# Patient Record
Sex: MALE | Race: WHITE | NOT HISPANIC OR LATINO | Employment: OTHER | URBAN - METROPOLITAN AREA
[De-identification: names, ages, dates, MRNs, and addresses within clinical notes are randomized per-mention and may not be internally consistent; named-entity substitution may affect disease eponyms.]

---

## 2017-01-04 ENCOUNTER — COMMUNICATION - HEALTHEAST (OUTPATIENT)
Dept: NURSING | Facility: CLINIC | Age: 77
End: 2017-01-04

## 2017-01-04 DIAGNOSIS — I48.91 ATRIAL FIBRILLATION, UNSPECIFIED TYPE (H): ICD-10-CM

## 2017-01-09 ENCOUNTER — AMBULATORY - HEALTHEAST (OUTPATIENT)
Dept: CARDIOLOGY | Facility: CLINIC | Age: 77
End: 2017-01-09

## 2017-01-09 DIAGNOSIS — Z95.810 ICD (IMPLANTABLE CARDIOVERTER-DEFIBRILLATOR) IN PLACE: ICD-10-CM

## 2017-01-10 ENCOUNTER — COMMUNICATION - HEALTHEAST (OUTPATIENT)
Dept: CARDIOLOGY | Facility: CLINIC | Age: 77
End: 2017-01-10

## 2017-01-17 ENCOUNTER — COMMUNICATION - HEALTHEAST (OUTPATIENT)
Dept: NURSING | Facility: CLINIC | Age: 77
End: 2017-01-17

## 2017-01-17 DIAGNOSIS — I48.91 ATRIAL FIBRILLATION, UNSPECIFIED TYPE (H): ICD-10-CM

## 2017-01-30 ENCOUNTER — COMMUNICATION - HEALTHEAST (OUTPATIENT)
Dept: NURSING | Facility: CLINIC | Age: 77
End: 2017-01-30

## 2017-01-30 DIAGNOSIS — I48.91 ATRIAL FIBRILLATION, UNSPECIFIED TYPE (H): ICD-10-CM

## 2017-02-06 ENCOUNTER — COMMUNICATION - HEALTHEAST (OUTPATIENT)
Dept: CARDIOLOGY | Facility: CLINIC | Age: 77
End: 2017-02-06

## 2017-02-06 ENCOUNTER — AMBULATORY - HEALTHEAST (OUTPATIENT)
Dept: CARDIOLOGY | Facility: CLINIC | Age: 77
End: 2017-02-06

## 2017-02-06 DIAGNOSIS — Z95.810 ICD (IMPLANTABLE CARDIOVERTER-DEFIBRILLATOR) IN PLACE: ICD-10-CM

## 2017-02-07 ENCOUNTER — COMMUNICATION - HEALTHEAST (OUTPATIENT)
Dept: SCHEDULING | Facility: CLINIC | Age: 77
End: 2017-02-07

## 2017-02-10 ENCOUNTER — OFFICE VISIT - HEALTHEAST (OUTPATIENT)
Dept: FAMILY MEDICINE | Facility: CLINIC | Age: 77
End: 2017-02-10

## 2017-02-10 ENCOUNTER — COMMUNICATION - HEALTHEAST (OUTPATIENT)
Dept: NURSING | Facility: CLINIC | Age: 77
End: 2017-02-10

## 2017-02-10 DIAGNOSIS — J40 BRONCHITIS: ICD-10-CM

## 2017-02-10 DIAGNOSIS — I48.91 ATRIAL FIBRILLATION, UNSPECIFIED TYPE (H): ICD-10-CM

## 2017-02-10 DIAGNOSIS — I48.20 CHRONIC ATRIAL FIBRILLATION (H): ICD-10-CM

## 2017-02-13 ENCOUNTER — OFFICE VISIT - HEALTHEAST (OUTPATIENT)
Dept: NEUROSURGERY | Facility: CLINIC | Age: 77
End: 2017-02-13

## 2017-02-13 ENCOUNTER — HOSPITAL ENCOUNTER (OUTPATIENT)
Dept: CT IMAGING | Facility: CLINIC | Age: 77
Discharge: HOME | End: 2017-02-13
Attending: SURGERY

## 2017-02-13 ENCOUNTER — AMBULATORY - HEALTHEAST (OUTPATIENT)
Dept: NEUROSURGERY | Facility: CLINIC | Age: 77
End: 2017-02-13

## 2017-02-13 ENCOUNTER — COMMUNICATION - HEALTHEAST (OUTPATIENT)
Dept: CARDIOLOGY | Facility: CLINIC | Age: 77
End: 2017-02-13

## 2017-02-13 DIAGNOSIS — S22.009A THORACIC SPINE FRACTURE (H): ICD-10-CM

## 2017-02-13 DIAGNOSIS — S12.9XXA MULTIPLE FRACTURES OF CERVICAL SPINE (H): ICD-10-CM

## 2017-02-13 ASSESSMENT — MIFFLIN-ST. JEOR: SCORE: 1652.01

## 2017-02-14 ENCOUNTER — COMMUNICATION - HEALTHEAST (OUTPATIENT)
Dept: NURSING | Facility: CLINIC | Age: 77
End: 2017-02-14

## 2017-02-14 ENCOUNTER — AMBULATORY - HEALTHEAST (OUTPATIENT)
Dept: LAB | Facility: CLINIC | Age: 77
End: 2017-02-14

## 2017-02-14 ENCOUNTER — OFFICE VISIT - HEALTHEAST (OUTPATIENT)
Dept: FAMILY MEDICINE | Facility: CLINIC | Age: 77
End: 2017-02-14

## 2017-02-14 DIAGNOSIS — I48.91 ATRIAL FIBRILLATION, UNSPECIFIED TYPE (H): ICD-10-CM

## 2017-02-14 DIAGNOSIS — I48.20 CHRONIC ATRIAL FIBRILLATION (H): ICD-10-CM

## 2017-02-14 DIAGNOSIS — R60.9 EDEMA, UNSPECIFIED TYPE: ICD-10-CM

## 2017-02-14 DIAGNOSIS — N18.3 CHRONIC KIDNEY DISEASE (CKD), STAGE 3 (MODERATE): ICD-10-CM

## 2017-02-14 DIAGNOSIS — I50.22 CHRONIC SYSTOLIC CONGESTIVE HEART FAILURE (H): ICD-10-CM

## 2017-02-15 ENCOUNTER — AMBULATORY - HEALTHEAST (OUTPATIENT)
Dept: NEUROSURGERY | Facility: CLINIC | Age: 77
End: 2017-02-15
Payer: MEDICARE

## 2017-02-21 ENCOUNTER — COMMUNICATION - HEALTHEAST (OUTPATIENT)
Dept: NURSING | Facility: CLINIC | Age: 77
End: 2017-02-21

## 2017-02-21 DIAGNOSIS — I48.91 ATRIAL FIBRILLATION, UNSPECIFIED TYPE (H): ICD-10-CM

## 2017-02-22 ENCOUNTER — COMMUNICATION - HEALTHEAST (OUTPATIENT)
Dept: FAMILY MEDICINE | Facility: CLINIC | Age: 77
End: 2017-02-22

## 2017-02-22 DIAGNOSIS — I48.91 ATRIAL FIBRILLATION, UNSPECIFIED TYPE (H): ICD-10-CM

## 2017-03-02 ENCOUNTER — COMMUNICATION - HEALTHEAST (OUTPATIENT)
Dept: NURSING | Facility: CLINIC | Age: 77
End: 2017-03-02

## 2017-03-02 DIAGNOSIS — I48.91 ATRIAL FIBRILLATION, UNSPECIFIED TYPE (H): ICD-10-CM

## 2017-03-09 ENCOUNTER — COMMUNICATION - HEALTHEAST (OUTPATIENT)
Dept: NURSING | Facility: CLINIC | Age: 77
End: 2017-03-09

## 2017-03-09 DIAGNOSIS — I48.91 ATRIAL FIBRILLATION, UNSPECIFIED TYPE (H): ICD-10-CM

## 2017-03-16 ENCOUNTER — COMMUNICATION - HEALTHEAST (OUTPATIENT)
Dept: NURSING | Facility: CLINIC | Age: 77
End: 2017-03-16

## 2017-03-16 DIAGNOSIS — I48.91 ATRIAL FIBRILLATION, UNSPECIFIED TYPE (H): ICD-10-CM

## 2017-03-21 ENCOUNTER — AMBULATORY - HEALTHEAST (OUTPATIENT)
Dept: HEALTH INFORMATION MANAGEMENT | Facility: CLINIC | Age: 77
End: 2017-03-21

## 2017-03-26 ENCOUNTER — COMMUNICATION - HEALTHEAST (OUTPATIENT)
Dept: FAMILY MEDICINE | Facility: CLINIC | Age: 77
End: 2017-03-26

## 2017-03-26 DIAGNOSIS — K21.9 GERD (GASTROESOPHAGEAL REFLUX DISEASE): ICD-10-CM

## 2017-03-30 ENCOUNTER — COMMUNICATION - HEALTHEAST (OUTPATIENT)
Dept: ADMINISTRATIVE | Facility: CLINIC | Age: 77
End: 2017-03-30

## 2017-03-30 ENCOUNTER — COMMUNICATION - HEALTHEAST (OUTPATIENT)
Dept: NURSING | Facility: CLINIC | Age: 77
End: 2017-03-30

## 2017-03-30 DIAGNOSIS — I48.91 ATRIAL FIBRILLATION, UNSPECIFIED TYPE (H): ICD-10-CM

## 2017-04-13 ENCOUNTER — COMMUNICATION - HEALTHEAST (OUTPATIENT)
Dept: NURSING | Facility: CLINIC | Age: 77
End: 2017-04-13

## 2017-04-13 ENCOUNTER — COMMUNICATION - HEALTHEAST (OUTPATIENT)
Dept: FAMILY MEDICINE | Facility: CLINIC | Age: 77
End: 2017-04-13

## 2017-04-13 ENCOUNTER — AMBULATORY - HEALTHEAST (OUTPATIENT)
Dept: LAB | Facility: CLINIC | Age: 77
End: 2017-04-13

## 2017-04-13 DIAGNOSIS — I48.91 ATRIAL FIBRILLATION, UNSPECIFIED TYPE (H): ICD-10-CM

## 2017-04-17 ENCOUNTER — AMBULATORY - HEALTHEAST (OUTPATIENT)
Dept: CARDIOLOGY | Facility: CLINIC | Age: 77
End: 2017-04-17

## 2017-04-17 DIAGNOSIS — Z95.810 ICD (IMPLANTABLE CARDIOVERTER-DEFIBRILLATOR) IN PLACE: ICD-10-CM

## 2017-04-26 ENCOUNTER — AMBULATORY - HEALTHEAST (OUTPATIENT)
Dept: PODIATRY | Age: 77
End: 2017-04-26

## 2017-04-26 DIAGNOSIS — L60.2 ONYCHAUXIS: ICD-10-CM

## 2017-04-26 DIAGNOSIS — M79.673 PAIN OF FOOT, UNSPECIFIED LATERALITY: ICD-10-CM

## 2017-04-27 ENCOUNTER — COMMUNICATION - HEALTHEAST (OUTPATIENT)
Dept: NURSING | Facility: CLINIC | Age: 77
End: 2017-04-27

## 2017-04-27 ENCOUNTER — AMBULATORY - HEALTHEAST (OUTPATIENT)
Dept: LAB | Facility: CLINIC | Age: 77
End: 2017-04-27

## 2017-04-27 DIAGNOSIS — I48.91 ATRIAL FIBRILLATION, UNSPECIFIED TYPE (H): ICD-10-CM

## 2017-04-28 ENCOUNTER — OFFICE VISIT - HEALTHEAST (OUTPATIENT)
Dept: ENDOCRINOLOGY | Facility: CLINIC | Age: 77
End: 2017-04-28

## 2017-04-28 DIAGNOSIS — M81.0 OSTEOPOROSIS: ICD-10-CM

## 2017-04-28 ASSESSMENT — MIFFLIN-ST. JEOR: SCORE: 1613.9

## 2017-05-08 ENCOUNTER — COMMUNICATION - HEALTHEAST (OUTPATIENT)
Dept: NEUROSURGERY | Facility: CLINIC | Age: 77
End: 2017-05-08

## 2017-05-08 ENCOUNTER — AMBULATORY - HEALTHEAST (OUTPATIENT)
Dept: NEUROSURGERY | Facility: CLINIC | Age: 77
End: 2017-05-08

## 2017-05-08 DIAGNOSIS — M46.1 SACROILIITIS, NOT ELSEWHERE CLASSIFIED (H): ICD-10-CM

## 2017-05-08 DIAGNOSIS — M54.9 BACK PAIN: ICD-10-CM

## 2017-05-09 ENCOUNTER — COMMUNICATION - HEALTHEAST (OUTPATIENT)
Dept: FAMILY MEDICINE | Facility: CLINIC | Age: 77
End: 2017-05-09

## 2017-05-09 ENCOUNTER — COMMUNICATION - HEALTHEAST (OUTPATIENT)
Dept: NURSING | Facility: CLINIC | Age: 77
End: 2017-05-09

## 2017-05-09 ENCOUNTER — AMBULATORY - HEALTHEAST (OUTPATIENT)
Dept: LAB | Facility: CLINIC | Age: 77
End: 2017-05-09

## 2017-05-09 DIAGNOSIS — I48.91 ATRIAL FIBRILLATION, UNSPECIFIED TYPE (H): ICD-10-CM

## 2017-05-15 ENCOUNTER — AMBULATORY - HEALTHEAST (OUTPATIENT)
Dept: LAB | Facility: CLINIC | Age: 77
End: 2017-05-15

## 2017-05-15 ENCOUNTER — COMMUNICATION - HEALTHEAST (OUTPATIENT)
Dept: NURSING | Facility: CLINIC | Age: 77
End: 2017-05-15

## 2017-05-15 DIAGNOSIS — I48.91 ATRIAL FIBRILLATION, UNSPECIFIED TYPE (H): ICD-10-CM

## 2017-05-16 ENCOUNTER — COMMUNICATION - HEALTHEAST (OUTPATIENT)
Dept: FAMILY MEDICINE | Facility: CLINIC | Age: 77
End: 2017-05-16

## 2017-05-18 ENCOUNTER — COMMUNICATION - HEALTHEAST (OUTPATIENT)
Dept: CARDIOLOGY | Facility: CLINIC | Age: 77
End: 2017-05-18

## 2017-05-18 DIAGNOSIS — I48.20 CHRONIC ATRIAL FIBRILLATION (H): ICD-10-CM

## 2017-05-18 DIAGNOSIS — I25.5 ISCHEMIC CARDIOMYOPATHY: ICD-10-CM

## 2017-05-22 ENCOUNTER — AMBULATORY - HEALTHEAST (OUTPATIENT)
Dept: LAB | Facility: CLINIC | Age: 77
End: 2017-05-22

## 2017-05-22 ENCOUNTER — COMMUNICATION - HEALTHEAST (OUTPATIENT)
Dept: NURSING | Facility: CLINIC | Age: 77
End: 2017-05-22

## 2017-05-22 DIAGNOSIS — I48.91 ATRIAL FIBRILLATION, UNSPECIFIED TYPE (H): ICD-10-CM

## 2017-05-23 ENCOUNTER — AMBULATORY - HEALTHEAST (OUTPATIENT)
Dept: NEUROSURGERY | Facility: CLINIC | Age: 77
End: 2017-05-23

## 2017-06-05 ENCOUNTER — COMMUNICATION - HEALTHEAST (OUTPATIENT)
Dept: ENDOCRINOLOGY | Facility: CLINIC | Age: 77
End: 2017-06-05

## 2017-06-06 ENCOUNTER — AMBULATORY - HEALTHEAST (OUTPATIENT)
Dept: LAB | Facility: CLINIC | Age: 77
End: 2017-06-06

## 2017-06-06 ENCOUNTER — COMMUNICATION - HEALTHEAST (OUTPATIENT)
Dept: NURSING | Facility: CLINIC | Age: 77
End: 2017-06-06

## 2017-06-06 DIAGNOSIS — I48.91 ATRIAL FIBRILLATION, UNSPECIFIED TYPE (H): ICD-10-CM

## 2017-06-16 ENCOUNTER — OFFICE VISIT - HEALTHEAST (OUTPATIENT)
Dept: CARDIOLOGY | Facility: CLINIC | Age: 77
End: 2017-06-16

## 2017-06-16 DIAGNOSIS — I25.810 CORONARY ARTERY DISEASE INVOLVING CORONARY BYPASS GRAFT OF NATIVE HEART WITHOUT ANGINA PECTORIS: ICD-10-CM

## 2017-06-16 DIAGNOSIS — G47.33 OBSTRUCTIVE SLEEP APNEA (ADULT) (PEDIATRIC): ICD-10-CM

## 2017-06-16 DIAGNOSIS — D68.32 WARFARIN-INDUCED COAGULOPATHY (H): ICD-10-CM

## 2017-06-16 DIAGNOSIS — I48.20 CHRONIC ATRIAL FIBRILLATION (H): ICD-10-CM

## 2017-06-16 DIAGNOSIS — I48.91 ATRIAL FIBRILLATION, UNSPECIFIED TYPE (H): ICD-10-CM

## 2017-06-16 DIAGNOSIS — I50.22 CHRONIC SYSTOLIC CONGESTIVE HEART FAILURE (H): ICD-10-CM

## 2017-06-16 DIAGNOSIS — T45.515A WARFARIN-INDUCED COAGULOPATHY (H): ICD-10-CM

## 2017-06-16 ASSESSMENT — MIFFLIN-ST. JEOR: SCORE: 1611.18

## 2017-06-19 ENCOUNTER — AMBULATORY - HEALTHEAST (OUTPATIENT)
Dept: LAB | Facility: CLINIC | Age: 77
End: 2017-06-19

## 2017-06-19 ENCOUNTER — AMBULATORY - HEALTHEAST (OUTPATIENT)
Dept: ENDOCRINOLOGY | Facility: CLINIC | Age: 77
End: 2017-06-19

## 2017-06-19 ENCOUNTER — COMMUNICATION - HEALTHEAST (OUTPATIENT)
Dept: NURSING | Facility: CLINIC | Age: 77
End: 2017-06-19

## 2017-06-19 DIAGNOSIS — M81.0 OSTEOPOROSIS: ICD-10-CM

## 2017-06-19 DIAGNOSIS — I48.91 ATRIAL FIBRILLATION, UNSPECIFIED TYPE (H): ICD-10-CM

## 2017-06-22 ENCOUNTER — OFFICE VISIT - HEALTHEAST (OUTPATIENT)
Dept: FAMILY MEDICINE | Facility: CLINIC | Age: 77
End: 2017-06-22

## 2017-06-22 ENCOUNTER — RECORDS - HEALTHEAST (OUTPATIENT)
Dept: GENERAL RADIOLOGY | Facility: CLINIC | Age: 77
End: 2017-06-22

## 2017-06-22 DIAGNOSIS — S22.39XA CLOSED RIB FRACTURE: ICD-10-CM

## 2017-06-22 DIAGNOSIS — I10 ESSENTIAL HYPERTENSION, BENIGN: ICD-10-CM

## 2017-06-22 DIAGNOSIS — T14.90XA TRAUMA: ICD-10-CM

## 2017-06-22 DIAGNOSIS — T14.90XA INJURY: ICD-10-CM

## 2017-07-03 ENCOUNTER — AMBULATORY - HEALTHEAST (OUTPATIENT)
Dept: LAB | Facility: CLINIC | Age: 77
End: 2017-07-03

## 2017-07-03 ENCOUNTER — COMMUNICATION - HEALTHEAST (OUTPATIENT)
Dept: NURSING | Facility: CLINIC | Age: 77
End: 2017-07-03

## 2017-07-03 DIAGNOSIS — I48.91 ATRIAL FIBRILLATION, UNSPECIFIED TYPE (H): ICD-10-CM

## 2017-07-13 ENCOUNTER — COMMUNICATION - HEALTHEAST (OUTPATIENT)
Dept: FAMILY MEDICINE | Facility: CLINIC | Age: 77
End: 2017-07-13

## 2017-07-13 DIAGNOSIS — I51.89 DIASTOLIC DYSFUNCTION: ICD-10-CM

## 2017-07-14 ENCOUNTER — COMMUNICATION - HEALTHEAST (OUTPATIENT)
Dept: FAMILY MEDICINE | Facility: CLINIC | Age: 77
End: 2017-07-14

## 2017-07-24 ENCOUNTER — AMBULATORY - HEALTHEAST (OUTPATIENT)
Dept: CARDIOLOGY | Facility: CLINIC | Age: 77
End: 2017-07-24

## 2017-07-24 DIAGNOSIS — Z95.810 ICD (IMPLANTABLE CARDIOVERTER-DEFIBRILLATOR) IN PLACE: ICD-10-CM

## 2017-07-25 LAB — HCC DEVICE COMMENTS: NORMAL

## 2017-08-01 ENCOUNTER — AMBULATORY - HEALTHEAST (OUTPATIENT)
Dept: LAB | Facility: CLINIC | Age: 77
End: 2017-08-01

## 2017-08-01 ENCOUNTER — COMMUNICATION - HEALTHEAST (OUTPATIENT)
Dept: NURSING | Facility: CLINIC | Age: 77
End: 2017-08-01

## 2017-08-01 DIAGNOSIS — I48.91 ATRIAL FIBRILLATION, UNSPECIFIED TYPE (H): ICD-10-CM

## 2017-08-14 ENCOUNTER — AMBULATORY - HEALTHEAST (OUTPATIENT)
Dept: LAB | Facility: CLINIC | Age: 77
End: 2017-08-14

## 2017-08-14 ENCOUNTER — COMMUNICATION - HEALTHEAST (OUTPATIENT)
Dept: NURSING | Facility: CLINIC | Age: 77
End: 2017-08-14

## 2017-08-14 DIAGNOSIS — I48.91 ATRIAL FIBRILLATION, UNSPECIFIED TYPE (H): ICD-10-CM

## 2017-08-21 ENCOUNTER — COMMUNICATION - HEALTHEAST (OUTPATIENT)
Dept: NURSING | Facility: CLINIC | Age: 77
End: 2017-08-21

## 2017-08-21 ENCOUNTER — COMMUNICATION - HEALTHEAST (OUTPATIENT)
Dept: NEUROSURGERY | Facility: CLINIC | Age: 77
End: 2017-08-21

## 2017-08-21 ENCOUNTER — AMBULATORY - HEALTHEAST (OUTPATIENT)
Dept: LAB | Facility: CLINIC | Age: 77
End: 2017-08-21

## 2017-08-21 DIAGNOSIS — I48.91 ATRIAL FIBRILLATION, UNSPECIFIED TYPE (H): ICD-10-CM

## 2017-08-21 DIAGNOSIS — M25.559 HIP PAIN: ICD-10-CM

## 2017-08-29 ENCOUNTER — COMMUNICATION - HEALTHEAST (OUTPATIENT)
Dept: FAMILY MEDICINE | Facility: CLINIC | Age: 77
End: 2017-08-29

## 2017-08-29 DIAGNOSIS — I48.91 ATRIAL FIBRILLATION, UNSPECIFIED TYPE (H): ICD-10-CM

## 2017-08-31 ENCOUNTER — AMBULATORY - HEALTHEAST (OUTPATIENT)
Dept: LAB | Facility: CLINIC | Age: 77
End: 2017-08-31

## 2017-08-31 ENCOUNTER — COMMUNICATION - HEALTHEAST (OUTPATIENT)
Dept: FAMILY MEDICINE | Facility: CLINIC | Age: 77
End: 2017-08-31

## 2017-08-31 DIAGNOSIS — I48.91 ATRIAL FIBRILLATION, UNSPECIFIED TYPE (H): ICD-10-CM

## 2017-09-07 ENCOUNTER — COMMUNICATION - HEALTHEAST (OUTPATIENT)
Dept: CARDIOLOGY | Facility: CLINIC | Age: 77
End: 2017-09-07

## 2017-09-07 DIAGNOSIS — I47.29 PAROXYSMAL VENTRICULAR TACHYCARDIA (H): ICD-10-CM

## 2017-09-07 DIAGNOSIS — M25.559 HIP PAIN: ICD-10-CM

## 2017-09-07 DIAGNOSIS — I25.5 ISCHEMIC CARDIOMYOPATHY: ICD-10-CM

## 2017-09-08 ENCOUNTER — COMMUNICATION - HEALTHEAST (OUTPATIENT)
Dept: CARDIOLOGY | Facility: CLINIC | Age: 77
End: 2017-09-08

## 2017-09-08 DIAGNOSIS — I47.29 PAROXYSMAL VENTRICULAR TACHYCARDIA (H): ICD-10-CM

## 2017-09-08 DIAGNOSIS — I25.5 ISCHEMIC CARDIOMYOPATHY: ICD-10-CM

## 2017-09-11 ENCOUNTER — COMMUNICATION - HEALTHEAST (OUTPATIENT)
Dept: ENDOCRINOLOGY | Facility: CLINIC | Age: 77
End: 2017-09-11

## 2017-09-11 ENCOUNTER — AMBULATORY - HEALTHEAST (OUTPATIENT)
Dept: ENDOCRINOLOGY | Facility: CLINIC | Age: 77
End: 2017-09-11

## 2017-09-12 ENCOUNTER — AMBULATORY - HEALTHEAST (OUTPATIENT)
Dept: LAB | Facility: CLINIC | Age: 77
End: 2017-09-12

## 2017-09-12 ENCOUNTER — COMMUNICATION - HEALTHEAST (OUTPATIENT)
Dept: NURSING | Facility: CLINIC | Age: 77
End: 2017-09-12

## 2017-09-12 DIAGNOSIS — I48.91 ATRIAL FIBRILLATION, UNSPECIFIED TYPE (H): ICD-10-CM

## 2017-09-14 ENCOUNTER — COMMUNICATION - HEALTHEAST (OUTPATIENT)
Dept: FAMILY MEDICINE | Facility: CLINIC | Age: 77
End: 2017-09-14

## 2017-09-14 DIAGNOSIS — E78.5 DYSLIPIDEMIA: ICD-10-CM

## 2017-09-24 ENCOUNTER — COMMUNICATION - HEALTHEAST (OUTPATIENT)
Dept: FAMILY MEDICINE | Facility: CLINIC | Age: 77
End: 2017-09-24

## 2017-09-24 DIAGNOSIS — K21.9 GERD (GASTROESOPHAGEAL REFLUX DISEASE): ICD-10-CM

## 2017-09-25 ENCOUNTER — AMBULATORY - HEALTHEAST (OUTPATIENT)
Dept: NEUROSURGERY | Facility: CLINIC | Age: 77
End: 2017-09-25

## 2017-09-26 ENCOUNTER — AMBULATORY - HEALTHEAST (OUTPATIENT)
Dept: LAB | Facility: CLINIC | Age: 77
End: 2017-09-26

## 2017-09-26 ENCOUNTER — COMMUNICATION - HEALTHEAST (OUTPATIENT)
Dept: NURSING | Facility: CLINIC | Age: 77
End: 2017-09-26

## 2017-09-26 DIAGNOSIS — I48.91 ATRIAL FIBRILLATION, UNSPECIFIED TYPE (H): ICD-10-CM

## 2017-09-27 ENCOUNTER — OFFICE VISIT - HEALTHEAST (OUTPATIENT)
Dept: NEUROSURGERY | Facility: CLINIC | Age: 77
End: 2017-09-27

## 2017-09-27 DIAGNOSIS — M48.062 LUMBAR STENOSIS WITH NEUROGENIC CLAUDICATION: ICD-10-CM

## 2017-09-27 DIAGNOSIS — M43.16 SPONDYLOLISTHESIS OF LUMBAR REGION: ICD-10-CM

## 2017-09-27 DIAGNOSIS — M53.3 SACROILIAC JOINT DYSFUNCTION OF RIGHT SIDE: ICD-10-CM

## 2017-09-27 ASSESSMENT — MIFFLIN-ST. JEOR: SCORE: 1611.18

## 2017-10-02 ENCOUNTER — AMBULATORY - HEALTHEAST (OUTPATIENT)
Dept: CARDIOLOGY | Facility: CLINIC | Age: 77
End: 2017-10-02

## 2017-10-02 DIAGNOSIS — I50.22 CHRONIC SYSTOLIC CONGESTIVE HEART FAILURE (H): ICD-10-CM

## 2017-10-02 DIAGNOSIS — I48.91 ATRIAL FIBRILLATION (H): ICD-10-CM

## 2017-10-04 ENCOUNTER — AMBULATORY - HEALTHEAST (OUTPATIENT)
Dept: PODIATRY | Age: 77
End: 2017-10-04

## 2017-10-04 ENCOUNTER — AMBULATORY - HEALTHEAST (OUTPATIENT)
Dept: CARDIOLOGY | Facility: CLINIC | Age: 77
End: 2017-10-04

## 2017-10-04 DIAGNOSIS — M79.673 PAIN OF FOOT, UNSPECIFIED LATERALITY: ICD-10-CM

## 2017-10-04 DIAGNOSIS — I48.21 PERMANENT ATRIAL FIBRILLATION (H): ICD-10-CM

## 2017-10-04 DIAGNOSIS — I50.22 CHRONIC SYSTOLIC CONGESTIVE HEART FAILURE (H): ICD-10-CM

## 2017-10-04 DIAGNOSIS — Z95.810 ICD (IMPLANTABLE CARDIOVERTER-DEFIBRILLATOR), SINGLE, IN SITU: ICD-10-CM

## 2017-10-04 DIAGNOSIS — L60.2 ONYCHAUXIS: ICD-10-CM

## 2017-10-04 DIAGNOSIS — I25.5 ISCHEMIC CARDIOMYOPATHY: ICD-10-CM

## 2017-10-04 DIAGNOSIS — I47.29 PAROXYSMAL VENTRICULAR TACHYCARDIA (H): ICD-10-CM

## 2017-10-04 DIAGNOSIS — Z95.810 PRESENCE OF AUTOMATIC CARDIOVERTER/DEFIBRILLATOR (AICD): ICD-10-CM

## 2017-10-04 LAB
ATRIAL RATE - MUSE: 65 BPM
DIASTOLIC BLOOD PRESSURE - MUSE: NORMAL MMHG
HCC DEVICE COMMENTS: NORMAL
INTERPRETATION ECG - MUSE: NORMAL
P AXIS - MUSE: NORMAL DEGREES
PR INTERVAL - MUSE: NORMAL MS
QRS DURATION - MUSE: 172 MS
QT - MUSE: 468 MS
QTC - MUSE: 471 MS
R AXIS - MUSE: -34 DEGREES
SYSTOLIC BLOOD PRESSURE - MUSE: NORMAL MMHG
T AXIS - MUSE: 171 DEGREES
VENTRICULAR RATE- MUSE: 61 BPM

## 2017-10-04 ASSESSMENT — MIFFLIN-ST. JEOR
SCORE: 1597.57
SCORE: 1597.57

## 2017-10-05 ENCOUNTER — COMMUNICATION - HEALTHEAST (OUTPATIENT)
Dept: FAMILY MEDICINE | Facility: CLINIC | Age: 77
End: 2017-10-05

## 2017-10-13 ENCOUNTER — HOSPITAL ENCOUNTER (OUTPATIENT)
Dept: CARDIOLOGY | Facility: HOSPITAL | Age: 77
Discharge: HOME | End: 2017-10-13
Attending: INTERNAL MEDICINE

## 2017-10-13 DIAGNOSIS — I50.22 CHRONIC SYSTOLIC CONGESTIVE HEART FAILURE (H): ICD-10-CM

## 2017-10-13 DIAGNOSIS — I48.91 ATRIAL FIBRILLATION (H): ICD-10-CM

## 2017-10-13 LAB
AORTIC VALVE MEAN VELOCITY: 95.6 CM/S
AV DIMENSIONLESS INDEX VTI: 0.7
AV MEAN GRADIENT: 4 MMHG
AV PEAK GRADIENT: 8 MMHG
AV VALVE AREA: 2.5 CM2
AV VELOCITY RATIO: 0.8
BSA FOR ECHO PROCEDURE: 2.1 M2
CV BLOOD PRESSURE: NORMAL MMHG
CV ECHO HEIGHT: 69 IN
CV ECHO WEIGHT: 200 LBS
DOP CALC AO PEAK VEL: 141 CM/S
DOP CALC AO VTI: 31.6 CM
DOP CALC LVOT AREA: 3.46 CM2
DOP CALC LVOT DIAMETER: 2.1 CM
DOP CALC LVOT PEAK VEL: 110 CM/S
DOP CALC LVOT STROKE VOLUME: 80 CM3
DOP CALCLVOT PEAK VEL VTI: 23.1 CM
ECHO EJECTION FRACTION ESTIMATED: 35 %
LA AREA 1: 28.8 CM2
LA AREA 2: 30 CM2
LEFT ATRIUM LENGTH: 6.9 CM
LEFT ATRIUM SIZE: 5.2 CM
LEFT ATRIUM VOLUME INDEX: 50.7 ML/M2
LEFT ATRIUM VOLUME: 106.4 CM3
LEFT VENTRICULAR OUTFLOW TRACT MEAN GRADIENT: 2 MMHG
LEFT VENTRICULAR OUTFLOW TRACT MEAN VELOCITY: 76.3 CM/S
LEFT VENTRICULAR OUTFLOW TRACT PEAK GRADIENT: 5 MMHG
LV STROKE VOLUME INDEX: 38.1 ML/M2
MV AVERAGE E/E' RATIO: 12.6 CM/S
MV DECELERATION TIME: 175 MS
MV E'TISSUE VEL-LAT: 9.19 CM/S
MV E'TISSUE VEL-MED: 5.51 CM/S
MV LATERAL E/E' RATIO: 10.1
MV MEDIAL E/E' RATIO: 16.8
MV PEAK E VELOCITY: 92.6 CM/S
NUC REST DIASTOLIC VOLUME INDEX: 3200 LBS
NUC REST SYSTOLIC VOLUME INDEX: 69 IN
TRICUSPID REGURGITATION PEAK PRESSURE GRADIENT: 29.8 MMHG
TRICUSPID VALVE ANULAR PLANE SYSTOLIC EXCURSION: 1.2 CM
TRICUSPID VALVE PEAK REGURGITANT VELOCITY: 273 CM/S

## 2017-10-13 ASSESSMENT — MIFFLIN-ST. JEOR: SCORE: 1597.57

## 2017-10-17 ENCOUNTER — COMMUNICATION - HEALTHEAST (OUTPATIENT)
Dept: NURSING | Facility: CLINIC | Age: 77
End: 2017-10-17

## 2017-10-17 ENCOUNTER — OFFICE VISIT - HEALTHEAST (OUTPATIENT)
Dept: FAMILY MEDICINE | Facility: CLINIC | Age: 77
End: 2017-10-17

## 2017-10-17 DIAGNOSIS — K21.9 GASTROESOPHAGEAL REFLUX DISEASE WITHOUT ESOPHAGITIS: ICD-10-CM

## 2017-10-17 DIAGNOSIS — I50.22 CHRONIC SYSTOLIC CONGESTIVE HEART FAILURE (H): ICD-10-CM

## 2017-10-17 DIAGNOSIS — N18.30 STAGE 3 CHRONIC KIDNEY DISEASE (H): ICD-10-CM

## 2017-10-17 DIAGNOSIS — G47.33 OBSTRUCTIVE SLEEP APNEA: ICD-10-CM

## 2017-10-17 DIAGNOSIS — M25.569 KNEE PAIN: ICD-10-CM

## 2017-10-17 DIAGNOSIS — D64.9 ANEMIA: ICD-10-CM

## 2017-10-17 DIAGNOSIS — I10 ESSENTIAL HYPERTENSION, BENIGN: ICD-10-CM

## 2017-10-17 DIAGNOSIS — E78.5 HYPERLIPIDEMIA, UNSPECIFIED HYPERLIPIDEMIA TYPE: ICD-10-CM

## 2017-10-17 DIAGNOSIS — M81.0 OSTEOPOROSIS: ICD-10-CM

## 2017-10-17 DIAGNOSIS — H16.139 ACTINIC KERATITIS: ICD-10-CM

## 2017-10-17 DIAGNOSIS — Z00.00 ROUTINE GENERAL MEDICAL EXAMINATION AT A HEALTH CARE FACILITY: ICD-10-CM

## 2017-10-17 DIAGNOSIS — I25.10 ATHEROSCLEROSIS OF NATIVE CORONARY ARTERY OF NATIVE HEART WITHOUT ANGINA PECTORIS: ICD-10-CM

## 2017-10-17 DIAGNOSIS — I48.91 ATRIAL FIBRILLATION, UNSPECIFIED TYPE (H): ICD-10-CM

## 2017-10-17 DIAGNOSIS — M43.16 SPONDYLOLISTHESIS OF LUMBAR REGION: ICD-10-CM

## 2017-10-17 LAB
CHOLEST SERPL-MCNC: 149 MG/DL
FASTING STATUS PATIENT QL REPORTED: YES
HDLC SERPL-MCNC: 41 MG/DL
LDLC SERPL CALC-MCNC: 96 MG/DL
TRIGL SERPL-MCNC: 59 MG/DL

## 2017-10-17 ASSESSMENT — MIFFLIN-ST. JEOR: SCORE: 1615.72

## 2017-10-19 ENCOUNTER — COMMUNICATION - HEALTHEAST (OUTPATIENT)
Dept: FAMILY MEDICINE | Facility: CLINIC | Age: 77
End: 2017-10-19

## 2017-10-25 ENCOUNTER — OFFICE VISIT - HEALTHEAST (OUTPATIENT)
Dept: PHYSICAL THERAPY | Facility: REHABILITATION | Age: 77
End: 2017-10-25

## 2017-10-25 DIAGNOSIS — M25.561 CHRONIC PAIN OF BOTH KNEES: ICD-10-CM

## 2017-10-25 DIAGNOSIS — M25.562 CHRONIC PAIN OF BOTH KNEES: ICD-10-CM

## 2017-10-25 DIAGNOSIS — M62.81 MUSCLE WEAKNESS (GENERALIZED): ICD-10-CM

## 2017-10-25 DIAGNOSIS — G89.29 CHRONIC PAIN OF BOTH KNEES: ICD-10-CM

## 2017-11-01 ENCOUNTER — OFFICE VISIT - HEALTHEAST (OUTPATIENT)
Dept: PHYSICAL THERAPY | Facility: REHABILITATION | Age: 77
End: 2017-11-01

## 2017-11-01 DIAGNOSIS — M25.561 CHRONIC PAIN OF BOTH KNEES: ICD-10-CM

## 2017-11-01 DIAGNOSIS — M62.81 MUSCLE WEAKNESS (GENERALIZED): ICD-10-CM

## 2017-11-01 DIAGNOSIS — G89.29 CHRONIC PAIN OF BOTH KNEES: ICD-10-CM

## 2017-11-01 DIAGNOSIS — M25.562 CHRONIC PAIN OF BOTH KNEES: ICD-10-CM

## 2017-11-03 ENCOUNTER — OFFICE VISIT - HEALTHEAST (OUTPATIENT)
Dept: CARDIOLOGY | Facility: CLINIC | Age: 77
End: 2017-11-03

## 2017-11-03 ENCOUNTER — AMBULATORY - HEALTHEAST (OUTPATIENT)
Dept: CARDIOLOGY | Facility: CLINIC | Age: 77
End: 2017-11-03

## 2017-11-03 DIAGNOSIS — I50.22 CHRONIC SYSTOLIC CONGESTIVE HEART FAILURE (H): ICD-10-CM

## 2017-11-03 DIAGNOSIS — I25.5 ISCHEMIC CARDIOMYOPATHY: ICD-10-CM

## 2017-11-03 ASSESSMENT — MIFFLIN-ST. JEOR: SCORE: 1620.25

## 2017-11-15 ENCOUNTER — COMMUNICATION - HEALTHEAST (OUTPATIENT)
Dept: CARDIOLOGY | Facility: CLINIC | Age: 77
End: 2017-11-15

## 2017-11-15 ENCOUNTER — OFFICE VISIT - HEALTHEAST (OUTPATIENT)
Dept: PHYSICAL THERAPY | Facility: REHABILITATION | Age: 77
End: 2017-11-15

## 2017-11-15 DIAGNOSIS — G89.29 CHRONIC PAIN OF BOTH KNEES: ICD-10-CM

## 2017-11-15 DIAGNOSIS — M53.3 CHRONIC RIGHT SACROILIAC JOINT PAIN: ICD-10-CM

## 2017-11-15 DIAGNOSIS — M62.81 MUSCLE WEAKNESS (GENERALIZED): ICD-10-CM

## 2017-11-15 DIAGNOSIS — M62.81 GENERALIZED MUSCLE WEAKNESS: ICD-10-CM

## 2017-11-15 DIAGNOSIS — G89.29 CHRONIC RIGHT SACROILIAC JOINT PAIN: ICD-10-CM

## 2017-11-15 DIAGNOSIS — M25.562 CHRONIC PAIN OF BOTH KNEES: ICD-10-CM

## 2017-11-15 DIAGNOSIS — I25.5 ISCHEMIC CARDIOMYOPATHY: ICD-10-CM

## 2017-11-15 DIAGNOSIS — M25.561 CHRONIC PAIN OF BOTH KNEES: ICD-10-CM

## 2017-11-15 DIAGNOSIS — I48.20 CHRONIC ATRIAL FIBRILLATION (H): ICD-10-CM

## 2017-11-17 ENCOUNTER — COMMUNICATION - HEALTHEAST (OUTPATIENT)
Dept: NURSING | Facility: CLINIC | Age: 77
End: 2017-11-17

## 2017-11-17 ENCOUNTER — AMBULATORY - HEALTHEAST (OUTPATIENT)
Dept: LAB | Facility: CLINIC | Age: 77
End: 2017-11-17

## 2017-11-17 ENCOUNTER — COMMUNICATION - HEALTHEAST (OUTPATIENT)
Dept: FAMILY MEDICINE | Facility: CLINIC | Age: 77
End: 2017-11-17

## 2017-11-17 DIAGNOSIS — I48.91 ATRIAL FIBRILLATION, UNSPECIFIED TYPE (H): ICD-10-CM

## 2017-11-17 DIAGNOSIS — S22.39XA CLOSED RIB FRACTURE: ICD-10-CM

## 2017-11-29 ENCOUNTER — HOSPITAL ENCOUNTER (OUTPATIENT)
Dept: PHYSICAL MEDICINE AND REHAB | Facility: CLINIC | Age: 77
Discharge: HOME | End: 2017-11-29
Attending: NEUROLOGICAL SURGERY

## 2017-11-29 ENCOUNTER — OFFICE VISIT - HEALTHEAST (OUTPATIENT)
Dept: PHYSICAL THERAPY | Facility: REHABILITATION | Age: 77
End: 2017-11-29

## 2017-11-29 DIAGNOSIS — M53.3 SACROILIAC JOINT DYSFUNCTION OF RIGHT SIDE: ICD-10-CM

## 2017-11-29 DIAGNOSIS — G89.29 CHRONIC PAIN OF BOTH KNEES: ICD-10-CM

## 2017-11-29 DIAGNOSIS — Z79.01 ON WARFARIN THERAPY: ICD-10-CM

## 2017-11-29 DIAGNOSIS — M62.81 MUSCLE WEAKNESS (GENERALIZED): ICD-10-CM

## 2017-11-29 DIAGNOSIS — M53.3 CHRONIC RIGHT SACROILIAC JOINT PAIN: ICD-10-CM

## 2017-11-29 DIAGNOSIS — G89.29 CHRONIC RIGHT SACROILIAC JOINT PAIN: ICD-10-CM

## 2017-11-29 DIAGNOSIS — M25.562 CHRONIC PAIN OF BOTH KNEES: ICD-10-CM

## 2017-11-29 DIAGNOSIS — M25.561 CHRONIC PAIN OF BOTH KNEES: ICD-10-CM

## 2017-12-04 ENCOUNTER — COMMUNICATION - HEALTHEAST (OUTPATIENT)
Dept: CARDIOLOGY | Facility: CLINIC | Age: 77
End: 2017-12-04

## 2017-12-04 DIAGNOSIS — I47.29 PAROXYSMAL VENTRICULAR TACHYCARDIA (H): ICD-10-CM

## 2017-12-04 DIAGNOSIS — I25.5 ISCHEMIC CARDIOMYOPATHY: ICD-10-CM

## 2017-12-05 ENCOUNTER — COMMUNICATION - HEALTHEAST (OUTPATIENT)
Dept: FAMILY MEDICINE | Facility: CLINIC | Age: 77
End: 2017-12-05

## 2017-12-05 DIAGNOSIS — E78.5 DYSLIPIDEMIA: ICD-10-CM

## 2017-12-07 ENCOUNTER — COMMUNICATION - HEALTHEAST (OUTPATIENT)
Dept: NURSING | Facility: CLINIC | Age: 77
End: 2017-12-07

## 2017-12-07 ENCOUNTER — AMBULATORY - HEALTHEAST (OUTPATIENT)
Dept: LAB | Facility: CLINIC | Age: 77
End: 2017-12-07

## 2017-12-07 DIAGNOSIS — I48.91 ATRIAL FIBRILLATION, UNSPECIFIED TYPE (H): ICD-10-CM

## 2017-12-08 ENCOUNTER — OFFICE VISIT - HEALTHEAST (OUTPATIENT)
Dept: CARDIOLOGY | Facility: CLINIC | Age: 77
End: 2017-12-08

## 2017-12-08 DIAGNOSIS — D68.32 WARFARIN-INDUCED COAGULOPATHY (H): ICD-10-CM

## 2017-12-08 DIAGNOSIS — N18.30 STAGE 3 CHRONIC KIDNEY DISEASE (H): ICD-10-CM

## 2017-12-08 DIAGNOSIS — I25.5 ISCHEMIC CARDIOMYOPATHY: ICD-10-CM

## 2017-12-08 DIAGNOSIS — I48.21 PERMANENT ATRIAL FIBRILLATION (H): ICD-10-CM

## 2017-12-08 DIAGNOSIS — T45.515A WARFARIN-INDUCED COAGULOPATHY (H): ICD-10-CM

## 2017-12-08 DIAGNOSIS — Z95.810 PRESENCE OF AUTOMATIC CARDIOVERTER/DEFIBRILLATOR (AICD): ICD-10-CM

## 2017-12-08 ASSESSMENT — MIFFLIN-ST. JEOR: SCORE: 1606.65

## 2017-12-11 ENCOUNTER — HOSPITAL ENCOUNTER (OUTPATIENT)
Dept: PHYSICAL MEDICINE AND REHAB | Facility: CLINIC | Age: 77
Discharge: HOME | End: 2017-12-11
Attending: NURSE PRACTITIONER

## 2017-12-11 DIAGNOSIS — M48.062 SPINAL STENOSIS OF LUMBAR REGION WITH NEUROGENIC CLAUDICATION: ICD-10-CM

## 2017-12-11 DIAGNOSIS — M53.3 SACROILIAC JOINT DYSFUNCTION OF RIGHT SIDE: ICD-10-CM

## 2017-12-11 DIAGNOSIS — Z79.01 ON WARFARIN THERAPY: ICD-10-CM

## 2017-12-11 ASSESSMENT — MIFFLIN-ST. JEOR: SCORE: 1615.72

## 2017-12-20 ENCOUNTER — COMMUNICATION - HEALTHEAST (OUTPATIENT)
Dept: FAMILY MEDICINE | Facility: CLINIC | Age: 77
End: 2017-12-20

## 2017-12-21 ENCOUNTER — AMBULATORY - HEALTHEAST (OUTPATIENT)
Dept: PHYSICAL MEDICINE AND REHAB | Facility: CLINIC | Age: 77
End: 2017-12-21

## 2017-12-21 ENCOUNTER — COMMUNICATION - HEALTHEAST (OUTPATIENT)
Dept: NURSING | Facility: CLINIC | Age: 77
End: 2017-12-21

## 2017-12-21 ENCOUNTER — COMMUNICATION - HEALTHEAST (OUTPATIENT)
Dept: PHYSICAL MEDICINE AND REHAB | Facility: CLINIC | Age: 77
End: 2017-12-21

## 2017-12-21 DIAGNOSIS — M48.062 SPINAL STENOSIS OF LUMBAR REGION WITH NEUROGENIC CLAUDICATION: ICD-10-CM

## 2018-01-03 ENCOUNTER — COMMUNICATION - HEALTHEAST (OUTPATIENT)
Dept: NURSING | Facility: CLINIC | Age: 78
End: 2018-01-03

## 2018-01-03 DIAGNOSIS — I48.21 PERMANENT ATRIAL FIBRILLATION (H): ICD-10-CM

## 2018-01-09 ENCOUNTER — AMBULATORY - HEALTHEAST (OUTPATIENT)
Dept: CARDIOLOGY | Facility: CLINIC | Age: 78
End: 2018-01-09

## 2018-01-09 DIAGNOSIS — Z95.810 ICD (IMPLANTABLE CARDIOVERTER-DEFIBRILLATOR), SINGLE, IN SITU: ICD-10-CM

## 2018-01-10 LAB — HCC DEVICE COMMENTS: NORMAL

## 2018-01-11 ENCOUNTER — COMMUNICATION - HEALTHEAST (OUTPATIENT)
Dept: NURSING | Facility: CLINIC | Age: 78
End: 2018-01-11

## 2018-01-11 LAB — INR PPP: 2.9 (ref 0.9–1.1)

## 2018-01-12 ENCOUNTER — COMMUNICATION - HEALTHEAST (OUTPATIENT)
Dept: FAMILY MEDICINE | Facility: CLINIC | Age: 78
End: 2018-01-12

## 2018-01-12 DIAGNOSIS — R13.10 DYSPHAGIA: ICD-10-CM

## 2018-01-16 ENCOUNTER — COMMUNICATION - HEALTHEAST (OUTPATIENT)
Dept: FAMILY MEDICINE | Facility: CLINIC | Age: 78
End: 2018-01-16

## 2018-01-24 ENCOUNTER — COMMUNICATION - HEALTHEAST (OUTPATIENT)
Dept: NURSING | Facility: CLINIC | Age: 78
End: 2018-01-24

## 2018-01-24 LAB — INR PPP: 1.2 (ref 0.9–1.1)

## 2018-01-30 ENCOUNTER — COMMUNICATION - HEALTHEAST (OUTPATIENT)
Dept: NURSING | Facility: CLINIC | Age: 78
End: 2018-01-30

## 2018-01-30 LAB — INR PPP: 2 (ref 0.9–1.1)

## 2018-02-02 ENCOUNTER — COMMUNICATION - HEALTHEAST (OUTPATIENT)
Dept: NURSING | Facility: CLINIC | Age: 78
End: 2018-02-02

## 2018-02-02 ENCOUNTER — COMMUNICATION - HEALTHEAST (OUTPATIENT)
Dept: FAMILY MEDICINE | Facility: CLINIC | Age: 78
End: 2018-02-02

## 2018-02-02 LAB — INR PPP: 2.6 (ref 0.9–1.1)

## 2018-02-05 ENCOUNTER — COMMUNICATION - HEALTHEAST (OUTPATIENT)
Dept: NURSING | Facility: CLINIC | Age: 78
End: 2018-02-05

## 2018-02-05 LAB — INR PPP: 3.3 (ref 0.9–1.1)

## 2018-02-14 ENCOUNTER — AMBULATORY - HEALTHEAST (OUTPATIENT)
Dept: PODIATRY | Age: 78
End: 2018-02-14

## 2018-02-14 DIAGNOSIS — L60.2 ONYCHAUXIS: ICD-10-CM

## 2018-02-14 DIAGNOSIS — M79.673 PAIN OF FOOT, UNSPECIFIED LATERALITY: ICD-10-CM

## 2018-02-15 ENCOUNTER — AMBULATORY - HEALTHEAST (OUTPATIENT)
Dept: PHYSICAL MEDICINE AND REHAB | Facility: CLINIC | Age: 78
End: 2018-02-15

## 2018-02-15 ENCOUNTER — HOSPITAL ENCOUNTER (OUTPATIENT)
Dept: PHYSICAL MEDICINE AND REHAB | Facility: CLINIC | Age: 78
Discharge: HOME | End: 2018-02-15
Attending: PHYSICAL MEDICINE & REHABILITATION

## 2018-02-15 DIAGNOSIS — M48.062 SPINAL STENOSIS OF LUMBAR REGION WITH NEUROGENIC CLAUDICATION: ICD-10-CM

## 2018-02-15 DIAGNOSIS — M53.3 SACROILIAC JOINT DYSFUNCTION OF RIGHT SIDE: ICD-10-CM

## 2018-02-15 DIAGNOSIS — Z79.01 ON WARFARIN THERAPY: ICD-10-CM

## 2018-02-15 LAB — POC INR - HE - HISTORICAL: 1.5 (ref 0.9–1.1)

## 2018-02-16 ENCOUNTER — RECORDS - HEALTHEAST (OUTPATIENT)
Dept: ADMINISTRATIVE | Facility: OTHER | Age: 78
End: 2018-02-16

## 2018-02-21 ENCOUNTER — COMMUNICATION - HEALTHEAST (OUTPATIENT)
Dept: NURSING | Facility: CLINIC | Age: 78
End: 2018-02-21

## 2018-02-21 LAB — INR PPP: 2.3 (ref 0.9–1.1)

## 2018-02-27 ENCOUNTER — COMMUNICATION - HEALTHEAST (OUTPATIENT)
Dept: FAMILY MEDICINE | Facility: CLINIC | Age: 78
End: 2018-02-27

## 2018-02-27 DIAGNOSIS — E78.5 DYSLIPIDEMIA: ICD-10-CM

## 2018-02-27 DIAGNOSIS — I48.91 ATRIAL FIBRILLATION, UNSPECIFIED TYPE (H): ICD-10-CM

## 2018-02-28 ENCOUNTER — COMMUNICATION - HEALTHEAST (OUTPATIENT)
Dept: INTERNAL MEDICINE | Facility: CLINIC | Age: 78
End: 2018-02-28

## 2018-02-28 ENCOUNTER — COMMUNICATION - HEALTHEAST (OUTPATIENT)
Dept: FAMILY MEDICINE | Facility: CLINIC | Age: 78
End: 2018-02-28

## 2018-02-28 DIAGNOSIS — I48.91 ATRIAL FIBRILLATION, UNSPECIFIED TYPE (H): ICD-10-CM

## 2018-02-28 LAB — INR PPP: 2.8 (ref 0.9–1.1)

## 2018-03-19 ENCOUNTER — COMMUNICATION - HEALTHEAST (OUTPATIENT)
Dept: FAMILY MEDICINE | Facility: CLINIC | Age: 78
End: 2018-03-19

## 2018-03-19 DIAGNOSIS — K21.9 GERD (GASTROESOPHAGEAL REFLUX DISEASE): ICD-10-CM

## 2018-03-21 ENCOUNTER — COMMUNICATION - HEALTHEAST (OUTPATIENT)
Dept: NURSING | Facility: CLINIC | Age: 78
End: 2018-03-21

## 2018-03-21 LAB — INR PPP: 3.2 (ref 0.9–1.1)

## 2018-04-09 ENCOUNTER — AMBULATORY - HEALTHEAST (OUTPATIENT)
Dept: LAB | Facility: CLINIC | Age: 78
End: 2018-04-09

## 2018-04-09 ENCOUNTER — COMMUNICATION - HEALTHEAST (OUTPATIENT)
Dept: ANTICOAGULATION | Facility: CLINIC | Age: 78
End: 2018-04-09

## 2018-04-09 DIAGNOSIS — I48.21 PERMANENT ATRIAL FIBRILLATION (H): ICD-10-CM

## 2018-04-09 LAB — INR PPP: 4.3 (ref 0.9–1.1)

## 2018-04-11 ENCOUNTER — AMBULATORY - HEALTHEAST (OUTPATIENT)
Dept: CARDIOLOGY | Facility: CLINIC | Age: 78
End: 2018-04-11

## 2018-04-11 DIAGNOSIS — Z95.810 ICD (IMPLANTABLE CARDIOVERTER-DEFIBRILLATOR), SINGLE, IN SITU: ICD-10-CM

## 2018-04-11 LAB — HCC DEVICE COMMENTS: NORMAL

## 2018-04-19 ENCOUNTER — COMMUNICATION - HEALTHEAST (OUTPATIENT)
Dept: ANTICOAGULATION | Facility: CLINIC | Age: 78
End: 2018-04-19

## 2018-04-19 ENCOUNTER — AMBULATORY - HEALTHEAST (OUTPATIENT)
Dept: LAB | Facility: CLINIC | Age: 78
End: 2018-04-19

## 2018-04-19 DIAGNOSIS — I48.21 PERMANENT ATRIAL FIBRILLATION (H): ICD-10-CM

## 2018-04-19 LAB — INR PPP: 3.1 (ref 0.9–1.1)

## 2018-05-02 ENCOUNTER — COMMUNICATION - HEALTHEAST (OUTPATIENT)
Dept: ANTICOAGULATION | Facility: CLINIC | Age: 78
End: 2018-05-02

## 2018-05-02 ENCOUNTER — COMMUNICATION - HEALTHEAST (OUTPATIENT)
Dept: FAMILY MEDICINE | Facility: CLINIC | Age: 78
End: 2018-05-02

## 2018-05-02 ENCOUNTER — AMBULATORY - HEALTHEAST (OUTPATIENT)
Dept: LAB | Facility: CLINIC | Age: 78
End: 2018-05-02

## 2018-05-02 DIAGNOSIS — H91.90 HEARING LOSS: ICD-10-CM

## 2018-05-02 DIAGNOSIS — I48.21 PERMANENT ATRIAL FIBRILLATION (H): ICD-10-CM

## 2018-05-02 LAB — INR PPP: 2.5 (ref 0.9–1.1)

## 2018-05-08 ENCOUNTER — HOSPITAL ENCOUNTER (OUTPATIENT)
Dept: PHYSICAL MEDICINE AND REHAB | Facility: CLINIC | Age: 78
Discharge: HOME | End: 2018-05-08
Attending: NURSE PRACTITIONER

## 2018-05-08 DIAGNOSIS — M54.16 RIGHT LUMBAR RADICULITIS: ICD-10-CM

## 2018-05-08 DIAGNOSIS — M79.18 MYOFASCIAL PAIN: ICD-10-CM

## 2018-05-08 DIAGNOSIS — M48.061 LUMBAR STENOSIS: ICD-10-CM

## 2018-05-08 DIAGNOSIS — M53.3 SACROILIAC JOINT DYSFUNCTION OF RIGHT SIDE: ICD-10-CM

## 2018-05-08 DIAGNOSIS — M48.061 LUMBAR FORAMINAL STENOSIS: ICD-10-CM

## 2018-05-14 ENCOUNTER — HOSPITAL ENCOUNTER (OUTPATIENT)
Dept: CT IMAGING | Facility: HOSPITAL | Age: 78
Discharge: HOME | End: 2018-05-14

## 2018-05-14 DIAGNOSIS — M48.061 LUMBAR FORAMINAL STENOSIS: ICD-10-CM

## 2018-05-14 DIAGNOSIS — M99.83 OTHER BIOMECHANICAL LESIONS OF LUMBAR REGION: ICD-10-CM

## 2018-05-14 DIAGNOSIS — M54.16 RIGHT LUMBAR RADICULITIS: ICD-10-CM

## 2018-05-14 DIAGNOSIS — M48.061 LUMBAR STENOSIS: ICD-10-CM

## 2018-05-15 ENCOUNTER — COMMUNICATION - HEALTHEAST (OUTPATIENT)
Dept: PHYSICAL MEDICINE AND REHAB | Facility: CLINIC | Age: 78
End: 2018-05-15

## 2018-05-15 ENCOUNTER — OFFICE VISIT - HEALTHEAST (OUTPATIENT)
Dept: AUDIOLOGY | Facility: CLINIC | Age: 78
End: 2018-05-15

## 2018-05-15 DIAGNOSIS — M54.50 LOW BACK PAIN: ICD-10-CM

## 2018-05-15 DIAGNOSIS — M48.061 LUMBAR STENOSIS: ICD-10-CM

## 2018-05-15 DIAGNOSIS — H90.3 SENSORINEURAL HEARING LOSS, BILATERAL: ICD-10-CM

## 2018-05-16 ENCOUNTER — COMMUNICATION - HEALTHEAST (OUTPATIENT)
Dept: ANTICOAGULATION | Facility: CLINIC | Age: 78
End: 2018-05-16

## 2018-05-16 ENCOUNTER — OFFICE VISIT - HEALTHEAST (OUTPATIENT)
Dept: FAMILY MEDICINE | Facility: CLINIC | Age: 78
End: 2018-05-16

## 2018-05-16 ENCOUNTER — AMBULATORY - HEALTHEAST (OUTPATIENT)
Dept: PODIATRY | Age: 78
End: 2018-05-16

## 2018-05-16 DIAGNOSIS — L60.2 ONYCHAUXIS: ICD-10-CM

## 2018-05-16 DIAGNOSIS — I48.21 PERMANENT ATRIAL FIBRILLATION (H): ICD-10-CM

## 2018-05-16 DIAGNOSIS — M79.673 PAIN OF FOOT, UNSPECIFIED LATERALITY: ICD-10-CM

## 2018-05-16 DIAGNOSIS — R19.7 DIARRHEA: ICD-10-CM

## 2018-05-16 LAB
ALBUMIN SERPL-MCNC: 3.5 G/DL (ref 3.5–5)
ALP SERPL-CCNC: 210 U/L (ref 45–120)
ALT SERPL W P-5'-P-CCNC: 13 U/L (ref 0–45)
ANION GAP SERPL CALCULATED.3IONS-SCNC: 12 MMOL/L (ref 5–18)
AST SERPL W P-5'-P-CCNC: 15 U/L (ref 0–40)
BASOPHILS # BLD AUTO: 0 THOU/UL (ref 0–0.2)
BASOPHILS NFR BLD AUTO: 0 % (ref 0–2)
BILIRUB SERPL-MCNC: 0.5 MG/DL (ref 0–1)
BUN SERPL-MCNC: 20 MG/DL (ref 8–28)
CALCIUM SERPL-MCNC: 9.2 MG/DL (ref 8.5–10.5)
CHLORIDE BLD-SCNC: 106 MMOL/L (ref 98–107)
CO2 SERPL-SCNC: 24 MMOL/L (ref 22–31)
CREAT SERPL-MCNC: 1.51 MG/DL (ref 0.7–1.3)
EOSINOPHIL # BLD AUTO: 0.6 THOU/UL (ref 0–0.4)
EOSINOPHIL NFR BLD AUTO: 7 % (ref 0–6)
ERYTHROCYTE [DISTWIDTH] IN BLOOD BY AUTOMATED COUNT: 12.3 % (ref 11–14.5)
GFR SERPL CREATININE-BSD FRML MDRD: 45 ML/MIN/1.73M2
GLUCOSE BLD-MCNC: 91 MG/DL (ref 70–125)
HCT VFR BLD AUTO: 31.5 % (ref 40–54)
HGB BLD-MCNC: 10.5 G/DL (ref 14–18)
INR PPP: 2.5 (ref 0.9–1.1)
LYMPHOCYTES # BLD AUTO: 1.3 THOU/UL (ref 0.8–4.4)
LYMPHOCYTES NFR BLD AUTO: 17 % (ref 20–40)
MCH RBC QN AUTO: 30.2 PG (ref 27–34)
MCHC RBC AUTO-ENTMCNC: 33.4 G/DL (ref 32–36)
MCV RBC AUTO: 90 FL (ref 80–100)
MONOCYTES # BLD AUTO: 0.8 THOU/UL (ref 0–0.9)
MONOCYTES NFR BLD AUTO: 11 % (ref 2–10)
NEUTROPHILS # BLD AUTO: 4.8 THOU/UL (ref 2–7.7)
NEUTROPHILS NFR BLD AUTO: 65 % (ref 50–70)
PLATELET # BLD AUTO: 159 THOU/UL (ref 140–440)
PMV BLD AUTO: 6.7 FL (ref 7–10)
POTASSIUM BLD-SCNC: 4.2 MMOL/L (ref 3.5–5)
PROT SERPL-MCNC: 6.6 G/DL (ref 6–8)
RBC # BLD AUTO: 3.49 MILL/UL (ref 4.4–6.2)
SODIUM SERPL-SCNC: 142 MMOL/L (ref 136–145)
WBC: 7.5 THOU/UL (ref 4–11)

## 2018-05-16 ASSESSMENT — MIFFLIN-ST. JEOR: SCORE: 1567.18

## 2018-05-21 ENCOUNTER — RECORDS - HEALTHEAST (OUTPATIENT)
Dept: ADMINISTRATIVE | Facility: OTHER | Age: 78
End: 2018-05-21

## 2018-05-22 ENCOUNTER — COMMUNICATION - HEALTHEAST (OUTPATIENT)
Dept: FAMILY MEDICINE | Facility: CLINIC | Age: 78
End: 2018-05-22

## 2018-05-23 ENCOUNTER — COMMUNICATION - HEALTHEAST (OUTPATIENT)
Dept: PHYSICAL MEDICINE AND REHAB | Facility: CLINIC | Age: 78
End: 2018-05-23

## 2018-05-23 ENCOUNTER — HOSPITAL ENCOUNTER (OUTPATIENT)
Dept: PHYSICAL MEDICINE AND REHAB | Facility: CLINIC | Age: 78
Discharge: HOME | End: 2018-05-23
Attending: PHYSICAL MEDICINE & REHABILITATION

## 2018-05-23 DIAGNOSIS — I48.21 PERMANENT ATRIAL FIBRILLATION (H): ICD-10-CM

## 2018-05-23 DIAGNOSIS — M54.50 LOW BACK PAIN: ICD-10-CM

## 2018-05-23 DIAGNOSIS — M53.3 SACROILIAC JOINT DYSFUNCTION OF RIGHT SIDE: ICD-10-CM

## 2018-05-23 LAB — POC INR - HE - HISTORICAL: 2.5 (ref 0.9–1.1)

## 2018-05-25 ENCOUNTER — COMMUNICATION - HEALTHEAST (OUTPATIENT)
Dept: FAMILY MEDICINE | Facility: CLINIC | Age: 78
End: 2018-05-25

## 2018-06-04 ENCOUNTER — OFFICE VISIT - HEALTHEAST (OUTPATIENT)
Dept: CARDIOLOGY | Facility: CLINIC | Age: 78
End: 2018-06-04

## 2018-06-04 DIAGNOSIS — I48.20 CHRONIC ATRIAL FIBRILLATION (H): ICD-10-CM

## 2018-06-04 DIAGNOSIS — I25.5 ISCHEMIC CARDIOMYOPATHY: ICD-10-CM

## 2018-06-04 ASSESSMENT — MIFFLIN-ST. JEOR: SCORE: 1574.89

## 2018-06-06 ENCOUNTER — HOSPITAL ENCOUNTER (OUTPATIENT)
Dept: PHYSICAL MEDICINE AND REHAB | Facility: CLINIC | Age: 78
Discharge: HOME | End: 2018-06-06
Attending: NURSE PRACTITIONER

## 2018-06-06 DIAGNOSIS — M53.3 SACROILIAC JOINT DYSFUNCTION OF RIGHT SIDE: ICD-10-CM

## 2018-06-06 DIAGNOSIS — M47.816 LUMBAR FACET ARTHROPATHY: ICD-10-CM

## 2018-06-06 DIAGNOSIS — M48.061 LUMBAR STENOSIS: ICD-10-CM

## 2018-06-06 DIAGNOSIS — M25.562 CHRONIC PAIN OF BOTH KNEES: ICD-10-CM

## 2018-06-06 DIAGNOSIS — M54.50 CHRONIC RIGHT-SIDED LOW BACK PAIN WITHOUT SCIATICA: ICD-10-CM

## 2018-06-06 DIAGNOSIS — M48.061 LUMBAR FORAMINAL STENOSIS: ICD-10-CM

## 2018-06-06 DIAGNOSIS — M25.561 CHRONIC PAIN OF BOTH KNEES: ICD-10-CM

## 2018-06-06 DIAGNOSIS — G89.29 CHRONIC PAIN OF BOTH KNEES: ICD-10-CM

## 2018-06-06 DIAGNOSIS — M54.16 RIGHT LUMBAR RADICULITIS: ICD-10-CM

## 2018-06-06 DIAGNOSIS — G89.29 CHRONIC RIGHT-SIDED LOW BACK PAIN WITHOUT SCIATICA: ICD-10-CM

## 2018-06-13 ENCOUNTER — AMBULATORY - HEALTHEAST (OUTPATIENT)
Dept: LAB | Facility: CLINIC | Age: 78
End: 2018-06-13

## 2018-06-13 ENCOUNTER — COMMUNICATION - HEALTHEAST (OUTPATIENT)
Dept: ANTICOAGULATION | Facility: CLINIC | Age: 78
End: 2018-06-13

## 2018-06-13 DIAGNOSIS — I48.21 PERMANENT ATRIAL FIBRILLATION (H): ICD-10-CM

## 2018-06-13 LAB — INR PPP: 1.9 (ref 0.9–1.1)

## 2018-06-15 ENCOUNTER — HOSPITAL ENCOUNTER (OUTPATIENT)
Dept: PHYSICAL MEDICINE AND REHAB | Facility: CLINIC | Age: 78
Discharge: HOME | End: 2018-06-15
Attending: PHYSICAL MEDICINE & REHABILITATION

## 2018-06-15 DIAGNOSIS — G89.29 CHRONIC RIGHT-SIDED LOW BACK PAIN WITHOUT SCIATICA: ICD-10-CM

## 2018-06-15 DIAGNOSIS — M47.816 LUMBAR FACET ARTHROPATHY: ICD-10-CM

## 2018-06-15 DIAGNOSIS — M54.50 CHRONIC RIGHT-SIDED LOW BACK PAIN WITHOUT SCIATICA: ICD-10-CM

## 2018-06-15 DIAGNOSIS — M12.88 OTHER SPECIFIC ARTHROPATHIES, NOT ELSEWHERE CLASSIFIED, OTHER SPECIFIED SITE: ICD-10-CM

## 2018-06-15 DIAGNOSIS — I47.29 PAROXYSMAL VENTRICULAR TACHYCARDIA (H): ICD-10-CM

## 2018-06-15 LAB — POC INR - HE - HISTORICAL: 2 (ref 0.9–1.1)

## 2018-06-26 ENCOUNTER — HOSPITAL ENCOUNTER (OUTPATIENT)
Dept: PHYSICAL MEDICINE AND REHAB | Facility: CLINIC | Age: 78
Discharge: HOME | End: 2018-06-26
Attending: NURSE PRACTITIONER

## 2018-06-26 DIAGNOSIS — M48.061 LUMBAR FORAMINAL STENOSIS: ICD-10-CM

## 2018-06-26 DIAGNOSIS — M47.816 LUMBAR FACET ARTHROPATHY: ICD-10-CM

## 2018-06-26 DIAGNOSIS — M48.061 LUMBAR STENOSIS: ICD-10-CM

## 2018-06-26 DIAGNOSIS — G89.29 CHRONIC RIGHT-SIDED LOW BACK PAIN WITHOUT SCIATICA: ICD-10-CM

## 2018-06-26 DIAGNOSIS — M53.3 SACROILIAC JOINT DYSFUNCTION OF RIGHT SIDE: ICD-10-CM

## 2018-06-26 DIAGNOSIS — M54.50 CHRONIC RIGHT-SIDED LOW BACK PAIN WITHOUT SCIATICA: ICD-10-CM

## 2018-06-28 ENCOUNTER — AMBULATORY - HEALTHEAST (OUTPATIENT)
Dept: LAB | Facility: CLINIC | Age: 78
End: 2018-06-28

## 2018-06-28 ENCOUNTER — COMMUNICATION - HEALTHEAST (OUTPATIENT)
Dept: ANTICOAGULATION | Facility: CLINIC | Age: 78
End: 2018-06-28

## 2018-06-28 DIAGNOSIS — I48.21 PERMANENT ATRIAL FIBRILLATION (H): ICD-10-CM

## 2018-06-28 LAB — INR PPP: 4.5 (ref 0.9–1.1)

## 2018-07-02 ENCOUNTER — COMMUNICATION - HEALTHEAST (OUTPATIENT)
Dept: FAMILY MEDICINE | Facility: CLINIC | Age: 78
End: 2018-07-02

## 2018-07-02 DIAGNOSIS — L60.2 ONYCHAUXIS: ICD-10-CM

## 2018-07-09 ENCOUNTER — COMMUNICATION - HEALTHEAST (OUTPATIENT)
Dept: ANTICOAGULATION | Facility: CLINIC | Age: 78
End: 2018-07-09

## 2018-07-09 ENCOUNTER — OFFICE VISIT - HEALTHEAST (OUTPATIENT)
Dept: PODIATRY | Facility: CLINIC | Age: 78
End: 2018-07-09

## 2018-07-09 ENCOUNTER — AMBULATORY - HEALTHEAST (OUTPATIENT)
Dept: LAB | Facility: CLINIC | Age: 78
End: 2018-07-09

## 2018-07-09 ENCOUNTER — COMMUNICATION - HEALTHEAST (OUTPATIENT)
Dept: FAMILY MEDICINE | Facility: CLINIC | Age: 78
End: 2018-07-09

## 2018-07-09 DIAGNOSIS — S22.39XA CLOSED RIB FRACTURE: ICD-10-CM

## 2018-07-09 DIAGNOSIS — I48.21 PERMANENT ATRIAL FIBRILLATION (H): ICD-10-CM

## 2018-07-09 DIAGNOSIS — L60.2 ONYCHAUXIS: ICD-10-CM

## 2018-07-09 DIAGNOSIS — B35.1 NAIL FUNGUS: ICD-10-CM

## 2018-07-09 DIAGNOSIS — M21.6X9 PLANTAR FLEXED METATARSAL, UNSPECIFIED LATERALITY: ICD-10-CM

## 2018-07-09 LAB — INR PPP: 3.3 (ref 0.9–1.1)

## 2018-07-11 ENCOUNTER — HOSPITAL ENCOUNTER (OUTPATIENT)
Dept: PHYSICAL MEDICINE AND REHAB | Facility: CLINIC | Age: 78
Discharge: HOME | End: 2018-07-11
Attending: PHYSICAL MEDICINE & REHABILITATION

## 2018-07-11 DIAGNOSIS — M54.50 CHRONIC RIGHT-SIDED LOW BACK PAIN WITHOUT SCIATICA: ICD-10-CM

## 2018-07-11 DIAGNOSIS — I47.29 PAROXYSMAL VENTRICULAR TACHYCARDIA (H): ICD-10-CM

## 2018-07-11 DIAGNOSIS — M99.83 OTHER BIOMECHANICAL LESIONS OF LUMBAR REGION: ICD-10-CM

## 2018-07-11 DIAGNOSIS — M48.061 LUMBAR FORAMINAL STENOSIS: ICD-10-CM

## 2018-07-11 DIAGNOSIS — G89.29 CHRONIC RIGHT-SIDED LOW BACK PAIN WITHOUT SCIATICA: ICD-10-CM

## 2018-07-11 DIAGNOSIS — M53.3 SACROILIAC JOINT DYSFUNCTION: ICD-10-CM

## 2018-07-11 LAB — POC INR - HE - HISTORICAL: 2.7 (ref 0.9–1.1)

## 2018-07-16 ENCOUNTER — AMBULATORY - HEALTHEAST (OUTPATIENT)
Dept: LAB | Facility: CLINIC | Age: 78
End: 2018-07-16

## 2018-07-16 ENCOUNTER — COMMUNICATION - HEALTHEAST (OUTPATIENT)
Dept: ANTICOAGULATION | Facility: CLINIC | Age: 78
End: 2018-07-16

## 2018-07-16 DIAGNOSIS — I48.21 PERMANENT ATRIAL FIBRILLATION (H): ICD-10-CM

## 2018-07-16 LAB — INR PPP: 2.3 (ref 0.9–1.1)

## 2018-07-17 ENCOUNTER — AMBULATORY - HEALTHEAST (OUTPATIENT)
Dept: CARDIOLOGY | Facility: CLINIC | Age: 78
End: 2018-07-17

## 2018-07-17 DIAGNOSIS — Z95.810 ICD (IMPLANTABLE CARDIOVERTER-DEFIBRILLATOR), SINGLE, IN SITU: ICD-10-CM

## 2018-07-17 LAB
HCC DEVICE COMMENTS: NORMAL
HCC DEVICE IMPLANTING PROVIDER: NORMAL
HCC DEVICE MANUFACTURE: NORMAL
HCC DEVICE MODEL: NORMAL
HCC DEVICE SERIAL NUMBER: NORMAL
HCC DEVICE TYPE: NORMAL

## 2018-08-06 ENCOUNTER — COMMUNICATION - HEALTHEAST (OUTPATIENT)
Dept: ANTICOAGULATION | Facility: CLINIC | Age: 78
End: 2018-08-06

## 2018-08-07 ENCOUNTER — AMBULATORY - HEALTHEAST (OUTPATIENT)
Dept: LAB | Facility: CLINIC | Age: 78
End: 2018-08-07

## 2018-08-07 ENCOUNTER — COMMUNICATION - HEALTHEAST (OUTPATIENT)
Dept: ANTICOAGULATION | Facility: CLINIC | Age: 78
End: 2018-08-07

## 2018-08-07 DIAGNOSIS — I48.21 PERMANENT ATRIAL FIBRILLATION (H): ICD-10-CM

## 2018-08-07 LAB — INR PPP: 4.9 (ref 0.9–1.1)

## 2018-08-14 ENCOUNTER — COMMUNICATION - HEALTHEAST (OUTPATIENT)
Dept: ANTICOAGULATION | Facility: CLINIC | Age: 78
End: 2018-08-14

## 2018-08-14 ENCOUNTER — AMBULATORY - HEALTHEAST (OUTPATIENT)
Dept: LAB | Facility: CLINIC | Age: 78
End: 2018-08-14

## 2018-08-14 ENCOUNTER — COMMUNICATION - HEALTHEAST (OUTPATIENT)
Dept: FAMILY MEDICINE | Facility: CLINIC | Age: 78
End: 2018-08-14

## 2018-08-14 DIAGNOSIS — I50.22 CHRONIC SYSTOLIC CONGESTIVE HEART FAILURE (H): ICD-10-CM

## 2018-08-14 DIAGNOSIS — I48.21 PERMANENT ATRIAL FIBRILLATION (H): ICD-10-CM

## 2018-08-14 LAB — INR PPP: 2.7 (ref 0.9–1.1)

## 2018-08-20 ENCOUNTER — HOSPITAL ENCOUNTER (OUTPATIENT)
Dept: CT IMAGING | Facility: HOSPITAL | Age: 78
Discharge: HOME | End: 2018-08-20
Attending: NURSE PRACTITIONER

## 2018-08-20 ENCOUNTER — OFFICE VISIT - HEALTHEAST (OUTPATIENT)
Dept: FAMILY MEDICINE | Facility: CLINIC | Age: 78
End: 2018-08-20

## 2018-08-20 DIAGNOSIS — R10.84 ABDOMINAL PAIN, GENERALIZED: ICD-10-CM

## 2018-08-20 DIAGNOSIS — R19.7 DIARRHEA, UNSPECIFIED TYPE: ICD-10-CM

## 2018-08-20 DIAGNOSIS — K50.919 CROHN'S DISEASE WITH COMPLICATION, UNSPECIFIED GASTROINTESTINAL TRACT LOCATION (H): ICD-10-CM

## 2018-08-20 LAB
ALBUMIN SERPL-MCNC: 3.2 G/DL (ref 3.5–5)
ALP SERPL-CCNC: 290 U/L (ref 45–120)
ALT SERPL W P-5'-P-CCNC: 15 U/L (ref 0–45)
ANION GAP SERPL CALCULATED.3IONS-SCNC: 10 MMOL/L (ref 5–18)
AST SERPL W P-5'-P-CCNC: 15 U/L (ref 0–40)
BILIRUB SERPL-MCNC: 0.5 MG/DL (ref 0–1)
BUN SERPL-MCNC: 22 MG/DL (ref 8–28)
CALCIUM SERPL-MCNC: 8.8 MG/DL (ref 8.5–10.5)
CHLORIDE BLD-SCNC: 107 MMOL/L (ref 98–107)
CO2 SERPL-SCNC: 23 MMOL/L (ref 22–31)
CREAT BLD-MCNC: 1.6 MG/DL
CREAT SERPL-MCNC: 1.53 MG/DL (ref 0.7–1.3)
ERYTHROCYTE [DISTWIDTH] IN BLOOD BY AUTOMATED COUNT: 12.8 % (ref 11–14.5)
GFR SERPL CREATININE-BSD FRML MDRD: 44 ML/MIN/1.73M2
GLUCOSE BLD-MCNC: 109 MG/DL (ref 70–125)
HCT VFR BLD AUTO: 28.9 % (ref 40–54)
HGB BLD-MCNC: 9.9 G/DL (ref 14–18)
MCH RBC QN AUTO: 30 PG (ref 27–34)
MCHC RBC AUTO-ENTMCNC: 34.1 G/DL (ref 32–36)
MCV RBC AUTO: 88 FL (ref 80–100)
PLATELET # BLD AUTO: 205 THOU/UL (ref 140–440)
PMV BLD AUTO: 6.5 FL (ref 7–10)
POTASSIUM BLD-SCNC: 3.9 MMOL/L (ref 3.5–5)
PROT SERPL-MCNC: 6.2 G/DL (ref 6–8)
RBC # BLD AUTO: 3.29 MILL/UL (ref 4.4–6.2)
SODIUM SERPL-SCNC: 140 MMOL/L (ref 136–145)
WBC: 11.8 THOU/UL (ref 4–11)

## 2018-08-20 ASSESSMENT — MIFFLIN-ST. JEOR: SCORE: 1535.88

## 2018-08-21 ENCOUNTER — AMBULATORY - HEALTHEAST (OUTPATIENT)
Dept: FAMILY MEDICINE | Facility: CLINIC | Age: 78
End: 2018-08-21

## 2018-08-21 ENCOUNTER — COMMUNICATION - HEALTHEAST (OUTPATIENT)
Dept: FAMILY MEDICINE | Facility: CLINIC | Age: 78
End: 2018-08-21

## 2018-08-21 ENCOUNTER — COMMUNICATION - HEALTHEAST (OUTPATIENT)
Dept: UROLOGY | Facility: CLINIC | Age: 78
End: 2018-08-21

## 2018-08-21 DIAGNOSIS — N20.0 NEPHROLITHIASIS: ICD-10-CM

## 2018-08-22 ENCOUNTER — OFFICE VISIT - HEALTHEAST (OUTPATIENT)
Dept: UROLOGY | Facility: CLINIC | Age: 78
End: 2018-08-22

## 2018-08-22 DIAGNOSIS — N20.9 URINARY TRACT STONES: ICD-10-CM

## 2018-08-22 DIAGNOSIS — N20.1 CALCULUS OF URETER: ICD-10-CM

## 2018-08-22 LAB
ALBUMIN UR-MCNC: ABNORMAL MG/DL
APPEARANCE UR: CLEAR
BILIRUB UR QL STRIP: NEGATIVE
COLOR UR AUTO: YELLOW
GLUCOSE UR STRIP-MCNC: NEGATIVE MG/DL
HGB UR QL STRIP: NEGATIVE
KETONES UR STRIP-MCNC: NEGATIVE MG/DL
LEUKOCYTE ESTERASE UR QL STRIP: NEGATIVE
NITRATE UR QL: NEGATIVE
PH UR STRIP: 5 [PH] (ref 5–8)
SP GR UR STRIP: 1.01 (ref 1–1.03)
UROBILINOGEN UR STRIP-ACNC: ABNORMAL

## 2018-08-27 ENCOUNTER — COMMUNICATION - HEALTHEAST (OUTPATIENT)
Dept: CARDIOLOGY | Facility: CLINIC | Age: 78
End: 2018-08-27

## 2018-08-27 DIAGNOSIS — I48.20 CHRONIC ATRIAL FIBRILLATION (H): ICD-10-CM

## 2018-08-27 DIAGNOSIS — I25.5 ISCHEMIC CARDIOMYOPATHY: ICD-10-CM

## 2018-08-28 ENCOUNTER — AMBULATORY - HEALTHEAST (OUTPATIENT)
Dept: CARDIOLOGY | Facility: CLINIC | Age: 78
End: 2018-08-28

## 2018-08-28 DIAGNOSIS — Z95.810 PRESENCE OF AUTOMATIC CARDIOVERTER/DEFIBRILLATOR (AICD): ICD-10-CM

## 2018-08-28 ASSESSMENT — MIFFLIN-ST. JEOR: SCORE: 1529.53

## 2018-08-29 ENCOUNTER — OFFICE VISIT - HEALTHEAST (OUTPATIENT)
Dept: UROLOGY | Facility: CLINIC | Age: 78
End: 2018-08-29

## 2018-08-29 ENCOUNTER — HOSPITAL ENCOUNTER (OUTPATIENT)
Dept: RADIOLOGY | Facility: CLINIC | Age: 78
Discharge: HOME | End: 2018-08-29
Attending: SPECIALIST

## 2018-08-29 ENCOUNTER — AMBULATORY - HEALTHEAST (OUTPATIENT)
Dept: LAB | Facility: CLINIC | Age: 78
End: 2018-08-29

## 2018-08-29 ENCOUNTER — COMMUNICATION - HEALTHEAST (OUTPATIENT)
Dept: ANTICOAGULATION | Facility: CLINIC | Age: 78
End: 2018-08-29

## 2018-08-29 ENCOUNTER — HOSPITAL ENCOUNTER (OUTPATIENT)
Dept: CT IMAGING | Facility: CLINIC | Age: 78
Discharge: HOME | End: 2018-08-29
Attending: SPECIALIST

## 2018-08-29 DIAGNOSIS — N20.9 URINARY TRACT STONES: ICD-10-CM

## 2018-08-29 DIAGNOSIS — N20.1 CALCULUS OF URETER: ICD-10-CM

## 2018-08-29 DIAGNOSIS — I48.21 PERMANENT ATRIAL FIBRILLATION (H): ICD-10-CM

## 2018-08-29 LAB
ALBUMIN UR-MCNC: NEGATIVE MG/DL
APPEARANCE UR: CLEAR
BILIRUB UR QL STRIP: NEGATIVE
COLOR UR AUTO: YELLOW
GLUCOSE UR STRIP-MCNC: NEGATIVE MG/DL
HGB UR QL STRIP: NEGATIVE
INR PPP: 2.9 (ref 0.9–1.1)
KETONES UR STRIP-MCNC: NEGATIVE MG/DL
LEUKOCYTE ESTERASE UR QL STRIP: ABNORMAL
NITRATE UR QL: NEGATIVE
PH UR STRIP: 5 [PH] (ref 5–8)
SP GR UR STRIP: 1.01 (ref 1–1.03)
UROBILINOGEN UR STRIP-ACNC: ABNORMAL

## 2018-09-04 ENCOUNTER — OFFICE VISIT - HEALTHEAST (OUTPATIENT)
Dept: FAMILY MEDICINE | Facility: CLINIC | Age: 78
End: 2018-09-04

## 2018-09-04 DIAGNOSIS — M54.9 CVA TENDERNESS: ICD-10-CM

## 2018-09-04 DIAGNOSIS — R42 LIGHTHEADEDNESS: ICD-10-CM

## 2018-09-04 DIAGNOSIS — R19.7 DIARRHEA: ICD-10-CM

## 2018-09-04 LAB
ALBUMIN SERPL-MCNC: 3.5 G/DL (ref 3.5–5)
ALBUMIN UR-MCNC: NEGATIVE MG/DL
ALP SERPL-CCNC: 281 U/L (ref 45–120)
ALT SERPL W P-5'-P-CCNC: 11 U/L (ref 0–45)
ANION GAP SERPL CALCULATED.3IONS-SCNC: 11 MMOL/L (ref 5–18)
APPEARANCE UR: CLEAR
AST SERPL W P-5'-P-CCNC: 14 U/L (ref 0–40)
BASOPHILS # BLD AUTO: 0 THOU/UL (ref 0–0.2)
BASOPHILS NFR BLD AUTO: 0 % (ref 0–2)
BILIRUB SERPL-MCNC: 0.5 MG/DL (ref 0–1)
BILIRUB UR QL STRIP: NEGATIVE
BUN SERPL-MCNC: 27 MG/DL (ref 8–28)
CALCIUM SERPL-MCNC: 9.3 MG/DL (ref 8.5–10.5)
CHLORIDE BLD-SCNC: 106 MMOL/L (ref 98–107)
CO2 SERPL-SCNC: 23 MMOL/L (ref 22–31)
COLOR UR AUTO: YELLOW
CREAT SERPL-MCNC: 1.94 MG/DL (ref 0.7–1.3)
EOSINOPHIL # BLD AUTO: 0.7 THOU/UL (ref 0–0.4)
EOSINOPHIL NFR BLD AUTO: 7 % (ref 0–6)
ERYTHROCYTE [DISTWIDTH] IN BLOOD BY AUTOMATED COUNT: 12.8 % (ref 11–14.5)
GFR SERPL CREATININE-BSD FRML MDRD: 34 ML/MIN/1.73M2
GLUCOSE BLD-MCNC: 95 MG/DL (ref 70–125)
GLUCOSE UR STRIP-MCNC: NEGATIVE MG/DL
HCT VFR BLD AUTO: 30.3 % (ref 40–54)
HGB BLD-MCNC: 10.3 G/DL (ref 14–18)
HGB UR QL STRIP: NEGATIVE
KETONES UR STRIP-MCNC: NEGATIVE MG/DL
LEUKOCYTE ESTERASE UR QL STRIP: NEGATIVE
LYMPHOCYTES # BLD AUTO: 1.3 THOU/UL (ref 0.8–4.4)
LYMPHOCYTES NFR BLD AUTO: 14 % (ref 20–40)
MCH RBC QN AUTO: 29.8 PG (ref 27–34)
MCHC RBC AUTO-ENTMCNC: 33.9 G/DL (ref 32–36)
MCV RBC AUTO: 88 FL (ref 80–100)
MONOCYTES # BLD AUTO: 1 THOU/UL (ref 0–0.9)
MONOCYTES NFR BLD AUTO: 11 % (ref 2–10)
NEUTROPHILS # BLD AUTO: 6.4 THOU/UL (ref 2–7.7)
NEUTROPHILS NFR BLD AUTO: 68 % (ref 50–70)
NITRATE UR QL: NEGATIVE
PH UR STRIP: 5 [PH] (ref 5–8)
PLATELET # BLD AUTO: 236 THOU/UL (ref 140–440)
PMV BLD AUTO: 6.4 FL (ref 7–10)
POTASSIUM BLD-SCNC: 4.2 MMOL/L (ref 3.5–5)
PROT SERPL-MCNC: 6.6 G/DL (ref 6–8)
RBC # BLD AUTO: 3.45 MILL/UL (ref 4.4–6.2)
SODIUM SERPL-SCNC: 140 MMOL/L (ref 136–145)
SP GR UR STRIP: 1.02 (ref 1–1.03)
UROBILINOGEN UR STRIP-ACNC: NORMAL
WBC: 9.4 THOU/UL (ref 4–11)

## 2018-09-04 ASSESSMENT — MIFFLIN-ST. JEOR: SCORE: 1515.93

## 2018-09-05 ENCOUNTER — COMMUNICATION - HEALTHEAST (OUTPATIENT)
Dept: FAMILY MEDICINE | Facility: CLINIC | Age: 78
End: 2018-09-05

## 2018-09-06 ENCOUNTER — COMMUNICATION - HEALTHEAST (OUTPATIENT)
Dept: FAMILY MEDICINE | Facility: CLINIC | Age: 78
End: 2018-09-06

## 2018-09-10 ENCOUNTER — COMMUNICATION - HEALTHEAST (OUTPATIENT)
Dept: CARE COORDINATION | Facility: CLINIC | Age: 78
End: 2018-09-10

## 2018-09-10 ENCOUNTER — COMMUNICATION - HEALTHEAST (OUTPATIENT)
Dept: ANTICOAGULATION | Facility: CLINIC | Age: 78
End: 2018-09-10

## 2018-09-11 ENCOUNTER — OFFICE VISIT - HEALTHEAST (OUTPATIENT)
Dept: FAMILY MEDICINE | Facility: CLINIC | Age: 78
End: 2018-09-11

## 2018-09-11 DIAGNOSIS — I50.22 CHRONIC SYSTOLIC CONGESTIVE HEART FAILURE (H): ICD-10-CM

## 2018-09-11 DIAGNOSIS — N18.30 STAGE 3 CHRONIC KIDNEY DISEASE (H): ICD-10-CM

## 2018-09-11 DIAGNOSIS — K52.9 ILEITIS: ICD-10-CM

## 2018-09-11 DIAGNOSIS — D64.9 ANEMIA: ICD-10-CM

## 2018-09-11 DIAGNOSIS — R59.9 ADENOPATHY: ICD-10-CM

## 2018-09-11 DIAGNOSIS — N39.0 URINARY TRACT INFECTION: ICD-10-CM

## 2018-09-11 DIAGNOSIS — I48.21 PERMANENT ATRIAL FIBRILLATION (H): ICD-10-CM

## 2018-09-11 DIAGNOSIS — R74.8 ELEVATED ALKALINE PHOSPHATASE LEVEL: ICD-10-CM

## 2018-09-11 LAB
ALBUMIN SERPL-MCNC: 3.3 G/DL (ref 3.5–5)
ALP SERPL-CCNC: 212 U/L (ref 45–120)
ALT SERPL W P-5'-P-CCNC: 10 U/L (ref 0–45)
ANION GAP SERPL CALCULATED.3IONS-SCNC: 11 MMOL/L (ref 5–18)
AST SERPL W P-5'-P-CCNC: 14 U/L (ref 0–40)
BILIRUB SERPL-MCNC: 0.5 MG/DL (ref 0–1)
BUN SERPL-MCNC: 14 MG/DL (ref 8–28)
CALCIUM SERPL-MCNC: 9 MG/DL (ref 8.5–10.5)
CHLORIDE BLD-SCNC: 108 MMOL/L (ref 98–107)
CO2 SERPL-SCNC: 23 MMOL/L (ref 22–31)
CREAT SERPL-MCNC: 1.77 MG/DL (ref 0.7–1.3)
ERYTHROCYTE [DISTWIDTH] IN BLOOD BY AUTOMATED COUNT: 13.2 % (ref 11–14.5)
GFR SERPL CREATININE-BSD FRML MDRD: 37 ML/MIN/1.73M2
GGT SERPL-CCNC: 126 U/L (ref 0–50)
GLUCOSE BLD-MCNC: 100 MG/DL (ref 70–125)
HCT VFR BLD AUTO: 28 % (ref 40–54)
HGB BLD-MCNC: 9.6 G/DL (ref 14–18)
MCH RBC QN AUTO: 29.7 PG (ref 27–34)
MCHC RBC AUTO-ENTMCNC: 34.2 G/DL (ref 32–36)
MCV RBC AUTO: 87 FL (ref 80–100)
PLATELET # BLD AUTO: 198 THOU/UL (ref 140–440)
PMV BLD AUTO: 7.2 FL (ref 7–10)
POTASSIUM BLD-SCNC: 4 MMOL/L (ref 3.5–5)
PROT SERPL-MCNC: 6 G/DL (ref 6–8)
RBC # BLD AUTO: 3.23 MILL/UL (ref 4.4–6.2)
SODIUM SERPL-SCNC: 142 MMOL/L (ref 136–145)
WBC: 7.6 THOU/UL (ref 4–11)

## 2018-09-11 ASSESSMENT — MIFFLIN-ST. JEOR: SCORE: 1506.85

## 2018-09-12 ENCOUNTER — COMMUNICATION - HEALTHEAST (OUTPATIENT)
Dept: CARDIOLOGY | Facility: CLINIC | Age: 78
End: 2018-09-12

## 2018-09-12 DIAGNOSIS — I47.29 PAROXYSMAL VENTRICULAR TACHYCARDIA (H): ICD-10-CM

## 2018-09-12 DIAGNOSIS — I25.5 ISCHEMIC CARDIOMYOPATHY: ICD-10-CM

## 2018-09-13 ENCOUNTER — COMMUNICATION - HEALTHEAST (OUTPATIENT)
Dept: ANTICOAGULATION | Facility: CLINIC | Age: 78
End: 2018-09-13

## 2018-09-13 ENCOUNTER — AMBULATORY - HEALTHEAST (OUTPATIENT)
Dept: LAB | Facility: CLINIC | Age: 78
End: 2018-09-13

## 2018-09-13 DIAGNOSIS — I48.21 PERMANENT ATRIAL FIBRILLATION (H): ICD-10-CM

## 2018-09-13 LAB
INR PPP: 1.5 (ref 0.9–1.1)
MITOCHONDRIAL M2 ABY IGG: 1 U/ML

## 2018-09-14 ENCOUNTER — RECORDS - HEALTHEAST (OUTPATIENT)
Dept: ADMINISTRATIVE | Facility: OTHER | Age: 78
End: 2018-09-14

## 2018-09-18 ENCOUNTER — COMMUNICATION - HEALTHEAST (OUTPATIENT)
Dept: SCHEDULING | Facility: CLINIC | Age: 78
End: 2018-09-18

## 2018-09-18 DIAGNOSIS — K52.9 COLITIS: ICD-10-CM

## 2018-09-21 ENCOUNTER — OFFICE VISIT - HEALTHEAST (OUTPATIENT)
Dept: UROLOGY | Facility: CLINIC | Age: 78
End: 2018-09-21

## 2018-09-21 ENCOUNTER — HOSPITAL ENCOUNTER (OUTPATIENT)
Dept: CT IMAGING | Facility: CLINIC | Age: 78
Discharge: HOME | End: 2018-09-21
Attending: SPECIALIST

## 2018-09-21 DIAGNOSIS — R10.31 RIGHT LOWER QUADRANT ABDOMINAL PAIN: ICD-10-CM

## 2018-09-21 DIAGNOSIS — N20.1 CALCULUS OF URETER: ICD-10-CM

## 2018-09-21 DIAGNOSIS — I48.91 A-FIB (H): ICD-10-CM

## 2018-09-21 DIAGNOSIS — N20.9 URINARY TRACT STONES: ICD-10-CM

## 2018-09-21 LAB
ALBUMIN UR-MCNC: NEGATIVE MG/DL
APPEARANCE UR: CLEAR
BILIRUB UR QL STRIP: NEGATIVE
COLOR UR AUTO: YELLOW
GLUCOSE UR STRIP-MCNC: NEGATIVE MG/DL
HGB UR QL STRIP: NEGATIVE
KETONES UR STRIP-MCNC: NEGATIVE MG/DL
LEUKOCYTE ESTERASE UR QL STRIP: NEGATIVE
NITRATE UR QL: NEGATIVE
PH UR STRIP: 5 [PH] (ref 5–8)
SP GR UR STRIP: 1.01 (ref 1–1.03)
UROBILINOGEN UR STRIP-ACNC: NORMAL

## 2018-09-24 ENCOUNTER — AMBULATORY - HEALTHEAST (OUTPATIENT)
Dept: LAB | Facility: CLINIC | Age: 78
End: 2018-09-24

## 2018-09-24 ENCOUNTER — RECORDS - HEALTHEAST (OUTPATIENT)
Dept: ADMINISTRATIVE | Facility: OTHER | Age: 78
End: 2018-09-24

## 2018-09-24 ENCOUNTER — COMMUNICATION - HEALTHEAST (OUTPATIENT)
Dept: ANTICOAGULATION | Facility: CLINIC | Age: 78
End: 2018-09-24

## 2018-09-24 DIAGNOSIS — I48.21 PERMANENT ATRIAL FIBRILLATION (H): ICD-10-CM

## 2018-09-24 LAB — INR PPP: 2.1 (ref 0.9–1.1)

## 2018-09-25 ENCOUNTER — RECORDS - HEALTHEAST (OUTPATIENT)
Dept: ADMINISTRATIVE | Facility: OTHER | Age: 78
End: 2018-09-25

## 2018-09-25 ENCOUNTER — COMMUNICATION - HEALTHEAST (OUTPATIENT)
Dept: FAMILY MEDICINE | Facility: CLINIC | Age: 78
End: 2018-09-25

## 2018-09-25 ENCOUNTER — COMMUNICATION - HEALTHEAST (OUTPATIENT)
Dept: UROLOGY | Facility: CLINIC | Age: 78
End: 2018-09-25

## 2018-09-25 DIAGNOSIS — I48.91 ATRIAL FIBRILLATION, UNSPECIFIED TYPE (H): ICD-10-CM

## 2018-09-25 DIAGNOSIS — K21.9 GERD (GASTROESOPHAGEAL REFLUX DISEASE): ICD-10-CM

## 2018-09-26 ENCOUNTER — RECORDS - HEALTHEAST (OUTPATIENT)
Dept: ADMINISTRATIVE | Facility: OTHER | Age: 78
End: 2018-09-26

## 2018-09-27 ENCOUNTER — COMMUNICATION - HEALTHEAST (OUTPATIENT)
Dept: FAMILY MEDICINE | Facility: CLINIC | Age: 78
End: 2018-09-27

## 2018-10-01 ENCOUNTER — RECORDS - HEALTHEAST (OUTPATIENT)
Dept: ADMINISTRATIVE | Facility: OTHER | Age: 78
End: 2018-10-01

## 2018-10-05 ENCOUNTER — AMBULATORY - HEALTHEAST (OUTPATIENT)
Dept: FAMILY MEDICINE | Facility: CLINIC | Age: 78
End: 2018-10-05

## 2018-10-05 ENCOUNTER — OFFICE VISIT - HEALTHEAST (OUTPATIENT)
Dept: FAMILY MEDICINE | Facility: CLINIC | Age: 78
End: 2018-10-05

## 2018-10-05 ENCOUNTER — COMMUNICATION - HEALTHEAST (OUTPATIENT)
Dept: ANTICOAGULATION | Facility: CLINIC | Age: 78
End: 2018-10-05

## 2018-10-05 DIAGNOSIS — I25.10 ATHEROSCLEROSIS OF NATIVE CORONARY ARTERY OF NATIVE HEART WITHOUT ANGINA PECTORIS: ICD-10-CM

## 2018-10-05 DIAGNOSIS — N18.30 STAGE 3 CHRONIC KIDNEY DISEASE (H): ICD-10-CM

## 2018-10-05 DIAGNOSIS — R13.10 DYSPHAGIA: ICD-10-CM

## 2018-10-05 DIAGNOSIS — I48.21 PERMANENT ATRIAL FIBRILLATION (H): ICD-10-CM

## 2018-10-05 DIAGNOSIS — K52.9 COLITIS: ICD-10-CM

## 2018-10-05 DIAGNOSIS — Z01.818 PREOPERATIVE EXAMINATION: ICD-10-CM

## 2018-10-05 DIAGNOSIS — G47.33 OBSTRUCTIVE SLEEP APNEA: ICD-10-CM

## 2018-10-05 DIAGNOSIS — I50.22 CHRONIC SYSTOLIC CONGESTIVE HEART FAILURE (H): ICD-10-CM

## 2018-10-05 LAB — INR PPP: 1.8 (ref 0.9–1.1)

## 2018-10-08 ENCOUNTER — COMMUNICATION - HEALTHEAST (OUTPATIENT)
Dept: FAMILY MEDICINE | Facility: CLINIC | Age: 78
End: 2018-10-08

## 2018-10-10 ENCOUNTER — COMMUNICATION - HEALTHEAST (OUTPATIENT)
Dept: CARDIOLOGY | Facility: CLINIC | Age: 78
End: 2018-10-10

## 2018-10-10 ENCOUNTER — OFFICE VISIT - HEALTHEAST (OUTPATIENT)
Dept: CARDIOLOGY | Facility: CLINIC | Age: 78
End: 2018-10-10

## 2018-10-10 DIAGNOSIS — I50.23 ACUTE ON CHRONIC SYSTOLIC HEART FAILURE (H): ICD-10-CM

## 2018-10-10 DIAGNOSIS — I25.5 ISCHEMIC CARDIOMYOPATHY: ICD-10-CM

## 2018-10-10 DIAGNOSIS — I48.20 CHRONIC ATRIAL FIBRILLATION (H): ICD-10-CM

## 2018-10-10 LAB
ANION GAP SERPL CALCULATED.3IONS-SCNC: 9 MMOL/L (ref 5–18)
BUN SERPL-MCNC: 19 MG/DL (ref 8–28)
CALCIUM SERPL-MCNC: 8.9 MG/DL (ref 8.5–10.5)
CHLORIDE BLD-SCNC: 105 MMOL/L (ref 98–107)
CO2 SERPL-SCNC: 27 MMOL/L (ref 22–31)
CREAT SERPL-MCNC: 0.94 MG/DL (ref 0.7–1.3)
GFR SERPL CREATININE-BSD FRML MDRD: >60 ML/MIN/1.73M2
GLUCOSE BLD-MCNC: 111 MG/DL (ref 70–125)
POTASSIUM BLD-SCNC: 3.6 MMOL/L (ref 3.5–5)
SODIUM SERPL-SCNC: 141 MMOL/L (ref 136–145)

## 2018-10-10 ASSESSMENT — MIFFLIN-ST. JEOR: SCORE: 1561.29

## 2018-10-11 ENCOUNTER — COMMUNICATION - HEALTHEAST (OUTPATIENT)
Dept: CARDIOLOGY | Facility: CLINIC | Age: 78
End: 2018-10-11

## 2018-10-11 DIAGNOSIS — I50.23 ACUTE ON CHRONIC SYSTOLIC CONGESTIVE HEART FAILURE (H): ICD-10-CM

## 2018-10-15 ENCOUNTER — COMMUNICATION - HEALTHEAST (OUTPATIENT)
Dept: ANTICOAGULATION | Facility: CLINIC | Age: 78
End: 2018-10-15

## 2018-10-15 ENCOUNTER — AMBULATORY - HEALTHEAST (OUTPATIENT)
Dept: LAB | Facility: CLINIC | Age: 78
End: 2018-10-15

## 2018-10-15 DIAGNOSIS — I48.21 PERMANENT ATRIAL FIBRILLATION (H): ICD-10-CM

## 2018-10-15 LAB — INR PPP: 1.2 (ref 0.9–1.1)

## 2018-10-16 ENCOUNTER — OFFICE VISIT - HEALTHEAST (OUTPATIENT)
Dept: CARDIOLOGY | Facility: CLINIC | Age: 78
End: 2018-10-16

## 2018-10-16 ENCOUNTER — COMMUNICATION - HEALTHEAST (OUTPATIENT)
Dept: CARDIOLOGY | Facility: CLINIC | Age: 78
End: 2018-10-16

## 2018-10-16 DIAGNOSIS — K50.919 CROHN'S DISEASE WITH COMPLICATION, UNSPECIFIED GASTROINTESTINAL TRACT LOCATION (H): ICD-10-CM

## 2018-10-16 DIAGNOSIS — I10 ESSENTIAL HYPERTENSION, BENIGN: ICD-10-CM

## 2018-10-16 DIAGNOSIS — I50.23 ACUTE ON CHRONIC SYSTOLIC HEART FAILURE (H): ICD-10-CM

## 2018-10-16 DIAGNOSIS — I50.23 ACUTE ON CHRONIC SYSTOLIC CONGESTIVE HEART FAILURE (H): ICD-10-CM

## 2018-10-16 DIAGNOSIS — I48.20 CHRONIC ATRIAL FIBRILLATION (H): ICD-10-CM

## 2018-10-16 LAB
ANION GAP SERPL CALCULATED.3IONS-SCNC: 7 MMOL/L (ref 5–18)
BUN SERPL-MCNC: 20 MG/DL (ref 8–28)
CALCIUM SERPL-MCNC: 9.5 MG/DL (ref 8.5–10.5)
CHLORIDE BLD-SCNC: 105 MMOL/L (ref 98–107)
CO2 SERPL-SCNC: 30 MMOL/L (ref 22–31)
CREAT SERPL-MCNC: 1.04 MG/DL (ref 0.7–1.3)
GFR SERPL CREATININE-BSD FRML MDRD: >60 ML/MIN/1.73M2
GLUCOSE BLD-MCNC: 120 MG/DL (ref 70–125)
POTASSIUM BLD-SCNC: 3.7 MMOL/L (ref 3.5–5)
SODIUM SERPL-SCNC: 142 MMOL/L (ref 136–145)

## 2018-10-16 ASSESSMENT — MIFFLIN-ST. JEOR: SCORE: 1570.36

## 2018-10-18 ENCOUNTER — COMMUNICATION - HEALTHEAST (OUTPATIENT)
Dept: CARDIOLOGY | Facility: CLINIC | Age: 78
End: 2018-10-18

## 2018-10-18 DIAGNOSIS — I50.23 ACUTE ON CHRONIC SYSTOLIC HEART FAILURE (H): ICD-10-CM

## 2018-10-22 ENCOUNTER — OFFICE VISIT - HEALTHEAST (OUTPATIENT)
Dept: CARDIOLOGY | Facility: CLINIC | Age: 78
End: 2018-10-22

## 2018-10-22 DIAGNOSIS — K50.919 CROHN'S DISEASE WITH COMPLICATION, UNSPECIFIED GASTROINTESTINAL TRACT LOCATION (H): ICD-10-CM

## 2018-10-22 DIAGNOSIS — I48.20 CHRONIC ATRIAL FIBRILLATION (H): ICD-10-CM

## 2018-10-22 DIAGNOSIS — I50.23 ACUTE ON CHRONIC SYSTOLIC HEART FAILURE (H): ICD-10-CM

## 2018-10-22 LAB
ANION GAP SERPL CALCULATED.3IONS-SCNC: 10 MMOL/L (ref 5–18)
BUN SERPL-MCNC: 25 MG/DL (ref 8–28)
CALCIUM SERPL-MCNC: 9.2 MG/DL (ref 8.5–10.5)
CHLORIDE BLD-SCNC: 103 MMOL/L (ref 98–107)
CO2 SERPL-SCNC: 30 MMOL/L (ref 22–31)
CREAT SERPL-MCNC: 1.09 MG/DL (ref 0.7–1.3)
GFR SERPL CREATININE-BSD FRML MDRD: >60 ML/MIN/1.73M2
GLUCOSE BLD-MCNC: 94 MG/DL (ref 70–125)
POTASSIUM BLD-SCNC: 4.1 MMOL/L (ref 3.5–5)
SODIUM SERPL-SCNC: 143 MMOL/L (ref 136–145)

## 2018-10-22 ASSESSMENT — MIFFLIN-ST. JEOR: SCORE: 1552.21

## 2018-10-23 ENCOUNTER — COMMUNICATION - HEALTHEAST (OUTPATIENT)
Dept: CARDIOLOGY | Facility: CLINIC | Age: 78
End: 2018-10-23

## 2018-10-23 ENCOUNTER — AMBULATORY - HEALTHEAST (OUTPATIENT)
Dept: LAB | Facility: CLINIC | Age: 78
End: 2018-10-23

## 2018-10-23 ENCOUNTER — COMMUNICATION - HEALTHEAST (OUTPATIENT)
Dept: ANTICOAGULATION | Facility: CLINIC | Age: 78
End: 2018-10-23

## 2018-10-23 DIAGNOSIS — I48.21 PERMANENT ATRIAL FIBRILLATION (H): ICD-10-CM

## 2018-10-23 DIAGNOSIS — I50.23 ACUTE ON CHRONIC SYSTOLIC HEART FAILURE (H): ICD-10-CM

## 2018-10-23 LAB — INR PPP: 1.6 (ref 0.9–1.1)

## 2018-10-29 ENCOUNTER — OFFICE VISIT - HEALTHEAST (OUTPATIENT)
Dept: CARDIOLOGY | Facility: CLINIC | Age: 78
End: 2018-10-29

## 2018-10-29 DIAGNOSIS — I50.23 ACUTE ON CHRONIC SYSTOLIC HEART FAILURE (H): ICD-10-CM

## 2018-10-29 DIAGNOSIS — K50.919 CROHN'S DISEASE WITH COMPLICATION, UNSPECIFIED GASTROINTESTINAL TRACT LOCATION (H): ICD-10-CM

## 2018-10-29 DIAGNOSIS — I25.5 ISCHEMIC CARDIOMYOPATHY: ICD-10-CM

## 2018-10-29 DIAGNOSIS — Z95.810 PRESENCE OF AUTOMATIC CARDIOVERTER/DEFIBRILLATOR (AICD): ICD-10-CM

## 2018-10-29 DIAGNOSIS — I48.20 CHRONIC ATRIAL FIBRILLATION (H): ICD-10-CM

## 2018-10-29 LAB
ANION GAP SERPL CALCULATED.3IONS-SCNC: 13 MMOL/L (ref 5–18)
BUN SERPL-MCNC: 34 MG/DL (ref 8–28)
CALCIUM SERPL-MCNC: 9.9 MG/DL (ref 8.5–10.5)
CHLORIDE BLD-SCNC: 104 MMOL/L (ref 98–107)
CO2 SERPL-SCNC: 28 MMOL/L (ref 22–31)
CREAT SERPL-MCNC: 1.44 MG/DL (ref 0.7–1.3)
GFR SERPL CREATININE-BSD FRML MDRD: 47 ML/MIN/1.73M2
GLUCOSE BLD-MCNC: 96 MG/DL (ref 70–125)
POTASSIUM BLD-SCNC: 4.1 MMOL/L (ref 3.5–5)
SODIUM SERPL-SCNC: 145 MMOL/L (ref 136–145)

## 2018-10-29 ASSESSMENT — MIFFLIN-ST. JEOR: SCORE: 1561.29

## 2018-10-30 ENCOUNTER — COMMUNICATION - HEALTHEAST (OUTPATIENT)
Dept: CARDIOLOGY | Facility: CLINIC | Age: 78
End: 2018-10-30

## 2018-10-30 DIAGNOSIS — I50.23 ACUTE ON CHRONIC SYSTOLIC HEART FAILURE (H): ICD-10-CM

## 2018-10-31 ENCOUNTER — RECORDS - HEALTHEAST (OUTPATIENT)
Dept: ADMINISTRATIVE | Facility: OTHER | Age: 78
End: 2018-10-31

## 2018-11-05 ENCOUNTER — COMMUNICATION - HEALTHEAST (OUTPATIENT)
Dept: CARDIOLOGY | Facility: CLINIC | Age: 78
End: 2018-11-05

## 2018-11-05 ENCOUNTER — AMBULATORY - HEALTHEAST (OUTPATIENT)
Dept: CARDIOLOGY | Facility: CLINIC | Age: 78
End: 2018-11-05

## 2018-11-05 ENCOUNTER — COMMUNICATION - HEALTHEAST (OUTPATIENT)
Dept: ANTICOAGULATION | Facility: CLINIC | Age: 78
End: 2018-11-05

## 2018-11-05 ENCOUNTER — AMBULATORY - HEALTHEAST (OUTPATIENT)
Dept: LAB | Facility: CLINIC | Age: 78
End: 2018-11-05

## 2018-11-05 DIAGNOSIS — I50.23 ACUTE ON CHRONIC SYSTOLIC HEART FAILURE (H): ICD-10-CM

## 2018-11-05 DIAGNOSIS — Z95.810 ICD (IMPLANTABLE CARDIOVERTER-DEFIBRILLATOR), SINGLE, IN SITU: ICD-10-CM

## 2018-11-05 DIAGNOSIS — I48.21 PERMANENT ATRIAL FIBRILLATION (H): ICD-10-CM

## 2018-11-05 DIAGNOSIS — I50.20 HFREF (HEART FAILURE WITH REDUCED EJECTION FRACTION) (H): ICD-10-CM

## 2018-11-05 LAB
HCC DEVICE COMMENTS: NORMAL
HCC DEVICE IMPLANTING PROVIDER: NORMAL
HCC DEVICE MANUFACTURE: NORMAL
HCC DEVICE MODEL: NORMAL
HCC DEVICE SERIAL NUMBER: NORMAL
HCC DEVICE TYPE: NORMAL
INR PPP: 2 (ref 0.9–1.1)

## 2018-11-09 ENCOUNTER — COMMUNICATION - HEALTHEAST (OUTPATIENT)
Dept: CARDIOLOGY | Facility: CLINIC | Age: 78
End: 2018-11-09

## 2018-11-11 ENCOUNTER — AMBULATORY - HEALTHEAST (OUTPATIENT)
Dept: CARDIOLOGY | Facility: CLINIC | Age: 78
End: 2018-11-11

## 2018-11-11 DIAGNOSIS — Z95.810 ICD (IMPLANTABLE CARDIOVERTER-DEFIBRILLATOR), SINGLE, IN SITU: ICD-10-CM

## 2018-11-12 ENCOUNTER — COMMUNICATION - HEALTHEAST (OUTPATIENT)
Dept: CARDIOLOGY | Facility: CLINIC | Age: 78
End: 2018-11-12

## 2018-11-12 ENCOUNTER — HOSPITAL ENCOUNTER (OUTPATIENT)
Dept: CARDIOLOGY | Facility: HOSPITAL | Age: 78
Discharge: HOME | End: 2018-11-12

## 2018-11-12 ENCOUNTER — AMBULATORY - HEALTHEAST (OUTPATIENT)
Dept: LAB | Facility: HOSPITAL | Age: 78
End: 2018-11-12

## 2018-11-12 DIAGNOSIS — I50.23 ACUTE ON CHRONIC SYSTOLIC HEART FAILURE (H): ICD-10-CM

## 2018-11-12 DIAGNOSIS — I25.5 ISCHEMIC CARDIOMYOPATHY: ICD-10-CM

## 2018-11-12 DIAGNOSIS — I50.9 HEART FAILURE (H): ICD-10-CM

## 2018-11-12 LAB
ANION GAP SERPL CALCULATED.3IONS-SCNC: 9 MMOL/L (ref 5–18)
AORTIC ROOT: 3.3 CM
ASCENDING AORTA: 3.3 CM
BSA FOR ECHO PROCEDURE: 2.06 M2
BUN SERPL-MCNC: 42 MG/DL (ref 8–28)
CALCIUM SERPL-MCNC: 10.1 MG/DL (ref 8.5–10.5)
CHLORIDE BLD-SCNC: 105 MMOL/L (ref 98–107)
CO2 SERPL-SCNC: 27 MMOL/L (ref 22–31)
CREAT SERPL-MCNC: 2.31 MG/DL (ref 0.7–1.3)
CV BLOOD PRESSURE: ABNORMAL MMHG
CV ECHO HEIGHT: 69 IN
CV ECHO WEIGHT: 192 LBS
DOP CALC LVOT AREA: 3.14 CM2
DOP CALC LVOT DIAMETER: 2 CM
DOP CALC LVOT PEAK VEL: 103 CM/S
DOP CALC LVOT STROKE VOLUME: 61.5 CM3
DOP CALCLVOT PEAK VEL VTI: 19.6 CM
ECHO EJECTION FRACTION ESTIMATED: 35 %
EJECTION FRACTION: 41 % (ref 55–75)
FRACTIONAL SHORTENING: 18.8 % (ref 28–44)
GFR SERPL CREATININE-BSD FRML MDRD: 27 ML/MIN/1.73M2
GLUCOSE BLD-MCNC: 94 MG/DL (ref 70–125)
INTERVENTRICULAR SEPTUM IN END DIASTOLE: 1.21 CM (ref 0.6–1)
IVS/PW RATIO: 0.8
LA AREA 1: 33 CM2
LA AREA 2: 32 CM2
LEFT ATRIUM LENGTH: 7.2 CM
LEFT ATRIUM SIZE: 5.2 CM
LEFT ATRIUM TO AORTIC ROOT RATIO: 1.58 NO UNITS
LEFT ATRIUM VOLUME INDEX: 60.5 ML/M2
LEFT ATRIUM VOLUME: 124.7 ML
LEFT VENTRICLE CARDIAC INDEX: 1.8 L/MIN/M2
LEFT VENTRICLE CARDIAC OUTPUT: 3.6 L/MIN
LEFT VENTRICLE DIASTOLIC VOLUME INDEX: 76.2 CM3/M2 (ref 34–74)
LEFT VENTRICLE DIASTOLIC VOLUME: 157 CM3 (ref 62–150)
LEFT VENTRICLE HEART RATE: 59 BPM
LEFT VENTRICLE MASS INDEX: 148.1 G/M2
LEFT VENTRICLE SYSTOLIC VOLUME INDEX: 44.8 CM3/M2 (ref 11–31)
LEFT VENTRICLE SYSTOLIC VOLUME: 92.2 CM3 (ref 21–61)
LEFT VENTRICULAR INTERNAL DIMENSION IN DIASTOLE: 5.22 CM (ref 4.2–5.8)
LEFT VENTRICULAR INTERNAL DIMENSION IN SYSTOLE: 4.24 CM (ref 2.5–4)
LEFT VENTRICULAR MASS: 305.1 G
LEFT VENTRICULAR OUTFLOW TRACT MEAN GRADIENT: 2 MMHG
LEFT VENTRICULAR OUTFLOW TRACT MEAN VELOCITY: 65.7 CM/S
LEFT VENTRICULAR OUTFLOW TRACT PEAK GRADIENT: 4 MMHG
LEFT VENTRICULAR POSTERIOR WALL IN END DIASTOLE: 1.55 CM (ref 0.6–1)
LV STROKE VOLUME INDEX: 29.9 ML/M2
MITRAL VALVE DECELERATION SLOPE: 5460 MM/S2
MITRAL VALVE PRESSURE HALF-TIME: 62 MS
MV AVERAGE E/E' RATIO: 16.1 CM/S
MV DECELERATION TIME: 212 MS
MV E'TISSUE VEL-LAT: 8.05 CM/S
MV E'TISSUE VEL-MED: 6.09 CM/S
MV LATERAL E/E' RATIO: 14.2
MV MEDIAL E/E' RATIO: 18.7
MV PEAK E VELOCITY: 114 CM/S
MV VALVE AREA PRESSURE 1/2 METHOD: 3.5 CM2
NUC REST DIASTOLIC VOLUME INDEX: 3072 LBS
NUC REST SYSTOLIC VOLUME INDEX: 69 IN
POTASSIUM BLD-SCNC: 5.1 MMOL/L (ref 3.5–5)
PR MAX PG: 4 MMHG
PR PEAK VELOCITY: 111 CM/S
SODIUM SERPL-SCNC: 141 MMOL/L (ref 136–145)
TRICUSPID REGURGITATION PEAK PRESSURE GRADIENT: 38.7 MMHG
TRICUSPID VALVE ANULAR PLANE SYSTOLIC EXCURSION: 1.5 CM
TRICUSPID VALVE PEAK REGURGITANT VELOCITY: 311 CM/S

## 2018-11-12 ASSESSMENT — MIFFLIN-ST. JEOR: SCORE: 1561.29

## 2018-11-19 ENCOUNTER — COMMUNICATION - HEALTHEAST (OUTPATIENT)
Dept: ANTICOAGULATION | Facility: CLINIC | Age: 78
End: 2018-11-19

## 2018-11-19 ENCOUNTER — COMMUNICATION - HEALTHEAST (OUTPATIENT)
Dept: CARDIOLOGY | Facility: CLINIC | Age: 78
End: 2018-11-19

## 2018-11-19 ENCOUNTER — AMBULATORY - HEALTHEAST (OUTPATIENT)
Dept: LAB | Facility: CLINIC | Age: 78
End: 2018-11-19

## 2018-11-19 DIAGNOSIS — I50.9 HEART FAILURE (H): ICD-10-CM

## 2018-11-19 DIAGNOSIS — I48.21 PERMANENT ATRIAL FIBRILLATION (H): ICD-10-CM

## 2018-11-19 DIAGNOSIS — I50.20 HFREF (HEART FAILURE WITH REDUCED EJECTION FRACTION) (H): ICD-10-CM

## 2018-11-19 LAB
ANION GAP SERPL CALCULATED.3IONS-SCNC: 11 MMOL/L (ref 5–18)
BUN SERPL-MCNC: 38 MG/DL (ref 8–28)
CALCIUM SERPL-MCNC: 9.4 MG/DL (ref 8.5–10.5)
CHLORIDE BLD-SCNC: 105 MMOL/L (ref 98–107)
CO2 SERPL-SCNC: 22 MMOL/L (ref 22–31)
CREAT SERPL-MCNC: 2.49 MG/DL (ref 0.7–1.3)
GFR SERPL CREATININE-BSD FRML MDRD: 25 ML/MIN/1.73M2
GLUCOSE BLD-MCNC: 96 MG/DL (ref 70–125)
INR PPP: 2.9 (ref 0.9–1.1)
POTASSIUM BLD-SCNC: 4.5 MMOL/L (ref 3.5–5)
SODIUM SERPL-SCNC: 138 MMOL/L (ref 136–145)

## 2018-11-26 ENCOUNTER — OFFICE VISIT - HEALTHEAST (OUTPATIENT)
Dept: CARDIOLOGY | Facility: CLINIC | Age: 78
End: 2018-11-26

## 2018-11-26 DIAGNOSIS — I10 ESSENTIAL HYPERTENSION, BENIGN: ICD-10-CM

## 2018-11-26 DIAGNOSIS — K50.919 CROHN'S DISEASE WITH COMPLICATION, UNSPECIFIED GASTROINTESTINAL TRACT LOCATION (H): ICD-10-CM

## 2018-11-26 DIAGNOSIS — I25.5 ISCHEMIC CARDIOMYOPATHY: ICD-10-CM

## 2018-11-26 DIAGNOSIS — I50.22 CHRONIC SYSTOLIC HEART FAILURE (H): ICD-10-CM

## 2018-11-26 LAB
ANION GAP SERPL CALCULATED.3IONS-SCNC: 9 MMOL/L (ref 5–18)
BUN SERPL-MCNC: 39 MG/DL (ref 8–28)
CALCIUM SERPL-MCNC: 9.6 MG/DL (ref 8.5–10.5)
CHLORIDE BLD-SCNC: 105 MMOL/L (ref 98–107)
CO2 SERPL-SCNC: 26 MMOL/L (ref 22–31)
CREAT SERPL-MCNC: 2.67 MG/DL (ref 0.7–1.3)
GFR SERPL CREATININE-BSD FRML MDRD: 23 ML/MIN/1.73M2
GLUCOSE BLD-MCNC: 94 MG/DL (ref 70–125)
POTASSIUM BLD-SCNC: 5.3 MMOL/L (ref 3.5–5)
SODIUM SERPL-SCNC: 140 MMOL/L (ref 136–145)

## 2018-11-26 ASSESSMENT — MIFFLIN-ST. JEOR: SCORE: 1534.07

## 2018-11-27 ENCOUNTER — OFFICE VISIT - HEALTHEAST (OUTPATIENT)
Dept: FAMILY MEDICINE | Facility: CLINIC | Age: 78
End: 2018-11-27

## 2018-11-27 ENCOUNTER — COMMUNICATION - HEALTHEAST (OUTPATIENT)
Dept: CARDIOLOGY | Facility: CLINIC | Age: 78
End: 2018-11-27

## 2018-11-27 DIAGNOSIS — I50.22 CHRONIC SYSTOLIC CONGESTIVE HEART FAILURE (H): ICD-10-CM

## 2018-11-27 DIAGNOSIS — I25.5 ISCHEMIC CARDIOMYOPATHY: ICD-10-CM

## 2018-11-27 DIAGNOSIS — E87.5 HYPERKALEMIA: ICD-10-CM

## 2018-11-27 DIAGNOSIS — I48.21 PERMANENT ATRIAL FIBRILLATION (H): ICD-10-CM

## 2018-11-27 DIAGNOSIS — N17.9 AKI (ACUTE KIDNEY INJURY) (H): ICD-10-CM

## 2018-11-27 DIAGNOSIS — I50.20 HFREF (HEART FAILURE WITH REDUCED EJECTION FRACTION) (H): ICD-10-CM

## 2018-11-27 DIAGNOSIS — I48.20 CHRONIC ATRIAL FIBRILLATION (H): ICD-10-CM

## 2018-11-27 DIAGNOSIS — N20.0 NEPHROLITHIASIS: ICD-10-CM

## 2018-11-27 DIAGNOSIS — R19.7 DIARRHEA, UNSPECIFIED TYPE: ICD-10-CM

## 2018-11-27 DIAGNOSIS — K50.919 CROHN'S DISEASE WITH COMPLICATION, UNSPECIFIED GASTROINTESTINAL TRACT LOCATION (H): ICD-10-CM

## 2018-11-27 DIAGNOSIS — N18.30 STAGE 3 CHRONIC KIDNEY DISEASE (H): ICD-10-CM

## 2018-11-27 LAB
ALBUMIN UR-MCNC: NEGATIVE MG/DL
APPEARANCE UR: CLEAR
BILIRUB UR QL STRIP: NEGATIVE
COLOR UR AUTO: YELLOW
GLUCOSE UR STRIP-MCNC: NEGATIVE MG/DL
HGB UR QL STRIP: NEGATIVE
KETONES UR STRIP-MCNC: NEGATIVE MG/DL
LEUKOCYTE ESTERASE UR QL STRIP: NEGATIVE
NITRATE UR QL: NEGATIVE
PH UR STRIP: 5.5 [PH] (ref 5–8)
SP GR UR STRIP: 1.01 (ref 1–1.03)
UROBILINOGEN UR STRIP-ACNC: NORMAL

## 2018-12-04 ENCOUNTER — OFFICE VISIT - HEALTHEAST (OUTPATIENT)
Dept: FAMILY MEDICINE | Facility: CLINIC | Age: 78
End: 2018-12-04

## 2018-12-04 ENCOUNTER — COMMUNICATION - HEALTHEAST (OUTPATIENT)
Dept: ANTICOAGULATION | Facility: CLINIC | Age: 78
End: 2018-12-04

## 2018-12-04 DIAGNOSIS — D64.9 ANEMIA, UNSPECIFIED TYPE: ICD-10-CM

## 2018-12-04 DIAGNOSIS — I48.21 PERMANENT ATRIAL FIBRILLATION (H): ICD-10-CM

## 2018-12-04 DIAGNOSIS — N18.30 STAGE 3 CHRONIC KIDNEY DISEASE (H): ICD-10-CM

## 2018-12-04 DIAGNOSIS — N17.9 AKI (ACUTE KIDNEY INJURY) (H): ICD-10-CM

## 2018-12-04 DIAGNOSIS — I50.22 CHRONIC SYSTOLIC CONGESTIVE HEART FAILURE (H): ICD-10-CM

## 2018-12-04 DIAGNOSIS — N20.0 NEPHROLITHIASIS: ICD-10-CM

## 2018-12-04 DIAGNOSIS — K50.919 CROHN'S DISEASE WITH COMPLICATION, UNSPECIFIED GASTROINTESTINAL TRACT LOCATION (H): ICD-10-CM

## 2018-12-04 DIAGNOSIS — R19.7 DIARRHEA, UNSPECIFIED TYPE: ICD-10-CM

## 2018-12-04 LAB
ANION GAP SERPL CALCULATED.3IONS-SCNC: 10 MMOL/L (ref 5–18)
BUN SERPL-MCNC: 31 MG/DL (ref 8–28)
CALCIUM SERPL-MCNC: 9.3 MG/DL (ref 8.5–10.5)
CHLORIDE BLD-SCNC: 107 MMOL/L (ref 98–107)
CO2 SERPL-SCNC: 22 MMOL/L (ref 22–31)
CREAT SERPL-MCNC: 2.01 MG/DL (ref 0.7–1.3)
ERYTHROCYTE [DISTWIDTH] IN BLOOD BY AUTOMATED COUNT: 14.1 % (ref 11–14.5)
GFR SERPL CREATININE-BSD FRML MDRD: 32 ML/MIN/1.73M2
GLUCOSE BLD-MCNC: 79 MG/DL (ref 70–125)
HCT VFR BLD AUTO: 30.6 % (ref 40–54)
HGB BLD-MCNC: 10.3 G/DL (ref 14–18)
INR PPP: 2.7 (ref 0.9–1.1)
MCH RBC QN AUTO: 29.3 PG (ref 27–34)
MCHC RBC AUTO-ENTMCNC: 33.8 G/DL (ref 32–36)
MCV RBC AUTO: 87 FL (ref 80–100)
PLATELET # BLD AUTO: 144 THOU/UL (ref 140–440)
PMV BLD AUTO: 6.5 FL (ref 7–10)
POTASSIUM BLD-SCNC: 4.9 MMOL/L (ref 3.5–5)
RBC # BLD AUTO: 3.52 MILL/UL (ref 4.4–6.2)
SODIUM SERPL-SCNC: 139 MMOL/L (ref 136–145)
WBC: 8.3 THOU/UL (ref 4–11)

## 2018-12-05 ENCOUNTER — COMMUNICATION - HEALTHEAST (OUTPATIENT)
Dept: ANTICOAGULATION | Facility: CLINIC | Age: 78
End: 2018-12-05

## 2018-12-05 DIAGNOSIS — I48.20 CHRONIC ATRIAL FIBRILLATION (H): ICD-10-CM

## 2018-12-07 ENCOUNTER — AMBULATORY - HEALTHEAST (OUTPATIENT)
Dept: FAMILY MEDICINE | Facility: CLINIC | Age: 78
End: 2018-12-07

## 2018-12-07 DIAGNOSIS — D64.9 ANEMIA, UNSPECIFIED: ICD-10-CM

## 2018-12-07 DIAGNOSIS — N18.30 STAGE 3 CHRONIC KIDNEY DISEASE (H): ICD-10-CM

## 2018-12-14 ENCOUNTER — HOSPITAL ENCOUNTER (OUTPATIENT)
Dept: PHYSICAL MEDICINE AND REHAB | Facility: CLINIC | Age: 78
Discharge: HOME | End: 2018-12-14
Attending: NURSE PRACTITIONER

## 2018-12-14 DIAGNOSIS — G89.29 CHRONIC RIGHT-SIDED LOW BACK PAIN WITHOUT SCIATICA: ICD-10-CM

## 2018-12-14 DIAGNOSIS — M54.50 CHRONIC RIGHT-SIDED LOW BACK PAIN WITHOUT SCIATICA: ICD-10-CM

## 2018-12-14 DIAGNOSIS — M47.816 LUMBAR FACET ARTHROPATHY: ICD-10-CM

## 2018-12-14 DIAGNOSIS — M53.3 SACROILIAC JOINT DYSFUNCTION: ICD-10-CM

## 2018-12-14 DIAGNOSIS — M79.18 RIGHT BUTTOCK PAIN: ICD-10-CM

## 2018-12-14 DIAGNOSIS — M48.061 LUMBAR FORAMINAL STENOSIS: ICD-10-CM

## 2018-12-17 ENCOUNTER — HOSPITAL ENCOUNTER (OUTPATIENT)
Dept: PHYSICAL MEDICINE AND REHAB | Facility: CLINIC | Age: 78
Discharge: HOME | End: 2018-12-17
Attending: PHYSICAL MEDICINE & REHABILITATION

## 2018-12-17 DIAGNOSIS — M53.3 SACROILIAC JOINT DYSFUNCTION: ICD-10-CM

## 2018-12-17 DIAGNOSIS — M54.50 CHRONIC RIGHT-SIDED LOW BACK PAIN WITHOUT SCIATICA: ICD-10-CM

## 2018-12-17 DIAGNOSIS — Z79.01 ON WARFARIN THERAPY: ICD-10-CM

## 2018-12-17 DIAGNOSIS — M79.18 RIGHT BUTTOCK PAIN: ICD-10-CM

## 2018-12-17 DIAGNOSIS — G89.29 CHRONIC RIGHT-SIDED LOW BACK PAIN WITHOUT SCIATICA: ICD-10-CM

## 2018-12-17 LAB — POC INR - HE - HISTORICAL: 2.5 (ref 0.9–1.1)

## 2018-12-18 ENCOUNTER — COMMUNICATION - HEALTHEAST (OUTPATIENT)
Dept: FAMILY MEDICINE | Facility: CLINIC | Age: 78
End: 2018-12-18

## 2018-12-21 ENCOUNTER — AMBULATORY - HEALTHEAST (OUTPATIENT)
Dept: LAB | Facility: CLINIC | Age: 78
End: 2018-12-21

## 2018-12-21 DIAGNOSIS — N18.30 STAGE 3 CHRONIC KIDNEY DISEASE (H): ICD-10-CM

## 2018-12-21 DIAGNOSIS — D64.9 ANEMIA, UNSPECIFIED: ICD-10-CM

## 2018-12-21 LAB
ANION GAP SERPL CALCULATED.3IONS-SCNC: 8 MMOL/L (ref 5–18)
BUN SERPL-MCNC: 19 MG/DL (ref 8–28)
CALCIUM SERPL-MCNC: 9.4 MG/DL (ref 8.5–10.5)
CHLORIDE BLD-SCNC: 105 MMOL/L (ref 98–107)
CO2 SERPL-SCNC: 32 MMOL/L (ref 22–31)
CREAT SERPL-MCNC: 1.43 MG/DL (ref 0.7–1.3)
ERYTHROCYTE [DISTWIDTH] IN BLOOD BY AUTOMATED COUNT: 13.3 % (ref 11–14.5)
GFR SERPL CREATININE-BSD FRML MDRD: 48 ML/MIN/1.73M2
GLUCOSE BLD-MCNC: 93 MG/DL (ref 70–125)
HCT VFR BLD AUTO: 31 % (ref 40–54)
HGB BLD-MCNC: 10.6 G/DL (ref 14–18)
MCH RBC QN AUTO: 29.1 PG (ref 27–34)
MCHC RBC AUTO-ENTMCNC: 34.2 G/DL (ref 32–36)
MCV RBC AUTO: 85 FL (ref 80–100)
PLATELET # BLD AUTO: 221 THOU/UL (ref 140–440)
PMV BLD AUTO: 6.3 FL (ref 7–10)
POTASSIUM BLD-SCNC: 3.5 MMOL/L (ref 3.5–5)
RBC # BLD AUTO: 3.64 MILL/UL (ref 4.4–6.2)
SODIUM SERPL-SCNC: 145 MMOL/L (ref 136–145)
WBC: 8 THOU/UL (ref 4–11)

## 2018-12-24 ENCOUNTER — OFFICE VISIT - HEALTHEAST (OUTPATIENT)
Dept: CARDIOLOGY | Facility: CLINIC | Age: 78
End: 2018-12-24

## 2018-12-24 DIAGNOSIS — I25.5 ISCHEMIC CARDIOMYOPATHY: ICD-10-CM

## 2018-12-24 DIAGNOSIS — I50.22 CHRONIC SYSTOLIC HEART FAILURE (H): ICD-10-CM

## 2018-12-24 DIAGNOSIS — I48.20 CHRONIC ATRIAL FIBRILLATION (H): ICD-10-CM

## 2018-12-24 ASSESSMENT — MIFFLIN-ST. JEOR: SCORE: 1561.29

## 2018-12-27 ENCOUNTER — COMMUNICATION - HEALTHEAST (OUTPATIENT)
Dept: FAMILY MEDICINE | Facility: CLINIC | Age: 78
End: 2018-12-27

## 2019-01-02 ENCOUNTER — RECORDS - HEALTHEAST (OUTPATIENT)
Dept: ADMINISTRATIVE | Facility: OTHER | Age: 79
End: 2019-01-02

## 2019-01-03 ENCOUNTER — AMBULATORY - HEALTHEAST (OUTPATIENT)
Dept: LAB | Facility: CLINIC | Age: 79
End: 2019-01-03

## 2019-01-03 ENCOUNTER — COMMUNICATION - HEALTHEAST (OUTPATIENT)
Dept: ANTICOAGULATION | Facility: CLINIC | Age: 79
End: 2019-01-03

## 2019-01-03 DIAGNOSIS — I48.20 CHRONIC ATRIAL FIBRILLATION (H): ICD-10-CM

## 2019-01-03 LAB — INR PPP: 2.9 (ref 0.9–1.1)

## 2019-01-04 ENCOUNTER — HOSPITAL ENCOUNTER (OUTPATIENT)
Dept: PHYSICAL MEDICINE AND REHAB | Facility: CLINIC | Age: 79
Discharge: HOME | End: 2019-01-04
Attending: NURSE PRACTITIONER

## 2019-01-04 ENCOUNTER — COMMUNICATION - HEALTHEAST (OUTPATIENT)
Dept: FAMILY MEDICINE | Facility: CLINIC | Age: 79
End: 2019-01-04

## 2019-01-04 DIAGNOSIS — M48.061 LUMBAR FORAMINAL STENOSIS: ICD-10-CM

## 2019-01-04 DIAGNOSIS — M79.18 RIGHT BUTTOCK PAIN: ICD-10-CM

## 2019-01-04 DIAGNOSIS — M54.16 RIGHT LUMBAR RADICULITIS: ICD-10-CM

## 2019-01-04 DIAGNOSIS — M54.50 CHRONIC RIGHT-SIDED LOW BACK PAIN WITHOUT SCIATICA: ICD-10-CM

## 2019-01-04 DIAGNOSIS — M47.816 LUMBAR FACET ARTHROPATHY: ICD-10-CM

## 2019-01-04 DIAGNOSIS — M53.3 SACROILIAC JOINT DYSFUNCTION: ICD-10-CM

## 2019-01-04 DIAGNOSIS — G89.29 CHRONIC RIGHT-SIDED LOW BACK PAIN WITHOUT SCIATICA: ICD-10-CM

## 2019-01-07 ENCOUNTER — RECORDS - HEALTHEAST (OUTPATIENT)
Dept: ADMINISTRATIVE | Facility: OTHER | Age: 79
End: 2019-01-07

## 2019-01-08 ENCOUNTER — AMBULATORY - HEALTHEAST (OUTPATIENT)
Dept: CARDIOLOGY | Facility: CLINIC | Age: 79
End: 2019-01-08

## 2019-01-08 ENCOUNTER — COMMUNICATION - HEALTHEAST (OUTPATIENT)
Dept: CARDIOLOGY | Facility: CLINIC | Age: 79
End: 2019-01-08

## 2019-01-08 DIAGNOSIS — Z95.810 ICD (IMPLANTABLE CARDIOVERTER-DEFIBRILLATOR) IN PLACE: ICD-10-CM

## 2019-01-09 ENCOUNTER — RECORDS - HEALTHEAST (OUTPATIENT)
Dept: ADMINISTRATIVE | Facility: OTHER | Age: 79
End: 2019-01-09

## 2019-01-09 ENCOUNTER — OFFICE VISIT - HEALTHEAST (OUTPATIENT)
Dept: NEUROSURGERY | Facility: CLINIC | Age: 79
End: 2019-01-09

## 2019-01-09 ENCOUNTER — AMBULATORY - HEALTHEAST (OUTPATIENT)
Dept: SCHEDULING | Facility: CLINIC | Age: 79
End: 2019-01-09

## 2019-01-09 ENCOUNTER — COMMUNICATION - HEALTHEAST (OUTPATIENT)
Dept: FAMILY MEDICINE | Facility: CLINIC | Age: 79
End: 2019-01-09

## 2019-01-09 DIAGNOSIS — M48.062 SPINAL STENOSIS OF LUMBAR REGION WITH NEUROGENIC CLAUDICATION: ICD-10-CM

## 2019-01-09 ASSESSMENT — MIFFLIN-ST. JEOR: SCORE: 1570.36

## 2019-01-14 ENCOUNTER — OFFICE VISIT - HEALTHEAST (OUTPATIENT)
Dept: CARDIOLOGY | Facility: CLINIC | Age: 79
End: 2019-01-14

## 2019-01-14 DIAGNOSIS — I50.20 HFREF (HEART FAILURE WITH REDUCED EJECTION FRACTION) (H): ICD-10-CM

## 2019-01-14 DIAGNOSIS — I50.22 CHRONIC SYSTOLIC HEART FAILURE (H): ICD-10-CM

## 2019-01-14 DIAGNOSIS — I25.5 ISCHEMIC CARDIOMYOPATHY: ICD-10-CM

## 2019-01-14 LAB
ANION GAP SERPL CALCULATED.3IONS-SCNC: 11 MMOL/L (ref 5–18)
BUN SERPL-MCNC: 13 MG/DL (ref 8–28)
CALCIUM SERPL-MCNC: 8.8 MG/DL (ref 8.5–10.5)
CHLORIDE BLD-SCNC: 107 MMOL/L (ref 98–107)
CO2 SERPL-SCNC: 25 MMOL/L (ref 22–31)
CREAT SERPL-MCNC: 1.11 MG/DL (ref 0.7–1.3)
GFR SERPL CREATININE-BSD FRML MDRD: >60 ML/MIN/1.73M2
GLUCOSE BLD-MCNC: 98 MG/DL (ref 70–125)
POTASSIUM BLD-SCNC: 3.4 MMOL/L (ref 3.5–5)
SODIUM SERPL-SCNC: 143 MMOL/L (ref 136–145)

## 2019-01-14 ASSESSMENT — MIFFLIN-ST. JEOR: SCORE: 1561.29

## 2019-01-17 ENCOUNTER — HOSPITAL ENCOUNTER (OUTPATIENT)
Dept: RADIOLOGY | Facility: HOSPITAL | Age: 79
Discharge: HOME | End: 2019-01-17
Attending: SURGERY

## 2019-01-17 ENCOUNTER — HOSPITAL ENCOUNTER (OUTPATIENT)
Dept: CT IMAGING | Facility: HOSPITAL | Age: 79
Discharge: HOME | End: 2019-01-17
Attending: SURGERY

## 2019-01-17 ENCOUNTER — HOSPITAL ENCOUNTER (OUTPATIENT)
Dept: INTERVENTIONAL RADIOLOGY/VASCULAR | Facility: HOSPITAL | Age: 79
Discharge: HOME | End: 2019-01-17
Attending: SURGERY

## 2019-01-17 DIAGNOSIS — M48.062 SPINAL STENOSIS OF LUMBAR REGION WITH NEUROGENIC CLAUDICATION: ICD-10-CM

## 2019-01-17 DIAGNOSIS — M48.062 LUMBAR STENOSIS WITH NEUROGENIC CLAUDICATION: ICD-10-CM

## 2019-01-17 ASSESSMENT — MIFFLIN-ST. JEOR: SCORE: 1561.29

## 2019-01-18 ENCOUNTER — COMMUNICATION - HEALTHEAST (OUTPATIENT)
Dept: ANTICOAGULATION | Facility: CLINIC | Age: 79
End: 2019-01-18

## 2019-01-18 ENCOUNTER — COMMUNICATION - HEALTHEAST (OUTPATIENT)
Dept: CARDIOLOGY | Facility: CLINIC | Age: 79
End: 2019-01-18

## 2019-01-21 ENCOUNTER — COMMUNICATION - HEALTHEAST (OUTPATIENT)
Dept: CARDIOLOGY | Facility: CLINIC | Age: 79
End: 2019-01-21

## 2019-01-21 ENCOUNTER — AMBULATORY - HEALTHEAST (OUTPATIENT)
Dept: CARDIOLOGY | Facility: CLINIC | Age: 79
End: 2019-01-21

## 2019-01-21 DIAGNOSIS — I25.5 ISCHEMIC CARDIOMYOPATHY: ICD-10-CM

## 2019-01-21 DIAGNOSIS — I50.20 HFREF (HEART FAILURE WITH REDUCED EJECTION FRACTION) (H): ICD-10-CM

## 2019-01-21 DIAGNOSIS — Z95.810 PRESENCE OF AUTOMATIC CARDIOVERTER/DEFIBRILLATOR (AICD): ICD-10-CM

## 2019-01-23 ENCOUNTER — OFFICE VISIT - HEALTHEAST (OUTPATIENT)
Dept: NEUROSURGERY | Facility: CLINIC | Age: 79
End: 2019-01-23

## 2019-01-23 ENCOUNTER — AMBULATORY - HEALTHEAST (OUTPATIENT)
Dept: LAB | Facility: HOSPITAL | Age: 79
End: 2019-01-23

## 2019-01-23 ENCOUNTER — COMMUNICATION - HEALTHEAST (OUTPATIENT)
Dept: CARDIOLOGY | Facility: CLINIC | Age: 79
End: 2019-01-23

## 2019-01-23 ENCOUNTER — HOSPITAL ENCOUNTER (OUTPATIENT)
Dept: RADIOLOGY | Facility: HOSPITAL | Age: 79
Discharge: HOME | End: 2019-01-23
Attending: SURGERY

## 2019-01-23 ENCOUNTER — AMBULATORY - HEALTHEAST (OUTPATIENT)
Dept: CARDIOLOGY | Facility: CLINIC | Age: 79
End: 2019-01-23

## 2019-01-23 DIAGNOSIS — I50.23 ACUTE ON CHRONIC SYSTOLIC HEART FAILURE (H): ICD-10-CM

## 2019-01-23 DIAGNOSIS — M47.12 CERVICAL SPONDYLOSIS WITH MYELOPATHY: ICD-10-CM

## 2019-01-23 DIAGNOSIS — I25.5 ISCHEMIC CARDIOMYOPATHY: ICD-10-CM

## 2019-01-23 LAB
ANION GAP SERPL CALCULATED.3IONS-SCNC: 9 MMOL/L (ref 5–18)
BUN SERPL-MCNC: 10 MG/DL (ref 8–28)
CALCIUM SERPL-MCNC: 9.5 MG/DL (ref 8.5–10.5)
CHLORIDE BLD-SCNC: 104 MMOL/L (ref 98–107)
CO2 SERPL-SCNC: 30 MMOL/L (ref 22–31)
CREAT SERPL-MCNC: 1.32 MG/DL (ref 0.7–1.3)
GFR SERPL CREATININE-BSD FRML MDRD: 52 ML/MIN/1.73M2
GLUCOSE BLD-MCNC: 103 MG/DL (ref 70–125)
POTASSIUM BLD-SCNC: 4.1 MMOL/L (ref 3.5–5)
SODIUM SERPL-SCNC: 143 MMOL/L (ref 136–145)

## 2019-01-23 ASSESSMENT — MIFFLIN-ST. JEOR: SCORE: 1561.29

## 2019-01-24 ENCOUNTER — AMBULATORY - HEALTHEAST (OUTPATIENT)
Dept: LAB | Facility: CLINIC | Age: 79
End: 2019-01-24

## 2019-01-24 ENCOUNTER — COMMUNICATION - HEALTHEAST (OUTPATIENT)
Dept: ANTICOAGULATION | Facility: CLINIC | Age: 79
End: 2019-01-24

## 2019-01-24 ENCOUNTER — OFFICE VISIT - HEALTHEAST (OUTPATIENT)
Dept: NEUROSURGERY | Facility: CLINIC | Age: 79
End: 2019-01-24

## 2019-01-24 DIAGNOSIS — M53.3 SACROILIAC JOINT PAIN: ICD-10-CM

## 2019-01-24 DIAGNOSIS — I48.20 CHRONIC ATRIAL FIBRILLATION (H): ICD-10-CM

## 2019-01-24 DIAGNOSIS — G95.20 CORD COMPRESSION MYELOPATHY (H): ICD-10-CM

## 2019-01-24 DIAGNOSIS — M54.16 LUMBAR RADICULOPATHY: ICD-10-CM

## 2019-01-24 LAB — INR PPP: 2.5 (ref 0.9–1.1)

## 2019-01-24 ASSESSMENT — MIFFLIN-ST. JEOR: SCORE: 1561.29

## 2019-01-29 ENCOUNTER — COMMUNICATION - HEALTHEAST (OUTPATIENT)
Dept: CARDIOLOGY | Facility: CLINIC | Age: 79
End: 2019-01-29

## 2019-02-01 ENCOUNTER — OFFICE VISIT - HEALTHEAST (OUTPATIENT)
Dept: CARDIOLOGY | Facility: CLINIC | Age: 79
End: 2019-02-01

## 2019-02-01 ENCOUNTER — COMMUNICATION - HEALTHEAST (OUTPATIENT)
Dept: CARDIOLOGY | Facility: CLINIC | Age: 79
End: 2019-02-01

## 2019-02-01 ENCOUNTER — COMMUNICATION - HEALTHEAST (OUTPATIENT)
Dept: ANTICOAGULATION | Facility: CLINIC | Age: 79
End: 2019-02-01

## 2019-02-01 DIAGNOSIS — I50.20 HFREF (HEART FAILURE WITH REDUCED EJECTION FRACTION) (H): ICD-10-CM

## 2019-02-01 DIAGNOSIS — I25.5 ISCHEMIC CARDIOMYOPATHY: ICD-10-CM

## 2019-02-01 DIAGNOSIS — I48.20 CHRONIC ATRIAL FIBRILLATION (H): ICD-10-CM

## 2019-02-01 DIAGNOSIS — I50.22 CHRONIC SYSTOLIC HEART FAILURE (H): ICD-10-CM

## 2019-02-01 LAB
ANION GAP SERPL CALCULATED.3IONS-SCNC: 7 MMOL/L (ref 5–18)
BUN SERPL-MCNC: 28 MG/DL (ref 8–28)
CALCIUM SERPL-MCNC: 9.5 MG/DL (ref 8.5–10.5)
CHLORIDE BLD-SCNC: 103 MMOL/L (ref 98–107)
CO2 SERPL-SCNC: 30 MMOL/L (ref 22–31)
CREAT SERPL-MCNC: 1.76 MG/DL (ref 0.7–1.3)
GFR SERPL CREATININE-BSD FRML MDRD: 38 ML/MIN/1.73M2
GLUCOSE BLD-MCNC: 97 MG/DL (ref 70–125)
POC INR - HE - HISTORICAL: 2.4 (ref 0.9–1.1)
POTASSIUM BLD-SCNC: 4 MMOL/L (ref 3.5–5)
SODIUM SERPL-SCNC: 140 MMOL/L (ref 136–145)

## 2019-02-01 ASSESSMENT — MIFFLIN-ST. JEOR: SCORE: 1513.66

## 2019-02-09 ENCOUNTER — AMBULATORY - HEALTHEAST (OUTPATIENT)
Dept: CARDIOLOGY | Facility: CLINIC | Age: 79
End: 2019-02-09

## 2019-02-09 DIAGNOSIS — Z95.810 ICD (IMPLANTABLE CARDIOVERTER-DEFIBRILLATOR) IN PLACE: ICD-10-CM

## 2019-02-11 ENCOUNTER — COMMUNICATION - HEALTHEAST (OUTPATIENT)
Dept: FAMILY MEDICINE | Facility: CLINIC | Age: 79
End: 2019-02-11

## 2019-02-12 ENCOUNTER — COMMUNICATION - HEALTHEAST (OUTPATIENT)
Dept: FAMILY MEDICINE | Facility: CLINIC | Age: 79
End: 2019-02-12

## 2019-02-12 ENCOUNTER — COMMUNICATION - HEALTHEAST (OUTPATIENT)
Dept: ANTICOAGULATION | Facility: CLINIC | Age: 79
End: 2019-02-12

## 2019-02-12 ENCOUNTER — RECORDS - HEALTHEAST (OUTPATIENT)
Dept: ADMINISTRATIVE | Facility: OTHER | Age: 79
End: 2019-02-12

## 2019-02-12 ENCOUNTER — OFFICE VISIT - HEALTHEAST (OUTPATIENT)
Dept: FAMILY MEDICINE | Facility: CLINIC | Age: 79
End: 2019-02-12

## 2019-02-12 ENCOUNTER — COMMUNICATION - HEALTHEAST (OUTPATIENT)
Dept: NEUROSURGERY | Facility: CLINIC | Age: 79
End: 2019-02-12

## 2019-02-12 ENCOUNTER — AMBULATORY - HEALTHEAST (OUTPATIENT)
Dept: NEUROSURGERY | Facility: CLINIC | Age: 79
End: 2019-02-12

## 2019-02-12 DIAGNOSIS — M48.02 CERVICAL STENOSIS OF SPINE: ICD-10-CM

## 2019-02-12 DIAGNOSIS — I48.20 CHRONIC ATRIAL FIBRILLATION (H): ICD-10-CM

## 2019-02-12 DIAGNOSIS — K52.9 COLITIS: ICD-10-CM

## 2019-02-12 DIAGNOSIS — I50.22 CHRONIC SYSTOLIC CONGESTIVE HEART FAILURE (H): ICD-10-CM

## 2019-02-12 DIAGNOSIS — I25.10 ATHEROSCLEROSIS OF NATIVE CORONARY ARTERY OF NATIVE HEART WITHOUT ANGINA PECTORIS: ICD-10-CM

## 2019-02-12 DIAGNOSIS — D64.9 ANEMIA, UNSPECIFIED TYPE: ICD-10-CM

## 2019-02-12 DIAGNOSIS — Z01.818 PRE-OP EXAM: ICD-10-CM

## 2019-02-12 DIAGNOSIS — G47.33 OBSTRUCTIVE SLEEP APNEA: ICD-10-CM

## 2019-02-12 DIAGNOSIS — I48.21 PERMANENT ATRIAL FIBRILLATION (H): ICD-10-CM

## 2019-02-12 DIAGNOSIS — N18.30 STAGE 3 CHRONIC KIDNEY DISEASE (H): ICD-10-CM

## 2019-02-12 DIAGNOSIS — Z01.818 PREOPERATIVE EXAMINATION: ICD-10-CM

## 2019-02-12 LAB
ALBUMIN UR-MCNC: NEGATIVE MG/DL
ANION GAP SERPL CALCULATED.3IONS-SCNC: 12 MMOL/L (ref 5–18)
APPEARANCE UR: CLEAR
APTT PPP: 46 SECONDS (ref 24–37)
BILIRUB UR QL STRIP: NEGATIVE
BUN SERPL-MCNC: 22 MG/DL (ref 8–28)
CALCIUM SERPL-MCNC: 9.2 MG/DL (ref 8.5–10.5)
CHLORIDE BLD-SCNC: 105 MMOL/L (ref 98–107)
CLOSURE TME COLL+EPINEP BLD: 101 SEC (ref 1–180)
CO2 SERPL-SCNC: 25 MMOL/L (ref 22–31)
COLOR UR AUTO: YELLOW
CREAT SERPL-MCNC: 1.66 MG/DL (ref 0.7–1.3)
ERYTHROCYTE [DISTWIDTH] IN BLOOD BY AUTOMATED COUNT: 13.2 % (ref 11–14.5)
GFR SERPL CREATININE-BSD FRML MDRD: 40 ML/MIN/1.73M2
GLUCOSE BLD-MCNC: 110 MG/DL (ref 70–125)
GLUCOSE UR STRIP-MCNC: NEGATIVE MG/DL
HCT VFR BLD AUTO: 30.4 % (ref 40–54)
HGB BLD-MCNC: 9.9 G/DL (ref 14–18)
HGB UR QL STRIP: NEGATIVE
INR PPP: 2.9 (ref 0.9–1.1)
KETONES UR STRIP-MCNC: NEGATIVE MG/DL
LEUKOCYTE ESTERASE UR QL STRIP: NEGATIVE
MCH RBC QN AUTO: 28.6 PG (ref 27–34)
MCHC RBC AUTO-ENTMCNC: 32.6 G/DL (ref 32–36)
MCV RBC AUTO: 88 FL (ref 80–100)
NITRATE UR QL: NEGATIVE
PH UR STRIP: 5.5 [PH] (ref 5–8)
PLATELET # BLD AUTO: 159 THOU/UL (ref 140–440)
PMV BLD AUTO: 6.5 FL (ref 7–10)
POTASSIUM BLD-SCNC: 3.5 MMOL/L (ref 3.5–5)
RBC # BLD AUTO: 3.47 MILL/UL (ref 4.4–6.2)
SODIUM SERPL-SCNC: 142 MMOL/L (ref 136–145)
SP GR UR STRIP: 1.01 (ref 1–1.03)
UROBILINOGEN UR STRIP-ACNC: NORMAL
WBC: 6.3 THOU/UL (ref 4–11)

## 2019-02-12 ASSESSMENT — MIFFLIN-ST. JEOR: SCORE: 1520.46

## 2019-02-13 ENCOUNTER — COMMUNICATION - HEALTHEAST (OUTPATIENT)
Dept: NEUROSURGERY | Facility: CLINIC | Age: 79
End: 2019-02-13

## 2019-02-13 ENCOUNTER — COMMUNICATION - HEALTHEAST (OUTPATIENT)
Dept: CARDIOLOGY | Facility: CLINIC | Age: 79
End: 2019-02-13

## 2019-02-14 ENCOUNTER — COMMUNICATION - HEALTHEAST (OUTPATIENT)
Dept: FAMILY MEDICINE | Facility: CLINIC | Age: 79
End: 2019-02-14

## 2019-02-14 ENCOUNTER — ANESTHESIA - HEALTHEAST (OUTPATIENT)
Dept: SURGERY | Facility: HOSPITAL | Age: 79
End: 2019-02-14

## 2019-02-14 ENCOUNTER — RECORDS - HEALTHEAST (OUTPATIENT)
Dept: ADMINISTRATIVE | Facility: OTHER | Age: 79
End: 2019-02-14

## 2019-02-14 ENCOUNTER — COMMUNICATION - HEALTHEAST (OUTPATIENT)
Dept: CARDIOLOGY | Facility: CLINIC | Age: 79
End: 2019-02-14

## 2019-02-15 ENCOUNTER — COMMUNICATION - HEALTHEAST (OUTPATIENT)
Dept: ANTICOAGULATION | Facility: CLINIC | Age: 79
End: 2019-02-15

## 2019-02-15 ENCOUNTER — OFFICE VISIT - HEALTHEAST (OUTPATIENT)
Dept: NEUROSURGERY | Facility: CLINIC | Age: 79
End: 2019-02-15

## 2019-02-15 ENCOUNTER — AMBULATORY - HEALTHEAST (OUTPATIENT)
Dept: LAB | Facility: CLINIC | Age: 79
End: 2019-02-15

## 2019-02-15 DIAGNOSIS — I48.20 CHRONIC ATRIAL FIBRILLATION (H): ICD-10-CM

## 2019-02-15 DIAGNOSIS — Z01.818 PREOPERATIVE EXAMINATION: ICD-10-CM

## 2019-02-15 LAB — INR PPP: 1.8 (ref 0.9–1.1)

## 2019-02-15 ASSESSMENT — MIFFLIN-ST. JEOR: SCORE: 1520.46

## 2019-02-19 ENCOUNTER — OFFICE VISIT - HEALTHEAST (OUTPATIENT)
Dept: CARDIOLOGY | Facility: CLINIC | Age: 79
End: 2019-02-19

## 2019-02-19 DIAGNOSIS — I25.5 ISCHEMIC CARDIOMYOPATHY: ICD-10-CM

## 2019-02-19 ASSESSMENT — MIFFLIN-ST. JEOR: SCORE: 1529.53

## 2019-02-21 ENCOUNTER — COMMUNICATION - HEALTHEAST (OUTPATIENT)
Dept: NEUROSURGERY | Facility: CLINIC | Age: 79
End: 2019-02-21

## 2019-02-21 DIAGNOSIS — G95.20 CORD COMPRESSION (H): ICD-10-CM

## 2019-02-22 ENCOUNTER — SURGERY - HEALTHEAST (OUTPATIENT)
Dept: SURGERY | Facility: CLINIC | Age: 79
End: 2019-02-22

## 2019-02-22 ENCOUNTER — ANESTHESIA - HEALTHEAST (OUTPATIENT)
Dept: SURGERY | Facility: CLINIC | Age: 79
End: 2019-02-22

## 2019-02-22 ASSESSMENT — MIFFLIN-ST. JEOR
SCORE: 1494.95
SCORE: 1515.93

## 2019-02-23 ENCOUNTER — HOME CARE/HOSPICE - HEALTHEAST (OUTPATIENT)
Dept: HOME HEALTH SERVICES | Facility: HOME HEALTH | Age: 79
End: 2019-02-23

## 2019-02-25 ENCOUNTER — COMMUNICATION - HEALTHEAST (OUTPATIENT)
Dept: NEUROSURGERY | Facility: CLINIC | Age: 79
End: 2019-02-25

## 2019-02-25 DIAGNOSIS — M48.02 CERVICAL STENOSIS OF SPINAL CANAL: ICD-10-CM

## 2019-02-26 ENCOUNTER — HOME CARE/HOSPICE - HEALTHEAST (OUTPATIENT)
Dept: HOME HEALTH SERVICES | Facility: HOME HEALTH | Age: 79
End: 2019-02-26

## 2019-02-26 ENCOUNTER — RECORDS - HEALTHEAST (OUTPATIENT)
Dept: ADMINISTRATIVE | Facility: OTHER | Age: 79
End: 2019-02-26

## 2019-02-26 ENCOUNTER — COMMUNICATION - HEALTHEAST (OUTPATIENT)
Dept: ANTICOAGULATION | Facility: CLINIC | Age: 79
End: 2019-02-26

## 2019-02-27 ENCOUNTER — HOME CARE/HOSPICE - HEALTHEAST (OUTPATIENT)
Dept: HOME HEALTH SERVICES | Facility: HOME HEALTH | Age: 79
End: 2019-02-27

## 2019-02-27 ASSESSMENT — MIFFLIN-ST. JEOR: SCORE: 1493.25

## 2019-02-28 ENCOUNTER — ANESTHESIA - HEALTHEAST (OUTPATIENT)
Dept: SURGERY | Facility: HOSPITAL | Age: 79
End: 2019-02-28

## 2019-02-28 ENCOUNTER — HOME CARE/HOSPICE - HEALTHEAST (OUTPATIENT)
Dept: HOME HEALTH SERVICES | Facility: HOME HEALTH | Age: 79
End: 2019-02-28

## 2019-02-28 ENCOUNTER — SURGERY - HEALTHEAST (OUTPATIENT)
Dept: SURGERY | Facility: HOSPITAL | Age: 79
End: 2019-02-28

## 2019-02-28 ASSESSMENT — MIFFLIN-ST. JEOR
SCORE: 1475.56
SCORE: 1475.56

## 2019-03-01 ENCOUNTER — HOME CARE/HOSPICE - HEALTHEAST (OUTPATIENT)
Dept: HOME HEALTH SERVICES | Facility: HOME HEALTH | Age: 79
End: 2019-03-01

## 2019-03-02 ENCOUNTER — HOME CARE/HOSPICE - HEALTHEAST (OUTPATIENT)
Dept: HOME HEALTH SERVICES | Facility: HOME HEALTH | Age: 79
End: 2019-03-02

## 2019-03-02 ASSESSMENT — MIFFLIN-ST. JEOR: SCORE: 1481

## 2019-03-04 ENCOUNTER — COMMUNICATION - HEALTHEAST (OUTPATIENT)
Dept: ANTICOAGULATION | Facility: CLINIC | Age: 79
End: 2019-03-04

## 2019-03-05 ENCOUNTER — COMMUNICATION - HEALTHEAST (OUTPATIENT)
Dept: NEUROSURGERY | Facility: CLINIC | Age: 79
End: 2019-03-05

## 2019-03-06 ENCOUNTER — OFFICE VISIT - HEALTHEAST (OUTPATIENT)
Dept: NEUROSURGERY | Facility: CLINIC | Age: 79
End: 2019-03-06

## 2019-03-06 ENCOUNTER — COMMUNICATION - HEALTHEAST (OUTPATIENT)
Dept: ANTICOAGULATION | Facility: CLINIC | Age: 79
End: 2019-03-06

## 2019-03-06 DIAGNOSIS — Z48.02 REMOVAL OF STAPLE: ICD-10-CM

## 2019-03-06 LAB — INR PPP: 1.1 (ref 0.9–1.1)

## 2019-03-06 ASSESSMENT — MIFFLIN-ST. JEOR: SCORE: 1479.64

## 2019-03-07 ENCOUNTER — OFFICE VISIT - HEALTHEAST (OUTPATIENT)
Dept: GERIATRICS | Facility: CLINIC | Age: 79
End: 2019-03-07

## 2019-03-07 DIAGNOSIS — R52 PAIN MANAGEMENT: ICD-10-CM

## 2019-03-07 DIAGNOSIS — I50.22 CHRONIC SYSTOLIC CONGESTIVE HEART FAILURE (H): ICD-10-CM

## 2019-03-07 DIAGNOSIS — Z90.49 STATUS POST LAPAROSCOPIC CHOLECYSTECTOMY: ICD-10-CM

## 2019-03-07 DIAGNOSIS — K81.0 ACUTE CHOLECYSTITIS: ICD-10-CM

## 2019-03-07 DIAGNOSIS — I10 ESSENTIAL HYPERTENSION: ICD-10-CM

## 2019-03-08 ENCOUNTER — COMMUNICATION - HEALTHEAST (OUTPATIENT)
Dept: ANTICOAGULATION | Facility: CLINIC | Age: 79
End: 2019-03-08

## 2019-03-08 ENCOUNTER — RECORDS - HEALTHEAST (OUTPATIENT)
Dept: LAB | Facility: CLINIC | Age: 79
End: 2019-03-08

## 2019-03-08 LAB
HGB BLD-MCNC: 8.2 G/DL (ref 14–18)
INR PPP: 1.2 (ref 0.9–1.1)

## 2019-03-11 ENCOUNTER — OFFICE VISIT - HEALTHEAST (OUTPATIENT)
Dept: GERIATRICS | Facility: CLINIC | Age: 79
End: 2019-03-11

## 2019-03-11 ENCOUNTER — COMMUNICATION - HEALTHEAST (OUTPATIENT)
Dept: ANTICOAGULATION | Facility: CLINIC | Age: 79
End: 2019-03-11

## 2019-03-11 DIAGNOSIS — K59.00 CONSTIPATION, UNSPECIFIED CONSTIPATION TYPE: ICD-10-CM

## 2019-03-11 DIAGNOSIS — I10 ESSENTIAL HYPERTENSION: ICD-10-CM

## 2019-03-11 DIAGNOSIS — Z98.1 S/P CERVICAL SPINAL FUSION: ICD-10-CM

## 2019-03-11 DIAGNOSIS — Z90.49 S/P LAPAROSCOPIC CHOLECYSTECTOMY: ICD-10-CM

## 2019-03-11 LAB — INR PPP: 1.7 (ref 0.9–1.1)

## 2019-03-13 ENCOUNTER — COMMUNICATION - HEALTHEAST (OUTPATIENT)
Dept: ANTICOAGULATION | Facility: CLINIC | Age: 79
End: 2019-03-13

## 2019-03-13 LAB — INR PPP: 2.3 (ref 0.9–1.1)

## 2019-03-14 ENCOUNTER — OFFICE VISIT - HEALTHEAST (OUTPATIENT)
Dept: GERIATRICS | Facility: CLINIC | Age: 79
End: 2019-03-14

## 2019-03-14 DIAGNOSIS — Z90.49 S/P LAPAROSCOPIC CHOLECYSTECTOMY: ICD-10-CM

## 2019-03-14 DIAGNOSIS — N18.30 STAGE 3 CHRONIC KIDNEY DISEASE (H): ICD-10-CM

## 2019-03-14 DIAGNOSIS — K21.9 GASTROESOPHAGEAL REFLUX DISEASE WITHOUT ESOPHAGITIS: ICD-10-CM

## 2019-03-14 DIAGNOSIS — G95.9 CERVICAL MYELOPATHY (H): ICD-10-CM

## 2019-03-15 ENCOUNTER — COMMUNICATION - HEALTHEAST (OUTPATIENT)
Dept: HOME HEALTH SERVICES | Facility: HOME HEALTH | Age: 79
End: 2019-03-15

## 2019-03-15 ENCOUNTER — COMMUNICATION - HEALTHEAST (OUTPATIENT)
Dept: ANTICOAGULATION | Facility: CLINIC | Age: 79
End: 2019-03-15

## 2019-03-15 LAB — INR PPP: 2.9 (ref 0.9–1.1)

## 2019-03-18 ENCOUNTER — COMMUNICATION - HEALTHEAST (OUTPATIENT)
Dept: GERIATRICS | Facility: CLINIC | Age: 79
End: 2019-03-18

## 2019-03-18 ENCOUNTER — AMBULATORY - HEALTHEAST (OUTPATIENT)
Dept: GERIATRICS | Facility: CLINIC | Age: 79
End: 2019-03-18

## 2019-03-19 ENCOUNTER — COMMUNICATION - HEALTHEAST (OUTPATIENT)
Dept: FAMILY MEDICINE | Facility: CLINIC | Age: 79
End: 2019-03-19

## 2019-03-19 ENCOUNTER — HOME CARE/HOSPICE - HEALTHEAST (OUTPATIENT)
Dept: ADMINISTRATIVE | Facility: OTHER | Age: 79
End: 2019-03-19

## 2019-03-19 ENCOUNTER — HOME CARE/HOSPICE - HEALTHEAST (OUTPATIENT)
Dept: HOME HEALTH SERVICES | Facility: HOME HEALTH | Age: 79
End: 2019-03-19

## 2019-03-19 LAB — INR PPP: 2.3 (ref 0.9–1.1)

## 2019-03-20 ENCOUNTER — HOME CARE/HOSPICE - HEALTHEAST (OUTPATIENT)
Dept: ADMINISTRATIVE | Facility: OTHER | Age: 79
End: 2019-03-20

## 2019-03-25 ENCOUNTER — OFFICE VISIT - HEALTHEAST (OUTPATIENT)
Dept: FAMILY MEDICINE | Facility: CLINIC | Age: 79
End: 2019-03-25

## 2019-03-25 ENCOUNTER — COMMUNICATION - HEALTHEAST (OUTPATIENT)
Dept: ANTICOAGULATION | Facility: CLINIC | Age: 79
End: 2019-03-25

## 2019-03-25 DIAGNOSIS — R63.4 WEIGHT LOSS: ICD-10-CM

## 2019-03-25 DIAGNOSIS — R11.0 NAUSEA: ICD-10-CM

## 2019-03-25 DIAGNOSIS — I48.20 CHRONIC ATRIAL FIBRILLATION (H): ICD-10-CM

## 2019-03-25 DIAGNOSIS — R10.84 ABDOMINAL PAIN, GENERALIZED: ICD-10-CM

## 2019-03-25 DIAGNOSIS — R19.7 DIARRHEA, UNSPECIFIED TYPE: ICD-10-CM

## 2019-03-25 DIAGNOSIS — I50.22 CHRONIC SYSTOLIC HEART FAILURE (H): ICD-10-CM

## 2019-03-25 DIAGNOSIS — D64.9 ANEMIA, UNSPECIFIED TYPE: ICD-10-CM

## 2019-03-25 DIAGNOSIS — Z90.49 S/P LAPAROSCOPIC CHOLECYSTECTOMY: ICD-10-CM

## 2019-03-25 DIAGNOSIS — Z98.1 S/P CERVICAL SPINAL FUSION: ICD-10-CM

## 2019-03-25 LAB
ALBUMIN SERPL-MCNC: 3.1 G/DL (ref 3.5–5)
ALP SERPL-CCNC: 300 U/L (ref 45–120)
ALT SERPL W P-5'-P-CCNC: 11 U/L (ref 0–45)
ANION GAP SERPL CALCULATED.3IONS-SCNC: 11 MMOL/L (ref 5–18)
AST SERPL W P-5'-P-CCNC: 15 U/L (ref 0–40)
BASOPHILS # BLD AUTO: 0 THOU/UL (ref 0–0.2)
BASOPHILS NFR BLD AUTO: 0 % (ref 0–2)
BILIRUB SERPL-MCNC: 0.4 MG/DL (ref 0–1)
BUN SERPL-MCNC: 15 MG/DL (ref 8–28)
CALCIUM SERPL-MCNC: 9.1 MG/DL (ref 8.5–10.5)
CHLORIDE BLD-SCNC: 105 MMOL/L (ref 98–107)
CO2 SERPL-SCNC: 26 MMOL/L (ref 22–31)
CREAT SERPL-MCNC: 1.42 MG/DL (ref 0.7–1.3)
EOSINOPHIL # BLD AUTO: 0.4 THOU/UL (ref 0–0.4)
EOSINOPHIL NFR BLD AUTO: 6 % (ref 0–6)
ERYTHROCYTE [DISTWIDTH] IN BLOOD BY AUTOMATED COUNT: 14.2 % (ref 11–14.5)
GFR SERPL CREATININE-BSD FRML MDRD: 48 ML/MIN/1.73M2
GLUCOSE BLD-MCNC: 102 MG/DL (ref 70–125)
HCT VFR BLD AUTO: 30 % (ref 40–54)
HGB BLD-MCNC: 9.7 G/DL (ref 14–18)
INR PPP: 2.3 (ref 0.9–1.1)
LIPASE SERPL-CCNC: 16 U/L (ref 0–52)
LYMPHOCYTES # BLD AUTO: 1.1 THOU/UL (ref 0.8–4.4)
LYMPHOCYTES NFR BLD AUTO: 15 % (ref 20–40)
MCH RBC QN AUTO: 27.8 PG (ref 27–34)
MCHC RBC AUTO-ENTMCNC: 32.4 G/DL (ref 32–36)
MCV RBC AUTO: 86 FL (ref 80–100)
MONOCYTES # BLD AUTO: 0.9 THOU/UL (ref 0–0.9)
MONOCYTES NFR BLD AUTO: 12 % (ref 2–10)
NEUTROPHILS # BLD AUTO: 4.7 THOU/UL (ref 2–7.7)
NEUTROPHILS NFR BLD AUTO: 66 % (ref 50–70)
PLATELET # BLD AUTO: 249 THOU/UL (ref 140–440)
PMV BLD AUTO: 6.4 FL (ref 7–10)
POTASSIUM BLD-SCNC: 4.1 MMOL/L (ref 3.5–5)
PROT SERPL-MCNC: 6.3 G/DL (ref 6–8)
RBC # BLD AUTO: 3.51 MILL/UL (ref 4.4–6.2)
SODIUM SERPL-SCNC: 142 MMOL/L (ref 136–145)
TSH SERPL DL<=0.005 MIU/L-ACNC: 1.92 UIU/ML (ref 0.3–5)
WBC: 7.1 THOU/UL (ref 4–11)

## 2019-03-26 ENCOUNTER — COMMUNICATION - HEALTHEAST (OUTPATIENT)
Dept: FAMILY MEDICINE | Facility: CLINIC | Age: 79
End: 2019-03-26

## 2019-03-26 DIAGNOSIS — R11.0 NAUSEA: ICD-10-CM

## 2019-03-27 ENCOUNTER — COMMUNICATION - HEALTHEAST (OUTPATIENT)
Dept: FAMILY MEDICINE | Facility: CLINIC | Age: 79
End: 2019-03-27

## 2019-03-27 DIAGNOSIS — R11.0 NAUSEA: ICD-10-CM

## 2019-03-27 LAB
C DIFF TOX B STL QL: POSITIVE
RIBOTYPE 027/NAP1/BI: ABNORMAL

## 2019-03-28 ENCOUNTER — AMBULATORY - HEALTHEAST (OUTPATIENT)
Dept: FAMILY MEDICINE | Facility: CLINIC | Age: 79
End: 2019-03-28

## 2019-03-28 ENCOUNTER — AMBULATORY - HEALTHEAST (OUTPATIENT)
Dept: LAB | Facility: CLINIC | Age: 79
End: 2019-03-28

## 2019-03-28 DIAGNOSIS — R19.7 DIARRHEA, UNSPECIFIED TYPE: ICD-10-CM

## 2019-03-28 DIAGNOSIS — A04.72 C. DIFFICILE COLITIS: ICD-10-CM

## 2019-03-28 LAB
G LAMBLIA AG STL QL IA: NORMAL
O+P STL MICRO: NORMAL

## 2019-03-29 ENCOUNTER — OFFICE VISIT - HEALTHEAST (OUTPATIENT)
Dept: FAMILY MEDICINE | Facility: CLINIC | Age: 79
End: 2019-03-29

## 2019-03-29 DIAGNOSIS — I50.22 CHRONIC SYSTOLIC CONGESTIVE HEART FAILURE (H): ICD-10-CM

## 2019-03-29 DIAGNOSIS — I48.20 CHRONIC ATRIAL FIBRILLATION (H): ICD-10-CM

## 2019-03-29 DIAGNOSIS — N18.30 STAGE 3 CHRONIC KIDNEY DISEASE (H): ICD-10-CM

## 2019-03-29 DIAGNOSIS — A04.72 C. DIFFICILE COLITIS: ICD-10-CM

## 2019-03-29 LAB
ANION GAP SERPL CALCULATED.3IONS-SCNC: 11 MMOL/L (ref 5–18)
BUN SERPL-MCNC: 16 MG/DL (ref 8–28)
CALCIUM SERPL-MCNC: 8.8 MG/DL (ref 8.5–10.5)
CHLORIDE BLD-SCNC: 106 MMOL/L (ref 98–107)
CO2 SERPL-SCNC: 25 MMOL/L (ref 22–31)
CREAT SERPL-MCNC: 1.86 MG/DL (ref 0.7–1.3)
ERYTHROCYTE [DISTWIDTH] IN BLOOD BY AUTOMATED COUNT: 13.5 % (ref 11–14.5)
GFR SERPL CREATININE-BSD FRML MDRD: 35 ML/MIN/1.73M2
GLUCOSE BLD-MCNC: 90 MG/DL (ref 70–125)
HCT VFR BLD AUTO: 30.2 % (ref 40–54)
HGB BLD-MCNC: 9.7 G/DL (ref 14–18)
MCH RBC QN AUTO: 27.5 PG (ref 27–34)
MCHC RBC AUTO-ENTMCNC: 32.2 G/DL (ref 32–36)
MCV RBC AUTO: 85 FL (ref 80–100)
PLATELET # BLD AUTO: 218 THOU/UL (ref 140–440)
PMV BLD AUTO: 6.8 FL (ref 7–10)
POTASSIUM BLD-SCNC: 3.9 MMOL/L (ref 3.5–5)
RBC # BLD AUTO: 3.54 MILL/UL (ref 4.4–6.2)
SHIGA TOXIN 1: NEGATIVE
SHIGA TOXIN 2: NEGATIVE
SODIUM SERPL-SCNC: 142 MMOL/L (ref 136–145)
WBC: 7 THOU/UL (ref 4–11)

## 2019-03-30 ENCOUNTER — COMMUNICATION - HEALTHEAST (OUTPATIENT)
Dept: CARDIOLOGY | Facility: CLINIC | Age: 79
End: 2019-03-30

## 2019-03-30 DIAGNOSIS — I47.29 PAROXYSMAL VENTRICULAR TACHYCARDIA (H): ICD-10-CM

## 2019-03-30 DIAGNOSIS — I25.5 ISCHEMIC CARDIOMYOPATHY: ICD-10-CM

## 2019-03-31 LAB — BACTERIA SPEC CULT: NORMAL

## 2019-04-01 ENCOUNTER — COMMUNICATION - HEALTHEAST (OUTPATIENT)
Dept: SCHEDULING | Facility: CLINIC | Age: 79
End: 2019-04-01

## 2019-04-01 ENCOUNTER — COMMUNICATION - HEALTHEAST (OUTPATIENT)
Dept: FAMILY MEDICINE | Facility: CLINIC | Age: 79
End: 2019-04-01

## 2019-04-02 ENCOUNTER — COMMUNICATION - HEALTHEAST (OUTPATIENT)
Dept: NEUROSURGERY | Facility: CLINIC | Age: 79
End: 2019-04-02

## 2019-04-03 ENCOUNTER — COMMUNICATION - HEALTHEAST (OUTPATIENT)
Dept: FAMILY MEDICINE | Facility: CLINIC | Age: 79
End: 2019-04-03

## 2019-04-03 ENCOUNTER — AMBULATORY - HEALTHEAST (OUTPATIENT)
Dept: CARE COORDINATION | Facility: CLINIC | Age: 79
End: 2019-04-03

## 2019-04-03 DIAGNOSIS — Z98.1 S/P CERVICAL SPINAL FUSION: ICD-10-CM

## 2019-04-03 DIAGNOSIS — N18.30 STAGE 3 CHRONIC KIDNEY DISEASE (H): ICD-10-CM

## 2019-04-03 DIAGNOSIS — Z90.49 S/P LAPAROSCOPIC CHOLECYSTECTOMY: ICD-10-CM

## 2019-04-03 DIAGNOSIS — I48.20 CHRONIC ATRIAL FIBRILLATION (H): ICD-10-CM

## 2019-04-03 DIAGNOSIS — I10 ESSENTIAL HYPERTENSION, BENIGN: ICD-10-CM

## 2019-04-03 DIAGNOSIS — A04.72 C. DIFFICILE COLITIS: ICD-10-CM

## 2019-04-03 DIAGNOSIS — Z87.19 HISTORY OF CROHN'S DISEASE: ICD-10-CM

## 2019-04-03 DIAGNOSIS — R29.898 RIGHT ARM WEAKNESS: ICD-10-CM

## 2019-04-04 ENCOUNTER — HOME CARE/HOSPICE - HEALTHEAST (OUTPATIENT)
Dept: HOME HEALTH SERVICES | Facility: HOME HEALTH | Age: 79
End: 2019-04-04

## 2019-04-04 ENCOUNTER — COMMUNICATION - HEALTHEAST (OUTPATIENT)
Dept: CARE COORDINATION | Facility: CLINIC | Age: 79
End: 2019-04-04

## 2019-04-05 ENCOUNTER — COMMUNICATION - HEALTHEAST (OUTPATIENT)
Dept: NEUROSURGERY | Facility: CLINIC | Age: 79
End: 2019-04-05

## 2019-04-06 ENCOUNTER — RECORDS - HEALTHEAST (OUTPATIENT)
Dept: ADMINISTRATIVE | Facility: OTHER | Age: 79
End: 2019-04-06

## 2019-04-08 ENCOUNTER — COMMUNICATION - HEALTHEAST (OUTPATIENT)
Dept: FAMILY MEDICINE | Facility: CLINIC | Age: 79
End: 2019-04-08

## 2019-04-08 ENCOUNTER — OFFICE VISIT - HEALTHEAST (OUTPATIENT)
Dept: FAMILY MEDICINE | Facility: CLINIC | Age: 79
End: 2019-04-08

## 2019-04-08 ENCOUNTER — COMMUNICATION - HEALTHEAST (OUTPATIENT)
Dept: ANTICOAGULATION | Facility: CLINIC | Age: 79
End: 2019-04-08

## 2019-04-08 DIAGNOSIS — I48.20 CHRONIC ATRIAL FIBRILLATION (H): ICD-10-CM

## 2019-04-08 DIAGNOSIS — Z90.49 S/P LAPAROSCOPIC CHOLECYSTECTOMY: ICD-10-CM

## 2019-04-08 DIAGNOSIS — E83.42 HYPOMAGNESEMIA: ICD-10-CM

## 2019-04-08 DIAGNOSIS — M62.81 MUSCLE WEAKNESS (GENERALIZED): ICD-10-CM

## 2019-04-08 DIAGNOSIS — A04.72 C. DIFFICILE COLITIS: ICD-10-CM

## 2019-04-08 DIAGNOSIS — N18.30 STAGE 3 CHRONIC KIDNEY DISEASE (H): ICD-10-CM

## 2019-04-08 DIAGNOSIS — I50.22 CHRONIC SYSTOLIC CONGESTIVE HEART FAILURE (H): ICD-10-CM

## 2019-04-08 DIAGNOSIS — Z98.1 S/P CERVICAL SPINAL FUSION: ICD-10-CM

## 2019-04-08 LAB
ALBUMIN SERPL-MCNC: 3.3 G/DL (ref 3.5–5)
ALP SERPL-CCNC: 221 U/L (ref 45–120)
ALT SERPL W P-5'-P-CCNC: <9 U/L (ref 0–45)
ANION GAP SERPL CALCULATED.3IONS-SCNC: 12 MMOL/L (ref 5–18)
AST SERPL W P-5'-P-CCNC: 14 U/L (ref 0–40)
BILIRUB SERPL-MCNC: 0.4 MG/DL (ref 0–1)
BUN SERPL-MCNC: 12 MG/DL (ref 8–28)
CALCIUM SERPL-MCNC: 9.5 MG/DL (ref 8.5–10.5)
CHLORIDE BLD-SCNC: 104 MMOL/L (ref 98–107)
CO2 SERPL-SCNC: 26 MMOL/L (ref 22–31)
CREAT SERPL-MCNC: 1.33 MG/DL (ref 0.7–1.3)
ERYTHROCYTE [DISTWIDTH] IN BLOOD BY AUTOMATED COUNT: 14.1 % (ref 11–14.5)
GFR SERPL CREATININE-BSD FRML MDRD: 52 ML/MIN/1.73M2
GLUCOSE BLD-MCNC: 96 MG/DL (ref 70–125)
HCT VFR BLD AUTO: 28.7 % (ref 40–54)
HGB BLD-MCNC: 9.3 G/DL (ref 14–18)
INR PPP: 1.5 (ref 0.9–1.1)
MAGNESIUM SERPL-MCNC: 1.7 MG/DL (ref 1.8–2.6)
MCH RBC QN AUTO: 27.8 PG (ref 27–34)
MCHC RBC AUTO-ENTMCNC: 32.3 G/DL (ref 32–36)
MCV RBC AUTO: 86 FL (ref 80–100)
PLATELET # BLD AUTO: 172 THOU/UL (ref 140–440)
PMV BLD AUTO: 6.7 FL (ref 7–10)
POTASSIUM BLD-SCNC: 3.5 MMOL/L (ref 3.5–5)
PROT SERPL-MCNC: 6.1 G/DL (ref 6–8)
RBC # BLD AUTO: 3.34 MILL/UL (ref 4.4–6.2)
SODIUM SERPL-SCNC: 142 MMOL/L (ref 136–145)
WBC: 7.3 THOU/UL (ref 4–11)

## 2019-04-09 ENCOUNTER — COMMUNICATION - HEALTHEAST (OUTPATIENT)
Dept: NURSING | Facility: CLINIC | Age: 79
End: 2019-04-09

## 2019-04-10 ENCOUNTER — COMMUNICATION - HEALTHEAST (OUTPATIENT)
Dept: FAMILY MEDICINE | Facility: CLINIC | Age: 79
End: 2019-04-10

## 2019-04-12 ENCOUNTER — COMMUNICATION - HEALTHEAST (OUTPATIENT)
Dept: FAMILY MEDICINE | Facility: CLINIC | Age: 79
End: 2019-04-12

## 2019-04-17 ENCOUNTER — RECORDS - HEALTHEAST (OUTPATIENT)
Dept: ADMINISTRATIVE | Facility: OTHER | Age: 79
End: 2019-04-17

## 2019-04-17 ENCOUNTER — OFFICE VISIT - HEALTHEAST (OUTPATIENT)
Dept: NEUROSURGERY | Facility: CLINIC | Age: 79
End: 2019-04-17

## 2019-04-17 DIAGNOSIS — G95.9 CERVICAL MYELOPATHY (H): ICD-10-CM

## 2019-04-17 DIAGNOSIS — M53.3 SACROILIAC PAIN: ICD-10-CM

## 2019-04-17 ASSESSMENT — MIFFLIN-ST. JEOR: SCORE: 1443.35

## 2019-04-18 ENCOUNTER — COMMUNICATION - HEALTHEAST (OUTPATIENT)
Dept: FAMILY MEDICINE | Facility: CLINIC | Age: 79
End: 2019-04-18

## 2019-04-19 ENCOUNTER — COMMUNICATION - HEALTHEAST (OUTPATIENT)
Dept: ANTICOAGULATION | Facility: CLINIC | Age: 79
End: 2019-04-19

## 2019-04-19 ENCOUNTER — COMMUNICATION - HEALTHEAST (OUTPATIENT)
Dept: FAMILY MEDICINE | Facility: CLINIC | Age: 79
End: 2019-04-19

## 2019-04-19 DIAGNOSIS — I48.91 ATRIAL FIBRILLATION, UNSPECIFIED TYPE (H): ICD-10-CM

## 2019-04-22 ENCOUNTER — COMMUNICATION - HEALTHEAST (OUTPATIENT)
Dept: FAMILY MEDICINE | Facility: CLINIC | Age: 79
End: 2019-04-22

## 2019-04-22 DIAGNOSIS — I48.91 ATRIAL FIBRILLATION, UNSPECIFIED TYPE (H): ICD-10-CM

## 2019-04-23 ENCOUNTER — COMMUNICATION - HEALTHEAST (OUTPATIENT)
Dept: ANTICOAGULATION | Facility: CLINIC | Age: 79
End: 2019-04-23

## 2019-04-23 ENCOUNTER — RECORDS - HEALTHEAST (OUTPATIENT)
Dept: LAB | Facility: CLINIC | Age: 79
End: 2019-04-23

## 2019-04-23 LAB
ANION GAP SERPL CALCULATED.3IONS-SCNC: 9 MMOL/L (ref 5–18)
BUN SERPL-MCNC: 11 MG/DL (ref 8–28)
CALCIUM SERPL-MCNC: 9.5 MG/DL (ref 8.5–10.5)
CHLORIDE BLD-SCNC: 106 MMOL/L (ref 98–107)
CO2 SERPL-SCNC: 27 MMOL/L (ref 22–31)
CREAT SERPL-MCNC: 1.3 MG/DL (ref 0.7–1.3)
GFR SERPL CREATININE-BSD FRML MDRD: 53 ML/MIN/1.73M2
GLUCOSE BLD-MCNC: 88 MG/DL (ref 70–125)
INR PPP: 1.6 (ref 0.9–1.1)
MAGNESIUM SERPL-MCNC: 1.3 MG/DL (ref 1.8–2.6)
POTASSIUM BLD-SCNC: 3.7 MMOL/L (ref 3.5–5)
SODIUM SERPL-SCNC: 142 MMOL/L (ref 136–145)

## 2019-04-24 ENCOUNTER — OFFICE VISIT - HEALTHEAST (OUTPATIENT)
Dept: GERIATRICS | Facility: CLINIC | Age: 79
End: 2019-04-24

## 2019-04-24 ENCOUNTER — COMMUNICATION - HEALTHEAST (OUTPATIENT)
Dept: FAMILY MEDICINE | Facility: CLINIC | Age: 79
End: 2019-04-24

## 2019-04-24 ENCOUNTER — RECORDS - HEALTHEAST (OUTPATIENT)
Dept: LAB | Facility: CLINIC | Age: 79
End: 2019-04-24

## 2019-04-24 DIAGNOSIS — M48.062 LUMBAR STENOSIS WITH NEUROGENIC CLAUDICATION: ICD-10-CM

## 2019-04-24 DIAGNOSIS — R53.1 GENERAL WEAKNESS: ICD-10-CM

## 2019-04-24 DIAGNOSIS — A04.72 CLOSTRIDIUM DIFFICILE DIARRHEA: ICD-10-CM

## 2019-04-24 DIAGNOSIS — I10 ESSENTIAL HYPERTENSION: ICD-10-CM

## 2019-04-24 LAB
ANION GAP SERPL CALCULATED.3IONS-SCNC: 11 MMOL/L (ref 5–18)
BUN SERPL-MCNC: 18 MG/DL (ref 8–28)
CALCIUM SERPL-MCNC: 9.9 MG/DL (ref 8.5–10.5)
CHLORIDE BLD-SCNC: 102 MMOL/L (ref 98–107)
CO2 SERPL-SCNC: 28 MMOL/L (ref 22–31)
CREAT SERPL-MCNC: 1.7 MG/DL (ref 0.7–1.3)
GFR SERPL CREATININE-BSD FRML MDRD: 39 ML/MIN/1.73M2
GLUCOSE BLD-MCNC: 105 MG/DL (ref 70–125)
MAGNESIUM SERPL-MCNC: 1.5 MG/DL (ref 1.8–2.6)
POTASSIUM BLD-SCNC: 3.6 MMOL/L (ref 3.5–5)
SODIUM SERPL-SCNC: 141 MMOL/L (ref 136–145)

## 2019-04-25 ENCOUNTER — OFFICE VISIT - HEALTHEAST (OUTPATIENT)
Dept: GERIATRICS | Facility: CLINIC | Age: 79
End: 2019-04-25

## 2019-04-25 DIAGNOSIS — R79.0 LOW MAGNESIUM LEVELS: ICD-10-CM

## 2019-04-25 DIAGNOSIS — A04.72 CLOSTRIDIUM DIFFICILE DIARRHEA: ICD-10-CM

## 2019-04-25 DIAGNOSIS — Z90.49 S/P LAPAROSCOPIC CHOLECYSTECTOMY: ICD-10-CM

## 2019-04-25 DIAGNOSIS — N18.30 STAGE 3 CHRONIC KIDNEY DISEASE (H): ICD-10-CM

## 2019-04-25 DIAGNOSIS — M48.062 LUMBAR STENOSIS WITH NEUROGENIC CLAUDICATION: ICD-10-CM

## 2019-04-25 DIAGNOSIS — R53.1 GENERAL WEAKNESS: ICD-10-CM

## 2019-04-26 ENCOUNTER — RECORDS - HEALTHEAST (OUTPATIENT)
Dept: LAB | Facility: CLINIC | Age: 79
End: 2019-04-26

## 2019-04-26 ENCOUNTER — COMMUNICATION - HEALTHEAST (OUTPATIENT)
Dept: ANTICOAGULATION | Facility: CLINIC | Age: 79
End: 2019-04-26

## 2019-04-26 LAB
INR PPP: 2.1 (ref 0.9–1.1)
MAGNESIUM SERPL-MCNC: 1.7 MG/DL (ref 1.8–2.6)

## 2019-04-29 ENCOUNTER — OFFICE VISIT - HEALTHEAST (OUTPATIENT)
Dept: GERIATRICS | Facility: CLINIC | Age: 79
End: 2019-04-29

## 2019-04-29 DIAGNOSIS — A04.72 CLOSTRIDIUM DIFFICILE DIARRHEA: ICD-10-CM

## 2019-04-29 DIAGNOSIS — I50.22 CHRONIC SYSTOLIC CONGESTIVE HEART FAILURE (H): ICD-10-CM

## 2019-04-29 DIAGNOSIS — I10 ESSENTIAL HYPERTENSION: ICD-10-CM

## 2019-04-29 DIAGNOSIS — R53.1 GENERAL WEAKNESS: ICD-10-CM

## 2019-04-29 DIAGNOSIS — R79.0 LOW MAGNESIUM LEVELS: ICD-10-CM

## 2019-04-29 DIAGNOSIS — N18.30 STAGE 3 CHRONIC KIDNEY DISEASE (H): ICD-10-CM

## 2019-04-29 LAB
ANION GAP SERPL CALCULATED.3IONS-SCNC: 10 MMOL/L (ref 5–18)
BUN SERPL-MCNC: 33 MG/DL (ref 8–28)
CALCIUM SERPL-MCNC: 10.3 MG/DL (ref 8.5–10.5)
CHLORIDE BLD-SCNC: 103 MMOL/L (ref 98–107)
CO2 SERPL-SCNC: 24 MMOL/L (ref 22–31)
CREAT SERPL-MCNC: 2.32 MG/DL (ref 0.7–1.3)
GFR SERPL CREATININE-BSD FRML MDRD: 27 ML/MIN/1.73M2
GLUCOSE BLD-MCNC: 112 MG/DL (ref 70–125)
POTASSIUM BLD-SCNC: 4 MMOL/L (ref 3.5–5)
SODIUM SERPL-SCNC: 137 MMOL/L (ref 136–145)

## 2019-04-30 ENCOUNTER — COMMUNICATION - HEALTHEAST (OUTPATIENT)
Dept: ANTICOAGULATION | Facility: CLINIC | Age: 79
End: 2019-04-30

## 2019-04-30 LAB — INR PPP: 2.2 (ref 0.9–1.1)

## 2019-05-01 ENCOUNTER — OFFICE VISIT - HEALTHEAST (OUTPATIENT)
Dept: GERIATRICS | Facility: CLINIC | Age: 79
End: 2019-05-01

## 2019-05-01 ENCOUNTER — RECORDS - HEALTHEAST (OUTPATIENT)
Dept: LAB | Facility: CLINIC | Age: 79
End: 2019-05-01

## 2019-05-01 DIAGNOSIS — I50.22 CHRONIC SYSTOLIC HEART FAILURE (H): ICD-10-CM

## 2019-05-01 DIAGNOSIS — I10 ESSENTIAL HYPERTENSION: ICD-10-CM

## 2019-05-01 DIAGNOSIS — R53.1 WEAKNESS: ICD-10-CM

## 2019-05-01 DIAGNOSIS — E83.42 HYPOMAGNESEMIA: ICD-10-CM

## 2019-05-02 LAB
ANION GAP SERPL CALCULATED.3IONS-SCNC: 9 MMOL/L (ref 5–18)
BUN SERPL-MCNC: 45 MG/DL (ref 8–28)
CALCIUM SERPL-MCNC: 10.2 MG/DL (ref 8.5–10.5)
CHLORIDE BLD-SCNC: 107 MMOL/L (ref 98–107)
CO2 SERPL-SCNC: 24 MMOL/L (ref 22–31)
CREAT SERPL-MCNC: 2.68 MG/DL (ref 0.7–1.3)
GFR SERPL CREATININE-BSD FRML MDRD: 23 ML/MIN/1.73M2
GLUCOSE BLD-MCNC: 85 MG/DL (ref 70–125)
POTASSIUM BLD-SCNC: 4.3 MMOL/L (ref 3.5–5)
SODIUM SERPL-SCNC: 140 MMOL/L (ref 136–145)

## 2019-05-06 ENCOUNTER — OFFICE VISIT - HEALTHEAST (OUTPATIENT)
Dept: GERIATRICS | Facility: CLINIC | Age: 79
End: 2019-05-06

## 2019-05-06 DIAGNOSIS — I10 ESSENTIAL HYPERTENSION: ICD-10-CM

## 2019-05-06 DIAGNOSIS — M48.062 LUMBAR STENOSIS WITH NEUROGENIC CLAUDICATION: ICD-10-CM

## 2019-05-06 DIAGNOSIS — M43.16 SPONDYLOLISTHESIS OF LUMBAR REGION: ICD-10-CM

## 2019-05-06 DIAGNOSIS — I50.22 CHRONIC SYSTOLIC HEART FAILURE (H): ICD-10-CM

## 2019-05-07 ENCOUNTER — COMMUNICATION - HEALTHEAST (OUTPATIENT)
Dept: ANTICOAGULATION | Facility: CLINIC | Age: 79
End: 2019-05-07

## 2019-05-07 LAB — INR PPP: 3.4 (ref 0.9–1.1)

## 2019-05-09 ENCOUNTER — OFFICE VISIT - HEALTHEAST (OUTPATIENT)
Dept: GERIATRICS | Facility: CLINIC | Age: 79
End: 2019-05-09

## 2019-05-09 DIAGNOSIS — I25.5 ISCHEMIC CARDIOMYOPATHY: ICD-10-CM

## 2019-05-09 DIAGNOSIS — I10 ESSENTIAL HYPERTENSION: ICD-10-CM

## 2019-05-09 DIAGNOSIS — M48.062 LUMBAR STENOSIS WITH NEUROGENIC CLAUDICATION: ICD-10-CM

## 2019-05-09 DIAGNOSIS — I50.22 CHRONIC SYSTOLIC HEART FAILURE (H): ICD-10-CM

## 2019-05-14 ENCOUNTER — RECORDS - HEALTHEAST (OUTPATIENT)
Dept: LAB | Facility: CLINIC | Age: 79
End: 2019-05-14

## 2019-05-14 ENCOUNTER — COMMUNICATION - HEALTHEAST (OUTPATIENT)
Dept: ANTICOAGULATION | Facility: CLINIC | Age: 79
End: 2019-05-14

## 2019-05-14 LAB
C DIFF TOX B STL QL: POSITIVE
INR PPP: 4.6 (ref 0.9–1.1)
RIBOTYPE 027/NAP1/BI: ABNORMAL

## 2019-05-20 ENCOUNTER — COMMUNICATION - HEALTHEAST (OUTPATIENT)
Dept: FAMILY MEDICINE | Facility: CLINIC | Age: 79
End: 2019-05-20

## 2019-05-20 ENCOUNTER — AMBULATORY - HEALTHEAST (OUTPATIENT)
Dept: GERIATRICS | Facility: CLINIC | Age: 79
End: 2019-05-20

## 2019-05-20 ENCOUNTER — COMMUNICATION - HEALTHEAST (OUTPATIENT)
Dept: GERIATRICS | Facility: CLINIC | Age: 79
End: 2019-05-20

## 2019-05-21 ENCOUNTER — OFFICE VISIT - HEALTHEAST (OUTPATIENT)
Dept: FAMILY MEDICINE | Facility: CLINIC | Age: 79
End: 2019-05-21

## 2019-05-21 ENCOUNTER — COMMUNICATION - HEALTHEAST (OUTPATIENT)
Dept: ANTICOAGULATION | Facility: CLINIC | Age: 79
End: 2019-05-21

## 2019-05-21 DIAGNOSIS — I48.20 CHRONIC ATRIAL FIBRILLATION (H): ICD-10-CM

## 2019-05-21 DIAGNOSIS — N18.30 STAGE 3 CHRONIC KIDNEY DISEASE (H): ICD-10-CM

## 2019-05-21 DIAGNOSIS — R53.1 WEAKNESS: ICD-10-CM

## 2019-05-21 DIAGNOSIS — A04.72 CLOSTRIDIUM DIFFICILE DIARRHEA: ICD-10-CM

## 2019-05-21 DIAGNOSIS — Z09 HOSPITAL DISCHARGE FOLLOW-UP: ICD-10-CM

## 2019-05-21 DIAGNOSIS — I50.22 CHRONIC SYSTOLIC HEART FAILURE (H): ICD-10-CM

## 2019-05-21 DIAGNOSIS — I10 ESSENTIAL HYPERTENSION, BENIGN: ICD-10-CM

## 2019-05-21 LAB
ALBUMIN SERPL-MCNC: 3.7 G/DL (ref 3.5–5)
ALP SERPL-CCNC: 199 U/L (ref 45–120)
ALT SERPL W P-5'-P-CCNC: 17 U/L (ref 0–45)
ANION GAP SERPL CALCULATED.3IONS-SCNC: 11 MMOL/L (ref 5–18)
AST SERPL W P-5'-P-CCNC: 14 U/L (ref 0–40)
BILIRUB SERPL-MCNC: 0.6 MG/DL (ref 0–1)
BUN SERPL-MCNC: 66 MG/DL (ref 8–28)
CALCIUM SERPL-MCNC: 10 MG/DL (ref 8.5–10.5)
CHLORIDE BLD-SCNC: 107 MMOL/L (ref 98–107)
CO2 SERPL-SCNC: 18 MMOL/L (ref 22–31)
CREAT SERPL-MCNC: 5.11 MG/DL (ref 0.7–1.3)
ERYTHROCYTE [DISTWIDTH] IN BLOOD BY AUTOMATED COUNT: 15.1 % (ref 11–14.5)
GFR SERPL CREATININE-BSD FRML MDRD: 11 ML/MIN/1.73M2
GLUCOSE BLD-MCNC: 101 MG/DL (ref 70–125)
HCT VFR BLD AUTO: 31.8 % (ref 40–54)
HGB BLD-MCNC: 10.4 G/DL (ref 14–18)
INR PPP: 2.1 (ref 0.9–1.1)
MCH RBC QN AUTO: 28.1 PG (ref 27–34)
MCHC RBC AUTO-ENTMCNC: 32.7 G/DL (ref 32–36)
MCV RBC AUTO: 86 FL (ref 80–100)
PLATELET # BLD AUTO: 172 THOU/UL (ref 140–440)
PMV BLD AUTO: 6.9 FL (ref 7–10)
POTASSIUM BLD-SCNC: 5.1 MMOL/L (ref 3.5–5)
PROT SERPL-MCNC: 6.6 G/DL (ref 6–8)
RBC # BLD AUTO: 3.7 MILL/UL (ref 4.4–6.2)
SODIUM SERPL-SCNC: 136 MMOL/L (ref 136–145)
WBC: 7.4 THOU/UL (ref 4–11)

## 2019-05-22 ENCOUNTER — COMMUNICATION - HEALTHEAST (OUTPATIENT)
Dept: FAMILY MEDICINE | Facility: CLINIC | Age: 79
End: 2019-05-22

## 2019-05-22 ENCOUNTER — AMBULATORY - HEALTHEAST (OUTPATIENT)
Dept: CARDIOLOGY | Facility: CLINIC | Age: 79
End: 2019-05-22

## 2019-05-22 DIAGNOSIS — Z95.810 PRESENCE OF AUTOMATIC CARDIOVERTER/DEFIBRILLATOR (AICD): ICD-10-CM

## 2019-05-28 ENCOUNTER — COMMUNICATION - HEALTHEAST (OUTPATIENT)
Dept: FAMILY MEDICINE | Facility: CLINIC | Age: 79
End: 2019-05-28

## 2019-05-29 ENCOUNTER — COMMUNICATION - HEALTHEAST (OUTPATIENT)
Dept: FAMILY MEDICINE | Facility: CLINIC | Age: 79
End: 2019-05-29

## 2019-05-29 ENCOUNTER — COMMUNICATION - HEALTHEAST (OUTPATIENT)
Dept: ANTICOAGULATION | Facility: CLINIC | Age: 79
End: 2019-05-29

## 2019-05-29 ENCOUNTER — COMMUNICATION - HEALTHEAST (OUTPATIENT)
Dept: CARE COORDINATION | Facility: CLINIC | Age: 79
End: 2019-05-29

## 2019-05-30 ENCOUNTER — OFFICE VISIT - HEALTHEAST (OUTPATIENT)
Dept: INFECTIOUS DISEASES | Facility: CLINIC | Age: 79
End: 2019-05-30

## 2019-05-30 DIAGNOSIS — A04.72 C. DIFFICILE DIARRHEA: ICD-10-CM

## 2019-05-31 ENCOUNTER — COMMUNICATION - HEALTHEAST (OUTPATIENT)
Dept: FAMILY MEDICINE | Facility: CLINIC | Age: 79
End: 2019-05-31

## 2019-05-31 ENCOUNTER — AMBULATORY - HEALTHEAST (OUTPATIENT)
Dept: FAMILY MEDICINE | Facility: CLINIC | Age: 79
End: 2019-05-31

## 2019-06-03 ENCOUNTER — COMMUNICATION - HEALTHEAST (OUTPATIENT)
Dept: ANTICOAGULATION | Facility: CLINIC | Age: 79
End: 2019-06-03

## 2019-06-03 ENCOUNTER — OFFICE VISIT - HEALTHEAST (OUTPATIENT)
Dept: FAMILY MEDICINE | Facility: CLINIC | Age: 79
End: 2019-06-03

## 2019-06-03 ENCOUNTER — AMBULATORY - HEALTHEAST (OUTPATIENT)
Dept: PHARMACY | Facility: CLINIC | Age: 79
End: 2019-06-03

## 2019-06-03 DIAGNOSIS — N18.30 STAGE 3 CHRONIC KIDNEY DISEASE (H): ICD-10-CM

## 2019-06-03 DIAGNOSIS — Z98.1 S/P CERVICAL SPINAL FUSION: ICD-10-CM

## 2019-06-03 DIAGNOSIS — R53.1 WEAKNESS: ICD-10-CM

## 2019-06-03 DIAGNOSIS — R63.4 WEIGHT LOSS: ICD-10-CM

## 2019-06-03 DIAGNOSIS — D64.9 ANEMIA, UNSPECIFIED TYPE: ICD-10-CM

## 2019-06-03 DIAGNOSIS — I48.20 CHRONIC ATRIAL FIBRILLATION (H): ICD-10-CM

## 2019-06-03 DIAGNOSIS — Z90.49 S/P LAPAROSCOPIC CHOLECYSTECTOMY: ICD-10-CM

## 2019-06-03 DIAGNOSIS — A04.72 CLOSTRIDIUM DIFFICILE DIARRHEA: ICD-10-CM

## 2019-06-03 DIAGNOSIS — I50.22 CHRONIC SYSTOLIC HEART FAILURE (H): ICD-10-CM

## 2019-06-03 LAB
ANION GAP SERPL CALCULATED.3IONS-SCNC: 10 MMOL/L (ref 5–18)
BUN SERPL-MCNC: 16 MG/DL (ref 8–28)
CALCIUM SERPL-MCNC: 8.9 MG/DL (ref 8.5–10.5)
CHLORIDE BLD-SCNC: 109 MMOL/L (ref 98–107)
CO2 SERPL-SCNC: 23 MMOL/L (ref 22–31)
CREAT SERPL-MCNC: 1.45 MG/DL (ref 0.7–1.3)
ERYTHROCYTE [DISTWIDTH] IN BLOOD BY AUTOMATED COUNT: 15 % (ref 11–14.5)
GFR SERPL CREATININE-BSD FRML MDRD: 47 ML/MIN/1.73M2
GLUCOSE BLD-MCNC: 99 MG/DL (ref 70–125)
HCT VFR BLD AUTO: 28.1 % (ref 40–54)
HGB BLD-MCNC: 8.9 G/DL (ref 14–18)
INR PPP: 3.2 (ref 0.9–1.1)
MCH RBC QN AUTO: 27.9 PG (ref 27–34)
MCHC RBC AUTO-ENTMCNC: 31.7 G/DL (ref 32–36)
MCV RBC AUTO: 88 FL (ref 80–100)
PLATELET # BLD AUTO: 152 THOU/UL (ref 140–440)
PMV BLD AUTO: 7.6 FL (ref 7–10)
POTASSIUM BLD-SCNC: 3.7 MMOL/L (ref 3.5–5)
RBC # BLD AUTO: 3.19 MILL/UL (ref 4.4–6.2)
SODIUM SERPL-SCNC: 142 MMOL/L (ref 136–145)
WBC: 5.5 THOU/UL (ref 4–11)

## 2019-06-06 ENCOUNTER — COMMUNICATION - HEALTHEAST (OUTPATIENT)
Dept: FAMILY MEDICINE | Facility: CLINIC | Age: 79
End: 2019-06-06

## 2019-06-06 ENCOUNTER — OFFICE VISIT - HEALTHEAST (OUTPATIENT)
Dept: PHARMACY | Facility: CLINIC | Age: 79
End: 2019-06-06

## 2019-06-06 DIAGNOSIS — A04.72 C. DIFFICILE DIARRHEA: ICD-10-CM

## 2019-06-06 DIAGNOSIS — I25.5 ISCHEMIC CARDIOMYOPATHY: ICD-10-CM

## 2019-06-06 DIAGNOSIS — I25.10 ATHEROSCLEROSIS OF NATIVE CORONARY ARTERY OF NATIVE HEART WITHOUT ANGINA PECTORIS: ICD-10-CM

## 2019-06-06 DIAGNOSIS — Z95.810 PRESENCE OF AUTOMATIC CARDIOVERTER/DEFIBRILLATOR (AICD): ICD-10-CM

## 2019-06-06 DIAGNOSIS — N17.0 ACUTE RENAL FAILURE WITH TUBULAR NECROSIS (H): ICD-10-CM

## 2019-06-06 DIAGNOSIS — D50.9 IRON DEFICIENCY ANEMIA, UNSPECIFIED IRON DEFICIENCY ANEMIA TYPE: ICD-10-CM

## 2019-06-06 DIAGNOSIS — M43.16 SPONDYLOLISTHESIS OF LUMBAR REGION: ICD-10-CM

## 2019-06-06 DIAGNOSIS — I10 ESSENTIAL HYPERTENSION, BENIGN: ICD-10-CM

## 2019-06-06 DIAGNOSIS — K21.9 GASTROESOPHAGEAL REFLUX DISEASE WITHOUT ESOPHAGITIS: ICD-10-CM

## 2019-06-07 ENCOUNTER — COMMUNICATION - HEALTHEAST (OUTPATIENT)
Dept: FAMILY MEDICINE | Facility: CLINIC | Age: 79
End: 2019-06-07

## 2019-06-07 DIAGNOSIS — M54.30 SCIATICA, UNSPECIFIED LATERALITY: ICD-10-CM

## 2019-06-10 ENCOUNTER — COMMUNICATION - HEALTHEAST (OUTPATIENT)
Dept: CARDIOLOGY | Facility: CLINIC | Age: 79
End: 2019-06-10

## 2019-06-10 DIAGNOSIS — I48.20 CHRONIC ATRIAL FIBRILLATION (H): ICD-10-CM

## 2019-06-10 DIAGNOSIS — I25.5 ISCHEMIC CARDIOMYOPATHY: ICD-10-CM

## 2019-06-12 ENCOUNTER — COMMUNICATION - HEALTHEAST (OUTPATIENT)
Dept: PHYSICAL MEDICINE AND REHAB | Facility: CLINIC | Age: 79
End: 2019-06-12

## 2019-06-13 ENCOUNTER — COMMUNICATION - HEALTHEAST (OUTPATIENT)
Dept: NEUROSURGERY | Facility: CLINIC | Age: 79
End: 2019-06-13

## 2019-06-17 ENCOUNTER — COMMUNICATION - HEALTHEAST (OUTPATIENT)
Dept: NEUROSURGERY | Facility: CLINIC | Age: 79
End: 2019-06-17

## 2019-06-19 ENCOUNTER — COMMUNICATION - HEALTHEAST (OUTPATIENT)
Dept: FAMILY MEDICINE | Facility: CLINIC | Age: 79
End: 2019-06-19

## 2019-06-19 ENCOUNTER — OFFICE VISIT - HEALTHEAST (OUTPATIENT)
Dept: FAMILY MEDICINE | Facility: CLINIC | Age: 79
End: 2019-06-19

## 2019-06-19 ENCOUNTER — COMMUNICATION - HEALTHEAST (OUTPATIENT)
Dept: ANTICOAGULATION | Facility: CLINIC | Age: 79
End: 2019-06-19

## 2019-06-19 DIAGNOSIS — I50.22 CHRONIC SYSTOLIC HEART FAILURE (H): ICD-10-CM

## 2019-06-19 DIAGNOSIS — R27.0 ATAXIA: ICD-10-CM

## 2019-06-19 DIAGNOSIS — R60.0 BILATERAL LOWER EXTREMITY EDEMA: ICD-10-CM

## 2019-06-19 DIAGNOSIS — I48.20 CHRONIC ATRIAL FIBRILLATION (H): ICD-10-CM

## 2019-06-19 LAB
CK SERPL-CCNC: 40 U/L (ref 30–190)
INR PPP: 1.9 (ref 0.9–1.1)

## 2019-06-24 ENCOUNTER — OFFICE VISIT - HEALTHEAST (OUTPATIENT)
Dept: FAMILY MEDICINE | Facility: CLINIC | Age: 79
End: 2019-06-24

## 2019-06-24 DIAGNOSIS — N18.30 STAGE 3 CHRONIC KIDNEY DISEASE (H): ICD-10-CM

## 2019-06-24 DIAGNOSIS — I10 ESSENTIAL HYPERTENSION, BENIGN: ICD-10-CM

## 2019-06-24 DIAGNOSIS — Z90.49 S/P LAPAROSCOPIC CHOLECYSTECTOMY: ICD-10-CM

## 2019-06-24 DIAGNOSIS — Z98.1 S/P CERVICAL SPINAL FUSION: ICD-10-CM

## 2019-06-24 DIAGNOSIS — I50.22 CHRONIC SYSTOLIC HEART FAILURE (H): ICD-10-CM

## 2019-06-24 DIAGNOSIS — I48.20 CHRONIC ATRIAL FIBRILLATION (H): ICD-10-CM

## 2019-06-24 DIAGNOSIS — D64.9 ANEMIA, UNSPECIFIED TYPE: ICD-10-CM

## 2019-06-24 DIAGNOSIS — R19.7 DIARRHEA: ICD-10-CM

## 2019-06-24 DIAGNOSIS — A04.72 C. DIFFICILE COLITIS: ICD-10-CM

## 2019-06-24 LAB
ALBUMIN SERPL-MCNC: 3.7 G/DL (ref 3.5–5)
ALP SERPL-CCNC: 217 U/L (ref 45–120)
ALT SERPL W P-5'-P-CCNC: 11 U/L (ref 0–45)
ANION GAP SERPL CALCULATED.3IONS-SCNC: 12 MMOL/L (ref 5–18)
AST SERPL W P-5'-P-CCNC: 19 U/L (ref 0–40)
BASOPHILS # BLD AUTO: 0 THOU/UL (ref 0–0.2)
BASOPHILS NFR BLD AUTO: 0 % (ref 0–2)
BILIRUB SERPL-MCNC: 0.5 MG/DL (ref 0–1)
BUN SERPL-MCNC: 12 MG/DL (ref 8–28)
C DIFF TOX B STL QL: NEGATIVE
CALCIUM SERPL-MCNC: 9.4 MG/DL (ref 8.5–10.5)
CHLORIDE BLD-SCNC: 103 MMOL/L (ref 98–107)
CO2 SERPL-SCNC: 28 MMOL/L (ref 22–31)
CREAT SERPL-MCNC: 1.58 MG/DL (ref 0.7–1.3)
EOSINOPHIL # BLD AUTO: 0.4 THOU/UL (ref 0–0.4)
EOSINOPHIL NFR BLD AUTO: 8 % (ref 0–6)
ERYTHROCYTE [DISTWIDTH] IN BLOOD BY AUTOMATED COUNT: 15 % (ref 11–14.5)
GFR SERPL CREATININE-BSD FRML MDRD: 42 ML/MIN/1.73M2
GLUCOSE BLD-MCNC: 94 MG/DL (ref 70–125)
HCT VFR BLD AUTO: 29.4 % (ref 40–54)
HGB BLD-MCNC: 9.6 G/DL (ref 14–18)
LYMPHOCYTES # BLD AUTO: 1.1 THOU/UL (ref 0.8–4.4)
LYMPHOCYTES NFR BLD AUTO: 19 % (ref 20–40)
MAGNESIUM SERPL-MCNC: 1.7 MG/DL (ref 1.8–2.6)
MCH RBC QN AUTO: 29.2 PG (ref 27–34)
MCHC RBC AUTO-ENTMCNC: 32.8 G/DL (ref 32–36)
MCV RBC AUTO: 89 FL (ref 80–100)
MONOCYTES # BLD AUTO: 0.6 THOU/UL (ref 0–0.9)
MONOCYTES NFR BLD AUTO: 9 % (ref 2–10)
NEUTROPHILS # BLD AUTO: 3.7 THOU/UL (ref 2–7.7)
NEUTROPHILS NFR BLD AUTO: 64 % (ref 50–70)
PLATELET # BLD AUTO: 158 THOU/UL (ref 140–440)
PMV BLD AUTO: 7 FL (ref 7–10)
POTASSIUM BLD-SCNC: 3.2 MMOL/L (ref 3.5–5)
PROT SERPL-MCNC: 6.5 G/DL (ref 6–8)
RBC # BLD AUTO: 3.31 MILL/UL (ref 4.4–6.2)
RIBOTYPE 027/NAP1/BI: NORMAL
SODIUM SERPL-SCNC: 143 MMOL/L (ref 136–145)
WBC: 5.8 THOU/UL (ref 4–11)

## 2019-06-25 ENCOUNTER — COMMUNICATION - HEALTHEAST (OUTPATIENT)
Dept: FAMILY MEDICINE | Facility: CLINIC | Age: 79
End: 2019-06-25

## 2019-06-26 ENCOUNTER — HOSPITAL ENCOUNTER (OUTPATIENT)
Dept: PHYSICAL MEDICINE AND REHAB | Facility: CLINIC | Age: 79
Discharge: HOME | End: 2019-06-26
Attending: NURSE PRACTITIONER

## 2019-06-26 DIAGNOSIS — M79.18 RIGHT BUTTOCK PAIN: ICD-10-CM

## 2019-06-26 DIAGNOSIS — Z98.890 HX OF CERVICAL SPINE SURGERY: ICD-10-CM

## 2019-06-26 DIAGNOSIS — M54.50 CHRONIC RIGHT-SIDED LOW BACK PAIN WITHOUT SCIATICA: ICD-10-CM

## 2019-06-26 DIAGNOSIS — M53.3 SACROILIAC JOINT PAIN: ICD-10-CM

## 2019-06-26 DIAGNOSIS — Z98.890 HISTORY OF LUMBAR SURGERY: ICD-10-CM

## 2019-06-26 DIAGNOSIS — M53.3 SACROILIAC JOINT DYSFUNCTION: ICD-10-CM

## 2019-06-26 DIAGNOSIS — G89.29 CHRONIC RIGHT-SIDED LOW BACK PAIN WITHOUT SCIATICA: ICD-10-CM

## 2019-06-27 ENCOUNTER — COMMUNICATION - HEALTHEAST (OUTPATIENT)
Dept: PHYSICAL MEDICINE AND REHAB | Facility: CLINIC | Age: 79
End: 2019-06-27

## 2019-06-27 ENCOUNTER — COMMUNICATION - HEALTHEAST (OUTPATIENT)
Dept: FAMILY MEDICINE | Facility: CLINIC | Age: 79
End: 2019-06-27

## 2019-06-27 DIAGNOSIS — E87.6 HYPOKALEMIA: ICD-10-CM

## 2019-07-01 ENCOUNTER — COMMUNICATION - HEALTHEAST (OUTPATIENT)
Dept: PHYSICAL MEDICINE AND REHAB | Facility: CLINIC | Age: 79
End: 2019-07-01

## 2019-07-02 ENCOUNTER — AMBULATORY - HEALTHEAST (OUTPATIENT)
Dept: LAB | Facility: CLINIC | Age: 79
End: 2019-07-02

## 2019-07-02 DIAGNOSIS — E87.6 HYPOKALEMIA: ICD-10-CM

## 2019-07-02 LAB
ANION GAP SERPL CALCULATED.3IONS-SCNC: 9 MMOL/L (ref 5–18)
BUN SERPL-MCNC: 17 MG/DL (ref 8–28)
CALCIUM SERPL-MCNC: 8.7 MG/DL (ref 8.5–10.5)
CHLORIDE BLD-SCNC: 109 MMOL/L (ref 98–107)
CO2 SERPL-SCNC: 24 MMOL/L (ref 22–31)
CREAT SERPL-MCNC: 1.28 MG/DL (ref 0.7–1.3)
GFR SERPL CREATININE-BSD FRML MDRD: 54 ML/MIN/1.73M2
GLUCOSE BLD-MCNC: 89 MG/DL (ref 70–125)
POTASSIUM BLD-SCNC: 3.2 MMOL/L (ref 3.5–5)
SODIUM SERPL-SCNC: 142 MMOL/L (ref 136–145)

## 2019-07-03 ENCOUNTER — AMBULATORY - HEALTHEAST (OUTPATIENT)
Dept: PHYSICAL MEDICINE AND REHAB | Facility: CLINIC | Age: 79
End: 2019-07-03

## 2019-07-03 DIAGNOSIS — M79.18 RIGHT BUTTOCK PAIN: ICD-10-CM

## 2019-07-03 DIAGNOSIS — M53.3 SACROILIAC JOINT DYSFUNCTION: ICD-10-CM

## 2019-07-03 DIAGNOSIS — G89.29 CHRONIC RIGHT-SIDED LOW BACK PAIN WITHOUT SCIATICA: ICD-10-CM

## 2019-07-03 DIAGNOSIS — M25.561 BILATERAL CHRONIC KNEE PAIN: ICD-10-CM

## 2019-07-03 DIAGNOSIS — G89.29 BILATERAL CHRONIC KNEE PAIN: ICD-10-CM

## 2019-07-03 DIAGNOSIS — M53.3 SACROILIAC JOINT PAIN: ICD-10-CM

## 2019-07-03 DIAGNOSIS — M17.9 OSTEOARTHRITIS OF KNEE, UNSPECIFIED LATERALITY, UNSPECIFIED OSTEOARTHRITIS TYPE: ICD-10-CM

## 2019-07-03 DIAGNOSIS — M54.50 CHRONIC RIGHT-SIDED LOW BACK PAIN WITHOUT SCIATICA: ICD-10-CM

## 2019-07-03 DIAGNOSIS — M25.562 BILATERAL CHRONIC KNEE PAIN: ICD-10-CM

## 2019-07-03 DIAGNOSIS — M19.012 PRIMARY OSTEOARTHRITIS OF LEFT SHOULDER: ICD-10-CM

## 2019-07-07 ENCOUNTER — COMMUNICATION - HEALTHEAST (OUTPATIENT)
Dept: FAMILY MEDICINE | Facility: CLINIC | Age: 79
End: 2019-07-07

## 2019-07-07 ENCOUNTER — AMBULATORY - HEALTHEAST (OUTPATIENT)
Dept: FAMILY MEDICINE | Facility: CLINIC | Age: 79
End: 2019-07-07

## 2019-07-07 DIAGNOSIS — E87.6 HYPOKALEMIA: ICD-10-CM

## 2019-07-08 ENCOUNTER — COMMUNICATION - HEALTHEAST (OUTPATIENT)
Dept: FAMILY MEDICINE | Facility: CLINIC | Age: 79
End: 2019-07-08

## 2019-07-08 ENCOUNTER — AMBULATORY - HEALTHEAST (OUTPATIENT)
Dept: FAMILY MEDICINE | Facility: CLINIC | Age: 79
End: 2019-07-08

## 2019-07-08 ENCOUNTER — HOSPITAL ENCOUNTER (OUTPATIENT)
Dept: PHYSICAL MEDICINE AND REHAB | Facility: CLINIC | Age: 79
Discharge: HOME | End: 2019-07-08
Attending: SURGERY

## 2019-07-08 ENCOUNTER — COMMUNICATION - HEALTHEAST (OUTPATIENT)
Dept: ANTICOAGULATION | Facility: CLINIC | Age: 79
End: 2019-07-08

## 2019-07-08 DIAGNOSIS — E87.6 HYPOKALEMIA: ICD-10-CM

## 2019-07-08 DIAGNOSIS — Z79.01 ON WARFARIN THERAPY: ICD-10-CM

## 2019-07-08 DIAGNOSIS — M53.3 SACROILIAC PAIN: ICD-10-CM

## 2019-07-08 LAB — POC INR - HE - HISTORICAL: 2.4 (ref 0.9–1.1)

## 2019-07-09 ENCOUNTER — COMMUNICATION - HEALTHEAST (OUTPATIENT)
Dept: FAMILY MEDICINE | Facility: CLINIC | Age: 79
End: 2019-07-09

## 2019-07-09 ENCOUNTER — OFFICE VISIT - HEALTHEAST (OUTPATIENT)
Dept: CARDIOLOGY | Facility: CLINIC | Age: 79
End: 2019-07-09

## 2019-07-09 ENCOUNTER — AMBULATORY - HEALTHEAST (OUTPATIENT)
Dept: CARDIOLOGY | Facility: CLINIC | Age: 79
End: 2019-07-09

## 2019-07-09 DIAGNOSIS — Z95.810 ICD (IMPLANTABLE CARDIOVERTER-DEFIBRILLATOR), SINGLE, IN SITU: ICD-10-CM

## 2019-07-09 DIAGNOSIS — N18.30 STAGE 3 CHRONIC KIDNEY DISEASE (H): ICD-10-CM

## 2019-07-09 DIAGNOSIS — I10 ESSENTIAL HYPERTENSION, BENIGN: ICD-10-CM

## 2019-07-09 DIAGNOSIS — I50.22 CHRONIC SYSTOLIC HEART FAILURE (H): ICD-10-CM

## 2019-07-09 DIAGNOSIS — I25.5 ISCHEMIC CARDIOMYOPATHY: ICD-10-CM

## 2019-07-09 DIAGNOSIS — E87.6 HYPOKALEMIA: ICD-10-CM

## 2019-07-09 DIAGNOSIS — N17.0 ACUTE RENAL FAILURE WITH TUBULAR NECROSIS (H): ICD-10-CM

## 2019-07-09 LAB
ANION GAP SERPL CALCULATED.3IONS-SCNC: 10 MMOL/L (ref 5–18)
BUN SERPL-MCNC: 13 MG/DL (ref 8–28)
CALCIUM SERPL-MCNC: 9 MG/DL (ref 8.5–10.5)
CHLORIDE BLD-SCNC: 112 MMOL/L (ref 98–107)
CO2 SERPL-SCNC: 22 MMOL/L (ref 22–31)
CREAT SERPL-MCNC: 1.24 MG/DL (ref 0.7–1.3)
GFR SERPL CREATININE-BSD FRML MDRD: 56 ML/MIN/1.73M2
GLUCOSE BLD-MCNC: 104 MG/DL (ref 70–125)
POTASSIUM BLD-SCNC: 4.1 MMOL/L (ref 3.5–5)
SODIUM SERPL-SCNC: 144 MMOL/L (ref 136–145)

## 2019-07-09 ASSESSMENT — MIFFLIN-ST. JEOR: SCORE: 1479.64

## 2019-07-12 ENCOUNTER — COMMUNICATION - HEALTHEAST (OUTPATIENT)
Dept: SCHEDULING | Facility: CLINIC | Age: 79
End: 2019-07-12

## 2019-07-18 ENCOUNTER — COMMUNICATION - HEALTHEAST (OUTPATIENT)
Dept: ANTICOAGULATION | Facility: CLINIC | Age: 79
End: 2019-07-18

## 2019-07-19 ENCOUNTER — RECORDS - HEALTHEAST (OUTPATIENT)
Dept: LAB | Facility: CLINIC | Age: 79
End: 2019-07-19

## 2019-07-19 LAB — INR PPP: 1.83 (ref 0.9–1.1)

## 2019-07-22 ENCOUNTER — RECORDS - HEALTHEAST (OUTPATIENT)
Dept: LAB | Facility: CLINIC | Age: 79
End: 2019-07-22

## 2019-07-22 ENCOUNTER — OFFICE VISIT - HEALTHEAST (OUTPATIENT)
Dept: GERIATRICS | Facility: CLINIC | Age: 79
End: 2019-07-22

## 2019-07-22 DIAGNOSIS — M48.062 LUMBAR STENOSIS WITH NEUROGENIC CLAUDICATION: ICD-10-CM

## 2019-07-22 DIAGNOSIS — I10 ESSENTIAL HYPERTENSION, BENIGN: ICD-10-CM

## 2019-07-22 DIAGNOSIS — D64.9 ANEMIA, UNSPECIFIED TYPE: ICD-10-CM

## 2019-07-22 DIAGNOSIS — I50.22 CHRONIC SYSTOLIC CONGESTIVE HEART FAILURE (H): ICD-10-CM

## 2019-07-22 DIAGNOSIS — I25.5 ISCHEMIC CARDIOMYOPATHY: ICD-10-CM

## 2019-07-22 DIAGNOSIS — R42 DIZZINESS: ICD-10-CM

## 2019-07-22 DIAGNOSIS — K21.9 GASTROESOPHAGEAL REFLUX DISEASE WITHOUT ESOPHAGITIS: ICD-10-CM

## 2019-07-22 DIAGNOSIS — I48.20 CHRONIC ATRIAL FIBRILLATION (H): ICD-10-CM

## 2019-07-22 DIAGNOSIS — I25.10 ATHEROSCLEROSIS OF NATIVE CORONARY ARTERY OF NATIVE HEART WITHOUT ANGINA PECTORIS: ICD-10-CM

## 2019-07-22 LAB — INR PPP: 1.94 (ref 0.9–1.1)

## 2019-07-23 ENCOUNTER — OFFICE VISIT - HEALTHEAST (OUTPATIENT)
Dept: GERIATRICS | Facility: CLINIC | Age: 79
End: 2019-07-23

## 2019-07-23 DIAGNOSIS — I48.20 CHRONIC ATRIAL FIBRILLATION (H): ICD-10-CM

## 2019-07-23 DIAGNOSIS — I10 ESSENTIAL HYPERTENSION: ICD-10-CM

## 2019-07-23 DIAGNOSIS — R41.89 COGNITIVE IMPAIRMENT: ICD-10-CM

## 2019-07-23 DIAGNOSIS — N18.30 STAGE 3 CHRONIC KIDNEY DISEASE (H): ICD-10-CM

## 2019-07-23 DIAGNOSIS — I25.5 ISCHEMIC CARDIOMYOPATHY: ICD-10-CM

## 2019-07-23 DIAGNOSIS — R11.0 NAUSEA: ICD-10-CM

## 2019-07-23 DIAGNOSIS — D64.9 ANEMIA, UNSPECIFIED TYPE: ICD-10-CM

## 2019-07-24 ENCOUNTER — RECORDS - HEALTHEAST (OUTPATIENT)
Dept: LAB | Facility: CLINIC | Age: 79
End: 2019-07-24

## 2019-07-25 ENCOUNTER — OFFICE VISIT - HEALTHEAST (OUTPATIENT)
Dept: GERIATRICS | Facility: CLINIC | Age: 79
End: 2019-07-25

## 2019-07-25 DIAGNOSIS — I48.20 CHRONIC ATRIAL FIBRILLATION (H): ICD-10-CM

## 2019-07-25 DIAGNOSIS — Z79.01 ANTICOAGULATION MANAGEMENT ENCOUNTER: ICD-10-CM

## 2019-07-25 DIAGNOSIS — N18.30 STAGE 3 CHRONIC KIDNEY DISEASE (H): ICD-10-CM

## 2019-07-25 DIAGNOSIS — Z51.81 ANTICOAGULATION MANAGEMENT ENCOUNTER: ICD-10-CM

## 2019-07-25 DIAGNOSIS — D64.9 ANEMIA, UNSPECIFIED TYPE: ICD-10-CM

## 2019-07-25 DIAGNOSIS — R11.0 NAUSEA: ICD-10-CM

## 2019-07-25 DIAGNOSIS — I50.22 CHRONIC SYSTOLIC CONGESTIVE HEART FAILURE (H): ICD-10-CM

## 2019-07-25 LAB
ANION GAP SERPL CALCULATED.3IONS-SCNC: 10 MMOL/L (ref 5–18)
BASOPHILS # BLD AUTO: 0.1 THOU/UL (ref 0–0.2)
BASOPHILS NFR BLD AUTO: 1 % (ref 0–2)
BUN SERPL-MCNC: 21 MG/DL (ref 8–28)
CALCIUM SERPL-MCNC: 9.2 MG/DL (ref 8.5–10.5)
CHLORIDE BLD-SCNC: 105 MMOL/L (ref 98–107)
CO2 SERPL-SCNC: 23 MMOL/L (ref 22–31)
CREAT SERPL-MCNC: 1.33 MG/DL (ref 0.7–1.3)
EOSINOPHIL # BLD AUTO: 0.4 THOU/UL (ref 0–0.4)
EOSINOPHIL NFR BLD AUTO: 5 % (ref 0–6)
ERYTHROCYTE [DISTWIDTH] IN BLOOD BY AUTOMATED COUNT: 15.3 % (ref 11–14.5)
GFR SERPL CREATININE-BSD FRML MDRD: 52 ML/MIN/1.73M2
GLUCOSE BLD-MCNC: 85 MG/DL (ref 70–125)
HCT VFR BLD AUTO: 28.8 % (ref 40–54)
HGB BLD-MCNC: 8.9 G/DL (ref 14–18)
INR PPP: 2.45 (ref 0.9–1.1)
LYMPHOCYTES # BLD AUTO: 0.9 THOU/UL (ref 0.8–4.4)
LYMPHOCYTES NFR BLD AUTO: 14 % (ref 20–40)
MCH RBC QN AUTO: 29 PG (ref 27–34)
MCHC RBC AUTO-ENTMCNC: 30.9 G/DL (ref 32–36)
MCV RBC AUTO: 94 FL (ref 80–100)
MONOCYTES # BLD AUTO: 1 THOU/UL (ref 0–0.9)
MONOCYTES NFR BLD AUTO: 14 % (ref 2–10)
NEUTROPHILS # BLD AUTO: 4.5 THOU/UL (ref 2–7.7)
NEUTROPHILS NFR BLD AUTO: 66 % (ref 50–70)
PLATELET # BLD AUTO: 124 THOU/UL (ref 140–440)
PMV BLD AUTO: 10.7 FL (ref 8.5–12.5)
POTASSIUM BLD-SCNC: 4.1 MMOL/L (ref 3.5–5)
RBC # BLD AUTO: 3.07 MILL/UL (ref 4.4–6.2)
SODIUM SERPL-SCNC: 138 MMOL/L (ref 136–145)
WBC: 6.9 THOU/UL (ref 4–11)

## 2019-07-26 ENCOUNTER — RECORDS - HEALTHEAST (OUTPATIENT)
Dept: LAB | Facility: CLINIC | Age: 79
End: 2019-07-26

## 2019-07-29 ENCOUNTER — RECORDS - HEALTHEAST (OUTPATIENT)
Dept: LAB | Facility: CLINIC | Age: 79
End: 2019-07-29

## 2019-07-29 LAB — INR PPP: 2.29 (ref 0.9–1.1)

## 2019-07-30 LAB
ANION GAP SERPL CALCULATED.3IONS-SCNC: 5 MMOL/L (ref 5–18)
BUN SERPL-MCNC: 14 MG/DL (ref 8–28)
CALCIUM SERPL-MCNC: 9 MG/DL (ref 8.5–10.5)
CHLORIDE BLD-SCNC: 108 MMOL/L (ref 98–107)
CO2 SERPL-SCNC: 24 MMOL/L (ref 22–31)
CREAT SERPL-MCNC: 1.08 MG/DL (ref 0.7–1.3)
GFR SERPL CREATININE-BSD FRML MDRD: >60 ML/MIN/1.73M2
GLUCOSE BLD-MCNC: 80 MG/DL (ref 70–125)
HGB BLD-MCNC: 8.5 G/DL (ref 14–18)
POTASSIUM BLD-SCNC: 3.7 MMOL/L (ref 3.5–5)
SODIUM SERPL-SCNC: 137 MMOL/L (ref 136–145)

## 2019-08-01 ENCOUNTER — OFFICE VISIT - HEALTHEAST (OUTPATIENT)
Dept: GERIATRICS | Facility: CLINIC | Age: 79
End: 2019-08-01

## 2019-08-01 DIAGNOSIS — D64.9 ANEMIA, UNSPECIFIED TYPE: ICD-10-CM

## 2019-08-01 DIAGNOSIS — I25.5 ISCHEMIC CARDIOMYOPATHY: ICD-10-CM

## 2019-08-01 DIAGNOSIS — I48.20 CHRONIC ATRIAL FIBRILLATION (H): ICD-10-CM

## 2019-08-01 DIAGNOSIS — I50.22 CHRONIC SYSTOLIC HEART FAILURE (H): ICD-10-CM

## 2019-08-05 ENCOUNTER — COMMUNICATION - HEALTHEAST (OUTPATIENT)
Dept: GERIATRICS | Facility: CLINIC | Age: 79
End: 2019-08-05

## 2019-08-05 ENCOUNTER — AMBULATORY - HEALTHEAST (OUTPATIENT)
Dept: GERIATRICS | Facility: CLINIC | Age: 79
End: 2019-08-05

## 2019-08-06 ENCOUNTER — OFFICE VISIT - HEALTHEAST (OUTPATIENT)
Dept: FAMILY MEDICINE | Facility: CLINIC | Age: 79
End: 2019-08-06

## 2019-08-06 ENCOUNTER — COMMUNICATION - HEALTHEAST (OUTPATIENT)
Dept: ANTICOAGULATION | Facility: CLINIC | Age: 79
End: 2019-08-06

## 2019-08-06 DIAGNOSIS — I48.20 CHRONIC ATRIAL FIBRILLATION (H): ICD-10-CM

## 2019-08-06 DIAGNOSIS — I50.22 CHRONIC SYSTOLIC CONGESTIVE HEART FAILURE (H): ICD-10-CM

## 2019-08-06 DIAGNOSIS — I25.5 ISCHEMIC CARDIOMYOPATHY: ICD-10-CM

## 2019-08-06 DIAGNOSIS — N18.30 STAGE 3 CHRONIC KIDNEY DISEASE (H): ICD-10-CM

## 2019-08-06 DIAGNOSIS — D64.9 ANEMIA, UNSPECIFIED TYPE: ICD-10-CM

## 2019-08-06 LAB
ANION GAP SERPL CALCULATED.3IONS-SCNC: 11 MMOL/L (ref 5–18)
BUN SERPL-MCNC: 23 MG/DL (ref 8–28)
CALCIUM SERPL-MCNC: 9.2 MG/DL (ref 8.5–10.5)
CHLORIDE BLD-SCNC: 111 MMOL/L (ref 98–107)
CO2 SERPL-SCNC: 19 MMOL/L (ref 22–31)
CREAT SERPL-MCNC: 1.44 MG/DL (ref 0.7–1.3)
FERRITIN SERPL-MCNC: 256 NG/ML (ref 27–300)
GFR SERPL CREATININE-BSD FRML MDRD: 47 ML/MIN/1.73M2
GLUCOSE BLD-MCNC: 92 MG/DL (ref 70–125)
INR PPP: 2.4 (ref 0.9–1.1)
IRON SATN MFR SERPL: 9 % (ref 20–50)
IRON SERPL-MCNC: 25 UG/DL (ref 42–175)
POTASSIUM BLD-SCNC: 4.8 MMOL/L (ref 3.5–5)
RETICS # AUTO: 0.01 MILL/UL (ref 0.01–0.11)
RETICS/RBC NFR AUTO: 0.46 % (ref 0.8–2.7)
SODIUM SERPL-SCNC: 141 MMOL/L (ref 136–145)
TIBC SERPL-MCNC: 275 UG/DL (ref 313–563)
TRANSFERRIN SERPL-MCNC: 220 MG/DL (ref 212–360)
VIT B12 SERPL-MCNC: 352 PG/ML (ref 213–816)

## 2019-08-07 LAB
BASOPHILS # BLD AUTO: 0 THOU/UL (ref 0–0.2)
BASOPHILS NFR BLD AUTO: 1 % (ref 0–2)
EOSINOPHIL # BLD AUTO: 0.5 THOU/UL (ref 0–0.4)
EOSINOPHIL NFR BLD AUTO: 10 % (ref 0–6)
ERYTHROCYTE [DISTWIDTH] IN BLOOD BY AUTOMATED COUNT: 14.6 % (ref 11–14.5)
HCT VFR BLD AUTO: 28.5 % (ref 40–54)
HGB BLD-MCNC: 9.1 G/DL (ref 14–18)
LYMPHOCYTES # BLD AUTO: 0.9 THOU/UL (ref 0.8–4.4)
LYMPHOCYTES NFR BLD AUTO: 16 % (ref 20–40)
MCH RBC QN AUTO: 28.7 PG (ref 27–34)
MCHC RBC AUTO-ENTMCNC: 31.9 G/DL (ref 32–36)
MCV RBC AUTO: 90 FL (ref 80–100)
MONOCYTES # BLD AUTO: 0.7 THOU/UL (ref 0–0.9)
MONOCYTES NFR BLD AUTO: 13 % (ref 2–10)
NEUTROPHILS # BLD AUTO: 3.3 THOU/UL (ref 2–7.7)
NEUTROPHILS NFR BLD AUTO: 60 % (ref 50–70)
PATH REPORT.MICROSCOPIC SPEC OTHER STN: ABNORMAL
PLATELET # BLD AUTO: 141 THOU/UL (ref 140–440)
PMV BLD AUTO: 9.8 FL (ref 8.5–12.5)
RBC # BLD AUTO: 3.17 MILL/UL (ref 4.4–6.2)
WBC: 5.5 THOU/UL (ref 4–11)

## 2019-08-08 LAB
LAB AP CHARGES (HE HISTORICAL CONVERSION): NORMAL
PATH REPORT.COMMENTS IMP SPEC: NORMAL
PATH REPORT.COMMENTS IMP SPEC: NORMAL
PATH REPORT.FINAL DX SPEC: NORMAL
PATH REPORT.RELEVANT HX SPEC: NORMAL

## 2019-08-16 ENCOUNTER — COMMUNICATION - HEALTHEAST (OUTPATIENT)
Dept: ANTICOAGULATION | Facility: CLINIC | Age: 79
End: 2019-08-16

## 2019-08-16 ENCOUNTER — AMBULATORY - HEALTHEAST (OUTPATIENT)
Dept: LAB | Facility: CLINIC | Age: 79
End: 2019-08-16

## 2019-08-16 DIAGNOSIS — I48.20 CHRONIC ATRIAL FIBRILLATION (H): ICD-10-CM

## 2019-08-16 LAB — INR PPP: 1.8 (ref 0.9–1.1)

## 2019-08-27 ENCOUNTER — OFFICE VISIT - HEALTHEAST (OUTPATIENT)
Dept: CARDIOLOGY | Facility: CLINIC | Age: 79
End: 2019-08-27

## 2019-08-27 ENCOUNTER — HOSPITAL ENCOUNTER (OUTPATIENT)
Dept: PHYSICAL MEDICINE AND REHAB | Facility: CLINIC | Age: 79
Discharge: HOME | End: 2019-08-27
Attending: NURSE PRACTITIONER

## 2019-08-27 DIAGNOSIS — M53.3 SACROILIAC JOINT DYSFUNCTION: ICD-10-CM

## 2019-08-27 DIAGNOSIS — M62.9 HAMSTRING TIGHTNESS OF BOTH LOWER EXTREMITIES: ICD-10-CM

## 2019-08-27 DIAGNOSIS — Z98.890 HX OF CERVICAL SPINE SURGERY: ICD-10-CM

## 2019-08-27 DIAGNOSIS — I25.5 ISCHEMIC CARDIOMYOPATHY: ICD-10-CM

## 2019-08-27 DIAGNOSIS — M53.3 SACROILIAC PAIN: ICD-10-CM

## 2019-08-27 DIAGNOSIS — G89.29 CHRONIC RIGHT-SIDED LOW BACK PAIN WITHOUT SCIATICA: ICD-10-CM

## 2019-08-27 DIAGNOSIS — M48.061 SPINAL STENOSIS OF LUMBAR REGION WITHOUT NEUROGENIC CLAUDICATION: ICD-10-CM

## 2019-08-27 DIAGNOSIS — M54.50 CHRONIC RIGHT-SIDED LOW BACK PAIN WITHOUT SCIATICA: ICD-10-CM

## 2019-08-27 DIAGNOSIS — M48.061 LUMBAR FORAMINAL STENOSIS: ICD-10-CM

## 2019-08-27 DIAGNOSIS — I50.22 CHRONIC SYSTOLIC HEART FAILURE (H): ICD-10-CM

## 2019-08-27 DIAGNOSIS — Z98.890 HISTORY OF LUMBAR SURGERY: ICD-10-CM

## 2019-08-27 ASSESSMENT — MIFFLIN-ST. JEOR: SCORE: 1466.03

## 2019-08-30 ENCOUNTER — AMBULATORY - HEALTHEAST (OUTPATIENT)
Dept: LAB | Facility: CLINIC | Age: 79
End: 2019-08-30

## 2019-08-30 ENCOUNTER — COMMUNICATION - HEALTHEAST (OUTPATIENT)
Dept: ANTICOAGULATION | Facility: CLINIC | Age: 79
End: 2019-08-30

## 2019-08-30 DIAGNOSIS — I48.20 CHRONIC ATRIAL FIBRILLATION (H): ICD-10-CM

## 2019-08-30 LAB — INR PPP: 3.1 (ref 0.9–1.1)

## 2019-09-06 ENCOUNTER — OFFICE VISIT - HEALTHEAST (OUTPATIENT)
Dept: PHYSICAL THERAPY | Facility: REHABILITATION | Age: 79
End: 2019-09-06

## 2019-09-06 DIAGNOSIS — M54.6 PAIN IN THORACIC SPINE: ICD-10-CM

## 2019-09-06 DIAGNOSIS — Z98.890 HX OF CERVICAL SPINE SURGERY: ICD-10-CM

## 2019-09-06 DIAGNOSIS — M62.9 HAMSTRING TIGHTNESS OF BOTH LOWER EXTREMITIES: ICD-10-CM

## 2019-09-06 DIAGNOSIS — M53.3 CHRONIC RIGHT SACROILIAC JOINT PAIN: ICD-10-CM

## 2019-09-06 DIAGNOSIS — M62.81 GENERALIZED MUSCLE WEAKNESS: ICD-10-CM

## 2019-09-06 DIAGNOSIS — G89.29 CHRONIC RIGHT SACROILIAC JOINT PAIN: ICD-10-CM

## 2019-09-09 ENCOUNTER — COMMUNICATION - HEALTHEAST (OUTPATIENT)
Dept: CARDIOLOGY | Facility: CLINIC | Age: 79
End: 2019-09-09

## 2019-09-13 ENCOUNTER — AMBULATORY - HEALTHEAST (OUTPATIENT)
Dept: LAB | Facility: CLINIC | Age: 79
End: 2019-09-13

## 2019-09-13 ENCOUNTER — COMMUNICATION - HEALTHEAST (OUTPATIENT)
Dept: ANTICOAGULATION | Facility: CLINIC | Age: 79
End: 2019-09-13

## 2019-09-13 DIAGNOSIS — I48.20 CHRONIC ATRIAL FIBRILLATION (H): ICD-10-CM

## 2019-09-13 LAB — INR PPP: 3.3 (ref 0.9–1.1)

## 2019-09-14 ENCOUNTER — COMMUNICATION - HEALTHEAST (OUTPATIENT)
Dept: FAMILY MEDICINE | Facility: CLINIC | Age: 79
End: 2019-09-14

## 2019-09-14 DIAGNOSIS — I10 ESSENTIAL HYPERTENSION: ICD-10-CM

## 2019-09-19 ENCOUNTER — OFFICE VISIT - HEALTHEAST (OUTPATIENT)
Dept: CARDIOLOGY | Facility: CLINIC | Age: 79
End: 2019-09-19

## 2019-09-19 DIAGNOSIS — I50.20 HEART FAILURE WITH REDUCED EJECTION FRACTION (H): ICD-10-CM

## 2019-09-19 DIAGNOSIS — I48.91 A-FIB (H): ICD-10-CM

## 2019-09-19 DIAGNOSIS — I50.22 CHRONIC SYSTOLIC CONGESTIVE HEART FAILURE (H): ICD-10-CM

## 2019-09-19 DIAGNOSIS — I25.5 ISCHEMIC CARDIOMYOPATHY: ICD-10-CM

## 2019-09-19 ASSESSMENT — MIFFLIN-ST. JEOR: SCORE: 1470.57

## 2019-09-24 ENCOUNTER — COMMUNICATION - HEALTHEAST (OUTPATIENT)
Dept: ANTICOAGULATION | Facility: CLINIC | Age: 79
End: 2019-09-24

## 2019-09-24 ENCOUNTER — OFFICE VISIT - HEALTHEAST (OUTPATIENT)
Dept: FAMILY MEDICINE | Facility: CLINIC | Age: 79
End: 2019-09-24

## 2019-09-24 DIAGNOSIS — I48.20 CHRONIC ATRIAL FIBRILLATION (H): ICD-10-CM

## 2019-09-24 DIAGNOSIS — R74.8 ELEVATED ALKALINE PHOSPHATASE LEVEL: ICD-10-CM

## 2019-09-24 DIAGNOSIS — N18.30 STAGE 3 CHRONIC KIDNEY DISEASE (H): ICD-10-CM

## 2019-09-24 DIAGNOSIS — D64.9 ANEMIA, UNSPECIFIED TYPE: ICD-10-CM

## 2019-09-24 DIAGNOSIS — I50.22 CHRONIC SYSTOLIC CONGESTIVE HEART FAILURE (H): ICD-10-CM

## 2019-09-24 LAB
ALBUMIN SERPL-MCNC: 3.4 G/DL (ref 3.5–5)
ALP SERPL-CCNC: 437 U/L (ref 45–120)
ALT SERPL W P-5'-P-CCNC: 17 U/L (ref 0–45)
ANION GAP SERPL CALCULATED.3IONS-SCNC: 8 MMOL/L (ref 5–18)
AST SERPL W P-5'-P-CCNC: 21 U/L (ref 0–40)
BILIRUB SERPL-MCNC: 0.5 MG/DL (ref 0–1)
BUN SERPL-MCNC: 12 MG/DL (ref 8–28)
CALCIUM SERPL-MCNC: 8.7 MG/DL (ref 8.5–10.5)
CHLORIDE BLD-SCNC: 108 MMOL/L (ref 98–107)
CO2 SERPL-SCNC: 26 MMOL/L (ref 22–31)
CREAT SERPL-MCNC: 1.41 MG/DL (ref 0.7–1.3)
ERYTHROCYTE [DISTWIDTH] IN BLOOD BY AUTOMATED COUNT: 14.1 % (ref 11–14.5)
GFR SERPL CREATININE-BSD FRML MDRD: 48 ML/MIN/1.73M2
GLUCOSE BLD-MCNC: 90 MG/DL (ref 70–125)
HCT VFR BLD AUTO: 28.2 % (ref 40–54)
HGB BLD-MCNC: 9.5 G/DL (ref 14–18)
INR PPP: 2.9 (ref 0.9–1.1)
MCH RBC QN AUTO: 28.8 PG (ref 27–34)
MCHC RBC AUTO-ENTMCNC: 33.6 G/DL (ref 32–36)
MCV RBC AUTO: 86 FL (ref 80–100)
PLATELET # BLD AUTO: 171 THOU/UL (ref 140–440)
PMV BLD AUTO: 7.2 FL (ref 7–10)
POTASSIUM BLD-SCNC: 4 MMOL/L (ref 3.5–5)
PROT SERPL-MCNC: 6.4 G/DL (ref 6–8)
RBC # BLD AUTO: 3.28 MILL/UL (ref 4.4–6.2)
SODIUM SERPL-SCNC: 142 MMOL/L (ref 136–145)
WBC: 6.8 THOU/UL (ref 4–11)

## 2019-09-26 LAB — GGT SERPL-CCNC: 146 U/L (ref 0–50)

## 2019-09-27 ENCOUNTER — HOSPITAL ENCOUNTER (OUTPATIENT)
Dept: CARDIOLOGY | Facility: HOSPITAL | Age: 79
Discharge: HOME | End: 2019-09-27
Attending: INTERNAL MEDICINE

## 2019-09-27 ENCOUNTER — HOSPITAL ENCOUNTER (OUTPATIENT)
Dept: NUCLEAR MEDICINE | Facility: HOSPITAL | Age: 79
Discharge: HOME | End: 2019-09-27
Attending: INTERNAL MEDICINE

## 2019-09-27 ENCOUNTER — OFFICE VISIT - HEALTHEAST (OUTPATIENT)
Dept: PHYSICAL THERAPY | Facility: REHABILITATION | Age: 79
End: 2019-09-27

## 2019-09-27 DIAGNOSIS — G89.29 CHRONIC RIGHT SACROILIAC JOINT PAIN: ICD-10-CM

## 2019-09-27 DIAGNOSIS — M54.6 PAIN IN THORACIC SPINE: ICD-10-CM

## 2019-09-27 DIAGNOSIS — M62.9 HAMSTRING TIGHTNESS OF BOTH LOWER EXTREMITIES: ICD-10-CM

## 2019-09-27 DIAGNOSIS — I50.22 CHRONIC SYSTOLIC CONGESTIVE HEART FAILURE (H): ICD-10-CM

## 2019-09-27 DIAGNOSIS — M53.3 CHRONIC RIGHT SACROILIAC JOINT PAIN: ICD-10-CM

## 2019-09-27 DIAGNOSIS — M62.81 GENERALIZED MUSCLE WEAKNESS: ICD-10-CM

## 2019-09-27 DIAGNOSIS — Z98.890 HX OF CERVICAL SPINE SURGERY: ICD-10-CM

## 2019-09-27 DIAGNOSIS — I25.5 ISCHEMIC CARDIOMYOPATHY: ICD-10-CM

## 2019-09-27 LAB
CV STRESS CURRENT BP HE: NORMAL
CV STRESS CURRENT HR HE: 57
CV STRESS CURRENT HR HE: 59
CV STRESS CURRENT HR HE: 60
CV STRESS CURRENT HR HE: 60
CV STRESS CURRENT HR HE: 61
CV STRESS CURRENT HR HE: 62
CV STRESS CURRENT HR HE: 63
CV STRESS CURRENT HR HE: 64
CV STRESS CURRENT HR HE: 65
CV STRESS CURRENT HR HE: 66
CV STRESS CURRENT HR HE: 72
CV STRESS DEVIATION TIME HE: NORMAL
CV STRESS ECHO PERCENT HR HE: NORMAL
CV STRESS EXERCISE STAGE HE: NORMAL
CV STRESS FINAL RESTING BP HE: NORMAL
CV STRESS FINAL RESTING HR HE: 64
CV STRESS MAX HR HE: 72
CV STRESS MAX TREADMILL GRADE HE: 0
CV STRESS MAX TREADMILL SPEED HE: 0
CV STRESS PEAK DIA BP HE: NORMAL
CV STRESS PEAK SYS BP HE: NORMAL
CV STRESS PHASE HE: NORMAL
CV STRESS PROTOCOL HE: NORMAL
CV STRESS RESTING PT POSITION HE: NORMAL
CV STRESS RESTING PT POSITION HE: NORMAL
CV STRESS ST DEVIATION AMOUNT HE: NORMAL
CV STRESS ST DEVIATION ELEVATION HE: NORMAL
CV STRESS ST EVELATION AMOUNT HE: NORMAL
CV STRESS TEST TYPE HE: NORMAL
CV STRESS TOTAL STAGE TIME MIN 1 HE: NORMAL
NUC STRESS EJECTION FRACTION: 48 %
STRESS ECHO BASELINE BP: 58
STRESS ECHO BASELINE HR: 60
STRESS ECHO CALCULATED PERCENT HR: 51 %
STRESS ECHO LAST STRESS BP: NORMAL
STRESS ECHO LAST STRESS HR: 65

## 2019-10-01 ENCOUNTER — COMMUNICATION - HEALTHEAST (OUTPATIENT)
Dept: FAMILY MEDICINE | Facility: CLINIC | Age: 79
End: 2019-10-01

## 2019-10-02 ENCOUNTER — AMBULATORY - HEALTHEAST (OUTPATIENT)
Dept: CARDIOLOGY | Facility: CLINIC | Age: 79
End: 2019-10-02

## 2019-10-02 DIAGNOSIS — Z95.810 ICD (IMPLANTABLE CARDIOVERTER-DEFIBRILLATOR), SINGLE, IN SITU: ICD-10-CM

## 2019-10-08 ENCOUNTER — COMMUNICATION - HEALTHEAST (OUTPATIENT)
Dept: ANTICOAGULATION | Facility: CLINIC | Age: 79
End: 2019-10-08

## 2019-10-08 ENCOUNTER — AMBULATORY - HEALTHEAST (OUTPATIENT)
Dept: LAB | Facility: CLINIC | Age: 79
End: 2019-10-08

## 2019-10-08 DIAGNOSIS — I25.5 ISCHEMIC CARDIOMYOPATHY: ICD-10-CM

## 2019-10-08 DIAGNOSIS — R74.8 ELEVATED ALKALINE PHOSPHATASE LEVEL: ICD-10-CM

## 2019-10-08 DIAGNOSIS — I48.20 CHRONIC ATRIAL FIBRILLATION (H): ICD-10-CM

## 2019-10-08 LAB
ANION GAP SERPL CALCULATED.3IONS-SCNC: 8 MMOL/L (ref 5–18)
BUN SERPL-MCNC: 16 MG/DL (ref 8–28)
CALCIUM SERPL-MCNC: 9.3 MG/DL (ref 8.5–10.5)
CHLORIDE BLD-SCNC: 106 MMOL/L (ref 98–107)
CO2 SERPL-SCNC: 28 MMOL/L (ref 22–31)
CREAT SERPL-MCNC: 1.68 MG/DL (ref 0.7–1.3)
GFR SERPL CREATININE-BSD FRML MDRD: 40 ML/MIN/1.73M2
GLUCOSE BLD-MCNC: 97 MG/DL (ref 70–125)
INR PPP: 2 (ref 0.9–1.1)
POTASSIUM BLD-SCNC: 3.9 MMOL/L (ref 3.5–5)
SODIUM SERPL-SCNC: 142 MMOL/L (ref 136–145)

## 2019-10-14 LAB
ALBUMIN SERPL-MCNC: 3.8 G/DL (ref 3.5–5)
ALP SERPL-CCNC: 414 U/L (ref 45–120)
ALT SERPL W P-5'-P-CCNC: 12 U/L (ref 0–45)
AST SERPL W P-5'-P-CCNC: 17 U/L (ref 0–40)
BILIRUB DIRECT SERPL-MCNC: 0.4 MG/DL
BILIRUB SERPL-MCNC: 0.8 MG/DL (ref 0–1)
PROT SERPL-MCNC: 6.6 G/DL (ref 6–8)

## 2019-10-17 ENCOUNTER — AMBULATORY - HEALTHEAST (OUTPATIENT)
Dept: FAMILY MEDICINE | Facility: CLINIC | Age: 79
End: 2019-10-17

## 2019-10-17 DIAGNOSIS — R74.8 ELEVATED ALKALINE PHOSPHATASE LEVEL: ICD-10-CM

## 2019-11-06 ENCOUNTER — COMMUNICATION - HEALTHEAST (OUTPATIENT)
Dept: ANTICOAGULATION | Facility: CLINIC | Age: 79
End: 2019-11-06

## 2019-11-06 ENCOUNTER — AMBULATORY - HEALTHEAST (OUTPATIENT)
Dept: LAB | Facility: CLINIC | Age: 79
End: 2019-11-06

## 2019-11-06 DIAGNOSIS — R74.8 ELEVATED ALKALINE PHOSPHATASE LEVEL: ICD-10-CM

## 2019-11-06 DIAGNOSIS — I48.20 CHRONIC ATRIAL FIBRILLATION (H): ICD-10-CM

## 2019-11-06 LAB
ALBUMIN SERPL-MCNC: 3.4 G/DL (ref 3.5–5)
ALP SERPL-CCNC: 453 U/L (ref 45–120)
ALT SERPL W P-5'-P-CCNC: 17 U/L (ref 0–45)
ANION GAP SERPL CALCULATED.3IONS-SCNC: 8 MMOL/L (ref 5–18)
AST SERPL W P-5'-P-CCNC: 24 U/L (ref 0–40)
BILIRUB SERPL-MCNC: 0.7 MG/DL (ref 0–1)
BUN SERPL-MCNC: 23 MG/DL (ref 8–28)
CALCIUM SERPL-MCNC: 9 MG/DL (ref 8.5–10.5)
CHLORIDE BLD-SCNC: 107 MMOL/L (ref 98–107)
CO2 SERPL-SCNC: 26 MMOL/L (ref 22–31)
CREAT SERPL-MCNC: 1.92 MG/DL (ref 0.7–1.3)
ERYTHROCYTE [DISTWIDTH] IN BLOOD BY AUTOMATED COUNT: 14.1 % (ref 11–14.5)
GFR SERPL CREATININE-BSD FRML MDRD: 34 ML/MIN/1.73M2
GGT SERPL-CCNC: 178 U/L (ref 0–50)
GLUCOSE BLD-MCNC: 105 MG/DL (ref 70–125)
HCT VFR BLD AUTO: 26.2 % (ref 40–54)
HGB BLD-MCNC: 8.7 G/DL (ref 14–18)
INR PPP: 1.8 (ref 0.9–1.1)
MCH RBC QN AUTO: 28.4 PG (ref 27–34)
MCHC RBC AUTO-ENTMCNC: 33.1 G/DL (ref 32–36)
MCV RBC AUTO: 86 FL (ref 80–100)
PLATELET # BLD AUTO: 167 THOU/UL (ref 140–440)
PMV BLD AUTO: 6.5 FL (ref 7–10)
POTASSIUM BLD-SCNC: 3.9 MMOL/L (ref 3.5–5)
PROT SERPL-MCNC: 6.6 G/DL (ref 6–8)
RBC # BLD AUTO: 3.05 MILL/UL (ref 4.4–6.2)
SODIUM SERPL-SCNC: 141 MMOL/L (ref 136–145)
WBC: 5.6 THOU/UL (ref 4–11)

## 2019-11-09 LAB
ALP BONE SERPL-CCNC: 32 U/L (ref 0–55)
ALP LIVER SERPL-CCNC: 423 U/L (ref 0–94)
ALP OTHER SERPL-CCNC: 0 U/L
ALP SERPL-CCNC: 455 U/L (ref 40–120)

## 2019-11-10 ENCOUNTER — AMBULATORY - HEALTHEAST (OUTPATIENT)
Dept: FAMILY MEDICINE | Facility: CLINIC | Age: 79
End: 2019-11-10

## 2019-11-10 DIAGNOSIS — R74.8 ELEVATED ALKALINE PHOSPHATASE LEVEL: ICD-10-CM

## 2019-11-10 DIAGNOSIS — D64.9 ANEMIA, UNSPECIFIED TYPE: ICD-10-CM

## 2019-11-11 LAB
ALBUMIN PERCENT: 55.4 % (ref 51–67)
ALBUMIN SERPL ELPH-MCNC: 3.7 G/DL (ref 3.2–4.7)
ALPHA 1 PERCENT: 4.1 % (ref 2–4)
ALPHA 2 PERCENT: 13.4 % (ref 5–13)
ALPHA1 GLOB SERPL ELPH-MCNC: 0.3 G/DL (ref 0.1–0.3)
ALPHA2 GLOB SERPL ELPH-MCNC: 0.9 G/DL (ref 0.4–0.9)
B-GLOBULIN SERPL ELPH-MCNC: 0.8 G/DL (ref 0.7–1.2)
BETA PERCENT: 12.4 % (ref 10–17)
GAMMA GLOB SERPL ELPH-MCNC: 1 G/DL (ref 0.6–1.4)
GAMMA GLOBULIN PERCENT: 14.7 % (ref 9–20)
PATH ICD:: ABNORMAL
PROT PATTERN SERPL ELPH-IMP: ABNORMAL
PROT SERPL-MCNC: 6.7 G/DL (ref 6–8)
REVIEWING PATHOLOGIST: ABNORMAL

## 2019-11-19 ENCOUNTER — COMMUNICATION - HEALTHEAST (OUTPATIENT)
Dept: FAMILY MEDICINE | Facility: CLINIC | Age: 79
End: 2019-11-19

## 2019-11-19 DIAGNOSIS — K21.9 GERD (GASTROESOPHAGEAL REFLUX DISEASE): ICD-10-CM

## 2019-11-20 ENCOUNTER — COMMUNICATION - HEALTHEAST (OUTPATIENT)
Dept: ANTICOAGULATION | Facility: CLINIC | Age: 79
End: 2019-11-20

## 2019-11-20 ENCOUNTER — OFFICE VISIT - HEALTHEAST (OUTPATIENT)
Dept: CARDIOLOGY | Facility: CLINIC | Age: 79
End: 2019-11-20

## 2019-11-20 ENCOUNTER — AMBULATORY - HEALTHEAST (OUTPATIENT)
Dept: CARDIOLOGY | Facility: CLINIC | Age: 79
End: 2019-11-20

## 2019-11-20 DIAGNOSIS — Z79.01 LONG TERM (CURRENT) USE OF ANTICOAGULANTS: ICD-10-CM

## 2019-11-20 DIAGNOSIS — I50.22 CHRONIC SYSTOLIC HEART FAILURE (H): ICD-10-CM

## 2019-11-20 DIAGNOSIS — I48.91 A-FIB (H): ICD-10-CM

## 2019-11-20 DIAGNOSIS — I25.5 ISCHEMIC CARDIOMYOPATHY: ICD-10-CM

## 2019-11-20 LAB — POC INR - HE - HISTORICAL: 1.9 (ref 0.9–1.1)

## 2019-11-20 ASSESSMENT — MIFFLIN-ST. JEOR: SCORE: 1484.17

## 2019-12-04 ENCOUNTER — COMMUNICATION - HEALTHEAST (OUTPATIENT)
Dept: ANTICOAGULATION | Facility: CLINIC | Age: 79
End: 2019-12-04

## 2019-12-04 ENCOUNTER — AMBULATORY - HEALTHEAST (OUTPATIENT)
Dept: LAB | Facility: CLINIC | Age: 79
End: 2019-12-04

## 2019-12-04 DIAGNOSIS — I47.29 PAROXYSMAL VENTRICULAR TACHYCARDIA (H): ICD-10-CM

## 2019-12-04 DIAGNOSIS — I48.91 A-FIB (H): ICD-10-CM

## 2019-12-04 LAB — INR PPP: 2.2 (ref 0.9–1.1)

## 2019-12-17 ENCOUNTER — COMMUNICATION - HEALTHEAST (OUTPATIENT)
Dept: FAMILY MEDICINE | Facility: CLINIC | Age: 79
End: 2019-12-17

## 2019-12-17 ENCOUNTER — AMBULATORY - HEALTHEAST (OUTPATIENT)
Dept: LAB | Facility: CLINIC | Age: 79
End: 2019-12-17

## 2019-12-17 ENCOUNTER — COMMUNICATION - HEALTHEAST (OUTPATIENT)
Dept: ANTICOAGULATION | Facility: CLINIC | Age: 79
End: 2019-12-17

## 2019-12-17 DIAGNOSIS — I48.91 A-FIB (H): ICD-10-CM

## 2019-12-17 LAB — INR PPP: 2.3 (ref 0.9–1.1)

## 2020-01-07 ENCOUNTER — AMBULATORY - HEALTHEAST (OUTPATIENT)
Dept: CARDIOLOGY | Facility: CLINIC | Age: 80
End: 2020-01-07

## 2020-01-07 ENCOUNTER — COMMUNICATION - HEALTHEAST (OUTPATIENT)
Dept: ANTICOAGULATION | Facility: CLINIC | Age: 80
End: 2020-01-07

## 2020-01-07 ENCOUNTER — AMBULATORY - HEALTHEAST (OUTPATIENT)
Dept: LAB | Facility: CLINIC | Age: 80
End: 2020-01-07

## 2020-01-07 DIAGNOSIS — I48.91 A-FIB (H): ICD-10-CM

## 2020-01-07 DIAGNOSIS — Z95.810 ICD (IMPLANTABLE CARDIOVERTER-DEFIBRILLATOR) IN PLACE: ICD-10-CM

## 2020-01-07 DIAGNOSIS — I25.5 ISCHEMIC CARDIOMYOPATHY: ICD-10-CM

## 2020-01-07 LAB — INR PPP: 2 (ref 0.9–1.1)

## 2020-01-08 ENCOUNTER — COMMUNICATION - HEALTHEAST (OUTPATIENT)
Dept: CARDIOLOGY | Facility: CLINIC | Age: 80
End: 2020-01-08

## 2020-01-08 ENCOUNTER — OFFICE VISIT - HEALTHEAST (OUTPATIENT)
Dept: CARDIOLOGY | Facility: CLINIC | Age: 80
End: 2020-01-08

## 2020-01-08 DIAGNOSIS — I25.5 ISCHEMIC CARDIOMYOPATHY: ICD-10-CM

## 2020-01-08 DIAGNOSIS — N18.30 STAGE 3 CHRONIC KIDNEY DISEASE (H): ICD-10-CM

## 2020-01-08 DIAGNOSIS — I48.20 CHRONIC ATRIAL FIBRILLATION (H): ICD-10-CM

## 2020-01-08 DIAGNOSIS — Z95.810 ICD (IMPLANTABLE CARDIOVERTER-DEFIBRILLATOR) IN PLACE: ICD-10-CM

## 2020-01-08 ASSESSMENT — MIFFLIN-ST. JEOR: SCORE: 1461.49

## 2020-01-13 ENCOUNTER — COMMUNICATION - HEALTHEAST (OUTPATIENT)
Dept: CARDIOLOGY | Facility: CLINIC | Age: 80
End: 2020-01-13

## 2020-01-13 DIAGNOSIS — I48.20 CHRONIC ATRIAL FIBRILLATION (H): ICD-10-CM

## 2020-01-13 DIAGNOSIS — I25.5 ISCHEMIC CARDIOMYOPATHY: ICD-10-CM

## 2020-02-04 ENCOUNTER — AMBULATORY - HEALTHEAST (OUTPATIENT)
Dept: LAB | Facility: CLINIC | Age: 80
End: 2020-02-04

## 2020-02-04 ENCOUNTER — COMMUNICATION - HEALTHEAST (OUTPATIENT)
Dept: ANTICOAGULATION | Facility: CLINIC | Age: 80
End: 2020-02-04

## 2020-02-04 DIAGNOSIS — I48.91 A-FIB (H): ICD-10-CM

## 2020-02-04 LAB — INR PPP: 1.6 (ref 0.9–1.1)

## 2020-02-07 ENCOUNTER — COMMUNICATION - HEALTHEAST (OUTPATIENT)
Dept: ANTICOAGULATION | Facility: CLINIC | Age: 80
End: 2020-02-07

## 2020-02-07 ENCOUNTER — OFFICE VISIT - HEALTHEAST (OUTPATIENT)
Dept: FAMILY MEDICINE | Facility: CLINIC | Age: 80
End: 2020-02-07

## 2020-02-07 DIAGNOSIS — D64.9 ANEMIA, UNSPECIFIED TYPE: ICD-10-CM

## 2020-02-07 DIAGNOSIS — Z86.19 HX OF CLOSTRIDIUM DIFFICILE INFECTION: ICD-10-CM

## 2020-02-07 DIAGNOSIS — M81.0 OSTEOPOROSIS, UNSPECIFIED OSTEOPOROSIS TYPE, UNSPECIFIED PATHOLOGICAL FRACTURE PRESENCE: ICD-10-CM

## 2020-02-07 DIAGNOSIS — R74.8 ELEVATED ALKALINE PHOSPHATASE LEVEL: ICD-10-CM

## 2020-02-07 DIAGNOSIS — K21.9 GASTROESOPHAGEAL REFLUX DISEASE WITHOUT ESOPHAGITIS: ICD-10-CM

## 2020-02-07 DIAGNOSIS — N18.30 STAGE 3 CHRONIC KIDNEY DISEASE (H): ICD-10-CM

## 2020-02-07 DIAGNOSIS — I50.22 CHRONIC SYSTOLIC CONGESTIVE HEART FAILURE (H): ICD-10-CM

## 2020-02-07 DIAGNOSIS — I48.91 A-FIB (H): ICD-10-CM

## 2020-02-07 DIAGNOSIS — Z95.810 ICD (IMPLANTABLE CARDIOVERTER-DEFIBRILLATOR) IN PLACE: ICD-10-CM

## 2020-02-07 DIAGNOSIS — Z01.818 PREOP GENERAL PHYSICAL EXAM: ICD-10-CM

## 2020-02-07 DIAGNOSIS — I25.5 ISCHEMIC CARDIOMYOPATHY: ICD-10-CM

## 2020-02-07 DIAGNOSIS — I48.20 CHRONIC ATRIAL FIBRILLATION (H): ICD-10-CM

## 2020-02-07 DIAGNOSIS — G47.33 OBSTRUCTIVE SLEEP APNEA: ICD-10-CM

## 2020-02-07 LAB
ALBUMIN SERPL-MCNC: 3.6 G/DL (ref 3.5–5)
ALP SERPL-CCNC: 365 U/L (ref 45–120)
ALT SERPL W P-5'-P-CCNC: 10 U/L (ref 0–45)
ANION GAP SERPL CALCULATED.3IONS-SCNC: 10 MMOL/L (ref 5–18)
AST SERPL W P-5'-P-CCNC: 15 U/L (ref 0–40)
ATRIAL RATE - MUSE: 49 BPM
BASOPHILS # BLD AUTO: 0 THOU/UL (ref 0–0.2)
BASOPHILS NFR BLD AUTO: 0 % (ref 0–2)
BILIRUB SERPL-MCNC: 0.6 MG/DL (ref 0–1)
BUN SERPL-MCNC: 20 MG/DL (ref 8–28)
CALCIUM SERPL-MCNC: 9.2 MG/DL (ref 8.5–10.5)
CHLORIDE BLD-SCNC: 106 MMOL/L (ref 98–107)
CO2 SERPL-SCNC: 27 MMOL/L (ref 22–31)
CREAT SERPL-MCNC: 1.62 MG/DL (ref 0.7–1.3)
DIASTOLIC BLOOD PRESSURE - MUSE: NORMAL
EOSINOPHIL # BLD AUTO: 0.6 THOU/UL (ref 0–0.4)
EOSINOPHIL NFR BLD AUTO: 7 % (ref 0–6)
ERYTHROCYTE [DISTWIDTH] IN BLOOD BY AUTOMATED COUNT: 15 % (ref 11–14.5)
GFR SERPL CREATININE-BSD FRML MDRD: 41 ML/MIN/1.73M2
GLUCOSE BLD-MCNC: 108 MG/DL (ref 70–125)
HCT VFR BLD AUTO: 29 % (ref 40–54)
HGB BLD-MCNC: 9.7 G/DL (ref 14–18)
INR PPP: 1.6 (ref 0.9–1.1)
INTERPRETATION ECG - MUSE: NORMAL
LYMPHOCYTES # BLD AUTO: 1.1 THOU/UL (ref 0.8–4.4)
LYMPHOCYTES NFR BLD AUTO: 14 % (ref 20–40)
MCH RBC QN AUTO: 28.4 PG (ref 27–34)
MCHC RBC AUTO-ENTMCNC: 33.4 G/DL (ref 32–36)
MCV RBC AUTO: 85 FL (ref 80–100)
MONOCYTES # BLD AUTO: 0.7 THOU/UL (ref 0–0.9)
MONOCYTES NFR BLD AUTO: 8 % (ref 2–10)
NEUTROPHILS # BLD AUTO: 5.6 THOU/UL (ref 2–7.7)
NEUTROPHILS NFR BLD AUTO: 70 % (ref 50–70)
P AXIS - MUSE: NORMAL
PLATELET # BLD AUTO: 173 THOU/UL (ref 140–440)
PMV BLD AUTO: 6.8 FL (ref 7–10)
POTASSIUM BLD-SCNC: 4.1 MMOL/L (ref 3.5–5)
PR INTERVAL - MUSE: NORMAL
PROT SERPL-MCNC: 6.8 G/DL (ref 6–8)
QRS DURATION - MUSE: 188 MS
QT - MUSE: 530 MS
QTC - MUSE: 507 MS
R AXIS - MUSE: -80 DEGREES
RBC # BLD AUTO: 3.4 MILL/UL (ref 4.4–6.2)
SODIUM SERPL-SCNC: 143 MMOL/L (ref 136–145)
SYSTOLIC BLOOD PRESSURE - MUSE: NORMAL
T AXIS - MUSE: 82 DEGREES
VENTRICULAR RATE- MUSE: 55 BPM
WBC: 8 THOU/UL (ref 4–11)

## 2020-02-07 ASSESSMENT — MIFFLIN-ST. JEOR: SCORE: 1429.74

## 2020-02-10 ENCOUNTER — COMMUNICATION - HEALTHEAST (OUTPATIENT)
Dept: FAMILY MEDICINE | Facility: CLINIC | Age: 80
End: 2020-02-10

## 2020-02-11 ENCOUNTER — COMMUNICATION - HEALTHEAST (OUTPATIENT)
Dept: FAMILY MEDICINE | Facility: CLINIC | Age: 80
End: 2020-02-11

## 2020-02-17 ENCOUNTER — COMMUNICATION - HEALTHEAST (OUTPATIENT)
Dept: FAMILY MEDICINE | Facility: CLINIC | Age: 80
End: 2020-02-17

## 2020-02-19 ENCOUNTER — AMBULATORY - HEALTHEAST (OUTPATIENT)
Dept: ANTICOAGULATION | Facility: CLINIC | Age: 80
End: 2020-02-19

## 2020-02-19 ENCOUNTER — SURGERY - HEALTHEAST (OUTPATIENT)
Dept: SURGERY | Facility: CLINIC | Age: 80
End: 2020-02-19

## 2020-02-19 ENCOUNTER — ANESTHESIA - HEALTHEAST (OUTPATIENT)
Dept: SURGERY | Facility: CLINIC | Age: 80
End: 2020-02-19

## 2020-02-26 ENCOUNTER — COMMUNICATION - HEALTHEAST (OUTPATIENT)
Dept: FAMILY MEDICINE | Facility: CLINIC | Age: 80
End: 2020-02-26

## 2020-02-26 ENCOUNTER — COMMUNICATION - HEALTHEAST (OUTPATIENT)
Dept: ANTICOAGULATION | Facility: CLINIC | Age: 80
End: 2020-02-26

## 2020-02-26 ENCOUNTER — AMBULATORY - HEALTHEAST (OUTPATIENT)
Dept: LAB | Facility: CLINIC | Age: 80
End: 2020-02-26

## 2020-02-26 DIAGNOSIS — I48.91 A-FIB (H): ICD-10-CM

## 2020-02-26 LAB — INR PPP: 2.1 (ref 0.9–1.1)

## 2020-03-05 ENCOUNTER — COMMUNICATION - HEALTHEAST (OUTPATIENT)
Dept: FAMILY MEDICINE | Facility: CLINIC | Age: 80
End: 2020-03-05

## 2020-03-05 ENCOUNTER — COMMUNICATION - HEALTHEAST (OUTPATIENT)
Dept: CARDIOLOGY | Facility: CLINIC | Age: 80
End: 2020-03-05

## 2020-03-05 DIAGNOSIS — I48.20 CHRONIC ATRIAL FIBRILLATION (H): ICD-10-CM

## 2020-03-05 DIAGNOSIS — I25.5 ISCHEMIC CARDIOMYOPATHY: ICD-10-CM

## 2020-03-10 ENCOUNTER — RECORDS - HEALTHEAST (OUTPATIENT)
Dept: ADMINISTRATIVE | Facility: OTHER | Age: 80
End: 2020-03-10

## 2020-03-11 ENCOUNTER — COMMUNICATION - HEALTHEAST (OUTPATIENT)
Dept: FAMILY MEDICINE | Facility: CLINIC | Age: 80
End: 2020-03-11

## 2020-03-11 ENCOUNTER — COMMUNICATION - HEALTHEAST (OUTPATIENT)
Dept: ANTICOAGULATION | Facility: CLINIC | Age: 80
End: 2020-03-11

## 2020-03-11 ENCOUNTER — AMBULATORY - HEALTHEAST (OUTPATIENT)
Dept: LAB | Facility: CLINIC | Age: 80
End: 2020-03-11

## 2020-03-11 DIAGNOSIS — R11.0 NAUSEA: ICD-10-CM

## 2020-03-11 DIAGNOSIS — I48.91 A-FIB (H): ICD-10-CM

## 2020-03-11 LAB — INR PPP: 2.7 (ref 0.9–1.1)

## 2020-03-12 ENCOUNTER — RECORDS - HEALTHEAST (OUTPATIENT)
Dept: ADMINISTRATIVE | Facility: OTHER | Age: 80
End: 2020-03-12

## 2020-04-06 ENCOUNTER — COMMUNICATION - HEALTHEAST (OUTPATIENT)
Dept: FAMILY MEDICINE | Facility: CLINIC | Age: 80
End: 2020-04-06

## 2020-04-07 ENCOUNTER — COMMUNICATION - HEALTHEAST (OUTPATIENT)
Dept: PHYSICAL MEDICINE AND REHAB | Facility: CLINIC | Age: 80
End: 2020-04-07

## 2020-04-07 ENCOUNTER — AMBULATORY - HEALTHEAST (OUTPATIENT)
Dept: CARDIOLOGY | Facility: CLINIC | Age: 80
End: 2020-04-07

## 2020-04-07 DIAGNOSIS — M25.562 CHRONIC PAIN OF LEFT KNEE: ICD-10-CM

## 2020-04-07 DIAGNOSIS — I25.5 ISCHEMIC CARDIOMYOPATHY: ICD-10-CM

## 2020-04-07 DIAGNOSIS — M53.3 SACROILIAC JOINT PAIN: ICD-10-CM

## 2020-04-07 DIAGNOSIS — Z95.810 ICD (IMPLANTABLE CARDIOVERTER-DEFIBRILLATOR), SINGLE, IN SITU: ICD-10-CM

## 2020-04-07 DIAGNOSIS — G89.29 CHRONIC PAIN OF LEFT KNEE: ICD-10-CM

## 2020-04-07 DIAGNOSIS — M17.9 OSTEOARTHRITIS OF KNEE, UNSPECIFIED LATERALITY, UNSPECIFIED OSTEOARTHRITIS TYPE: ICD-10-CM

## 2020-04-08 ENCOUNTER — AMBULATORY - HEALTHEAST (OUTPATIENT)
Dept: LAB | Facility: CLINIC | Age: 80
End: 2020-04-08

## 2020-04-08 ENCOUNTER — COMMUNICATION - HEALTHEAST (OUTPATIENT)
Dept: FAMILY MEDICINE | Facility: CLINIC | Age: 80
End: 2020-04-08

## 2020-04-08 ENCOUNTER — COMMUNICATION - HEALTHEAST (OUTPATIENT)
Dept: ANTICOAGULATION | Facility: CLINIC | Age: 80
End: 2020-04-08

## 2020-04-08 DIAGNOSIS — I48.91 A-FIB (H): ICD-10-CM

## 2020-04-08 LAB — INR PPP: 2.8 (ref 0.9–1.1)

## 2020-05-06 ENCOUNTER — OFFICE VISIT - HEALTHEAST (OUTPATIENT)
Dept: CARDIOLOGY | Facility: CLINIC | Age: 80
End: 2020-05-06

## 2020-05-06 DIAGNOSIS — I50.22 CHRONIC SYSTOLIC HEART FAILURE (H): ICD-10-CM

## 2020-05-06 DIAGNOSIS — I10 ESSENTIAL HYPERTENSION, BENIGN: ICD-10-CM

## 2020-05-06 DIAGNOSIS — I25.5 ISCHEMIC CARDIOMYOPATHY: ICD-10-CM

## 2020-05-14 ENCOUNTER — COMMUNICATION - HEALTHEAST (OUTPATIENT)
Dept: SCHEDULING | Facility: CLINIC | Age: 80
End: 2020-05-14

## 2020-05-15 ENCOUNTER — OFFICE VISIT - HEALTHEAST (OUTPATIENT)
Dept: FAMILY MEDICINE | Facility: CLINIC | Age: 80
End: 2020-05-15

## 2020-05-15 DIAGNOSIS — R19.5 LOOSE STOOLS: ICD-10-CM

## 2020-05-15 DIAGNOSIS — R53.83 FATIGUE, UNSPECIFIED TYPE: ICD-10-CM

## 2020-05-17 ENCOUNTER — HOSPITAL ENCOUNTER (OUTPATIENT)
Dept: CT IMAGING | Facility: HOSPITAL | Age: 80
Discharge: HOME | End: 2020-05-17

## 2020-05-17 DIAGNOSIS — M54.50 LOW BACK PAIN: ICD-10-CM

## 2020-05-19 ENCOUNTER — AMBULATORY - HEALTHEAST (OUTPATIENT)
Dept: LAB | Facility: CLINIC | Age: 80
End: 2020-05-19

## 2020-05-19 ENCOUNTER — COMMUNICATION - HEALTHEAST (OUTPATIENT)
Dept: PHYSICAL MEDICINE AND REHAB | Facility: CLINIC | Age: 80
End: 2020-05-19

## 2020-05-19 ENCOUNTER — AMBULATORY - HEALTHEAST (OUTPATIENT)
Dept: FAMILY MEDICINE | Facility: CLINIC | Age: 80
End: 2020-05-19

## 2020-05-19 ENCOUNTER — COMMUNICATION - HEALTHEAST (OUTPATIENT)
Dept: ANTICOAGULATION | Facility: CLINIC | Age: 80
End: 2020-05-19

## 2020-05-19 DIAGNOSIS — D64.9 ANEMIA, UNSPECIFIED TYPE: ICD-10-CM

## 2020-05-19 DIAGNOSIS — I48.91 A-FIB (H): ICD-10-CM

## 2020-05-19 DIAGNOSIS — I50.22 CHRONIC SYSTOLIC CONGESTIVE HEART FAILURE (H): ICD-10-CM

## 2020-05-19 DIAGNOSIS — M17.9 OSTEOARTHRITIS OF KNEE, UNSPECIFIED LATERALITY, UNSPECIFIED OSTEOARTHRITIS TYPE: ICD-10-CM

## 2020-05-19 DIAGNOSIS — N18.30 STAGE 3 CHRONIC KIDNEY DISEASE (H): ICD-10-CM

## 2020-05-19 DIAGNOSIS — I25.5 ISCHEMIC CARDIOMYOPATHY: ICD-10-CM

## 2020-05-19 DIAGNOSIS — G89.29 CHRONIC PAIN OF LEFT KNEE: ICD-10-CM

## 2020-05-19 DIAGNOSIS — M53.3 SACROILIAC JOINT PAIN: ICD-10-CM

## 2020-05-19 DIAGNOSIS — M25.562 CHRONIC PAIN OF LEFT KNEE: ICD-10-CM

## 2020-05-19 LAB
ALBUMIN SERPL-MCNC: 3.3 G/DL (ref 3.5–5)
ALP SERPL-CCNC: 438 U/L (ref 45–120)
ALT SERPL W P-5'-P-CCNC: 23 U/L (ref 0–45)
ANION GAP SERPL CALCULATED.3IONS-SCNC: 13 MMOL/L (ref 5–18)
AST SERPL W P-5'-P-CCNC: 25 U/L (ref 0–40)
BASOPHILS # BLD AUTO: 0 THOU/UL (ref 0–0.2)
BASOPHILS NFR BLD AUTO: 1 % (ref 0–2)
BILIRUB SERPL-MCNC: 0.7 MG/DL (ref 0–1)
BUN SERPL-MCNC: 22 MG/DL (ref 8–28)
CALCIUM SERPL-MCNC: 8.8 MG/DL (ref 8.5–10.5)
CHLORIDE BLD-SCNC: 107 MMOL/L (ref 98–107)
CHOLEST SERPL-MCNC: 99 MG/DL
CO2 SERPL-SCNC: 22 MMOL/L (ref 22–31)
CREAT SERPL-MCNC: 1.67 MG/DL (ref 0.7–1.3)
EOSINOPHIL # BLD AUTO: 0.4 THOU/UL (ref 0–0.4)
EOSINOPHIL NFR BLD AUTO: 5 % (ref 0–6)
ERYTHROCYTE [DISTWIDTH] IN BLOOD BY AUTOMATED COUNT: 15.6 % (ref 11–14.5)
FASTING STATUS PATIENT QL REPORTED: NO
GFR SERPL CREATININE-BSD FRML MDRD: 40 ML/MIN/1.73M2
GLUCOSE BLD-MCNC: 104 MG/DL (ref 70–125)
HCT VFR BLD AUTO: 26.3 % (ref 40–54)
HDLC SERPL-MCNC: 36 MG/DL
HGB BLD-MCNC: 9.1 G/DL (ref 14–18)
INR PPP: 4.7 (ref 0.9–1.1)
LDLC SERPL CALC-MCNC: 47 MG/DL
LYMPHOCYTES # BLD AUTO: 0.8 THOU/UL (ref 0.8–4.4)
LYMPHOCYTES NFR BLD AUTO: 10 % (ref 20–40)
MCH RBC QN AUTO: 29.8 PG (ref 27–34)
MCHC RBC AUTO-ENTMCNC: 34.6 G/DL (ref 32–36)
MCV RBC AUTO: 86 FL (ref 80–100)
MONOCYTES # BLD AUTO: 0.8 THOU/UL (ref 0–0.9)
MONOCYTES NFR BLD AUTO: 10 % (ref 2–10)
NEUTROPHILS # BLD AUTO: 6 THOU/UL (ref 2–7.7)
NEUTROPHILS NFR BLD AUTO: 75 % (ref 50–70)
PLATELET # BLD AUTO: 187 THOU/UL (ref 140–440)
PMV BLD AUTO: 6.3 FL (ref 7–10)
POTASSIUM BLD-SCNC: 4.4 MMOL/L (ref 3.5–5)
PROT SERPL-MCNC: 6.9 G/DL (ref 6–8)
RBC # BLD AUTO: 3.04 MILL/UL (ref 4.4–6.2)
SODIUM SERPL-SCNC: 142 MMOL/L (ref 136–145)
TRIGL SERPL-MCNC: 81 MG/DL
WBC: 8 THOU/UL (ref 4–11)

## 2020-05-26 ENCOUNTER — COMMUNICATION - HEALTHEAST (OUTPATIENT)
Dept: FAMILY MEDICINE | Facility: CLINIC | Age: 80
End: 2020-05-26

## 2020-06-01 ENCOUNTER — COMMUNICATION - HEALTHEAST (OUTPATIENT)
Dept: FAMILY MEDICINE | Facility: CLINIC | Age: 80
End: 2020-06-01

## 2020-06-01 DIAGNOSIS — M25.562 CHRONIC PAIN OF LEFT KNEE: ICD-10-CM

## 2020-06-01 DIAGNOSIS — G89.29 CHRONIC PAIN OF LEFT KNEE: ICD-10-CM

## 2020-06-01 DIAGNOSIS — M17.9 OSTEOARTHRITIS OF KNEE, UNSPECIFIED LATERALITY, UNSPECIFIED OSTEOARTHRITIS TYPE: ICD-10-CM

## 2020-06-01 DIAGNOSIS — M53.3 SACROILIAC JOINT PAIN: ICD-10-CM

## 2020-06-02 ENCOUNTER — COMMUNICATION - HEALTHEAST (OUTPATIENT)
Dept: ANTICOAGULATION | Facility: CLINIC | Age: 80
End: 2020-06-02

## 2020-06-02 ENCOUNTER — AMBULATORY - HEALTHEAST (OUTPATIENT)
Dept: LAB | Facility: CLINIC | Age: 80
End: 2020-06-02

## 2020-06-02 DIAGNOSIS — I48.91 A-FIB (H): ICD-10-CM

## 2020-06-02 LAB — INR PPP: 4.1 (ref 0.9–1.1)

## 2020-06-19 ENCOUNTER — COMMUNICATION - HEALTHEAST (OUTPATIENT)
Dept: ANTICOAGULATION | Facility: CLINIC | Age: 80
End: 2020-06-19

## 2020-06-19 ENCOUNTER — AMBULATORY - HEALTHEAST (OUTPATIENT)
Dept: LAB | Facility: CLINIC | Age: 80
End: 2020-06-19

## 2020-06-19 ENCOUNTER — COMMUNICATION - HEALTHEAST (OUTPATIENT)
Dept: FAMILY MEDICINE | Facility: CLINIC | Age: 80
End: 2020-06-19

## 2020-06-19 DIAGNOSIS — I48.91 A-FIB (H): ICD-10-CM

## 2020-06-19 LAB — INR PPP: 2.4 (ref 0.9–1.1)

## 2020-07-01 ENCOUNTER — COMMUNICATION - HEALTHEAST (OUTPATIENT)
Dept: ANTICOAGULATION | Facility: CLINIC | Age: 80
End: 2020-07-01

## 2020-07-01 ENCOUNTER — AMBULATORY - HEALTHEAST (OUTPATIENT)
Dept: LAB | Facility: CLINIC | Age: 80
End: 2020-07-01

## 2020-07-01 DIAGNOSIS — I48.91 A-FIB (H): ICD-10-CM

## 2020-07-01 LAB — INR PPP: 2.2 (ref 0.9–1.1)

## 2020-07-06 ENCOUNTER — RECORDS - HEALTHEAST (OUTPATIENT)
Dept: ADMINISTRATIVE | Facility: OTHER | Age: 80
End: 2020-07-06

## 2020-07-07 ENCOUNTER — DOCUMENTATION ONLY (OUTPATIENT)
Dept: OTHER | Facility: CLINIC | Age: 80
End: 2020-07-07

## 2020-07-07 ENCOUNTER — AMBULATORY - HEALTHEAST (OUTPATIENT)
Dept: OTHER | Facility: CLINIC | Age: 80
End: 2020-07-07

## 2020-07-13 ENCOUNTER — COMMUNICATION - HEALTHEAST (OUTPATIENT)
Dept: FAMILY MEDICINE | Facility: CLINIC | Age: 80
End: 2020-07-13

## 2020-07-13 DIAGNOSIS — E87.6 HYPOKALEMIA: ICD-10-CM

## 2020-08-03 ENCOUNTER — COMMUNICATION - HEALTHEAST (OUTPATIENT)
Dept: ANTICOAGULATION | Facility: CLINIC | Age: 80
End: 2020-08-03

## 2020-08-03 ENCOUNTER — AMBULATORY - HEALTHEAST (OUTPATIENT)
Dept: LAB | Facility: CLINIC | Age: 80
End: 2020-08-03

## 2020-08-03 DIAGNOSIS — I48.91 A-FIB (H): ICD-10-CM

## 2020-08-03 LAB — INR PPP: 3 (ref 0.9–1.1)

## 2020-08-24 ENCOUNTER — RECORDS - HEALTHEAST (OUTPATIENT)
Dept: ADMINISTRATIVE | Facility: OTHER | Age: 80
End: 2020-08-24

## 2020-08-24 ENCOUNTER — TRANSFERRED RECORDS (OUTPATIENT)
Dept: HEALTH INFORMATION MANAGEMENT | Facility: CLINIC | Age: 80
End: 2020-08-24

## 2020-08-29 ENCOUNTER — COMMUNICATION - HEALTHEAST (OUTPATIENT)
Dept: CARDIOLOGY | Facility: CLINIC | Age: 80
End: 2020-08-29

## 2020-08-29 DIAGNOSIS — I25.5 ISCHEMIC CARDIOMYOPATHY: ICD-10-CM

## 2020-09-01 ENCOUNTER — AMBULATORY - HEALTHEAST (OUTPATIENT)
Dept: LAB | Facility: CLINIC | Age: 80
End: 2020-09-01

## 2020-09-01 ENCOUNTER — COMMUNICATION - HEALTHEAST (OUTPATIENT)
Dept: ANTICOAGULATION | Facility: CLINIC | Age: 80
End: 2020-09-01

## 2020-09-01 DIAGNOSIS — I48.91 A-FIB (H): ICD-10-CM

## 2020-09-01 LAB — INR PPP: 2.7 (ref 0.9–1.1)

## 2020-09-08 ENCOUNTER — OFFICE VISIT - HEALTHEAST (OUTPATIENT)
Dept: FAMILY MEDICINE | Facility: CLINIC | Age: 80
End: 2020-09-08

## 2020-09-08 DIAGNOSIS — R74.8 ELEVATED ALKALINE PHOSPHATASE LEVEL: ICD-10-CM

## 2020-09-08 DIAGNOSIS — I50.22 CHRONIC SYSTOLIC CONGESTIVE HEART FAILURE (H): ICD-10-CM

## 2020-09-08 DIAGNOSIS — D64.9 ANEMIA, UNSPECIFIED TYPE: ICD-10-CM

## 2020-09-08 DIAGNOSIS — Z23 FLU VACCINE NEED: ICD-10-CM

## 2020-09-08 DIAGNOSIS — I48.20 CHRONIC ATRIAL FIBRILLATION (H): ICD-10-CM

## 2020-09-08 DIAGNOSIS — N18.30 STAGE 3 CHRONIC KIDNEY DISEASE (H): ICD-10-CM

## 2020-09-08 DIAGNOSIS — I25.5 ISCHEMIC CARDIOMYOPATHY: ICD-10-CM

## 2020-09-08 DIAGNOSIS — R19.7 DIARRHEA, UNSPECIFIED TYPE: ICD-10-CM

## 2020-09-08 LAB
ALBUMIN SERPL-MCNC: 3.8 G/DL (ref 3.5–5)
ALP SERPL-CCNC: 421 U/L (ref 45–120)
ALT SERPL W P-5'-P-CCNC: 28 U/L (ref 0–45)
ANION GAP SERPL CALCULATED.3IONS-SCNC: 13 MMOL/L (ref 5–18)
AST SERPL W P-5'-P-CCNC: 25 U/L (ref 0–40)
BASOPHILS # BLD AUTO: 0 THOU/UL (ref 0–0.2)
BASOPHILS NFR BLD AUTO: 1 % (ref 0–2)
BILIRUB SERPL-MCNC: 0.4 MG/DL (ref 0–1)
BUN SERPL-MCNC: 23 MG/DL (ref 8–28)
CALCIUM SERPL-MCNC: 8.8 MG/DL (ref 8.5–10.5)
CHLORIDE BLD-SCNC: 108 MMOL/L (ref 98–107)
CO2 SERPL-SCNC: 22 MMOL/L (ref 22–31)
CREAT SERPL-MCNC: 1.55 MG/DL (ref 0.7–1.3)
EOSINOPHIL # BLD AUTO: 0.6 THOU/UL (ref 0–0.4)
EOSINOPHIL NFR BLD AUTO: 7 % (ref 0–6)
ERYTHROCYTE [DISTWIDTH] IN BLOOD BY AUTOMATED COUNT: 13.5 % (ref 11–14.5)
GFR SERPL CREATININE-BSD FRML MDRD: 43 ML/MIN/1.73M2
GLUCOSE BLD-MCNC: 99 MG/DL (ref 70–125)
HCT VFR BLD AUTO: 32.6 % (ref 40–54)
HGB BLD-MCNC: 10.6 G/DL (ref 14–18)
LYMPHOCYTES # BLD AUTO: 1.2 THOU/UL (ref 0.8–4.4)
LYMPHOCYTES NFR BLD AUTO: 15 % (ref 20–40)
MCH RBC QN AUTO: 28.8 PG (ref 27–34)
MCHC RBC AUTO-ENTMCNC: 32.7 G/DL (ref 32–36)
MCV RBC AUTO: 88 FL (ref 80–100)
MONOCYTES # BLD AUTO: 0.6 THOU/UL (ref 0–0.9)
MONOCYTES NFR BLD AUTO: 8 % (ref 2–10)
NEUTROPHILS # BLD AUTO: 5.6 THOU/UL (ref 2–7.7)
NEUTROPHILS NFR BLD AUTO: 70 % (ref 50–70)
PLATELET # BLD AUTO: 173 THOU/UL (ref 140–440)
PMV BLD AUTO: 7.4 FL (ref 7–10)
POTASSIUM BLD-SCNC: 4.2 MMOL/L (ref 3.5–5)
PROT SERPL-MCNC: 6.9 G/DL (ref 6–8)
RBC # BLD AUTO: 3.7 MILL/UL (ref 4.4–6.2)
SODIUM SERPL-SCNC: 143 MMOL/L (ref 136–145)
VIT B12 SERPL-MCNC: >2000 PG/ML (ref 213–816)
WBC: 8 THOU/UL (ref 4–11)

## 2020-09-09 ENCOUNTER — AMBULATORY - HEALTHEAST (OUTPATIENT)
Dept: LAB | Facility: CLINIC | Age: 80
End: 2020-09-09

## 2020-09-09 DIAGNOSIS — R19.7 DIARRHEA, UNSPECIFIED TYPE: ICD-10-CM

## 2020-09-09 LAB
25(OH)D3 SERPL-MCNC: 68.1 NG/ML (ref 30–80)
C DIFF TOX B STL QL: NEGATIVE
RIBOTYPE 027/NAP1/BI: NORMAL

## 2020-09-10 ENCOUNTER — COMMUNICATION - HEALTHEAST (OUTPATIENT)
Dept: FAMILY MEDICINE | Facility: CLINIC | Age: 80
End: 2020-09-10

## 2020-09-11 ENCOUNTER — COMMUNICATION - HEALTHEAST (OUTPATIENT)
Dept: FAMILY MEDICINE | Facility: CLINIC | Age: 80
End: 2020-09-11

## 2020-09-11 DIAGNOSIS — I48.20 CHRONIC ATRIAL FIBRILLATION (H): ICD-10-CM

## 2020-09-14 LAB — ELASTASE PANC STL-MCNT: >800 UG/G

## 2020-09-21 ENCOUNTER — COMMUNICATION - HEALTHEAST (OUTPATIENT)
Dept: FAMILY MEDICINE | Facility: CLINIC | Age: 80
End: 2020-09-21

## 2020-09-21 DIAGNOSIS — R74.8 ELEVATED ALKALINE PHOSPHATASE LEVEL: ICD-10-CM

## 2020-09-28 ENCOUNTER — TRANSCRIBE ORDERS (OUTPATIENT)
Dept: OTHER | Age: 80
End: 2020-09-28

## 2020-09-28 ENCOUNTER — MEDICAL CORRESPONDENCE (OUTPATIENT)
Dept: HEALTH INFORMATION MANAGEMENT | Facility: CLINIC | Age: 80
End: 2020-09-28

## 2020-09-28 DIAGNOSIS — M54.50 LOW BACK PAIN: Primary | ICD-10-CM

## 2020-10-06 ENCOUNTER — COMMUNICATION - HEALTHEAST (OUTPATIENT)
Dept: ANTICOAGULATION | Facility: CLINIC | Age: 80
End: 2020-10-06

## 2020-10-06 ENCOUNTER — AMBULATORY - HEALTHEAST (OUTPATIENT)
Dept: LAB | Facility: CLINIC | Age: 80
End: 2020-10-06

## 2020-10-06 DIAGNOSIS — I48.91 A-FIB (H): ICD-10-CM

## 2020-10-06 LAB — INR PPP: 2.7 (ref 0.9–1.1)

## 2020-10-14 ENCOUNTER — COMMUNICATION - HEALTHEAST (OUTPATIENT)
Dept: CARDIOLOGY | Facility: CLINIC | Age: 80
End: 2020-10-14

## 2020-10-14 DIAGNOSIS — I48.20 CHRONIC ATRIAL FIBRILLATION (H): ICD-10-CM

## 2020-10-14 DIAGNOSIS — I25.5 ISCHEMIC CARDIOMYOPATHY: ICD-10-CM

## 2020-10-23 ENCOUNTER — TRANSFERRED RECORDS (OUTPATIENT)
Dept: HEALTH INFORMATION MANAGEMENT | Facility: CLINIC | Age: 80
End: 2020-10-23

## 2020-10-23 NOTE — TELEPHONE ENCOUNTER
DIAGNOSIS: low back pain/Dr. Abreu/BCBS/Xr/MRI with referring md/ortho con   APPOINTMENT DATE: 12/3/2020   NOTES STATUS DETAILS   OFFICE NOTE from referring provider Received 9/28/2020 referral   8/24/2020 note    OFFICE NOTE from other specialist Care Everywhere 09/27/2019 OV from Sharon Arora PT ([facility]  )    08/27/2019 OV from Jyoti Her, CNP ([facility] [place] )   DISCHARGE SUMMARY from hospital Care Everywhere 04/20/2019 - 04/22/2019 @ [facility]    10/22/2016 - 10/25/2016 @ [facility]    DISCHARGE REPORT from the ER N/A    OPERATIVE REPORT N/A    MEDICATION LIST Care Everywhere    EMG (for Spine) N/A    IMPLANT RECORD/STICKER N/A    LABS     CBC/DIFF Care Everywhere 9/8/2020   CULTURES N/A    INJECTIONS DONE IN RADIOLOGY Care Everywhere [facility]   7/8/2019 SACROILIAC JOINT INJECTION   12/17/2018 SACROILIAC JOINT INJECTION    MRI Pending  req sent to [facility] for MRI    CT SCAN Complete [facility] images   5/17/2020 CT Lumbar SPine   7/15/2019 CT Abd Pelvis   2/27/2019 CT Abd Pelvis  1/18/2019 CT Cervical Thoracic Lumbar   XRAYS (IMAGES & REPORTS) Complete [facility] images   1/17/2019 XR Lumbar    TUMOR     PATHOLOGY  Slides & report N/A      10/23/2020 4:53PM sent a fax to [facility] for images. A req sent to [facility] for MRI report - Amay

## 2020-11-02 ENCOUNTER — COMMUNICATION - HEALTHEAST (OUTPATIENT)
Dept: CARDIOLOGY | Facility: CLINIC | Age: 80
End: 2020-11-02

## 2020-11-02 DIAGNOSIS — I50.22 CHRONIC SYSTOLIC CONGESTIVE HEART FAILURE (H): ICD-10-CM

## 2020-11-02 DIAGNOSIS — I25.5 ISCHEMIC CARDIOMYOPATHY: ICD-10-CM

## 2020-11-17 ENCOUNTER — COMMUNICATION - HEALTHEAST (OUTPATIENT)
Dept: ANTICOAGULATION | Facility: CLINIC | Age: 80
End: 2020-11-17

## 2020-11-17 ENCOUNTER — AMBULATORY - HEALTHEAST (OUTPATIENT)
Dept: LAB | Facility: CLINIC | Age: 80
End: 2020-11-17

## 2020-11-17 DIAGNOSIS — I48.91 A-FIB (H): ICD-10-CM

## 2020-11-17 LAB — INR PPP: 2.7 (ref 0.9–1.1)

## 2020-11-23 ENCOUNTER — COMMUNICATION - HEALTHEAST (OUTPATIENT)
Dept: CARDIOLOGY | Facility: CLINIC | Age: 80
End: 2020-11-23

## 2020-11-25 ENCOUNTER — AMBULATORY - HEALTHEAST (OUTPATIENT)
Dept: ANTICOAGULATION | Facility: CLINIC | Age: 80
End: 2020-11-25

## 2020-11-25 ENCOUNTER — OFFICE VISIT - HEALTHEAST (OUTPATIENT)
Dept: CARDIOLOGY | Facility: CLINIC | Age: 80
End: 2020-11-25

## 2020-11-25 ENCOUNTER — AMBULATORY - HEALTHEAST (OUTPATIENT)
Dept: CARDIOLOGY | Facility: CLINIC | Age: 80
End: 2020-11-25

## 2020-11-25 DIAGNOSIS — I48.20 CHRONIC ATRIAL FIBRILLATION (H): ICD-10-CM

## 2020-11-25 DIAGNOSIS — I25.5 ISCHEMIC CARDIOMYOPATHY: ICD-10-CM

## 2020-11-25 DIAGNOSIS — Z95.810 ICD (IMPLANTABLE CARDIOVERTER-DEFIBRILLATOR) IN PLACE: ICD-10-CM

## 2020-11-25 DIAGNOSIS — I48.91 ATRIAL FIBRILLATION, UNSPECIFIED TYPE (H): ICD-10-CM

## 2020-11-25 DIAGNOSIS — I50.20 HEART FAILURE WITH REDUCED EJECTION FRACTION (H): ICD-10-CM

## 2020-11-25 ASSESSMENT — MIFFLIN-ST. JEOR: SCORE: 1438.82

## 2020-11-30 DIAGNOSIS — M54.50 LOW BACK PAIN: Primary | ICD-10-CM

## 2020-12-03 ENCOUNTER — OFFICE VISIT (OUTPATIENT)
Dept: ORTHOPEDICS | Facility: CLINIC | Age: 80
End: 2020-12-03
Payer: COMMERCIAL

## 2020-12-03 ENCOUNTER — ANCILLARY PROCEDURE (OUTPATIENT)
Dept: GENERAL RADIOLOGY | Facility: CLINIC | Age: 80
End: 2020-12-03
Attending: ORTHOPAEDIC SURGERY
Payer: COMMERCIAL

## 2020-12-03 ENCOUNTER — PRE VISIT (OUTPATIENT)
Dept: ORTHOPEDICS | Facility: CLINIC | Age: 80
End: 2020-12-03

## 2020-12-03 VITALS — WEIGHT: 164 LBS | BODY MASS INDEX: 24.29 KG/M2 | HEIGHT: 69 IN

## 2020-12-03 DIAGNOSIS — M54.50 LOW BACK PAIN, UNSPECIFIED BACK PAIN LATERALITY, UNSPECIFIED CHRONICITY, UNSPECIFIED WHETHER SCIATICA PRESENT: ICD-10-CM

## 2020-12-03 DIAGNOSIS — G57.01 PIRIFORMIS SYNDROME, RIGHT: Primary | ICD-10-CM

## 2020-12-03 PROCEDURE — 72082 X-RAY EXAM ENTIRE SPI 2/3 VW: CPT | Performed by: STUDENT IN AN ORGANIZED HEALTH CARE EDUCATION/TRAINING PROGRAM

## 2020-12-03 PROCEDURE — 99203 OFFICE O/P NEW LOW 30 MIN: CPT | Mod: GC | Performed by: ORTHOPAEDIC SURGERY

## 2020-12-03 PROCEDURE — 77073 BONE LENGTH STUDIES: CPT | Performed by: STUDENT IN AN ORGANIZED HEALTH CARE EDUCATION/TRAINING PROGRAM

## 2020-12-03 PROCEDURE — 72190 X-RAY EXAM OF PELVIS: CPT | Performed by: RADIOLOGY

## 2020-12-03 RX ORDER — POTASSIUM CHLORIDE 1500 MG/1
1 TABLET, EXTENDED RELEASE ORAL DAILY
COMMUNITY
Start: 2020-07-14 | End: 2021-07-21

## 2020-12-03 RX ORDER — AMLODIPINE BESYLATE 5 MG/1
5 TABLET ORAL DAILY
COMMUNITY
Start: 2019-09-14 | End: 2021-07-16

## 2020-12-03 RX ORDER — WARFARIN SODIUM 2.5 MG/1
1.25-2.5 TABLET ORAL SEE ADMIN INSTRUCTIONS
COMMUNITY
Start: 2020-09-14 | End: 2022-01-01

## 2020-12-03 RX ORDER — ONDANSETRON 8 MG/1
8 TABLET, FILM COATED ORAL PRN
Status: ON HOLD | COMMUNITY
Start: 2020-03-11 | End: 2022-01-01

## 2020-12-03 RX ORDER — BUMETANIDE 1 MG/1
1 TABLET ORAL DAILY
COMMUNITY
Start: 2020-11-02 | End: 2021-09-08

## 2020-12-03 RX ORDER — ACETAMINOPHEN 500 MG
1000 TABLET ORAL EVERY 8 HOURS PRN
COMMUNITY
Start: 2019-07-18

## 2020-12-03 RX ORDER — CYCLOBENZAPRINE HCL 10 MG
10 TABLET ORAL
COMMUNITY
End: 2021-07-13

## 2020-12-03 RX ORDER — MULTIPLE VITAMINS W/ MINERALS TAB 9MG-400MCG
1 TAB ORAL DAILY
COMMUNITY

## 2020-12-03 RX ORDER — ROSUVASTATIN CALCIUM 20 MG/1
1 TABLET, COATED ORAL AT BEDTIME
COMMUNITY
Start: 2020-10-14 | End: 2021-07-13

## 2020-12-03 RX ORDER — FERROUS SULFATE 325(65) MG
1 TABLET ORAL DAILY
COMMUNITY

## 2020-12-03 RX ORDER — LANOLIN ALCOHOL/MO/W.PET/CERES
3 CREAM (GRAM) TOPICAL
COMMUNITY
Start: 2019-07-18 | End: 2021-08-09

## 2020-12-03 RX ORDER — CARVEDILOL 25 MG/1
25 TABLET ORAL 2 TIMES DAILY
Status: ON HOLD | COMMUNITY
Start: 2020-08-31 | End: 2022-01-01

## 2020-12-03 ASSESSMENT — MIFFLIN-ST. JEOR: SCORE: 1439.28

## 2020-12-03 NOTE — NURSING NOTE
"Reason For Visit:   Chief Complaint   Patient presents with     Consult     low back pain/Dr. Abreu/BCBS/Xr/MRI with referring md       Primary MD: jerrell Stiles [facility]  Ref. MD: Dr. Abreu    ?  No  Occupation Retired.    Date of injury: No  Type of injury: no.    Date of surgery: couple years ago   Type of surgery: fusion in the neck    Smoker: No  Request smoking cessation information: No    Ht 1.753 m (5' 9\")   Wt 74.4 kg (164 lb)   BMI 24.22 kg/m      Pain Assessment  Patient Currently in Pain: Yes  0-10 Pain Scale: 5  Primary Pain Location: Back    Oswestry (CANDIDA) Questionnaire    OSWESTRY DISABILITY INDEX 12/3/2020   Count 9   Sum 15   Oswestry Score (%) 33.33   Some recent data might be hidden          Visual Analog Pain Scale  Back Pain Scale 0-10: 5.5  Right leg pain: 6(back of knee)  Left leg pain: 6(back of knee)  Neck Pain Scale 0-10: 0  Right arm pain: 0  Left arm pain: 0    Promis 10 Assessment    PROMIS 10 12/3/2020   In general, would you say your health is: Very good   In general, would you say your quality of life is: Very good   In general, how would you rate your physical health? Very good   In general, how would you rate your mental health, including your mood and your ability to think? Very good   In general, how would you rate your satisfaction with your social activities and relationships? Good   In general, please rate how well you carry out your usual social activities and roles Good   To what extent are you able to carry out your everyday physical activities such as walking, climbing stairs, carrying groceries, or moving a chair? Moderately   How often have you been bothered by emotional problems such as feeling anxious, depressed or irritable? Never   How would you rate your fatigue on average? Moderate   How would you rate your pain on average?   0 = No Pain  to  10 = Worst Imaginable Pain 5   In general, would you say your health is: 4   In general, would " you say your quality of life is: 4   In general, how would you rate your physical health? 4   In general, how would you rate your mental health, including your mood and your ability to think? 4   In general, how would you rate your satisfaction with your social activities and relationships? 3   In general, please rate how well you carry out your usual social activities and roles. (This includes activities at home, at work and in your community, and responsibilities as a parent, child, spouse, employee, friend, etc.) 3   To what extent are you able to carry out your everyday physical activities such as walking, climbing stairs, carrying groceries, or moving a chair? 3   In the past 7 days, how often have you been bothered by emotional problems such as feeling anxious, depressed, or irritable? 1   In the past 7 days, how would you rate your fatigue on average? 3   In the past 7 days, how would you rate your pain on average, where 0 means no pain, and 10 means worst imaginable pain? 5   Global Mental Health Score 16   Global Physical Health Score 13   PROMIS TOTAL - SUBSCORES 29   Some recent data might be hidden                uNvia Samaniego LPN

## 2020-12-03 NOTE — LETTER
"    12/3/2020         RE: Tanmay Israel  [address] [place] [address]        Dear Colleague,    Thank you for referring your patient, Tanmay Israel, to the [facility] [place]. Please see a copy of my visit note below.    Service Date: 12/03/2020      HISTORY OF PRESENT ILLNESS:  Mr. Israel is an 80-year-old male presenting to [facility] today for evaluation of right sacroiliitis.  The patient has a history of an L5 laminectomy and what appears to be a right facetectomy at the same level.  The patient has no recollection as to this surgery.  He has a long history of lower back pain that he believes is just due to his age and he is not interested in having that addressed today.  His primary complaint is a sharp pain in his right buttock.  When the patient's right buttock pain gets quite severe, he experiences radiating pain down the back of his right leg that stop at his knee.  He denies any sensory changes or weakness.  It has been going on for years and has been getting worse.  It is more significant with activity such as golfing.  He is able to sit for long periods of time with no issue.  He does not feel as if he needs to lean to one side versus the other to offload his buttocks.  He is able to stand for approximately 30 minutes max prior to needing to sit down and rest.  He had a right fluoroscopy guided SI joint injection, which offered approximately 75% pain relief.  He has undergone approximately 6 months of physical therapy, which was SI joint specific that he felt was not helpful.    He has some similar symptoms on the left side but to a lesser degree.      PHYSICAL EXAMINATION:   Ht 1.753 m (5' 9\")   Wt 74.4 kg (164 lb)   BMI 24.22 kg/m     CANDIDA 33%.  VAS 5.     The patient is alert, appropriately communicative.  He does have a lower lumbar scar from a prior incision that appears well-healed.  No tenderness to palpation over the facet joints bilaterally.  " Mildly tender over the right PSIS and no tenderness on the left side.  The tenderness is local without any radiation.  Palpation over the right piriformis is quite tender with re-creation of radiating pains.  He is also somewhat tender to palpation over the left piriformis with some minor radicular component.  His right glute is exceptionally tense.  No pain with palpation over greater trochanters bilaterally.  Strength and sensation intact in bilateral lower extremities.  SI specific provocative tests as follows:  Negative pelvic gapping, thigh thrust, pelvic compression and Gaenslen's.  Mildly positive MANUELA test on the right.  Doing cross-leg stretch of the piriformis was very painful in the right with re-creation of radicular symptoms.  The patient stands with a kyphotic stooped posture.  He is able to stand erect with additional effort.      IMAGING:  Standing EOS x-ray was obtained prior to today's visit.  This demonstrates degenerative grade 2 L4-L5 spondylolisthesis as well as mild degenerative scoliosis in the lumbar spine.  Bilateral vacuum phenomena is present in the SI joints.  The very wide laminectomy is apparent at L5 with the right-sided facetectomy.  Additional degenerative changes are apparent throughout his lumbar spine on the CT.  SI joint injection dated 07/08/2019 imaging was reviewed.  This shows caudal needle placement with some extravasation of contrast.          ASSESSMENT:   1.  Right piriformis syndrome.   2.  Physical examination is inconsistent with right sacroiliitis.  Given his response to injection, I question if the injection specifically filled the SI joint or had some extravasation to offer treatment locally as well.   3.  L4-L5 degenerative spondylolisthesis.   4.  Degenerative lumbar scoliosis.   5.  Baastrup's phenomenon.      PLAN:  The patient will be referred to Dr. Leonardo Smart in PM&R for evaluation and treatment of right piriformis syndrome.  Given that he has attempted  physical therapy in the past, he was pain limited.  He may need additional support through the treatment of a physiatrist to address his symptoms.           IAM DURÁN JR, MD       As dictated by HEBERT MEEK MD            D: 2020   T: 2020   MT: mariano      Name:     YOKASTA MILTON   MRN:      [id number]        Account:      [id number]   :      1940           Service Date: 2020      Document: [id number]        Attestation signed by Iam Durán MD at 2020  9:29 PM:  I saw and evaluated the patient and developed the plan.  Iam Durán MD

## 2020-12-04 ENCOUNTER — TELEPHONE (OUTPATIENT)
Dept: PHYSICAL MEDICINE AND REHAB | Facility: CLINIC | Age: 80
End: 2020-12-04

## 2020-12-04 NOTE — PROGRESS NOTES
"Service Date: 12/03/2020      HISTORY OF PRESENT ILLNESS:  Mr. Israel is an 80-year-old male presenting to [facility] today for evaluation of right sacroiliitis.  The patient has a history of an L5 laminectomy and what appears to be a right facetectomy at the same level.  The patient has no recollection as to this surgery.  He has a long history of lower back pain that he believes is just due to his age and he is not interested in having that addressed today.  His primary complaint is a sharp pain in his right buttock.  When the patient's right buttock pain gets quite severe, he experiences radiating pain down the back of his right leg that stop at his knee.  He denies any sensory changes or weakness.  It has been going on for years and has been getting worse.  It is more significant with activity such as golfing.  He is able to sit for long periods of time with no issue.  He does not feel as if he needs to lean to one side versus the other to offload his buttocks.  He is able to stand for approximately 30 minutes max prior to needing to sit down and rest.  He had a right fluoroscopy guided SI joint injection, which offered approximately 75% pain relief.  He has undergone approximately 6 months of physical therapy, which was SI joint specific that he felt was not helpful.    He has some similar symptoms on the left side but to a lesser degree.      PHYSICAL EXAMINATION:   Ht 1.753 m (5' 9\")   Wt 74.4 kg (164 lb)   BMI 24.22 kg/m     CANDIDA 33%.  VAS 5.     The patient is alert, appropriately communicative.  He does have a lower lumbar scar from a prior incision that appears well-healed.  No tenderness to palpation over the facet joints bilaterally.  Mildly tender over the right PSIS and no tenderness on the left side.  The tenderness is local without any radiation.  Palpation over the right piriformis is quite tender with re-creation of radiating pains.  He is also somewhat tender to palpation over the " left piriformis with some minor radicular component.  His right glute is exceptionally tense.  No pain with palpation over greater trochanters bilaterally.  Strength and sensation intact in bilateral lower extremities.  SI specific provocative tests as follows:  Negative pelvic gapping, thigh thrust, pelvic compression and Gaenslen's.  Mildly positive MANUELA test on the right.  Doing cross-leg stretch of the piriformis was very painful in the right with re-creation of radicular symptoms.  The patient stands with a kyphotic stooped posture.  He is able to stand erect with additional effort.      IMAGING:  Standing EOS x-ray was obtained prior to today's visit.  This demonstrates degenerative grade 2 L4-L5 spondylolisthesis as well as mild degenerative scoliosis in the lumbar spine.  Bilateral vacuum phenomena is present in the SI joints.  The very wide laminectomy is apparent at L5 with the right-sided facetectomy.  Additional degenerative changes are apparent throughout his lumbar spine on the CT.  SI joint injection dated 07/08/2019 imaging was reviewed.  This shows caudal needle placement with some extravasation of contrast.          ASSESSMENT:   1.  Right piriformis syndrome.   2.  Physical examination is inconsistent with right sacroiliitis.  Given his response to injection, I question if the injection specifically filled the SI joint or had some extravasation to offer treatment locally as well.   3.  L4-L5 degenerative spondylolisthesis.   4.  Degenerative lumbar scoliosis.   5.  Baastrup's phenomenon.      PLAN:  The patient will be referred to Dr. Leonardo Smart in PM&R for evaluation and treatment of right piriformis syndrome.  Given that he has attempted physical therapy in the past, he was pain limited.  He may need additional support through the treatment of a physiatrist to address his symptoms.           IAM RUBIO JR, MD       As dictated by HEBERT MEEK MD            D: 12/03/2020   T:  2020   MT: mariano      Name:     YOKASTA MILTON   MRN:      [id number]        Account:      [id number]   :      1940           Service Date: 2020      Document: [id number]

## 2020-12-04 NOTE — TELEPHONE ENCOUNTER
[facility] Call Center    Phone Message    May a detailed message be left on voicemail: yes     Reason for Call: Appointment Intake    Referring Provider Name: Josué Lam MD   Diagnosis and/or Symptoms: Piriformis syndrome, right     Referral specifies Dr Dickerson who shows on the Medical Spine page.    This Dx does not appear on our protocols. Was directed by priority line to send TE.    Action Taken: Message routed to:  [facility] ([facility] PM[facility]    Travel Screening: Not Applicable

## 2020-12-12 ENCOUNTER — COMMUNICATION - HEALTHEAST (OUTPATIENT)
Dept: FAMILY MEDICINE | Facility: CLINIC | Age: 80
End: 2020-12-12

## 2020-12-12 DIAGNOSIS — K21.9 GERD (GASTROESOPHAGEAL REFLUX DISEASE): ICD-10-CM

## 2020-12-16 ENCOUNTER — TELEPHONE (OUTPATIENT)
Dept: PHYSICAL MEDICINE AND REHAB | Facility: CLINIC | Age: 80
End: 2020-12-16

## 2020-12-16 ENCOUNTER — OFFICE VISIT (OUTPATIENT)
Dept: PHYSICAL MEDICINE AND REHAB | Facility: CLINIC | Age: 80
End: 2020-12-16
Attending: STUDENT IN AN ORGANIZED HEALTH CARE EDUCATION/TRAINING PROGRAM
Payer: COMMERCIAL

## 2020-12-16 VITALS
SYSTOLIC BLOOD PRESSURE: 178 MMHG | DIASTOLIC BLOOD PRESSURE: 68 MMHG | HEART RATE: 54 BPM | OXYGEN SATURATION: 99 % | BODY MASS INDEX: 24.22 KG/M2 | WEIGHT: 164 LBS

## 2020-12-16 DIAGNOSIS — M48.07 NEURAL FORAMINAL STENOSIS OF LUMBOSACRAL SPINE: ICD-10-CM

## 2020-12-16 DIAGNOSIS — Z79.01 WARFARIN ANTICOAGULATION: ICD-10-CM

## 2020-12-16 DIAGNOSIS — M53.3 SACROILIAC JOINT DYSFUNCTION: ICD-10-CM

## 2020-12-16 DIAGNOSIS — G57.01 PIRIFORMIS SYNDROME, RIGHT: Primary | ICD-10-CM

## 2020-12-16 DIAGNOSIS — M79.18 PIRIFORMIS MUSCLE PAIN: ICD-10-CM

## 2020-12-16 PROCEDURE — 99204 OFFICE O/P NEW MOD 45 MIN: CPT | Mod: GC | Performed by: PHYSICAL MEDICINE & REHABILITATION

## 2020-12-16 RX ORDER — CYCLOBENZAPRINE HCL 5 MG
5-10 TABLET ORAL 3 TIMES DAILY PRN
Qty: 60 TABLET | Refills: 1 | Status: SHIPPED | OUTPATIENT
Start: 2020-12-16 | End: 2021-07-13

## 2020-12-16 ASSESSMENT — PAIN SCALES - GENERAL: PAINLEVEL: MODERATE PAIN (5)

## 2020-12-16 NOTE — PATIENT INSTRUCTIONS
You have pain in your right gluteal area that is consistent with Piriformis Syndrome.  The piriformis is a muscle that attaches to your sacrum (tailbone) and to your hip and helps you rotate your leg out.    We recommend the following:  - Physical Therapy to work on stretching and strengthening program.   - Can continue to take Flexeril as needed to help you sleep at night.  A refill was ordered for you and can take 5 to 10 mg (1-2 tabs) as needed at night.  Monitor for excessive grogginess in the morning.  - If stretching does not help, we can inject the muscle with numbing medication.  Please call our office to schedule this when you need it.  - We will need to get your INR checked the day of the procedure, so prepare for that.

## 2020-12-16 NOTE — TELEPHONE ENCOUNTER
Spoke with lab. The patient has not been seen by our clinic as of yet. PMR clinic did not send him for labs. Recommended lab send the patient up to be seen for appointment.

## 2020-12-16 NOTE — LETTER
2020       RE: Tanmay Israel  [address] [place] [address]     Dear Colleague,    Thank you for referring your patient, Tanmay Israel, to the [facility] [place] at [facility]. Please see a copy of my visit note below.    Patient seen at the request of Dr. Houston Durán / Josué Lam for an opinion and evaluation of piriformis syndrome.      HISTORY OF PRESENT ILLNESS:  Tanmay Israel is a 80 year old male who presents with a chief complaint of gluteal pain.     Patient has a history of at least 2-3 years of right gluteal pain.  He feels that it is different from when he had back surgery.    Pain began 2-3 years ago and is not associated with trauma.   The pain is localized to the right gluteal region; he denies numbness/tingling; described as aching in nature. Pain is reported as 5-6/10 at rest today and up to 8/10 at worst in the last week. Symptoms are worse with golf swing ; and improved with leg extensions. he does  report pain-related sleep disturbance.     Functional limitations: playing golf    PRIOR INJURIES/TREATMENT:   Ice/Heat: some help  Brace: none  Physical Therapy:   Feels that it was not helpful in the past      - Current Pain Medications -   Flexeril 10 mg at bedtime PRN    - Prior/Trialed Pain Medications -   Aleve/Tylenol OTC    Prior Procedures:  Date      Procedure     Improvement (%)  Prior SI joint injection got 30% relief         Prior Related Surgery: prior history of L5 laminectomy, but prior to this pain.   Other (acupuncture, OMT, CMM, TENS, DME, etc.): none    Specialists Seen:   1. Orthopedic spine surgery - Dr. Houston Durán     IMAGIN views pelvis radiograph(s) 12/3/2020 6:14 PM     History: Low back pain     Comparison: Outside CT 2020     Findings:     Outlet and lateral  view(s) of the pelvis were obtained.      No acute osseous abnormality.     Extensive enthesopathic  changes of innominate bones and trochanters.  Bones appear osteopenic.     Mild to moderate vertebral body height loss of L4 and L5, similar to prior. 8 mm anterolisthesis of L4 on L5 with moderate to severe disc space loss posteriorly. 6 mm retrolisthesis L3 on L4.     Vascular calcifications.     Degenerative changes of bilateral hips, greater on the right which is moderate to severe. Radiodensity right lower quadrant, presumably ingested material.                                                                      IMPRESSION:  1. Osteopenia.  2. 8 mm anterolisthesis of L4 on L5 with moderate to severe disc space  loss posteriorly.    Review Of Systems:   Contsitutional: No Fever, chills  HEENT: No HA, blurry vision  Respiratory: No Cough, SOB  Cardiovascular: No CP, palpitations   Gastrointestinal: continent of bowel  Genitourinary: continent of bladder  Musculoskeletal: as per HPI  Neurologic: as per HPI  Endocrine: no polyuria, polydipsia, polyphagia, no major unintentional weight gain/loss, hair growth/loss  Psychiatric: mood is stable      Medical History:  History of CAD, atrial fibrillation, ischemic cardiomyopathy status post ICD/PPM on warfarin, status post lumbar laminectomy and facetectomy, rotator cuff disease, GERD, rotator cuff arthropathy, iron deficiency anemia, hyperlipidemia, benign essential hypertension, history of tonsillectomy, left cervical 4, 5, 6 laminoplasty-surgeon Dr. Liza Cesar at [facility]      Family History  his family history includes alcohol abuse in his brother cancer in his brother, Crohn's disease in his father, stroke in his mother, gout and his son    Social History:  Work: retired  History of any legal related pain issues: none  Current living situation: independent apartment  Smoking: none   rare EtOH, none Illicit      Current Medications:   he has a current medication list which includes the following prescription(s): acetaminophen, amlodipine,  bextra, bumetanide, carvedilol, cholecalciferol, cyclobenzaprine, diclofenac, ferrous sulfate, melatonin, multivitamin w/minerals, omeprazole, ondansetron, potassium chloride er, rosuvastatin, vitamin b-12, and warfarin anticoagulant.       Allergies:   No Known Allergies    PHYSICAL EXAMINATION:  BP (!) 178/68   Pulse 54   Wt 74.4 kg (164 lb)   SpO2 99%   BMI 24.22 kg/m     General: Pleasant, straightforward, WDWN individual.  Mental Status: Pleasant, direct, appropriate mood and affect  Resp: breathing is unlabored without audible wheeze  Vascular: Palpable pedal pulses, no cyanosis, no venous stasis changes  Heme: no visible ecchymosis or erythema on extremities  Skin: No notable rash    Neurologic:  Strength: All major muscle groups of the bilateral lower extremities have normal and symmetric muscle strength    Sensation: SILT in lower extremities bilaterally L3-S1     DTRs: bilateral lower extremity stretch reflexes are equal and symmetric      Musculoskeletal:   Palpation: mild tenderness to R SI joint, no tenderness over C/T/L spinous/facet joints.  Pain improves with flexion/extension movements.  Tenderness in right glut region that reproduces his pain.  ROM: trunk flexion to 90, trunk extension 20, hip flexion 110  Special tests: SLR-, mild pain with bilateral FAIR, mild pain with right MANUELA, pain reproduces with right hip flexion and adduction.  Neuro: MS is equal and symmetrical in all myotomes of BLE and is consistent for age.  Reflexes were intact and symmetrical at achilles and patellar.     ASSESSMENT:   Tanmay Israel is a pleasant 80 year old gentleman who is on anticoagulation who presents with a chronic history of right gluteal pain that worsens with certain activity.  An SI joint injection in the past was of some benefit.  Today's exam showed tenderness over the right gluteal area of his piriformis that reproduces his pain.  Piriformis Syndrome, consider L5/S1 NFS affecting proximal muscles  and innervation to glut max.    PLAN:  - Imaging: no further imaging ordered  - Therapy: recommend a trial of PT to focus on strengthening and stabilizing hip and piriformis.  - Injections: consider USG piriformis and possibly right SI joint if pain does not improve with focused PT.  Will need INR prior to injection  - Medications: pt requested a refill of flexeril.  Will trial on smaller dose of 5 mg to see if has similar effect.  - RTC: 4-6 weeks or as needed.    Ready to learn, no apparent learning barriers.  Education provided on treatment plan according to patient's preferred learning style.  Patient verbalizes understanding.     Babak Murillo, DO  PGY-4, [facility]    I was physically present for the E/M service provided. I agree with the House Officer note and plan, which I have reviewed and edited where appropriate.  Agree with review of history, physical exam, and imaging which is suggestive of piriformis syndrome/muscle pain on the right.  I would also consider a potential more proximal spine generator given overlapping innervation of the gluteal region and lumbosacral nerve roots in the setting of known, severe neuroforaminal stenosis, and possibly atypically presenting given minimal radicular features.    He would like to pursue conservative management with focused physical therapy which we have prescribed.  He may be interested in a piriformis muscle injection prior to golf season.  I offered to schedule an appointment in the late winter/early spring, however, patient defers.  He knows to contact our clinic if he would like to pursue an injection or if signs/symptoms persist or worsen.  ________________________________   Leonardo Smart MD  Department of Physical Medicine & Rehabilitation

## 2020-12-16 NOTE — PROGRESS NOTES
Patient seen at the request of Dr. Houston Durán / Josué Lam for an opinion and evaluation of piriformis syndrome.      HISTORY OF PRESENT ILLNESS:  Tanmay Israel is a 80 year old male who presents with a chief complaint of gluteal pain.     Patient has a history of at least 2-3 years of right gluteal pain.  He feels that it is different from when he had back surgery.    Pain began 2-3 years ago and is not associated with trauma.   The pain is localized to the right gluteal region; he denies numbness/tingling; described as aching in nature. Pain is reported as 5-6/10 at rest today and up to 8/10 at worst in the last week. Symptoms are worse with golf swing ; and improved with leg extensions. he does  report pain-related sleep disturbance.     Functional limitations: playing golf      PRIOR INJURIES/TREATMENT:   Ice/Heat: some help  Brace: none  Physical Therapy:   Feels that it was not helpful in the past      - Current Pain Medications -   Flexeril 10 mg at bedtime PRN    - Prior/Trialed Pain Medications -   Aleve/Tylenol OTC    Prior Procedures:  Date      Procedure     Improvement (%)  Prior SI joint injection got 30% relief         Prior Related Surgery: prior history of L5 laminectomy, but prior to this pain.   Other (acupuncture, OMT, CMM, TENS, DME, etc.): none    Specialists Seen:   1. Orthopedic spine surgery - Dr. Houston Durán     IMAGIN views pelvis radiograph(s) 12/3/2020 6:14 PM     History: Low back pain     Comparison: Outside CT 2020     Findings:     Outlet and lateral  view(s) of the pelvis were obtained.      No acute osseous abnormality.     Extensive enthesopathic changes of innominate bones and trochanters.  Bones appear osteopenic.     Mild to moderate vertebral body height loss of L4 and L5, similar to prior. 8 mm anterolisthesis of L4 on L5 with moderate to severe disc space loss posteriorly. 6 mm retrolisthesis L3 on L4.     Vascular calcifications.     Degenerative changes of  bilateral hips, greater on the right which is moderate to severe. Radiodensity right lower quadrant, presumably ingested material.                                                                      IMPRESSION:  1. Osteopenia.  2. 8 mm anterolisthesis of L4 on L5 with moderate to severe disc space  loss posteriorly.    Review Of Systems:   Contsitutional: No Fever, chills  HEENT: No HA, blurry vision  Respiratory: No Cough, SOB  Cardiovascular: No CP, palpitations   Gastrointestinal: continent of bowel  Genitourinary: continent of bladder  Musculoskeletal: as per HPI  Neurologic: as per HPI  Endocrine: no polyuria, polydipsia, polyphagia, no major unintentional weight gain/loss, hair growth/loss  Psychiatric: mood is stable      Medical History:  History of CAD, atrial fibrillation, ischemic cardiomyopathy status post ICD/PPM on warfarin, status post lumbar laminectomy and facetectomy, rotator cuff disease, GERD, rotator cuff arthropathy, iron deficiency anemia, hyperlipidemia, benign essential hypertension, history of tonsillectomy, left cervical 4, 5, 6 laminoplasty-surgeon [facility]      Family History  his family history includes alcohol abuse in his brother cancer in his brother, Crohn's disease in his father, stroke in his mother, gout and his son    Social History:  Work: retired  History of any legal related pain issues: none  Current living situation: independent apartment  Smoking: none   rare EtOH, none Illicit      Current Medications:   he has a current medication list which includes the following prescription(s): acetaminophen, amlodipine, bextra, bumetanide, carvedilol, cholecalciferol, cyclobenzaprine, diclofenac, ferrous sulfate, melatonin, multivitamin w/minerals, omeprazole, ondansetron, potassium chloride er, rosuvastatin, vitamin b-12, and warfarin anticoagulant.       Allergies:   No Known Allergies    PHYSICAL EXAMINATION:  BP (!) 178/68   Pulse  54   Wt 74.4 kg (164 lb)   SpO2 99%   BMI 24.22 kg/m     General: Pleasant, straightforward, WDWN individual.  Mental Status: Pleasant, direct, appropriate mood and affect  Resp: breathing is unlabored without audible wheeze  Vascular: Palpable pedal pulses, no cyanosis, no venous stasis changes  Heme: no visible ecchymosis or erythema on extremities  Skin: No notable rash    Neurologic:  Strength: All major muscle groups of the bilateral lower extremities have normal and symmetric muscle strength    Sensation: SILT in lower extremities bilaterally L3-S1     DTRs: bilateral lower extremity stretch reflexes are equal and symmetric      Musculoskeletal:   Palpation: mild tenderness to R SI joint, no tenderness over C/T/L spinous/facet joints.  Pain improves with flexion/extension movements.  Tenderness in right glut region that reproduces his pain.  ROM: trunk flexion to 90, trunk extension 20, hip flexion 110  Special tests: SLR-, mild pain with bilateral FAIR, mild pain with right MANUELA, pain reproduces with right hip flexion and adduction.  Neuro: MS is equal and symmetrical in all myotomes of BLE and is consistent for age.  Reflexes were intact and symmetrical at achilles and patellar.     ASSESSMENT:   Tanmay Israel is a pleasant 80 year old gentleman who is on anticoagulation who presents with a chronic history of right gluteal pain that worsens with certain activity.  An SI joint injection in the past was of some benefit.  Today's exam showed tenderness over the right gluteal area of his piriformis that reproduces his pain.  Piriformis Syndrome, consider L5/S1 NFS affecting proximal muscles and innervation to glut max.    PLAN:  - Imaging: no further imaging ordered  - Therapy: recommend a trial of PT to focus on strengthening and stabilizing hip and piriformis.  - Injections: consider USG piriformis and possibly right SI joint if pain does not improve with focused PT.  Will need INR prior to injection  -  Medications: pt requested a refill of flexeril.  Will trial on smaller dose of 5 mg to see if has similar effect.  - RTC: 4-6 weeks or as needed.    Ready to learn, no apparent learning barriers.  Education provided on treatment plan according to patient's preferred learning style.  Patient verbalizes understanding.     Babak Murillo, DO  PGY-4, [facility]    I was physically present for the E/M service provided. I agree with the House Officer note and plan, which I have reviewed and edited where appropriate.  Agree with review of history, physical exam, and imaging which is suggestive of piriformis syndrome/muscle pain on the right.  I would also consider a potential more proximal spine generator given overlapping innervation of the gluteal region and lumbosacral nerve roots in the setting of known, severe neuroforaminal stenosis, and possibly atypically presenting given minimal radicular features.    He would like to pursue conservative management with focused physical therapy which we have prescribed.  He may be interested in a piriformis muscle injection prior to golf season.  I offered to schedule an appointment in the late winter/early spring, however, patient defers.  He knows to contact our clinic if he would like to pursue an injection or if signs/symptoms persist or worsen.  ________________________________   Leonardo Smart MD  Department of Physical Medicine & Rehabilitation

## 2020-12-16 NOTE — NURSING NOTE
Chief Complaint   Patient presents with     Consult     Low back pain - right side     Johan Tamayo, SANDY

## 2020-12-16 NOTE — TELEPHONE ENCOUNTER
[facility] Call Center    Phone Message    May a detailed message be left on voicemail: yes     Reason for Call: Other: Nicol calling from the lab on the first floor stating that patient came to the lab with a card and stating he is needing labs today for Dr. Smart but there are no orders placed for labs    Please advise and call the lab back to discuss further     Action Taken: Other: [facility]    Travel Screening: Not Applicable

## 2020-12-16 NOTE — Clinical Note
Thanks for your help in seeing patients. Please be sure to pay a little closer attention to detail and fill out all components of the note. PSH, PMH and FHx was empty. Physical exam needs 8 systems including Vitals. Thx

## 2020-12-29 ENCOUNTER — AMBULATORY - HEALTHEAST (OUTPATIENT)
Dept: LAB | Facility: CLINIC | Age: 80
End: 2020-12-29

## 2020-12-29 ENCOUNTER — COMMUNICATION - HEALTHEAST (OUTPATIENT)
Dept: ANTICOAGULATION | Facility: CLINIC | Age: 80
End: 2020-12-29

## 2020-12-29 DIAGNOSIS — I48.91 ATRIAL FIBRILLATION, UNSPECIFIED TYPE (H): ICD-10-CM

## 2020-12-29 LAB — INR PPP: 1.9 (ref 0.9–1.1)

## 2020-12-30 ENCOUNTER — COMMUNICATION - HEALTHEAST (OUTPATIENT)
Dept: FAMILY MEDICINE | Facility: CLINIC | Age: 80
End: 2020-12-30

## 2021-01-01 ENCOUNTER — TELEPHONE (OUTPATIENT)
Dept: FAMILY MEDICINE | Facility: CLINIC | Age: 81
End: 2021-01-01

## 2021-01-01 ENCOUNTER — LAB (OUTPATIENT)
Dept: LAB | Facility: CLINIC | Age: 81
End: 2021-01-01
Payer: COMMERCIAL

## 2021-01-01 ENCOUNTER — MEDICAL CORRESPONDENCE (OUTPATIENT)
Dept: HEALTH INFORMATION MANAGEMENT | Facility: CLINIC | Age: 81
End: 2021-01-01
Payer: COMMERCIAL

## 2021-01-01 ENCOUNTER — LAB (OUTPATIENT)
Dept: LAB | Facility: CLINIC | Age: 81
End: 2021-01-01

## 2021-01-01 ENCOUNTER — TELEPHONE (OUTPATIENT)
Dept: FAMILY MEDICINE | Facility: CLINIC | Age: 81
End: 2021-01-01
Payer: COMMERCIAL

## 2021-01-01 ENCOUNTER — PATIENT OUTREACH (OUTPATIENT)
Dept: NURSING | Facility: CLINIC | Age: 81
End: 2021-01-01
Payer: COMMERCIAL

## 2021-01-01 ENCOUNTER — ANTICOAGULATION THERAPY VISIT (OUTPATIENT)
Dept: ANTICOAGULATION | Facility: CLINIC | Age: 81
End: 2021-01-01

## 2021-01-01 ENCOUNTER — ANCILLARY PROCEDURE (OUTPATIENT)
Dept: CARDIOLOGY | Facility: CLINIC | Age: 81
End: 2021-01-01
Attending: INTERNAL MEDICINE
Payer: COMMERCIAL

## 2021-01-01 ENCOUNTER — PATIENT OUTREACH (OUTPATIENT)
Dept: CARE COORDINATION | Facility: CLINIC | Age: 81
End: 2021-01-01
Payer: COMMERCIAL

## 2021-01-01 ENCOUNTER — DOCUMENTATION ONLY (OUTPATIENT)
Dept: CARDIOLOGY | Facility: CLINIC | Age: 81
End: 2021-01-01
Payer: COMMERCIAL

## 2021-01-01 ENCOUNTER — PATIENT OUTREACH (OUTPATIENT)
Dept: CARE COORDINATION | Facility: CLINIC | Age: 81
End: 2021-01-01

## 2021-01-01 ENCOUNTER — TRANSFERRED RECORDS (OUTPATIENT)
Dept: HEALTH INFORMATION MANAGEMENT | Facility: CLINIC | Age: 81
End: 2021-01-01
Payer: COMMERCIAL

## 2021-01-01 ENCOUNTER — TELEPHONE (OUTPATIENT)
Dept: CARE COORDINATION | Facility: CLINIC | Age: 81
End: 2021-01-01
Payer: COMMERCIAL

## 2021-01-01 ENCOUNTER — ANTICOAGULATION THERAPY VISIT (OUTPATIENT)
Dept: FAMILY MEDICINE | Facility: CLINIC | Age: 81
End: 2021-01-01

## 2021-01-01 ENCOUNTER — OFFICE VISIT (OUTPATIENT)
Dept: FAMILY MEDICINE | Facility: CLINIC | Age: 81
End: 2021-01-01
Payer: COMMERCIAL

## 2021-01-01 ENCOUNTER — PATIENT OUTREACH (OUTPATIENT)
Dept: OTHER | Facility: CLINIC | Age: 81
End: 2021-01-01

## 2021-01-01 ENCOUNTER — HOSPITAL ENCOUNTER (OUTPATIENT)
Dept: RADIOLOGY | Facility: HOSPITAL | Age: 81
End: 2021-12-10
Attending: PHYSICAL MEDICINE & REHABILITATION
Payer: COMMERCIAL

## 2021-01-01 ENCOUNTER — DOCUMENTATION ONLY (OUTPATIENT)
Dept: ANTICOAGULATION | Facility: CLINIC | Age: 81
End: 2021-01-01

## 2021-01-01 ENCOUNTER — DOCUMENTATION ONLY (OUTPATIENT)
Dept: FAMILY MEDICINE | Facility: CLINIC | Age: 81
End: 2021-01-01

## 2021-01-01 ENCOUNTER — TELEPHONE (OUTPATIENT)
Dept: CARDIOLOGY | Facility: CLINIC | Age: 81
End: 2021-01-01
Payer: COMMERCIAL

## 2021-01-01 ENCOUNTER — TELEPHONE (OUTPATIENT)
Dept: CARDIOLOGY | Facility: CLINIC | Age: 81
End: 2021-01-01

## 2021-01-01 ENCOUNTER — APPOINTMENT (OUTPATIENT)
Dept: LAB | Facility: CLINIC | Age: 81
End: 2021-01-01
Attending: PHYSICAL MEDICINE & REHABILITATION
Payer: COMMERCIAL

## 2021-01-01 ENCOUNTER — PREP FOR PROCEDURE (OUTPATIENT)
Dept: CARDIOLOGY | Facility: CLINIC | Age: 81
End: 2021-01-01
Payer: COMMERCIAL

## 2021-01-01 ENCOUNTER — TRANSCRIBE ORDERS (OUTPATIENT)
Dept: CARDIOLOGY | Facility: CLINIC | Age: 81
End: 2021-01-01
Payer: COMMERCIAL

## 2021-01-01 ENCOUNTER — ANTICOAGULATION THERAPY VISIT (OUTPATIENT)
Dept: ANTICOAGULATION | Facility: CLINIC | Age: 81
End: 2021-01-01
Payer: COMMERCIAL

## 2021-01-01 ENCOUNTER — HOSPITAL ENCOUNTER (OUTPATIENT)
Dept: CT IMAGING | Facility: HOSPITAL | Age: 81
End: 2021-12-10
Attending: PHYSICAL MEDICINE & REHABILITATION
Payer: COMMERCIAL

## 2021-01-01 ENCOUNTER — TRANSFERRED RECORDS (OUTPATIENT)
Dept: HEALTH INFORMATION MANAGEMENT | Facility: CLINIC | Age: 81
End: 2021-01-01

## 2021-01-01 VITALS
SYSTOLIC BLOOD PRESSURE: 153 MMHG | OXYGEN SATURATION: 99 % | RESPIRATION RATE: 18 BRPM | TEMPERATURE: 97.6 F | DIASTOLIC BLOOD PRESSURE: 61 MMHG | HEART RATE: 66 BPM

## 2021-01-01 VITALS
SYSTOLIC BLOOD PRESSURE: 116 MMHG | HEART RATE: 73 BPM | OXYGEN SATURATION: 99 % | DIASTOLIC BLOOD PRESSURE: 62 MMHG | BODY MASS INDEX: 21.41 KG/M2 | WEIGHT: 145 LBS

## 2021-01-01 VITALS
BODY MASS INDEX: 22.54 KG/M2 | HEART RATE: 68 BPM | HEIGHT: 67 IN | WEIGHT: 143.6 LBS | SYSTOLIC BLOOD PRESSURE: 136 MMHG | DIASTOLIC BLOOD PRESSURE: 78 MMHG

## 2021-01-01 DIAGNOSIS — I48.91 ATRIAL FIBRILLATION (H): Primary | ICD-10-CM

## 2021-01-01 DIAGNOSIS — Z95.810 ICD (IMPLANTABLE CARDIOVERTER-DEFIBRILLATOR) IN PLACE: ICD-10-CM

## 2021-01-01 DIAGNOSIS — N18.32 STAGE 3B CHRONIC KIDNEY DISEASE (H): ICD-10-CM

## 2021-01-01 DIAGNOSIS — Z23 HIGH PRIORITY FOR 2019-NCOV VACCINE: ICD-10-CM

## 2021-01-01 DIAGNOSIS — I25.5 ISCHEMIC CARDIOMYOPATHY: ICD-10-CM

## 2021-01-01 DIAGNOSIS — I26.99 PULMONARY EMBOLISM WITH INFARCTION (H): ICD-10-CM

## 2021-01-01 DIAGNOSIS — M48.062 LUMBAR STENOSIS WITH NEUROGENIC CLAUDICATION: ICD-10-CM

## 2021-01-01 DIAGNOSIS — M48.062 LUMBAR STENOSIS WITH NEUROGENIC CLAUDICATION: Primary | ICD-10-CM

## 2021-01-01 DIAGNOSIS — N18.31 STAGE 3A CHRONIC KIDNEY DISEASE (H): ICD-10-CM

## 2021-01-01 DIAGNOSIS — Z95.810 ICD (IMPLANTABLE CARDIOVERTER-DEFIBRILLATOR), SINGLE, IN SITU: ICD-10-CM

## 2021-01-01 DIAGNOSIS — Z45.02 ICD (IMPLANTABLE CARDIOVERTER-DEFIBRILLATOR) BATTERY DEPLETION: Primary | ICD-10-CM

## 2021-01-01 DIAGNOSIS — I50.22 CHRONIC SYSTOLIC CONGESTIVE HEART FAILURE (H): ICD-10-CM

## 2021-01-01 DIAGNOSIS — S42.202D CLOSED FRACTURE OF PROXIMAL END OF LEFT HUMERUS WITH ROUTINE HEALING, UNSPECIFIED FRACTURE MORPHOLOGY, SUBSEQUENT ENCOUNTER: ICD-10-CM

## 2021-01-01 DIAGNOSIS — M46.1 SACROILIITIS (H): Primary | ICD-10-CM

## 2021-01-01 DIAGNOSIS — I50.23 ACUTE ON CHRONIC SYSTOLIC HEART FAILURE (H): ICD-10-CM

## 2021-01-01 DIAGNOSIS — Z71.89 OTHER SPECIFIED COUNSELING: ICD-10-CM

## 2021-01-01 DIAGNOSIS — M54.50 LOW BACK PAIN: ICD-10-CM

## 2021-01-01 DIAGNOSIS — Z95.810 ICD (IMPLANTABLE CARDIOVERTER-DEFIBRILLATOR) IN PLACE: Primary | ICD-10-CM

## 2021-01-01 DIAGNOSIS — M48.061 SPINAL STENOSIS, LUMBAR REGION, WITHOUT NEUROGENIC CLAUDICATION: ICD-10-CM

## 2021-01-01 DIAGNOSIS — D64.9 ANEMIA, UNSPECIFIED TYPE: ICD-10-CM

## 2021-01-01 DIAGNOSIS — I25.5 ISCHEMIC CARDIOMYOPATHY: Primary | ICD-10-CM

## 2021-01-01 DIAGNOSIS — I10 ESSENTIAL HYPERTENSION, BENIGN: ICD-10-CM

## 2021-01-01 DIAGNOSIS — I48.20 CHRONIC ATRIAL FIBRILLATION (H): ICD-10-CM

## 2021-01-01 DIAGNOSIS — M17.10 UNILATERAL PRIMARY OSTEOARTHRITIS, UNSPECIFIED KNEE: ICD-10-CM

## 2021-01-01 DIAGNOSIS — M54.32 SCIATICA OF LEFT SIDE: ICD-10-CM

## 2021-01-01 DIAGNOSIS — I26.99 PULMONARY EMBOLISM WITH INFARCTION (H): Primary | ICD-10-CM

## 2021-01-01 DIAGNOSIS — S42.202D CLOSED FRACTURE OF PROXIMAL END OF LEFT HUMERUS WITH ROUTINE HEALING, UNSPECIFIED FRACTURE MORPHOLOGY, SUBSEQUENT ENCOUNTER: Primary | ICD-10-CM

## 2021-01-01 DIAGNOSIS — I48.21 PERMANENT ATRIAL FIBRILLATION (H): ICD-10-CM

## 2021-01-01 DIAGNOSIS — I48.91 ATRIAL FIBRILLATION, UNSPECIFIED TYPE (H): ICD-10-CM

## 2021-01-01 DIAGNOSIS — I48.91 ATRIAL FIBRILLATION, UNSPECIFIED TYPE (H): Primary | ICD-10-CM

## 2021-01-01 DIAGNOSIS — Z11.59 ENCOUNTER FOR SCREENING FOR OTHER VIRAL DISEASES: ICD-10-CM

## 2021-01-01 DIAGNOSIS — M46.1 SACROILIITIS (H): ICD-10-CM

## 2021-01-01 DIAGNOSIS — Z01.818 PREOP GENERAL PHYSICAL EXAM: ICD-10-CM

## 2021-01-01 LAB
ANION GAP SERPL CALCULATED.3IONS-SCNC: 11 MMOL/L (ref 5–18)
ANION GAP SERPL CALCULATED.3IONS-SCNC: 13 MMOL/L (ref 5–18)
BUN SERPL-MCNC: 21 MG/DL (ref 8–28)
BUN SERPL-MCNC: 23 MG/DL (ref 8–28)
CALCIUM SERPL-MCNC: 9.3 MG/DL (ref 8.5–10.5)
CALCIUM SERPL-MCNC: 9.8 MG/DL (ref 8.5–10.5)
CHLORIDE BLD-SCNC: 104 MMOL/L (ref 98–107)
CHLORIDE BLD-SCNC: 107 MMOL/L (ref 98–107)
CO2 SERPL-SCNC: 21 MMOL/L (ref 22–31)
CO2 SERPL-SCNC: 27 MMOL/L (ref 22–31)
CREAT SERPL-MCNC: 1.37 MG/DL (ref 0.7–1.3)
CREAT SERPL-MCNC: 1.57 MG/DL (ref 0.7–1.3)
ERYTHROCYTE [DISTWIDTH] IN BLOOD BY AUTOMATED COUNT: 13.9 % (ref 10–15)
ERYTHROCYTE [DISTWIDTH] IN BLOOD BY AUTOMATED COUNT: 14.6 % (ref 10–15)
GFR SERPL CREATININE-BSD FRML MDRD: 40 ML/MIN/1.73M2
GFR SERPL CREATININE-BSD FRML MDRD: 48 ML/MIN/1.73M2
GLUCOSE BLD-MCNC: 102 MG/DL (ref 70–125)
GLUCOSE BLD-MCNC: 97 MG/DL (ref 70–125)
HCT VFR BLD AUTO: 29.2 % (ref 40–53)
HCT VFR BLD AUTO: 33.5 % (ref 40–53)
HGB BLD-MCNC: 10.9 G/DL (ref 13.3–17.7)
HGB BLD-MCNC: 9.4 G/DL (ref 13.3–17.7)
INR (EXTERNAL): 1.8 (ref 0.9–1.1)
INR (EXTERNAL): 2.2 (ref 0.9–1.1)
INR (EXTERNAL): 3 (ref 0.9–1.1)
INR BLD: 1.4 (ref 0.9–1.1)
INR BLD: 1.7 (ref 0.9–1.1)
INR BLD: 2.9 (ref 0.9–1.1)
INR BLD: 3.1 (ref 0.9–1.1)
INR BLD: 3.2 (ref 0.9–1.1)
MCH RBC QN AUTO: 29.3 PG (ref 26.5–33)
MCH RBC QN AUTO: 29.8 PG (ref 26.5–33)
MCHC RBC AUTO-ENTMCNC: 32.2 G/DL (ref 31.5–36.5)
MCHC RBC AUTO-ENTMCNC: 32.5 G/DL (ref 31.5–36.5)
MCV RBC AUTO: 91 FL (ref 78–100)
MCV RBC AUTO: 92 FL (ref 78–100)
MDC_IDC_LEAD_IMPLANT_DT: NORMAL
MDC_IDC_LEAD_IMPLANT_DT: NORMAL
MDC_IDC_LEAD_LOCATION: NORMAL
MDC_IDC_LEAD_LOCATION: NORMAL
MDC_IDC_LEAD_LOCATION_DETAIL_1: NORMAL
MDC_IDC_LEAD_LOCATION_DETAIL_1: NORMAL
MDC_IDC_LEAD_MFG: NORMAL
MDC_IDC_LEAD_MFG: NORMAL
MDC_IDC_LEAD_MODEL: NORMAL
MDC_IDC_LEAD_MODEL: NORMAL
MDC_IDC_LEAD_POLARITY_TYPE: NORMAL
MDC_IDC_LEAD_POLARITY_TYPE: NORMAL
MDC_IDC_LEAD_SERIAL: NORMAL
MDC_IDC_LEAD_SERIAL: NORMAL
MDC_IDC_MSMT_BATTERY_DTM: NORMAL
MDC_IDC_MSMT_BATTERY_DTM: NORMAL
MDC_IDC_MSMT_BATTERY_RRT_TRIGGER: 2.63
MDC_IDC_MSMT_BATTERY_RRT_TRIGGER: 2.63
MDC_IDC_MSMT_BATTERY_STATUS: NORMAL
MDC_IDC_MSMT_BATTERY_STATUS: NORMAL
MDC_IDC_MSMT_BATTERY_VOLTAGE: 2.61 V
MDC_IDC_MSMT_BATTERY_VOLTAGE: 2.61 V
MDC_IDC_MSMT_CAP_CHARGE_DTM: NORMAL
MDC_IDC_MSMT_CAP_CHARGE_DTM: NORMAL
MDC_IDC_MSMT_CAP_CHARGE_ENERGY: 35 J
MDC_IDC_MSMT_CAP_CHARGE_ENERGY: 35 J
MDC_IDC_MSMT_CAP_CHARGE_TIME: 14.83
MDC_IDC_MSMT_CAP_CHARGE_TIME: 14.83
MDC_IDC_MSMT_CAP_CHARGE_TYPE: NORMAL
MDC_IDC_MSMT_CAP_CHARGE_TYPE: NORMAL
MDC_IDC_MSMT_LEADCHNL_RV_IMPEDANCE_VALUE: 304 OHM
MDC_IDC_MSMT_LEADCHNL_RV_IMPEDANCE_VALUE: 342 OHM
MDC_IDC_MSMT_LEADCHNL_RV_PACING_THRESHOLD_AMPLITUDE: 0.75 V
MDC_IDC_MSMT_LEADCHNL_RV_PACING_THRESHOLD_AMPLITUDE: 0.88 V
MDC_IDC_MSMT_LEADCHNL_RV_PACING_THRESHOLD_PULSEWIDTH: 0.4 MS
MDC_IDC_MSMT_LEADCHNL_RV_PACING_THRESHOLD_PULSEWIDTH: 0.4 MS
MDC_IDC_MSMT_LEADCHNL_RV_SENSING_INTR_AMPL: 10.25 MV
MDC_IDC_MSMT_LEADCHNL_RV_SENSING_INTR_AMPL: 10.25 MV
MDC_IDC_MSMT_LEADCHNL_RV_SENSING_INTR_AMPL: 10.88 MV
MDC_IDC_MSMT_LEADCHNL_RV_SENSING_INTR_AMPL: 10.88 MV
MDC_IDC_PG_IMPLANT_DTM: NORMAL
MDC_IDC_PG_IMPLANT_DTM: NORMAL
MDC_IDC_PG_MFG: NORMAL
MDC_IDC_PG_MFG: NORMAL
MDC_IDC_PG_MODEL: NORMAL
MDC_IDC_PG_MODEL: NORMAL
MDC_IDC_PG_SERIAL: NORMAL
MDC_IDC_PG_SERIAL: NORMAL
MDC_IDC_PG_TYPE: NORMAL
MDC_IDC_PG_TYPE: NORMAL
MDC_IDC_SESS_CLINIC_NAME: NORMAL
MDC_IDC_SESS_CLINIC_NAME: NORMAL
MDC_IDC_SESS_DTM: NORMAL
MDC_IDC_SESS_DTM: NORMAL
MDC_IDC_SESS_TYPE: NORMAL
MDC_IDC_SESS_TYPE: NORMAL
MDC_IDC_SET_BRADY_HYSTRATE: NORMAL
MDC_IDC_SET_BRADY_HYSTRATE: NORMAL
MDC_IDC_SET_BRADY_LOWRATE: 50 {BEATS}/MIN
MDC_IDC_SET_BRADY_LOWRATE: 50 {BEATS}/MIN
MDC_IDC_SET_BRADY_MODE: NORMAL
MDC_IDC_SET_BRADY_MODE: NORMAL
MDC_IDC_SET_LEADCHNL_RV_PACING_AMPLITUDE: 1.5 V
MDC_IDC_SET_LEADCHNL_RV_PACING_AMPLITUDE: 1.5 V
MDC_IDC_SET_LEADCHNL_RV_PACING_ANODE_ELECTRODE_1: NORMAL
MDC_IDC_SET_LEADCHNL_RV_PACING_ANODE_ELECTRODE_1: NORMAL
MDC_IDC_SET_LEADCHNL_RV_PACING_ANODE_LOCATION_1: NORMAL
MDC_IDC_SET_LEADCHNL_RV_PACING_ANODE_LOCATION_1: NORMAL
MDC_IDC_SET_LEADCHNL_RV_PACING_CAPTURE_MODE: NORMAL
MDC_IDC_SET_LEADCHNL_RV_PACING_CAPTURE_MODE: NORMAL
MDC_IDC_SET_LEADCHNL_RV_PACING_CATHODE_ELECTRODE_1: NORMAL
MDC_IDC_SET_LEADCHNL_RV_PACING_CATHODE_ELECTRODE_1: NORMAL
MDC_IDC_SET_LEADCHNL_RV_PACING_CATHODE_LOCATION_1: NORMAL
MDC_IDC_SET_LEADCHNL_RV_PACING_CATHODE_LOCATION_1: NORMAL
MDC_IDC_SET_LEADCHNL_RV_PACING_POLARITY: NORMAL
MDC_IDC_SET_LEADCHNL_RV_PACING_POLARITY: NORMAL
MDC_IDC_SET_LEADCHNL_RV_PACING_PULSEWIDTH: 0.4 MS
MDC_IDC_SET_LEADCHNL_RV_PACING_PULSEWIDTH: 0.4 MS
MDC_IDC_SET_LEADCHNL_RV_SENSING_ANODE_ELECTRODE_1: NORMAL
MDC_IDC_SET_LEADCHNL_RV_SENSING_ANODE_ELECTRODE_1: NORMAL
MDC_IDC_SET_LEADCHNL_RV_SENSING_ANODE_LOCATION_1: NORMAL
MDC_IDC_SET_LEADCHNL_RV_SENSING_ANODE_LOCATION_1: NORMAL
MDC_IDC_SET_LEADCHNL_RV_SENSING_CATHODE_ELECTRODE_1: NORMAL
MDC_IDC_SET_LEADCHNL_RV_SENSING_CATHODE_ELECTRODE_1: NORMAL
MDC_IDC_SET_LEADCHNL_RV_SENSING_CATHODE_LOCATION_1: NORMAL
MDC_IDC_SET_LEADCHNL_RV_SENSING_CATHODE_LOCATION_1: NORMAL
MDC_IDC_SET_LEADCHNL_RV_SENSING_POLARITY: NORMAL
MDC_IDC_SET_LEADCHNL_RV_SENSING_POLARITY: NORMAL
MDC_IDC_SET_LEADCHNL_RV_SENSING_SENSITIVITY: 0.45 MV
MDC_IDC_SET_LEADCHNL_RV_SENSING_SENSITIVITY: 0.45 MV
MDC_IDC_SET_ZONE_DETECTION_BEATS_DENOMINATOR: 40 {BEATS}
MDC_IDC_SET_ZONE_DETECTION_BEATS_DENOMINATOR: 40 {BEATS}
MDC_IDC_SET_ZONE_DETECTION_BEATS_NUMERATOR: 30 {BEATS}
MDC_IDC_SET_ZONE_DETECTION_BEATS_NUMERATOR: 30 {BEATS}
MDC_IDC_SET_ZONE_DETECTION_INTERVAL: 240 MS
MDC_IDC_SET_ZONE_DETECTION_INTERVAL: 240 MS
MDC_IDC_SET_ZONE_DETECTION_INTERVAL: 310 MS
MDC_IDC_SET_ZONE_DETECTION_INTERVAL: 310 MS
MDC_IDC_SET_ZONE_DETECTION_INTERVAL: 330 MS
MDC_IDC_SET_ZONE_DETECTION_INTERVAL: 330 MS
MDC_IDC_SET_ZONE_DETECTION_INTERVAL: 450 MS
MDC_IDC_SET_ZONE_DETECTION_INTERVAL: 450 MS
MDC_IDC_SET_ZONE_TYPE: NORMAL
MDC_IDC_STAT_BRADY_DTM_END: NORMAL
MDC_IDC_STAT_BRADY_DTM_END: NORMAL
MDC_IDC_STAT_BRADY_DTM_START: NORMAL
MDC_IDC_STAT_BRADY_DTM_START: NORMAL
MDC_IDC_STAT_BRADY_RV_PERCENT_PACED: 22.4 %
MDC_IDC_STAT_BRADY_RV_PERCENT_PACED: 26.41 %
MDC_IDC_STAT_EPISODE_RECENT_COUNT: 0
MDC_IDC_STAT_EPISODE_RECENT_COUNT_DTM_END: NORMAL
MDC_IDC_STAT_EPISODE_RECENT_COUNT_DTM_START: NORMAL
MDC_IDC_STAT_EPISODE_TOTAL_COUNT: 0
MDC_IDC_STAT_EPISODE_TOTAL_COUNT: 9
MDC_IDC_STAT_EPISODE_TOTAL_COUNT: 9
MDC_IDC_STAT_EPISODE_TOTAL_COUNT_DTM_END: NORMAL
MDC_IDC_STAT_EPISODE_TOTAL_COUNT_DTM_START: NORMAL
MDC_IDC_STAT_EPISODE_TYPE: NORMAL
MDC_IDC_STAT_TACHYTHERAPY_ATP_DELIVERED_RECENT: 0
MDC_IDC_STAT_TACHYTHERAPY_ATP_DELIVERED_RECENT: 0
MDC_IDC_STAT_TACHYTHERAPY_ATP_DELIVERED_TOTAL: 0
MDC_IDC_STAT_TACHYTHERAPY_ATP_DELIVERED_TOTAL: 0
MDC_IDC_STAT_TACHYTHERAPY_RECENT_DTM_END: NORMAL
MDC_IDC_STAT_TACHYTHERAPY_RECENT_DTM_END: NORMAL
MDC_IDC_STAT_TACHYTHERAPY_RECENT_DTM_START: NORMAL
MDC_IDC_STAT_TACHYTHERAPY_RECENT_DTM_START: NORMAL
MDC_IDC_STAT_TACHYTHERAPY_SHOCKS_ABORTED_RECENT: 0
MDC_IDC_STAT_TACHYTHERAPY_SHOCKS_ABORTED_RECENT: 0
MDC_IDC_STAT_TACHYTHERAPY_SHOCKS_ABORTED_TOTAL: 0
MDC_IDC_STAT_TACHYTHERAPY_SHOCKS_ABORTED_TOTAL: 0
MDC_IDC_STAT_TACHYTHERAPY_SHOCKS_DELIVERED_RECENT: 0
MDC_IDC_STAT_TACHYTHERAPY_SHOCKS_DELIVERED_RECENT: 0
MDC_IDC_STAT_TACHYTHERAPY_SHOCKS_DELIVERED_TOTAL: 0
MDC_IDC_STAT_TACHYTHERAPY_SHOCKS_DELIVERED_TOTAL: 0
MDC_IDC_STAT_TACHYTHERAPY_TOTAL_DTM_END: NORMAL
MDC_IDC_STAT_TACHYTHERAPY_TOTAL_DTM_END: NORMAL
MDC_IDC_STAT_TACHYTHERAPY_TOTAL_DTM_START: NORMAL
MDC_IDC_STAT_TACHYTHERAPY_TOTAL_DTM_START: NORMAL
PLATELET # BLD AUTO: 133 10E3/UL (ref 150–450)
PLATELET # BLD AUTO: 163 10E3/UL (ref 150–450)
POTASSIUM BLD-SCNC: 4 MMOL/L (ref 3.5–5)
POTASSIUM BLD-SCNC: 4 MMOL/L (ref 3.5–5)
RBC # BLD AUTO: 3.21 10E6/UL (ref 4.4–5.9)
RBC # BLD AUTO: 3.66 10E6/UL (ref 4.4–5.9)
SARS-COV-2 RNA RESP QL NAA+PROBE: NEGATIVE
SARS-COV-2 RNA RESP QL NAA+PROBE: NORMAL
SARS-COV-2 RNA RESP QL NAA+PROBE: NOT DETECTED
SODIUM SERPL-SCNC: 141 MMOL/L (ref 136–145)
SODIUM SERPL-SCNC: 142 MMOL/L (ref 136–145)
WBC # BLD AUTO: 6.2 10E3/UL (ref 4–11)
WBC # BLD AUTO: 7.7 10E3/UL (ref 4–11)

## 2021-01-01 PROCEDURE — 99214 OFFICE O/P EST MOD 30 MIN: CPT | Performed by: NURSE PRACTITIONER

## 2021-01-01 PROCEDURE — 36416 COLLJ CAPILLARY BLOOD SPEC: CPT

## 2021-01-01 PROCEDURE — 85610 PROTHROMBIN TIME: CPT | Performed by: FAMILY MEDICINE

## 2021-01-01 PROCEDURE — 85610 PROTHROMBIN TIME: CPT

## 2021-01-01 PROCEDURE — U0003 INFECTIOUS AGENT DETECTION BY NUCLEIC ACID (DNA OR RNA); SEVERE ACUTE RESPIRATORY SYNDROME CORONAVIRUS 2 (SARS-COV-2) (CORONAVIRUS DISEASE [COVID-19]), AMPLIFIED PROBE TECHNIQUE, MAKING USE OF HIGH THROUGHPUT TECHNOLOGIES AS DESCRIBED BY CMS-2020-01-R: HCPCS | Performed by: PHYSICAL MEDICINE & REHABILITATION

## 2021-01-01 PROCEDURE — 80048 BASIC METABOLIC PNL TOTAL CA: CPT | Performed by: FAMILY MEDICINE

## 2021-01-01 PROCEDURE — 93295 DEV INTERROG REMOTE 1/2/MLT: CPT | Performed by: INTERNAL MEDICINE

## 2021-01-01 PROCEDURE — U0005 INFEC AGEN DETEC AMPLI PROBE: HCPCS | Performed by: NURSE PRACTITIONER

## 2021-01-01 PROCEDURE — 72132 CT LUMBAR SPINE W/DYE: CPT

## 2021-01-01 PROCEDURE — 99214 OFFICE O/P EST MOD 30 MIN: CPT | Performed by: FAMILY MEDICINE

## 2021-01-01 PROCEDURE — 93296 REM INTERROG EVL PM/IDS: CPT | Performed by: INTERNAL MEDICINE

## 2021-01-01 PROCEDURE — 255N000002 HC RX 255 OP 636: Performed by: PHYSICAL MEDICINE & REHABILITATION

## 2021-01-01 PROCEDURE — 85027 COMPLETE CBC AUTOMATED: CPT | Performed by: FAMILY MEDICINE

## 2021-01-01 PROCEDURE — 85610 PROTHROMBIN TIME: CPT | Performed by: NURSE PRACTITIONER

## 2021-01-01 PROCEDURE — 62304 MYELOGRAPHY LUMBAR INJECTION: CPT

## 2021-01-01 PROCEDURE — U0003 INFECTIOUS AGENT DETECTION BY NUCLEIC ACID (DNA OR RNA); SEVERE ACUTE RESPIRATORY SYNDROME CORONAVIRUS 2 (SARS-COV-2) (CORONAVIRUS DISEASE [COVID-19]), AMPLIFIED PROBE TECHNIQUE, MAKING USE OF HIGH THROUGHPUT TECHNOLOGIES AS DESCRIBED BY CMS-2020-01-R: HCPCS | Performed by: NURSE PRACTITIONER

## 2021-01-01 PROCEDURE — 250N000013 HC RX MED GY IP 250 OP 250 PS 637: Performed by: RADIOLOGY

## 2021-01-01 PROCEDURE — 36415 COLL VENOUS BLD VENIPUNCTURE: CPT | Performed by: FAMILY MEDICINE

## 2021-01-01 PROCEDURE — 0004A COVID-19,PF,PFIZER (12+ YRS): CPT | Performed by: FAMILY MEDICINE

## 2021-01-01 PROCEDURE — 91300 COVID-19,PF,PFIZER (12+ YRS): CPT | Performed by: FAMILY MEDICINE

## 2021-01-01 PROCEDURE — 36416 COLLJ CAPILLARY BLOOD SPEC: CPT | Performed by: NURSE PRACTITIONER

## 2021-01-01 RX ORDER — BUMETANIDE 1 MG/1
1 TABLET ORAL DAILY
COMMUNITY
Start: 2021-10-09 | End: 2022-01-01

## 2021-01-01 RX ORDER — VANCOMYCIN HYDROCHLORIDE 1 G/200ML
1000 INJECTION, SOLUTION INTRAVENOUS
Status: CANCELLED | OUTPATIENT
Start: 2021-01-01

## 2021-01-01 RX ORDER — TRAMADOL HYDROCHLORIDE 50 MG/1
50 TABLET ORAL EVERY 6 HOURS PRN
Status: ON HOLD | COMMUNITY
Start: 2021-01-01 | End: 2022-01-01

## 2021-01-01 RX ORDER — SODIUM CHLORIDE 9 MG/ML
100 INJECTION, SOLUTION INTRAVENOUS CONTINUOUS
Status: CANCELLED | OUTPATIENT
Start: 2021-01-01

## 2021-01-01 RX ORDER — VANCOMYCIN HYDROCHLORIDE 1 G/20ML
1000 INJECTION, POWDER, LYOPHILIZED, FOR SOLUTION INTRAVENOUS
Status: CANCELLED | OUTPATIENT
Start: 2022-01-01

## 2021-01-01 RX ORDER — LIDOCAINE 40 MG/G
CREAM TOPICAL
Status: CANCELLED | OUTPATIENT
Start: 2021-01-01

## 2021-01-01 RX ORDER — FENTANYL CITRATE 50 UG/ML
25 INJECTION, SOLUTION INTRAMUSCULAR; INTRAVENOUS
Status: CANCELLED | OUTPATIENT
Start: 2021-01-01

## 2021-01-01 RX ORDER — ACETAMINOPHEN 325 MG/1
650 TABLET ORAL EVERY 4 HOURS PRN
Status: DISCONTINUED | OUTPATIENT
Start: 2021-01-01 | End: 2021-01-01 | Stop reason: HOSPADM

## 2021-01-01 RX ORDER — SODIUM CHLORIDE 9 MG/ML
100 INJECTION, SOLUTION INTRAVENOUS CONTINUOUS
Status: CANCELLED | OUTPATIENT
Start: 2022-01-01

## 2021-01-01 RX ADMIN — IOHEXOL 20 ML: 180 INJECTION INTRAVENOUS at 12:33

## 2021-01-01 RX ADMIN — ACETAMINOPHEN 650 MG: 325 TABLET ORAL at 12:53

## 2021-01-01 SDOH — ECONOMIC STABILITY: INCOME INSECURITY: IN THE LAST 12 MONTHS, WAS THERE A TIME WHEN YOU WERE NOT ABLE TO PAY THE MORTGAGE OR RENT ON TIME?: NO

## 2021-01-01 SDOH — ECONOMIC STABILITY: FOOD INSECURITY: WITHIN THE PAST 12 MONTHS, THE FOOD YOU BOUGHT JUST DIDN'T LAST AND YOU DIDN'T HAVE MONEY TO GET MORE.: NEVER TRUE

## 2021-01-01 SDOH — ECONOMIC STABILITY: TRANSPORTATION INSECURITY
IN THE PAST 12 MONTHS, HAS THE LACK OF TRANSPORTATION KEPT YOU FROM MEDICAL APPOINTMENTS OR FROM GETTING MEDICATIONS?: NO

## 2021-01-01 SDOH — ECONOMIC STABILITY: TRANSPORTATION INSECURITY
IN THE PAST 12 MONTHS, HAS LACK OF TRANSPORTATION KEPT YOU FROM MEETINGS, WORK, OR FROM GETTING THINGS NEEDED FOR DAILY LIVING?: NO

## 2021-01-01 SDOH — ECONOMIC STABILITY: FOOD INSECURITY: WITHIN THE PAST 12 MONTHS, YOU WORRIED THAT YOUR FOOD WOULD RUN OUT BEFORE YOU GOT MONEY TO BUY MORE.: NEVER TRUE

## 2021-01-01 ASSESSMENT — SOCIAL DETERMINANTS OF HEALTH (SDOH): HOW HARD IS IT FOR YOU TO PAY FOR THE VERY BASICS LIKE FOOD, HOUSING, MEDICAL CARE, AND HEATING?: SOMEWHAT HARD

## 2021-01-01 ASSESSMENT — MIFFLIN-ST. JEOR: SCORE: 1302.06

## 2021-01-01 ASSESSMENT — ACTIVITIES OF DAILY LIVING (ADL)
DEPENDENT_IADLS:: CLEANING;LAUNDRY;SHOPPING;MEAL PREPARATION
DEPENDENT_IADLS:: CLEANING;LAUNDRY;SHOPPING;MEAL PREPARATION

## 2021-01-06 ENCOUNTER — COMMUNICATION - HEALTHEAST (OUTPATIENT)
Dept: CARDIOLOGY | Facility: CLINIC | Age: 81
End: 2021-01-06

## 2021-01-06 DIAGNOSIS — I25.5 ISCHEMIC CARDIOMYOPATHY: ICD-10-CM

## 2021-01-06 DIAGNOSIS — I48.20 CHRONIC ATRIAL FIBRILLATION (H): ICD-10-CM

## 2021-01-12 ENCOUNTER — AMBULATORY - HEALTHEAST (OUTPATIENT)
Dept: LAB | Facility: CLINIC | Age: 81
End: 2021-01-12

## 2021-01-12 ENCOUNTER — COMMUNICATION - HEALTHEAST (OUTPATIENT)
Dept: ANTICOAGULATION | Facility: CLINIC | Age: 81
End: 2021-01-12

## 2021-01-12 DIAGNOSIS — I48.91 ATRIAL FIBRILLATION, UNSPECIFIED TYPE (H): ICD-10-CM

## 2021-01-12 LAB — INR PPP: 2 (ref 0.9–1.1)

## 2021-01-26 ENCOUNTER — AMBULATORY - HEALTHEAST (OUTPATIENT)
Dept: LAB | Facility: CLINIC | Age: 81
End: 2021-01-26

## 2021-01-26 ENCOUNTER — COMMUNICATION - HEALTHEAST (OUTPATIENT)
Dept: ANTICOAGULATION | Facility: CLINIC | Age: 81
End: 2021-01-26

## 2021-01-26 DIAGNOSIS — I48.91 ATRIAL FIBRILLATION, UNSPECIFIED TYPE (H): ICD-10-CM

## 2021-01-26 LAB — INR PPP: 1.9 (ref 0.9–1.1)

## 2021-02-01 ENCOUNTER — COMMUNICATION - HEALTHEAST (OUTPATIENT)
Dept: CARDIOLOGY | Facility: CLINIC | Age: 81
End: 2021-02-01

## 2021-02-01 DIAGNOSIS — I50.22 CHRONIC SYSTOLIC CONGESTIVE HEART FAILURE (H): ICD-10-CM

## 2021-02-01 DIAGNOSIS — I25.5 ISCHEMIC CARDIOMYOPATHY: ICD-10-CM

## 2021-02-09 ENCOUNTER — COMMUNICATION - HEALTHEAST (OUTPATIENT)
Dept: ANTICOAGULATION | Facility: CLINIC | Age: 81
End: 2021-02-09

## 2021-02-09 ENCOUNTER — AMBULATORY - HEALTHEAST (OUTPATIENT)
Dept: LAB | Facility: CLINIC | Age: 81
End: 2021-02-09

## 2021-02-09 ENCOUNTER — TRANSFERRED RECORDS (OUTPATIENT)
Dept: HEALTH INFORMATION MANAGEMENT | Facility: CLINIC | Age: 81
End: 2021-02-09

## 2021-02-09 DIAGNOSIS — I48.91 ATRIAL FIBRILLATION, UNSPECIFIED TYPE (H): ICD-10-CM

## 2021-02-09 LAB — INR PPP: 2.2 (ref 0.9–1.1)

## 2021-02-11 ENCOUNTER — AMBULATORY - HEALTHEAST (OUTPATIENT)
Dept: NURSING | Facility: CLINIC | Age: 81
End: 2021-02-11

## 2021-02-22 ENCOUNTER — DOCUMENTATION ONLY (OUTPATIENT)
Dept: PHYSICAL MEDICINE AND REHAB | Facility: CLINIC | Age: 81
End: 2021-02-22

## 2021-02-22 NOTE — PROGRESS NOTES
Received initial evaluation PT paperwork from [facility]. Signed by MD and faxed back to [phone]. A copy was sent to scan.

## 2021-02-25 ENCOUNTER — AMBULATORY - HEALTHEAST (OUTPATIENT)
Dept: CARDIOLOGY | Facility: CLINIC | Age: 81
End: 2021-02-25

## 2021-02-25 DIAGNOSIS — I25.5 ISCHEMIC CARDIOMYOPATHY: ICD-10-CM

## 2021-02-25 DIAGNOSIS — Z95.810 ICD (IMPLANTABLE CARDIOVERTER-DEFIBRILLATOR) IN PLACE: ICD-10-CM

## 2021-03-01 ENCOUNTER — COMMUNICATION - HEALTHEAST (OUTPATIENT)
Dept: CARDIOLOGY | Facility: CLINIC | Age: 81
End: 2021-03-01

## 2021-03-01 DIAGNOSIS — I25.5 ISCHEMIC CARDIOMYOPATHY: ICD-10-CM

## 2021-03-02 ENCOUNTER — AMBULATORY - HEALTHEAST (OUTPATIENT)
Dept: LAB | Facility: CLINIC | Age: 81
End: 2021-03-02

## 2021-03-02 ENCOUNTER — COMMUNICATION - HEALTHEAST (OUTPATIENT)
Dept: ANTICOAGULATION | Facility: CLINIC | Age: 81
End: 2021-03-02

## 2021-03-02 DIAGNOSIS — I48.91 ATRIAL FIBRILLATION, UNSPECIFIED TYPE (H): ICD-10-CM

## 2021-03-02 LAB — INR PPP: 2.9 (ref 0.9–1.1)

## 2021-03-04 ENCOUNTER — AMBULATORY - HEALTHEAST (OUTPATIENT)
Dept: NURSING | Facility: CLINIC | Age: 81
End: 2021-03-04

## 2021-03-20 ENCOUNTER — COMMUNICATION - HEALTHEAST (OUTPATIENT)
Dept: FAMILY MEDICINE | Facility: CLINIC | Age: 81
End: 2021-03-20

## 2021-03-20 DIAGNOSIS — I48.20 CHRONIC ATRIAL FIBRILLATION (H): ICD-10-CM

## 2021-03-25 ENCOUNTER — AMBULATORY - HEALTHEAST (OUTPATIENT)
Dept: CARDIOLOGY | Facility: CLINIC | Age: 81
End: 2021-03-25

## 2021-03-25 DIAGNOSIS — Z95.810 ICD (IMPLANTABLE CARDIOVERTER-DEFIBRILLATOR) IN PLACE: ICD-10-CM

## 2021-03-25 DIAGNOSIS — I25.5 ISCHEMIC CARDIOMYOPATHY: ICD-10-CM

## 2021-03-30 ENCOUNTER — COMMUNICATION - HEALTHEAST (OUTPATIENT)
Dept: ANTICOAGULATION | Facility: CLINIC | Age: 81
End: 2021-03-30

## 2021-03-30 ENCOUNTER — AMBULATORY - HEALTHEAST (OUTPATIENT)
Dept: LAB | Facility: CLINIC | Age: 81
End: 2021-03-30

## 2021-03-30 DIAGNOSIS — I48.91 ATRIAL FIBRILLATION, UNSPECIFIED TYPE (H): ICD-10-CM

## 2021-03-30 LAB — INR PPP: 3.3 (ref 0.9–1.1)

## 2021-04-13 ENCOUNTER — COMMUNICATION - HEALTHEAST (OUTPATIENT)
Dept: ANTICOAGULATION | Facility: CLINIC | Age: 81
End: 2021-04-13

## 2021-04-13 ENCOUNTER — AMBULATORY - HEALTHEAST (OUTPATIENT)
Dept: LAB | Facility: CLINIC | Age: 81
End: 2021-04-13

## 2021-04-13 DIAGNOSIS — I48.91 ATRIAL FIBRILLATION, UNSPECIFIED TYPE (H): ICD-10-CM

## 2021-04-13 LAB — INR PPP: 2.6 (ref 0.9–1.1)

## 2021-04-14 ENCOUNTER — TELEPHONE (OUTPATIENT)
Dept: PHYSICAL MEDICINE AND REHAB | Facility: CLINIC | Age: 81
End: 2021-04-14

## 2021-04-14 DIAGNOSIS — G57.01 PIRIFORMIS SYNDROME, RIGHT: Primary | ICD-10-CM

## 2021-04-14 DIAGNOSIS — Z79.01 WARFARIN ANTICOAGULATION: ICD-10-CM

## 2021-04-14 DIAGNOSIS — M79.18 PIRIFORMIS MUSCLE PAIN: ICD-10-CM

## 2021-04-14 NOTE — TELEPHONE ENCOUNTER
[facility] Call Center    Phone Message    May a detailed message be left on voicemail: yes     Reason for Call: Other: Pt called to schedule appt wit Dr. Smart for injection for Piriformis syndrome.    Please call Pt back to schedule.    Action Taken: Message routed to:  [facility]    Travel Screening: Not Applicable

## 2021-04-15 NOTE — TELEPHONE ENCOUNTER
Ok to schedule USG piriformis mm injection.   INR order placed. Needs to have completed day of procedure with INR < 3. Patient should arrive early for this.

## 2021-04-16 ENCOUNTER — OFFICE VISIT - HEALTHEAST (OUTPATIENT)
Dept: CARDIOLOGY | Facility: CLINIC | Age: 81
End: 2021-04-16

## 2021-04-16 DIAGNOSIS — I25.5 ISCHEMIC CARDIOMYOPATHY: ICD-10-CM

## 2021-04-16 DIAGNOSIS — I10 ESSENTIAL HYPERTENSION, BENIGN: ICD-10-CM

## 2021-04-16 DIAGNOSIS — N18.30 STAGE 3 CHRONIC KIDNEY DISEASE (H): ICD-10-CM

## 2021-04-16 DIAGNOSIS — Z95.810 ICD (IMPLANTABLE CARDIOVERTER-DEFIBRILLATOR) IN PLACE: ICD-10-CM

## 2021-04-16 ASSESSMENT — MIFFLIN-ST. JEOR: SCORE: 1434.28

## 2021-04-16 NOTE — TELEPHONE ENCOUNTER
[facility] Call Center    Phone Message    May a detailed message be left on voicemail: yes     Reason for Call: Other: Patient calling checking status of scheduling injections with Dr. Smart.      Please advise and call patient back at your earliest convenience     Action Taken: Other: [facility]    Travel Screening: Not Applicable

## 2021-04-19 NOTE — TELEPHONE ENCOUNTER
[facility] Call [facility]    Phone Message    May a detailed message be left on voicemail: yes     Reason for Call: Other: Patient calling to schedule injections with Dr. Smart.     Please advise and call patient back at your earliest convenience to discuss scheduling options     Action Taken: Other: [facility]    Travel Screening: Not Applicable

## 2021-04-20 NOTE — TELEPHONE ENCOUNTER
Appt scheduled for piriformis injection with Dr. Smart for 5/26/21. We discussed the INR lab should be completed on day of procedure. He prefers to go to his PCP. Lab order placed.

## 2021-04-22 ENCOUNTER — AMBULATORY - HEALTHEAST (OUTPATIENT)
Dept: CARDIOLOGY | Facility: CLINIC | Age: 81
End: 2021-04-22

## 2021-04-22 DIAGNOSIS — I10 ESSENTIAL HYPERTENSION, BENIGN: ICD-10-CM

## 2021-04-22 DIAGNOSIS — N18.30 STAGE 3 CHRONIC KIDNEY DISEASE (H): ICD-10-CM

## 2021-04-22 LAB
ANION GAP SERPL CALCULATED.3IONS-SCNC: 11 MMOL/L (ref 5–18)
BUN SERPL-MCNC: 27 MG/DL (ref 8–28)
CALCIUM SERPL-MCNC: 8.2 MG/DL (ref 8.5–10.5)
CHLORIDE BLD-SCNC: 109 MMOL/L (ref 98–107)
CO2 SERPL-SCNC: 20 MMOL/L (ref 22–31)
CREAT SERPL-MCNC: 1.95 MG/DL (ref 0.7–1.3)
GFR SERPL CREATININE-BSD FRML MDRD: 33 ML/MIN/1.73M2
GLUCOSE BLD-MCNC: 114 MG/DL (ref 70–125)
POTASSIUM BLD-SCNC: 4.3 MMOL/L (ref 3.5–5)
SODIUM SERPL-SCNC: 140 MMOL/L (ref 136–145)

## 2021-04-23 ENCOUNTER — RECORDS - HEALTHEAST (OUTPATIENT)
Dept: ADMINISTRATIVE | Facility: OTHER | Age: 81
End: 2021-04-23

## 2021-04-23 ENCOUNTER — AMBULATORY - HEALTHEAST (OUTPATIENT)
Dept: CARDIOLOGY | Facility: CLINIC | Age: 81
End: 2021-04-23

## 2021-04-23 DIAGNOSIS — Z95.810 ICD (IMPLANTABLE CARDIOVERTER-DEFIBRILLATOR) IN PLACE: ICD-10-CM

## 2021-04-23 DIAGNOSIS — I25.5 ISCHEMIC CARDIOMYOPATHY: ICD-10-CM

## 2021-04-27 ENCOUNTER — AMBULATORY - HEALTHEAST (OUTPATIENT)
Dept: LAB | Facility: CLINIC | Age: 81
End: 2021-04-27

## 2021-04-27 ENCOUNTER — COMMUNICATION - HEALTHEAST (OUTPATIENT)
Dept: SCHEDULING | Facility: CLINIC | Age: 81
End: 2021-04-27

## 2021-04-27 ENCOUNTER — COMMUNICATION - HEALTHEAST (OUTPATIENT)
Dept: ANTICOAGULATION | Facility: CLINIC | Age: 81
End: 2021-04-27

## 2021-04-27 DIAGNOSIS — I48.91 ATRIAL FIBRILLATION, UNSPECIFIED TYPE (H): ICD-10-CM

## 2021-04-27 LAB — INR PPP: 3.5 (ref 0.9–1.1)

## 2021-05-04 ENCOUNTER — COMMUNICATION - HEALTHEAST (OUTPATIENT)
Dept: CARDIOLOGY | Facility: CLINIC | Age: 81
End: 2021-05-04
Payer: COMMERCIAL

## 2021-05-04 DIAGNOSIS — I50.22 CHRONIC SYSTOLIC CONGESTIVE HEART FAILURE (H): ICD-10-CM

## 2021-05-04 DIAGNOSIS — I25.5 ISCHEMIC CARDIOMYOPATHY: ICD-10-CM

## 2021-05-10 ENCOUNTER — RECORDS - HEALTHEAST (OUTPATIENT)
Dept: ADMINISTRATIVE | Facility: OTHER | Age: 81
End: 2021-05-10

## 2021-05-10 ENCOUNTER — SURGERY - HEALTHEAST (OUTPATIENT)
Dept: GASTROENTEROLOGY | Facility: HOSPITAL | Age: 81
End: 2021-05-10
Payer: COMMERCIAL

## 2021-05-10 ENCOUNTER — HOSPITAL ENCOUNTER (EMERGENCY)
Dept: EMERGENCY MEDICINE | Facility: HOSPITAL | Age: 81
Discharge: HOME | End: 2021-05-10
Attending: EMERGENCY MEDICINE
Payer: COMMERCIAL
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
## 2021-05-10 DIAGNOSIS — T18.128A ESOPHAGEAL OBSTRUCTION DUE TO FOOD IMPACTION: ICD-10-CM

## 2021-05-10 ASSESSMENT — MIFFLIN-ST. JEOR
SCORE: 1434.28
SCORE: 1434.28

## 2021-05-11 ENCOUNTER — AMBULATORY - HEALTHEAST (OUTPATIENT)
Dept: LAB | Facility: CLINIC | Age: 81
End: 2021-05-11

## 2021-05-11 ENCOUNTER — AMBULATORY - HEALTHEAST (OUTPATIENT)
Dept: ANTICOAGULATION | Facility: CLINIC | Age: 81
End: 2021-05-11

## 2021-05-11 ENCOUNTER — COMMUNICATION - HEALTHEAST (OUTPATIENT)
Dept: ANTICOAGULATION | Facility: CLINIC | Age: 81
End: 2021-05-11

## 2021-05-11 DIAGNOSIS — I48.91 ATRIAL FIBRILLATION, UNSPECIFIED TYPE (H): ICD-10-CM

## 2021-05-11 LAB — INR PPP: 3.2 (ref 0.9–1.1)

## 2021-05-19 ENCOUNTER — TELEPHONE (OUTPATIENT)
Dept: PHYSICAL MEDICINE AND REHAB | Facility: CLINIC | Age: 81
End: 2021-05-19

## 2021-05-19 ENCOUNTER — COMMUNICATION - HEALTHEAST (OUTPATIENT)
Dept: ANTICOAGULATION | Facility: CLINIC | Age: 81
End: 2021-05-19

## 2021-05-19 ENCOUNTER — AMBULATORY - HEALTHEAST (OUTPATIENT)
Dept: LAB | Facility: CLINIC | Age: 81
End: 2021-05-19

## 2021-05-19 DIAGNOSIS — I48.91 ATRIAL FIBRILLATION, UNSPECIFIED TYPE (H): ICD-10-CM

## 2021-05-19 LAB — INR PPP: 3.4 (ref 0.9–1.1)

## 2021-05-19 NOTE — TELEPHONE ENCOUNTER
[facility] Call Center    Phone Message    May a detailed message be left on voicemail: yes     Reason for Call: Other: Tanmay calling to request a call back to discuss if he should complete his INR prior to his appointment or if he can complete it at his PCP's office on 5/25/21. Please call Tanmay at your earliest convenience to discuss.     Action Taken: Message routed to:  [facility]    Travel Screening: Not Applicable

## 2021-05-20 NOTE — TELEPHONE ENCOUNTER
Needs to have INR <3.0 prior to the procedure. Ideally day of procedure INR but if he does at PCP needs to be within 24hrs     Notified patient of information and he said he will get his INR done the morning before his injection. His last INR was 3.4, so dosing was adjusted and patient is aware that INR must be less than 3.0 for injection.       May Aparicio RN, BSN, PHN

## 2021-05-24 ENCOUNTER — RECORDS - HEALTHEAST (OUTPATIENT)
Dept: ADMINISTRATIVE | Facility: CLINIC | Age: 81
End: 2021-05-24

## 2021-05-25 ENCOUNTER — RECORDS - HEALTHEAST (OUTPATIENT)
Dept: ADMINISTRATIVE | Facility: CLINIC | Age: 81
End: 2021-05-25

## 2021-05-25 ENCOUNTER — AMBULATORY - HEALTHEAST (OUTPATIENT)
Dept: LAB | Facility: CLINIC | Age: 81
End: 2021-05-25

## 2021-05-25 ENCOUNTER — TELEPHONE (OUTPATIENT)
Dept: PHYSICAL MEDICINE AND REHAB | Facility: CLINIC | Age: 81
End: 2021-05-25

## 2021-05-25 ENCOUNTER — COMMUNICATION - HEALTHEAST (OUTPATIENT)
Dept: ANTICOAGULATION | Facility: CLINIC | Age: 81
End: 2021-05-25

## 2021-05-25 DIAGNOSIS — I48.91 ATRIAL FIBRILLATION, UNSPECIFIED TYPE (H): ICD-10-CM

## 2021-05-25 LAB — INR PPP: 2.8 (ref 0.9–1.1)

## 2021-05-25 NOTE — TELEPHONE ENCOUNTER
[facility] Call Center    Phone Message    May a detailed message be left on voicemail: yes     Reason for Call: Other: Reporting INR for appt tomorrow   Patient called to report that his INR is 2.8  Action Taken: Message routed to:  [facility]    Travel Screening: Not Applicable

## 2021-05-26 ENCOUNTER — OFFICE VISIT (OUTPATIENT)
Dept: PHYSICAL MEDICINE AND REHAB | Facility: CLINIC | Age: 81
End: 2021-05-26
Payer: COMMERCIAL

## 2021-05-26 ENCOUNTER — RECORDS - HEALTHEAST (OUTPATIENT)
Dept: ADMINISTRATIVE | Facility: CLINIC | Age: 81
End: 2021-05-26

## 2021-05-26 VITALS — SYSTOLIC BLOOD PRESSURE: 148 MMHG | HEART RATE: 63 BPM | DIASTOLIC BLOOD PRESSURE: 75 MMHG | OXYGEN SATURATION: 99 %

## 2021-05-26 DIAGNOSIS — M79.18 PIRIFORMIS MUSCLE PAIN: ICD-10-CM

## 2021-05-26 DIAGNOSIS — G57.01 PIRIFORMIS SYNDROME, RIGHT: Primary | ICD-10-CM

## 2021-05-26 PROCEDURE — 20552 NJX 1/MLT TRIGGER POINT 1/2: CPT | Performed by: PHYSICAL MEDICINE & REHABILITATION

## 2021-05-26 PROCEDURE — 76942 ECHO GUIDE FOR BIOPSY: CPT | Performed by: PHYSICAL MEDICINE & REHABILITATION

## 2021-05-26 ASSESSMENT — PAIN SCALES - GENERAL: PAINLEVEL: EXTREME PAIN (8)

## 2021-05-26 NOTE — NURSING NOTE
Chief Complaint   Patient presents with     Procedure     Piriformis injection      Johan Tamayo, CMA

## 2021-05-26 NOTE — PROGRESS NOTES
PROCEDURE NOTE: Piriformis Muscle Injection Under Ultrasound Guidance    PROCEDURE DATE: 5/26/2021    PATIENT NAME: Tanmay Israel  YOB: 1940    ATTENDING PHYSICIAN: Leonardo Smart MD  FELLOW/RESIDENT PHYSICIAN: Dominic Burgess MD, PGY-IV    PREOPERATIVE DIAGNOSIS:   #. Piriformis syndrome, right  (primary encounter diagnosis)  #. Piriformis muscle pain    POSTOPERATIVE DIAGNOSIS: same    PROCEDURE PERFORMED: Right Piriformis Muscle Injection Under Ultrasound Guidance    ULTRASOUND WAS USED.     INDICATIONS FOR THE PROCEDURE:  Tanmay Israel is a 81 year old male who presents with piriformis syndrome.     PROCEDURE AND FINDINGS:  He was greeted in the clinic. The risk, benefits and alternatives to the procedure were again reviewed with him and informed consent was obtained and the patient agreed to proceed. A time-out was performed. Following review alternatives, benefits and risks, the procedure was carried out under sterile prep with sterile gel. The use of direct sonographic guidance was used to ensure accurate placement of the needle (rather than non-guided injection) and required to minimize the risk of bleeding or injury to nearby neurovascular structures.     A 5-1MHz ultrasound transducer was used to visualize the relevant structures and determine the optimal needle path for the procedure.  2mL 1% lidocaine was infiltrated at the needle insertion site subcutaneously. Then, a 22 gauge 3.5 inch Quincke spinal needle was advanced from lateral to medial utilizing an in-plane approach, under continuous ultrasound guidance to the piriformis muscle. After negative aspiration, slow injection of the treatment solution 3.5mL total consisting of 1mL Kenalog (40mg/mL) and 2.5mL of 1% Lidocaine was instilled into affected area. The tip of the needle was visualized throughout the procedure. The remainder of the single-use vials were discarded.      he tolerated the procedure well, was discharged home in stable  condition.     Follow-up will be in clinic ~4 weeks    COMPLICATIONS: None    COMMENTS: None    Procedure was performed by Dr. Burgess, resident physician, under my direct supervision.

## 2021-05-26 NOTE — LETTER
5/26/2021       RE: Tanmay Israel  [address]  [place] [address]     Dear Colleague,    Thank you for referring your patient, Tanmay Israel, to the [facility] [place] [facility]. Please see a copy of my visit note below.    PROCEDURE NOTE: Piriformis Muscle Injection Under Ultrasound Guidance    PROCEDURE DATE: 5/26/2021    PATIENT NAME: Tanmay Israel  YOB: 1940    ATTENDING PHYSICIAN: Leonardo Smart MD  FELLOW/RESIDENT PHYSICIAN: Dominic Burgess MD, PGY-IV    PREOPERATIVE DIAGNOSIS:   #. Piriformis syndrome, right  (primary encounter diagnosis)  #. Piriformis muscle pain    POSTOPERATIVE DIAGNOSIS: same    PROCEDURE PERFORMED: Right Piriformis Muscle Injection Under Ultrasound Guidance    ULTRASOUND WAS USED.     INDICATIONS FOR THE PROCEDURE:  Tanmay Israel is a 81 year old male who presents with piriformis syndrome.     PROCEDURE AND FINDINGS:  He was greeted in the clinic. The risk, benefits and alternatives to the procedure were again reviewed with him and informed consent was obtained and the patient agreed to proceed. A time-out was performed. Following review alternatives, benefits and risks, the procedure was carried out under sterile prep with sterile gel. The use of direct sonographic guidance was used to ensure accurate placement of the needle (rather than non-guided injection) and required to minimize the risk of bleeding or injury to nearby neurovascular structures.     A 5-1MHz ultrasound transducer was used to visualize the relevant structures and determine the optimal needle path for the procedure.  2mL 1% lidocaine was infiltrated at the needle insertion site subcutaneously. Then, a 22 gauge 3.5 inch Quincke spinal needle was advanced from lateral to medial utilizing an in-plane approach, under continuous ultrasound guidance to the piriformis muscle. After negative  aspiration, slow injection of the treatment solution 3.5mL total consisting of 1mL Kenalog (40mg/mL) and 2.5mL of 1% Lidocaine was instilled into affected area. The tip of the needle was visualized throughout the procedure. The remainder of the single-use vials were discarded.      he tolerated the procedure well, was discharged home in stable condition.     Follow-up will be in clinic ~4 weeks    COMPLICATIONS: None    COMMENTS: None    Procedure was performed by Dr. Burgess, resident physician, under my direct supervision.       Again, thank you for allowing me to participate in the care of your patient.      Sincerely,    Leonardo Smart MD

## 2021-05-27 VITALS
HEIGHT: 69 IN | BODY MASS INDEX: 24.29 KG/M2 | HEIGHT: 69 IN | WEIGHT: 164 LBS | BODY MASS INDEX: 24.22 KG/M2 | BODY MASS INDEX: 24.22 KG/M2 | WEIGHT: 164 LBS

## 2021-05-27 NOTE — TELEPHONE ENCOUNTER
Patients niece called to inform us that her uncle was admitted to the hospital and has an appt scheduled 4/3/19 w/ Arsenio. The niece said that the doctor from the hospital was going to contact Neurosurgery to see if the patient could be seen in the hospital. Patients niece was going to call the hospital back to get some clarification before deciding to either cancel or reschedule the appt w/ Arsenio.

## 2021-05-27 NOTE — TELEPHONE ENCOUNTER
Reason contacted:  Orders request - home care  Information relayed:  Spoke with patient and Yisel.    Requesting home care services for HHA to help with showering and house keeping, PT to strengthen arms and legs patient has been experiencing weakness due to C. Diff and sits in bed most of the day.    Ardisabrina is going to look into Meals on Wheels as patient hasn't been able to cook for himself right now and she is concerned he is not eating enough.    Other than weakness patient is not experiencing any pain or discomfort (other than the leg and arm weakness), denies shortness of breath, denies chest pain.     Patient did not sound as if he was in any pain or discomfort on the phone    Please advise   Additional questions:  No  Further follow-up needed:  Yes  Okay to leave a detailed message:  Yes

## 2021-05-27 NOTE — TELEPHONE ENCOUNTER
ACN called and spoke with pt. Pt couldn't get INR check yesterday so he scheduled it for tomorrow 4/19. No further questions/ concerns today.

## 2021-05-27 NOTE — PROGRESS NOTES
TCM DISCHARGE FOLLOW UP CALL    Discharge Date:  4/8/2019  Reason for hospital stay (discharge diagnosis)::  Hypomagnesemia  Are you feeling better, the same or worse since your discharge?:  Patient is feeling the same (feels weak. Stools soft, formed, 2 daily. Denies abd pain.)  Do you feel like you have a plan in the event of a health emergency?: Yes (he talks to niece or nephew, neighbors)    As part of your discharge plan, were  home care services ordered for you?: Yes    Have you seen them yet, or are they scheduled to visit?: Yes    Do you have any follow up visits scheduled with your PCP or Specialist?:  Yes, with PCP (4/8)  (RN) Is PCP appt scheduled soon enough (within 14 days of discharge date)?: Yes

## 2021-05-27 NOTE — TELEPHONE ENCOUNTER
ANTICOAGULATION  MANAGEMENT    Assessment     Today's INR result of 1.5 is Subtherapeutic (goal INR of 2.0-3.0)        Warfarin taken as previously instructed    Increased greens/vitamin K intake may be affecting INR- started drinking boost daily several days ago. Will discontinue boosts.     No new medication/supplements affecting INR    Continues to tolerate warfarin with no reported s/s of bleeding or thromboembolism     Previous INR was Therapeutic    Plan:     Spoke with Tanmay regarding INR result and instructed:     Warfarin Dosing Instructions:  Take booster dose of 3.75 mg today only then continue current warfarin dose 2.5 mg daily.    Instructed patient to follow up no later than: 10 days. Appt is made for 4/17.     Education provided: importance of taking warfarin as instructed    Tanmay verbalizes understanding and agrees to warfarin dosing plan.    Instructed to call the [facility] for any changes, questions or concerns. (#[phone])   ?   Edgar Ortez RN    Subjective/Objective:      Tanmay Israel, a 78 y.o. male is on warfarin.     Tanmay reports:     Home warfarin dose: verbally confirmed home dose with pt and updated on anticoagulation calendar     Missed doses: No     Medication changes:  No     S/S of bleeding or thromboembolism:  No     New Injury or illness:  No     Changes in diet or alcohol consumption:  Yes: started drinking boosts.      Upcoming surgery, procedure or cardioversion:  No    Anticoagulation Episode Summary     Current INR goal:   2.0-3.0   TTR:   58.2 % (4.1 y)   Next INR check:   4/18/2019   INR from last check:   1.50! (4/8/2019)   Weekly max warfarin dose:      Target end date:      INR check location:      Preferred lab:      Send INR reminders to:   ANTICOAGULATION POOL B (MPW,ANA CRISTINAG,STW,RVL,OAK,RLN)       Comments:            Anticoagulation Care Providers     Provider Role Specialty Phone number    Go Ramírez MD Referring Family Medicine [phone]

## 2021-05-27 NOTE — TELEPHONE ENCOUNTER
ANTICOAGULATION  MANAGEMENT    Assessment     Today's INR result of 2.3 is Therapeutic (goal INR of 2.0-3.0)        Warfarin taken as previously instructed    No new diet changes affecting INR    No new medication/supplements affecting INR     Has been having problems with loose stools for about two weeks now.     Continues to tolerate warfarin with no reported s/s of bleeding or thromboembolism     Previous INR was Therapeutic     Colonoscopy 4/11/19- hold warfarin 4 days without bridge (see encounter 1/4/19).     Plan:     Spoke with Tanmay regarding INR result and instructed:     Warfarin Dosing Instructions:  Continue current warfarin dose 2.5 mg daily.    Start holding 4/7-4/10. Resume the evening of 4/11 of ok with procedure doctor.     Instructed patient to follow up no later than: 1 week after resuming warfarin.     Education provided: importance of taking warfarin as instructed and no interaction anticipated between warfarin and Reglan    Tanmay verbalizes understanding and agrees to warfarin dosing plan.    Instructed to call the [facility] for any changes, questions or concerns. (#[phone])   ?   Edgar Ortez RN    Subjective/Objective:      Tanmay GIO Haydertim, a 78 y.o. male is on warfarin.     Tanmay reports:     Home warfarin dose: verbally confirmed home dose with pt and updated on anticoagulation calendar     Missed doses: No     Medication changes:  Yes: starting Reglan     S/S of bleeding or thromboembolism:  No     New Injury or illness:  No     Changes in diet or alcohol consumption:  No     Upcoming surgery, procedure or cardioversion:  Yes: colonoscopy 4/11.     Anticoagulation Episode Summary     Current INR goal:   2.0-3.0   TTR:   58.1 % (4.1 y)   Next INR check:   4/18/2019   INR from last check:   2.30 (3/25/2019)   Weekly max warfarin dose:      Target end date:      INR check location:      Preferred lab:      Send INR reminders to:   ANTICOAGULATION POOL B (MPW,MARTA,STW,RVL,OAK,RLN)        Comments:            Anticoagulation Care Providers     Provider Role Specialty Phone number    Go Ramírez MD Referring Family Medicine [phone]

## 2021-05-27 NOTE — TELEPHONE ENCOUNTER
[facility] PA team  [phone]  Pool: HE PA MED ([id number])          PA has been initiated.       PA form completed and faxed insurance via Cover My Meds     Key:  [id number]     Medication:  Ondansetron HCl 8MG OR TABS    Insurance:  [employer] [place] Medicare         Response will be received via fax and may take up to 5-10 business days depending on plan

## 2021-05-27 NOTE — TELEPHONE ENCOUNTER
Orders being requested: skilled nursing visits 1 x a week for 2 weeks starting 4/11, PT and OT evals  Reason service is needed/diagnosis: recent hospital discharge s/p c - diff  When are orders needed by: asap  Where to send Orders: Phone:  [phone]  Okay to leave detailed message?  Yes

## 2021-05-27 NOTE — TELEPHONE ENCOUNTER
ANTICOAGULATION  MANAGEMENT    Assessment     Today's INR result of 2.09 is Therapeutic (goal INR of 2.0-3.0)        Warfarin taken as previously instructed     Hospitalized 4/1-4/3 for hypomagnesemia    No new diet changes affecting INR    No new medication/supplements affecting INR    Continues to tolerate warfarin with no reported s/s of bleeding or thromboembolism     Previous INR was Therapeutic     Colonoscopy 5/2- to hold warfarin 4 days before without bridge (see encounter 1/4/19).    Plan:     Spoke with Tanmay regarding INR result and instructed:     Warfarin Dosing Instructions:  Continue current warfarin dose 2.5 mg daily.  Instructed patient to follow up no later than: 2 weeks.     Education provided: importance of taking warfarin as instructed    Tanmay verbalizes understanding and agrees to warfarin dosing plan.    Instructed to call the [facility] for any changes, questions or concerns. (#[phone])   ?   Edgar Ortez RN    Subjective/Objective:      Tanmay Israel, a 78 y.o. male is on warfarin.     Tanmay reports:     Home warfarin dose: verbally confirmed home dose with pt and updated on anticoagulation calendar     Missed doses: No     Medication changes:  No     S/S of bleeding or thromboembolism:  No     New Injury or illness:  Yes: hypomagnesemia     Changes in diet or alcohol consumption:  No     Upcoming surgery, procedure or cardioversion:  Yes: colonoscopy 5/2.     Anticoagulation Episode Summary     Current INR goal:   2.0-3.0   TTR:   58.3 % (4.1 y)   Next INR check:   4/17/2019   INR from last check:   2.09 (4/3/2019)   Weekly max warfarin dose:      Target end date:      INR check location:      Preferred lab:      Send INR reminders to:   ANTICOAGULATION POOL B (MPW,MARTA,STW,RVL,OAK,RLN)       Comments:            Anticoagulation Care Providers     Provider Role Specialty Phone number    Go Ramírez MD Referring Family Medicine [phone]

## 2021-05-27 NOTE — TELEPHONE ENCOUNTER
Orders being requested:    1 More nursing visit  Reason service is needed/diagnosis:  Medication education and teaching  When are orders needed by:  asap  Where to send Orders: Phone:  [phone]  Okay to leave detailed message?  Yes    Medication question:  Should the patient be taking the the Reglan 3 times per day? Or as a PRN.    Patient accidentally took 2 Baclofen last night.  Patient is fine alert.  And knows now to only take one.  Still working on the educations

## 2021-05-27 NOTE — TELEPHONE ENCOUNTER
Fax received from [facility], they have started the Prior Authorization Process via Cover My Meds    CoverMyMeds Key: [id number]    Medication Name: Ondansetron 8mg tablet    Insurance Plan: [facility] Part D   PBM:    Patient ID Number: [id number]    Please complete PA process

## 2021-05-27 NOTE — PROGRESS NOTES
Patient ID: Tanmay Israel is a 78 y.o. male.  /72   Pulse 65   Temp 98.3  F (36.8  C)   Wt 174 lb 12.8 oz (79.3 kg)   SpO2 99%   BMI 25.81 kg/m      Assessment/Plan:                Diagnoses and all orders for this visit:    Diarrhea, unspecified type  -     Comprehensive Metabolic Panel  -     HM1(CBC and Differential)  -     Lipase  -     C. difficile Toxigenic by PCR  -     Giardia Detection, Stool  -     Ova and Parasite, Stool  -     Culture, Stool  -     HM1 (CBC with Diff)  -     C. difficile Toxigenic by PCR  -     Giardia Detection, Stool  -     Ova and Parasite, Stool    Chronic atrial fibrillation (H)  -     INR    Abdominal pain, generalized  -     Comprehensive Metabolic Panel  -     HM1(CBC and Differential)  -     Lipase  -     HM1 (CBC with Diff)    Anemia, unspecified type    Nausea  -     Discontinue: metoclopramide (REGLAN) 10 MG tablet  Dispense: 90 tablet; Refill: 0    Weight loss  -     Thyroid Eugene    Chronic systolic heart failure (H)    S/P laparoscopic cholecystectomy    S/P cervical spinal fusion      DISCUSSION  From a standpoint of his recovery from surgery he seems to be doing well with the exception of the diarrhea.  I am concerned that this could be colitis or other infectious cause based on his description in the scenario.  Obtain stool test immediately and treat based on test results.  Use Reglan for nausea as this is been successful in the past.  Obtain additional labs as noted above to make sure that there are not any evolving concerns which may be causing symptoms.  Continue other medications as prior.  Monitor closely.  Discussed with patient the importance of immediate follow-up should he have any worsening of his symptoms from this point.  Subjective:     HPI    Tanmay Israel is a 78 y.o. male he is here today to follow-up of a hospitalization.  He was hospitalized for gallbladder removal from February 27 - March 4.  Prior to that on February 22 he had a cervical  laminoplasty for long-standing cervical myelopathy symptoms.    He has a complex medical history that includes coronary artery disease, chronic systolic congestive heart failure, chronic kidney disease stage III, history of colitis thought to be Crohn's colitis.  Prior to his original cervical laminoplasty I had seen him for a preoperative evaluation and he underwent a cardiac evaluation.  At that time was doing quite well.  He had done fairly well with his cervical laminoplasty but then developed abdominal pain that was determined to be cholecystitis and then has since had a cholecystectomy.    Today reviewed his hospital course from the cervical laminoplasty standpoint as well as the cholecystectomy standpoint.  We reviewed his records from transitional care unit stay.  He is now back at home reporting he is doing relatively well but does report to me he is having increased abdominal pain and profuse diarrhea.  He reports he feels uncomfortable.  He denies any dizziness lightheadedness or syncope.  He is not feeling short of breath nor is he having chest pain.  He is not having any dysuria or frequency.  He reports no blood with the diarrhea.  Denies any vomiting.  Appetite is poor.    Review of Systems  Complete review of systems is obtained.  Other than the specific considerations noted above complete review of systems is negative.          Objective:   Medications:  Current Outpatient Medications   Medication Sig     baclofen (LIORESAL) 10 MG tablet Take 1 tablet (10 mg total) by mouth every 8 (eight) hours as needed.     bumetanide (BUMEX) 1 MG tablet Take 1 tablet (1 mg total) by mouth daily.     carvedilol (COREG) 25 MG tablet Take 1.5 tablets (37.5 mg total) by mouth 2 (two) times a day.     cholecalciferol, vitamin D3, 1,000 unit tablet Take 2,000 Units by mouth daily.     FERROUS FUMARATE (IRON ORAL) Take 1 tablet by mouth daily.      lidocaine 4 % patch Place 1 patch on the skin daily. Remove and discard  patch with 12 hours or as directed by MD.     losartan (COZAAR) 50 MG tablet Take 1 tablet (50 mg total) by mouth daily.     multivitamin with iron (ONE DAILY WITH IRON) Tab tablet Take 1 tablet by mouth daily.      omeprazole (PRILOSEC) 20 MG capsule Take 20 mg by mouth 2 (two) times a day.     oxyCODONE (ROXICODONE) 5 MG immediate release tablet 1 tab PO for pain rated 6-8 and 2 tabs for pain rated 9-10 every 4 hours as needed.     rosuvastatin (CRESTOR) 20 MG tablet Take 1 tablet (20 mg total) by mouth at bedtime.     senna-docusate (PERICOLACE) 8.6-50 mg tablet Take 1 tablet by mouth 2 (two) times a day.     warfarin (COUMADIN/JANTOVEN) 2.5 MG tablet Take 2.5 mg by mouth See Admin Instructions. Continue with 2.5mg daily until next INR at clinic on 3/25/19. larry Davenport RN for Dr. Ramírez     metoclopramide (REGLAN) 10 MG tablet Take 1 tablet (10 mg total) by mouth 3 (three) times a day with meals.     ondansetron (ZOFRAN) 8 MG tablet Take 1 tablet (8 mg total) by mouth every 8 (eight) hours as needed for nausea.     vancomycin (VANCOCIN) 125 MG capsule Take 1 capsule (125 mg total) by mouth 4 (four) times a day for 10 days.       Allergies:  No Known Allergies    Tobacco:   reports that he has quit smoking. he has never used smokeless tobacco.     Physical Exam          /72   Pulse 65   Temp 98.3  F (36.8  C)   Wt 174 lb 12.8 oz (79.3 kg)   SpO2 99%   BMI 25.81 kg/m          General Appearance:    Alert, cooperative, no distress   Eyes:   No scleral icterus or conjunctival irritation       Ears:    Normal TM's and external ear canals, both ears   Throat:   Lips, mucosa, and tongue normal; teeth and gums normal   Neck:   Supple, symmetrical, trachea midline, no adenopathy;        thyroid:  No enlargement/tenderness/nodules   Lungs:     Clear to auscultation bilaterally, respirations unlabored, no wheezes or crackles   Heart:    Regular rate and rhythm,  No murmur   Abdomen:    Soft, no distention, diffuse  mild tenderness no organomegaly or masses     Extremities:  No edema, no joint swelling or redness, no evidence of any injuries   Skin:  No concerning skin findings, no suspicious moles, no rashes   Neurologic:  On gross examination there is no motor or sensory deficit.  Patient walks with a normal gait

## 2021-05-27 NOTE — PROGRESS NOTES
The Clinic Care Guide called the patient  today at the request of the PCP to discuss possible clinic care coordination enrollment. Clinic care coordination was described to the patient and immediate needs were discussed. The patient declined enrollment in clinic care coordination at this time. The patient was provided with contact information for the clinic care guide if their needs changed.    Priority: Routine Class: Internal Referral    Standing Status: Normal Status: Sent [2]    Ordering User: Myhre, David J, RN Department: [facility]    Auth Provider: NAI IVAN Provider: Myhre, David J, RN    Diagnosis: History of Crohn's disease  Right arm weakness  Essential hypertension, benign      Indications of Use:         Expected Date:        Order Questions:          Order Comments: Patient was recently hospitalized at [facility] for evaluation of fatigue/weakness probably related to recent diarrhea secondary to C difficile plus using diuretics. He was started on vancomycin a few days prior to hospitalization. Patient had cholecystectomy 6 weeks ago and recent cervical surgery chronic pain in February 2019. He was discharged to his home. Patient has a history of Crohn's disease, CKD, heart failure, cardiomyopathy, pacemaker, hyperlipidemia, hypertension, A fib, heart failure with reduced ejection fraction.      Sched Instructions:   Patient Instructions:       Visit Types: CCC RN ASSESSMENT      Sched Instruct (Non-Radiology):         Referral Priority: Routine [1]      Additional Comments:        Order Summary Internal Referral, Routine, Primary Care, Continuity of Care

## 2021-05-27 NOTE — PROGRESS NOTES
Hospital discharge follow up call to pt, no answer, no VM.  RN will attempt call back at another time.

## 2021-05-27 NOTE — TELEPHONE ENCOUNTER
Bilateral infiltrates no a clear picture of pneumonia or COPD will CTA chest PATIENT NAME:  Tanmay Israel  YOB: 1940  MRN: [id number]  SURGEON: DR. CESAR  DATE of CONSULT:  4-2-19  DIAGNOSIS:  NEW WEAKNESS, S/P cervical laminoplasty 4,5,6 with Dr Cesar on 2/22/2019 for cervical myelopathy from severe cervical stenosis at C5-6    Patient currently being treated for CDiff and hypomagnesemia (Mag 1.1 4/1/2019) which may contribute to generalized weakness but not focal weakness as noticed on exam in his RUE            FOLLOW-UP:  Post Op Visit: 4-17-19 (ALREADY SCHEDULED)  Post-op Provider: DR. CESAR  DIAGNOSTICS:  HAD CT CERVICAL SPINE 4-2-19  DISPOSITION:  HOME 4-3-19    ADDITIONAL INSTRUCTIONS FOR MEDICAL STAFF:    Patient discharged prior to being able to visit. Dr Cesar did see patient early this morning and established a plan to see her in clinic in two weeks. Images were reviewed yesterday with Dr Cesar and plans at this time are no intervention for the screw that is lose. Foramen is open and patent and imaging does not support arm weakness. However, Dr Cesar did not appreciate weakness on exam. OK to wean from cervical collar beginning today. Our office will call and discuss with patient.    WEAN COLLAR: (INSTRUCTIONS PROVIDED TO PT)  To wean from your cervical collar, begin by removing the collar for 1-2 hours on the first day.   You may sleep out of the collar.  Increase the time you are not wearing the collar slowly by 1-2 hours per day.  Listen to your body as you wean from the collar.  If you experience increased pain or back/neck spasms, back down on how long you are out of the collar (example: go back to how may hours you were out of the collar yesterday or do not increase your time out of the collar tomorrow).  Resume increasing your time out of the collar when ready.  When you tolerate being out of collar for eight hours, you may discontinue the collar altogether.  Everyone is different, however, you may anticipate weaning from the collar over seven to  ten days.

## 2021-05-27 NOTE — TELEPHONE ENCOUNTER
Who is calling:  lindy Mroillo  Reason for Call:  Caller stated that patient would like to have help getting out of bed, showering, house keeping, physical therapy (patient is very weak). Caller was with patient during the call.  Date of last appointment with primary care: 3/29  Okay to leave a detailed message: Yes, [phone]

## 2021-05-27 NOTE — TELEPHONE ENCOUNTER
RN Triage:     Patient calling as a follow up to his diarrhea. Per patient the diarrhea is better but he is very weak.   The patient can't get in and out of bed and this is new. Patient stated he cannot walk and is having a hard time standing. Patient was advised if he is too weak to get out of bed to call 911 for help.     Kia Koehler RN, BSN Care Connection Triage Nurse      Reason for Disposition    SEVERE weakness (i.e., unable to walk or barely able to walk, requires support) and new onset or worsening    Protocols used: WEAKNESS (GENERALIZED) AND FATIGUE-A-OH

## 2021-05-27 NOTE — PROGRESS NOTES
Patient ID: Tanmay Israel is a 78 y.o. male.  /62   Pulse 69   Wt 174 lb (78.9 kg)   SpO2 98%   BMI 25.70 kg/m      Assessment/Plan:                   Diagnoses and all orders for this visit:    C. difficile colitis    Chronic atrial fibrillation (H)  -     HM2(CBC w/o Differential)  -     INR    Stage 3 chronic kidney disease (H)  -     HM2(CBC w/o Differential)  -     Comprehensive Metabolic Panel    S/P laparoscopic cholecystectomy  -     Comprehensive Metabolic Panel    S/P cervical spinal fusion    Chronic systolic congestive heart failure (H)    Hypomagnesemia  -     Magnesium    Muscle weakness (generalized)  -     HM2(CBC w/o Differential)    Other orders  -     Cancel: Basic Metabolic Panel          DISCUSSION  Largely labs had improved prior to discharge but we discussed rechecking labs today to be sure.  Reviewed medications.  I do not recommend any changes to medications.  His nutrition overall seems to be improving.  His diarrhea is resolving.  There is no focal finding on examination today.  Recommend proceeding with the current plan including home care with therapy and awaiting lab test results.  Will plan to arrange for short-term follow-up pending test results.  Subjective:     HPI    Tanmay Israel is a 78 y.o. male with a complex medical history including recent cholecystectomy and recent cervical spine surgery.  Following hospitalizations for these 2 surgeries he developed diarrhea was tested and found to have C. difficile colitis.  He was initiated on treatment.  I saw him in outpatient follow-up and he was doing better.  Unfortunately he began to feel profoundly weak after that visit on March 29 and he presented to the emergency department on April 1 and was admitted and discharged on the third.  Laboratory test performed and showed he had profound hypomagnesemia and other electrode disturbances.  These were corrected.  He had an evaluation of his cervical spine given his recent surgery  and there was notation that there is a screw that is not properly fixated.  He was seen by neurosurgery.  Patient informs me he was told that he may need a repeat surgery to correct this situation he is not interested in doing this and he soon.  He is currently in the process of weaning off of his cervical spine collar.  Patient reports he continues to feel weak but his appetite is improved.  It is noted his weight is increased slightly.  He does not have any lower extremity edema.  He denies shortness of breath, denies dizziness and lightheadedness.  His diarrhea associated with the C. difficile colitis has improved and he now reports an infrequent soft stool.  He denies any vomiting.  Other comp getting factors are atrial fibrillation with antic regulation, chronic congestive heart failure kidney disease stage III.  He has started home care.  He is essentially homebound because he needs the assistance of another person currently because of his profound weakness.  He was transferred to the clinic today by a friend.  There is a plan to start therapy through home care which I think is essential.    Review of Systems  Complete review of systems is obtained.  Other than the specific considerations noted above complete review of systems is negative.          Objective:   Medications:  Current Outpatient Medications   Medication Sig     baclofen (LIORESAL) 10 MG tablet Take 1 tablet (10 mg total) by mouth every 8 (eight) hours as needed.     bumetanide (BUMEX) 1 MG tablet Take 1 tablet (1 mg total) by mouth daily.     carvedilol (COREG) 25 MG tablet Take 37.5 mg by mouth 2 (two) times a day with meals.     cholecalciferol, vitamin D3, 1,000 unit tablet Take 2,000 Units by mouth daily.     FERROUS FUMARATE (IRON ORAL) Take 1 tablet by mouth daily.      losartan (COZAAR) 100 MG tablet Take 100 mg by mouth daily.     metoclopramide (REGLAN) 10 MG tablet Take 1 tablet (10 mg total) by mouth 3 (three) times a day with meals.      multivitamin with iron (ONE DAILY WITH IRON) Tab tablet Take 1 tablet by mouth daily.      omeprazole (PRILOSEC) 20 MG capsule Take 20 mg by mouth daily before breakfast.            ondansetron (ZOFRAN) 8 MG tablet Take 1 tablet (8 mg total) by mouth every 8 (eight) hours as needed for nausea.     oxyCODONE (ROXICODONE) 5 MG immediate release tablet Take 5-10 mg by mouth every 4 (four) hours as needed for pain. 1 tab PO for pain rated 6-8 and 2 tabs for pain rated 9-10 every 4 hours as needed.     rosuvastatin (CRESTOR) 20 MG tablet Take 1 tablet (20 mg total) by mouth at bedtime.     senna-docusate (SENNOSIDES-DOCUSATE SODIUM) 8.6-50 mg tablet Take 1 tablet by mouth 2 (two) times a day as needed for constipation.     warfarin (COUMADIN/JANTOVEN) 2.5 MG tablet Take 2.5 mg by mouth daily.              Allergies:  No Known Allergies    Tobacco:   reports that he has quit smoking. He has never used smokeless tobacco.     Physical Exam      /62   Pulse 69   Wt 174 lb (78.9 kg)   SpO2 98%   BMI 25.70 kg/m          General Appearance:    Alert, cooperative, no distress   Eyes:   No scleral icterus or conjunctival irritation       Lungs:     Clear to auscultation bilaterally, respirations unlabored, no wheezes or crackles   Heart:    Regular rate and rhythm,  No murmur   Abdomen:    Soft, no distention, no organomegaly or masses he does report mild tenderness to palpation especially in the right upper quadrant where he recently had surgery no rebound tenderness no guarding     Extremities:  No edema, no joint swelling or redness, no evidence of any injuries   Skin:  No concerning skin findings, no suspicious moles, no rashes   Neurologic:  On gross examination there is no motor or sensory deficit.  Patient walks with a normal gait

## 2021-05-27 NOTE — TELEPHONE ENCOUNTER
FYI - Status Update  Who is Calling: Patient  Update: Patient called to let you know that [facility] took his INR today and the INR result was 2.09.  Patient left the hospital today.  Patient also wanted you to know that he canceled his colonoscopy.  Okay to leave a detailed message?:  No return call needed per patient

## 2021-05-27 NOTE — TELEPHONE ENCOUNTER
Left message to call back for: medication problem  Information to relay to patient:  Below message

## 2021-05-27 NOTE — PROGRESS NOTES
"NEUROSURGERY FOLLOWUP  NOTE    Tanmay Israel comes today in f/u after C4-6 laminoplasty on 2/22/019. Recent hospitalization for C diff and s/p cholecystectomy.    Overall doing better. Neck pain controlled. Arms improved. No new weakness. He is ambulating with a walker due to his SI joint pain. Does not feel like he is weak in the legs but he has difficulty walking due to his pain.      The pts PMH, PSH, ROS, Meds, Allergies, SH, FH are all unchanged and summarized in the pts health history from last visit        PHYSICAL EXAM:   Constitutional: /56   Pulse 68   Ht 5' 9\" (1.753 m)   Wt 166 lb (75.3 kg)   SpO2 97%   BMI 24.51 kg/m       Mental Status: A & O in no acute distress.  Affect is appropriate.  Speech is fluent.  Recent and remote memory are intact.  Attention span and concentration are normal.     Cranial Nerves: CN1: grossly intact per patient recall. CN2: No funduscopic exam performed. CN3,4 & 6: Pupillary light response, lateral and vertical gaze normal.  No nystagmus.  Visual fields are full to confrontation. CN5: Intact to touch CN7: No facial weakness, smile, facial symmetry intact. CN8: Intact to spoken voice. CN9&10: Gag reflex, uvula midline, palate rises with phonation. CN11: Shoulder shrug 5/5 intact bilaterally. CN12: Tongue midline and moves freely from side to side.     Motor: No pronator drift of upper extremity. Normal bulk and tone all muscle groups of upper and lower extremities.     Sensory: Sensation intact bilaterally to light touch.      Incision CDI    IMAGING:   I personally reviewed all radiographic images      CONSULTATION ASSESSMENT AND PLAN:    77 yo male s/p C4-6 laminoplasty on 2/22/019. Recent hospitalization for C dif and s/p cholecystectomy. Overall improving. He feels his right buttock pain is coming from his SI joint. Cannot mobilize well due to the pain. Ordered a new right SI joint injection. Baclofen and oxy refilled. F/u in 4 weeks.     I spent more than 25 " minutes in this apt, examining the pt, reviewing the scans, reviewing notes from chart, discussing treatment options with risks and benefits and coordinating care. >50 % clinic time was spent in face to face counseling and coordinating care    Liza Cesar      CC:     Go Ramírez MD  [address] [address]

## 2021-05-27 NOTE — TELEPHONE ENCOUNTER
Reason contacted:  Orders request  Information relayed:  Below message, patient was very appreciative.   Additional questions:  No  Further follow-up needed:  No  Okay to leave a detailed message:  No

## 2021-05-27 NOTE — TELEPHONE ENCOUNTER
Orders being requested: Home Care  Reason service is needed/diagnosis: Post hospital care, and general weakness  When are orders needed by: as soon as possible  Where to send Orders: [facility]  Okay to leave detailed message?  Yes    Patient's neighbor called with patient in the room.  Neighbor is taking care of patient who's just been discharged from [facility] and is having a lot of difficulty with weakness.  Neighbor states that she'd like home care services for patient whom she states she is taking care of right now.  From what writer could hear in background patient did seem to be in discomfort.  Writer offered triage but neighbor declined.

## 2021-05-27 NOTE — TELEPHONE ENCOUNTER
Please find out more information.  It is concerning that there is a description in the message that it sounds like patient is in discomfort.  Please see if patient requires a more emergent evaluation.  Please determine if patient wants to receive home care services.  Please determine what type of services they are hoping to receive.

## 2021-05-27 NOTE — PROGRESS NOTES
"Tanmay Israel presents today for a follow up regarding his neck. He is s/p LEFT CERVICAL 4, 5, 6 LAMINOPLASTY on 2/22/2019 with Dr. Cesar. He denies any pain at this time. He states, \"Everything is fine with my neck.\" He does c/o SI joint pain. He states, \"the right side is just killing me.\" He would like to see about getting a cortisone injection.   NDI Score: 52%  Ashish Tony LPN      "

## 2021-05-27 NOTE — TELEPHONE ENCOUNTER
[facility] called and spoke with [facility]. Colonoscopy has been rescheduled from 4/11 to 5/2. ACN informed that PCP approved orders to hold warfarin 4 days without bridge (see encounter 1/4/19).

## 2021-05-27 NOTE — TELEPHONE ENCOUNTER
Left message to call back for: orders request   Information to relay to patient:  Below message via VM

## 2021-05-27 NOTE — TELEPHONE ENCOUNTER
Orders being requested:  Physical Therapy     2 times per week for 3 weeks    Reason service is needed/diagnosis: Strength, Balance, Endurance    When are orders needed by: asap  Where to send Orders: Phone:  [phone]  Okay to leave detailed message?  Yes

## 2021-05-27 NOTE — TELEPHONE ENCOUNTER
Reason contacted:  Orders request  Information relayed:  Notified ok for requested order per Dr. Ramírez. Please advise if this is not ok.   Additional questions:  No  Further follow-up needed:  No  Okay to leave a detailed message:  No

## 2021-05-27 NOTE — TELEPHONE ENCOUNTER
Patient Returning Call  Reason for call:  Returning call  Information relayed to patient:  Below message relayed to patient. Patient states that yes he would like a reglan prescription.  Patient has additional questions:  No  If YES, what are your questions/concerns:  No additional calls at this time.  Okay to leave a detailed message?: No call back needed

## 2021-05-27 NOTE — TELEPHONE ENCOUNTER
Thank you for obtaining additional information.  I had misread the initial note, thinking there was discomfort described when it was not.  I have placed an order for home care, I have asked that they try to see him within 24 hours.

## 2021-05-27 NOTE — TELEPHONE ENCOUNTER
Orders being requested: bridging and hold orders. 4 day hold  Reason service is needed/diagnosis: colonoscopy   When are orders needed by: as available  Where to send Orders: verbal  Okay to leave detailed message?  Yes

## 2021-05-27 NOTE — PROGRESS NOTES
Patient ID: Tanmay Israel is a 78 y.o. male.  /64   Pulse 68   Wt 173 lb 4.8 oz (78.6 kg)   SpO2 98%   BMI 25.59 kg/m      Assessment/Plan:                Diagnoses and all orders for this visit:    C. difficile colitis  -     Basic Metabolic Panel  -     HM2(CBC w/o Differential)    Chronic atrial fibrillation (H)    Chronic systolic congestive heart failure (H)  -     Basic Metabolic Panel  -     HM2(CBC w/o Differential)    Stage 3 chronic kidney disease (H)  -     Basic Metabolic Panel  -     HM2(CBC w/o Differential)      DISCUSSION  Check labs as above.  Continue vancomycin 125 mg 4 times daily for the 10-day course.  Subjective:     HPI    Tanmay Israel is a 78 y.o. male is here today at my request for follow-up.  He was seen earlier this week for hospital follow-up.  He had been hospitalized for gallbladder removal from February 27 - March 4 after having undergone a cervical laminoplasty on February 22.    When he saw me for outpatient follow-up on March 25 he was complaining of some increasing abdominal pain, nausea and profuse diarrhea.  He does have a history of colitis which in the past had thought to be Crohn's or ulcerative colitis although that had been called into question.  I was concerned about his recent hospitalizations and treatment testing was ordered for C. difficile and was found to be positive.  The result returned yesterday and patient was contacted and initiated on vancomycin.  Because of other medications and other underlying health concerns we elected to proceed directly with treatment with vancomycin as opposed to trying metronidazole.  Patient states he has done 3 doses and actually has noted some slight improvement in his symptoms as of today.  He does not report any fevers or chills.  Reports improved abdominal pain.    He does have underlying congestive heart failure.  He also has chronic kidney disease stage III.  Discussed recheck of labs obtained at last visit to ensure  improvement.    Review of Systems  Complete review of systems is obtained.  Other than the specific considerations noted above complete review of systems is negative.        Objective:   Medications:  Current Outpatient Medications   Medication Sig     baclofen (LIORESAL) 10 MG tablet Take 1 tablet (10 mg total) by mouth every 8 (eight) hours as needed.     bumetanide (BUMEX) 1 MG tablet Take 1 tablet (1 mg total) by mouth daily.     carvedilol (COREG) 25 MG tablet Take 1.5 tablets (37.5 mg total) by mouth 2 (two) times a day.     cholecalciferol, vitamin D3, 1,000 unit tablet Take 2,000 Units by mouth daily.     FERROUS FUMARATE (IRON ORAL) Take 1 tablet by mouth daily.      lidocaine 4 % patch Place 1 patch on the skin daily. Remove and discard patch with 12 hours or as directed by MD.     losartan (COZAAR) 50 MG tablet Take 1 tablet (50 mg total) by mouth daily.     metoclopramide (REGLAN) 10 MG tablet Take 1 tablet (10 mg total) by mouth 3 (three) times a day with meals.     multivitamin with iron (ONE DAILY WITH IRON) Tab tablet Take 1 tablet by mouth daily.      omeprazole (PRILOSEC) 20 MG capsule Take 20 mg by mouth 2 (two) times a day.     ondansetron (ZOFRAN) 8 MG tablet Take 1 tablet (8 mg total) by mouth every 8 (eight) hours as needed for nausea.     oxyCODONE (ROXICODONE) 5 MG immediate release tablet 1 tab PO for pain rated 6-8 and 2 tabs for pain rated 9-10 every 4 hours as needed.     rosuvastatin (CRESTOR) 20 MG tablet Take 1 tablet (20 mg total) by mouth at bedtime.     senna-docusate (PERICOLACE) 8.6-50 mg tablet Take 1 tablet by mouth 2 (two) times a day.     vancomycin (VANCOCIN) 125 MG capsule Take 1 capsule (125 mg total) by mouth 4 (four) times a day for 10 days.     warfarin (COUMADIN/JANTOVEN) 2.5 MG tablet Take 2.5 mg by mouth See Admin Instructions. Continue with 2.5mg daily until next INR at clinic on 3/25/19. larry Davenport RN for Dr. Ramírez     Allergies:  No Known Allergies    Tobacco:    reports that he has quit smoking. he has never used smokeless tobacco.     Physical Exam      /64   Pulse 68   Wt 173 lb 4.8 oz (78.6 kg)   SpO2 98%   BMI 25.59 kg/m          General Appearance:    Alert, cooperative, no distress   Eyes:   No scleral icterus or conjunctival irritation       Lungs:     Clear to auscultation bilaterally, respirations unlabored, no wheezes or crackles   Heart:    Regular rate and rhythm,  No murmur   Extremities:  No edema, no joint swelling or redness, no evidence of any injuries   Skin:  No concerning skin findings, no suspicious moles, no rashes   Neurologic:  On gross examination there is no motor or sensory deficit.  Patient walks with a normal gait

## 2021-05-27 NOTE — TELEPHONE ENCOUNTER
Who is calling:  The patient     Reason for Call:  The patient would like to talk to Nikky about his INR.  Patient is available for a call back call.

## 2021-05-28 ENCOUNTER — AMBULATORY - HEALTHEAST (OUTPATIENT)
Dept: CARDIOLOGY | Facility: CLINIC | Age: 81
End: 2021-05-28

## 2021-05-28 DIAGNOSIS — I25.5 ISCHEMIC CARDIOMYOPATHY: ICD-10-CM

## 2021-05-28 DIAGNOSIS — Z95.810 ICD (IMPLANTABLE CARDIOVERTER-DEFIBRILLATOR) IN PLACE: ICD-10-CM

## 2021-05-28 NOTE — TELEPHONE ENCOUNTER
Routed to the anticoagulation pool.    Giovana Crews, RN, BSN, PHN  [facility] Medication Refill Nurse  4/23/2019  8:44 AM

## 2021-05-28 NOTE — TELEPHONE ENCOUNTER
ANTICOAGULATION  MANAGEMENT    Assessment     Today's INR result of 1.6 is Subtherapeutic (goal INR of 2.0-3.0)        Previous INR was Subtherapeutic    Warfarin given as previously instructed    Acute health changes, weakness; c-diff, may be affecting INR    Interaction between Vanco and warfarin may be affecting INR- on Vanco until 5/1.     Continues to tolerate warfarin with no reported s/s of bleeding or thromboembolism     Colonoscopy 5/2/19- hold warfarin 4 days without bridge (see encounter 1/4/19). TCU nurse Carolynn will check with pt if he will go through with procedure.       Plan:     Warfarin Dosing Orders: Give 3.75 mg Tues; 2.5 mg on Wed and Thurs.     Next INR: Fri 4/26.    Telephone orders given to nurseCarolynn.  Orders read back correctly.     Edgar Ortez RN    Subjective/Objective:      Tanmay POWERS Haydertim, a 79 y.o. male is on warfarin recently admitted to TCU under care of [facility].  Chart reviewed:    Outpatient anticoagulation information:     Anticoagulation management provider: [facility]    Reason for anticoagulation: Atrial Fibrillation    Home INR goal: 2-3    Home warfarin dose:  3.75 mg daily on Fri; and 2.5 mg daily rest of week     Recent hospitalization review:    Reason for hospitalization prior to TCU admission: weakness; c-diff    Hospital warfarin management: Home regimen continued    Hospital medication changes pertinent to anticoagulation: Yes: antibiotic    Health changes pertinent to anticoagulation during hospitalization: Yes: c-diff      TCU Facility nurse report since admission:    Other anticoagulants: No    Medication changes: No    Missed warfarin doses since last INR: No     Abnormal bleeding since last INR: No    New symptoms, injury or illness: No     Upcoming surgery, procedure or cardioversion: No      Recent INR Results:    Lab Results   Component Value Date    INR 1.60 (!) 04/23/2019    INR 1.66 (H) 04/22/2019    INR 1.86  (H) 04/21/2019       Anticoagulation Episode Summary     Current INR goal:   2.0-3.0   TTR:   57.6 % (4.2 y)   Next INR check:   4/26/2019   INR from last check:   1.60! (4/23/2019)   Weekly max warfarin dose:      Target end date:      INR check location:      Preferred lab:      Send INR reminders to:   ANTICOAGULATION POOL E ([facility])       Comments:            Anticoagulation Care Providers     Provider Role Specialty Phone number    Go Ramírez MD Referring Family Medicine [phone]

## 2021-05-28 NOTE — TELEPHONE ENCOUNTER
ANTICOAGULATION  MANAGEMENT    Assessment     Today's INR result of 1.78 is Subtherapeutic (goal INR of 2.0-3.0)        Warfarin taken as previously instructed    No new diet changes affecting INR    No new medication/supplements affecting INR     Visited ED today for extremity weakness    Continues to tolerate warfarin with no reported s/s of bleeding or thromboembolism     Previous INR was Subtherapeutic     Colonoscopy 5/2/19- hold warfarin 4 days without bridge (see encounter 1/4/19).     Plan:     Spoke with Tanmay regarding INR result and instructed:     Warfarin Dosing Instructions:  Change warfarin dose to 3.75 mg daily on Fri; and 2.5 mg daily rest of week  (7 % change)    Start holding 4/28-5/1. Resume the mary of 5/2 if ok with attending provider.     Instructed patient to follow up no later than: 1 week after resuming warfarin. Appt is made for 5/9.     Education provided: importance of taking warfarin as instructed    Tanmay verbalizes understanding and agrees to warfarin dosing plan.    Instructed to call the [facility] for any changes, questions or concerns. (#[phone])   ?   Edgar Ortez RN    Subjective/Objective:      Tanmay Israel, a 79 y.o. male is on warfarin.     Tanmay reports:     Home warfarin dose: verbally confirmed home dose with pt and updated on anticoagulation calendar     Missed doses: No     Medication changes:  Yes: starting Potassium and Magnesium     S/S of bleeding or thromboembolism:  No     New Injury or illness:  Yes: ED visit for extremity weakness     Changes in diet or alcohol consumption:  No     Upcoming surgery, procedure or cardioversion:  Yes: colonoscopy 5/2.     Anticoagulation Episode Summary     Current INR goal:   2.0-3.0   TTR:   57.8 % (4.2 y)   Next INR check:   5/9/2019   INR from last check:   1.78! (4/19/2019)   Weekly max warfarin dose:      Target end date:      INR check location:      Preferred lab:      Send INR reminders to:   ANTICOAGULATION POOL  B (MPW,ANA CRISTINAG,STW,RVL,OAK,RLN)       Comments:            Anticoagulation Care Providers     Provider Role Specialty Phone number    Go Ramírez MD Referring Family Medicine [phone]

## 2021-05-28 NOTE — TELEPHONE ENCOUNTER
Fax received from [facility], they have started the Prior Authorization Process via Cover My Meds    CoverMyMeds Key: [id number]    Medication Name: Ondansetron 8mg tablets    Insurance Plan: [facility]  PBM:   Patient ID: [id number]    Please complete the PA process

## 2021-05-28 NOTE — TELEPHONE ENCOUNTER
Central PA team  [phone]  Pool: HE PA MED ([id number])          PA has been initiated.       PA form completed and faxed insurance via Cover My Meds     Key:  [id number] - Rx #: [id number]     Medication:  Ondansetron HCl 8MG OR TABS    Insurance:  [employer]        Response will be received via fax and may take up to 5-10 business days depending on plan

## 2021-05-28 NOTE — PROGRESS NOTES
Code Status:  FULL CODE  Visit Type: Follow Up     Facility:  [facility] [[id number]]        Facility Type: SNF (Skilled Nursing Facility, TCU)    History of Present Illness: Tanmay Israel is a 79 y.o. male who is seen today for a TCU follow-up visit.  He has a past medical history for atrial fibrillation on Coumadin, ED 3, CAD, Crohn's disease, HLD, hypertension, ischemic cardiomyopathy, and anemia.  He was recently seen in the emergency room for generalized weakness in which the CT of his cervical spine was negative.  However, they did determine he had C. difficile colitis, and hypomagnesemia.  His Slow-Mag was discontinued due to his loose stools as he was started on vancomycin.  Recent magnesium was better on 4/26 at 1.7.  This was reinstated last week and he reports that his stools are becoming firmer.  He denies any abdominal cramping or pain.  We will continue vancomycin until tomorrow.  His CHF is well compensated.  His BMP was drawn today and showed an elevated creatinine of 2.32.  He states his appetite is improving but is not all the way back to baseline.  He also feels he is not drinking enough fluids.    Review of Systems   Patient denies fever, chills, headache, lightheadedness, dizziness, rhinorrhea, cough, congestion, shortness of breath, chest pain, palpitations, abdominal pain, n/v, diarrhea, constipation, change in appetite, dysuria, frequency, burning or pain with urination.  Other than stated in HPI all other review of systems is negative.         Physical Exam   Vital signs: P10 1/58, heart rate 76, respiratory 24, temp 95.1, weight 158 pounds.  GENERAL APPEARANCE:well nourished, pleasant male, in no acute distress.  HEENT: normocephalic, atraumatic  PERRL, sclerae anicteric, conjunctivae clear and moist, EOM intact.  NECK: Supple and symmetric. Trachea is midline, no thyromegaly, no adenopathy, and no tenderness  LUNGS: Lung sounds CTA, no adventitious sounds, respiratory  effort normal.  CARD: RRR, S1, S2, without murmurs, gallops, rubs, no JVD, peripheral pulses 2+ and symmetric  ABD: Soft nondistended and nontender with normal bowel sounds.   MSK: Muscle strength and tone were normal.  EXTREMITIES: No cyanosis, clubbing or edema.  NEURO: Alert and oriented x 3. Normal affect.  Face is symmetric.  SKIN: Inspection of the skin reveals no rashes, ulcerations or petechiae.  PSYCH: euthymic          Labs:    Recent Results (from the past 240 hour(s))   Comprehensive Metabolic Panel    Collection Time: 04/20/19  9:57 AM   Result Value Ref Range    Sodium 142 136 - 145 mmol/L    Potassium 3.7 3.5 - 5.0 mmol/L    Chloride 105 98 - 107 mmol/L    CO2 27 22 - 31 mmol/L    Anion Gap, Calculation 10 5 - 18 mmol/L    Glucose 125 70 - 125 mg/dL    BUN 13 8 - 28 mg/dL    Creatinine 1.36 (H) 0.70 - 1.30 mg/dL    GFR MDRD Af Amer >60 >60 mL/min/1.73m2    GFR MDRD Non Af Amer 50 (L) >60 mL/min/1.73m2    Bilirubin, Total 0.7 0.0 - 1.0 mg/dL    Calcium 9.9 8.5 - 10.5 mg/dL    Protein, Total 6.9 6.0 - 8.0 g/dL    Albumin 3.7 3.5 - 5.0 g/dL    Alkaline Phosphatase 200 (H) 45 - 120 U/L    AST 19 0 - 40 U/L    ALT 12 0 - 45 U/L   Magnesium    Collection Time: 04/20/19  9:57 AM   Result Value Ref Range    Magnesium 1.6 (L) 1.8 - 2.6 mg/dL   HM1 (CBC with Diff)    Collection Time: 04/20/19  9:57 AM   Result Value Ref Range    WBC 7.6 4.0 - 11.0 thou/uL    RBC 3.58 (L) 4.40 - 6.20 mill/uL    Hemoglobin 9.9 (L) 14.0 - 18.0 g/dL    Hematocrit 30.9 (L) 40.0 - 54.0 %    MCV 86 80 - 100 fL    MCH 27.7 27.0 - 34.0 pg    MCHC 32.0 32.0 - 36.0 g/dL    RDW 15.8 (H) 11.0 - 14.5 %    Platelets 154 140 - 440 thou/uL    MPV 8.8 8.5 - 12.5 fL    Neutrophils % 73 (H) 50 - 70 %    Lymphocytes % 12 (L) 20 - 40 %    Monocytes % 10 2 - 10 %    Eosinophils % 4 0 - 6 %    Basophils % 1 0 - 2 %    Neutrophils Absolute 5.5 2.0 - 7.7 thou/uL    Lymphocytes Absolute 0.9 0.8 - 4.4 thou/uL    Monocytes Absolute 0.8 0.0 - 0.9 thou/uL     Eosinophils Absolute 0.3 0.0 - 0.4 thou/uL    Basophils Absolute 0.1 0.0 - 0.2 thou/uL   CK    Collection Time: 04/20/19  9:57 AM   Result Value Ref Range    CK, Total 42 30 - 190 U/L   C. difficile Toxigenic by PCR    Collection Time: 04/21/19  6:15 AM   Result Value Ref Range    C.Difficile Toxigenic by PCR Positive (!) Negative    Ribotype 027/NAP1/B1 Presumptive Negative Presumptive Negative   Basic Metabolic Panel    Collection Time: 04/21/19  6:27 AM   Result Value Ref Range    Sodium 141 136 - 145 mmol/L    Potassium 3.6 3.5 - 5.0 mmol/L    Chloride 108 (H) 98 - 107 mmol/L    CO2 26 22 - 31 mmol/L    Anion Gap, Calculation 7 5 - 18 mmol/L    Glucose 97 70 - 125 mg/dL    Calcium 9.6 8.5 - 10.5 mg/dL    BUN 11 8 - 28 mg/dL    Creatinine 1.15 0.70 - 1.30 mg/dL    GFR MDRD Af Amer >60 >60 mL/min/1.73m2    GFR MDRD Non Af Amer >60 >60 mL/min/1.73m2   HM2(CBC w/o Differential)    Collection Time: 04/21/19  6:27 AM   Result Value Ref Range    WBC 6.3 4.0 - 11.0 thou/uL    RBC 3.38 (L) 4.40 - 6.20 mill/uL    Hemoglobin 9.4 (L) 14.0 - 18.0 g/dL    Hematocrit 29.3 (L) 40.0 - 54.0 %    MCV 87 80 - 100 fL    MCH 27.8 27.0 - 34.0 pg    MCHC 32.1 32.0 - 36.0 g/dL    RDW 16.0 (H) 11.0 - 14.5 %    Platelets 139 (L) 140 - 440 thou/uL    MPV 9.0 8.5 - 12.5 fL   Hepatic Profile    Collection Time: 04/21/19  6:27 AM   Result Value Ref Range    Bilirubin, Total 0.6 0.0 - 1.0 mg/dL    Bilirubin, Direct 0.3 <=0.5 mg/dL    Protein, Total 6.3 6.0 - 8.0 g/dL    Albumin 3.4 (L) 3.5 - 5.0 g/dL    Alkaline Phosphatase 201 (H) 45 - 120 U/L    AST 19 0 - 40 U/L    ALT 12 0 - 45 U/L   INR    Collection Time: 04/21/19  6:27 AM   Result Value Ref Range    INR 1.86 (H) 0.90 - 1.10   Vitamin B12    Collection Time: 04/21/19  6:27 AM   Result Value Ref Range    Vitamin B-12 257 213 - 816 pg/mL   Myasthenia Gravis (MG) Evaluation, With MuSK Reflex    Collection Time: 04/21/19  6:27 AM   Result Value Ref Range    ACH Receptor (Muscle) Modulating  Antibody 0 %    ACH Receptor (Muscle) Binding Antibody 0.00 <=0.02 nmol/L    Striational (Striated Muscle) Antibody Negative <1:120 titer    MG Interpretive Comments SEE BELOW    MuSK Autoantibody,Serum    Collection Time: 04/21/19  6:27 AM   Result Value Ref Range    MuSK Autoantibody, S 0.00 0.00 - 0.02 nmol/L   INR    Collection Time: 04/22/19  6:04 AM   Result Value Ref Range    INR 1.66 (H) 0.90 - 1.10   Basic Metabolic Panel    Collection Time: 04/22/19  6:04 AM   Result Value Ref Range    Sodium 142 136 - 145 mmol/L    Potassium 3.2 (L) 3.5 - 5.0 mmol/L    Chloride 106 98 - 107 mmol/L    CO2 27 22 - 31 mmol/L    Anion Gap, Calculation 9 5 - 18 mmol/L    Glucose 91 70 - 125 mg/dL    Calcium 9.2 8.5 - 10.5 mg/dL    BUN 10 8 - 28 mg/dL    Creatinine 1.17 0.70 - 1.30 mg/dL    GFR MDRD Af Amer >60 >60 mL/min/1.73m2    GFR MDRD Non Af Amer 60 (L) >60 mL/min/1.73m2   HM2(CBC w/o Differential)    Collection Time: 04/22/19  6:04 AM   Result Value Ref Range    WBC 7.0 4.0 - 11.0 thou/uL    RBC 3.37 (L) 4.40 - 6.20 mill/uL    Hemoglobin 9.5 (L) 14.0 - 18.0 g/dL    Hematocrit 28.8 (L) 40.0 - 54.0 %    MCV 86 80 - 100 fL    MCH 28.2 27.0 - 34.0 pg    MCHC 33.0 32.0 - 36.0 g/dL    RDW 16.0 (H) 11.0 - 14.5 %    Platelets 134 (L) 140 - 440 thou/uL    MPV 9.3 8.5 - 12.5 fL   Magnesium    Collection Time: 04/22/19  6:04 AM   Result Value Ref Range    Magnesium 1.4 (L) 1.8 - 2.6 mg/dL   INR    Collection Time: 04/23/19 12:00 AM   Result Value Ref Range    INR 1.60 (!) 0.9 - 1.1   Magnesium    Collection Time: 04/23/19  7:00 AM   Result Value Ref Range    Magnesium 1.3 (L) 1.8 - 2.6 mg/dL   Basic Metabolic Panel    Collection Time: 04/23/19  7:00 AM   Result Value Ref Range    Sodium 142 136 - 145 mmol/L    Potassium 3.7 3.5 - 5.0 mmol/L    Chloride 106 98 - 107 mmol/L    CO2 27 22 - 31 mmol/L    Anion Gap, Calculation 9 5 - 18 mmol/L    Glucose 88 70 - 125 mg/dL    Calcium 9.5 8.5 - 10.5 mg/dL    BUN 11 8 - 28 mg/dL     Creatinine 1.30 0.70 - 1.30 mg/dL    GFR MDRD Af Amer >60 >60 mL/min/1.73m2    GFR MDRD Non Af Amer 53 (L) >60 mL/min/1.73m2   Basic Metabolic Panel    Collection Time: 04/24/19  8:15 AM   Result Value Ref Range    Sodium 141 136 - 145 mmol/L    Potassium 3.6 3.5 - 5.0 mmol/L    Chloride 102 98 - 107 mmol/L    CO2 28 22 - 31 mmol/L    Anion Gap, Calculation 11 5 - 18 mmol/L    Glucose 105 70 - 125 mg/dL    Calcium 9.9 8.5 - 10.5 mg/dL    BUN 18 8 - 28 mg/dL    Creatinine 1.70 (H) 0.70 - 1.30 mg/dL    GFR MDRD Af Amer 47 (L) >60 mL/min/1.73m2    GFR MDRD Non Af Amer 39 (L) >60 mL/min/1.73m2   Magnesium    Collection Time: 04/24/19  8:15 AM   Result Value Ref Range    Magnesium 1.5 (L) 1.8 - 2.6 mg/dL   INR    Collection Time: 04/26/19 12:00 AM   Result Value Ref Range    INR 2.10 (!) 0.9 - 1.1   Magnesium    Collection Time: 04/26/19 10:20 AM   Result Value Ref Range    Magnesium 1.7 (L) 1.8 - 2.6 mg/dL   Basic Metabolic Panel    Collection Time: 04/29/19  9:00 AM   Result Value Ref Range    Sodium 137 136 - 145 mmol/L    Potassium 4.0 3.5 - 5.0 mmol/L    Chloride 103 98 - 107 mmol/L    CO2 24 22 - 31 mmol/L    Anion Gap, Calculation 10 5 - 18 mmol/L    Glucose 112 70 - 125 mg/dL    Calcium 10.3 8.5 - 10.5 mg/dL    BUN 33 (H) 8 - 28 mg/dL    Creatinine 2.32 (H) 0.70 - 1.30 mg/dL    GFR MDRD Af Amer 33 (L) >60 mL/min/1.73m2    GFR MDRD Non Af Amer 27 (L) >60 mL/min/1.73m2     .    Assessment:  1. Clostridium difficile diarrhea     2. General weakness     3. Stage 3 chronic kidney disease (H)     4. Low magnesium levels     5. Essential hypertension     6. Chronic systolic congestive heart failure (H)         Plan:  C. difficile: Improving, continue vancomycin through 4/30/2019.  Counseled patient on the possibility of ongoing loose stools approximately 1 week following an date of vancomycin.    General weakness: Continue therapies.    CKD: Creatinine has almost doubled.  Most likely related to dehydration because of  Bumex and recent diarrhea.  Counseled on improving fluid intake I will have nursing encourage fluids.  We will recheck a BMP on Thursday of this week.    Hypomagnesemia: Continue Slow-Mag, will plan for recheck in the next week.    Hypertension: Continue losartan 100 mg daily his hypertension is stable.  Could be a little on the low side due to dehydration.    CHF: Continue Bumex 1 mg daily do not want to decrease this as his CHF is well compensated.  We will also continue carvedilol 37.5 mg daily.        Electronically signed by: Kimmy Naranjo, ERIN

## 2021-05-28 NOTE — TELEPHONE ENCOUNTER
Reason contacted:  Orders request  Information relayed:  Below message   Additional questions:  No  Further follow-up needed:  No  Okay to leave a detailed message:  No

## 2021-05-28 NOTE — TELEPHONE ENCOUNTER
Orders being requested:  PT and OT eval and treat ,  referral,  nursing recommending skilled nurse once a week for 1 week , 2 times a week for 2 weeks, and 1 time a week for 3 weeks, also- 3 PRN's. Also, needing to know if patient is still taking medication called Reglan 10 mg, 3 times a day, as this was not noted on discharge summary- AND FiberCon, patient has been experiencing diarrhea and unsure if patient should still be taking this medicaiton  Reason service is needed/diagnosis:  Medication education, symptom management related to weakness and c-diff.    When are orders needed by: as soon as able today  Where to send Orders: Phone:  [phone]  Okay to leave detailed message?  Yes

## 2021-05-28 NOTE — TELEPHONE ENCOUNTER
ANTICOAGULATION  MANAGEMENT    Assessment     Today's INR result of 4.6 is Supratherapeutic (goal INR of 2.0-3.0)        Previous INR was Supratherapeutic    Warfarin given as previously instructed    No new health/diet changes affecting INR    No new medication/supplements affecting INR    Continues to tolerate warfarin with no reported s/s of bleeding or thromboembolism       Plan:     Warfarin Dosing Orders:  Hold today then change warfarin dose to 1.25 mg daily on Wed; and 2.5 mg daily rest of week  (13 % change)    Next INR: 1 week.    Telephone orders given to nurseKiley.  Orders read back correctly.     Edgar Ortez RN    Subjective/Objective:      Tanmay Israel, a 79 y.o. male is on warfarin. Facility nurse reports for Tanmay:    Other anticoagulants: No    Medication changes: No     Missed warfarin doses since last INR: No     Abnormal bleeding since last INR: No    New symptoms, injury or illness: No     Upcoming surgery, procedure or cardioversion: No    Recent INR Results:    Lab Results   Component Value Date    INR 4.60 (!) 05/14/2019    INR 3.40 (!) 05/07/2019    INR 2.20 (!) 04/30/2019       Anticoagulation Episode Summary     Current INR goal:   2.0-3.0   TTR:   57.4 % (4.2 y)   Next INR check:   5/21/2019   INR from last check:   4.60! (5/14/2019)   Weekly max warfarin dose:      Target end date:      INR check location:      Preferred lab:      Send INR reminders to:   ANTICOAGULATION POOL E ([facility])       Comments:            Anticoagulation Care Providers     Provider Role Specialty Phone number    Go Ramírez MD Referring Family Medicine [phone]

## 2021-05-28 NOTE — TELEPHONE ENCOUNTER
Okay to continue fiber supplementation as this will not worsen diarrhea.    Is patient requesting to take the Reglan?  Since it is not on his discharge medications I would say he should not take it at this time.    Otherwise okay for orders.

## 2021-05-28 NOTE — PROGRESS NOTES
[facility]      Facility:    [facility] [[id number]]  Code Status: UNKNOWN      Chief Complaint/Reason for Visit:  Chief Complaint   Patient presents with     H & P     Generalized weakness, C. difficile infection, chronic kidney disease stage III which is stable, chronic systolic CHF well compensated at this time, low magnesium levels, cervical myelopathy in the past, left shoulder pain.       HPI:   Tanmay is a 79 y.o. male who was recently admitted to the hospital after relatively quick onset of weakness and it was generalized.  He went to the emergency room and it was unclear etiology.  CC C-spine imaging showed surgical procedure was unrelated.  He did see neurology and head CT and cervical spine imaging was negative.  He did have a B12 was low normal and he did have low magnesium 1.4.  He was taken of mag oxide secondary to he was diagnosed with C. difficile colitis.  He was started oral vancomycin and slept to stop the mag oxide and I did start him on Slow-Mag secondary to his stools are starting to form.  He did have chronic kidney disease stage II which is stable and his CHF was well compensated.  He was treated appropriately and transferred here to the TCU at [facility] in stable condition.    Patient feels much better at this time he is improving and it is anticipated with physical and Occupational Therapy.  His stools are starting to form at this time he has had no abdominal pain nausea vomiting.  He is breathing okay and has no chest pain or shortness of breath.    Patient complains of left shoulder pain which is nontraumatic with normal range of motion.  He feels he sleeping on it wrong at this time.    Past Medical History:  Past Medical History:   Diagnosis Date     Anemia      Arthritis      Atrial fibrillation (H)      Back pain      Callus      Cerebral aneurysm      Cervical spinal stenosis      Chronic kidney disease     stage 3     Chronic  systolic congestive heart failure (H)      Coronary artery disease      Crohn's disease (H)      Dysphagia      GERD (gastroesophageal reflux disease)      Hyperlipidemia      Hypertension      ICD (implantable cardioverter-defibrillator) in place     Medtronic     Ischemic cardiomyopathy      Kidney stone      Low back pain      Macular degeneration      Permanent atrial fibrillation (H)     [id number] risk score = 5 (age1/ CAD/ HTN/ CHF) Chronic warfarin     Right rotator cuff tendinitis      Schatzki's ring      Shortness of breath      Sleep apnea     no CPAP           Surgical History:  Past Surgical History:   Procedure Laterality Date     APPENDECTOMY       CARDIAC PACEMAKER PLACEMENT  2013    ICD Medtronic     CATARACT EXTRACTION W/  INTRAOCULAR LENS IMPLANT Bilateral      CHOLECYSTECTOMY       ESOPHAGOGASTRODUODENOSCOPY       CT C- LAMINOPLASTY, 2 OR MORE Left 2/22/2019    Procedure: LEFT CERVICAL 4, 5, 6 LAMINOPLASTY;  Surgeon: Liza Cesar MD;  Location: [facility];  Service: Spine     CT CABG, VEIN, FOUR      Description: Coronary Artery Quadruple Venous Bypass Graft;  Recorded: 10/21/2008;  Comments: 8/2004     CT LAP,CHOLECYSTECTOMY N/A 2/28/2019    Procedure: CHOLECYSTECTOMY, LAPAROSCOPIC;  Surgeon: Mana Roman MD;  Location: [facility];  Service: General     ROTATOR CUFF REPAIR Right      TONSILLECTOMY       XR MYELOGRAM CERVICAL THORACIC LUMBAR  1/17/2019       Family History:   Family History   Problem Relation Age of Onset     Heart disease Mother      Stroke Mother      Crohn's disease Father      Alcohol abuse Brother      No Medical Problems Daughter      No Medical Problems Son      No Medical Problems Maternal Aunt      No Medical Problems Maternal Uncle      No Medical Problems Paternal Aunt      No Medical Problems Paternal Uncle      No Medical Problems Maternal Grandmother      No Medical Problems Maternal Grandfather      No Medical Problems Paternal  Grandmother      No Medical Problems Paternal Grandfather      Cancer Brother      Urolithiasis Neg Hx      Gout Neg Hx        Social History:    Social History     Socioeconomic History     Marital status: Single     Spouse name: None     Number of children: None     Years of education: None     Highest education level: None   Occupational History     Occupation: retired     Comment: [employer]   Social Needs     Financial resource strain: None     Food insecurity:     Worry: None     Inability: None     Transportation needs:     Medical: None     Non-medical: None   Tobacco Use     Smoking status: Former Smoker     Smokeless tobacco: Never Used   Substance and Sexual Activity     Alcohol use: No     Drug use: No     Sexual activity: None   Lifestyle     Physical activity:     Days per week: None     Minutes per session: None     Stress: None   Relationships     Social connections:     Talks on phone: None     Gets together: None     Attends Catholic service: None     Active member of club or organization: None     Attends meetings of clubs or organizations: None     Relationship status: None     Intimate partner violence:     Fear of current or ex partner: None     Emotionally abused: None     Physically abused: None     Forced sexual activity: None   Other Topics Concern     None   Social History Narrative     None          Review of Systems   Constitutional:        Outside left shoulder pain patient denies any fevers chills nausea vomiting diarrhea change in vision hearing taste or smell weakness one side the other chest pain or shortness of breath.  He denies any incontinent of stool polyphagia or polydipsia polyuria depression or anxiety and the remainder the review of systems is negative.       Vitals:    04/25/19 1246   BP: 130/74   Pulse: 72   Resp: 15   Temp: 98.3  F (36.8  C)   SpO2: 96%       Physical Exam   Constitutional: No distress.   HENT:   Head: Normocephalic and atraumatic.   Nose: Nose normal.    Mouth/Throat: No oropharyngeal exudate.   Eyes: Conjunctivae are normal. Right eye exhibits no discharge. Left eye exhibits no discharge. No scleral icterus.   Cardiovascular:   Patient's heart sounds are irregularly irregular with adequate rate control.   Pulmonary/Chest: Effort normal and breath sounds normal. No respiratory distress. He has no wheezes.   Abdominal: Soft. Bowel sounds are normal. He exhibits no distension. There is no tenderness. There is no rebound.   Musculoskeletal: He exhibits no edema.   Tenderness in the musculoskeletal exam was in the left shoulder anterior posterior of the glenohumeral joint.  He is good range of motion with minimal crepitus and strength was symmetrical.   Neurological: He is alert. He exhibits normal muscle tone.   Skin: Skin is warm and dry. No rash noted. He is not diaphoretic. No erythema.   Psychiatric: He has a normal mood and affect. His behavior is normal.       Medication List:  Current Outpatient Medications   Medication Sig     baclofen (LIORESAL) 10 MG tablet Take 1 tablet (10 mg total) by mouth every 8 (eight) hours as needed.     bumetanide (BUMEX) 1 MG tablet Take 2 mg by mouth 2 (two) times a day.     carvedilol (COREG) 25 MG tablet Take 37.5 mg by mouth 2 (two) times a day with meals.     cholecalciferol, vitamin D3, 1,000 unit tablet Take 2,000 Units by mouth daily.     FERROUS FUMARATE (IRON ORAL) Take 1 tablet by mouth daily.      losartan (COZAAR) 100 MG tablet Take 100 mg by mouth daily.     magnesium chloride (SLOW-MAG) 64 mg TbEC delayed-release tablet Take 64 mg by mouth 2 (two) times a day.     mecobalamin, vitamin B12, 1,000 mcg TbDL Place 1 tablet (1,000 mcg total) under the tongue daily.     multivitamin with iron (ONE DAILY WITH IRON) Tab tablet Take 1 tablet by mouth daily.      omeprazole (PRILOSEC) 20 MG capsule Take 20 mg by mouth daily before breakfast.            potassium chloride (KLOR-CON) 20 mEq packet Take 20 mEq by mouth daily.      rosuvastatin (CRESTOR) 20 MG tablet Take 1 tablet (20 mg total) by mouth at bedtime.     vancomycin (FIRVANQ) 50 mg/mL oral solution Take 2.5 mL (125 mg total) by mouth 4 (four) times a day for 9 days.     warfarin (COUMADIN) 3 MG tablet Take 1 tablet (3 mg total) by mouth See Admin Instructions.       Labs: Basic metabolic profile done 2 days ago was basically within normal limits with the exception of a GFR about 53.  Magnesium is gone from 1.4-1.3 now patient is on Slow-Mag for better absorption.  Watching the fact that patient does have loose stools but his stools are starting to form from his C. difficile.      Assessment:    ICD-10-CM    1. Clostridium difficile diarrhea A04.72    2. General weakness R53.1    3. Lumbar stenosis with neurogenic claudication M48.062    4. Stage 3 chronic kidney disease (H) N18.3    5. S/P laparoscopic cholecystectomy Z90.49    6. Low magnesium levels R79.0        Plan: Plan at this time Coumadin clinic will manage INR to an INR of 2.0-3.0.  Will increase Slow-Mag to twice daily and check mag today sacral low mag levels.  Aspercreme to left shoulder 3 times daily as needed and will assess left shoulder pain every 8 hours for Aspercreme.  We will continue to monitor his stool output for forming stools magnesium that may make his diarrhea somewhat worse.  However his magnesium levels are too low and dropping.  I will continue to monitor above medical problems and no other changes to care plan at this time.        Electronically signed by: Juan Sotelo DO

## 2021-05-28 NOTE — PROGRESS NOTES
[facility]    Facility:   [facility] [[id number]]   Code Status: FULL CODE      CHIEF COMPLAINT/REASON FOR VISIT:  Chief Complaint   Patient presents with     Problem Visit     asked to see       HISTORY:      HPI: Tanmay is a 79 y.o. male who I had the chance and was asked to see secondary to his hospitalization April 20 - April 22, 2019 secondary to generalized weakness.  The generalized weakness was of unclear etiology it was extended to the upper extremities and lower extremities but no neck pain.  His workup did include a chest x-ray which was negative and clear.  He does have a prior right shoulder arthroplasty as well as a pacemaker.  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  CT of the cervical spine without contrast did show posterior hardware otherwise unremarkable along with severe right-sided facet arthropathy at C2-3 and severe left-sided facet arthropathy C3-4 but again unchanged from the previous CAT scan but he does have a history of C4-C6 laminoplasty's.  His laboratory studies on the 20th did show a hemoglobin of 9.9 white cells of 7.6 sodium 142 potassium 3.7 BUN 13 creatinine 1.36.  He is on warfarin secondary to his history of A. fib and pacemaker.  C. difficile was positive they did start him on oral vancomycin and stop the magnesium oxide.  He does have a history of CKD stage III but is stable.  A history of chronic systolic congestive heart failure which was compensated continue with current diuretics and Coreg.  I remember him from a previous transitional care unit stay when he was hospitalized February 27 - March 4, 2019 secondary to acute cholecystitis status post laparoscopic cholecystectomy.  He currently is now in the transitional care unit which we had a chance to once again addressed as well as the rehabilitation process and  trying to improve his strength and his conditioning.  He does admit to generalized weakness and deconditioned.  He does have a history of chronic pain including spasms he can have baclofen as needed to which he has not required any at this time he can have Tylenol as needed.  He is on a number of multivitamins and minerals he is also being followed by the Coumadin clinic.  The vancomycin will continue 4 times a day until April 30, 2019.  He otherwise has been normotensive and afebrile and also on room air.  Recently did develop some left shoulder discomfort he is not sure why he did have a chance to talk about osteoarthrosis in that shoulder joint as well as impingement and frozen shoulder or adhesive capsulitis.  He does remember those similar symptoms before his right shoulder replacement.  He currently is not having any increase in bowel problems his appetite is slowly improving.  He does have a good sense of humor.  He does live down in [facility].  He does have a couple of friends in the memory care unit in this facility.    Past Medical History:   Diagnosis Date     Anemia      Arthritis      Atrial fibrillation (H)      Back pain      Callus      Cerebral aneurysm      Cervical spinal stenosis      Chronic kidney disease     stage 3     Chronic systolic congestive heart failure (H)      Coronary artery disease      Crohn's disease (H)      Dysphagia      GERD (gastroesophageal reflux disease)      Hyperlipidemia      Hypertension      ICD (implantable cardioverter-defibrillator) in place     Medtronic     Ischemic cardiomyopathy      Kidney stone      Low back pain      Macular degeneration      Permanent atrial fibrillation (H)     [id number] risk score = 5 (age1/ CAD/ HTN/ CHF) Chronic warfarin     Right rotator cuff tendinitis      Schatzki's ring      Shortness of breath      Sleep apnea     no CPAP             Family History   Problem Relation Age of Onset     Heart disease Mother      Stroke Mother       Crohn's disease Father      Alcohol abuse Brother      No Medical Problems Daughter      No Medical Problems Son      No Medical Problems Maternal Aunt      No Medical Problems Maternal Uncle      No Medical Problems Paternal Aunt      No Medical Problems Paternal Uncle      No Medical Problems Maternal Grandmother      No Medical Problems Maternal Grandfather      No Medical Problems Paternal Grandmother      No Medical Problems Paternal Grandfather      Cancer Brother      Urolithiasis Neg Hx      Gout Neg Hx      Social History     Socioeconomic History     Marital status: Single     Spouse name: Not on file     Number of children: Not on file     Years of education: Not on file     Highest education level: Not on file   Occupational History     Occupation: retired     Comment: [employer]   Social Needs     Financial resource strain: Not on file     Food insecurity:     Worry: Not on file     Inability: Not on file     Transportation needs:     Medical: Not on file     Non-medical: Not on file   Tobacco Use     Smoking status: Former Smoker     Smokeless tobacco: Never Used   Substance and Sexual Activity     Alcohol use: No     Drug use: No     Sexual activity: Not on file   Lifestyle     Physical activity:     Days per week: Not on file     Minutes per session: Not on file     Stress: Not on file   Relationships     Social connections:     Talks on phone: Not on file     Gets together: Not on file     Attends Mormonism service: Not on file     Active member of club or organization: Not on file     Attends meetings of clubs or organizations: Not on file     Relationship status: Not on file     Intimate partner violence:     Fear of current or ex partner: Not on file     Emotionally abused: Not on file     Physically abused: Not on file     Forced sexual activity: Not on file   Other Topics Concern     Not on file   Social History Narrative     Not on file         Review of Systems  Currently denies chills and  fever coughing wheezing chest pain dizziness or vertigo nausea vomiting diarrhea or flulike symptoms headache or rashes or sores.  History of recent laparoscopic cholecystectomy hypertension CAD ICD CKD stage III heart failure cervical laminoplasty      /  Current Outpatient Medications   Medication Sig Note     baclofen (LIORESAL) 10 MG tablet Take 1 tablet (10 mg total) by mouth every 8 (eight) hours as needed.      bumetanide (BUMEX) 1 MG tablet Take 2 mg by mouth 2 (two) times a day. 4/20/2019: Patient takes 3 tablets in the morning daily.      carvedilol (COREG) 25 MG tablet Take 37.5 mg by mouth 2 (two) times a day with meals.      cholecalciferol, vitamin D3, 1,000 unit tablet Take 2,000 Units by mouth daily.      FERROUS FUMARATE (IRON ORAL) Take 1 tablet by mouth daily.       losartan (COZAAR) 100 MG tablet Take 100 mg by mouth daily.      mecobalamin, vitamin B12, 1,000 mcg TbDL Place 1 tablet (1,000 mcg total) under the tongue daily.      multivitamin with iron (ONE DAILY WITH IRON) Tab tablet Take 1 tablet by mouth daily.       omeprazole (PRILOSEC) 20 MG capsule Take 20 mg by mouth daily before breakfast.             potassium chloride (KLOR-CON) 20 mEq packet Take 20 mEq by mouth daily. 4/20/2019: Potassium chloride was prescribed on 4/19 for a 5 day course. Patient took the first dose yesterday.     rosuvastatin (CRESTOR) 20 MG tablet Take 1 tablet (20 mg total) by mouth at bedtime.      vancomycin (FIRVANQ) 50 mg/mL oral solution Take 2.5 mL (125 mg total) by mouth 4 (four) times a day for 9 days.      warfarin (COUMADIN) 3 MG tablet Take 1 tablet (3 mg total) by mouth See Admin Instructions.        .There were no vitals filed for this visit.  Blood pressure 102/68 pulse 85 temperature 97.9 saturation room air 92%  Physical Exam  Constitutional: No distress.   HENT:    Eyes: Pupils are equal, round, and reactive to light.   Neck: Neck supple. No thyromegaly present.   Cardiovascular:   Irregularity.   Pacemaker.   Pulmonary/Chest: Breath sounds normal.   Abdominal: Bowel sounds are normal. There is no rebound.   Musculoskeletal: History of right shoulder replacement.  Current left shoulder discomfort along with an impingement.  Generalized deconditioning.  Chronic back pain.   Lymphadenopathy:     He has no cervical adenopathy.   Neurological: He is alert.   Psychiatric: His behavior is normal.       LABS:   Lab Results   Component Value Date    WBC 7.0 04/22/2019    HGB 9.5 (L) 04/22/2019    HCT 28.8 (L) 04/22/2019    MCV 86 04/22/2019     (L) 04/22/2019     Results for orders placed or performed in visit on 04/23/19   Basic Metabolic Panel   Result Value Ref Range    Sodium 142 136 - 145 mmol/L    Potassium 3.7 3.5 - 5.0 mmol/L    Chloride 106 98 - 107 mmol/L    CO2 27 22 - 31 mmol/L    Anion Gap, Calculation 9 5 - 18 mmol/L    Glucose 88 70 - 125 mg/dL    Calcium 9.5 8.5 - 10.5 mg/dL    BUN 11 8 - 28 mg/dL    Creatinine 1.30 0.70 - 1.30 mg/dL    GFR MDRD Af Amer >60 >60 mL/min/1.73m2    GFR MDRD Non Af Amer 53 (L) >60 mL/min/1.73m2       Lab Results   Component Value Date    ALT 12 04/21/2019    AST 19 04/21/2019    ALKPHOS 201 (H) 04/21/2019    BILITOT 0.6 04/21/2019         ASSESSMENT:      ICD-10-CM    1. Clostridium difficile diarrhea A04.72    2. General weakness R53.1    3. Lumbar stenosis with neurogenic claudication M48.062    4. Essential hypertension I10        PLAN:    At this time we will continue to manage and follow he will continue his current medication and treatment modalities.  Also being followed by the Coumadin clinic.  His vancomycin will finish up on April 30, 2019 and he will continue to work with the therapy department including perhaps his left shoulder and do some ultrasound to that arm as well.  He did not have any other further questions.    For documentation purposes total visit 35 minutes to which over 50% was spent with the patient going over his hospitalization our  previous visits his laboratory studies his concerns rehabilitation process and coordination of care.    Electronically signed by: Michael Duane Johnson, CNP

## 2021-05-28 NOTE — PROGRESS NOTES
[facility]    Facility:   [facility] [[id number]]   Code Status: FULL CODE      CHIEF COMPLAINT/REASON FOR VISIT:  Chief Complaint   Patient presents with     Follow Up     rehab,        HISTORY:      HPI: Tanmay is a 79 y.o. male who I had the pleasure to visit with secondary to his hospitalization 20 April 22nd 2019 secondary to generalized weakness of unclear etiology.  He currently and eventually was treated with vancomycin for C. difficile.  He is still getting a few he thinks at least 2 or 3 loose stools per day now he has had magnesium twice daily Slow-Mag 71.5 mg twice daily and what we will do is just decrease it to once daily since his magnesium now at 1.7 and will also add in probiotic as well as FiberCon tablets.  He did finish his vancomycin last on April 30, 2019.  He otherwise has been normotensive and afebrile.  Is also having Coumadin which is being followed by the Coumadin clinic.  His appetite is fair to good.  He is enjoying the therapy process is on a regular diet also getting extra mighty shakes twice daily.    Past Medical History:   Diagnosis Date     Anemia      Arthritis      Atrial fibrillation (H)      Back pain      Callus      Cerebral aneurysm      Cervical spinal stenosis      Chronic kidney disease     stage 3     Chronic systolic congestive heart failure (H)      Coronary artery disease      Crohn's disease (H)      Dysphagia      GERD (gastroesophageal reflux disease)      Hyperlipidemia      Hypertension      ICD (implantable cardioverter-defibrillator) in place     [employer]tr[employer]     Ischemic cardiomyopathy      Kidney stone      Low back pain      Macular degeneration      Permanent atrial fibrillation (H)     [id number] risk score = 5 (age1/ CAD/ HTN/ CHF) Chronic warfarin     Right rotator cuff tendinitis      Schatzki's ring      Shortness of breath      Sleep apnea     no CPAP             Family History   Problem Relation Age of Onset      Heart disease Mother      Stroke Mother      Crohn's disease Father      Alcohol abuse Brother      No Medical Problems Daughter      No Medical Problems Son      No Medical Problems Maternal Aunt      No Medical Problems Maternal Uncle      No Medical Problems Paternal Aunt      No Medical Problems Paternal Uncle      No Medical Problems Maternal Grandmother      No Medical Problems Maternal Grandfather      No Medical Problems Paternal Grandmother      No Medical Problems Paternal Grandfather      Cancer Brother      Urolithiasis Neg Hx      Gout Neg Hx      Social History     Socioeconomic History     Marital status: Single     Spouse name: Not on file     Number of children: Not on file     Years of education: Not on file     Highest education level: Not on file   Occupational History     Occupation: retired     Comment: [employer]   Social Needs     Financial resource strain: Not on file     Food insecurity:     Worry: Not on file     Inability: Not on file     Transportation needs:     Medical: Not on file     Non-medical: Not on file   Tobacco Use     Smoking status: Former Smoker     Smokeless tobacco: Never Used   Substance and Sexual Activity     Alcohol use: No     Drug use: No     Sexual activity: Not on file   Lifestyle     Physical activity:     Days per week: Not on file     Minutes per session: Not on file     Stress: Not on file   Relationships     Social connections:     Talks on phone: Not on file     Gets together: Not on file     Attends Adventism service: Not on file     Active member of club or organization: Not on file     Attends meetings of clubs or organizations: Not on file     Relationship status: Not on file     Intimate partner violence:     Fear of current or ex partner: Not on file     Emotionally abused: Not on file     Physically abused: Not on file     Forced sexual activity: Not on file   Other Topics Concern     Not on file   Social History Narrative     Not on file         Review  of Systems  Currently denies chills and fever coughing wheezing chest pain dizziness or vertigo nausea vomiting diarrhea or flulike symptoms headache or rashes or sores.  History of recent laparoscopic cholecystectomy hypertension CAD ICD CKD stage III heart failure cervical laminoplasty       Current Outpatient Medications   Medication Sig Note     baclofen (LIORESAL) 10 MG tablet Take 1 tablet (10 mg total) by mouth every 8 (eight) hours as needed.      bumetanide (BUMEX) 1 MG tablet Take 2 mg by mouth 2 (two) times a day. 4/20/2019: Patient takes 3 tablets in the morning daily.      carvedilol (COREG) 25 MG tablet Take 37.5 mg by mouth 2 (two) times a day with meals.      cholecalciferol, vitamin D3, 1,000 unit tablet Take 2,000 Units by mouth daily.      FERROUS FUMARATE (IRON ORAL) Take 1 tablet by mouth daily.       losartan (COZAAR) 100 MG tablet Take 100 mg by mouth daily.      magnesium chloride (SLOW-MAG) 64 mg TbEC delayed-release tablet Take 64 mg by mouth 2 (two) times a day.      mecobalamin, vitamin B12, 1,000 mcg TbDL Place 1 tablet (1,000 mcg total) under the tongue daily.      multivitamin with iron (ONE DAILY WITH IRON) Tab tablet Take 1 tablet by mouth daily.       omeprazole (PRILOSEC) 20 MG capsule Take 20 mg by mouth daily before breakfast.             potassium chloride (KLOR-CON) 20 mEq packet Take 20 mEq by mouth daily. 4/20/2019: Potassium chloride was prescribed on 4/19 for a 5 day course. Patient took the first dose yesterday.     rosuvastatin (CRESTOR) 20 MG tablet Take 1 tablet (20 mg total) by mouth at bedtime.      vancomycin (FIRVANQ) 50 mg/mL oral solution Take 2.5 mL (125 mg total) by mouth 4 (four) times a day for 9 days.      warfarin (COUMADIN) 3 MG tablet Take 1 tablet (3 mg total) by mouth See Admin Instructions.        .There were no vitals filed for this visit.  Blood pressure 122/53 pulse 50 respirations 19 temperature 96.9 saturation room air 99%  Physical Exam    Constitutional: No distress.   HENT:     Neck: Neck supple. No thyromegaly present.   Cardiovascular:   Irregularity.  Pacemaker.   Pulmonary/Chest: Breath sounds normal.   Abdominal: Bowel sounds are normal. There is no rebound.   Musculoskeletal: History of right shoulder replacement. .  Generalized deconditioning.  Chronic back pain.  Neurological: He is alert.   Psychiatric: His behavior is normal.          LABS:   Lab Results   Component Value Date    WBC 7.0 04/22/2019    HGB 9.5 (L) 04/22/2019    HCT 28.8 (L) 04/22/2019    MCV 86 04/22/2019     (L) 04/22/2019     Results for orders placed or performed in visit on 04/29/19   Basic Metabolic Panel   Result Value Ref Range    Sodium 137 136 - 145 mmol/L    Potassium 4.0 3.5 - 5.0 mmol/L    Chloride 103 98 - 107 mmol/L    CO2 24 22 - 31 mmol/L    Anion Gap, Calculation 10 5 - 18 mmol/L    Glucose 112 70 - 125 mg/dL    Calcium 10.3 8.5 - 10.5 mg/dL    BUN 33 (H) 8 - 28 mg/dL    Creatinine 2.32 (H) 0.70 - 1.30 mg/dL    GFR MDRD Af Amer 33 (L) >60 mL/min/1.73m2    GFR MDRD Non Af Amer 27 (L) >60 mL/min/1.73m2       April 26, 2019 magnesium 1.7 and in comparison to April 24 magnesium 1.5  ASSESSMENT:      ICD-10-CM    1. Weakness R53.1    2. Hypomagnesemia E83.42    3. Essential hypertension I10    4. Chronic systolic heart failure (H) I50.22        PLAN:    Decreasing his Slow-Mag once daily rather than twice daily adding FiberCon tablets as well as Culturelle for stock.  Continue to monitor his chronic medical conditions.  Is also being seen by the Coumadin clinic for his warfarin dosing.  Did talk again about the rehabilitation process.  Also last week I did look at his left shoulder it was a little tight and impingement but he states his been doing much better.  He does not see neurology until June 18, 2019.  .    Electronically signed by: Michael Duane Johnson, CNP

## 2021-05-28 NOTE — TELEPHONE ENCOUNTER
ANTICOAGULATION  MANAGEMENT    Assessment     Today's INR result of 3.4 is Supratherapeutic (goal INR of 2.0-3.0)        Previous INR was Therapeutic    Warfarin given as previously instructed    No new health/diet changes affecting INR    No new medication/supplements affecting INR    Continues to tolerate warfarin with no reported s/s of bleeding or thromboembolism       Plan:     Warfarin Dosing Orders:  Change warfarin dose to 3.75 mg daily on Fri; and 2.5 mg daily rest of week  (6 % change)    Next INR: 1 week.     Telephone orders given to nurseRhoda.  Orders read back correctly.     Edgar Ortez RN    Subjective/Objective:      Tanmay Israel, a 79 y.o. male is on warfarin. Facility nurse reports for Tanmay:    Other anticoagulants: No    Medication changes: No     Missed warfarin doses since last INR: No     Abnormal bleeding since last INR: No    New symptoms, injury or illness: No     Upcoming surgery, procedure or cardioversion: No    Recent INR Results:    Lab Results   Component Value Date    INR 3.40 (!) 05/07/2019    INR 2.20 (!) 04/30/2019    INR 2.10 (!) 04/26/2019       Anticoagulation Episode Summary     Current INR goal:   2.0-3.0   TTR:   57.7 % (4.2 y)   Next INR check:   5/14/2019   INR from last check:   3.40! (5/7/2019)   Weekly max warfarin dose:      Target end date:      INR check location:      Preferred lab:      Send INR reminders to:   ANTICOAGULATION POOL E ([facility])       Comments:            Anticoagulation Care Providers     Provider Role Specialty Phone number    Go Ramírez MD Referring Family Medicine [phone]

## 2021-05-28 NOTE — TELEPHONE ENCOUNTER
ANTICOAGULATION  MANAGEMENT    Assessment     Today's INR result of 2.1 is Therapeutic (goal INR of 2.0-3.0)        Previous INR was Subtherapeutic    Warfarin given as previously instructed    No new health/diet changes affecting INR    Interaction between Vanco and warfarin may be affecting INR- will be done 5/1.     Continues to tolerate warfarin with no reported s/s of bleeding or thromboembolism     Pt cancelled colonoscopy that was scheduled for 5/2.       Plan:     Warfarin Dosing Orders: Give 3.75 mg on Fri; 2.5 mg on Sat, Sun, Mon    Next INR: Tues 4/30.     Telephone orders given to nurseRhoda.  Orders read back correctly.     Edgar Ortez RN    Subjective/Objective:      Tanmay Israel, a 79 y.o. male is on warfarin. Facility nurse reports for Tanmay:    Other anticoagulants: No    Medication changes: No     Missed warfarin doses since last INR: No     Abnormal bleeding since last INR: No    New symptoms, injury or illness: No     Upcoming surgery, procedure or cardioversion: No    Recent INR Results:    Lab Results   Component Value Date    INR 2.10 (!) 04/26/2019    INR 1.60 (!) 04/23/2019    INR 1.66 (H) 04/22/2019       Anticoagulation Episode Summary     Current INR goal:   2.0-3.0   TTR:   57.6 % (4.2 y)   Next INR check:   4/30/2019   INR from last check:   2.10 (4/26/2019)   Weekly max warfarin dose:      Target end date:      INR check location:      Preferred lab:      Send INR reminders to:   ANTICOAGULATION POOL E ([facility])       Comments:            Anticoagulation Care Providers     Provider Role Specialty Phone number    Go Ramírez MD Referring Family Medicine [phone]

## 2021-05-28 NOTE — TELEPHONE ENCOUNTER
ANTICOAGULATION  MANAGEMENT    Assessment     Today's INR result of 2.2 is Therapeutic (goal INR of 2.0-3.0)        Previous INR was Therapeutic    Warfarin given as previously instructed    No new health/diet changes affecting INR    Interaction between Vanco and warfarin may be affecting INR- will take last dose today     Continues to tolerate warfarin with no reported s/s of bleeding or thromboembolism       Plan:     Warfarin Dosing Orders:  Change warfarin dose to 3.75 mg daily on Tue, Fri; and 2.5 mg daily rest of week  (0 % change from what pt took the past 7 days)    Next INR: 1 week.     Telephone orders given to nurseMansi.  Orders read back correctly.     Edgar Ortez RN    Subjective/Objective:      Tanmay Israel, a 79 y.o. male is on warfarin. Facility nurse reports for Tanmay:    Other anticoagulants: No    Medication changes: No     Missed warfarin doses since last INR: No     Abnormal bleeding since last INR: No    New symptoms, injury or illness: No     Upcoming surgery, procedure or cardioversion: No    Recent INR Results:    Lab Results   Component Value Date    INR 2.20 (!) 04/30/2019    INR 2.10 (!) 04/26/2019    INR 1.60 (!) 04/23/2019       Anticoagulation Episode Summary     Current INR goal:   2.0-3.0   TTR:   57.7 % (4.2 y)   Next INR check:   5/7/2019   INR from last check:   2.20 (4/30/2019)   Weekly max warfarin dose:      Target end date:      INR check location:      Preferred lab:      Send INR reminders to:   ANTICOAGULATION POOL E ([facility])       Comments:            Anticoagulation Care Providers     Provider Role Specialty Phone number    Go Ramírez MD Referring Family Medicine [phone]

## 2021-05-28 NOTE — PROGRESS NOTES
[facility]    Facility:   [facility] [[id number]]   Code Status: FULL CODE  PCP: Go Ramírez MD   Phone: [phone]   Fax: [phone]      CHIEF COMPLAINT/REASON FOR VISIT:  Chief Complaint   Patient presents with     Discharge Summary       HISTORY COURSE:  Tanmay is a 79 y.o. male who I had the chance and was asked to see secondary to his hospitalization April 20 - April 22, 2019 secondary to generalized weakness.  The generalized weakness was of unclear etiology it was extended to the upper extremities and lower extremities but no neck pain.  His workup did include a chest x-ray which was negative and clear.  He does have a prior right shoulder arthroplasty as well as a pacemaker.  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  CT of the cervical spine without contrast did show posterior hardware otherwise unremarkable along with severe right-sided facet arthropathy at C2-3 and severe left-sided facet arthropathy C3-4 but again unchanged from the previous CAT scan but he does have a history of C4-C6 laminoplasty's.  His laboratory studies on the 20th did show a hemoglobin of 9.9 white cells of 7.6 sodium 142 potassium 3.7 BUN 13 creatinine 1.36.  He is on warfarin secondary to his history of A. fib and pacemaker.  C. difficile was positive they did start him on oral vancomycin and stop the magnesium oxide.  He does have a history of CKD stage III but is stable.  A history of chronic systolic congestive heart failure which was compensated continue with current diuretics and Coreg.  I remember him from a previous transitional care unit stay when he was hospitalized February 27 - March 4, 2019 secondary to acute cholecystitis status post laparoscopic cholecystectomy.  He has done well from a therapy standpoint and overall has been able to  participate in succeed therapy recommendations.  He does have also have a history of shoulder osteoarthrosis primarily the left was giving him problems ice packs did help we did talk about further intervention but it did seem to resolve.  He did finish up his vancomycin on April 30.  Couple of days later did have a couple loose stools we did bulk him up using FiberCon as well as a probiotic.  Over the past couple of days he has had some nausea to which Zofran has been helpful he is taking a couple of doses otherwise feeling pretty good now his appetite has slowly improved he is taking in some fluids.  He has been normotensive and afebrile and also on room air.  He can have baclofen as needed for muscular spasms he did take a dose on May 6.  He also is being followed by the warfarin clinic.  No current heartburn or reflux.  We did have a chance to go over his rehabilitation stay but also his medications and also going home and having various services.    Review of Systems  Currently denies chills and fever coughing wheezing chest pain dizziness or vertigo nausea vomiting diarrhea or flulike symptoms headache or rashes or sores.  History of recent laparoscopic cholecystectomy hypertension CAD ICD CKD stage III heart failure cervical laminoplasty    There were no vitals filed for this visit.  Blood pressure 102/59 pulse 56 respirations 20 temperature 97.0 saturation room air 98%  Physical Exam   Constitutional: No distress.   HENT:     Neck: Neck supple. No thyromegaly present.   Cardiovascular:   Irregularity.  Pacemaker.   Pulmonary/Chest: Breath sounds normal.   Abdominal: Bowel sounds are normal. There is no rebound.   Musculoskeletal: History of right shoulder replacement. .  Generalized deconditioning.  Chronic back pain.  Neurological: He is alert.   Psychiatric: His behavior is normal.     Lab Results   Component Value Date    WBC 7.0 04/22/2019    HGB 9.5 (L) 04/22/2019    HCT 28.8 (L) 04/22/2019    MCV 86  04/22/2019     (L) 04/22/2019     Lab Results   Component Value Date    WBC 7.0 04/22/2019    HGB 9.5 (L) 04/22/2019    HCT 28.8 (L) 04/22/2019    MCV 86 04/22/2019     (L) 04/22/2019     Lab Results   Component Value Date    ALT 12 04/21/2019    AST 19 04/21/2019    ALKPHOS 201 (H) 04/21/2019    BILITOT 0.6 04/21/2019     Lab Results   Component Value Date    ALBUMIN 3.4 (L) 04/21/2019     Results for orders placed or performed in visit on 05/02/19   Basic Metabolic Panel   Result Value Ref Range    Sodium 140 136 - 145 mmol/L    Potassium 4.3 3.5 - 5.0 mmol/L    Chloride 107 98 - 107 mmol/L    CO2 24 22 - 31 mmol/L    Anion Gap, Calculation 9 5 - 18 mmol/L    Glucose 85 70 - 125 mg/dL    Calcium 10.2 8.5 - 10.5 mg/dL    BUN 45 (H) 8 - 28 mg/dL    Creatinine 2.68 (H) 0.70 - 1.30 mg/dL    GFR MDRD Af Amer 28 (L) >60 mL/min/1.73m2    GFR MDRD Non Af Amer 23 (L) >60 mL/min/1.73m2         MEDICATION LIST:  Current Outpatient Medications   Medication Sig     baclofen (LIORESAL) 10 MG tablet Take 1 tablet (10 mg total) by mouth every 8 (eight) hours as needed.     bumetanide (BUMEX) 1 MG tablet Take 2 mg by mouth 2 (two) times a day.     carvedilol (COREG) 25 MG tablet Take 37.5 mg by mouth 2 (two) times a day with meals.     cholecalciferol, vitamin D3, 1,000 unit tablet Take 2,000 Units by mouth daily.     FERROUS FUMARATE (IRON ORAL) Take 1 tablet by mouth daily.      losartan (COZAAR) 100 MG tablet Take 100 mg by mouth daily.     magnesium chloride (SLOW-MAG) 64 mg TbEC delayed-release tablet Take 64 mg by mouth daily.            mecobalamin, vitamin B12, 1,000 mcg TbDL Place 1 tablet (1,000 mcg total) under the tongue daily.     multivitamin with iron (ONE DAILY WITH IRON) Tab tablet Take 1 tablet by mouth daily.      omeprazole (PRILOSEC) 20 MG capsule Take 20 mg by mouth daily before breakfast.            potassium chloride (KLOR-CON) 20 mEq packet Take 20 mEq by mouth daily.     rosuvastatin  (CRESTOR) 20 MG tablet Take 1 tablet (20 mg total) by mouth at bedtime.     warfarin (COUMADIN) 3 MG tablet Take 1 tablet (3 mg total) by mouth See Admin Instructions.       DISCHARGE DIAGNOSIS:    ICD-10-CM    1. Chronic systolic heart failure (H) I50.22    2. Essential hypertension I10    3. Ischemic cardiomyopathy I25.5    4. Lumbar stenosis with neurogenic claudication M48.062        MEDICAL EQUIPMENT NEEDS:  None    DISCHARGE PLAN/FACE TO FACE:  I certify that services are/were furnished while this patient was under the care of a physician and that a physician or an allowed non-physician practitioner (NPP), had a face-to-face encounter that meets the physician face-to-face encounter requirements. The encounter was in whole, or in part, related to the primary reason for home health. The patient is confined to his/her home and needs intermittent skilled nursing, physical therapy, speech-language pathology, or the continued need for occupational therapy. A plan of care has been established by a physician and is periodically reviewed by a physician.  Date of Face-to-Face Encounter: May 9, 2019    I certify that, based on my findings, the following services are medically necessary home health services: He will be discharging to home with current medications and the approximated discharge date is May 11, 2019.  He will also receive physical and occupational therapy home health aide and nursing.  Home care agency has not yet been identified.    My clinical findings support the need for the above skilled services because: (Please write a brief narrative summary that describes what the RN, PT, SLP, or other services will be doing in the home. A list of diagnoses in this section does not meet the CMS requirements.)  He will require the various skilled services secondary to his recent hospitalization for weakness along with chronic medical conditions home safety self-care deficits and medication management.    This  patient is homebound because: (Please write a brief narrative summary describing the functional limitations as to why this patient is homebound and specifically what makes this patient homebound.)  Secondary to multiple chronic medical conditions including his most recent hospitalization for weakness but also home safety transfers exercises self-care deficits and medication management.    The patient is, or has been, under my care and I have initiated the establishment of the plan of care. This patient will be followed by a physician who will periodically review the plan of care.    Schedule follow up visit with primary care provider within 7 days to reestablish care.  He is aware of his most recent laboratory studies as well as following up with his primary care doctor to repeat his BMP as well as try to keep himself hydrated and drinking water and certainly keeping in mind his other chronic medical conditions as well as being alerted to any problems with CHF.  Again he is being followed by the Coumadin clinic.  He does have a visit with neurology on June 18.  He also follow-up with cardiology and at this point does not recall the exact date but he will be following up with cardiology.  He did not have any further questions.    Discharge coordination of care greater than 30 minutes  Electronically signed by: Michael Duane Johnson, CNP

## 2021-05-28 NOTE — PROGRESS NOTES
[facility] Patient Outreach:     Discharge Date::  5/19/19      Reason for TCU stay (discharge diagnosis)::  Generalized weakness, C-diff      Are you feeling better, the same or worse since your discharge?:  Patient is feeling the same (still having loose stool.  Stil taking antibiotic for C-diff.  )          As part of your discharge plan, did they discuss home care with you?: Yes        Have your seen them yet, or are they scheduled to visit?: Yes (Home RN saw patient today.  )                Do you have any follow up visits scheduled with your PCP or Specialist?:  Yes, with PCP      (RN) Is it scheduled soon enough (3-5 days)?: Yes        I'm glad to hear you're doing well and we want you to continue to do well. Your PCP would like to see you for a follow-up visit. Can we help set that up for your today?: No        (RN) Provided patient the PCP's phone number to call if they have any questions or concerns?: No (Patient seeing PCP's office tomorrow.  )

## 2021-05-28 NOTE — PROGRESS NOTES
[facility]    Facility:   [facility] [[id number]]   Code Status: FULL CODE      CHIEF COMPLAINT/REASON FOR VISIT:  Chief Complaint   Patient presents with     Follow Up     rehab, weak       HISTORY:      HPI: Tanmay is a 79 y.o. male who I had the chance to visit with secondary to his hospitalization April 20 through April 22, 2019 secondary to generalized weakness of unclear etiology.  He currently was doing fairly well the end of last week after adding in the probiotic as well as fiber, twice daily.  Overall had a pretty good weekend.  His shoulders are not bothering him anymore.  Does have a pacemaker.  Has been normotensive and afebrile.  Getting extra mighty shakes.  He was treated for C. difficile currently asymptomatic.  Able to use a walker his strength has improved his balance is also improved he is overall hoping to perhaps go home in another week or so he does not see neurology until June.  He does have some generalized weakness and debility along with a history of laminoplasty/cervical.  He is on warfarin being followed by the Coumadin clinic.    Past Medical History:   Diagnosis Date     Anemia      Arthritis      Atrial fibrillation (H)      Back pain      Callus      Cerebral aneurysm      Cervical spinal stenosis      Chronic kidney disease     stage 3     Chronic systolic congestive heart failure (H)      Coronary artery disease      Crohn's disease (H)      Dysphagia      GERD (gastroesophageal reflux disease)      Hyperlipidemia      Hypertension      ICD (implantable cardioverter-defibrillator) in place     [employer]     Ischemic cardiomyopathy      Kidney stone      Low back pain      Macular degeneration      Permanent atrial fibrillation (H)     [id number] risk score = 5 (age3/ CAD/ HTN/ CHF) Chronic warfarin     Right rotator cuff tendinitis      Schatzki's ring      Shortness of breath      Sleep apnea     no CPAP             Family History    Problem Relation Age of Onset     Heart disease Mother      Stroke Mother      Crohn's disease Father      Alcohol abuse Brother      No Medical Problems Daughter      No Medical Problems Son      No Medical Problems Maternal Aunt      No Medical Problems Maternal Uncle      No Medical Problems Paternal Aunt      No Medical Problems Paternal Uncle      No Medical Problems Maternal Grandmother      No Medical Problems Maternal Grandfather      No Medical Problems Paternal Grandmother      No Medical Problems Paternal Grandfather      Cancer Brother      Urolithiasis Neg Hx      Gout Neg Hx      Social History     Socioeconomic History     Marital status: Single     Spouse name: Not on file     Number of children: Not on file     Years of education: Not on file     Highest education level: Not on file   Occupational History     Occupation: retired     Comment: [employer]   Social Needs     Financial resource strain: Not on file     Food insecurity:     Worry: Not on file     Inability: Not on file     Transportation needs:     Medical: Not on file     Non-medical: Not on file   Tobacco Use     Smoking status: Former Smoker     Smokeless tobacco: Never Used   Substance and Sexual Activity     Alcohol use: No     Drug use: No     Sexual activity: Not on file   Lifestyle     Physical activity:     Days per week: Not on file     Minutes per session: Not on file     Stress: Not on file   Relationships     Social connections:     Talks on phone: Not on file     Gets together: Not on file     Attends Spiritism service: Not on file     Active member of club or organization: Not on file     Attends meetings of clubs or organizations: Not on file     Relationship status: Not on file     Intimate partner violence:     Fear of current or ex partner: Not on file     Emotionally abused: Not on file     Physically abused: Not on file     Forced sexual activity: Not on file   Other Topics Concern     Not on file   Social History  Narrative     Not on file         Review of Systems  Currently denies chills and fever coughing wheezing chest pain dizziness or vertigo nausea vomiting diarrhea or flulike symptoms headache or rashes or sores.  History of recent laparoscopic cholecystectomy hypertension CAD ICD CKD stage III heart failure cervical laminoplasty         /  Current Outpatient Medications   Medication Sig Note     baclofen (LIORESAL) 10 MG tablet Take 1 tablet (10 mg total) by mouth every 8 (eight) hours as needed.      bumetanide (BUMEX) 1 MG tablet Take 2 mg by mouth 2 (two) times a day. 4/20/2019: Patient takes 3 tablets in the morning daily.      carvedilol (COREG) 25 MG tablet Take 37.5 mg by mouth 2 (two) times a day with meals.      cholecalciferol, vitamin D3, 1,000 unit tablet Take 2,000 Units by mouth daily.      FERROUS FUMARATE (IRON ORAL) Take 1 tablet by mouth daily.       losartan (COZAAR) 100 MG tablet Take 100 mg by mouth daily.      magnesium chloride (SLOW-MAG) 64 mg TbEC delayed-release tablet Take 64 mg by mouth daily.             mecobalamin, vitamin B12, 1,000 mcg TbDL Place 1 tablet (1,000 mcg total) under the tongue daily.      multivitamin with iron (ONE DAILY WITH IRON) Tab tablet Take 1 tablet by mouth daily.       omeprazole (PRILOSEC) 20 MG capsule Take 20 mg by mouth daily before breakfast.             potassium chloride (KLOR-CON) 20 mEq packet Take 20 mEq by mouth daily. 4/20/2019: Potassium chloride was prescribed on 4/19 for a 5 day course. Patient took the first dose yesterday.     rosuvastatin (CRESTOR) 20 MG tablet Take 1 tablet (20 mg total) by mouth at bedtime.      warfarin (COUMADIN) 3 MG tablet Take 1 tablet (3 mg total) by mouth See Admin Instructions.        .There were no vitals filed for this visit.  Blood pressure 116/68 pulse 66 weight 159 pounds  Physical Exam   Constitutional: No distress.   HENT:     Neck: Neck supple. No thyromegaly present.   Cardiovascular:   Irregularity.   Pacemaker.   Pulmonary/Chest: Breath sounds normal.   Abdominal: Bowel sounds are normal. There is no rebound.   Musculoskeletal: History of right shoulder replacement. .  Generalized deconditioning.  Chronic back pain.  Neurological: He is alert.   Psychiatric: His behavior is normal.     LABS:   Lab Results   Component Value Date    WBC 7.0 04/22/2019    HGB 9.5 (L) 04/22/2019    HCT 28.8 (L) 04/22/2019    MCV 86 04/22/2019     (L) 04/22/2019     Results for orders placed or performed in visit on 05/02/19   Basic Metabolic Panel   Result Value Ref Range    Sodium 140 136 - 145 mmol/L    Potassium 4.3 3.5 - 5.0 mmol/L    Chloride 107 98 - 107 mmol/L    CO2 24 22 - 31 mmol/L    Anion Gap, Calculation 9 5 - 18 mmol/L    Glucose 85 70 - 125 mg/dL    Calcium 10.2 8.5 - 10.5 mg/dL    BUN 45 (H) 8 - 28 mg/dL    Creatinine 2.68 (H) 0.70 - 1.30 mg/dL    GFR MDRD Af Amer 28 (L) >60 mL/min/1.73m2    GFR MDRD Non Af Amer 23 (L) >60 mL/min/1.73m2           ASSESSMENT:      ICD-10-CM    1. Spondylolisthesis of lumbar region M43.16    2. Lumbar stenosis with neurogenic claudication M48.062    3. Essential hypertension I10    4. Chronic systolic heart failure (H) I50.22        PLAN:    No new changes at this time.  Is made pretty good progress he did finish his vancomycin on April 30.  He wishes to continue to work with therapy he is made pretty good progress [place] in his own eyes.  We will continue to manage and follow.      Electronically signed by: Michael Duane Johnson, CNP

## 2021-05-29 ENCOUNTER — RECORDS - HEALTHEAST (OUTPATIENT)
Dept: ADMINISTRATIVE | Facility: CLINIC | Age: 81
End: 2021-05-29

## 2021-05-29 NOTE — PROGRESS NOTES
Patient ID: Tanmay Israel is a 79 y.o. male.  /52   Pulse 66   Wt 166 lb (75.3 kg)   SpO2 99%   BMI 24.51 kg/m      Assessment/Plan:                   Diagnoses and all orders for this visit:    Stage 3 chronic kidney disease (H)  -     Basic Metabolic Panel    Chronic atrial fibrillation (H)  -     INR  -     Cancel: INR    Anemia, unspecified type  -     HM2(CBC w/o Differential)    Clostridium difficile diarrhea    Weakness    Chronic systolic heart failure (H)    S/P laparoscopic cholecystectomy    S/P cervical spinal fusion    Weight loss        DISCUSSION  Reviewed medications.  Obtain labs as above.  Follow-up with specialty providers.  Subjective:     HPI    Tanmay Israel is a 79 y.o. male who is here today for follow-up.  This past winter he had cervical spine surgery undergoing a cervical spine fusion.  Following that he developed acute cholecystitis requiring a cholecystectomy.  Following that he developed C. difficile colitis.  He was initiated on treatment.  He developed profound weakness resulting in several hospitalizations.  Most recently he was hospitalized because of acute renal failure with a creatinine of 5.  He was also found to have an anemia with a hemoglobin of 8.5.  Medications were adjusted and he was ultimately discharged.  His treatment for C. difficile colitis was changed from vancomycin to Dificid.  He is reporting that his stools have returned to a near normal state at this point.  He reports increasing appetite and increasing energy.  Today we reviewed his medications, reviewed labs including abnormal labs.  Reviewed other tests.  Reviewed other outpatient follow-up visits preceding this visit.    He plans to continue on the Dificid until June 17 under the direction of ID.  He is scheduled for follow-up with nephrology.  Fortunately his creatinine seems to have returned to near his usual baseline prior to discharge his hemoglobin was noted to be 8.5 as of the 23rd and had not  been rechecked.  We discussed reassessment of labs today.  In reviewing medications patient had questions and they were answered.  We discussed discontinuation of baclofen as it seems unnecessary at this point and may be contributing to fatigue and weakness.  He is holding his Bumex and weighing himself daily.  He gained 1 pound yesterday but his weight has otherwise been very stable.  He is avoiding salt in his diet.  Reviewed some specific dietary considerations noting that his overall nutrition seems to be increasing significantly just in the past week.  We discussed considerations that can lead to recurrent C. difficile namely usage of other antibiotics.    With his low hemoglobin he does not have any history of blood loss.  He reports no blood loss concern.  He is anticoagulated.  He does have a chronic kidney disease consideration which may be contributing factor as well as the colitis described.    May need medication adjustment for chronic congestive heart failure in the future as his clinical state improves.  We will hold on restarting his losartan at this time given low normal blood pressure and recent creatinine elevation.    Review of Systems  Complete review of systems is obtained.  Other than the specific considerations noted above complete review of systems is negative.          Objective:   Medications:  Current Outpatient Medications   Medication Sig     bumetanide (BUMEX) 1 MG tablet Take 1 tablet (1 mg total) by mouth see administration instructions. For weight gain >3lbs.     carvedilol (COREG) 25 MG tablet Take 37.5 mg by mouth 2 (two) times a day with meals.     cholecalciferol, vitamin D3, 1,000 unit tablet Take 2,000 Units by mouth daily.     FERROUS FUMARATE (IRON ORAL) Take 1 tablet by mouth daily.      fidaxomicin (DIFICID) tablet 200mg PO two times a day 5/23-5/27. Then 200mg PO every other day x 10 doses starting 5/29.     multivitamin with iron (ONE DAILY WITH IRON) Tab tablet Take 1  tablet by mouth daily.      omeprazole (PRILOSEC) 20 MG capsule Take 20 mg by mouth daily before breakfast.            potassium chloride (K-DUR,KLOR-CON) 20 MEQ tablet Take 20 mEq by mouth daily.     rosuvastatin (CRESTOR) 20 MG tablet Take 1 tablet (20 mg total) by mouth at bedtime.     warfarin (COUMADIN/JANTOVEN) 2.5 MG tablet Take 1.25-2.5 mg by mouth See Admin Instructions. Take 1.25 mg (half-tablet) on Wednesdays. Take 2.5 mg (1 tablet) all other days of the week.       Allergies:  No Known Allergies    Tobacco:   reports that he has quit smoking. He has never used smokeless tobacco.     Physical Exam          /52   Pulse 66   Wt 166 lb (75.3 kg)   SpO2 99%   BMI 24.51 kg/m          General Appearance:    Alert, cooperative, no distress, he is much more pale frail appearance than prior   Eyes:   No scleral icterus or conjunctival irritation       Lungs:     Clear to auscultation bilaterally, respirations unlabored, no wheezes or crackles   Heart:    Regular rate and rhythm,  No murmur   Abdomen:    Soft, no distention, no tenderness on palpation, no masses, no organomegaly     Extremities:  No edema, no joint swelling or redness, no evidence of any injuries   Skin:  No concerning skin findings, no suspicious moles, no rashes   Neurologic:  On gross examination there is no motor or sensory deficit.  Patient walks with a normal gait

## 2021-05-29 NOTE — TELEPHONE ENCOUNTER
Per Dr. Cesar, we will be able to re-submit injection for insurance this Wednesday and will see what they say. Pt was informed.   SANDY Alvarado

## 2021-05-29 NOTE — TELEPHONE ENCOUNTER
Referral Request  Type of referral: spine clinic  Who s requesting: Patient  Why the request: would like to get an injection for his low back pain  Have you been seen for this request: No:  Appointment Offered:  declined  Does patient have a preference on a group/provider? no  Okay to leave a detailed message?  Yes

## 2021-05-29 NOTE — TELEPHONE ENCOUNTER
ANTICOAGULATION  MANAGEMENT    Assessment     Today's INR result of 3.2 is Supratherapeutic (goal INR of 2.0-3.0)        Warfarin taken as previously instructed    No new diet changes affecting INR    No new medication/supplements affecting INR    Continues to tolerate warfarin with no reported s/s of bleeding or thromboembolism     Previous INR was Therapeutic    Plan:     Spoke with Tanmay regarding INR result and instructed:     Warfarin Dosing Instructions: (Pt already took 2.5 mg today).  Change warfarin dose to 1.25 mg daily on Wed, Sat; and 2.5 mg daily rest of week  (7.7 % change)    Instructed patient to follow up no later than: 2 weeks.     Education provided: importance of taking warfarin as instructed    Tanmay verbalizes understanding and agrees to warfarin dosing plan.    Instructed to call the [facility] for any changes, questions or concerns. (#[phone])   ?   Edgar Ortez RN    Subjective/Objective:      Tanmay Israel, a 79 y.o. male is on warfarin.     Tanmay reports:     Home warfarin dose: verbally confirmed home dose with pt and updated on anticoagulation calendar     Missed doses: No     Medication changes:  No     S/S of bleeding or thromboembolism:  No     New Injury or illness:  No     Changes in diet or alcohol consumption:  No     Upcoming surgery, procedure or cardioversion:  No    Anticoagulation Episode Summary     Current INR goal:   2.0-3.0   TTR:   57.4 % (4.3 y)   Next INR check:   6/17/2019   INR from last check:   3.20! (6/3/2019)   Weekly max warfarin dose:      Target end date:      INR check location:      Preferred lab:      Send INR reminders to:   CHANI RIOS       Comments:            Anticoagulation Care Providers     Provider Role Specialty Phone number    Go Ramírez MD Referring Family Medicine [phone]

## 2021-05-29 NOTE — TELEPHONE ENCOUNTER
I called and spoke pt again. Explained to him that he could pay out of pocket but Dr. Cesar is having her team work on his injection. Unfortunately, we are a different department so I'm not sure where things are at but I'm assuming [facility] will reach out to him with any updates.     Informed pt that we will need to cancel his injection on 6/13/19 due to the denial from his insurance. Pt was ok cancelling his injection for tomorrow.     Informed pt to contact [facility] if he don't hear from them. Again, [facility] does the injection but the ordering provider is with [facility] so they will need to contact his insurance. Pt verbally understood and will call [facility] if he doesn't hear from them.

## 2021-05-29 NOTE — PROGRESS NOTES
Assessment/Plan:  1. Hospital discharge follow-up  2. Weakness  3. Clostridium difficile diarrhea  Hospital discharge follow-up visit completed today.  Reviewed pertinent labs and discharge notes.  Patient continues to have some weakness, feeling fatigued.  Discussed that this could be related to C. difficile and dehydration.  He remains on vancomycin for treatment of C. difficile.  Will check comprehensive metabolic panel and complete blood count today to ensure stability.  Per patient's request, will place referral to infectious disease for further evaluation and management of recurring C. difficile.  In the meantime, we discussed red flag symptoms that would warrant immediate evaluation in the ER.  He has follow-up visit with his neurologist scheduled in upcoming weeks to discuss possible correlation between recent C-spine procedure and symptoms, possible EMG.  - Comprehensive Metabolic Panel  - HM2(CBC w/o Differential)  - Ambulatory referral to Infectious Disease    4. Benign Essential Hypertension  Blood pressure stable today 120/62.  Continue with losartan, carvedilol as prescribed.    5. Chronic atrial fibrillation (H)  Rate controlled.  Continued carvedilol and Coumadin.  - INR    6. Chronic systolic heart failure (H)  Patient is euvolemic.  Continue carvedilol, Bumex.  We will check electrolytes today follow-up with patient regarding results available.     7. Stage 3 chronic kidney disease (H)  Chronic kidney disease, will check comprehensive metabolic panel today.      Subjective:    Tanmay Israel is a 79 year old male seen today for an inpatient follow up.  He is accompanied by his niece today.  He presented to [facility] emergency department on 4/20/2019 with generalized weakness and difficulty getting out of his bed.  There is no clear etiology found to explain his symptoms.  Patient recently had a C-spine surgical procedure with neurosurgery.  Neurosurgery in the hospital do not feel that this was a  potential cause however patient was advised to follow-up with his neurosurgeon in upcoming weeks to discuss possible EMG to further evaluate potential causes of his weakness.  Patient tested positive for C. difficile.  He was started on vancomycin.  Reports that this is been his third bout of C. difficile this year.  Is very frustrated by this and his requesting referral to infectious disease to further discuss.  Patient has history of chronic kidney disease stage III, and chronic systolic heart failure.  He remains on carvedilol and Bumex.  Continues use of losartan for management of blood pressure.  To fibrillation, rate controlled.  Continues Coumadin.  Today, patient reports feeling fatigued and somewhat weak still.  He continues to have loose bowel movements.  He is frustrated by these ongoing symptoms without any clear etiology.  He denies any chest pain or shortness of breath.  No lower extremity edema.  He is reports that patient has not been eating as much as he should.  He has decreased appetite.  She has been trying to find supplement drinks for him to take to ensure that he is staying hydrated etc. Review of systems is as stated in HPI, and the remainder of the 10 system review is otherwise unremarkable.    Per discharge note:    1. Generalized weakness of unclear etiology.  Weakness extends to upper extremities and lower extremities no neck pain.  Recent C-spine surgical procedure neurosurgery informed by ER who feel this is un related.    - appreciate neurology consult follow-up with neurology in 6 to 8 weeks depending on course may need an EMG  - Head CT and cervical negative   - PT and OT evaluation.  - B12 low normal B12 subcu 1000 mcg x 1 B12 1000 mcg p.o. Daily  - MG panel pending   2. Atrial fibrillation rate controlled   - pharmacy to dose Coumadin but sending out slightly increased due to low INR   - continue coreg   - INR check tomorrow   3. CDiff  - enteric precautions  - start oral vanco   -  stop mag ox   4. Electrolyte abnormalities continue supplementation per protocol  - recheck labs at TCU   5. Chronic kidney disease stage III stable   6. Chronic systolic CHf Ischemic cardiomyopathy compensated   - continue coreg and bumex  - monitor lytes   - continue arb   7. Cervical myelopathy post recent C-spine surgery last admission neurosurgery was consulted for same weakness and felt was unrelated to surgical procedure follow neurosurgery as outpatient  - PT/OT   - may need EMG in the future per neuro and f/u with them   Follow-up with neurosurgery in regards to his past neck surgery per their recommendations question loose screw on CT of the neck stable compared to previous scan      Past Medical History, Family History, and Social History reviewed.    Past Surgical History:   Procedure Laterality Date     APPENDECTOMY       CARDIAC PACEMAKER PLACEMENT  2013    ICD Medtronic     CATARACT EXTRACTION W/  INTRAOCULAR LENS IMPLANT Bilateral      CHOLECYSTECTOMY       ESOPHAGOGASTRODUODENOSCOPY       N[place] C- LAMINOPLASTY, 2 OR MORE Left 2/22/2019    Procedure: LEFT CERVICAL 4, 5, 6 LAMINOPLASTY;  Surgeon: Liza Cesar MD;  Location: [facility];  Service: Spine     NJ CABG, VEIN, FOUR      Description: Coronary Artery Quadruple Venous Bypass Graft;  Recorded: 10/21/2008;  Comments: 8/2004     NJ LAP,CHOLECYSTECTOMY N/A 2/28/2019    Procedure: CHOLECYSTECTOMY, LAPAROSCOPIC;  Surgeon: Mana Roman MD;  [facility];  Service: General     ROTATOR CUFF REPAIR Right      TONSILLECTOMY       XR MYELOGRAM CERVICAL THORACIC LUMBAR  1/17/2019        Family History   Problem Relation Age of Onset     Heart disease Mother      Stroke Mother      Crohn's disease Father      Alcohol abuse Brother      No Medical Problems Daughter      No Medical Problems Son      No Medical Problems Maternal Aunt      No Medical Problems Maternal Uncle      No Medical Problems Paternal Aunt      No  Medical Problems Paternal Uncle      No Medical Problems Maternal Grandmother      No Medical Problems Maternal Grandfather      No Medical Problems Paternal Grandmother      No Medical Problems Paternal Grandfather      Cancer Brother      Urolithiasis Neg Hx      Gout Neg Hx         Past Medical History:   Diagnosis Date     Anemia      Arthritis      Atrial fibrillation (H)      Back pain      Callus      Cerebral aneurysm      Cervical spinal stenosis      Chronic kidney disease     stage 3     Chronic systolic congestive heart failure (H)      Coronary artery disease      Crohn's disease (H)      Dysphagia      GERD (gastroesophageal reflux disease)      Hyperlipidemia      Hypertension      ICD (implantable cardioverter-defibrillator) in place     Medtronic     Ischemic cardiomyopathy      Kidney stone      Low back pain      Macular degeneration      Permanent atrial fibrillation (H)     [id number] risk score = 5 (age3/ CAD/ HTN/ CHF) Chronic warfarin     Right rotator cuff tendinitis      Schatzki's ring      Shortness of breath      Sleep apnea     no CPAP        Social History     Tobacco Use     Smoking status: Former Smoker     Smokeless tobacco: Never Used   Substance Use Topics     Alcohol use: No     Drug use: No        Current Outpatient Medications   Medication Sig Dispense Refill     baclofen (LIORESAL) 10 MG tablet Take 1 tablet (10 mg total) by mouth every 8 (eight) hours as needed. 20 tablet 0     bumetanide (BUMEX) 1 MG tablet Take 2 mg by mouth 2 (two) times a day.       carvedilol (COREG) 25 MG tablet Take 37.5 mg by mouth 2 (two) times a day with meals.       cholecalciferol, vitamin D3, 1,000 unit tablet Take 2,000 Units by mouth daily.       FERROUS FUMARATE (IRON ORAL) Take 1 tablet by mouth daily.        losartan (COZAAR) 100 MG tablet Take 100 mg by mouth daily.       magnesium chloride (SLOW-MAG) 64 mg TbEC delayed-release tablet Take 64 mg by mouth daily.              mecobalamin,  vitamin B12, 1,000 mcg TbDL Place 1 tablet (1,000 mcg total) under the tongue daily.  0     multivitamin with iron (ONE DAILY WITH IRON) Tab tablet Take 1 tablet by mouth daily.        omeprazole (PRILOSEC) 20 MG capsule Take 20 mg by mouth daily before breakfast.              potassium chloride (KLOR-CON) 20 mEq packet Take 20 mEq by mouth daily. 5 tablet 0     rosuvastatin (CRESTOR) 20 MG tablet Take 1 tablet (20 mg total) by mouth at bedtime. 90 tablet 3     warfarin (COUMADIN) 3 MG tablet Take 1 tablet (3 mg total) by mouth See Admin Instructions. 10 tablet 0     No current facility-administered medications for this visit.           Objective:    Vitals:    05/21/19 1026   BP: 120/62   Patient Site: Right Arm   Patient Position: Sitting   Cuff Size: Adult Regular   Pulse: 68   Weight: 160 lb 12.8 oz (72.9 kg)      Body mass index is 23.75 kg/m .      General Appearance:  Alert, cooperative, no distress, appears stated age   HEENT:  Normal.  No acute findings.   Neck: Supple, symmetrical, no adenopathy.   Lungs:   Clear to auscultation bilaterally, respirations unlabored.    Heart:  Regular rate and rhythm, S1, S2 normal, no murmur, rub or gallop   Abdomen:   Soft, non-tender, positive bowel sounds, no masses, no organomegaly   Extremities: Extremities normal.  No cyanosis or edema. Equal strength and sensation bilaterally.    Skin: Warm, dry.  Skin color, texture, turgor normal, no rashes or lesions   Neurologic: Alert and oriented x 3. Grossly intact. He I using wheelchair, transfers slowly.          This note has been dictated using voice recognition software. Any grammatical or context distortions are unintentional and inherent to the use of this software.

## 2021-05-29 NOTE — TELEPHONE ENCOUNTER
Orders being requested: skilled nursing x1 wk x2 wks, 2 prn  PT & OT assessment  Social work evaluation  INR orders     Reason service is needed/diagnosis: patient care  When are orders needed by: asap  Where to send Orders: Phone:  Verbal  Okay to leave detailed message?  Yes

## 2021-05-29 NOTE — TELEPHONE ENCOUNTER
Left message to call back for: orders request  Information to relay to patient:  Notified ok for requested order per Dr. Ramírez via

## 2021-05-29 NOTE — TELEPHONE ENCOUNTER
Orders being requested: Physical therapy.  - One time a week for four weeks .  Reason service is needed/diagnosis: Strengthing ,Endurance , and balance .   When are orders needed by: Today  Where to send Orders: Verbal order:KRIS Herrera from [facility]-[id number]   Okay to leave detailed message?  Yes

## 2021-05-29 NOTE — TELEPHONE ENCOUNTER
Please advise when you want the patients INR checked again?  Lab Results   Component Value Date    INR 2.43 (H) 05/25/2019    INR 2.36 (H) 05/24/2019    INR 2.27 (H) 05/23/2019       Reason contacted:  Orders request  Information relayed:  Notified ok for requested order per Dr. Ramírez.   Additional questions:  Yes  Further follow-up needed:  Yes  Okay to leave a detailed message:  Yes

## 2021-05-29 NOTE — TELEPHONE ENCOUNTER
Please see message below and place order to [facility] if you agree. Thank you!

## 2021-05-29 NOTE — TELEPHONE ENCOUNTER
Not sure if Samina Gao ([facility] coordinator) has seen Dr. Cesar's message yet. Samina Gao doesn't seem to be in clinic today. I will reach out to pt regarding cancelling his injection.    _______________________________    I called and spoke with pt. Explained to pt that his R SI injection was denied by [facility] on 4/30/19 which he was not aware of. Per pt, he has been having phone issues and finally got a new phone. Plus he was recently hospitalized for something else. Informed pt, since his injection was denied on 4/30/19 we will need to cancel his injection tomorrow 6/13/19. Pt did not meet the medical necessity and more info is needed per [facility] medical director. Dr. Cesar who is the ordering provider will need to do a [id number] review and we will need to wait to hear back from his insurance after the [id number] before rescheduling. He wanted to know if Dr. Cesar was in clinic today or doing surgery. Informed pt I don't know, Dr. Cesar works in a different department so I don't know. Anyway, Dr Cesar is the ordering provider and not [facility] so her office will need to work on this [id number].    Pt stated, he is in so much pain and can't  wait due to his right SI pain. He really want to get this injection and want to know the out of pocket cost. I'm not sure of that james but I could ask Gwen and then call him back.

## 2021-05-29 NOTE — PROGRESS NOTES
Assessment/Plan:    1. Bilateral lower extremity edema    2. Chronic systolic heart failure (H)  Worsening lower extremity edema over the last 5 days.  No respiratory symptoms, lung sounds clear on exam today.  Will have patient increase his bumetanide to 1 mg by mouth twice daily.  He will follow-up in clinic with Dr. Ramírez on Monday or Tuesday of next week to reassess symptoms.  We discussed worsening symptoms that would warrant immediate evaluation.  He understands this.    3. Chronic atrial fibrillation (H)  He remains anticoagulated on warfarin.  Will check INR today.  - INR    4. Ataxia  Patient's neurologist is requesting to have CK level drawn today.  Will fax results to neurology.  - CK Total    5.  C. Difficile  Patient finished antibiotic treatment for C. difficile 4 days ago.  Discussed that it is too soon to retest today.  Will have patient leave stool sample at follow-up appointment next week.    Subjective:    Tanmay Israel is a 79 year old male seen today for evaluation of bilateral lower extremity edema.  He is accompanied by his nephew today.  Past medical history includes hypertension, atrial fibrillation, heart failure, hyperlipidemia, DANIEL, recent C. difficile diagnosis, CKD stage III.  Patient back surgery earlier this year and continues to follow with neurology for management of chronic back pain.    Patient noticed worsening lower extremity edema on Saturday, 4 days ago.  Prior to that patient had been holding his Bumex.  Once edema developed on Saturday, he started taking 1 tablet each day.  Previously was prescribed 2 tablets by mouth twice daily.  Has noticed a slight improvement in edema since doing this.  At the time he noticed edema he was having some mild shortness of breath which has since improved.  He denies any continued symptoms of shortness of breath today.  No chest pain.  No other symptoms that he is concerned about in this regard.    Additionally, patient has been under the care  of infectious disease for management of C. difficile.  States that he finished antibiotic 4 days ago and has brought in a stool sample today to recheck.  States that stools have slowly been improving.      He does remain anticoagulated.  Denies any concern for bleeding or blood loss.  He does not have any additional concerns today.    Review of systems is as stated in HPI, and the remainder of the 10 system review is otherwise unremarkable.    Past Medical History, Family History, and Social History reviewed.    Past Surgical History:   Procedure Laterality Date     APPENDECTOMY       CARDIAC PACEMAKER PLACEMENT  2013    ICD Medtronic     CATARACT EXTRACTION W/  INTRAOCULAR LENS IMPLANT Bilateral      CHOLECYSTECTOMY       ESOPHAGOGASTRODUODENOSCOPY       RI C- LAMINOPLASTY, 2 OR MORE Left 2/22/2019    Procedure: LEFT CERVICAL 4, 5, 6 LAMINOPLASTY;  Surgeon: Liza Cesar MD;  Location: [facility];  Service: Spine     RI CABG, VEIN, FOUR      Description: Coronary Artery Quadruple Venous Bypass Graft;  Recorded: 10/21/2008;  Comments: 8/2004     RI LAP,CHOLECYSTECTOMY N/A 2/28/2019    Procedure: CHOLECYSTECTOMY, LAPAROSCOPIC;  Surgeon: Mana Roman MD;  Location: [facility];  Service: General     ROTATOR CUFF REPAIR Right      TONSILLECTOMY       XR MYELOGRAM CERVICAL THORACIC LUMBAR  1/17/2019        Family History   Problem Relation Age of Onset     Heart disease Mother      Stroke Mother      Crohn's disease Father      Alcohol abuse Brother      No Medical Problems Daughter      No Medical Problems Son      No Medical Problems Maternal Aunt      No Medical Problems Maternal Uncle      No Medical Problems Paternal Aunt      No Medical Problems Paternal Uncle      No Medical Problems Maternal Grandmother      No Medical Problems Maternal Grandfather      No Medical Problems Paternal Grandmother      No Medical Problems Paternal Grandfather      Cancer Brother      Urolithiasis Neg Hx       Gout Neg Hx         Past Medical History:   Diagnosis Date     Anemia      Arthritis      Atrial fibrillation (H)      Back pain      Callus      Cerebral aneurysm      Cervical spinal stenosis      Chronic kidney disease     stage 3     Chronic systolic congestive heart failure (H)      Coronary artery disease      Crohn's disease (H)      Dysphagia      GERD (gastroesophageal reflux disease)      Hyperlipidemia      Hypertension      ICD (implantable cardioverter-defibrillator) in place     Medtronic     Ischemic cardiomyopathy      Kidney stone      Low back pain      Macular degeneration      Permanent atrial fibrillation (H)     [id number] risk score = 5 (age3/ CAD/ HTN/ CHF) Chronic warfarin     Right rotator cuff tendinitis      Schatzki's ring      Shortness of breath      Sleep apnea     no CPAP        Social History     Tobacco Use     Smoking status: Former Smoker     Smokeless tobacco: Never Used   Substance Use Topics     Alcohol use: No     Drug use: No        Current Outpatient Medications   Medication Sig Dispense Refill     bumetanide (BUMEX) 1 MG tablet Take 1 tablet (1 mg total) by mouth see administration instructions. For weight gain >3lbs.  0     carvedilol (COREG) 25 MG tablet Take 37.5 mg by mouth 2 (two) times a day with meals.       cholecalciferol, vitamin D3, 1,000 unit tablet Take 2,000 Units by mouth daily.       FERROUS FUMARATE (IRON ORAL) Take 1 tablet by mouth daily.        fidaxomicin (DIFICID) tablet 200mg PO two times a day 5/23-5/27. Then 200mg PO every other day x 10 doses starting 5/29. 11 tablet 0     multivitamin with iron (ONE DAILY WITH IRON) Tab tablet Take 1 tablet by mouth daily.        omeprazole (PRILOSEC) 20 MG capsule Take 20 mg by mouth daily before breakfast.              oxyCODONE (ROXICODONE) 5 MG immediate release tablet Take 5 mg by mouth every 4 (four) hours as needed.       rosuvastatin (CRESTOR) 20 MG tablet TAKE 1 TABLET(20 MG) BY MOUTH AT BEDTIME 90  tablet 1     warfarin (COUMADIN/JANTOVEN) 2.5 MG tablet Take 1.25-2.5 mg by mouth See Admin Instructions. Take 1.25 mg (half-tablet) on Wednesdays. Take 2.5 mg (1 tablet) all other days of the week.       No current facility-administered medications for this visit.           Objective:    Vitals:    06/19/19 1013   BP: 130/62   Patient Site: Left Arm   Patient Position: Sitting   Cuff Size: Adult Regular   Pulse: 76   Weight: 171 lb 14.4 oz (78 kg)      Body mass index is 25.39 kg/m .      General Appearance:  Alert, cooperative, no distress, appears stated age   Lungs:   Clear to auscultation bilaterally, respirations unlabored.  No expiratory wheeze or inspiratory crackles noted.   Heart:  Regular rate and rhythm, S1, S2 normal, no murmur, rub or gallop   Extremities:  +1 pitting edema in bilateral lower extremities.  Extremities are otherwise normal.   Skin: Warm, dry.  Skin color, texture, turgor normal, no rashes or lesions   Neurologic:  Alert and oriented x3.  Grossly intact.       This note has been dictated using voice recognition software. Any grammatical or context distortions are unintentional and inherent to the use of this software.

## 2021-05-29 NOTE — TELEPHONE ENCOUNTER
Orders being requested: Resumption Orders For Skilled Nursing  Reason service is needed/diagnosis: Patient was discharged from the hospital on 05/27/2019  When are orders needed by: As Able  Where to send Orders: Please give verbal orders to Tran at: [phone].  Okay to leave detailed message?  Yes

## 2021-05-29 NOTE — TELEPHONE ENCOUNTER
Writer left message for patient to return call. Per Gladys, please let patient know that he will need to contact the spine center to submit for his injections.   Ashish Tony LPN

## 2021-05-29 NOTE — TELEPHONE ENCOUNTER
ANTICOAGULATION  MANAGEMENT    Assessment     Today's INR result of 1.9 is Subtherapeutic (goal INR of 2.0-3.0)        Missed dose(s) may be affecting INR- pt skipped two days last week; unsure which days.     No new diet changes affecting INR    No new medication/supplements affecting INR    Continues to tolerate warfarin with no reported s/s of bleeding or thromboembolism     Previous INR was Supratherapeutic    Plan:     Spoke with Tanmay regarding INR result and instructed:     Warfarin Dosing Instructions:  Take booster dose of 2.5 mg today only then continue current warfarin dose    1.25 mg every Wed, Sat; 2.5 mg all other days       Instructed patient to follow up no later than: 2 weeks     Education provided: importance of taking warfarin as instructed    Tanmay verbalizes understanding and agrees to warfarin dosing plan.    Instructed to call the [facility] for any changes, questions or concerns. (#[phone])   ?   Edgar Ortez RN    Subjective/Objective:      Tanmay Israel, a 79 y.o. male is on warfarin.     Tanmay reports:     Home warfarin dose: verbally confirmed home dose with pt and updated on anticoagulation calendar     Missed doses: Yes: missed two days.     Medication changes:  No     S/S of bleeding or thromboembolism:  No     New Injury or illness:  No     Changes in diet or alcohol consumption:  No     Upcoming surgery, procedure or cardioversion:  No    Anticoagulation Episode Summary     Current INR goal:   2.0-3.0   TTR:   57.6 % (4.3 y)   Next INR check:   7/3/2019   INR from last check:   1.90! (6/19/2019)   Weekly max warfarin dose:      Target end date:      INR check location:      Preferred lab:      Send INR reminders to:   CHANI RIOS       Comments:            Anticoagulation Care Providers     Provider Role Specialty Phone number    Go Ramírez MD Referring Family Medicine [phone]

## 2021-05-29 NOTE — TELEPHONE ENCOUNTER
ANTICOAGULATION  MANAGEMENT: Discharge Continuity of Care Review    Hospital admission on 5/22-5/27for acute renal failure.    Discharge disposition: Home with home care    INR Results:       Recent labs: (last 7 days)     05/23/19  0631 05/24/19  0638 05/25/19  0628   INR 2.27* 2.36* 2.43*       Warfarin inpatient management: Home regimen continued    Warfarin discharge instructions: home regimen continued     Medication Changes Affecting Anticoagulation: No    Additional Factors Affecting Anticoagulation: No    Plan     Agree with discharge plan for follow up on 6/3    Recommended follow up is scheduled    Anticoagulation calendar updated    Edgar Ortez RN

## 2021-05-29 NOTE — PATIENT INSTRUCTIONS - HE
Recommendations from today's PharmD medication management visit                                                       1. If the back injection is not helpful, could try acetaminophen 1000 mg (Tylenol) two times a day on a schedule or lidocaine 4% patch on your back (available over the counter)    2. Consider decrease omeprazole to every other day    3. We will check your magnesium level with your future INR, to see what your level is and if you need to keep taking magnesium supplement.     4. When you are done with current iron supplement, buy ferrous sulfate which has more iron.     Next pharmacist visit: As needed     My Pharmacist's contact information:                                                       It was a pleasure seeing you today to discuss your medications!  Please feel free to contact me with any questions or concerns you have. I look forward to seeing you again to help you get the most benefit from your medications!    To reschedule your PharmD appointment, please call the clinic directly or you may call the MTM scheduling line at [phone] or toll-free at [phone]:   [facility] (available for appointments Mondays and Thursdays)  [facility] (available for appointments Tuesday, Wednesday & Friday)     Massiel Medina, [place], BCACP  Medication Therapy Management Pharmacist  [facility]    You may receive a survey about the MTM services you received by email and/or US Mail.  I would appreciate your feedback to help me serve you better in the future. Your comments will be anonymous.

## 2021-05-29 NOTE — PROGRESS NOTES
[facility] INFECTIOUS DISEASE CLINIC FOLLOW UP NOTE    Date: 5/30/2019  Patient Name: Tanmay Israel   YOB: 1940  MRN: [id number]      ASSESSMENT:  79-year-old man referred to ID clinic for recurrent C. difficile infection.  Unfortunately, prior to her appointment he was admitted to the hospital for acute kidney injury and hypotension.  He was seen by ID during hospital stay for recurring C. difficile infection.  He was placed on fidaxomicin with a taper given his recurring infection.  Today he is doing quite well.  Diarrhea has resolved.  He remains on fidaxomicin.    Renal function improved prior to discharge.  He was fortunately able to discharge back to home and is doing well there.  Unfortunately, he continues to have poor appetite, which has been an issue for several months.      PLAN:  -Continue fidaxomicin acute 48 hours until 6/17  -Watch for recurrence of diarrhea  -Avoid antibiotics unless absolutely necessary for new infection, as this may trigger recurrence  -If he does have a recurrence of C. difficile, then would refer to GI for FMT.    Return to clinic as needed.    Carmine Wright MD  [facility]   Clinic phone: [phone]  Clinic fax: [phone]    ______________________________________________________________________    HISTORY OF PRESENT ILLNESS:   From inpatient ID consult on 5/23:    Nice elder man with recurrent c diff, most of 2019.  From TCU/NH now with paleness, FTT, no appetite serious dehydration and ARF.  On oral vanco mid April to April 30, then c diff + again may 14th (notes very poor on this period of his care in chart) and back on oral vanco.  Some vomiting I believe also.  Belly is soft doesn't look like megacolon clinically.           SUBJECTIVE / INTERVAL HISTORY:   Tanmay Israel returns for follow up of C. difficile infection.  The patient was hospitalized at [facility] from 5/22 to 5/27.  He was seen by Dr. Osman with ID.  He was  started on fidaxomicin for 5 days, followed by a 2-week taper.  He was discharged to home.  He is doing well.  He denies any diarrhea.  He is having 1-2 formed bowel movements a day.  Unfortunately, his appetite remains poor, this started several months ago.  He is scheduled to see his primary care provider next week.      ROS:   No fevers. No abd pain.      Current Outpatient Medications:      baclofen (LIORESAL) 10 MG tablet, Take 1 tablet (10 mg total) by mouth every 8 (eight) hours as needed., Disp: 20 tablet, Rfl: 0     bumetanide (BUMEX) 1 MG tablet, Take 1 tablet (1 mg total) by mouth see administration instructions. For weight gain >3lbs., Disp: , Rfl: 0     carvedilol (COREG) 25 MG tablet, Take 37.5 mg by mouth 2 (two) times a day with meals., Disp: , Rfl:      cholecalciferol, vitamin D3, 1,000 unit tablet, Take 2,000 Units by mouth daily., Disp: , Rfl:      FERROUS FUMARATE (IRON ORAL), Take 1 tablet by mouth daily. , Disp: , Rfl:      fidaxomicin (DIFICID) tablet, 200mg PO two times a day 5/23-5/27. Then 200mg PO every other day x 10 doses starting 5/29., Disp: 11 tablet, Rfl: 0     magnesium chloride (SLOW-MAG) 64 mg TbEC delayed-release tablet, Take 64 mg by mouth daily.    , Disp: , Rfl:      multivitamin with iron (ONE DAILY WITH IRON) Tab tablet, Take 1 tablet by mouth daily. , Disp: , Rfl:      omeprazole (PRILOSEC) 20 MG capsule, Take 20 mg by mouth daily before breakfast.    , Disp: , Rfl:      rosuvastatin (CRESTOR) 20 MG tablet, Take 1 tablet (20 mg total) by mouth at bedtime., Disp: 90 tablet, Rfl: 3     warfarin (COUMADIN/JANTOVEN) 2.5 MG tablet, Take 1.25-2.5 mg by mouth See Admin Instructions. Take 1.25 mg (half-tablet) on Wednesdays. Take 2.5 mg (1 tablet) all other days of the week., Disp: , Rfl:       OBJECTIVE:  Vitals:    05/30/19 0954   BP: 104/56   Pulse: 60   Temp: 97.6  F (36.4  C)         GEN: No acute distress.    HENT: Head is normocephalic, atraumatic.   EYES: Eyes have  anicteric sclerae without conjunctival injection   RESPIRATORY:  Normal breathing pattern. Clear to auscultation  CARDIOVASCULAR:  Regular rate and rhythm. Normal S1 and S2. No murmur, click, gallop or rub.   ABDOMEN:  Soft, normal bowel sounds, non-tender  EXTREMITIES: No edema.   SKIN/HAIR/NAILS:  No rashes  NEUROLOGIC: Grossly nonfocal. Slow movements, but able to get on/off exam bed with minimal assistance.      Pertinent labs:        Results from last 7 days   Lab Units 05/26/19  0753 05/25/19  0628 05/24/19  0638   LN-SODIUM mmol/L 139 139 140   LN-POTASSIUM mmol/L 4.1 4.3 4.3   LN-CHLORIDE mmol/L 112* 115* 117*   LN-CO2 mmol/L 20* 19* 16*   LN-BLOOD UREA NITROGEN mg/dL 20 28 38*   LN-CREATININE mg/dL 1.65* 1.85* 2.46*   LN-CALCIUM mg/dL 9.4 9.0 9.1     Lab Results   Component Value Date    CRP 0.5 04/01/2019         Lab Results   Component Value Date    ALT 17 05/21/2019    AST 14 05/21/2019    ALKPHOS 199 (H) 05/21/2019    BILITOT 0.6 05/21/2019         MICROBIOLOGY DATA:  Reviewed.    RADIOLOGY:  Reviewed

## 2021-05-29 NOTE — PROGRESS NOTES
"MTM Transition of Care Encounter  Assessment & Plan                                                     Post Discharge Medication Reconciliation Status: discharge medications reconciled and changed, per note/orders (see AVS)    Medication Adherence/Access: Pillboxes set correctly.  Reviewed that he could consider moving warfarin from evening slot to dinner slot that way he is taking medications twice a day as he is only taking warfarin in the \"evening.\"    Acute Renal Failure: Serum creatinine has returned to baseline.  We will continue to monitor.    Hypertension: BP well controlled today.  Continue to remain off losartan at this time.    Recurrent CDiff: Improved, patient will be done with fidaxomicin next week.    CHF: Weight appears stable, therefore continue to use Bumex as needed.    Atrial Fibrillation: Last INR goal 2-3.  Patient to continue current regimen.    Pain: Uncontrolled.  Chandler today will be asking Dr. Ramírez about back injection.  At this point we will add oxycodone to his med list as he is taking as needed.  Recommended using acetaminophen on a schedule if he does not get the back injection.  Can also consider lidocaine patch.  Reviewed to not use tramadol if he is using oxycodone.    GERD/nausea: Patient is not having nausea, therefore I recommended that he discontinue metoclopramide due to risk of tardive dyskinesia.  Okay to have ondansetron as needed in the future.   Reviewed potential risk of omeprazole and C. difficile.  The effect of PPIs and increasing gastric pH levels may encourage the growth of got microflora and therefore the susceptibility of certain organisms including C. difficile.  Due to his history of C. difficile, could consider tapering off.  Recommended to try every other day.  He would like to think about this.  Plan:  1.  Consider omeprazole 20 mg every other day due to risk of C. difficile.  Patient would like to think about this.    CAD: Patient is on a high intensity " statin which is appropriate due to CABG history.  Patient to continue.    Supplements: Last vitamin D level within normal limits, recommended continuing since he has osteoporosis.  Patient last magnesium level was normal, but now that he will be taking Bumex as needed, he may not need magnesium supplementation.  We will check a future magnesium level.  Patient would like to lessen medications and pill burden.  Last hemoglobin level was low, which is chronic for him.  Reviewed that ferrous fumarate has a very low amount of elemental iron, therefore when he buys iron next to look for ferrous sulfate.  Plan:  1.  Future magnesium level  2.  One done with ferrous fumarate, buy ferrous sulfate once daily.    Follow Up  As needed with MTM    Subjective & Objective                                                       Tanmay Israel is a 79 y.o. male coming in for a transitions of care visit. he was discharged from [facility] on 5/27/19 for ARF.  His nephew joins us today    Chief Complaint: Would like to see what his medications are for and if he needs to take all of them.    Medication Adherence/Access: niece sets up his medications. 2 weeks at home.  Brings to pillboxes each for 4 times daily.  Takes medications 3 times daily.    Acute Renal Failure: Baseline SCr 1-1.5 and presented with SCr 5.1. Was suspected to be pre-renal from ongoing GI loss, HOTN, Bumex, and losartan. Nephrology consulted  Last Scr = 1.45 on 6/3/2019    Hypertension: Hypotension in ED. Was recommended to hold losartan 100 mg daily --he is not taking. Currently taking carvedilol twice daily. Patient does not monitor BP at home.  Denies lightheadedness/dizziness.     Recurrent CDiff: Was on vanco on admission and was still having loose stools. ID consulted and vanco d/c'ed and fidaxomycin started (dose = 200 mg two times a day x 5 days, then every other day x 10 doses).   Per consult, if fails a stool transplant was recommended. ID follow up on 5/30  -- recommended to continue fidaxomicin every other day --will be done on Allyson 15.  Reports that currently he is having about 2 bowel movements a day and they are firm.    CHF: Current medications include carvedilol and Bumex 1 mg PRN. Due to ANA losartan 100 mg held and recommended to use Bumex 1 mg PRN weight gain >3 lbs. he has not needed yet since discharge.  No longer on K supplement.  Denies swelling.   ECHO: Date 11/12/2018, EF 35%  Pt is measuring daily weights: Gained a lb maybe, almost 2 in last week.     Atrial Fibrillation: Currently taking carvedilol 37.5 mg BID and warfarin.  Currently sees a cardiologist.  Last INR 2.43. Denies significant bleeding/bruising.   [id number] score = 5   Patient does have a pacemaker.     Pain:  Reports his back is killing him. Took an oxycodone 5 mg yesterday and today-- was helpful.  Brings 2 bottles of oxycodone today.  Left over from past neck surgery.  Aches lower back the more he walks then more worse it gets. Once in a while APAP, not lot.  Is wondering about a shot in his back.  Also brings baclofen 10 mg every 8 hours PRN and tramadol 50 mg every 6 hours as needed with him today, but he is not using currently.    GERD/nausea: Currently taking omeprazole 20 mg daily. No heartburn or acid reflex.  Reports he has been taking for very long time.  Brings metoclopramide 10 mg 3 times daily and ondansetron 8 mg every 8 hours as needed with him today, but is not currently taking.  Denies current nausea.    CAD: Currently taking rosuvastatin 20 mg daily. Denies myalgias. Last lipids checked 2015. Hx CABG x4.     Supplements: Currently taking vitamin D 2000 IU daily, Slow-Mag daily, MVI, and ferrous fumarate daily.  Patient does have osteoporosis. Last T score -2.5 in 2016.    Last Vitamin D = 54.5 on 10/24/16  Last Hgb level = 8.9 on 6/3/2019  Last Mag level = 1.9 on 5/22/19     PMH: reviewed in EPIC   Allergies/ADRs: reviewed in EPIC   Alcohol: reviewed  Tobacco:    Social History     Tobacco Use   Smoking Status Former Smoker   Smokeless Tobacco Never Used     Recent Vitals:   BP Readings from Last 3 Encounters:   06/03/19 116/52   05/30/19 104/56   05/27/19 156/72      Wt Readings from Last 3 Encounters:   06/03/19 166 lb (75.3 kg)   05/30/19 166 lb (75.3 kg)   05/24/19 168 lb (76.2 kg)     ----------------    The patient was given a summary of these recommendations as an after visit summary    I spent 45 minutes with this patient today;  . All changes were made via collaborative practice agreement with Go Ramírez MD. A copy of the visit note was provided to the patient's provider.     Massiel Medina, Pharm.D., [facility]  Medication Therapy Management Pharmacist  [facility]     Current Outpatient Medications   Medication Sig Dispense Refill     bumetanide (BUMEX) 1 MG tablet Take 1 tablet (1 mg total) by mouth see administration instructions. For weight gain >3lbs.  0     carvedilol (COREG) 25 MG tablet Take 37.5 mg by mouth 2 (two) times a day with meals.       cholecalciferol, vitamin D3, 1,000 unit tablet Take 2,000 Units by mouth daily.       FERROUS FUMARATE (IRON ORAL) Take 1 tablet by mouth daily.        fidaxomicin (DIFICID) tablet 200mg PO two times a day 5/23-5/27. Then 200mg PO every other day x 10 doses starting 5/29. 11 tablet 0     multivitamin with iron (ONE DAILY WITH IRON) Tab tablet Take 1 tablet by mouth daily.        omeprazole (PRILOSEC) 20 MG capsule Take 20 mg by mouth daily before breakfast.              potassium chloride (K-DUR,KLOR-CON) 20 MEQ tablet Take 20 mEq by mouth daily.  0     rosuvastatin (CRESTOR) 20 MG tablet Take 1 tablet (20 mg total) by mouth at bedtime. 90 tablet 3     warfarin (COUMADIN/JANTOVEN) 2.5 MG tablet Take 1.25-2.5 mg by mouth See Admin Instructions. Take 1.25 mg (half-tablet) on Wednesdays. Take 2.5 mg (1 tablet) all other days of the week.       No current facility-administered medications for  this visit.

## 2021-05-29 NOTE — TELEPHONE ENCOUNTER
Friend of patient (w patient in background) called regarding denial of injection ordered by Dr. Cesar. Per Evelyn/Gladys, he had too many injections in a year. Can resubmit claim June 19th.    Pt is a 9 of 10 on pain scale. Would like a cb from a nurse. [phone]

## 2021-05-29 NOTE — PROGRESS NOTES
Patient ID: Tanmay Israel is a 79 y.o. male.  /62   Pulse 66   Wt 164 lb (74.4 kg)   SpO2 99%   BMI 24.22 kg/m      Assessment/Plan:                Diagnoses and all orders for this visit:    Diarrhea  -     Cancel: C. Diff Toxin By PCR; Future  -     C. Diff Toxin By PCR    Chronic systolic heart failure (H)  -     Comprehensive Metabolic Panel    Chronic atrial fibrillation (H)    Stage 3 chronic kidney disease (H)  -     Comprehensive Metabolic Panel    Anemia, unspecified type  -     HM1(CBC and Differential)  -     HM1 (CBC with Diff)    S/P laparoscopic cholecystectomy    S/P cervical spinal fusion    C. difficile colitis      DISCUSSION  See discussion below.  Obtain labs as above.  Subjective:     HPI    Tanmay Israel is a 79 y.o. male is here today for follow-up.  His complex medical history.  This past year he had undergone cervical spine surgery followed by gallbladder surgery then developed recurrent C. difficile colitis.  He was rehospitalized most recently for acute renal failure with ongoing C. difficile colitis.  He has been seen by infectious disease.  He just completed a course of Dificid.  He is not having diarrhea.  There was some confusion about whether or not he should have a stool sample to confirm resolution.  His stools are solid at this point.  In my opinion I do not think it is pertinent to test at this point as long as he is not having symptoms in the stool sample he brought his a solid stool.  We will forego any testing as long as he is symptom-free.  We did review considerations regarding prevention.  If he gets recurrent diarrhea he should contact me immediately so we can decide on a further course of action.  He should be cautious about antibiotics and he is now well aware of this.  He reports reasonable appetite.    He has chronic systolic congestive heart failure.  Currently he has good fluid balance after taking several days of Bumex for lower extremity edema.  His weight  has gradually gone upward but it had not increased significantly to trigger him to use the Bumex.  He was noted to have lower extremity edema so he did start the diuretic and has had good diuresis.  The edema is now gone.  His weight is back down to what seems to be a reasonable baseline for him at just less than 160 pounds.  He is not short of breath.  He does not feel dizzy or lightheaded.  We reviewed the considerations related to his C. difficile and chronic congestive heart failure in depth.  We discussed rechecking laboratory test because of his chronic kidney disease and anemia.  Based on the test results we will decide if there is any further considerations.    Review of Systems  Complete review of systems is obtained.  Other than the specific considerations noted above complete review of systems is negative.        Objective:   Medications:  Current Outpatient Medications   Medication Sig     carvedilol (COREG) 25 MG tablet Take 37.5 mg by mouth 2 (two) times a day with meals.     cholecalciferol, vitamin D3, 1,000 unit tablet Take 2,000 Units by mouth daily.     FERROUS FUMARATE (IRON ORAL) Take 1 tablet by mouth daily.      fidaxomicin (DIFICID) tablet 200mg PO two times a day 5/23-5/27. Then 200mg PO every other day x 10 doses starting 5/29.     multivitamin with iron (ONE DAILY WITH IRON) Tab tablet Take 1 tablet by mouth daily.      omeprazole (PRILOSEC) 20 MG capsule Take 20 mg by mouth daily before breakfast.            rosuvastatin (CRESTOR) 20 MG tablet TAKE 1 TABLET(20 MG) BY MOUTH AT BEDTIME     warfarin (COUMADIN/JANTOVEN) 2.5 MG tablet Take 1.25-2.5 mg by mouth See Admin Instructions. Take 1.25 mg (half-tablet) on Wednesdays. Take 2.5 mg (1 tablet) all other days of the week.     bumetanide (BUMEX) 1 MG tablet Take 1 tablet (1 mg total) by mouth see administration instructions. For weight gain >3lbs.     oxyCODONE (ROXICODONE) 5 MG immediate release tablet Take 5 mg by mouth every 4 (four)  hours as needed.     Allergies:  No Known Allergies    Tobacco:   reports that he has quit smoking. He has never used smokeless tobacco.     Physical Exam      /62   Pulse 66   Wt 164 lb (74.4 kg)   SpO2 99%   BMI 24.22 kg/m      General Appearance:    Alert, cooperative, no distress, he is much more pale frail appearance than prior   Eyes:   No scleral icterus or conjunctival irritation       Lungs:     Clear to auscultation bilaterally, respirations unlabored, no wheezes or crackles   Heart:    Regular rate and rhythm,  No murmur   Abdomen:    Soft, no distention, no tenderness on palpation, no masses, no organomegaly      Extremities:  No edema, no joint swelling or redness, no evidence of any injuries   Skin:  No concerning skin findings, no suspicious moles, no rashes   Neurologic:  On gross examination there is no motor or sensory deficit.  Patient walks with a normal gait

## 2021-05-29 NOTE — TELEPHONE ENCOUNTER
----- Message from Bita Velasquez CNP sent at 5/22/2019  8:43 AM CDT -----  Patient's kidney function has decreased since 2 weeks ago.  Given his symptoms of continued weakness along with these results, I would feel most comfortable having patient evaluated in the ER again.  He may need IV fluids.

## 2021-05-29 NOTE — TELEPHONE ENCOUNTER
FYI - Status Update  Who is Calling: Patient  Update: Patient requests call back from Edgar Patel to leave a detailed message?:  No, would like to speak with you.

## 2021-05-29 NOTE — TELEPHONE ENCOUNTER
ANTICOAGULATION  MANAGEMENT    Assessment     Today's INR result of 2.1 is Therapeutic (goal INR of 2.0-3.0)        Warfarin taken as previously instructed     Discharged home from TCU two days ago on 5/19. Pt is a little unclear on how much warfarin he's been taking since. He said home care set it up for him.     No new diet changes affecting INR    No new medication/supplements affecting INR    Continues to tolerate warfarin with no reported s/s of bleeding or thromboembolism     Previous INR was Supratherapeutic    Plan:     Spoke with Tanmay regarding INR result and instructed:     Warfarin Dosing Instructions:  Continue current warfarin dose 1.25 mg daily on Wed; and 2.5 mg daily rest of week  (0 % change)    Instructed patient to follow up no later than: Thurs 5/23 at nurse home visit. ACN called and spoke with Aster at [facility].     Education provided: importance of taking warfarin as instructed    Tanmay verbalizes understanding and agrees to warfarin dosing plan.    Instructed to call the [facility] for any changes, questions or concerns. (#[phone])   ?   Edgar Ortez RN    Subjective/Objective:      Tanmay Israel, a 79 y.o. male is on warfarin.     Tanmay reports:     Home warfarin dose: verbally confirmed home dose with pt and updated on anticoagulation calendar     Missed doses: No     Medication changes:  No     S/S of bleeding or thromboembolism:  No     New Injury or illness:  No     Changes in diet or alcohol consumption:  No     Upcoming surgery, procedure or cardioversion:  No    Anticoagulation Episode Summary     Current INR goal:   2.0-3.0   TTR:   57.3 % (4.3 y)   Next INR check:   5/23/2019   INR from last check:   2.10 (5/21/2019)   Weekly max warfarin dose:      Target end date:      INR check location:      Preferred lab:      Send INR reminders to:   CHANI RIOS       Comments:            Anticoagulation Care Providers     Provider Role Specialty Phone number     Go Ramírez MD [facility] [phone]

## 2021-05-29 NOTE — TELEPHONE ENCOUNTER
Attempted to call patient regarding lab results from yesterday.  His kidney function has decreased quite a bit since hospitalization 2 weeks ago.  I suspect this is related to dehydration secondary to C. Difficile. tano emanuel  Given these results along with patient's continued weakness, I would feel most comfortable having patient evaluated in the ER. He will likely need IV fluids.

## 2021-05-29 NOTE — PROGRESS NOTES
TCM DISCHARGE FOLLOW UP CALL    Discharge Date:  5/27/2019  Reason for hospital stay (discharge diagnosis)::  ARF, C. diff  Are you feeling better, the same or worse since your discharge?:  Patient is feeling the same (Very tired. Denies loose stools. Pt not interested in talking on the phone.)  Do you feel like you have a plan in the event of a health emergency?: Yes (He talks with his neighbor when not well.)    As part of your discharge plan, were  home care services ordered for you?: Yes    Have you seen them yet, or are they scheduled to visit?: Yes    Do you have any follow up visits scheduled with your PCP or Specialist?:  Yes, with PCP (6/3)  (RN) Is PCP appt scheduled soon enough (within 14 days of discharge date)?: Yes

## 2021-05-30 ENCOUNTER — RECORDS - HEALTHEAST (OUTPATIENT)
Dept: ADMINISTRATIVE | Facility: CLINIC | Age: 81
End: 2021-05-30

## 2021-05-30 VITALS — HEIGHT: 69 IN | WEIGHT: 212 LBS | BODY MASS INDEX: 31.4 KG/M2

## 2021-05-30 VITALS — BODY MASS INDEX: 31.37 KG/M2 | WEIGHT: 212.4 LBS

## 2021-05-30 VITALS — WEIGHT: 207.6 LBS | BODY MASS INDEX: 30.66 KG/M2

## 2021-05-30 VITALS — BODY MASS INDEX: 30.16 KG/M2 | HEIGHT: 69 IN | WEIGHT: 203.6 LBS

## 2021-05-30 NOTE — TELEPHONE ENCOUNTER
Central PA team  [phone]  Pool: LAISHA PA MED ([id number])          PA has been initiated.       PA form completed and faxed insurance via Cover My Meds     Key:  YOKASTA MILTON Aquino: [id number]       Medication:  Diclofenac Sodium 1% gel      Insurance:  [employer] [place] Medicare Electronic PA Form (CB)          Response will be received via fax and may take up to 5-10 business days depending on plan

## 2021-05-30 NOTE — PROGRESS NOTES
[facility]      Facility:    [facility] [[id number]]  Code Status: DNR      Chief Complaint/Reason for Visit:  Chief Complaint   Patient presents with     Follow Up       HPI:   Tanmay is a 79 y.o. male whom I see today for initial TCU follow up visit.  With a past medical history for CHF, CKD 3, dementia, hypertension, anemia, osteoarthritis, CAD, ischemic cardiomyopathy, atrial fibrillation on Coumadin, and pacemaker in situ.  He has had multiple hospitalizations in the last 6 months for various reasons including weakness, radiculopathy due to lumbar stenosis, abnormal lab work, C. difficile, cholecystitis.  He was admitted to [facility] 7/15 to 7/18/2019 for acute dizziness and weakness.  He was out fishing in the heat in which he did not drink enough fluids and he had vomiting for 2 days.  He also had diarrhea.  His diagnosis was presumed likely related to vertigo as his orthostatics were normal and his dizziness had improved with a dose of meclizine.  He did have pleural effusion on chest x-ray with left lower leg edema and so his Bumex was restarted in which it was discontinued on a previous admission due to ANA.  Echocardiogram done in April showed EF of 35%.    Today, he reports he is feeling better and has had no incidents of diarrhea or vomiting.  His C. difficile culture was negative in the hospital and he was recently treated with fidaxomicin with good results.  He reports his appetite is returning and he feels stronger and feels he is independent enough to return home however with ambulation he does have an unsteady gait and I do feel he needs ongoing therapy and I discussed this with him and his wife and they were agreeable.  He plans to go out to a family event over the weekend.  His weight did get up to 163 from his baseline of 160 however today his weight is back down to 160.9 pounds.  He has no lower extremity edema.  His blood pressures are well  controlled with SBP's in the 140s.  He states he is sleeping well and does not feel he has a sleep issue.  He has been refusing his scheduled Tylenol and feels he has no aches and pains that require ongoing scheduled Tylenol and would like this changed to as needed.  His INR was 1.94 today and he has been taking 2.4 mg daily since 7/18/2019.  He states that he typically takes 5 mg twice a day and 2-1/2 mg all the other days.  Due to his multiple hospitalizations and dementia it was thought in the hospital that he would likely need a new living situation.    Past Medical History:  Past Medical History:   Diagnosis Date     Acute cholecystitis 2/28/2019     Acute post-operative pain      Anemia      Arthritis      Atrial fibrillation (H)      Back pain      C. difficile diarrhea 4/21/2019     Calculus of ureter      Callus      Cerebral aneurysm      Cervical myelopathy (H) 2/22/2019     Cervical spinal stenosis      Chronic kidney disease     stage 3     Chronic systolic congestive heart failure (H)      Closed fracture of distal end of left radius, unspecified fracture morphology, initial encounter      Closed fracture of distal end of right radius, unspecified fracture morphology, initial encounter      Closed fracture of first thoracic vertebra, unspecified fracture morphology, initial encounter (H)      Coronary artery disease      Crohn's disease (H)      Difficulty Breathing (Dyspnea)     Created by Conversion      Dysphagia      Fall 10/22/2016     GERD (gastroesophageal reflux disease)      Hospital discharge follow-up 5/21/2019     Hyperlipidemia      Hypertension      Hypotension, unspecified hypotension type      ICD (implantable cardioverter-defibrillator) in place     Medtronic     Ischemic cardiomyopathy      Joint Pain, Localized In The Knee     Created by Conversion      Joint Pain, Localized In The Right Shoulder     Created by Conversion      Kidney stone      Low back pain      Lumbago     Created  by Conversion      Macular degeneration      Muscle Aches, Generalized (Myalgias)     Created by Conversion      Permanent atrial fibrillation (H)     [id number] risk score = 5 (age1/ CAD/ HTN/ CHF) Chronic warfarin     Personal history of colonic polyps 2/12/2019     Polyp of duodenum 10/17/2016     Pyuria      Right arm weakness 4/2/2019     Right rotator cuff tendinitis      Right Rotator Cuff Tendonitis     Created by Conversion  Replacement Utility updated for latest IMO load     Schatzki's ring      Sciatica     Created by Conversion      Serum Enzyme Levels - ALT (SGPT) Elevated     Created by Conversion      Shortness of breath      Sleep apnea     no CPAP     Spondylolisthesis of lumbar region 7/5/2016     Weakness 4/20/2019           Surgical History:  Past Surgical History:   Procedure Laterality Date     APPENDECTOMY       CARDIAC PACEMAKER PLACEMENT  2013    ICD Medtronic     CATARACT EXTRACTION W/  INTRAOCULAR LENS IMPLANT Bilateral      CHOLECYSTECTOMY       ESOPHAGOGASTRODUODENOSCOPY       IN C- LAMINOPLASTY, 2 OR MORE Left 2/22/2019    Procedure: LEFT CERVICAL 4, 5, 6 LAMINOPLASTY;  Surgeon: Liza Cesar MD;  Location: [facility];  Service: Spine     IN CABG, VEIN, FOUR      Description: Coronary Artery Quadruple Venous Bypass Graft;  Recorded: 10/21/2008;  Comments: 8/2004     IN LAP,CHOLECYSTECTOMY N/A 2/28/2019    Procedure: CHOLECYSTECTOMY, LAPAROSCOPIC;  Surgeon: Mana Roman MD;  Location: [facility];  Service: General     ROTATOR CUFF REPAIR Right      TONSILLECTOMY       XR MYELOGRAM CERVICAL THORACIC LUMBAR  1/17/2019       Family History:   Family History   Problem Relation Age of Onset     Heart disease Mother      Stroke Mother      Crohn's disease Father      Alcohol abuse Brother      No Medical Problems Daughter      No Medical Problems Son      No Medical Problems Maternal Aunt      No Medical Problems Maternal Uncle      No Medical Problems Paternal  Aunt      No Medical Problems Paternal Uncle      No Medical Problems Maternal Grandmother      No Medical Problems Maternal Grandfather      No Medical Problems Paternal Grandmother      No Medical Problems Paternal Grandfather      Cancer Brother      Urolithiasis Neg Hx      Gout Neg Hx        Social History:    Social History     Socioeconomic History     Marital status: Single     Spouse name: Not on file     Number of children: Not on file     Years of education: Not on file     Highest education level: Not on file   Occupational History     Occupation: retired     Comment: [employer]   Social Needs     Financial resource strain: Not on file     Food insecurity:     Worry: Not on file     Inability: Not on file     Transportation needs:     Medical: Not on file     Non-medical: Not on file   Tobacco Use     Smoking status: Former Smoker     Smokeless tobacco: Never Used   Substance and Sexual Activity     Alcohol use: No     Drug use: No     Sexual activity: Not on file   Lifestyle     Physical activity:     Days per week: Not on file     Minutes per session: Not on file     Stress: Not on file   Relationships     Social connections:     Talks on phone: Not on file     Gets together: Not on file     Attends Hinduism service: Not on file     Active member of club or organization: Not on file     Attends meetings of clubs or organizations: Not on file     Relationship status: Not on file     Intimate partner violence:     Fear of current or ex partner: Not on file     Emotionally abused: Not on file     Physically abused: Not on file     Forced sexual activity: Not on file   Other Topics Concern     Not on file   Social History Narrative     Not on file          Review of Systems   Patient denies fever, chills, headache, lightheadedness, dizziness, rhinorrhea, cough, congestion, shortness of breath, chest pain, palpitations, abdominal pain, n/v, diarrhea, constipation, change in appetite, dysuria, frequency,  burning or pain with urination.  Other than stated in HPI all other review of systems is negative.       Physical Exam   Vital signs: /74, heart rate 75, respiratory 16, temp 97.3.  GENERAL APPEARANCE: Well developed, well nourished, in no acute distress.  HEENT: normocephalic, atraumatic  PERRL, sclerae anicteric, conjunctivae clear and moist, EOM intact  External inspection of ears and nose showed no scars, lesions or masses.  NECK: Supple and symmetric. Trachea is midline, no thyromegaly, no adenopathy, and no tenderness  LUNGS: Lung sounds CTA, no adventitious sounds, respiratory effort normal.  CARD: RRR, S1, S2, without murmurs, gallops, rubs,   ABD: Soft and nontender with normal bowel sounds.   MSK: Muscle strength and tone were normal.  EXTREMITIES: No cyanosis, clubbing or edema.  NEURO: Alert and oriented x 3.  With cognitive impairment.  Face is symmetric.  SKIN: Inspection of the skin reveals no rashes, ulcerations or petechiae.  PSYCH: euthymic          Medication List:  Current Outpatient Medications   Medication Sig     acetaminophen (TYLENOL) 500 MG tablet Take 2 tablets (1,000 mg total) by mouth 3 (three) times a day.     amLODIPine (NORVASC) 5 MG tablet Take 1 tablet (5 mg total) by mouth daily.     bumetanide (BUMEX) 1 MG tablet Take 0.5 tablets (0.5 mg total) by mouth see administration instructions. For weight gain >3lbs. (Patient taking differently: Take 0.5 mg by mouth daily.       )     carvedilol (COREG) 25 MG tablet Take 37.5 mg by mouth 2 (two) times a day with meals.     cholecalciferol, vitamin D3, 1,000 unit tablet Take 2,000 Units by mouth daily.     cyanocobalamin 500 MCG tablet Take 500 mcg by mouth daily.     diclofenac sodium (VOLTAREN) 1 % Gel Apply 4 g topically 4 (four) times a day as needed.     FERROUS FUMARATE (IRON ORAL) Take 1 tablet by mouth daily.      melatonin 3 mg Tab tablet Take 1 tablet (3 mg total) by mouth at bedtime as needed.     multivitamin with iron  (ONE DAILY WITH IRON) Tab tablet Take 1 tablet by mouth daily.      omeprazole (PRILOSEC) 20 MG capsule Take 20 mg by mouth daily before breakfast.            potassium chloride (K-DUR,KLOR-CON) 20 MEQ tablet TAKE 1 TABLET(20 MEQ) BY MOUTH DAILY     rosuvastatin (CRESTOR) 20 MG tablet TAKE 1 TABLET(20 MG) BY MOUTH AT BEDTIME     senna-docusate (PERICOLACE) 8.6-50 mg tablet Take 1 tablet by mouth daily as needed for constipation.     warfarin (COUMADIN/JANTOVEN) 2.5 MG tablet Take 1 tablet 2.5 mg daily, repeat INR and adjust dose as directed       Labs:  Recent Results (from the past 240 hour(s))   POCT Glucose   Result Value Ref Range    Glucose 105 70 - 139 mg/dL   Basic Metabolic Panel   Result Value Ref Range    Sodium 146 (H) 136 - 145 mmol/L    Potassium 3.8 3.5 - 5.0 mmol/L    Chloride 107 98 - 107 mmol/L    CO2 28 22 - 31 mmol/L    Anion Gap, Calculation 11 5 - 18 mmol/L    Glucose 100 70 - 125 mg/dL    Calcium 10.0 8.5 - 10.5 mg/dL    BUN 18 8 - 28 mg/dL    Creatinine 1.19 0.70 - 1.30 mg/dL    GFR MDRD Af Amer >60 >60 mL/min/1.73m2    GFR MDRD Non Af Amer 59 (L) >60 mL/min/1.73m2   Hepatic Profile   Result Value Ref Range    Bilirubin, Total 0.9 0.0 - 1.0 mg/dL    Bilirubin, Direct 0.4 <=0.5 mg/dL    Protein, Total 7.2 6.0 - 8.0 g/dL    Albumin 3.9 3.5 - 5.0 g/dL    Alkaline Phosphatase 260 (H) 45 - 120 U/L    AST 17 0 - 40 U/L    ALT 23 0 - 45 U/L   Lipase   Result Value Ref Range    Lipase 11 0 - 52 U/L   Lactic Acid   Result Value Ref Range    Lactic Acid 1.1 0.5 - 2.2 mmol/L   Magnesium   Result Value Ref Range    Magnesium 1.5 (L) 1.8 - 2.6 mg/dL   HM2 (CBC W/O DIFF)   Result Value Ref Range    WBC 10.6 4.0 - 11.0 thou/uL    RBC 3.36 (L) 4.40 - 6.20 mill/uL    Hemoglobin 9.8 (L) 14.0 - 18.0 g/dL    Hematocrit 31.1 (L) 40.0 - 54.0 %    MCV 93 80 - 100 fL    MCH 29.2 27.0 - 34.0 pg    MCHC 31.5 (L) 32.0 - 36.0 g/dL    RDW 16.6 (H) 11.0 - 14.5 %    Platelets 205 140 - 440 thou/uL    MPV 9.4 8.5 - 12.5 fL    INR   Result Value Ref Range    INR 1.60 (H) 0.90 - 1.10   Urinalysis-UC if Indicated   Result Value Ref Range    Color, UA Yellow Colorless, Yellow, Straw, Light Yellow    Clarity, UA Cloudy (!) Clear    Glucose, UA Negative Negative    Bilirubin, UA Negative Negative    Ketones, UA Trace (!) Negative, 60 mg/dL    Specific Gravity, UA 1.021 1.001 - 1.030    Blood, UA Negative Negative    pH, UA 5.5 4.5 - 8.0    Protein,  mg/dL (!) Negative mg/dL    Urobilinogen, UA 2.0 E.U./dL <2.0 E.U./dL, 2.0 E.U./dL    Nitrite, UA Negative Negative    Leukocytes, UA Negative Negative    Bacteria, UA None Seen None Seen hpf    RBC, UA 0-2 None Seen, 0-2 hpf    WBC, UA 5-10 (!) None Seen, 0-5 hpf    Squam Epithel, UA 0-5 None Seen, 0-5 lpf    Mucus, UA Moderate (!) None Seen lpf    Hyaline Casts, UA 5-10 (!) 0-5, None Seen lpf   Culture, Urine   Result Value Ref Range    Culture No Growth    Basic Metabolic Panel   Result Value Ref Range    Sodium 144 136 - 145 mmol/L    Potassium 3.4 (L) 3.5 - 5.0 mmol/L    Chloride 107 98 - 107 mmol/L    CO2 27 22 - 31 mmol/L    Anion Gap, Calculation 10 5 - 18 mmol/L    Glucose 79 70 - 125 mg/dL    Calcium 9.1 8.5 - 10.5 mg/dL    BUN 17 8 - 28 mg/dL    Creatinine 1.15 0.70 - 1.30 mg/dL    GFR MDRD Af Amer >60 >60 mL/min/1.73m2    GFR MDRD Non Af Amer >60 >60 mL/min/1.73m2   HM2(CBC w/o Differential)   Result Value Ref Range    WBC 8.5 4.0 - 11.0 thou/uL    RBC 2.81 (L) 4.40 - 6.20 mill/uL    Hemoglobin 8.1 (L) 14.0 - 18.0 g/dL    Hematocrit 25.6 (L) 40.0 - 54.0 %    MCV 91 80 - 100 fL    MCH 28.8 27.0 - 34.0 pg    MCHC 31.6 (L) 32.0 - 36.0 g/dL    RDW 16.2 (H) 11.0 - 14.5 %    Platelets 155 140 - 440 thou/uL    MPV 10.6 8.5 - 12.5 fL   INR   Result Value Ref Range    INR 1.71 (H) 0.90 - 1.10   Magnesium   Result Value Ref Range    Magnesium 2.1 1.8 - 2.6 mg/dL   Potassium   Result Value Ref Range    Potassium 3.7 3.5 - 5.0 mmol/L   INR   Result Value Ref Range    INR 1.75 (H) 0.90 -  1.10   Magnesium   Result Value Ref Range    Magnesium 2.0 1.8 - 2.6 mg/dL   Potassium - Next AM   Result Value Ref Range    Potassium 4.3 3.5 - 5.0 mmol/L   C. difficile Toxigenic by PCR   Result Value Ref Range    C.Difficile Toxigenic by PCR Negative Negative    Ribotype 027/NAP1/B1 Presumptive Negative Presumptive Negative   Culture, Stool   Result Value Ref Range    Culture       No Salmonella, Shigella, Yersinia, or Campylobacter.   EnteroHaemorrhagic E. coli Work Up   Result Value Ref Range    Shiga Toxin 1 Negative Negative    Shiga Toxin 2 Negative Negative   INR   Result Value Ref Range    INR 1.86 (H) 0.90 - 1.10   Magnesium   Result Value Ref Range    Magnesium 2.0 1.8 - 2.6 mg/dL   Comprehensive Metabolic Panel   Result Value Ref Range    Sodium 139 136 - 145 mmol/L    Potassium 4.0 3.5 - 5.0 mmol/L    Chloride 103 98 - 107 mmol/L    CO2 29 22 - 31 mmol/L    Anion Gap, Calculation 7 5 - 18 mmol/L    Glucose 94 70 - 125 mg/dL    BUN 12 8 - 28 mg/dL    Creatinine 1.05 0.70 - 1.30 mg/dL    GFR MDRD Af Amer >60 >60 mL/min/1.73m2    GFR MDRD Non Af Amer >60 >60 mL/min/1.73m2    Bilirubin, Total 0.8 0.0 - 1.0 mg/dL    Calcium 9.2 8.5 - 10.5 mg/dL    Protein, Total 6.2 6.0 - 8.0 g/dL    Albumin 3.3 (L) 3.5 - 5.0 g/dL    Alkaline Phosphatase 247 (H) 45 - 120 U/L    AST 14 0 - 40 U/L    ALT 16 0 - 45 U/L   HM1 (CBC with Diff)   Result Value Ref Range    WBC 6.8 4.0 - 11.0 thou/uL    RBC 3.19 (L) 4.40 - 6.20 mill/uL    Hemoglobin 9.3 (L) 14.0 - 18.0 g/dL    Hematocrit 28.8 (L) 40.0 - 54.0 %    MCV 90 80 - 100 fL    MCH 29.2 27.0 - 34.0 pg    MCHC 32.3 32.0 - 36.0 g/dL    RDW 15.7 (H) 11.0 - 14.5 %    Platelets 163 140 - 440 thou/uL    MPV 10.2 8.5 - 12.5 fL    Neutrophils % 71 (H) 50 - 70 %    Lymphocytes % 12 (L) 20 - 40 %    Monocytes % 13 (H) 2 - 10 %    Eosinophils % 4 0 - 6 %    Basophils % 0 0 - 2 %    Neutrophils Absolute 4.8 2.0 - 7.7 thou/uL    Lymphocytes Absolute 0.8 0.8 - 4.4 thou/uL    Monocytes  Absolute 0.9 0.0 - 0.9 thou/uL    Eosinophils Absolute 0.3 0.0 - 0.4 thou/uL    Basophils Absolute 0.0 0.0 - 0.2 thou/uL   INR   Result Value Ref Range    INR 1.83 (H) 0.90 - 1.10   INR   Result Value Ref Range    INR 1.94 (H) 0.90 - 1.10       Assessment:    ICD-10-CM    1. Chronic systolic congestive heart failure (H) I50.22    2. Chronic atrial fibrillation (H) I48.2    3. Benign Essential Hypertension I10    4. Atherosclerosis of native coronary artery of native heart without angina pectoris I25.10    5. Ischemic cardiomyopathy I25.5    6. Dizziness R42    7. Lumbar stenosis with neurogenic claudication M48.062    8. Gastroesophageal reflux disease without esophagitis K21.9    9. Anemia, unspecified type D64.9        Plan:  CHF: Stable continue with Bumex 0.5 mg and potassium supplement of 20 mEq daily continue with carvedilol 37.5 mg twice daily.  Counseled patient and wife on rationale for potassium supplement paired with Bumex.  Will have staff check weights daily.  We will also check orthostatic blood pressures to assure he is stable on his feet.  We will check a BMP on Thursday to check his electrolytes.    Atrial fibrillation: Continue with carvedilol 37.5 mg twice daily and warfarin.  His INR today was 1.94.  We will have him take 5 mg of warfarin today, then 2.5 mg for the next 2 days and then will recheck his INR on Thursday, 7/25/2019.    Hypertension: Stable continue with amlodipine 5 mg daily and carvedilol 37.5 mg twice daily.    CAD: Not on any aspirin at this time unsure of rationale for that will have him follow-up with his primary provider in regards to risk to benefit for aspirin.  Continue with rosuvastatin 20 mg daily he denies any myopathy issues.    Dizziness: Resolved    Lumbar stenosis: Pain is well managed he would like to discontinue scheduled Tylenol and so I will change this to PRN for him to take if needed.  Continue with Voltaren gel for osteoarthritis discomforts.    Continue with  Prilosec 20 mg daily.    Anemia: Stable with last hemoglobin at 9.3 will check a CBC this week at this time continue with ferrous sulfate 325 mg daily and cyanocobalamin.    At this time will discontinue his melatonin as he does report he is sleeping well.    35 minutes total time spent with 20 minutes spent with patient and wife in counseling and coordination of medication uses, effects and side effects.     Electronically signed by: Kimmy Naranjo CNP

## 2021-05-30 NOTE — TELEPHONE ENCOUNTER
Please inform patient that his potassium was slightly low, I would like him to come in this week for a lab recheck.  If it remains low we will start a potassium supplement.

## 2021-05-30 NOTE — TELEPHONE ENCOUNTER
Who is calling:  Patient  Reason for Call:  Patient is wanting to ask Pharm Massiel CARREON, if he is suppose to be taking more potassium.   Date of last appointment with primary care: 6.24.19  Okay to leave a detailed message: Yes

## 2021-05-30 NOTE — TELEPHONE ENCOUNTER
Hi PA team, Jyoti noted that he is not able to take oral NSAIDs in her note from 6/26/19, see below. Also Jyoti is out of the office today and will return, Monday 7/1/19. I will talk to her a get back to you regarding the appeal letter. Thanks!    _________________________________    Prescribed diclofenac gel which patient has utilized in the past with benefit advised patient to apply to right low back 3 times daily as needed for inflammation and pain related to the sacroiliac joint.  -Also advised patient that he can continue Tylenol 500 mg 1 to 2 tablets 3 times daily or 650 mg 1 tablet 4 times daily as needed for pain.  -Patient cannot take oral NSAIDs due to long-term Coumadin anticoagulation therapy.

## 2021-05-30 NOTE — TELEPHONE ENCOUNTER
Refill Approved  -  Patient Requests 90 Day Supply    Rx renewed per Medication Renewal Policy. Medication was last renewed on 7/7/19 for 30/11  Last OV 6/24/2019  Jessica Chan, [facility] Connection Triage/Med Refill 7/8/2019     Requested Prescriptions   Pending Prescriptions Disp Refills     potassium chloride (K-DUR,KLOR-CON) 20 MEQ tablet [Pharmacy Med Name: POTASSIUM CL 20MEQ ER TABLETS] 90 tablet 11     Sig: TAKE 1 TABLET(20 MEQ) BY MOUTH DAILY       Potassium Supplements Refill Protocol Passed - 7/7/2019  9:48 AM        Passed - PCP or prescribing provider visit in past 12 months       Last office visit with prescriber/PCP: 6/24/2019 Go Ramírez MD OR same dept: 6/24/2019 Go Ramírez MD OR same specialty: 6/24/2019 Go Ramírez MD  Last physical: 2/12/2019 Last MTM visit: Visit date not found   Next visit within 3 mo: Visit date not found  Next physical within 3 mo: Visit date not found  Prescriber OR PCP: Go Ramírez MD  Last diagnosis associated with med order: 1. Hypokalemia  - potassium chloride (K-DUR,KLOR-CON) 20 MEQ tablet [Pharmacy Med Name: POTASSIUM CL 20MEQ ER TABLETS]; TAKE 1 TABLET(20 MEQ) BY MOUTH DAILY  Dispense: 90 tablet; Refill: 11    If protocol passes may refill for 12 months if within 3 months of last provider visit (or a total of 15 months).             Passed - Potassium level in last 12 months     Lab Results   Component Value Date    Potassium 3.2 (L) 07/02/2019

## 2021-05-30 NOTE — PROGRESS NOTES
[facility]      Facility:    [facility] [[id number]]  Code Status: DNR      Chief Complaint/Reason for Visit:  Chief Complaint   Patient presents with     Follow Up       HPI:   Tanmay is a 79 y.o. male whom I see today forT follow up visit.  With a past medical history for CHF, CKD 3, dementia, hypertension, anemia, osteoarthritis, CAD, ischemic cardiomyopathy, atrial fibrillation on Coumadin, and pacemaker in situ.  He has had multiple hospitalizations in the last 6 months for various reasons including weakness, radiculopathy due to lumbar stenosis, abnormal lab work, C. difficile, cholecystitis.  He was admitted to [facility] 7/15 to 7/18/2019 for acute dizziness and weakness.  He was out fishing in the heat in which he did not drink enough fluids and he had vomiting for 2 days.  He also had diarrhea.  His diagnosis was presumed likely related to vertigo as his orthostatics were normal and his dizziness had improved with a dose of meclizine.  He did have pleural effusion on chest x-ray with left lower leg edema and so his Bumex was restarted in which it was discontinued on a previous admission due to ANA.  Echocardiogram done in April showed EF of 35%.    Today, he reports he is feeling good and is wanting to know when he can be discharged.  He was started on Zofran earlier this week due to new onset nausea however he states that resolved within that day.  He denies any abdominal discomfort, heartburn, increased gas, diarrhea or constipation.  He denies any pain or discomforts at this time and has not used any PRN acetaminophen.  His INR today was 2.45 which is up from 1.94 on Monday.  He denies any signs and symptoms of bleeding    Past Medical History:  Past Medical History:   Diagnosis Date     Acute cholecystitis 2/28/2019     Acute post-operative pain      Anemia      Arthritis      Atrial fibrillation (H)      Back pain      C. difficile diarrhea 4/21/2019      Calculus of ureter      Callus      Cerebral aneurysm      Cervical myelopathy (H) 2/22/2019     Cervical spinal stenosis      Chronic kidney disease     stage 3     Chronic systolic congestive heart failure (H)      Closed fracture of distal end of left radius, unspecified fracture morphology, initial encounter      Closed fracture of distal end of right radius, unspecified fracture morphology, initial encounter      Closed fracture of first thoracic vertebra, unspecified fracture morphology, initial encounter (H)      Coronary artery disease      Crohn's disease (H)      Difficulty Breathing (Dyspnea)     Created by Conversion      Dysphagia      Fall 10/22/2016     GERD (gastroesophageal reflux disease)      Hospital discharge follow-up 5/21/2019     Hyperlipidemia      Hypertension      Hypotension, unspecified hypotension type      ICD (implantable cardioverter-defibrillator) in place     Medtronic     Ischemic cardiomyopathy      Joint Pain, Localized In The Knee     Created by Conversion      Joint Pain, Localized In The Right Shoulder     Created by Conversion      Kidney stone      Low back pain      Lumbago     Created by Conversion      Macular degeneration      Muscle Aches, Generalized (Myalgias)     Created by Conversion      Permanent atrial fibrillation (H)     [id number] risk score = 5 (age3/ CAD/ HTN/ CHF) Chronic warfarin     Personal history of colonic polyps 2/12/2019     Polyp of duodenum 10/17/2016     Pyuria      Right arm weakness 4/2/2019     Right rotator cuff tendinitis      Right Rotator Cuff Tendonitis     Created by Conversion  Replacement Utility updated for latest IMO load     Schatzki's ring      Sciatica     Created by Conversion      Serum Enzyme Levels - ALT (SGPT) Elevated     Created by Conversion      Shortness of breath      Sleep apnea     no CPAP     Spondylolisthesis of lumbar region 7/5/2016     Weakness 4/20/2019           Surgical History:  Past Surgical History:    Procedure Laterality Date     APPENDECTOMY       CARDIAC PACEMAKER PLACEMENT  2013    ICD Medtronic     CATARACT EXTRACTION W/  INTRAOCULAR LENS IMPLANT Bilateral      CHOLECYSTECTOMY       ESOPHAGOGASTRODUODENOSCOPY       WI C- LAMINOPLASTY, 2 OR MORE Left 2/22/2019    Procedure: LEFT CERVICAL 4, 5, 6 LAMINOPLASTY;  Surgeon: Liza Cesar MD;  Location: [facility];  Service: Spine     WI CABG, VEIN, FOUR      Description: Coronary Artery Quadruple Venous Bypass Graft;  Recorded: 10/21/2008;  Comments: 8/2004     WI LAP,CHOLECYSTECTOMY N/A 2/28/2019    Procedure: CHOLECYSTECTOMY, LAPAROSCOPIC;  Surgeon: Mana Roman MD;  Location: [facility];  Service: General     ROTATOR CUFF REPAIR Right      TONSILLECTOMY       XR MYELOGRAM CERVICAL THORACIC LUMBAR  1/17/2019       Family History:   Family History   Problem Relation Age of Onset     Heart disease Mother      Stroke Mother      Crohn's disease Father      Alcohol abuse Brother      No Medical Problems Daughter      No Medical Problems Son      No Medical Problems Maternal Aunt      No Medical Problems Maternal Uncle      No Medical Problems Paternal Aunt      No Medical Problems Paternal Uncle      No Medical Problems Maternal Grandmother      No Medical Problems Maternal Grandfather      No Medical Problems Paternal Grandmother      No Medical Problems Paternal Grandfather      Cancer Brother      Urolithiasis Neg Hx      Gout Neg Hx        Social History:    Social History     Socioeconomic History     Marital status: Single     Spouse name: Not on file     Number of children: Not on file     Years of education: Not on file     Highest education level: Not on file   Occupational History     Occupation: retired     Comment: [employer]   Social Needs     Financial resource strain: Not on file     Food insecurity:     Worry: Not on file     Inability: Not on file     Transportation needs:     Medical: Not on file     Non-medical: Not on  file   Tobacco Use     Smoking status: Former Smoker     Smokeless tobacco: Never Used   Substance and Sexual Activity     Alcohol use: No     Drug use: No     Sexual activity: Not on file   Lifestyle     Physical activity:     Days per week: Not on file     Minutes per session: Not on file     Stress: Not on file   Relationships     Social connections:     Talks on phone: Not on file     Gets together: Not on file     Attends [employer] service: Not on file     Active member of club or organization: Not on file     Attends meetings of clubs or organizations: Not on file     Relationship status: Not on file     Intimate partner violence:     Fear of current or ex partner: Not on file     Emotionally abused: Not on file     Physically abused: Not on file     Forced sexual activity: Not on file   Other Topics Concern     Not on file   Social History Narrative     Not on file          Review of Systems   Patient denies fever, chills, headache, lightheadedness, dizziness, rhinorrhea, cough, congestion, shortness of breath, chest pain, palpitations, abdominal pain, n/v, diarrhea, constipation, change in appetite, dysuria, frequency, burning or pain with urination.  Other than stated in HPI all other review of systems is negative.       Physical Exam   Vital signs: /55, heart rate 71, respiratory 14, weight stable at 162 pounds.  GENERAL APPEARANCE: Well developed, well nourished, in no acute distress.  HEENT: normocephalic, atraumatic  PERRL, sclerae anicteric, conjunctivae clear and moist, EOM intact  External inspection of ears and nose showed no scars, lesions or masses.  LUNGS: Lung sounds CTA, no adventitious sounds, respiratory effort normal.  CARD: RRR, S1, S2, without murmurs, gallops, rubs,   ABD: Soft nondistended and nontender with normal bowel sounds.   MSK: Muscle strength and tone were normal.  EXTREMITIES: No cyanosis, clubbing or edema.  NEURO: Alert and oriented x 3.  With cognitive impairment.   Face is symmetric.  SKIN: Inspection of the skin reveals no rashes, ulcerations or petechiae.  PSYCH: euthymic          Medication List:  Current Outpatient Medications   Medication Sig     acetaminophen (TYLENOL) 500 MG tablet Take 2 tablets (1,000 mg total) by mouth 3 (three) times a day.     amLODIPine (NORVASC) 5 MG tablet Take 1 tablet (5 mg total) by mouth daily.     bumetanide (BUMEX) 1 MG tablet Take 0.5 tablets (0.5 mg total) by mouth see administration instructions. For weight gain >3lbs. (Patient taking differently: Take 0.5 mg by mouth daily.       )     carvedilol (COREG) 25 MG tablet Take 37.5 mg by mouth 2 (two) times a day with meals.     cholecalciferol, vitamin D3, 1,000 unit tablet Take 2,000 Units by mouth daily.     cyanocobalamin 500 MCG tablet Take 500 mcg by mouth daily.     diclofenac sodium (VOLTAREN) 1 % Gel Apply 4 g topically 4 (four) times a day as needed.     FERROUS FUMARATE (IRON ORAL) Take 1 tablet by mouth daily.      melatonin 3 mg Tab tablet Take 1 tablet (3 mg total) by mouth at bedtime as needed.     multivitamin with iron (ONE DAILY WITH IRON) Tab tablet Take 1 tablet by mouth daily.      omeprazole (PRILOSEC) 20 MG capsule Take 20 mg by mouth daily before breakfast.            potassium chloride (K-DUR,KLOR-CON) 20 MEQ tablet TAKE 1 TABLET(20 MEQ) BY MOUTH DAILY     rosuvastatin (CRESTOR) 20 MG tablet TAKE 1 TABLET(20 MG) BY MOUTH AT BEDTIME     senna-docusate (PERICOLACE) 8.6-50 mg tablet Take 1 tablet by mouth daily as needed for constipation.     warfarin (COUMADIN/JANTOVEN) 2.5 MG tablet Take 1 tablet 2.5 mg daily, repeat INR and adjust dose as directed       Labs:  Recent Results (from the past 240 hour(s))   Basic Metabolic Panel   Result Value Ref Range    Sodium 146 (H) 136 - 145 mmol/L    Potassium 3.8 3.5 - 5.0 mmol/L    Chloride 107 98 - 107 mmol/L    CO2 28 22 - 31 mmol/L    Anion Gap, Calculation 11 5 - 18 mmol/L    Glucose 100 70 - 125 mg/dL    Calcium 10.0 8.5  - 10.5 mg/dL    BUN 18 8 - 28 mg/dL    Creatinine 1.19 0.70 - 1.30 mg/dL    GFR MDRD Af Amer >60 >60 mL/min/1.73m2    GFR MDRD Non Af Amer 59 (L) >60 mL/min/1.73m2   Hepatic Profile   Result Value Ref Range    Bilirubin, Total 0.9 0.0 - 1.0 mg/dL    Bilirubin, Direct 0.4 <=0.5 mg/dL    Protein, Total 7.2 6.0 - 8.0 g/dL    Albumin 3.9 3.5 - 5.0 g/dL    Alkaline Phosphatase 260 (H) 45 - 120 U/L    AST 17 0 - 40 U/L    ALT 23 0 - 45 U/L   Lipase   Result Value Ref Range    Lipase 11 0 - 52 U/L   Lactic Acid   Result Value Ref Range    Lactic Acid 1.1 0.5 - 2.2 mmol/L   Magnesium   Result Value Ref Range    Magnesium 1.5 (L) 1.8 - 2.6 mg/dL   HM2 (CBC W/O DIFF)   Result Value Ref Range    WBC 10.6 4.0 - 11.0 thou/uL    RBC 3.36 (L) 4.40 - 6.20 mill/uL    Hemoglobin 9.8 (L) 14.0 - 18.0 g/dL    Hematocrit 31.1 (L) 40.0 - 54.0 %    MCV 93 80 - 100 fL    MCH 29.2 27.0 - 34.0 pg    MCHC 31.5 (L) 32.0 - 36.0 g/dL    RDW 16.6 (H) 11.0 - 14.5 %    Platelets 205 140 - 440 thou/uL    MPV 9.4 8.5 - 12.5 fL   INR   Result Value Ref Range    INR 1.60 (H) 0.90 - 1.10   Urinalysis-UC if Indicated   Result Value Ref Range    Color, UA Yellow Colorless, Yellow, Straw, Light Yellow    Clarity, UA Cloudy (!) Clear    Glucose, UA Negative Negative    Bilirubin, UA Negative Negative    Ketones, UA Trace (!) Negative, 60 mg/dL    Specific Gravity, UA 1.021 1.001 - 1.030    Blood, UA Negative Negative    pH, UA 5.5 4.5 - 8.0    Protein,  mg/dL (!) Negative mg/dL    Urobilinogen, UA 2.0 E.U./dL <2.0 E.U./dL, 2.0 E.U./dL    Nitrite, UA Negative Negative    Leukocytes, UA Negative Negative    Bacteria, UA None Seen None Seen hpf    RBC, UA 0-2 None Seen, 0-2 hpf    WBC, UA 5-10 (!) None Seen, 0-5 hpf    Squam Epithel, UA 0-5 None Seen, 0-5 lpf    Mucus, UA Moderate (!) None Seen lpf    Hyaline Casts, UA 5-10 (!) 0-5, None Seen lpf   Culture, Urine   Result Value Ref Range    Culture No Growth    Basic Metabolic Panel   Result Value Ref  Range    Sodium 144 136 - 145 mmol/L    Potassium 3.4 (L) 3.5 - 5.0 mmol/L    Chloride 107 98 - 107 mmol/L    CO2 27 22 - 31 mmol/L    Anion Gap, Calculation 10 5 - 18 mmol/L    Glucose 79 70 - 125 mg/dL    Calcium 9.1 8.5 - 10.5 mg/dL    BUN 17 8 - 28 mg/dL    Creatinine 1.15 0.70 - 1.30 mg/dL    GFR MDRD Af Amer >60 >60 mL/min/1.73m2    GFR MDRD Non Af Amer >60 >60 mL/min/1.73m2   HM2(CBC w/o Differential)   Result Value Ref Range    WBC 8.5 4.0 - 11.0 thou/uL    RBC 2.81 (L) 4.40 - 6.20 mill/uL    Hemoglobin 8.1 (L) 14.0 - 18.0 g/dL    Hematocrit 25.6 (L) 40.0 - 54.0 %    MCV 91 80 - 100 fL    MCH 28.8 27.0 - 34.0 pg    MCHC 31.6 (L) 32.0 - 36.0 g/dL    RDW 16.2 (H) 11.0 - 14.5 %    Platelets 155 140 - 440 thou/uL    MPV 10.6 8.5 - 12.5 fL   INR   Result Value Ref Range    INR 1.71 (H) 0.90 - 1.10   Magnesium   Result Value Ref Range    Magnesium 2.1 1.8 - 2.6 mg/dL   Potassium   Result Value Ref Range    Potassium 3.7 3.5 - 5.0 mmol/L   INR   Result Value Ref Range    INR 1.75 (H) 0.90 - 1.10   Magnesium   Result Value Ref Range    Magnesium 2.0 1.8 - 2.6 mg/dL   Potassium - Next AM   Result Value Ref Range    Potassium 4.3 3.5 - 5.0 mmol/L   C. difficile Toxigenic by PCR   Result Value Ref Range    C.Difficile Toxigenic by PCR Negative Negative    Ribotype 027/NAP1/B1 Presumptive Negative Presumptive Negative   Culture, Stool   Result Value Ref Range    Culture       No Salmonella, Shigella, Yersinia, or Campylobacter.   EnteroHaemorrhagic E. coli Work Up   Result Value Ref Range    Shiga Toxin 1 Negative Negative    Shiga Toxin 2 Negative Negative   INR   Result Value Ref Range    INR 1.86 (H) 0.90 - 1.10   Magnesium   Result Value Ref Range    Magnesium 2.0 1.8 - 2.6 mg/dL   Comprehensive Metabolic Panel   Result Value Ref Range    Sodium 139 136 - 145 mmol/L    Potassium 4.0 3.5 - 5.0 mmol/L    Chloride 103 98 - 107 mmol/L    CO2 29 22 - 31 mmol/L    Anion Gap, Calculation 7 5 - 18 mmol/L    Glucose 94 70 -  125 mg/dL    BUN 12 8 - 28 mg/dL    Creatinine 1.05 0.70 - 1.30 mg/dL    GFR MDRD Af Amer >60 >60 mL/min/1.73m2    GFR MDRD Non Af Amer >60 >60 mL/min/1.73m2    Bilirubin, Total 0.8 0.0 - 1.0 mg/dL    Calcium 9.2 8.5 - 10.5 mg/dL    Protein, Total 6.2 6.0 - 8.0 g/dL    Albumin 3.3 (L) 3.5 - 5.0 g/dL    Alkaline Phosphatase 247 (H) 45 - 120 U/L    AST 14 0 - 40 U/L    ALT 16 0 - 45 U/L   HM1 (CBC with Diff)   Result Value Ref Range    WBC 6.8 4.0 - 11.0 thou/uL    RBC 3.19 (L) 4.40 - 6.20 mill/uL    Hemoglobin 9.3 (L) 14.0 - 18.0 g/dL    Hematocrit 28.8 (L) 40.0 - 54.0 %    MCV 90 80 - 100 fL    MCH 29.2 27.0 - 34.0 pg    MCHC 32.3 32.0 - 36.0 g/dL    RDW 15.7 (H) 11.0 - 14.5 %    Platelets 163 140 - 440 thou/uL    MPV 10.2 8.5 - 12.5 fL    Neutrophils % 71 (H) 50 - 70 %    Lymphocytes % 12 (L) 20 - 40 %    Monocytes % 13 (H) 2 - 10 %    Eosinophils % 4 0 - 6 %    Basophils % 0 0 - 2 %    Neutrophils Absolute 4.8 2.0 - 7.7 thou/uL    Lymphocytes Absolute 0.8 0.8 - 4.4 thou/uL    Monocytes Absolute 0.9 0.0 - 0.9 thou/uL    Eosinophils Absolute 0.3 0.0 - 0.4 thou/uL    Basophils Absolute 0.0 0.0 - 0.2 thou/uL   INR   Result Value Ref Range    INR 1.83 (H) 0.90 - 1.10   INR   Result Value Ref Range    INR 1.94 (H) 0.90 - 1.10   Basic Metabolic Panel   Result Value Ref Range    Sodium 138 136 - 145 mmol/L    Potassium 4.1 3.5 - 5.0 mmol/L    Chloride 105 98 - 107 mmol/L    CO2 23 22 - 31 mmol/L    Anion Gap, Calculation 10 5 - 18 mmol/L    Glucose 85 70 - 125 mg/dL    Calcium 9.2 8.5 - 10.5 mg/dL    BUN 21 8 - 28 mg/dL    Creatinine 1.33 (H) 0.70 - 1.30 mg/dL    GFR MDRD Af Amer >60 >60 mL/min/1.73m2    GFR MDRD Non Af Amer 52 (L) >60 mL/min/1.73m2   INR   Result Value Ref Range    INR 2.45 (H) 0.90 - 1.10   HM1 (CBC with Diff)   Result Value Ref Range    WBC 6.9 4.0 - 11.0 thou/uL    RBC 3.07 (L) 4.40 - 6.20 mill/uL    Hemoglobin 8.9 (L) 14.0 - 18.0 g/dL    Hematocrit 28.8 (L) 40.0 - 54.0 %    MCV 94 80 - 100 fL    MCH  29.0 27.0 - 34.0 pg    MCHC 30.9 (L) 32.0 - 36.0 g/dL    RDW 15.3 (H) 11.0 - 14.5 %    Platelets 124 (L) 140 - 440 thou/uL    MPV 10.7 8.5 - 12.5 fL    Neutrophils % 66 50 - 70 %    Lymphocytes % 14 (L) 20 - 40 %    Monocytes % 14 (H) 2 - 10 %    Eosinophils % 5 0 - 6 %    Basophils % 1 0 - 2 %    Neutrophils Absolute 4.5 2.0 - 7.7 thou/uL    Lymphocytes Absolute 0.9 0.8 - 4.4 thou/uL    Monocytes Absolute 1.0 (H) 0.0 - 0.9 thou/uL    Eosinophils Absolute 0.4 0.0 - 0.4 thou/uL    Basophils Absolute 0.1 0.0 - 0.2 thou/uL       Assessment:    ICD-10-CM    1. Chronic atrial fibrillation (H) I48.2    2. Anticoagulation management encounter Z51.81     Z79.01    3. Anemia, unspecified type D64.9    4. Stage 3 chronic kidney disease (H) N18.3    5. Nausea R11.0    6. Chronic systolic congestive heart failure (H) I50.22        Plan:  For his atrial fibrillation will continue on carvedilol 37.5 mg twice a day as he is stable on this dose.      His INR today is in therapeutic range and so we will continue with 2.5 mg of Coumadin every day.  He will likely need a combination of 2.5 mg and a couple of 5 mg/week.    Anemia: His hemoglobin today was 8.9 down from 9.3 will continue to monitor this this is likely due to his chronic kidney disease and he currently already takes ferrous sulfate.  Recheck a hemoglobin next week.    Chronic kidney disease: His creatinine is elevated more today than at discharge from the hospital at 1.33 will recheck a BMP next week to monitor his potassium and creatinine.  After that could expect that he could follow-up in his primary clinic as needed.    Nausea: Resolved    CHF: Weight is stable at 162 pounds he was 160 pounds when coming into the facility so we will continue to monitor daily weights and continue with Bumex 0.5 mg daily.  He has no lower extremity edema or difficulties with breathing.            Electronically signed by: Kimmy Naranjo, ERIN

## 2021-05-30 NOTE — TELEPHONE ENCOUNTER
Prior Authorization Request  Who s requesting:  Pharmacy  Pharmacy Name and Location: [address]  Medication Name: Diclofenac 1% gel  Insurance Plan: [employer] [place]  Insurance Member ID Number:  [id number]  Informed patient that prior authorizations can take up to 10 business days for response:   Yes  Okay to leave a detailed message: Yes

## 2021-05-30 NOTE — PROGRESS NOTES
Assessment:     Diagnoses and all orders for this visit:    Chronic right-sided low back pain without sciatica  -     diclofenac sodium (VOLTAREN) 1 % Gel; Apply 2-4 g topically 3 (three) times a day. Each application should be 2-4 grams.  Dispense: 1 Tube; Refill: 3    Right buttock pain  -     diclofenac sodium (VOLTAREN) 1 % Gel; Apply 2-4 g topically 3 (three) times a day. Each application should be 2-4 grams.  Dispense: 1 Tube; Refill: 3    Sacroiliac joint dysfunction    Sacroiliac joint pain  -     diclofenac sodium (VOLTAREN) 1 % Gel; Apply 2-4 g topically 3 (three) times a day. Each application should be 2-4 grams.  Dispense: 1 Tube; Refill: 3    History of lumbar surgery    Hx of cervical spine surgery       Tanmay Israel is a 79 y.o. y.o. male with past medical history significant for knee osteoarthritis, GERD, DANIEL, hyperlipidemia, Crohn's colitis, hypertension, coronary atherosclerosis, ischemic cardiomyopathy/chronic systolic CHF, paroxysmal ventricular tachycardia, internal defibrillator, lumbar spinal listhesis/SI joint dysfunction, chronic kidney disease stage III, cerebral aneurysm, osteoporosis, left cervical C4, C5, C6 laminoplasty Dr. Cesar 2/22/2019, cholecystectomy 2/28/2019 with C. difficile infection who presents today for follow-up regarding:    -Chronic right low back pain rating to the right upper buttocks that localizes to the sacroiliac joint that is most consistent with sacroiliac joint dysfunction, patient is gotten benefit with SI joint steroid injections in the past, last injection December 2018.    No radicular pain, patient is neurologically intact on exam.     Plan:     A shared decision making plan was used. The patient's values and choices were respected. Prior medical records from 1/4/19 were reviewed today. The following represents what was discussed and decided upon by the provider and the patient.        -DIAGNOSTIC TESTS: Images were personally reviewed and interpreted.    --No further imaging recommendations at this time.  --Lumbar CT scan 5/14/2018 reveals L5-S1 laminectomy and right facetectomy.  L4-5 severe bilateral foraminal stenosis with moderate to severe spinal canal stenosis that has worsened since 2015.  There is also similar L5-S1 bilateral foraminal stenosis.  L3-4 moderate bilateral foraminal and bilateral lateral recess stenosis.  Nondisplaced right L4 pars defect noted with unchanged L4-5 spondylolisthesis, 2 mm.  Spondylolisthesis also noted 4 mm at L4-5 unchanged and 2 mm L5-S1 unchanged.  --Lumbar spine CT myelogram from 2015 does reveal chronic L4-5 moderately severe central canal stenosis and severe right/moderate left foraminal stenosis.  L5-S1 moderately severe foraminal stenosis without central canal stenosis.  --Scoliosis panel 2015 shows 65  lordosis, thoracic kyphosis 51 , 8  scoliosis T11 through L3.  5 mm L4-5 and 3 mm L5-S1 spondylolisthesis.    -INTERVENTIONS: Previous order right SI joint steroid injection was placed by Dr. Cesar [facility] neurosurgery 4/17/2019 which patient would like to get completed at this time.  He is currently scheduled for the injection 7/2/2019 and it is in review by his insurance.  I also agree that this injection is warranted at this time.    -MEDICATIONS: Prescribed diclofenac gel which patient has utilized in the past with benefit advised patient to apply to right low back 3 times daily as needed for inflammation and pain related to the sacroiliac joint.  -Also advised patient that he can continue Tylenol 500 mg 1 to 2 tablets 3 times daily or 650 mg 1 tablet 4 times daily as needed for pain.  -Patient cannot take oral NSAIDs due to long-term Coumadin anticoagulation therapy.  Discussed side effects of medications and proper use. Patient verbalized understanding.    -PHYSICAL THERAPY: Did discuss revisiting physical therapy, patient does continue with home exercises from prior physical therapy at this time therefore  defers further PT.  Discussed the importance of core strengthening, ROM, stretching exercises with the patient and how each of these entities is important in decreasing pain.  Explained to the patient that the purpose of physical therapy is to teach the patient a home exercise program.  These exercises need to be performed every day in order to decrease pain and prevent future occurrences of pain.        -PATIENT EDUCATION:  25 minutes of total visit time was spent face to face with the patient today, 60 % of the visit was spent on counseling, education, and coordinating care.   -5 minutes spent outside of visit time, non-face-to-face time, reviewing chart.    -FOLLOW UP: Follow-up for injection with Dr. Cage in 2 weeks postinjection  Advised to contact clinic if symptoms worsen or change.    Subjective:     Tanmay Israel is a 79 y.o. male who presents today for follow-up regarding ongoing chronic right-sided low back pain lumbosacral junction that radiates to the right upper buttock that is ongoing for many years, currently bothersome at 8/10 more significant for the last couple of months that is significant with sitting for long periods of time as well as walking and standing however.  Patient denies any new symptoms in the lower extremity's, denies any recent trips or falls or weakness in his lower extremities, he does walk long-term with a walker however due to generalized weakness.  Patient denies bowel or bladder loss control.    **Patient saw Dr. Alicia last 9/27/2017 who recommended SI joint steroid injection however did discuss that if he fails to get further relief with SI injection/therapy, can trial injection to target stenosis at the L4-5 level.  Both of these injections have been completed however with minimal benefit.  **Patient was also evaluated by Dr. Cesar post neck surgery as well as her SI joint pain 4/17/2019 recommended right SI joint steroid injection.  However injection order was placed  and it was denied by insurance, order originally placed 4/17/2019.      Treatment to Date: History right L5-S1 laminectomy [facility] orthopedics.  Left cervical C4, C5, C6 laminoplasty Dr. Cesar 2/22/2019  Physical therapy [facility] re[facility] ×4 sessions 11/29/2017.  Lam Chi on his own in the past however unable to do currently due to increased pain.  Acupuncture recently with some relief.      SPR Right L4-5 TF JOSUÉ 7/12/2016 with no benefit.  Procedure 8/10, postprocedure 0/10 on procedure note.  SPR CT-guided Right SI joint steroid injection 9/20/2016.  SPR Right SI joint steroid injection 2/15/2017, 5/23/2017, 9/8/2017, with typical relief except the last 9/8/2017 he reports he got minimal relief.  Right SI joint steroid injection Dr. Cage 11/29/2017 with 10% relief.  Right L4-5 TF JOSUÉ 2/15/2018 with 40% relief ×3 months.  Preprocedure pain 8/10, post 1/10.  Right L5-S1 TF JOSUÉ 5/23/2018 with 90% relief ×3-4 days, NO lasting relief. Preprocedure pain 8/10, post 3/10.  FAILED Right L3, L4, L5 MBB 6/15/2018.  Right SI joint 7/11/2018 with moderate relief.  Preprocedure pain 8/10, post 0/10.  Right SI joint steroid injection 12/17/2018 with moderate relief.  Preprocedure pain 8/10, post 7/10.      Medications:  Voltaren gel is helping  Lidoderm patches  Tylenol occasional with minimal relief.  Previous tramadol prescription via PCP currently taking very infrequently as he does not want to take this regularly.  Does provide some benefit however.    Patient Active Problem List   Diagnosis     Right Rotator Cuff Tendonitis     Osteoarthritis Of The Knee     Joint Pain, Localized In The Right Shoulder     Difficulty Breathing (Dyspnea)     Esophageal Reflux     Muscle Aches, Generalized (Myalgias)     Lumbago     Obstructive sleep apnea     Sciatica     Joint Pain, Localized In The Knee     Ptosis Of Eyelid     Hyperlipidemia     Anemia     Crohn's (Granulomatous) Colitis     Benign Essential Hypertension      Coronary atherosclerosis of native coronary artery     Ischemic cardiomyopathy     Paroxysmal ventricular tachycardia (H)     Dry Nonexudative Macular Degeneration     Serum Enzyme Levels - ALT (SGPT) Elevated     Serum Enzyme Levels - Alkaline Phosphatase Elevated     Dysphagia     Presence of automatic cardioverter/defibrillator (AICD)--- Medtronic single lead     Spondylolisthesis of lumbar region     Lumbar stenosis with neurogenic claudication     Sacroiliac joint dysfunction of right side     Stage 3 chronic kidney disease (H)     Fall     Closed fracture of distal end of left radius, unspecified fracture morphology, initial encounter     Closed fracture of distal end of right radius, unspecified fracture morphology, initial encounter     Warfarin-induced coagulopathy (H)     Closed fracture of first thoracic vertebra, unspecified fracture morphology, initial encounter (H)     A-fib (H)     Anemia, unspecified type     Cerebral aneurysm     Osteoporosis     Ileitis     Calculus of ureter     Pyuria     Heart failure with reduced ejection fraction (H)     Diaphragmatic hernia     Pharyngoesophageal dysphagia     History of Crohn's disease     Hypertension     Iron deficiency anemia     Polyp of duodenum     Polyp of stomach and duodenum     Reflux esophagitis     Rotator cuff tear arthropathy of right shoulder     Schatzki's ring     Diverticulosis of colon     Flatulence, eructation and gas pain     Personal history of colonic polyps     Cervical myelopathy (H)     Acute post-operative pain     Acute cholecystitis     S/P cervical spinal fusion     S/P laparoscopic cholecystectomy     Hypomagnesemia     Right arm weakness     Weakness     C. difficile diarrhea     Hospital discharge follow-up     Acute renal failure (ARF) (H)     Hypotension, unspecified hypotension type       Current Outpatient Medications on File Prior to Encounter   Medication Sig Dispense Refill     bumetanide (BUMEX) 1 MG tablet Take 1  tablet (1 mg total) by mouth see administration instructions. For weight gain >3lbs.  0     carvedilol (COREG) 25 MG tablet Take 37.5 mg by mouth 2 (two) times a day with meals.       cholecalciferol, vitamin D3, 1,000 unit tablet Take 2,000 Units by mouth daily.       cyanocobalamin 500 MCG tablet Take 500 mcg by mouth daily.       FERROUS FUMARATE (IRON ORAL) Take 1 tablet by mouth daily.        multivitamin with iron (ONE DAILY WITH IRON) Tab tablet Take 1 tablet by mouth daily.        omeprazole (PRILOSEC) 20 MG capsule Take 20 mg by mouth daily before breakfast.              oxyCODONE (ROXICODONE) 5 MG immediate release tablet Take 5 mg by mouth every 4 (four) hours as needed.       rosuvastatin (CRESTOR) 20 MG tablet TAKE 1 TABLET(20 MG) BY MOUTH AT BEDTIME 90 tablet 1     warfarin (COUMADIN/JANTOVEN) 2.5 MG tablet Take 1.25-2.5 mg by mouth See Admin Instructions. Take 1.25 mg (half-tablet) on Wednesdays. Take 2.5 mg (1 tablet) all other days of the week.       [DISCONTINUED] fidaxomicin (DIFICID) tablet 200mg PO two times a day 5/23-5/27. Then 200mg PO every other day x 10 doses starting 5/29. 11 tablet 0     No current facility-administered medications on file prior to encounter.        No Known Allergies    Past Medical History:   Diagnosis Date     Anemia      Arthritis      Atrial fibrillation (H)      Back pain      Callus      Cerebral aneurysm      Cervical spinal stenosis      Chronic kidney disease     stage 3     Chronic systolic congestive heart failure (H)      Coronary artery disease      Crohn's disease (H)      Dysphagia      GERD (gastroesophageal reflux disease)      Hyperlipidemia      Hypertension      ICD (implantable cardioverter-defibrillator) in place     Medtronic     Ischemic cardiomyopathy      Kidney stone      Low back pain      Macular degeneration      Permanent atrial fibrillation (H)     [id number] risk score = 5 (age1/ CAD/ HTN/ CHF) Chronic warfarin     Right rotator cuff  tendinitis      Schatzki's ring      Shortness of breath      Sleep apnea     no CPAP        Review of Systems  ROS: Positive for generalized weakness.  Specifically negative for bowel/bladder dysfunction, balance changes, headache, dizziness, foot drop, fevers, chills, appetite changes, nausea/vomiting, unexplained weight loss. Otherwise 13 systems reviewed are negative. Please see the patient's intake questionnaire from today for details.    Reviewed Social, Family, Past Medical and Past Surgical history with patient, no significant changes noted since prior visit.     Objective:     /60 (Patient Site: Right Arm, Patient Position: Sitting)   Pulse 66   Wt 165 lb (74.8 kg)   SpO2 100%   BMI 24.37 kg/m      PHYSICAL EXAMINATION:    --CONSTITUTIONAL: Well developed, well nourished, healthy appearing individual.  --PSYCHIATRIC: Appropriate mood and affect. No difficulty interacting due to temper, social withdrawal, or memory issues.  --SKIN: Lumbar region is dry and intact.   --RESPIRATORY: Normal rhythm and effort. No abnormal accessory muscle breathing patterns noted.   --MUSCULOSKELETAL:  Normal lumbar lordosis noted, no lateral shift.  --GROSS MOTOR: Easily arises from a seated position. Gait is non-antalgic  --LUMBAR SPINE:  Inspection reveals no evidence of deformity. No tenderness to palpation lumbar spine. Straight leg raising in the supine position is negative to radicular pain. Sciatic notch non-tender on the left and tender on the right.   --SACROILIAC JOINT: Positive right distraction.  Positive right Primitivo's with reproduction of pain to affected extremity. One Finger point test positive right.  --LOWER EXTREMITY MOTOR TESTING:  Plantar flexion left 5/5, right 5/5   Dorsiflexion left 5/5, right 5/5   Great toe MTP extension left 5/5, right 5/5  Knee flexion left 5/5, right 5/5  Knee extension left 5/5, right 5/5   Hip flexion left 5/5, right 5/5  Hip abduction left 5/5, right 5/5  Hip adduction  left 5/5, right 5/5   --HIPS: Full range of motion bilaterally.   --NEUROLOGIC: Bilateral patellar and achilles reflexes are physiologic and symmetric. Sensation to light touch is intact in the bilateral L4, L5, and S1 dermatomes.    RESULTS:   Imaging: Lumbar spine imaging was reviewed today. The images were shown to the patient and the findings were explained using a spine model.      Ct Lumbar Spine Without Contrast  Result Date: 5/14/2018  INDICATION: Spinal stenosis, lumbar. Ongoing right lumbar radiculitis. TECHNIQUE: Helical thin-section CT scan of the spine was performed using bone reconstruction algorithm. From the axial source data, sagittal and coronal thin reformats. Dose reduction techniques were used. IV CONTRAST: None. COMPARISON: 9/9/2015. FINDINGS:  Nomenclature is based on 5 lumbar type vertebral bodies. Vertebral body heights are unremarkable and unchanged from 9/9/2015. No destructive bony lesions are demonstrated. L5-S1 laminectomy and right facetectomy have been performed. Nondisplaced right L4 pars defect again demonstrated. There is no evidence of an acute displaced fracture. The imaged portions of the bony pelvis appear intact. There are mild degenerative changes of the sacroiliac joints. There is L4-L5 anterolisthesis of approximately 4 mm, not appreciably changed. L3-L4 retrolisthesis of 2 mm is also unchanged. L5-S1 anterolisthesis of 2 mm is similar to prior. Alignment is otherwise unremarkable in the sagittal plane. There is mild levoconvex lower lumbar curvature. Grossly unremarkable paraspinal soft tissues. There is scattered atherosclerotic change of the abdominal aorta and its branches. No abdominal aortic aneurysm is demonstrated. There is colonic diverticulosis.   T12-L1: Mild intervertebral disc height loss. No significant posterior disc abnormality. Prominent anterior and lateral disc osteophyte complexes. Mild facet arthropathy. No significant spinal canal stenosis. No right  neural foraminal stenosis. No left neural foraminal stenosis.   L1-L2: Mild intervertebral disc height loss. Mild broad-based disc bulging. Mild ligamentum flavum thickening and mild facet arthropathy. Mild to moderate spinal canal stenosis. Mild right neural foraminal stenosis. Mild left neural foraminal stenosis.   L2-L3: Moderate intervertebral disc height loss, with broad-based disc osteophyte complex. Mild facet arthropathy. Bilateral lateral recess stenosis. Mild to moderate spinal canal stenosis. Moderate right neural foraminal stenosis. Mild to moderate left neural foraminal stenosis.   L3-L4: Mild intervertebral disc height loss with broad-based disc osteophyte complex. Ligamentum flavum thickening and moderate facet arthropathy. Moderate spinal canal stenosis. Moderate right neural foraminal stenosis. Moderate left neural foraminal stenosis. Bilateral lateral recess stenosis.   L4-L5: Unchanged anterolisthesis as above, with uncovering of the dorsal disc margin. There is superimposed broad-based disc bulging. Severe bilateral facet arthropathy, left greater than right. Moderate to severe spinal canal stenosis. Severe right neural foraminal stenosis. Severe left neural foraminal stenosis.   L5-S1: Normal disc height. Mild bulge. No definite right-sided facet articulation demonstrated, similar to prior. Moderate left facet arthropathy. Mild spinal canal stenosis. Severe right neural foraminal stenosis. Severe left neural foraminal stenosis.   CONCLUSION:   1.  Postoperative changes of L5-S1 laminectomy and right facetectomy.   2.  At L4-L5, there is severe bilateral neural foraminal stenosis and moderate to severe spinal canal stenosis, slightly progressed.   3.  At L5-S1, there is severe bilateral neural foraminal stenosis, similar to prior.   4.  At L3-L4, there is moderate bilateral neural foraminal stenosis and bilateral lateral recess stenosis.   5.  Additional degenerative changes as  described.        XR SCOLIOSIS AP OR PA AND LATERAL STANDING  10/19/2015   INDICATION: Low back pain. Thoracic kyphosis.  COMPARISON: None.  FINDINGS: Twelve rib-bearing thoracic-type vertebral bodies. Five lumbar type vertebral bodies. Thoracic kyphosis estimated at 51 degrees. Lumbar lordosis between L1 and S1 estimated at 65 degrees. Right convex asymmetry of the thoracolumbar spine   measuring 8 degrees between T11 and L3.  Several bridging osteophytes in the thoracic spine, pattern that is consistent with diffuse idiopathic skeletal hyperostosis. Prominent left lateral T12-L1 osteophyte and ventral L2-L3 and L3-L4 osteophytes, but not definitely bridging. 5 mm   anterolisthesis of L4 on L5 and 3 mm anterolisthesis of L5 on S1. Prominent facet arthropathy lower lumbar spine. No acute superimposed bony abnormality of the thoracolumbar spine, without definite fracture.  Dense aortic calcification, normal caliber. Left hemithorax generator with two pacemaker leads to the heart. Sternal wires. Reverse right shoulder arthroplasty. Moderate degenerative change both hips.          CT LUMBAR SPINE MYELOGRAM  9/9/2015  INDICATION: Lumbar stenosis.  TECHNIQUE: Helical images were reconstructed in the axial plane at 1.25-mm increments, with sagittal and coronal reformations. Intrathecal contrast introduced under separate procedure and described with that procedure.  COMPARISON: None.  FINDINGS: Five lumbar-type vertebral bodies. Slightly exaggerated lordosis at L4-L5 on a degenerative basis. 4-mm anterior subluxation of L4 on L5. 2-mm anterior subluxation of L5 on S1. Slight retrolisthesis of L2 on L3 and L3 on L4. Left convex   scoliosis measuring 14 degrees between L3 and L5. Much of this is at L4-L5 from asymmetric disc height loss. Right-sided L4 pars defect. L5-S1 wide laminectomy with right facetectomy. No destructive bony lesion is apparent. No fracture is evident.  Conus tip ends at T12-L1. Cauda equina generally  unremarkable. Paraspinous soft tissues are grossly normal. Moderate paraspinous muscle volume loss. Normal aortic caliber, moderate calcification. Mild to moderate degenerative change SI joints.  T12-L1: Slight disc height loss. Left anterior interbody spurring. Mild facet arthropathy. No central canal stenosis. No foraminal stenosis.  L1-L2: Slight disc height loss. Mild circumferential disc bulging. Left anterolateral interbody spur. Mild to moderate facet arthropathy. No central canal stenosis. No foraminal stenosis.  L2-L3: Moderate disc height loss. Moderate circumferential interbody spur and disc bulging, more pronounced anteriorly. Mild facet arthropathy. No central canal stenosis. Mild right foraminal stenosis. No left foraminal stenosis.  L3-L4: Mild disc height loss. Mild to moderate circumferential disc bulging. Mild to moderate facet arthropathy. Some calcification of the right facet cartilage. Mild central canal stenosis. No lateral recess stenosis. Mild to moderate right foraminal   stenosis. Borderline moderate left foraminal stenosis.  L4-L5: Mild disc height loss greater dorsally. At least moderate circumferential disc bulging. Prominent facet arthropathy with diastatic left facet. Moderately severe central canal stenosis. Despite this, no definite lateral recess stenosis. Severe   right and moderate left foraminal stenoses from disc bulging.  L5-S1: Disc height normal. Slight disc bulging. Sagittally oriented left facet. Effectively absent right facet. No central canal stenosis. Moderately severe bilateral foraminal stenoses.  CONCLUSION:  1.  Previous lumbar spine surgery with L5-S1 wide laminectomy and right facetectomy.  2.  Degenerative changes most prominent at L4-L5 and L5-S1. The facets are sagittally oriented. This predisposes to subluxation.  3.  At L4-L5, moderately severe central canal stenosis without grossly apparent lateral recess stenosis. Severe right and moderate left foraminal  stenoses.  4.  At L5-S1, moderately severe bilateral foraminal stenoses.  5.  At L3-L4, borderline moderate left foraminal stenosis. Mild central canal stenosis.

## 2021-05-30 NOTE — PROGRESS NOTES
[facility]        Visit Type: H & P (Generalized weakness with nausea)    Code Status:  FULL CODE  Facility:  [facility] [[id number]]          PCP: Go Ramírez MD       PHONE: [phone]     FAX:[phone]        ASSESSMENT/PLAN:  1. Nausea   with no associated vomiting or weakness.  Will restart Zofran 4 mg p.o. 3 times daily as needed   2. Chronic atrial fibrillation (H)   stable, status post pacemaker.  Rate controlled with Coreg.  On Coumadin.  Will monitor INR, consider decreasing INR goal to 1.8-2.5, need to be cautious with patient's anemia   3. Ischemic cardiomyopathy   currently stable without evidence of fluid overload, on Bumex and KCl, Coreg   4. Stage 3 chronic kidney disease (H)   will recheck BMP   5. Anemia, unspecified type   no evidence for bleeding, will recheck hemogram   6. Essential hypertension   blood pressures are stable on Coreg and amlodipine   7. Cognitive impairment   at this time patient does not want to be in [facility], will need discussion with family,  and further and testing of cognitive impairment         HISTORY OF PRESENT ILLNESS:   Tanmay Israel is a 79 y.o. male with a history of chronic atrial fibrillation status post pacemaker placement, CAD with ischemic cardiomyopathy, CRD with chronic anemia, hypertension, DJD, CRD, history of colonic polyps, history of Schatzki's ring, history of sleep apnea was admitted for nausea and vomiting, dizziness and diarrhea which improved after initial IV fluids and treatment with meclizine and IV Zofran.  Most recently the patient had C. difficile treated with vancomycin and eventually treated with for fidaxomicin after ID consult.  C. difficile culture in the hospital was negative.  The patient was also noted to have pleural effusions on imaging and eventually IV fluids were discontinued for fear of CHF exacerbation and the patient was restarted on Bumex.  The  most recent Echo done on 11/18 showed EF of 35% with moderate global hypokinesis.  The patient also was noted to have anemia with a hemoglobin initially of 8.2, then 9.3, also had hypokalemia and hypomagnesemia.  The patient does have chronic renal disease and his chronic anemia was felt to be secondary to multiple factors including his CRD and his multiple medical problems.  He does not have any evidence for any bleeding.  Electrolyte imbalance resolved prior to discharge.  His blood pressure was also high and he was started on Norvasc in the hospital.  For his dementia, evaluation was done by OT with slums score at 15/30.  It was recommended that the patient consider [facility] after TCU admit.    Currently, the patient has been doing much better except that this morning, he again developed some nausea and he refused his breakfast and lunch for fear of having some vomiting although he did not have any episodes of vomiting today.  He states that his energy is usually good and that he ambulates well to the dining room without any problems he is also doing well with physical therapy.  He denies any abdominal pain, dizziness or lightheadedness.  He also has not had any diarrhea.  He also denies any shortness of breath, chest pains, palpitations.  He does not have any urinary symptoms.  He had recent coccyx and heels ulcers which are now clear.    Other review of systems are negative.      PAST MEDICAL/SURGICAL HISTORY:  Past Medical History:   Diagnosis Date     Acute cholecystitis 2/28/2019     Acute post-operative pain      Anemia      Arthritis      Atrial fibrillation (H)      Back pain      C. difficile diarrhea 4/21/2019     Calculus of ureter      Callus      Cerebral aneurysm      Cervical myelopathy (H) 2/22/2019     Cervical spinal stenosis      Chronic kidney disease     stage 3     Chronic systolic congestive heart failure (H)      Closed fracture of distal end of left radius, unspecified fracture morphology,  initial encounter      Closed fracture of distal end of right radius, unspecified fracture morphology, initial encounter      Closed fracture of first thoracic vertebra, unspecified fracture morphology, initial encounter (H)      Coronary artery disease      Crohn's disease (H)      Difficulty Breathing (Dyspnea)     Created by Conversion      Dysphagia      Fall 10/22/2016     GERD (gastroesophageal reflux disease)      Hospital discharge follow-up 5/21/2019     Hyperlipidemia      Hypertension      Hypotension, unspecified hypotension type      ICD (implantable cardioverter-defibrillator) in place     Medtronic     Ischemic cardiomyopathy      Joint Pain, Localized In The Knee     Created by Conversion      Joint Pain, Localized In The Right Shoulder     Created by Conversion      Kidney stone      Low back pain      Lumbago     Created by Conversion      Macular degeneration      Muscle Aches, Generalized (Myalgias)     Created by Conversion      Permanent atrial fibrillation (H)     [id number] risk score = 5 (age1/ CAD/ HTN/ CHF) Chronic warfarin     Personal history of colonic polyps 2/12/2019     Polyp of duodenum 10/17/2016     Pyuria      Right arm weakness 4/2/2019     Right rotator cuff tendinitis      Right Rotator Cuff Tendonitis     Created by Conversion  Replacement Utility updated for latest IMO load     Schatzki's ring      Sciatica     Created by Conversion      Serum Enzyme Levels - ALT (SGPT) Elevated     Created by Conversion      Shortness of breath      Sleep apnea     no CPAP     Spondylolisthesis of lumbar region 7/5/2016     Weakness 4/20/2019     Past Surgical History:   Procedure Laterality Date     APPENDECTOMY       CARDIAC PACEMAKER PLACEMENT  2013    ICD Medtronic     CATARACT EXTRACTION W/  INTRAOCULAR LENS IMPLANT Bilateral      CHOLECYSTECTOMY       ESOPHAGOGASTRODUODENOSCOPY       [place]- LAMINOPLASTY, 2 OR MORE Left 2/22/2019    Procedure: LEFT CERVICAL 4, 5, 6 LAMINOPLASTY;   Surgeon: Liza Cesar MD;  Location: [facility];  Service: Spine     PA CABG, VEIN, FOUR      Description: Coronary Artery Quadruple Venous Bypass Graft;  Recorded: 10/21/2008;  Comments: 8/2004     PA LAP,CHOLECYSTECTOMY N/A 2/28/2019    Procedure: CHOLECYSTECTOMY, LAPAROSCOPIC;  Surgeon: Mana Roman MD;  Location: [facility];  Service: General     ROTATOR CUFF REPAIR Right      TONSILLECTOMY       XR MYELOGRAM CERVICAL THORACIC LUMBAR  1/17/2019       SOCIAL HISTORY:  Social History     Socioeconomic History     Marital status: Single     Spouse name: Not on file     Number of children: Not on file     Years of education: Not on file     Highest education level: Not on file   Occupational History     Occupation: retired     Comment: [employer]   Social Needs     Financial resource strain: Not on file     Food insecurity:     Worry: Not on file     Inability: Not on file     Transportation needs:     Medical: Not on file     Non-medical: Not on file   Tobacco Use     Smoking status: Former Smoker     Smokeless tobacco: Never Used   Substance and Sexual Activity     Alcohol use: No     Drug use: No     Sexual activity: Not on file   Lifestyle     Physical activity:     Days per week: Not on file     Minutes per session: Not on file     Stress: Not on file   Relationships     Social connections:     Talks on phone: Not on file     Gets together: Not on file     Attends Gnosticism service: Not on file     Active member of club or organization: Not on file     Attends meetings of clubs or organizations: Not on file     Relationship status: Not on file     Intimate partner violence:     Fear of current or ex partner: Not on file     Emotionally abused: Not on file     Physically abused: Not on file     Forced sexual activity: Not on file   Other Topics Concern     Not on file   Social History Narrative     Not on file       FAMILY HISTORY:  Family History   Problem Relation Age of Onset      Heart disease Mother      Stroke Mother      Crohn's disease Father      Alcohol abuse Brother      No Medical Problems Daughter      No Medical Problems Son      No Medical Problems Maternal Aunt      No Medical Problems Maternal Uncle      No Medical Problems Paternal Aunt      No Medical Problems Paternal Uncle      No Medical Problems Maternal Grandmother      No Medical Problems Maternal Grandfather      No Medical Problems Paternal Grandmother      No Medical Problems Paternal Grandfather      Cancer Brother      Urolithiasis Neg Hx      Gout Neg Hx        MEDICATIONS:  Current Outpatient Medications on File Prior to Visit   Medication Sig     acetaminophen (TYLENOL) 500 MG tablet Take 2 tablets (1,000 mg total) by mouth 3 (three) times a day.     amLODIPine (NORVASC) 5 MG tablet Take 1 tablet (5 mg total) by mouth daily.     bumetanide (BUMEX) 1 MG tablet Take 0.5 tablets (0.5 mg total) by mouth see administration instructions. For weight gain >3lbs. (Patient taking differently: Take 0.5 mg by mouth daily.       )     carvedilol (COREG) 25 MG tablet Take 37.5 mg by mouth 2 (two) times a day with meals.     cholecalciferol, vitamin D3, 1,000 unit tablet Take 2,000 Units by mouth daily.     cyanocobalamin 500 MCG tablet Take 500 mcg by mouth daily.     diclofenac sodium (VOLTAREN) 1 % Gel Apply 4 g topically 4 (four) times a day as needed.     FERROUS FUMARATE (IRON ORAL) Take 1 tablet by mouth daily.      melatonin 3 mg Tab tablet Take 1 tablet (3 mg total) by mouth at bedtime as needed.     multivitamin with iron (ONE DAILY WITH IRON) Tab tablet Take 1 tablet by mouth daily.      omeprazole (PRILOSEC) 20 MG capsule Take 20 mg by mouth daily before breakfast.            potassium chloride (K-DUR,KLOR-CON) 20 MEQ tablet TAKE 1 TABLET(20 MEQ) BY MOUTH DAILY     rosuvastatin (CRESTOR) 20 MG tablet TAKE 1 TABLET(20 MG) BY MOUTH AT BEDTIME     senna-docusate (PERICOLACE) 8.6-50 mg tablet Take 1 tablet by mouth  daily as needed for constipation.     warfarin (COUMADIN/JANTOVEN) 2.5 MG tablet Take 1 tablet 2.5 mg daily, repeat INR and adjust dose as directed     No current facility-administered medications on file prior to visit.        ALLERGIES:  No Known Allergies      PHYSICAL EXAMINATION:  Vital signs 136/57, 98.0, 54, 160.9 pounds, 18, 99% room air  General: Awake, Alert, oriented x3, not in any form of acute distress, answers questions appropriately, follows simple commands, conversant  HEENT: Pink conjunctiva, anicteric sclerae, oral mucosa is moist upper and lower dentures  NECK: Supple, without any lymphadenopathy, thyromegaly or any masses  LUNG: Clear to auscultation, good chest expansion. There are no crackles, no wheezes, normal AP diameter  BACK: No kyphosis of the thoracic spine  CVS: There is good S1  S2, there are no murmurs, no heaves, rhythm is regular, pacemaker on left chest  ABDOMEN: Globular and soft, nontender to palpation, no organomegaly, good bowel sounds  EXTREMITIES: Good range of motion on both upper and lower extremities, no pedal edema, no cyanosis or clubbing, no calf tenderness  SKIN: Warm and dry, no rashes or erythema noted    LABS:  Lab Results   Component Value Date    WBC 6.8 07/18/2019    HGB 9.3 (L) 07/18/2019    HCT 28.8 (L) 07/18/2019    MCV 90 07/18/2019     07/18/2019     Lab Results   Component Value Date    CREATININE 1.05 07/18/2019    BUN 12 07/18/2019     07/18/2019    K 4.0 07/18/2019     07/18/2019    CO2 29 07/18/2019           >45 minutes of total time spent, greater than 55% of the time spent in coordination of care and counseling regarding the above medical issues and plan of care including discussion regarding cognitive impairment with possible consideration of senior living. I have reviewed the patient's medical records, labs and medications.       Electronically signed by:Nataliya Manriquez MD

## 2021-05-30 NOTE — TELEPHONE ENCOUNTER
MEDICATION APPEAL APPROVED    Effective date: 04/06/2019  End date 07/05/2020    I called to get the information in regards to the status the appeal and per the [facility] rep that I spoke with, it was approved on 07/05/2019. The doctor's office will be getting a letter mailed to them. I did call [facility] and the medication did process and they will notify the patient.

## 2021-05-30 NOTE — TELEPHONE ENCOUNTER
Who is calling:  Nurse  at [employer]  Reason for Call:  Caller stated I am here to assist patient and the care team as needed with care managment. Please have a member of Go Ramírez MD  return call to touch base with me regarding how I am able to assist.    Date of last appointment with primary care: 6/24/19  Okay to leave a detailed message: Yes

## 2021-05-30 NOTE — TELEPHONE ENCOUNTER
ANTICOAGULATION  MANAGEMENT: Discharge Continuity of Care Review    Hospital admission on  7/15-7/18 for dizziness and weakness.    Discharge disposition: TCU    INR Results:       Recent labs: (last 7 days)     07/15/19  1614 07/16/19  0635 07/17/19  0641 07/18/19  0658   INR 1.60* 1.71* 1.75* 1.86*       Warfarin inpatient management: Home regimen continued    Warfarin discharge instructions: increase dose to: 2.5 mg daily      Medication Changes Affecting Anticoagulation: No    Additional Factors Affecting Anticoagulation: No    Plan       ACM will resume monitoring upon discharge from TCU    Anticoagulation calendar updated    Edgar Ortez RN

## 2021-05-30 NOTE — PATIENT INSTRUCTIONS - HE
Please follow up two weeks post procedure with Jyoti to evaluate your plan of care.       DISCHARGE INSTRUCTIONS    During office hours (8:00 a.m.- 4:30 p.m.) questions or concerns may be answered  by calling Spine Navigation Nurses at  [phone].     If you experience any problems after hours  please call [phone] and you will be connected to [facility].     All Patients:    ? You may experience an increase in your symptoms for the first 2 days (It may take anywhere between 2 days- 2 weeks for the steroid to have maximum effect).    ? You may use ice on the injection site, as frequently as 20 minutes each hour if needed.    ? You may take your pain medicine.    ? You may continue taking your regular medication after your injection. If you have had a Medial Branch Block you may resume pain medication once your pain diary is completed.    ? You may shower. No swimming, tub bath or hot tub for 48 hours.  You may remove your bandaid/bandage as soon as you are home.    ? You may resume light activities, as tolerated.    ? Resume your usual diet as tolerated.    ? It is strongly advised that you do not drive for 1-3 hours post injection.    ? If you have had oral sedation:  Do not drive for 8 hours post injection.      ? If you have had IV sedation:  Do not drive for 24 hours post injection.  Do not operate hazardous machinery or make important personal/business decisions for 24 hours.      POSSIBLE STEROID SIDE EFFECTS (If steroid/cortisone was used for your procedure)    -If you experience these symptoms, it should only last for a short period      Swelling of the legs                Skin redness (flushing)       Mouth (oral) irritation     Blood sugar (glucose) levels              Sweats                      Mood changes    Headache    Sleeplessness         POSSIBLE PROCEDURE SIDE EFFECTS  -Call the Spine Center if you are concerned    Increased Pain             Increased numbness/tingling         Nausea/Vomiting            Bruising/bleeding at site        Redness or swelling                                                Difficulty walking        Weakness             Fever greater than 100.5    *In the event of a severe headache after an epidural steroid injection that is relieved by lying down, please call the [facility] to speak with a clinical staff member*

## 2021-05-30 NOTE — TELEPHONE ENCOUNTER
ANTICOAGULATION  MANAGEMENT    Assessment     Today's INR result of 2.4 is Therapeutic (goal INR of 2.0-3.0)        Warfarin taken as previously instructed    No new diet changes affecting INR    No new medication/supplements affecting INR    Continues to tolerate warfarin with no reported s/s of bleeding or thromboembolism     Previous INR was Subtherapeutic    Plan:     Spoke with Tanmay regarding INR result and instructed:     Warfarin Dosing Instructions:  Continue current warfarin dose    1.25 mg every Wed, Sat; 2.5 mg all other days         Instructed patient to follow up no later than: 2 weeks.    Education provided: importance of taking warfarin as instructed    Tanmay verbalizes understanding and agrees to warfarin dosing plan.    Instructed to call the [facility] for any changes, questions or concerns. (#[phone])   ?   Edgar Ortez RN    Subjective/Objective:      Tanmay Israel, a 79 y.o. male is on warfarin.     Tanmay reports:     Home warfarin dose: verbally confirmed home dose with pt and updated on anticoagulation calendar     Missed doses: No     Medication changes:  No     S/S of bleeding or thromboembolism:  No     New Injury or illness:  No     Changes in diet or alcohol consumption:  No     Upcoming surgery, procedure or cardioversion:  No    Anticoagulation Episode Summary     Current INR goal:   2.0-3.0   TTR:   57.8 % (4.4 y)   Next INR check:   7/22/2019   INR from last check:   2.40 (7/8/2019)   Weekly max warfarin dose:      Target end date:      INR check location:      Preferred lab:      Send INR reminders to:   CHANI RIOS       Comments:            Anticoagulation Care Providers     Provider Role Specialty Phone number    Go Ramírez MD Referring Family Medicine [phone]

## 2021-05-30 NOTE — PATIENT INSTRUCTIONS - HE
Tylenol over-the-counter up to 3000 mg max per day.  You can take either:  500 mg, 1 to 2 tablets 3 times daily (max 6 tablets/day)   OR   650 mg, 1 tablet 4 times daily (max 4 tablets/day)    ~Please call Nurse Navigation line [phone] with any questions or concerns about your treatment plan, if symptoms worsen and you would like to be seen urgently, or if you have problems controlling bladder and bowel function.  ~Follow Up Appointment time slots with Jyoti Her CNP with the Spine Center, are also available at the [facility] location near [facility] on the first and third THURSDAY afternoons of each month.

## 2021-05-30 NOTE — TELEPHONE ENCOUNTER
Last K was 3.2 on 6/24/19. Would you like to have the K rechecked before starting a supplement?     Massiel Medina, Pharm.D., [facility] Center[facility]  Medication Therapy Management Pharmacist  [facility]

## 2021-05-30 NOTE — TELEPHONE ENCOUNTER
Reason contacted:  Results   Information relayed:  Below message. Patient is coming in today at 1:45 pm to have his potassium rechecked.    Order pended for approval     Additional questions:  No  Further follow-up needed:  No  Okay to leave a detailed message:  No

## 2021-05-30 NOTE — PROGRESS NOTES
In clinic device check with Tatyana Bolden CNP.  Please see link for full device report.  Patient was informed of results and next follow up during today's visit.

## 2021-05-30 NOTE — PATIENT INSTRUCTIONS - HE
Tanmay Israel,    It was a pleasure to see you today at the [facility].     My recommendations after this visit include:    - Device check (OptiVol) shows that you are retaining fluid. I would like to resume Bumex on lower dose. Take half a tablet of 1 mg (0.5mg) daily. Please call back in 3-5 days if no improvement in your weight and leg swelling     - I did some lab work today and will call you with results when available     - Please get your blood work (BMP) checked in 1 week    - Follow up with Dr. Baker in 4-6 weeks    - Follow up in Heart Failure Clinic with Tatyana/Alaina in 1 week    - Please call Sydney Smith RN on [phone], if you have any questions or concerns    Tatyana Bolden CNP

## 2021-05-30 NOTE — TELEPHONE ENCOUNTER
MEDICATION APPEAL INITIATION     Letter of medical necessity, original denial letter, chart notes and application for determination were to faxed to [phone].

## 2021-05-30 NOTE — TELEPHONE ENCOUNTER
Who is calling:  Patient   Reason for Call:  The patient is returning the call to the pharmacist and is requesting more information on the dosing of the supplement recommended of Magnesium. He has 500 mg tbs on hand. Please advise at the number provided.   Date of last appointment with primary care: 6/24  Okay to leave a detailed message: Yes

## 2021-05-30 NOTE — TELEPHONE ENCOUNTER
500 mg would be fine! Since his level was not too low, one tablet a day would be sufficient. We can continue to monitor his magnesium with upcoming labs.

## 2021-05-31 ENCOUNTER — RECORDS - HEALTHEAST (OUTPATIENT)
Dept: ADMINISTRATIVE | Facility: CLINIC | Age: 81
End: 2021-05-31

## 2021-05-31 VITALS — WEIGHT: 200 LBS | HEIGHT: 69 IN | BODY MASS INDEX: 29.62 KG/M2

## 2021-05-31 VITALS — WEIGHT: 205 LBS | HEIGHT: 69 IN | BODY MASS INDEX: 30.36 KG/M2

## 2021-05-31 VITALS — WEIGHT: 203 LBS | HEIGHT: 69 IN | BODY MASS INDEX: 30.07 KG/M2

## 2021-05-31 VITALS — HEIGHT: 69 IN | BODY MASS INDEX: 29.62 KG/M2 | WEIGHT: 200 LBS

## 2021-05-31 VITALS — HEIGHT: 69 IN | BODY MASS INDEX: 29.92 KG/M2 | WEIGHT: 202 LBS

## 2021-05-31 VITALS — WEIGHT: 203 LBS | BODY MASS INDEX: 29.98 KG/M2

## 2021-05-31 VITALS — BODY MASS INDEX: 29.62 KG/M2 | HEIGHT: 69 IN | WEIGHT: 200 LBS

## 2021-05-31 VITALS — BODY MASS INDEX: 30.21 KG/M2 | HEIGHT: 69 IN | WEIGHT: 204 LBS

## 2021-05-31 VITALS — WEIGHT: 203 LBS | BODY MASS INDEX: 30.07 KG/M2 | HEIGHT: 69 IN

## 2021-05-31 NOTE — PROGRESS NOTES
Patient ID: Tanmay Israel is a 79 y.o. male.  /50   Pulse (!) 59   Wt 162 lb 8 oz (73.7 kg)   SpO2 99%   BMI 24.00 kg/m      Assessment/Plan:                Diagnoses and all orders for this visit:    Anemia, unspecified type  -     Basic Metabolic Panel  -     HM1(CBC and Differential)  -     Iron and Transferrin Iron Binding Capacity  -     Ferritin  -     Vitamin B12  -     Reticulocytes  -     Morphology,Smear Review (MORP)  -     HM1 (CBC with Diff)    Chronic atrial fibrillation (H)  -     INR    Chronic systolic congestive heart failure (H)    Ischemic cardiomyopathy    Stage 3 chronic kidney disease (H)      DISCUSSION  Check labs as above.  Continue current medication treatment.  Follow-up with me in about 1 month pending laboratory test results.  Continue to monitor closely if signs of edema, fluid weight gain, trouble breathing or any other additional symptoms he needs to be evaluated in a timely manner.  Today medications are reconciled, patient is provided with a new printed medication list.  I reviewed a multitude of lab tests including hospital labs, labs from transitional care.  I reviewed consultation notes, imaging studies and notes from [facility].  All information discussed with patient today.  Subjective:     HPI    Tanmay Israel is a 79 y.o. male is here today for hospital in transitional care unit follow-up.    His complex medical history.  This past year is undergone cervical spine surgery followed by gallbladder surgery then he developed recurrent C. difficile colitis.  He has had multiple hospitalizations for these various issues over the last several he also has chronic systolic congestive heart failure, caused by ischemic cardia myopathy and he has atrial fibrillation and is anticoagulated.    Most recently he was hospitalized beginning July 15-18.  He was discharged to transitional care where he stayed until 2 August.    He was hospitalized initially for dizziness and weakness.   He was having vomiting and diarrhea.  There is also concern during his most recent hospitalization that he of some level of cognitive impairment/early dementia.  This is not been something that has been well quantified on an outpatient basis.    He reports is overall doing well.  Today we reviewed his hospital course and transitional care unit course.  I do note that he has an anemia with hemoglobin in the mid 8 range.  He does have chronic anemia but is current hemoglobin readings are worse than it has been previously.  I do not see any distinct recent work-up for this.  There are multiple potential reasons for this which would include his chronic kidney disease, possibility of chronic GI blood loss or malabsorption.  Discussed investigating further to try and determine further course of action.    Overall he reports he feels well.  He does not feel dizzy or lightheaded.  His blood pressure is noted to be on the lower end of normal.  His fluid balance seems to be reasonable.  Reviewed recent labs indicate that there are no significant elect light disturbances or problems with kidney function but we discussed recheck.  He remains anticoagulated he denies any sources of bleeding.        Review of Systems  Complete review of systems is obtained.  Other than the specific considerations noted above complete review of systems is negative.        Objective:   Medications:  Current Outpatient Medications   Medication Sig     acetaminophen (TYLENOL) 500 MG tablet Take 2 tablets (1,000 mg total) by mouth 3 (three) times a day.     amLODIPine (NORVASC) 5 MG tablet Take 1 tablet (5 mg total) by mouth daily.     bumetanide (BUMEX) 1 MG tablet Take 0.5 tablets (0.5 mg total) by mouth see administration instructions. For weight gain >3lbs. (Patient taking differently: Take 0.5 mg by mouth daily.       )     carvedilol (COREG) 25 MG tablet Take 37.5 mg by mouth 2 (two) times a day with meals.     cholecalciferol, vitamin D3,  1,000 unit tablet Take 2,000 Units by mouth daily.     cyanocobalamin 500 MCG tablet Take 500 mcg by mouth daily.     diclofenac sodium (VOLTAREN) 1 % Gel Apply 4 g topically 4 (four) times a day as needed.     FERROUS FUMARATE (IRON ORAL) Take 1 tablet by mouth daily.      melatonin 3 mg Tab tablet Take 1 tablet (3 mg total) by mouth at bedtime as needed.     multivitamin with iron (ONE DAILY WITH IRON) Tab tablet Take 1 tablet by mouth daily.      omeprazole (PRILOSEC) 20 MG capsule Take 20 mg by mouth daily before breakfast.            potassium chloride (K-DUR,KLOR-CON) 20 MEQ tablet TAKE 1 TABLET(20 MEQ) BY MOUTH DAILY     rosuvastatin (CRESTOR) 20 MG tablet TAKE 1 TABLET(20 MG) BY MOUTH AT BEDTIME     senna-docusate (PERICOLACE) 8.6-50 mg tablet Take 1 tablet by mouth daily as needed for constipation.     warfarin (COUMADIN/JANTOVEN) 2.5 MG tablet Take 1 tablet 2.5 mg daily, repeat INR and adjust dose as directed     Allergies:  No Known Allergies    Tobacco:   reports that he has quit smoking. He has never used smokeless tobacco.     Physical Exam      /50   Pulse (!) 59   Wt 162 lb 8 oz (73.7 kg)   SpO2 99%   BMI 24.00 kg/m      General Appearance:    Alert, cooperative, no distress   Eyes:   No scleral icterus or conjunctival irritation       Throat:   Lips, mucosa, and tongue normal; teeth and gums normal   Neck:   Supple, symmetrical, trachea midline, no adenopathy;        thyroid:  No enlargement/tenderness/nodules   Lungs:     Clear to auscultation bilaterally, respirations unlabored, no wheezes or crackles   Heart:    Regular rate and rhythm,  No murmur   Abdomen:    Soft, no distention, no tenderness on palpation, no masses, no organomegaly     Extremities:  No edema, no joint swelling or redness, no evidence of any injuries   Skin:  No concerning skin findings, no suspicious moles, no rashes   Neurologic:  On gross examination there is no motor or sensory deficit.  Patient walks with a  normal gait

## 2021-05-31 NOTE — PROGRESS NOTES
Code Status:  DNR  Visit Type: Discharge Summary     Facility:  [facility] [[id number]]          PCP:  Go Ramírez MD  [phone]       Admission Date to our Facility: 7/18/2019 Discharge Date from our Facility: 8/2/2019    Discharge Diagnosis:    1. Ischemic cardiomyopathy     2. Chronic atrial fibrillation (H)     3. Anemia, unspecified type     4. Chronic systolic heart failure (H)          History of Present Illness: Tanmay Israel is a 79 y.o. male with a past medical history for CHF, CKD 3, dementia, hypertension, anemia, osteoarthritis, CAD, ischemic cardiomyopathy, atrial fibrillation on Coumadin, and pacemaker in situ. He has had multiple hospitalizations in the last 6 months for various reasons including weakness, radiculopathy due to lumbar stenosis, abnormal lab work, C. difficile, cholecystitis.  He was admitted to [facility] 7/15 to 7/18/2019 for acute dizziness and weakness.  He was out fishing in the heat in which he did not drink enough fluids and he had vomiting for 2 days.  He also had diarrhea.  His diagnosis was presumed likely related to vertigo as his orthostatics were normal and his dizziness had improved with a dose of meclizine.  He did have pleural effusion on chest x-ray with left lower leg edema and so his Bumex was restarted in which it was discontinued on a previous admission due to ANA.  Echocardiogram done in April showed EF of 35%. He was discharged to the TCU due to poor balance and weakness.      Skilled Nursing Facility Course:  While at the TCU he progressed well with therapy to ambulating without a device independently.  He does have a mild cognitive impairment and has difficulty with problem solving.  He lives alone independently and declines any home care services.  He is planning to discharge to his girlfriend's house for a couple of weeks.  His INR has been fairly stable and he is currently taking Warfarin 2.5mg daily with plan for a recheck INR  on 8/6/2019 at his PCP.  I did lower his therapeutic range to 1.8-2.5 due to his anemia.  He may need further workup regarding low hemoglobin.  Orthostatic blood pressures have been normal.  He has had no issues with nausea/vomitting or diarrhea while at the TCU so his Zofran was discontinued.      Discharge Medications:    Current Outpatient Medications   Medication Sig Dispense Refill     acetaminophen (TYLENOL) 500 MG tablet Take 2 tablets (1,000 mg total) by mouth 3 (three) times a day.  0     amLODIPine (NORVASC) 5 MG tablet Take 1 tablet (5 mg total) by mouth daily. 30 tablet 0     bumetanide (BUMEX) 1 MG tablet Take 0.5 tablets (0.5 mg total) by mouth see administration instructions. For weight gain >3lbs. (Patient taking differently: Take 0.5 mg by mouth daily.       ) 30 tablet 0     carvedilol (COREG) 25 MG tablet Take 37.5 mg by mouth 2 (two) times a day with meals.       cholecalciferol, vitamin D3, 1,000 unit tablet Take 2,000 Units by mouth daily.       cyanocobalamin 500 MCG tablet Take 500 mcg by mouth daily.       diclofenac sodium (VOLTAREN) 1 % Gel Apply 4 g topically 4 (four) times a day as needed.       FERROUS FUMARATE (IRON ORAL) Take 1 tablet by mouth daily.        melatonin 3 mg Tab tablet Take 1 tablet (3 mg total) by mouth at bedtime as needed.  0     multivitamin with iron (ONE DAILY WITH IRON) Tab tablet Take 1 tablet by mouth daily.        omeprazole (PRILOSEC) 20 MG capsule Take 20 mg by mouth daily before breakfast.              potassium chloride (K-DUR,KLOR-CON) 20 MEQ tablet TAKE 1 TABLET(20 MEQ) BY MOUTH DAILY 90 tablet 3     rosuvastatin (CRESTOR) 20 MG tablet TAKE 1 TABLET(20 MG) BY MOUTH AT BEDTIME 90 tablet 1     senna-docusate (PERICOLACE) 8.6-50 mg tablet Take 1 tablet by mouth daily as needed for constipation.  0     warfarin (COUMADIN/JANTOVEN) 2.5 MG tablet Take 1 tablet 2.5 mg daily, repeat INR and adjust dose as directed  0     No current facility-administered  medications for this visit.        For most current and accurate medication list, please contact the skilled nursing facility that this patient visit took place at.      Discharge Plan:  Patient is stable to discharge to the home of his girlfriend for extra support.  He declines any home care services at this time.  I did remind him that he could get services after his discharge if he felt he needed them he would need to follow up with his PCP.  He has a PCP appointment 8/6 in which he will need an INR and a follow up hgb as his last hgb is 8.5 but has been stable in the 8.        Review of Systems   Patient denies fever, chills, headache, lightheadedness, dizziness, rhinorrhea, cough, congestion, shortness of breath, chest pain, palpitations, abdominal pain, n/v, diarrhea, constipation, change in appetite, dysuria, frequency, burning or pain with urination.  Other than stated in HPI all other review of systems is negative.         Physical Exam   Vital signs: 131/52, HR 70, resp 18, temp 98.2  GENERAL APPEARANCE: Well developed, well nourished, in no acute distress.  HEENT: normocephalic, atraumatic  PERRL, sclerae anicteric, conjunctivae clear and moist, EOM intact  LUNGS: Lung sounds CTA, no adventitious sounds, respiratory effort normal.  CARD: RRR, S1, S2, without murmurs, gallops, rubs, no JVD  ABD: Soft and nontender with normal bowel sounds.   MSK: Muscle strength and tone were normal.  EXTREMITIES: No cyanosis, clubbing or edema.  NEURO: Alert and oriented x 3.with cognitive impairment. Face is symmetric.  SKIN: Inspection of the skin reveals no rashes, ulcerations or petechiae.  PSYCH: euthymic          Labs:    Recent Results (from the past 240 hour(s))   Basic Metabolic Panel   Result Value Ref Range    Sodium 138 136 - 145 mmol/L    Potassium 4.1 3.5 - 5.0 mmol/L    Chloride 105 98 - 107 mmol/L    CO2 23 22 - 31 mmol/L    Anion Gap, Calculation 10 5 - 18 mmol/L    Glucose 85 70 - 125 mg/dL    Calcium  9.2 8.5 - 10.5 mg/dL    BUN 21 8 - 28 mg/dL    Creatinine 1.33 (H) 0.70 - 1.30 mg/dL    GFR MDRD Af Amer >60 >60 mL/min/1.73m2    GFR MDRD Non Af Amer 52 (L) >60 mL/min/1.73m2   INR   Result Value Ref Range    INR 2.45 (H) 0.90 - 1.10   HM1 (CBC with Diff)   Result Value Ref Range    WBC 6.9 4.0 - 11.0 thou/uL    RBC 3.07 (L) 4.40 - 6.20 mill/uL    Hemoglobin 8.9 (L) 14.0 - 18.0 g/dL    Hematocrit 28.8 (L) 40.0 - 54.0 %    MCV 94 80 - 100 fL    MCH 29.0 27.0 - 34.0 pg    MCHC 30.9 (L) 32.0 - 36.0 g/dL    RDW 15.3 (H) 11.0 - 14.5 %    Platelets 124 (L) 140 - 440 thou/uL    MPV 10.7 8.5 - 12.5 fL    Neutrophils % 66 50 - 70 %    Lymphocytes % 14 (L) 20 - 40 %    Monocytes % 14 (H) 2 - 10 %    Eosinophils % 5 0 - 6 %    Basophils % 1 0 - 2 %    Neutrophils Absolute 4.5 2.0 - 7.7 thou/uL    Lymphocytes Absolute 0.9 0.8 - 4.4 thou/uL    Monocytes Absolute 1.0 (H) 0.0 - 0.9 thou/uL    Eosinophils Absolute 0.4 0.0 - 0.4 thou/uL    Basophils Absolute 0.1 0.0 - 0.2 thou/uL   INR   Result Value Ref Range    INR 2.29 (H) 0.90 - 1.10   Basic Metabolic Panel   Result Value Ref Range    Sodium 137 136 - 145 mmol/L    Potassium 3.7 3.5 - 5.0 mmol/L    Chloride 108 (H) 98 - 107 mmol/L    CO2 24 22 - 31 mmol/L    Anion Gap, Calculation 5 5 - 18 mmol/L    Glucose 80 70 - 125 mg/dL    Calcium 9.0 8.5 - 10.5 mg/dL    BUN 14 8 - 28 mg/dL    Creatinine 1.08 0.70 - 1.30 mg/dL    GFR MDRD Af Amer >60 >60 mL/min/1.73m2    GFR MDRD Non Af Amer >60 >60 mL/min/1.73m2   Hemoglobin   Result Value Ref Range    Hemoglobin 8.5 (L) 14.0 - 18.0 g/dL         Assessment:  1. Ischemic cardiomyopathy     2. Chronic atrial fibrillation (H)     3. Anemia, unspecified type     4. Chronic systolic heart failure (H)         MEDICAL EQUIPMENT NEEDS:  NA        Electronically signed by: Kimmy Naranjo CNP

## 2021-05-31 NOTE — PROGRESS NOTES
[facility] Patient Outreach:     Discharge Date::  8/2/19      Reason for TCU stay (discharge diagnosis)::  Weakness, dizziness, ischemic cardiomyopathy, A-fib, CHF      Are you feeling better, the same or worse since your discharge?:  Patient is feeling better          As part of your discharge plan, did they discuss home care with you?: No            Did you receive any new medications, or was there a change to your medications?: No (same as TCU.  )            Do you have any follow up visits scheduled with your PCP or Specialist?:  Yes, with PCP      (RN) Is it scheduled soon enough (3-5 days)?: Yes

## 2021-05-31 NOTE — PATIENT INSTRUCTIONS - HE
Discussed the importance of core strengthening, ROM, stretching exercises with the patient and how each of these entities is important in decreasing pain.  Explained to the patient that the purpose of physical therapy is to teach the patient a home exercise program.  These exercises need to be performed every day in order to decrease pain and prevent future occurrences of pain.        ~Please call Nurse Navigation line [phone] with any questions or concerns about your treatment plan, if symptoms worsen and you would like to be seen urgently, or if you have problems controlling bladder and bowel function.  ~Follow Up Appointment time slots with Jyoti Her CNP with [facility], are also available at the [facility] location near [facility] on the first and third THURSDAY afternoons of each month.

## 2021-05-31 NOTE — PROGRESS NOTES
Assessment:     Diagnoses and all orders for this visit:    Chronic right-sided low back pain without sciatica  -     Ambulatory referral to PT/OT    Sacroiliac pain  -     Ambulatory referral to PT/OT    Sacroiliac joint dysfunction  -     Ambulatory referral to PT/OT    History of lumbar surgery  -     Ambulatory referral to PT/OT    Hx of cervical spine surgery  -     Ambulatory referral to PT/OT    Hamstring tightness of both lower extremities  -     Ambulatory referral to PT/OT    Spinal stenosis of lumbar region without neurogenic claudication  -     Ambulatory referral to PT/OT    Lumbar foraminal stenosis  -     Ambulatory referral to PT/OT       Tanmay Israel is a 79 y.o. y.o. male with past medical history significant for knee osteoarthritis, GERD, DANIEL, hyperlipidemia, Crohn's colitis, hypertension, coronary atherosclerosis, ischemic cardiomyopathy/chronic systolic CHF, paroxysmal ventricular tachycardia, internal defibrillator, lumbar spinal listhesis/SI joint dysfunction, chronic kidney disease stage III, cerebral aneurysm, osteoporosis, left cervical C4, C5, C6 laminoplasty Dr. Cesar 2/22/2019, cholecystectomy 2/28/2019 with C. difficile infection who presents today for follow-up regarding:    -Chronic right low back pain with pain into the right buttock in which she received about 40% relief with right SI joint steroid injection however pain is slightly returning 4 weeks post-injection.    -Posterior thigh pain post walking, question stenosis related however minimal pain with walking therefore no clear neurogenic claudication, hamstring tightness noted on exam.    Patient is otherwise neurologically intact.     Plan:     A shared decision making plan was used. The patient's values and choices were respected. Prior medical records from 4/19/39 were reviewed today. The following represents what was discussed and decided upon by the provider and the patient.        -DIAGNOSTIC TESTS: Images were  personally reviewed and interpreted.   --Discussed obtaining updated CT scan (versus CT myelogram however patient is on warfarin anticoagulation therapy) and lumbar flexion-extension x-ray if symptoms are not improving with physical therapy.  --Lumbar CT scan 5/14/2018 reveals L5-S1 laminectomy and right facetectomy.  L4-5 severe bilateral foraminal stenosis with moderate to severe spinal canal stenosis that has worsened since 2015.  There is also similar L5-S1 bilateral foraminal stenosis.  L3-4 moderate bilateral foraminal and bilateral lateral recess stenosis.  Nondisplaced right L4 pars defect noted with unchanged L4-5 spondylolisthesis, 2 mm.  Spondylolisthesis also noted 4 mm at L4-5 unchanged and 2 mm L5-S1 unchanged.  --Lumbar spine CT myelogram from 2015 does reveal chronic L4-5 moderately severe central canal stenosis and severe right/moderate left foraminal stenosis.  L5-S1 moderately severe foraminal stenosis without central canal stenosis.  --Scoliosis panel 2015 shows 65  lordosis, thoracic kyphosis 51 , 8  scoliosis T11 through L3.  5 mm L4-5 and 3 mm L5-S1 spondylolisthesis.    -INTERVENTIONS: No further injection recommendations at this time.  -Warfarin anticoagulation therapy.    -MEDICATIONS: No changes in medications advised patient continue Tylenol as needed as well as diclofenac gel as needed.  Discussed side effects of medications and proper use. Patient verbalized understanding.    -PHYSICAL THERAPY: Referral to physical therapy again placed [facility] to work on core strengthening and reestablish home exercise program, previous therapy 2017.  Discussed the importance of core strengthening, ROM, stretching exercises with the patient and how each of these entities is important in decreasing pain.  Explained to the patient that the purpose of physical therapy is to teach the patient a home exercise program.  These exercises need to be performed every day in order to decrease pain  and prevent future occurrences of pain.        -PATIENT EDUCATION:  25 minutes of total visit time was spent face to face with the patient today, 60 % of the visit was spent on counseling, education, and coordinating care.   -5 minutes spent outside of visit time, non-face-to-face time, reviewing chart.    -FOLLOW UP: Follow-up if symptoms are not improving with PT, sooner pain is worsening or new symptoms arise.  Advised to contact clinic if symptoms worsen or change.    Subjective:     Tanmay Israel is a 79 y.o. male who presents today for follow-up regarding ongoing chronic right-sided low back pain lumbosacral junction that radiates to the right buttock in which she did receive about 40% relief with right SI joint steroid injection however short-lived for about 4 weeks and pain is slowly returning however he reports it still better than preinjection pain level.  Currently his pain is a 6/10, and 8 at its worst, a 3 to its best more bothersome with walking and standing, does improve with lying down and with heat.  Patient does report that after walking he does notice pain into his posterior thighs but more of a tight feeling than pain, he reports that when he is walking he feels pretty good but pain is more bothersome when he sits down and post walking.  Patient denies any lower extremely weakness but he does use a walker long-term at times for generalized weakness.  Patient denies bowel or bladder loss control, denies saddle anesthesia.    **Patient saw Dr. Alicia last 9/27/2017 who recommended SI joint steroid injection however did discuss that if he fails to get further relief with SI injection/therapy, can trial injection to target stenosis at the L4-5 level.  Both of these injections have been completed however with minimal benefit.  **Patient was also evaluated by Dr. Cesar post neck surgery as well as her SI joint pain 4/17/2019 recommended right SI joint steroid injection.     Treatment to Date: History  right L5-S1 laminectomy [facility].  Left cervical C4, C5, C6 laminoplasty Dr. Cesar 2/22/2019  Physical therapy [facility] ×4 sessions 11/29/2017.  Lam Chi on his own in the past however unable to do currently due to increased pain.  Acupuncture recently with some relief.      SPR Right L4-5 TF JOSUÉ 7/12/2016 with no benefit.  Procedure 8/10, postprocedure 0/10 on procedure note.  SPR CT-guided Right SI joint steroid injection 9/20/2016.  SPR Right SI joint steroid injection 2/15/2017, 5/23/2017, 9/8/2017, with typical relief except the last 9/8/2017 he reports he got minimal relief.  Right SI joint steroid injection Dr. Cage 11/29/2017 with 10% relief.  Right L4-5 TF JOSUÉ 2/15/2018 with 40% relief ×3 months.  Preprocedure pain 8/10, post 1/10.  Right L5-S1 TF JOSUÉ 5/23/2018 with 90% relief ×3-4 days, NO lasting relief. Preprocedure pain 8/10, post 3/10.  FAILED Right L3, L4, L5 MBB 6/15/2018.  Right SI joint 7/11/2018 with moderate relief.  Preprocedure pain 8/10, post 0/10.  Right SI joint steroid injection 12/17/2018 with moderate relief.  Preprocedure pain 8/10, post 7/10.  Right SI joint steroid injection 7/8/2019 with 40% relief x4 weeks then slow return of symptoms.  Preprocedure pain 8/10, post 7/10.      Medications:  Voltaren gel is helping  Lidoderm patches  Tylenol occasional with minimal relief.  Previous tramadol prescription via PCP currently taking very infrequently as he does not want to take this regularly.  Does provide some benefit however.    Patient Active Problem List   Diagnosis     Osteoarthritis Of The Knee     Esophageal Reflux     Obstructive sleep apnea     Ptosis Of Eyelid     Hyperlipidemia     Anemia     Crohn's (Granulomatous) Colitis     Benign Essential Hypertension     Coronary atherosclerosis of native coronary artery     Ischemic cardiomyopathy     Paroxysmal ventricular tachycardia (H)     Dry Nonexudative Macular Degeneration     Serum Enzyme Levels -  Alkaline Phosphatase Elevated     Dysphagia     Presence of automatic cardioverter/defibrillator (AICD)--- Medtronic single lead     Lumbar stenosis with neurogenic claudication     Sacroiliac joint dysfunction of right side     Stage 3 chronic kidney disease (H)     Warfarin-induced coagulopathy (H)     A-fib (H)     Anemia, unspecified type     Cerebral aneurysm     Osteoporosis     Ileitis     Heart failure with reduced ejection fraction (H)     Diaphragmatic hernia     Pharyngoesophageal dysphagia     History of Crohn's disease     Iron deficiency anemia     Polyp of stomach and duodenum     Reflux esophagitis     Rotator cuff tear arthropathy of right shoulder     Schatzki's ring     Diverticulosis of colon     Flatulence, eructation and gas pain     S/P cervical spinal fusion     S/P laparoscopic cholecystectomy     Hypomagnesemia     Acute renal failure (ARF) (H)     Dizziness     Chronic systolic congestive heart failure (H)       Current Outpatient Medications on File Prior to Encounter   Medication Sig Dispense Refill     acetaminophen (TYLENOL) 500 MG tablet Take 2 tablets (1,000 mg total) by mouth 3 (three) times a day.  0     amLODIPine (NORVASC) 5 MG tablet Take 1 tablet (5 mg total) by mouth daily. 30 tablet 0     carvedilol (COREG) 25 MG tablet Take 37.5 mg by mouth 2 (two) times a day with meals.       cholecalciferol, vitamin D3, 1,000 unit tablet Take 2,000 Units by mouth daily.       cyanocobalamin 500 MCG tablet Take 500 mcg by mouth daily.       diclofenac sodium (VOLTAREN) 1 % Gel Apply 4 g topically 4 (four) times a day as needed.       FERROUS FUMARATE (IRON ORAL) Take 1 tablet by mouth daily.        melatonin 3 mg Tab tablet Take 1 tablet (3 mg total) by mouth at bedtime as needed.  0     multivitamin with iron (ONE DAILY WITH IRON) Tab tablet Take 1 tablet by mouth daily.        omeprazole (PRILOSEC) 20 MG capsule Take 20 mg by mouth daily before breakfast.              potassium chloride  (K-DUR,KLOR-CON) 20 MEQ tablet TAKE 1 TABLET(20 MEQ) BY MOUTH DAILY 90 tablet 3     rosuvastatin (CRESTOR) 20 MG tablet TAKE 1 TABLET(20 MG) BY MOUTH AT BEDTIME 90 tablet 1     senna-docusate (PERICOLACE) 8.6-50 mg tablet Take 1 tablet by mouth daily as needed for constipation.  0     warfarin (COUMADIN/JANTOVEN) 2.5 MG tablet Take 1 tablet 2.5 mg daily, repeat INR and adjust dose as directed  0     [DISCONTINUED] bumetanide (BUMEX) 1 MG tablet Take 0.5 tablets (0.5 mg total) by mouth see administration instructions. For weight gain >3lbs. (Patient taking differently: Take 0.5 mg by mouth daily.       ) 30 tablet 0     No current facility-administered medications on file prior to encounter.        No Known Allergies    Past Medical History:   Diagnosis Date     Acute cholecystitis 2/28/2019     Acute post-operative pain      Anemia      Arthritis      Atrial fibrillation (H)      Back pain      C. difficile diarrhea 4/21/2019     Calculus of ureter      Callus      Cerebral aneurysm      Cervical myelopathy (H) 2/22/2019     Cervical spinal stenosis      Chronic kidney disease     stage 3     Chronic systolic congestive heart failure (H)      Closed fracture of distal end of left radius, unspecified fracture morphology, initial encounter      Closed fracture of distal end of right radius, unspecified fracture morphology, initial encounter      Closed fracture of first thoracic vertebra, unspecified fracture morphology, initial encounter (H)      Coronary artery disease      Crohn's disease (H)      Difficulty Breathing (Dyspnea)     Created by Conversion      Dysphagia      Fall 10/22/2016     GERD (gastroesophageal reflux disease)      Hospital discharge follow-up 5/21/2019     Hyperlipidemia      Hypertension      Hypotension, unspecified hypotension type      ICD (implantable cardioverter-defibrillator) in place     Medtronic     Ischemic cardiomyopathy      Joint Pain, Localized In The Knee     Created by  Conversion      Joint Pain, Localized In The Right Shoulder     Created by Conversion      Kidney stone      Low back pain      Lumbago     Created by Conversion      Macular degeneration      Muscle Aches, Generalized (Myalgias)     Created by Conversion      Permanent atrial fibrillation (H)     [id number] risk score = 5 (age3/ CAD/ HTN/ CHF) Chronic warfarin     Personal history of colonic polyps 2/12/2019     Polyp of duodenum 10/17/2016     Pyuria      Right arm weakness 4/2/2019     Right rotator cuff tendinitis      Right Rotator Cuff Tendonitis     Created by Conversion  Replacement Utility updated for latest IMO load     Schatzki's ring      Sciatica     Created by Conversion      Serum Enzyme Levels - ALT (SGPT) Elevated     Created by Conversion      Shortness of breath      Sleep apnea     no CPAP     Spondylolisthesis of lumbar region 7/5/2016     Weakness 4/20/2019        Review of Systems  ROS: Positive for lower extremity weakness.  Specifically negative for bowel/bladder dysfunction, balance changes, headache, dizziness, foot drop, fevers, chills, appetite changes, nausea/vomiting, unexplained weight loss. Otherwise 13 systems reviewed are negative. Please see the patient's intake questionnaire from today for details.    Reviewed Social, Family, Past Medical and Past Surgical history with patient, no significant changes noted since prior visit.     Objective:     /53 (Patient Site: Right Arm, Patient Position: Sitting)   Pulse 72   Wt 172 lb (78 kg)   SpO2 94%   BMI 25.40 kg/m      PHYSICAL EXAMINATION:    --CONSTITUTIONAL: Well developed, well nourished, healthy appearing individual.  --PSYCHIATRIC: Appropriate mood and affect. No difficulty interacting due to temper, social withdrawal, or memory issues.  --SKIN: Lumbar region is dry and intact.   --RESPIRATORY: Normal rhythm and effort. No abnormal accessory muscle breathing patterns noted.   --MUSCULOSKELETAL:  Normal lumbar lordosis  noted, no lateral shift.  --GROSS MOTOR: Easily arises from a seated position. Gait is non-antalgic  --LUMBAR SPINE:  Inspection reveals no evidence of deformity. Range of motion is not limited in lumbar flexion, extension, lateral rotation.  --LOWER EXTREMITY MOTOR TESTING:  Plantar flexion left 5/5, right 5/5   Dorsiflexion left 5/5, right 5/5   Great toe MTP extension left 5/5, right 5/5  Knee flexion left 5/5, right 5/5  Knee extension left 5/5, right 5/5   Hip flexion left 5/5, right 5/5  Hip abduction left 5/5, right 5/5  Hip adduction left 5/5, right 5/5   --HIPS: Full range of motion bilaterally.    --NEUROLOGIC: Bilateral patellar and achilles reflexes are physiologic and symmetric. Sensation to light touch is intact in the bilateral L4, L5, and S1 dermatomes.    RESULTS:   Imaging: Lumbar spine imaging was reviewed today. The images were shown to the patient and the findings were explained using a spine model.      Ct Lumbar Spine Without Contrast  Result Date: 5/14/2018  INDICATION: Spinal stenosis, lumbar. Ongoing right lumbar radiculitis. TECHNIQUE: Helical thin-section CT scan of the spine was performed using bone reconstruction algorithm. From the axial source data, sagittal and coronal thin reformats. Dose reduction techniques were used. IV CONTRAST: None. COMPARISON: 9/9/2015. FINDINGS:  Nomenclature is based on 5 lumbar type vertebral bodies. Vertebral body heights are unremarkable and unchanged from 9/9/2015. No destructive bony lesions are demonstrated. L5-S1 laminectomy and right facetectomy have been performed. Nondisplaced right L4 pars defect again demonstrated. There is no evidence of an acute displaced fracture. The imaged portions of the bony pelvis appear intact. There are mild degenerative changes of the sacroiliac joints. There is L4-L5 anterolisthesis of approximately 4 mm, not appreciably changed. L3-L4 retrolisthesis of 2 mm is also unchanged. L5-S1 anterolisthesis of 2 mm is similar  to prior. Alignment is otherwise unremarkable in the sagittal plane. There is mild levoconvex lower lumbar curvature. Grossly unremarkable paraspinal soft tissues. There is scattered atherosclerotic change of the abdominal aorta and its branches. No abdominal aortic aneurysm is demonstrated. There is colonic diverticulosis.   T12-L1: Mild intervertebral disc height loss. No significant posterior disc abnormality. Prominent anterior and lateral disc osteophyte complexes. Mild facet arthropathy. No significant spinal canal stenosis. No right neural foraminal stenosis. No left neural foraminal stenosis.   L1-L2: Mild intervertebral disc height loss. Mild broad-based disc bulging. Mild ligamentum flavum thickening and mild facet arthropathy. Mild to moderate spinal canal stenosis. Mild right neural foraminal stenosis. Mild left neural foraminal stenosis.   L2-L3: Moderate intervertebral disc height loss, with broad-based disc osteophyte complex. Mild facet arthropathy. Bilateral lateral recess stenosis. Mild to moderate spinal canal stenosis. Moderate right neural foraminal stenosis. Mild to moderate left neural foraminal stenosis.   L3-L4: Mild intervertebral disc height loss with broad-based disc osteophyte complex. Ligamentum flavum thickening and moderate facet arthropathy. Moderate spinal canal stenosis. Moderate right neural foraminal stenosis. Moderate left neural foraminal stenosis. Bilateral lateral recess stenosis.   L4-L5: Unchanged anterolisthesis as above, with uncovering of the dorsal disc margin. There is superimposed broad-based disc bulging. Severe bilateral facet arthropathy, left greater than right. Moderate to severe spinal canal stenosis. Severe right neural foraminal stenosis. Severe left neural foraminal stenosis.   L5-S1: Normal disc height. Mild bulge. No definite right-sided facet articulation demonstrated, similar to prior. Moderate left facet arthropathy. Mild spinal canal stenosis. Severe  right neural foraminal stenosis. Severe left neural foraminal stenosis.   CONCLUSION:   1.  Postoperative changes of L5-S1 laminectomy and right facetectomy.   2.  At L4-L5, there is severe bilateral neural foraminal stenosis and moderate to severe spinal canal stenosis, slightly progressed.   3.  At L5-S1, there is severe bilateral neural foraminal stenosis, similar to prior.   4.  At L3-L4, there is moderate bilateral neural foraminal stenosis and bilateral lateral recess stenosis.   5.  Additional degenerative changes as described.        XR SCOLIOSIS AP OR PA AND LATERAL STANDING  10/19/2015   INDICATION: Low back pain. Thoracic kyphosis.  COMPARISON: None.  FINDINGS: Twelve rib-bearing thoracic-type vertebral bodies. Five lumbar type vertebral bodies. Thoracic kyphosis estimated at 51 degrees. Lumbar lordosis between L1 and S1 estimated at 65 degrees. Right convex asymmetry of the thoracolumbar spine   measuring 8 degrees between T11 and L3.  Several bridging osteophytes in the thoracic spine, pattern that is consistent with diffuse idiopathic skeletal hyperostosis. Prominent left lateral T12-L1 osteophyte and ventral L2-L3 and L3-L4 osteophytes, but not definitely bridging. 5 mm   anterolisthesis of L4 on L5 and 3 mm anterolisthesis of L5 on S1. Prominent facet arthropathy lower lumbar spine. No acute superimposed bony abnormality of the thoracolumbar spine, without definite fracture.  Dense aortic calcification, normal caliber. Left hemithorax generator with two pacemaker leads to the heart. Sternal wires. Reverse right shoulder arthroplasty. Moderate degenerative change both hips.          CT LUMBAR SPINE MYELOGRAM  9/9/2015  INDICATION: Lumbar stenosis.  TECHNIQUE: Helical images were reconstructed in the axial plane at 1.25-mm increments, with sagittal and coronal reformations. Intrathecal contrast introduced under separate procedure and described with that procedure.  COMPARISON: None.  FINDINGS: Five  lumbar-type vertebral bodies. Slightly exaggerated lordosis at L4-L5 on a degenerative basis. 4-mm anterior subluxation of L4 on L5. 2-mm anterior subluxation of L5 on S1. Slight retrolisthesis of L2 on L3 and L3 on L4. Left convex   scoliosis measuring 14 degrees between L3 and L5. Much of this is at L4-L5 from asymmetric disc height loss. Right-sided L4 pars defect. L5-S1 wide laminectomy with right facetectomy. No destructive bony lesion is apparent. No fracture is evident.  Conus tip ends at T12-L1. Cauda equina generally unremarkable. Paraspinous soft tissues are grossly normal. Moderate paraspinous muscle volume loss. Normal aortic caliber, moderate calcification. Mild to moderate degenerative change SI joints.  T12-L1: Slight disc height loss. Left anterior interbody spurring. Mild facet arthropathy. No central canal stenosis. No foraminal stenosis.  L1-L2: Slight disc height loss. Mild circumferential disc bulging. Left anterolateral interbody spur. Mild to moderate facet arthropathy. No central canal stenosis. No foraminal stenosis.  L2-L3: Moderate disc height loss. Moderate circumferential interbody spur and disc bulging, more pronounced anteriorly. Mild facet arthropathy. No central canal stenosis. Mild right foraminal stenosis. No left foraminal stenosis.  L3-L4: Mild disc height loss. Mild to moderate circumferential disc bulging. Mild to moderate facet arthropathy. Some calcification of the right facet cartilage. Mild central canal stenosis. No lateral recess stenosis. Mild to moderate right foraminal   stenosis. Borderline moderate left foraminal stenosis.  L4-L5: Mild disc height loss greater dorsally. At least moderate circumferential disc bulging. Prominent facet arthropathy with diastatic left facet. Moderately severe central canal stenosis. Despite this, no definite lateral recess stenosis. Severe   right and moderate left foraminal stenoses from disc bulging.  L5-S1: Disc height normal. Slight  disc bulging. Sagittally oriented left facet. Effectively absent right facet. No central canal stenosis. Moderately severe bilateral foraminal stenoses.  CONCLUSION:  1.  Previous lumbar spine surgery with L5-S1 wide laminectomy and right facetectomy.  2.  Degenerative changes most prominent at L4-L5 and L5-S1. The facets are sagittally oriented. This predisposes to subluxation.  3.  At L4-L5, moderately severe central canal stenosis without grossly apparent lateral recess stenosis. Severe right and moderate left foraminal stenoses.  4.  At L5-S1, moderately severe bilateral foraminal stenoses.  5.  At L3-L4, borderline moderate left foraminal stenosis. Mild central canal stenosis.

## 2021-05-31 NOTE — PATIENT INSTRUCTIONS - HE
Tanmay Israel,    It was a pleasure to see you today at the [facility].     My recommendations after this visit include:  - No changes to your medications.    - See me in 3-4 months.   - Continue to monitor for signs of retaining fluid (increasing weights, shortness of breath, swelling) and call with any concerns. [phone]    Debra Rowe, CNP

## 2021-05-31 NOTE — TELEPHONE ENCOUNTER
"ANTICOAGULATION  MANAGEMENT    Assessment     Today's INR result of 2.4 is Therapeutic (goal INR of 2.0-3.0)(per TCU Discharge summary \"I did lower his therapeutic range to 1.8-2.5 due to his anemia.\" will clarify with PCP)        Warfarin taken as previously instructed    No new diet changes affecting INR    No new medication/supplements affecting INR    Continues to tolerate warfarin with no reported s/s of bleeding or thromboembolism     Home from TCU last week    Previous INR was Therapeutic    Plan:     Spoke with Tanmay while at the lab regarding INR result and instructed:     Warfarin Dosing Instructions:  Continue current warfarin dose 2.5 mg daily   (0 % change)    Instructed patient to follow up no later than: 7-10 days    Education provided: target INR goal and significance of current INR result, importance of following up for INR monitoring at instructed interval, importance of taking warfarin as instructed and importance of notifying clinic for changes in medications    Tanmay verbalizes understanding and agrees to warfarin dosing plan.    Instructed to call the [facility] for any changes, questions or concerns. (#[phone])   ?   Ana Adler RN    Subjective/Objective:      Tanmay Israel, a 79 y.o. male is on warfarin.     Tanmay reports:     Home warfarin dose: verbally confirmed home dose with Tanmay and updated on anticoagulation calendar     Missed doses: No     Medication changes:  No     S/S of bleeding or thromboembolism:  No     New Injury or illness:  No     Changes in diet or alcohol consumption:  No     Upcoming surgery, procedure or cardioversion:  No    Anticoagulation Episode Summary     Current INR goal:   2.0-3.0   TTR:   57.9 % (4.4 y)   Next INR check:   8/16/2019   INR from last check:   2.40 (8/6/2019)   Weekly max warfarin dose:      Target end date:      INR check location:      Preferred lab:      Send INR reminders to:   CHANI RIOS       Comments:            Anticoagulation " Care Providers     Provider Role Specialty Phone number    Go Ramírez MD Referring Family Medicine [phone]

## 2021-05-31 NOTE — TELEPHONE ENCOUNTER
ANTICOAGULATION  MANAGEMENT    Assessment     Today's INR result of 1.8 is Subtherapeutic (goal INR of 2.0-3.0)        Missed dose(s) may be affecting INR- missed on 8/12.     No new diet changes affecting INR    No new medication/supplements affecting INR    Continues to tolerate warfarin with no reported s/s of bleeding or thromboembolism     Previous INR was Therapeutic    Plan:     Spoke with Tanmay regarding INR result and instructed:     Warfarin Dosing Instructions:  Take booster dose of 3.75 mg today only then continue current warfarin dose 2.5 mg daily.    Instructed patient to follow up no later than: 2 weeks.     Education provided: importance of taking warfarin as instructed    Tanmay verbalizes understanding and agrees to warfarin dosing plan.    Instructed to call the [facility] for any changes, questions or concerns. (#[phone])   ?   Edgar Ortez RN    Subjective/Objective:      Tanmay Israel, a 79 y.o. male is on warfarin.     Tanmay reports:     Home warfarin dose: verbally confirmed home dose with pt and updated on anticoagulation calendar     Missed doses: Yes, missed on 8/12.      Medication changes:  No     S/S of bleeding or thromboembolism:  No     New Injury or illness:  No     Changes in diet or alcohol consumption:  No     Upcoming surgery, procedure or cardioversion:  No    Anticoagulation Episode Summary     Current INR goal:   2.0-3.0   TTR:   58.0 % (4.5 y)   Next INR check:   8/30/2019   INR from last check:   1.80! (8/16/2019)   Weekly max warfarin dose:      Target end date:      INR check location:      Preferred lab:      Send INR reminders to:   CHANI RIOS       Comments:            Anticoagulation Care Providers     Provider Role Specialty Phone number    Go Ramírez MD Referring Family Medicine [phone]

## 2021-05-31 NOTE — TELEPHONE ENCOUNTER
ANTICOAGULATION  MANAGEMENT    Assessment     Today's INR result of 3.1 is Supratherapeutic (goal INR of 2.0-3.0)        Warfarin taken as previously instructed    No new diet changes affecting INR    No new medication/supplements affecting INR    Continues to tolerate warfarin with no reported s/s of bleeding or thromboembolism     Previous INR was Subtherapeutic    Plan:     Spoke with Tanmay regarding INR result and instructed:     Warfarin Dosing Instructions:  Continue current warfarin dose 2.5 mg daily.  (0 % change)    Instructed patient to follow up no later than: 1-2 weeks.    Education provided: importance of consistent vitamin K intake, impact of vitamin K foods on INR, importance of therapeutic range, importance of following up for INR monitoring at instructed interval and importance of taking warfarin as instructed    Tanmay verbalizes understanding and agrees to warfarin dosing plan.    Instructed to call the [facility] for any changes, questions or concerns. (#[phone])   ?   Bekah Urban RN    Subjective/Objective:      Tanmay Israel, a 79 y.o. male is on warfarin.     Tanmay reports:     Home warfarin dose: verbally confirmed home dose with Tanmay and updated on anticoagulation calendar     Missed doses: No     Medication changes:  No     S/S of bleeding or thromboembolism:  No     New Injury or illness:  No     Changes in diet or alcohol consumption:  No     Upcoming surgery, procedure or cardioversion:  No    Anticoagulation Episode Summary     Current INR goal:   2.0-3.0   TTR:   63.2 %   Next INR check:   9/13/2019   INR from last check:   3.10! (8/30/2019)   Weekly max warfarin dose:      Target end date:      INR check location:      Preferred lab:      Send INR reminders to:   CHANI RIOS       Comments:            Anticoagulation Care Providers     Provider Role Specialty Phone number    Go Ramírez MD Referring Family Medicine [phone]

## 2021-05-31 NOTE — TELEPHONE ENCOUNTER
"PCP,    Can you clarify goal range for pt.  We have 2.0-3.0 for patient, but TCU discharge summary states \"I did lower his therapeutic range to 1.8-2.5 due to his anemia. \"    Please advise which goal range we should follow.  "

## 2021-06-01 VITALS — BODY MASS INDEX: 27.61 KG/M2 | WEIGHT: 186.4 LBS | HEIGHT: 69 IN

## 2021-06-01 VITALS — HEIGHT: 69 IN | WEIGHT: 195 LBS | BODY MASS INDEX: 28.88 KG/M2

## 2021-06-01 VITALS — BODY MASS INDEX: 27.4 KG/M2 | WEIGHT: 185 LBS | HEIGHT: 69 IN

## 2021-06-01 VITALS — BODY MASS INDEX: 28.5 KG/M2 | WEIGHT: 193 LBS

## 2021-06-01 VITALS — HEIGHT: 69 IN | WEIGHT: 193.3 LBS | BODY MASS INDEX: 28.63 KG/M2

## 2021-06-01 VITALS — WEIGHT: 195 LBS | BODY MASS INDEX: 28.8 KG/M2

## 2021-06-01 VITALS — BODY MASS INDEX: 28.21 KG/M2 | WEIGHT: 191 LBS

## 2021-06-01 VITALS — WEIGHT: 192 LBS | BODY MASS INDEX: 28.35 KG/M2

## 2021-06-01 NOTE — PROGRESS NOTES
"[facility] Daily Progress/ Discharge Summary     Patient Name: Tanmay Israel  Date: 2019  Visit #: 2  PTA visit #:  na  Referral Diagnosis:  Chronic right-sided low back pain without sciatica [M54.5, G89.29]  - Primary       Sacroiliac pain [M53.3]       Sacroiliac joint dysfunction [M53.3]       History of lumbar surgery [Z98.890]       Hx of cervical spine surgery [Z98.890]       Hamstring tightness of both lower extremities [M62.9]       Spinal stenosis of lumbar region without neurogenic claudication [M48.061]       Lumbar foraminal stenosis [M99.83]         Referring provider: Jyoti Her C*Visit Diagnosis:     ICD-10-CM    1. Chronic right sacroiliac joint pain M53.3     G89.29    2. Hamstring tightness of both lower extremities M62.9    3. Generalized muscle weakness M62.81    4. Pain in thoracic spine M54.6    5. Hx of cervical spine surgery Z98.890        Assessment:     HEP/POC compliance is  good .  Response to Intervention  Pt is compliant with his HEP 2x/day but would like to do it more frequently. He is walking 1 mile daily without a significant increase in his pain. At this time, he is not interested in further exercises and would like to continue with his current HEP independently. Pt was educated on discharge at this time.    Goal Status:  Pt. will be independent with home exercise program in : 4 weeks;Met    Pt will: be able to walk 1 mile with <4/10 pain in juanis posterior thighs in 12 weeks.  (Partially met, pain 6/10 but able to do)  Pt will: be able to perform all bed mobility with <4/10 pain in 12 weeks. (Partially met, 4-5/10 pain with bed mobility)      Plan / Patient Education:     Pt would like to continue with the exercises independently at home. Will discharge from PT at this point.    Subjective:     Pain Ratin  Reports his neck pain is \" a little bit better\". His back is about the same. No change with the exercises, does them 2x/day. The pain in his juanis " posterior thighs is a little better.       Objective:     Cervical AROM:  Flexion:  Mild  Ext: mod to severe  Lateral flexion: severe juanis  Rotation: R severe, L severe    Lumbar AROM:  Flexion: to ankles  Ext: severe  Rotation: R mod to severe, L  mod    Exercises:  Exercise #1: current HEP: heel slides, supine arm reaches- laterally and above head, walk, standing twists  Comment #1: seated hamstring stretch 2x 30 sec hold B  Exercise #2: bridge with isometric hip add 2 x 10 reps  Comment #2: isometric hip flexion with TA contraction, 10 x 3 sec hold B  Exercise #3: seated scapular retraction with chin tuck, x10 reps  Comment #3: STS 5-10 reps, 3x/day        Treatment Today     TREATMENT MINUTES COMMENTS   Evaluation     Self-care/ Home management     Manual therapy     Neuromuscular Re-education     Therapeutic Activity     Therapeutic Exercises 27 -see exercise flow sheet for date completed  -educated on discharge- will leave chart open for 30 days then formal DC  -educated on continued HEP completion and walking to help reduce pain and maintain function  -educated on looking into gym membership through his insurance for walking in the winter   Gait training     Modality__________________                Total 27    Blank areas are intentional and mean the treatment did not include these items.       Sharon Arora, PT, DPT  9/27/2019

## 2021-06-01 NOTE — PROGRESS NOTES
[facility] Office Note    Assessment / Plan:    1.  Permanent atrial fibrillation.  Rate control adequate.  2.  Cardiomyopathy, ischemic euvolemic on today's examination.  With decreasing activity tolerance, will check pharmacologic nuclear stress test to exclude progressive disease.  Volume overloaded today.  Will resume Bumex 1 mg daily.  Consider resuming losartan and follow-up  3.  Coronary artery disease.  No anginal symptoms, but dyspnea could be anginal equivalent    Follow-up 3 months or sooner based on stress test results    ______________________________________________________________________    Subjective:    I had the opportunity to see Tanmay Israel at the [facility]. Tanmay Israel is a 79 y.o. male with a history of atrial fibrillation, coronary artery disease status post bypass revascularization , and cardiomyopathy who returns for routine follow-up.      Echocardiogram 11/2018 showed stable moderate to severe impairment left ventricular systolic function.    Over the last year he has had a couple of surgical procedures, complicated by C. difficile, etc.  He has lost 25 pounds.  He reports that his appetite is good and he continues to walk 1 mile daily.  He notes that he has to stop more often during his walk and that he feels short of breath, particularly first thing in the morning.  Since his Bumex has been stopped, he is developed lower extremity edema.  He denies paroxysmal nocturnal dyspnea or orthopnea.  He has had no chest pain palpitations.  He has had no shocks from his ICD, and reports that his sleep is restorative, interrupted by nocturia only    ______________________________________________________________________    Problem List:  Patient Active Problem List   Diagnosis     Osteoarthritis Of The Knee     Esophageal Reflux     Obstructive sleep apnea     Ptosis Of Eyelid     Hyperlipidemia     Anemia     Crohn's (Granulomatous) Colitis     Benign Essential  Hypertension     Coronary atherosclerosis of native coronary artery     Ischemic cardiomyopathy     Paroxysmal ventricular tachycardia (H)     Dry Nonexudative Macular Degeneration     Serum Enzyme Levels - Alkaline Phosphatase Elevated     Dysphagia     Presence of automatic cardioverter/defibrillator (AICD)--- Medtronic single lead     Lumbar stenosis with neurogenic claudication     Sacroiliac joint dysfunction of right side     Stage 3 chronic kidney disease (H)     Warfarin-induced coagulopathy (H)     A-fib (H)     Anemia, unspecified type     Cerebral aneurysm     Osteoporosis     Ileitis     Heart failure with reduced ejection fraction (H)     Diaphragmatic hernia     Pharyngoesophageal dysphagia     History of Crohn's disease     Iron deficiency anemia     Polyp of stomach and duodenum     Reflux esophagitis     Rotator cuff tear arthropathy of right shoulder     Schatzki's ring     Diverticulosis of colon     Flatulence, eructation and gas pain     S/P cervical spinal fusion     S/P laparoscopic cholecystectomy     Hypomagnesemia     Acute renal failure (ARF) (H)     Dizziness     Chronic systolic congestive heart failure (H)     Medical History:  Past Medical History:   Diagnosis Date     Acute cholecystitis 2/28/2019     Acute post-operative pain      Anemia      Arthritis      Atrial fibrillation (H)      Back pain      C. difficile diarrhea 4/21/2019     Calculus of ureter      Callus      Cerebral aneurysm      Cervical myelopathy (H) 2/22/2019     Cervical spinal stenosis      Chronic kidney disease     stage 3     Chronic systolic congestive heart failure (H)      Closed fracture of distal end of left radius, unspecified fracture morphology, initial encounter      Closed fracture of distal end of right radius, unspecified fracture morphology, initial encounter      Closed fracture of first thoracic vertebra, unspecified fracture morphology, initial encounter (H)      Coronary artery disease       Crohn's disease (H)      Difficulty Breathing (Dyspnea)     Created by Conversion      Dysphagia      Fall 10/22/2016     GERD (gastroesophageal reflux disease)      Hospital discharge follow-up 5/21/2019     Hyperlipidemia      Hypertension      Hypotension, unspecified hypotension type      ICD (implantable cardioverter-defibrillator) in place     Medtronic     Ischemic cardiomyopathy      Joint Pain, Localized In The Knee     Created by Conversion      Joint Pain, Localized In The Right Shoulder     Created by Conversion      Kidney stone      Low back pain      Lumbago     Created by Conversion      Macular degeneration      Muscle Aches, Generalized (Myalgias)     Created by Conversion      Permanent atrial fibrillation (H)     [id number] risk score = 5 (age3/ CAD/ HTN/ CHF) Chronic warfarin     Personal history of colonic polyps 2/12/2019     Polyp of duodenum 10/17/2016     Pyuria      Right arm weakness 4/2/2019     Right rotator cuff tendinitis      Right Rotator Cuff Tendonitis     Created by Conversion  Replacement Utility updated for latest IMO load     Schatzki's ring      Sciatica     Created by Conversion      Serum Enzyme Levels - ALT (SGPT) Elevated     Created by Conversion      Shortness of breath      Sleep apnea     no CPAP     Spondylolisthesis of lumbar region 7/5/2016     Weakness 4/20/2019     Surgical History:  Past Surgical History:   Procedure Laterality Date     APPENDECTOMY       CARDIAC PACEMAKER PLACEMENT  2013    ICD Medtronic     CATARACT EXTRACTION W/  INTRAOCULAR LENS IMPLANT Bilateral      CHOLECYSTECTOMY       ESOPHAGOGASTRODUODENOSCOPY       MT C- LAMINOPLASTY, 2 OR MORE Left 2/22/2019    Procedure: LEFT CERVICAL 4, 5, 6 LAMINOPLASTY;  Surgeon: Liza Cesar MD;  Location: [facility];  Service: Spine     MT CABG, VEIN, FOUR      Description: Coronary Artery Quadruple Venous Bypass Graft;  Recorded: 10/21/2008;  Comments: 8/2004     MT LAP,CHOLECYSTECTOMY N/A  2/28/2019    Procedure: CHOLECYSTECTOMY, LAPAROSCOPIC;  Surgeon: Mana Roman MD;  Location: [facility] OR;  Service: General     ROTATOR CUFF REPAIR Right      TONSILLECTOMY       XR MYELOGRAM CERVICAL THORACIC LUMBAR  1/17/2019     Social History:  Social History     Socioeconomic History     Marital status: Single     Spouse name: Not on file     Number of children: Not on file     Years of education: Not on file     Highest education level: Not on file   Occupational History     Occupation: retired     Comment: [employer]   Social Needs     Financial resource strain: Not on file     Food insecurity:     Worry: Not on file     Inability: Not on file     Transportation needs:     Medical: Not on file     Non-medical: Not on file   Tobacco Use     Smoking status: Former Smoker     Smokeless tobacco: Never Used   Substance and Sexual Activity     Alcohol use: No     Drug use: No     Sexual activity: Not on file   Lifestyle     Physical activity:     Days per week: Not on file     Minutes per session: Not on file     Stress: Not on file   Relationships     Social connections:     Talks on phone: Not on file     Gets together: Not on file     Attends Yazdanism service: Not on file     Active member of club or organization: Not on file     Attends meetings of clubs or organizations: Not on file     Relationship status: Not on file     Intimate partner violence:     Fear of current or ex partner: Not on file     Emotionally abused: Not on file     Physically abused: Not on file     Forced sexual activity: Not on file   Other Topics Concern     Not on file   Social History Narrative     Not on file     Sleep History:  Sleeps with the head of bed up on a 2 x 4.  No daytime sleepiness  Exercise History:  Walks 1 mile a day.  Frequent stops along the way.    Review of Systems:   General: Weight Gain  Eyes: WNL  Ears/Nose/Throat: WNL  Lungs: Shortness of Breath  Heart: Shortness of Breath with activity, Irregular  Heartbeat, Leg Swelling  Stomach: Diarrhea  Bladder: WNL  Muscle/Joints: Joint Pain  Skin: WNL  Nervous System: WNL  Mental Health: WNL     Blood: WNL          Family History:  Family History   Problem Relation Age of Onset     Heart disease Mother      Stroke Mother      Crohn's disease Father      Alcohol abuse Brother      No Medical Problems Daughter      No Medical Problems Son      No Medical Problems Maternal Aunt      No Medical Problems Maternal Uncle      No Medical Problems Paternal Aunt      No Medical Problems Paternal Uncle      No Medical Problems Maternal Grandmother      No Medical Problems Maternal Grandfather      No Medical Problems Paternal Grandmother      No Medical Problems Paternal Grandfather      Cancer Brother      Urolithiasis Neg Hx      Gout Neg Hx          Allergies:  No Known Allergies  Medications:  Current Outpatient Medications   Medication Sig Dispense Refill     acetaminophen (TYLENOL) 500 MG tablet Take 2 tablets (1,000 mg total) by mouth 3 (three) times a day.  0     amLODIPine (NORVASC) 5 MG tablet Take 1 tablet (5 mg total) by mouth daily. 90 tablet 3     carvedilol (COREG) 25 MG tablet Take 37.5 mg by mouth 2 (two) times a day with meals.       cholecalciferol, vitamin D3, 1,000 unit tablet Take 2,000 Units by mouth daily.       cyanocobalamin 500 MCG tablet Take 500 mcg by mouth daily.       diclofenac sodium (VOLTAREN) 1 % Gel Apply 4 g topically 4 (four) times a day as needed.       FERROUS FUMARATE (IRON ORAL) Take 1 tablet by mouth daily.        multivitamin with iron (ONE DAILY WITH IRON) Tab tablet Take 1 tablet by mouth daily.        omeprazole (PRILOSEC) 20 MG capsule Take 20 mg by mouth daily before breakfast.              potassium chloride (K-DUR,KLOR-CON) 20 MEQ tablet TAKE 1 TABLET(20 MEQ) BY MOUTH DAILY 90 tablet 3     rosuvastatin (CRESTOR) 20 MG tablet TAKE 1 TABLET(20 MG) BY MOUTH AT BEDTIME 90 tablet 1     warfarin (COUMADIN/JANTOVEN) 2.5 MG tablet Take 1  "tablet 2.5 mg daily, repeat INR and adjust dose as directed  0     melatonin 3 mg Tab tablet Take 1 tablet (3 mg total) by mouth at bedtime as needed.  0     senna-docusate (PERICOLACE) 8.6-50 mg tablet Take 1 tablet by mouth daily as needed for constipation.  0     No current facility-administered medications for this visit.        Objective:   Wt Readings from Last 3 Encounters:   09/19/19 172 lb (78 kg)   08/27/19 172 lb (78 kg)   08/27/19 171 lb (77.6 kg)     Vital signs:  /52 (Patient Site: Right Arm, Patient Position: Sitting, Cuff Size: Adult Regular)   Pulse 64   Resp 24   Ht 5' 9\" (1.753 m)   Wt 172 lb (78 kg)   BMI 25.40 kg/m        Physical Exam:    Eyes     Conjunctiva Findings: Noninjected  Sclerae: Clear, nonicteric.    ENT    Mouth: Oral mucuous membranes are pink and moist  .  Loosely fitting dentures  Neck    Carotid pulses: Carotid pulses palpaple with normal contours and symmetric, no bruits.    Jugular Veins: Normal jugular venous pressure.    Thyroid: No visible thyromegaly.    Pulmonary    Examination of lungs: Clear to auscultation, no crackles, or wheezing.    Chest    Examination of Chest incision: Clear, dry and intact.  Pectoral asymmetry  Cardiovascular     The heart rate was normal. Regular S1 and S2 with no murmur or gallop  Pulses: Normal    Extremities: 1+ ankle and mid calf edema , no clubbing.  Superficial varicosities particularly on the right   Gastrointestinal    Bowel sounds were normal. The abdomen was soft and nontender.    Musculoskeletal    Spine: spine straight upon visual inspection.    Skin    Skin and subcutaneous tissue: No xanthelasma.    Neurologic    Orientation to person, place and time: Normal.    Psychiatric    Mood and affect: Normal.     Lab Results:  LIPIDS:  Lab Results   Component Value Date    CHOL 149 10/17/2017    CHOL 170 09/27/2016    CHOL 144 07/27/2015     Lab Results   Component Value Date    HDL 41 10/17/2017    HDL 46 09/27/2016    HDL 33 " (L) 07/27/2015     Lab Results   Component Value Date    LDLCALC 96 10/17/2017    LDLCALC 106 09/27/2016    LDLCALC 82 07/27/2015     Lab Results   Component Value Date    TRIG 59 10/17/2017    TRIG 88 09/27/2016    TRIG 144 07/27/2015         Echocardiogram 11/2018:    When compared to the previous study dated 10/13/2017, no significant change.    Normal left ventricular size, wall thickness and moderate global hypokinesis. Elevated left ventricular filling pressure. Left ventricle ejection fraction is moderately decreased. The estimated left ventricular ejection fraction is 35%.    Normal right ventricular size with moderately reduced right ventricular systolic performance. Pacemaker leads are identified.    Severely enlarged left atrium. Moderately enlarged right atrium.    Mild mitral valve regurgitation. Mild tricuspid valve regurgitation with estimated right ventricular systolic pressure 45 mmHg.      RegAdenosine myocardial perfusion imaging study 5/2016:   The gated images reveal moderate global hypokinesis. The measured left ventricular ejection fraction is 40 %.   CONCLUSION:    1. Lexiscan stress nuclear study is negative for inducible myocardial ischemia or infarction.    2. Mild to moderate(?) global hypokinesis with a quantitate the left ventricular ejection fraction of 40%.      LOUISA BLOCK MD [facility]  [phone]    This note created using Dragon voice recognition software.  Sound alike errors may have escaped editing.

## 2021-06-01 NOTE — TELEPHONE ENCOUNTER
----- Message from Tran King sent at 9/9/2019  9:51 AM CDT -----  General phone call:    Caller: Pt  Primary cardiologist: Rico  Detailed reason for call: Pt states he has swollen feet and weight gain  New or active symptoms? yes  Best phone number: [phone]  Best time to contact:   Ok to leave a detailed message?   Device? Yes    Additional Info:

## 2021-06-01 NOTE — TELEPHONE ENCOUNTER
ANTICOAGULATION  MANAGEMENT    Assessment     Today's INR result of 2.9 is Therapeutic (goal INR of 2.0-3.0)        Missed dose(s) may be affecting INR- missed a dose last week; unsure which day.     No new diet changes affecting INR    No new medication/supplements affecting INR    Continues to tolerate warfarin with no reported s/s of bleeding or thromboembolism     Previous INR was Supratherapeutic    Plan:     Spoke with Tanmay regarding INR result and instructed:     Warfarin Dosing Instructions:  Continue current warfarin dose 1.25 mg daily on Fri; and 2.5 mg daily rest of week  (0 % change)    Instructed patient to follow up no later than: 2 weeks.     Education provided: importance of taking warfarin as instructed    Tanmay verbalizes understanding and agrees to warfarin dosing plan.    Instructed to call the [facility] for any changes, questions or concerns. (#[phone])   ?   Edgar Ortez RN    Subjective/Objective:      Tanmay Israel, a 79 y.o. male is on warfarin.     Tanmay reports:     Home warfarin dose: verbally confirmed home dose with pt and updated on anticoagulation calendar     Missed doses: Yes, one dose last week.     Medication changes:  No     S/S of bleeding or thromboembolism:  No     New Injury or illness:  No     Changes in diet or alcohol consumption:  No     Upcoming surgery, procedure or cardioversion:  No    Anticoagulation Episode Summary     Current INR goal:   2.0-3.0   TTR:   60.1 %   Next INR check:   10/8/2019   INR from last check:   2.90 (9/24/2019)   Weekly max warfarin dose:      Target end date:      INR check location:      Preferred lab:      Send INR reminders to:   CHANI RIOS       Comments:            Anticoagulation Care Providers     Provider Role Specialty Phone number    Go Ramírez MD Referring Family Medicine [phone]

## 2021-06-01 NOTE — TELEPHONE ENCOUNTER
Left detailed message with recommendation below and my direct number for return call with further questions or concerns.

## 2021-06-01 NOTE — PROGRESS NOTES
Patient ID: Tanmay Israel is a 79 y.o. male.  /62   Pulse 61   Wt 169 lb 14.4 oz (77.1 kg) Comment: 164# at home scale naked  SpO2 99%   BMI 25.09 kg/m      Assessment/Plan:                   Diagnoses and all orders for this visit:    Anemia, unspecified type  -     HM2(CBC w/o Differential)    Chronic systolic congestive heart failure (H)  -     Comprehensive Metabolic Panel    Stage 3 chronic kidney disease (H)  -     Comprehensive Metabolic Panel    Chronic atrial fibrillation (H)  -     INR    Other orders  -     Influenza High Dose, Seasonal 65+ yrs           DISCUSSION  Check labs as above.  See additional discussion below.  No indication for any change in medication management at this point.  Based on laboratory test results will decide on recommendations for further follow-up which would be at a maximum of 3 months duration.  Subjective:     HPI    Tanmay Israel is a 79 y.o. male who is here today at my request for routine follow-up.  He has a complex medical history.  This past year he has undergone cervical spine surgery, followed by gallbladder surgery then developed recurrent C. difficile colitis.  Over the course of the spring and summer 2019 he had multiple hospitalizations.  His medical history is also significant for chronic systolic congestive heart failure caused by ischemic cardiomyopathy.  He has atrial fibrillation, coronary artery disease and a pacemaker.    He recently saw his cardiologist who restarted Bumex because of concerns of fluid buildup.  Patient reports that since initiating medication a week ago he has had significant diuresis, his weight is noted to have decreased.  He reports he is breathing better and just overall feeling better.  He does not report any recurrent diarrhea.  He denies dizziness lightheadedness or weakness.  He is out walking daily.  He reports no concerns with his appetite.  He denies any areas of pain.  During the course of his hospitalizations it was  brought up that there might be concern about an evolving dementia process however this is not apparent at this point in time.    Today we discussed obtaining repeat laboratory testing.  As mentioned in previous office visit note he does have an anemia.  His anemia was improving when last checked on August 6.  He reports no history of blood loss and remains anticoagulated.  INR is to be checked today as well.  We will consider further work-up if there are concerns about repeat evaluation of his hemoglobin.    Review of Systems  Complete review of systems is obtained.  Other than the specific considerations noted above complete review of systems is negative.          Objective:   Medications:  Current Outpatient Medications   Medication Sig     acetaminophen (TYLENOL) 500 MG tablet Take 2 tablets (1,000 mg total) by mouth 3 (three) times a day.     amLODIPine (NORVASC) 5 MG tablet Take 1 tablet (5 mg total) by mouth daily.     bumetanide (BUMEX) 1 MG tablet Take 1 tablet (1 mg total) by mouth daily.     carvedilol (COREG) 25 MG tablet Take 37.5 mg by mouth 2 (two) times a day with meals.     cholecalciferol, vitamin D3, 1,000 unit tablet Take 2,000 Units by mouth daily.     cyanocobalamin 500 MCG tablet Take 500 mcg by mouth daily.     diclofenac sodium (VOLTAREN) 1 % Gel Apply 4 g topically 4 (four) times a day as needed.     FERROUS FUMARATE (IRON ORAL) Take 1 tablet by mouth daily.      melatonin 3 mg Tab tablet Take 1 tablet (3 mg total) by mouth at bedtime as needed.     multivitamin with iron (ONE DAILY WITH IRON) Tab tablet Take 1 tablet by mouth daily.      omeprazole (PRILOSEC) 20 MG capsule Take 20 mg by mouth daily before breakfast.            potassium chloride (K-DUR,KLOR-CON) 20 MEQ tablet TAKE 1 TABLET(20 MEQ) BY MOUTH DAILY     rosuvastatin (CRESTOR) 20 MG tablet TAKE 1 TABLET(20 MG) BY MOUTH AT BEDTIME     senna-docusate (PERICOLACE) 8.6-50 mg tablet Take 1 tablet by mouth daily as needed for  constipation.     warfarin (COUMADIN/JANTOVEN) 2.5 MG tablet Take 1 tablet 2.5 mg daily, repeat INR and adjust dose as directed       Allergies:  No Known Allergies    Tobacco:   reports that he has quit smoking. He has never used smokeless tobacco.     Physical Exam          /62   Pulse 61   Wt 169 lb 14.4 oz (77.1 kg) Comment: 164# at home scale naked  SpO2 99%   BMI 25.09 kg/m        General: Patient is alert without any signs of distress.    Lungs: Good air movement throughout no wheeze crackle or other focal lung sounds are heard    Heart: Irregularly irregular rhythm that is rate controlled no murmur heard    Abdomen: Soft nondistended no significant tenderness on palpation    Extremities: Trace pitting edema is noted bilaterally with mild discomfort on palpation.  No evidence of skin breakdown.

## 2021-06-01 NOTE — TELEPHONE ENCOUNTER
ANTICOAGULATION  MANAGEMENT    Assessment     Today's INR result of 3.3 is Supratherapeutic (goal INR of 2.0-3.0)        Missed dose(s) may be affecting INR- missed yesterday.    No new diet changes affecting INR    No new medication/supplements affecting INR    Continues to tolerate warfarin with no reported s/s of bleeding or thromboembolism     Previous INR was Supratherapeutic    Plan:     Spoke with Tanmay regarding INR result and instructed:     Warfarin Dosing Instructions:  Change warfarin dose to 1.25 mg daily on Fri; and 2.5 mg daily rest of week  (7 % change)    Instructed patient to follow up no later than: 2 weeks.    Education provided: importance of taking warfarin as instructed    Tanmay verbalizes understanding and agrees to warfarin dosing plan.    Instructed to call the [facility] for any changes, questions or concerns. (#[phone])   ?   Edgar Ortez RN    Subjective/Objective:      Tanmay Israel, a 79 y.o. male is on warfarin.     Tanmay reports:     Home warfarin dose: verbally confirmed home dose with pt and updated on anticoagulation calendar     Missed doses: Yes, missed yesterday 9/12.      Medication changes:  No     S/S of bleeding or thromboembolism:  No     New Injury or illness:  No     Changes in diet or alcohol consumption:  No     Upcoming surgery, procedure or cardioversion:  No    Anticoagulation Episode Summary     Current INR goal:   2.0-3.0   TTR:   59.7 %   Next INR check:   9/27/2019   INR from last check:   3.30! (9/13/2019)   Weekly max warfarin dose:      Target end date:      INR check location:      Preferred lab:      Send INR reminders to:   CHANI RIOS       Comments:            Anticoagulation Care Providers     Provider Role Specialty Phone number    Go Ramírez MD Referring Family Medicine [phone]

## 2021-06-01 NOTE — PATIENT INSTRUCTIONS - HE
"1. Start Bumex 1 mg daily  2. We will schedule you for a \"chemical stress test\" to look for signs of recurrent heart artery blockages  3. Continue your walking regimen.  There is no substitute!  4. I will plan to see you back in 3 months  "

## 2021-06-01 NOTE — TELEPHONE ENCOUNTER
With the lab tests that were obtained here in the clinic on September 24 his kidney function is remained stable and the electrolytes are normal.  There is a slight abnormality to a kidney function measurement called alkaline phosphatase which I think we need to recheck again soon and make sure it is not indicating any new or evolving problem with his liver.  The elevation in the alkaline phosphatase may be an indication of a problem or just a simple fluctuation because of other things going on.  I would like him to have a repeat blood test 2 weeks from now.  If he develops abdominal pain, nausea or vomiting he will need to let me know right away.  If he continues to feel well we will simply obtain the lab test again in 2 weeks and I will let him know if we need to take any further action.

## 2021-06-01 NOTE — TELEPHONE ENCOUNTER
----- Message from Katelynn Watkins CMA sent at 9/30/2019  8:42 AM CDT -----    ----- Message -----  From: Go Ramírez MD  Sent: 9/29/2019   2:58 PM CDT  To: Go Ramírez Care Team Pool    Please call patient: With the lab tests that were obtained here in the clinic on September 24 his kidney function is remained stable and the electrolytes are normal.  There is a slight abnormality to a kidney function measurement called alkaline phosphatase which I think we need to recheck again soon and make sure it is not indicating any new or evolving problem with his liver.  The elevation in the alkaline phosphatase may be an indication of a problem or just a simple fluctuation because of other things going on.  I would like him to have a repeat blood test 2 weeks from now.  If he develops abdominal pain, nausea or vomiting he will need to let me know right away.  If he continues to feel well we will simply obtain the lab test again in 2 weeks and I will let him know if we need to take any further action.

## 2021-06-01 NOTE — TELEPHONE ENCOUNTER
I agree with Dr. Ramírez's recommendation.  If this one time dose does not work, he should call me or Dr. Ramírez back.

## 2021-06-01 NOTE — TELEPHONE ENCOUNTER
Refill Approved    Rx renewed per Medication Renewal Policy. Medication was last renewed on 7/19/19.    Sue Yang, [facility] Triage/Med Refill 9/14/2019     Requested Prescriptions   Pending Prescriptions Disp Refills     amLODIPine (NORVASC) 5 MG tablet [Pharmacy Med Name: AMLODIPINE BESYLATE 5MG TABLETS] 30 tablet 0     Sig: TAKE 1 TABLET BY MOUTH EVERY DAY       Calcium-Channel Blockers Protocol Passed - 9/14/2019 10:09 AM        Passed - PCP or prescribing provider visit in past 12 months or next 3 months     Last office visit with prescriber/PCP: 8/6/2019 Go Ramírez MD OR same dept: 8/6/2019 Go Ramírez MD OR same specialty: 8/6/2019 Go Ramírez MD  Last physical: 2/12/2019 Last MTM visit: Visit date not found   Next visit within 3 mo: Visit date not found  Next physical within 3 mo: Visit date not found  Prescriber OR PCP: Go Ramírez MD  Last diagnosis associated with med order: 1. Essential hypertension  - amLODIPine (NORVASC) 5 MG tablet [Pharmacy Med Name: AMLODIPINE BESYLATE 5MG TABLETS]; TAKE 1 TABLET BY MOUTH EVERY DAY  Dispense: 30 tablet; Refill: 0    If protocol passes may refill for 12 months if within 3 months of last provider visit (or a total of 15 months).             Passed - Blood pressure filed in past 12 months     BP Readings from Last 1 Encounters:   08/27/19 119/53

## 2021-06-01 NOTE — TELEPHONE ENCOUNTER
Reached out to patient to ensure he received the message.  He did take a bumex 2 mg tablet this afternoon. He will call Dr. Ramírez tomorrow if symptoms are no better. He has no further questions for me at this time.

## 2021-06-01 NOTE — TELEPHONE ENCOUNTER
Debra,  Please see patient concerns below.  Patient stats Dr. Ramírez has advised he take Bumex 2 mg tablet once with 3 or more pound weight gain.  He wishes for you to please advise/confirm this recommendations.  Any new orders or recommendations?  Thank you.        ----------------------------------------  Called patient to discuss his concerns.    Since last Thursday he has worsening shortness of breath, lower extremity edema and weight gain.  His stable home weight is 164 pounds. This mornings weight is 167 pounds and recorded 165, 166, 168, 169 pounds since 9/5/19.    While golfing last Thursday he was short of breath the entire time which is new for him. He notices worsening swelling and itching in his feet especially when putting shoes on.    He denies lightheadedness, dizziness, palpitations, chest pain and pre-syncope. He is monitoring sodium intake and has made no dietary changes or indiscretions.     Dr. Ramírez has recommended patient take Bumex 2 mg tablet with weight gain of 3 pounds or more; of which Tanmay has a current prescription.     Patient is calling for Debra's advisement.

## 2021-06-02 ENCOUNTER — RECORDS - HEALTHEAST (OUTPATIENT)
Dept: ADMINISTRATIVE | Facility: CLINIC | Age: 81
End: 2021-06-02

## 2021-06-02 VITALS — HEIGHT: 69 IN | WEIGHT: 182 LBS | BODY MASS INDEX: 26.96 KG/M2

## 2021-06-02 VITALS — BODY MASS INDEX: 27.55 KG/M2 | HEIGHT: 69 IN | WEIGHT: 186 LBS

## 2021-06-02 VITALS — BODY MASS INDEX: 26.66 KG/M2 | HEIGHT: 69 IN | WEIGHT: 180 LBS

## 2021-06-02 VITALS — HEIGHT: 69 IN | WEIGHT: 192 LBS | BODY MASS INDEX: 28.44 KG/M2

## 2021-06-02 VITALS — HEIGHT: 69 IN | BODY MASS INDEX: 28.44 KG/M2 | WEIGHT: 192 LBS

## 2021-06-02 VITALS — BODY MASS INDEX: 28.65 KG/M2 | WEIGHT: 194 LBS

## 2021-06-02 VITALS — BODY MASS INDEX: 28.44 KG/M2 | WEIGHT: 192 LBS | HEIGHT: 69 IN

## 2021-06-02 VITALS — WEIGHT: 192 LBS | HEIGHT: 69 IN | BODY MASS INDEX: 28.44 KG/M2

## 2021-06-02 VITALS — BODY MASS INDEX: 28.94 KG/M2 | WEIGHT: 196 LBS

## 2021-06-02 VITALS — BODY MASS INDEX: 25.81 KG/M2 | WEIGHT: 174.8 LBS

## 2021-06-02 VITALS — BODY MASS INDEX: 28.8 KG/M2 | WEIGHT: 195 LBS

## 2021-06-02 VITALS — HEIGHT: 69 IN | BODY MASS INDEX: 28.73 KG/M2 | WEIGHT: 194 LBS

## 2021-06-02 VITALS — BODY MASS INDEX: 27.11 KG/M2 | HEIGHT: 69 IN | WEIGHT: 183 LBS

## 2021-06-02 VITALS — HEIGHT: 69 IN | WEIGHT: 190 LBS | BODY MASS INDEX: 28.14 KG/M2

## 2021-06-02 VITALS — HEIGHT: 69 IN | WEIGHT: 174.3 LBS | BODY MASS INDEX: 25.82 KG/M2

## 2021-06-02 VITALS — WEIGHT: 191 LBS | BODY MASS INDEX: 28.21 KG/M2

## 2021-06-02 VITALS — WEIGHT: 185 LBS | BODY MASS INDEX: 27.4 KG/M2 | HEIGHT: 69 IN

## 2021-06-02 VITALS — BODY MASS INDEX: 25.77 KG/M2 | WEIGHT: 174 LBS | HEIGHT: 69 IN

## 2021-06-02 VITALS — BODY MASS INDEX: 25.59 KG/M2 | WEIGHT: 173.3 LBS

## 2021-06-02 VITALS — BODY MASS INDEX: 28.73 KG/M2 | HEIGHT: 69 IN | WEIGHT: 194 LBS

## 2021-06-02 VITALS
HEIGHT: 69 IN | WEIGHT: 192 LBS | BODY MASS INDEX: 28.44 KG/M2 | WEIGHT: 192 LBS | HEIGHT: 69 IN | BODY MASS INDEX: 28.44 KG/M2

## 2021-06-02 VITALS — BODY MASS INDEX: 25.7 KG/M2 | WEIGHT: 174 LBS

## 2021-06-02 VITALS — HEIGHT: 69 IN | WEIGHT: 181.5 LBS | BODY MASS INDEX: 26.88 KG/M2

## 2021-06-02 VITALS — WEIGHT: 183 LBS | BODY MASS INDEX: 27.11 KG/M2 | HEIGHT: 69 IN

## 2021-06-02 NOTE — TELEPHONE ENCOUNTER
ANTICOAGULATION  MANAGEMENT    Assessment     Today's INR result of 2.0 is Therapeutic (goal INR of 2.0-3.0)        Warfarin taken as previously instructed    No new diet changes affecting INR    No new medication/supplements affecting INR    Continues to tolerate warfarin with no reported s/s of bleeding or thromboembolism     Previous INR was Therapeutic    Plan:     Spoke with Tanmay regarding INR result and instructed:     Warfarin Dosing Instructions:  Continue current warfarin dose 1.25 mg daily on Fri; and 2.5 mg daily rest of week  (0 % change)    Instructed patient to follow up no later than: 4 weeks.     Education provided: importance of taking warfarin as instructed    Tanmay verbalizes understanding and agrees to warfarin dosing plan.    Instructed to call the [facility] for any changes, questions or concerns. (#[phone])   ?   Edgar Ortez RN    Subjective/Objective:      Tanmay Israel, a 79 y.o. male is on warfarin.     Tanmay reports:     Home warfarin dose: verbally confirmed home dose with Tanmay and updated on anticoagulation calendar     Missed doses: No     Medication changes:  No     S/S of bleeding or thromboembolism:  No     New Injury or illness:  No     Changes in diet or alcohol consumption:  No     Upcoming surgery, procedure or cardioversion:  No    Anticoagulation Episode Summary     Current INR goal:   2.0-3.0   TTR:   61.4 %   Next INR check:   11/5/2019   INR from last check:   2.00 (10/8/2019)   Weekly max warfarin dose:      Target end date:      INR check location:      Preferred lab:      Send INR reminders to:   CHANI RIOS       Comments:            Anticoagulation Care Providers     Provider Role Specialty Phone number    Go Ramírez MD Referring Family Medicine [phone]

## 2021-06-03 VITALS — WEIGHT: 160.8 LBS | BODY MASS INDEX: 23.75 KG/M2

## 2021-06-03 VITALS
HEART RATE: 64 BPM | SYSTOLIC BLOOD PRESSURE: 124 MMHG | WEIGHT: 172 LBS | RESPIRATION RATE: 24 BRPM | BODY MASS INDEX: 25.48 KG/M2 | HEIGHT: 69 IN | DIASTOLIC BLOOD PRESSURE: 52 MMHG

## 2021-06-03 VITALS — HEIGHT: 69 IN | WEIGHT: 166 LBS | BODY MASS INDEX: 24.59 KG/M2

## 2021-06-03 VITALS — WEIGHT: 174 LBS | BODY MASS INDEX: 25.77 KG/M2 | HEIGHT: 69 IN

## 2021-06-03 VITALS — BODY MASS INDEX: 25.33 KG/M2 | WEIGHT: 171 LBS | HEIGHT: 69 IN

## 2021-06-03 VITALS
HEART RATE: 61 BPM | BODY MASS INDEX: 25.09 KG/M2 | WEIGHT: 169.9 LBS | OXYGEN SATURATION: 99 % | DIASTOLIC BLOOD PRESSURE: 62 MMHG | SYSTOLIC BLOOD PRESSURE: 118 MMHG

## 2021-06-03 VITALS — WEIGHT: 162.5 LBS | BODY MASS INDEX: 24 KG/M2

## 2021-06-03 VITALS — BODY MASS INDEX: 25.4 KG/M2 | WEIGHT: 172 LBS

## 2021-06-03 VITALS — WEIGHT: 171.9 LBS | BODY MASS INDEX: 25.39 KG/M2

## 2021-06-03 VITALS — WEIGHT: 166 LBS | BODY MASS INDEX: 24.51 KG/M2

## 2021-06-03 VITALS — BODY MASS INDEX: 24.51 KG/M2 | WEIGHT: 166 LBS

## 2021-06-03 VITALS — WEIGHT: 161 LBS | BODY MASS INDEX: 23.78 KG/M2

## 2021-06-03 VITALS — BODY MASS INDEX: 24.37 KG/M2 | WEIGHT: 165 LBS

## 2021-06-03 VITALS — BODY MASS INDEX: 24.22 KG/M2 | WEIGHT: 164 LBS

## 2021-06-03 NOTE — TELEPHONE ENCOUNTER
ANTICOAGULATION  MANAGEMENT    Assessment     Today's INR result of 1.8 is Subtherapeutic (goal INR of 2.0-3.0)        Warfarin taken as previously instructed    No new diet changes affecting INR    No new medication/supplements affecting INR    Continues to tolerate warfarin with no reported s/s of bleeding or thromboembolism     Previous INR was Therapeutic    Plan:     Spoke with Tanmay regarding INR result and instructed:     Warfarin Dosing Instructions:  Take booster dose of 3.75 mg today only then change warfarin dose to 2.5 mg daily  (7.7 % change)    Instructed patient to follow up no later than: 2 weeks. Appt is made for 11/20    Education provided: importance of taking warfarin as instructed    Tanmay verbalizes understanding and agrees to warfarin dosing plan.    Instructed to call the [facility] for any changes, questions or concerns. (#[phone])   ?   Edgar Ortez RN    Subjective/Objective:      Tanmay Israel, a 79 y.o. male is on warfarin.     Tanmay reports:     Home warfarin dose: verbally confirmed home dose with pt and updated on anticoagulation calendar     Missed doses: No     Medication changes:  No     S/S of bleeding or thromboembolism:  No     New Injury or illness:  No     Changes in diet or alcohol consumption:  No     Upcoming surgery, procedure or cardioversion:  No    Anticoagulation Episode Summary     Current INR goal:   2.0-3.0   TTR:   61.2 %   Next INR check:   11/20/2019   INR from last check:   1.80! (11/6/2019)   Weekly max warfarin dose:      Target end date:      INR check location:      Preferred lab:      Send INR reminders to:   CHANI RIOS       Comments:            Anticoagulation Care Providers     Provider Role Specialty Phone number    Go Ramírez MD Referring Family Medicine [phone]

## 2021-06-03 NOTE — PATIENT INSTRUCTIONS - HE
Tanmay Israel,    It was a pleasure to see you today at the [facility].     My recommendations after this visit include:  - See Dr. Baker in January    - Continue to monitor for signs of retaining fluid (increasing weights, shortness of breath, swelling) and call with any concerns. [phone]  - Follow up with me in 6 months.     Debra Rowe, CNP

## 2021-06-03 NOTE — TELEPHONE ENCOUNTER
ANTICOAGULATION  MANAGEMENT    Assessment     Today's INR result of 1.9 is Subtherapeutic (goal INR of 2.0-3.0)        Missed dose(s) may be affecting INR- missed on 11/17.     No new diet changes affecting INR    No new medication/supplements affecting INR    Continues to tolerate warfarin with no reported s/s of bleeding or thromboembolism     Previous INR was Therapeutic     Colonoscopy 2/19/20.    Plan:     Spoke with Tanmay regarding INR result and instructed:     Warfarin Dosing Instructions:  Take booster dose of 3.75 mg today only then continue current warfarin dose 2.5 mg daily.    Instructed patient to follow up no later than: 2 weeks.    Education provided: importance of taking warfarin as instructed    Tanmay verbalizes understanding and agrees to warfarin dosing plan.    Instructed to call the [facility] for any changes, questions or concerns. (#[phone])   ?   Edgar Ortez RN    Subjective/Objective:      Tanmay Israel, a 79 y.o. male is on warfarin.     Tanmay reports:     Home warfarin dose: verbally confirmed home dose with pt and updated on anticoagulation calendar     Missed doses: Yes, missed 11/17.     Medication changes:  No     S/S of bleeding or thromboembolism:  No     New Injury or illness:  No     Changes in diet or alcohol consumption:  No     Upcoming surgery, procedure or cardioversion:  Yes: colonoscopy 2/19/20.    Anticoagulation Episode Summary     Current INR goal:   2.0-3.0   TTR:   57.1 %   Next INR check:   12/4/2019   INR from last check:   1.90! (11/20/2019)   Weekly max warfarin dose:      Target end date:      INR check location:      Preferred lab:      Send INR reminders to:   CHANI RIOS       Comments:            Anticoagulation Care Providers     Provider Role Specialty Phone number    Go Ramírez MD Referring Family Medicine [phone]

## 2021-06-03 NOTE — TELEPHONE ENCOUNTER
RN cannot approve Refill Request    RN can NOT refill this medication historical medication requested.     Blossom Medina, [facility] Triage/Med Refill 11/21/2019    Requested Prescriptions   Pending Prescriptions Disp Refills     omeprazole (PRILOSEC) 20 MG capsule [Pharmacy Med Name: OMEPRAZOLE 20MG CAPSULES] 180 capsule 0     Sig: TAKE 1 CAPSULE BY MOUTH TWICE DAILY       GI Medications Refill Protocol Passed - 11/19/2019 12:49 PM        Passed - PCP or prescribing provider visit in last 12 or next 3 months.     Last office visit with prescriber/PCP: 9/24/2019 Go Ramírez MD OR same dept: 9/24/2019 Go Ramírez MD OR same specialty: 9/24/2019 Go Ramírez MD  Last physical: 2/12/2019 Last MTM visit: Visit date not found   Next visit within 3 mo: Visit date not found  Next physical within 3 mo: Visit date not found  Prescriber OR PCP: Go Ramírez MD  Last diagnosis associated with med order: 1. GERD (gastroesophageal reflux disease)  - omeprazole (PRILOSEC) 20 MG capsule [Pharmacy Med Name: OMEPRAZOLE 20MG CAPSULES]; TAKE 1 CAPSULE BY MOUTH TWICE DAILY  Dispense: 180 capsule; Refill: 0    If protocol passes may refill for 12 months if within 3 months of last provider visit (or a total of 15 months).

## 2021-06-04 VITALS
SYSTOLIC BLOOD PRESSURE: 120 MMHG | HEIGHT: 69 IN | WEIGHT: 175 LBS | RESPIRATION RATE: 16 BRPM | DIASTOLIC BLOOD PRESSURE: 46 MMHG | BODY MASS INDEX: 25.92 KG/M2 | HEART RATE: 60 BPM

## 2021-06-04 NOTE — TELEPHONE ENCOUNTER
ACN called back and spoke with pt. Reviewed warfarin dosing. Recheck INR in 4 weeks. Pt verbalized understanding.

## 2021-06-04 NOTE — TELEPHONE ENCOUNTER
Who is calling:  Patient  Reason for Call:  Patient accidentally erased voicemail message for dosing and follow up instructions from 12/17 and asked ACN to call him again with instructions.   Date of last appointment with primary care: na  Okay to leave a detailed message: Yes

## 2021-06-04 NOTE — TELEPHONE ENCOUNTER
ANTICOAGULATION  MANAGEMENT    Assessment     Today's INR result of 2.2 is Therapeutic (goal INR of 2.0-3.0)        Warfarin taken as previously instructed    No new diet changes affecting INR    No new medication/supplements affecting INR    Continues to tolerate warfarin with no reported s/s of bleeding or thromboembolism     Previous INR was Subtherapeutic    Plan:     Spoke with Tanmay regarding INR result and instructed:     Warfarin Dosing Instructions:  Continue current warfarin dose 2.5 mg daily.    Instructed patient to follow up no later than: 2 weeks.     Education provided: importance of taking warfarin as instructed    Tanmay verbalizes understanding and agrees to warfarin dosing plan.    Instructed to call the [facility] for any changes, questions or concerns. (#[phone])   ?   Edgar Ortez RN    Subjective/Objective:      Tanmay Israel, a 79 y.o. male is on warfarin.     Tanmay reports:     Home warfarin dose: template incorrect; verbally confirmed home dose with pt and updated on anticoagulation calendar     Missed doses: No     Medication changes:  No     S/S of bleeding or thromboembolism:  No     New Injury or illness:  No     Changes in diet or alcohol consumption:  No     Upcoming surgery, procedure or cardioversion:  No    Anticoagulation Episode Summary     Current INR goal:   2.0-3.0   TTR:   55.8 % (11.4 mo)   Next INR check:   12/18/2019   INR from last check:   2.20 (12/4/2019)   Weekly max warfarin dose:      Target end date:      INR check location:      Preferred lab:      Send INR reminders to:   CHANI RIOS       Comments:            Anticoagulation Care Providers     Provider Role Specialty Phone number    Go Ramírez MD Referring Family Medicine [phone]

## 2021-06-04 NOTE — TELEPHONE ENCOUNTER
ANTICOAGULATION  MANAGEMENT    Assessment     Today's INR result of 2.3 is Therapeutic (goal INR of 2.0-3.0)        Warfarin taken as previously instructed    No new diet changes affecting INR    No new medication/supplements affecting INR    Continues to tolerate warfarin with no reported s/s of bleeding or thromboembolism     Previous INR was Therapeutic    Plan:     Left a detailed message for Tanmay regarding INR result and instructed:     Warfarin Dosing Instructions:  Continue current warfarin dose 2.5 mg daily.    Instructed patient to follow up no later than: 4 weeks.    Education provided: importance of taking warfarin as instructed    Instructed to call the [facility] for any changes, questions or concerns. (#[phone])   ?   Edgar Ortez RN    Subjective/Objective:      Tanmay Israel, a 79 y.o. male is on warfarin.     Tanmay reports:     Home warfarin dose: as updated on anticoagulation calendar per template     Missed doses: No     Medication changes:  No     S/S of bleeding or thromboembolism:  No     New Injury or illness:  No     Changes in diet or alcohol consumption:  No     Upcoming surgery, procedure or cardioversion:  No    Anticoagulation Episode Summary     Current INR goal:   2.0-3.0   TTR:   55.8 % (11.4 mo)   Next INR check:   1/14/2020   INR from last check:   2.30 (12/17/2019)   Weekly max warfarin dose:      Target end date:      INR check location:      Preferred lab:      Send INR reminders to:   CHANI RIOS       Comments:            Anticoagulation Care Providers     Provider Role Specialty Phone number    Go Ramírez MD Referring Family Medicine [phone]

## 2021-06-05 NOTE — TELEPHONE ENCOUNTER
ANTICOAGULATION  MANAGEMENT    Assessment     Today's INR result of 2.0 is Therapeutic (goal INR of 2.0-3.0)        Warfarin taken as previously instructed    No new diet changes affecting INR    No new medication/supplements affecting INR    Continues to tolerate warfarin with no reported s/s of bleeding or thromboembolism     Previous INR was Therapeutic     Colonoscopy 2/19. Pre-op visit 2/7.     Plan:     Left a detailed message for Tanmay regarding INR result and instructed:     Warfarin Dosing Instructions:  Continue current warfarin dose 2.5 mg daily.    Instructed patient to follow up no later than: Pre-op 2/7.    Education provided: importance of taking warfarin as instructed      Instructed to call the [facility] for any changes, questions or concerns. (#[phone])   ?   Edgar Ortez RN    Subjective/Objective:      Tanmay Israel, a 79 y.o. male is on warfarin.     Tanmay reports:     Home warfarin dose: as updated on anticoagulation calendar per template     Missed doses: No     Medication changes:  No     S/S of bleeding or thromboembolism:  No     New Injury or illness:  No     Changes in diet or alcohol consumption:  No     Upcoming surgery, procedure or cardioversion:  No    Anticoagulation Episode Summary     Current INR goal:   2.0-3.0   TTR:   55.8 % (11.4 mo)   Next INR check:   2/7/2020   INR from last check:   2.00 (1/7/2020)   Weekly max warfarin dose:      Target end date:      INR check location:      Preferred lab:      Send INR reminders to:   CHANI RIOS       Comments:            Anticoagulation Care Providers     Provider Role Specialty Phone number    Go Ramírez MD Referring Family Medicine [phone]

## 2021-06-05 NOTE — TELEPHONE ENCOUNTER
ANTICOAGULATION  MANAGEMENT    Assessment     Today's INR result of 1.6 is Subtherapeutic (goal INR of 2.0-3.0)        Warfarin taken as previously instructed    No new diet changes affecting INR    No new medication/supplements affecting INR    Continues to tolerate warfarin with no reported s/s of bleeding or thromboembolism     Previous INR was Therapeutic     Colonoscopy 2/19. Pre-op on 2/7.     Plan:     Spoke with Tanmay regarding INR result and instructed:     Warfarin Dosing Instructions:  Change warfarin dose to 3.75 mg daily on Tues; and 2.5 mg daily rest of week  (7 % change)    Instructed patient to follow up no later than: Fri 2/7.     Education provided: importance of taking warfarin as instructed    Tanmay verbalizes understanding and agrees to warfarin dosing plan.    Instructed to call the [facility] for any changes, questions or concerns. (#[phone])   ?   Edgar Ortez RN    Subjective/Objective:      Tanmay Israel, a 79 y.o. male is on warfarin.     Tanmay reports:     Home warfarin dose: verbally confirmed home dose with pt and updated on anticoagulation calendar     Missed doses: No     Medication changes:  No     S/S of bleeding or thromboembolism:  No     New Injury or illness:  No     Changes in diet or alcohol consumption:  No     Upcoming surgery, procedure or cardioversion:  Yes: colonoscopy 2/19.     Anticoagulation Episode Summary     Current INR goal:   2.0-3.0   TTR:   49.8 % (11.4 mo)   Next INR check:   2/7/2020   INR from last check:   1.60! (2/4/2020)   Weekly max warfarin dose:      Target end date:      INR check location:      Preferred lab:      Send INR reminders to:   CHANI RIOS       Comments:            Anticoagulation Care Providers     Provider Role Specialty Phone number    Go Ramírez MD Referring Family Medicine [phone]

## 2021-06-05 NOTE — TELEPHONE ENCOUNTER
----- Message from Go Ramírez MD sent at 2/9/2020 10:54 AM CST -----  Please call pt- all labs are improved, we still need to monitor kidney, liver and blood counts.  Recheck in 3 months.

## 2021-06-05 NOTE — TELEPHONE ENCOUNTER
ANTICOAGULATION  MANAGEMENT    Assessment     Today's INR result of 1.6 is Subtherapeutic (goal INR of 2.0-3.0)        Warfarin taken as previously instructed    No new diet changes affecting INR    No new medication/supplements affecting INR    Continues to tolerate warfarin with no reported s/s of bleeding or thromboembolism     Previous INR was Subtherapeutic     Colonoscopy 2/19- to hold warfarin 4 days before without bridge (see Pre-op notes today)    Plan:     Spoke with Tanmay regarding INR result and instructed:     Warfarin Dosing Instructions:  Change warfarin dose to 3.75 mg daily on Tues, Fri; and 2.5 mg daily rest of week  (7 % change)    Start holding 2/15-2/18. Resume the evening of 2/19 if ok with surgeon.    Instructed patient to follow up no later than: 1 week after resuming warfarin.    Education provided: importance of taking warfarin as instructed    Tanmay verbalizes understanding and agrees to warfarin dosing plan.    Instructed to call the [facility] for any changes, questions or concerns. (#[phone])   ?   Edgar Ortez RN    Subjective/Objective:      Tanmay Israel, a 79 y.o. male is on warfarin.     Tanmay reports:     Home warfarin dose: verbally confirmed home dose with pt and updated on anticoagulation calendar     Missed doses: No     Medication changes:  No     S/S of bleeding or thromboembolism:  No     New Injury or illness:  No     Changes in diet or alcohol consumption:  No     Upcoming surgery, procedure or cardioversion:  Yes: colonoscopy 2/19.     Anticoagulation Episode Summary     Current INR goal:   2.0-3.0   TTR:   48.9 % (11.4 mo)   Next INR check:   2/26/2020   INR from last check:   1.60! (2/7/2020)   Weekly max warfarin dose:      Target end date:      INR check location:      Preferred lab:      Send INR reminders to:   CHANI RIOS       Comments:            Anticoagulation Care Providers     Provider Role Specialty Phone number    Go Ramírez MD  [facility] [phone]

## 2021-06-05 NOTE — PATIENT INSTRUCTIONS - HE
1. Work your way up to walking 20 minutes daily  2. No medication changes today  3. Look forward to seeing you again in 1 year.

## 2021-06-05 NOTE — TELEPHONE ENCOUNTER
Patient Returning Call  Reason for call:  Patient called back  Information relayed to patient:  Informed of Dr Ramírez's message listed below.  Patient states understanding.  Patient has additional questions:  No  If YES, what are your questions/concerns:    Okay to leave a detailed message?: No call back needed

## 2021-06-05 NOTE — PROGRESS NOTES
Preoperative Exam    Scheduled Procedure: colonoscopy  Surgery Date:  2/19/20  Surgery Location: [facility], fax [phone]    Surgeon:  Dr. Rivas    Assessment/Plan:     Tanmay was seen today for pre-op exam.    Preop general physical exam  -     Electrocardiogram Perform and Read    Anemia, unspecified type  -     HM1(CBC and Differential)  -     HM1 (CBC with Diff)    Chronic systolic congestive heart failure (H)    Stage 3 chronic kidney disease (H)  -     Comprehensive Metabolic Panel    Elevated alkaline phosphatase level  -     Comprehensive Metabolic Panel    Chronic atrial fibrillation    Ischemic cardiomyopathy    Hx of Clostridium difficile infection    Gastroesophageal reflux disease without esophagitis    ICD (implantable cardioverter-defibrillator) in place    Obstructive sleep apnea    Osteoporosis, unspecified osteoporosis type, unspecified pathological fracture presence      Surgical Procedure Risk: Low (reported cardiac risk generally < 1%)  Have you had prior anesthesia?: Yes  Have you or any family members had a previous anesthesia reaction:  No  Do you or any family members have a history of a clotting or bleeding disorder?: Yes: pt is on warfarin for a fib  Cardiac Risk Assessment: no increased risk for major cardiac complications    APPROVAL GIVEN to proceed with proposed procedure, without further diagnostic evaluation    Stop warfarin without bridging 4 days prior to colonoscopy, will confirm [facility] gastroenterology to make sure they agree with 4 day hold.    Please Note:  Patient has an implanted device.  Device Type: Medtronic [id number] Protecta XT VR, ICD      Device Vendor:  Medtronic    Functional Status: Independent  Patient plans to recover at home with family.     Subjective:      Tanmay Israel is a 79 y.o. male who presents for a preoperative consultation.      He needs to undergo a colonoscopy for assessment of his colon.  He has a history of Crohn's disease.  He has a chronic  anemia which may be related to his underlying chronic kidney disease.  He needs reassessment at this time.    His medical history significant for coronary disease with chronic systolic congestive heart failure, he has an ejection fraction of 48%.  He was recently seen by cardiology and noted to be stable with 1 year follow-up recommended.  He also has underlying atrial fibrillation and is anticoagulated.  He has coronary artery disease and denies any symptoms such as chest pain or shortness of breath.  He is at his baseline weight without any concerns for decompensation of his underlying cardiomyopathy.  He remains on diuretic therapy.    He has a history of chronic kidney disease stage III.  He has a history of obstructive sleep apnea but does not use CPAP.  Does not currently report any snoring symptoms.    During the course of the year last year he underwent cervical spine surgery followed by need for cholecystectomy, he then developed C. difficile colitis with several recurrences along with concurrent decompensation of his congestive heart failure.  He has recovered from these surgical interventions and the C. difficile is resolved without any sign of recurrence and again his cardiomyopathy/CHF is well compensated at this time.    No sign of acute illness.    No history of reaction to anesthesia.    No history of blood clot.    All other systems reviewed and are negative, other than those listed in the HPI.    Pertinent History  Do you have difficulty breathing or chest pain after walking up a flight of stairs: No  History of obstructive sleep apnea: Yes: not using CPAP  Steroid use in the last 6 months: No  Frequent Aspirin/NSAID use: No  Prior Blood Transfusion: No  Prior Blood Transfusion Reaction: No  If for some reason prior to, during or after the procedure, if it is medically indicated, would you be willing to have a blood transfusion?:  There is no transfusion refusal.    Current Outpatient Medications    Medication Sig Dispense Refill     acetaminophen (TYLENOL) 500 MG tablet Take 2 tablets (1,000 mg total) by mouth 3 (three) times a day.  0     amLODIPine (NORVASC) 5 MG tablet Take 1 tablet (5 mg total) by mouth daily. 90 tablet 3     bumetanide (BUMEX) 1 MG tablet Take 1 tablet (1 mg total) by mouth daily. 90 tablet 3     carvedilol (COREG) 25 MG tablet Take 37.5 mg by mouth 2 (two) times a day with meals.       cholecalciferol, vitamin D3, 1,000 unit tablet Take 2,000 Units by mouth daily.       cyanocobalamin 500 MCG tablet Take 500 mcg by mouth daily.       diclofenac sodium (VOLTAREN) 1 % Gel Apply 4 g topically 4 (four) times a day as needed.       melatonin 3 mg Tab tablet Take 1 tablet (3 mg total) by mouth at bedtime as needed.  0     multivitamin with iron (ONE DAILY WITH IRON) Tab tablet Take 1 tablet by mouth daily.        omeprazole (PRILOSEC) 20 MG capsule TAKE 1 CAPSULE BY MOUTH TWICE DAILY 180 capsule 3     potassium chloride (K-DUR,KLOR-CON) 20 MEQ tablet TAKE 1 TABLET(20 MEQ) BY MOUTH DAILY 90 tablet 3     rosuvastatin (CRESTOR) 20 MG tablet Take 1 tablet (20 mg total) by mouth at bedtime. 90 tablet 1     senna-docusate (PERICOLACE) 8.6-50 mg tablet Take 1 tablet by mouth daily as needed for constipation.  0     warfarin (COUMADIN/JANTOVEN) 2.5 MG tablet Take 1 tablet 2.5 mg daily, repeat INR and adjust dose as directed  0     omeprazole (PRILOSEC) 20 MG capsule Take 20 mg by mouth daily before breakfast.              No current facility-administered medications for this visit.       No Known Allergies    Patient Active Problem List   Diagnosis     Osteoarthritis Of The Knee     Esophageal Reflux     Obstructive sleep apnea     Ptosis Of Eyelid     Hyperlipidemia     Anemia     Crohn's (Granulomatous) Colitis     Benign Essential Hypertension     Coronary atherosclerosis of native coronary artery     Ischemic cardiomyopathy     Paroxysmal ventricular tachycardia (H)     Dry Nonexudative Macular  Degeneration     Serum Enzyme Levels - Alkaline Phosphatase Elevated     Dysphagia     ICD (implantable cardioverter-defibrillator) in place     Lumbar stenosis with neurogenic claudication     Sacroiliac joint dysfunction of right side     Stage 3 chronic kidney disease (H)     Warfarin-induced coagulopathy (H)     A-fib (H)     Anemia, unspecified type     Cerebral aneurysm     Osteoporosis     Ileitis     Heart failure with reduced ejection fraction (H)     Diaphragmatic hernia     Pharyngoesophageal dysphagia     History of Crohn's disease     Iron deficiency anemia     Polyp of stomach and duodenum     Reflux esophagitis     Rotator cuff tear arthropathy of right shoulder     Schatzki's ring     Diverticulosis of colon     Flatulence, eructation and gas pain     S/P cervical spinal fusion     S/P laparoscopic cholecystectomy     Hypomagnesemia     Acute renal failure (ARF) (H)     Dizziness     Past Medical History:   Diagnosis Date     Acute cholecystitis 2/28/2019     Acute post-operative pain      Anemia      Arthritis      Atrial fibrillation (H)      C. difficile diarrhea 4/21/2019     Calculus of ureter      Callus      Cerebral aneurysm      Cervical myelopathy (H) 2/22/2019     Cervical spinal stenosis      Chronic kidney disease     stage 3     Chronic systolic congestive heart failure (H)      Closed fracture of distal end of left radius, unspecified fracture morphology, initial encounter      Closed fracture of distal end of right radius, unspecified fracture morphology, initial encounter      Closed fracture of first thoracic vertebra, unspecified fracture morphology, initial encounter (H)      Coronary artery disease      Crohn's disease (H)      Dysphagia      Fall 10/22/2016     GERD (gastroesophageal reflux disease)      Hyperlipidemia      Hypertension      Hypotension, unspecified hypotension type      ICD (implantable cardioverter-defibrillator) in place     Medtronic     Ischemic  cardiomyopathy      Kidney stone      Low back pain      Lumbago     Created by Conversion      Macular degeneration      Muscle Aches, Generalized (Myalgias)     Created by Conversion      Permanent atrial fibrillation     [id number] risk score = 5 (age3/ CAD/ HTN/ CHF) Chronic warfarin     Personal history of colonic polyps 2/12/2019     Polyp of duodenum 10/17/2016     Pyuria      Right arm weakness 4/2/2019     Right Rotator Cuff Tendonitis     Created by Conversion  Replacement Utility updated for latest IMO load     Schatzki's ring      Sciatica     Created by Conversion      Serum Enzyme Levels - ALT (SGPT) Elevated     Created by Conversion      Sleep apnea     no CPAP     Spondylolisthesis of lumbar region 7/5/2016     Weakness 4/20/2019     Past Surgical History:   Procedure Laterality Date     APPENDECTOMY       CARDIAC DEFIBRILLATOR PLACEMENT  2013    ICD Medtronic     CATARACT EXTRACTION W/  INTRAOCULAR LENS IMPLANT Bilateral      CHOLECYSTECTOMY       CORONARY ARTERY BYPASS GRAFT  08/2004    Coronary Artery Quadruple Venous Bypass Graft     ESOPHAGOGASTRODUODENOSCOPY       UT C- LAMINOPLASTY, 2 OR MORE Left 2/22/2019    Procedure: LEFT CERVICAL 4, 5, 6 LAMINOPLASTY;  Surgeon: Liza Cesar MD;  Location: [facility] OR;  Service: Spine     UT LAP,CHOLECYSTECTOMY N/A 2/28/2019    CHOLECYSTECTOMY, LAPAROSCOPIC;  Surgeon: Mana Roman MD;  Location: [facility] OR;  Service: General     ROTATOR CUFF REPAIR Right      TONSILLECTOMY       XR MYELOGRAM CERVICAL THORACIC LUMBAR  1/17/2019       Social History     Socioeconomic History     Marital status: Single     Spouse name: Not on file     Number of children: Not on file     Years of education: Not on file     Highest education level: Not on file   Occupational History     Occupation: yang     Employer: RETIRED   Social Needs     Financial resource strain: Not on file     Food insecurity:     Worry: Not on file     Inability: Not  "on file     Transportation needs:     Medical: Not on file     Non-medical: Not on file   Tobacco Use     Smoking status: Former Smoker     Smokeless tobacco: Never Used   Substance and Sexual Activity     Alcohol use: No     Drug use: No     Sexual activity: Not on file   Lifestyle     Physical activity:     Days per week: Not on file     Minutes per session: Not on file     Stress: Not on file   Relationships     Social connections:     Talks on phone: Not on file     Gets together: Not on file     Attends Muslim service: Not on file     Active member of club or organization: Not on file     Attends meetings of clubs or organizations: Not on file     Relationship status: Not on file     Intimate partner violence:     Fear of current or ex partner: Not on file     Emotionally abused: Not on file     Physically abused: Not on file     Forced sexual activity: Not on file   Other Topics Concern     Not on file   Social History Narrative     Not on file     Patient Care Team:  Go Ramírez MD as PCP - General  Massiel Medina PharmD as Pharmacist (Pharmacist)  Go Ramírez MD as Assigned PCP    Objective:     Vitals:    02/07/20 0957   BP: 106/70   Pulse: 77   SpO2: 95%   Weight: 170 lb (77.1 kg)   Height: 5' 7\" (1.702 m)     Physical Exam:  Physical Exam   General Appearance:    Alert, cooperative, no distress   Eyes:   No scleral icterus or conjunctival irritation       Ears:    Normal TM's and external ear canals, both ears   Throat:   Lips, mucosa, and tongue normal; denture plates present   Neck:   Supple, symmetrical, trachea midline, no adenopathy;        thyroid:  No enlargement/tenderness/nodules, there is a skin nodule that appears benign on the left anterior neck   Lungs:     Clear to auscultation bilaterally, respirations unlabored, no wheezes or crackles   Heart:    Regular rate and rhythm,  No murmur   Abdomen:    Soft, no distention, no tenderness on palpation, no masses, no " organomegaly     Extremities:  No edema, no joint swelling or redness, no evidence of any injuries   Skin:  No concerning skin findings, no suspicious moles, no rashes   Neurologic:  On gross examination there is no motor or sensory deficit.  Patient walks with a normal gait     Recent Results (from the past 240 hour(s))   INR    Collection Time: 02/04/20  9:27 AM   Result Value Ref Range    INR 1.60 (H) 0.90 - 1.10   Electrocardiogram Perform and Read    Collection Time: 02/07/20 10:04 AM   Result Value Ref Range    SYSTOLIC BLOOD PRESSURE      DIASTOLIC BLOOD PRESSURE      VENTRICULAR RATE 55 BPM    ATRIAL RATE 49 BPM    P-R INTERVAL      QRS DURATION 188 ms    Q-T INTERVAL 530 ms    QTC CALCULATION (BEZET) 507 ms    P Axis      R AXIS -80 degrees    T AXIS 82 degrees    MUSE DIAGNOSIS       Atrial fibrillation with slow ventricular response with premature ventricular or aberrantly conducted complexes and with ventricular escape complexes  Left axis deviation  Non-specific intra-ventricular conduction block  Possible Lateral infarct (cited on or before 07-FEB-2020)  Inferior infarct (cited on or before 07-FEB-2020)  Abnormal ECG  When compared with ECG of 22-MAY-2019 14:26,  Sinus rhythm is now with ventricular escape complexes  Serial changes of Lateral infarct Present       When EKG is compared to previous there is no significant appreciable difference.  From a symptom standpoint do not feel that based on this piece of information we need to undergo any further evaluation of his heart at this time prior to proceeding with the procedure.      Immunization History   Administered Date(s) Administered     DT (pediatric) 03/01/2001     Hep B, Adult 10/05/2018     Influenza P3w5-85, 01/27/2010     Influenza high dose,seasonal,PF, 65+ yrs 09/17/2015, 09/27/2016, 10/17/2017, 09/11/2018, 09/24/2019     Influenza, Seasonal, Inj PF IIV3 09/09/2010     Influenza, inj, historic,unspecified 10/15/2007, 10/21/2008, 09/09/2010      Influenza, seasonal,quad inj 6-35 mos 09/16/2009, 11/22/2011, 09/28/2012, 10/01/2014     Influenza,inj,MDCK,PF,Quad >4yrs 09/30/2018     Influenza,seasonal, Inj IIV3 10/21/2005, 11/28/2006, 10/15/2007, 10/21/2008, 09/16/2009, 11/22/2011, 09/28/2012, 10/01/2014     Pneumo Conj 13-V (2010&after) 10/01/2014     Pneumo Polysac 23-V 09/08/2005     Td, Adult, Absorbed 03/01/2001     Td,adult,historic,unspecified 03/01/2001     Tdap 11/09/2015     ZOSTER, LIVE 12/16/2010       Electronically signed by Go Ramírez MD 02/07/20 9:52 AM

## 2021-06-06 NOTE — ANESTHESIA PREPROCEDURE EVALUATION
Anesthesia Evaluation      Patient summary reviewed   No history of anesthetic complications     Airway   Mallampati: II  Neck ROM: limited  Comment: Cervical collar in place   Pulmonary - normal exam   (+) sleep apnea,   (-) shortness of breath                         Cardiovascular - normal exam  Exercise tolerance: > or = 4 METS  (+) pacemaker (ICD), hypertension, CAD, CABG/stent (2008), dysrhythmias ( Hx of paroxysmal afib, rare VT), CHF, cardiomyopathy ( EF improved to 48% on 9/2019 [facility]), hypercholesterolemia, PVD     Neuro/Psych - negative ROS   (-) no neuromuscular disease    Comments: Possible cerebral aneurysm      Endo/Other    (+) arthritis,      GI/Hepatic/Renal    (+) GERD,   chronic renal disease,           Dental    (+) upper dentures                         Anesthesia Plan  Planned anesthetic: MAC  Plan for propofol gtt w/ fentanyl PRN for pain.  Discussed potential need to convert to GA w/ ETT vs LMA if not tolerating.  Explained that it is possible to remember certain aspects of the OR experience during MAC dependent on the depth of sedation and patient understands this.  zofran for PONV ppx.  **Magnet on AICD.     ASA 4   Induction: intravenous   Anesthetic plan and risks discussed with: patient  Anesthesia plan special considerations: antiemetics, arterial catheterization,   Post-op plan: routine recovery        Chemistry        Component Value Date/Time     02/07/2020 1038    K 4.1 02/07/2020 1038     02/07/2020 1038    CO2 27 02/07/2020 1038    BUN 20 02/07/2020 1038    CREATININE 1.62 (H) 02/07/2020 1038     02/07/2020 1038        Component Value Date/Time    CALCIUM 9.2 02/07/2020 1038    ALKPHOS 365 (H) 02/07/2020 1038    AST 15 02/07/2020 1038    ALT 10 02/07/2020 1038    BILITOT 0.6 02/07/2020 1038        Lab Results   Component Value Date    WBC 8.0 02/07/2020    HGB 9.7 (L) 02/07/2020    HCT 29.0 (L) 02/07/2020    MCV 85 02/07/2020     02/07/2020      Lab Results   Component Value Date    INR 1.60 (H) 02/07/2020    INR 1.60 (H) 02/04/2020    INR 2.00 (H) 01/07/2020

## 2021-06-06 NOTE — TELEPHONE ENCOUNTER
Who is calling:  Patient   Reason for Call:  Patient was calling with questions regarding his medication and when he was suppose have his next INR is due.   Date of last appointment with primary care: 02/07/20  Okay to leave a detailed message: No

## 2021-06-06 NOTE — TELEPHONE ENCOUNTER
Per 2/7/2020 OV note:   Stop warfarin without bridging 4 days prior to colonoscopy, will confirm [facility] to make sure they agree with 4 day hold.       Called [facility] and notified ok for 4 day hold without bridging.

## 2021-06-06 NOTE — ANESTHESIA POSTPROCEDURE EVALUATION
Patient: Tanmay sIrael  Procedure(s):  COLONOSCOPY  Anesthesia type: MAC    Patient location: Phase II Recovery  Last vitals:   Vitals Value Taken Time   /74 2/19/2020 12:45 PM   Temp 36.4  C (97.6  F) 2/19/2020 12:16 PM   Pulse 57 2/19/2020 12:54 PM   Resp 18 2/19/2020 12:45 PM   SpO2 95 % 2/19/2020 12:55 PM   Vitals shown include unvalidated device data.  Post vital signs: stable  Level of consciousness: awake and responds to simple questions  Post-anesthesia pain: pain controlled  Post-anesthesia nausea and vomiting: no  Pulmonary: unassisted, return to baseline  Cardiovascular: stable and blood pressure at baseline  Hydration: adequate  Anesthetic events: no    QCDR Measures:  ASA# 11 - Tarah-op Cardiac Arrest: ASA11B - Patient did NOT experience unanticipated cardiac arrest  ASA# 12 - Tarah-op Mortality Rate: ASA12B - Patient did NOT die  ASA# 13 - PACU Re-Intubation Rate: NA - No ETT / LMA used for case  ASA# 10 - Composite Anes Safety: ASA10A - No serious adverse event    Additional Notes:

## 2021-06-06 NOTE — TELEPHONE ENCOUNTER
ANTICOAGULATION  MANAGEMENT    Assessment     Today's INR result of 2.1 is Therapeutic (goal INR of 2.0-3.0)        Warfarin taken as previously instructed    No new diet changes affecting INR    No new medication/supplements affecting INR    Continues to tolerate warfarin with no reported s/s of bleeding or thromboembolism     Previous INR was Subtherapeutic most likely due to  4 days warfarin hold for Colonoscopy Procedure done on  2/19    Plan:     Left a detailed message for Tanmay regarding INR result and instructed:     Warfarin Dosing Instructions:  Continue current warfarin dose    3.75 mg every Tue, Fri; 2.5 mg all other days      (0 % change)    Instructed patient to follow up no later than: 1-2 weeks    Education provided: importance of therapeutic range and target INR goal and significance of current INR result        Instructed to call the [facility] for any changes, questions or concerns. (#[phone])   ?   Roxanne Valdez RN    Subjective/Objective:      Tanmay Israel, a 79 y.o. male is on warfarin.     Tanmay reports:     Home warfarin dose: as updated on anticoagulation calendar per template     Missed doses: No     Medication changes:  No     S/S of bleeding or thromboembolism:  No     New Injury or illness:  No     Changes in diet or alcohol consumption:  No     Upcoming surgery, procedure or cardioversion:  No    Anticoagulation Episode Summary     Current INR goal:   2.0-3.0   TTR:   46.5 % (11.5 mo)   Next INR check:   3/11/2020   INR from last check:   2.10 (2/26/2020)   Weekly max warfarin dose:      Target end date:      INR check location:      Preferred lab:      Send INR reminders to:   CHANI RIOS       Comments:            Anticoagulation Care Providers     Provider Role Specialty Phone number    Go Ramírez MD Referring Family Medicine [phone]

## 2021-06-06 NOTE — ANESTHESIA CARE TRANSFER NOTE
Last vitals:   Vitals:    02/19/20 1216   BP: 126/58   Pulse: (!) 57   Resp: 16   Temp: 36.4  C (97.6  F)   SpO2: 99%     Patient's level of consciousness is drowsy  Spontaneous respirations: yes  Maintains airway independently: yes  Dentition unchanged: yes  Oropharynx: oropharynx clear of all foreign objects    QCDR Measures:  ASA# 20 - Surgical Safety Checklist: WHO surgical safety checklist completed prior to induction    PQRS# 430 - Adult PONV Prevention: 4558F - Pt received => 2 anti-emetic agents (different classes) preop & intraop  ASA# 8 - Peds PONV Prevention: NA - Not pediatric patient, not GA or 2 or more risk factors NOT present  PQRS# 424 - Tarah-op Temp Management: NA - MAC anesthesia or case < 60 minutes  PQRS# 426 - PACU Transfer Protocol: - Transfer of care checklist used  ASA# 14 - Acute Post-op Pain: ASA14B - Patient did NOT experience pain >= 7 out of 10

## 2021-06-06 NOTE — TELEPHONE ENCOUNTER
Called and spoke with pt. Pt has been holding warfarin since 2/15 for colonoscopy on 2/19. Will continue holding until after colonoscopy, and recheck INR a week after resuming warfarin. Pt verbalized understanding.

## 2021-06-06 NOTE — TELEPHONE ENCOUNTER
Who is calling:  Patient   Reason for Call:  Returning ACN call- encounter is closed  Date of last appointment with primary care: n/a  Okay to leave a detailed message: Yes

## 2021-06-06 NOTE — TELEPHONE ENCOUNTER
RN cannot approve Refill Request    RN can NOT refill this medication Protocol failed and NO refill given.       Blossom Medina, [facility] Triage/Med Refill 3/5/2020    Requested Prescriptions   Pending Prescriptions Disp Refills     warfarin ANTICOAGULANT (COUMADIN/JANTOVEN) 2.5 MG tablet [Pharmacy Med Name: WARFARIN 2.5MG ORAL TABLET] 30 tablet 0     Sig: TAKE 1 TABLET BY MOUTH DAILY       Warfarin Refill Protocol  Failed - 3/5/2020 11:59 AM        Failed -  Route to appropriate pool/provider     Last Anticoagulation Summary:   Anticoagulation Episode Summary     Current INR goal:   2.0-3.0   TTR:   46.5 % (11.5 mo)   Next INR check:   3/11/2020   INR from last check:   2.10 (2/26/2020)   Weekly max warfarin dose:      Target end date:      INR check location:      Preferred lab:      Send INR reminders to:   CHANI RIOS       Comments:            Anticoagulation Care Providers     Provider Role Specialty Phone number    Go Ramírez MD Referring Family Medicine [phone]                Passed - Provider visit in last year     Last office visit with prescriber/PCP: 9/24/2019 oG Ramírez MD OR same dept: 9/24/2019 Go Ramírez MD OR same specialty: 9/24/2019 Go Ramírez MD  Last physical: 2/7/2020 Last MTM visit: Visit date not found    Next appt within 3 mo: Visit date not found Next physical within 3 mo: Visit date not found  Prescriber OR PCP: Go Ramírez MD  Last diagnosis associated with med order: 1. Chronic atrial fibrillation  - warfarin ANTICOAGULANT (COUMADIN/JANTOVEN) 2.5 MG tablet [Pharmacy Med Name: WARFARIN 2.5MG ORAL TABLET]; TAKE 1 TABLET BY MOUTH DAILY  Dispense: 30 tablet; Refill: 0    If protocol passes may refill for 6 months if within 3 months of last provider visit (or a total of 9 months).

## 2021-06-06 NOTE — PROGRESS NOTES
ANTICOAGULATION  MANAGEMENT: Discharge Review    Tanmay Israel chart reviewed for anticoagulation continuity of care    Outpatient surgery/procedure on  2/19 for colonoscopy.    Discharge disposition: Home    INR Results:       Recent labs: (last 7 days)     02/19/20  1105   INR 1.45*       Warfarin inpatient management: not applicable    Warfarin discharge instructions: home regimen continued     Medication Changes Affecting Anticoagulation: No    Additional Factors Affecting Anticoagulation: No    Plan     No adjustment to anticoagulation plan needed      Patient not contacted      Edgar Ortez RN

## 2021-06-06 NOTE — TELEPHONE ENCOUNTER
ACN called patient back and discussed INR and warfarin dosing plan as stated below.    Patient prefers to call back to schedule follow up INR appointment.  He has no other concerns at this time.    Roxanne Valdez RN

## 2021-06-06 NOTE — TELEPHONE ENCOUNTER
FYI - Status Update  Who is Calling: Patient  Update: Returned ACN Edgar's call, unable to transfer at this time, line busy. Patient requests you call him again, he will be available between 12:00 and 1:00 at home.  Okay to leave a detailed message?:  Yes

## 2021-06-06 NOTE — TELEPHONE ENCOUNTER
Orders being requested:   1. 4 Day Hold Orders and bridging if necessary     Reason service is needed/diagnosis: Patient has upcoming Colonoscopy ( Please call and verify date )   When are orders needed by: ASAP   Where to send Orders: [phone]  Okay to leave detailed message?  No, Please call and speak w/ Clinical staff     warfarin (COUMADIN/JANTOVEN) 2.5 MG tablet

## 2021-06-07 NOTE — TELEPHONE ENCOUNTER
Last appointment: 08/27/2019  Next appointment: None    Notes/Comments:      Rx request(s) has been reviewed. Rx(s) cued up for auth, to be e-scribed to pharm. Thanks.

## 2021-06-07 NOTE — TELEPHONE ENCOUNTER
Called and spoke with pt. Advised him to check his INR as instructed. INR appt is made for 4/8. Pt verbalized understanding.

## 2021-06-07 NOTE — TELEPHONE ENCOUNTER
ANTICOAGULATION  MANAGEMENT    Assessment     Today's INR result of 2.8 is Therapeutic (goal INR of 2.0-3.0)        Warfarin taken as previously instructed    No new diet changes affecting INR    No new medication/supplements affecting INR    Continues to tolerate warfarin with no reported s/s of bleeding or thromboembolism     Previous INR was Therapeutic    Plan:     Spoke with Tanmay regarding INR result and instructed:     Warfarin Dosing Instructions:  Continue current warfarin dose    3.75 mg every Tue, Fri; 2.5 mg all other days         Instructed patient to follow up no later than: 4-6 weeks.    Education provided: importance of taking warfarin as instructed    Tanmay verbalizes understanding and agrees to warfarin dosing plan.    Instructed to call the [facility] for any changes, questions or concerns. (#[phone])   ?   Edgar Ortez RN    Subjective/Objective:      Tanmay Israel, a 79 y.o. male is on warfarin.     Tanmay reports:     Home warfarin dose: verbally confirmed home dose with pt and updated on anticoagulation calendar     Missed doses: No     Medication changes:  No     S/S of bleeding or thromboembolism:  No     New Injury or illness:  No     Changes in diet or alcohol consumption:  No     Upcoming surgery, procedure or cardioversion:  No    Anticoagulation Episode Summary     Current INR goal:   2.0-3.0   TTR:   50.8 % (11.8 mo)   Next INR check:   5/20/2020   INR from last check:   2.80 (4/8/2020)   Weekly max warfarin dose:      Target end date:      INR check location:      Preferred lab:      Send INR reminders to:   CHANI RIOS       Comments:            Anticoagulation Care Providers     Provider Role Specialty Phone number    Go Ramírez MD Referring Family Medicine [phone]

## 2021-06-07 NOTE — TELEPHONE ENCOUNTER
Who is calling:  patient  Reason for Call:  Tanmay called regarding his upcoming INR. He would like to speak with a nurse about whether or not he should keep the appointment. He stated he is feeling good.  Date of last appointment with primary care: 2/7/2020  Okay to leave a detailed message: Yes

## 2021-06-07 NOTE — TELEPHONE ENCOUNTER
FYI - Status Update  Who is Calling: Patient  Update: Patient stated that Edgar, INR nurse, gave him a call so he is just returning the call. Did not see a direct number to transfer to. Please call the patient back.   Okay to leave a detailed message?:  Yes

## 2021-06-08 ENCOUNTER — OFFICE VISIT - HEALTHEAST (OUTPATIENT)
Dept: FAMILY MEDICINE | Facility: CLINIC | Age: 81
End: 2021-06-08

## 2021-06-08 ENCOUNTER — COMMUNICATION - HEALTHEAST (OUTPATIENT)
Dept: ANTICOAGULATION | Facility: CLINIC | Age: 81
End: 2021-06-08

## 2021-06-08 ENCOUNTER — AMBULATORY - HEALTHEAST (OUTPATIENT)
Dept: LAB | Facility: CLINIC | Age: 81
End: 2021-06-08

## 2021-06-08 DIAGNOSIS — I48.91 ATRIAL FIBRILLATION, UNSPECIFIED TYPE (H): ICD-10-CM

## 2021-06-08 DIAGNOSIS — R50.9 FEVER, UNSPECIFIED FEVER CAUSE: ICD-10-CM

## 2021-06-08 DIAGNOSIS — R05.9 COUGH: ICD-10-CM

## 2021-06-08 DIAGNOSIS — I50.22 CHRONIC SYSTOLIC CONGESTIVE HEART FAILURE (H): ICD-10-CM

## 2021-06-08 LAB
ALBUMIN SERPL-MCNC: 3.9 G/DL (ref 3.5–5)
ALP SERPL-CCNC: 243 U/L (ref 45–120)
ALT SERPL W P-5'-P-CCNC: 26 U/L (ref 0–45)
ANION GAP SERPL CALCULATED.3IONS-SCNC: 11 MMOL/L (ref 5–18)
AST SERPL W P-5'-P-CCNC: 17 U/L (ref 0–40)
ATRIAL RATE - MUSE: 78 BPM
BILIRUB SERPL-MCNC: 0.8 MG/DL (ref 0–1)
BUN SERPL-MCNC: 24 MG/DL (ref 8–28)
CALCIUM SERPL-MCNC: 8.4 MG/DL (ref 8.5–10.5)
CHLORIDE BLD-SCNC: 107 MMOL/L (ref 98–107)
CO2 SERPL-SCNC: 23 MMOL/L (ref 22–31)
CREAT SERPL-MCNC: 1.27 MG/DL (ref 0.7–1.3)
DIASTOLIC BLOOD PRESSURE - MUSE: NORMAL
ERYTHROCYTE [DISTWIDTH] IN BLOOD BY AUTOMATED COUNT: 14.6 % (ref 11–14.5)
GFR SERPL CREATININE-BSD FRML MDRD: 54 ML/MIN/1.73M2
GLUCOSE BLD-MCNC: 92 MG/DL (ref 70–125)
HCT VFR BLD AUTO: 30.7 % (ref 40–54)
HGB BLD-MCNC: 10.1 G/DL (ref 14–18)
INR PPP: 1.7 (ref 0.9–1.1)
INTERPRETATION ECG - MUSE: NORMAL
MCH RBC QN AUTO: 30.6 PG (ref 27–34)
MCHC RBC AUTO-ENTMCNC: 32.9 G/DL (ref 32–36)
MCV RBC AUTO: 93 FL (ref 80–100)
P AXIS - MUSE: NORMAL
PLATELET # BLD AUTO: 113 THOU/UL (ref 140–440)
PMV BLD AUTO: 9 FL (ref 7–10)
POTASSIUM BLD-SCNC: 4.1 MMOL/L (ref 3.5–5)
PR INTERVAL - MUSE: NORMAL
PROT SERPL-MCNC: 6.6 G/DL (ref 6–8)
QRS DURATION - MUSE: 156 MS
QT - MUSE: 412 MS
QTC - MUSE: 472 MS
R AXIS - MUSE: 259 DEGREES
RBC # BLD AUTO: 3.3 MILL/UL (ref 4.4–6.2)
SODIUM SERPL-SCNC: 141 MMOL/L (ref 136–145)
SYSTOLIC BLOOD PRESSURE - MUSE: NORMAL
T AXIS - MUSE: 89 DEGREES
VENTRICULAR RATE- MUSE: 79 BPM
WBC: 9.4 THOU/UL (ref 4–11)

## 2021-06-08 NOTE — TELEPHONE ENCOUNTER
ANTICOAGULATION  MANAGEMENT    Assessment     Today's INR result of 4.7 is Supratherapeutic (goal INR of 2.0-3.0)        Warfarin taken as previously instructed    No new diet changes affecting INR    No new medication/supplements affecting INR    Continues to tolerate warfarin with no reported s/s of bleeding or thromboembolism     Previous INR was Therapeutic     Had diarrhea for 3-4 days last week.    Plan:     Spoke with Tanmay regarding INR result and instructed:     Warfarin Dosing Instructions:  Hold today, take lower dose of 1.25 mg tmr 5/20 then continue current warfarin dose    3.75 mg every Tue, Fri; 2.5 mg all other days         Instructed patient to follow up no later than: 10 days.    Education provided: importance of following up for INR monitoring at instructed interval and importance of taking warfarin as instructed    Tanmay verbalizes understanding and agrees to warfarin dosing plan.    Instructed to call the [facility] for any changes, questions or concerns. (#[phone])   ?   Edgar Ortez RN    Subjective/Objective:      Tanmay Israel, a 80 y.o. male is on warfarin.     Tanmay reports:     Home warfarin dose: verbally confirmed home dose with pt and updated on anticoagulation calendar     Missed doses: No     Medication changes:  No     S/S of bleeding or thromboembolism:  No     New Injury or illness:  Yes, had diarrhea last week.     Changes in diet or alcohol consumption:  No     Upcoming surgery, procedure or cardioversion:  No    Anticoagulation Episode Summary     Current INR goal:   2.0-3.0   TTR:   49.0 % (11.8 mo)   Next INR check:   5/29/2020   INR from last check:   4.70! (5/19/2020)   Weekly max warfarin dose:      Target end date:      INR check location:      Preferred lab:      Send INR reminders to:   CHANI RIOS       Comments:            Anticoagulation Care Providers     Provider Role Specialty Phone number    Go Ramírez MD Referring Family Medicine  [phone]

## 2021-06-08 NOTE — PROGRESS NOTES
Patient is 76 years old.  He fell and fractured his cervical and thoracic spine October 22, 2016.  He has been in a [employer] brace.  His current CT shows healing but not healed fractures.  His DEXA scan shows osteoporosis with high fracture risk.  Plan to leave him in a collar for a total of 6 months from the fracture date.  New CT at that time.  Referral to osteoporosis clinic.  Patient is satisfied with the plan.  Total time 10 minutes, more than 50% spent counseling and/or coordinating care.

## 2021-06-08 NOTE — PROGRESS NOTES
Assessment/Plan:     1. Bronchitis  Significant improvement  Provided nebulizer in office which patient states gave him some relief.  Declines albuterol to have at home.  Discussed ongoing supportive care.  Call return to care if symptoms worsen or do not improve.  - Nebulizer  - albuterol nebulizer solution 3 mL (PROVENTIL); Take 3 mL by nebulization once.        Subjective:      Tanmay Israel is a 76 y.o. male comes in today as a follow-up from bronchitis.  He pedro in [place] and states he was seen there just about 2 weeks ago.  He was given prednisone and Levaquin and states he finished the medication about a week ago.  He states he feels significantly improved since taking the medication he does not feel very ill.  No recent fevers.  No postnasal drip.  No runny nose or sore throat.  No ear pain or sinus pressure.  He has not been coughing much but just states he feels he can take as deep a breath as he normally can.  He is a cardiac patient.  States no changes in his meds.  His weight is somewhat up from the last time that we saw him review shows that he fluctuates greatly and he has not had any significant leg edema.  He did come home on a flight last night and reports no significant shortness of breath since then.  He is on Coumadin for anticoagulation and we will check his INR today but he should be at low risk for blood clot.  Otherwise he feels well.  He plans to go back to [place] in a few days but will be back within a few months and will plan to have physical with primary care provider at that time.    Current Outpatient Prescriptions   Medication Sig Dispense Refill     acetaminophen (TYLENOL) 325 MG tablet Take 650 mg by mouth every 4 (four) hours as needed for pain. Indications: Pain       calcium, as carbonate, (OS-RUBÉN) 500 mg calcium (1,250 mg) tablet Take 1 tablet (500 mg total) by mouth 2 (two) times a day.  0     camphor-menthol (SARNA) lotion Apply as needed to skin under collar 222  mL 0     carvedilol (COREG) 25 MG tablet Take 37.5 mg by mouth 2 (two) times a day with meals.        carvedilol (COREG) 25 MG tablet TAKE 1 AND 1/2 TABLETS BY MOUTH TWICE DAILY 270 tablet 2     cholecalciferol, vitamin D3, 1,000 unit tablet Take 2 tablets (2,000 Units total) by mouth daily.  0     FERROUS FUMARATE (IRON ORAL) Take 1 tablet by mouth daily.        HYDROcodone-acetaminophen 5-325 mg per tablet Take 1-2 tablets by mouth every 4 (four) hours as needed for pain. Indications: Pain       losartan (COZAAR) 50 MG tablet Take 1.5 tablets (75 mg total) by mouth daily. (Patient taking differently: Take 75 mg by mouth daily. ) 135 tablet 3     lovastatin (MEVACOR) 40 MG tablet Take 40 mg by mouth daily.        lovastatin (MEVACOR) 40 MG tablet TAKE 1 TABLET BY MOUTH EVERY DAY AS DIRECTED 90 tablet 2     multivitamin with iron (ONE DAILY WITH IRON) Tab tablet Take 1 tablet by mouth daily.        omeprazole (PRILOSEC) 20 MG capsule Take 20 mg by mouth daily.        senna-docusate (PERICOLACE) 8.6-50 mg tablet Take 1 tablet by mouth 2 (two) times a day.  0     warfarin (COUMADIN) 2.5 MG tablet 3.75 mg on Thu; 2.5 mg all other days or as last directed 60 tablet 0     Current Facility-Administered Medications   Medication Dose Route Frequency Provider Last Rate Last Dose     albuterol nebulizer solution 3 mL (PROVENTIL)  3 mL Nebulization Once Elisa East DO           Past Medical History, Family History, and Social History reviewed.  Past Medical History:   Diagnosis Date     Arthritis      Atrial fibrillation      Back pain      Chronic systolic congestive heart failure      Coronary artery disease      Hyperlipidemia      Hypertension      Past Surgical History:   Procedure Laterality Date     APPENDECTOMY       CARDIAC PACEMAKER PLACEMENT  2013     CATARACT EXTRACTION W/  INTRAOCULAR LENS IMPLANT Bilateral      CHOLECYSTECTOMY       ESOPHAGOGASTRODUODENOSCOPY       [place] CABG, VEIN, FOUR      Description:  Coronary Artery Quadruple Venous Bypass Graft;  Recorded: 10/21/2008;  Comments: 8/2004     ROTATOR CUFF REPAIR Right      TONSILLECTOMY       Review of patient's allergies indicates no known allergies.  Family History   Problem Relation Age of Onset     Heart disease Mother      Stroke Mother      Crohn's disease Father      Alcohol abuse Brother      No Medical Problems Daughter      No Medical Problems Son      No Medical Problems Maternal Aunt      No Medical Problems Maternal Uncle      No Medical Problems Paternal Aunt      No Medical Problems Paternal Uncle      No Medical Problems Maternal Grandmother      No Medical Problems Maternal Grandfather      No Medical Problems Paternal Grandmother      No Medical Problems Paternal Grandfather      Cancer Brother      Social History     Social History     Marital status: Single     Spouse name: N/A     Number of children: N/A     Years of education: N/A     Occupational History     retired      [employer]     Social History Main Topics     Smoking status: Former Smoker     Smokeless tobacco: Not on file     Alcohol use No     Drug use: No     Sexual activity: Not on file     Other Topics Concern     Not on file     Social History Narrative         Review of systems is as stated in HPI, and the remainder of the 10 system review is otherwise negative.    Objective:     Vitals:    02/10/17 0720   BP: 130/70   Patient Site: Right Arm   Patient Position: Sitting   Cuff Size: Adult Regular   Pulse: 72   Resp: 22   Temp: 98.3  F (36.8  C)   TempSrc: Tympanic   SpO2: 99%   Weight: 212 lb 6.4 oz (96.3 kg)    Body mass index is 31.37 kg/(m^2).    General Appearance:    Alert, cooperative, no distress, appears stated age   Head:    Normocephalic, without obvious abnormality, atraumatic   Neck:   Supple, symmetrical, no adenopathy, no carotid bruit        Lungs:     Clear to auscultation bilaterally, respirations unlabored    Heart:    Regular rate and rhythm, S1 and S2 normal, no  murmur       Extremities:   Extremities normal, atraumatic, no cyanosis or edema   Skin:   No rashes or lesions         This note has been dictated using voice recognition software. Any grammatical or context distortions are unintentional and inherent to the the software.

## 2021-06-08 NOTE — PATIENT INSTRUCTIONS - HE
Tanmay Israel,    It was a pleasure to see you today at the [facility].     My recommendations after this visit include:  - No changes to your medications.    - Follow up with me in 6 months.   - Please call my nurse Yuliana if you have any concerns: [phone]    Debra Rowe, CNP

## 2021-06-08 NOTE — PROGRESS NOTES
Tanmay is a 76 y.o. male presenting to the clinic for follow-up on lower extremity edema.  Patient was seen at a clinic in [place] approximately 2 1/2 weeks ago.  He was diagnosed with bronchitis and provided prednisone and Levaquin.  He flew to [place] from [place] last week.  Patient presented to the clinic on 2/10/17 where he had noted lower extremity edema.  Patient states he recently discontinued Coumadin temporarily to receive a cortisone injection.  He restarts Coumadin tomorrow.  He has a history of ischemic cardiomyopathy, CAD, atrial fibrillation, and chronic systolic heart failure.  He has a pacemaker in place.  Patient has been taking furosemide 20 mg twice a day and the swelling in his ankles have improved.  He denies any pain or redness in his lower extremities.  He denies shortness of breath, chest tightness, wheezing, and chest pain.  He returns to [place] on Thursday.    Review of Systems: A complete 14 point review of systems was obtained and is negative or as stated in the history of present illness.    Social History     Social History     Marital status: Single     Spouse name: N/A     Number of children: N/A     Years of education: N/A     Occupational History     retired      [employer]     Social History Main Topics     Smoking status: Former Smoker     Smokeless tobacco: Not on file     Alcohol use No     Drug use: No     Sexual activity: Not on file     Other Topics Concern     Not on file     Social History Narrative       Active Ambulatory Problems     Diagnosis Date Noted     Right Rotator Cuff Tendonitis      Osteoarthritis Of The Knee      Joint Pain, Localized In The Right Shoulder      Difficulty Breathing (Dyspnea)      Esophageal Reflux      Muscle Aches, Generalized (Myalgias)      Lumbago      Obstructive Sleep Apnea      Sciatica      Joint Pain, Localized In The Knee      Ptosis Of Eyelid      Hyperlipidemia      Anemia      Crohn's (Granulomatous) Colitis      Benign  Essential Hypertension      Coronary atherosclerosis of native coronary artery      Ischemic cardiomyopathy      Paroxysmal Ventricular Tachycardia      Atrial fibrillation, unspecified type      Dry Nonexudative Macular Degeneration      Serum Enzyme Levels - ALT (SGPT) Elevated      Serum Enzyme Levels - Alkaline Phosphatase Elevated      Dysphagia      Diarrhea      Abdominal Pain      Presence of automatic cardioverter/defibrillator (AICD)--- Medtronic single lead 10/20/2015     Spondylolisthesis of lumbar region 07/05/2016     Lumbar stenosis with neurogenic claudication 07/05/2016     Sacroiliac joint dysfunction of right side 08/22/2016     Chronic kidney disease (CKD), stage 3 (moderate) 09/27/2016     Fall 10/22/2016     Multiple fractures of cervical spine, closed, initial encounter      Closed fracture of distal end of left radius, unspecified fracture morphology, initial encounter      Closed fracture of distal end of right radius, unspecified fracture morphology, initial encounter      Warfarin-induced coagulopathy      Coronary artery disease involving coronary bypass graft of native heart without angina pectoris      Closed fracture of first thoracic vertebra, unspecified fracture morphology, initial encounter      Chronic systolic congestive heart failure      Chronic atrial fibrillation      Anemia, unspecified type      Cerebral aneurysm 11/16/2016     Resolved Ambulatory Problems     Diagnosis Date Noted     No Resolved Ambulatory Problems     Past Medical History:   Diagnosis Date     Arthritis      Atrial fibrillation      Back pain      Chronic systolic congestive heart failure      Coronary artery disease      Hyperlipidemia      Hypertension        Family History   Problem Relation Age of Onset     Heart disease Mother      Stroke Mother      Crohn's disease Father      Alcohol abuse Brother      No Medical Problems Daughter      No Medical Problems Son      No Medical Problems Maternal Aunt       No Medical Problems Maternal Uncle      No Medical Problems Paternal Aunt      No Medical Problems Paternal Uncle      No Medical Problems Maternal Grandmother      No Medical Problems Maternal Grandfather      No Medical Problems Paternal Grandmother      No Medical Problems Paternal Grandfather      Cancer Brother        OBJECTIVE:     Visit Vitals     /62 (Patient Site: Right Arm, Patient Position: Sitting, Cuff Size: Adult Large)     Pulse 78     Wt 207 lb 9.6 oz (94.2 kg)     SpO2 98%     BMI 30.66 kg/m2       Patient is alert, in no obvious distress.   Skin: Warm, dry.    Lungs:  Clear to auscultation. Respirations even and unlabored.  No wheezing or rales noted.   Heart:  Regular rate and rhythm.  No murmurs.  Musculoskeletal:  He ambulates without difficulty.  He is +1 nonpitting edema present in bilateral lower extremities.  Pedal pulses present and palpable.  There is no erythema and he is nontender to palpation of his ankles, feet, and calves.  Love's sign is negative.     ASSESSMENT AND PLAN:     1. Edema, unspecified type     2. Chronic systolic congestive heart failure     3. Chronic kidney disease (CKD), stage 3 (moderate)       Differentials for edema include retention from heart failure or recent prednisone use, worsening kidney disease, DVT.  Patient has found improvement with furosemide and will continue this.  Offered labs to check BMP, d-dimer, and BNP, but he declines.  He has found improvement with his symptoms and would like to continue to monitor them.  Discussed referral to heart failure clinic and he will discuss this further with Dr. Ramírez when he returns from [place] in one month.  He is content with the plan.    I spent 25 minutes with the patient with greater than 50% spent discussing symptoms, treatment options, and coordination of care.

## 2021-06-08 NOTE — PROGRESS NOTES
"Tanmay Israel is a 80 y.o. male who is being evaluated via a billable telephone visit.      The patient has been notified of following:     \"This telephone visit will be conducted via a call between you and your physician/provider. We have found that certain health care needs can be provided without the need for a physical exam.  This service lets us provide the care you need with a short phone conversation.  If a prescription is necessary we can send it directly to your pharmacy.  If lab work is needed we can place an order for that and you can then stop by our lab to have the test done at a later time.    Telephone visits are billed at different rates depending on your insurance coverage. During this emergency period, for some insurers they may be billed the same as an in-person visit.  Please reach out to your insurance provider with any questions.    If during the course of the call the physician/provider feels a telephone visit is not appropriate, you will not be charged for this service.\"    Patient has given verbal consent to a Telephone visit? Yes    What phone number would you like to be contacted at? [phone]    Patient would like to receive their AVS by AVS Preference: Mail a copy.    Assessment/Plan:    1. Fatigue, unspecified type  2. Loose stools  We discussed potential causes of his fatigue and recent loose stools.  Based on timing of his symptoms, I think the drowsiness is likely to be a result of taking his muscle relaxers yesterday.  It is reassuring that he is feeling better today.  Loose stools have also stopped which is a good sign and he is not having any other systemic symptoms currently.  We did discuss the possibility of having atypical symptoms of coronavirus.  Patient does not feel that this is the case.  I recommend that he monitor symptoms over the next 3 days and follow-up with us early next week to let us know how he is doing.  In the meantime, he should ensure that he is staying " hydrated.  He should notify us of any new or worsening symptoms early next week and will determine if further laboratory testing or work-up is indicated at that time.      Subjective:    Tanmay Israel is a 80-year-old male here today for a telephone visit to discuss concerns of loose stools over the last couple days.  Past medical history is significant for CAD, atrial fibrillation, heart failure, Crohn's.  Patient states that over the last 5 days or so he has noticed some diarrhea in the morning and evening hours.  He denies any abdominal pain associated with the change in bowel movements.  He has not been running any fevers.  No nausea or vomiting.  Patient states that last evening and this morning he felt more fatigued than usual.  He went golfing yesterday and walked fairly far.  He also took 2 muscle relaxers prior to golfing to help with sacroiliac pain that he has been experiencing.  He felt somewhat groggy this morning.  Reports taking a shower and now feels better.  He did not have much of an appetite yesterday and has not had diarrhea for the last day.  He is not coughing.  He denies having any shortness of breath or chest pain.  No weakness.  He has not started any new medications otherwise.  Review of systems is as stated in HPI, and the remainder of the 10 system review is otherwise unremarkable.    Past Medical History, Family History, and Social History reviewed.    Past Surgical History:   Procedure Laterality Date     APPENDECTOMY       CARDIAC DEFIBRILLATOR PLACEMENT  2013    ICD Medtronic     CATARACT EXTRACTION W/  INTRAOCULAR LENS IMPLANT Bilateral      CHOLECYSTECTOMY       COLONOSCOPY N/A 2/19/2020    Procedure: COLONOSCOPY;  Surgeon: Houston Medina MD;  Location: [facility];  Service: Gastroenterology     CORONARY ARTERY BYPASS GRAFT  08/2004    Coronary Artery Quadruple Venous Bypass Graft     ESOPHAGOGASTRODUODENOSCOPY       AZ C- LAMINOPLASTY, 2 OR MORE Left 2/22/2019     Procedure: LEFT CERVICAL 4, 5, 6 LAMINOPLASTY;  Surgeon: Liza Cesar MD;  Location: [facility];  Service: Spine     GA LAP,CHOLECYSTECTOMY N/A 2/28/2019    CHOLECYSTECTOMY, LAPAROSCOPIC;  Surgeon: Mana Roman MD;  Location: [facility] [place], [place];  Service: General     ROTATOR CUFF REPAIR Right      TONSILLECTOMY       XR MYELOGRAM CERVICAL THORACIC LUMBAR  1/17/2019        Family History   Problem Relation Age of Onset     Heart disease Mother      Stroke Mother      Crohn's disease Father      Alcohol abuse Brother      No Medical Problems Daughter      No Medical Problems Son      No Medical Problems Maternal Aunt      No Medical Problems Maternal Uncle      No Medical Problems Paternal Aunt      No Medical Problems Paternal Uncle      No Medical Problems Maternal Grandmother      No Medical Problems Maternal Grandfather      No Medical Problems Paternal Grandmother      No Medical Problems Paternal Grandfather      Cancer Brother      Urolithiasis Neg Hx      Gout Neg Hx         Past Medical History:   Diagnosis Date     Acute cholecystitis 2/28/2019     Acute post-operative pain      Anemia      Arthritis      Atrial fibrillation (H)      C. difficile diarrhea 4/21/2019     Calculus of ureter      Callus      Cerebral aneurysm      Cervical myelopathy (H) 2/22/2019     Cervical spinal stenosis      Chronic kidney disease     stage 3     Chronic systolic congestive heart failure (H)      Closed fracture of distal end of left radius, unspecified fracture morphology, initial encounter      Closed fracture of distal end of right radius, unspecified fracture morphology, initial encounter      Closed fracture of first thoracic vertebra, unspecified fracture morphology, initial encounter (H)      Coronary artery disease      Crohn's disease (H)      Dysphagia      Fall 10/22/2016     GERD (gastroesophageal reflux disease)      Hyperlipidemia      Hypertension      Hypotension, unspecified  hypotension type      ICD (implantable cardioverter-defibrillator) in place     Medtronic     Ischemic cardiomyopathy      Kidney stone      Low back pain      Lumbago     Created by Conversion      Macular degeneration      Muscle Aches, Generalized (Myalgias)     Created by Conversion      Permanent atrial fibrillation     [id number] risk score = 5 (age3/ CAD/ HTN/ CHF) Chronic warfarin     Personal history of colonic polyps 2/12/2019     Polyp of duodenum 10/17/2016     Pyuria      Right arm weakness 4/2/2019     Right Rotator Cuff Tendonitis     Created by Conversion  Replacement Utility updated for latest IMO load     Schatzki's ring      Sciatica     Created by Conversion      Serum Enzyme Levels - ALT (SGPT) Elevated     Created by Conversion      Sleep apnea     no CPAP     Spondylolisthesis of lumbar region 7/5/2016     Weakness 4/20/2019        Social History     Tobacco Use     Smoking status: Former Smoker     Smokeless tobacco: Never Used   Substance Use Topics     Alcohol use: No     Comment: rarely     Drug use: No        Current Outpatient Medications   Medication Sig Dispense Refill     acetaminophen (TYLENOL) 500 MG tablet Take 2 tablets (1,000 mg total) by mouth 3 (three) times a day.  0     amLODIPine (NORVASC) 5 MG tablet Take 1 tablet (5 mg total) by mouth daily. 90 tablet 3     bumetanide (BUMEX) 1 MG tablet Take 1 tablet (1 mg total) by mouth daily. 90 tablet 3     carvediloL (COREG) 25 MG tablet Take 1.5 tablets (37.5 mg total) by mouth 2 (two) times a day with meals. 270 tablet 1     cholecalciferol, vitamin D3, 1,000 unit tablet Take 2,000 Units by mouth daily.       cyanocobalamin 500 MCG tablet Take 500 mcg by mouth daily.       diclofenac sodium (VOLTAREN) 1 % Gel APPLY 2 TO 4 GRAMS TOPICALLY THREE TIMES DAILY 100 g 0     ferrous sulfate 325 (65 FE) MG tablet Take 1 tablet by mouth daily.       melatonin 3 mg Tab tablet Take 1 tablet (3 mg total) by mouth at bedtime as needed.  0      multivitamin with iron (ONE DAILY WITH IRON) Tab tablet Take 1 tablet by mouth daily.        omeprazole (PRILOSEC) 20 MG capsule TAKE 1 CAPSULE BY MOUTH TWICE DAILY (Patient taking differently: Take 20 mg by mouth daily. ) 180 capsule 3     ondansetron (ZOFRAN) 8 MG tablet Take 1 tablet (8 mg total) by mouth every 8 (eight) hours as needed. 30 tablet 0     potassium chloride (K-DUR,KLOR-CON) 20 MEQ tablet TAKE 1 TABLET(20 MEQ) BY MOUTH DAILY 90 tablet 3     rosuvastatin (CRESTOR) 20 MG tablet Take 1 tablet (20 mg total) by mouth at bedtime. 90 tablet 1     warfarin ANTICOAGULANT (COUMADIN/JANTOVEN) 2.5 MG tablet Take 1 to 1.5 tablets (2.5 to 3.75 mg) by mouth daily. Adjust dose per INR results as instructed. 100 tablet 1     No current facility-administered medications for this visit.           This note has been dictated using voice recognition software. Any grammatical or context distortions are unintentional and inherent to the use of this software.       Phone call duration:  12 minutes    Biat Velasquez, CNP

## 2021-06-08 NOTE — TELEPHONE ENCOUNTER
RN Triage  Tanmay is calling today because he has an appointment for lab on Tuesday at 10 am. Tanmay says he has been having diarrhea lately. Diarrhea started about one week ago. He says he feels mildly weak. Reports diarrhea mostly in the morning and at bed time, about 2-3 times per day but a few occurrences each time. He states he has been taking zofran that helps his diarrhea. He is worried the diarrhea may be caused by one of his pills.    I advised Tanmay that we should have him consult with a doctor as soon as possible. He understands this, scheduling to assist with appointment time.    Shelley Michelle RN  [facility] Nurse Advisor         Reason for Disposition    MODERATE diarrhea (e.g., 4-6 times / day more than normal) and present > 48 hours (2 days)    MODERATE diarrhea (e.g., 4-6 times / day more than normal) and age > 70 years    Protocols used: DIARRHEA-A-OH    COVID 19 Nurse Triage Plan/Patient Instructions    Please be aware that novel coronavirus (COVID-19) may be circulating in the community. If you develop symptoms such as fever, cough, or SOB or if you have concerns about the presence of another infection including coronavirus (COVID-19), please contact your health care provider or visit [email or web address].     Disposition/Instructions    Patient to have scheduled Telephone Visit with a provider. Follow System Ambulatory Workflow for COVID 19.     The clinic staff will assist you to schedule an appointment to complete the Telephone Visit with a provider during normal clinic hours.       Call Back If: Your symptoms worsen before you are able to complete your Telephone Visit with a provider.        Thank you for limiting contact with others, wearing a simple mask to cover your cough, practice good hand hygiene habits and accessing our virtual services where possible to limit the spread of this virus.    For more information about COVID19 and options for caring for yourself at home, please visit the CDC  website at https://www.cdc.gov/coronavirus/2019-ncov/about/steps-when-sick.html  For more options for care at [facility], please visit our website at [email or web address]    For more information, please use the [place] Department of Health COVID-19 Website: [email or web address]/coronavirus/index.html  [place] Department of Health ([facility]) COVID-19 Hotlines (Interpreters available):      Health questions: Phone Number: [phone] or [phone] and Hours: 7 a.m. to 7 p.m.    Schools and  questions: Phone Number: [phone] or [phone] and Hours 7 a.m. to 7 p.m.

## 2021-06-08 NOTE — PROGRESS NOTES
Patient is here to follow up on recent Dexa scan and CT. He fell  In October 2016 and fractured his cervical and thoracic spine.  He states that he is  Feel much better.  Radicular Pain:  denies     Motor complaints: bilateral arn weakness   Sensory complaints: denies        Gait and balance issues: denies  Bowel or bladder issues: denies       Patient has tried the following conservative measures: PT- helpful, soaking in hot tub, tylenol   NDI today is  0   %  Antonio, CMA

## 2021-06-08 NOTE — TELEPHONE ENCOUNTER
Last appointment: 08/17/2019  Next appointment: None    Notes/Comments:      Rx request(s) has been reviewed. Rx(s) cued up for auth, to be e-scribed to pharm. Thanks.

## 2021-06-08 NOTE — TELEPHONE ENCOUNTER
ANTICOAGULATION  MANAGEMENT    Assessment     Today's INR result of 4.1 is Supratherapeutic (goal INR of 2.0-3.0)        Warfarin taken as previously instructed    No new diet changes affecting INR    No new medication/supplements affecting INR    Continues to tolerate warfarin with no reported s/s of bleeding or thromboembolism     Previous INR was Supratherapeutic     Back injection 6/12. Pt said he was instructed [facility] doctor to hold warfarin 4 days prior.     Plan:     Spoke with Tanmay regarding INR result and instructed:     Warfarin Dosing Instructions:  Hold today only then change warfarin dose to 2.5 mg daily (12.5 % change)  Start holding 6/8-6/11 as instructed by [facility]. Resume after procedure as instructed by surgeon.  Instructed patient to follow up no later than: 1 week after resuming warfarin.     Education provided: importance of taking warfarin as instructed    Tanmay verbalizes understanding and agrees to warfarin dosing plan.    Instructed to call the [facility] for any changes, questions or concerns. (#[phone])   ?   Edgar Ortez RN    Subjective/Objective:      Tanmay Israel, a 80 y.o. male is on warfarin.     Tanmay reports:     Home warfarin dose: verbally confirmed home dose with Tanmay and updated on anticoagulation calendar     Missed doses: No     Medication changes:  No     S/S of bleeding or thromboembolism:  No     New Injury or illness:  No     Changes in diet or alcohol consumption:  No     Upcoming surgery, procedure or cardioversion:  Yes: back injection 6/12.     Anticoagulation Episode Summary     Current INR goal:   2.0-3.0   TTR:   46.8 % (1 y)   Next INR check:   6/19/2020   INR from last check:   4.10! (6/2/2020)   Weekly max warfarin dose:      Target end date:      INR check location:      Preferred lab:      Send INR reminders to:   CHANI RIOS       Comments:            Anticoagulation Care Providers     Provider Role Specialty Phone number    Desiree  Go SAAVEDRA MD [facility] [phone]

## 2021-06-09 LAB
SARS-COV-2 PCR COMMENT: NORMAL
SARS-COV-2 RNA SPEC QL NAA+PROBE: NEGATIVE
SARS-COV-2 VIRUS SPECIMEN SOURCE: NORMAL

## 2021-06-09 NOTE — TELEPHONE ENCOUNTER
ANTICOAGULATION  MANAGEMENT    Assessment     Today's INR result of 2.2 is Therapeutic (goal INR of 2.0-3.0)        Warfarin taken as previously instructed    No new diet changes affecting INR    No new medication/supplements affecting INR    Continues to tolerate warfarin with no reported s/s of bleeding or thromboembolism     Previous INR was Therapeutic    Plan:     Spoke with Tanmay regarding INR result and instructed:     Warfarin Dosing Instructions:  Continue current warfarin dose 2.5 mg daily.    Instructed patient to follow up no later than: 4 weeks.    Education provided: importance of taking warfarin as instructed    Tanmay verbalizes understanding and agrees to warfarin dosing plan.    Instructed to call the [facility] for any changes, questions or concerns. (#[phone])   ?   Edgar Ortez RN    Subjective/Objective:      Tanmay GIO Haydertim, a 80 y.o. male is on warfarin.     Tanmay reports:     Home warfarin dose: verbally confirmed home dose with Tanmay and updated on anticoagulation calendar     Missed doses: No     Medication changes:  No     S/S of bleeding or thromboembolism:  No     New Injury or illness:  No     Changes in diet or alcohol consumption:  No     Upcoming surgery, procedure or cardioversion:  No    Anticoagulation Episode Summary     Current INR goal:   2.0-3.0   TTR:   45.9 % (1 y)   Next INR check:   7/29/2020   INR from last check:   2.20 (7/1/2020)   Weekly max warfarin dose:      Target end date:      INR check location:      Preferred lab:      Send INR reminders to:   CHANI RIOS       Comments:            Anticoagulation Care Providers     Provider Role Specialty Phone number    Go Ramírez MD Referring Family Medicine [phone]

## 2021-06-09 NOTE — TELEPHONE ENCOUNTER
ANTICOAGULATION  MANAGEMENT    Assessment     Today's INR result of 2.4 is Therapeutic (goal INR of 2.0-3.0)        Warfarin taken as previously instructed    No new diet changes affecting INR    No new medication/supplements affecting INR    Continues to tolerate warfarin with no reported s/s of bleeding or thromboembolism     Previous INR was Supratherapeutic    Plan:     Left a detailed message for Tanmay regarding INR result and instructed:     Warfarin Dosing Instructions:  Continue current warfarin dose 2.5 mg daily.    Instructed patient to follow up no later than: 2 weeks.    Education provided: importance of taking warfarin as instructed      Instructed to call the [facility] for any changes, questions or concerns. (#[phone])   ?   Edgar Ortez RN    Subjective/Objective:      Tanmay Israel, a 80 y.o. male is on warfarin.     Tanmay reports:     Home warfarin dose: as updated on anticoagulation calendar per template     Missed doses: No     Medication changes:  No     S/S of bleeding or thromboembolism:  No     New Injury or illness:  No     Changes in diet or alcohol consumption:  No     Upcoming surgery, procedure or cardioversion:  No    Anticoagulation Episode Summary     Current INR goal:   2.0-3.0   TTR:   45.0 % (1 y)   Next INR check:   7/3/2020   INR from last check:   2.40 (6/19/2020)   Weekly max warfarin dose:      Target end date:      INR check location:      Preferred lab:      Send INR reminders to:   CHANI RIOS       Comments:            Anticoagulation Care Providers     Provider Role Specialty Phone number    Go Ramírez MD Referring Family Medicine [phone]         Principal Discharge DX:	Postpartum state  Goal:	routine postpartum state  Instructions for follow-up, activity and diet:	see discharge instructions and follow up with Dr. Hartley

## 2021-06-09 NOTE — TELEPHONE ENCOUNTER
Who is calling:  Tanmay   Reason for Call:  Patient called back requesting to speak with Edgar  Date of last appointment with primary care: 5/15/2020  Okay to leave a detailed message: Yes

## 2021-06-09 NOTE — TELEPHONE ENCOUNTER
Refill Approved    Rx renewed per Medication Renewal Policy. Medication was last renewed on 7/8/19.    Blossom Medina, [facility] Triage/Med Refill 7/14/2020     Requested Prescriptions   Pending Prescriptions Disp Refills     potassium chloride (K-DUR,KLOR-CON) 20 MEQ tablet [Pharmacy Med Name: POTASSIUM CHL ER 20MEQ SUSTAINED ACTION TABLET] 90 tablet 3     Sig: TAKE ONE TABLET BY MOUTH ONCE DAILY       Potassium Supplements Refill Protocol Passed - 7/13/2020 12:15 PM        Passed - PCP or prescribing provider visit in past 12 months       Last office visit with prescriber/PCP: 9/24/2019 Go Ramírez MD OR same dept: 9/24/2019 Go Ramírez MD OR same specialty: 9/24/2019 Go Ramírez MD  Last physical: 2/7/2020 Last MTM visit: Visit date not found   Next visit within 3 mo: Visit date not found  Next physical within 3 mo: Visit date not found  Prescriber OR PCP: Go Ramírez MD  Last diagnosis associated with med order: 1. Hypokalemia  - potassium chloride (K-DUR,KLOR-CON) 20 MEQ tablet [Pharmacy Med Name: POTASSIUM CHL ER 20MEQ SUSTAINED ACTION TABLET]; TAKE ONE TABLET BY MOUTH ONCE DAILY  Dispense: 90 tablet; Refill: 3    If protocol passes may refill for 12 months if within 3 months of last provider visit (or a total of 15 months).             Passed - Potassium level in last 12 months     Lab Results   Component Value Date    Potassium 4.4 05/19/2020

## 2021-06-09 NOTE — TELEPHONE ENCOUNTER
Called back and spoke with pt. Reviewed warfarin dosing instructions. Pt will continue taking 2.5 mg warfarin daily. Recheck INR in 2 weeks. Appt is made for 7/1.

## 2021-06-10 ENCOUNTER — COMMUNICATION - HEALTHEAST (OUTPATIENT)
Dept: SCHEDULING | Facility: CLINIC | Age: 81
End: 2021-06-10

## 2021-06-10 NOTE — PROGRESS NOTES
Progress Note    Reason for Visit:  Chief Complaint     Consult          Progress Note:    This pleasant 77-year-old male patient seen in consultation at the request of his primary care physician because of osteoporosis.    Thank you for referring this pleasant 77-year-old male patient last year he fell off a retaining wall that was 6 feet high hearing sustained fracture of his cervical vertebrae and both wrists.    He had a bone DEXA scan which showed T score at the spine is -1.8 at the left hip -1.7 at the right hip -2.5 at the forearm -2.3.    The patient has lost 15.8% at the left hip and 14.2 at the right hip 7.7 at the forearm.    He does not smoke or drink he does not have children.    The patient has a pacemaker he takes Coreg 25 mg 1-1/2 tablets twice daily.  Losartan 50 mg Mevacor 40 mg    Component      Latest Ref Rng & Units 10/23/2016 10/24/2016 11/3/2016   Sodium      136 - 145 mmol/L 139  139   Potassium      3.5 - 5.0 mmol/L 4.3  3.8   CO2      22 - 31 mmol/L 23  23   Chloride      98 - 107 mmol/L 107  105   Anion Gap, Calculation      5 - 18 mmol/L 9  11   BUN      8 - 28 mg/dL 22  22   Creatinine      0.70 - 1.30 mg/dL 1.33 (H)  1.16   GFR MDRD Non Af Amer      >60 mL/min/1.73m2 52 (L)  >60   GFR MDRD Af Amer      >60 mL/min/1.73m2 >60  >60   Glucose      70 - 125 mg/dL 109  85   Calcium      8.5 - 10.5 mg/dL 9.0  9.7   Vitamin D, Total (25-Hydroxy)      30.0 - 80.0 ng/mL  54.5        HPI:     Patient Profile:  76 y.o. male, is here for the follow up bone density test.   History of fractures - Yes; Wrist and Thoracic vertebrae. Family history of osteoporosis - None. Family history of hip fracture: None. Smoking history - No. Osteoporosis treatment past - No. Osteoporosis treatment current - No. Chronic medical problems - Inflammatory bowel disease. High risk medications - Blood thinner (Coumadin or Heparin); Yes, Currently.        Assessment:     1. The spine bone density L1-L4 with T-score 1.2 and  significant artifacts.  2. Femoral bone densities show left femoral neck T- score -1.7 and right femoral neck T-score -2.5 and significant decline of 15.8% on the left hip and 14.2% on the right hip compared to 2008.  3. Left forearm bone density with T-score -2.3 and significant decline of 7.7% compared to 2008..     76 y.o. male with OSTEOPOROSIS and HIGH fracture risk.         Review of Systems:    Nervous System: No headache, dizziness, fainting or memory loss. No tingling sensation of hand or feet.  Ears: No hearing loss or ringing in the ears  Eyes: No blurring of vision, redness, itching or dryness.  Nose: No nosebleed or loss of smell  Mouth: No mouth sores or loss of taste  Throat: No hoarseness or difficulty swallowing  Neck: No enlarged thyroid or lymph nodes.  Heart: No chest pain, palpitation or irregular heartbeat. No swelling of hands or feet  Lungs: No shortness of breath, cough, night sweats, wheezing or hemoptysis.  Gastrointestinal: No nausea or vomiting, constipation or diarrhea.  No acid reflux, abdominal pain or blood in stools.  Kidney/Bladdr: No polyuria, polydipsia, nocturia or hematuria.  Genital/Sexual: No loss of libido  Skin: No rash, hair loss or hirsutism.  No abnormal striae  Muscles/Joints/Bones: No morning stiffness, muscle aches and pain or loss of height.    Current Medications:  Current Outpatient Prescriptions   Medication Sig     acetaminophen (TYLENOL) 325 MG tablet Take 650 mg by mouth every 4 (four) hours as needed for pain. Indications: Pain     albuterol (PROVENTIL HFA;VENTOLIN HFA) 90 mcg/actuation inhaler Inhale 2 puffs every 6 (six) hours as needed.     calcium, as carbonate, (OS-RUBÉN) 500 mg calcium (1,250 mg) tablet Take 1 tablet (500 mg total) by mouth 2 (two) times a day.     camphor-menthol (SARNA) lotion Apply as needed to skin under collar     carvedilol (COREG) 25 MG tablet TAKE 1 AND 1/2 TABLETS BY MOUTH TWICE DAILY     cholecalciferol, vitamin D3, 1,000 unit  tablet Take 2 tablets (2,000 Units total) by mouth daily.     FERROUS FUMARATE (IRON ORAL) Take 1 tablet by mouth daily.      furosemide (LASIX) 40 MG tablet Take 40 mg by mouth daily.     HYDROcodone-acetaminophen 5-325 mg per tablet Take 1-2 tablets by mouth every 4 (four) hours as needed for pain. Indications: Pain     losartan (COZAAR) 50 MG tablet Take 1.5 tablets (75 mg total) by mouth daily. (Patient taking differently: Take 75 mg by mouth daily. )     lovastatin (MEVACOR) 40 MG tablet TAKE 1 TABLET BY MOUTH EVERY DAY AS DIRECTED     multivitamin with iron (ONE DAILY WITH IRON) Tab tablet Take 1 tablet by mouth daily.      omeprazole (PRILOSEC) 20 MG capsule Take 20 mg by mouth daily.      omeprazole (PRILOSEC) 20 MG capsule TAKE 1 CAPSULE BY MOUTH TWICE DAILY     senna-docusate (PERICOLACE) 8.6-50 mg tablet Take 1 tablet by mouth 2 (two) times a day.     warfarin (COUMADIN) 2.5 MG tablet TAKE 1 AND 1/2 TABLETS BY MOUTH ON THURSDAYS; TAKE 1 TABLET ALL OTHER DAYS OR AS DIRECTED.       Patients Active Problems:  Patient Active Problem List   Diagnosis     Right Rotator Cuff Tendonitis     Osteoarthritis Of The Knee     Joint Pain, Localized In The Right Shoulder     Difficulty Breathing (Dyspnea)     Esophageal Reflux     Muscle Aches, Generalized (Myalgias)     Lumbago     Obstructive Sleep Apnea     Sciatica     Joint Pain, Localized In The Knee     Ptosis Of Eyelid     Hyperlipidemia     Anemia     Crohn's (Granulomatous) Colitis     Benign Essential Hypertension     Coronary atherosclerosis of native coronary artery     Ischemic cardiomyopathy     Paroxysmal Ventricular Tachycardia     Atrial fibrillation, unspecified type     Dry Nonexudative Macular Degeneration     Serum Enzyme Levels - ALT (SGPT) Elevated     Serum Enzyme Levels - Alkaline Phosphatase Elevated     Dysphagia     Diarrhea     Abdominal Pain     Presence of automatic cardioverter/defibrillator (AICD)--- Medtronic single lead      Spondylolisthesis of lumbar region     Lumbar stenosis with neurogenic claudication     Sacroiliac joint dysfunction of right side     Chronic kidney disease (CKD), stage 3 (moderate)     Fall     Multiple fractures of cervical spine, closed, initial encounter     Closed fracture of distal end of left radius, unspecified fracture morphology, initial encounter     Closed fracture of distal end of right radius, unspecified fracture morphology, initial encounter     Warfarin-induced coagulopathy     Coronary artery disease involving coronary bypass graft of native heart without angina pectoris     Closed fracture of first thoracic vertebra, unspecified fracture morphology, initial encounter     Chronic systolic congestive heart failure     Chronic atrial fibrillation     Anemia, unspecified type     Cerebral aneurysm       History:   reports that he has quit smoking. He does not have any smokeless tobacco history on file. He reports that he does not drink alcohol or use illicit drugs.   reports that he has quit smoking. He does not have any smokeless tobacco history on file. He reports that he does not drink alcohol or use illicit drugs.  History   Smoking Status     Former Smoker   Smokeless Tobacco     Not on file      reports that he has quit smoking. He does not have any smokeless tobacco history on file. He reports that he does not drink alcohol or use illicit drugs.  History   Sexual Activity     Sexual activity: Not on file     Past Medical History:   Diagnosis Date     Arthritis      Atrial fibrillation      Back pain      Chronic systolic congestive heart failure      Coronary artery disease      Hyperlipidemia      Hypertension      Family History   Problem Relation Age of Onset     Heart disease Mother      Stroke Mother      Crohn's disease Father      Alcohol abuse Brother      No Medical Problems Daughter      No Medical Problems Son      No Medical Problems Maternal Aunt      No Medical Problems Maternal  "Uncle      No Medical Problems Paternal Aunt      No Medical Problems Paternal Uncle      No Medical Problems Maternal Grandmother      No Medical Problems Maternal Grandfather      No Medical Problems Paternal Grandmother      No Medical Problems Paternal Grandfather      Cancer Brother      Past Medical History:   Diagnosis Date     Arthritis      Atrial fibrillation      Back pain      Chronic systolic congestive heart failure      Coronary artery disease      Hyperlipidemia      Hypertension      Past Surgical History:   Procedure Laterality Date     APPENDECTOMY       CARDIAC PACEMAKER PLACEMENT  2013     CATARACT EXTRACTION W/  INTRAOCULAR LENS IMPLANT Bilateral      CHOLECYSTECTOMY       ESOPHAGOGASTRODUODENOSCOPY       AZ CABG, VEIN, FOUR      Description: Coronary Artery Quadruple Venous Bypass Graft;  Recorded: 10/21/2008;  Comments: 8/2004     ROTATOR CUFF REPAIR Right      TONSILLECTOMY         Vitals   height is 5' 9\" (1.753 m) and weight is 203 lb 9.6 oz (92.4 kg). His blood pressure is 112/60.         Exam  General appearance: The patient looked well, not in acute distress.  Eyes: no evidence of thyroid eye disease.   Retinal exam: No evidence of diabetic retinopathy.  Mouth and Throat: Normal  Neck: No evidence of thyromegaly, enlarged lymph node or tenderness  Chest: Trachea is central. Chest is clear to auscultation and percussion. Breat sounds are normal.  Cardiovascular exam: JVP is not raised. Heart sounds are normal, no murmurs or rub  Peripheral pulses are palpable.   Abdomen: No masses or tenderness.    Back: No vertebral tenderness or kyphosis.  Extremities: No evidence of leg edema.   Skin: Normal to touch.  No abnormal striae  Neurologic exam:  Visual fields are intact by confrontation, grossly intact. No evidence of peripheral neuropathy.  Detailed foot exam normal.        Diagnosis:  No diagnosis found.    Orders:   No orders of the defined types were placed in this " encounter.        Assessment and Plan:  Osteoporosis I have discussed the pathophysiology of the disease with the patient I did advise him to continue on calcium and vitamin D his vitamin D is adequate in the 50s.    We will contact insurance to approve Prolia 60 mg subcutaneously every 6 months.    Cardiomyopathy the patient has a pacemaker and he is on Coreg and Lasix and losartan.  Lasix.    Hyperlipidemia controlled on Mevacor.    The patient is also taking warfarin.    Patient will return to clinic in 1 year I did spend 60 minutes with the patient more than 50% was spent on counseling and managing his care.

## 2021-06-10 NOTE — TELEPHONE ENCOUNTER
ANTICOAGULATION  MANAGEMENT    Assessment     Today's INR result of 3.0 is Therapeutic (goal INR of 2.0-3.0)        Warfarin taken as previously instructed    No new diet changes affecting INR    No new medication/supplements affecting INR    Continues to tolerate warfarin with no reported s/s of bleeding or thromboembolism     Previous INR was Therapeutic    Plan:     Left a detailed message for Tanmay regarding INR result and instructed:     Warfarin Dosing Instructions:  Continue current warfarin dose 2.5 mg daily  (0 % change)    Instructed patient to follow up no later than: 4 weeks    Education provided: target INR goal and significance of current INR result, importance of notifying clinic for changes in medications and importance of notifying clinic for diarrhea, nausea/vomiting, reduced intake and/or illness    Instructed to call the [facility] for any changes, questions or concerns. (#[phone])   ?   Ana Adler RN    Subjective/Objective:      Tanmay Israel, a 80 y.o. male is on warfarin.     Tanmay reports:     Home warfarin dose: as updated on anticoagulation calendar per template     Missed doses: No     Medication changes:  No     S/S of bleeding or thromboembolism:  No     New Injury or illness:  No     Changes in diet or alcohol consumption:  No     Upcoming surgery, procedure or cardioversion:  No    Anticoagulation Episode Summary     Current INR goal:   2.0-3.0   TTR:   48.8 % (1 y)   Next INR check:   8/31/2020   INR from last check:   3.00 (8/3/2020)   Weekly max warfarin dose:      Target end date:      INR check location:      Preferred lab:      Send INR reminders to:   CHANI RIOS       Comments:            Anticoagulation Care Providers     Provider Role Specialty Phone number    Go Ramírez MD Referring Family Medicine [phone]

## 2021-06-10 NOTE — PROGRESS NOTES
"ASSESSMENT: Onychauxis, foot pain.    PLAN: Toenails were debrided manually and mechanically x10.  Return to clinic in nine weeks.         SUBJECTIVE: Toenails are long, thick and painful. Last nail debridement in the clinic was 24, 2016.  He just got back from [place] where he spent the winter.  He complains of paresthesias bilaterally. When walking, the feet feel like \"a block of wood\". He is not diabetic.    OBJECTIVE:  General: Pleasant 77 y.o. male in no acute distress.  Vascular: DP pulses are absent. PT pulses are palpable. Pedal hair is diminished. Feet are cool to the touch.  Neuro: Sensation in the feet is altered. Patient describes paresthesias.  Derm:  Toenails are elongated, thickened and dystrophic with discoloration and subungual debris. Skin is thin and shiny but intact.   Musculoskeletal: Hammertoes.     "

## 2021-06-11 ENCOUNTER — RECORDS - HEALTHEAST (OUTPATIENT)
Dept: ADMINISTRATIVE | Facility: OTHER | Age: 81
End: 2021-06-11

## 2021-06-11 ENCOUNTER — OFFICE VISIT - HEALTHEAST (OUTPATIENT)
Dept: FAMILY MEDICINE | Facility: CLINIC | Age: 81
End: 2021-06-11

## 2021-06-11 DIAGNOSIS — I48.20 CHRONIC ATRIAL FIBRILLATION (H): ICD-10-CM

## 2021-06-11 DIAGNOSIS — I25.5 ISCHEMIC CARDIOMYOPATHY: ICD-10-CM

## 2021-06-11 DIAGNOSIS — I50.22 CHRONIC SYSTOLIC CONGESTIVE HEART FAILURE (H): ICD-10-CM

## 2021-06-11 DIAGNOSIS — R50.9 FEVER, UNSPECIFIED FEVER CAUSE: ICD-10-CM

## 2021-06-11 DIAGNOSIS — D64.9 ANEMIA, UNSPECIFIED TYPE: ICD-10-CM

## 2021-06-11 DIAGNOSIS — R05.9 COUGH: ICD-10-CM

## 2021-06-11 DIAGNOSIS — N18.31 STAGE 3A CHRONIC KIDNEY DISEASE (H): ICD-10-CM

## 2021-06-11 DIAGNOSIS — R06.00 DYSPNEA, UNSPECIFIED TYPE: ICD-10-CM

## 2021-06-11 ASSESSMENT — MIFFLIN-ST. JEOR
SCORE: 1420.67
SCORE: 1391.64

## 2021-06-11 NOTE — TELEPHONE ENCOUNTER
----- Message from Go Ramírez MD sent at 9/20/2020  8:01 PM CDT -----  Please see if Tanmay has a follow-up scheduled with his gastroenterologist and let me know.  All of the testing from his visit with me has returned and no new problem has been identified but I do want to make sure he has follow-up for his gastrointestinal concerns.

## 2021-06-11 NOTE — TELEPHONE ENCOUNTER
ANTICOAGULATION  MANAGEMENT    Assessment     Today's INR result of 2.7 is Therapeutic (goal INR of 2.0-3.0)        Warfarin taken as previously instructed    No new diet changes affecting INR    No new medication/supplements affecting INR    Continues to tolerate warfarin with no reported s/s of bleeding or thromboembolism     Previous INR was Therapeutic    Plan:     Left detailed message for Tanmay regarding INR result and instructed:     Warfarin Dosing Instructions:  Continue current warfarin dose 2.5 mg daily.    Instructed patient to follow up no later than: 4 weeks.    Education provided: importance of following up for INR monitoring at instructed interval and importance of taking warfarin as instructed    Instructed to call the [facility] for any changes, questions or concerns. (#[phone])   ?   Edgar Ortez RN    Subjective/Objective:      Tanmay Israel, a 80 y.o. male is on warfarin.     Tanmay reports:     Home warfarin dose: as updated on anticoagulation calendar per template     Missed doses: No     Medication changes:  No     S/S of bleeding or thromboembolism:  No     New Injury or illness:  No     Changes in diet or alcohol consumption:  No     Upcoming surgery, procedure or cardioversion:  No    Anticoagulation Episode Summary     Current INR goal:   2.0-3.0   TTR:   51.3 % (1 y)   Next INR check:   9/29/2020   INR from last check:   2.70 (9/1/2020)   Weekly max warfarin dose:      Target end date:      INR check location:      Preferred lab:      Send INR reminders to:   CHANI RIOS       Comments:            Anticoagulation Care Providers     Provider Role Specialty Phone number    Go Ramírez MD Referring Family Medicine [phone]

## 2021-06-11 NOTE — TELEPHONE ENCOUNTER
RN cannot approve Refill Request    RN can NOT refill this medication . Last office visit: 9/8/2020 Go Ramírez MD Last Physical: 2/7/2020 Last MTM visit: Visit date not found Last visit same specialty: 9/8/2020 Go Ramírez MD.  Next visit within 3 mo: Visit date not found  Next physical within 3 mo: Visit date not found      Jacque Odonnell, [facility] Triage/Med Refill 9/11/2020    Requested Prescriptions   Pending Prescriptions Disp Refills     warfarin ANTICOAGULANT (COUMADIN/JANTOVEN) 2.5 MG tablet [Pharmacy Med Name: WARFARIN 2.5MG ORAL TABLET] 20 tablet 0     Sig: TAKE 1 TO 1 & 1/2 TABLETS (2.5MG TO 3.75MG) BY MOUTH DAILY  >ADJUST DOSE PER INR AS DIRECTED       Warfarin Refill Protocol  Failed - 9/11/2020 12:49 PM        Failed -  Route to appropriate pool/provider     Last Anticoagulation Summary:   Anticoagulation Episode Summary     Current INR goal:   2.0-3.0   TTR:   52.7 % (11.8 mo)   Next INR check:   9/29/2020   INR from last check:   2.70 (9/1/2020)   Weekly max warfarin dose:      Target end date:      INR check location:      Preferred lab:      Send INR reminders to:   CHANI RIOS       Comments:            Anticoagulation Care Providers     Provider Role Specialty Phone number    Go Ramírez MD Referring Family Medicine [phone]                Passed - Provider visit in last year     Last office visit with prescriber/PCP: 9/8/2020 Go Ramírez MD OR same dept: 9/8/2020 Go Ramírez MD OR same specialty: 9/8/2020 Go Ramírez MD  Last physical: 2/7/2020 Last MTM visit: Visit date not found    Next appt within 3 mo: Visit date not found Next physical within 3 mo: Visit date not found  Prescriber OR PCP: Go Ramírez MD  Last diagnosis associated with med order: 1. Chronic atrial fibrillation (H)  - warfarin ANTICOAGULANT (COUMADIN/JANTOVEN) 2.5 MG tablet [Pharmacy Med Name: WARFARIN 2.5MG ORAL TABLET]; TAKE 1 TO 1 & 1/2 TABLETS (2.5MG  TO 3.75MG) BY MOUTH DAILY  >ADJUST DOSE PER INR AS DIRECTED  Dispense: 20 tablet; Refill: 0    If protocol passes may refill for 6 months if within 3 months of last provider visit (or a total of 9 months).

## 2021-06-11 NOTE — TELEPHONE ENCOUNTER
Reason contacted:  Results   Information relayed:  Below message. Patient does not have an appointment with [facility] scheduled. Could you place an order for [facility] and then their office can reach out to him in regards to scheduling?  Additional questions:  No  Further follow-up needed:  Yes  Okay to leave a detailed message:  Yes

## 2021-06-11 NOTE — PROGRESS NOTES
Assessment:    1. Trauma  XR Ribs Right W PA Chest   2. Closed rib fracture  traMADol (ULTRAM) 50 mg tablet   3. Benign Essential Hypertension           Plan:    Rib films do suggest right lateral 9th rib fracture with mild displacement only.  Will have radiologist review.  Avoidance of exacerbating triggers.  Tramadol 50 mg every 6 hours as needed for acute pain management otherwise anticipate routine healing over next 4-6 weeks.  Hypertension as well as chronic medical concerns remain otherwise stable and will continue home medications as noted.        Subjective:    Tanmay Israel is seen today for concerns with right anterolateral chest discomfort.  Occurred on Sunday when on his back on the floor at home exercising with a back to life machine in which does some twisting activity.  Attempted to roll over onto his side and felt pain right anterolateral chest.  Worse with deep breath.  No cough.  No shortness of breath.  No exertional chest pain otherwise or cardiac etiology identified.  No fevers or chills.  No hemoptysis.  No phlegm production.  Known history of hypertension, CKD stage III, CAD etc.    Past Surgical History:   Procedure Laterality Date     APPENDECTOMY       CARDIAC PACEMAKER PLACEMENT  2013     CATARACT EXTRACTION W/  INTRAOCULAR LENS IMPLANT Bilateral      CHOLECYSTECTOMY       ESOPHAGOGASTRODUODENOSCOPY       [place] CABG, VEIN, FOUR      Description: Coronary Artery Quadruple Venous Bypass Graft;  Recorded: 10/21/2008;  Comments: 8/2004     ROTATOR CUFF REPAIR Right      TONSILLECTOMY          Family History   Problem Relation Age of Onset     Heart disease Mother      Stroke Mother      Crohn's disease Father      Alcohol abuse Brother      No Medical Problems Daughter      No Medical Problems Son      No Medical Problems Maternal Aunt      No Medical Problems Maternal Uncle      No Medical Problems Paternal Aunt      No Medical Problems Paternal Uncle      No Medical Problems Maternal Grandmother       No Medical Problems Maternal Grandfather      No Medical Problems Paternal Grandmother      No Medical Problems Paternal Grandfather      Cancer Brother         Past Medical History:   Diagnosis Date     Arthritis      Atrial fibrillation      Back pain      Chronic systolic congestive heart failure      Coronary artery disease      Hyperlipidemia      Hypertension         Social History   Substance Use Topics     Smoking status: Former Smoker     Smokeless tobacco: None     Alcohol use No        Current Outpatient Prescriptions   Medication Sig Dispense Refill     acetaminophen (TYLENOL) 325 MG tablet Take 650 mg by mouth every 4 (four) hours as needed for pain. Indications: Pain       albuterol (PROVENTIL HFA;VENTOLIN HFA) 90 mcg/actuation inhaler Inhale 2 puffs every 6 (six) hours as needed. 1 Inhaler 0     calcium, as carbonate, (OS-RUBÉN) 500 mg calcium (1,250 mg) tablet Take 1 tablet (500 mg total) by mouth 2 (two) times a day.  0     camphor-menthol (SARNA) lotion Apply as needed to skin under collar 222 mL 0     carvedilol (COREG) 25 MG tablet TAKE 1 AND 1/2 TABLETS BY MOUTH TWICE DAILY 270 tablet 2     cholecalciferol, vitamin D3, 1,000 unit tablet Take 2 tablets (2,000 Units total) by mouth daily.  0     FERROUS FUMARATE (IRON ORAL) Take 1 tablet by mouth daily.        furosemide (LASIX) 40 MG tablet Take 40 mg by mouth daily.       HYDROcodone-acetaminophen 5-325 mg per tablet Take 1-2 tablets by mouth every 4 (four) hours as needed for pain. Indications: Pain       losartan (COZAAR) 50 MG tablet TAKE 1 1/2 TABLETS BY MOUTH EVERY  tablet 1     lovastatin (MEVACOR) 40 MG tablet TAKE 1 TABLET BY MOUTH EVERY DAY AS DIRECTED 90 tablet 2     multivitamin with iron (ONE DAILY WITH IRON) Tab tablet Take 1 tablet by mouth daily.        omeprazole (PRILOSEC) 20 MG capsule TAKE 1 CAPSULE BY MOUTH TWICE DAILY 180 capsule 0     senna-docusate (PERICOLACE) 8.6-50 mg tablet Take 1 tablet by mouth 2 (two)  times a day.  0     warfarin (COUMADIN) 2.5 MG tablet TAKE 1 AND 1/2 TABLETS BY MOUTH ON THURSDAYS; TAKE 1 TABLET ALL OTHER DAYS OR AS DIRECTED. 60 tablet 6     omeprazole (PRILOSEC) 20 MG capsule Take 20 mg by mouth daily.        traMADol (ULTRAM) 50 mg tablet Take 1 tablet (50 mg total) by mouth every 6 (six) hours as needed for pain. 30 tablet 0     Current Facility-Administered Medications   Medication Dose Route Frequency Provider Last Rate Last Dose     denosumab 60 mg (PROLIA 60 mg/ml)  60 mg Subcutaneous Q6 Months Israel Barros MD   60 mg at 06/19/17 1301          Objective:    Vitals:    06/22/17 1026   BP: 130/60   Pulse: 62   SpO2: 99%   Weight: 203 lb (92.1 kg)      Body mass index is 29.98 kg/(m^2).    Alert.  No apparent distress at rest.  Abdominal obesity noted.  Right anterolateral chest wall tenderness reproducible on exam without significant crepitus.  Chest clear.  Cardiac exam regular.  AICD in place.  Extremities warm and dry per

## 2021-06-11 NOTE — PROGRESS NOTES
[facility] Office Note    Assessment / Plan:    1.  Permanent atrial fibrillation.  Rate control adequate by device interrogation.  2.  Cardiomyopathy, likely ischemic with no ischemia noted on stress test and global dysfunction.  Euvolemic on examination today despite elevated optive all readings.  No medication changes suggested.  We will plan repeat echo prior to follow-up in 6 months  3.  Coronary artery disease.  No evidence of inducible ischemia on nuclear testing.      Follow-up in 6 months     ______________________________________________________________________    Subjective:    I had the opportunity to see Tanmay Israel at the [facility]. Tanmay Israel is a 77 y.o. male with a history of atrial fibrillation, coronary artery disease status post bypass revascularization , and cardiomyopathy who returns for routine follow-up.      Since I saw him last he has not experienced any chest pains.  He has had no problems playing golf, using a cart.  He walks 1 mile daily with no change in stamina.  He had a fall last year resulting in bilateral wrist fractures and cervical spine fracture.  He rarely drinks any alcohol anymore.  His walking is improved.  He is not aware of any chest pain, or palpitations, shortness of breath, or peripheral edema.  He chronically sleeps with the head of the bed elevated but is not using any CPAP.  He reports awakening refreshed and denies any daytime sleepiness.  ______________________________________________________________________    Problem List:  Patient Active Problem List   Diagnosis     Right Rotator Cuff Tendonitis     Osteoarthritis Of The Knee     Joint Pain, Localized In The Right Shoulder     Difficulty Breathing (Dyspnea)     Esophageal Reflux     Muscle Aches, Generalized (Myalgias)     Lumbago     Obstructive Sleep Apnea     Sciatica     Joint Pain, Localized In The Knee     Ptosis Of Eyelid     Hyperlipidemia     Anemia     Crohn's  (Granulomatous) Colitis     Benign Essential Hypertension     Coronary atherosclerosis of native coronary artery     Ischemic cardiomyopathy     Paroxysmal Ventricular Tachycardia     Atrial fibrillation, unspecified type     Dry Nonexudative Macular Degeneration     Serum Enzyme Levels - ALT (SGPT) Elevated     Serum Enzyme Levels - Alkaline Phosphatase Elevated     Dysphagia     Diarrhea     Abdominal Pain     Presence of automatic cardioverter/defibrillator (AICD)--- [employer] single lead     Spondylolisthesis of lumbar region     Lumbar stenosis with neurogenic claudication     Sacroiliac joint dysfunction of right side     Chronic kidney disease (CKD), stage 3 (moderate)     Fall     Multiple fractures of cervical spine, closed, initial encounter     Closed fracture of distal end of left radius, unspecified fracture morphology, initial encounter     Closed fracture of distal end of right radius, unspecified fracture morphology, initial encounter     Warfarin-induced coagulopathy     Coronary artery disease involving coronary bypass graft of native heart without angina pectoris     Closed fracture of first thoracic vertebra, unspecified fracture morphology, initial encounter     Chronic systolic congestive heart failure     Chronic atrial fibrillation     Anemia, unspecified type     Cerebral aneurysm     Osteoporosis     Medical History:  Past Medical History:   Diagnosis Date     Arthritis      Atrial fibrillation      Back pain      Chronic systolic congestive heart failure      Coronary artery disease      Hyperlipidemia      Hypertension      Surgical History:  Past Surgical History:   Procedure Laterality Date     APPENDECTOMY       CARDIAC PACEMAKER PLACEMENT  2013     CATARACT EXTRACTION W/  INTRAOCULAR LENS IMPLANT Bilateral      CHOLECYSTECTOMY       ESOPHAGOGASTRODUODENOSCOPY       [place] CABG, VEIN, FOUR      Description: Coronary Artery Quadruple Venous Bypass Graft;  Recorded: 10/21/2008;  Comments:  8/2004     ROTATOR CUFF REPAIR Right      TONSILLECTOMY       Social History:  Social History     Social History     Marital status: Single     Spouse name: N/A     Number of children: N/A     Years of education: N/A     Occupational History     retired      [employer]     Social History Main Topics     Smoking status: Former Smoker     Smokeless tobacco: Not on file     Alcohol use No     Drug use: No     Sexual activity: Not on file     Other Topics Concern     Not on file     Social History Narrative     Sleep History:  Sleeps with the head of bed up on 1- 2 x 4. no paroxysmal nocturnal dyspnea  Exercise History:  Plays golf with a golf cart    Review of Systems:   General: WNL  Eyes: WNL  Ears/Nose/Throat: WNL  Lungs: WNL  Heart: Irregular Heartbeat  Stomach: WNL  Bladder: WNL  Muscle/Joints: WNL  Skin: WNL  Nervous System: WNL  Mental Health: WNL     Blood: WNL          Family History:  Family History   Problem Relation Age of Onset     Heart disease Mother      Stroke Mother      Crohn's disease Father      Alcohol abuse Brother      No Medical Problems Daughter      No Medical Problems Son      No Medical Problems Maternal Aunt      No Medical Problems Maternal Uncle      No Medical Problems Paternal Aunt      No Medical Problems Paternal Uncle      No Medical Problems Maternal Grandmother      No Medical Problems Maternal Grandfather      No Medical Problems Paternal Grandmother      No Medical Problems Paternal Grandfather      Cancer Brother          Allergies:  No Known Allergies  Medications:  Current Outpatient Prescriptions   Medication Sig Dispense Refill     acetaminophen (TYLENOL) 325 MG tablet Take 650 mg by mouth every 4 (four) hours as needed for pain. Indications: Pain       calcium, as carbonate, (OS-RUBÉN) 500 mg calcium (1,250 mg) tablet Take 1 tablet (500 mg total) by mouth 2 (two) times a day.  0     camphor-menthol (SARNA) lotion Apply as needed to skin under collar 222 mL 0     carvedilol  "(COREG) 25 MG tablet TAKE 1 AND 1/2 TABLETS BY MOUTH TWICE DAILY 270 tablet 2     cholecalciferol, vitamin D3, 1,000 unit tablet Take 2 tablets (2,000 Units total) by mouth daily.  0     FERROUS FUMARATE (IRON ORAL) Take 1 tablet by mouth daily.        furosemide (LASIX) 40 MG tablet Take 40 mg by mouth daily.       HYDROcodone-acetaminophen 5-325 mg per tablet Take 1-2 tablets by mouth every 4 (four) hours as needed for pain. Indications: Pain       losartan (COZAAR) 50 MG tablet TAKE 1 1/2 TABLETS BY MOUTH EVERY  tablet 1     lovastatin (MEVACOR) 40 MG tablet TAKE 1 TABLET BY MOUTH EVERY DAY AS DIRECTED 90 tablet 2     multivitamin with iron (ONE DAILY WITH IRON) Tab tablet Take 1 tablet by mouth daily.        omeprazole (PRILOSEC) 20 MG capsule Take 20 mg by mouth daily.        omeprazole (PRILOSEC) 20 MG capsule TAKE 1 CAPSULE BY MOUTH TWICE DAILY 180 capsule 0     senna-docusate (PERICOLACE) 8.6-50 mg tablet Take 1 tablet by mouth 2 (two) times a day.  0     warfarin (COUMADIN) 2.5 MG tablet TAKE 1 AND 1/2 TABLETS BY MOUTH ON THURSDAYS; TAKE 1 TABLET ALL OTHER DAYS OR AS DIRECTED. 60 tablet 6     albuterol (PROVENTIL HFA;VENTOLIN HFA) 90 mcg/actuation inhaler Inhale 2 puffs every 6 (six) hours as needed. 1 Inhaler 0     No current facility-administered medications for this visit.        Objective:   Wt Readings from Last 3 Encounters:   06/16/17 203 lb (92.1 kg)   04/28/17 203 lb 9.6 oz (92.4 kg)   02/14/17 207 lb 9.6 oz (94.2 kg)     Vital signs:  /50 (Patient Site: Left Arm, Patient Position: Sitting, Cuff Size: Adult Large)  Pulse 60  Resp 18  Ht 5' 9\" (1.753 m)  Wt 203 lb (92.1 kg)  BMI 29.98 kg/m2      Physical Exam:    Eyes     Conjunctiva Findings: Noninjected  Sclerae: Clear, nonicteric.    ENT    Mouth: Oral mucuous membranes are pink and moist    Neck    Carotid pulses: Carotid pulses palpaple with normal contours and symmetric, no bruits.    Jugular Veins: Normal jugular venous " pressure.    Thyroid: No visible thyromegaly.    Pulmonary    Examination of lungs: Clear to auscultation, no crackles, or wheezing.    Chest    Examination of Chest incision: Clear, dry and intact.    Cardiovascular     The heart rate was normal. Regular S1 and S2 with no murmur or gallop  Pulses: Normal    Extremities: No edema or clubbing.  Superficial varicosities particularly on the right   Gastrointestinal     The abdomen was obese. Bowel sounds were normal. The abdomen was soft and nontender.    Musculoskeletal    Spine: spine straight upon visual inspection.    Skin    Skin and subcutaneous tissue: No xanthelasma.    Neurologic    Orientation to person, place and time: Normal.    Psychiatric    Mood and affect: Normal.     Lab Results:  LIPIDS:  Lab Results   Component Value Date    CHOL 170 09/27/2016    CHOL 144 07/27/2015    CHOL 156 02/01/2013     Lab Results   Component Value Date    HDL 46 09/27/2016    HDL 33 (L) 07/27/2015    HDL 41 02/01/2013     Lab Results   Component Value Date    LDLCALC 106 09/27/2016    LDLCALC 82 07/27/2015    LDLCALC 98 02/01/2013     Lab Results   Component Value Date    TRIG 88 09/27/2016    TRIG 144 07/27/2015    TRIG 86 02/01/2013         Echocardiogram 4/2016:  Summary  Global hypokinesis of the left ventricle with moderate impairment of systolic function.  Left ventricular ejection fraction is visually estimated to be 35%.  Pacemaker lead noted in the right atrium and right ventricle.  Mild mitral regurgitation is present.  Since 2/01/2013 the EF has decreased    RegAdenosine myocardial perfusion imaging study 5/2016:   The gated images reveal moderate global hypokinesis. The measured left ventricular ejection fraction is 40 %.   CONCLUSION:    1. Lexiscan stress nuclear study is negative for inducible myocardial ischemia or infarction.    2. Mild to moderate(?) global hypokinesis with a quantitate the left ventricular ejection fraction of 40%.      LOUISA BLOCK,  MD [facility]  [phone]    This note created using Dragon voice recognition software.  Sound alike errors may have escaped editing.

## 2021-06-12 ENCOUNTER — RECORDS - HEALTHEAST (OUTPATIENT)
Dept: ADMINISTRATIVE | Facility: OTHER | Age: 81
End: 2021-06-12

## 2021-06-12 ASSESSMENT — MIFFLIN-ST. JEOR: SCORE: 1385.74

## 2021-06-12 NOTE — TELEPHONE ENCOUNTER
ANTICOAGULATION  MANAGEMENT    Assessment     Today's INR result of 2.7 is Therapeutic (goal INR of 2.0-3.0)        Warfarin taken as previously instructed    No new diet changes affecting INR    No new medication/supplements affecting INR    Continues to tolerate warfarin with no reported s/s of bleeding or thromboembolism     Previous INR was Therapeutic    Plan:     Spoke on phone with Tanmay regarding INR result and instructed:     Warfarin Dosing Instructions:  Continue current warfarin dose 2.5 mg daily.    Instructed patient to follow up no later than: 6 weeks.    Education provided: importance of following up for INR monitoring at instructed interval and importance of taking warfarin as instructed    Tanmay verbalizes understanding and agrees to warfarin dosing plan.    Instructed to call the [facility] for any changes, questions or concerns. (#[phone])   ?   Edgar Ortez RN    Subjective/Objective:      Tanmay Israel, a 80 y.o. male is on warfarin.     Tanmay reports:     Home warfarin dose: verbally confirmed home dose with pt and updated on anticoagulation calendar     Missed doses: No     Medication changes:  No     S/S of bleeding or thromboembolism:  No     New Injury or illness:  No     Changes in diet or alcohol consumption:  No     Upcoming surgery, procedure or cardioversion:  No    Anticoagulation Episode Summary     Current INR goal:  2.0-3.0   TTR:  56.7 % (1 y)   Next INR check:  11/17/2020   INR from last check:  2.70 (10/6/2020)   Weekly max warfarin dose:     Target end date:     INR check location:     Preferred lab:     Send INR reminders to:  CHANI RIOS       Comments:           Anticoagulation Care Providers     Provider Role Specialty Phone number    Go Ramírez MD Referring Family Medicine [phone]

## 2021-06-13 ENCOUNTER — RECORDS - HEALTHEAST (OUTPATIENT)
Dept: ADMINISTRATIVE | Facility: OTHER | Age: 81
End: 2021-06-13

## 2021-06-13 ASSESSMENT — MIFFLIN-ST. JEOR: SCORE: 1387.11

## 2021-06-13 NOTE — PROGRESS NOTES
"ASSESSMENT: Onychauxis, foot pain.    PLAN: Toenails were debrided manually and mechanically x10.  Return to clinic in nine weeks.         SUBJECTIVE: Toenails are long, thick and painful. Last nail debridement in the clinic was April 26, 2017.  He complains of paresthesias bilaterally. When walking, the feet feel like \"a block of wood\". He is not diabetic.    OBJECTIVE:  General: Pleasant 77 y.o. male in no acute distress.  Vascular: DP pulses are absent. PT pulses are palpable. Pedal hair is diminished. Feet are cool to the touch.  Neuro: Sensation in the feet is altered. Patient describes paresthesias.  Derm:  Toenails are elongated, thickened and dystrophic with discoloration and subungual debris. Skin is thin and shiny but intact.   Musculoskeletal: Hammertoes.     "

## 2021-06-13 NOTE — PROGRESS NOTES
"[facility] HEART [facility]  Arrhythmia Clinic  Eugenio Ordoñez    Referring:      Assessment:         Chronic systolic heart failure: The patient has known presumed ischemic cardiomyopathy with an LVEF of 35% when measured over a year ago.  His device interrogations have shown periods of elevated Opti-Vol excursion most recently in May of this year but the patient had a prolonged elevation in January and February of this year as well.  The patient was symptomatic with \"the flu\" during that timeframe.  The patient does not appear to have had significant dietary counseling regarding the role of sodium as it pertains to his heart failure clinical status.  I outlined the problem, need to follow a strict shopping regimen, and a target sodium intake of 2000 mg daily.  The patient's twelve-lead EKG today demonstrates left bundle branch block.  If the patient does not stabilize with a low-sodium diet he may be a candidate for upgrade to a CRT-D device.    Permanent atrial for ablation: The patient has a [id number] score of 5 and an elevated HAS-BLED score.  The patient has been on chronic warfarin therapy but does exhibit evidence of labile INR levels.  The patient is also falls risk having a serious fall involving multiple fractures in the past 12 months.  The patient is a possible candidate for left atrial appendage occlusion depending on his left ventricular systolic performance.  The patient is scheduled for an echo in the next week or 2 which will help clarify his left ventricular systolic performance and whether or not he would be a candidate to potentially undergo left atrial appendage occlusion.      Recommendations:    I gave the patient specific instructions regarding sodium intake restriction and target levels for daily consumption.  Additionally I gave him specific recommendations about reading labels and shopping directions for all foods which are packaged.    No change in the patient's device prescription " today.    We will review the echocardiogram and contact the patient if it does appear that he would be a candidate for left atrial appendage occlusion.    Follow-up in the heart failure clinic with the nurse practitioner in 4 weeks to reassess his clinical status.        Patient Active Problem List   Diagnosis     Right Rotator Cuff Tendonitis     Osteoarthritis Of The Knee     Joint Pain, Localized In The Right Shoulder     Difficulty Breathing (Dyspnea)     Esophageal Reflux     Muscle Aches, Generalized (Myalgias)     Lumbago     Obstructive Sleep Apnea     Sciatica     Joint Pain, Localized In The Knee     Ptosis Of Eyelid     Hyperlipidemia     Anemia     Crohn's (Granulomatous) Colitis     Benign Essential Hypertension     Coronary atherosclerosis of native coronary artery     Ischemic cardiomyopathy     Paroxysmal ventricular tachycardia     Dry Nonexudative Macular Degeneration     Serum Enzyme Levels - ALT (SGPT) Elevated     Serum Enzyme Levels - Alkaline Phosphatase Elevated     Dysphagia     Diarrhea     Abdominal Pain     Presence of automatic cardioverter/defibrillator (AICD)--- [employer] single lead     Spondylolisthesis of lumbar region     Lumbar stenosis with neurogenic claudication     Sacroiliac joint dysfunction of right side     Chronic kidney disease (CKD), stage 3 (moderate)     Fall     Multiple fractures of cervical spine, closed, initial encounter     Closed fracture of distal end of left radius, unspecified fracture morphology, initial encounter     Closed fracture of distal end of right radius, unspecified fracture morphology, initial encounter     Warfarin-induced coagulopathy     Closed fracture of first thoracic vertebra, unspecified fracture morphology, initial encounter     Chronic systolic congestive heart failure     Permanent atrial fibrillation     Anemia, unspecified type     Cerebral aneurysm     Osteoporosis       Subjective:  Tanmay Israel (77 y.o. male) returns to the  "arrhythmia clinic for interval reevaluation of his rhythm status, heart care status, and device function.  The patient reports that currently he feels reasonably stable.  He is not having any significant dyspnea on exertion or chest discomfort.  He has not had any tachypalpitations, presyncope, syncope, or ICD discharge.  The patient does typically travel out to [place] for the winter.  This past winter he had \"the flu\" which occurred in the timeframe when he is recovering from a serious fall with multiple fractures.  The patient was questioned about his understanding of low-sodium diet and he said that he does not currently \"follow a diet\".  He does not read food labels and although he does not use a saltshaker much he does use it on some foods.    Current Outpatient Prescriptions   Medication Sig Dispense Refill     acetaminophen (TYLENOL) 325 MG tablet Take 650 mg by mouth every 4 (four) hours as needed for pain. Indications: Pain       carvedilol (COREG) 25 MG tablet Take 1.5 tablets (37.5 mg total) by mouth 2 (two) times a day with meals. 270 tablet 2     cholecalciferol, vitamin D3, 1,000 unit tablet Take 2 tablets (2,000 Units total) by mouth daily.  0     diclofenac sodium (VOLTAREN) 1 % Gel Apply to painful areas over low back and sacroiliac joint once daily as needed for pain 1 Tube 3     FERROUS FUMARATE (IRON ORAL) Take 1 tablet by mouth daily.        furosemide (LASIX) 40 MG tablet Take 1 tablet (40 mg total) by mouth daily. 90 tablet 3     losartan (COZAAR) 50 MG tablet TAKE 1 1/2 TABLETS BY MOUTH EVERY  tablet 1     lovastatin (MEVACOR) 40 MG tablet TAKE 1 TABLET BY MOUTH EVERY DAY AS DIRECTED 90 tablet 2     multivitamin with iron (ONE DAILY WITH IRON) Tab tablet Take 1 tablet by mouth daily.        omeprazole (PRILOSEC) 20 MG capsule TAKE 1 CAPSULE BY MOUTH TWICE DAILY 180 capsule 0     warfarin (COUMADIN) 2.5 MG tablet TAKE 1 AND 1/2 TABLETS BY MOUTH ON THURSDAYS; TAKE 1 TABLET ALL OTHER " "DAYS OR AS DIRECTED. 60 tablet 6     Current Facility-Administered Medications   Medication Dose Route Frequency Provider Last Rate Last Dose     denosumab 60 mg (PROLIA 60 mg/ml)  60 mg Subcutaneous Q6 Months Israel Barros MD   60 mg at 06/19/17 1301       Review of Systems:   General: WNL  Eyes: WNL  Ears/Nose/Throat: WNL  Lungs: WNL  Heart: WNL  Stomach: WNL  Bladder: WNL  Muscle/Joints: Joint Pain  Skin: WNL  Nervous System: WNL  Mental Health: WNL     Blood: WNL    Family History  Family History   Problem Relation Age of Onset     Heart disease Mother      Stroke Mother      Crohn's disease Father      Alcohol abuse Brother      No Medical Problems Daughter      No Medical Problems Son      No Medical Problems Maternal Aunt      No Medical Problems Maternal Uncle      No Medical Problems Paternal Aunt      No Medical Problems Paternal Uncle      No Medical Problems Maternal Grandmother      No Medical Problems Maternal Grandfather      No Medical Problems Paternal Grandmother      No Medical Problems Paternal Grandfather      Cancer Brother        Social History   reports that he has quit smoking. He has never used smokeless tobacco. He reports that he does not drink alcohol or use illicit drugs.    Objective:   Vital signs in last 24 hours:  /56 (Patient Site: Left Arm, Patient Position: Sitting, Cuff Size: Adult Regular)  Pulse 60  Resp 16  Ht 5' 9\" (1.753 m)  Wt 200 lb (90.7 kg)  BMI 29.53 kg/m2  Weight: Weight: 200 lb (90.7 kg)     Physical Exam:  General: The patient is alert oriented to person place and situation.  The patient is in no acute distress at the time of my evaluation.  Eyes: Pupils are equal, round, and reactive to light.  Conjunctiva and sclera are clear.  ENT: Oral mucosa is moist and without redness. No evident nasal discharge.  Pulmonary: Lungs are notable for some mild decrease in breath sounds at the bases bilaterally,, but no rales, rhonchi, or wheezes.  "   Cardiovascular exam: Rhythm is irregular. S1 and S2 are normal. No significant murmur is present. JVP is normal. Lower extremities demonstrate no significant edema. Distal pulses are intact bilaterally.  Abdomen is flat, soft, and nontender.  Musculoskeletal: Spine is straight. Extremities without deformity.  Neuro: Gait is normal.   Skin is warm, dry, and otherwise intact.      Cardiographics:   Twelve-lead EKG: The patient demonstrates atrial fibrillation with a left bundle branch block.  There is rare ventricular paced events.  Device interrogation demonstrates normal battery monitoring voltage.  The right ventricular lead function is stable.  Device diagnostics show no sustained ventricular arrhythmia.  Opti-Vol assessment is currently normal however the patient had a prolonged decrease in thoracic impedance from December 2016 through March 2017.    Lab Results:   Lab Results   Component Value Date     11/03/2016    K 3.8 11/03/2016     11/03/2016    CO2 23 11/03/2016    BUN 22 11/03/2016    CREATININE 1.16 11/03/2016    CALCIUM 9.7 11/03/2016     Lab Results   Component Value Date    WBC 8.9 10/24/2016    WBC 7.5 08/31/2015    HGB 11.0 (L) 11/03/2016    HCT 32.7 (L) 10/24/2016    MCV 86 10/24/2016     (L) 10/24/2016     Lab Results   Component Value Date    INR 2.60 (H) 09/26/2017    INR 2.60 (!) 12/02/2016         Outside record review:

## 2021-06-13 NOTE — PROGRESS NOTES
In clinic device check with Debra Rowe CNP.  Please see link for full device report.  Patient was informed of results and next follow up during today's visit.

## 2021-06-13 NOTE — PROGRESS NOTES
" Patient ID: Tanmay Israel is a 77 y.o. male.  /60  Pulse (!) 57  Ht 5' 9\" (1.753 m)  Wt 204 lb (92.5 kg)  SpO2 98%  BMI 30.13 kg/m2    Assessment/Plan:                   Diagnoses and all orders for this visit:    Routine general medical examination at a health care facility    Atrial fibrillation, unspecified type  -     INR    Chronic systolic congestive heart failure    Obstructive sleep apnea    Benign Essential Hypertension  -     Comprehensive Metabolic Panel    Hyperlipidemia, unspecified hyperlipidemia type  -     Comprehensive Metabolic Panel  -     Lipid [place] FASTING    Atherosclerosis of native coronary artery of native heart without angina pectoris    Osteoporosis    Stage 3 chronic kidney disease    Gastroesophageal reflux disease without esophagitis    Spondylolisthesis of lumbar region    Osteoarthritis Of The Knee  -     Ambulatory referral to PT/OT    Anemia  -     HM2(CBC w/o Differential)    Knee pain  -     Ambulatory referral to PT/OT    Actinic keratitis  -     Ambulatory referral to Dermatology    Other orders  -     Influenza High Dose, Seasonal 65+ yrs      DISCUSSION  Obtain labs as above.  Flu shot given today.  For multiple actinic keratoses found on examination will refer to dermatology for help in treating.  Given his knee osteoarthritis with weakness we will send a physical therapy as I think this will overcome the biggest concern for him which is the weakness.  No signs or symptoms of heart failure.  Continue current medication therapy for other underlying chronic medical concerns.  Subjective:     HPI    Tanmay Israel is a 69-year-old man with a complex medical history who is here today for a physical/review of multiple chronic health concerns.    He has specific questions today.  His history of knee osteoarthritis.  Feels that his knees have become much weaker.  Notes that and in a senior exercise class that he was not able to raise from a chair without using his arms " for assistance.  He reports some stiffness and mild discomfort in the morning but not having a lot of knee pain.  He would like to improve the situation.  We discussed physical therapy as an option to improve strength.    He does have a history of osteoporosis.  About a year ago had a fall and had multiple fractures in the upper extremities and neck.  He is recovered from this but as part of the workup underwent a bone density scan as he had significant risk factors for osteoporosis.  He did have osteoporosis and was seen by specialty provider.  He was prescribed Prolia.  Because of the cost to him for the medication he has elected not to pursue this any further.  He would be due for his second injection but he refuses to consider this at this point.  At this point in time we elected not to pursue any specific treatment for his osteoporosis but we will revisit this in the future.    He does have a history of low back pain and has undergone corticosteroid injections into the low back region in different areas.  Currently reports he is doing well.  He does have known sacroiliac joint dysfunction.  He previously been seen by neurosurgery.  He does have an option to undergo a corticosteroid injection this February.  Discussed the potential for referral to a different provider as his current neurosurgeon that he has seen most recently will be departing.    He does have a history of coronary artery disease, atrial fibrillation and heart failure.  He is currently well compensated.  His heart rate is controlled.  He is anticoagulated with Coumadin.  There are no bleeding concerns.  No lower extremity edema does not report shortness of breath.  Had a recent visit with cardiology.  He was counseled on a low-salt diet and we discussed this again briefly today.    He has a history of Crohn's disease without any active symptoms or concerns for many years.  He does have acid reflux with dysphasia.  He had been on metoclopramide  to manage acid reflux and swallowing concerns but it did lead to abnormal jaw movements and has since been discontinued.  He has undergone esophageal dilatation on several occasions because of narrowing in the past but is not currently reporting any concerns.    He does have a history of anemia and chronic kidney disease.  Anemia has typically been stable.  Discussed reassessment of these concerns.    While doing well he is retired [employer].  Works 2 days per week generally overall feels well.  Does not smoke.    Review of Systems  Complete review of systems is obtained.  Other than the specific considerations noted above complete review of systems is negative.          Objective:   Medications:  Current Outpatient Prescriptions   Medication Sig     acetaminophen (TYLENOL) 325 MG tablet Take 650 mg by mouth every 4 (four) hours as needed for pain. Indications: Pain     carvedilol (COREG) 25 MG tablet Take 1.5 tablets (37.5 mg total) by mouth 2 (two) times a day with meals.     cholecalciferol, vitamin D3, 1,000 unit tablet Take 2 tablets (2,000 Units total) by mouth daily.     diclofenac sodium (VOLTAREN) 1 % Gel Apply to painful areas over low back and sacroiliac joint once daily as needed for pain     FERROUS FUMARATE (IRON ORAL) Take 1 tablet by mouth daily.      furosemide (LASIX) 40 MG tablet Take 1 tablet (40 mg total) by mouth daily.     losartan (COZAAR) 50 MG tablet TAKE 1 1/2 TABLETS BY MOUTH EVERY DAY     lovastatin (MEVACOR) 40 MG tablet TAKE 1 TABLET BY MOUTH EVERY DAY AS DIRECTED     multivitamin with iron (ONE DAILY WITH IRON) Tab tablet Take 1 tablet by mouth daily.      omeprazole (PRILOSEC) 20 MG capsule TAKE 1 CAPSULE BY MOUTH TWICE DAILY     warfarin (COUMADIN) 2.5 MG tablet TAKE 1 AND 1/2 TABLETS BY MOUTH ON THURSDAYS; TAKE 1 TABLET ALL OTHER DAYS OR AS DIRECTED.       Allergies:  No Known Allergies    Tobacco:   reports that he has quit smoking. He has never used smokeless tobacco.    HEALTH  "PREVENTION    General  Dental care: Discussed the importance of regular dental care.  Eye care: Discussed importance of routine eye exams for glaucoma screening  Exercise: Discussed the importance of remaining active.  Diet: Discussed a healthy balanced diet, specifically low-salt diet  Alcohol use: Not identified as a concern  DEXA: Has known osteoporosis see discussion above  AAA: Previous imaging has not shown the presence of an abdominal aortic aneurysm.    Wt Readings from Last 3 Encounters:   10/17/17 204 lb (92.5 kg)   10/13/17 200 lb (90.7 kg)   10/04/17 200 lb (90.7 kg)     Body mass index is 30.13 kg/(m^2).    The following high BMI interventions were performed this visit: encouragement to exercise    Cancer screening  Testicular cancer:is discussed and exam performed today  Skin cancer: Discussed sun burn prevention and self monitoring.  Colon cancer: Colon cancer screening is discussed.  Reviewed most recent colon cancer screening.  Prostate cancer: Elected to forego any specific screening at this time    Cholesterol:   LDL Calculated (mg/dL)   Date Value   09/27/2016 106   07/27/2015 82   02/01/2013 98      Blood Pressure:   BP Readings from Last 3 Encounters:   10/17/17 134/60   10/13/17 130/70   10/04/17 132/58     Immunization History   Administered Date(s) Administered     DT (pediatric) 03/01/2001     Influenza J2q4-12, 01/27/2010     Influenza high dose, seasonal 09/17/2015, 09/27/2016, 10/17/2017     Influenza, inj, historic 10/15/2007, 10/21/2008, 09/09/2010     Influenza, seasonal,quad inj 6-35 mos 09/16/2009, 11/22/2011, 09/28/2012, 10/01/2014     Pneumo Conj 13-V (2010&after) 10/01/2014     Pneumo Polysac 23-V 09/08/2005     Td, historic 03/01/2001     Tdap 11/09/2015     ZOSTER 12/16/2010     There are no preventive care reminders to display for this patient.     Physical Exam      /60  Pulse (!) 57  Ht 5' 9\" (1.753 m)  Wt 204 lb (92.5 kg)  SpO2 98%  BMI 30.13 kg/m2    General " Appearance:    Alert, cooperative, no distress, appears stated age   Head:   Multiple actinic keratoses are noted on the scalp and temporal region   Eyes:   No conjunctival irritation or scleral icterus       Ears:    Normal TM's and external ear canals, both ears   Nose:   Nares normal,    Throat:   Lips, mucosa, and tongue normal; dentures posterior pharynx normal   Neck:   Supple, symmetrical, trachea midline, no adenopathy;        thyroid:  No enlargement/tenderness/nodules   Lungs:     Clear to auscultation bilaterally, respirations unlabored   Heart:    Regular rate and rhythm, no murmur, rub   or gallop   Abdomen:     Soft, non-tender, bowel sounds active all four quadrants,     no masses, no organomegaly   Extremities:   Extremities normal, atraumatic, no cyanosis or edema   Pulses:   2+ and symmetric all extremities   Skin:   Skin color, texture, turgor normal, no rashes or lesions   Neurologic:  No focal neurologic dysfunction

## 2021-06-13 NOTE — PROGRESS NOTES
Assessment/Plan:     1. Ischemic cardiomyopathy with systolic dysfunction, NYHA class II: Tanmay Israel appears well compensated.  He has mild dyspnea on exertion.  This is his norm he states.  We discussed monitoring heart failure symptoms, following a low-sodium diet, monitoring daily weights, and heart failure treatment.  No changes to medications today. He met with a heart failure nurse clinician to discuss heart failure management.  His Optivol does not show fluid retention.  We discussed reasons to follow-up in the heart failure clinic.  He will be going to [place] for the winter.      Heart failure treatment includes:  - Beta blocker therapy with carvedilol 37.5 mg twice a day  - ARB therapy with losartan 75 mg daily  - Diuretic therapy with furosemide 40 mg daily    2. Hypertension: Controlled.  Blood pressure 100/66.    3. Permanent atrial fibrillation: Rate controlled.  He continues carvedilol 37.5 mg twice a day and warfarin for anticoagulation.    Follow-up with Dr. Baker on December 8 as scheduled and in the heart failure clinic as needed    Subjective:     Tanmay Israel is seen at [facility] heart failure clinic today for continued follow-up.  He follows up for ischemic cardiomyopathy with systolic dysfunction.  His most recent echocardiogram was done October 13, 2017 which showed a ejection fraction of 35%.  It also showed mild mitral regurgitation.  His ejection fraction is unchanged from previous echocardiogram in 2016 which was 35% also.  He has a past medical history significant for hypertension, coronary artery disease, hyperlipidemia, permanent atrial fibrillation, obstructive sleep apnea, and chronic kidney disease stage III.  He also has a ICD which was implanted in February 2013.    During the last clinic visit, Dr. Ordoñez noticed some fluid retention on his Optivol and wanted him to follow-up in the heart failure clinic.  Today, Tanmay states he has mild dyspnea on exertion but  this is not new.  He denies any other acute heart failure symptoms.  He has been more aware of salt intake since Dr. Ordoñez's appointment.  He denies fatigue, lightheadedness, shortness of breath, orthopnea, PND, palpitations, chest pain, abdominal fullness/bloating and lower extremity edema.      He is not monitoring home weights.  He is trying to follow a low sodium diet.  He has been more aware and been reading food labels looking at salt intake.    Review of Systems:   General: WNL  Eyes: WNL  Ears/Nose/Throat: WNL  Lungs: WNL  Heart: WNL  Stomach: WNL  Bladder: WNL  Muscle/Joints: WNL  Skin: WNL  Nervous System: WNL  Mental Health: WNL     Blood: WNL     Patient Active Problem List   Diagnosis     Right Rotator Cuff Tendonitis     Osteoarthritis Of The Knee     Joint Pain, Localized In The Right Shoulder     Difficulty Breathing (Dyspnea)     Esophageal Reflux     Muscle Aches, Generalized (Myalgias)     Lumbago     Obstructive sleep apnea     Sciatica     Joint Pain, Localized In The Knee     Ptosis Of Eyelid     Hyperlipidemia     Anemia     Crohn's (Granulomatous) Colitis     Benign Essential Hypertension     Coronary atherosclerosis of native coronary artery     Ischemic cardiomyopathy     Paroxysmal ventricular tachycardia     Dry Nonexudative Macular Degeneration     Serum Enzyme Levels - ALT (SGPT) Elevated     Serum Enzyme Levels - Alkaline Phosphatase Elevated     Dysphagia     Diarrhea     Abdominal Pain     Presence of automatic cardioverter/defibrillator (AICD)--- [employer] single lead     Spondylolisthesis of lumbar region     Lumbar stenosis with neurogenic claudication     Sacroiliac joint dysfunction of right side     Stage 3 chronic kidney disease     Fall     Multiple fractures of cervical spine, closed, initial encounter     Closed fracture of distal end of left radius, unspecified fracture morphology, initial encounter     Closed fracture of distal end of right radius, unspecified fracture  morphology, initial encounter     Warfarin-induced coagulopathy     Closed fracture of first thoracic vertebra, unspecified fracture morphology, initial encounter     Chronic systolic congestive heart failure     Permanent atrial fibrillation     Anemia, unspecified type     Cerebral aneurysm     Osteoporosis       Past Medical History:   Diagnosis Date     Arthritis      Atrial fibrillation      Back pain      Callus      Chronic systolic congestive heart failure      Coronary artery disease      Hyperlipidemia      Hypertension      Permanent atrial fibrillation     [id number] risk score = 5 (age2/ CAD/ HTN/ CHF) Chronic warfarin       Past Surgical History:   Procedure Laterality Date     APPENDECTOMY       CARDIAC PACEMAKER PLACEMENT  2013     CATARACT EXTRACTION W/  INTRAOCULAR LENS IMPLANT Bilateral      CHOLECYSTECTOMY       ESOPHAGOGASTRODUODENOSCOPY       MS CABG, VEIN, FOUR      Description: Coronary Artery Quadruple Venous Bypass Graft;  Recorded: 10/21/2008;  Comments: 8/2004     ROTATOR CUFF REPAIR Right      TONSILLECTOMY         Family History   Problem Relation Age of Onset     Heart disease Mother      Stroke Mother      Crohn's disease Father      Alcohol abuse Brother      No Medical Problems Daughter      No Medical Problems Son      No Medical Problems Maternal Aunt      No Medical Problems Maternal Uncle      No Medical Problems Paternal Aunt      No Medical Problems Paternal Uncle      No Medical Problems Maternal Grandmother      No Medical Problems Maternal Grandfather      No Medical Problems Paternal Grandmother      No Medical Problems Paternal Grandfather      Cancer Brother        Social History     Social History     Marital status: Single     Spouse name: N/A     Number of children: N/A     Years of education: N/A     Occupational History     retired      [employer]     Social History Main Topics     Smoking status: Former Smoker     Smokeless tobacco: Never Used     Alcohol use No      Drug use: No     Sexual activity: Not on file     Other Topics Concern     Not on file     Social History Narrative       Current Outpatient Prescriptions   Medication Sig Dispense Refill     acetaminophen (TYLENOL) 325 MG tablet Take 650 mg by mouth every 4 (four) hours as needed for pain. Indications: Pain       carvedilol (COREG) 25 MG tablet Take 1.5 tablets (37.5 mg total) by mouth 2 (two) times a day with meals. 270 tablet 2     cholecalciferol, vitamin D3, 1,000 unit tablet Take 2 tablets (2,000 Units total) by mouth daily.  0     diclofenac sodium (VOLTAREN) 1 % Gel Apply to painful areas over low back and sacroiliac joint once daily as needed for pain 1 Tube 3     FERROUS FUMARATE (IRON ORAL) Take 1 tablet by mouth daily.        furosemide (LASIX) 40 MG tablet Take 1 tablet (40 mg total) by mouth daily. 90 tablet 3     losartan (COZAAR) 50 MG tablet TAKE 1 1/2 TABLETS BY MOUTH EVERY  tablet 1     lovastatin (MEVACOR) 40 MG tablet TAKE 1 TABLET BY MOUTH EVERY DAY AS DIRECTED 90 tablet 2     multivitamin with iron (ONE DAILY WITH IRON) Tab tablet Take 1 tablet by mouth daily.        omeprazole (PRILOSEC) 20 MG capsule TAKE 1 CAPSULE BY MOUTH TWICE DAILY 180 capsule 0     warfarin (COUMADIN) 2.5 MG tablet TAKE 1 AND 1/2 TABLETS BY MOUTH ON THURSDAYS; TAKE 1 TABLET ALL OTHER DAYS OR AS DIRECTED. 60 tablet 6     Current Facility-Administered Medications   Medication Dose Route Frequency Provider Last Rate Last Dose     denosumab 60 mg (PROLIA 60 mg/ml)  60 mg Subcutaneous Q6 Months Israel Barros MD   60 mg at 06/19/17 1301       No Known Allergies    Objective:     Vitals:    11/03/17 0950   BP: 100/66   Pulse: 60   Resp: 18     Wt Readings from Last 3 Encounters:   11/03/17 205 lb (93 kg)   10/17/17 204 lb (92.5 kg)   10/13/17 200 lb (90.7 kg)       General Appearance:   Alert, cooperative and in no acute distress.   HEENT:  No scleral icterus; the mucous membranes were pink and moist.   Neck: JVP  normal. No HJR.   Chest: The spine was straight. The chest was symmetric.   Lungs:   Respirations unlabored; the lungs are clear to auscultation.   Cardiovascular:    Irregularly irregular. S1 and S2 without murmur, clicks or rubs. Radial and posterior tibial pulses are intact and symmetrical.    Abdomen:  Soft, nontender, nondistended, bowel sounds present   Extremities: No cyanosis, clubbing, or edema.   Skin: No xanthelasma.   Neurologic: Mood and affect are appropriate.         Lab Review   Lab Results   Component Value Date    CREATININE 1.01 10/17/2017    BUN 20 10/17/2017     10/17/2017    K 4.3 10/17/2017     (H) 10/17/2017    CO2 25 10/17/2017     Lab Results   Component Value Date     (H) 02/08/2013     BNP (pg/mL)   Date Value   02/08/2013 104 (H)   01/31/2013 89 (H)   05/13/2011 143 (H)     Creatinine (mg/dL)   Date Value   10/17/2017 1.01   11/03/2016 1.16   10/24/2016 1.43 (H)   10/23/2016 1.33 (H)     [employer] device has heart failure diagnostics which were downloaded and reviewed.  The OptiVol fluid index is below the reference line indicating normal fluid status.  The thoracic impedence is near the reference line indicating the possibility of normal fluid status.  There is no VT, VF, or AT/AF.  There is good heart rate variability and good separation between average day and night heart rates.    Cardiographics  Echocardiogram: 10/13/2017  Summary     When compared to the previous study dated 4/26/2016, no significant change    Left ventricle ejection fraction is moderately decreased. The estimated left ventricular ejection fraction is 35%.    Mild mitral regurgitation    Pacemaker lead in right heart chambers               30 minutes were spent face to face with the patient with greater than 50% spent on education and counseling.      Alaina Young, [facility]   Heart Failure Clinic

## 2021-06-13 NOTE — PROGRESS NOTES
Anticoagulation Annual Referral Renewal Review    Tanmay Israel's chart reviewed for annual renewal of referral to anticoagulation monitoring.        Criteria for anticoagulation nurse and/or pharmacist renewal met   Warfarin indication: Atrial Fibrillation Yes, per indication   Current with INR monitoring/compliant Yes Yes   Date of last office visit 9/8/20 Yes, had office visit within last year   Time in Therapeutic Range (TTR) 68 % Yes, TTR > 60%       aTnmay Israel met all criteria for anticoagulation management program initiated renewal.  New INR standing orders and anticoagulation referral renewal placed.      Renay Li RN  1:12 PM

## 2021-06-13 NOTE — PROGRESS NOTES
Tanmay is here for his yearly follow up and follow up after SI injection. He no longer has right hip and leg pain.  He does c/o back pain and bilateral hamstring pain with activity. He states when he sits down pain goes away. He also c/o weakness in both legs.   Tanmay denies numbness and tingling.   CANDIDA today is 26%  JShowen,CMA

## 2021-06-13 NOTE — PATIENT INSTRUCTIONS - HE
Tanmay Israel,    It was a pleasure to see you today at the [facility].     My recommendations after this visit include:  - Follow up with Dr. Baker in March and follow up with me in August.    - Please call my nurse Yuliana if you have any concerns: [phone]    Debra Rowe, CNP

## 2021-06-13 NOTE — PROGRESS NOTES
In clinic device check with follow up with Dr. Ordoñez.  Please see link for full device report.  Patient was informed of results and next follow up during today's visit.

## 2021-06-13 NOTE — PROGRESS NOTES
Diagnoses and all orders for this visit:    Sacroiliac joint dysfunction of right side  -     Ambulatory referral to [facility]  -     OPS Joint Injection Sacroiliac Joint Unilateral; Future; Expected date: 2/1/18  -     diclofenac sodium (VOLTAREN) 1 % Gel; Apply to painful areas over low back and sacroiliac joint once daily as needed for pain  Dispense: 1 Tube; Refill: 3    Lumbar stenosis with neurogenic claudication    Spondylolisthesis of lumbar region                Mr. Israel has right sacroiliac joint pain and L4-5 stenosis with neurogenic claudication.    Mr. Israel had zero relief from a right L4-5 TFESI in July 2016, no good effect on his pain at all not even with the local anesthetic in the first few hours. He has always had great initial pain relief with right sacroiliac joint injections, best relief documented by Dr. Tony Canales injecting the posterior fibrous portion of the joint as well as synovial portion.      I talked in detail with him about his two separate pathologies:  1. Lumbar stenosis with neurogenic claudication at L4-5 spondylolisthesis, and I think this is causing his posterior thigh tightness when ambulating, relieved somewhat by leaning on a shopping cart. He has no significant radiculopathy.  2. Right sacroiliac joint dysfunction: I think this is causing his right-sided low back and sharp buttock pain.          His SI joint exam today really is again the most significant pain generator; this is aligned with his injection results.  We will continue conservative treatment with SI joint injections. He tried SI joint PT- he felt this was too painful. Heat has been helpful for him as have lidoderm patches over the SI joint. Voltaren gel has helped him also, so I have reordered this for him.    He has started Lam-Chi and is enjoying the exercise, except he cannot move from seated to standing without pain and weakness in his gluteal muscles. He has only done this exercise twice, and I  encouraged him to work to build up muscle strength in the Lam-Chi for the next month or so before trying it again.     He is leaving for wintering in [place] the first week of December. He would like to have another right SI joint injection in February 2018 when he returns, and I would like to transition his care to the spine center, so I have ordered the next injection there and made a referral to the spine center for continued nonoperative care.    Of note, Mr. Israel also has lumbar stenosis with neurogenic claudication secondary to spondylolisthesis at L4-L5; although this seems to not be his major pain generator at this time, I told him that if he is having worsening pain in his posterior thighs with walking and feels this is limiting him over time, then he will need to have his lumbar spine re-imaged- he needs a CT myelogram due to a pacemaker. He has not had an interlaminar epidural steroid injection under my care- this could be trialed at L3-4 if the SI joint injections stop working, but I would recommend repeat imaging first.      He had prior lami at [facility]- I note that at his prior laminectomy at L5-S1 the entire right L5-S1 facet looks like it was resected on imaging. Unfortunately, his degenerative spondylolisthesis at L4-L5 has very sagitally oriented facets that are prone to further spondylolisthesis if a unilateral decompression is performed at L4-L5; but importantly his left L4-L5 facet is autofused (series 3, image 27 of the myelogram), meaning that he should not have instability there if decompressed without resecting that facet. Because of his age and medical co-morbidities and bone health with obvious osteopenia on CT scan (Hounsfeld units are 53 when opportunistically measuring at L1, osteoporosis is less than 120), I think he would best be served with a decompression, not a fusion. I explained both a fusion and a decompression and the risks and benefits of both, and he agrees that  the decompression is the right surgery for him, and he understands the risk of further surgery needed or of failure to improve. He knows that he will still have low back pain after surgery.  I would be careful NOT to take the medial edge of the left L4-5 facet that is autofused, and I would NOT perform foraminotomies because he does not have any radiculopathy right now, and foraminotomies would further destabilize the L4-5 facets due to need for medial facetectomies; I also would not do a diskectomy because of the need for facetectomy with that approach, I think central dorsal decompression alone would be enough to help his stenosis; with management of his perioperative coumadin needed.       He will call us if he has worsening symptoms otherwise will follow up with [facility] in February 2018 to establish nonoperative care.       I spent 15 minutes in patient care with greater than 50% spent in counseling and/or coordination of care.            Subjective:   Patient ID: Tanmay Israel is a 75 y.o. male.  WELLINGTON Donato is here to discuss results of right SI joint injection done by Dr. Sosa at [facility] 9/08/17; this injection worked well for him and he has 60% pain relief lasting. He feels this is an acceptable amount of pain relief and that he can function with this. He has been getting SI joint injections about every 4-5 months for the past 2 years.    On previous SI joint injections, Dr. Dominic Canales had done CT-guided injections and made note that patient's posterior fibrous area of joint as well as synovium were injected due to pain provocation at those sites, and this was a very successful strategy that I appreciate very much. The patient is very happy with this result.      Tanmay Israel is a 75 y.o. male presenting with low back and bilateral leg pain, right greater than left.  Pain radiates to both legs posterior thighs, cramping pain with buttocks worsens with ambulation, can't walk greater than 1 block due  to pain.  50% low back pain,   50% leg pain.      Leg pain is only in buttocks and posterior thighs bilaterally, significantly worse with standing and ambulation, also with prolonged sitting, no radicular pain.      NO prior relief with L4-5 TFESI. All prior relief has come from SI joint injection on right.    Since I saw him last, he had a fall off of a 6-foot wall where he didn't see the drop off while walking to a bonfire, and he broke bilateral radius and had a cervical spine fracture. He has since recovered well from all of the above.        low back pain is:  Quality: burning, stabbing, cramping and aching   Severity: severe   Location: low right lumbar spine  Radiation: to buttock and posterior bilateral thighs  Duration: constant  Exacerbating Factors: sitting, standing and walking   Relieving Factors: rest  Prior therapies tried: lumbar epidural steroid injections, at first were effective, then decreased effectiveness to several days relief; prior hip steroid injections with no effect  Prior spine surgeries: L5-S1 laminectomy several years ago/[facility]  Pain medications: Tylenol; has anxiety with oral steroid medication in past  Weakness: right foot subjective weakness/tripping  Numbness: denies  Gait imbalance: trips easily  Bowel/bladder changes: none  Loss of height: no        Body mass index is 31.01 kg/(m^2).     Review of Systems   Constitutional: Negative for fever, chills, activity change, appetite change, fatigue and unexpected weight change.   HENT: Negative for dental problem, mouth sores and trouble swallowing.   Eyes: Negative for visual disturbance.   Respiratory: Negative for shortness of breath.   Cardiovascular: Negative for leg swelling.   Gastrointestinal: Negative for nausea, vomiting, abdominal pain, diarrhea and constipation.   Endocrine: Negative for cold intolerance.   Genitourinary: Negative for dysuria, urgency, frequency, enuresis and difficulty urinating.   Musculoskeletal:  Positive for arthralgias, back pain and myalgias. Negative for gait problem, neck pain and neck stiffness.   Skin: Negative for color change.   Allergic/Immunologic: Negative for immunocompromised state.   Neurological: Negative for tremors, speech difficulty, weakness, numbness and headaches.   Hematological: Bruises/bleeds easily.   Psychiatric/Behavioral: The patient is not nervous/anxious.       Past Medical History:   Diagnosis Date     Arthritis      Atrial fibrillation      Back pain      Chronic systolic congestive heart failure      Coronary artery disease      Hyperlipidemia      Hypertension          No Known Allergies                   Current Outpatient Prescriptions on File Prior to Visit   Medication Sig Dispense Refill     carvedilol (COREG) 25 MG tablet Take 25 mg by mouth 2 (two) times a day.            cholecalciferol, vitamin D3, 1,000 unit tablet Take 1,000 Units by mouth daily.           coenzyme Q10 (CO Q-10) 100 mg capsule Take 100 mg by mouth daily.            COUMADIN 2.5 mg tablet              diclofenac sodium (VOLTAREN) 1 % Gel Apply 1 application topically 2 (two) times a day as needed. Apply sparingly to affected area twice daily as needed for pain. 100 g 1     furosemide (LASIX) 40 MG tablet TAKE 1 TABLET BY MOUTH EVERY DAY 90 tablet 3     losartan (COZAAR) 50 MG tablet TAKE ONE TABLET BY MOUTH DAILY 90 tablet 1     lovastatin (MEVACOR) 40 MG tablet TAKE 1 TABLET BY MOUTH DAILY AS DIRECTED 90 tablet 1     multivitamin with iron (ONE DAILY WITH IRON) Tab tablet Take 1 tablet by mouth daily.           omeprazole (PRILOSEC) 20 MG capsule TAKE ONE CAPSULE BY MOUTH TWICE DAILY 180 capsule 0     promethazine (PHENERGAN) 25 MG tablet Take 1 tablet (25 mg total) by mouth every 6 (six) hours as needed for nausea. 30 tablet 1     VOLTAREN 1 % Gel            No current facility-administered medications on file prior to visit.          Social History                     History                  Social History     Marital Status: Single         Spouse Name: N/A         Number of Children: N/A     Years of Education: N/A           Occupational History     Not on file.              Social History Main Topics     Smoking status: Former Smoker     Smokeless tobacco: Not on file     Alcohol Use: No     Drug Use: No     Sexual Activity: Not on file              Other Topics Concern     Not on file           Social History Narrative     No narrative on file                          Family History   Problem Relation Age of Onset     Heart disease Mother        No Medical Problems Father        No Medical Problems Sister        No Medical Problems Brother        No Medical Problems Daughter        No Medical Problems Son        No Medical Problems Maternal Aunt        No Medical Problems Maternal Uncle        No Medical Problems Paternal Aunt        No Medical Problems Paternal Uncle        No Medical Problems Maternal Grandmother        No Medical Problems Maternal Grandfather        No Medical Problems Paternal Grandmother        No Medical Problems Paternal Grandfather          IMAGIN2015: CT myelogram lumbar spine (no MRI due to pacemaker)- degenerative spondylolisthesis at L4-L5 with moderate central canal stenosis; note of L5 prior laminectomy; disc height well-preserved; flexion extension xrays with no instability at spondylolisthesis      Objective:   Physical Exam   Constitutional: He is oriented to person, place, and time. He appears well-developed and well-nourished. He is cooperative. No distress.   HENT:   Head: Normocephalic and atraumatic.   Eyes: Conjunctivae are normal.   Neck: Normal range of motion. Neck supple. No spinous process tenderness and no muscular tenderness present. No tracheal deviation present.   Cardiovascular: Normal rate.   Pulmonary/Chest: Effort normal and breath sounds normal.   Abdominal: Soft. Bowel sounds are normal. He exhibits no distension. There is no tenderness.    Musculoskeletal:     Lumbar flexion/extension ROM: flexion to 75 degrees, extension to 5 degrees  Neurological: He is alert and oriented to person, place, and time. No cranial nerve deficit or sensory deficit. He displays a negative Romberg sign. Gait normal. He displays no Babinski's sign on the right side. He displays no Babinski's sign on the left side.   Reflex Scores:  Tricep reflexes are 2+ on the right side and 2+ on the left side.  Bicep reflexes are 2+ on the right side and 2+ on the left side.  Brachioradialis reflexes are 2+ on the right side and 2+ on the left side.  Patellar reflexes are 2+ on the right side and 2+ on the left side.  Achilles reflexes are 2+ on the right side and 2+ on the left side.  Strength:    LEFT RIGHT  Deltoid 5 5  Bicep 5 5  Wrist Extensor 5 5   Tricep 5 5  Finger Flexion 5 5  Finger Abduction 5 5   5 5    Hip Flexion 5 5   Knee Extension 5 5  Dorsiflexion 5 4  Extensor Hallucis Longus 5 4  Plantar Flexion 5 5    No Lhermitte's, No Spurling's  No Radha's   No ankle clonus  Able to tandem walk  Able to heel walk and toe walk      SI Joint EXAM:  LEFT RIGHT  Anoop Finger Test - +  PSIS tenderness - +  Thigh Thrust - +  MANUELA - +  Pelvic gapping - -  Pelvic compression - +  Gaenslen's - +  Sacral Thrust - +    Hip EXAM:  Axial loading/posterior - -  Impingement     Medial femoral   Impingement: - -     Skin: Skin is warm, dry and intact.   Psychiatric: He has a normal mood and affect. His speech is normal and behavior is normal.

## 2021-06-13 NOTE — TELEPHONE ENCOUNTER
ANTICOAGULATION  MANAGEMENT    Assessment     Today's INR result of 2.7 is Therapeutic (goal INR of 2.0-3.0)        Warfarin taken as previously instructed    No new diet changes affecting INR    No new medication/supplements affecting INR    Continues to tolerate warfarin with no reported s/s of bleeding or thromboembolism     Previous INR was Therapeutic    Plan:     Left detailed message for Tanmay regarding INR result and instructed:     Warfarin Dosing Instructions:  Continue current warfarin dose 2.5 mg daily (0 % change)    Instructed patient to follow up no later than: 6 weeks    Education provided: target INR goal and significance of current INR result, importance of following up for INR monitoring at instructed interval, importance of taking warfarin as instructed, importance of notifying clinic for changes in medications and importance of notifying clinic for diarrhea, nausea/vomiting, reduced intake and/or illness    Instructed to call the [facility] for any changes, questions or concerns. (#[phone])   ?   Ana Adler RN    Subjective/Objective:      Tanmay Israel, a 80 y.o. male is on warfarin.     Tanmay reports:     Home warfarin dose: as updated on anticoagulation calendar per template     Missed doses: No     Medication changes:  No     S/S of bleeding or thromboembolism:  No     New Injury or illness:  No     Changes in diet or alcohol consumption:  No     Upcoming surgery, procedure or cardioversion:  No    Anticoagulation Episode Summary     Current INR goal:  2.0-3.0   TTR:  67.8 % (1 y)   Next INR check:  12/29/2020   INR from last check:  2.70 (11/17/2020)   Weekly max warfarin dose:     Target end date:     INR check location:     Preferred lab:     Send INR reminders to:  CHANI RIOS       Comments:           Anticoagulation Care Providers     Provider Role Specialty Phone number    Go Ramírez MD Referring Family Medicine [phone]

## 2021-06-13 NOTE — PROGRESS NOTES
[facility] Certification Request    October 25, 2017      Patient: Tanmay Israel  MR Number: [id number]  YOB: 1940  Date of Visit: 10/25/2017      Dear Dr. Ramírez:    Thank you for this referral.   We are seeing Tanmay Israel for Physical Therapy of leg weakness and knee pain.    Medicare and/or Medicaid requires physician review and approval of the treatment plan. Please review the plan of care and verify that you agree with the therapy plan of care by co-signing this note.      Plan of Care  Authorization / Certification Start Date: 10/25/17  Communication with: Referral Source  Patient Related Instruction: Nature of Condition;Treatment plan and rationale;Self Care instruction;Basis of treatment;Body mechanics;Posture;Precautions;Next steps;Expected outcome  Times per Week: 1  Number of Weeks: 6-8  Number of Visits: up tp 8 sessions  Therapeutic Exercise: ROM;Stretching;Strengthening  Neuromuscular Reeducation: kinesio tape;posture;core;balance/proprioception  Manual Therapy: soft tissue mobilization;myofascial release;joint mobilization;muscle energy    Goals:  Pt. will be independent with home exercise program in : 6 weeks  Pt. will be able to walk : 30 minutes;on uneven/inclined surfaces;on even surfaces;with less difficulty;for community mobility;for exercise/recreation;in 6 weeks  Patient will transfer: sit/stand;for car;for in/out of chair;with less difficulty;in 6 weeks  Pt will: complete floor to stand transfer wtih modified indpendence in 6 weeks      If you have any questions or concerns, please don't hesitate to call.    Sincerely,      Mary Rosas, PT        Physician recommendation:     ___ Follow therapist's recommendation        ___ Modify therapy      *Physician co-signature indicates they certify the need for these services furnished within this plan and while under their care.        [facility]   Knee Initial Evaluation    Patient Name: Tanmay Israel  Date of  evaluation: 10/25/2017  Referral Diagnosis: Osteoarthrosis involving lower leg  Referring provider: Go Ramírez MD  Visit Diagnosis:     ICD-10-CM    1. Chronic pain of both knees M25.561     M25.562     G89.29    2. Muscle weakness (generalized) M62.81        Assessment:      Impairments in  pain, posture, ROM, joint mobility, strength  The POC is dynamic and will be modified on an ongoing basis.  Barriers to achieving goals as noted in the assessment section may affect outcome.  Prognosis to achieve goals is  fair   Pt. is appropriate for skilled PT intervention as outlined in the Plan of Care (POC).  Pt. is a good candidate for skilled PT services to improve pain levels and function.     Tanmay Israel is a 77 y.o. male who presents to therapy today with chief complaints of leg weakness and pain from back and knees that has been chronic in nature and progressively getting worse. He is most concerned about the weakness as he is limited in getting out of a chair without use of UEs and getting down to line up a put and back up off the ground. Patient tested 6 reps in 30 seconds on sit to stand test but was able to do it without the use of his arms, this is below the age gender norm of 14 reps and puts him at an increase risk for falls. Patient has mild limitations in knee extension but WNL knee flexion. Patient is unable to SL stance on the right side but able to do 3-5 seconds on the left side. Patient is able to heel and toe walk without increase in pain. Patient will benefit from skilled PT intervention to increase in strength and overall functional mobility.     Plan of care and pathology of condition were reviewed with the pt.  Pt is in agreement with the POC.  A HEP was initiated today.  Pt was instructed in exercise with PT and a handout with detailed instructions was given.    Goals:  Pt. will be independent with home exercise program in : 6 weeks  Pt. will be able to walk : 30 minutes;on  uneven/inclined surfaces;on even surfaces;with less difficulty;for community mobility;for exercise/recreation;in 6 weeks  Patient will transfer: sit/stand;for car;for in/out of chair;with less difficulty;in 6 weeks  Pt will: complete floor to stand transfer wtih modified indpendence in 6 weeks      Patient's expectations/goals are realistic.    Barriers to Learning or Achieving Goals:  Chronicity of the problem.  Co-morbidities or other medical factors.  Crohn's, HTN, CAD, AICD placed, cerebral aneurysm, a-fib, osteoporosis, hx of fall with multiple fractures, kidney disease        Patient educated on and demonstrated understanding of nature of impairment, plan of care, patient role and HEP. Patient compliant with PT and prognosis is good. Patient would benefit from skilled PT to progress and improve range of motion, flexibility and tissue extensibility, joint mobility and pain.     Plan / Patient Instructions:      Plan of Care:   Authorization / Certification Start Date: 10/25/17  Communication with: Referral Source  Patient Related Instruction: Nature of Condition;Treatment plan and rationale;Self Care instruction;Basis of treatment;Body mechanics;Posture;Precautions;Next steps;Expected outcome  Times per Week: 1  Number of Weeks: 6-8  Number of Visits: up tp 8 sessions  Therapeutic Exercise: ROM;Stretching;Strengthening  Neuromuscular Reeducation: kinesio tape;posture;core;balance/proprioception  Manual Therapy: soft tissue mobilization;myofascial release;joint mobilization;muscle energy    Plan for next visit: review HEP, discuss floor transfers and progress exercises toward functional limitations      Subjective:        Social information:   Living Situation:single family home and lives with others    Occupation:retired   Work Status:NA   Equipment Available: None    History of Present Illness:    Tanmay is a 77 y.o. male who presents to therapy today with complaints of bilateral knee pain due to OA of the  knees, and weakness of both legs. Onset was gradual and over a longer period of time where he progressively had more pain and less strength of the knees and legs. The patient noticed that when he moved to a apartment and started taking a Lam Chi class he noticed his legs are not as strong as they should be as he could not get up out of the chair without pushing with his hands. 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 Patient did have a fall one year ago in which he was walking in the dark holding something and fell over a 6 foot wall, he ended up with several cervical and one thoracic fracture as well as bilateral wrist fractures. He has healed well from this overall and does not feel the fall was related to balance issues only an accident.   Patients goal would be to be able to get up with increase ease and be able to complete a transfer from the ground to standing when golfing.      Pain Ratin  Pain rating at best: 0  Pain rating at worst: 7  Pain description:aching    Functional limitations are described as occurring with:   ascending and descending stairs or curbs  bending  lifting  repetitive movements  performing routine daily activities  transitional movements sit to stand and sit to supine  walking >20 min    Patient reports benefit from:  rest  , movement or exercise        Objective:      Note: Items left blank indicates the item was not performed or not indicated at the time of the evaluation.    Patient Outcome Measures :    Lower Extremity Functional Scale (_/80): 26/56   Scores range from 0-80, where a score of 80 represents maximum function. The minimal clinically important difference is a positive change of 9 points.    Knee Examination  1. Chronic pain of both knees     2. Muscle weakness (generalized)       Precautions/Restrictions:  None  Involved Side: Bilateral  Posture Observation:      General standing posture is  fair.  Assistive Device: None  Gait Observation: decrease knee extension in terminal stance, decrease hip extension and rotation  Lumbar Clearing: Does not provoke symptoms  Hip Clearing: Does not provoke symptoms    Knee ROM Within normal limits unless otherwise indicated     Date:  10/25/2017    AROM in degrees  Right   Left  Right   Left  Right   Left       Knee Flexion  (130 )   WNL WNL                     Knee Extension  (0 )   0   -3                 PROM in degrees  Right   Left  Right   Left  Right   Left       Knee Flexion  (130 )                         Knee Extension  (0 )                       LE Strength    Within normal limits unless otherwise indicated               Date:  10/25/2017   Able to toe walk - off balance but able to do with UE support, able to heel walk  SL stance: unable on the right side, 2-5 seconds on the left side  30 sec sit to stand: 7 reps     Flexibility:  Moderate tightness of B hamstrings, R > L side     Palpation:  Non tender in knees bilaterally specifically     Knee Special Tests (+/-):  10/25/2017    Knee OA Cluster   Right   Left   Ligament Tests   Right   Left    1. > 51 y/o   + +       Lachman          2. Stiffness > 30 min.   +   +     Anterior Drawer          3. Crepitus           Posterior Drawer          4. Bony tenderness   +  +      Posterior Sag          5. Bone enlargement           Valgus Stress          6. No warmth to the touch           Varus Stress           Meniscal Tests   Right   Left    Other   Right    Left       [place]'s           Ely's             Joint line tenderness           Paresh             Thessaly Thomas Apley's                        Treatment Today   10/25/2017  TREATMENT MINUTES COMMENTS   Evaluation 25    Self-care/ Home management     Manual therapy     Neuromuscular Re-education     Therapeutic Activity     Therapeutic Exercises 25 Discussed POC and pathology  Reviewed current exercises with patient; Lam Chi, walking  1 mile daily, leg kicks, knee to chest stretching, ball exercises and encouraged the patient to continue with this as needed for back pain management   Initiated HEP   - sit to stand 10 reps 2 times per day  - walking up steps at home 1 time per day     Gait training     Modality__________________                Total 50     Blank areas are intentional and mean the treatment did not include these items.     PT Evaluation Code: (Please list factors)  Patient History/Comorbidities: as above  Examination: as above   Clinical Presentation: stable   Clinical Decision Making: low     Patient History/  Comorbidities Examination  (body structures and functions, activity limitations, and/or participation restrictions) Clinical Presentation Clinical Decision Making (Complexity)   No documented Comorbidities or personal factors 1-2 Elements Stable and/or uncomplicated Low   1-2 documented comorbidities or personal factor 3 Elements Evolving clinical presentation with changing characteristics Moderate   3-4 documented comorbidities or personal factors 4 or more Unstable and unpredictable High     Mary Rosas, PT, DPT   10/25/2017  8:50 AM

## 2021-06-13 NOTE — TELEPHONE ENCOUNTER
Wellness Screening Tool  Symptom Screening:  Do you have one of the following NEW symptoms:    Fever (subjective or >100.0)?  No    A new cough?  No    Shortness of breath?  No     Chills? No     New loss of taste or smell? No     Generalized body aches? No     New persistent headache? No     New sore throat? No     Nausea, vomiting, or diarrhea?  No    Within the past 2 weeks, have you been exposed to someone with a known positive illness below:    COVID-19 (known or suspected)?  No    Chicken pox?  No    Mealses?  No    Pertussis?  No    Patient notified of visitor policy- No visitors are allowed to accompany the patient at this time. Yes  Pt informed to wear a mask: Yes  Pt notified if they develop any symptoms listed above, prior to their appointment, they are to call the clinic directly at [phone] for further instructions.  Yes  Patient's appointment status: Patient will be seen in clinic as scheduled on 11/25/20

## 2021-06-13 NOTE — PROGRESS NOTES
"Patient seen in clinic for HF education s/p recent clinic visit with Dr. Ordoñez 10/4/17.  Reviewed HF Binder that includes the  HF Sx Awareness & Action plan  handout and  A Stronger Pump  booklet and Weight log booklet highlighting :  __X_patient s type of heart failure _X__Na management in diet  __X_importance of daily wts  _X__Fluid Guidelines, if applicable  __X_medication review and importance of compliance     Instructed patient in signs and sx of heart failure, reiterated when to call clinic - reviewed HF hotline # [phone] and after hours call # [phone].  Majority of time was spent reviewing: reading nutrition labels, serving size in relation to sodium content and foods not recommended. Patient encouraged to have 1 serving size - occasionally -  of foods he \"needs to live\" such as pizza.   Patient verbalized understanding of HF discussion.  No formal f/u scheduled with nurse clinician for continued education - will continue to reinforce HF management education.  "

## 2021-06-13 NOTE — TELEPHONE ENCOUNTER
RN cannot approve Refill Request    RN can NOT refill this medication pt reports is taking daily. Last office visit: 9/8/2020 Go Ramírez MD Last Physical: 2/7/2020 Last MTM visit: Visit date not found Last visit same specialty: 9/8/2020 Go Ramírez MD.  Next visit within 3 mo: Visit date not found  Next physical within 3 mo: Visit date not found      Blossom Medina, [facility] Triage/Med Refill 12/14/2020    Requested Prescriptions   Pending Prescriptions Disp Refills     omeprazole (PRILOSEC) 20 MG capsule [Pharmacy Med Name: OMEPRAZOLE 20MG ORAL CAPSULE] 180 capsule 3     Sig: TAKE 1 CAPSULE BY MOUTH TWICE DAILY       GI Medications Refill Protocol Passed - 12/12/2020 11:15 AM        Passed - PCP or prescribing provider visit in last 12 or next 3 months.     Last office visit with prescriber/PCP: 9/8/2020 Go Ramírez MD OR same dept: 9/8/2020 Go Ramírez MD OR same specialty: 9/8/2020 Go Ramírez MD  Last physical: 2/7/2020 Last MTM visit: Visit date not found   Next visit within 3 mo: Visit date not found  Next physical within 3 mo: Visit date not found  Prescriber OR PCP: Go Ramírez MD  Last diagnosis associated with med order: 1. GERD (gastroesophageal reflux disease)  - omeprazole (PRILOSEC) 20 MG capsule [Pharmacy Med Name: OMEPRAZOLE 20MG ORAL CAPSULE]; TAKE 1 CAPSULE BY MOUTH TWICE DAILY  Dispense: 180 capsule; Refill: 3    If protocol passes may refill for 12 months if within 3 months of last provider visit (or a total of 15 months).

## 2021-06-14 ENCOUNTER — RECORDS - HEALTHEAST (OUTPATIENT)
Dept: ADMINISTRATIVE | Facility: OTHER | Age: 81
End: 2021-06-14

## 2021-06-14 ASSESSMENT — MIFFLIN-ST. JEOR: SCORE: 1389.83

## 2021-06-14 NOTE — PROGRESS NOTES
[facility] Office Note    Assessment / Plan:    1.  Permanent atrial fibrillation.  Rate control adequate.  2.  Cardiomyopathy, likely ischemic euvolemic on today's examination.  No medication changes suggested  3.  Coronary artery disease.  No anginal symptoms or evidence of inducible ischemia.    Stable, doing well.  Follow-up in 6 months     ______________________________________________________________________    Subjective:    I had the opportunity to see Tanmay Israel at the [facility]. Tanmay Israel is a 77 y.o. male with a history of atrial fibrillation, coronary artery disease status post bypass revascularization , and cardiomyopathy who returns for routine follow-up.      Since I saw him last 6 months ago he has not experienced any chest pains.  He enjoys cart golf but otherwise walks little due to chronic low back pain.  He reports being dyspneic climbing one flight of steps which he does on a daily basis, this is not changed significantly over the last year.  His stamina is otherwise stable.  He denies orthopnea or paroxysmal nocturnal dyspnea.  He sleeps chronically with the head of bed elevated secondary to reflux.  He awakens refreshed.  He has had no palpitations or peripheral edema.    He rarely drinks any alcohol anymore.  Generally feels well and offers no complaints.  He has had no significant bruising or bleeding problems on the warfarin.  He tries to limit his sodium intake, this is a struggle for him.    He plans to travel to [place] for this winter, he will take his Optival monitoring device with him.  ______________________________________________________________________    Problem List:  Patient Active Problem List   Diagnosis     Right Rotator Cuff Tendonitis     Osteoarthritis Of The Knee     Joint Pain, Localized In The Right Shoulder     Difficulty Breathing (Dyspnea)     Esophageal Reflux     Muscle Aches, Generalized (Myalgias)     Lumbago      Obstructive sleep apnea     Sciatica     Joint Pain, Localized In The Knee     Ptosis Of Eyelid     Hyperlipidemia     Anemia     Crohn's (Granulomatous) Colitis     Benign Essential Hypertension     Coronary atherosclerosis of native coronary artery     Ischemic cardiomyopathy     Paroxysmal ventricular tachycardia     Dry Nonexudative Macular Degeneration     Serum Enzyme Levels - ALT (SGPT) Elevated     Serum Enzyme Levels - Alkaline Phosphatase Elevated     Dysphagia     Diarrhea     Abdominal Pain     Presence of automatic cardioverter/defibrillator (AICD)--- [employer] single lead     Spondylolisthesis of lumbar region     Lumbar stenosis with neurogenic claudication     Sacroiliac joint dysfunction of right side     Stage 3 chronic kidney disease     Fall     Multiple fractures of cervical spine, closed, initial encounter     Closed fracture of distal end of left radius, unspecified fracture morphology, initial encounter     Closed fracture of distal end of right radius, unspecified fracture morphology, initial encounter     Warfarin-induced coagulopathy     Closed fracture of first thoracic vertebra, unspecified fracture morphology, initial encounter     Chronic systolic congestive heart failure     Permanent atrial fibrillation     Anemia, unspecified type     Cerebral aneurysm     Osteoporosis     Medical History:  Past Medical History:   Diagnosis Date     Arthritis      Atrial fibrillation      Back pain      Callus      Chronic systolic congestive heart failure      Coronary artery disease      Hyperlipidemia      Hypertension      Permanent atrial fibrillation     [id number] risk score = 5 (age2/ CAD/ HTN/ CHF) Chronic warfarin     Surgical History:  Past Surgical History:   Procedure Laterality Date     APPENDECTOMY       CARDIAC PACEMAKER PLACEMENT  2013     CATARACT EXTRACTION W/  INTRAOCULAR LENS IMPLANT Bilateral      CHOLECYSTECTOMY       ESOPHAGOGASTRODUODENOSCOPY       MS CABG, VEIN, FOUR       Description: Coronary Artery Quadruple Venous Bypass Graft;  Recorded: 10/21/2008;  Comments: 8/2004     ROTATOR CUFF REPAIR Right      TONSILLECTOMY       Social History:  Social History     Social History     Marital status: Single     Spouse name: N/A     Number of children: N/A     Years of education: N/A     Occupational History     retired      [employer]     Social History Main Topics     Smoking status: Former Smoker     Smokeless tobacco: Never Used     Alcohol use No     Drug use: No     Sexual activity: Not on file     Other Topics Concern     Not on file     Social History Narrative     Sleep History:  Sleeps with the head of bed up on a 2 x 4.  No daytime sleepiness  Exercise History:  Plays golf with a golf cart, walking limited due to low back pain    Review of Systems:   General: WNL  Eyes: WNL  Ears/Nose/Throat: WNL  Lungs: WNL  Heart: WNL  Stomach: WNL  Bladder: WNL  Muscle/Joints: WNL  Skin: WNL  Nervous System: WNL  Mental Health: WNL     Blood: WNL          Family History:  Family History   Problem Relation Age of Onset     Heart disease Mother      Stroke Mother      Crohn's disease Father      Alcohol abuse Brother      No Medical Problems Daughter      No Medical Problems Son      No Medical Problems Maternal Aunt      No Medical Problems Maternal Uncle      No Medical Problems Paternal Aunt      No Medical Problems Paternal Uncle      No Medical Problems Maternal Grandmother      No Medical Problems Maternal Grandfather      No Medical Problems Paternal Grandmother      No Medical Problems Paternal Grandfather      Cancer Brother          Allergies:  No Known Allergies  Medications:  Current Outpatient Prescriptions   Medication Sig Dispense Refill     acetaminophen (TYLENOL) 325 MG tablet Take 650 mg by mouth every 4 (four) hours as needed for pain. Indications: Pain       carvedilol (COREG) 25 MG tablet Take 1.5 tablets (37.5 mg total) by mouth 2 (two) times a day with meals. 270 tablet 2      "cholecalciferol, vitamin D3, 1,000 unit tablet Take 2 tablets (2,000 Units total) by mouth daily.  0     diclofenac sodium (VOLTAREN) 1 % Gel Apply to painful areas over low back and sacroiliac joint once daily as needed for pain 1 Tube 3     FERROUS FUMARATE (IRON ORAL) Take 1 tablet by mouth daily.        furosemide (LASIX) 40 MG tablet Take 1 tablet (40 mg total) by mouth daily. 90 tablet 3     losartan (COZAAR) 50 MG tablet TAKE 1 1/2 TABLETS BY MOUTH EVERY  tablet 2     lovastatin (MEVACOR) 40 MG tablet TAKE 1 TABLET BY MOUTH EVERY DAY AS DIRECTED 90 tablet 2     multivitamin with iron (ONE DAILY WITH IRON) Tab tablet Take 1 tablet by mouth daily.        omeprazole (PRILOSEC) 20 MG capsule TAKE 1 CAPSULE BY MOUTH TWICE DAILY 180 capsule 0     warfarin (COUMADIN) 2.5 MG tablet TAKE 1 AND 1/2 TABLETS BY MOUTH ON THURSDAYS; TAKE 1 TABLET ALL OTHER DAYS OR AS DIRECTED. 60 tablet 6     carvedilol (COREG) 25 MG tablet TAKE 1 AND 1/2 TABLETS BY MOUTH TWICE DAILY 270 tablet 1     lovastatin (MEVACOR) 40 MG tablet TAKE 1 TABLET BY MOUTH EVERY DAY AS DIRECTED 90 tablet 3     Current Facility-Administered Medications   Medication Dose Route Frequency Provider Last Rate Last Dose     denosumab 60 mg (PROLIA 60 mg/ml)  60 mg Subcutaneous Q6 Months Israel Barros MD   60 mg at 06/19/17 1301       Objective:   Wt Readings from Last 3 Encounters:   12/08/17 202 lb (91.6 kg)   11/03/17 205 lb (93 kg)   10/17/17 204 lb (92.5 kg)     Vital signs:  /66 (Patient Site: Right Arm, Patient Position: Sitting, Cuff Size: Adult Regular)  Pulse 62  Resp 18  Ht 5' 9\" (1.753 m)  Wt 202 lb (91.6 kg)  BMI 29.83 kg/m2      Physical Exam:    Eyes     Conjunctiva Findings: Noninjected  Sclerae: Clear, nonicteric.    ENT    Mouth: Oral mucuous membranes are pink and moist    Neck    Carotid pulses: Carotid pulses palpaple with normal contours and symmetric, no bruits.    Jugular Veins: Normal jugular venous pressure. "    Thyroid: No visible thyromegaly.    Pulmonary    Examination of lungs: Clear to auscultation, no crackles, or wheezing.    Chest    Examination of Chest incision: Clear, dry and intact.    Cardiovascular     The heart rate was normal. Regular S1 and S2 with no murmur or gallop  Pulses: Normal    Extremities: No edema or clubbing.  Superficial varicosities particularly on the right   Gastrointestinal     The abdomen was obese. Bowel sounds were normal. The abdomen was soft and nontender.    Musculoskeletal    Spine: spine straight upon visual inspection.    Skin    Skin and subcutaneous tissue: No xanthelasma.    Neurologic    Orientation to person, place and time: Normal.    Psychiatric    Mood and affect: Normal.     Lab Results:  LIPIDS:  Lab Results   Component Value Date    CHOL 149 10/17/2017    CHOL 170 09/27/2016    CHOL 144 07/27/2015     Lab Results   Component Value Date    HDL 41 10/17/2017    HDL 46 09/27/2016    HDL 33 (L) 07/27/2015     Lab Results   Component Value Date    LDLCALC 96 10/17/2017    LDLCALC 106 09/27/2016    LDLCALC 82 07/27/2015     Lab Results   Component Value Date    TRIG 59 10/17/2017    TRIG 88 09/27/2016    TRIG 144 07/27/2015         Echocardiogram 10/2017:Summary     When compared to the previous study dated 4/26/2016, no significant change    Left ventricle ejection fraction is moderately decreased. The estimated left ventricular ejection fraction is 35%.    Mild mitral regurgitation    Pacemaker lead in right heart chambers         RegAdenosine myocardial perfusion imaging study 5/2016:   The gated images reveal moderate global hypokinesis. The measured left ventricular ejection fraction is 40 %.   CONCLUSION:    1. Lexiscan stress nuclear study is negative for inducible myocardial ischemia or infarction.    2. Mild to moderate(?) global hypokinesis with a quantitate the left ventricular ejection fraction of 40%.      LOUISA BLOCK MD [facility]  [facility]  [phone]    This note created using Dragon voice recognition software.  Sound alike errors may have escaped editing.

## 2021-06-14 NOTE — TELEPHONE ENCOUNTER
Who is calling:  Tanmay  Reason for Call:  Patient called stating that he realized that he was taking the wrong warfarin dosing. He was taking the green pills, he believes are 2.5 mg. He took 2 purple pills last night, per patient.  Date of last appointment with primary care: 12/29/2020  Okay to leave a detailed message: Yes

## 2021-06-14 NOTE — TELEPHONE ENCOUNTER
Spoke with pt, clarified dosing, advised should be taking 1 green tablet daily. No further questions.  Did update anticoagulation calender for slightly lower boost dose yesterday.

## 2021-06-14 NOTE — PROGRESS NOTES
Assessment:     Diagnoses and all orders for this visit:    Sacroiliac joint dysfunction of right side    On warfarin therapy    Spinal stenosis of lumbar region with neurogenic claudication       Tanmay Israel is a 77 y.o. y.o. male previous patient of Dr. Moore seen last in 2015, with past medical history significant for knee osteoarthritis, GERD, DANIEL, hyperlipidemia, Crohn's colitis, hypertension, coronary atherosclerosis, ischemic cardiomyopathy/chronic systolic CHF, paroxysmal ventricular tachycardia, internal defibrillator, lumbar spinal listhesis/SI joint dysfunction, chronic kidney disease stage III cerebral aneurysm, osteoporosis who presents today for follow-up regarding 2 weeks post right SI joint steroid injection which he got minimal relief of his right buttock pain, and reports previous injection September 2017 got minimal relief as well.    Back pain is chronic that radiates to the buttock and lateral thigh worse with standing and walking that is consistent with stenosis type pain.     Plan:     A shared decision making plan was used. The patient's values and choices were respected. Prior medical records from 9/27/2017 to current were reviewed today. The following represents what was discussed and decided upon by the provider and the patient.        -DIAGNOSTIC TESTS: Images were personally reviewed and interpreted.   --Lumbar spine CT myelogram from 2015 does reveal chronic moderately severe central canal stenosis and bilateral foraminal stenosis per    -INTERVENTIONS: Plan to order L5-S1 interlaminar epidural steroid injection once approval to hold Coumadin by cardiologist/PCP.  Patient is leaving for [place] in 2 days therefore will plan on the injection when he returns in February and have him hold Coumadin 4 days prior to scheduled injection today.  --Addend 12/21/17: Received ok from cardiologist Dr. Baker to hold coumadin for 4 days prior to injection, minimal risk noted. No bridging  necessary.      -MEDICATIONS: No change in medications otherwise advised patient continue Voltaren gel and Tylenol as needed.  Discussed side effects of medications and proper use. Patient verbalized understanding.    -PHYSICAL THERAPY: Advised patient continue home exercise program from recent physical therapy sessions.  Discussed the importance of core strengthening, ROM, stretching exercises with the patient and how each of these entities is important in decreasing pain.  Explained to the patient that the purpose of physical therapy is to teach the patient a home exercise program.  These exercises need to be performed every day in order to decrease pain and prevent future occurrences of pain.        -PATIENT EDUCATION:  30 minutes of total visit time was spent face to face with the patient today, 60 % of the visit was spent on counseling, education, and coordinating care.   -10 minutes spent outside of visit time, non-face-to-face time, reviewing chart.    -FOLLOW UP: Follow-up in February for injection with Dr. Cage if okayed per PCP/cardiologist to hold Coumadin.  Advised to contact clinic if symptoms worsen or change.    Subjective:     Tanmay Israel is a 77 y.o. male who presents today for follow-up regarding ongoing chronic low back pain bilateral that radiates into the buttocks as well as posterior thighs that worsened significantly with standing and walking and does improve with sitting, no radicular pain noted however.  Currently his pain is a 2/10 but more bothersome again with standing and walking that he describes as a deep ache/burning type pain.    **Patient saw Dr. Alicia last 9/27/2017 who recommended SI joint steroid injection however did discuss that if he fails to get further relief with SI injection/therapy, can trial interlaminar injection to target stenosis at the L4-5 level.    Treatment to Date: History right L5-S1 laminectomy [facility] orthoped[facility].  Physical therapy [facility]  ×4 sessions 11/29/2017.  Lam Chi on his own in the past however unable to do currently due to increased pain.    SPR Right L4-5 TF JOSUÉ 7/12/2016 with no benefit.  Procedure 8/10, postprocedure 0/10 on procedure note.  SPR CT-guided Right SI joint steroid injection 9/20/2016.  SPR Right SI joint steroid injection 2/15/2017, 5/23/2017, 9/8/2017, with typical relief except the last 9/8/2017 he reports he got minimal relief.    Right SI joint steroid injection Dr. Cage 11/29/2017 with 10% relief.    Medications:  Voltaren gel has helped in the past  Lidoderm patches    Patient Active Problem List   Diagnosis     Right Rotator Cuff Tendonitis     Osteoarthritis Of The Knee     Joint Pain, Localized In The Right Shoulder     Difficulty Breathing (Dyspnea)     Esophageal Reflux     Muscle Aches, Generalized (Myalgias)     Lumbago     Obstructive sleep apnea     Sciatica     Joint Pain, Localized In The Knee     Ptosis Of Eyelid     Hyperlipidemia     Anemia     Crohn's (Granulomatous) Colitis     Benign Essential Hypertension     Coronary atherosclerosis of native coronary artery     Ischemic cardiomyopathy     Paroxysmal ventricular tachycardia     Dry Nonexudative Macular Degeneration     Serum Enzyme Levels - ALT (SGPT) Elevated     Serum Enzyme Levels - Alkaline Phosphatase Elevated     Dysphagia     Diarrhea     Abdominal Pain     Presence of automatic cardioverter/defibrillator (AICD)--- [employer] single lead     Spondylolisthesis of lumbar region     Lumbar stenosis with neurogenic claudication     Sacroiliac joint dysfunction of right side     Stage 3 chronic kidney disease     Fall     Multiple fractures of cervical spine, closed, initial encounter     Closed fracture of distal end of left radius, unspecified fracture morphology, initial encounter     Closed fracture of distal end of right radius, unspecified fracture morphology, initial encounter     Warfarin-induced coagulopathy     Closed fracture of first  thoracic vertebra, unspecified fracture morphology, initial encounter     Chronic systolic congestive heart failure     Permanent atrial fibrillation     Anemia, unspecified type     Cerebral aneurysm     Osteoporosis       Current Outpatient Prescriptions on File Prior to Encounter   Medication Sig Dispense Refill     acetaminophen (TYLENOL) 325 MG tablet Take 650 mg by mouth every 4 (four) hours as needed for pain. Indications: Pain       carvedilol (COREG) 25 MG tablet Take 1.5 tablets (37.5 mg total) by mouth 2 (two) times a day with meals. 270 tablet 2     carvedilol (COREG) 25 MG tablet TAKE 1 AND 1/2 TABLETS BY MOUTH TWICE DAILY 270 tablet 1     cholecalciferol, vitamin D3, 1,000 unit tablet Take 2 tablets (2,000 Units total) by mouth daily.  0     diclofenac sodium (VOLTAREN) 1 % Gel Apply to painful areas over low back and sacroiliac joint once daily as needed for pain 1 Tube 3     FERROUS FUMARATE (IRON ORAL) Take 1 tablet by mouth daily.        furosemide (LASIX) 40 MG tablet Take 1 tablet (40 mg total) by mouth daily. 90 tablet 3     losartan (COZAAR) 50 MG tablet TAKE 1 1/2 TABLETS BY MOUTH EVERY  tablet 2     lovastatin (MEVACOR) 40 MG tablet TAKE 1 TABLET BY MOUTH EVERY DAY AS DIRECTED 90 tablet 2     lovastatin (MEVACOR) 40 MG tablet TAKE 1 TABLET BY MOUTH EVERY DAY AS DIRECTED 90 tablet 3     multivitamin with iron (ONE DAILY WITH IRON) Tab tablet Take 1 tablet by mouth daily.        omeprazole (PRILOSEC) 20 MG capsule TAKE 1 CAPSULE BY MOUTH TWICE DAILY 180 capsule 0     warfarin (COUMADIN) 2.5 MG tablet TAKE 1 AND 1/2 TABLETS BY MOUTH ON THURSDAYS; TAKE 1 TABLET ALL OTHER DAYS OR AS DIRECTED. 60 tablet 6     Current Facility-Administered Medications on File Prior to Encounter   Medication Dose Route Frequency Provider Last Rate Last Dose     denosumab 60 mg (PROLIA 60 mg/ml)  60 mg Subcutaneous Q6 Months Israel Barros MD   60 mg at 06/19/17 1301       No Known Allergies    Past  "Medical History:   Diagnosis Date     Arthritis      Atrial fibrillation      Back pain      Callus      Chronic systolic congestive heart failure      Coronary artery disease      Hyperlipidemia      Hypertension      Permanent atrial fibrillation     [id number] risk score = 5 (age3/ CAD/ HTN/ CHF) Chronic warfarin        Review of Systems  ROS:  Specifically negative for bowel/bladder dysfunction, balance changes, headache, dizziness, foot drop, fevers, chills, appetite changes, nausea/vomiting, unexplained weight loss. Otherwise 13 systems reviewed are negative. Please see the patient's intake questionnaire from today for details.    Reviewed Social, Family, Past Medical and Past Surgical history with patient, no significant changes noted since prior visit.     Objective:     /56 (Patient Site: Left Arm, Patient Position: Sitting)  Pulse 75  Ht 5' 9\" (1.753 m)  Wt 204 lb (92.5 kg)  BMI 30.13 kg/m2    PHYSICAL EXAMINATION:    --CONSTITUTIONAL: Well developed, well nourished, healthy appearing individual.  --PSYCHIATRIC: Appropriate mood and affect. No difficulty interacting due to temper, social withdrawal, or memory issues.  --SKIN: Lumbar region is dry and intact. Sensation to light touch is intact in the bilateral L4, L5, and S1 dermatomes.  --RESPIRATORY: Normal rhythm and effort. No abnormal accessory muscle breathing patterns noted.   --MUSCULOSKELETAL:  Normal lumbar lordosis noted, no lateral shift.  --GROSS MOTOR: Easily arises from a seated position.   --LUMBAR SPINE:  Inspection reveals no evidence of deformity. Range of motion is not limited in lumbar flexion, extension, or lateral rotation. No tenderness to palpation. Straight leg raising in the supine position is negative to radicular pain. Sciatic notch non-tender.   --LOWER EXTREMITY MOTOR TESTING:  Plantar flexion left 5/5, right 5/5   Dorsiflexion left 5/5, right 5/5   Great toe MTP extension left 5/5, right 5/5  Knee flexion left " 5/5, right 5/5  Knee extension left 5/5, right 5/5   Hip flexion left 5/5, right 5/5  Hip abduction left 5/5, right 5/5  Hip adduction left 5/5, right 5/5   --HIPS: Full range of motion bilaterally. Negative FABERs on the involved lower extremity.   --NEUROLOGIC: Bilateral patellar and achilles reflexes are physiologic and symmetric. Lower extremities are intact to light touch.     RESULTS:   Imaging: Lumbar spine imaging was reviewed today. The images were shown to the patient and the findings were explained using a spine model.      XR SCOLIOSIS AP OR PA AND LATERAL STANDING  10/19/2015   INDICATION: Low back pain. Thoracic kyphosis.  COMPARISON: None.  FINDINGS: Twelve rib-bearing thoracic-type vertebral bodies. Five lumbar type vertebral bodies. Thoracic kyphosis estimated at 51 degrees. Lumbar lordosis between L1 and S1 estimated at 65 degrees. Right convex asymmetry of the thoracolumbar spine   measuring 8 degrees between T11 and L3.  Several bridging osteophytes in the thoracic spine, pattern that is consistent with diffuse idiopathic skeletal hyperostosis. Prominent left lateral T12-L1 osteophyte and ventral L2-L3 and L3-L4 osteophytes, but not definitely bridging. 5 mm   anterolisthesis of L4 on L5 and 3 mm anterolisthesis of L5 on S1. Prominent facet arthropathy lower lumbar spine. No acute superimposed bony abnormality of the thoracolumbar spine, without definite fracture.  Dense aortic calcification, normal caliber. Left hemithorax generator with two pacemaker leads to the heart. Sternal wires. Reverse right shoulder arthroplasty. Moderate degenerative change both hips.      CT LUMBAR SPINE MYELOGRAM  9/9/2015  INDICATION: Lumbar stenosis.  TECHNIQUE: Helical images were reconstructed in the axial plane at 1.25-mm increments, with sagittal and coronal reformations. Intrathecal contrast introduced under separate procedure and described with that procedure.  COMPARISON: None.  FINDINGS: Five lumbar-type  vertebral bodies. Slightly exaggerated lordosis at L4-L5 on a degenerative basis. 4-mm anterior subluxation of L4 on L5. 2-mm anterior subluxation of L5 on S1. Slight retrolisthesis of L2 on L3 and L3 on L4. Left convex   scoliosis measuring 14 degrees between L3 and L5. Much of this is at L4-L5 from asymmetric disc height loss. Right-sided L4 pars defect. L5-S1 wide laminectomy with right facetectomy. No destructive bony lesion is apparent. No fracture is evident.  Conus tip ends at T12-L1. Cauda equina generally unremarkable. Paraspinous soft tissues are grossly normal. Moderate paraspinous muscle volume loss. Normal aortic caliber, moderate calcification. Mild to moderate degenerative change SI joints.  T12-L1: Slight disc height loss. Left anterior interbody spurring. Mild facet arthropathy. No central canal stenosis. No foraminal stenosis.  L1-L2: Slight disc height loss. Mild circumferential disc bulging. Left anterolateral interbody spur. Mild to moderate facet arthropathy. No central canal stenosis. No foraminal stenosis.  L2-L3: Moderate disc height loss. Moderate circumferential interbody spur and disc bulging, more pronounced anteriorly. Mild facet arthropathy. No central canal stenosis. Mild right foraminal stenosis. No left foraminal stenosis.  L3-L4: Mild disc height loss. Mild to moderate circumferential disc bulging. Mild to moderate facet arthropathy. Some calcification of the right facet cartilage. Mild central canal stenosis. No lateral recess stenosis. Mild to moderate right foraminal   stenosis. Borderline moderate left foraminal stenosis.  L4-L5: Mild disc height loss greater dorsally. At least moderate circumferential disc bulging. Prominent facet arthropathy with diastatic left facet. Moderately severe central canal stenosis. Despite this, no definite lateral recess stenosis. Severe   right and moderate left foraminal stenoses from disc bulging.  L5-S1: Disc height normal. Slight disc  bulging. Sagittally oriented left facet. Effectively absent right facet. No central canal stenosis. Moderately severe bilateral foraminal stenoses.  CONCLUSION:  1.  Previous lumbar spine surgery with L5-S1 wide laminectomy and right facetectomy.  2.  Degenerative changes most prominent at L4-L5 and L5-S1. The facets are sagittally oriented. This predisposes to subluxation.  3.  At L4-L5, moderately severe central canal stenosis without grossly apparent lateral recess stenosis. Severe right and moderate left foraminal stenoses.  4.  At L5-S1, moderately severe bilateral foraminal stenoses.  5.  At L3-L4, borderline moderate left foraminal stenosis. Mild central canal stenosis.

## 2021-06-14 NOTE — PROGRESS NOTES
[facility] Daily Progress     Patient Name: Tanmay Israel  Date: 11/15/2017  Visit #: 3  PTA visit #:    Date of evaluation: 10/25/2017  Referral Diagnosis: Osteoarthrosis involving lower leg  Referring provider: Go Ramírez MD  Visit Diagnosis:     ICD-10-CM    1. Chronic pain of both knees M25.561     M25.562     G89.29    2. Muscle weakness (generalized) M62.81    3. Chronic right sacroiliac joint pain M53.3     G89.29    4. Generalized muscle weakness M62.81      Initial Assessment  Tanmay Israel is a 77 y.o. male who presents to therapy today with chief complaints of leg weakness and pain from back and knees that has been chronic in nature and progressively getting worse. He is most concerned about the weakness as he is limited in getting out of a chair without use of UEs and getting down to line up a put and back up off the ground. Patient tested 6 reps in 30 seconds on sit to stand test but was able to do it without the use of his arms, this is below the age gender norm of 14 reps and puts him at an increase risk for falls. Patient has mild limitations in knee extension but WNL knee flexion. Patient is unable to SL stance on the right side but able to do 3-5 seconds on the left side. Patient is able to heel and toe walk without increase in pain. Patient will benefit from skilled PT intervention to increase in strength and overall functional mobility    Assessment:     HEP/POC compliance is  good .  Patient demonstrates understanding/independence with home program.  Patient is benefitting from skilled physical therapy and is making steady progress toward functional goals.  Patient is appropriate to continue with skilled physical therapy intervention, as indicated by initial plan of care.    Goal Status:  Pt. will be independent with home exercise program in : 6 weeks  Pt. will be able to walk : 30 minutes;on uneven/inclined surfaces;on even surfaces;with less difficulty;for community  mobility;for exercise/recreation;in 6 weeks  Patient will transfer: sit/stand;for car;for in/out of chair;with less difficulty;in 6 weeks  Pt will: complete floor to stand transfer wtih modified indpendence in 6 weeks    Plan / Patient Education:     Continue with initial plan of care.  Progress with home program as tolerated.   Plan for next visit:  Follow up in 2 weeks,  review HEP, progress exercises toward functional limitations      Subjective:     Pain Ratin   Patient had called last week, and stated there was an increase in pain after doing the exercises, he was instructed at that time to rest for a few days to decrease pain and then restart the exercises. Patient reports he feels the sit to stands from the bed is still hard but not increasing his pain, the Lam Chi seems to be causing a lot of pain in the back and the back of the knee and feels he might be doing that as much.    He leaves for [place] on Dec 13th for the winter.     Objective:     HEP  Sit to stands  SLR  Squats     Treatment Today   11/15/2017  TREATMENT MINUTES COMMENTS   Evaluation     Self-care/ Home management     Manual therapy     Neuromuscular Re-education     Therapeutic Activity     Therapeutic Exercises 25  NuStep WL 4 x 6 min  Reviewed HEP, continue as before  Added standing hip abduction 3 x 5 reps B  Added standing hip extension 3 x 5 reps B   Gait training     Modality__________________                Total 25    Blank areas are intentional and mean the treatment did not include these items.     Mary Rosas, PT, DPT   11/15/2017

## 2021-06-14 NOTE — TELEPHONE ENCOUNTER
ANTICOAGULATION  MANAGEMENT    Assessment     Today's INR result of 2.0 is Therapeutic (goal INR of 2.0-3.0)        Warfarin taken as previously instructed    No new diet changes affecting INR    No new medication/supplements affecting INR    Continues to tolerate warfarin with no reported s/s of bleeding or thromboembolism     Previous INR was Subtherapeutic    Plan:     Spoke on phone with Tanmay regarding INR result and instructed:     Warfarin Dosing Instructions:  Continue current warfarin dose 2.5 mg daily   (0 % change)    Instructed patient to follow up no later than: 2 weeks    Education provided: target INR goal and significance of current INR result, importance of following up for INR monitoring at instructed interval and importance of taking warfarin as instructed    Tanmay verbalizes understanding and agrees to warfarin dosing plan.    Instructed to call the [facility] for any changes, questions or concerns. (#[phone])   ?   Ana Adler RN    Subjective/Objective:      Tanmay Israel, a 80 y.o. male is on warfarin.     Tanmay reports:     Home warfarin dose: verbally confirmed home dose with Tanmay and updated on anticoagulation calendar     Missed doses: No     Medication changes:  No     S/S of bleeding or thromboembolism:  No     New Injury or illness:  No     Changes in diet or alcohol consumption:  No     Upcoming surgery, procedure or cardioversion:  No    Anticoagulation Episode Summary     Current INR goal:  2.0-3.0   TTR:  66.0 % (1 y)   Next INR check:  1/26/2021   INR from last check:  2.00 (1/12/2021)   Weekly max warfarin dose:     Target end date:     INR check location:     Preferred lab:     Send INR reminders to:  CHANI RIOS       Comments:           Anticoagulation Care Providers     Provider Role Specialty Phone number    Go Ramírez MD Referring Family Medicine [phone]

## 2021-06-15 ENCOUNTER — RECORDS - HEALTHEAST (OUTPATIENT)
Dept: ADMINISTRATIVE | Facility: OTHER | Age: 81
End: 2021-06-15

## 2021-06-15 PROBLEM — M81.0 OSTEOPOROSIS: Status: ACTIVE | Noted: 2017-04-28

## 2021-06-15 ASSESSMENT — MIFFLIN-ST. JEOR: SCORE: 1387.11

## 2021-06-15 NOTE — TELEPHONE ENCOUNTER
ANTICOAGULATION  MANAGEMENT    Assessment     Today's INR result of 2.9 is Therapeutic (goal INR of 2.0-3.0)        Warfarin taken as previously instructed    No new diet changes affecting INR    No new medication/supplements affecting INR    Continues to tolerate warfarin with no reported s/s of bleeding or thromboembolism     Previous INR was Therapeutic    Plan:     Spoke on phone with Tanmay regarding INR result and instructed:      Warfarin Dosing Instructions:  Continue current warfarin dose 3.75 mg daily on Tu; and 2.5 mg daily rest of week  (0 % change)    Instructed patient to follow up no later than: 4 weeks-- will call back to schedule    Education provided: target INR goal and significance of current INR result, importance of notifying clinic for changes in medications, importance of notifying clinic for diarrhea, nausea/vomiting, reduced intake and/or illness and importance of notifying clinic of upcoming surgeries and procedures 2 weeks in advance    Tanmay verbalizes understanding and agrees to warfarin dosing plan.    Instructed to call the [facility] for any changes, questions or concerns. (#[phone])   ?   Yaneth Wells RN    Subjective/Objective:      Tanmay Israel, a 80 y.o. male is on warfarin. Tanmay Donato reports:     Home warfarin dose: as updated on anticoagulation calendar per template     Missed doses: No     Medication changes:  No     S/S of bleeding or thromboembolism:  No     New Injury or illness:  No     Changes in diet or alcohol consumption:  No     Upcoming surgery, procedure or cardioversion:  No    Anticoagulation Episode Summary     Current INR goal:  2.0-3.0   TTR:  72.7 % (1 y)   Next INR check:  3/30/2021   INR from last check:  2.90 (3/2/2021)   Weekly max warfarin dose:     Target end date:     INR check location:     Preferred lab:     Send INR reminders to:  CHANI RIOS       Comments:           Anticoagulation Care Providers     Provider Role Specialty  Phone number    Go Ramírez MD [facility] [phone]

## 2021-06-15 NOTE — TELEPHONE ENCOUNTER
ANTICOAGULATION  MANAGEMENT    Assessment     Today's INR result of 2.2 is Therapeutic (goal INR of 2.0-3.0)        Warfarin taken as previously instructed    No new diet changes affecting INR    No new medication/supplements affecting INR    Continues to tolerate warfarin with no reported s/s of bleeding or thromboembolism     Previous INR was Subtherapeutic    Plan:     Spoke on phone with Tanmay regarding INR result and instructed:      Warfarin Dosing Instructions:  Continue current warfarin dose 3.75 mg daily on Tue; and 2.5 mg daily rest of week  (0 % change)    Instructed patient to follow up no later than: 2 weeks-- will call back to schedule    Education provided: target INR goal and significance of current INR result and importance of notifying clinic for changes in medications    Tanmay verbalizes understanding and agrees to warfarin dosing plan.    Instructed to call the [facility] for any changes, questions or concerns. (#[phone])   ?   Yaneth Wells RN    Subjective/Objective:      Tanmay Israel, a 80 y.o. male is on warfarin. Tanmay Donato reports:     Home warfarin dose: as updated on anticoagulation calendar per template     Missed doses: No     Medication changes:  No     S/S of bleeding or thromboembolism:  No     New Injury or illness:  No     Changes in diet or alcohol consumption:  No     Upcoming surgery, procedure or cardioversion:  No    Anticoagulation Episode Summary     Current INR goal:  2.0-3.0   TTR:  68.6 % (1 y)   Next INR check:  2/23/2021   INR from last check:  2.20 (2/9/2021)   Weekly max warfarin dose:     Target end date:     INR check location:     Preferred lab:     Send INR reminders to:  CHANI RIOS       Comments:           Anticoagulation Care Providers     Provider Role Specialty Phone number    Go Ramírez MD Referring Family Medicine [phone]

## 2021-06-16 ENCOUNTER — ANESTHESIA - HEALTHEAST (OUTPATIENT)
Dept: SURGERY | Facility: HOSPITAL | Age: 81
End: 2021-06-16

## 2021-06-16 ENCOUNTER — RECORDS - HEALTHEAST (OUTPATIENT)
Dept: ADMINISTRATIVE | Facility: OTHER | Age: 81
End: 2021-06-16

## 2021-06-16 ENCOUNTER — SURGERY - HEALTHEAST (OUTPATIENT)
Dept: SURGERY | Facility: HOSPITAL | Age: 81
End: 2021-06-16
Payer: COMMERCIAL

## 2021-06-16 PROBLEM — R13.14 PHARYNGOESOPHAGEAL DYSPHAGIA: Status: ACTIVE | Noted: 2018-12-04

## 2021-06-16 PROBLEM — N17.9 ACUTE RENAL FAILURE (ARF) (H): Status: ACTIVE | Noted: 2019-05-22

## 2021-06-16 PROBLEM — E83.42 HYPOMAGNESEMIA: Status: ACTIVE | Noted: 2019-04-01

## 2021-06-16 PROBLEM — R06.02 SOB (SHORTNESS OF BREATH): Status: ACTIVE | Noted: 2021-06-11

## 2021-06-16 PROBLEM — K63.5 POLYP OF COLON: Status: ACTIVE | Noted: 2020-09-04

## 2021-06-16 PROBLEM — T18.128A ESOPHAGEAL OBSTRUCTION DUE TO FOOD IMPACTION: Status: ACTIVE | Noted: 2021-05-10

## 2021-06-16 PROBLEM — Z98.1 S/P CERVICAL SPINAL FUSION: Status: ACTIVE | Noted: 2019-03-11

## 2021-06-16 PROBLEM — I50.21 ACUTE SYSTOLIC (CONGESTIVE) HEART FAILURE (H): Status: ACTIVE | Noted: 2021-06-12

## 2021-06-16 PROBLEM — K52.9 ILEITIS: Status: ACTIVE | Noted: 2018-09-07

## 2021-06-16 PROBLEM — I50.23 ACUTE ON CHRONIC SYSTOLIC HEART FAILURE (H): Status: ACTIVE | Noted: 2021-06-12

## 2021-06-16 PROBLEM — K31.7 POLYP OF STOMACH AND DUODENUM: Status: ACTIVE | Noted: 2018-12-04

## 2021-06-16 PROBLEM — R42 DIZZINESS: Status: ACTIVE | Noted: 2019-07-15
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
## 2021-06-16 PROBLEM — I50.20 HEART FAILURE WITH REDUCED EJECTION FRACTION (H): Status: ACTIVE | Noted: 2018-10-10

## 2021-06-16 PROBLEM — Z90.49 S/P LAPAROSCOPIC CHOLECYSTECTOMY: Status: ACTIVE | Noted: 2019-03-11

## 2021-06-16 ASSESSMENT — MIFFLIN-ST. JEOR: SCORE: 1389.83

## 2021-06-16 NOTE — TELEPHONE ENCOUNTER
ANTICOAGULATION  MANAGEMENT    Assessment     Today's INR result of 2.6 is Therapeutic (goal INR of 2.0-3.0)        Warfarin taken as previously instructed    No new diet changes affecting INR    No new medication/supplements affecting INR    Continues to tolerate warfarin with no reported s/s of bleeding or thromboembolism     Previous INR was Supratherapeutic    Plan:     Spoke on phone with Tanmay regarding INR result and instructed:      Warfarin Dosing Instructions:  Continue current warfarin dose 3.75 mg daily on Tu; and 2.5 mg daily rest of week  (0 % change)    Instructed patient to follow up no later than: 2 weeks    Education provided: target INR goal and significance of current INR result    Tanmay verbalizes understanding and agrees to warfarin dosing plan.    Instructed to call the [facility] for any changes, questions or concerns. (#[phone])   ?   Yaneth Wells RN    Subjective/Objective:      Tanmay Israel, a 80 y.o. male is on warfarin.       Tanmay reports:     Home warfarin dose: as updated on anticoagulation calendar per template     Missed doses: No     Medication changes:  No     S/S of bleeding or thromboembolism:  No     New Injury or illness:  No     Changes in diet or alcohol consumption:  No     Upcoming surgery, procedure or cardioversion:  No    Anticoagulation Episode Summary     Current INR goal:  2.0-3.0   TTR:  65.4 % (1 y)   Next INR check:  4/27/2021   INR from last check:  2.60 (4/13/2021)   Weekly max warfarin dose:     Target end date:     INR check location:     Preferred lab:     Send INR reminders to:  CHANI RIOS       Comments:           Anticoagulation Care Providers     Provider Role Specialty Phone number    Go Ramírez MD Referring Family Medicine [phone]

## 2021-06-16 NOTE — PROGRESS NOTES
"ASSESSMENT: Onychauxis, foot pain.    PLAN: Toenails were debrided manually and mechanically x10.  Return to clinic in nine weeks.         SUBJECTIVE: Toenails are long, thick and painful. Last nail debridement in the clinic was October 4, 2017.  He is back from [place].  He complains of paresthesias bilaterally. When walking, the feet feel like \"a block of wood\". He is not diabetic.    OBJECTIVE:  General: Pleasant 77 y.o. male in no acute distress.  Vascular: DP pulses are absent. PT pulses are palpable. Pedal hair is diminished. Feet are cool to the touch.  Neuro: Sensation in the feet is altered. Patient describes paresthesias.  Derm:  Toenails are elongated, thickened and dystrophic with discoloration and subungual debris. Skin is thin and shiny but intact.   Musculoskeletal: Hammertoes.     "

## 2021-06-16 NOTE — TELEPHONE ENCOUNTER
Telephone Encounter by Gina Evans at 6/27/2019  5:33 PM     Author: Gina Evans Service: -- Author Type: --    Filed: 6/27/2019  5:39 PM Encounter Date: 6/27/2019 Status: Signed    : Gina Evans       PRIOR AUTHORIZATION DENIED    Denial Rational: THE PATIENT NEEDS TO HAVE AN FDA APPROVED INDICATION FOR USE OF THE REQUEST MEDICATION. THE PATIENT ALSO NEEDS TO HAVE USED PRESCRIPTION ORAL NSAID UNLESS THE PATIENT HAS A DOCUMENTED INTOLERANCE, FDA LABELED CONTRAINDICATION, OR HYPERSENSITIVITY TO A PRESCRIPTION ORAL NSAID.        Appeal Information: If the provider would like to appeal this denial, please provide a letter of medical necessity and once it has been completed and placed in the patient's chart, notify the [facility] PA Team ([facility] [id number]) and the appeal can be initiated on behalf of the patient and provider.  Please also include any therapies that the patient has tried and their outcomes.

## 2021-06-16 NOTE — TELEPHONE ENCOUNTER
Warfarin refilled per [facility] protocol.   INR checked within past 90 days, most recently on 3/2/21. Last OV on 9/8/20. Referral UTD, last renewed on 11/25/20.     Current sig checked and adjusted to match dosing from last INR check note.      Yaneth Wells, RN  Anticoagulation Clinic

## 2021-06-16 NOTE — TELEPHONE ENCOUNTER
RN cannot approve Refill Request    RN can NOT refill this medication med is not covered by policy/route to provider. Last office visit: 9/8/2020 Go Ramírez MD Last Physical: 2/7/2020 Last MTM visit: Visit date not found Last visit same specialty: 9/8/2020 Go Ramírez MD.  Next visit within 3 mo: Visit date not found  Next physical within 3 mo: Visit date not found      Abbie Gilman, [facility] Triage/Med Refill 3/20/2021    Requested Prescriptions   Pending Prescriptions Disp Refills     warfarin ANTICOAGULANT (COUMADIN/JANTOVEN) 2.5 MG tablet [Pharmacy Med Name: WARFARIN 2.5MG ORAL TABLET] 90 tablet 1     Sig: TAKE ONE TABLET BY MOUTH ONCE DAILY.  ADJUST DOSE PER INR AS DIRECTED       Warfarin Refill Protocol  Failed - 3/20/2021 10:26 AM        Failed -  Route to appropriate pool/provider     Last Anticoagulation Summary:   Anticoagulation Episode Summary     Current INR goal:  2.0-3.0   TTR:  71.3 % (11.6 mo)   Next INR check:  3/30/2021   INR from last check:  2.90 (3/2/2021)   Weekly max warfarin dose:     Target end date:     INR check location:     Preferred lab:     Send INR reminders to:  CHANI RIOS       Comments:           Anticoagulation Care Providers     Provider Role Specialty Phone number    Go Ramírez MD Referring Family Medicine [phone]                Passed - Provider visit in last year     Last office visit with prescriber/PCP: 9/8/2020 Go Ramírez MD OR same dept: 9/8/2020 Go Ramírez MD OR same specialty: 9/8/2020 Go Ramírez MD  Last physical: 2/7/2020 Last MTM visit: Visit date not found    Next appt within 3 mo: Visit date not found Next physical within 3 mo: Visit date not found  Prescriber OR PCP: Go Ramírez MD  Last diagnosis associated with med order: 1. Chronic atrial fibrillation (H)  - warfarin ANTICOAGULANT (COUMADIN/JANTOVEN) 2.5 MG tablet [Pharmacy Med Name: WARFARIN 2.5MG ORAL TABLET]; TAKE ONE TABLET BY MOUTH  ONCE DAILY.  ADJUST DOSE PER INR AS DIRECTED  Dispense: 90 tablet; Refill: 1    If protocol passes may refill for 6 months if within 3 months of last provider visit (or a total of 9 months).

## 2021-06-16 NOTE — TELEPHONE ENCOUNTER
Telephone Encounter by Mara Chacko at 3/27/2019  3:36 PM     Author: Mara Chacko Service: -- Author Type: --    Filed: 3/27/2019  3:38 PM Encounter Date: 3/27/2019 Status: Signed    : Mara Chacko       PRIOR AUTHORIZATION DENIED    Denial Rational: Drug only covered when it is being used as a full replacement for intravenous antiemetic within 48 hours of cancer treatment. Drugs covered for patient's diagnosis is metoclopramide and prochlorperazine.      Appeal Information: This medication was denied. If physician would like to appeal because patient has contraindication or allergy to covered medication please write letter of medical necessity and route back to PA team to initiate.  If no further action is needed please close encounter thank you.

## 2021-06-16 NOTE — TELEPHONE ENCOUNTER
Telephone Encounter by Gina Evans at 7/3/2019  9:14 AM     Author: Gina Evans Service: -- Author Type: --    Filed: 7/3/2019  9:28 AM Encounter Date: 6/27/2019 Status: Signed    : Gina Evans       Received a phone call from a clinical pharmacist and due to the diagnosis, the appeal would likely be denied and she wanted to inform the provider of their options.  Since the patient also has osteoarthritis of various joints, she was inquiring if the Diclofenac was also be using for that and in that case, since the medication is FDA Approved for osteoarthritis of joints in hand, wrist, knees that in that case, it would be approved. However, if the patient is only using it for these conditions: Chronic right-sided low back pain without sciatica [M54.5, G89.29], Right buttock pain [M79.18], and Sacroiliac joint pain [M53.3] and the patient is only applying the gel to those areas, because it has been approved for those speific conditions, it would get appealed.  I was only able to provide what stated in the appeal letter and with that being said, she suggested that we withdrew the appeal request, because we can always resubmit the information with updated information stating that the patient is applying the gel for his osteoarthritis if that is the case.  I told the clinical pharmacist that I would contact the provider and let them know and see where we should proceed.

## 2021-06-16 NOTE — TELEPHONE ENCOUNTER
Telephone Encounter by Nuvia Donnelly at 4/29/2019  8:28 AM     Author: Nuvia Donnelly Service: -- Author Type: --    Filed: 4/29/2019  8:30 AM Encounter Date: 4/24/2019 Status: Signed    : Nuvia Donnelly APPROVED:    Approval start date:04/26/2019   Approval end date:04/26/2020    Pharmacy has been notified of approval and will contact patient when medication is ready for pickup.

## 2021-06-16 NOTE — PATIENT INSTRUCTIONS - HE
1. Continue your walking regimen.  There is no substitute!  2. For better blood pressure control, and also to help your heart muscle function, we will add lisinopril 2.5 mg daily to your current medical regimen.  We will check a blood test in 1 week to make sure your kidneys are tolerating it well.  3. Cover the wound on your chest with bacitracin and a dressing.  If it is not improved in 1 week, check with your primary care provider.

## 2021-06-16 NOTE — TELEPHONE ENCOUNTER
Telephone Encounter by Edgar Ortez RN at 3/11/2020 11:34 AM     Author: Edgar Ortez RN Service: -- Author Type: RN, Care Manager    Filed: 3/11/2020 11:34 AM Encounter Date: 3/11/2020 Status: Attested    : Edgar Ortez RN (RN, Care Manager) Cosigner: Go Ramírez MD at 3/11/2020 11:43 AM    Attestation signed by Go Ramírez MD at 3/11/2020 11:43 AM    I have reviewed the notes, assessments, and/or procedures performed by   Edgar Ortez RN      , I concur with her/his documentation of Tanmay Israel.                 ANTICOAGULATION  MANAGEMENT    Assessment     Today's INR result of 2.7 is Therapeutic (goal INR of 2.0-3.0)        Warfarin taken as previously instructed    No new diet changes affecting INR    No new medication/supplements affecting INR    Continues to tolerate warfarin with no reported s/s of bleeding or thromboembolism     Previous INR was Therapeutic    Plan:     Spoke with Tanmay regarding INR result and instructed:     Warfarin Dosing Instructions:  Continue current warfarin dose    3.75 mg every Tue, Fri; 2.5 mg all other days         Instructed patient to follow up no later than: 4 weeks.    Education provided: importance of taking warfarin as instructed    Tanmay verbalizes understanding and agrees to warfarin dosing plan.    Instructed to call the [facility] for any changes, questions or concerns. (#[phone])   ?   Edgar Ortez RN    Subjective/Objective:      Tanmay Israel, a 79 y.o. male is on warfarin.     Tanmay reports:     Home warfarin dose: verbally confirmed home dose with pt and updated on anticoagulation calendar     Missed doses: No     Medication changes:  No     S/S of bleeding or thromboembolism:  No     New Injury or illness:  No     Changes in diet or alcohol consumption:  No     Upcoming surgery, procedure or cardioversion:  No    Anticoagulation Episode Summary     Current INR goal:   2.0-3.0   TTR:   49.4 % (11.8  mo)   Next INR check:   4/8/2020   INR from last check:   2.70 (3/11/2020)   Weekly max warfarin dose:      Target end date:      INR check location:      Preferred lab:      Send INR reminders to:   CHANI RIOS       Comments:            Anticoagulation Care Providers     Provider Role Specialty Phone number    Go Ramírez MD Referring Family Medicine [phone]

## 2021-06-17 ENCOUNTER — RECORDS - HEALTHEAST (OUTPATIENT)
Dept: ADMINISTRATIVE | Facility: OTHER | Age: 81
End: 2021-06-17

## 2021-06-17 ASSESSMENT — MIFFLIN-ST. JEOR: SCORE: 1392.09

## 2021-06-17 NOTE — ED TRIAGE NOTES
Pt reports eating pork chops and feels as if a piece is stuck in throat-occurred at 1330. Pt is able to talk in complete sentences, but reports spitting secretions. Pt reports neighbor tried heimlich and was unsuccessful. Pt reports hx of needing esophagus stretched.

## 2021-06-17 NOTE — TELEPHONE ENCOUNTER
Telephone Encounter by Yaneth Wells RN at 5/12/2021 10:42 AM     Author: Yaneth Wells RN Service: -- Author Type: Registered Nurse    Filed: 5/12/2021 10:44 AM Encounter Date: 5/11/2021 Status: Signed    : Yaneth Wells RN (Registered Nurse)       ANTICOAGULATION  MANAGEMENT    Assessment     Yesterday's INR result of 3.2 is Supratherapeutic (goal INR of 2.0-3.0)        Warfarin taken as previously instructed    No new diet changes affecting INR    No new medication/supplements affecting INR    Continues to tolerate warfarin with no reported s/s of bleeding or thromboembolism     Previous INR was Supratherapeutic    Plan:     Spoke on phone with Tanmay regarding INR result-- did not receive VM last night so took normal dose of 2.5mg. [facility] instructed:      Warfarin Dosing Instructions:  Change warfarin dose to 1.25 mg daily on W; and 2.5 mg daily rest of week  (7.1 % change)    Instructed patient to follow up no later than: 2 weeks-- requested to be cehcekd next week, scheduled for 5/19    Education provided: target INR goal and significance of current INR result and importance of notifying clinic for diarrhea, nausea/vomiting, reduced intake and/or illness    Tanmay verbalizes understanding and agrees to warfarin dosing plan.    Instructed to call the [facility] for any changes, questions or concerns. (#[phone])   ?   Yaneth Wells RN    Subjective/Objective:      Tanmay Israel, a 81 y.o. male is on warfarin.       Tanmay reports:     Home warfarin dose: verbally confirmed home dose with Tanmay and updated on anticoagulation calendar     Missed doses: No     Medication changes:  No     S/S of bleeding or thromboembolism:  No     New Injury or illness:  No     Changes in diet or alcohol consumption:  No     Upcoming surgery, procedure or cardioversion:  No    Anticoagulation Episode Summary     Current INR goal:  2.0-3.0   TTR:  67.3 % (1 y)   Next INR check:  5/19/2021   INR from  last check:  3.20 (5/11/2021)   Weekly max warfarin dose:     Target end date:     INR check location:     Preferred lab:     Send INR reminders to:  CHANI RIOS       Comments:           Anticoagulation Care Providers     Provider Role Specialty Phone number    Go Ramírez MD Referring Family Medicine [phone]

## 2021-06-17 NOTE — PATIENT INSTRUCTIONS - HE
Patient Instructions by Sharon Arora PT at 9/27/2019  9:30 AM     Author: Sharon Arora PT Service: -- Author Type: Physical Therapist    Filed: 9/27/2019  9:54 AM Encounter Date: 9/27/2019 Status: Signed    : Sharon Arora PT (Physical Therapist)        SIT TO STAND - NO HANDS    Start by sitting in a chair. Scoot to the edge of the seat.  Bend forward with nose over toes, then raise up to standing without using your hands for support.    5-10 reps, 3 times per day

## 2021-06-17 NOTE — TELEPHONE ENCOUNTER
Who is calling:  Patient  Reason for Call:  Asked for call back from [facility] to confirm dosing/ follow up instructions  Date of last appointment with primary care:   Okay to leave a detailed message: Yes

## 2021-06-17 NOTE — TELEPHONE ENCOUNTER
Telephone Encounter by Yaneth Wells RN at 3/30/2021 11:59 AM     Author: Yaneth Wells RN Service: -- Author Type: Registered Nurse    Filed: 3/30/2021 12:03 PM Encounter Date: 3/30/2021 Status: Signed    : Yaneth Wells RN (Registered Nurse)       ANTICOAGULATION  MANAGEMENT    Assessment     Today's INR result of 3.3 is Supratherapeutic (goal INR of 2.0-3.0)        Warfarin taken as previously instructed    No new diet changes affecting INR    No new medication/supplements affecting INR    Continues to tolerate warfarin with no reported s/s of bleeding or thromboembolism     Previous INR was Therapeutic    Plan:     Spoke on phone with Tanmay regarding INR result and instructed:      Warfarin Dosing Instructions:  Continue current warfarin dose 3.75 mg daily on Tu; and 2.5 mg daily rest of week  (0 % change)    Instructed patient to follow up no later than: 2 weeks-- scheduled for 4/13    Education provided: target INR goal and significance of current INR result, importance of notifying clinic for changes in medications, when to seek medical attention/emergency care, importance of notifying clinic for diarrhea, nausea/vomiting, reduced intake and/or illness and importance of notifying clinic of upcoming surgeries and procedures 2 weeks in advance    Tanmay verbalizes understanding and agrees to warfarin dosing plan.    Instructed to call the [facility] for any changes, questions or concerns. (#[phone])   ?   Yaneth Wells RN    Subjective/Objective:      Tanmay Israel, a 80 y.o. male is on warfarin.       Tanmay reports:     Home warfarin dose: as updated on anticoagulation calendar per template     Missed doses: No     Medication changes:  No     S/S of bleeding or thromboembolism:  No     New Injury or illness:  No     Changes in diet or alcohol consumption:  No     Upcoming surgery, procedure or cardioversion:  No    Anticoagulation Episode Summary     Current INR goal:   2.0-3.0   TTR:  67.0 % (1 y)   Next INR check:  4/13/2021   INR from last check:  3.30 (3/30/2021)   Weekly max warfarin dose:     Target end date:     INR check location:     Preferred lab:     Send INR reminders to:  CHANI RIOS       Comments:           Anticoagulation Care Providers     Provider Role Specialty Phone number    Go Ramírez MD Referring Family Medicine [phone]

## 2021-06-17 NOTE — PROGRESS NOTES
Assessment:     Diagnoses and all orders for this visit:    Right lumbar radiculitis  -     CT Lumbar Spine Without Contrast; Future; Expected date: 5/8/18  -     Ambulatory referral to Acupuncture    Lumbar stenosis  -     CT Lumbar Spine Without Contrast; Future; Expected date: 5/8/18  -     Ambulatory referral to Acupuncture    Lumbar foraminal stenosis  -     CT Lumbar Spine Without Contrast; Future; Expected date: 5/8/18  -     Ambulatory referral to Acupuncture    Sacroiliac joint dysfunction of right side    Myofascial pain  -     Ambulatory referral to Acupuncture     Tanmay Israel is a 78 y.o. y.o. male with past medical history significant for knee osteoarthritis, GERD, DANIEL, hyperlipidemia, Crohn's colitis, hypertension, coronary atherosclerosis, ischemic cardiomyopathy/chronic systolic CHF, paroxysmal ventricular tachycardia, internal defibrillator, lumbar spinal listhesis/SI joint dysfunction, chronic kidney disease stage III cerebral aneurysm, osteoporosis who presents today for follow-up regarding:    -Ongoing chronic right lumbar radiculitis L5 dermatomal pattern, he did get about 40% relief with previous right L4-5 TF JOSUÉ, MRI from 2015 shows severe right foraminal stenosis at this level and moderately severe at L5-S1.    Patient is neurologically intact on exam.     Plan:     A shared decision making plan was used. The patient's values and choices were respected. Prior medical records from 12/11/17 were reviewed today. The following represents what was discussed and decided upon by the provider and the patient.        -DIAGNOSTIC TESTS: Images were personally reviewed and interpreted.   --Ordered updated lumbar CT scan.  --Lumbar spine CT myelogram from 2015 does reveal chronic L4-5 moderately severe central canal stenosis and severe right/moderate left foraminal stenosis.  L5-S1 moderately severe foraminal stenosis without central canal stenosis.  --Scoliosis panel 2015 shows 65  lordosis,  thoracic kyphosis 51 , 8  scoliosis T11 through L3.  5 mm L4-5 and 3 mm L5-S1 spondylolisthesis.    -INTERVENTIONS: Consider either repeat right L4-5 TF JOSUÉ versus right L5-S1 TF JOSUÉ versus both right L4-5 and L5-S1 TF JOSUÉ pending updated CT scan.    -MEDICATIONS: No change in medications today.  Discussed side effects of medications and proper use. Patient verbalized understanding.    -PHYSICAL THERAPY: Advised patient continue home exercises from prior physical therapy sessions.  Discussed the importance of core strengthening, ROM, stretching exercises with the patient and how each of these entities is important in decreasing pain.  Explained to the patient that the purpose of physical therapy is to teach the patient a home exercise program.  These exercises need to be performed every day in order to decrease pain and prevent future occurrences of pain.        -PATIENT EDUCATION:  25  minutes of total visit time was spent face to face with the patient today, 60 % of the visit was spent on counseling, education, and coordinating care.   -5 minutes spent outside of visit time, non-face-to-face time, reviewing chart.    -FOLLOW UP: follow-up after obtaining updated CT scan review imaging and Next steps in his plan.  Advised to contact clinic if symptoms worsen or change.    Subjective:     Tanmay Israel is a 78 y.o. male who presents today for follow-up regarding ongoing chronic low back pain with most bothersome right low back pain radiating to the right posterior buttock posterior thigh and lateral thigh that is worse with standing and walking and improves with sitting.  Patient reports very minimal pain on the left.  Currently his pain is a 6/1024 to its best but up to an 8 at its worst.  Overall he did get about 40% relief from JOSUÉ back in February and did feel like it was helpful however is wearing off.    Patient denies any new symptoms, denies weakness or recent trips or falls, denies bowel or bladder  dysfunction.  Again his right leg pain is most bothersome symptom today.    **Patient saw Dr. Alicia last 9/27/2017 who recommended SI joint steroid injection however did discuss that if he fails to get further relief with SI injection/therapy, can trial interlaminar injection to target stenosis at the L4-5 level.     Treatment to Date: History right L5-S1 laminectomy [facility] orth[facility]ics.  Physical therapy [facility] ×4 sessions 11/29/2017.  [facility] on his own in the past however unable to do currently due to increased pain.     SPR Right L4-5 TF JOSUÉ 7/12/2016 with no benefit.  Procedure 8/10, postprocedure 0/10 on procedure note.  SPR CT-guided Right SI joint steroid injection 9/20/2016.  SPR Right SI joint steroid injection 2/15/2017, 5/23/2017, 9/8/2017, with typical relief except the last 9/8/2017 he reports he got minimal relief.     Right SI joint steroid injection Dr. Cage 11/29/2017 with 10% relief.  Right L4-5 TF JOSUÉ 2/15/2018 with 40% relief ×3 months.  Preprocedure pain 8/10, post 1/10.     Medications:  Voltaren gel has helped in the past  Lidoderm patches       Patient Active Problem List   Diagnosis     Right Rotator Cuff Tendonitis     Osteoarthritis Of The Knee     Joint Pain, Localized In The Right Shoulder     Difficulty Breathing (Dyspnea)     Esophageal Reflux     Muscle Aches, Generalized (Myalgias)     Lumbago     Obstructive sleep apnea     Sciatica     Joint Pain, Localized In The Knee     Ptosis Of Eyelid     Hyperlipidemia     Anemia     Crohn's (Granulomatous) Colitis     Benign Essential Hypertension     Coronary atherosclerosis of native coronary artery     Ischemic cardiomyopathy     Paroxysmal ventricular tachycardia     Dry Nonexudative Macular Degeneration     Serum Enzyme Levels - ALT (SGPT) Elevated     Serum Enzyme Levels - Alkaline Phosphatase Elevated     Dysphagia     Diarrhea     Abdominal Pain     Presence of automatic cardioverter/defibrillator (AICD)---  [place]opher single lead     Spondylolisthesis of lumbar region     Lumbar stenosis with neurogenic claudication     Sacroiliac joint dysfunction of right side     Stage 3 chronic kidney disease     Fall     Multiple fractures of cervical spine, closed, initial encounter     Closed fracture of distal end of left radius, unspecified fracture morphology, initial encounter     Closed fracture of distal end of right radius, unspecified fracture morphology, initial encounter     Warfarin-induced coagulopathy     Closed fracture of first thoracic vertebra, unspecified fracture morphology, initial encounter     Chronic systolic congestive heart failure     Permanent atrial fibrillation     Anemia, unspecified type     Cerebral aneurysm     Osteoporosis       Current Outpatient Prescriptions on File Prior to Encounter   Medication Sig Dispense Refill     acetaminophen (TYLENOL) 325 MG tablet Take 650 mg by mouth every 4 (four) hours as needed for pain. Indications: Pain       carvedilol (COREG) 25 MG tablet Take 1.5 tablets (37.5 mg total) by mouth 2 (two) times a day with meals. 270 tablet 2     carvedilol (COREG) 25 MG tablet TAKE 1 AND 1/2 TABLETS BY MOUTH TWICE DAILY 270 tablet 1     cholecalciferol, vitamin D3, 1,000 unit tablet Take 2 tablets (2,000 Units total) by mouth daily.  0     diclofenac sodium (VOLTAREN) 1 % Gel Apply to painful areas over low back and sacroiliac joint once daily as needed for pain 1 Tube 3     diphenoxylate-atropine (LOMOTIL) 2.5-0.025 mg per tablet TK 2 T PO Q 6 H PRF CRAMPS/DIARRHEA  0     FERROUS FUMARATE (IRON ORAL) Take 1 tablet by mouth daily.        furosemide (LASIX) 40 MG tablet Take 1 tablet (40 mg total) by mouth daily. 90 tablet 3     losartan (COZAAR) 50 MG tablet TAKE 1 1/2 TABLETS BY MOUTH EVERY  tablet 2     losartan (COZAAR) 50 MG tablet   1     lovastatin (MEVACOR) 40 MG tablet TAKE 1 TABLET BY MOUTH EVERY DAY AS DIRECTED 90 tablet 2     lovastatin (MEVACOR) 40 MG  tablet TAKE 1 TABLET BY MOUTH EVERY DAY AS DIRECTED 90 tablet 3     lovastatin (MEVACOR) 40 MG tablet TAKE 1 TABLET BY MOUTH EVERY DAY AS DIRECTED 90 tablet 1     metroNIDAZOLE (FLAGYL) 250 MG tablet   0     multivitamin with iron (ONE DAILY WITH IRON) Tab tablet Take 1 tablet by mouth daily.        omeprazole (PRILOSEC) 20 MG capsule TAKE 1 CAPSULE BY MOUTH TWICE DAILY 180 capsule 0     omeprazole (PRILOSEC) 20 MG capsule TAKE 1 CAPSULE BY MOUTH TWICE DAILY 180 capsule 0     warfarin (COUMADIN) 2.5 MG tablet TAKE 1 AND 1/2 TABLETS BY MOUTH ON THURSDAYS; TAKE 1 TABLET ALL OTHER DAYS OR AS DIRECTED. 60 tablet 6     warfarin (COUMADIN) 2.5 MG tablet Take 1 to 1.5 tablets (2.5 to 3.75 mg) by mouth daily. Adjust dose per INR results as instructed. 100 tablet 1     Current Facility-Administered Medications on File Prior to Encounter   Medication Dose Route Frequency Provider Last Rate Last Dose     denosumab 60 mg (PROLIA 60 mg/ml)  60 mg Subcutaneous Q6 Months Israel Barros MD   60 mg at 06/19/17 1301       No Known Allergies    Past Medical History:   Diagnosis Date     Arthritis      Atrial fibrillation      Back pain      Callus      Chronic systolic congestive heart failure      Coronary artery disease      Hyperlipidemia      Hypertension      Permanent atrial fibrillation     [id number] risk score = 5 (age3/ CAD/ HTN/ CHF) Chronic warfarin        Review of Systems  ROS:  Specifically negative for bowel/bladder dysfunction, balance changes, headache, dizziness, foot drop, fevers, chills, appetite changes, nausea/vomiting, unexplained weight loss. Otherwise 13 systems reviewed are negative. Please see the patient's intake questionnaire from today for details.    Reviewed Social, Family, Past Medical and Past Surgical history with patient, no significant changes noted since prior visit.     Objective:     /66 (Patient Site: Left Arm, Patient Position: Sitting)  Pulse (!) 55  Temp 97.5  F (36.4  C)  (Oral)   Wt 192 lb (87.1 kg)  SpO2 99%  BMI 28.35 kg/m2    PHYSICAL EXAMINATION:    --CONSTITUTIONAL: Well developed, well nourished, healthy appearing individual.  --PSYCHIATRIC: Appropriate mood and affect. No difficulty interacting due to temper, social withdrawal, or memory issues.  --SKIN: Lumbar region is dry and intact.   --RESPIRATORY: Normal rhythm and effort. No abnormal accessory muscle breathing patterns noted.   --MUSCULOSKELETAL:  Normal lumbar lordosis noted, no lateral shift.  --GROSS MOTOR: Easily arises from a seated position. Gait is non-antalgic  --LUMBAR SPINE:  Inspection reveals no evidence of deformity. Range of motion is not limited in lumbar flexion, extension, lateral rotation. No tenderness to palpation lumbar spine. Straight leg raising in the supine position is negative to radicular pain. Sciatic notch non-tender.   --LOWER EXTREMITY MOTOR TESTING:  Plantar flexion left 5/5, right 5/5   Dorsiflexion left 5/5, right 5/5   Great toe MTP extension left 5/5, right 5/5  Knee flexion left 5/5, right 5/5  Knee extension left 5/5, right 5/5   Hip flexion left 5/5, right 5/5  Hip abduction left 5/5, right 5/5  Hip adduction left 5/5, right 5/5   --HIPS: Full range of motion bilaterally. Negative FABERs on the involved lower extremity.   --NEUROLOGIC: Bilateral patellar and achilles reflexes are physiologic and symmetric. Sensation to light touch is intact in the bilateral L4, L5, and S1 dermatomes.    RESULTS:   Imaging: Lumbar spine imaging was reviewed today. The images were shown to the patient and the findings were explained using a spine model.      XR SCOLIOSIS AP OR PA AND LATERAL STANDING  10/19/2015   INDICATION: Low back pain. Thoracic kyphosis.  COMPARISON: None.  FINDINGS: Twelve rib-bearing thoracic-type vertebral bodies. Five lumbar type vertebral bodies. Thoracic kyphosis estimated at 51 degrees. Lumbar lordosis between L1 and S1 estimated at 65 degrees. Right convex asymmetry  of the thoracolumbar spine   measuring 8 degrees between T11 and L3.  Several bridging osteophytes in the thoracic spine, pattern that is consistent with diffuse idiopathic skeletal hyperostosis. Prominent left lateral T12-L1 osteophyte and ventral L2-L3 and L3-L4 osteophytes, but not definitely bridging. 5 mm   anterolisthesis of L4 on L5 and 3 mm anterolisthesis of L5 on S1. Prominent facet arthropathy lower lumbar spine. No acute superimposed bony abnormality of the thoracolumbar spine, without definite fracture.  Dense aortic calcification, normal caliber. Left hemithorax generator with two pacemaker leads to the heart. Sternal wires. Reverse right shoulder arthroplasty. Moderate degenerative change both hips.        CT LUMBAR SPINE MYELOGRAM  9/9/2015  INDICATION: Lumbar stenosis.  TECHNIQUE: Helical images were reconstructed in the axial plane at 1.25-mm increments, with sagittal and coronal reformations. Intrathecal contrast introduced under separate procedure and described with that procedure.  COMPARISON: None.  FINDINGS: Five lumbar-type vertebral bodies. Slightly exaggerated lordosis at L4-L5 on a degenerative basis. 4-mm anterior subluxation of L4 on L5. 2-mm anterior subluxation of L5 on S1. Slight retrolisthesis of L2 on L3 and L3 on L4. Left convex   scoliosis measuring 14 degrees between L3 and L5. Much of this is at L4-L5 from asymmetric disc height loss. Right-sided L4 pars defect. L5-S1 wide laminectomy with right facetectomy. No destructive bony lesion is apparent. No fracture is evident.  Conus tip ends at T12-L1. Cauda equina generally unremarkable. Paraspinous soft tissues are grossly normal. Moderate paraspinous muscle volume loss. Normal aortic caliber, moderate calcification. Mild to moderate degenerative change SI joints.  T12-L1: Slight disc height loss. Left anterior interbody spurring. Mild facet arthropathy. No central canal stenosis. No foraminal stenosis.  L1-L2: Slight disc height  loss. Mild circumferential disc bulging. Left anterolateral interbody spur. Mild to moderate facet arthropathy. No central canal stenosis. No foraminal stenosis.  L2-L3: Moderate disc height loss. Moderate circumferential interbody spur and disc bulging, more pronounced anteriorly. Mild facet arthropathy. No central canal stenosis. Mild right foraminal stenosis. No left foraminal stenosis.  L3-L4: Mild disc height loss. Mild to moderate circumferential disc bulging. Mild to moderate facet arthropathy. Some calcification of the right facet cartilage. Mild central canal stenosis. No lateral recess stenosis. Mild to moderate right foraminal   stenosis. Borderline moderate left foraminal stenosis.  L4-L5: Mild disc height loss greater dorsally. At least moderate circumferential disc bulging. Prominent facet arthropathy with diastatic left facet. Moderately severe central canal stenosis. Despite this, no definite lateral recess stenosis. Severe   right and moderate left foraminal stenoses from disc bulging.  L5-S1: Disc height normal. Slight disc bulging. Sagittally oriented left facet. Effectively absent right facet. No central canal stenosis. Moderately severe bilateral foraminal stenoses.  CONCLUSION:  1.  Previous lumbar spine surgery with L5-S1 wide laminectomy and right facetectomy.  2.  Degenerative changes most prominent at L4-L5 and L5-S1. The facets are sagittally oriented. This predisposes to subluxation.  3.  At L4-L5, moderately severe central canal stenosis without grossly apparent lateral recess stenosis. Severe right and moderate left foraminal stenoses.  4.  At L5-S1, moderately severe bilateral foraminal stenoses.  5.  At L3-L4, borderline moderate left foraminal stenosis. Mild central canal stenosis.

## 2021-06-17 NOTE — OP NOTE
Esophagogastroduodenoscopy Procedure Note    Patient Name: Tanmay Israel  Date of Procedure: May 10, 2021    Endoscopist: Franklin Mendoza  Assistants: None    Procedure: ESOPHAGOGASTRODUODENOSCOPY (EGD)    Pre-operative Diagnosis: Esophageal obstruction due to food impaction [K22.2, T18.128A]     Post-operative Diagnosis: Food impaction      Esophageal stricture      Hiatal hernia    Indications:  food impaction    Medications:  Fentanyl 100 mcg IV  Versed 2 mg IV  2 sprays benzocaine    Procedure Details  The patient was informed of the risks, benefits and alternatives and gave informed consent. The patient had stable cardiovascular status and was judged to be adequate for sedation. A timeout was performed.    The procedure was performed with administration of intravenous conscious sedation with appropriate preoperative, intraoperative, and postoperative evaluations.  0 Hr 17 Min 15 Sec of supervised face to face conscious sedation time was provided by a Registered Nurse under my direct supervision.    The patient was placed in the left lateral decubitus position. The endoscope was introduced through the mouth and advanced under direct vision to stomach.  Careful inspection was made upon withdrawal. Retroflexion was performed. Appropriate photodocumentation was obtained.    Endoscopy Findings:         Esophagus- large food impaction from the GE junction to the upper esophageal sphincter. Removed in multiple passes with a Paz Net. Small amount pushed into stomach.   Severe erosive esophagitis present due to the impaction  Esophageal stricture present with a diameter of 13 mm. Not dilated due to anticoagulation and esophagitis.     Stomach- hiatal hernia. Minimally assessed     Estimated Blood Loss: none    Specimens:    None    Complications: None; patient tolerated the procedure well.    Impression: Esophageal food impaction- removed    Esophageal stricture- not dilated    Hiatal hernia      Recommendations: Ok to  discharge     Will arrange EGD in 2-3 weeks for dilation     Mechanical soft diet until repeat EGD     PPI daily       Franklin Mendoza  5/10/2021 10:14 PM  [facility]  [phone]

## 2021-06-17 NOTE — PROGRESS NOTES
"ANTICOAGULATION  MANAGEMENT: Discharge Review    Tanmay Israel chart reviewed for anticoagulation continuity of care    Emergency room visit on  5/10 for esophageal obstruction d/t food impaction.  ED Course Review    Presented with: foreign body in throat, unable to tolerate secretions  Procedures: EGD, removal of \"large volume of food impaction\"-- symptoms resolved  Treatments/interventions: E-Z gas x2-- ineffective in relieving symptoms  Warfarin reversal? none  Consults? GI     Discharge disposition: Home, f/u with [facility] in 2 days    INR Results:   No results for input(s): INR in the last 168 hours.    Warfarin inpatient management: not applicable    Warfarin discharge instructions: home regimen continued     Medication Changes Affecting Anticoagulation: No    Additional Factors Affecting Anticoagulation: No    Plan     No adjustment to anticoagulation plan needed      Patient not contacted      Yaneth Wells, RN                  "

## 2021-06-17 NOTE — PATIENT INSTRUCTIONS - HE
"Patient Instructions by Sharon Arora PT at 9/6/2019 10:00 AM     Author: Sharon Arora PT Service: -- Author Type: Physical Therapist    Filed: 9/6/2019 10:49 AM Encounter Date: 9/6/2019 Status: Addendum    : Sharon Arora PT (Physical Therapist)    Related Notes: Original Note by Sharon Arora PT (Physical Therapist) filed at 9/6/2019 10:37 AM        SEATED HAMSTRING STRETCH    While seated, rest your heel on the floor with your knee straight and gently lean forward until a stretch is felt behind your knee/thigh.    Hold 30 sec, 2-3 times, 1-2 times per day      BRIDGING WITH PILLOW SQUEEZE    While lying on your back, place a pillow between your knees and squeeze the pillow. Hold this and then tighten your lower abdominals, squeeze your buttocks and raise your buttocks off the floor/bed as creating a \"Bridge\" with your body.    10 reps, work up to 3 sets, once per day      HIP FLEXION ISOMETRIC - ALTERNATING    While lying on your back, raise up your knee and press it into your hand. Then, return to the original position and repeat on the other side.     Hold 3-5 sec, work up to 10 reps, work up to 3 times on each leg, once per day      SCAPULAR RETRACTIONS    Draw your shoulder blades back and down.    SIT UP TALL, keep chin tucked down slightly, hold 3-5 sec, 10 reps, 1-3 times per day           "

## 2021-06-17 NOTE — PRE-PROCEDURE
Procedure Name: esophagogastroduodenoscopy  Date/Time: 5/10/2021 9:49 PM    Verbal consent obtained?: Yes  Written consent obtained?: Yes  Risks and benefits: Risks, benefits and alternatives were discussed  Consent given by: patient  Expected level of sedation: moderate  ASA Class: Class 3- Severe systemic disease, definite functional limitations  Mallampati: Grade 2- soft palate, base of uvula, tonsillar pillars, and portion of posterior pharyngeal wall visible  Patient states understanding of procedure being performed: Yes  Patient's understanding of procedure matches consent: Yes  Procedure consent matches procedure scheduled: Yes  Appropriately NPO: yes  Lungs: lungs clear with good breath sounds bilaterally  Heart: normal heart sounds and rate and other (comment)  Heart exam comment: pacer/defibrillator   History & Physical reviewed: History and physical reviewed and no updates needed  Statement of review: I have reviewed the lab findings, diagnostic data, medications, and the plan for sedation

## 2021-06-17 NOTE — ED NOTES
Unable to obtain BP in triage due to pt continually moving arm. Updated JUANCARLOS Pérez to obtain BP while in room.

## 2021-06-17 NOTE — TELEPHONE ENCOUNTER
Telephone Encounter by Yaneth Wells RN at 5/19/2021 11:00 AM     Author: Yaneth Wells RN Service: -- Author Type: Registered Nurse    Filed: 5/19/2021 11:13 AM Encounter Date: 5/19/2021 Status: Signed    : Yaneth Wells RN (Registered Nurse)       ANTICOAGULATION  MANAGEMENT    Assessment     Today's INR result of 3.4 is Supratherapeutic (goal INR of 2.0-3.0)        Missed dose(s) may be affecting INR    No new diet changes affecting INR    No new medication/supplements affecting INR    Continues to tolerate warfarin with no reported s/s of bleeding or thromboembolism     Previous INR was Supratherapeutic    Plan:     Spoke on phone with Tanmay regarding INR result and instructed:      Warfarin Dosing Instructions:  hold warfarin today then change warfarin dose to 1.25 mg daily on M/W/F; and 2.5 mg daily rest of week  (15 % change)    Instructed patient to follow up no later than: 1-2 weeks-- will be having injection on 5/26 and needs INR <3.0, will have INR checked at injection    Education provided: target INR goal and significance of current INR result, monitoring for bleeding signs and symptoms and when to seek medical attention/emergency care    Tanmay verbalizes understanding and agrees to warfarin dosing plan.    Instructed to call the [facility] for any changes, questions or concerns. (#[phone])   ?   Yaneth Wells RN    Subjective/Objective:      Tanmay Israel, a 81 y.o. male is on warfarin.       Tanmay reports:     Home warfarin dose: as updated on anticoagulation calendar per template     Missed doses: Yes-- missed Wed last week     Medication changes:  No     S/S of bleeding or thromboembolism:  No     New Injury or illness:  No     Changes in diet or alcohol consumption:  No     Upcoming surgery, procedure or cardioversion:  No    Anticoagulation Episode Summary     Current INR goal:  2.0-3.0   TTR:  67.1 % (1 y)   Next INR check:  5/26/2021   INR from last check:   3.40 (5/19/2021)   Weekly max warfarin dose:     Target end date:     INR check location:     Preferred lab:     Send INR reminders to:  CHANI RIOS       Comments:           Anticoagulation Care Providers     Provider Role Specialty Phone number    Go Ramírez MD Referring Family Medicine [phone]

## 2021-06-17 NOTE — TELEPHONE ENCOUNTER
ANTICOAGULATION  MANAGEMENT    Assessment     Today's INR result of 2.8 is Therapeutic (goal INR of 2.0-3.0)        Warfarin taken as previously instructed-- held as instructed on 5/19    No new diet changes affecting INR    No new medication/supplements affecting INR    Continues to tolerate warfarin with no reported s/s of bleeding or thromboembolism     Previous INR was Supratherapeutic    Plan:     Spoke on phone with Tanmay regarding INR result and instructed:      Warfarin Dosing Instructions:  Continue current warfarin dose 1.25 mg daily on M/W/F; and 2.5 mg daily rest of week  (0 % change)    Instructed patient to follow up no later than: 2 weeks    Education provided: target INR goal and significance of current INR result, importance of notifying clinic for changes in medications and importance of notifying clinic for diarrhea, nausea/vomiting, reduced intake and/or illness    Tanmay verbalizes understanding and agrees to warfarin dosing plan.    Instructed to call the [facility] for any changes, questions or concerns. (#[phone])   ?   Yaneth Wells RN    Subjective/Objective:      Tanmay Israel, a 81 y.o. male is on warfarin.       Tanmay reports:     Home warfarin dose: verbally confirmed home dose with Tanmay and updated on anticoagulation calendar     Missed doses: No     Medication changes:  No     S/S of bleeding or thromboembolism:  No     New Injury or illness:  No     Changes in diet or alcohol consumption:  No     Upcoming surgery, procedure or cardioversion:  Yes: spinal injection tomorrow    Anticoagulation Episode Summary     Current INR goal:  2.0-3.0   TTR:  67.7 % (1 y)   Next INR check:  6/8/2021   INR from last check:  2.80 (5/25/2021)   Weekly max warfarin dose:     Target end date:     INR check location:     Preferred lab:     Send INR reminders to:  CHANI RIOS       Comments:           Anticoagulation Care Providers     Provider Role Specialty Phone number    Go Ramírez  MD [facility] [phone]

## 2021-06-17 NOTE — ED NOTES
Educated patient on diet. Encouraged patient to start with a full liquid diet then advance to mechanical soft. Told his that [facility] will call him to set up an appointment but if they do not call I told him to call them and highlighted the phone number on his discharge instructions. Patient has a friend here with him that is driving him home.

## 2021-06-17 NOTE — TELEPHONE ENCOUNTER
Telephone Encounter by Yaneth Wells RN at 4/27/2021 11:46 AM     Author: Yaneth Wells RN Service: -- Author Type: Registered Nurse    Filed: 4/27/2021 11:54 AM Encounter Date: 4/27/2021 Status: Signed    : Yaneth Wells RN (Registered Nurse)       ANTICOAGULATION  MANAGEMENT    Assessment     Today's INR result of 3.5 is Supratherapeutic (goal INR of 2.0-3.0)        Warfarin taken as previously instructed    Increased greens/vitamin K intake may be affecting INR, does not plan to continue    No new medication/supplements affecting INR    Continues to tolerate warfarin with no reported s/s of bleeding or thromboembolism     Previous INR was Therapeutic    Plan:     Spoke on phone with Tanmay regarding INR result and instructed:      Warfarin Dosing Instructions:  Change warfarin dose to 2.5 mg daily (6.7 % change)    Instructed patient to follow up no later than: 2 weeks-- will call back to schedule    Education provided: impact of vitamin K foods on INR, target INR goal and significance of current INR result and importance of notifying clinic for diarrhea, nausea/vomiting, reduced intake and/or illness    Tanmay verbalizes understanding and agrees to warfarin dosing plan.    Instructed to call the [facility] for any changes, questions or concerns. (#[phone])   ?   Yaneth Wells RN    Subjective/Objective:      Tanmay Israel, a 81 y.o. male is on warfarin.       Tanmay reports:     Home warfarin dose: as updated on anticoagulation calendar per template     Missed doses: No     Medication changes:  No     S/S of bleeding or thromboembolism:  No     New Injury or illness:  No     Changes in diet or alcohol consumption: Yes-- had extra greens over the weekend     Upcoming surgery, procedure or cardioversion:  No    Anticoagulation Episode Summary     Current INR goal:  2.0-3.0   TTR:  67.1 % (1 y)   Next INR check:  5/11/2021   INR from last check:  3.50 (4/27/2021)   Weekly max  warfarin dose:     Target end date:     INR check location:     Preferred lab:     Send INR reminders to:  CHANI RIOS       Comments:           Anticoagulation Care Providers     Provider Role Specialty Phone number    Go Ramírez MD Referring Family Medicine [phone]

## 2021-06-17 NOTE — TELEPHONE ENCOUNTER
Telephone Encounter by Ana Adler RN at 12/29/2020 11:32 AM     Author: Ana Adler RN Service: -- Author Type: Registered Nurse    Filed: 12/29/2020 11:34 AM Encounter Date: 12/29/2020 Status: Signed    : Ana Adler RN (Registered Nurse)       ANTICOAGULATION  MANAGEMENT    Assessment     Today's INR result of 1.9 is Subtherapeutic (goal INR of 2.0-3.0)        Warfarin taken as previously instructed    Holidays may be affecting diet and INR    No new medication/supplements affecting INR    Continues to tolerate warfarin with no reported s/s of bleeding or thromboembolism     Previous INR was Therapeutic    Plan:     Spoke on phone with Tanmay regarding INR result and instructed:     Warfarin Dosing Instructions:  Take 5 mg today only then continue current warfarin dose 2.5 mg daily  (0 % change)    Instructed patient to follow up no later than: 2 weeks    Education provided: importance of consistent vitamin K intake, target INR goal and significance of current INR result, importance of notifying clinic for changes in medications and importance of notifying clinic for diarrhea, nausea/vomiting, reduced intake and/or illness    Tanmay verbalizes understanding and agrees to warfarin dosing plan.    Instructed to call the [facility] for any changes, questions or concerns. (#[phone])   ?   Ana Adler RN    Subjective/Objective:      Tanmay GIO Lindy, a 80 y.o. male is on warfarin.     Tanmay reports:     Home warfarin dose: verbally confirmed home dose with Tanmay and updated on anticoagulation calendar     Missed doses: No     Medication changes:  No     S/S of bleeding or thromboembolism:  No     New Injury or illness:  No     Changes in diet or alcohol consumption:  Yes: likely little more greens with the holiday over the weekend     Upcoming surgery, procedure or cardioversion:  No    Anticoagulation Episode Summary     Current INR goal:  2.0-3.0   TTR:  68.3 % (1 y)   Next INR check:   1/12/2021   INR from last check:  1.90 (12/29/2020)   Weekly max warfarin dose:     Target end date:     INR check location:     Preferred lab:     Send INR reminders to:  CHANI RIOS       Comments:           Anticoagulation Care Providers     Provider Role Specialty Phone number    Go Ramírez MD Referring Family Medicine [phone]

## 2021-06-17 NOTE — TELEPHONE ENCOUNTER
Telephone Encounter by Ana Alder RN at 1/26/2021 11:32 AM     Author: Ana Adler RN Service: -- Author Type: Registered Nurse    Filed: 1/26/2021 11:36 AM Encounter Date: 1/26/2021 Status: Signed    : Ana Adler RN (Registered Nurse)       ANTICOAGULATION  MANAGEMENT    Assessment     Today's INR result of 1.9 is Subtherapeutic (goal INR of 2.0-3.0)        Warfarin taken as previously instructed    No new diet changes affecting INR    No new medication/supplements affecting INR    Continues to tolerate warfarin with no reported s/s of bleeding or thromboembolism     Previous INR was Therapeutic    Plan:     Spoke on phone with Tanmay regarding INR result and instructed:     Warfarin Dosing Instructions:  Change warfarin dose to 3.75 mg daily on Tues; and 2.5 mg daily rest of week  (7.1 % change)    Instructed patient to follow up no later than: 2 weeks    Education provided: target INR goal and significance of current INR result, importance of following up for INR monitoring at instructed interval, importance of taking warfarin as instructed, importance of notifying clinic for changes in medications and importance of notifying clinic for diarrhea, nausea/vomiting, reduced intake and/or illness    Tanmay verbalizes understanding and agrees to warfarin dosing plan.    Instructed to call the [facility] for any changes, questions or concerns. (#[phone])   ?   Ana Adler RN    Subjective/Objective:      Tanmay Israel, a 80 y.o. male is on warfarin.     Tanmay reports:     Home warfarin dose: verbally confirmed home dose with Tanmay and updated on anticoagulation calendar     Missed doses: No     Medication changes:  No     S/S of bleeding or thromboembolism:  No     New Injury or illness:  No     Changes in diet or alcohol consumption:  No     Upcoming surgery, procedure or cardioversion:  No    Anticoagulation Episode Summary     Current INR goal:  2.0-3.0   TTR:  66.0 % (1 y)   Next INR  check:  1/26/2021   INR from last check:  1.90 (1/26/2021)   Weekly max warfarin dose:     Target end date:     INR check location:     Preferred lab:     Send INR reminders to:  CHANI RIOS       Comments:           Anticoagulation Care Providers     Provider Role Specialty Phone number    Go Ramírez MD Referring Family Medicine [phone]

## 2021-06-17 NOTE — H&P
Admission History & Physical  Tanmay Israel, 1940, [id number]    [facility]  Go Ramírez MD, [phone]   Code Status:  Prior   Extended Emergency Contact Information  Primary Emergency Contact: Mary Kay Jovel  Address: [address] [place] [address] [place]  Home Phone: [phone]  Work Phone: [phone]  Mobile Phone: [phone]  Relation: Niece  Secondary Emergency Contact: Anthony Israel  Address: [address] [place] [address] [facility] [place]  Home Phone: [phone]  Mobile Phone: [phone]  Relation: Nephew     Assessment and Plan:   Food impaction- esophageal. Hx of stricture and dysmotility  Principal Problem:    Esophageal obstruction due to food impaction      Chief Complaint: Food impaction     HPI:    Tanmay Israel is a 81 y.o. old male with a hx of esophageal food impaction and esophageal dysmotility in the ER with a food impaction   History is provided by patient    Medical History  Active Ambulatory (Non-Hospital) Problems    Diagnosis     ICD (implantable cardioverter-defibrillator) in place     Polyp of colon     Dizziness     Acute renal failure (ARF) (H)     Hypomagnesemia     S/P cervical spinal fusion     S/P laparoscopic cholecystectomy     Pharyngoesophageal dysphagia     Polyp of stomach and duodenum     Heart failure with reduced ejection fraction (H)     Ileitis     Osteoporosis     Cerebral aneurysm     A-fib (H)     Anemia, unspecified type     Warfarin-induced coagulopathy (H)     Stage 3 chronic kidney disease     Sacroiliac joint dysfunction of right side     Lumbar stenosis with neurogenic claudication     ICD (implantable cardioverter-defibrillator), single, in situ     Osteoarthritis Of The Knee     Esophageal Reflux     Obstructive sleep apnea     Ptosis Of Eyelid     Hyperlipidemia     Anemia     Crohn's (Granulomatous) Colitis     Benign Essential Hypertension     Coronary atherosclerosis  of native coronary artery     Ischemic cardiomyopathy     Paroxysmal ventricular tachycardia (H)     Dry Nonexudative Macular Degeneration     Serum Enzyme Levels - Alkaline Phosphatase Elevated     Dysphagia     Iron deficiency anemia     History of Crohn's disease     Schatzki's ring     Rotator cuff tear arthropathy of right shoulder     Diaphragmatic hernia     Diverticulosis of colon     Flatulence, eructation and gas pain     Reflux esophagitis     Past Medical History:   Diagnosis Date     Acute cholecystitis 2/28/2019     Acute post-operative pain      Anemia      Arthritis      Atrial fibrillation (H)      C. difficile diarrhea 4/21/2019     Calculus of ureter      Callus      Cerebral aneurysm      Cervical myelopathy (H) 2/22/2019     Cervical spinal stenosis      Chronic kidney disease      Chronic systolic congestive heart failure (H)      Closed fracture of distal end of left radius, unspecified fracture morphology, initial encounter      Closed fracture of distal end of right radius, unspecified fracture morphology, initial encounter      Closed fracture of first thoracic vertebra, unspecified fracture morphology, initial encounter (H)      Coronary artery disease      Crohn's disease (H)      Dysphagia      Fall 10/22/2016     GERD (gastroesophageal reflux disease)      Hyperlipidemia      Hypertension      Hypotension, unspecified hypotension type      ICD (implantable cardioverter-defibrillator) in place      Ischemic cardiomyopathy      Kidney stone      Low back pain      Lumbago      Macular degeneration      Muscle Aches, Generalized (Myalgias)      Permanent atrial fibrillation (H)      Personal history of colonic polyps 2/12/2019     Polyp of duodenum 10/17/2016     Pyuria      Right arm weakness 4/2/2019     Right Rotator Cuff Tendonitis      Schatzki's ring      Sciatica      Serum Enzyme Levels - ALT (SGPT) Elevated      Sleep apnea      Spondylolisthesis of lumbar region 7/5/2016      Weakness 4/20/2019     Patient Active Problem List    Diagnosis Date Noted     Esophageal obstruction due to food impaction 05/10/2021     ICD (implantable cardioverter-defibrillator) in place 02/25/2021     Polyp of colon 09/04/2020     Dizziness 07/15/2019     Acute renal failure (ARF) (H) 05/22/2019     Hypomagnesemia 04/01/2019     S/P cervical spinal fusion 03/11/2019     S/P laparoscopic cholecystectomy 03/11/2019     Pharyngoesophageal dysphagia 12/04/2018     Polyp of stomach and duodenum 12/04/2018     Heart failure with reduced ejection fraction (H) 10/10/2018     Ileitis 09/07/2018     Osteoporosis 04/28/2017     Cerebral aneurysm 11/16/2016     A-fib (H)      Anemia, unspecified type      Warfarin-induced coagulopathy (H)      Stage 3 chronic kidney disease 09/27/2016     Sacroiliac joint dysfunction of right side 08/22/2016     Lumbar stenosis with neurogenic claudication 07/05/2016     ICD (implantable cardioverter-defibrillator), single, in situ 10/20/2015     Osteoarthritis Of The Knee      Esophageal Reflux      Obstructive sleep apnea      Ptosis Of Eyelid      Hyperlipidemia      Anemia      Crohn's (Granulomatous) Colitis      Benign Essential Hypertension      Coronary atherosclerosis of native coronary artery      Ischemic cardiomyopathy      Paroxysmal ventricular tachycardia (H)      Dry Nonexudative Macular Degeneration      Serum Enzyme Levels - Alkaline Phosphatase Elevated      Dysphagia      Iron deficiency anemia 01/04/2013     History of Crohn's disease 12/06/2012     Schatzki's ring 12/06/2012     Rotator cuff tear arthropathy of right shoulder 11/26/2012     Diaphragmatic hernia 11/20/2012     Diverticulosis of colon 05/12/2009     Flatulence, eructation and gas pain 12/09/2008     Reflux esophagitis 06/30/2008     Surgical History  He  has a past surgical history that includes Esophagogastroduodenoscopy; Tonsillectomy; Appendectomy; Cholecystectomy; Rotator cuff repair (Right);  Cataract extraction w/  intraocular lens implant (Bilateral); XR Myelogram Cervical Thoracic Lumbar (1/17/2019); pr c- laminoplasty, 2 or more (Left, 2/22/2019); pr lap,cholecystectomy (N/A, 2/28/2019); Cardiac defibrillator placement (2013); Coronary artery bypass graft (08/2004); and Colonoscopy (N/A, 2/19/2020).     Past Surgical History:   Procedure Laterality Date     APPENDECTOMY       CARDIAC DEFIBRILLATOR PLACEMENT  2013    ICD Medtronic     CATARACT EXTRACTION W/  INTRAOCULAR LENS IMPLANT Bilateral      CHOLECYSTECTOMY       COLONOSCOPY N/A 2/19/2020    Procedure: COLONOSCOPY;  Surgeon: Houston Medina MD;  Location: [facility];  Service: Gastroenterology     CORONARY ARTERY BYPASS GRAFT  08/2004    Coronary Artery Quadruple Venous Bypass Graft     ESOPHAGOGASTRODUODENOSCOPY       ID C- LAMINOPLASTY, 2 OR MORE Left 2/22/2019    Procedure: LEFT CERVICAL 4, 5, 6 LAMINOPLASTY;  Surgeon: Liza Cesar MD;  Location: [facility] OR;  Service: Spine     ID LAP,CHOLECYSTECTOMY N/A 2/28/2019    CHOLECYSTECTOMY, LAPAROSCOPIC;  Surgeon: Mana Roamn MD;  Location: [facility] OR;  Service: General     ROTATOR CUFF REPAIR Right      TONSILLECTOMY       XR MYELOGRAM CERVICAL THORACIC LUMBAR  1/17/2019    Social History  Reviewed, and he  reports that he has quit smoking. He has never used smokeless tobacco. He reports that he does not drink alcohol or use drugs.  Social History     Tobacco Use     Smoking status: Former Smoker     Smokeless tobacco: Never Used   Substance Use Topics     Alcohol use: No     Comment: rarely      Allergies  No Known Allergies Family History  Reviewed, and family history includes Alcohol abuse in his brother; Cancer in his brother; Crohn's disease in his father; Heart disease in his mother; No Medical Problems in his daughter, maternal aunt, maternal grandfather, maternal grandmother, maternal uncle, paternal aunt, paternal grandfather, paternal  "grandmother, paternal uncle, and son; Stroke in his mother.   Psychosocial Needs  Social History     Social History Narrative     Not on file     Additional psychosocial needs reviewed per nursing assessment.       Prior to Admission Medications   No outpatient medications have been marked as taking for the 5/10/21 encounter (Hospital Encounter).           Review of Systems   Constitutional: Negative.    HENT: Negative.    Respiratory: Negative.    Cardiovascular: Negative.        /65   Pulse 65   Temp 97.7  F (36.5  C) (Oral)   Resp 18   Ht 5' 9\" (1.753 m)   Wt 164 lb (74.4 kg)   SpO2 100%   BMI 24.22 kg/m      Physical Exam  Constitutional:       Appearance: Normal appearance. He is normal weight.   HENT:      Head: Normocephalic and atraumatic.      Mouth/Throat:      Mouth: Mucous membranes are moist.      Comments: edentulous  Eyes:      Extraocular Movements: Extraocular movements intact.      Pupils: Pupils are equal, round, and reactive to light.   Cardiovascular:      Rate and Rhythm: Normal rate and regular rhythm.      Comments: Pacer/defibrillator  Pulmonary:      Effort: Pulmonary effort is normal.      Breath sounds: Normal breath sounds.   Neurological:      Mental Status: He is alert.         Results:  Reviewed           "

## 2021-06-17 NOTE — TELEPHONE ENCOUNTER
Anticoagulation Management    Unable to reach Tanmay today.    Today's INR result of 3.2 is Supratherapeutic (goal INR of 2.0-3.0).     Follow up required to discuss out of range INR .    Left message to take reduced dose of warfarin, 1.25 mg tonight.       ACN to follow up    Yaneth Wells RN

## 2021-06-17 NOTE — TELEPHONE ENCOUNTER
Called Tanmay regarding his warfarin dosing.     Advised the below dosing per INR encounter from today:  Warfarin Dosing Instructions:  Change warfarin dose to 2.5 mg daily (6.7 % change)    Tanmay verbalized understanding.  Scheduled recheck for 5/11.      Yaneth Wells RN  [facility] Anticoagulation

## 2021-06-18 ENCOUNTER — COMMUNICATION - HEALTHEAST (OUTPATIENT)
Dept: ANTICOAGULATION | Facility: CLINIC | Age: 81
End: 2021-06-18

## 2021-06-18 ENCOUNTER — AMBULATORY - HEALTHEAST (OUTPATIENT)
Dept: CARE COORDINATION | Facility: CLINIC | Age: 81
End: 2021-06-18

## 2021-06-18 ENCOUNTER — COMMUNICATION - HEALTHEAST (OUTPATIENT)
Dept: NURSING | Facility: CLINIC | Age: 81
End: 2021-06-18

## 2021-06-18 DIAGNOSIS — J96.01 ACUTE RESPIRATORY FAILURE WITH HYPOXIA (H): ICD-10-CM

## 2021-06-18 NOTE — PROGRESS NOTES
Assessment/Plan:    1. Diarrhea  Will check apprehensive metabolic panel to check for any electrolyte imbalances.  ill also check blood count to rule out any underlying infection or anemia.  Will follow-up with patient regarding these lab results when made available.  In light of patient's history Crohn's disease, referral placed to gastroenterology for further evaluation and management of intermittent diarrhea.  Okay to take Pepto-Bismol intermittently for management of symptoms in the meantime.  Discussed red flag symptoms that would trigger prompt return to clinic or urgent care.  Patient advised to notify clinic if he develops any new or worsening symptoms.  He understands and is comfortable with this plan.  - HM1(CBC and Differential)  - Comprehensive Metabolic Panel  - Ambulatory referral to Gastroenterology  - HM1 (CBC with Diff)      Subjective:    Tanmay Israel is a 78-year-old male seen today for elevation of diarrhea.  Patient reports that he has had some intermittent diarrhea over the last few months.  Has history of Crohn's and therefore has had irregular bowel movements in the past.  After Crohn's procedure several years ago he was told that he no longer had this and has not followed up with GI recently.  Notes that diarrhea comes and goes.  Does not seem to have any specific pattern to it.  It is not related to any specific foods or time of day.  Does have normal bowel movements in between these episodes.  Has been taking Pepto-Bismol which does relieves symptoms.  Is hesitant to pass any gas and fear of having diarrhea or incontinence.  Does note that he has generally poor eating habit.  Does not eat very many fruits, vegetables, or whole grains.  Has cereal much of the time.  He has not noticed any blood or mucus in the stool. Stools are generally darker in color after taking Pepto-Bismol.   No significant abdominal pain but does have some bloating and discomfort at times.  Denies nausea, vomiting.   Has not had any fevers, chills, weight loss.  Is not taking any new medications.  No recent antibiotic use.  Denies any international travel but was in [place] for some time this winter.  Returned in April 2018.  Denies any other new exposures. Review of systems is as stated in HPI, and the remainder of the 10 system review is otherwise unremarkable.    Past Medical History, Family History, and Social History reviewed.    Past Surgical History:   Procedure Laterality Date     APPENDECTOMY       CARDIAC PACEMAKER PLACEMENT  2013     CATARACT EXTRACTION W/  INTRAOCULAR LENS IMPLANT Bilateral      CHOLECYSTECTOMY       ESOPHAGOGASTRODUODENOSCOPY       CO CABG, VEIN, FOUR      Description: Coronary Artery Quadruple Venous Bypass Graft;  Recorded: 10/21/2008;  Comments: 8/2004     ROTATOR CUFF REPAIR Right      TONSILLECTOMY          Family History   Problem Relation Age of Onset     Heart disease Mother      Stroke Mother      Crohn's disease Father      Alcohol abuse Brother      No Medical Problems Daughter      No Medical Problems Son      No Medical Problems Maternal Aunt      No Medical Problems Maternal Uncle      No Medical Problems Paternal Aunt      No Medical Problems Paternal Uncle      No Medical Problems Maternal Grandmother      No Medical Problems Maternal Grandfather      No Medical Problems Paternal Grandmother      No Medical Problems Paternal Grandfather      Cancer Brother         Past Medical History:   Diagnosis Date     Arthritis      Atrial fibrillation      Back pain      Callus      Chronic systolic congestive heart failure      Coronary artery disease      Hyperlipidemia      Hypertension      Permanent atrial fibrillation     [id number] risk score = 5 (age1/ CAD/ HTN/ CHF) Chronic warfarin        Social History   Substance Use Topics     Smoking status: Former Smoker     Smokeless tobacco: Never Used     Alcohol use No        Current Outpatient Prescriptions   Medication Sig Dispense  Refill     acetaminophen (TYLENOL) 325 MG tablet Take 650 mg by mouth every 4 (four) hours as needed for pain. Indications: Pain       carvedilol (COREG) 25 MG tablet Take 1.5 tablets (37.5 mg total) by mouth 2 (two) times a day with meals. 270 tablet 2     carvedilol (COREG) 25 MG tablet TAKE 1 AND 1/2 TABLETS BY MOUTH TWICE DAILY 270 tablet 1     cholecalciferol, vitamin D3, 1,000 unit tablet Take 2 tablets (2,000 Units total) by mouth daily.  0     diclofenac sodium (VOLTAREN) 1 % Gel Apply to painful areas over low back and sacroiliac joint once daily as needed for pain 1 Tube 3     diphenoxylate-atropine (LOMOTIL) 2.5-0.025 mg per tablet TK 2 T PO Q 6 H PRF CRAMPS/DIARRHEA  0     FERROUS FUMARATE (IRON ORAL) Take 1 tablet by mouth daily.        furosemide (LASIX) 40 MG tablet Take 1 tablet (40 mg total) by mouth daily. 90 tablet 3     losartan (COZAAR) 50 MG tablet TAKE 1 1/2 TABLETS BY MOUTH EVERY  tablet 2     losartan (COZAAR) 50 MG tablet   1     lovastatin (MEVACOR) 40 MG tablet TAKE 1 TABLET BY MOUTH EVERY DAY AS DIRECTED 90 tablet 2     lovastatin (MEVACOR) 40 MG tablet TAKE 1 TABLET BY MOUTH EVERY DAY AS DIRECTED 90 tablet 3     lovastatin (MEVACOR) 40 MG tablet TAKE 1 TABLET BY MOUTH EVERY DAY AS DIRECTED 90 tablet 1     metroNIDAZOLE (FLAGYL) 250 MG tablet   0     multivitamin with iron (ONE DAILY WITH IRON) Tab tablet Take 1 tablet by mouth daily.        omeprazole (PRILOSEC) 20 MG capsule TAKE 1 CAPSULE BY MOUTH TWICE DAILY 180 capsule 0     omeprazole (PRILOSEC) 20 MG capsule TAKE 1 CAPSULE BY MOUTH TWICE DAILY 180 capsule 0     PROAIR HFA 90 mcg/actuation inhaler INHALE 2 PUFFS PO Q 4 H PRF SOB  0     warfarin (COUMADIN) 2.5 MG tablet TAKE 1 AND 1/2 TABLETS BY MOUTH ON THURSDAYS; TAKE 1 TABLET ALL OTHER DAYS OR AS DIRECTED. 60 tablet 6     warfarin (COUMADIN) 2.5 MG tablet Take 1 to 1.5 tablets (2.5 to 3.75 mg) by mouth daily. Adjust dose per INR results as instructed. 100 tablet 1      Current Facility-Administered Medications   Medication Dose Route Frequency Provider Last Rate Last Dose     denosumab 60 mg (PROLIA 60 mg/ml)  60 mg Subcutaneous Q6 Months Israel Barros MD   60 mg at 06/19/17 1301          Objective:    Vitals:    05/16/18 1145   BP: 108/62   Pulse: 72   Temp: 98  F (36.7  C)   SpO2: 100%   Weight: 193 lb (87.5 kg)      Body mass index is 28.5 kg/(m^2).      General Appearance:  Alert, cooperative, no distress, appears stated age   Neck: Supple, symmetrical, no adenopathy.   Lungs:   Clear to auscultation bilaterally, respirations unlabored.  No expiratory wheeze or inspiratory crackles noted.   Heart:  Regular rate and rhythm, S1, S2 normal, no murmur, rub or gallop   Abdomen:   Soft, non-tender, positive bowel sounds, no masses, no organomegaly   Extremities: Extremities normal.  No cyanosis or edema   Skin: Warm, dry.  Skin color, texture, turgor normal, no rashes or lesions           This note has been dictated using voice recognition software. Any grammatical or context distortions are unintentional and inherent to the use of this software.

## 2021-06-18 NOTE — PROGRESS NOTES
ASSESSMENT: Onychauxis, foot pain.    PLAN: Toenails were debrided x10.  Return to clinic in nine weeks.         SUBJECTIVE: Toenails are long, thick and painful. Last nail debridement in the clinic was February 14, 2018.      OBJECTIVE:  General: Pleasant 78 y.o. male in no acute distress.  Vascular: DP pulses are absent. PT pulses are palpable. Pedal hair is diminished. Feet are cool to the touch.  Neuro: Sensation in the feet is altered. Patient describes paresthesias.  Derm:  Toenails are elongated, thickened and dystrophic with discoloration and subungual debris. Skin is thin and shiny but intact.   Musculoskeletal: Hammertoes.

## 2021-06-18 NOTE — LETTER
Letter by Roxanne Rodrigues MD at      Author: Roxanne Rodrigues MD Service: -- Author Type: --    Filed:  Encounter Date: 1/24/2019 Status: (Other)       Go Ramírez MD  [address]  [address]  [facility] [address]                                  January 24, 2019    Patient: Tanmay Israel   MR Number: [id number]   YOB: 1940   Date of Visit: 1/24/2019     Dear Dr. Desiree MD:    Thank you for referring Tanmay Israel to me for evaluation. Below are the relevant portions of my assessment and plan of care.    If you have questions, please do not hesitate to call me. I look forward to following Tanmay along with you.    Sincerely,        Roxanne Rodrigues MD          CC  No Recipients  Roxanne Rodrigues MD  1/24/2019 12:53 PM  Incomplete  NEUROSURGERY CONSULTATION NOTE:    Assessment:    1. Cord compression myelopathy (H)     2. Sacroiliac joint pain     3. Lumbar radiculopathy           Plan: I concur with Dr. Cesar that a cervical laminoplasty is in order.  I did explain the risks and benefits and the logic and rationale of this recommendation.  He has sacroiliac pain as he responded to the injection for nearly 2 weeks.  He may have a component of lumbar radiculopathy on the right due to right L4-5 lateral stenosis.  He also has foraminal stenosis at L5-S1.  This should be addressed after his cervical spine is addressed.  He would like to proceed with both myself and Dr. Cesar.  I will try to arrange this.  He will need to have cardiac clearance as he does have congestive heart failure.  He will also need to be cleared by Dr. Ramírez.  Time spent with him and his niece was 45 minutes in review of information, personal interpretation of imaging, documentation, coordination of care and face-to-face discussion.    Tanmay Israel   [address]  [facility] [address]  78 y.o. male is sent to me in consultation   by Go Ramírez MD     CC: Cord compression myelopathy    HPI:  Neurosurgery consultation was  requested by: Jyoti Her,ERIN and Dr. Cesar   Pain: LBP  Radicular Pain is present: Pain radiates into buttock and back of both legs to the knees  Lhermitte sign: No  Motor complaints: Weakness in both legs   Sensory complaints: Denies numbness and tingling   Gait and balance issues: NO   Bowel or bladder issues: Some bladder issues   Duration of SX is: Many years   The symptoms are worse with: Walking   The symptoms are better with: Injections   Injury: Denies   Severity is: Chronic   Patient has tried the following conservative measures: He has done PT and did not help. He has also done injections and states he has had significant relief.        PROBLEM LIST:  1. Cord compression myelopathy (H)     2. Sacroiliac joint pain     3. Lumbar radiculopathy            REVIEW OF SYSTEMS:  A 12 point review is positive for personal history of arthritis.  The neurological review is listed above.  Otherwise, the review is negative.      Past Medical History:   Diagnosis Date   ? Anemia    ? Arthritis    ? Atrial fibrillation (H)    ? Back pain    ? Callus    ? Cerebral aneurysm    ? Chronic kidney disease    ? Chronic systolic congestive heart failure (H)    ? Coronary artery disease    ? Crohn's disease (H)    ? Dysphagia    ? GERD (gastroesophageal reflux disease)    ? Hyperlipidemia    ? Hypertension    ? ICD (implantable cardioverter-defibrillator) in place     Medtronic   ? Ischemic cardiomyopathy    ? Kidney stone    ? Low back pain    ? Macular degeneration    ? Permanent atrial fibrillation (H)     [id number] risk score = 5 (age3/ CAD/ HTN/ CHF) Chronic warfarin   ? Sleep apnea          Past Surgical History:   Procedure Laterality Date   ? APPENDECTOMY     ? CARDIAC PACEMAKER PLACEMENT  2013    ICD Medtronic   ? CATARACT EXTRACTION W/  INTRAOCULAR LENS IMPLANT Bilateral    ? CHOLECYSTECTOMY     ? ESOPHAGOGASTRODUODENOSCOPY     ? [place] CABG, VEIN, FOUR      Description: Coronary Artery Quadruple Venous Bypass  Graft;  Recorded: 10/21/2008;  Comments: 8/2004   ? ROTATOR CUFF REPAIR Right    ? TONSILLECTOMY     ? XR MYELOGRAM CERVICAL THORACIC LUMBAR  1/17/2019         MEDICATIONS:  Current Outpatient Medications   Medication Sig Dispense Refill   ? acetaminophen (TYLENOL) 325 MG tablet Take 650 mg by mouth every 4 (four) hours as needed for pain. Indications: Pain     ? bumetanide (BUMEX) 1 MG tablet Take 1 tablet (1 mg total) by mouth 2 (two) times a day at 9am and 6pm. 360 tablet 3   ? carvedilol (COREG) 25 MG tablet Take 1.5 tablets (37.5 mg total) by mouth 2 (two) times a day. 270 tablet 1   ? cholecalciferol, vitamin D3, 1,000 unit tablet Take 2,000 Units by mouth daily.     ? FERROUS FUMARATE (IRON ORAL) Take 1 tablet by mouth daily.      ? losartan (COZAAR) 50 MG tablet Take 1 tablet (50 mg total) by mouth daily.     ? lovastatin (MEVACOR) 40 MG tablet Take 1 tablet by mouth daily.     ? metOLazone (ZAROXOLYN) 2.5 MG tablet TAKE 1 TABLET BY MOUTH ONCE FOR 1 DOSE 3 tablet 0   ? multivitamin with iron (ONE DAILY WITH IRON) Tab tablet Take 1 tablet by mouth daily.      ? omeprazole (PRILOSEC) 20 MG capsule Take 20 mg by mouth 2 (two) times a day.     ? ondansetron (ZOFRAN) 8 MG tablet Take 1 tablet (8 mg total) by mouth every 8 (eight) hours as needed for nausea. 30 tablet 1   ? rosuvastatin (CRESTOR) 20 MG tablet Take 1 tablet (20 mg total) by mouth at bedtime. 90 tablet 3   ? warfarin (COUMADIN) 2.5 MG tablet Take 1.25-2.5 mg by mouth See Admin Instructions. Take 1.25 mg (0.5 tablet) on Wednesdays/Saturdays and 2.5 mg rest of week. Adjust dose based on INR results as directed.       No current facility-administered medications for this visit.          ALLERGIES/SENSITIVITIES:     No Known Allergies    PERTINENT SOCIAL HISTORY:   Social History     Socioeconomic History   ? Marital status: Single     Spouse name: None   ? Number of children: None   ? Years of education: None   ? Highest education level: None   Social  "Needs   ? Financial resource strain: None   ? Food insecurity - worry: None   ? Food insecurity - inability: None   ? Transportation needs - medical: None   ? Transportation needs - non-medical: None   Occupational History   ? Occupation: retired     Comment: [employer]   Tobacco Use   ? Smoking status: Former Smoker   ? Smokeless tobacco: Never Used   Substance and Sexual Activity   ? Alcohol use: No   ? Drug use: No   ? Sexual activity: None       FAMILY HISTORY:  Family History   Problem Relation Age of Onset   ? Heart disease Mother    ? Stroke Mother    ? Crohn's disease Father    ? Alcohol abuse Brother    ? No Medical Problems Daughter    ? No Medical Problems Son    ? No Medical Problems Maternal Aunt    ? No Medical Problems Maternal Uncle    ? No Medical Problems Paternal Aunt    ? No Medical Problems Paternal Uncle    ? No Medical Problems Maternal Grandmother    ? No Medical Problems Maternal Grandfather    ? No Medical Problems Paternal Grandmother    ? No Medical Problems Paternal Grandfather    ? Cancer Brother    ? Urolithiasis Neg Hx    ? Gout Neg Hx         PHYSICAL EXAM:   Constitutional: BP (!) 129/94   Pulse 75   Ht 5' 9\" (1.753 m)   Wt 192 lb (87.1 kg)   SpO2 98%   BMI 28.35 kg/m       General appearance: Appropriately groomed.  No acute distress.  Interactive.     Mental Status: Mental status: Alert and oriented, mood and affect appropriate, language reception and expression normal, recent and remote memory is normal, higher cortical function normal. Attention span, concentration and ability to follow commands is normal.       Cranial Nerves: Face is symmetric.  Extraocular movements are full, conjugate and without nystagmus.  Hearing is preserved.  Shoulder position is symmetric.  Tongue is midline with normal motion.      Motor: Proximal leg weakness with hip flexor weakness.  This is 4+/5.  He also has to push himself out of the chair.  The remaining muscles in the lower extremities and " upper extremities are intact to gross confrontation testing.    Sensory: Sensory exam by subjective report intact to LT,PP,Position and Vib. in the UE and  LE.     Station and Gait: Gait is slow with some wide stance.  He also has some balance issues.  Unable to tandem walk.     Reflexes; hyperreflexia is present with spreading of reflexes.    IMAGING:  I have personally reviewed the images and discussed the findings with Tanmay Israel and his niece.  The myelogram CT shows moderate stenosis at C5-6.  He also has mild to moderate spinal canal narrowing at C4-5.  He has mild spinal canal narrowing at C6-7.  The thoracic spine CT with myelogram does not show any high-grade spinal canal narrowing.  With regards to the lumbar spine, he has lateral recess stenosis at L4-5 there is bilateral with moderate spinal canal narrowing.  He has moderate to severe right neuroforaminal narrowing at this level and moderate to severe left neuroforaminal narrowing.  At L5-S1 he has moderate to severe bilateral foraminal narrowing.  He has degenerative changes elsewhere in the lumbar spine but less pronounced foraminal stenosis.    CC:     Go Ramírez MD[address] [facility] [address], [place] [address]       Harryhellen Abbie T  1/24/2019  7:53 AM  Signed  Neurosurgery consultation was requested by: Jyoti Her CNP and Dr. Cesar   Pain: LBP  Radicular Pain is present: Pain radiates into buttock and back of both legs to the knees  Lhermitte sign: No  Motor complaints: Weakness in both legs   Sensory complaints: Denies numbness and tingling   Gait and balance issues: NO   Bowel or bladder issues: Some bladder issues   Duration of SX is: Many years   The symptoms are worse with: Walking   The symptoms are better with: Injections   Injury: Denies   Severity is: Chronic   Patient has tried the following conservative measures: He has done PT and did not help. He has also done injections and states he has had significant relief.  CANDIDA  score is:  SANDY Alvarado

## 2021-06-18 NOTE — PROGRESS NOTES
Assessment:     Diagnoses and all orders for this visit:    Chronic right-sided low back pain without sciatica  -     Ambulatory referral to PT/OT  -     OPS Medial Branch Block Lumbar Unilateral; Future; Expected date: 6/6/18    Right lumbar radiculitis  -     Ambulatory referral to PT/OT    Lumbar foraminal stenosis  -     Ambulatory referral to PT/OT    Lumbar stenosis  -     Ambulatory referral to PT/OT    Sacroiliac joint dysfunction of right side    Chronic pain of both knees  -     Ambulatory referral to PT/OT    Lumbar facet arthropathy  -     Ambulatory referral to PT/OT  -     OPS Medial Branch Block Lumbar Unilateral; Future; Expected date: 6/6/18     Tanmay Israel is a 78 y.o. y.o. male with past medical history significant for knee osteoarthritis, GERD, DANIEL, hyperlipidemia, Crohn's colitis, hypertension, coronary atherosclerosis, ischemic cardiomyopathy/chronic systolic CHF, paroxysmal ventricular tachycardia, internal defibrillator, lumbar spinal listhesis/SI joint dysfunction, chronic kidney disease stage III cerebral aneurysm, osteoporosis who presents today for follow-up regarding:    -Ongoing in which he reports he got significant initial relief but no lasting relief with right L5-S1 TF JOSUÉ, however not having as much right low back pain at the lumbosacral junction radicular pain that he was previously in an L5 dermatomal pattern.  His pain is most significant into the right low back therefore there could be some facet joint mediated pain as well.  Can determine further with diagnostic MBB.  If no benefit with MBB could consider surgical referral again however.    -Chronic bilateral knee pain right greater than left likely related to osteoarthritis.    Patient is neurologically intact on exam.     Plan:     A shared decision making plan was used. The patient's values and choices were respected. Prior medical records from 5/8/18 were reviewed today. The following represents what was discussed and  decided upon by the provider and the patient.        -DIAGNOSTIC TESTS: Images were personally reviewed and interpreted.   --Lumbar CT scan 5/14/2018 reveals L5-S1 laminectomy and right facetectomy.  L4-5 severe bilateral foraminal stenosis with moderate to severe spinal canal stenosis that has worsened since 2015.  There is also similar L5-S1 bilateral foraminal stenosis.  L3-4 moderate bilateral foraminal and bilateral lateral recess stenosis.  Nondisplaced right L4 pars defect noted with unchanged L4-5 spondylolisthesis, 2 mm.  Spinal listhesis also noted 4 mm at L4-5 unchanged and 2 mm L5-S1 unchanged.  --Lumbar spine CT myelogram from 2015 does reveal chronic L4-5 moderately severe central canal stenosis and severe right/moderate left foraminal stenosis.  L5-S1 moderately severe foraminal stenosis without central canal stenosis.  --Scoliosis panel 2015 shows 65  lordosis, thoracic kyphosis 51 , 8  scoliosis T11 through L3.  5 mm L4-5 and 3 mm L5-S1 spondylolisthesis.    -INTERVENTIONS: Ordered a right L3, L4, L5 medial branch block to diagnostically determine how much of his pain is facet mediated versus nerve related.  He does have significant arthritis both at the L4-5 and L5-S1 level.  Pain today is 90% on the right.    -If he does have benefit consider facet joint steroid injection versus rhizotomy, he has had for steroid injections however since September 2017.    -MEDICATIONS: No change in medications advised patient continue Tylenol as needed for pain as well as diclofenac gel previously prescribed.  Discussed side effects of medications and proper use. Patient verbalized understanding.    -PHYSICAL THERAPY: Referral to physical therapy [facility] for core strengthening and nerve glide exercises as well as chronic knee pain sent today, goal is to reestablish home exercise program and change as needed.  Discussed the importance of core strengthening, ROM, stretching exercises with the  patient and how each of these entities is important in decreasing pain.  Explained to the patient that the purpose of physical therapy is to teach the patient a home exercise program.  These exercises need to be performed every day in order to decrease pain and prevent future occurrences of pain.        -PATIENT EDUCATION:  25 minutes of total visit time was spent face to face with the patient today, 60 % of the visit was spent on counseling, education, and coordinating care.   -5 minutes spent outside of visit time, non-face-to-face time, reviewing chart.    -FOLLOW UP: Follow-up for diagnostic MBB with Dr. Cage than post injection to determine next steps.  Advised to contact clinic if symptoms worsen or change.    Subjective:     Tanmay Israel is a 78 y.o. male who presents today for follow-up regarding 2 weeks post right L5-S1 TF JOSUÉ in which he reported he received significant at 90% relief of his pain for 3-4 days however than the pain returned and is unchanged.  Currently his pain is most significant however into the low back was previously was having some pain into the right posterior lateral thigh and buttock, today more significant into the lumbosacral junction currently a 7/10 up to an 8 at its worst that is somewhat constant.  He does report that the pain is worse with walking and standing and improved with sitting but does not completely go away with sitting.  Patient denies numbness or tingling, does report chronic generalized weakness however in bilateral lower extremities.  Denies any recent trips or falls or balance changes.  Pain is 90% on the right into the low back.    **Patient saw Dr. Alicia last 9/27/2017 who recommended SI joint steroid injection however did discuss that if he fails to get further relief with SI injection/therapy, can trial injection to target stenosis at the L4-5 level.  Both of these injections have been completed however with minimal benefit.      Treatment to Date:  History right L5-S1 laminectomy [facility] orth[facility].  Physical therapy [facility] ×4 sessions 11/29/2017.  Lam Chi on his own in the past however unable to do currently due to increased pain.  Acupuncture recently with some relief.      SPR Right L4-5 TF JOSUÉ 7/12/2016 with no benefit.  Procedure 8/10, postprocedure 0/10 on procedure note.  SPR CT-guided Right SI joint steroid injection 9/20/2016.  SPR Right SI joint steroid injection 2/15/2017, 5/23/2017, 9/8/2017, with typical relief except the last 9/8/2017 he reports he got minimal relief.      Right SI joint steroid injection Dr. Cage 11/29/2017 with 10% relief.  Right L4-5 TF JOSUÉ 2/15/2018 with 40% relief ×3 months.  Preprocedure pain 8/10, post 1/10.  Right L5-S1 TF JOSUÉ 5/23/2018 with 90% relief ×3-4 days, no lasting relief.  Preprocedure pain 8/10, post 3/10.      Medications:  Voltaren gel is helping  Lidoderm patches  Tylenol occasional with minimal relief.    Patient Active Problem List   Diagnosis     Right Rotator Cuff Tendonitis     Osteoarthritis Of The Knee     Joint Pain, Localized In The Right Shoulder     Difficulty Breathing (Dyspnea)     Esophageal Reflux     Muscle Aches, Generalized (Myalgias)     Lumbago     Obstructive sleep apnea     Sciatica     Joint Pain, Localized In The Knee     Ptosis Of Eyelid     Hyperlipidemia     Anemia     Crohn's (Granulomatous) Colitis     Benign Essential Hypertension     Coronary atherosclerosis of native coronary artery     Ischemic cardiomyopathy     Paroxysmal ventricular tachycardia     Dry Nonexudative Macular Degeneration     Serum Enzyme Levels - ALT (SGPT) Elevated     Serum Enzyme Levels - Alkaline Phosphatase Elevated     Dysphagia     Diarrhea     Abdominal Pain     Presence of automatic cardioverter/defibrillator (AICD)--- [employer] single lead     Spondylolisthesis of lumbar region     Lumbar stenosis with neurogenic claudication     Sacroiliac joint dysfunction of right side      Stage 3 chronic kidney disease     Fall     Multiple fractures of cervical spine, closed, initial encounter     Closed fracture of distal end of left radius, unspecified fracture morphology, initial encounter     Closed fracture of distal end of right radius, unspecified fracture morphology, initial encounter     Warfarin-induced coagulopathy     Closed fracture of first thoracic vertebra, unspecified fracture morphology, initial encounter     Chronic systolic congestive heart failure     Permanent atrial fibrillation     Anemia, unspecified type     Cerebral aneurysm     Osteoporosis       Current Outpatient Prescriptions on File Prior to Encounter   Medication Sig Dispense Refill     acetaminophen (TYLENOL) 325 MG tablet Take 650 mg by mouth every 4 (four) hours as needed for pain. Indications: Pain       carvedilol (COREG) 25 MG tablet Take 1.5 tablets (37.5 mg total) by mouth 2 (two) times a day with meals. 270 tablet 2     carvedilol (COREG) 25 MG tablet TAKE 1 AND 1/2 TABLETS BY MOUTH TWICE DAILY 270 tablet 1     cholecalciferol, vitamin D3, 1,000 unit tablet Take 2 tablets (2,000 Units total) by mouth daily. (Patient taking differently: Take 1,000 Units by mouth daily. )  0     diclofenac sodium (VOLTAREN) 1 % Gel Apply to painful areas over low back and sacroiliac joint once daily as needed for pain 1 Tube 3     diphenoxylate-atropine (LOMOTIL) 2.5-0.025 mg per tablet TK 2 T PO Q 6 H PRF CRAMPS/DIARRHEA  0     FERROUS FUMARATE (IRON ORAL) Take 1 tablet by mouth daily.        furosemide (LASIX) 40 MG tablet Take 1 tablet (40 mg total) by mouth daily. 90 tablet 3     losartan (COZAAR) 100 MG tablet Take 1 tablet (100 mg total) by mouth daily. 90 tablet 3     multivitamin with iron (ONE DAILY WITH IRON) Tab tablet Take 1 tablet by mouth daily.        omeprazole (PRILOSEC) 20 MG capsule TAKE 1 CAPSULE BY MOUTH TWICE DAILY (Patient taking differently: TAKE 1 CAPSULE BY MOUTH DAILY) 180 capsule 0     omeprazole  (PRILOSEC) 20 MG capsule TAKE 1 CAPSULE BY MOUTH TWICE DAILY 180 capsule 0     PROAIR HFA 90 mcg/actuation inhaler INHALE 2 PUFFS PO Q 4 H PRF SOB  0     rosuvastatin (CRESTOR) 20 MG tablet Take 1 tablet (20 mg total) by mouth at bedtime. 90 tablet 3     warfarin (COUMADIN) 2.5 MG tablet TAKE 1 AND 1/2 TABLETS BY MOUTH ON THURSDAYS; TAKE 1 TABLET ALL OTHER DAYS OR AS DIRECTED. 60 tablet 6     warfarin (COUMADIN) 2.5 MG tablet Take 1 to 1.5 tablets (2.5 to 3.75 mg) by mouth daily. Adjust dose per INR results as instructed. 100 tablet 1     [DISCONTINUED] losartan (COZAAR) 50 MG tablet   1     [DISCONTINUED] metroNIDAZOLE (FLAGYL) 250 MG tablet Take 250 mg by mouth daily.   0     Current Facility-Administered Medications on File Prior to Encounter   Medication Dose Route Frequency Provider Last Rate Last Dose     denosumab 60 mg (PROLIA 60 mg/ml)  60 mg Subcutaneous Q6 Months Israel Barros MD   60 mg at 06/19/17 1301       No Known Allergies    Past Medical History:   Diagnosis Date     Arthritis      Atrial fibrillation      Back pain      Callus      Chronic systolic congestive heart failure      Coronary artery disease      Hyperlipidemia      Hypertension      Permanent atrial fibrillation     [id number] risk score = 5 (age1/ CAD/ HTN/ CHF) Chronic warfarin        Review of Systems  ROS: Positive for weakness.  Specifically negative for bowel/bladder dysfunction, balance changes, headache, dizziness, foot drop, fevers, chills, appetite changes, nausea/vomiting, unexplained weight loss. Otherwise 13 systems reviewed are negative. Please see the patient's intake questionnaire from today for details.    Reviewed Social, Family, Past Medical and Past Surgical history with patient, no significant changes noted since prior visit.     Objective:     /69 (Patient Site: Left Arm, Patient Position: Sitting)  Temp 98.2  F (36.8  C) (Oral)   Wt 195 lb (88.5 kg)  SpO2 98%  BMI 28.8 kg/m2    PHYSICAL  EXAMINATION:    --CONSTITUTIONAL: Well developed, well nourished, healthy appearing individual.  --PSYCHIATRIC: Appropriate mood and affect. No difficulty interacting due to temper, social withdrawal, or memory issues.  --SKIN: Lumbar region is dry and intact.   --RESPIRATORY: Normal rhythm and effort. No abnormal accessory muscle breathing patterns noted.   --MUSCULOSKELETAL:  Normal lumbar lordosis noted, no lateral shift.  --GROSS MOTOR: Easily arises from a seated position.   --LUMBAR SPINE:  Inspection reveals no evidence of deformity. Range of motion is not limited in lumbar flexion, extension, lateral rotation. No tenderness to palpation lumbar spine.  Sciatic notch non-tender.   --LOWER EXTREMITY MOTOR TESTING:  Plantar flexion left 5/5, right 5/5   Dorsiflexion left 5/5, right 5/5   Great toe MTP extension left 5/5, right 5/5  Knee flexion left 5/5, right 5/5  Knee extension left 5/5, right 5/5   Hip flexion left 5/5, right 5/5  Hip abduction left 5/5, right 5/5  Hip adduction left 5/5, right 5/5   --HIPS: Full range of motion bilaterally.   --NEUROLOGIC: Bilateral patellar and achilles reflexes are physiologic and symmetric. Sensation to light touch is intact in the bilateral L4, L5, and S1 dermatomes.    RESULTS:   Imaging: Lumbar spine imaging was reviewed today. The images were shown to the patient and the findings were explained using a spine model.      Ct Lumbar Spine Without Contrast  Result Date: 5/14/2018  INDICATION: Spinal stenosis, lumbar. Ongoing right lumbar radiculitis. TECHNIQUE: Helical thin-section CT scan of the spine was performed using bone reconstruction algorithm. From the axial source data, sagittal and coronal thin reformats. Dose reduction techniques were used. IV CONTRAST: None. COMPARISON: 9/9/2015. FINDINGS:  Nomenclature is based on 5 lumbar type vertebral bodies. Vertebral body heights are unremarkable and unchanged from 9/9/2015. No destructive bony lesions are demonstrated.  L5-S1 laminectomy and right facetectomy have been performed. Nondisplaced right L4 pars defect again demonstrated. There is no evidence of an acute displaced fracture. The imaged portions of the bony pelvis appear intact. There are mild degenerative changes of the sacroiliac joints. There is L4-L5 anterolisthesis of approximately 4 mm, not appreciably changed. L3-L4 retrolisthesis of 2 mm is also unchanged. L5-S1 anterolisthesis of 2 mm is similar to prior. Alignment is otherwise unremarkable in the sagittal plane. There is mild levoconvex lower lumbar curvature. Grossly unremarkable paraspinal soft tissues. There is scattered atherosclerotic change of the abdominal aorta and its branches. No abdominal aortic aneurysm is demonstrated. There is colonic diverticulosis.   T12-L1: Mild intervertebral disc height loss. No significant posterior disc abnormality. Prominent anterior and lateral disc osteophyte complexes. Mild facet arthropathy. No significant spinal canal stenosis. No right neural foraminal stenosis. No left neural foraminal stenosis.   L1-L2: Mild intervertebral disc height loss. Mild broad-based disc bulging. Mild ligamentum flavum thickening and mild facet arthropathy. Mild to moderate spinal canal stenosis. Mild right neural foraminal stenosis. Mild left neural foraminal stenosis.   L2-L3: Moderate intervertebral disc height loss, with broad-based disc osteophyte complex. Mild facet arthropathy. Bilateral lateral recess stenosis. Mild to moderate spinal canal stenosis. Moderate right neural foraminal stenosis. Mild to moderate left neural foraminal stenosis.   L3-L4: Mild intervertebral disc height loss with broad-based disc osteophyte complex. Ligamentum flavum thickening and moderate facet arthropathy. Moderate spinal canal stenosis. Moderate right neural foraminal stenosis. Moderate left neural foraminal stenosis. Bilateral lateral recess stenosis.   L4-L5: Unchanged anterolisthesis as above, with  uncovering of the dorsal disc margin. There is superimposed broad-based disc bulging. Severe bilateral facet arthropathy, left greater than right. Moderate to severe spinal canal stenosis. Severe right neural foraminal stenosis. Severe left neural foraminal stenosis.   L5-S1: Normal disc height. Mild bulge. No definite right-sided facet articulation demonstrated, similar to prior. Moderate left facet arthropathy. Mild spinal canal stenosis. Severe right neural foraminal stenosis. Severe left neural foraminal stenosis.   CONCLUSION:   1.  Postoperative changes of L5-S1 laminectomy and right facetectomy.   2.  At L4-L5, there is severe bilateral neural foraminal stenosis and moderate to severe spinal canal stenosis, slightly progressed.   3.  At L5-S1, there is severe bilateral neural foraminal stenosis, similar to prior.   4.  At L3-L4, there is moderate bilateral neural foraminal stenosis and bilateral lateral recess stenosis.   5.  Additional degenerative changes as described.      XR SCOLIOSIS AP OR PA AND LATERAL STANDING  10/19/2015   INDICATION: Low back pain. Thoracic kyphosis.  COMPARISON: None.  FINDINGS: Twelve rib-bearing thoracic-type vertebral bodies. Five lumbar type vertebral bodies. Thoracic kyphosis estimated at 51 degrees. Lumbar lordosis between L1 and S1 estimated at 65 degrees. Right convex asymmetry of the thoracolumbar spine   measuring 8 degrees between T11 and L3.  Several bridging osteophytes in the thoracic spine, pattern that is consistent with diffuse idiopathic skeletal hyperostosis. Prominent left lateral T12-L1 osteophyte and ventral L2-L3 and L3-L4 osteophytes, but not definitely bridging. 5 mm   anterolisthesis of L4 on L5 and 3 mm anterolisthesis of L5 on S1. Prominent facet arthropathy lower lumbar spine. No acute superimposed bony abnormality of the thoracolumbar spine, without definite fracture.  Dense aortic calcification, normal caliber. Left hemithorax generator with two  pacemaker leads to the heart. Sternal wires. Reverse right shoulder arthroplasty. Moderate degenerative change both hips.          CT LUMBAR SPINE MYELOGRAM  9/9/2015  INDICATION: Lumbar stenosis.  TECHNIQUE: Helical images were reconstructed in the axial plane at 1.25-mm increments, with sagittal and coronal reformations. Intrathecal contrast introduced under separate procedure and described with that procedure.  COMPARISON: None.  FINDINGS: Five lumbar-type vertebral bodies. Slightly exaggerated lordosis at L4-L5 on a degenerative basis. 4-mm anterior subluxation of L4 on L5. 2-mm anterior subluxation of L5 on S1. Slight retrolisthesis of L2 on L3 and L3 on L4. Left convex   scoliosis measuring 14 degrees between L3 and L5. Much of this is at L4-L5 from asymmetric disc height loss. Right-sided L4 pars defect. L5-S1 wide laminectomy with right facetectomy. No destructive bony lesion is apparent. No fracture is evident.  Conus tip ends at T12-L1. Cauda equina generally unremarkable. Paraspinous soft tissues are grossly normal. Moderate paraspinous muscle volume loss. Normal aortic caliber, moderate calcification. Mild to moderate degenerative change SI joints.  T12-L1: Slight disc height loss. Left anterior interbody spurring. Mild facet arthropathy. No central canal stenosis. No foraminal stenosis.  L1-L2: Slight disc height loss. Mild circumferential disc bulging. Left anterolateral interbody spur. Mild to moderate facet arthropathy. No central canal stenosis. No foraminal stenosis.  L2-L3: Moderate disc height loss. Moderate circumferential interbody spur and disc bulging, more pronounced anteriorly. Mild facet arthropathy. No central canal stenosis. Mild right foraminal stenosis. No left foraminal stenosis.  L3-L4: Mild disc height loss. Mild to moderate circumferential disc bulging. Mild to moderate facet arthropathy. Some calcification of the right facet cartilage. Mild central canal stenosis. No lateral  recess stenosis. Mild to moderate right foraminal   stenosis. Borderline moderate left foraminal stenosis.  L4-L5: Mild disc height loss greater dorsally. At least moderate circumferential disc bulging. Prominent facet arthropathy with diastatic left facet. Moderately severe central canal stenosis. Despite this, no definite lateral recess stenosis. Severe   right and moderate left foraminal stenoses from disc bulging.  L5-S1: Disc height normal. Slight disc bulging. Sagittally oriented left facet. Effectively absent right facet. No central canal stenosis. Moderately severe bilateral foraminal stenoses.  CONCLUSION:  1.  Previous lumbar spine surgery with L5-S1 wide laminectomy and right facetectomy.  2.  Degenerative changes most prominent at L4-L5 and L5-S1. The facets are sagittally oriented. This predisposes to subluxation.  3.  At L4-L5, moderately severe central canal stenosis without grossly apparent lateral recess stenosis. Severe right and moderate left foraminal stenoses.  4.  At L5-S1, moderately severe bilateral foraminal stenoses.  5.  At L3-L4, borderline moderate left foraminal stenosis. Mild central canal stenosis.

## 2021-06-18 NOTE — LETTER
Letter by Liza Cesar MD at      Author: Liza Cesar MD Service: -- Author Type: --    Filed:  Encounter Date: 2/13/2019 Status: (Other)       Dear Mr. Israel,    This letter will help in preparation for your upcoming surgery. Please contact us with any additional questions you may have regarding your surgery. Contact information for your surgery scheduler:   Michael Johnston, and Airam: [phone] ~ Lianne Nova and Luis: [phone] ~ Andrew    You are scheduled for: Cervical Laminoplasty  With: Dr. Liza Cesar and Dr. Roxanne Rodrigues  Date/Time: Monday, February 18, 2019 at 7:45 am (time subject to change)  Location: [place] [address]    Check in at the Welcome Desk inside the main doors of the hospital. An escort will take you to the surgery waiting area. A nurse from [facility] (surgery admit unit) at the hospital will call you with your exact arrival time to the hospital - typically one-and-a-half to two-and-a-half hours prior to your scheduled surgery time.     In the event of an emergency surgery case, there may be an adjustment to your start time for surgery.     PREPARING FOR YOUR SURGERY    *Pre-op Physical: Done Tuesday, February 12, 2019 with Dr. Go Ramírez at the [facility].      *Please discuss the necessity of receiving a pneumococcal vaccine prior to surgery at your pre-op physical. Recommended for all patients over the age of 65, or based on certain medical conditions.     *After the pre-op physical is complete, please have your clinic fax the visit note to your surgery scheduler at [phone].    *Pre-op Lab Work: Friday, February 15, 2019 at the [facility] lab.     *CPAP Machine: Please bring with you day of surgery.    *Readiness Visit: Friday, February 15, 2019 at 10:30 am at the [facility] at [address] [place] [address].     *Collar/Brace: You  will be fitted for the collar/brace at your Readiness Visit with our office. Please bring collar/brace with you the day of surgery.    *Bring your images on disk or film to your Readiness Visit so we can have them loaded into our system and available for your surgery, if they have not previously been brought to our office. Failure to bring your images to our office will result in cancellation of your surgery.     *Ensure that you have completed your pre-op physical, along with any other necessary tests/appointments (listed above), prior to your Readiness Visit.     ADDITIONAL INFORMATION REGARDING YOUR SURGERY    Medications    Bring a list ALL of your medications, including any over-the-counter vitamins and herbal supplements to your Readiness Visit, and on the day of surgery.    DO NOT bring your medications with you the day of surgery.    Please see attached third sheet for more details on medications/vitamins/herbal supplements that should be discontinued prior to your surgery date.     If you are unsure if you should discontinue a medication/ vitamin/herbal supplement, please call our office and discuss with a nurse.    Continue taking your medications/vitamins/herbal supplements unless they are on the attached list.     Failure to follow the instructions regarding medications/vitamins/herbal supplements will result in cancellation of your surgery.    Day BEFORE Surgery    DO NOT shave near your surgical site. This can cause irritation of the skin    Using a washcloth and provided bottle of Hibiclens, shower the night BEFORE surgery, using a half bottle of Hibiclens to wash your body, avoiding face and genitals. The morning OF surgery, shower and use the second half of the bottle to wash your body, avoiding face and genitals. If you are unable to take a shower the morning of surgery, please discuss your options with the nurse at your readiness visit.     NOTHING  to eat after 11:00 p.m. the night prior to your  procedure    CLEAR LIQUIDS: May have the following liquids up to two (2) hours before your arrival time at the hospital: water, plain black coffee (no cream or milk), plain black tea or plain green tea (no cream or milk), Gatorade or Propel Water.    SMOKING: Stop smoking as far before surgery as possible, or as directed by your surgeon. NO tobacco products of any kind (cigarettes, e-cigarettes, chewing tobacco) beginning at 11:00 p.m. the night prior to your procedure.     ALCOHOL: You should stop drinking alcohol beginning at 11:00 p.m. the night prior to your procedure    Contact our office if you have symptoms of illness such as a fever of 101 or greater, chills, cough, sore throat, or if you develop a rash or any open sore    Day OF Surgery    If youve been instructed to take a medication(s) on the morning of surgery, please take with a very small sip of water.    Wear loose & comfortable clothing and flat shoes, Leave jewelry/valuables at home. If you wear contact lenses, remove them at home and wear glasses. Remove any body piercings. Remove nail polish.     Planning for Discharge    Start planning for your care after discharge as soon as you receive this letter.    If you have not made arrangements to have someone take you home and stay with you for the first 48 hours after discharge, your surgery will be cancelled.      PRE-OPERATIVE MEDICATION INSTRUCTIONS  Review this information with your primary care physician prior to discontinuing any of the medications listed below.  Notify your primary care physician that you have been instructed to discontinue these medications.    TEN (10) Days Prior to Surgery, STOP the Following Medications   [facility]  Anacin  Aspirin  Excedrin  Pepto Bismol    **Before taking ANY over-the-counter medications, check the label for Aspirin   Non-steroidal   Anti-inflammatory Medications (NSAIDS)    Celebrex  Diclofenac (Cataflam)  Etodolac (Iodine)  Fenoprofen  (Nalfon)  Ibuprofen (Advil, Motrin, Nuprin)  Indomethacin (Indocin)  Ketoprofen  Ketorolac (Toradol)  Melaxicam (Mobic)  Naproxen (AnaProx, Aleve, Naprosyn)  Relafen (Nabumetone)   Herbal Supplements (this is a partial list of herbals to be discontinued)    [place]  Ephedra  Feverfew  Fish Oil  Flaxseed Oil  Garlic  Ramya  Gingko  Ginseng  Goldenseal  Imitrex (Sumatriptan)  Kava  Krill Oil  Licorice  Multi Vitamins  [facility]  Valerian  Vitamin E  Yohimbe   CHECK WITH YOUR PRESCRIBING DOCTOR BEFORE STOPPING ANY BLOOD THINNERS (approximately 7 days prior to surgery)  (Coumadin, Plavix, Platel, Aggrenox, Effient (Prasugrel), Ticlid), Xarelto, and Pradaxa      ALWAYS CHECK WITH YOUR PRESCRIBING DOCTOR REGARDING THE MEDICATIONS LISTED BELOW; RECOMMENDED STOP TIME IS ALSO LISTED      If you are taking Lovenox, discontinue 24 HOURS prior to surgery    If you are taking weight loss medication, discontinue 7 days prior to surgery    If you are taking Metformin or Simvastatin, check with your primary care physician (or whoever has prescribed you this medication) regarding when to discontinue prior to surgery        and traditional parking is located at [facility] at [address] [place] [address]. Validation is done at the Welcome Desk in the [facility].

## 2021-06-18 NOTE — LETTER
Letter by Nida Roberts RDCS at      Author: Nida Roberts RDCS Service: -- Author Type: --    Filed:  Encounter Date: 2/9/2019 Status: (Other)       Tanmay Israel  [address]  [place] [address]      February 11, 2019      Dear Mr. Israel,    RE: Remote Results    We are writing to you regarding your recent Remote ICD check from home. Your transmission was received successfully. Battery status is satisfactory at this time.     Your results are within normal limits.    Your next device appointment will be a remote check on May 14, 2019; this will occur automatically.    To schedule or reschedule, please call [phone] and press 1.    NOTE: If you would like to do an extra transmission, please call [phone] and press 3 to speak to a nurse BEFORE transmitting. This ensures that the Device Clinic staff is aware of the reason you are sending a transmission, and can follow-up with you after it has been reviewed.    We will be checking your implanted device from home (remotely) every three months unless otherwise instructed. We will need to see you in the clinic at least once a year. You may need to be seen in the clinic sooner depending on the results of your check.    Please be aware:    The follow-up schedule is like a Physician prescription.    Your remote monitor is paired to your specific implanted device.      Sincerely,    [facility]

## 2021-06-18 NOTE — LETTER
Letter by Johnson, Michael Duane, CNP at      Author: Johnson, Michael Duane, CNP Service: -- Author Type: --    Filed:  Encounter Date: 3/11/2019 Status: (Other)         Patient: Tanmay Israel   MR Number: [id number]   YOB: 1940   Date of Visit: 3/11/2019     [facility]    Facility:   [facility] [[id number]]   Code Status: FULL CODE      CHIEF COMPLAINT/REASON FOR VISIT:  Chief Complaint   Patient presents with   ? Follow Up     rehab, sulma       HISTORY:      HPI: Tanmay is a 78 y.o. male who I am seeing today secondary to hospitalization February 27 secondary to acute cholecystitis status post laparoscopic cholecystectomy with all history of cervical fusion C4-6 along with a history of hypertension CKD stage III.  He currently is actually doing pretty well he is on the Tylenol scheduled 650 mg every 6 hours he also has a lidocaine patch.  He can have baclofen as needed and rarely takes when his last dose was on the eighth he also has oxycodone as needed has not taken any since the sixth.  He is on Coumadin and he is being followed by the Coumadin clinic.  He has been normotensive and afebrile and also on room air.  His only issue these days is with constipation he recently was started on stool softeners twice daily we will go ahead and give him a dose or 2 of sorbitol to help start things going for him.  Does have anemia currently on iron.  He is on omeprazole for GERD twice daily.  He does wear a cervical collar is got good strong upper extremity .  Visit with the neurosurgeon on April 3.    Past Medical History:   Diagnosis Date   ? Anemia    ? Arthritis    ? Atrial fibrillation (H)    ? Back pain    ? Callus    ? Cerebral aneurysm    ? Cervical spinal stenosis    ? Chronic kidney disease     stage 3   ? Chronic systolic congestive heart failure (H)    ? Coronary artery disease    ? Crohn's disease (H)    ? Dysphagia    ? GERD (gastroesophageal reflux  disease)    ? Hyperlipidemia    ? Hypertension    ? ICD (implantable cardioverter-defibrillator) in place     Medtronic   ? Ischemic cardiomyopathy    ? Kidney stone    ? Low back pain    ? Macular degeneration    ? Permanent atrial fibrillation (H)     [id number] risk score = 5 (age1/ CAD/ HTN/ CHF) Chronic warfarin   ? Right rotator cuff tendinitis    ? Schatzki's ring    ? Shortness of breath    ? Sleep apnea     no CPAP             Family History   Problem Relation Age of Onset   ? Heart disease Mother    ? Stroke Mother    ? Crohn's disease Father    ? Alcohol abuse Brother    ? No Medical Problems Daughter    ? No Medical Problems Son    ? No Medical Problems Maternal Aunt    ? No Medical Problems Maternal Uncle    ? No Medical Problems Paternal Aunt    ? No Medical Problems Paternal Uncle    ? No Medical Problems Maternal Grandmother    ? No Medical Problems Maternal Grandfather    ? No Medical Problems Paternal Grandmother    ? No Medical Problems Paternal Grandfather    ? Cancer Brother    ? Urolithiasis Neg Hx    ? Gout Neg Hx      Social History     Socioeconomic History   ? Marital status: Single     Spouse name: Not on file   ? Number of children: Not on file   ? Years of education: Not on file   ? Highest education level: Not on file   Occupational History   ? Occupation: retired     Comment: [employer]   Social Needs   ? Financial resource strain: Not on file   ? Food insecurity:     Worry: Not on file     Inability: Not on file   ? Transportation needs:     Medical: Not on file     Non-medical: Not on file   Tobacco Use   ? Smoking status: Former Smoker   ? Smokeless tobacco: Never Used   Substance and Sexual Activity   ? Alcohol use: No   ? Drug use: No   ? Sexual activity: Not on file   Lifestyle   ? Physical activity:     Days per week: Not on file     Minutes per session: Not on file   ? Stress: Not on file   Relationships   ? Social connections:     Talks on phone: Not on file     Gets together:  Not on file     Attends Zoroastrian service: Not on file     Active member of club or organization: Not on file     Attends meetings of clubs or organizations: Not on file     Relationship status: Not on file   ? Intimate partner violence:     Fear of current or ex partner: Not on file     Emotionally abused: Not on file     Physically abused: Not on file     Forced sexual activity: Not on file   Other Topics Concern   ? Not on file   Social History Narrative   ? Not on file         Review of Systems  Currently denies chills and fever coughing wheezing chest pain dizziness or vertigo nausea vomiting diarrhea or flulike symptoms headache or rashes or sores.  History of recent laparoscopic cholecystectomy hypertension CAD ICD CKD stage III heart failure cervical laminoplasty    Current Outpatient Medications   Medication Sig   ? baclofen (LIORESAL) 10 MG tablet Take 1 tablet (10 mg total) by mouth every 8 (eight) hours as needed.   ? bumetanide (BUMEX) 1 MG tablet Take 1 tablet (1 mg total) by mouth daily.   ? carvedilol (COREG) 25 MG tablet Take 1.5 tablets (37.5 mg total) by mouth 2 (two) times a day.   ? cholecalciferol, vitamin D3, 1,000 unit tablet Take 2,000 Units by mouth daily.   ? enoxaparin (LOVENOX) 40 mg/0.4 mL syringe Inject 0.4 mL (40 mg total) under the skin daily for 7 days.   ? FERROUS FUMARATE (IRON ORAL) Take 1 tablet by mouth daily.    ? lidocaine 4 % patch Place 1 patch on the skin daily. Remove and discard patch with 12 hours or as directed by MD.   ? losartan (COZAAR) 50 MG tablet Take 1 tablet (50 mg total) by mouth daily.   ? multivitamin with iron (ONE DAILY WITH IRON) Tab tablet Take 1 tablet by mouth daily.    ? omeprazole (PRILOSEC) 20 MG capsule Take 20 mg by mouth 2 (two) times a day.   ? oxyCODONE (ROXICODONE) 5 MG immediate release tablet 1 tab PO for pain rated 6-8 and 2 tabs for pain rated 9-10 every 4 hours as needed.   ? polyethylene glycol (MIRALAX) 17 gram packet Take 1 packet (17  g total) by mouth 2 (two) times a day.   ? rosuvastatin (CRESTOR) 20 MG tablet Take 1 tablet (20 mg total) by mouth at bedtime.   ? senna-docusate (PERICOLACE) 8.6-50 mg tablet Take 1 tablet by mouth 2 (two) times a day.       .There were no vitals filed for this visit.  Blood pressure 134/75 pulse 80 temperature 96.9 saturation room air 100%  Physical Exam   Constitutional: No distress.   HENT:   Head: Normocephalic.   Eyes: Pupils are equal, round, and reactive to light.   Neck: Neck supple. No thyromegaly present.   Cardiovascular:   Irregularity.  Pacemaker.   Pulmonary/Chest: Breath sounds normal.   Abdominal: Bowel sounds are normal. There is no rebound.   Musculoskeletal:   Improving with a strength and conditioning.  Good CMS bilateral arms.  Cervical collar.   Lymphadenopathy:     He has no cervical adenopathy.   Neurological: He is alert.   Skin: Skin is warm and dry. No rash noted.   Abdominal stab wounds are clean and dry.   Psychiatric: His behavior is normal.         LABS:   /\  Lab Results   Component Value Date    WBC 8.8 03/03/2019    HGB 8.2 (L) 03/08/2019    HCT 25.2 (L) 03/03/2019    MCV 91 03/03/2019     (L) 03/03/2019     Results for orders placed or performed during the hospital encounter of 02/22/19   Basic Metabolic Panel   Result Value Ref Range    Sodium 140 136 - 145 mmol/L    Potassium 3.4 (L) 3.5 - 5.0 mmol/L    Chloride 106 98 - 107 mmol/L    CO2 27 22 - 31 mmol/L    Anion Gap, Calculation 7 5 - 18 mmol/L    Glucose 91 70 - 125 mg/dL    Calcium 9.4 8.5 - 10.5 mg/dL    BUN 20 8 - 28 mg/dL    Creatinine 1.38 (H) 0.70 - 1.30 mg/dL    GFR MDRD Af Amer 60 (L) >60 mL/min/1.73m2    GFR MDRD Non Af Amer 50 (L) >60 mL/min/1.73m2           ASSESSMENT:      ICD-10-CM    1. S/P cervical spinal fusion Z98.1    2. S/P laparoscopic cholecystectomy Z90.49    3. Essential hypertension I10    4. Constipation, unspecified constipation type K59.00        PLAN:    Adding sorbitol for the next 2 days  and noontime.  Also encouraging a house nutrient supplement.  Being followed by the Coumadin clinic.  Recheck hemoglobin in another week the last was 8.2 is on iron daily.  Does feel he is making pretty good progress overall.  Neurosurgeon visit April 3.    Electronically signed by: Michael Duane Johnson, CNP

## 2021-06-18 NOTE — LETTER
Letter by Liza Cesar MD at      Author: Liza Cesar MD Service: -- Author Type: --    Filed:  Encounter Date: 2/13/2019 Status: (Other)       Dear Mr. Israel,    This letter will help in preparation for your upcoming surgery. Please contact us with any additional questions you may have regarding your surgery. Contact information for your surgery scheduler:   Michael Johnston, and Airam: [phone] ~ Lianne Nova and Luis: [phone] ~ Andrew    You are scheduled for: Cervical Laminoplasty  With: Dr. Liza Cesar and Dr. Roxanne Rodrigues  Date/Time: Friday, February 22,2019 at 1:00 pm (time subject to change)  Location: [place] [address]    Check in at the Welcome Desk inside the main doors of the [facility]. An escort will take you to the surgery waiting area. A nurse from [facility] (surgery admit unit) at the hospital will call you with your exact arrival time to the hospital - typically one-and-a-half to two-and-a-half hours prior to your scheduled surgery time.     In the event of an emergency surgery case, there may be an adjustment to your start time for surgery.     PREPARING FOR YOUR SURGERY    *Pre-op Physical: Done Tuesday, February 12, 2019 with Dr. Go Ramírez at the [facility].      *Cardiac Clearance: Tuesday, February 19,2019 at 3:20 pm at the [facility] at [facility], [address] [place] [address].    *After the pre-op physical is complete, please have your clinic fax the visit note to your surgery scheduler at [phone].    *Pre-op Lab Work: Friday, February 15, 2019 at the [facility] lab.     *CPAP Machine: Please bring with you day of surgery.    *Readiness Visit: Friday, February 15, 2019 at 10:30 am at the [facility] at [address], [place] [address].     *Collar/Brace: You will be fitted for the  collar/brace at your Readiness Visit with our office. Please bring collar/brace with you the day of surgery.    *Bring your images on disk or film to your Readiness Visit so we can have them loaded into our system and available for your surgery, if they have not previously been brought to our office. Failure to bring your images to our office will result in cancellation of your surgery.     *Ensure that you have completed your pre-op physical, along with any other necessary tests/appointments (listed above), prior to your Readiness Visit.       ADDITIONAL INFORMATION REGARDING YOUR SURGERY    Medications    Bring a list ALL of your medications, including any over-the-counter vitamins and herbal supplements to your Readiness Visit, and on the day of surgery.    DO NOT bring your medications with you the day of surgery.    Please see attached third sheet for more details on medications/vitamins/herbal supplements that should be discontinued prior to your surgery date.     If you are unsure if you should discontinue a medication/ vitamin/herbal supplement, please call our office and discuss with a nurse.    Continue taking your medications/vitamins/herbal supplements unless they are on the attached list.     Failure to follow the instructions regarding medications/vitamins/herbal supplements will result in cancellation of your surgery.    Day BEFORE Surgery    DO NOT shave near your surgical site. This can cause irritation of the skin    Using a washcloth and provided bottle of Hibiclens, shower the night BEFORE surgery, using a half bottle of Hibiclens to wash your body, avoiding face and genitals. The morning OF surgery, shower and use the second half of the bottle to wash your body, avoiding face and genitals. If you are unable to take a shower the morning of surgery, please discuss your options with the nurse at your readiness visit.     NOTHING  to eat after 11:00 p.m. the night prior to your procedure    CLEAR  LIQUIDS: May have the following liquids up to two (2) hours before your arrival time at the hospital: water, plain black coffee (no cream or milk), plain black tea or plain green tea (no cream or milk), Gatorade or Propel Water.    SMOKING: Stop smoking as far before surgery as possible, or as directed by your surgeon. NO tobacco products of any kind (cigarettes, e-cigarettes, chewing tobacco) beginning at 11:00 p.m. the night prior to your procedure.     ALCOHOL: You should stop drinking alcohol beginning at 11:00 p.m. the night prior to your procedure    Contact our office if you have symptoms of illness such as a fever of 101 or greater, chills, cough, sore throat, or if you develop a rash or any open sore    Day OF Surgery    If youve been instructed to take a medication(s) on the morning of surgery, please take with a very small sip of water.    Wear loose & comfortable clothing and flat shoes, Leave jewelry/valuables at home. If you wear contact lenses, remove them at home and wear glasses. Remove any body piercings. Remove nail polish.     Planning for Discharge    Start planning for your care after discharge as soon as you receive this letter.    If you have not made arrangements to have someone take you home and stay with you for the first 48 hours after discharge, your surgery will be cancelled.      PRE-OPERATIVE MEDICATION INSTRUCTIONS  Review this information with your primary care physician prior to discontinuing any of the medications listed below.  Notify your primary care physician that you have been instructed to discontinue these medications.    TEN (10) Days Prior to Surgery, STOP the Following Medications   [facility]  Anacin  Aspirin  Excedrin  Pepto Bismol    **Before taking ANY over-the-counter medications, check the label for Aspirin   Non-steroidal   Anti-inflammatory Medications (NSAIDS)    Celebrex  Diclofenac (Cataflam)  Etodolac (Iodine)  Fenoprofen (Nalfon)  Ibuprofen (Advil, Motrin,  Nuprin)  Indomethacin (Indocin)  Ketoprofen  Ketorolac (Toradol)  Melaxicam (Mobic)  Naproxen (AnaProx, Aleve, Naprosyn)  Relafen (Nabumetone)   Herbal Supplements (this is a partial list of herbals to be discontinued)    [place]  Ephedra  Feverfew  Fish Oil  Flaxseed Oil  Garlic  Ramya  Gingko  Ginseng  Goldenseal  Imitrex (Sumatriptan)  Kava  Krill Oil  Licorice  Multi Vitamins  [facility]  Valerian  Vitamin E  Yohimbe   CHECK WITH YOUR PRESCRIBING DOCTOR BEFORE STOPPING ANY BLOOD THINNERS (approximately 7 days prior to surgery)  (Coumadin, Plavix, Platel, Aggrenox, Effient (Prasugrel), Ticlid), Xarelto, and Pradaxa      ALWAYS CHECK WITH YOUR PRESCRIBING DOCTOR REGARDING THE MEDICATIONS LISTED BELOW; RECOMMENDED STOP TIME IS ALSO LISTED      If you are taking Lovenox, discontinue 24 HOURS prior to surgery    If you are taking weight loss medication, discontinue 7 days prior to surgery    If you are taking Metformin or Simvastatin, check with your primary care physician (or whoever has prescribed you this medication) regarding when to discontinue prior to surgery        and traditional parking is located at [facility] at [address] [place] [address]. Validation is done at the Welcome Desk in the [facility].

## 2021-06-18 NOTE — PROGRESS NOTES
"[facility] Office Note    Assessment / Plan:    1.  Permanent atrial fibrillation.  Rate control adequate.  2.  Cardiomyopathy, likely ischemic euvolemic on today's examination.  Will increase losartan to 100 mg daily.  Will switch lovastatin to a more potent agent given his 13-year-old bypass grafts.  Start rosuvastatin 20 mg daily.  3.  Coronary artery disease.  No anginal symptoms or evidence of inducible ischemia.    Stable, doing well.  Follow-up 1 year.    ______________________________________________________________________    Subjective:    I had the opportunity to see Tanmay Israel at the [facility]. Tanmay Israel is a 78 y.o. male with a history of atrial fibrillation, coronary artery disease status post bypass revascularization , and cardiomyopathy who returns for routine follow-up.      Since I saw him last 6 months ago he has not experienced any chest pains.  He enjoys cart golf but otherwise walks little due to chronic low back pain.  He feels well, and offers no complaints.  He denies exertional dyspnea.  He has had no chest pain.  He is not limiting his sodium intake, (pizza was for dinner last night) and realizes that he retains fluid after high sodium meal.  This is generally improved after his morning furosemide.    He is never had any peripheral edema.  He has had no syncope or near syncopal spells had no shocks from his ICD.  He had a good winter in [place] and feels well today.  He is on no treatment for sleep apnea but reports that with use of his \"my pillow\", his sleep is restorative and he has no daytime sleepiness  ______________________________________________________________________    Problem List:  Patient Active Problem List   Diagnosis     Right Rotator Cuff Tendonitis     Osteoarthritis Of The Knee     Joint Pain, Localized In The Right Shoulder     Difficulty Breathing (Dyspnea)     Esophageal Reflux     Muscle Aches, Generalized (Myalgias)     " Lumbago     Obstructive sleep apnea     Sciatica     Joint Pain, Localized In The Knee     Ptosis Of Eyelid     Hyperlipidemia     Anemia     Crohn's (Granulomatous) Colitis     Benign Essential Hypertension     Coronary atherosclerosis of native coronary artery     Ischemic cardiomyopathy     Paroxysmal ventricular tachycardia     Dry Nonexudative Macular Degeneration     Serum Enzyme Levels - ALT (SGPT) Elevated     Serum Enzyme Levels - Alkaline Phosphatase Elevated     Dysphagia     Diarrhea     Abdominal Pain     Presence of automatic cardioverter/defibrillator (AICD)--- Medtronic single lead     Spondylolisthesis of lumbar region     Lumbar stenosis with neurogenic claudication     Sacroiliac joint dysfunction of right side     Stage 3 chronic kidney disease     Fall     Multiple fractures of cervical spine, closed, initial encounter     Closed fracture of distal end of left radius, unspecified fracture morphology, initial encounter     Closed fracture of distal end of right radius, unspecified fracture morphology, initial encounter     Warfarin-induced coagulopathy     Closed fracture of first thoracic vertebra, unspecified fracture morphology, initial encounter     Chronic systolic congestive heart failure     Permanent atrial fibrillation     Anemia, unspecified type     Cerebral aneurysm     Osteoporosis     Medical History:  Past Medical History:   Diagnosis Date     Arthritis      Atrial fibrillation      Back pain      Callus      Chronic systolic congestive heart failure      Coronary artery disease      Hyperlipidemia      Hypertension      Permanent atrial fibrillation     [id number] risk score = 5 (age3/ CAD/ HTN/ CHF) Chronic warfarin     Surgical History:  Past Surgical History:   Procedure Laterality Date     APPENDECTOMY       CARDIAC PACEMAKER PLACEMENT  2013     CATARACT EXTRACTION W/  INTRAOCULAR LENS IMPLANT Bilateral      CHOLECYSTECTOMY       ESOPHAGOGASTRODUODENOSCOPY       MO CABG,  VEIN, FOUR      Description: Coronary Artery Quadruple Venous Bypass Graft;  Recorded: 10/21/2008;  Comments: 8/2004     ROTATOR CUFF REPAIR Right      TONSILLECTOMY       Social History:  Social History     Social History     Marital status: Single     Spouse name: N/A     Number of children: N/A     Years of education: N/A     Occupational History     retired      [employer]     Social History Main Topics     Smoking status: Former Smoker     Smokeless tobacco: Never Used     Alcohol use No     Drug use: No     Sexual activity: Not on file     Other Topics Concern     Not on file     Social History Narrative     Sleep History:  Sleeps with the head of bed up on a 2 x 4.  No daytime sleepiness  Exercise History:  Plays golf with a golf cart, walking limited due to low back pain    Review of Systems:   General: WNL  Eyes: WNL  Ears/Nose/Throat: WNL  Lungs: WNL  Heart: WNL  Stomach: WNL  Bladder: WNL  Muscle/Joints: WNL  Skin: WNL  Nervous System: WNL  Mental Health: WNL     Blood: WNL          Family History:  Family History   Problem Relation Age of Onset     Heart disease Mother      Stroke Mother      Crohn's disease Father      Alcohol abuse Brother      No Medical Problems Daughter      No Medical Problems Son      No Medical Problems Maternal Aunt      No Medical Problems Maternal Uncle      No Medical Problems Paternal Aunt      No Medical Problems Paternal Uncle      No Medical Problems Maternal Grandmother      No Medical Problems Maternal Grandfather      No Medical Problems Paternal Grandmother      No Medical Problems Paternal Grandfather      Cancer Brother          Allergies:  No Known Allergies  Medications:  Current Outpatient Prescriptions   Medication Sig Dispense Refill     acetaminophen (TYLENOL) 325 MG tablet Take 650 mg by mouth every 4 (four) hours as needed for pain. Indications: Pain       carvedilol (COREG) 25 MG tablet Take 1.5 tablets (37.5 mg total) by mouth 2 (two) times a day with meals.  270 tablet 2     cholecalciferol, vitamin D3, 1,000 unit tablet Take 2 tablets (2,000 Units total) by mouth daily. (Patient taking differently: Take 1,000 Units by mouth daily. )  0     diclofenac sodium (VOLTAREN) 1 % Gel Apply to painful areas over low back and sacroiliac joint once daily as needed for pain 1 Tube 3     diphenoxylate-atropine (LOMOTIL) 2.5-0.025 mg per tablet TK 2 T PO Q 6 H PRF CRAMPS/DIARRHEA  0     FERROUS FUMARATE (IRON ORAL) Take 1 tablet by mouth daily.        furosemide (LASIX) 40 MG tablet Take 1 tablet (40 mg total) by mouth daily. 90 tablet 3     losartan (COZAAR) 50 MG tablet TAKE 1 1/2 TABLETS BY MOUTH EVERY  tablet 2     lovastatin (MEVACOR) 40 MG tablet TAKE 1 TABLET BY MOUTH EVERY DAY AS DIRECTED 90 tablet 2     metroNIDAZOLE (FLAGYL) 250 MG tablet Take 250 mg by mouth daily.   0     multivitamin with iron (ONE DAILY WITH IRON) Tab tablet Take 1 tablet by mouth daily.        omeprazole (PRILOSEC) 20 MG capsule TAKE 1 CAPSULE BY MOUTH TWICE DAILY (Patient taking differently: TAKE 1 CAPSULE BY MOUTH DAILY) 180 capsule 0     warfarin (COUMADIN) 2.5 MG tablet TAKE 1 AND 1/2 TABLETS BY MOUTH ON THURSDAYS; TAKE 1 TABLET ALL OTHER DAYS OR AS DIRECTED. 60 tablet 6     warfarin (COUMADIN) 2.5 MG tablet Take 1 to 1.5 tablets (2.5 to 3.75 mg) by mouth daily. Adjust dose per INR results as instructed. 100 tablet 1     carvedilol (COREG) 25 MG tablet TAKE 1 AND 1/2 TABLETS BY MOUTH TWICE DAILY 270 tablet 1     losartan (COZAAR) 50 MG tablet   1     lovastatin (MEVACOR) 40 MG tablet TAKE 1 TABLET BY MOUTH EVERY DAY AS DIRECTED 90 tablet 3     lovastatin (MEVACOR) 40 MG tablet TAKE 1 TABLET BY MOUTH EVERY DAY AS DIRECTED 90 tablet 1     omeprazole (PRILOSEC) 20 MG capsule TAKE 1 CAPSULE BY MOUTH TWICE DAILY 180 capsule 0     PROAIR HFA 90 mcg/actuation inhaler INHALE 2 PUFFS PO Q 4 H PRF SOB  0     Current Facility-Administered Medications   Medication Dose Route Frequency Provider Last  "Rate Last Dose     denosumab 60 mg (PROLIA 60 mg/ml)  60 mg Subcutaneous Q6 Months Israel Barros MD   60 mg at 06/19/17 1301       Objective:   Wt Readings from Last 3 Encounters:   06/04/18 195 lb (88.5 kg)   05/16/18 193 lb (87.5 kg)   05/16/18 193 lb 4.8 oz (87.7 kg)     Vital signs:  /66 (Patient Site: Left Arm, Patient Position: Sitting, Cuff Size: Adult Regular)  Pulse 64  Resp 16  Ht 5' 9\" (1.753 m)  Wt 195 lb (88.5 kg)  BMI 28.8 kg/m2      Physical Exam:    Eyes     Conjunctiva Findings: Noninjected  Sclerae: Clear, nonicteric.    ENT    Mouth: Oral mucuous membranes are pink and moist    Neck    Carotid pulses: Carotid pulses palpaple with normal contours and symmetric, no bruits.    Jugular Veins: Normal jugular venous pressure.    Thyroid: No visible thyromegaly.    Pulmonary    Examination of lungs: Clear to auscultation, no crackles, or wheezing.    Chest    Examination of Chest incision: Clear, dry and intact.    Cardiovascular     The heart rate was normal. Regular S1 and S2 with no murmur or gallop  Pulses: Normal    Extremities: No edema or clubbing.  Superficial varicosities particularly on the right   Gastrointestinal     The abdomen was obese. Bowel sounds were normal. The abdomen was soft and nontender.    Musculoskeletal    Spine: spine straight upon visual inspection.    Skin    Skin and subcutaneous tissue: No xanthelasma.    Neurologic    Orientation to person, place and time: Normal.    Psychiatric    Mood and affect: Normal.     Lab Results:  LIPIDS:  Lab Results   Component Value Date    CHOL 149 10/17/2017    CHOL 170 09/27/2016    CHOL 144 07/27/2015     Lab Results   Component Value Date    HDL 41 10/17/2017    HDL 46 09/27/2016    HDL 33 (L) 07/27/2015     Lab Results   Component Value Date    LDLCALC 96 10/17/2017    LDLCALC 106 09/27/2016    LDLCALC 82 07/27/2015     Lab Results   Component Value Date    TRIG 59 10/17/2017    TRIG 88 09/27/2016    TRIG 144 " 07/27/2015         Echocardiogram 10/2017:Summary     When compared to the previous study dated 4/26/2016, no significant change    Left ventricle ejection fraction is moderately decreased. The estimated left ventricular ejection fraction is 35%.    Mild mitral regurgitation    Pacemaker lead in right heart chambers         RegAdenosine myocardial perfusion imaging study 5/2016:   The gated images reveal moderate global hypokinesis. The measured left ventricular ejection fraction is 40 %.   CONCLUSION:    1. Lexiscan stress nuclear study is negative for inducible myocardial ischemia or infarction.    2. Mild to moderate(?) global hypokinesis with a quantitate the left ventricular ejection fraction of 40%.      LOUISA BLOCK MD [facility]  [phone]    This note created using Dragon voice recognition software.  Sound alike errors may have escaped editing.

## 2021-06-18 NOTE — PROGRESS NOTES
Post injection F/U  --6/15/18 failed  right L3, L4, L5 MBB  --C/O right low back pain, no change  --Rates pain 6/10  --PT x 4 sessions [facility] for leg weakness/SI/knee pain 11/29/17    Medication  --Tylenol 325 mg 2 tab Q4H PRN  --Diclofenac 1% gel applies 1-2 times daily per pt which helps

## 2021-06-18 NOTE — LETTER
Letter by Juan Sotelo DO at      Author: Juan Sotelo DO Service: -- Author Type: --    Filed:  Encounter Date: 3/7/2019 Status: (Other)         Patient: Tanmay Israel   MR Number: [id number]   YOB: 1940   Date of Visit: 3/7/2019     [facility]      Facility:    [facility] [[id number]]  Code Status: UNKNOWN      Chief Complaint/Reason for Visit:  Chief Complaint   Patient presents with   ? H & P     Acute cholecystitis with status post laparoscopic cholecystectomy, essential hypertension, coronary disease, ischemic cardiomyopathy, systolic congestive heart failure, paroxysmal atrial fibrillation, status post ICD placement, stage III chronic kidney disease, Crohn's colitis, diverticulosis, history of gallstones.  Distant hospital hospitalization for cervical 4 5 and 6 laminoplasty.       HPI:   Tanmay is a 78 y.o. male who was recently admitted to the hospital on 2/27/2019.  He had signs and symptoms of right upper quadrant abdominal pain rating to the back and he did have some nausea.  He does have a history of Crohn's disease as well as systolic congestive heart failure and essential hypertension with coronary disease.  He recently had a cervical 4 5 and 6 laminoplasty was discharged on 2/25/2019.  And he also had for signs and symptoms of watery diarrhea.  He was admitted to the hospital was worked up very thoroughly and found to have cholecystitis.  ALT and AST will normal bilirubin was normal alk phosphatase was mildly elevated.  Lipid lipase was within normal limits and there is no leukocytosis he was seen by general surgery and he underwent laparoscopic cholecystectomy on 2/28/2019.  There were no perioperative complications and no postoperative complications.  He did continue Bumex as well as Coreg and losartan.  Hemoglobin was low at 8 but apparently stable.  We did start him on Coumadin yesterday was discharged on Lovenox for total 14 days 7  more days from discharge however we have, 20 and so I did start him on the Coumadin.  However I am not sure when I did my research she did not have any Coumadin when he was admitted to the hospital so do not know about resolution of Coumadin.  His current Lovenox at this time we did start Coumadin yesterday thinking therapeutic in the next for 5 days.    Patient denies any issues at this time since pain is in good control and he is moving his bowels.  He claims he had some upset stomach this morning but no real issues or vomiting at this time.  He is moving his bowels okay and denies any other issues.  He has progressed as anticipated with physical and occupational therapy.    Past Medical History:  Past Medical History:   Diagnosis Date   ? Anemia    ? Arthritis    ? Atrial fibrillation (H)    ? Back pain    ? Callus    ? Cerebral aneurysm    ? Cervical spinal stenosis    ? Chronic kidney disease     stage 3   ? Chronic systolic congestive heart failure (H)    ? Coronary artery disease    ? Crohn's disease (H)    ? Dysphagia    ? GERD (gastroesophageal reflux disease)    ? Hyperlipidemia    ? Hypertension    ? ICD (implantable cardioverter-defibrillator) in place     Medtronic   ? Ischemic cardiomyopathy    ? Kidney stone    ? Low back pain    ? Macular degeneration    ? Permanent atrial fibrillation (H)     [id number] risk score = 5 (age3/ CAD/ HTN/ CHF) Chronic warfarin   ? Right rotator cuff tendinitis    ? Schatzki's ring    ? Shortness of breath    ? Sleep apnea     no CPAP           Surgical History:  Past Surgical History:   Procedure Laterality Date   ? APPENDECTOMY     ? CARDIAC PACEMAKER PLACEMENT  2013    ICD Medtronic   ? CATARACT EXTRACTION W/  INTRAOCULAR LENS IMPLANT Bilateral    ? CHOLECYSTECTOMY     ? ESOPHAGOGASTRODUODENOSCOPY     ? [place] C- LAMINOPLASTY, 2 OR MORE Left 2/22/2019    Procedure: LEFT CERVICAL 4, 5, 6 LAMINOPLASTY;  Surgeon: Liza Cesar MD;  Location: [facility] OR;   Service: Spine   ? RI CABG, VEIN, FOUR      Description: Coronary Artery Quadruple Venous Bypass Graft;  Recorded: 10/21/2008;  Comments: 8/2004   ? RI LAP,CHOLECYSTECTOMY N/A 2/28/2019    Procedure: CHOLECYSTECTOMY, LAPAROSCOPIC;  Surgeon: Mana Roman MD;  Location: [facility] [place];  Service: General   ? ROTATOR CUFF REPAIR Right    ? TONSILLECTOMY     ? XR MYELOGRAM CERVICAL THORACIC LUMBAR  1/17/2019       Family History:   Family History   Problem Relation Age of Onset   ? Heart disease Mother    ? Stroke Mother    ? Crohn's disease Father    ? Alcohol abuse Brother    ? No Medical Problems Daughter    ? No Medical Problems Son    ? No Medical Problems Maternal Aunt    ? No Medical Problems Maternal Uncle    ? No Medical Problems Paternal Aunt    ? No Medical Problems Paternal Uncle    ? No Medical Problems Maternal Grandmother    ? No Medical Problems Maternal Grandfather    ? No Medical Problems Paternal Grandmother    ? No Medical Problems Paternal Grandfather    ? Cancer Brother    ? Urolithiasis Neg Hx    ? Gout Neg Hx        Social History:    Social History     Socioeconomic History   ? Marital status: Single     Spouse name: None   ? Number of children: None   ? Years of education: None   ? Highest education level: None   Occupational History   ? Occupation: retired     Comment: [employer]   Social Needs   ? Financial resource strain: None   ? Food insecurity:     Worry: None     Inability: None   ? Transportation needs:     Medical: None     Non-medical: None   Tobacco Use   ? Smoking status: Former Smoker   ? Smokeless tobacco: Never Used   Substance and Sexual Activity   ? Alcohol use: No   ? Drug use: No   ? Sexual activity: None   Lifestyle   ? Physical activity:     Days per week: None     Minutes per session: None   ? Stress: None   Relationships   ? Social connections:     Talks on phone: None     Gets together: None     Attends Taoism service: None     Active member of club or  organization: None     Attends meetings of clubs or organizations: None     Relationship status: None   ? Intimate partner violence:     Fear of current or ex partner: None     Emotionally abused: None     Physically abused: None     Forced sexual activity: None   Other Topics Concern   ? None   Social History Narrative   ? None          Review of Systems   Constitutional:        Patient denies any pain fevers chills nausea vomiting diarrhea change in vision hearing taste or smell weakness one-sided suspensions of breath.  Denies incontinent of stool polyphagia polydipsia polyuria depression or anxiety and the remainder review of systems is negative.       Vitals:    03/07/19 1257   BP: 120/58   Pulse: 63   Resp: 20   Temp: 97  F (36.1  C)   SpO2: 95%       Physical Exam   Constitutional: No distress.   HENT:   Head: Normocephalic and atraumatic.   Nose: Nose normal.   Mouth/Throat: No oropharyngeal exudate.   Eyes: Conjunctivae are normal. Right eye exhibits no discharge. Left eye exhibits no discharge.   Neck: Neck supple. No thyromegaly present.   Cardiovascular: Normal rate and normal heart sounds.   Patient's rhythm is irregularly irregular.   Pulmonary/Chest: Effort normal and breath sounds normal. No respiratory distress. He has no wheezes.   Abdominal: Soft. Bowel sounds are normal. He exhibits distension. There is no tenderness. There is no rebound and no guarding.   Musculoskeletal:   Trace edema bilateral.   Neurological: He is alert. No cranial nerve deficit. He exhibits normal muscle tone.   Skin: Skin is warm and dry. He is not diaphoretic. No erythema.   Psychiatric: He has a normal mood and affect. His behavior is normal.       Medication List:  Current Outpatient Medications   Medication Sig   ? acetaminophen (TYLENOL) 325 MG tablet Take 2 tablets (650 mg total) by mouth every 6 (six) hours for 10 days.   ? baclofen (LIORESAL) 10 MG tablet Take 1 tablet (10 mg total) by mouth every 8 (eight) hours  as needed.   ? bumetanide (BUMEX) 1 MG tablet Take 1 tablet (1 mg total) by mouth daily.   ? carvedilol (COREG) 25 MG tablet Take 1.5 tablets (37.5 mg total) by mouth 2 (two) times a day.   ? cholecalciferol, vitamin D3, 1,000 unit tablet Take 2,000 Units by mouth daily.   ? enoxaparin (LOVENOX) 40 mg/0.4 mL syringe Inject 0.4 mL (40 mg total) under the skin daily for 7 days.   ? FERROUS FUMARATE (IRON ORAL) Take 1 tablet by mouth daily.    ? lidocaine 4 % patch Place 1 patch on the skin daily. Remove and discard patch with 12 hours or as directed by MD.   ? losartan (COZAAR) 50 MG tablet Take 1 tablet (50 mg total) by mouth daily.   ? multivitamin with iron (ONE DAILY WITH IRON) Tab tablet Take 1 tablet by mouth daily.    ? omeprazole (PRILOSEC) 20 MG capsule Take 20 mg by mouth 2 (two) times a day.   ? oxyCODONE (ROXICODONE) 5 MG immediate release tablet 1 tab PO for pain rated 6-8 and 2 tabs for pain rated 9-10 every 4 hours as needed.   ? polyethylene glycol (MIRALAX) 17 gram packet Take 1 packet (17 g total) by mouth 2 (two) times a day.   ? rosuvastatin (CRESTOR) 20 MG tablet Take 1 tablet (20 mg total) by mouth at bedtime.   ? senna-docusate (PERICOLACE) 8.6-50 mg tablet Take 1 tablet by mouth 2 (two) times a day.       Labs: Hospital labs from 3/4/2019 are as follows; creatinine 1.01, GFR is greater than 60, potassium 3.6, white count was 8.8, hemoglobin was 8.0, platelets 132,000.  White count was high as 10.8.  Potassium slightly elevated but do not have an exact number and normal bilirubin normal AST and normal ALT      Assessment:    ICD-10-CM    1. Acute cholecystitis K81.0    2. Status post laparoscopic cholecystectomy Z90.49    3. Pain management R52    4. Essential hypertension I10    5. Chronic systolic congestive heart failure (H) I50.22        Plan: Plan at this time will get a hemoglobin next lab day secondary to low hemoglobin however in review of his labs and see anything was too concerning.   He is healing very well from his acute cholecystitis and his blood pressure had been pretty much normotensive.  His chronic systolic heart failure seems about stable at this time and I will continue to monitor above medical problems no other changes to care plan at this time.        Electronically signed by: Juan Sotelo DO

## 2021-06-18 NOTE — PROGRESS NOTES
Audiology Report:    Referring Provider:  Go Ramírez MD    History:  Tanmay Israel is seen today for comprehensive hearing evaluation. He feels he doesn't have a issue with hearing, but reports others have encouraged to have his hearing evaluated. He reports some occasions of not understanding others, but feels they speak softly.  He states he has never had his hearing evaluated.   He denies a history of otalgia, dizziness, tinnitus, aural fullness and history of noise exposure.    Results:     Left Ear Right Ear   Otoscopy clear canals clear canals   Pure Tone Audiometry Normal hearing from 250-1500 Hz sloping to a moderate to severe sensorineural hearing loss   Normal hearing from 250-1500 Hz sloping to a moderate to severe sensorineural hearing loss   Word Recognition good fair   Tympanometry normal (Type A)  normal (Type A)     Transducer: Insert earphones and Circumaural headphones    Reliability was good  and there was good  SRT to PTA agreement.       Plan:  Results are discussed in detail.  He should return for retesting annually, or sooner with concerns. Explained that patient is a candidate for hearing aids bilaterally. Recommended that he return for a hearing aid evaluation appointment within 6 months of today's test date. He reports he is not interested in amplification at this time. Provided him with a copy of today's test results, a brochure from the [place] [employer] about hearing aids, and my contact information should he change his mind.     Please see audiogram under  media  and  audiogram  in the patient s chart.     Rossana [email or web address].[email or web address], CCC-A  [place] Licensed Audiologist #[id number]

## 2021-06-18 NOTE — PROGRESS NOTES
Assessment:     Diagnoses and all orders for this visit:    Chronic right-sided low back pain without sciatica  -     OPS TFESI Lumbar Sacral Unilateral; Future; Expected date: 6/26/18    Lumbar facet arthropathy    Lumbar foraminal stenosis  -     OPS TFESI Lumbar Sacral Unilateral; Future; Expected date: 6/26/18    Lumbar stenosis    Sacroiliac joint dysfunction of right side       Tanmay Israel is a 78 y.o. y.o. male with past medical history significant for knee osteoarthritis, GERD, DANIEL, hyperlipidemia, Crohn's colitis, hypertension, coronary atherosclerosis, ischemic cardiomyopathy/chronic systolic CHF, paroxysmal ventricular tachycardia, internal defibrillator, lumbar spinal listhesis/SI joint dysfunction, chronic kidney disease stage III, cerebral aneurysm, osteoporosis who presents today for follow-up regarding:    -Ongoing chronic right-sided low back pain with some L5 radicular dermatomal pain that is unchanged.  Patient had a failed medial branch block indicating pain is not facet mediated therefore he is not a candidate for rhizotomy.  Patient is interested in considering surgery but is leaving for a vacation in [place] in July and wants to wait until he returns to consider surgery.     Plan:     A shared decision making plan was used. The patient's values and choices were respected. Prior medical records from 6/6/18 were reviewed today. The following represents what was discussed and decided upon by the provider and the patient.        -DIAGNOSTIC TESTS: Images were personally reviewed and interpreted.   --Lumbar CT scan 5/14/2018 reveals L5-S1 laminectomy and right facetectomy.  L4-5 severe bilateral foraminal stenosis with moderate to severe spinal canal stenosis that has worsened since 2015.  There is also similar L5-S1 bilateral foraminal stenosis.  L3-4 moderate bilateral foraminal and bilateral lateral recess stenosis.  Nondisplaced right L4 pars defect noted with unchanged L4-5  spondylolisthesis, 2 mm.  Spinal listhesis also noted 4 mm at L4-5 unchanged and 2 mm L5-S1 unchanged.  --Lumbar spine CT myelogram from 2015 does reveal chronic L4-5 moderately severe central canal stenosis and severe right/moderate left foraminal stenosis.  L5-S1 moderately severe foraminal stenosis without central canal stenosis.  --Scoliosis panel 2015 shows 65  lordosis, thoracic kyphosis 51 , 8  scoliosis T11 through L3.  5 mm L4-5 and 3 mm L5-S1 spondylolisthesis.    -INTERVENTIONS: Ordered a repeat Right L4-5 transforaminal epidural steroid injection as this is the only injection that has given him some benefit and he would like to repeat it prior to his vacation to [place] leaving July 18, 2018.  Patient did find that the previous right L4-5 TF JOSUÉ was helpful, and he does have severe foraminal stenosis on the right at both L4-5 and L5-S1.  He got no lasting relief with L5-S1 TF JOSUÉ however.  No relief previously with SI joint steroid injection.    -MEDICATIONS: No change in medications, advise patient to continue tylenol as needed.    Discussed side effects of medications and proper use. Patient verbalized understanding.    -PHYSICAL THERAPY: We did discuss trialing different types of physical therapy including pool therapy which he is interested in but wants to wait until he returns to [place] which is reasonable.  Discussed the importance of core strengthening, ROM, stretching exercises with the patient and how each of these entities is important in decreasing pain.  Explained to the patient that the purpose of physical therapy is to teach the patient a home exercise program.  These exercises need to be performed every day in order to decrease pain and prevent future occurrences of pain.        -PATIENT EDUCATION:  25 minutes of total visit time was spent face to face with the patient today, 60 % of the visit was spent on counseling, education, and coordinating care.   -5 minutes spent outside of visit  time, non-face-to-face time, reviewing chart.    -FOLLOW UP: Follow-up for injection with Dr. Cage.  Advised to contact clinic if symptoms worsen or change.    Subjective:     Tanmay Israel is a 78 y.o. male who presents today for follow-up regarding post right L3, L4, L5 medial branch block in which he got no relief and actually increased his pain.  Patient currently reports right-sided low back pain is a 6/10 that is constant, a 4 at its best.  He does report that standing and walking is more aggravating to him and sitting feels better.  He does have some pain in the posterior lateral and posterior hamstring, otherwise denies new symptoms.  He reports that that L4-5 TF JOSUÉ as the injection that gave him some benefit and he is leaving for [place] in July and would like to trial the injection one more time before he goes.  However he is also interested in talking with a surgeon at this point as his pain is quite frustrating and debilitating to him.    **Patient saw Dr. Alicia last 9/27/2017 who recommended SI joint steroid injection however did discuss that if he fails to get further relief with SI injection/therapy, can trial injection to target stenosis at the L4-5 level.  Both of these injections have been completed however with minimal benefit.      Treatment to Date: History right L5-S1 laminectomy [facility] orthopedics.  Physical therapy [facility] ×4 sessions 11/29/2017.  [facility] on his own in the past however unable to do currently due to increased pain.  Acupuncture recently with some relief.      SPR Right L4-5 TF JOSUÉ 7/12/2016 with no benefit.  Procedure 8/10, postprocedure 0/10 on procedure note.  SPR CT-guided Right SI joint steroid injection 9/20/2016.  SPR Right SI joint steroid injection 2/15/2017, 5/23/2017, 9/8/2017, with typical relief except the last 9/8/2017 he reports he got minimal relief.      Right SI joint steroid injection Dr. Cage 11/29/2017 with 10% relief.  Right L4-5 TF  JOSUÉ 2/15/2018 with 40% relief ×3 months.  Preprocedure pain 8/10, post 1/10.  Right L5-S1 TF JOSUÉ 5/23/2018 with 90% relief ×3-4 days, NO lasting relief. Preprocedure pain 8/10, post 3/10.  FAILED Right L3, L4, L5 MBB 6/15/2018.      Medications:  Voltaren gel is helping  Lidoderm patches  Tylenol occasional with minimal relief.    Patient Active Problem List   Diagnosis     Right Rotator Cuff Tendonitis     Osteoarthritis Of The Knee     Joint Pain, Localized In The Right Shoulder     Difficulty Breathing (Dyspnea)     Esophageal Reflux     Muscle Aches, Generalized (Myalgias)     Lumbago     Obstructive sleep apnea     Sciatica     Joint Pain, Localized In The Knee     Ptosis Of Eyelid     Hyperlipidemia     Anemia     Crohn's (Granulomatous) Colitis     Benign Essential Hypertension     Coronary atherosclerosis of native coronary artery     Ischemic cardiomyopathy     Paroxysmal ventricular tachycardia (H)     Dry Nonexudative Macular Degeneration     Serum Enzyme Levels - ALT (SGPT) Elevated     Serum Enzyme Levels - Alkaline Phosphatase Elevated     Dysphagia     Diarrhea     Abdominal Pain     Presence of automatic cardioverter/defibrillator (AICD)--- [employer] single lead     Spondylolisthesis of lumbar region     Lumbar stenosis with neurogenic claudication     Sacroiliac joint dysfunction of right side     Stage 3 chronic kidney disease     Fall     Multiple fractures of cervical spine, closed, initial encounter (H)     Closed fracture of distal end of left radius, unspecified fracture morphology, initial encounter     Closed fracture of distal end of right radius, unspecified fracture morphology, initial encounter     Warfarin-induced coagulopathy (H)     Closed fracture of first thoracic vertebra, unspecified fracture morphology, initial encounter (H)     Chronic systolic congestive heart failure (H)     Permanent atrial fibrillation (H)     Anemia, unspecified type     Cerebral aneurysm     Osteoporosis        Current Outpatient Prescriptions on File Prior to Encounter   Medication Sig Dispense Refill     acetaminophen (TYLENOL) 325 MG tablet Take 650 mg by mouth every 4 (four) hours as needed for pain. Indications: Pain       carvedilol (COREG) 25 MG tablet TAKE 1 AND 1/2 TABLETS BY MOUTH TWICE DAILY 270 tablet 1     cholecalciferol, vitamin D3, 1,000 unit tablet Take 2 tablets (2,000 Units total) by mouth daily. (Patient taking differently: Take 1,000 Units by mouth daily. )  0     diclofenac sodium (VOLTAREN) 1 % Gel Apply to painful areas over low back and sacroiliac joint once daily as needed for pain 1 Tube 3     diphenoxylate-atropine (LOMOTIL) 2.5-0.025 mg per tablet TK 2 T PO Q 6 H PRF CRAMPS/DIARRHEA  0     FERROUS FUMARATE (IRON ORAL) Take 1 tablet by mouth daily.        furosemide (LASIX) 40 MG tablet Take 1 tablet (40 mg total) by mouth daily. 90 tablet 3     losartan (COZAAR) 100 MG tablet Take 1 tablet (100 mg total) by mouth daily. 90 tablet 3     multivitamin with iron (ONE DAILY WITH IRON) Tab tablet Take 1 tablet by mouth daily.        omeprazole (PRILOSEC) 20 MG capsule TAKE 1 CAPSULE BY MOUTH TWICE DAILY (Patient taking differently: TAKE 1 CAPSULE BY MOUTH DAILY) 180 capsule 0     omeprazole (PRILOSEC) 20 MG capsule TAKE 1 CAPSULE BY MOUTH TWICE DAILY 180 capsule 0     PROAIR HFA 90 mcg/actuation inhaler INHALE 2 PUFFS PO Q 4 H PRF SOB  0     rosuvastatin (CRESTOR) 20 MG tablet Take 1 tablet (20 mg total) by mouth at bedtime. 90 tablet 3     warfarin (COUMADIN) 2.5 MG tablet TAKE 1 AND 1/2 TABLETS BY MOUTH ON THURSDAYS; TAKE 1 TABLET ALL OTHER DAYS OR AS DIRECTED. 60 tablet 6     warfarin (COUMADIN) 2.5 MG tablet Take 1 to 1.5 tablets (2.5 to 3.75 mg) by mouth daily. Adjust dose per INR results as instructed. 100 tablet 1     [DISCONTINUED] carvedilol (COREG) 25 MG tablet Take 1.5 tablets (37.5 mg total) by mouth 2 (two) times a day with meals. 270 tablet 2     Current Facility-Administered  Medications on File Prior to Encounter   Medication Dose Route Frequency Provider Last Rate Last Dose     denosumab 60 mg (PROLIA 60 mg/ml)  60 mg Subcutaneous Q6 Months Israel Barros MD   60 mg at 06/19/17 1301       No Known Allergies    Past Medical History:   Diagnosis Date     Arthritis      Atrial fibrillation (H)      Back pain      Callus      Chronic systolic congestive heart failure (H)      Coronary artery disease      Hyperlipidemia      Hypertension      Permanent atrial fibrillation (H)     [id number] risk score = 5 (age1/ CAD/ HTN/ CHF) Chronic warfarin        Review of Systems  ROS:  Specifically negative for bowel/bladder dysfunction, balance changes, headache, dizziness, foot drop, fevers, chills, appetite changes, nausea/vomiting, unexplained weight loss. Otherwise 13 systems reviewed are negative. Please see the patient's intake questionnaire from today for details.    Reviewed Social, Family, Past Medical and Past Surgical history with patient, no significant changes noted since prior visit.     Objective:     /62 (Patient Site: Right Arm, Patient Position: Sitting)  Pulse (!) 59  Wt 191 lb (86.6 kg)  SpO2 98%  BMI 28.21 kg/m2    PHYSICAL EXAMINATION:    --CONSTITUTIONAL: Well developed, well nourished, healthy appearing individual.  --PSYCHIATRIC: Appropriate mood and affect. No difficulty interacting due to temper, social withdrawal, or memory issues.  --SKIN: Lumbar region is dry and intact.   --RESPIRATORY: Normal rhythm and effort. No abnormal accessory muscle breathing patterns noted.   --MUSCULOSKELETAL:  Normal lumbar lordosis noted, no lateral shift.  --GROSS MOTOR: Easily arises from a seated position. Gait is non-antalgic  --LUMBAR SPINE:  Inspection reveals no evidence of deformity. Range of motion is not limited in lumbar flexion, extension, lateral rotation. No tenderness to palpation lumbar spine. Straight leg raising in the supine position is negative to  radicular pain. Sciatic notch non-tender.   --LOWER EXTREMITY MOTOR TESTING:  Plantar flexion left 5/5, right 5/5   Dorsiflexion left 5/5, right 5/5   Great toe MTP extension left 5/5, right 5/5  Knee flexion left 5/5, right 5/5  Knee extension left 5/5, right 5/5   Hip flexion left 5/5, right 5/5  Hip abduction left 5/5, right 5/5  Hip adduction left 5/5, right 5/5   --NEUROLOGIC: Bilateral patellar and achilles reflexes are physiologic and symmetric. Sensation to light touch is intact in the bilateral L4, L5, and S1 dermatomes.    RESULTS:   Imaging: Lumbar spine imaging was reviewed today. The images were shown to the patient and the findings were explained using a spine model.      Ct Lumbar Spine Without Contrast  Result Date: 5/14/2018  INDICATION: Spinal stenosis, lumbar. Ongoing right lumbar radiculitis. TECHNIQUE: Helical thin-section CT scan of the spine was performed using bone reconstruction algorithm. From the axial source data, sagittal and coronal thin reformats. Dose reduction techniques were used. IV CONTRAST: None. COMPARISON: 9/9/2015. FINDINGS:  Nomenclature is based on 5 lumbar type vertebral bodies. Vertebral body heights are unremarkable and unchanged from 9/9/2015. No destructive bony lesions are demonstrated. L5-S1 laminectomy and right facetectomy have been performed. Nondisplaced right L4 pars defect again demonstrated. There is no evidence of an acute displaced fracture. The imaged portions of the bony pelvis appear intact. There are mild degenerative changes of the sacroiliac joints. There is L4-L5 anterolisthesis of approximately 4 mm, not appreciably changed. L3-L4 retrolisthesis of 2 mm is also unchanged. L5-S1 anterolisthesis of 2 mm is similar to prior. Alignment is otherwise unremarkable in the sagittal plane. There is mild levoconvex lower lumbar curvature. Grossly unremarkable paraspinal soft tissues. There is scattered atherosclerotic change of the abdominal aorta and its  branches. No abdominal aortic aneurysm is demonstrated. There is colonic diverticulosis.   T12-L1: Mild intervertebral disc height loss. No significant posterior disc abnormality. Prominent anterior and lateral disc osteophyte complexes. Mild facet arthropathy. No significant spinal canal stenosis. No right neural foraminal stenosis. No left neural foraminal stenosis.   L1-L2: Mild intervertebral disc height loss. Mild broad-based disc bulging. Mild ligamentum flavum thickening and mild facet arthropathy. Mild to moderate spinal canal stenosis. Mild right neural foraminal stenosis. Mild left neural foraminal stenosis.   L2-L3: Moderate intervertebral disc height loss, with broad-based disc osteophyte complex. Mild facet arthropathy. Bilateral lateral recess stenosis. Mild to moderate spinal canal stenosis. Moderate right neural foraminal stenosis. Mild to moderate left neural foraminal stenosis.   L3-L4: Mild intervertebral disc height loss with broad-based disc osteophyte complex. Ligamentum flavum thickening and moderate facet arthropathy. Moderate spinal canal stenosis. Moderate right neural foraminal stenosis. Moderate left neural foraminal stenosis. Bilateral lateral recess stenosis.   L4-L5: Unchanged anterolisthesis as above, with uncovering of the dorsal disc margin. There is superimposed broad-based disc bulging. Severe bilateral facet arthropathy, left greater than right. Moderate to severe spinal canal stenosis. Severe right neural foraminal stenosis. Severe left neural foraminal stenosis.   L5-S1: Normal disc height. Mild bulge. No definite right-sided facet articulation demonstrated, similar to prior. Moderate left facet arthropathy. Mild spinal canal stenosis. Severe right neural foraminal stenosis. Severe left neural foraminal stenosis.   CONCLUSION:   1.  Postoperative changes of L5-S1 laminectomy and right facetectomy.   2.  At L4-L5, there is severe bilateral neural foraminal stenosis and moderate  to severe spinal canal stenosis, slightly progressed.   3.  At L5-S1, there is severe bilateral neural foraminal stenosis, similar to prior.   4.  At L3-L4, there is moderate bilateral neural foraminal stenosis and bilateral lateral recess stenosis.   5.  Additional degenerative changes as described.        XR SCOLIOSIS AP OR PA AND LATERAL STANDING  10/19/2015   INDICATION: Low back pain. Thoracic kyphosis.  COMPARISON: None.  FINDINGS: Twelve rib-bearing thoracic-type vertebral bodies. Five lumbar type vertebral bodies. Thoracic kyphosis estimated at 51 degrees. Lumbar lordosis between L1 and S1 estimated at 65 degrees. Right convex asymmetry of the thoracolumbar spine   measuring 8 degrees between T11 and L3.  Several bridging osteophytes in the thoracic spine, pattern that is consistent with diffuse idiopathic skeletal hyperostosis. Prominent left lateral T12-L1 osteophyte and ventral L2-L3 and L3-L4 osteophytes, but not definitely bridging. 5 mm   anterolisthesis of L4 on L5 and 3 mm anterolisthesis of L5 on S1. Prominent facet arthropathy lower lumbar spine. No acute superimposed bony abnormality of the thoracolumbar spine, without definite fracture.  Dense aortic calcification, normal caliber. Left hemithorax generator with two pacemaker leads to the heart. Sternal wires. Reverse right shoulder arthroplasty. Moderate degenerative change both hips.          CT LUMBAR SPINE MYELOGRAM  9/9/2015  INDICATION: Lumbar stenosis.  TECHNIQUE: Helical images were reconstructed in the axial plane at 1.25-mm increments, with sagittal and coronal reformations. Intrathecal contrast introduced under separate procedure and described with that procedure.  COMPARISON: None.  FINDINGS: Five lumbar-type vertebral bodies. Slightly exaggerated lordosis at L4-L5 on a degenerative basis. 4-mm anterior subluxation of L4 on L5. 2-mm anterior subluxation of L5 on S1. Slight retrolisthesis of L2 on L3 and L3 on L4. Left convex    scoliosis measuring 14 degrees between L3 and L5. Much of this is at L4-L5 from asymmetric disc height loss. Right-sided L4 pars defect. L5-S1 wide laminectomy with right facetectomy. No destructive bony lesion is apparent. No fracture is evident.  Conus tip ends at T12-L1. Cauda equina generally unremarkable. Paraspinous soft tissues are grossly normal. Moderate paraspinous muscle volume loss. Normal aortic caliber, moderate calcification. Mild to moderate degenerative change SI joints.  T12-L1: Slight disc height loss. Left anterior interbody spurring. Mild facet arthropathy. No central canal stenosis. No foraminal stenosis.  L1-L2: Slight disc height loss. Mild circumferential disc bulging. Left anterolateral interbody spur. Mild to moderate facet arthropathy. No central canal stenosis. No foraminal stenosis.  L2-L3: Moderate disc height loss. Moderate circumferential interbody spur and disc bulging, more pronounced anteriorly. Mild facet arthropathy. No central canal stenosis. Mild right foraminal stenosis. No left foraminal stenosis.  L3-L4: Mild disc height loss. Mild to moderate circumferential disc bulging. Mild to moderate facet arthropathy. Some calcification of the right facet cartilage. Mild central canal stenosis. No lateral recess stenosis. Mild to moderate right foraminal   stenosis. Borderline moderate left foraminal stenosis.  L4-L5: Mild disc height loss greater dorsally. At least moderate circumferential disc bulging. Prominent facet arthropathy with diastatic left facet. Moderately severe central canal stenosis. Despite this, no definite lateral recess stenosis. Severe   right and moderate left foraminal stenoses from disc bulging.  L5-S1: Disc height normal. Slight disc bulging. Sagittally oriented left facet. Effectively absent right facet. No central canal stenosis. Moderately severe bilateral foraminal stenoses.  CONCLUSION:  1.  Previous lumbar spine surgery with L5-S1 wide laminectomy and  right facetectomy.  2.  Degenerative changes most prominent at L4-L5 and L5-S1. The facets are sagittally oriented. This predisposes to subluxation.  3.  At L4-L5, moderately severe central canal stenosis without grossly apparent lateral recess stenosis. Severe right and moderate left foraminal stenoses.  4.  At L5-S1, moderately severe bilateral foraminal stenoses.  5.  At L3-L4, borderline moderate left foraminal stenosis. Mild central canal stenosis.

## 2021-06-19 NOTE — LETTER
Letter by Kimmy Naranjo CNP at      Author: Kimmy Naranjo CNP Service: -- Author Type: --    Filed:  Encounter Date: 4/29/2019 Status: (Other)         Patient: Tanmay Israel   MR Number: [id number]   YOB: 1940   Date of Visit: 4/29/2019     Code Status:  FULL CODE  Visit Type: Follow Up     Facility:  [facility] [[id number]]        Facility Type: SNF (Skilled Nursing Facility, TCU)    History of Present Illness: Tanmay Israel is a 79 y.o. male who is seen today for a TCU follow-up visit.  He has a past medical history for atrial fibrillation on Coumadin, ED 3, CAD, Crohn's disease, HLD, hypertension, ischemic cardiomyopathy, and anemia.  He was recently seen in the emergency room for generalized weakness in which the CT of his cervical spine was negative.  However, they did determine he had C. difficile colitis, and hypomagnesemia.  His Slow-Mag was discontinued due to his loose stools as he was started on vancomycin.  Recent magnesium was better on 4/26 at 1.7.  This was reinstated last week and he reports that his stools are becoming firmer.  He denies any abdominal cramping or pain.  We will continue vancomycin until tomorrow.  His CHF is well compensated.  His BMP was drawn today and showed an elevated creatinine of 2.32.  He states his appetite is improving but is not all the way back to baseline.  He also feels he is not drinking enough fluids.    Review of Systems   Patient denies fever, chills, headache, lightheadedness, dizziness, rhinorrhea, cough, congestion, shortness of breath, chest pain, palpitations, abdominal pain, n/v, diarrhea, constipation, change in appetite, dysuria, frequency, burning or pain with urination.  Other than stated in HPI all other review of systems is negative.         Physical Exam   Vital signs: P10 1/58, heart rate 76, respiratory 24, temp 95.1, weight 158 pounds.  GENERAL APPEARANCE:well nourished, pleasant male, in no acute  distress.  HEENT: normocephalic, atraumatic  PERRL, sclerae anicteric, conjunctivae clear and moist, EOM intact.  NECK: Supple and symmetric. Trachea is midline, no thyromegaly, no adenopathy, and no tenderness  LUNGS: Lung sounds CTA, no adventitious sounds, respiratory effort normal.  CARD: RRR, S1, S2, without murmurs, gallops, rubs, no JVD, peripheral pulses 2+ and symmetric  ABD: Soft nondistended and nontender with normal bowel sounds.   MSK: Muscle strength and tone were normal.  EXTREMITIES: No cyanosis, clubbing or edema.  NEURO: Alert and oriented x 3. Normal affect.  Face is symmetric.  SKIN: Inspection of the skin reveals no rashes, ulcerations or petechiae.  PSYCH: euthymic          Labs:    Recent Results (from the past 240 hour(s))   Comprehensive Metabolic Panel    Collection Time: 04/20/19  9:57 AM   Result Value Ref Range    Sodium 142 136 - 145 mmol/L    Potassium 3.7 3.5 - 5.0 mmol/L    Chloride 105 98 - 107 mmol/L    CO2 27 22 - 31 mmol/L    Anion Gap, Calculation 10 5 - 18 mmol/L    Glucose 125 70 - 125 mg/dL    BUN 13 8 - 28 mg/dL    Creatinine 1.36 (H) 0.70 - 1.30 mg/dL    GFR MDRD Af Amer >60 >60 mL/min/1.73m2    GFR MDRD Non Af Amer 50 (L) >60 mL/min/1.73m2    Bilirubin, Total 0.7 0.0 - 1.0 mg/dL    Calcium 9.9 8.5 - 10.5 mg/dL    Protein, Total 6.9 6.0 - 8.0 g/dL    Albumin 3.7 3.5 - 5.0 g/dL    Alkaline Phosphatase 200 (H) 45 - 120 U/L    AST 19 0 - 40 U/L    ALT 12 0 - 45 U/L   Magnesium    Collection Time: 04/20/19  9:57 AM   Result Value Ref Range    Magnesium 1.6 (L) 1.8 - 2.6 mg/dL   HM1 (CBC with Diff)    Collection Time: 04/20/19  9:57 AM   Result Value Ref Range    WBC 7.6 4.0 - 11.0 thou/uL    RBC 3.58 (L) 4.40 - 6.20 mill/uL    Hemoglobin 9.9 (L) 14.0 - 18.0 g/dL    Hematocrit 30.9 (L) 40.0 - 54.0 %    MCV 86 80 - 100 fL    MCH 27.7 27.0 - 34.0 pg    MCHC 32.0 32.0 - 36.0 g/dL    RDW 15.8 (H) 11.0 - 14.5 %    Platelets 154 140 - 440 thou/uL    MPV 8.8 8.5 - 12.5 fL     Neutrophils % 73 (H) 50 - 70 %    Lymphocytes % 12 (L) 20 - 40 %    Monocytes % 10 2 - 10 %    Eosinophils % 4 0 - 6 %    Basophils % 1 0 - 2 %    Neutrophils Absolute 5.5 2.0 - 7.7 thou/uL    Lymphocytes Absolute 0.9 0.8 - 4.4 thou/uL    Monocytes Absolute 0.8 0.0 - 0.9 thou/uL    Eosinophils Absolute 0.3 0.0 - 0.4 thou/uL    Basophils Absolute 0.1 0.0 - 0.2 thou/uL   CK    Collection Time: 04/20/19  9:57 AM   Result Value Ref Range    CK, Total 42 30 - 190 U/L   C. difficile Toxigenic by PCR    Collection Time: 04/21/19  6:15 AM   Result Value Ref Range    C.Difficile Toxigenic by PCR Positive (!) Negative    Ribotype 027/NAP1/B1 Presumptive Negative Presumptive Negative   Basic Metabolic Panel    Collection Time: 04/21/19  6:27 AM   Result Value Ref Range    Sodium 141 136 - 145 mmol/L    Potassium 3.6 3.5 - 5.0 mmol/L    Chloride 108 (H) 98 - 107 mmol/L    CO2 26 22 - 31 mmol/L    Anion Gap, Calculation 7 5 - 18 mmol/L    Glucose 97 70 - 125 mg/dL    Calcium 9.6 8.5 - 10.5 mg/dL    BUN 11 8 - 28 mg/dL    Creatinine 1.15 0.70 - 1.30 mg/dL    GFR MDRD Af Amer >60 >60 mL/min/1.73m2    GFR MDRD Non Af Amer >60 >60 mL/min/1.73m2   HM2(CBC w/o Differential)    Collection Time: 04/21/19  6:27 AM   Result Value Ref Range    WBC 6.3 4.0 - 11.0 thou/uL    RBC 3.38 (L) 4.40 - 6.20 mill/uL    Hemoglobin 9.4 (L) 14.0 - 18.0 g/dL    Hematocrit 29.3 (L) 40.0 - 54.0 %    MCV 87 80 - 100 fL    MCH 27.8 27.0 - 34.0 pg    MCHC 32.1 32.0 - 36.0 g/dL    RDW 16.0 (H) 11.0 - 14.5 %    Platelets 139 (L) 140 - 440 thou/uL    MPV 9.0 8.5 - 12.5 fL   Hepatic Profile    Collection Time: 04/21/19  6:27 AM   Result Value Ref Range    Bilirubin, Total 0.6 0.0 - 1.0 mg/dL    Bilirubin, Direct 0.3 <=0.5 mg/dL    Protein, Total 6.3 6.0 - 8.0 g/dL    Albumin 3.4 (L) 3.5 - 5.0 g/dL    Alkaline Phosphatase 201 (H) 45 - 120 U/L    AST 19 0 - 40 U/L    ALT 12 0 - 45 U/L   INR    Collection Time: 04/21/19  6:27 AM   Result Value Ref Range    INR 1.86  (H) 0.90 - 1.10   Vitamin B12    Collection Time: 04/21/19  6:27 AM   Result Value Ref Range    Vitamin B-12 257 213 - 816 pg/mL   Myasthenia Gravis (MG) Evaluation, With MuSK Reflex    Collection Time: 04/21/19  6:27 AM   Result Value Ref Range    ACH Receptor (Muscle) Modulating Antibody 0 %    ACH Receptor (Muscle) Binding Antibody 0.00 <=0.02 nmol/L    Striational (Striated Muscle) Antibody Negative <1:120 titer    MG Interpretive Comments SEE BELOW    MuSK Autoantibody,Serum    Collection Time: 04/21/19  6:27 AM   Result Value Ref Range    MuSK Autoantibody, S 0.00 0.00 - 0.02 nmol/L   INR    Collection Time: 04/22/19  6:04 AM   Result Value Ref Range    INR 1.66 (H) 0.90 - 1.10   Basic Metabolic Panel    Collection Time: 04/22/19  6:04 AM   Result Value Ref Range    Sodium 142 136 - 145 mmol/L    Potassium 3.2 (L) 3.5 - 5.0 mmol/L    Chloride 106 98 - 107 mmol/L    CO2 27 22 - 31 mmol/L    Anion Gap, Calculation 9 5 - 18 mmol/L    Glucose 91 70 - 125 mg/dL    Calcium 9.2 8.5 - 10.5 mg/dL    BUN 10 8 - 28 mg/dL    Creatinine 1.17 0.70 - 1.30 mg/dL    GFR MDRD Af Amer >60 >60 mL/min/1.73m2    GFR MDRD Non Af Amer 60 (L) >60 mL/min/1.73m2   HM2(CBC w/o Differential)    Collection Time: 04/22/19  6:04 AM   Result Value Ref Range    WBC 7.0 4.0 - 11.0 thou/uL    RBC 3.37 (L) 4.40 - 6.20 mill/uL    Hemoglobin 9.5 (L) 14.0 - 18.0 g/dL    Hematocrit 28.8 (L) 40.0 - 54.0 %    MCV 86 80 - 100 fL    MCH 28.2 27.0 - 34.0 pg    MCHC 33.0 32.0 - 36.0 g/dL    RDW 16.0 (H) 11.0 - 14.5 %    Platelets 134 (L) 140 - 440 thou/uL    MPV 9.3 8.5 - 12.5 fL   Magnesium    Collection Time: 04/22/19  6:04 AM   Result Value Ref Range    Magnesium 1.4 (L) 1.8 - 2.6 mg/dL   INR    Collection Time: 04/23/19 12:00 AM   Result Value Ref Range    INR 1.60 (!) 0.9 - 1.1   Magnesium    Collection Time: 04/23/19  7:00 AM   Result Value Ref Range    Magnesium 1.3 (L) 1.8 - 2.6 mg/dL   Basic Metabolic Panel    Collection Time: 04/23/19  7:00 AM    Result Value Ref Range    Sodium 142 136 - 145 mmol/L    Potassium 3.7 3.5 - 5.0 mmol/L    Chloride 106 98 - 107 mmol/L    CO2 27 22 - 31 mmol/L    Anion Gap, Calculation 9 5 - 18 mmol/L    Glucose 88 70 - 125 mg/dL    Calcium 9.5 8.5 - 10.5 mg/dL    BUN 11 8 - 28 mg/dL    Creatinine 1.30 0.70 - 1.30 mg/dL    GFR MDRD Af Amer >60 >60 mL/min/1.73m2    GFR MDRD Non Af Amer 53 (L) >60 mL/min/1.73m2   Basic Metabolic Panel    Collection Time: 04/24/19  8:15 AM   Result Value Ref Range    Sodium 141 136 - 145 mmol/L    Potassium 3.6 3.5 - 5.0 mmol/L    Chloride 102 98 - 107 mmol/L    CO2 28 22 - 31 mmol/L    Anion Gap, Calculation 11 5 - 18 mmol/L    Glucose 105 70 - 125 mg/dL    Calcium 9.9 8.5 - 10.5 mg/dL    BUN 18 8 - 28 mg/dL    Creatinine 1.70 (H) 0.70 - 1.30 mg/dL    GFR MDRD Af Amer 47 (L) >60 mL/min/1.73m2    GFR MDRD Non Af Amer 39 (L) >60 mL/min/1.73m2   Magnesium    Collection Time: 04/24/19  8:15 AM   Result Value Ref Range    Magnesium 1.5 (L) 1.8 - 2.6 mg/dL   INR    Collection Time: 04/26/19 12:00 AM   Result Value Ref Range    INR 2.10 (!) 0.9 - 1.1   Magnesium    Collection Time: 04/26/19 10:20 AM   Result Value Ref Range    Magnesium 1.7 (L) 1.8 - 2.6 mg/dL   Basic Metabolic Panel    Collection Time: 04/29/19  9:00 AM   Result Value Ref Range    Sodium 137 136 - 145 mmol/L    Potassium 4.0 3.5 - 5.0 mmol/L    Chloride 103 98 - 107 mmol/L    CO2 24 22 - 31 mmol/L    Anion Gap, Calculation 10 5 - 18 mmol/L    Glucose 112 70 - 125 mg/dL    Calcium 10.3 8.5 - 10.5 mg/dL    BUN 33 (H) 8 - 28 mg/dL    Creatinine 2.32 (H) 0.70 - 1.30 mg/dL    GFR MDRD Af Amer 33 (L) >60 mL/min/1.73m2    GFR MDRD Non Af Amer 27 (L) >60 mL/min/1.73m2     .    Assessment:  1. Clostridium difficile diarrhea     2. General weakness     3. Stage 3 chronic kidney disease (H)     4. Low magnesium levels     5. Essential hypertension     6. Chronic systolic congestive heart failure (H)         Plan:  C. difficile: Improving,  continue vancomycin through 4/30/2019.  Counseled patient on the possibility of ongoing loose stools approximately 1 week following an date of vancomycin.    General weakness: Continue therapies.    CKD: Creatinine has almost doubled.  Most likely related to dehydration because of Bumex and recent diarrhea.  Counseled on improving fluid intake I will have nursing encourage fluids.  We will recheck a BMP on Thursday of this week.    Hypomagnesemia: Continue Slow-Mag, will plan for recheck in the next week.    Hypertension: Continue losartan 100 mg daily his hypertension is stable.  Could be a little on the low side due to dehydration.    CHF: Continue Bumex 1 mg daily do not want to decrease this as his CHF is well compensated.  We will also continue carvedilol 37.5 mg daily.        Electronically signed by: Kimmy Naranjo CNP

## 2021-06-19 NOTE — LETTER
Letter by Jyoti Her CNP at      Author: Jyoti Her CNP Service: -- Author Type: --    Filed:  Encounter Date: 7/1/2019 Status: (Other)         July 1, 2019     Patient: Tanmay Israel   YOB: 1940   Date of Visit: 7/1/2019       To Whom It May Concern:    It is my medical opinion that Tanmay Israel would greatly benefit from diclofenac gel to apply to his low back 3 times daily as needed for pain and inflammation.  Patient is unable to take oral NSAIDs due to Coumadin/warfarin anticoagulation therapy long-term.  He is got minimal benefit with oral Tylenol and minimal benefit with lidocaine patches.  History of prior lumbar surgery, however patient is not a candidate for further surgery at this time.  No benefit with previous physical therapy, acupuncture treatment, or riana chi exercises.  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  He is pending insurance approval for bilateral sacroiliac joint steroid injection as well.    If you have any questions or concerns, please don't hesitate to call.    Sincerely,        Electronically signed by Jyoti Her CNP

## 2021-06-19 NOTE — LETTER
Letter by Johnson, Michael Duane, CNP at      Author: Johnson, Michael Duane, CNP Service: -- Author Type: --    Filed:  Encounter Date: 5/1/2019 Status: (Other)         Patient: Tanmay Israel   MR Number: [id number]   YOB: 1940   Date of Visit: 5/1/2019     [facility]    Facility:   [facility] [[id number]]   Code Status: FULL CODE      CHIEF COMPLAINT/REASON FOR VISIT:  Chief Complaint   Patient presents with   ? Follow Up     rehab,        HISTORY:      HPI: Tanmay is a 79 y.o. male who I had the pleasure to visit with secondary to his hospitalization 20 April 22nd 2019 secondary to generalized weakness of unclear etiology.  He currently and eventually was treated with vancomycin for C. difficile.  He is still getting a few he thinks at least 2 or 3 loose stools per day now he has had magnesium twice daily Slow-Mag 71.5 mg twice daily and what we will do is just decrease it to once daily since his magnesium now at 1.7 and will also add in probiotic as well as FiberCon tablets.  He did finish his vancomycin last on April 30, 2019.  He otherwise has been normotensive and afebrile.  Is also having Coumadin which is being followed by the Coumadin clinic.  His appetite is fair to good.  He is enjoying the therapy process is on a regular diet also getting extra mighty shakes twice daily.    Past Medical History:   Diagnosis Date   ? Anemia    ? Arthritis    ? Atrial fibrillation (H)    ? Back pain    ? Callus    ? Cerebral aneurysm    ? Cervical spinal stenosis    ? Chronic kidney disease     stage 3   ? Chronic systolic congestive heart failure (H)    ? Coronary artery disease    ? Crohn's disease (H)    ? Dysphagia    ? GERD (gastroesophageal reflux disease)    ? Hyperlipidemia    ? Hypertension    ? ICD (implantable cardioverter-defibrillator) in place     [employer]   ? Ischemic cardiomyopathy    ? Kidney stone    ? Low back pain    ? Macular degeneration    ?  Permanent atrial fibrillation (H)     [id number] risk score = 5 (age3/ CAD/ HTN/ CHF) Chronic warfarin   ? Right rotator cuff tendinitis    ? Schatzki's ring    ? Shortness of breath    ? Sleep apnea     no CPAP             Family History   Problem Relation Age of Onset   ? Heart disease Mother    ? Stroke Mother    ? Crohn's disease Father    ? Alcohol abuse Brother    ? No Medical Problems Daughter    ? No Medical Problems Son    ? No Medical Problems Maternal Aunt    ? No Medical Problems Maternal Uncle    ? No Medical Problems Paternal Aunt    ? No Medical Problems Paternal Uncle    ? No Medical Problems Maternal Grandmother    ? No Medical Problems Maternal Grandfather    ? No Medical Problems Paternal Grandmother    ? No Medical Problems Paternal Grandfather    ? Cancer Brother    ? Urolithiasis Neg Hx    ? Gout Neg Hx      Social History     Socioeconomic History   ? Marital status: Single     Spouse name: Not on file   ? Number of children: Not on file   ? Years of education: Not on file   ? Highest education level: Not on file   Occupational History   ? Occupation: retired     Comment: [employer]   Social Needs   ? Financial resource strain: Not on file   ? Food insecurity:     Worry: Not on file     Inability: Not on file   ? Transportation needs:     Medical: Not on file     Non-medical: Not on file   Tobacco Use   ? Smoking status: Former Smoker   ? Smokeless tobacco: Never Used   Substance and Sexual Activity   ? Alcohol use: No   ? Drug use: No   ? Sexual activity: Not on file   Lifestyle   ? Physical activity:     Days per week: Not on file     Minutes per session: Not on file   ? Stress: Not on file   Relationships   ? Social connections:     Talks on phone: Not on file     Gets together: Not on file     Attends Nondenominational service: Not on file     Active member of club or organization: Not on file     Attends meetings of clubs or organizations: Not on file     Relationship status: Not on file   ?  Intimate partner violence:     Fear of current or ex partner: Not on file     Emotionally abused: Not on file     Physically abused: Not on file     Forced sexual activity: Not on file   Other Topics Concern   ? Not on file   Social History Narrative   ? Not on file         Review of Systems  Currently denies chills and fever coughing wheezing chest pain dizziness or vertigo nausea vomiting diarrhea or flulike symptoms headache or rashes or sores.  History of recent laparoscopic cholecystectomy hypertension CAD ICD CKD stage III heart failure cervical laminoplasty       Current Outpatient Medications   Medication Sig Note   ? baclofen (LIORESAL) 10 MG tablet Take 1 tablet (10 mg total) by mouth every 8 (eight) hours as needed.    ? bumetanide (BUMEX) 1 MG tablet Take 2 mg by mouth 2 (two) times a day. 4/20/2019: Patient takes 3 tablets in the morning daily.    ? carvedilol (COREG) 25 MG tablet Take 37.5 mg by mouth 2 (two) times a day with meals.    ? cholecalciferol, vitamin D3, 1,000 unit tablet Take 2,000 Units by mouth daily.    ? FERROUS FUMARATE (IRON ORAL) Take 1 tablet by mouth daily.     ? losartan (COZAAR) 100 MG tablet Take 100 mg by mouth daily.    ? magnesium chloride (SLOW-MAG) 64 mg TbEC delayed-release tablet Take 64 mg by mouth 2 (two) times a day.    ? mecobalamin, vitamin B12, 1,000 mcg TbDL Place 1 tablet (1,000 mcg total) under the tongue daily.    ? multivitamin with iron (ONE DAILY WITH IRON) Tab tablet Take 1 tablet by mouth daily.     ? omeprazole (PRILOSEC) 20 MG capsule Take 20 mg by mouth daily before breakfast.           ? potassium chloride (KLOR-CON) 20 mEq packet Take 20 mEq by mouth daily. 4/20/2019: Potassium chloride was prescribed on 4/19 for a 5 day course. Patient took the first dose yesterday.   ? rosuvastatin (CRESTOR) 20 MG tablet Take 1 tablet (20 mg total) by mouth at bedtime.    ? vancomycin (FIRVANQ) 50 mg/mL oral solution Take 2.5 mL (125 mg total) by mouth 4 (four)  times a day for 9 days.    ? warfarin (COUMADIN) 3 MG tablet Take 1 tablet (3 mg total) by mouth See Admin Instructions.        .There were no vitals filed for this visit.  Blood pressure 122/53 pulse 50 respirations 19 temperature 96.9 saturation room air 99%  Physical Exam   Constitutional: No distress.   HENT:     Neck: Neck supple. No thyromegaly present.   Cardiovascular:   Irregularity.  Pacemaker.   Pulmonary/Chest: Breath sounds normal.   Abdominal: Bowel sounds are normal. There is no rebound.   Musculoskeletal: History of right shoulder replacement. .  Generalized deconditioning.  Chronic back pain.  Neurological: He is alert.   Psychiatric: His behavior is normal.          LABS:   Lab Results   Component Value Date    WBC 7.0 04/22/2019    HGB 9.5 (L) 04/22/2019    HCT 28.8 (L) 04/22/2019    MCV 86 04/22/2019     (L) 04/22/2019     Results for orders placed or performed in visit on 04/29/19   Basic Metabolic Panel   Result Value Ref Range    Sodium 137 136 - 145 mmol/L    Potassium 4.0 3.5 - 5.0 mmol/L    Chloride 103 98 - 107 mmol/L    CO2 24 22 - 31 mmol/L    Anion Gap, Calculation 10 5 - 18 mmol/L    Glucose 112 70 - 125 mg/dL    Calcium 10.3 8.5 - 10.5 mg/dL    BUN 33 (H) 8 - 28 mg/dL    Creatinine 2.32 (H) 0.70 - 1.30 mg/dL    GFR MDRD Af Amer 33 (L) >60 mL/min/1.73m2    GFR MDRD Non Af Amer 27 (L) >60 mL/min/1.73m2       April 26, 2019 magnesium 1.7 and in comparison to April 24 magnesium 1.5  ASSESSMENT:      ICD-10-CM    1. Weakness R53.1    2. Hypomagnesemia E83.42    3. Essential hypertension I10    4. Chronic systolic heart failure (H) I50.22        PLAN:    Decreasing his Slow-Mag once daily rather than twice daily adding FiberCon tablets as well as Culturelle for stock.  Continue to monitor his chronic medical conditions.  Is also being seen by the Coumadin clinic for his warfarin dosing.  Did talk again about the rehabilitation process.  Also last week I did look at his left shoulder  it was a little tight and impingement but he states his been doing much better.  He does not see neurology until June 18, 2019.  .    Electronically signed by: Michael Duane Johnson, ERIN

## 2021-06-19 NOTE — LETTER
Letter by Nataliya Manriquez MD at      Author: Nataliya Manriquez MD Service: -- Author Type: --    Filed:  Encounter Date: 7/23/2019 Status: (Other)         Patient: Tanmay Israel   MR Number: [id number]   YOB: 1940   Date of Visit: 7/23/2019                               [facility]        Visit Type: H & P (Generalized weakness with nausea)    Code Status:  FULL CODE  Facility:  [facility] [[id number]]          PCP: Go Ramírez MD       PHONE: [phone]     FAX:[phone]        ASSESSMENT/PLAN:  1. Nausea   with no associated vomiting or weakness.  Will restart Zofran 4 mg p.o. 3 times daily as needed   2. Chronic atrial fibrillation (H)   stable, status post pacemaker.  Rate controlled with Coreg.  On Coumadin.  Will monitor INR, consider decreasing INR goal to 1.8-2.5, need to be cautious with patient's anemia   3. Ischemic cardiomyopathy   currently stable without evidence of fluid overload, on Bumex and KCl, Coreg   4. Stage 3 chronic kidney disease (H)   will recheck BMP   5. Anemia, unspecified type   no evidence for bleeding, will recheck hemogram   6. Essential hypertension   blood pressures are stable on Coreg and amlodipine   7. Cognitive impairment   at this time patient does not want to be in [facility]I, will need discussion with family,  and further and testing of cognitive impairment         HISTORY OF PRESENT ILLNESS:   Tanmay Israel is a 79 y.o. male with a history of chronic atrial fibrillation status post pacemaker placement, CAD with ischemic cardiomyopathy, CRD with chronic anemia, hypertension, DJD, CRD, history of colonic polyps, history of Schatzki's ring, history of sleep apnea was admitted for nausea and vomiting, dizziness and diarrhea which improved after initial IV fluids and treatment with meclizine and IV Zofran.  Most recently the patient had C. difficile treated with vancomycin and eventually  treated with for fidaxomicin after ID consult.  C. difficile culture in the hospital was negative.  The patient was also noted to have pleural effusions on imaging and eventually IV fluids were discontinued for fear of CHF exacerbation and the patient was restarted on Bumex.  The most recent Echo done on 11/18 showed EF of 35% with moderate global hypokinesis.  The patient also was noted to have anemia with a hemoglobin initially of 8.2, then 9.3, also had hypokalemia and hypomagnesemia.  The patient does have chronic renal disease and his chronic anemia was felt to be secondary to multiple factors including his CRD and his multiple medical problems.  He does not have any evidence for any bleeding.  Electrolyte imbalance resolved prior to discharge.  His blood pressure was also high and he was started on Norvasc in the hospital.  For his dementia, evaluation was done by OT with slums score at 15/30.  It was recommended that the patient consider MAMI after TCU admit.    Currently, the patient has been doing much better except that this morning, he again developed some nausea and he refused his breakfast and lunch for fear of having some vomiting although he did not have any episodes of vomiting today.  He states that his energy is usually good and that he ambulates well to the dining room without any problems he is also doing well with physical therapy.  He denies any abdominal pain, dizziness or lightheadedness.  He also has not had any diarrhea.  He also denies any shortness of breath, chest pains, palpitations.  He does not have any urinary symptoms.  He had recent coccyx and heels ulcers which are now clear.    Other review of systems are negative.      PAST MEDICAL/SURGICAL HISTORY:  Past Medical History:   Diagnosis Date   ? Acute cholecystitis 2/28/2019   ? Acute post-operative pain    ? Anemia    ? Arthritis    ? Atrial fibrillation (H)    ? Back pain    ? C. difficile diarrhea 4/21/2019   ? Calculus of ureter     ? Callus    ? Cerebral aneurysm    ? Cervical myelopathy (H) 2/22/2019   ? Cervical spinal stenosis    ? Chronic kidney disease     stage 3   ? Chronic systolic congestive heart failure (H)    ? Closed fracture of distal end of left radius, unspecified fracture morphology, initial encounter    ? Closed fracture of distal end of right radius, unspecified fracture morphology, initial encounter    ? Closed fracture of first thoracic vertebra, unspecified fracture morphology, initial encounter (H)    ? Coronary artery disease    ? Crohn's disease (H)    ? Difficulty Breathing (Dyspnea)     Created by Conversion    ? Dysphagia    ? Fall 10/22/2016   ? GERD (gastroesophageal reflux disease)    ? Hospital discharge follow-up 5/21/2019   ? Hyperlipidemia    ? Hypertension    ? Hypotension, unspecified hypotension type    ? ICD (implantable cardioverter-defibrillator) in place     Medtronic   ? Ischemic cardiomyopathy    ? Joint Pain, Localized In The Knee     Created by Conversion    ? Joint Pain, Localized In The Right Shoulder     Created by Conversion    ? Kidney stone    ? Low back pain    ? Lumbago     Created by Conversion    ? Macular degeneration    ? Muscle Aches, Generalized (Myalgias)     Created by Conversion    ? Permanent atrial fibrillation (H)     [id number] risk score = 5 (age3/ CAD/ HTN/ CHF) Chronic warfarin   ? Personal history of colonic polyps 2/12/2019   ? Polyp of duodenum 10/17/2016   ? Pyuria    ? Right arm weakness 4/2/2019   ? Right rotator cuff tendinitis    ? Right Rotator Cuff Tendonitis     Created by Conversion  Replacement Utility updated for latest IMO load   ? Schatzki's ring    ? Sciatica     Created by Conversion    ? Serum Enzyme Levels - ALT (SGPT) Elevated     Created by Conversion    ? Shortness of breath    ? Sleep apnea     no CPAP   ? Spondylolisthesis of lumbar region 7/5/2016   ? Weakness 4/20/2019     Past Surgical History:   Procedure Laterality Date   ? APPENDECTOMY      ? CARDIAC PACEMAKER PLACEMENT  2013    ICD Medtronic   ? CATARACT EXTRACTION W/  INTRAOCULAR LENS IMPLANT Bilateral    ? CHOLECYSTECTOMY     ? ESOPHAGOGASTRODUODENOSCOPY     ? WA C- LAMINOPLASTY, 2 OR MORE Left 2/22/2019    Procedure: LEFT CERVICAL 4, 5, 6 LAMINOPLASTY;  Surgeon: Liza Cesar MD;  Location: [facility];  Service: Spine   ? WA CABG, VEIN, FOUR      Description: Coronary Artery Quadruple Venous Bypass Graft;  Recorded: 10/21/2008;  Comments: 8/2004   ? WA LAP,CHOLECYSTECTOMY N/A 2/28/2019    Procedure: CHOLECYSTECTOMY, LAPAROSCOPIC;  Surgeon: Mana Roman MD;  [place] [facility]  Service: General   ? ROTATOR CUFF REPAIR Right    ? TONSILLECTOMY     ? XR MYELOGRAM CERVICAL THORACIC LUMBAR  1/17/2019       SOCIAL HISTORY:  Social History     Socioeconomic History   ? Marital status: Single     Spouse name: Not on file   ? Number of children: Not on file   ? Years of education: Not on file   ? Highest education level: Not on file   Occupational History   ? Occupation: retired     Comment: [employer]   Social Needs   ? Financial resource strain: Not on file   ? Food insecurity:     Worry: Not on file     Inability: Not on file   ? Transportation needs:     Medical: Not on file     Non-medical: Not on file   Tobacco Use   ? Smoking status: Former Smoker   ? Smokeless tobacco: Never Used   Substance and Sexual Activity   ? Alcohol use: No   ? Drug use: No   ? Sexual activity: Not on file   Lifestyle   ? Physical activity:     Days per week: Not on file     Minutes per session: Not on file   ? Stress: Not on file   Relationships   ? Social connections:     Talks on phone: Not on file     Gets together: Not on file     Attends Protestant service: Not on file     Active member of club or organization: Not on file     Attends meetings of clubs or organizations: Not on file     Relationship status: Not on file   ? Intimate partner violence:     Fear of current or ex partner: Not on  file     Emotionally abused: Not on file     Physically abused: Not on file     Forced sexual activity: Not on file   Other Topics Concern   ? Not on file   Social History Narrative   ? Not on file       FAMILY HISTORY:  Family History   Problem Relation Age of Onset   ? Heart disease Mother    ? Stroke Mother    ? Crohn's disease Father    ? Alcohol abuse Brother    ? No Medical Problems Daughter    ? No Medical Problems Son    ? No Medical Problems Maternal Aunt    ? No Medical Problems Maternal Uncle    ? No Medical Problems Paternal Aunt    ? No Medical Problems Paternal Uncle    ? No Medical Problems Maternal Grandmother    ? No Medical Problems Maternal Grandfather    ? No Medical Problems Paternal Grandmother    ? No Medical Problems Paternal Grandfather    ? Cancer Brother    ? Urolithiasis Neg Hx    ? Gout Neg Hx        MEDICATIONS:  Current Outpatient Medications on File Prior to Visit   Medication Sig   ? acetaminophen (TYLENOL) 500 MG tablet Take 2 tablets (1,000 mg total) by mouth 3 (three) times a day.   ? amLODIPine (NORVASC) 5 MG tablet Take 1 tablet (5 mg total) by mouth daily.   ? bumetanide (BUMEX) 1 MG tablet Take 0.5 tablets (0.5 mg total) by mouth see administration instructions. For weight gain >3lbs. (Patient taking differently: Take 0.5 mg by mouth daily.       )   ? carvedilol (COREG) 25 MG tablet Take 37.5 mg by mouth 2 (two) times a day with meals.   ? cholecalciferol, vitamin D3, 1,000 unit tablet Take 2,000 Units by mouth daily.   ? cyanocobalamin 500 MCG tablet Take 500 mcg by mouth daily.   ? diclofenac sodium (VOLTAREN) 1 % Gel Apply 4 g topically 4 (four) times a day as needed.   ? FERROUS FUMARATE (IRON ORAL) Take 1 tablet by mouth daily.    ? melatonin 3 mg Tab tablet Take 1 tablet (3 mg total) by mouth at bedtime as needed.   ? multivitamin with iron (ONE DAILY WITH IRON) Tab tablet Take 1 tablet by mouth daily.    ? omeprazole (PRILOSEC) 20 MG capsule Take 20 mg by mouth daily  before breakfast.          ? potassium chloride (K-DUR,KLOR-CON) 20 MEQ tablet TAKE 1 TABLET(20 MEQ) BY MOUTH DAILY   ? rosuvastatin (CRESTOR) 20 MG tablet TAKE 1 TABLET(20 MG) BY MOUTH AT BEDTIME   ? senna-docusate (PERICOLACE) 8.6-50 mg tablet Take 1 tablet by mouth daily as needed for constipation.   ? warfarin (COUMADIN/JANTOVEN) 2.5 MG tablet Take 1 tablet 2.5 mg daily, repeat INR and adjust dose as directed     No current facility-administered medications on file prior to visit.        ALLERGIES:  No Known Allergies      PHYSICAL EXAMINATION:  Vital signs 136/57, 98.0, 54, 160.9 pounds, 18, 99% room air  General: Awake, Alert, oriented x3, not in any form of acute distress, answers questions appropriately, follows simple commands, conversant  HEENT: Pink conjunctiva, anicteric sclerae, oral mucosa is moist upper and lower dentures  NECK: Supple, without any lymphadenopathy, thyromegaly or any masses  LUNG: Clear to auscultation, good chest expansion. There are no crackles, no wheezes, normal AP diameter  BACK: No kyphosis of the thoracic spine  CVS: There is good S1  S2, there are no murmurs, no heaves, rhythm is regular, pacemaker on left chest  ABDOMEN: Globular and soft, nontender to palpation, no organomegaly, good bowel sounds  EXTREMITIES: Good range of motion on both upper and lower extremities, no pedal edema, no cyanosis or clubbing, no calf tenderness  SKIN: Warm and dry, no rashes or erythema noted    LABS:  Lab Results   Component Value Date    WBC 6.8 07/18/2019    HGB 9.3 (L) 07/18/2019    HCT 28.8 (L) 07/18/2019    MCV 90 07/18/2019     07/18/2019     Lab Results   Component Value Date    CREATININE 1.05 07/18/2019    BUN 12 07/18/2019     07/18/2019    K 4.0 07/18/2019     07/18/2019    CO2 29 07/18/2019           >45 minutes of total time spent, greater than 55% of the time spent in coordination of care and counseling regarding the above medical issues and plan of care  including discussion regarding cognitive impairment with possible consideration of MAMI. I have reviewed the patient's medical records, labs and medications.       Electronically signed by:Nataliya Manriquez MD

## 2021-06-19 NOTE — PROGRESS NOTES
Admission History & Physical  Tanmay Israel, 1940, [id number]    [facility]  Go Ramírez MD, [phone]    Extended Emergency Contact Information  Primary Emergency Contact: Mary Kay Jovel   [facility]  Home Phone: [phone]  Work Phone: [phone]  Relation: Niece  Secondary Emergency Contact: Anthony Israel   [facility]  Home Phone: [phone]  Relation: Sibling     Assessment and Plan:   Assessment: Onychomycosis, onychauxis, plantarflexed fifth metatarsal both feet  Plan: I recommended orthotics.  Active Problems:    * No active hospital problems. *      Chief Complaint:  Thick nails both feet and pain on the bottom of both feet     HPI:    Tanmay Israel is a 78 y.o. old male who presented to the clinic today complaining of pain in the bottom of both feet particularly near the small toes.  The patient has been using an adhesive pad on the bottom of both feet.  He stated that this area of his foot is quite painful with weightbearing and ambulation.  He has had this problem for several months.  It is a sharp pain which is only relieved with nonweightbearing.  He has not had any associated redness, swelling with his symptoms.  He denies any trauma to his feet.  He denies any other previous treatment.  The patient also stated that his nails can be quite long and thick.  He has had them treated recently.  History is provided by patient    Medical History  Active Ambulatory (Non-Hospital) Problems    Diagnosis     Osteoporosis     Cerebral aneurysm     Closed fracture of first thoracic vertebra, unspecified fracture morphology, initial encounter (H)     Chronic systolic congestive heart failure (H)     Permanent atrial fibrillation (H)     Anemia, unspecified type     Fall     Multiple fractures of cervical spine, closed, initial encounter (H)     Closed fracture of distal end of left radius, unspecified fracture morphology, initial encounter     Closed fracture of  distal end of right radius, unspecified fracture morphology, initial encounter     Warfarin-induced coagulopathy (H)     Stage 3 chronic kidney disease     Sacroiliac joint dysfunction of right side     Spondylolisthesis of lumbar region     Lumbar stenosis with neurogenic claudication     Presence of automatic cardioverter/defibrillator (AICD)--- Medtronic single lead     Diarrhea     Abdominal Pain     Right Rotator Cuff Tendonitis     Osteoarthritis Of The Knee     Joint Pain, Localized In The Right Shoulder     Difficulty Breathing (Dyspnea)     Esophageal Reflux     Muscle Aches, Generalized (Myalgias)     Lumbago     Obstructive sleep apnea     Sciatica     Joint Pain, Localized In The Knee     Ptosis Of Eyelid     Hyperlipidemia     Anemia     Crohn's (Granulomatous) Colitis     Benign Essential Hypertension     Coronary atherosclerosis of native coronary artery     Ischemic cardiomyopathy     Paroxysmal ventricular tachycardia (H)     Dry Nonexudative Macular Degeneration     Serum Enzyme Levels - ALT (SGPT) Elevated     Serum Enzyme Levels - Alkaline Phosphatase Elevated     Dysphagia     Past Medical History:   Diagnosis Date     Arthritis      Atrial fibrillation (H)      Back pain      Callus      Chronic systolic congestive heart failure (H)      Coronary artery disease      Hyperlipidemia      Hypertension      Permanent atrial fibrillation (H)      Patient Active Problem List    Diagnosis Date Noted     Osteoporosis 04/28/2017     Cerebral aneurysm 11/16/2016     Closed fracture of first thoracic vertebra, unspecified fracture morphology, initial encounter (H)      Chronic systolic congestive heart failure (H)      Permanent atrial fibrillation (H)      Anemia, unspecified type      Fall 10/22/2016     Multiple fractures of cervical spine, closed, initial encounter (H)      Closed fracture of distal end of left radius, unspecified fracture morphology, initial encounter      Closed fracture of distal  end of right radius, unspecified fracture morphology, initial encounter      Warfarin-induced coagulopathy (H)      Stage 3 chronic kidney disease 09/27/2016     Sacroiliac joint dysfunction of right side 08/22/2016     Spondylolisthesis of lumbar region 07/05/2016     Lumbar stenosis with neurogenic claudication 07/05/2016     Presence of automatic cardioverter/defibrillator (AICD)--- Medtronic single lead 10/20/2015     Diarrhea      Abdominal Pain      Right Rotator Cuff Tendonitis      Osteoarthritis Of The Knee      Joint Pain, Localized In The Right Shoulder      Difficulty Breathing (Dyspnea)      Esophageal Reflux      Muscle Aches, Generalized (Myalgias)      Lumbago      Obstructive sleep apnea      Sciatica      Joint Pain, Localized In The Knee      Ptosis Of Eyelid      Hyperlipidemia      Anemia      Crohn's (Granulomatous) Colitis      Benign Essential Hypertension      Coronary atherosclerosis of native coronary artery      Ischemic cardiomyopathy      Paroxysmal ventricular tachycardia (H)      Dry Nonexudative Macular Degeneration      Serum Enzyme Levels - ALT (SGPT) Elevated      Serum Enzyme Levels - Alkaline Phosphatase Elevated      Dysphagia      Surgical History  He  has a past surgical history that includes pr cabg, vein, four; Esophagogastroduodenoscopy; Tonsillectomy; Appendectomy; Cholecystectomy; Rotator cuff repair (Right); Cataract extraction w/  intraocular lens implant (Bilateral); and Cardiac pacemaker placement (2013).   Past Surgical History:   Procedure Laterality Date     APPENDECTOMY       CARDIAC PACEMAKER PLACEMENT  2013     CATARACT EXTRACTION W/  INTRAOCULAR LENS IMPLANT Bilateral      CHOLECYSTECTOMY       ESOPHAGOGASTRODUODENOSCOPY       OR CABG, VEIN, FOUR      Description: Coronary Artery Quadruple Venous Bypass Graft;  Recorded: 10/21/2008;  Comments: 8/2004     ROTATOR CUFF REPAIR Right      TONSILLECTOMY      Social History  Reviewed, and he  reports that he has  quit smoking. He has never used smokeless tobacco. He reports that he does not drink alcohol or use illicit drugs.  Social History   Substance Use Topics     Smoking status: Former Smoker     Smokeless tobacco: Never Used     Alcohol use No      Allergies  No Known Allergies Family History  Reviewed, and family history includes Alcohol abuse in his brother; Cancer in his brother; Crohn's disease in his father; Heart disease in his mother; No Medical Problems in his daughter, maternal aunt, maternal grandfather, maternal grandmother, maternal uncle, paternal aunt, paternal grandfather, paternal grandmother, paternal uncle, and son; Stroke in his mother.   Psychosocial Needs  Social History     Social History Narrative     Additional psychosocial needs reviewed per nursing assessment.       Prior to Admission Medications     (Not in a hospital admission)        Review of Systems - Negative     Pulse (!) 56  Temp 96.7  F (35.9  C) (Temporal)   SpO2 99%    Integument: Nails bilateral feet are  normal length and 2 times normal thickness.    Skin bilateral feet warm supple and intact.      Vascular: DP and PT pulses +2/4 bilateral feet.  Capillary refill less than 2 seconds bilateral feet.      Neurologic: Negative clonus, negative Babinski bilateral feet.      Musculoskeletal: Range of motion within normal limits bilateral feet.  Muscle power +5/5 bilaterally in all compartments.  There is significant atrophy of the anterior fat pad bilateral feet.  There is a large firm palpable subcutaneous mass on the plantar aspect fifth metatarsal head bilaterally.      Assessment: Plantarflexed fifth metatarsal both feet        Plan: I informed the patient that he does not have much of a callus on the bottom of both feet.  He is being aggravated by the prominence of the fifth metatarsal heads bilateral feet.  I recommended a pair of orthotics with a metatarsal raise.

## 2021-06-19 NOTE — LETTER
Letter by Go Ramírez MD at      Author: Go Ramírez MD Service: -- Author Type: --    Filed:  Encounter Date: 7/8/2019 Status: (Other)         Tanmay Israel  [address]  [facility] [address]             July 8, 2019     Dear Mr. Israel,    Below are the results from your recent visit:    Resulted Orders   Basic Metabolic Panel   Result Value Ref Range    Sodium 142 136 - 145 mmol/L    Potassium 3.2 (L) 3.5 - 5.0 mmol/L    Chloride 109 (H) 98 - 107 mmol/L    CO2 24 22 - 31 mmol/L    Anion Gap, Calculation 9 5 - 18 mmol/L    Glucose 89 70 - 125 mg/dL    Calcium 8.7 8.5 - 10.5 mg/dL    BUN 17 8 - 28 mg/dL    Creatinine 1.28 0.70 - 1.30 mg/dL    GFR MDRD Af Amer >60 >60 mL/min/1.73m2    GFR MDRD Non Af Amer 54 (L) >60 mL/min/1.73m2    Narrative    Fasting Glucose reference range is 70-99 mg/dL per  American Diabetes Association (ADA) guidelines.   The kidney function has improved but the potassium remains low.  I sent a prescription to [facility] pharmacy for a potassium supplement.  He should take 1 tablet daily.  I recommend he return in 7 to 10 days for a recheck of his potassium and kidney function with a lab only visit.    > LAB APPOINTMENT SCHEDULED FOR 10:15AM, Monday, July 15th at [facility].   Make sure to drink plenty of water. Do not need to be fasting.     Please call with questions or contact us using [employer].  Sincerely,    Electronically signed by Go Ramírez MD

## 2021-06-19 NOTE — LETTER
Letter by Johnson, Michael Duane, CNP at      Author: Johnson, Michael Duane, CNP Service: -- Author Type: --    Filed:  Encounter Date: 5/9/2019 Status: (Other)         Patient: Tanmay Israel   MR Number: [id number]   YOB: 1940   Date of Visit: 5/9/2019     [facility]    Facility:   [facility] [[id number]]   Code Status: FULL CODE  PCP: Go Ramírez MD   Phone: [phone]   Fax: [phone]      CHIEF COMPLAINT/REASON FOR VISIT:  Chief Complaint   Patient presents with   ? Discharge Summary       HISTORY COURSE:  Tanmay is a 79 y.o. male who I had the chance and was asked to see secondary to his hospitalization April 20 - April 22, 2019 secondary to generalized weakness.  The generalized weakness was of unclear etiology it was extended to the upper extremities and lower extremities but no neck pain.  His workup did include a chest x-ray which was negative and clear.  He does have a prior right shoulder arthroplasty as well as a pacemaker.  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  CT of the cervical spine without contrast did show posterior hardware otherwise unremarkable along with severe right-sided facet arthropathy at C2-3 and severe left-sided facet arthropathy C3-4 but again unchanged from the previous CAT scan but he does have a history of C4-C6 laminoplasty's.  His laboratory studies on the 20th did show a hemoglobin of 9.9 white cells of 7.6 sodium 142 potassium 3.7 BUN 13 creatinine 1.36.  He is on warfarin secondary to his history of A. fib and pacemaker.  C. difficile was positive they did start him on oral vancomycin and stop the magnesium oxide.  He does have a history of CKD stage III but is stable.  A history of chronic systolic congestive heart failure which was compensated continue with current diuretics and  Coreg.  I remember him from a previous transitional care unit stay when he was hospitalized February 27 - March 4, 2019 secondary to acute cholecystitis status post laparoscopic cholecystectomy.  He has done well from a therapy standpoint and overall has been able to participate in succeed therapy recommendations.  He does have also have a history of shoulder osteoarthrosis primarily the left was giving him problems ice packs did help we did talk about further intervention but it did seem to resolve.  He did finish up his vancomycin on April 30.  Couple of days later did have a couple loose stools we did bulk him up using FiberCon as well as a probiotic.  Over the past couple of days he has had some nausea to which Zofran has been helpful he is taking a couple of doses otherwise feeling pretty good now his appetite has slowly improved he is taking in some fluids.  He has been normotensive and afebrile and also on room air.  He can have baclofen as needed for muscular spasms he did take a dose on May 6.  He also is being followed by the warfarin clinic.  No current heartburn or reflux.  We did have a chance to go over his rehabilitation stay but also his medications and also going home and having various services.    Review of Systems  Currently denies chills and fever coughing wheezing chest pain dizziness or vertigo nausea vomiting diarrhea or flulike symptoms headache or rashes or sores.  History of recent laparoscopic cholecystectomy hypertension CAD ICD CKD stage III heart failure cervical laminoplasty    There were no vitals filed for this visit.  Blood pressure 102/59 pulse 56 respirations 20 temperature 97.0 saturation room air 98%  Physical Exam   Constitutional: No distress.   HENT:     Neck: Neck supple. No thyromegaly present.   Cardiovascular:   Irregularity.  Pacemaker.   Pulmonary/Chest: Breath sounds normal.   Abdominal: Bowel sounds are normal. There is no rebound.   Musculoskeletal: History of right  shoulder replacement. .  Generalized deconditioning.  Chronic back pain.  Neurological: He is alert.   Psychiatric: His behavior is normal.     Lab Results   Component Value Date    WBC 7.0 04/22/2019    HGB 9.5 (L) 04/22/2019    HCT 28.8 (L) 04/22/2019    MCV 86 04/22/2019     (L) 04/22/2019     Lab Results   Component Value Date    WBC 7.0 04/22/2019    HGB 9.5 (L) 04/22/2019    HCT 28.8 (L) 04/22/2019    MCV 86 04/22/2019     (L) 04/22/2019     Lab Results   Component Value Date    ALT 12 04/21/2019    AST 19 04/21/2019    ALKPHOS 201 (H) 04/21/2019    BILITOT 0.6 04/21/2019     Lab Results   Component Value Date    ALBUMIN 3.4 (L) 04/21/2019     Results for orders placed or performed in visit on 05/02/19   Basic Metabolic Panel   Result Value Ref Range    Sodium 140 136 - 145 mmol/L    Potassium 4.3 3.5 - 5.0 mmol/L    Chloride 107 98 - 107 mmol/L    CO2 24 22 - 31 mmol/L    Anion Gap, Calculation 9 5 - 18 mmol/L    Glucose 85 70 - 125 mg/dL    Calcium 10.2 8.5 - 10.5 mg/dL    BUN 45 (H) 8 - 28 mg/dL    Creatinine 2.68 (H) 0.70 - 1.30 mg/dL    GFR MDRD Af Amer 28 (L) >60 mL/min/1.73m2    GFR MDRD Non Af Amer 23 (L) >60 mL/min/1.73m2         MEDICATION LIST:  Current Outpatient Medications   Medication Sig   ? baclofen (LIORESAL) 10 MG tablet Take 1 tablet (10 mg total) by mouth every 8 (eight) hours as needed.   ? bumetanide (BUMEX) 1 MG tablet Take 2 mg by mouth 2 (two) times a day.   ? carvedilol (COREG) 25 MG tablet Take 37.5 mg by mouth 2 (two) times a day with meals.   ? cholecalciferol, vitamin D3, 1,000 unit tablet Take 2,000 Units by mouth daily.   ? FERROUS FUMARATE (IRON ORAL) Take 1 tablet by mouth daily.    ? losartan (COZAAR) 100 MG tablet Take 100 mg by mouth daily.   ? magnesium chloride (SLOW-MAG) 64 mg TbEC delayed-release tablet Take 64 mg by mouth daily.          ? mecobalamin, vitamin B12, 1,000 mcg TbDL Place 1 tablet (1,000 mcg total) under the tongue daily.   ? multivitamin  with iron (ONE DAILY WITH IRON) Tab tablet Take 1 tablet by mouth daily.    ? omeprazole (PRILOSEC) 20 MG capsule Take 20 mg by mouth daily before breakfast.          ? potassium chloride (KLOR-CON) 20 mEq packet Take 20 mEq by mouth daily.   ? rosuvastatin (CRESTOR) 20 MG tablet Take 1 tablet (20 mg total) by mouth at bedtime.   ? warfarin (COUMADIN) 3 MG tablet Take 1 tablet (3 mg total) by mouth See Admin Instructions.       DISCHARGE DIAGNOSIS:    ICD-10-CM    1. Chronic systolic heart failure (H) I50.22    2. Essential hypertension I10    3. Ischemic cardiomyopathy I25.5    4. Lumbar stenosis with neurogenic claudication M48.062        MEDICAL EQUIPMENT NEEDS:  None    DISCHARGE PLAN/FACE TO FACE:  I certify that services are/were furnished while this patient was under the care of a physician and that a physician or an allowed non-physician practitioner (NPP), had a face-to-face encounter that meets the physician face-to-face encounter requirements. The encounter was in whole, or in part, related to the primary reason for home health. The patient is confined to his/her home and needs intermittent skilled nursing, physical therapy, speech-language pathology, or the continued need for occupational therapy. A plan of care has been established by a physician and is periodically reviewed by a physician.  Date of Face-to-Face Encounter: May 9, 2019    I certify that, based on my findings, the following services are medically necessary home health services: He will be discharging to home with current medications and the approximated discharge date is May 11, 2019.  He will also receive physical and occupational therapy home health aide and nursing.  Home care agency has not yet been identified.    My clinical findings support the need for the above skilled services because: (Please write a brief narrative summary that describes what the RN, PT, SLP, or other services will be doing in the home. A list of diagnoses in  this section does not meet the CMS requirements.)  He will require the various skilled services secondary to his recent hospitalization for weakness along with chronic medical conditions home safety self-care deficits and medication management.    This patient is homebound because: (Please write a brief narrative summary describing the functional limitations as to why this patient is homebound and specifically what makes this patient homebound.)  Secondary to multiple chronic medical conditions including his most recent hospitalization for weakness but also home safety transfers exercises self-care deficits and medication management.    The patient is, or has been, under my care and I have initiated the establishment of the plan of care. This patient will be followed by a physician who will periodically review the plan of care.    Schedule follow up visit with primary care provider within 7 days to reestablish care.  He is aware of his most recent laboratory studies as well as following up with his primary care doctor to repeat his BMP as well as try to keep himself hydrated and drinking water and certainly keeping in mind his other chronic medical conditions as well as being alerted to any problems with CHF.  Again he is being followed by the Coumadin clinic.  He does have a visit with neurology on June 18.  He also follow-up with cardiology and at this point does not recall the exact date but he will be following up with cardiology.  He did not have any further questions.    Discharge coordination of care greater than 30 minutes  Electronically signed by: Michael Duane Johnson, CNP

## 2021-06-19 NOTE — LETTER
Letter by Kimmy Naranjo CNP at      Author: Kimmy Naranjo CNP Service: -- Author Type: --    Filed:  Encounter Date: 7/25/2019 Status: (Other)         Patient: Tanmay Israel   MR Number: [id number]   YOB: 1940   Date of Visit: 7/25/2019     [facility]      Facility:    [facility] [[id number]]  Code Status: DNR      Chief Complaint/Reason for Visit:  Chief Complaint   Patient presents with   ? Follow Up       HPI:   Tanmay is a 79 y.o. male whom I see today forTCU follow up visit.  With a past medical history for CHF, CKD 3, dementia, hypertension, anemia, osteoarthritis, CAD, ischemic cardiomyopathy, atrial fibrillation on Coumadin, and pacemaker in situ.  He has had multiple hospitalizations in the last 6 months for various reasons including weakness, radiculopathy due to lumbar stenosis, abnormal lab work, C. difficile, cholecystitis.  He was admitted to [facility] 7/15 to 7/18/2019 for acute dizziness and weakness.  He was out fishing in the heat in which he did not drink enough fluids and he had vomiting for 2 days.  He also had diarrhea.  His diagnosis was presumed likely related to vertigo as his orthostatics were normal and his dizziness had improved with a dose of meclizine.  He did have pleural effusion on chest x-ray with left lower leg edema and so his Bumex was restarted in which it was discontinued on a previous admission due to ANA.  Echocardiogram done in April showed EF of 35%.    Today, he reports he is feeling good and is wanting to know when he can be discharged.  He was started on Zofran earlier this week due to new onset nausea however he states that resolved within that day.  He denies any abdominal discomfort, heartburn, increased gas, diarrhea or constipation.  He denies any pain or discomforts at this time and has not used any PRN acetaminophen.  His INR today was 2.45 which is up from 1.94 on Monday.  He denies any signs and  symptoms of bleeding    Past Medical History:  Past Medical History:   Diagnosis Date   ? Acute cholecystitis 2/28/2019   ? Acute post-operative pain    ? Anemia    ? Arthritis    ? Atrial fibrillation (H)    ? Back pain    ? C. difficile diarrhea 4/21/2019   ? Calculus of ureter    ? Callus    ? Cerebral aneurysm    ? Cervical myelopathy (H) 2/22/2019   ? Cervical spinal stenosis    ? Chronic kidney disease     stage 3   ? Chronic systolic congestive heart failure (H)    ? Closed fracture of distal end of left radius, unspecified fracture morphology, initial encounter    ? Closed fracture of distal end of right radius, unspecified fracture morphology, initial encounter    ? Closed fracture of first thoracic vertebra, unspecified fracture morphology, initial encounter (H)    ? Coronary artery disease    ? Crohn's disease (H)    ? Difficulty Breathing (Dyspnea)     Created by Conversion    ? Dysphagia    ? Fall 10/22/2016   ? GERD (gastroesophageal reflux disease)    ? Hospital discharge follow-up 5/21/2019   ? Hyperlipidemia    ? Hypertension    ? Hypotension, unspecified hypotension type    ? ICD (implantable cardioverter-defibrillator) in place     Medtronic   ? Ischemic cardiomyopathy    ? Joint Pain, Localized In The Knee     Created by Conversion    ? Joint Pain, Localized In The Right Shoulder     Created by Conversion    ? Kidney stone    ? Low back pain    ? Lumbago     Created by Conversion    ? Macular degeneration    ? Muscle Aches, Generalized (Myalgias)     Created by Conversion    ? Permanent atrial fibrillation (H)     [id number] risk score = 5 (age3/ CAD/ HTN/ CHF) Chronic warfarin   ? Personal history of colonic polyps 2/12/2019   ? Polyp of duodenum 10/17/2016   ? Pyuria    ? Right arm weakness 4/2/2019   ? Right rotator cuff tendinitis    ? Right Rotator Cuff Tendonitis     Created by Conversion  Replacement Utility updated for latest IMO load   ? Schatzki's ring    ? Sciatica     Created by  Conversion    ? Serum Enzyme Levels - ALT (SGPT) Elevated     Created by Conversion    ? Shortness of breath    ? Sleep apnea     no CPAP   ? Spondylolisthesis of lumbar region 7/5/2016   ? Weakness 4/20/2019           Surgical History:  Past Surgical History:   Procedure Laterality Date   ? APPENDECTOMY     ? CARDIAC PACEMAKER PLACEMENT  2013    ICD Medtronic   ? CATARACT EXTRACTION W/  INTRAOCULAR LENS IMPLANT Bilateral    ? CHOLECYSTECTOMY     ? ESOPHAGOGASTRODUODENOSCOPY     ? OR C- LAMINOPLASTY, 2 OR MORE Left 2/22/2019    Procedure: LEFT CERVICAL 4, 5, 6 LAMINOPLASTY;  Surgeon: Liza Cesar MD;  Location: [facility];  Service: Spine   ? OR CABG, VEIN, FOUR      Description: Coronary Artery Quadruple Venous Bypass Graft;  Recorded: 10/21/2008;  Comments: 8/2004   ? OR LAP,CHOLECYSTECTOMY N/A 2/28/2019    Procedure: CHOLECYSTECTOMY, LAPAROSCOPIC;  Surgeon: Mana Roman MD;  [facility];  Service: General   ? ROTATOR CUFF REPAIR Right    ? TONSILLECTOMY     ? XR MYELOGRAM CERVICAL THORACIC LUMBAR  1/17/2019       Family History:   Family History   Problem Relation Age of Onset   ? Heart disease Mother    ? Stroke Mother    ? Crohn's disease Father    ? Alcohol abuse Brother    ? No Medical Problems Daughter    ? No Medical Problems Son    ? No Medical Problems Maternal Aunt    ? No Medical Problems Maternal Uncle    ? No Medical Problems Paternal Aunt    ? No Medical Problems Paternal Uncle    ? No Medical Problems Maternal Grandmother    ? No Medical Problems Maternal Grandfather    ? No Medical Problems Paternal Grandmother    ? No Medical Problems Paternal Grandfather    ? Cancer Brother    ? Urolithiasis Neg Hx    ? Gout Neg Hx        Social History:    Social History     Socioeconomic History   ? Marital status: Single     Spouse name: Not on file   ? Number of children: Not on file   ? Years of education: Not on file   ? Highest education level: Not on file    Occupational History   ? Occupation: retired     Comment: [employer]   Social Needs   ? Financial resource strain: Not on file   ? Food insecurity:     Worry: Not on file     Inability: Not on file   ? Transportation needs:     Medical: Not on file     Non-medical: Not on file   Tobacco Use   ? Smoking status: Former Smoker   ? Smokeless tobacco: Never Used   Substance and Sexual Activity   ? Alcohol use: No   ? Drug use: No   ? Sexual activity: Not on file   Lifestyle   ? Physical activity:     Days per week: Not on file     Minutes per session: Not on file   ? Stress: Not on file   Relationships   ? Social connections:     Talks on phone: Not on file     Gets together: Not on file     Attends [employer] service: Not on file     Active member of club or organization: Not on file     Attends meetings of clubs or organizations: Not on file     Relationship status: Not on file   ? Intimate partner violence:     Fear of current or ex partner: Not on file     Emotionally abused: Not on file     Physically abused: Not on file     Forced sexual activity: Not on file   Other Topics Concern   ? Not on file   Social History Narrative   ? Not on file          Review of Systems   Patient denies fever, chills, headache, lightheadedness, dizziness, rhinorrhea, cough, congestion, shortness of breath, chest pain, palpitations, abdominal pain, n/v, diarrhea, constipation, change in appetite, dysuria, frequency, burning or pain with urination.  Other than stated in HPI all other review of systems is negative.       Physical Exam   Vital signs: /55, heart rate 71, respiratory 14, weight stable at 162 pounds.  GENERAL APPEARANCE: Well developed, well nourished, in no acute distress.  HEENT: normocephalic, atraumatic  PERRL, sclerae anicteric, conjunctivae clear and moist, EOM intact  External inspection of ears and nose showed no scars, lesions or masses.  LUNGS: Lung sounds CTA, no adventitious sounds, respiratory effort  normal.  CARD: RRR, S1, S2, without murmurs, gallops, rubs,   ABD: Soft nondistended and nontender with normal bowel sounds.   MSK: Muscle strength and tone were normal.  EXTREMITIES: No cyanosis, clubbing or edema.  NEURO: Alert and oriented x 3.  With cognitive impairment.  Face is symmetric.  SKIN: Inspection of the skin reveals no rashes, ulcerations or petechiae.  PSYCH: euthymic          Medication List:  Current Outpatient Medications   Medication Sig   ? acetaminophen (TYLENOL) 500 MG tablet Take 2 tablets (1,000 mg total) by mouth 3 (three) times a day.   ? amLODIPine (NORVASC) 5 MG tablet Take 1 tablet (5 mg total) by mouth daily.   ? bumetanide (BUMEX) 1 MG tablet Take 0.5 tablets (0.5 mg total) by mouth see administration instructions. For weight gain >3lbs. (Patient taking differently: Take 0.5 mg by mouth daily.       )   ? carvedilol (COREG) 25 MG tablet Take 37.5 mg by mouth 2 (two) times a day with meals.   ? cholecalciferol, vitamin D3, 1,000 unit tablet Take 2,000 Units by mouth daily.   ? cyanocobalamin 500 MCG tablet Take 500 mcg by mouth daily.   ? diclofenac sodium (VOLTAREN) 1 % Gel Apply 4 g topically 4 (four) times a day as needed.   ? FERROUS FUMARATE (IRON ORAL) Take 1 tablet by mouth daily.    ? melatonin 3 mg Tab tablet Take 1 tablet (3 mg total) by mouth at bedtime as needed.   ? multivitamin with iron (ONE DAILY WITH IRON) Tab tablet Take 1 tablet by mouth daily.    ? omeprazole (PRILOSEC) 20 MG capsule Take 20 mg by mouth daily before breakfast.          ? potassium chloride (K-DUR,KLOR-CON) 20 MEQ tablet TAKE 1 TABLET(20 MEQ) BY MOUTH DAILY   ? rosuvastatin (CRESTOR) 20 MG tablet TAKE 1 TABLET(20 MG) BY MOUTH AT BEDTIME   ? senna-docusate (PERICOLACE) 8.6-50 mg tablet Take 1 tablet by mouth daily as needed for constipation.   ? warfarin (COUMADIN/JANTOVEN) 2.5 MG tablet Take 1 tablet 2.5 mg daily, repeat INR and adjust dose as directed       Labs:  Recent Results (from the past 240  hour(s))   Basic Metabolic Panel   Result Value Ref Range    Sodium 146 (H) 136 - 145 mmol/L    Potassium 3.8 3.5 - 5.0 mmol/L    Chloride 107 98 - 107 mmol/L    CO2 28 22 - 31 mmol/L    Anion Gap, Calculation 11 5 - 18 mmol/L    Glucose 100 70 - 125 mg/dL    Calcium 10.0 8.5 - 10.5 mg/dL    BUN 18 8 - 28 mg/dL    Creatinine 1.19 0.70 - 1.30 mg/dL    GFR MDRD Af Amer >60 >60 mL/min/1.73m2    GFR MDRD Non Af Amer 59 (L) >60 mL/min/1.73m2   Hepatic Profile   Result Value Ref Range    Bilirubin, Total 0.9 0.0 - 1.0 mg/dL    Bilirubin, Direct 0.4 <=0.5 mg/dL    Protein, Total 7.2 6.0 - 8.0 g/dL    Albumin 3.9 3.5 - 5.0 g/dL    Alkaline Phosphatase 260 (H) 45 - 120 U/L    AST 17 0 - 40 U/L    ALT 23 0 - 45 U/L   Lipase   Result Value Ref Range    Lipase 11 0 - 52 U/L   Lactic Acid   Result Value Ref Range    Lactic Acid 1.1 0.5 - 2.2 mmol/L   Magnesium   Result Value Ref Range    Magnesium 1.5 (L) 1.8 - 2.6 mg/dL   HM2 (CBC W/O DIFF)   Result Value Ref Range    WBC 10.6 4.0 - 11.0 thou/uL    RBC 3.36 (L) 4.40 - 6.20 mill/uL    Hemoglobin 9.8 (L) 14.0 - 18.0 g/dL    Hematocrit 31.1 (L) 40.0 - 54.0 %    MCV 93 80 - 100 fL    MCH 29.2 27.0 - 34.0 pg    MCHC 31.5 (L) 32.0 - 36.0 g/dL    RDW 16.6 (H) 11.0 - 14.5 %    Platelets 205 140 - 440 thou/uL    MPV 9.4 8.5 - 12.5 fL   INR   Result Value Ref Range    INR 1.60 (H) 0.90 - 1.10   Urinalysis-UC if Indicated   Result Value Ref Range    Color, UA Yellow Colorless, Yellow, Straw, Light Yellow    Clarity, UA Cloudy (!) Clear    Glucose, UA Negative Negative    Bilirubin, UA Negative Negative    Ketones, UA Trace (!) Negative, 60 mg/dL    Specific Gravity, UA 1.021 1.001 - 1.030    Blood, UA Negative Negative    pH, UA 5.5 4.5 - 8.0    Protein,  mg/dL (!) Negative mg/dL    Urobilinogen, UA 2.0 E.U./dL <2.0 E.U./dL, 2.0 E.U./dL    Nitrite, UA Negative Negative    Leukocytes, UA Negative Negative    Bacteria, UA None Seen None Seen hpf    RBC, UA 0-2 None Seen, 0-2 hpf     WBC, UA 5-10 (!) None Seen, 0-5 hpf    Squam Epithel, UA 0-5 None Seen, 0-5 lpf    Mucus, UA Moderate (!) None Seen lpf    Hyaline Casts, UA 5-10 (!) 0-5, None Seen lpf   Culture, Urine   Result Value Ref Range    Culture No Growth    Basic Metabolic Panel   Result Value Ref Range    Sodium 144 136 - 145 mmol/L    Potassium 3.4 (L) 3.5 - 5.0 mmol/L    Chloride 107 98 - 107 mmol/L    CO2 27 22 - 31 mmol/L    Anion Gap, Calculation 10 5 - 18 mmol/L    Glucose 79 70 - 125 mg/dL    Calcium 9.1 8.5 - 10.5 mg/dL    BUN 17 8 - 28 mg/dL    Creatinine 1.15 0.70 - 1.30 mg/dL    GFR MDRD Af Amer >60 >60 mL/min/1.73m2    GFR MDRD Non Af Amer >60 >60 mL/min/1.73m2   HM2(CBC w/o Differential)   Result Value Ref Range    WBC 8.5 4.0 - 11.0 thou/uL    RBC 2.81 (L) 4.40 - 6.20 mill/uL    Hemoglobin 8.1 (L) 14.0 - 18.0 g/dL    Hematocrit 25.6 (L) 40.0 - 54.0 %    MCV 91 80 - 100 fL    MCH 28.8 27.0 - 34.0 pg    MCHC 31.6 (L) 32.0 - 36.0 g/dL    RDW 16.2 (H) 11.0 - 14.5 %    Platelets 155 140 - 440 thou/uL    MPV 10.6 8.5 - 12.5 fL   INR   Result Value Ref Range    INR 1.71 (H) 0.90 - 1.10   Magnesium   Result Value Ref Range    Magnesium 2.1 1.8 - 2.6 mg/dL   Potassium   Result Value Ref Range    Potassium 3.7 3.5 - 5.0 mmol/L   INR   Result Value Ref Range    INR 1.75 (H) 0.90 - 1.10   Magnesium   Result Value Ref Range    Magnesium 2.0 1.8 - 2.6 mg/dL   Potassium - Next AM   Result Value Ref Range    Potassium 4.3 3.5 - 5.0 mmol/L   C. difficile Toxigenic by PCR   Result Value Ref Range    C.Difficile Toxigenic by PCR Negative Negative    Ribotype 027/NAP1/B1 Presumptive Negative Presumptive Negative   Culture, Stool   Result Value Ref Range    Culture       No Salmonella, Shigella, Yersinia, or Campylobacter.   EnteroHaemorrhagic E. coli Work Up   Result Value Ref Range    Shiga Toxin 1 Negative Negative    Shiga Toxin 2 Negative Negative   INR   Result Value Ref Range    INR 1.86 (H) 0.90 - 1.10   Magnesium   Result Value Ref  Range    Magnesium 2.0 1.8 - 2.6 mg/dL   Comprehensive Metabolic Panel   Result Value Ref Range    Sodium 139 136 - 145 mmol/L    Potassium 4.0 3.5 - 5.0 mmol/L    Chloride 103 98 - 107 mmol/L    CO2 29 22 - 31 mmol/L    Anion Gap, Calculation 7 5 - 18 mmol/L    Glucose 94 70 - 125 mg/dL    BUN 12 8 - 28 mg/dL    Creatinine 1.05 0.70 - 1.30 mg/dL    GFR MDRD Af Amer >60 >60 mL/min/1.73m2    GFR MDRD Non Af Amer >60 >60 mL/min/1.73m2    Bilirubin, Total 0.8 0.0 - 1.0 mg/dL    Calcium 9.2 8.5 - 10.5 mg/dL    Protein, Total 6.2 6.0 - 8.0 g/dL    Albumin 3.3 (L) 3.5 - 5.0 g/dL    Alkaline Phosphatase 247 (H) 45 - 120 U/L    AST 14 0 - 40 U/L    ALT 16 0 - 45 U/L   HM1 (CBC with Diff)   Result Value Ref Range    WBC 6.8 4.0 - 11.0 thou/uL    RBC 3.19 (L) 4.40 - 6.20 mill/uL    Hemoglobin 9.3 (L) 14.0 - 18.0 g/dL    Hematocrit 28.8 (L) 40.0 - 54.0 %    MCV 90 80 - 100 fL    MCH 29.2 27.0 - 34.0 pg    MCHC 32.3 32.0 - 36.0 g/dL    RDW 15.7 (H) 11.0 - 14.5 %    Platelets 163 140 - 440 thou/uL    MPV 10.2 8.5 - 12.5 fL    Neutrophils % 71 (H) 50 - 70 %    Lymphocytes % 12 (L) 20 - 40 %    Monocytes % 13 (H) 2 - 10 %    Eosinophils % 4 0 - 6 %    Basophils % 0 0 - 2 %    Neutrophils Absolute 4.8 2.0 - 7.7 thou/uL    Lymphocytes Absolute 0.8 0.8 - 4.4 thou/uL    Monocytes Absolute 0.9 0.0 - 0.9 thou/uL    Eosinophils Absolute 0.3 0.0 - 0.4 thou/uL    Basophils Absolute 0.0 0.0 - 0.2 thou/uL   INR   Result Value Ref Range    INR 1.83 (H) 0.90 - 1.10   INR   Result Value Ref Range    INR 1.94 (H) 0.90 - 1.10   Basic Metabolic Panel   Result Value Ref Range    Sodium 138 136 - 145 mmol/L    Potassium 4.1 3.5 - 5.0 mmol/L    Chloride 105 98 - 107 mmol/L    CO2 23 22 - 31 mmol/L    Anion Gap, Calculation 10 5 - 18 mmol/L    Glucose 85 70 - 125 mg/dL    Calcium 9.2 8.5 - 10.5 mg/dL    BUN 21 8 - 28 mg/dL    Creatinine 1.33 (H) 0.70 - 1.30 mg/dL    GFR MDRD Af Amer >60 >60 mL/min/1.73m2    GFR MDRD Non Af Amer 52 (L) >60  mL/min/1.73m2   INR   Result Value Ref Range    INR 2.45 (H) 0.90 - 1.10   HM1 (CBC with Diff)   Result Value Ref Range    WBC 6.9 4.0 - 11.0 thou/uL    RBC 3.07 (L) 4.40 - 6.20 mill/uL    Hemoglobin 8.9 (L) 14.0 - 18.0 g/dL    Hematocrit 28.8 (L) 40.0 - 54.0 %    MCV 94 80 - 100 fL    MCH 29.0 27.0 - 34.0 pg    MCHC 30.9 (L) 32.0 - 36.0 g/dL    RDW 15.3 (H) 11.0 - 14.5 %    Platelets 124 (L) 140 - 440 thou/uL    MPV 10.7 8.5 - 12.5 fL    Neutrophils % 66 50 - 70 %    Lymphocytes % 14 (L) 20 - 40 %    Monocytes % 14 (H) 2 - 10 %    Eosinophils % 5 0 - 6 %    Basophils % 1 0 - 2 %    Neutrophils Absolute 4.5 2.0 - 7.7 thou/uL    Lymphocytes Absolute 0.9 0.8 - 4.4 thou/uL    Monocytes Absolute 1.0 (H) 0.0 - 0.9 thou/uL    Eosinophils Absolute 0.4 0.0 - 0.4 thou/uL    Basophils Absolute 0.1 0.0 - 0.2 thou/uL       Assessment:    ICD-10-CM    1. Chronic atrial fibrillation (H) I48.2    2. Anticoagulation management encounter Z51.81     Z79.01    3. Anemia, unspecified type D64.9    4. Stage 3 chronic kidney disease (H) N18.3    5. Nausea R11.0    6. Chronic systolic congestive heart failure (H) I50.22        Plan:  For his atrial fibrillation will continue on carvedilol 37.5 mg twice a day as he is stable on this dose.      His INR today is in therapeutic range and so we will continue with 2.5 mg of Coumadin every day.  He will likely need a combination of 2.5 mg and a couple of 5 mg/week.    Anemia: His hemoglobin today was 8.9 down from 9.3 will continue to monitor this this is likely due to his chronic kidney disease and he currently already takes ferrous sulfate.  Recheck a hemoglobin next week.    Chronic kidney disease: His creatinine is elevated more today than at discharge from the hospital at 1.33 will recheck a BMP next week to monitor his potassium and creatinine.  After that could expect that he could follow-up in his primary clinic as needed.    Nausea: Resolved    CHF: Weight is stable at 162 pounds he was  160 pounds when coming into the facility so we will continue to monitor daily weights and continue with Bumex 0.5 mg daily.  He has no lower extremity edema or difficulties with breathing.            Electronically signed by: Kimmy Naranjo CNP

## 2021-06-19 NOTE — LETTER
Letter by Juan Sotelo DO at      Author: Juan Sotelo DO Service: -- Author Type: --    Filed:  Encounter Date: 4/25/2019 Status: (Other)         Patient: Tanmay Israel   MR Number: [id number]   YOB: 1940   Date of Visit: 4/25/2019     [facility]      Facility:    [facility] [[id number]]  Code Status: UNKNOWN      Chief Complaint/Reason for Visit:  Chief Complaint   Patient presents with   ? H & P     Generalized weakness, C. difficile infection, chronic kidney disease stage III which is stable, chronic systolic CHF well compensated at this time, low magnesium levels, cervical myelopathy in the past, left shoulder pain.       HPI:   Tanmay is a 79 y.o. male who was recently admitted to the hospital after relatively quick onset of weakness and it was generalized.  He went to the emergency room and it was unclear etiology.  CC C-spine imaging showed surgical procedure was unrelated.  He did see neurology and head CT and cervical spine imaging was negative.  He did have a B12 was low normal and he did have low magnesium 1.4.  He was taken of mag oxide secondary to he was diagnosed with C. difficile colitis.  He was started oral vancomycin and slept to stop the mag oxide and I did start him on Slow-Mag secondary to his stools are starting to form.  He did have chronic kidney disease stage II which is stable and his CHF was well compensated.  He was treated appropriately and transferred here to the TCU at [facility] in stable condition.    Patient feels much better at this time he is improving and it is anticipated with physical and Occupational Therapy.  His stools are starting to form at this time he has had no abdominal pain nausea vomiting.  He is breathing okay and has no chest pain or shortness of breath.    Patient complains of left shoulder pain which is nontraumatic with normal range of motion.  He feels he sleeping on it wrong at  this time.    Past Medical History:  Past Medical History:   Diagnosis Date   ? Anemia    ? Arthritis    ? Atrial fibrillation (H)    ? Back pain    ? Callus    ? Cerebral aneurysm    ? Cervical spinal stenosis    ? Chronic kidney disease     stage 3   ? Chronic systolic congestive heart failure (H)    ? Coronary artery disease    ? Crohn's disease (H)    ? Dysphagia    ? GERD (gastroesophageal reflux disease)    ? Hyperlipidemia    ? Hypertension    ? ICD (implantable cardioverter-defibrillator) in place     Medtronic   ? Ischemic cardiomyopathy    ? Kidney stone    ? Low back pain    ? Macular degeneration    ? Permanent atrial fibrillation (H)     [id number] risk score = 5 (age1/ CAD/ HTN/ CHF) Chronic warfarin   ? Right rotator cuff tendinitis    ? Schatzki's ring    ? Shortness of breath    ? Sleep apnea     no CPAP           Surgical History:  Past Surgical History:   Procedure Laterality Date   ? APPENDECTOMY     ? CARDIAC PACEMAKER PLACEMENT  2013    ICD Medtronic   ? CATARACT EXTRACTION W/  INTRAOCULAR LENS IMPLANT Bilateral    ? CHOLECYSTECTOMY     ? ESOPHAGOGASTRODUODENOSCOPY     ? VT C- LAMINOPLASTY, 2 OR MORE Left 2/22/2019    Procedure: LEFT CERVICAL 4, 5, 6 LAMINOPLASTY;  Surgeon: Liza Cesar MD;  Location: [facility];  Service: Spine   ? VT CABG, VEIN, FOUR      Description: Coronary Artery Quadruple Venous Bypass Graft;  Recorded: 10/21/2008;  Comments: 8/2004   ? VT LAP,CHOLECYSTECTOMY N/A 2/28/2019    Procedure: CHOLECYSTECTOMY, LAPAROSCOPIC;  Surgeon: Mana Roman MD;  Location: [facility];  Service: General   ? ROTATOR CUFF REPAIR Right    ? TONSILLECTOMY     ? XR MYELOGRAM CERVICAL THORACIC LUMBAR  1/17/2019       Family History:   Family History   Problem Relation Age of Onset   ? Heart disease Mother    ? Stroke Mother    ? Crohn's disease Father    ? Alcohol abuse Brother    ? No Medical Problems Daughter    ? No Medical Problems Son    ? No Medical Problems  Maternal Aunt    ? No Medical Problems Maternal Uncle    ? No Medical Problems Paternal Aunt    ? No Medical Problems Paternal Uncle    ? No Medical Problems Maternal Grandmother    ? No Medical Problems Maternal Grandfather    ? No Medical Problems Paternal Grandmother    ? No Medical Problems Paternal Grandfather    ? Cancer Brother    ? Urolithiasis Neg Hx    ? Gout Neg Hx        Social History:    Social History     Socioeconomic History   ? Marital status: Single     Spouse name: None   ? Number of children: None   ? Years of education: None   ? Highest education level: None   Occupational History   ? Occupation: retired     Comment: [employer]   Social Needs   ? Financial resource strain: None   ? Food insecurity:     Worry: None     Inability: None   ? Transportation needs:     Medical: None     Non-medical: None   Tobacco Use   ? Smoking status: Former Smoker   ? Smokeless tobacco: Never Used   Substance and Sexual Activity   ? Alcohol use: No   ? Drug use: No   ? Sexual activity: None   Lifestyle   ? Physical activity:     Days per week: None     Minutes per session: None   ? Stress: None   Relationships   ? Social connections:     Talks on phone: None     Gets together: None     Attends Druze service: None     Active member of club or organization: None     Attends meetings of clubs or organizations: None     Relationship status: None   ? Intimate partner violence:     Fear of current or ex partner: None     Emotionally abused: None     Physically abused: None     Forced sexual activity: None   Other Topics Concern   ? None   Social History Narrative   ? None          Review of Systems   Constitutional:        Outside left shoulder pain patient denies any fevers chills nausea vomiting diarrhea change in vision hearing taste or smell weakness one side the other chest pain or shortness of breath.  He denies any incontinent of stool polyphagia or polydipsia polyuria depression or anxiety and the remainder the  review of systems is negative.       Vitals:    04/25/19 1246   BP: 130/74   Pulse: 72   Resp: 15   Temp: 98.3  F (36.8  C)   SpO2: 96%       Physical Exam   Constitutional: No distress.   HENT:   Head: Normocephalic and atraumatic.   Nose: Nose normal.   Mouth/Throat: No oropharyngeal exudate.   Eyes: Conjunctivae are normal. Right eye exhibits no discharge. Left eye exhibits no discharge. No scleral icterus.   Cardiovascular:   Patient's heart sounds are irregularly irregular with adequate rate control.   Pulmonary/Chest: Effort normal and breath sounds normal. No respiratory distress. He has no wheezes.   Abdominal: Soft. Bowel sounds are normal. He exhibits no distension. There is no tenderness. There is no rebound.   Musculoskeletal: He exhibits no edema.   Tenderness in the musculoskeletal exam was in the left shoulder anterior posterior of the glenohumeral joint.  He is good range of motion with minimal crepitus and strength was symmetrical.   Neurological: He is alert. He exhibits normal muscle tone.   Skin: Skin is warm and dry. No rash noted. He is not diaphoretic. No erythema.   Psychiatric: He has a normal mood and affect. His behavior is normal.       Medication List:  Current Outpatient Medications   Medication Sig   ? baclofen (LIORESAL) 10 MG tablet Take 1 tablet (10 mg total) by mouth every 8 (eight) hours as needed.   ? bumetanide (BUMEX) 1 MG tablet Take 2 mg by mouth 2 (two) times a day.   ? carvedilol (COREG) 25 MG tablet Take 37.5 mg by mouth 2 (two) times a day with meals.   ? cholecalciferol, vitamin D3, 1,000 unit tablet Take 2,000 Units by mouth daily.   ? FERROUS FUMARATE (IRON ORAL) Take 1 tablet by mouth daily.    ? losartan (COZAAR) 100 MG tablet Take 100 mg by mouth daily.   ? magnesium chloride (SLOW-MAG) 64 mg TbEC delayed-release tablet Take 64 mg by mouth 2 (two) times a day.   ? mecobalamin, vitamin B12, 1,000 mcg TbDL Place 1 tablet (1,000 mcg total) under the tongue daily.   ?  multivitamin with iron (ONE DAILY WITH IRON) Tab tablet Take 1 tablet by mouth daily.    ? omeprazole (PRILOSEC) 20 MG capsule Take 20 mg by mouth daily before breakfast.          ? potassium chloride (KLOR-CON) 20 mEq packet Take 20 mEq by mouth daily.   ? rosuvastatin (CRESTOR) 20 MG tablet Take 1 tablet (20 mg total) by mouth at bedtime.   ? vancomycin (FIRVANQ) 50 mg/mL oral solution Take 2.5 mL (125 mg total) by mouth 4 (four) times a day for 9 days.   ? warfarin (COUMADIN) 3 MG tablet Take 1 tablet (3 mg total) by mouth See Admin Instructions.       Labs: Basic metabolic profile done 2 days ago was basically within normal limits with the exception of a GFR about 53.  Magnesium is gone from 1.4-1.3 now patient is on Slow-Mag for better absorption.  Watching the fact that patient does have loose stools but his stools are starting to form from his C. difficile.      Assessment:    ICD-10-CM    1. Clostridium difficile diarrhea A04.72    2. General weakness R53.1    3. Lumbar stenosis with neurogenic claudication M48.062    4. Stage 3 chronic kidney disease (H) N18.3    5. S/P laparoscopic cholecystectomy Z90.49    6. Low magnesium levels R79.0        Plan: Plan at this time Coumadin clinic will manage INR to an INR of 2.0-3.0.  Will increase Slow-Mag to twice daily and check mag today sacral low mag levels.  Aspercreme to left shoulder 3 times daily as needed and will assess left shoulder pain every 8 hours for Aspercreme.  We will continue to monitor his stool output for forming stools magnesium that may make his diarrhea somewhat worse.  However his magnesium levels are too low and dropping.  I will continue to monitor above medical problems and no other changes to care plan at this time.        Electronically signed by: Juan Sotelo, DO

## 2021-06-19 NOTE — LETTER
Letter by Go Ramírez MD at      Author: Go Ramírez MD Service: -- Author Type: --    Filed:  Encounter Date: 4/12/2019 Status: (Other)         Tanmay Israel  [address]  [facility] [address]       April 12, 2019     Dear Mr. Israel,  Below are the results from your recent visit:  Resulted Orders   HM2(CBC w/o Differential)   Result Value Ref Range    WBC 7.3 4.0 - 11.0 thou/uL    RBC 3.34 (L) 4.40 - 6.20 mill/uL    Hemoglobin 9.3 (L) 14.0 - 18.0 g/dL    Hematocrit 28.7 (L) 40.0 - 54.0 %    MCV 86 80 - 100 fL    MCH 27.8 27.0 - 34.0 pg    MCHC 32.3 32.0 - 36.0 g/dL    RDW 14.1 11.0 - 14.5 %    Platelets 172 140 - 440 thou/uL    MPV 6.7 (L) 7.0 - 10.0 fL   Magnesium   Result Value Ref Range    Magnesium 1.7 (L) 1.8 - 2.6 mg/dL   Comprehensive Metabolic Panel   Result Value Ref Range    Sodium 142 136 - 145 mmol/L    Potassium 3.5 3.5 - 5.0 mmol/L    Chloride 104 98 - 107 mmol/L    CO2 26 22 - 31 mmol/L    Anion Gap, Calculation 12 5 - 18 mmol/L    Glucose 96 70 - 125 mg/dL    BUN 12 8 - 28 mg/dL    Creatinine 1.33 (H) 0.70 - 1.30 mg/dL    GFR MDRD Af Amer >60 >60 mL/min/1.73m2    GFR MDRD Non Af Amer 52 (L) >60 mL/min/1.73m2    Bilirubin, Total 0.4 0.0 - 1.0 mg/dL    Calcium 9.5 8.5 - 10.5 mg/dL    Protein, Total 6.1 6.0 - 8.0 g/dL    Albumin 3.3 (L) 3.5 - 5.0 g/dL    Alkaline Phosphatase 221 (H) 45 - 120 U/L    AST 14 0 - 40 U/L    ALT <9 0 - 45 U/L    Narrative    Fasting Glucose reference range is 70-99 mg/dL per  American Diabetes Association (ADA) guidelines.       The recent lab tests show only very mild abnormalities which I believe will correct as he gets better.    Lab tests should be rechecked in about 2-3 weeks. A lab only is fine.   The number to schedule is . Make sure to drink plenty of water.    Please call with questions or contact us using [employer].     Go Ramírez MD

## 2021-06-19 NOTE — LETTER
Letter by Go Ramírez MD at      Author: Go Ramírez MD Service: -- Author Type: --    Filed:  Encounter Date: 4/1/2019 Status: (Other)         Tanmay Israel  [address]  [facility] [address]       April 1, 2019   Dear Mr. Israel,  Below are the results from your recent visit:  Resulted Orders   Basic Metabolic Panel   Result Value Ref Range    Sodium 142 136 - 145 mmol/L    Potassium 3.9 3.5 - 5.0 mmol/L    Chloride 106 98 - 107 mmol/L    CO2 25 22 - 31 mmol/L    Anion Gap, Calculation 11 5 - 18 mmol/L    Glucose 90 70 - 125 mg/dL    Calcium 8.8 8.5 - 10.5 mg/dL    BUN 16 8 - 28 mg/dL    Creatinine 1.86 (H) 0.70 - 1.30 mg/dL    GFR MDRD Af Amer 43 (L) >60 mL/min/1.73m2    GFR MDRD Non Af Amer 35 (L) >60 mL/min/1.73m2    Narrative    Fasting Glucose reference range is 70-99 mg/dL per  American Diabetes Association (ADA) guidelines.   HM2(CBC w/o Differential)   Result Value Ref Range    WBC 7.0 4.0 - 11.0 thou/uL    RBC 3.54 (L) 4.40 - 6.20 mill/uL    Hemoglobin 9.7 (L) 14.0 - 18.0 g/dL    Hematocrit 30.2 (L) 40.0 - 54.0 %    MCV 85 80 - 100 fL    MCH 27.5 27.0 - 34.0 pg    MCHC 32.2 32.0 - 36.0 g/dL    RDW 13.5 11.0 - 14.5 %    Platelets 218 140 - 440 thou/uL    MPV 6.8 (L) 7.0 - 10.0 fL   The kidney function measured by creatinine and GFR is slightly worse than previous, but the electrolytes have remained normal.  I am hopeful that as her diarrhea improves the kidney function will improve.  You should return in 7-10 days to have a repeat kidney function measurement to make sure it does not continue to decline.  Please call with questions or contact us using [employer].  Sincerely, Go Ramírez MD

## 2021-06-19 NOTE — LETTER
Letter by Johnson, Michael Duane, CNP at      Author: Johnson, Michael Duane, CNP Service: -- Author Type: --    Filed:  Encounter Date: 3/14/2019 Status: (Other)         Patient: Tanmay Israel   MR Number: [id number]   YOB: 1940   Date of Visit: 3/14/2019     [facility]    Facility:   [facility] [[id number]]   Code Status: FULL CODE  PCP: Go Ramírez MD   Phone: [phone]   Fax: [phone]      CHIEF COMPLAINT/REASON FOR VISIT:  Chief Complaint   Patient presents with   ? Discharge Summary       HISTORY COURSE:  Tanmay is a 78-year-old gentleman who was hospitalized February 27 - March 4, 2019 secondary to right upper quadrant discomfort and diagnosed with acute cholecystitis and did have a laparoscopic cholecystectomy.  He also has a history of hypertension CAD ischemic cardiomyopathy systolic congestive heart failure paroxysmal atrial fibrillation status post ICD, CKD stage III, Crohn's colitis, diverticulosis.  He also has recent hospitalization for left cervical 4, 5 and 6 laminoplasty and was recently discharged on February 25, 2019.  He did present to the emergency department secondary to complaints of nausea vomiting and watery diarrhea also complaining of right upper quadrant discomfort radiating to his back.  CT of the abdomen initially did show dilated gallbladder with wall thickening and mild thickening of the terminal ileum consistent with a history of inflammatory bowel disease without evidence of obstruction or active inflammation, there is also diverticulosis without diverticulitis.  He also has a nonobstructing right ureteral vesicular junction calculus which is unchanged.  On February 28 he underwent a laparoscopic cholecystectomy and intraoperative or immediate postoperative complications.  He remained afebrile without nausea vomiting abdominal pain tolerates diet well.  Negative stool cultures diarrhea did resolve.  He currently is  on the transitional care unit working with therapy to try to improve his strength balance and overall conditioning.  For his muscular spasms can have baclofen as needed at 10 mg he did take 1 dose and 11 prior to that March 8.  He also can have oxycodone as needed he did take 1 dose and probably prior to that was March 6.  Not having any other bowel issues at this time he has been normotensive and afebrile.  Is being monitored by the Coumadin clinic.  Sleeping well at night.  Is on iron and multivitamins and minerals.    Review of Systems  Currently denies chills and fever coughing wheezing chest pain dizziness or vertigo nausea vomiting diarrhea or flulike symptoms headache or rashes or sores.  History of recent laparoscopic cholecystectomy hypertension CAD ICD CKD stage III heart failure cervical laminoplasty  There were no vitals filed for this visit.  Blood pressure 137/66 pulse 68 temperature 97.9 saturation room air 92%  Physical Exam  Constitutional: No distress.   HENT:    Eyes: Pupils are equal, round, and reactive to light.   Neck: Neck supple. No thyromegaly present.   Cardiovascular:   Irregularity.  Pacemaker.   Pulmonary/Chest: Breath sounds normal.   Abdominal: Bowel sounds are normal. There is no rebound.   Musculoskeletal:   Improving with a strength and conditioning.  Good CMS bilateral arms.  Cervical collar.   Lymphadenopathy:     He has no cervical adenopathy.   Neurological: He is alert.   Skin:    Abdominal stab wounds are clean and dry.   Psychiatric: His behavior is normal.   Lab Results   Component Value Date    WBC 8.8 03/03/2019    HGB 8.2 (L) 03/08/2019    HCT 25.2 (L) 03/03/2019    MCV 91 03/03/2019     (L) 03/03/2019     Results for orders placed or performed during the hospital encounter of 02/22/19   Basic Metabolic Panel   Result Value Ref Range    Sodium 140 136 - 145 mmol/L    Potassium 3.4 (L) 3.5 - 5.0 mmol/L    Chloride 106 98 - 107 mmol/L    CO2 27 22 - 31 mmol/L     Anion Gap, Calculation 7 5 - 18 mmol/L    Glucose 91 70 - 125 mg/dL    Calcium 9.4 8.5 - 10.5 mg/dL    BUN 20 8 - 28 mg/dL    Creatinine 1.38 (H) 0.70 - 1.30 mg/dL    GFR MDRD Af Amer 60 (L) >60 mL/min/1.73m2    GFR MDRD Non Af Amer 50 (L) >60 mL/min/1.73m2         MEDICATION LIST:  Current Outpatient Medications   Medication Sig   ? baclofen (LIORESAL) 10 MG tablet Take 1 tablet (10 mg total) by mouth every 8 (eight) hours as needed.   ? bumetanide (BUMEX) 1 MG tablet Take 1 tablet (1 mg total) by mouth daily.   ? carvedilol (COREG) 25 MG tablet Take 1.5 tablets (37.5 mg total) by mouth 2 (two) times a day.   ? cholecalciferol, vitamin D3, 1,000 unit tablet Take 2,000 Units by mouth daily.   ? FERROUS FUMARATE (IRON ORAL) Take 1 tablet by mouth daily.    ? lidocaine 4 % patch Place 1 patch on the skin daily. Remove and discard patch with 12 hours or as directed by MD.   ? losartan (COZAAR) 50 MG tablet Take 1 tablet (50 mg total) by mouth daily.   ? multivitamin with iron (ONE DAILY WITH IRON) Tab tablet Take 1 tablet by mouth daily.    ? omeprazole (PRILOSEC) 20 MG capsule Take 20 mg by mouth 2 (two) times a day.   ? oxyCODONE (ROXICODONE) 5 MG immediate release tablet 1 tab PO for pain rated 6-8 and 2 tabs for pain rated 9-10 every 4 hours as needed.   ? rosuvastatin (CRESTOR) 20 MG tablet Take 1 tablet (20 mg total) by mouth at bedtime.   ? senna-docusate (PERICOLACE) 8.6-50 mg tablet Take 1 tablet by mouth 2 (two) times a day.       DISCHARGE DIAGNOSIS:    ICD-10-CM    1. Cervical myelopathy (H) G95.9    2. Gastroesophageal reflux disease without esophagitis K21.9    3. S/P laparoscopic cholecystectomy Z90.49    4. Stage 3 chronic kidney disease (H) N18.3        MEDICAL EQUIPMENT NEEDS:  None    DISCHARGE PLAN/FACE TO FACE:  I certify that services are/were furnished while this patient was under the care of a physician and that a physician or an allowed non-physician practitioner (NPP), had a face-to-face  encounter that meets the physician face-to-face encounter requirements. The encounter was in whole, or in part, related to the primary reason for home health. The patient is confined to his/her home and needs intermittent skilled nursing, physical therapy, speech-language pathology, or the continued need for occupational therapy. A plan of care has been established by a physician and is periodically reviewed by a physician.  Date of Face-to-Face Encounter: March 14, 2019    I certify that, based on my findings, the following services are medically necessary home health services: He will be discharging to home with current medications and narcotics he will also receive physical and occupational therapy home health aide and nursing.  His home discharge date has been set for March 15.    My clinical findings support the need for the above skilled services because: (Please write a brief narrative summary that describes what the RN, PT, SLP, or other services will be doing in the home. A list of diagnoses in this section does not meet the CMS requirements.)  He will require the skilled services secondary to his generalized debility recent hospitalization for acute cholecystectomy but also his cervical laminoplasty and home safety self-care deficits and medication management.    This patient is homebound because: (Please write a brief narrative summary describing the functional limitations as to why this patient is homebound and specifically what makes this patient homebound.)  Secondary to multiple chronic medical conditions including recent hospitalization for cervical laminoplasty along with a recent hospitalization for cholecystectomy self-care deficits home safety medication management.    The patient is, or has been, under my care and I have initiated the establishment of the plan of care. This patient will be followed by a physician who will periodically review the plan of care.    Schedule follow up visit with  primary care provider within 7 days to reestablish care.  He will follow-up with his surgeon as previously arranged also follow-up with neurosurgery as previously arranged.  Follow-up with cardiology as previously arranged.  Is an apparent colonoscopy scheduled for April 11, 2019.  I believe his neck spinal surgery recheck his on April 3.  He also follow-up with his primary care doctor regarding medication management and any future laboratory studies.  He does feel comfortable with his overall care as well as the rehabilitation process as well as his stay on the transitional care unit.  Had no further questions.    Discharge coordination of care greater than 30 minutes  Electronically signed by: Michael Duane Johnson, CNP

## 2021-06-19 NOTE — LETTER
Letter by Go Ramírez MD at      Author: Go Ramírez MD Service: -- Author Type: --    Filed:  Encounter Date: 10/1/2019 Status: Signed         Tanmay Israel  [address]  [facility] [address]             October 1, 2019         Dear Mr. Israel,    Below are the results from your recent visit:    Resulted Orders   Comprehensive Metabolic Panel   Result Value Ref Range    Sodium 142 136 - 145 mmol/L    Potassium 4.0 3.5 - 5.0 mmol/L    Chloride 108 (H) 98 - 107 mmol/L    CO2 26 22 - 31 mmol/L    Anion Gap, Calculation 8 5 - 18 mmol/L    Glucose 90 70 - 125 mg/dL    BUN 12 8 - 28 mg/dL    Creatinine 1.41 (H) 0.70 - 1.30 mg/dL    GFR MDRD Af Amer 59 (L) >60 mL/min/1.73m2    GFR MDRD Non Af Amer 48 (L) >60 mL/min/1.73m2    Bilirubin, Total 0.5 0.0 - 1.0 mg/dL    Calcium 8.7 8.5 - 10.5 mg/dL    Protein, Total 6.4 6.0 - 8.0 g/dL    Albumin 3.4 (L) 3.5 - 5.0 g/dL    Alkaline Phosphatase 437 (H) 45 - 120 U/L    AST 21 0 - 40 U/L    ALT 17 0 - 45 U/L    Narrative    Fasting Glucose reference range is 70-99 mg/dL per  American Diabetes Association (ADA) guidelines.   HM2(CBC w/o Differential)   Result Value Ref Range    WBC 6.8 4.0 - 11.0 thou/uL    RBC 3.28 (L) 4.40 - 6.20 mill/uL    Hemoglobin 9.5 (L) 14.0 - 18.0 g/dL    Hematocrit 28.2 (L) 40.0 - 54.0 %    MCV 86 80 - 100 fL    MCH 28.8 27.0 - 34.0 pg    MCHC 33.6 32.0 - 36.0 g/dL    RDW 14.1 11.0 - 14.5 %    Platelets 171 140 - 440 thou/uL    MPV 7.2 7.0 - 10.0 fL   GGT (Gamma GT)   Result Value Ref Range    GGT (Gamma GT) 146 (H) 0 - 50 U/L       With the lab tests that were obtained here in the clinic on September 24 his kidney function is remained stable and the electrolytes are normal.  There is a slight abnormality to a kidney function measurement called alkaline phosphatase which I think we need to recheck again soon and make sure it is not indicating any new or evolving problem with his liver.  The elevation in the alkaline phosphatase may be  an indication of a problem or just a simple fluctuation because of other things going on.  I would like him to have a repeat blood test 2 weeks from now.  If he develops abdominal pain, nausea or vomiting he will need to let me know right away.  If he continues to feel well we will simply obtain the lab test again in 2 weeks and I will let him know if we need to take any further action.    Please call with questions or contact us using [employer].    Sincerely,        Electronically signed by Go Ramírez MD

## 2021-06-19 NOTE — LETTER
Letter by Kimmy Naranjo CNP at      Author: Kimmy Naranjo CNP Service: -- Author Type: --    Filed:  Encounter Date: 8/1/2019 Status: (Other)         Patient: Tanmay Israel   MR Number: [id number]   YOB: 1940   Date of Visit: 8/1/2019     Code Status:  DNR  Visit Type: Discharge Summary     Facility:  [facility] [[id number]]          PCP:  Go Ramírez MD  [phone]       Admission Date to our Facility: 7/18/2019 Discharge Date from our Facility: 8/2/2019    Discharge Diagnosis:    1. Ischemic cardiomyopathy     2. Chronic atrial fibrillation (H)     3. Anemia, unspecified type     4. Chronic systolic heart failure (H)          History of Present Illness: Tanmay Israel is a 79 y.o. male with a past medical history for CHF, CKD 3, dementia, hypertension, anemia, osteoarthritis, CAD, ischemic cardiomyopathy, atrial fibrillation on Coumadin, and pacemaker in situ. He has had multiple hospitalizations in the last 6 months for various reasons including weakness, radiculopathy due to lumbar stenosis, abnormal lab work, C. difficile, cholecystitis.  He was admitted to [facility] 7/15 to 7/18/2019 for acute dizziness and weakness.  He was out fishing in the heat in which he did not drink enough fluids and he had vomiting for 2 days.  He also had diarrhea.  His diagnosis was presumed likely related to vertigo as his orthostatics were normal and his dizziness had improved with a dose of meclizine.  He did have pleural effusion on chest x-ray with left lower leg edema and so his Bumex was restarted in which it was discontinued on a previous admission due to ANA.  Echocardiogram done in April showed EF of 35%. He was discharged to the TCU due to poor balance and weakness.      Skilled Nursing Facility Course:  While at the TCU he progressed well with therapy to ambulating without a device independently.  He does have a mild cognitive impairment and has difficulty with problem  solving.  He lives alone independently and declines any home care services.  He is planning to discharge to his girlfriend's house for a couple of weeks.  His INR has been fairly stable and he is currently taking Warfarin 2.5mg daily with plan for a recheck INR on 8/6/2019 at his PCP.  I did lower his therapeutic range to 1.8-2.5 due to his anemia.  He may need further workup regarding low hemoglobin.  Orthostatic blood pressures have been normal.  He has had no issues with nausea/vomitting or diarrhea while at the TCU so his Zofran was discontinued.      Discharge Medications:    Current Outpatient Medications   Medication Sig Dispense Refill   ? acetaminophen (TYLENOL) 500 MG tablet Take 2 tablets (1,000 mg total) by mouth 3 (three) times a day.  0   ? amLODIPine (NORVASC) 5 MG tablet Take 1 tablet (5 mg total) by mouth daily. 30 tablet 0   ? bumetanide (BUMEX) 1 MG tablet Take 0.5 tablets (0.5 mg total) by mouth see administration instructions. For weight gain >3lbs. (Patient taking differently: Take 0.5 mg by mouth daily.       ) 30 tablet 0   ? carvedilol (COREG) 25 MG tablet Take 37.5 mg by mouth 2 (two) times a day with meals.     ? cholecalciferol, vitamin D3, 1,000 unit tablet Take 2,000 Units by mouth daily.     ? cyanocobalamin 500 MCG tablet Take 500 mcg by mouth daily.     ? diclofenac sodium (VOLTAREN) 1 % Gel Apply 4 g topically 4 (four) times a day as needed.     ? FERROUS FUMARATE (IRON ORAL) Take 1 tablet by mouth daily.      ? melatonin 3 mg Tab tablet Take 1 tablet (3 mg total) by mouth at bedtime as needed.  0   ? multivitamin with iron (ONE DAILY WITH IRON) Tab tablet Take 1 tablet by mouth daily.      ? omeprazole (PRILOSEC) 20 MG capsule Take 20 mg by mouth daily before breakfast.            ? potassium chloride (K-DUR,KLOR-CON) 20 MEQ tablet TAKE 1 TABLET(20 MEQ) BY MOUTH DAILY 90 tablet 3   ? rosuvastatin (CRESTOR) 20 MG tablet TAKE 1 TABLET(20 MG) BY MOUTH AT BEDTIME 90 tablet 1   ?  senna-docusate (PERICOLACE) 8.6-50 mg tablet Take 1 tablet by mouth daily as needed for constipation.  0   ? warfarin (COUMADIN/JANTOVEN) 2.5 MG tablet Take 1 tablet 2.5 mg daily, repeat INR and adjust dose as directed  0     No current facility-administered medications for this visit.        For most current and accurate medication list, please contact the skilled nursing facility that this patient visit took place at.      Discharge Plan:  Patient is stable to discharge to the home of his girlfriend for extra support.  He declines any home care services at this time.  I did remind him that he could get services after his discharge if he felt he needed them he would need to follow up with his PCP.  He has a PCP appointment 8/6 in which he will need an INR and a follow up hgb as his last hgb is 8.5 but has been stable in the 8.        Review of Systems   Patient denies fever, chills, headache, lightheadedness, dizziness, rhinorrhea, cough, congestion, shortness of breath, chest pain, palpitations, abdominal pain, n/v, diarrhea, constipation, change in appetite, dysuria, frequency, burning or pain with urination.  Other than stated in HPI all other review of systems is negative.         Physical Exam   Vital signs: 131/52, HR 70, resp 18, temp 98.2  GENERAL APPEARANCE: Well developed, well nourished, in no acute distress.  HEENT: normocephalic, atraumatic  PERRL, sclerae anicteric, conjunctivae clear and moist, EOM intact  LUNGS: Lung sounds CTA, no adventitious sounds, respiratory effort normal.  CARD: RRR, S1, S2, without murmurs, gallops, rubs, no JVD  ABD: Soft and nontender with normal bowel sounds.   MSK: Muscle strength and tone were normal.  EXTREMITIES: No cyanosis, clubbing or edema.  NEURO: Alert and oriented x 3.with cognitive impairment. Face is symmetric.  SKIN: Inspection of the skin reveals no rashes, ulcerations or petechiae.  PSYCH: euthymic          Labs:    Recent Results (from the past 240  hour(s))   Basic Metabolic Panel   Result Value Ref Range    Sodium 138 136 - 145 mmol/L    Potassium 4.1 3.5 - 5.0 mmol/L    Chloride 105 98 - 107 mmol/L    CO2 23 22 - 31 mmol/L    Anion Gap, Calculation 10 5 - 18 mmol/L    Glucose 85 70 - 125 mg/dL    Calcium 9.2 8.5 - 10.5 mg/dL    BUN 21 8 - 28 mg/dL    Creatinine 1.33 (H) 0.70 - 1.30 mg/dL    GFR MDRD Af Amer >60 >60 mL/min/1.73m2    GFR MDRD Non Af Amer 52 (L) >60 mL/min/1.73m2   INR   Result Value Ref Range    INR 2.45 (H) 0.90 - 1.10   HM1 (CBC with Diff)   Result Value Ref Range    WBC 6.9 4.0 - 11.0 thou/uL    RBC 3.07 (L) 4.40 - 6.20 mill/uL    Hemoglobin 8.9 (L) 14.0 - 18.0 g/dL    Hematocrit 28.8 (L) 40.0 - 54.0 %    MCV 94 80 - 100 fL    MCH 29.0 27.0 - 34.0 pg    MCHC 30.9 (L) 32.0 - 36.0 g/dL    RDW 15.3 (H) 11.0 - 14.5 %    Platelets 124 (L) 140 - 440 thou/uL    MPV 10.7 8.5 - 12.5 fL    Neutrophils % 66 50 - 70 %    Lymphocytes % 14 (L) 20 - 40 %    Monocytes % 14 (H) 2 - 10 %    Eosinophils % 5 0 - 6 %    Basophils % 1 0 - 2 %    Neutrophils Absolute 4.5 2.0 - 7.7 thou/uL    Lymphocytes Absolute 0.9 0.8 - 4.4 thou/uL    Monocytes Absolute 1.0 (H) 0.0 - 0.9 thou/uL    Eosinophils Absolute 0.4 0.0 - 0.4 thou/uL    Basophils Absolute 0.1 0.0 - 0.2 thou/uL   INR   Result Value Ref Range    INR 2.29 (H) 0.90 - 1.10   Basic Metabolic Panel   Result Value Ref Range    Sodium 137 136 - 145 mmol/L    Potassium 3.7 3.5 - 5.0 mmol/L    Chloride 108 (H) 98 - 107 mmol/L    CO2 24 22 - 31 mmol/L    Anion Gap, Calculation 5 5 - 18 mmol/L    Glucose 80 70 - 125 mg/dL    Calcium 9.0 8.5 - 10.5 mg/dL    BUN 14 8 - 28 mg/dL    Creatinine 1.08 0.70 - 1.30 mg/dL    GFR MDRD Af Amer >60 >60 mL/min/1.73m2    GFR MDRD Non Af Amer >60 >60 mL/min/1.73m2   Hemoglobin   Result Value Ref Range    Hemoglobin 8.5 (L) 14.0 - 18.0 g/dL         Assessment:  1. Ischemic cardiomyopathy     2. Chronic atrial fibrillation (H)     3. Anemia, unspecified type     4. Chronic systolic  heart failure (H)         MEDICAL EQUIPMENT NEEDS:  NA        Electronically signed by: Kimmy Naranjo CNP

## 2021-06-19 NOTE — LETTER
Letter by Go Ramírez MD at      Author: Go Ramírez MD Service: -- Author Type: --    Filed:  Encounter Date: 6/6/2019 Status: (Other)         Tanmay Israel  [address]  [facility] [address]       June 6, 2019     Dear Mr. Israel,  Below are the results from your recent visit:    Resulted Orders   Basic Metabolic Panel   Result Value Ref Range    Sodium 142 136 - 145 mmol/L    Potassium 3.7 3.5 - 5.0 mmol/L    Chloride 109 (H) 98 - 107 mmol/L    CO2 23 22 - 31 mmol/L    Anion Gap, Calculation 10 5 - 18 mmol/L    Glucose 99 70 - 125 mg/dL    Calcium 8.9 8.5 - 10.5 mg/dL    BUN 16 8 - 28 mg/dL    Creatinine 1.45 (H) 0.70 - 1.30 mg/dL    GFR MDRD Af Amer 57 (L) >60 mL/min/1.73m2    GFR MDRD Non Af Amer 47 (L) >60 mL/min/1.73m2    Narrative    Fasting Glucose reference range is 70-99 mg/dL per  American Diabetes Association (ADA) guidelines.   HM2(CBC w/o Differential)   Result Value Ref Range    WBC 5.5 4.0 - 11.0 thou/uL    RBC 3.19 (L) 4.40 - 6.20 mill/uL    Hemoglobin 8.9 (L) 14.0 - 18.0 g/dL    Hematocrit 28.1 (L) 40.0 - 54.0 %    MCV 88 80 - 100 fL    MCH 27.9 27.0 - 34.0 pg    MCHC 31.7 (L) 32.0 - 36.0 g/dL    RDW 15.0 (H) 11.0 - 14.5 %    Platelets 152 140 - 440 thou/uL    MPV 7.6 7.0 - 10.0 fL     His kidney function has improved slightly.  The electrolytes remain normal.  His blood count has improved slightly.  Overall labs are reassuring.  Follow-up as discussed no additional changes  Please call with questions or contact us using nookedt.  Sincerely,   Go Ramírez MD

## 2021-06-19 NOTE — LETTER
Letter by Carmine Wright MD at      Author: Carmine Wright MD Service: -- Author Type: --    Filed:  Encounter Date: 5/30/2019 Status: (Other)         Bita Velasquez, CNP  [address]  [facility] [address]                                  May 30, 2019    Patient: Tanmay Israel   MR Number: [id number]   YOB: 1940   Date of Visit: 5/30/2019     Dear Dr. Velasquez, CNP:    Thank you for referring Tanmay Israel to me for evaluation. Below are the relevant portions of my assessment and plan of care.    If you have questions, please do not hesitate to call me. I look forward to following Tanmay along with you.    Sincerely,        Carmine Wright MD            MD Kyle Qureshi Edwin C, MD  5/30/2019 10:30 AM  Sign at close encounter  [facility] INFECTIOUS DISEASE CLINIC FOLLOW UP NOTE    Date: 5/30/2019  Patient Name: Tanmay Israel   YOB: 1940  MRN: [id number]      ASSESSMENT:  79-year-old man referred to ID clinic for recurrent C. difficile infection.  Unfortunately, prior to her appointment he was admitted to the hospital for acute kidney injury and hypotension.  He was seen by ID during hospital stay for recurring C. difficile infection.  He was placed on fidaxomicin with a taper given his recurring infection.  Today he is doing quite well.  Diarrhea has resolved.  He remains on fidaxomicin.    Renal function improved prior to discharge.  He was fortunately able to discharge back to home and is doing well there.  Unfortunately, he continues to have poor appetite, which has been an issue for several months.      PLAN:  -Continue fidaxomicin acute 48 hours until 6/17  -Watch for recurrence of diarrhea  -Avoid antibiotics unless absolutely necessary for new infection, as this may trigger recurrence  -If he does have a recurrence of C. difficile, then would refer to GI for FMT.    Return to clinic as needed.    Carmine Wright MD  [facility]    Clinic phone: [phone]  Clinic fax: [phone]    ______________________________________________________________________    HISTORY OF PRESENT ILLNESS:   From inpatient ID consult on 5/23:    Nice elder man with recurrent c diff, most of 2019.  From TCU/NH now with paleness, FTT, no appetite serious dehydration and ARF.  On oral vanco mid April to April 30, then c diff + again may 14th (notes very poor on this period of his care in chart) and back on oral vanco.  Some vomiting I believe also.  Belly is soft doesn't look like megacolon clinically.           SUBJECTIVE / INTERVAL HISTORY:   Tanmay Israel returns for follow up of C. difficile infection.  The patient was hospitalized at [facility] from 5/22 to 5/27.  He was seen by Dr. Osman with ID.  He was started on fidaxomicin for 5 days, followed by a 2-week taper.  He was discharged to home.  He is doing well.  He denies any diarrhea.  He is having 1-2 formed bowel movements a day.  Unfortunately, his appetite remains poor, this started several months ago.  He is scheduled to see his primary care provider next week.      ROS:   No fevers. No abd pain.      Current Outpatient Medications:   ?  baclofen (LIORESAL) 10 MG tablet, Take 1 tablet (10 mg total) by mouth every 8 (eight) hours as needed., Disp: 20 tablet, Rfl: 0  ?  bumetanide (BUMEX) 1 MG tablet, Take 1 tablet (1 mg total) by mouth see administration instructions. For weight gain >3lbs., Disp: , Rfl: 0  ?  carvedilol (COREG) 25 MG tablet, Take 37.5 mg by mouth 2 (two) times a day with meals., Disp: , Rfl:   ?  cholecalciferol, vitamin D3, 1,000 unit tablet, Take 2,000 Units by mouth daily., Disp: , Rfl:   ?  FERROUS FUMARATE (IRON ORAL), Take 1 tablet by mouth daily. , Disp: , Rfl:   ?  fidaxomicin (DIFICID) tablet, 200mg PO two times a day 5/23-5/27. Then 200mg PO every other day x 10 doses starting 5/29., Disp: 11 tablet, Rfl: 0  ?  magnesium chloride (SLOW-MAG) 64 mg TbEC delayed-release tablet,  Take 64 mg by mouth daily.    , Disp: , Rfl:   ?  multivitamin with iron (ONE DAILY WITH IRON) Tab tablet, Take 1 tablet by mouth daily. , Disp: , Rfl:   ?  omeprazole (PRILOSEC) 20 MG capsule, Take 20 mg by mouth daily before breakfast.    , Disp: , Rfl:   ?  rosuvastatin (CRESTOR) 20 MG tablet, Take 1 tablet (20 mg total) by mouth at bedtime., Disp: 90 tablet, Rfl: 3  ?  warfarin (COUMADIN/JANTOVEN) 2.5 MG tablet, Take 1.25-2.5 mg by mouth See Admin Instructions. Take 1.25 mg (half-tablet) on Wednesdays. Take 2.5 mg (1 tablet) all other days of the week., Disp: , Rfl:       OBJECTIVE:  Vitals:    05/30/19 0954   BP: 104/56   Pulse: 60   Temp: 97.6  F (36.4  C)         GEN: No acute distress.    HENT: Head is normocephalic, atraumatic.   EYES: Eyes have anicteric sclerae without conjunctival injection   RESPIRATORY:  Normal breathing pattern. Clear to auscultation  CARDIOVASCULAR:  Regular rate and rhythm. Normal S1 and S2. No murmur, click, gallop or rub.   ABDOMEN:  Soft, normal bowel sounds, non-tender  EXTREMITIES: No edema.   SKIN/HAIR/NAILS:  No rashes  NEUROLOGIC: Grossly nonfocal. Slow movements, but able to get on/off exam bed with minimal assistance.      Pertinent labs:        Results from last 7 days   Lab Units 05/26/19  0753 05/25/19  0628 05/24/19  0638   LN-SODIUM mmol/L 139 139 140   LN-POTASSIUM mmol/L 4.1 4.3 4.3   LN-CHLORIDE mmol/L 112* 115* 117*   LN-CO2 mmol/L 20* 19* 16*   LN-BLOOD UREA NITROGEN mg/dL 20 28 38*   LN-CREATININE mg/dL 1.65* 1.85* 2.46*   LN-CALCIUM mg/dL 9.4 9.0 9.1     Lab Results   Component Value Date    CRP 0.5 04/01/2019         Lab Results   Component Value Date    ALT 17 05/21/2019    AST 14 05/21/2019    ALKPHOS 199 (H) 05/21/2019    BILITOT 0.6 05/21/2019         MICROBIOLOGY DATA:  Reviewed.    RADIOLOGY:  Reviewed

## 2021-06-19 NOTE — LETTER
Letter by Johnson, Michael Duane, CNP at      Author: Johnson, Michael Duane, CNP Service: -- Author Type: --    Filed:  Encounter Date: 4/24/2019 Status: (Other)         Patient: Tanmay Israel   MR Number: [id number]   YOB: 1940   Date of Visit: 4/24/2019     [facility]    Facility:   [facility] [[id number]]   Code Status: FULL CODE      CHIEF COMPLAINT/REASON FOR VISIT:  Chief Complaint   Patient presents with   ? Problem Visit     asked to see       HISTORY:      HPI: Tanmay is a 79 y.o. male who I had the chance and was asked to see secondary to his hospitalization April 20 - April 22, 2019 secondary to generalized weakness.  The generalized weakness was of unclear etiology it was extended to the upper extremities and lower extremities but no neck pain.  His workup did include a chest x-ray which was negative and clear.  He does have a prior right shoulder arthroplasty as well as a pacemaker.  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  CT of the cervical spine without contrast did show posterior hardware otherwise unremarkable along with severe right-sided facet arthropathy at C2-3 and severe left-sided facet arthropathy C3-4 but again unchanged from the previous CAT scan but he does have a history of C4-C6 laminoplasty's.  His laboratory studies on the 20th did show a hemoglobin of 9.9 white cells of 7.6 sodium 142 potassium 3.7 BUN 13 creatinine 1.36.  He is on warfarin secondary to his history of A. fib and pacemaker.  C. difficile was positive they did start him on oral vancomycin and stop the magnesium oxide.  He does have a history of CKD stage III but is stable.  A history of chronic systolic congestive heart failure which was compensated continue with current diuretics and Coreg.  I remember him from a previous transitional care  unit stay when he was hospitalized February 27 - March 4, 2019 secondary to acute cholecystitis status post laparoscopic cholecystectomy.  He currently is now in the transitional care unit which we had a chance to once again addressed as well as the rehabilitation process and trying to improve his strength and his conditioning.  He does admit to generalized weakness and deconditioned.  He does have a history of chronic pain including spasms he can have baclofen as needed to which he has not required any at this time he can have Tylenol as needed.  He is on a number of multivitamins and minerals he is also being followed by the Coumadin clinic.  The vancomycin will continue 4 times a day until April 30, 2019.  He otherwise has been normotensive and afebrile and also on room air.  Recently did develop some left shoulder discomfort he is not sure why he did have a chance to talk about osteoarthrosis in that shoulder joint as well as impingement and frozen shoulder or adhesive capsulitis.  He does remember those similar symptoms before his right shoulder replacement.  He currently is not having any increase in bowel problems his appetite is slowly improving.  He does have a good sense of humor.  He does live down in [facility].  He does have a couple of friends in the memory care unit in this facility.    Past Medical History:   Diagnosis Date   ? Anemia    ? Arthritis    ? Atrial fibrillation (H)    ? Back pain    ? Callus    ? Cerebral aneurysm    ? Cervical spinal stenosis    ? Chronic kidney disease     stage 3   ? Chronic systolic congestive heart failure (H)    ? Coronary artery disease    ? Crohn's disease (H)    ? Dysphagia    ? GERD (gastroesophageal reflux disease)    ? Hyperlipidemia    ? Hypertension    ? ICD (implantable cardioverter-defibrillator) in place     Medtronic   ? Ischemic cardiomyopathy    ? Kidney stone    ? Low back pain    ? Macular degeneration    ? Permanent atrial fibrillation (H)      [id number] risk score = 5 (age1/ CAD/ HTN/ CHF) Chronic warfarin   ? Right rotator cuff tendinitis    ? Schatzki's ring    ? Shortness of breath    ? Sleep apnea     no CPAP             Family History   Problem Relation Age of Onset   ? Heart disease Mother    ? Stroke Mother    ? Crohn's disease Father    ? Alcohol abuse Brother    ? No Medical Problems Daughter    ? No Medical Problems Son    ? No Medical Problems Maternal Aunt    ? No Medical Problems Maternal Uncle    ? No Medical Problems Paternal Aunt    ? No Medical Problems Paternal Uncle    ? No Medical Problems Maternal Grandmother    ? No Medical Problems Maternal Grandfather    ? No Medical Problems Paternal Grandmother    ? No Medical Problems Paternal Grandfather    ? Cancer Brother    ? Urolithiasis Neg Hx    ? Gout Neg Hx      Social History     Socioeconomic History   ? Marital status: Single     Spouse name: Not on file   ? Number of children: Not on file   ? Years of education: Not on file   ? Highest education level: Not on file   Occupational History   ? Occupation: retired     Comment: [employer]   Social Needs   ? Financial resource strain: Not on file   ? Food insecurity:     Worry: Not on file     Inability: Not on file   ? Transportation needs:     Medical: Not on file     Non-medical: Not on file   Tobacco Use   ? Smoking status: Former Smoker   ? Smokeless tobacco: Never Used   Substance and Sexual Activity   ? Alcohol use: No   ? Drug use: No   ? Sexual activity: Not on file   Lifestyle   ? Physical activity:     Days per week: Not on file     Minutes per session: Not on file   ? Stress: Not on file   Relationships   ? Social connections:     Talks on phone: Not on file     Gets together: Not on file     Attends Hindu service: Not on file     Active member of club or organization: Not on file     Attends meetings of clubs or organizations: Not on file     Relationship status: Not on file   ? Intimate partner violence:     Fear of  current or ex partner: Not on file     Emotionally abused: Not on file     Physically abused: Not on file     Forced sexual activity: Not on file   Other Topics Concern   ? Not on file   Social History Narrative   ? Not on file         Review of Systems  Currently denies chills and fever coughing wheezing chest pain dizziness or vertigo nausea vomiting diarrhea or flulike symptoms headache or rashes or sores.  History of recent laparoscopic cholecystectomy hypertension CAD ICD CKD stage III heart failure cervical laminoplasty      /  Current Outpatient Medications   Medication Sig Note   ? baclofen (LIORESAL) 10 MG tablet Take 1 tablet (10 mg total) by mouth every 8 (eight) hours as needed.    ? bumetanide (BUMEX) 1 MG tablet Take 2 mg by mouth 2 (two) times a day. 4/20/2019: Patient takes 3 tablets in the morning daily.    ? carvedilol (COREG) 25 MG tablet Take 37.5 mg by mouth 2 (two) times a day with meals.    ? cholecalciferol, vitamin D3, 1,000 unit tablet Take 2,000 Units by mouth daily.    ? FERROUS FUMARATE (IRON ORAL) Take 1 tablet by mouth daily.     ? losartan (COZAAR) 100 MG tablet Take 100 mg by mouth daily.    ? mecobalamin, vitamin B12, 1,000 mcg TbDL Place 1 tablet (1,000 mcg total) under the tongue daily.    ? multivitamin with iron (ONE DAILY WITH IRON) Tab tablet Take 1 tablet by mouth daily.     ? omeprazole (PRILOSEC) 20 MG capsule Take 20 mg by mouth daily before breakfast.           ? potassium chloride (KLOR-CON) 20 mEq packet Take 20 mEq by mouth daily. 4/20/2019: Potassium chloride was prescribed on 4/19 for a 5 day course. Patient took the first dose yesterday.   ? rosuvastatin (CRESTOR) 20 MG tablet Take 1 tablet (20 mg total) by mouth at bedtime.    ? vancomycin (FIRVANQ) 50 mg/mL oral solution Take 2.5 mL (125 mg total) by mouth 4 (four) times a day for 9 days.    ? warfarin (COUMADIN) 3 MG tablet Take 1 tablet (3 mg total) by mouth See Admin Instructions.        .There were no vitals  filed for this visit.  Blood pressure 102/68 pulse 85 temperature 97.9 saturation room air 92%  Physical Exam  Constitutional: No distress.   HENT:    Eyes: Pupils are equal, round, and reactive to light.   Neck: Neck supple. No thyromegaly present.   Cardiovascular:   Irregularity.  Pacemaker.   Pulmonary/Chest: Breath sounds normal.   Abdominal: Bowel sounds are normal. There is no rebound.   Musculoskeletal: History of right shoulder replacement.  Current left shoulder discomfort along with an impingement.  Generalized deconditioning.  Chronic back pain.   Lymphadenopathy:     He has no cervical adenopathy.   Neurological: He is alert.   Psychiatric: His behavior is normal.       LABS:   Lab Results   Component Value Date    WBC 7.0 04/22/2019    HGB 9.5 (L) 04/22/2019    HCT 28.8 (L) 04/22/2019    MCV 86 04/22/2019     (L) 04/22/2019     Results for orders placed or performed in visit on 04/23/19   Basic Metabolic Panel   Result Value Ref Range    Sodium 142 136 - 145 mmol/L    Potassium 3.7 3.5 - 5.0 mmol/L    Chloride 106 98 - 107 mmol/L    CO2 27 22 - 31 mmol/L    Anion Gap, Calculation 9 5 - 18 mmol/L    Glucose 88 70 - 125 mg/dL    Calcium 9.5 8.5 - 10.5 mg/dL    BUN 11 8 - 28 mg/dL    Creatinine 1.30 0.70 - 1.30 mg/dL    GFR MDRD Af Amer >60 >60 mL/min/1.73m2    GFR MDRD Non Af Amer 53 (L) >60 mL/min/1.73m2       Lab Results   Component Value Date    ALT 12 04/21/2019    AST 19 04/21/2019    ALKPHOS 201 (H) 04/21/2019    BILITOT 0.6 04/21/2019         ASSESSMENT:      ICD-10-CM    1. Clostridium difficile diarrhea A04.72    2. General weakness R53.1    3. Lumbar stenosis with neurogenic claudication M48.062    4. Essential hypertension I10        PLAN:    At this time we will continue to manage and follow he will continue his current medication and treatment modalities.  Also being followed by the Coumadin clinic.  His vancomycin will finish up on April 30, 2019 and he will continue to work with the  therapy department including perhaps his left shoulder and do some ultrasound to that arm as well.  He did not have any other further questions.    For documentation purposes total visit 35 minutes to which over 50% was spent with the patient going over his hospitalization our previous visits his laboratory studies his concerns rehabilitation process and coordination of care.    Electronically signed by: Michael Duane Johnson, CNP

## 2021-06-19 NOTE — PROGRESS NOTES
Assessment and Plan:     1. Abdominal pain, generalized  HM2(CBC w/o Differential)    Comprehensive Metabolic Panel    CT Abdomen Pelvis With Oral With Without IV Contrast   2. Diarrhea, unspecified type  HM2(CBC w/o Differential)    Comprehensive Metabolic Panel    CT Abdomen Pelvis With Oral With Without IV Contrast   3. Crohn's disease with complication, unspecified gastrointestinal tract location (H)       Patient is experiencing abdominal pain with an elevated white blood count.  Will order CT scan of the abdomen and pelvis to rule out bowel obstruction, diverticulitis, Crohn's flare, colitis, appendicitis, mesenteric adenitis.  Discussed clear liquid diet in the meantime.  Discussed symptomatic treatment and the importance of good hydration.  Further plans pending results.  He is content with the plan.    Subjective:     Tanmay is a 78 y.o. male presenting to the clinic for concerns for diarrhea.  Patient has had intermittent diarrhea for multiple years due to history of Crohn's.  Patient states he recently saw [facility] where they recommended that he take 2-3 tablets of Imodium every morning.  Patient states the diarrhea has been controlled since he started taking 2 tablets of Imodium.  Patient was in a hurry to take some elderly individuals out on a boat this morning.  He did not take his Imodium.  He developed diarrhea this morning.  States there has been no blood or mucus in the stool.  He denies any black, tarry stools.  He did try eating cereal and vomited.  He complains of lack of appetite and feels chilled.  He denies abdominal pain but has felt fatigued.  He has not taken any over-the-counter products for his symptoms.  He denies any recent travel.  No one else around him has diarrhea.  He did not take his temperature this morning.  He is unsure if he is losing weight.    Review of Systems: A complete 14 point review of systems was obtained and is negative or as stated in the history of  present illness.    Social History     Social History     Marital status: Single     Spouse name: N/A     Number of children: N/A     Years of education: N/A     Occupational History     retired      [employer]     Social History Main Topics     Smoking status: Former Smoker     Smokeless tobacco: Never Used     Alcohol use No     Drug use: No     Sexual activity: Not on file     Other Topics Concern     Not on file     Social History Narrative       Active Ambulatory Problems     Diagnosis Date Noted     Right Rotator Cuff Tendonitis      Osteoarthritis Of The Knee      Joint Pain, Localized In The Right Shoulder      Difficulty Breathing (Dyspnea)      Esophageal Reflux      Muscle Aches, Generalized (Myalgias)      Lumbago      Obstructive sleep apnea      Sciatica      Joint Pain, Localized In The Knee      Ptosis Of Eyelid      Hyperlipidemia      Anemia      Crohn's (Granulomatous) Colitis      Benign Essential Hypertension      Coronary atherosclerosis of native coronary artery      Ischemic cardiomyopathy      Paroxysmal ventricular tachycardia (H)      Dry Nonexudative Macular Degeneration      Serum Enzyme Levels - ALT (SGPT) Elevated      Serum Enzyme Levels - Alkaline Phosphatase Elevated      Dysphagia      Diarrhea      Abdominal Pain      Presence of automatic cardioverter/defibrillator (AICD)--- Medtronic single lead 10/20/2015     Spondylolisthesis of lumbar region 07/05/2016     Lumbar stenosis with neurogenic claudication 07/05/2016     Sacroiliac joint dysfunction of right side 08/22/2016     Stage 3 chronic kidney disease 09/27/2016     Fall 10/22/2016     Multiple fractures of cervical spine, closed, initial encounter (H)      Closed fracture of distal end of left radius, unspecified fracture morphology, initial encounter      Closed fracture of distal end of right radius, unspecified fracture morphology, initial encounter      Warfarin-induced coagulopathy (H)      Closed fracture of first  "thoracic vertebra, unspecified fracture morphology, initial encounter (H)      Chronic systolic congestive heart failure (H)      Permanent atrial fibrillation (H)      Anemia, unspecified type      Cerebral aneurysm 11/16/2016     Osteoporosis 04/28/2017     Resolved Ambulatory Problems     Diagnosis Date Noted     Coronary artery disease involving coronary bypass graft of native heart without angina pectoris      Past Medical History:   Diagnosis Date     Arthritis      Atrial fibrillation (H)      Back pain      Callus      Chronic systolic congestive heart failure (H)      Coronary artery disease      Hyperlipidemia      Hypertension      Permanent atrial fibrillation (H)        Family History   Problem Relation Age of Onset     Heart disease Mother      Stroke Mother      Crohn's disease Father      Alcohol abuse Brother      No Medical Problems Daughter      No Medical Problems Son      No Medical Problems Maternal Aunt      No Medical Problems Maternal Uncle      No Medical Problems Paternal Aunt      No Medical Problems Paternal Uncle      No Medical Problems Maternal Grandmother      No Medical Problems Maternal Grandfather      No Medical Problems Paternal Grandmother      No Medical Problems Paternal Grandfather      Cancer Brother        Objective:     /56  Pulse 66  Temp 97.7  F (36.5  C)  Ht 5' 9\" (1.753 m)  Wt 186 lb 6.4 oz (84.6 kg)  SpO2 99%  BMI 27.53 kg/m2    Patient is alert, in no obvious distress.   Skin: Warm, dry.    Lungs:  Clear to auscultation. Respirations even and unlabored.  No wheezing or rales noted.   Heart:  Regular rate and rhythm.  No murmurs, S3, S4, gallops, or rubs.    Abdomen: Soft, generalized tenderness noted throughout the abdomen.  No organomegaly. Bowel sounds normoactive. No guarding or masses noted.      LABORATORY:   Recent Results (from the past 24 hour(s))   HM2(CBC w/o Differential)    Collection Time: 08/20/18 10:00 AM   Result Value Ref Range    WBC " 11.8 (H) 4.0 - 11.0 thou/uL    RBC 3.29 (L) 4.40 - 6.20 mill/uL    Hemoglobin 9.9 (L) 14.0 - 18.0 g/dL    Hematocrit 28.9 (L) 40.0 - 54.0 %    MCV 88 80 - 100 fL    MCH 30.0 27.0 - 34.0 pg    MCHC 34.1 32.0 - 36.0 g/dL    RDW 12.8 11.0 - 14.5 %    Platelets 205 140 - 440 thou/uL    MPV 6.5 (L) 7.0 - 10.0 fL

## 2021-06-19 NOTE — LETTER
Letter by Bita Velasquez CNP at      Author: Bita Velasquez CNP Service: -- Author Type: --    Filed:  Encounter Date: 6/19/2019 Status: (Other)         Tanmay POWERS Lindy  [address]  [place] [address]             June 19, 2019         Dear Mr. Israel,    Below are the results from your recent visit:    Resulted Orders   CK Total   Result Value Ref Range    CK, Total 40 30 - 190 U/L       Your creatine kinase level is normal.    Please call with questions or contact us using [employer].    Sincerely,        Electronically signed by Bita Velasquez CNP

## 2021-06-19 NOTE — LETTER
Letter by Kimmy Naranjo CNP at      Author: Kimmy Naranjo CNP Service: -- Author Type: --    Filed:  Encounter Date: 7/22/2019 Status: (Other)         Patient: Tanmay Israel   MR Number: [id number]   YOB: 1940   Date of Visit: 7/22/2019     [facility]      Facility:    [facility] [[id number]]  Code Status: DNR      Chief Complaint/Reason for Visit:  Chief Complaint   Patient presents with   ? Follow Up       HPI:   Tanmay is a 79 y.o. male whom I see today for initial TCU follow up visit.  With a past medical history for CHF, CKD 3, dementia, hypertension, anemia, osteoarthritis, CAD, ischemic cardiomyopathy, atrial fibrillation on Coumadin, and pacemaker in situ.  He has had multiple hospitalizations in the last 6 months for various reasons including weakness, radiculopathy due to lumbar stenosis, abnormal lab work, C. difficile, cholecystitis.  He was admitted to [facility] 7/15 to 7/18/2019 for acute dizziness and weakness.  He was out fishing in the heat in which he did not drink enough fluids and he had vomiting for 2 days.  He also had diarrhea.  His diagnosis was presumed likely related to vertigo as his orthostatics were normal and his dizziness had improved with a dose of meclizine.  He did have pleural effusion on chest x-ray with left lower leg edema and so his Bumex was restarted in which it was discontinued on a previous admission due to ANA.  Echocardiogram done in April showed EF of 35%.    Today, he reports he is feeling better and has had no incidents of diarrhea or vomiting.  His C. difficile culture was negative in the hospital and he was recently treated with fidaxomicin with good results.  He reports his appetite is returning and he feels stronger and feels he is independent enough to return home however with ambulation he does have an unsteady gait and I do feel he needs ongoing therapy and I discussed this with him and his wife  and they were agreeable.  He plans to go out to a family event over the weekend.  His weight did get up to 163 from his baseline of 160 however today his weight is back down to 160.9 pounds.  He has no lower extremity edema.  His blood pressures are well controlled with SBP's in the 140s.  He states he is sleeping well and does not feel he has a sleep issue.  He has been refusing his scheduled Tylenol and feels he has no aches and pains that require ongoing scheduled Tylenol and would like this changed to as needed.  His INR was 1.94 today and he has been taking 2.4 mg daily since 7/18/2019.  He states that he typically takes 5 mg twice a day and 2-1/2 mg all the other days.  Due to his multiple hospitalizations and dementia it was thought in the hospital that he would likely need a new living situation.    Past Medical History:  Past Medical History:   Diagnosis Date   ? Acute cholecystitis 2/28/2019   ? Acute post-operative pain    ? Anemia    ? Arthritis    ? Atrial fibrillation (H)    ? Back pain    ? C. difficile diarrhea 4/21/2019   ? Calculus of ureter    ? Callus    ? Cerebral aneurysm    ? Cervical myelopathy (H) 2/22/2019   ? Cervical spinal stenosis    ? Chronic kidney disease     stage 3   ? Chronic systolic congestive heart failure (H)    ? Closed fracture of distal end of left radius, unspecified fracture morphology, initial encounter    ? Closed fracture of distal end of right radius, unspecified fracture morphology, initial encounter    ? Closed fracture of first thoracic vertebra, unspecified fracture morphology, initial encounter (H)    ? Coronary artery disease    ? Crohn's disease (H)    ? Difficulty Breathing (Dyspnea)     Created by Conversion    ? Dysphagia    ? Fall 10/22/2016   ? GERD (gastroesophageal reflux disease)    ? Hospital discharge follow-up 5/21/2019   ? Hyperlipidemia    ? Hypertension    ? Hypotension, unspecified hypotension type    ? ICD (implantable  cardioverter-defibrillator) in place     Medtronic   ? Ischemic cardiomyopathy    ? Joint Pain, Localized In The Knee     Created by Conversion    ? Joint Pain, Localized In The Right Shoulder     Created by Conversion    ? Kidney stone    ? Low back pain    ? Lumbago     Created by Conversion    ? Macular degeneration    ? Muscle Aches, Generalized (Myalgias)     Created by Conversion    ? Permanent atrial fibrillation (H)     [id number] risk score = 5 (age3/ CAD/ HTN/ CHF) Chronic warfarin   ? Personal history of colonic polyps 2/12/2019   ? Polyp of duodenum 10/17/2016   ? Pyuria    ? Right arm weakness 4/2/2019   ? Right rotator cuff tendinitis    ? Right Rotator Cuff Tendonitis     Created by Conversion  Replacement Utility updated for latest IMO load   ? Schatzki's ring    ? Sciatica     Created by Conversion    ? Serum Enzyme Levels - ALT (SGPT) Elevated     Created by Conversion    ? Shortness of breath    ? Sleep apnea     no CPAP   ? Spondylolisthesis of lumbar region 7/5/2016   ? Weakness 4/20/2019           Surgical History:  Past Surgical History:   Procedure Laterality Date   ? APPENDECTOMY     ? CARDIAC PACEMAKER PLACEMENT  2013    ICD Medtronic   ? CATARACT EXTRACTION W/  INTRAOCULAR LENS IMPLANT Bilateral    ? CHOLECYSTECTOMY     ? ESOPHAGOGASTRODUODENOSCOPY     ? NC C- LAMINOPLASTY, 2 OR MORE Left 2/22/2019    Procedure: LEFT CERVICAL 4, 5, 6 LAMINOPLASTY;  Surgeon: Liza Cesar MD;  [facility];  Service: Spine   ? NC CABG, VEIN, FOUR      Description: Coronary Artery Quadruple Venous Bypass Graft;  Recorded: 10/21/2008;  Comments: 8/2004   ? NC LAP,CHOLECYSTECTOMY N/A 2/28/2019    Procedure: CHOLECYSTECTOMY, LAPAROSCOPIC;  Surgeon: Mana Roman MD;  Location: [facility];  Service: General   ? ROTATOR CUFF REPAIR Right    ? TONSILLECTOMY     ? XR MYELOGRAM CERVICAL THORACIC LUMBAR  1/17/2019       Family History:   Family History   Problem Relation Age of  Onset   ? Heart disease Mother    ? Stroke Mother    ? Crohn's disease Father    ? Alcohol abuse Brother    ? No Medical Problems Daughter    ? No Medical Problems Son    ? No Medical Problems Maternal Aunt    ? No Medical Problems Maternal Uncle    ? No Medical Problems Paternal Aunt    ? No Medical Problems Paternal Uncle    ? No Medical Problems Maternal Grandmother    ? No Medical Problems Maternal Grandfather    ? No Medical Problems Paternal Grandmother    ? No Medical Problems Paternal Grandfather    ? Cancer Brother    ? Urolithiasis Neg Hx    ? Gout Neg Hx        Social History:    Social History     Socioeconomic History   ? Marital status: Single     Spouse name: Not on file   ? Number of children: Not on file   ? Years of education: Not on file   ? Highest education level: Not on file   Occupational History   ? Occupation: retired     Comment: [employer]   Social Needs   ? Financial resource strain: Not on file   ? Food insecurity:     Worry: Not on file     Inability: Not on file   ? Transportation needs:     Medical: Not on file     Non-medical: Not on file   Tobacco Use   ? Smoking status: Former Smoker   ? Smokeless tobacco: Never Used   Substance and Sexual Activity   ? Alcohol use: No   ? Drug use: No   ? Sexual activity: Not on file   Lifestyle   ? Physical activity:     Days per week: Not on file     Minutes per session: Not on file   ? Stress: Not on file   Relationships   ? Social connections:     Talks on phone: Not on file     Gets together: Not on file     Attends Tenriism service: Not on file     Active member of club or organization: Not on file     Attends meetings of clubs or organizations: Not on file     Relationship status: Not on file   ? Intimate partner violence:     Fear of current or ex partner: Not on file     Emotionally abused: Not on file     Physically abused: Not on file     Forced sexual activity: Not on file   Other Topics Concern   ? Not on file   Social History Narrative    ? Not on file          Review of Systems   Patient denies fever, chills, headache, lightheadedness, dizziness, rhinorrhea, cough, congestion, shortness of breath, chest pain, palpitations, abdominal pain, n/v, diarrhea, constipation, change in appetite, dysuria, frequency, burning or pain with urination.  Other than stated in HPI all other review of systems is negative.       Physical Exam   Vital signs: /74, heart rate 75, respiratory 16, temp 97.3.  GENERAL APPEARANCE: Well developed, well nourished, in no acute distress.  HEENT: normocephalic, atraumatic  PERRL, sclerae anicteric, conjunctivae clear and moist, EOM intact  External inspection of ears and nose showed no scars, lesions or masses.  NECK: Supple and symmetric. Trachea is midline, no thyromegaly, no adenopathy, and no tenderness  LUNGS: Lung sounds CTA, no adventitious sounds, respiratory effort normal.  CARD: RRR, S1, S2, without murmurs, gallops, rubs,   ABD: Soft and nontender with normal bowel sounds.   MSK: Muscle strength and tone were normal.  EXTREMITIES: No cyanosis, clubbing or edema.  NEURO: Alert and oriented x 3.  With cognitive impairment.  Face is symmetric.  SKIN: Inspection of the skin reveals no rashes, ulcerations or petechiae.  PSYCH: euthymic          Medication List:  Current Outpatient Medications   Medication Sig   ? acetaminophen (TYLENOL) 500 MG tablet Take 2 tablets (1,000 mg total) by mouth 3 (three) times a day.   ? amLODIPine (NORVASC) 5 MG tablet Take 1 tablet (5 mg total) by mouth daily.   ? bumetanide (BUMEX) 1 MG tablet Take 0.5 tablets (0.5 mg total) by mouth see administration instructions. For weight gain >3lbs. (Patient taking differently: Take 0.5 mg by mouth daily.       )   ? carvedilol (COREG) 25 MG tablet Take 37.5 mg by mouth 2 (two) times a day with meals.   ? cholecalciferol, vitamin D3, 1,000 unit tablet Take 2,000 Units by mouth daily.   ? cyanocobalamin 500 MCG tablet Take 500 mcg by mouth daily.    ? diclofenac sodium (VOLTAREN) 1 % Gel Apply 4 g topically 4 (four) times a day as needed.   ? FERROUS FUMARATE (IRON ORAL) Take 1 tablet by mouth daily.    ? melatonin 3 mg Tab tablet Take 1 tablet (3 mg total) by mouth at bedtime as needed.   ? multivitamin with iron (ONE DAILY WITH IRON) Tab tablet Take 1 tablet by mouth daily.    ? omeprazole (PRILOSEC) 20 MG capsule Take 20 mg by mouth daily before breakfast.          ? potassium chloride (K-DUR,KLOR-CON) 20 MEQ tablet TAKE 1 TABLET(20 MEQ) BY MOUTH DAILY   ? rosuvastatin (CRESTOR) 20 MG tablet TAKE 1 TABLET(20 MG) BY MOUTH AT BEDTIME   ? senna-docusate (PERICOLACE) 8.6-50 mg tablet Take 1 tablet by mouth daily as needed for constipation.   ? warfarin (COUMADIN/JANTOVEN) 2.5 MG tablet Take 1 tablet 2.5 mg daily, repeat INR and adjust dose as directed       Labs:  Recent Results (from the past 240 hour(s))   POCT Glucose   Result Value Ref Range    Glucose 105 70 - 139 mg/dL   Basic Metabolic Panel   Result Value Ref Range    Sodium 146 (H) 136 - 145 mmol/L    Potassium 3.8 3.5 - 5.0 mmol/L    Chloride 107 98 - 107 mmol/L    CO2 28 22 - 31 mmol/L    Anion Gap, Calculation 11 5 - 18 mmol/L    Glucose 100 70 - 125 mg/dL    Calcium 10.0 8.5 - 10.5 mg/dL    BUN 18 8 - 28 mg/dL    Creatinine 1.19 0.70 - 1.30 mg/dL    GFR MDRD Af Amer >60 >60 mL/min/1.73m2    GFR MDRD Non Af Amer 59 (L) >60 mL/min/1.73m2   Hepatic Profile   Result Value Ref Range    Bilirubin, Total 0.9 0.0 - 1.0 mg/dL    Bilirubin, Direct 0.4 <=0.5 mg/dL    Protein, Total 7.2 6.0 - 8.0 g/dL    Albumin 3.9 3.5 - 5.0 g/dL    Alkaline Phosphatase 260 (H) 45 - 120 U/L    AST 17 0 - 40 U/L    ALT 23 0 - 45 U/L   Lipase   Result Value Ref Range    Lipase 11 0 - 52 U/L   Lactic Acid   Result Value Ref Range    Lactic Acid 1.1 0.5 - 2.2 mmol/L   Magnesium   Result Value Ref Range    Magnesium 1.5 (L) 1.8 - 2.6 mg/dL   HM2 (CBC W/O DIFF)   Result Value Ref Range    WBC 10.6 4.0 - 11.0 thou/uL    RBC 3.36  (L) 4.40 - 6.20 mill/uL    Hemoglobin 9.8 (L) 14.0 - 18.0 g/dL    Hematocrit 31.1 (L) 40.0 - 54.0 %    MCV 93 80 - 100 fL    MCH 29.2 27.0 - 34.0 pg    MCHC 31.5 (L) 32.0 - 36.0 g/dL    RDW 16.6 (H) 11.0 - 14.5 %    Platelets 205 140 - 440 thou/uL    MPV 9.4 8.5 - 12.5 fL   INR   Result Value Ref Range    INR 1.60 (H) 0.90 - 1.10   Urinalysis-UC if Indicated   Result Value Ref Range    Color, UA Yellow Colorless, Yellow, Straw, Light Yellow    Clarity, UA Cloudy (!) Clear    Glucose, UA Negative Negative    Bilirubin, UA Negative Negative    Ketones, UA Trace (!) Negative, 60 mg/dL    Specific Gravity, UA 1.021 1.001 - 1.030    Blood, UA Negative Negative    pH, UA 5.5 4.5 - 8.0    Protein,  mg/dL (!) Negative mg/dL    Urobilinogen, UA 2.0 E.U./dL <2.0 E.U./dL, 2.0 E.U./dL    Nitrite, UA Negative Negative    Leukocytes, UA Negative Negative    Bacteria, UA None Seen None Seen hpf    RBC, UA 0-2 None Seen, 0-2 hpf    WBC, UA 5-10 (!) None Seen, 0-5 hpf    Squam Epithel, UA 0-5 None Seen, 0-5 lpf    Mucus, UA Moderate (!) None Seen lpf    Hyaline Casts, UA 5-10 (!) 0-5, None Seen lpf   Culture, Urine   Result Value Ref Range    Culture No Growth    Basic Metabolic Panel   Result Value Ref Range    Sodium 144 136 - 145 mmol/L    Potassium 3.4 (L) 3.5 - 5.0 mmol/L    Chloride 107 98 - 107 mmol/L    CO2 27 22 - 31 mmol/L    Anion Gap, Calculation 10 5 - 18 mmol/L    Glucose 79 70 - 125 mg/dL    Calcium 9.1 8.5 - 10.5 mg/dL    BUN 17 8 - 28 mg/dL    Creatinine 1.15 0.70 - 1.30 mg/dL    GFR MDRD Af Amer >60 >60 mL/min/1.73m2    GFR MDRD Non Af Amer >60 >60 mL/min/1.73m2   HM2(CBC w/o Differential)   Result Value Ref Range    WBC 8.5 4.0 - 11.0 thou/uL    RBC 2.81 (L) 4.40 - 6.20 mill/uL    Hemoglobin 8.1 (L) 14.0 - 18.0 g/dL    Hematocrit 25.6 (L) 40.0 - 54.0 %    MCV 91 80 - 100 fL    MCH 28.8 27.0 - 34.0 pg    MCHC 31.6 (L) 32.0 - 36.0 g/dL    RDW 16.2 (H) 11.0 - 14.5 %    Platelets 155 140 - 440 thou/uL    MPV  10.6 8.5 - 12.5 fL   INR   Result Value Ref Range    INR 1.71 (H) 0.90 - 1.10   Magnesium   Result Value Ref Range    Magnesium 2.1 1.8 - 2.6 mg/dL   Potassium   Result Value Ref Range    Potassium 3.7 3.5 - 5.0 mmol/L   INR   Result Value Ref Range    INR 1.75 (H) 0.90 - 1.10   Magnesium   Result Value Ref Range    Magnesium 2.0 1.8 - 2.6 mg/dL   Potassium - Next AM   Result Value Ref Range    Potassium 4.3 3.5 - 5.0 mmol/L   C. difficile Toxigenic by PCR   Result Value Ref Range    C.Difficile Toxigenic by PCR Negative Negative    Ribotype 027/NAP1/B1 Presumptive Negative Presumptive Negative   Culture, Stool   Result Value Ref Range    Culture       No Salmonella, Shigella, Yersinia, or Campylobacter.   EnteroHaemorrhagic E. coli Work Up   Result Value Ref Range    Shiga Toxin 1 Negative Negative    Shiga Toxin 2 Negative Negative   INR   Result Value Ref Range    INR 1.86 (H) 0.90 - 1.10   Magnesium   Result Value Ref Range    Magnesium 2.0 1.8 - 2.6 mg/dL   Comprehensive Metabolic Panel   Result Value Ref Range    Sodium 139 136 - 145 mmol/L    Potassium 4.0 3.5 - 5.0 mmol/L    Chloride 103 98 - 107 mmol/L    CO2 29 22 - 31 mmol/L    Anion Gap, Calculation 7 5 - 18 mmol/L    Glucose 94 70 - 125 mg/dL    BUN 12 8 - 28 mg/dL    Creatinine 1.05 0.70 - 1.30 mg/dL    GFR MDRD Af Amer >60 >60 mL/min/1.73m2    GFR MDRD Non Af Amer >60 >60 mL/min/1.73m2    Bilirubin, Total 0.8 0.0 - 1.0 mg/dL    Calcium 9.2 8.5 - 10.5 mg/dL    Protein, Total 6.2 6.0 - 8.0 g/dL    Albumin 3.3 (L) 3.5 - 5.0 g/dL    Alkaline Phosphatase 247 (H) 45 - 120 U/L    AST 14 0 - 40 U/L    ALT 16 0 - 45 U/L   HM1 (CBC with Diff)   Result Value Ref Range    WBC 6.8 4.0 - 11.0 thou/uL    RBC 3.19 (L) 4.40 - 6.20 mill/uL    Hemoglobin 9.3 (L) 14.0 - 18.0 g/dL    Hematocrit 28.8 (L) 40.0 - 54.0 %    MCV 90 80 - 100 fL    MCH 29.2 27.0 - 34.0 pg    MCHC 32.3 32.0 - 36.0 g/dL    RDW 15.7 (H) 11.0 - 14.5 %    Platelets 163 140 - 440 thou/uL    MPV 10.2  8.5 - 12.5 fL    Neutrophils % 71 (H) 50 - 70 %    Lymphocytes % 12 (L) 20 - 40 %    Monocytes % 13 (H) 2 - 10 %    Eosinophils % 4 0 - 6 %    Basophils % 0 0 - 2 %    Neutrophils Absolute 4.8 2.0 - 7.7 thou/uL    Lymphocytes Absolute 0.8 0.8 - 4.4 thou/uL    Monocytes Absolute 0.9 0.0 - 0.9 thou/uL    Eosinophils Absolute 0.3 0.0 - 0.4 thou/uL    Basophils Absolute 0.0 0.0 - 0.2 thou/uL   INR   Result Value Ref Range    INR 1.83 (H) 0.90 - 1.10   INR   Result Value Ref Range    INR 1.94 (H) 0.90 - 1.10       Assessment:    ICD-10-CM    1. Chronic systolic congestive heart failure (H) I50.22    2. Chronic atrial fibrillation (H) I48.2    3. Benign Essential Hypertension I10    4. Atherosclerosis of native coronary artery of native heart without angina pectoris I25.10    5. Ischemic cardiomyopathy I25.5    6. Dizziness R42    7. Lumbar stenosis with neurogenic claudication M48.062    8. Gastroesophageal reflux disease without esophagitis K21.9    9. Anemia, unspecified type D64.9        Plan:  CHF: Stable continue with Bumex 0.5 mg and potassium supplement of 20 mEq daily continue with carvedilol 37.5 mg twice daily.  Counseled patient and wife on rationale for potassium supplement paired with Bumex.  Will have staff check weights daily.  We will also check orthostatic blood pressures to assure he is stable on his feet.  We will check a BMP on Thursday to check his electrolytes.    Atrial fibrillation: Continue with carvedilol 37.5 mg twice daily and warfarin.  His INR today was 1.94.  We will have him take 5 mg of warfarin today, then 2.5 mg for the next 2 days and then will recheck his INR on Thursday, 7/25/2019.    Hypertension: Stable continue with amlodipine 5 mg daily and carvedilol 37.5 mg twice daily.    CAD: Not on any aspirin at this time unsure of rationale for that will have him follow-up with his primary provider in regards to risk to benefit for aspirin.  Continue with rosuvastatin 20 mg daily he denies  any myopathy issues.    Dizziness: Resolved    Lumbar stenosis: Pain is well managed he would like to discontinue scheduled Tylenol and so I will change this to PRN for him to take if needed.  Continue with Voltaren gel for osteoarthritis discomforts.    Continue with Prilosec 20 mg daily.    Anemia: Stable with last hemoglobin at 9.3 will check a CBC this week at this time continue with ferrous sulfate 325 mg daily and cyanocobalamin.    At this time will discontinue his melatonin as he does report he is sleeping well.    35 minutes total time spent with 20 minutes spent with patient and wife in counseling and coordination of medication uses, effects and side effects.     Electronically signed by: Kimmy Naranjo CNP

## 2021-06-19 NOTE — LETTER
Letter by Osiris Kaur RN at      Author: Osiris Kaur RN Service: -- Author Type: --    Filed:  Encounter Date: 10/2/2019 Status: Signed         Tanmay Israel  [address]  [place] [address]      October 2, 2019      Dear Mr. Israel,    RE: Remote Results    We are writing to you regarding your recent Remote ICD check from home. Your transmission was received successfully. Battery status is satisfactory at this time.     Your results are within normal limits.    Your next device appointment will be a remote check on 1/7/2020; this will occur automatically.    To schedule or reschedule, please call [phone] and press 1.    NOTE: If you would like to do an extra transmission, please call [phone] and press 3 to speak to a nurse BEFORE transmitting. This ensures that the [facility] staff is aware of the reason you are sending a transmission, and can follow-up with you after it has been reviewed.    We will be checking your implanted device from home (remotely) every three months unless otherwise instructed. We will need to see you in the clinic at least once a year. You may need to be seen in the clinic sooner depending on the results of your check.    Please be aware:    The follow-up schedule is like a Physician prescription.    Your remote monitor is paired to your specific implanted device.      Sincerely,    [facility]

## 2021-06-19 NOTE — LETTER
Letter by Massiel Medina PharmD at      Author: Massiel Medina PharmD Service: -- Author Type: --    Filed:  Encounter Date: 2019 Status: (Other)             2019      Tanmay Israel  [address]   [facility] [address]            Dear Tanmay Israel     Thank you for talking with me on 19 about your health and medications. [facility] MTM (Medication Therapy Management) program helps you understand your medications and use them safely.    Along with this letter are an action plan (Medication Action Plan) and a medication list (Personal Medication List). The action plan has steps you should take to help you get the best results from your medications. The medication list will help you keep track of your medications and how to use them the right way.       Have your action plan and medication list with you when you talk with your doctors, pharmacists, and other health care providers.    Ask your doctors, pharmacists, and other healthcare providers to update them at every visit.     Take your medication list with you if you go to the hospital or emergency room.     Give a copy of the action plan and medication list to your family or caregivers.     If you want to talk about this letter or any of the papers with it, please call [phone]. We look forward to working with you, your doctors, and other healthcare providers to help you stay healthy.    Sincerely,     Massiel Medina    _  Medication Action Plan For Tanmay Israel, : 1940     This action plan will help you get the best results from your medications if you:     1. Read What we talked about.   2. Take the steps listed in the What I need to do boxes.   3. Fill in What I did and when I did it.   4. Fill in My follow-up plan and Questions I want to ask.     Have this action plan with you when you talk with your doctors, pharmacists, and other healthcare providers in your care team. Share this with your family or caregivers too.     Date Prepared: 6/6/2019      What we talked about:  Since you are no longer taking the water pill daily (Bumex aka bumetanide), you may not need the magnesium supplement anymore.      What I need to do:  We will check your Magnesium level with your future INR.    What I did and when I did it:          What we talked about:  Being on omeprazole can increase the risk of C. Diff.      What I need to do:  Consider decreasing omeprazole to every other day.    What I did and when I did it:          What we talked about:       What I need to do:       What I did and when I did it:         My follow-up plan (add notes about next steps):            Questions I want to ask (include topics about medications or therapy):          If you have any questions about your action plan, call Massiel Medina at [phone], Monday-Friday 8:00-4:30pm  _  This medication list was made for you after we talked. We also used information from your doctors chart.      Use blank rows to add new medications. Then fill in the dates you started using them.     Cross out medications when you no longer use them. Then write the date and why you stopped using them.     Ask your doctors, pharmacists, and other healthcare providers to update this list at every visit.  Keep this list up-to-date with:  ? prescription medications  ? over the counter drugs  ? herbals  ? vitamins  ? minerals          If you go to the hospital or emergency room, take this list with you. Share this with your family or caregivers too.    Date Prepared: 6/6/2019      Allergies or side effects:       Medication: bumetanide (BUMEX) 1 MG tablet      How I use it: Take 1 tablet (1 mg total) by mouth see administration instructions. For weight gain >3lbs.      Why I use it: Ischemic Cardiomyopathy    Prescriber: NATASHA Blandon      Date I started using it:     Date I stopped using it:       Why I stopped using it:          Medication: carvedilol (COREG) 25 MG tablet       How I use it: Take 37.5 mg by mouth 2 (two) times a day with meals.      Why I use it: chronic heart failure; high blood pressure    Prescriber: Dr. Go Ramírez      Date I started using it:     Date I stopped using it:       Why I stopped using it:          Medication: cholecalciferol, vitamin D3, 1,000 unit tablet      How I use it: Take 2,000 Units by mouth daily.      Why I use it: vitamin D deficiency    Prescriber: Dr. Go Ramírez      Date I started using it:     Date I stopped using it:       Why I stopped using it:          Medication: FERROUS FUMARATE (IRON ORAL)      How I use it: Take 1 tablet by mouth daily.       Why I use it: anemia    Prescriber: Dr. Go Ramírez      Date I started using it:     Date I stopped using it:       Why I stopped using it:          Medication: fidaxomicin (DIFICID) tablet      How I use it: 200mg PO two times a day 5/23-5/27. Then 200mg PO every other day x 10 doses starting 5/29.      Why I use it: C. Difficile Diarrhea    Prescriber: Dr. Go Ramírez      Date I started using it:     Date I stopped using it:       Why I stopped using it:          Medication: multivitamin with iron (ONE DAILY WITH IRON) Tab tablet      How I use it: Take 1 tablet by mouth daily.       Why I use it: lack in vitamins    Prescriber: Dr. Go Ramírez      Date I started using it:     Date I stopped using it:       Why I stopped using it:          Medication: omeprazole (PRILOSEC) 20 MG capsule      How I use it: Take 20 mg by mouth daily before breakfast.             Why I use it:     Prescriber: Dr. Go Ramírez      Date I started using it:     Date I stopped using it:       Why I stopped using it:          Medication: oxyCODONE (ROXICODONE) 5 MG immediate release tablet      How I use it: Take 5 mg by mouth every 4 (four) hours as needed.      Why I use it:     Prescriber: Dr. Go Ramírez      Date I started using it:     Date I stopped using it:       Why I stopped using  it:          Medication: potassium chloride (K-DUR,KLOR-CON) 20 MEQ tablet      How I use it: Take 20 mEq by mouth daily.      Why I use it:     Prescriber: Dr. Go Ramírez      Date I started using it:     Date I stopped using it:       Why I stopped using it:          Medication: rosuvastatin (CRESTOR) 20 MG tablet      How I use it: Take 1 tablet (20 mg total) by mouth at bedtime.      Why I use it: Chronic Atrial Fibrillation (H); Ischemic Cardiomyopathy    Prescriber: Bryan Baker MD      Date I started using it:     Date I stopped using it:       Why I stopped using it:          Medication: warfarin (COUMADIN/JANTOVEN) 2.5 MG tablet      How I use it: Take 1.25-2.5 mg by mouth See Admin Instructions. Take 1.25 mg (half-tablet) on Wednesdays. Take 2.5 mg (1 tablet) all other days of the week.      Why I use it:     Prescriber: Dr. Go Ramírez      Date I started using it:     Date I stopped using it:       Why I stopped using it:          Medication:       How I use it:       Why I use it:     Prescriber:       Date I started using it:     Date I stopped using it:       Why I stopped using it:          Medication:       How I use it:       Why I use it:     Prescriber:       Date I started using it:     Date I stopped using it:       Why I stopped using it:          Medication:       How I use it:       Why I use it:     Prescriber:       Date I started using it:     Date I stopped using it:       Why I stopped using it:          Other Information:      If you have any questions about your medication list, call [phone]    According to the Paperwork Reduction Act of 1995, no persons are required to respond to a collection of information unless it displays a valid OMB control number. The valid B number for this information collection is 3093-6509. The time required to complete this information collection is estimated to average 37.76 minutes per response, including the time to review instructions,  searching existing data resources, gather the data needed, and complete and review the information collection. If you have any comments concerning the accuracy of the time estimate(s) or suggestions for improving this form, please write to: CMS, Attn: GILBERT Reports Clearance Officer, [address] [place] [address], [place], [place] [address].

## 2021-06-19 NOTE — LETTER
Letter by Johnson, Michael Duane, CNP at      Author: Johnson, Michael Duane, CNP Service: -- Author Type: --    Filed:  Encounter Date: 5/6/2019 Status: (Other)         Patient: Tanmay Israel   MR Number: [id number]   YOB: 1940   Date of Visit: 5/6/2019     [facility]    Facility:   [facility] [[id number]]   Code Status: FULL CODE      CHIEF COMPLAINT/REASON FOR VISIT:  Chief Complaint   Patient presents with   ? Follow Up     rehab, weak       HISTORY:      HPI: Tanmay is a 79 y.o. male who I had the chance to visit with secondary to his hospitalization April 20 through April 22, 2019 secondary to generalized weakness of unclear etiology.  He currently was doing fairly well the end of last week after adding in the probiotic as well as fiber, twice daily.  Overall had a pretty good weekend.  His shoulders are not bothering him anymore.  Does have a pacemaker.  Has been normotensive and afebrile.  Getting extra mighty shakes.  He was treated for C. difficile currently asymptomatic.  Able to use a walker his strength has improved his balance is also improved he is overall hoping to perhaps go home in another week or so he does not see neurology until June.  He does have some generalized weakness and debility along with a history of laminoplasty/cervical.  He is on warfarin being followed by the Coumadin clinic.    Past Medical History:   Diagnosis Date   ? Anemia    ? Arthritis    ? Atrial fibrillation (H)    ? Back pain    ? Callus    ? Cerebral aneurysm    ? Cervical spinal stenosis    ? Chronic kidney disease     stage 3   ? Chronic systolic congestive heart failure (H)    ? Coronary artery disease    ? Crohn's disease (H)    ? Dysphagia    ? GERD (gastroesophageal reflux disease)    ? Hyperlipidemia    ? Hypertension    ? ICD (implantable cardioverter-defibrillator) in place     [employer]   ? Ischemic cardiomyopathy    ? Kidney stone    ? Low back pain    ?  Macular degeneration    ? Permanent atrial fibrillation (H)     [id number] risk score = 5 (age3/ CAD/ HTN/ CHF) Chronic warfarin   ? Right rotator cuff tendinitis    ? Schatzki's ring    ? Shortness of breath    ? Sleep apnea     no CPAP             Family History   Problem Relation Age of Onset   ? Heart disease Mother    ? Stroke Mother    ? Crohn's disease Father    ? Alcohol abuse Brother    ? No Medical Problems Daughter    ? No Medical Problems Son    ? No Medical Problems Maternal Aunt    ? No Medical Problems Maternal Uncle    ? No Medical Problems Paternal Aunt    ? No Medical Problems Paternal Uncle    ? No Medical Problems Maternal Grandmother    ? No Medical Problems Maternal Grandfather    ? No Medical Problems Paternal Grandmother    ? No Medical Problems Paternal Grandfather    ? Cancer Brother    ? Urolithiasis Neg Hx    ? Gout Neg Hx      Social History     Socioeconomic History   ? Marital status: Single     Spouse name: Not on file   ? Number of children: Not on file   ? Years of education: Not on file   ? Highest education level: Not on file   Occupational History   ? Occupation: retired     Comment: [employer]   Social Needs   ? Financial resource strain: Not on file   ? Food insecurity:     Worry: Not on file     Inability: Not on file   ? Transportation needs:     Medical: Not on file     Non-medical: Not on file   Tobacco Use   ? Smoking status: Former Smoker   ? Smokeless tobacco: Never Used   Substance and Sexual Activity   ? Alcohol use: No   ? Drug use: No   ? Sexual activity: Not on file   Lifestyle   ? Physical activity:     Days per week: Not on file     Minutes per session: Not on file   ? Stress: Not on file   Relationships   ? Social connections:     Talks on phone: Not on file     Gets together: Not on file     Attends L[employer]-day service: Not on file     Active member of club or organization: Not on file     Attends meetings of clubs or organizations: Not on file     Relationship  status: Not on file   ? Intimate partner violence:     Fear of current or ex partner: Not on file     Emotionally abused: Not on file     Physically abused: Not on file     Forced sexual activity: Not on file   Other Topics Concern   ? Not on file   Social History Narrative   ? Not on file         Review of Systems  Currently denies chills and fever coughing wheezing chest pain dizziness or vertigo nausea vomiting diarrhea or flulike symptoms headache or rashes or sores.  History of recent laparoscopic cholecystectomy hypertension CAD ICD CKD stage III heart failure cervical laminoplasty         /  Current Outpatient Medications   Medication Sig Note   ? baclofen (LIORESAL) 10 MG tablet Take 1 tablet (10 mg total) by mouth every 8 (eight) hours as needed.    ? bumetanide (BUMEX) 1 MG tablet Take 2 mg by mouth 2 (two) times a day. 4/20/2019: Patient takes 3 tablets in the morning daily.    ? carvedilol (COREG) 25 MG tablet Take 37.5 mg by mouth 2 (two) times a day with meals.    ? cholecalciferol, vitamin D3, 1,000 unit tablet Take 2,000 Units by mouth daily.    ? FERROUS FUMARATE (IRON ORAL) Take 1 tablet by mouth daily.     ? losartan (COZAAR) 100 MG tablet Take 100 mg by mouth daily.    ? magnesium chloride (SLOW-MAG) 64 mg TbEC delayed-release tablet Take 64 mg by mouth daily.           ? mecobalamin, vitamin B12, 1,000 mcg TbDL Place 1 tablet (1,000 mcg total) under the tongue daily.    ? multivitamin with iron (ONE DAILY WITH IRON) Tab tablet Take 1 tablet by mouth daily.     ? omeprazole (PRILOSEC) 20 MG capsule Take 20 mg by mouth daily before breakfast.           ? potassium chloride (KLOR-CON) 20 mEq packet Take 20 mEq by mouth daily. 4/20/2019: Potassium chloride was prescribed on 4/19 for a 5 day course. Patient took the first dose yesterday.   ? rosuvastatin (CRESTOR) 20 MG tablet Take 1 tablet (20 mg total) by mouth at bedtime.    ? warfarin (COUMADIN) 3 MG tablet Take 1 tablet (3 mg total) by mouth See  Admin Instructions.        .There were no vitals filed for this visit.  Blood pressure 116/68 pulse 66 weight 159 pounds  Physical Exam   Constitutional: No distress.   HENT:     Neck: Neck supple. No thyromegaly present.   Cardiovascular:   Irregularity.  Pacemaker.   Pulmonary/Chest: Breath sounds normal.   Abdominal: Bowel sounds are normal. There is no rebound.   Musculoskeletal: History of right shoulder replacement. .  Generalized deconditioning.  Chronic back pain.  Neurological: He is alert.   Psychiatric: His behavior is normal.     LABS:   Lab Results   Component Value Date    WBC 7.0 04/22/2019    HGB 9.5 (L) 04/22/2019    HCT 28.8 (L) 04/22/2019    MCV 86 04/22/2019     (L) 04/22/2019     Results for orders placed or performed in visit on 05/02/19   Basic Metabolic Panel   Result Value Ref Range    Sodium 140 136 - 145 mmol/L    Potassium 4.3 3.5 - 5.0 mmol/L    Chloride 107 98 - 107 mmol/L    CO2 24 22 - 31 mmol/L    Anion Gap, Calculation 9 5 - 18 mmol/L    Glucose 85 70 - 125 mg/dL    Calcium 10.2 8.5 - 10.5 mg/dL    BUN 45 (H) 8 - 28 mg/dL    Creatinine 2.68 (H) 0.70 - 1.30 mg/dL    GFR MDRD Af Amer 28 (L) >60 mL/min/1.73m2    GFR MDRD Non Af Amer 23 (L) >60 mL/min/1.73m2           ASSESSMENT:      ICD-10-CM    1. Spondylolisthesis of lumbar region M43.16    2. Lumbar stenosis with neurogenic claudication M48.062    3. Essential hypertension I10    4. Chronic systolic heart failure (H) I50.22        PLAN:    No new changes at this time.  Is made pretty good progress he did finish his vancomycin on April 30.  He wishes to continue to work with therapy he is made pretty good progress [place] in his own eyes.  We will continue to manage and follow.      Electronically signed by: Michael Duane Johnson, CNP

## 2021-06-20 NOTE — PROGRESS NOTES
Assessment/Plan:        Diagnoses and all orders for this visit:    Calculus of ureter  -     Place sequential compression device; Standing  -     Insert and maintain IV; Standing  -     lidocaine 10 mg/mL (1 %) injection 0.1-0.3 mL; Inject 0.1-0.3 mL under the skin as needed (for IV insertion).  -     sodium chloride flush 3 mL (NS); Infuse 3 mL into a venous catheter Line Care.  -     Patient Stated Goal: Know what to expect after surgery  -     Ureteroscopy Education  -     Verify informed consent; Standing  -     Diet NPO; Standing  -     XR Abdomen AP; Standing  -     ceFAZolin 2 g in 100 mL in D5W (ANCEF); Infuse 100 mL (2 g total) into a venous catheter once.    Urinary tract stones  -     Urinalysis Macroscopic    A-fib (H)  -     INR- Future; Future; Expected date: 10/21/18      Stone Management Plan  [facility] 8/22/2018 8/29/2018 9/21/2018   Urinary Tract Infection No suspicion of infection No suspicion of infection No suspicion of infection   Renal Colic Well controlled symptoms Asymptomatic at this time Asymptomatic at this time   Renal Failure No suspicion of renal failure No suspicion of renal failure No suspicion of renal failure   Current CT date 8/20/2018 8/29/2018 9/21/2018   Right sided stones? Yes Yes Yes   R Number of ureteral stones 1 1 1   R GSD of ureteral stones 4 4 4   R Location of ureteral stone Distal Distal Distal   R Number of kidney stones  No renal stones No renal stones No renal stones   R GSD of kidney stones - - N/A   R Hydronephrosis Mild None None   R Stone Event New event Established event Established event   Diagnosis date 8/20/2018 - -   Initial location of primary symptomatic stone Distal - -   Initial GSD of primary symptomatic stone 4 - -   R MET status Initiation No progression No progression   R Current Plan MET MET Clear   MET 1 week F/U 2 week F/U -   Clear rationale - - MET failure   Left sided stones? No No No   L Stone Event No current event - No current  event             Subjective:      HPI  . Tanmay Israel is a 78 y.o.  male returning to the [facility] [place] for medical expulsive therapy follow up.     He has been on trial of passage for a non-obstructing right distal ureteral stone. He had unexpected hospitalization for ileitis and was seen by our service during that course on 9/7/18. CT imaging demonstrated no change to his right ureteral stone.    He followed up with his PCP 9/11/18 after recent hospitalization for ileitis and has upcoming appointment with Gastroenterology 9/24/18 which was expedited due to ongoing symptoms of bowel irregularity and abdominal pain. In addition, he has abnormal liver enzymes, which will require further evaluation.     He was recently experiencing right lower abdominal pain with intermittent diarrhea.. In addition, he has had diffuse lower back pain and leg pain. He is asymptomatic at present but feels unwell with malaise. He denies current symptoms of fever, chills, flank pain, nausea, vomiting, urinary frequency and dysuria.     New CT scan was personally reviewed and demonstrates persistence of non-obstructing 4 mm right UVJ stone. Stone was initially diagnosed 8/20/18. Previous inflammatory changes to terminal ileum and proximal colon resolved.    Urinalysis from today is unremarkable. Most recent INR 9/13/18 was 1.50. Last EKG was during hospitalization on 9/7/18.    PLAN    79 yo M on trial of passage for non-obstructing right UVJ calculus, no progression and asymptomatic. Recent hospitalization for acute ileitis, resolved.    It is unclear the etiology of his abdominal and back pain and intermittent diarrhea., but persistent ureteral stone could remotely be causing intermittent right sided pain. Given no progression of stone over past month, recommend proceeding with definitive stone clearance next week.    Will repeat INR on exit today.    He has failed adequate duration of medical expulsive  therapy and will proceed to the operating room 9/27/18 for ureteroscopic stone clearance. Risks and benefits were detailed of ureteroscopic stone clearance including potential issues of urinary or systemic infection, ureteral injury, inaccessible stone, incomplete stone clearance, multiple surgeries, and stent related symptoms of urgency, frequency and hematuria. Current documentation is adequate for preoperative history and physical.    Patient requested excuse be called into his place of work he was simply not feeling well.  He only works part-time today and next Monday.  He has an appointment with GI next Monday and so work excuse covers that time interval.    Patient also seen and examined by COLBY Kong   Review of systems is negative except for HPI.    Past Medical History:   Diagnosis Date     Arthritis      Atrial fibrillation (H)      Back pain      Callus      Chronic systolic congestive heart failure (H)      Coronary artery disease      Hyperlipidemia      Hypertension      Kidney stone      Permanent atrial fibrillation (H)     [id number] risk score = 5 (age2/ CAD/ HTN/ CHF) Chronic warfarin     Past Surgical History:   Procedure Laterality Date     APPENDECTOMY       CARDIAC PACEMAKER PLACEMENT  2013     CATARACT EXTRACTION W/  INTRAOCULAR LENS IMPLANT Bilateral      CHOLECYSTECTOMY       ESOPHAGOGASTRODUODENOSCOPY       DE CABG, VEIN, FOUR      Description: Coronary Artery Quadruple Venous Bypass Graft;  Recorded: 10/21/2008;  Comments: 8/2004     ROTATOR CUFF REPAIR Right      TONSILLECTOMY       Current Outpatient Prescriptions   Medication Sig Dispense Refill     acetaminophen (TYLENOL) 325 MG tablet Take 650 mg by mouth every 4 (four) hours as needed for pain. Indications: Pain       carvedilol (COREG) 25 MG tablet Take 1.5 tablets (37.5 mg total) by mouth 2 (two) times a day. 270 tablet 1     cholecalciferol, vitamin D3, 1,000 unit tablet Take 2 tablets (2,000 Units total) by  mouth daily. (Patient taking differently: Take 1,000 Units by mouth daily. )  0     diclofenac sodium (VOLTAREN) 1 % Gel Apply to painful areas over low back and sacroiliac joint once daily as needed for pain 1 Tube 3     diphenoxylate-atropine (LOMOTIL) 2.5-0.025 mg per tablet TK 2 T PO Q 6 H PRF CRAMPS/DIARRHEA  0     FERROUS FUMARATE (IRON ORAL) Take 1 tablet by mouth daily.        furosemide (LASIX) 40 MG tablet TAKE 1 TABLET(40 MG) BY MOUTH DAILY 90 tablet 2     losartan (COZAAR) 100 MG tablet Take 1 tablet (100 mg total) by mouth daily. 90 tablet 3     magnesium L-lactate (MAGTAB) 84 mg TbER Take 1 tablet (84 mg total) by mouth daily.  0     multivitamin with iron (ONE DAILY WITH IRON) Tab tablet Take 1 tablet by mouth daily.        omeprazole (PRILOSEC) 20 MG capsule TAKE 1 CAPSULE BY MOUTH TWICE DAILY (Patient taking differently: TAKE 1 CAPSULE BY MOUTH DAILY) 180 capsule 0     rosuvastatin (CRESTOR) 20 MG tablet Take 1 tablet (20 mg total) by mouth at bedtime. 90 tablet 3     warfarin (COUMADIN) 2.5 MG tablet Take 1.25-2.5 mg by mouth See Admin Instructions. Take 1.25 mg (0.5 tablet) on Wednesdays/Saturdays and 2.5 mg rest of week. Adjust dose based on INR results as directed.       No current facility-administered medications for this visit.      No Known Allergies    Social History     Social History     Marital status: Single     Spouse name: N/A     Number of children: N/A     Years of education: N/A     Occupational History     retired      [employer]     Social History Main Topics     Smoking status: Former Smoker     Smokeless tobacco: Never Used     Alcohol use No     Drug use: No     Sexual activity: Not on file     Other Topics Concern     Not on file     Social History Narrative     Family History   Problem Relation Age of Onset     Heart disease Mother      Stroke Mother      Crohn's disease Father      Alcohol abuse Brother      No Medical Problems Daughter      No Medical Problems Son      No  Medical Problems Maternal Aunt      No Medical Problems Maternal Uncle      No Medical Problems Paternal Aunt      No Medical Problems Paternal Uncle      No Medical Problems Maternal Grandmother      No Medical Problems Maternal Grandfather      No Medical Problems Paternal Grandmother      No Medical Problems Paternal Grandfather      Cancer Brother      Urolithiasis Neg Hx      Gout Neg Hx      Objective:      Physical Exam  Vitals:    09/21/18 0916   BP: 130/57   Pulse: (!) 57   Temp: 97.5  F (36.4  C)     General - well developed, well nourished, appropriate for age. Appears no distress at this time   Heart - irregularly irregular rate, no murmur appreciated  Respiratory - normal effort, clear to auscultation, good air entry without adventitious noises  Abdomen - soft, non-tender, no hepatosplenomegaly, no masses.   - no flank tenderness, no suprapubic tenderness, kidney and bladder non-palpable  MSK - normal spinal curvature. no spinal tenderness. normal gait. muscular strength intact.  Psych - oriented to time, place, and person, normal mood and affect.    Labs   Urinalysis POC (Office):  Nitrite, UA   Date Value Ref Range Status   09/21/2018 Negative Negative Final   09/07/2018 Negative Negative Final   09/04/2018 Negative Negative Final       Lab Urinalysis:  Blood, UA   Date Value Ref Range Status   09/21/2018 Negative Negative Final   09/07/2018 Negative Negative Final   09/04/2018 Negative Negative Final     Nitrite, UA   Date Value Ref Range Status   09/21/2018 Negative Negative Final   09/07/2018 Negative Negative Final   09/04/2018 Negative Negative Final     Leukocytes, UA   Date Value Ref Range Status   09/21/2018 Negative Negative Final   09/07/2018 Moderate (!) Negative Final   09/04/2018 Negative Negative Final     pH, UA   Date Value Ref Range Status   09/21/2018 5.0 5.0 - 8.0 Final   09/07/2018 5.5 4.5 - 8.0 Final   09/04/2018 5.0 5.0 - 8.0 Final    and Acute Labs   CBC   WBC   Date Value Ref  Range Status   09/11/2018 7.6 4.0 - 11.0 thou/uL Final   09/07/2018 7.1 4.0 - 11.0 thou/uL Final   09/04/2018 9.4 4.0 - 11.0 thou/uL Final   08/31/2015 7.5 4.0 - 11.0 thou/uL Final   07/27/2015 7.9 4.0 - 11.0 thou/uL Final   11/18/2014 7.7 4.0 - 11.0 thou/uL Final     Hemoglobin   Date Value Ref Range Status   09/11/2018 9.6 (L) 14.0 - 18.0 g/dL Final   09/07/2018 10.3 (L) 14.0 - 18.0 g/dL Final   09/04/2018 10.3 (L) 14.0 - 18.0 g/dL Final     Platelets   Date Value Ref Range Status   09/11/2018 198 140 - 440 thou/uL Final   09/07/2018 207 140 - 440 thou/uL Final   09/04/2018 236 140 - 440 thou/uL Final   , C Reactive Protein    CRP   Date Value Ref Range Status   09/07/2018 1.6 (H) 0.0 - 0.8 mg/dL Final   12/13/2012 5.2 (H) <0.9 mg/dL Final   12/02/2012 18.3 (H) <0.9 mg/dL Final   , Renal Panel  [facility]  Creatinine   Date Value Ref Range Status   09/11/2018 1.77 (H) 0.70 - 1.30 mg/dL Final   09/08/2018 1.56 (H) 0.70 - 1.30 mg/dL Final   09/07/2018 1.72 (H) 0.70 - 1.30 mg/dL Final     Potassium   Date Value Ref Range Status   09/11/2018 4.0 3.5 - 5.0 mmol/L Final   09/08/2018 3.5 3.5 - 5.0 mmol/L Final   09/07/2018 3.6 3.5 - 5.0 mmol/L Final     Calcium   Date Value Ref Range Status   09/11/2018 9.0 8.5 - 10.5 mg/dL Final   09/08/2018 8.8 8.5 - 10.5 mg/dL Final   09/07/2018 9.7 8.5 - 10.5 mg/dL Final    and Urine Culture    Culture   Date Value Ref Range Status   09/07/2018 Mixture of urogenital organisms  Final   08/14/2014   Final    No Salmonella, Shigella, Yersinia, Campylobacter, Shiga toxin 1and 2 producing E.coli

## 2021-06-20 NOTE — PROGRESS NOTES
Preoperative Exam    Scheduled Procedure: Colonoscopy  Surgery Date:  10/9  Surgery Location: [facility]    Surgeon:     Assessment/Plan:     Tanmay was seen today for pre-op exam, weight gain and hepatitis b.    Preoperative examination    Permanent atrial fibrillation (H)  -     INR    Dysphagia    Colitis    Chronic systolic congestive heart failure (H)    Obstructive sleep apnea    Atherosclerosis of native coronary artery of native heart without angina pectoris    Stage 3 chronic kidney disease (H)    Other orders  -     Hepatitis B Vaccine Age 20 years and above        Surgical Procedure Risk: Low (reported cardiac risk generally < 1%)  Have you had prior anesthesia?: Yes  Have you or any family members had a previous anesthesia reaction:  No  Do you or any family members have a history of a clotting or bleeding disorder?: Yes  Cardiac Risk Assessment: no increased risk for major cardiac complications    Patient approved for surgery with general or local anesthesia.    Please Note:  Patient has an implanted device.  Device Type: ICD      Device Vendor:  Unknown    Functional Status: Independent  Patient plans to recover at home with family.     Subjective:      Tanmay Israel is a 78 y.o. male who presents for a preoperative consultation.      He has a complex medical history.  Recently has been diagnosed and is being treated for colitis.  He has chronic issues with dysphasia.  He is scheduled to undergo a endoscopy with gastroenterology.  He needs preprocedure clearance.  He does have a history of chronic systolic congestive heart failure.  His most recent ejection fraction was obtained in October 2017 and was 35%.  He has been medically managed.  He was seen recently in the emergency department and his diuretic dosage was increased because of increasing shortness of breath which was believed to be secondary to his congestive heart failure with volume overload.  Patient reports improving breathing symptoms  and stable weight without significant lower extremity edema.  He denies chest pain syncope lightheadedness fevers chills abdominal pain.  His appetite has improved tremendously since he began treatment for his colitis.  His weight is noted to have increased steadily and it is believed that nutrition plays a strong role in this.  There is also consideration that he may have excess fluid as described.  He has never had significant complications associated with anesthesia or procedures.  He is anticoagulated secondary to atrial fibrillation.  He does need to obtain a hepatitis B vaccination based on negative antibody status with chronic liver disease on top of other concerns.  He denies any other symptoms of acute illness or other significant concerns.    Addendum: Spoke with patient Monday, October 8, 2018.  Patient confirms that his breathing has been fine over the weekend.  Increased diuresis has resulted in a favorable response.  There is no concern for decompensated CHF or other problems that would complicate his procedure.    All other systems reviewed and are negative, other than those listed in the HPI.    Pertinent History  Do you have difficulty breathing or chest pain after walking up a flight of stairs: He has been getting short of breath but this is improving with diuresis.  History of obstructive sleep apnea: No  Steroid use in the last 6 months: He has been initiated on steroids as part of his treatment approach for colitis.  Frequent Aspirin/NSAID use: No  Prior Blood Transfusion: No  Prior Blood Transfusion Reaction: No  If for some reason prior to, during or after the procedure, if it is medically indicated, would you be willing to have a blood transfusion?:  There is no transfusion refusal.    Current Outpatient Prescriptions   Medication Sig Dispense Refill     budesonide (ENTOCORT EC) 3 mg 24 hr capsule   0     carvedilol (COREG) 25 MG tablet Take 1.5 tablets (37.5 mg total) by mouth 2 (two) times  a day. 270 tablet 1     cholecalciferol, vitamin D3, 1,000 unit tablet Take 2 tablets (2,000 Units total) by mouth daily. (Patient taking differently: Take 1,000 Units by mouth daily. )  0     FERROUS FUMARATE (IRON ORAL) Take 1 tablet by mouth daily.        furosemide (LASIX) 20 MG tablet Take 3 tablets (60 mg total) by mouth daily for 7 days. 21 tablet 0     losartan (COZAAR) 100 MG tablet Take 1 tablet (100 mg total) by mouth daily. 90 tablet 3     magnesium L-lactate (MAGTAB) 84 mg TbER Take 1 tablet (84 mg total) by mouth daily.  0     multivitamin with iron (ONE DAILY WITH IRON) Tab tablet Take 1 tablet by mouth daily.        omeprazole (PRILOSEC) 20 MG capsule TAKE 1 CAPSULE BY MOUTH TWICE DAILY (Patient taking differently: TAKE 1 CAPSULE BY MOUTH DAILY) 180 capsule 0     warfarin (COUMADIN) 2.5 MG tablet Take 1.25-2.5 mg by mouth See Admin Instructions. Take 1.25 mg (0.5 tablet) on Wednesdays/Saturdays and 2.5 mg rest of week. Adjust dose based on INR results as directed.       warfarin (COUMADIN/JANTOVEN) 2.5 MG tablet Take 1/2 to 1 tablet (1.25 to 2.5 mg) by mouth daily. Adjust dose per INR results as instructed. 90 tablet 1     acetaminophen (TYLENOL) 325 MG tablet Take 650 mg by mouth every 4 (four) hours as needed for pain. Indications: Pain       diclofenac sodium (VOLTAREN) 1 % Gel Apply to painful areas over low back and sacroiliac joint once daily as needed for pain 1 Tube 3     diphenoxylate-atropine (LOMOTIL) 2.5-0.025 mg per tablet TK 2 T PO Q 6 H PRF CRAMPS/DIARRHEA  0     furosemide (LASIX) 40 MG tablet TAKE 1 TABLET(40 MG) BY MOUTH DAILY 90 tablet 2     omeprazole (PRILOSEC) 20 MG capsule TAKE 1 CAPSULE BY MOUTH TWICE DAILY 180 capsule 3     rosuvastatin (CRESTOR) 20 MG tablet Take 1 tablet (20 mg total) by mouth at bedtime. 90 tablet 3     No current facility-administered medications for this visit.         No Known Allergies    Patient Active Problem List   Diagnosis     Right Rotator Cuff  Tendonitis     Osteoarthritis Of The Knee     Joint Pain, Localized In The Right Shoulder     Difficulty Breathing (Dyspnea)     Esophageal Reflux     Muscle Aches, Generalized (Myalgias)     Lumbago     Obstructive sleep apnea     Sciatica     Joint Pain, Localized In The Knee     Ptosis Of Eyelid     Hyperlipidemia     Anemia     Crohn's (Granulomatous) Colitis     Benign Essential Hypertension     Coronary atherosclerosis of native coronary artery     Ischemic cardiomyopathy     Paroxysmal ventricular tachycardia (H)     Dry Nonexudative Macular Degeneration     Serum Enzyme Levels - ALT (SGPT) Elevated     Serum Enzyme Levels - Alkaline Phosphatase Elevated     Dysphagia     Diarrhea     Abdominal Pain     Presence of automatic cardioverter/defibrillator (AICD)--- Medtronic single lead     Spondylolisthesis of lumbar region     Lumbar stenosis with neurogenic claudication     Sacroiliac joint dysfunction of right side     Stage 3 chronic kidney disease (H)     Fall     Multiple fractures of cervical spine, closed, initial encounter (H)     Closed fracture of distal end of left radius, unspecified fracture morphology, initial encounter     Closed fracture of distal end of right radius, unspecified fracture morphology, initial encounter     Warfarin-induced coagulopathy (H)     Closed fracture of first thoracic vertebra, unspecified fracture morphology, initial encounter (H)     Chronic systolic congestive heart failure (H)     A-fib (H)     Anemia, unspecified type     Cerebral aneurysm     Osteoporosis     Ileitis     Calculus of ureter     Pyuria     Right lower quadrant abdominal pain       Past Medical History:   Diagnosis Date     Arthritis      Atrial fibrillation (H)      Back pain      Callus      Chronic systolic congestive heart failure (H)      Coronary artery disease      GERD (gastroesophageal reflux disease)      Hyperlipidemia      Hypertension      ICD (implantable cardioverter-defibrillator) in  place     Medtronic     Kidney stone      Permanent atrial fibrillation (H)     [id number] risk score = 5 (age3/ CAD/ HTN/ CHF) Chronic warfarin       Past Surgical History:   Procedure Laterality Date     APPENDECTOMY       CARDIAC PACEMAKER PLACEMENT  2013    ICD Medtronic     CATARACT EXTRACTION W/  INTRAOCULAR LENS IMPLANT Bilateral      CHOLECYSTECTOMY       ESOPHAGOGASTRODUODENOSCOPY       GA CABG, VEIN, FOUR      Description: Coronary Artery Quadruple Venous Bypass Graft;  Recorded: 10/21/2008;  Comments: 8/2004     ROTATOR CUFF REPAIR Right      TONSILLECTOMY         Social History     Social History     Marital status: Single     Spouse name: N/A     Number of children: N/A     Years of education: N/A     Occupational History     retired      [employer]     Social History Main Topics     Smoking status: Former Smoker     Smokeless tobacco: Never Used     Alcohol use No     Drug use: No     Sexual activity: Not on file     Other Topics Concern     Not on file     Social History Narrative       Patient Care Team:  Go Ramírez MD as PCP - General          Objective:     Vitals:    10/05/18 1056   Pulse: 81   SpO2: 99%   Weight: 195 lb (88.5 kg)         Physical Exam:      General Appearance:    Alert, cooperative, no distress, appears stated age   Eyes:   No scleral icterus or conjunctival irritation       Ears:    Normal TM's and external ear canals, both ears   Throat:   Lips, mucosa, and tongue normal; teeth and gums normal   Neck:   Supple, symmetrical, trachea midline, no adenopathy;        thyroid:  No enlargement/tenderness/nodules   Lungs:     Clear to auscultation bilaterally, respirations unlabored, no wheezes or crackles   Heart:   Irregularly irregular without murmur p   Abdomen:     Soft, non-tender     Extremities:   Extremities normal, atraumatic, no cyanosis or edema   Skin:   Skin color, texture, turgor normal, no rashes or lesions   Neurologic:   Normal strength and sensation         throughout on gross examination.       Recent Results (from the past 240 hour(s))   ECG 12 lead nursing unit performed   Result Value Ref Range    SYSTOLIC BLOOD PRESSURE  mmHg    DIASTOLIC BLOOD PRESSURE  mmHg    VENTRICULAR RATE 61 BPM    ATRIAL RATE 58 BPM    P-R INTERVAL  ms    QRS DURATION 156 ms    Q-T INTERVAL 446 ms    QTC CALCULATION (BEZET) 448 ms    P Axis  degrees    R AXIS -9 degrees    T AXIS 171 degrees    MUSE DIAGNOSIS       ** Poor data quality, interpretation may be adversely affected  Demand pacemaker; interpretation is based on intrinsic rhythm  Atrial fibrillation with premature ventricular or aberrantly conducted complexes  Left bundle branch block  Abnormal ECG  When compared with ECG of 07-SEP-2018 09:22,  Electronic demand pacing is now Present  Confirmed by PAUL VILLASENOR MD LOC:[facility] ([id number]) on 10/3/2018 3:10:38 PM     Basic metabolic panel   Result Value Ref Range    Sodium 143 136 - 145 mmol/L    Potassium 3.2 (L) 3.5 - 5.0 mmol/L    Chloride 108 (H) 98 - 107 mmol/L    CO2 26 22 - 31 mmol/L    Anion Gap, Calculation 9 5 - 18 mmol/L    Glucose 120 70 - 125 mg/dL    Calcium 9.0 8.5 - 10.5 mg/dL    BUN 18 8 - 28 mg/dL    Creatinine 0.93 0.70 - 1.30 mg/dL    GFR MDRD Af Amer >60 >60 mL/min/1.73m2    GFR MDRD Non Af Amer >60 >60 mL/min/1.73m2   BNP(B-type Natriuretic Peptide)   Result Value Ref Range     (H) 0 - 84 pg/mL   Troponin I   Result Value Ref Range    Troponin I 0.03 0.00 - 0.29 ng/mL   INR   Result Value Ref Range    INR 2.00 (H) 0.90 - 1.10   CBC   Result Value Ref Range    WBC 12.8 (H) 4.0 - 11.0 thou/uL    RBC 3.08 (L) 4.40 - 6.20 mill/uL    Hemoglobin 9.1 (L) 14.0 - 18.0 g/dL    Hematocrit 28.6 (L) 40.0 - 54.0 %    MCV 93 80 - 100 fL    MCH 29.5 27.0 - 34.0 pg    MCHC 31.8 (L) 32.0 - 36.0 g/dL    RDW 14.3 11.0 - 14.5 %    Platelets 164 140 - 440 thou/uL    MPV 9.1 8.5 - 12.5 fL   ECG 12 lead nursing unit performed   Result Value Ref Range    SYSTOLIC BLOOD PRESSURE  mmHg     DIASTOLIC BLOOD PRESSURE  mmHg    VENTRICULAR RATE 80 BPM    ATRIAL RATE 110 BPM    P-R INTERVAL  ms    QRS DURATION 150 ms    Q-T INTERVAL 420 ms    QTC CALCULATION (BEZET) 484 ms    P Axis  degrees    R AXIS 42 degrees    T AXIS 178 degrees    MUSE DIAGNOSIS       Atrial fibrillation with premature ventricular or aberrantly conducted complexes  Left bundle branch block  Abnormal ECG  When compared with ECG of 03-OCT-2018 08:38,  Electronic demand pacing is no longer Present  Confirmed by NOVA ZUNIGA MD LOC:[facility] [id number]) on 10/4/2018 3:36:54 PM     Basic Metabolic Panel   Result Value Ref Range    Sodium 143 136 - 145 mmol/L    Potassium 3.6 3.5 - 5.0 mmol/L    Chloride 107 98 - 107 mmol/L    CO2 27 22 - 31 mmol/L    Anion Gap, Calculation 9 5 - 18 mmol/L    Glucose 116 70 - 125 mg/dL    Calcium 9.1 8.5 - 10.5 mg/dL    BUN 21 8 - 28 mg/dL    Creatinine 1.07 0.70 - 1.30 mg/dL    GFR MDRD Af Amer >60 >60 mL/min/1.73m2    GFR MDRD Non Af Amer >60 >60 mL/min/1.73m2   Troponin I   Result Value Ref Range    Troponin I 0.04 0.00 - 0.29 ng/mL   BNP(B-type Natriuretic Peptide)   Result Value Ref Range     (H) 0 - 84 pg/mL   HM1 (CBC with Diff)   Result Value Ref Range    WBC 13.8 (H) 4.0 - 11.0 thou/uL    RBC 3.06 (L) 4.40 - 6.20 mill/uL    Hemoglobin 9.1 (L) 14.0 - 18.0 g/dL    Hematocrit 28.3 (L) 40.0 - 54.0 %    MCV 93 80 - 100 fL    MCH 29.7 27.0 - 34.0 pg    MCHC 32.2 32.0 - 36.0 g/dL    RDW 14.6 (H) 11.0 - 14.5 %    Platelets 170 140 - 440 thou/uL    MPV 9.4 8.5 - 12.5 fL    Neutrophils % 86 (H) 50 - 70 %    Lymphocytes % 4 (L) 20 - 40 %    Monocytes % 9 2 - 10 %    Eosinophils % 0 0 - 6 %    Basophils % 0 0 - 2 %    Neutrophils Absolute 11.8 (H) 2.0 - 7.7 thou/uL    Lymphocytes Absolute 0.6 (L) 0.8 - 4.4 thou/uL    Monocytes Absolute 1.3 (H) 0.0 - 0.9 thou/uL    Eosinophils Absolute 0.0 0.0 - 0.4 thou/uL    Basophils Absolute 0.0 0.0 - 0.2 thou/uL   INR   Result Value Ref Range    INR 1.80 (H) 0.90 - 1.10        Immunization History   Administered Date(s) Administered     DT (pediatric) 03/01/2001     Hep B, Adult 10/05/2018     Influenza [id number], 01/27/2010     Influenza high dose, seasonal 09/17/2015, 09/27/2016, 10/17/2017, 09/11/2018     Influenza, Seasonal, Inj PF IIV3 09/09/2010     Influenza, inj, historic,unspecified 10/15/2007, 10/21/2008, 09/09/2010     Influenza, seasonal,quad inj 6-35 mos 09/16/2009, 11/22/2011, 09/28/2012, 10/01/2014     Influenza,seasonal, Inj IIV3 10/21/2005, 11/28/2006, 10/15/2007, 10/21/2008, 09/16/2009, 11/22/2011, 09/28/2012, 10/01/2014     Pneumo Conj 13-V (2010&after) 10/01/2014     Pneumo Polysac 23-V 09/08/2005     Td, Adult, Absorbed 03/01/2001     Td,adult,historic,unspecified 03/01/2001     Tdap 11/09/2015     ZOSTER, LIVE 12/16/2010           Electronically signed by Go Ramírez MD 10/07/18 9:22 AM

## 2021-06-20 NOTE — PROGRESS NOTES
" Patient ID: Tanmay Israel is a 78 y.o. male.  /58  Pulse (!) 53  Ht 5' 9\" (1.753 m)  Wt 180 lb (81.6 kg)  SpO2 97%  BMI 26.58 kg/m2    Assessment/Plan:                   Diagnoses and all orders for this visit:    Elevated alkaline phosphatase level  -     Mitochondrial M2 Antibody, IgG  -     Comprehensive Metabolic Panel  -     GGT (Gamma GT)    Anemia  -     HM2(CBC w/o Differential)    Ileitis    Urinary tract infection    Adenopathy    Stage 3 chronic kidney disease    Chronic systolic congestive heart failure (H)    Permanent atrial fibrillation (H)    Other orders  -     Influenza High Dose, Seasonal 65+ yrs          DISCUSSION  The etiology of his ileitis is not clear but he is clinically improved.  I am also concerned about the persistently elevated alkaline phosphatase.  We will obtain additional labs to evaluate for the possibility of primary biliary cirrhosis.  We will also consider that there may be some more distinct liver abnormality.  Based on additional testing will decide on further course of action.  Unfortunately because he has an implanted ICD MR imaging of the liver would not be possible but we may move toward more focused CT scan to evaluate the liver.  We will also give consideration to getting him into see GI sooner.  Since his diarrhea is resolved and do not feel this warrants any further workup.  He will continue his other medications.  There is no indication that he needs any additional antibiotics or change in antibiotics in regard to the urinary tract infection with culture showing only mixture of urogenital organisms.  Subjective:     HPI    Tanmay Israel is a 78 y.o. male was hospitalized recently and diagnosed with ileitis and urinary tract infection, treated with antibiotics seen by gastroenterology and discharged home.  Currently doing well.  Leading up to that patient had been having diarrhea on a regular basis with reduced appetite.  He has lost about 12 pounds.  " Patient reports that he is feeling much better and closer to normal.  He was placed on ciprofloxacin for diagnosis of UTI.  Urine culture available at this time shows mixture of urogenital organisms.  He does not distinctly report any urinary symptoms.  I do note that I saw patient proximal he 1 year ago.  At that time his alkaline phosphatase level was elevated beyond what had been normal for him.  It was in the nearly 200 range.  On subsequent checks the level had increased.  I have not actually seen him for the past year.  I discussed with him I think we need to work this up more specifically.  During the course of his stay he did undergo an ultrasound as well as an abdominal CT.  The ultrasound did not show any significant abnormality.  The CT showed multiple findings including the terminal ileitis as noted, stable 3 x 4 mm kidney stone in the right ureter without hydronephrosis, right lower quadrant adenopathy and nonspecific heterogeneity of the right hepatic lobe.  Patient does have follow-up scheduled with gastroenterology.  He does have a history of inflammatory bowel disease as well as the cardiac conditions and chronic kidney disease.  He reports no shortness of breath chest pain.  He reports that he really does not have any distinct abdominal pain at this time.    Review of Systems  Complete review of systems is obtained.  Other than the specific considerations noted above complete review of systems is negative.        Objective:   Medications:  Current Outpatient Prescriptions   Medication Sig     acetaminophen (TYLENOL) 325 MG tablet Take 650 mg by mouth every 4 (four) hours as needed for pain. Indications: Pain     carvedilol (COREG) 25 MG tablet TAKE 1 AND 1/2 TABLETS BY MOUTH TWICE DAILY     cholecalciferol, vitamin D3, 1,000 unit tablet Take 2 tablets (2,000 Units total) by mouth daily. (Patient taking differently: Take 1,000 Units by mouth daily. )     ciprofloxacin HCl (CIPRO) 250 MG tablet Take  "1 tablet (250 mg total) by mouth 2 times a day at 6:00 am and 4:00 pm for 3 days.     diclofenac sodium (VOLTAREN) 1 % Gel Apply to painful areas over low back and sacroiliac joint once daily as needed for pain     diphenoxylate-atropine (LOMOTIL) 2.5-0.025 mg per tablet TK 2 T PO Q 6 H PRF CRAMPS/DIARRHEA     FERROUS FUMARATE (IRON ORAL) Take 1 tablet by mouth daily.      furosemide (LASIX) 40 MG tablet TAKE 1 TABLET(40 MG) BY MOUTH DAILY     losartan (COZAAR) 100 MG tablet Take 1 tablet (100 mg total) by mouth daily.     magnesium L-lactate (MAGTAB) 84 mg TbER Take 1 tablet (84 mg total) by mouth daily.     multivitamin with iron (ONE DAILY WITH IRON) Tab tablet Take 1 tablet by mouth daily.      omeprazole (PRILOSEC) 20 MG capsule TAKE 1 CAPSULE BY MOUTH TWICE DAILY (Patient taking differently: TAKE 1 CAPSULE BY MOUTH DAILY)     rosuvastatin (CRESTOR) 20 MG tablet Take 1 tablet (20 mg total) by mouth at bedtime.     warfarin (COUMADIN) 2.5 MG tablet Take 1.25-2.5 mg by mouth See Admin Instructions. Take 1.25 mg (0.5 tablet) on Wednesdays/Saturdays and 2.5 mg rest of week. Adjust dose based on INR results as directed.     Allergies:  No Known Allergies    Tobacco:   reports that he has quit smoking. He has never used smokeless tobacco.     Physical Exam      /58  Pulse (!) 53  Ht 5' 9\" (1.753 m)  Wt 180 lb (81.6 kg)  SpO2 97%  BMI 26.58 kg/m2        General: Patient alert no signs of distress, he appears much thinner than when I had previously seen him.  He is otherwise in no distress.    HEENT no scleral icterus or conjunctival irritation or lesions in the mouth or pharynx bottom lip protrudes outward neck supple no lymphadenopathy or masses    Heart: Regular rate and rhythm no murmur    Lungs: Clear no wheezes or crackles    Abdomen: Soft nondistended no significant tenderness on palpation mild tenderness noted in the right lower quadrant without rebound tenderness or guarding.    Extremities warm " without edema.

## 2021-06-20 NOTE — LETTER
Letter by Nida Roberts RDCS at      Author: Nida Roberts RDCS Service: -- Author Type: --    Filed:  Encounter Date: 1/7/2020 Status: Signed         Tanmay Israel  [address]  [facility] [address]      January 7, 2020      Dear Mr. Israel,    RE: Remote Results    We are writing to you regarding your recent Remote ICD check from home. Your transmission was received successfully. Battery status is satisfactory at this time.     Your results are showing no significant changes.    Your next device appointment will be a remote check on April 7, 2020; this will occur automatically.    To schedule or reschedule, please call [phone] and press 1.    NOTE: If you would like to do an extra transmission, please call [phone] and press 3 to speak to a nurse BEFORE transmitting. This ensures that the [facility] staff is aware of the reason you are sending a transmission, and can follow-up with you after it has been reviewed.    We will be checking your implanted device from home (remotely) every three months unless otherwise instructed. We will need to see you in the clinic at least once a year. You may need to be seen in the clinic sooner depending on the results of your check.    Please be aware:    The follow-up schedule is like a Physician prescription.    Your remote monitor is paired to your specific implanted device.      Sincerely,    [facility]

## 2021-06-20 NOTE — LETTER
Letter by Go Ramírez MD at      Author: Go Ramírez MD Service: -- Author Type: --    Filed:  Encounter Date: 9/10/2020 Status: (Other)         Tanmay Israel  [address]  [facility] [address]             September 10, 2020         Dear Mr. Israel,    Below are the results from your recent visit:    Resulted Orders   Comprehensive Metabolic Panel   Result Value Ref Range    Sodium 143 136 - 145 mmol/L    Potassium 4.2 3.5 - 5.0 mmol/L    Chloride 108 (H) 98 - 107 mmol/L    CO2 22 22 - 31 mmol/L    Anion Gap, Calculation 13 5 - 18 mmol/L    Glucose 99 70 - 125 mg/dL    BUN 23 8 - 28 mg/dL    Creatinine 1.55 (H) 0.70 - 1.30 mg/dL    GFR MDRD Af Amer 52 (L) >60 mL/min/1.73m2    GFR MDRD Non Af Amer 43 (L) >60 mL/min/1.73m2    Bilirubin, Total 0.4 0.0 - 1.0 mg/dL    Calcium 8.8 8.5 - 10.5 mg/dL    Protein, Total 6.9 6.0 - 8.0 g/dL    Albumin 3.8 3.5 - 5.0 g/dL    Alkaline Phosphatase 421 (H) 45 - 120 U/L    AST 25 0 - 40 U/L    ALT 28 0 - 45 U/L    Narrative    Fasting Glucose reference range is 70-99 mg/dL per  American Diabetes Association (ADA) guidelines.   HM1 (CBC with Diff)   Result Value Ref Range    WBC 8.0 4.0 - 11.0 thou/uL    RBC 3.70 (L) 4.40 - 6.20 mill/uL    Hemoglobin 10.6 (L) 14.0 - 18.0 g/dL    Hematocrit 32.6 (L) 40.0 - 54.0 %    MCV 88 80 - 100 fL    MCH 28.8 27.0 - 34.0 pg    MCHC 32.7 32.0 - 36.0 g/dL    RDW 13.5 11.0 - 14.5 %    Platelets 173 140 - 440 thou/uL    MPV 7.4 7.0 - 10.0 fL    Neutrophils % 70 50 - 70 %    Lymphocytes % 15 (L) 20 - 40 %    Monocytes % 8 2 - 10 %    Eosinophils % 7 (H) 0 - 6 %    Basophils % 1 0 - 2 %    Neutrophils Absolute 5.6 2.0 - 7.7 thou/uL    Lymphocytes Absolute 1.2 0.8 - 4.4 thou/uL    Monocytes Absolute 0.6 0.0 - 0.9 thou/uL    Eosinophils Absolute 0.6 (H) 0.0 - 0.4 thou/uL    Basophils Absolute 0.0 0.0 - 0.2 thou/uL   Vitamin B12   Result Value Ref Range    Vitamin B-12 >2,000 (H) 213 - 816 pg/mL   Vitamin D, Total (25-Hydroxy)   Result  Value Ref Range    Vitamin D, Total (25-Hydroxy) 68.1 30.0 - 80.0 ng/mL    Narrative    Deficiency <10.0 ng/mL  Insufficiency 10.0-29.9 ng/mL  Sufficiency 30.0-80.0 ng/mL  Toxicity (possible) >100.0 ng/mL       All of the blood test results have returned and do not show any new problems.  The abnormal kidney function, alkaline phosphatase, hemoglobin all are stable to slightly improved.  We will wait for the stool test results and I will let you know if we should take further action and when we should recheck labs.      Please call with questions or contact us using [facility].    Sincerely,        Electronically signed by Go Ramírez MD

## 2021-06-20 NOTE — PROGRESS NOTES
Assessment/Plan:        Diagnoses and all orders for this visit:    Urinary tract stones  -     Urinalysis Macroscopic  -    -   .  -     CT Abdomen Pelvis Without Oral Without IV Contrast; Future; Expected date: 8/29/18    Calculus of ureter      -     CT Abdomen Pelvis Without Oral Without IV Contrast; Future; Expected date: 8/29/18      Stone Management Plan  [facility] Stone Management 8/22/2018 8/29/2018   Urinary Tract Infection No suspicion of infection No suspicion of infection   Renal Colic Well controlled symptoms Asymptomatic at this time   Renal Failure No suspicion of renal failure No suspicion of renal failure   Current CT date 8/20/2018 8/29/2018   Right sided stones? Yes Yes   R Number of ureteral stones 1 1   R GSD of ureteral stones 4 4   R Location of ureteral stone Distal Distal   R Number of kidney stones  No renal stones No renal stones   R Hydronephrosis Mild None   R Stone Event New event Established event   Diagnosis date 8/20/2018 -   Initial location of primary symptomatic stone Distal -   Initial GSD of primary symptomatic stone 4 -   R MET status Initiation No progression   R Current Plan MET MET   MET 1 week F/U 2 week F/U   Left sided stones? No No   L Stone Event No current event -             Subjective:      HPI  Mr. Tanmay Israel is a 78 y.o.  male returning to the [facility] for follow-up of a right distal ureteral stone diagnosed 8/20/2018 measuring 4.2 x 3 mm.  Patient had history of Crohn's disease.  He been symptomatic with flank pain.  We saw him on 8/22/2018 with symptoms well controlled.  He comes today without abdominal or flank pain but having had a slight amount of discomfort several days ago but now currently totally asymptomatic without abdominal or flank pain or voiding symptoms of urgency or frequency.    UA today specific gravity 1.015 pH 5 negative blood nitrate trace leukocyte.    Personal review of CT scan without contrast obtained today  8/29/2018 demonstrates stone to be unchanged in the region of the UVJ.  Again it looks like it is almost in the bladder.  Could not definitely identify stone to be at the UVJ or in the bladder on sagittal axial or coronal views.    Head prone KUB accomplished which did not define the stone at all.    Impression right distal ureteral stone nonobstructing without symptoms versus in bladder    Plan continue straining urine follow-up 3 weeks with CT if stone has not been recovered in the interval.    (Patient had initially been scheduled for clearance this Friday with Dr. Dent.  Called patient explained my error in mistaking  date of initial finding of stone.  Surgery subsequently is canceled and appointment made as noted above.)         ROS   Review of systems is negative except for HPI.    Past Medical History:   Diagnosis Date     Arthritis      Atrial fibrillation (H)      Back pain      Callus      Chronic systolic congestive heart failure (H)      Coronary artery disease      Hyperlipidemia      Hypertension      Kidney stone      Permanent atrial fibrillation (H)     [id number] risk score = 5 (age1/ CAD/ HTN/ CHF) Chronic warfarin       Past Surgical History:   Procedure Laterality Date     APPENDECTOMY       CARDIAC PACEMAKER PLACEMENT  2013     CATARACT EXTRACTION W/  INTRAOCULAR LENS IMPLANT Bilateral      CHOLECYSTECTOMY       ESOPHAGOGASTRODUODENOSCOPY       WI CABG, VEIN, FOUR      Description: Coronary Artery Quadruple Venous Bypass Graft;  Recorded: 10/21/2008;  Comments: 8/2004     ROTATOR CUFF REPAIR Right      TONSILLECTOMY         Current Outpatient Prescriptions   Medication Sig Dispense Refill     acetaminophen (TYLENOL) 325 MG tablet Take 650 mg by mouth every 4 (four) hours as needed for pain. Indications: Pain       carvedilol (COREG) 25 MG tablet TAKE 1 AND 1/2 TABLETS BY MOUTH TWICE DAILY 270 tablet 1     cholecalciferol, vitamin D3, 1,000 unit tablet Take 2 tablets (2,000 Units total) by  mouth daily. (Patient taking differently: Take 1,000 Units by mouth daily. )  0     diclofenac sodium (VOLTAREN) 1 % Gel Apply to painful areas over low back and sacroiliac joint once daily as needed for pain 1 Tube 3     diphenoxylate-atropine (LOMOTIL) 2.5-0.025 mg per tablet TK 2 T PO Q 6 H PRF CRAMPS/DIARRHEA  0     FERROUS FUMARATE (IRON ORAL) Take 1 tablet by mouth daily.        furosemide (LASIX) 40 MG tablet TAKE 1 TABLET(40 MG) BY MOUTH DAILY 90 tablet 2     losartan (COZAAR) 100 MG tablet Take 1 tablet (100 mg total) by mouth daily. 90 tablet 3     losartan (COZAAR) 50 MG tablet Take 2 tablets (100 mg total) by mouth daily. 180 tablet 2     multivitamin with iron (ONE DAILY WITH IRON) Tab tablet Take 1 tablet by mouth daily.        omeprazole (PRILOSEC) 20 MG capsule TAKE 1 CAPSULE BY MOUTH TWICE DAILY (Patient taking differently: TAKE 1 CAPSULE BY MOUTH DAILY) 180 capsule 0     rosuvastatin (CRESTOR) 20 MG tablet Take 1 tablet (20 mg total) by mouth at bedtime. 90 tablet 3     warfarin (COUMADIN) 2.5 MG tablet TAKE 1 AND 1/2 TABLETS BY MOUTH ON THURSDAYS; TAKE 1 TABLET ALL OTHER DAYS OR AS DIRECTED. 60 tablet 6     warfarin (COUMADIN) 2.5 MG tablet Take 1 to 1.5 tablets (2.5 to 3.75 mg) by mouth daily. Adjust dose per INR results as instructed. 100 tablet 1     Current Facility-Administered Medications   Medication Dose Route Frequency Provider Last Rate Last Dose     denosumab 60 mg (PROLIA 60 mg/ml)  60 mg Subcutaneous Q6 Months Israel Barros MD   60 mg at 06/19/17 1301       No Known Allergies    Social History     Social History     Marital status: Single     Spouse name: N/A     Number of children: N/A     Years of education: N/A     Occupational History     retired      [employer]     Social History Main Topics     Smoking status: Former Smoker     Smokeless tobacco: Never Used     Alcohol use No     Drug use: No     Sexual activity: Not on file     Other Topics Concern     Not on file     Social  History Narrative       Family History   Problem Relation Age of Onset     Heart disease Mother      Stroke Mother      Crohn's disease Father      Alcohol abuse Brother      No Medical Problems Daughter      No Medical Problems Son      No Medical Problems Maternal Aunt      No Medical Problems Maternal Uncle      No Medical Problems Paternal Aunt      No Medical Problems Paternal Uncle      No Medical Problems Maternal Grandmother      No Medical Problems Maternal Grandfather      No Medical Problems Paternal Grandmother      No Medical Problems Paternal Grandfather      Cancer Brother      Urolithiasis Neg Hx      Gout Neg Hx      Objective:      Physical Exam  Vitals:    08/29/18 1134   BP: 116/57   Pulse: 61   Temp: 98  F (36.7  C)     General - well developed, well nourished, appropriate for age. Appears no distress at this time  Abdomen - slender soft, non-tender, no hepatosplenomegaly, no masses.   - no flank tenderness, no suprapubic tenderness, kidney and bladder non-palpable  MSK - normal spinal curvature. no spinal tenderness. normal gait. muscular strength intact.  Psych - oriented to time, place, and person, normal mood and affect.      Labs   Urinalysis POC (Office):  Nitrite, UA   Date Value Ref Range Status   08/29/2018 Negative Negative Final   08/22/2018 Negative Negative Final   01/31/2013 Negative (Negative) Final       Lab Urinalysis:  Blood, UA   Date Value Ref Range Status   08/29/2018 Negative Negative Final   08/22/2018 Negative Negative Final   01/31/2013 Negative (Negative) Final     Nitrite, UA   Date Value Ref Range Status   08/29/2018 Negative Negative Final   08/22/2018 Negative Negative Final   01/31/2013 Negative (Negative) Final     Leukocytes, UA   Date Value Ref Range Status   08/29/2018 Trace (!) Negative Final   08/22/2018 Negative Negative Final   01/31/2013 Negative (Negative) Final     pH, UA   Date Value Ref Range Status   08/29/2018 5.0 5.0 - 8.0 Final   08/22/2018 5.0  5.0 - 8.0 Final   01/31/2013 8.5 (H) 4.5 - 8.0 Final

## 2021-06-20 NOTE — LETTER
Letter by Leyla Rich RN at      Author: Leyla Rich RN Service: -- Author Type: --    Filed:  Encounter Date: 4/7/2020 Status: (Other)         Tanmay Israel  [address]  [facility] [address]      April 7, 2020      Dear Mr. Israel,    RE: Remote Results    We are writing to you regarding your recent Remote ICD check from home. Your transmission was received successfully. Battery status is satisfactory at this time.     Your results are within normal limits.    Your next device appointment will be a clinic visit.  Please call in May 2020 to schedule and appointment with the Device Registered Nurse and Dr. Baker in July 2020.    To schedule or reschedule, please call [phone] and press 1.    NOTE: If you would like to do an extra transmission, please call [phone] and press 3 to speak to a nurse BEFORE transmitting. This ensures that the Device Clinic staff is aware of the reason you are sending a transmission, and can follow-up with you after it has been reviewed.    We will be checking your implanted device from home (remotely) every three months unless otherwise instructed. We will need to see you in the clinic at least once a year. You may need to be seen in the clinic sooner depending on the results of your check.    Please be aware:    The follow-up schedule is like a Physician prescription.    Your remote monitor is paired to your specific implanted device.      Sincerely,    [facility]

## 2021-06-20 NOTE — PROGRESS NOTES
TCM DISCHARGE FOLLOW UP CALL    Discharge Date:  9/9/2018  Reason for hospital stay (discharge diagnosis)::  Ileitis, ureteral stone  Are you feeling better, the same or worse since your discharge?:  Patient is feeling better  Do you feel like you have a plan in the event of a health emergency?: No    (RN) Patient provided with information to call us in the event of a health emergency: Yes (Informed pt of nurse triage availability and WIC)    As part of your discharge plan, were  home care services ordered for you?: No    Did you receive any new medications, or was there a change to your medications?: Yes    Are you taking those medications, or do you have any established regiment?:  Is taking Cipro daily. He had some misgivings about taking it based on literature from pharmacy. Pt will continue to take it. He didn't like the idea of taking omeprazole two times a day. Instructed pt on rationale for increased dose. He will discuss with PCP. Pt hasn't bought magnesium yet but will today.  Do you have any follow up visits scheduled with your PCP or Specialist?:  Yes, with PCP  (RN) Is PCP appt scheduled soon enough (within 14 days of discharge date)?: Yes

## 2021-06-20 NOTE — PROGRESS NOTES
Assessment/Plan:     Patient presents to clinic with 1 week of nightly diarrheal episodes that resolved with Imodium, lightheadedness, and left-sided chest wall pain.  All the patient's chest wall pain could be from laying down on that hard surface causing a bone bruise or his regular bruise, I was also concerned about CVA tenderness in a patient with known kidney stones and felt a urinalysis was appropriate despite having had one fairly recently.  Patient also has chronic kidney disease, was taking double doses of Tylenol, and is currently having gastrointestinal concerns, I felt that a CMP was warranted.  The patient has no serious signs of infection but I felt that a CBC could be indicative of an underlying anemia given his fatigue and lightheadedness.  Patient's physical exam was essentially benign, no lightheadedness experience when transferring from the chair to the table, no significant findings aside from his known anisocoria and his chest wall tenderness.  He also endorses feeling better today, likely because he did not interrupt his sleep in order to have diarrheal episodes last night.  Although I have low suspicion that the Tylenol diphenhydramine is the culprit, recommended the patient again abstain from this medication as he noticed an improvement in symptoms when he did so last night.  Recommended a bland diet.  Encouraged copious hydration to help with blood pressure concerns.  Recommended follow-up with a gastroenterologist which had previously been scheduled.  Discussed return precautions.    Problem List Items Addressed This Visit     Diarrhea    Relevant Orders    Urinalysis-UC if Indicated    HM1(CBC and Differential)    Comprehensive Metabolic Panel    HM1 (CBC with Diff)      Other Visit Diagnoses     Lightheadedness    -  Primary    Relevant Orders    Urinalysis-UC if Indicated    HM1(CBC and Differential)    Comprehensive Metabolic Panel    HM1 (CBC with Diff)    CVA tenderness         "Relevant Orders    Urinalysis-UC if Indicated          Return to clinic PRN.    Total time spent with patient was 20 minutes with greater than 50% spent in face-to-face counseling regarding the above plan.    Subjective:      Tanmay Israel is a 78 y.o. male who presents to clinic for dizziness, malaise, and an inability to eat for 4 days.    Patient has been feeling poorly for 1 week.  He endorses being seen by his kidney doctor a week ago for the 2 kidney stones which are not causing him any pain.  He was told that he needed to have these removed surgically.  For the last week he has not been eating as he is not hungry.  When he attempts to eat he feels nauseated.  He will eat one quarter of a muffin every day.  He has diarrhea 3-4 times every day.  It only occurs at night.  He notices it almost immediately after taking his Tylenol PM.  He also takes regular Tylenol at the same time.  He also recently increased his statin medication which he also takes at night.  The diarrhea is alleviated by Imodium.    Patient had imaging done when he was seen by his kidney doctor where he had to lay on a metal table.  Since that time he has had left sided rib pain and wonders if it could be a fractured a rib.    Patient has been previously referred to gastroenterologist for both colonoscopy and an endoscopy but has not yet scheduled this.    Patient also endorses lightheadedness that is worse with standing but he has not fallen.  He is compensating by \"hanging onto stuff\" when he arises.    Overall today, he feels slightly better.    Past Medical History, Family History, and Social History reviewed.  Patient has a history notable for atrial fibrillation with paroxysmal ventricular tachycardia, chronic systolic congestive heart failure, coronary artery disease, hypertension, stage III chronic kidney disease, cerebral aneurysm and is status post CABG and pacemaker placement.    Current Outpatient Prescriptions on File Prior to " Visit   Medication Sig Dispense Refill     acetaminophen (TYLENOL) 325 MG tablet Take 650 mg by mouth every 4 (four) hours as needed for pain. Indications: Pain       carvedilol (COREG) 25 MG tablet TAKE 1 AND 1/2 TABLETS BY MOUTH TWICE DAILY 270 tablet 1     cholecalciferol, vitamin D3, 1,000 unit tablet Take 2 tablets (2,000 Units total) by mouth daily. (Patient taking differently: Take 1,000 Units by mouth daily. )  0     diclofenac sodium (VOLTAREN) 1 % Gel Apply to painful areas over low back and sacroiliac joint once daily as needed for pain 1 Tube 3     diphenoxylate-atropine (LOMOTIL) 2.5-0.025 mg per tablet TK 2 T PO Q 6 H PRF CRAMPS/DIARRHEA  0     FERROUS FUMARATE (IRON ORAL) Take 1 tablet by mouth daily.        furosemide (LASIX) 40 MG tablet TAKE 1 TABLET(40 MG) BY MOUTH DAILY 90 tablet 2     losartan (COZAAR) 100 MG tablet Take 1 tablet (100 mg total) by mouth daily. 90 tablet 3     losartan (COZAAR) 50 MG tablet Take 2 tablets (100 mg total) by mouth daily. 180 tablet 2     multivitamin with iron (ONE DAILY WITH IRON) Tab tablet Take 1 tablet by mouth daily.        omeprazole (PRILOSEC) 20 MG capsule TAKE 1 CAPSULE BY MOUTH TWICE DAILY (Patient taking differently: TAKE 1 CAPSULE BY MOUTH DAILY) 180 capsule 0     rosuvastatin (CRESTOR) 20 MG tablet Take 1 tablet (20 mg total) by mouth at bedtime. 90 tablet 3     warfarin (COUMADIN) 2.5 MG tablet TAKE 1 AND 1/2 TABLETS BY MOUTH ON THURSDAYS; TAKE 1 TABLET ALL OTHER DAYS OR AS DIRECTED. 60 tablet 6     warfarin (COUMADIN) 2.5 MG tablet Take 1 to 1.5 tablets (2.5 to 3.75 mg) by mouth daily. Adjust dose per INR results as instructed. 100 tablet 1     Current Facility-Administered Medications on File Prior to Visit   Medication Dose Route Frequency Provider Last Rate Last Dose     denosumab 60 mg (PROLIA 60 mg/ml)  60 mg Subcutaneous Q6 Months Israel Barros MD   60 mg at 06/19/17 1301       Review of systems is as stated in HPI.  Endorses: weight  "changes, weakness, fatigue, SOB  The remainder of the 10 system review is otherwise negative.    Objective:     /72  Pulse 63  Ht 5' 9\" (1.753 m)  Wt 182 lb (82.6 kg)  SpO2 98%  BMI 26.88 kg/m2 Body mass index is 26.88 kg/(m^2).    Gen: Alert, NAD, appears stated age, normal hygiene   Eyes: conjunctivae without injection, sclera clear, EOMI; anisocoria present  ENT/mouth: nares clear, septum midline, absent rhinorrhea, absent pharyngeal injection, neck is supple, no thyroid enlargement  CV: distant heart sounds, pedal edema absent bilaterally  Resp: CTAB, no wheezes, rales or ronchi  ABD: non-tender to palpation, nondistended  MSK: grossly full range of motion in all joints, no obvious deformity, left CVA tenderness present  Neuro: CN II-XII grossly intact, no deficits in coordination  Psych: no apparent hallucinations or delusions, no pressured speech; alert, oriented x3  SKIN: dry and without lesions  Heme/lymph: no pallor, no active bleeding/bruising, submandibular adenopathy appreciated    This note has been dictated using voice recognition software. Any grammatical or context distortions are unintentional and inherent to the the software.     Giovana Le MD      "

## 2021-06-20 NOTE — LETTER
Letter by Go Ramírez MD at      Author: Go Ramírez MD Service: -- Author Type: --    Filed:  Encounter Date: 5/26/2020 Status: (Other)         Tanmay Israel  [address]  [facility] [address]             May 26, 2020         Dear Mr. Israel,    Below are the results from your recent visit:    Resulted Orders   Comprehensive Metabolic Panel   Result Value Ref Range    Sodium 142 136 - 145 mmol/L    Potassium 4.4 3.5 - 5.0 mmol/L    Chloride 107 98 - 107 mmol/L    CO2 22 22 - 31 mmol/L    Anion Gap, Calculation 13 5 - 18 mmol/L    Glucose 104 70 - 125 mg/dL    BUN 22 8 - 28 mg/dL    Creatinine 1.67 (H) 0.70 - 1.30 mg/dL    GFR MDRD Af Amer 48 (L) >60 mL/min/1.73m2    GFR MDRD Non Af Amer 40 (L) >60 mL/min/1.73m2    Bilirubin, Total 0.7 0.0 - 1.0 mg/dL    Calcium 8.8 8.5 - 10.5 mg/dL    Protein, Total 6.9 6.0 - 8.0 g/dL    Albumin 3.3 (L) 3.5 - 5.0 g/dL    Alkaline Phosphatase 438 (H) 45 - 120 U/L    AST 25 0 - 40 U/L    ALT 23 0 - 45 U/L    Narrative    Fasting Glucose reference range is 70-99 mg/dL per  American Diabetes Association (ADA) guidelines.   Lipid [place] FASTING   Result Value Ref Range    Cholesterol 99 <=199 mg/dL    Triglycerides 81 <=149 mg/dL    HDL Cholesterol 36 (L) >=40 mg/dL    LDL Calculated 47 <=129 mg/dL    Patient Fasting > 8hrs? No    HM1 (CBC with Diff)   Result Value Ref Range    WBC 8.0 4.0 - 11.0 thou/uL    RBC 3.04 (L) 4.40 - 6.20 mill/uL    Hemoglobin 9.1 (L) 14.0 - 18.0 g/dL    Hematocrit 26.3 (L) 40.0 - 54.0 %    MCV 86 80 - 100 fL    MCH 29.8 27.0 - 34.0 pg    MCHC 34.6 32.0 - 36.0 g/dL    RDW 15.6 (H) 11.0 - 14.5 %    Platelets 187 140 - 440 thou/uL    MPV 6.3 (L) 7.0 - 10.0 fL    Neutrophils % 75 (H) 50 - 70 %    Lymphocytes % 10 (L) 20 - 40 %    Monocytes % 10 2 - 10 %    Eosinophils % 5 0 - 6 %    Basophils % 1 0 - 2 %    Neutrophils Absolute 6.0 2.0 - 7.7 thou/uL    Lymphocytes Absolute 0.8 0.8 - 4.4 thou/uL    Monocytes Absolute 0.8 0.0 - 0.9 thou/uL     Eosinophils Absolute 0.4 0.0 - 0.4 thou/uL    Basophils Absolute 0.0 0.0 - 0.2 thou/uL       Overall the labs are stable.  His hemoglobin (red blood cell count) remains low but has not changed significantly in comparison to other recent numbers.  The alkaline phosphatase level remains elevated.  I am unsure of the significance of this lab test abnormality.  He has spoken with gastroenterology in the past regarding this test result.  There is nothing that we need to do different right now.  I would like to schedule him for an appointment to discuss things further with me sometime in July.  Have a nice vacation,  [place]    Please call with questions or contact us using [facility].    Sincerely,        Electronically signed by Go Ramírez MD

## 2021-06-20 NOTE — PROGRESS NOTES
Assessment/Plan:        Diagnoses and all orders for this visit:    Urinary tract stones  -     Urinalysis Macroscopic  -     Symptom Control While Passing a Stone Education  -     CT Abdomen Pelvis Without Oral Without IV Contrast; Future; Expected date: 8/29/18  -     Stone Analysis - Standing; Standing    Calculus of ureter  -     Symptom Control While Passing a Stone Education  -     CT Abdomen Pelvis Without Oral Without IV Contrast; Future; Expected date: 8/29/18  -     Stone Analysis - Standing; Standing      Stone Management Plan  [facility] 8/22/2018   Urinary Tract Infection No suspicion of infection   Renal Colic Well controlled symptoms   Renal Failure No suspicion of renal failure   Current CT date 8/20/2018   Right sided stones? Yes   R Number of ureteral stones 1   R GSD of ureteral stones 4   R Location of ureteral stone Distal   R Number of kidney stones  No renal stones   R Hydronephrosis Mild   R Stone Event New event   Diagnosis date 8/20/2018   Initial location of primary symptomatic stone Distal   Initial GSD of primary symptomatic stone 4   R MET status Initiation   R Current Plan MET   MET 1 week F/U   Left sided stones? No   L Stone Event No current event             Subjective:      HPI  Mr. Tanmay Israel is a 78 y.o.  male presenting to the [facility] for evaluation of a right distal ureteral stone.  Patient had onset of abdominal pain 8/20/2018.  He had a history of Crohn's disease and intermittent diarrhea.  He had been stable using Imodium for the diarrhea.  Because of the pain he presented to Senia Vallejo CNP in his primary care office.  Because of his history of Crohn's disease in the abdominal discomfort a CT scan was obtained.    Personal review of CT scan with IV and oral contrast demonstrated a 4.2 x 3 mm stone at the right UVJ.  There are no other stones seen.  There is mild hydroureteronephrosis on the right.    Lab included white blood  count 11,800 hemoglobin 9.9 sodium 140 potassium 3.9 calcium 8.8 creatinine 1.53.    Patient was notified regarding the stone and [facility] notified.  Initial contact of patient by [facility] resulted in patient not desiring to make an appointment because he felt fine as of yesterday.  Subsequently the appointment was made .  Patient presents today with UA specific gravity 1.015 pH 5- for blood nitrate and leukocytes.    He continues to have a poor appetite but no pain.  He continues to have intermittent diarrhea associated with his Crohn's    This is patient's first stone and he has no family history of stones.    Impression right distal ureteral stone, known Crohn's disease    Plan trial of passage, patient instructed on use of acetaminophen and ibuprofen for his symptoms of pain warned him about possible urgency and frequency patient will strain urine.  Also pointed out that he may well have already passed the stone.    Patient will follow-up one week with CT scan or as needed in the interval.         ROS   Review of Systems  A 12 point comprehensive review of systems is negative except for HPI    Past Medical History:   Diagnosis Date     Arthritis      Atrial fibrillation (H)      Back pain      Callus      Chronic systolic congestive heart failure (H)      Coronary artery disease      Hyperlipidemia      Hypertension      Permanent atrial fibrillation (H)     [id number] risk score = 5 (age3/ CAD/ HTN/ CHF) Chronic warfarin       Past Surgical History:   Procedure Laterality Date     APPENDECTOMY       CARDIAC PACEMAKER PLACEMENT  2013     CATARACT EXTRACTION W/  INTRAOCULAR LENS IMPLANT Bilateral      CHOLECYSTECTOMY       ESOPHAGOGASTRODUODENOSCOPY       NE CABG, VEIN, FOUR      Description: Coronary Artery Quadruple Venous Bypass Graft;  Recorded: 10/21/2008;  Comments: 8/2004     ROTATOR CUFF REPAIR Right      TONSILLECTOMY         Current Outpatient Prescriptions   Medication Sig Dispense Refill     acetaminophen  (TYLENOL) 325 MG tablet Take 650 mg by mouth every 4 (four) hours as needed for pain. Indications: Pain       carvedilol (COREG) 25 MG tablet TAKE 1 AND 1/2 TABLETS BY MOUTH TWICE DAILY 270 tablet 1     cholecalciferol, vitamin D3, 1,000 unit tablet Take 2 tablets (2,000 Units total) by mouth daily. (Patient taking differently: Take 1,000 Units by mouth daily. )  0     diclofenac sodium (VOLTAREN) 1 % Gel Apply to painful areas over low back and sacroiliac joint once daily as needed for pain 1 Tube 3     diphenoxylate-atropine (LOMOTIL) 2.5-0.025 mg per tablet TK 2 T PO Q 6 H PRF CRAMPS/DIARRHEA  0     FERROUS FUMARATE (IRON ORAL) Take 1 tablet by mouth daily.        furosemide (LASIX) 40 MG tablet TAKE 1 TABLET(40 MG) BY MOUTH DAILY 90 tablet 2     losartan (COZAAR) 100 MG tablet Take 1 tablet (100 mg total) by mouth daily. 90 tablet 3     multivitamin with iron (ONE DAILY WITH IRON) Tab tablet Take 1 tablet by mouth daily.        omeprazole (PRILOSEC) 20 MG capsule TAKE 1 CAPSULE BY MOUTH TWICE DAILY (Patient taking differently: TAKE 1 CAPSULE BY MOUTH DAILY) 180 capsule 0     rosuvastatin (CRESTOR) 20 MG tablet Take 1 tablet (20 mg total) by mouth at bedtime. 90 tablet 3     warfarin (COUMADIN) 2.5 MG tablet TAKE 1 AND 1/2 TABLETS BY MOUTH ON THURSDAYS; TAKE 1 TABLET ALL OTHER DAYS OR AS DIRECTED. 60 tablet 6     warfarin (COUMADIN) 2.5 MG tablet Take 1 to 1.5 tablets (2.5 to 3.75 mg) by mouth daily. Adjust dose per INR results as instructed. 100 tablet 1     Current Facility-Administered Medications   Medication Dose Route Frequency Provider Last Rate Last Dose     denosumab 60 mg (PROLIA 60 mg/ml)  60 mg Subcutaneous Q6 Months Israel Barros MD   60 mg at 06/19/17 1301       No Known Allergies    Social History     Social History     Marital status: Single     Spouse name: N/A     Number of children: N/A     Years of education: N/A     Occupational History     retired      [employer]     Social History Main  Topics     Smoking status: Former Smoker     Smokeless tobacco: Never Used     Alcohol use No     Drug use: No     Sexual activity: Not on file     Other Topics Concern     Not on file     Social History Narrative       Family History   Problem Relation Age of Onset     Heart disease Mother      Stroke Mother      Crohn's disease Father      Alcohol abuse Brother      No Medical Problems Daughter      No Medical Problems Son      No Medical Problems Maternal Aunt      No Medical Problems Maternal Uncle      No Medical Problems Paternal Aunt      No Medical Problems Paternal Uncle      No Medical Problems Maternal Grandmother      No Medical Problems Maternal Grandfather      No Medical Problems Paternal Grandmother      No Medical Problems Paternal Grandfather      Cancer Brother        Objective:      Physical Exam  There were no vitals filed for this visit.  General - well developed, well nourished, appropriate for age. Appears no distress at this time   Heart - regular rate and rhythm, no murmur  Respiratory - normal effort, clear to auscultation, good air entry without adventitious noises  Abdomen - slender soft, non-tender, no hepatosplenomegaly, no masses.   - no flank tenderness, no suprapubic tenderness, kidney and bladder non-palpable  MSK - normal spinal curvature. no spinal tenderness. normal gait. muscular strength intact.  Neurology - cranial nerves II-XII grossly intact, normal sensation, no unsteadiness  Skin - intact, no bruising, no gouty tophi  Psych - oriented to time, place, and person, normal mood and affect.      Labs  Urinalysis POC (Office):  Nitrite, UA   Date Value Ref Range Status   08/22/2018 Negative Negative Final   01/31/2013 Negative (Negative) Final   12/28/2012 Negative (Negative) Final       Lab Urinalysis:  Blood, UA   Date Value Ref Range Status   08/22/2018 Negative Negative Final   01/31/2013 Negative (Negative) Final   12/28/2012 Negative (Negative) Final     Nitrite, UA    Date Value Ref Range Status   08/22/2018 Negative Negative Final   01/31/2013 Negative (Negative) Final   12/28/2012 Negative (Negative) Final     Leukocytes, UA   Date Value Ref Range Status   08/22/2018 Negative Negative Final   01/31/2013 Negative (Negative) Final   12/28/2012 Negative (Negative) Final     pH, UA   Date Value Ref Range Status   08/22/2018 5.0 5.0 - 8.0 Final   01/31/2013 8.5 (H) 4.5 - 8.0 Final   12/28/2012 5.0 4.5 - 8.0 Final    and Acute Labs   Renal Panel  [facility]  Creatinine   Date Value Ref Range Status   08/20/2018 1.53 (H) 0.70 - 1.30 mg/dL Final   05/16/2018 1.51 (H) 0.70 - 1.30 mg/dL Final   10/17/2017 1.01 0.70 - 1.30 mg/dL Final     Potassium   Date Value Ref Range Status   08/20/2018 3.9 3.5 - 5.0 mmol/L Final   05/16/2018 4.2 3.5 - 5.0 mmol/L Final   10/17/2017 4.3 3.5 - 5.0 mmol/L Final     Calcium   Date Value Ref Range Status   08/20/2018 8.8 8.5 - 10.5 mg/dL Final   05/16/2018 9.2 8.5 - 10.5 mg/dL Final   10/17/2017 8.9 8.5 - 10.5 mg/dL Final

## 2021-06-21 ENCOUNTER — COMMUNICATION - HEALTHEAST (OUTPATIENT)
Dept: FAMILY MEDICINE | Facility: CLINIC | Age: 81
End: 2021-06-21

## 2021-06-21 NOTE — PROGRESS NOTES
Patient ID: Tanmay Israel is a 78 y.o. male.  /58   Pulse (!) 55   Wt 191 lb (86.6 kg)   SpO2 99%   BMI 28.21 kg/m      Assessment/Plan:                   Diagnoses and all orders for this visit:    ANA (acute kidney injury) (H)  -     Urinalysis    Nephrolithiasis  -     Urinalysis    Permanent atrial fibrillation (H)    Chronic systolic congestive heart failure (H)    Stage 3 chronic kidney disease (H)    Crohn's disease with complication, unspecified gastrointestinal tract location (H)    Diarrhea, unspecified type    Hyperkalemia    Other orders  -     ondansetron (ZOFRAN) 8 MG tablet; Take 1 tablet (8 mg total) by mouth every 8 (eight) hours as needed for nausea.  Dispense: 30 tablet; Refill: 1      DISCUSSION  Urine test does not suggest that there would be any kidney stone that has become dislodged causing hematuria or potentially acute kidney injury from obstruction.  I do not think he needs any additional testing right now given the otherwise absence of symptoms.  His acute kidney injury on top of chronic kidney disease is likely secondary to dehydration as discussed below.  Appropriate medication changes have been made and his diarrhea seems to have improved.  Patient will follow up if there are more immediate concerns on a more basis otherwise I will see him in a week and we will assist and decide on further course of action.  Subjective:     HPI    Tanmay Israel is a 78 y.o. male with a complex medical history here today to follow-up after a recent visit with his cardiology team yesterday.  He is noted to have acute kidney injury on top of chronic kidney disease.  He has hyperkalemia.  He complained of diarrhea and has an underlying history of Crohn's disease with a significant flareup this past year.  He also has a known kidney stone.  It had been stable in the ureteropelvic junction on the right side not causing hydronephrosis or any other signs of obstruction 1 last assessed back in  September.  There was consideration to move toward having this extracted but because of the development of other health issues this did not occur.  He is not noting any hematuria.  He does not feel lightheaded dizzy or particularly weak.  Appropriate medication adjustments were made by his cardiology team to compensate for the abnormality seen on recent lab testing.  Likely his acute kidney injury is secondary to dehydration from a combination of ongoing diuretic use with Bumex and diarrhea.  On top of this he is on an ARB.  Appropriate reductions in the diuretic and ARB have been made based on the results obtained.  Discussed this in depth with patient.  He assures me the diarrhea is improved.  He states he had a few bouts yesterday and again last night but is not having any ongoing diarrhea today.  He denies abdominal pain.  He is anticoagulated.  He is not noting blood in the urine dysuria frequency or any change in urinary symptoms.    Review of Systems  Complete review of systems is obtained.  Other than the specific considerations noted above complete review of systems is negative.          Objective:   Medications:  Current Outpatient Medications   Medication Sig     acetaminophen (TYLENOL) 325 MG tablet Take 650 mg by mouth every 4 (four) hours as needed for pain. Indications: Pain     bumetanide (BUMEX) 1 MG tablet Take 0.5 tablets (0.5 mg total) by mouth every other day.     carvedilol (COREG) 25 MG tablet Take 1.5 tablets (37.5 mg total) by mouth 2 (two) times a day.     cholecalciferol, vitamin D3, 1,000 unit tablet Take 2,000 Units by mouth daily.     diphenoxylate-atropine (LOMOTIL) 2.5-0.025 mg per tablet Take 1 tablet by mouth 2 (two) times a day as needed.     FERROUS FUMARATE (IRON ORAL) Take 1 tablet by mouth daily.      losartan (COZAAR) 50 MG tablet Take 1 tablet (50 mg total) by mouth daily.     lovastatin (MEVACOR) 40 MG tablet Take 1 tablet by mouth daily.     magnesium L-lactate (MAGTAB) 84  mg TbER Take 1 tablet (84 mg total) by mouth daily.     multivitamin with iron (ONE DAILY WITH IRON) Tab tablet Take 1 tablet by mouth daily.      omeprazole (PRILOSEC) 20 MG capsule Take 20 mg by mouth 2 (two) times a day.     potassium chloride (KLOR-CON) 10 MEQ CR tablet Take 2 tablets (20 mEq total) by mouth 2 (two) times a day.     rosuvastatin (CRESTOR) 20 MG tablet Take 1 tablet (20 mg total) by mouth at bedtime.     warfarin (COUMADIN) 2.5 MG tablet Take 1.25-2.5 mg by mouth See Admin Instructions. Take 1.25 mg (0.5 tablet) on Wednesdays/Saturdays and 2.5 mg rest of week. Adjust dose based on INR results as directed.     ondansetron (ZOFRAN) 8 MG tablet Take 1 tablet (8 mg total) by mouth every 8 (eight) hours as needed for nausea.       Allergies:  No Known Allergies    Tobacco:   reports that he has quit smoking. he has never used smokeless tobacco.     Physical Exam      /58   Pulse (!) 55   Wt 191 lb (86.6 kg)   SpO2 99%   BMI 28.21 kg/m      General Appearance:    Alert, cooperative, no distress, appears stated age   Eyes:   No scleral icterus or conjunctival irritation       Neck:   Supple, symmetrical, trachea midline, no adenopathy;        thyroid:  No enlargement/tenderness/nodules   Lungs:     Clear to auscultation bilaterally, respirations unlabored, no wheezes or crackles   Heart:    Regular rate and rhythm,  no murmur, rub   or gallop   Abdomen:     Soft, non-tender    no masses, no organomegaly     Extremities:   Extremities normal, atraumatic, no cyanosis or edema   Skin:   Skin color, texture, turgor normal, no rashes or lesions     Recent Results (from the past 24 hour(s))   Urinalysis    Collection Time: 11/27/18  1:46 PM   Result Value Ref Range    Color, UA Yellow Colorless, Yellow, Straw, Light Yellow    Clarity, UA Clear Clear    Glucose, UA Negative Negative    Bilirubin, UA Negative Negative    Ketones, UA Negative Negative    Specific Gravity, UA 1.010 1.005 - 1.030     Blood, UA Negative Negative    pH, UA 5.5 5.0 - 8.0    Protein, UA Negative Negative mg/dL    Urobilinogen, UA 0.2 E.U./dL 0.2 E.U./dL, 1.0 E.U./dL    Nitrite, UA Negative Negative    Leukocytes, UA Negative Negative

## 2021-06-21 NOTE — LETTER
Letter by Samina Stevens at      Author: Samina Stevens Service: -- Author Type: --    Filed:  Encounter Date: 3/25/2021 Status: (Other)         Tanmay Israel  [address]  [place] [address]      March 25, 2021      Dear Mr. Israel,    RE: Remote Results    We are writing to you regarding your recent Remote ICD check from home. Your transmission was received successfully. Battery status is satisfactory at this time.     Your results are within normal limits.    Your next device appointment will be a remote check on April 23, 2021; this will occur automatically.    To schedule or reschedule, please call [phone] and press 1.    NOTE: If you would like to do an extra transmission, please call [phone] and press 3 to speak to a nurse BEFORE transmitting. This ensures that the Device Clinic staff is aware of the reason you are sending a transmission, and can follow-up with you after it has been reviewed.    We will be checking your implanted device from home (remotely) every month unless otherwise instructed. We will need to see you in the clinic at least once a year. You may need to be seen in the clinic sooner depending on the results of your check.    Please be aware:    The follow-up schedule is like a Physician prescription.    Your remote monitor is paired to your specific implanted device.      Sincerely,    [facility]

## 2021-06-21 NOTE — LETTER
Letter by Nida Roberts RDCS at      Author: Nida Roberts RDCS Service: -- Author Type: --    Filed:  Encounter Date: 4/23/2021 Status: (Other)         Tanmay Israel  [address]  [place] [address]      April 23, 2021      Dear Mr. Israel,    RE: Remote Results    We are writing to you regarding your recent Remote ICD check from home. Your transmission was received successfully. Battery status is satisfactory at this time.     Your results are showing no significant changes.    Your next device appointment will be a remote check on May 28, 2021; this will occur automatically.    To schedule or reschedule, please call [phone] and press 1.    NOTE: If you would like to do an extra transmission, please call [phone] and press 3 to speak to a nurse BEFORE transmitting. This ensures that the Device Clinic staff is aware of the reason you are sending a transmission, and can follow-up with you after it has been reviewed.    We will be checking your implanted device from home (remotely) every three months unless otherwise instructed. We will need to see you in the clinic at least once a year. You may need to be seen in the clinic sooner depending on the results of your check.    Please be aware:    The follow-up schedule is like a Physician prescription.    Your remote monitor is paired to your specific implanted device.      Sincerely,    [facility]

## 2021-06-21 NOTE — LETTER
Letter by Samina Stevens at      Author: Samina Stevens Service: -- Author Type: --    Filed:  Encounter Date: 2/25/2021 Status: (Other)         Tanmay Israel  [address]  [place] [address]      February 25, 2021      Dear Mr. Israel,    RE: Remote Results    We are writing to you regarding your recent Remote ICD check from home. Your transmission was received successfully. Battery status is satisfactory at this time.     Your results are within normal limits.    Your next device appointment will be a remote check on March 25, 2021; this will occur automatically.    To schedule or reschedule, please call [phone] and press 1.    NOTE: If you would like to do an extra transmission, please call [phone] and press 3 to speak to a nurse BEFORE transmitting. This ensures that the Device Clinic staff is aware of the reason you are sending a transmission, and can follow-up with you after it has been reviewed.    We will be checking your implanted device from home (remotely) every month unless otherwise instructed. We will need to see you in the clinic at least once a year. You may need to be seen in the clinic sooner depending on the results of your check.    Please be aware:    The follow-up schedule is like a Physician prescription.    Your remote monitor is paired to your specific implanted device.      Sincerely,    [facility]

## 2021-06-22 ENCOUNTER — COMMUNICATION - HEALTHEAST (OUTPATIENT)
Dept: ANTICOAGULATION | Facility: CLINIC | Age: 81
End: 2021-06-22

## 2021-06-22 NOTE — TELEPHONE ENCOUNTER
Medication Question or Clarification  Who is calling: Patient  What medication are you calling about?: Warfarin  What dose do you take?: n/a  How often are you taking the medication?: n/a  Who prescribed the medication?: Dr Ramírez    What is your question/concern?: Patient is having a CT Cervical Thoracic Lumbar Myelogram done on January 11th and was wondering if he is to hold his warfarin before procedure.      Pharmacy: [facility]  Okay to leave a detailed message?: Yes

## 2021-06-22 NOTE — PROGRESS NOTES
Assessment:     Diagnoses and all orders for this visit:    Chronic right-sided low back pain without sciatica  -     OPS Joint Injection Sacroiliac Joint Unilateral; Future; Expected date: 12/14/2018    Sacroiliac joint dysfunction  -     OPS Joint Injection Sacroiliac Joint Unilateral; Future; Expected date: 12/14/2018    Lumbar facet arthropathy    Lumbar foraminal stenosis    Right buttock pain  -     OPS Joint Injection Sacroiliac Joint Unilateral; Future; Expected date: 12/14/2018       Tanmay Israel is a 78 y.o. y.o. male with past medical history significant for knee osteoarthritis, GERD, DANIEL, hyperlipidemia, Crohn's colitis, hypertension, coronary atherosclerosis, ischemic cardiomyopathy/chronic systolic CHF, paroxysmal ventricular tachycardia, internal defibrillator, lumbar spinal listhesis/SI joint dysfunction, chronic kidney disease stage III, cerebral aneurysm, osteoporosis who presents today for follow-up regarding:    -Ongoing chronic right-sided low back pain with pain into the right buttock in which she has gotten some relief with previous right SI joint steroid injection and he does have increased pain with SI provocative maneuvers on exam.  Previously had a failed medial branch block indicating his pain was not facet mediated in nature.  He got minimal relief with JOSUÉ's in the past as well, not currently having radicular pain however.    Patient is neurologically intact on exam.     Plan:     A shared decision making plan was used. The patient's values and choices were respected. Prior medical records from 6/26/18 were reviewed today. The following represents what was discussed and decided upon by the provider and the patient.        -DIAGNOSTIC TESTS: Images were personally reviewed and interpreted.   --Lumbar CT scan 5/14/2018 reveals L5-S1 laminectomy and right facetectomy.  L4-5 severe bilateral foraminal stenosis with moderate to severe spinal canal stenosis that has worsened since 2015.   There is also similar L5-S1 bilateral foraminal stenosis.  L3-4 moderate bilateral foraminal and bilateral lateral recess stenosis.  Nondisplaced right L4 pars defect noted with unchanged L4-5 spondylolisthesis, 2 mm.  Spondylolisthesis also noted 4 mm at L4-5 unchanged and 2 mm L5-S1 unchanged.  --Lumbar spine CT myelogram from 2015 does reveal chronic L4-5 moderately severe central canal stenosis and severe right/moderate left foraminal stenosis.  L5-S1 moderately severe foraminal stenosis without central canal stenosis.  --Scoliosis panel 2015 shows 65  lordosis, thoracic kyphosis 51 , 8  scoliosis T11 through L3.  5 mm L4-5 and 3 mm L5-S1 spondylolisthesis.    -INTERVENTIONS: Ordered repeat right SI joint steroid injection see if we can get further relief of his right buttock pain localizing to the SI joint.  **Coumadin/warfarin anticoagulation therapy    -MEDICATIONS: Advised patient to continue Tylenol as needed for mild to moderate pain and tramadol prescribed by PCP as needed for moderate to severe pain.  Discussed side effects of medications and proper use. Patient verbalized understanding.    -PHYSICAL THERAPY: Did discuss trialing pool therapy which is an option at any time, he would prefer to trial the repeat SI injection but he will consider this down the road.  He is currently dealing with some flareup of his Crohn's disease therefore he wants to hold off.  Discussed the importance of core strengthening, ROM, stretching exercises with the patient and how each of these entities is important in decreasing pain.  Explained to the patient that the purpose of physical therapy is to teach the patient a home exercise program.  These exercises need to be performed every day in order to decrease pain and prevent future occurrences of pain.        -PATIENT EDUCATION:  25 minutes of total visit time was spent face to face with the patient today, 60 % of the visit was spent on counseling, education, and  coordinating care.   -5 minutes spent outside of visit time, non-face-to-face time, reviewing chart.    -FOLLOW UP: Follow-up for SI injection then 2 weeks postinjection if symptoms are not improving.  Advised to contact clinic if symptoms worsen or change.    Subjective:     Tanmay Israel is a 78 y.o. male who presents today for follow-up regarding chronic right low back pain at the lumbosacral junction that radiates to the right upper buttock about further leg pain at this time that is constant however worse with standing and walking in general currently his pain is a 7/10, it is a 6 at its best but up to a 9 at its worst and he reports is been pretty significant for the last week.  He denies any new symptoms he reports the same pain he had before and he did get some relief with previous injection in July as well.  He does report some tightness into his posterior hamstrings with walking that improves with sitting but that is not his biggest concern.  He denies any recent trips or falls but he does feel generalized weakness at this time as he has had some issues with his colitis and is been in and out of the hospital with this.  Patient denies bowel or bladder loss of control.  Denies saddle anesthesia.    **Patient saw Dr. Alicia last 9/27/2017 who recommended SI joint steroid injection however did discuss that if he fails to get further relief with SI injection/therapy, can trial injection to target stenosis at the L4-5 level.  Both of these injections have been completed however with minimal benefit.      Treatment to Date: History right L5-S1 laminectomy [facility] orthopedics.  Physical therapy [facility] ×4 sessions 11/29/2017.  [facility] on his own in the past however unable to do currently due to increased pain.  Acupuncture recently with some relief.      SPR Right L4-5 TF JOSUÉ 7/12/2016 with no benefit.  Procedure 8/10, postprocedure 0/10 on procedure note.  SPR CT-guided Right SI joint steroid  injection 9/20/2016.  SPR Right SI joint steroid injection 2/15/2017, 5/23/2017, 9/8/2017, with typical relief except the last 9/8/2017 he reports he got minimal relief.      Right SI joint steroid injection Dr. Cage 11/29/2017 with 10% relief.  Right L4-5 TF JOSUÉ 2/15/2018 with 40% relief ×3 months.  Preprocedure pain 8/10, post 1/10.  Right L5-S1 TF JOSUÉ 5/23/2018 with 90% relief ×3-4 days, NO lasting relief. Preprocedure pain 8/10, post 3/10.  FAILED Right L3, L4, L5 MBB 6/15/2018.  Right SI joint 7/11/2018 with moderate relief per patient.  Preprocedure pain 8/10, post 0/10.      Medications:  Voltaren gel is helping  Lidoderm patches  Tylenol occasional with minimal relief.      Patient Active Problem List   Diagnosis     Right Rotator Cuff Tendonitis     Osteoarthritis Of The Knee     Joint Pain, Localized In The Right Shoulder     Difficulty Breathing (Dyspnea)     Esophageal Reflux     Muscle Aches, Generalized (Myalgias)     Lumbago     Obstructive sleep apnea     Sciatica     Joint Pain, Localized In The Knee     Ptosis Of Eyelid     Hyperlipidemia     Anemia     Crohn's (Granulomatous) Colitis     Benign Essential Hypertension     Coronary atherosclerosis of native coronary artery     Ischemic cardiomyopathy     Paroxysmal ventricular tachycardia (H)     Dry Nonexudative Macular Degeneration     Serum Enzyme Levels - ALT (SGPT) Elevated     Serum Enzyme Levels - Alkaline Phosphatase Elevated     Dysphagia     Presence of automatic cardioverter/defibrillator (AICD)--- Medtronic single lead     Spondylolisthesis of lumbar region     Lumbar stenosis with neurogenic claudication     Sacroiliac joint dysfunction of right side     Stage 3 chronic kidney disease (H)     Fall     Multiple fractures of cervical spine, closed, initial encounter (H)     Closed fracture of distal end of left radius, unspecified fracture morphology, initial encounter     Closed fracture of distal end of right radius, unspecified  fracture morphology, initial encounter     Warfarin-induced coagulopathy (H)     Closed fracture of first thoracic vertebra, unspecified fracture morphology, initial encounter (H)     A-fib (H)     Anemia, unspecified type     Cerebral aneurysm     Osteoporosis     Ileitis     Calculus of ureter     Pyuria     Heart failure with reduced ejection fraction (H)     Diaphragmatic hernia     Pharyngoesophageal dysphagia     History of Crohn's disease     Hypertension     Iron deficiency anemia     Polyp of duodenum     Polyp of stomach and duodenum     Reflux esophagitis     Rotator cuff tear arthropathy of right shoulder     Schatzki's ring       Current Outpatient Medications on File Prior to Encounter   Medication Sig Dispense Refill     acetaminophen (TYLENOL) 325 MG tablet Take 650 mg by mouth every 4 (four) hours as needed for pain. Indications: Pain       bumetanide (BUMEX) 1 MG tablet Take 0.5 tablets (0.5 mg total) by mouth every other day. 360 tablet 3     carvedilol (COREG) 25 MG tablet Take 1.5 tablets (37.5 mg total) by mouth 2 (two) times a day. 270 tablet 1     cholecalciferol, vitamin D3, 1,000 unit tablet Take 2,000 Units by mouth daily.       diphenoxylate-atropine (LOMOTIL) 2.5-0.025 mg per tablet Take 1 tablet by mouth 2 (two) times a day as needed.       FERROUS FUMARATE (IRON ORAL) Take 1 tablet by mouth daily.        losartan (COZAAR) 50 MG tablet Take 1 tablet (50 mg total) by mouth daily.       lovastatin (MEVACOR) 40 MG tablet Take 1 tablet by mouth daily.       multivitamin with iron (ONE DAILY WITH IRON) Tab tablet Take 1 tablet by mouth daily.        omeprazole (PRILOSEC) 20 MG capsule Take 20 mg by mouth 2 (two) times a day.       ondansetron (ZOFRAN) 8 MG tablet Take 1 tablet (8 mg total) by mouth every 8 (eight) hours as needed for nausea. 30 tablet 1     rosuvastatin (CRESTOR) 20 MG tablet Take 1 tablet (20 mg total) by mouth at bedtime. 90 tablet 3     warfarin (COUMADIN) 2.5 MG tablet  Take 1.25-2.5 mg by mouth See Admin Instructions. Take 1.25 mg (0.5 tablet) on Wednesdays/Saturdays and 2.5 mg rest of week. Adjust dose based on INR results as directed.       magnesium L-lactate (MAGTAB) 84 mg TbER Take 1 tablet (84 mg total) by mouth daily.  0     potassium chloride (KLOR-CON) 10 MEQ CR tablet Take 2 tablets (20 mEq total) by mouth 2 (two) times a day. 120 tablet 11     No current facility-administered medications on file prior to encounter.        No Known Allergies    Past Medical History:   Diagnosis Date     Anemia      Arthritis      Atrial fibrillation (H)      Back pain      Callus      Cerebral aneurysm      Chronic kidney disease      Chronic systolic congestive heart failure (H)      Coronary artery disease      Crohn's disease (H)      Dysphagia      GERD (gastroesophageal reflux disease)      Hyperlipidemia      Hypertension      ICD (implantable cardioverter-defibrillator) in place     Medtronic     Ischemic cardiomyopathy      Kidney stone      Low back pain      Macular degeneration      Permanent atrial fibrillation (H)     [id number] risk score = 5 (age3/ CAD/ HTN/ CHF) Chronic warfarin     Sleep apnea         Review of Systems  ROS: Positive for generalized weakness.  Specifically negative for bowel/bladder dysfunction, balance changes, headache, dizziness, foot drop, fevers, chills, appetite changes, nausea/vomiting, unexplained weight loss. Otherwise 13 systems reviewed are negative. Please see the patient's intake questionnaire from today for details.    Reviewed Social, Family, Past Medical and Past Surgical history with patient, no significant changes noted since prior visit.     Objective:     /52 (Patient Site: Right Arm, Patient Position: Sitting)   Pulse 66   Wt 191 lb (86.6 kg)   SpO2 98%   BMI 28.21 kg/m      PHYSICAL EXAMINATION:    --CONSTITUTIONAL: Well developed, well nourished, healthy appearing individual.  --PSYCHIATRIC: Appropriate mood and affect.  No difficulty interacting due to temper, social withdrawal, or memory issues.  --SKIN: Lumbar region is dry and intact.   --RESPIRATORY: Normal rhythm and effort. No abnormal accessory muscle breathing patterns noted.   --MUSCULOSKELETAL:  Normal lumbar lordosis noted, no lateral shift.  --GROSS MOTOR: Easily arises from a seated position. Gait is non-antalgic  --LUMBAR SPINE:  Inspection reveals no evidence of deformity. Range of motion is not limited in lumbar flexion, extension, lateral rotation. No tenderness to palpation lumbar spine. Straight leg raising in the seated position is negative to radicular pain. Sciatic notch non-tender on the left and significantly tender on the right.   --SACROILIAC JOINT:  Positive Primitivo's with reproduction of pain to affected extremity. One Finger point test positive.  --LOWER EXTREMITY MOTOR TESTING:  Plantar flexion left 5/5, right 5/5   Dorsiflexion left 5/5, right 5/5   Great toe MTP extension left 5/5, right 5/5  Knee flexion left 5/5, right 5/5  Knee extension left 5/5, right 5/5   Hip flexion left 5/5, right 5/5  Hip abduction left 5/5, right 5/5  Hip adduction left 5/5, right 5/5   --NEUROLOGIC: Bilateral patellar and achilles reflexes are physiologic and symmetric. Sensation to light touch is intact in the bilateral L4, L5, and S1 dermatomes.    RESULTS:   Imaging: Lumbar spine imaging was reviewed today. The images were shown to the patient and the findings were explained using a spine model.      Ct Lumbar Spine Without Contrast  Result Date: 5/14/2018  INDICATION: Spinal stenosis, lumbar. Ongoing right lumbar radiculitis. TECHNIQUE: Helical thin-section CT scan of the spine was performed using bone reconstruction algorithm. From the axial source data, sagittal and coronal thin reformats. Dose reduction techniques were used. IV CONTRAST: None. COMPARISON: 9/9/2015. FINDINGS:  Nomenclature is based on 5 lumbar type vertebral bodies. Vertebral body heights are  unremarkable and unchanged from 9/9/2015. No destructive bony lesions are demonstrated. L5-S1 laminectomy and right facetectomy have been performed. Nondisplaced right L4 pars defect again demonstrated. There is no evidence of an acute displaced fracture. The imaged portions of the bony pelvis appear intact. There are mild degenerative changes of the sacroiliac joints. There is L4-L5 anterolisthesis of approximately 4 mm, not appreciably changed. L3-L4 retrolisthesis of 2 mm is also unchanged. L5-S1 anterolisthesis of 2 mm is similar to prior. Alignment is otherwise unremarkable in the sagittal plane. There is mild levoconvex lower lumbar curvature. Grossly unremarkable paraspinal soft tissues. There is scattered atherosclerotic change of the abdominal aorta and its branches. No abdominal aortic aneurysm is demonstrated. There is colonic diverticulosis.   T12-L1: Mild intervertebral disc height loss. No significant posterior disc abnormality. Prominent anterior and lateral disc osteophyte complexes. Mild facet arthropathy. No significant spinal canal stenosis. No right neural foraminal stenosis. No left neural foraminal stenosis.   L1-L2: Mild intervertebral disc height loss. Mild broad-based disc bulging. Mild ligamentum flavum thickening and mild facet arthropathy. Mild to moderate spinal canal stenosis. Mild right neural foraminal stenosis. Mild left neural foraminal stenosis.   L2-L3: Moderate intervertebral disc height loss, with broad-based disc osteophyte complex. Mild facet arthropathy. Bilateral lateral recess stenosis. Mild to moderate spinal canal stenosis. Moderate right neural foraminal stenosis. Mild to moderate left neural foraminal stenosis.   L3-L4: Mild intervertebral disc height loss with broad-based disc osteophyte complex. Ligamentum flavum thickening and moderate facet arthropathy. Moderate spinal canal stenosis. Moderate right neural foraminal stenosis. Moderate left neural foraminal stenosis.  Bilateral lateral recess stenosis.   L4-L5: Unchanged anterolisthesis as above, with uncovering of the dorsal disc margin. There is superimposed broad-based disc bulging. Severe bilateral facet arthropathy, left greater than right. Moderate to severe spinal canal stenosis. Severe right neural foraminal stenosis. Severe left neural foraminal stenosis.   L5-S1: Normal disc height. Mild bulge. No definite right-sided facet articulation demonstrated, similar to prior. Moderate left facet arthropathy. Mild spinal canal stenosis. Severe right neural foraminal stenosis. Severe left neural foraminal stenosis.   CONCLUSION:   1.  Postoperative changes of L5-S1 laminectomy and right facetectomy.   2.  At L4-L5, there is severe bilateral neural foraminal stenosis and moderate to severe spinal canal stenosis, slightly progressed.   3.  At L5-S1, there is severe bilateral neural foraminal stenosis, similar to prior.   4.  At L3-L4, there is moderate bilateral neural foraminal stenosis and bilateral lateral recess stenosis.   5.  Additional degenerative changes as described.        XR SCOLIOSIS AP OR PA AND LATERAL STANDING  10/19/2015   INDICATION: Low back pain. Thoracic kyphosis.  COMPARISON: None.  FINDINGS: Twelve rib-bearing thoracic-type vertebral bodies. Five lumbar type vertebral bodies. Thoracic kyphosis estimated at 51 degrees. Lumbar lordosis between L1 and S1 estimated at 65 degrees. Right convex asymmetry of the thoracolumbar spine   measuring 8 degrees between T11 and L3.  Several bridging osteophytes in the thoracic spine, pattern that is consistent with diffuse idiopathic skeletal hyperostosis. Prominent left lateral T12-L1 osteophyte and ventral L2-L3 and L3-L4 osteophytes, but not definitely bridging. 5 mm   anterolisthesis of L4 on L5 and 3 mm anterolisthesis of L5 on S1. Prominent facet arthropathy lower lumbar spine. No acute superimposed bony abnormality of the thoracolumbar spine, without definite  fracture.  Dense aortic calcification, normal caliber. Left hemithorax generator with two pacemaker leads to the heart. Sternal wires. Reverse right shoulder arthroplasty. Moderate degenerative change both hips.          CT LUMBAR SPINE MYELOGRAM  9/9/2015  INDICATION: Lumbar stenosis.  TECHNIQUE: Helical images were reconstructed in the axial plane at 1.25-mm increments, with sagittal and coronal reformations. Intrathecal contrast introduced under separate procedure and described with that procedure.  COMPARISON: None.  FINDINGS: Five lumbar-type vertebral bodies. Slightly exaggerated lordosis at L4-L5 on a degenerative basis. 4-mm anterior subluxation of L4 on L5. 2-mm anterior subluxation of L5 on S1. Slight retrolisthesis of L2 on L3 and L3 on L4. Left convex   scoliosis measuring 14 degrees between L3 and L5. Much of this is at L4-L5 from asymmetric disc height loss. Right-sided L4 pars defect. L5-S1 wide laminectomy with right facetectomy. No destructive bony lesion is apparent. No fracture is evident.  Conus tip ends at T12-L1. Cauda equina generally unremarkable. Paraspinous soft tissues are grossly normal. Moderate paraspinous muscle volume loss. Normal aortic caliber, moderate calcification. Mild to moderate degenerative change SI joints.  T12-L1: Slight disc height loss. Left anterior interbody spurring. Mild facet arthropathy. No central canal stenosis. No foraminal stenosis.  L1-L2: Slight disc height loss. Mild circumferential disc bulging. Left anterolateral interbody spur. Mild to moderate facet arthropathy. No central canal stenosis. No foraminal stenosis.  L2-L3: Moderate disc height loss. Moderate circumferential interbody spur and disc bulging, more pronounced anteriorly. Mild facet arthropathy. No central canal stenosis. Mild right foraminal stenosis. No left foraminal stenosis.  L3-L4: Mild disc height loss. Mild to moderate circumferential disc bulging. Mild to moderate facet arthropathy. Some  calcification of the right facet cartilage. Mild central canal stenosis. No lateral recess stenosis. Mild to moderate right foraminal   stenosis. Borderline moderate left foraminal stenosis.  L4-L5: Mild disc height loss greater dorsally. At least moderate circumferential disc bulging. Prominent facet arthropathy with diastatic left facet. Moderately severe central canal stenosis. Despite this, no definite lateral recess stenosis. Severe   right and moderate left foraminal stenoses from disc bulging.  L5-S1: Disc height normal. Slight disc bulging. Sagittally oriented left facet. Effectively absent right facet. No central canal stenosis. Moderately severe bilateral foraminal stenoses.  CONCLUSION:  1.  Previous lumbar spine surgery with L5-S1 wide laminectomy and right facetectomy.  2.  Degenerative changes most prominent at L4-L5 and L5-S1. The facets are sagittally oriented. This predisposes to subluxation.  3.  At L4-L5, moderately severe central canal stenosis without grossly apparent lateral recess stenosis. Severe right and moderate left foraminal stenoses.  4.  At L5-S1, moderately severe bilateral foraminal stenoses.  5.  At L3-L4, borderline moderate left foraminal stenosis. Mild central canal stenosis.

## 2021-06-22 NOTE — TELEPHONE ENCOUNTER
[facility] ANTICOAGULATION  MANAGEMENT    Assessment     Warfarin interruption plan for CT Myelogram on 1/17/19.     Verified with Mukul with [facility] that warfarin hold requested; 4 day hold for CT Myelogram.      Hx of Atrial fibrillation      Risk stratification for thromboembolism: moderate. (2017 ACC periprocedure pathway for NVAF)    [id number] = 5 (CHF (EF 35%, HTN, Age > 75, Vascular)    No hx stroke/TIA    ACC guidance suggests no bridge for most moderate risk patients without hx of stroke/TIA        No change to risk stratification/hold/no bridge recommendations since reviewed earlier this week with Dr. Ramírez for colonoscopy.     Plan       Okay to hold warfarin 4 days prior to procedure (Sun 1/13- Wed 1/16)    No bridge    Resume warfarin evening of procedure (1/17) if okay with provider doing procedure: 5 mg x 1 then resume home regimen: 2.5 mg daily.    INR check ~ 1 week after restarting warfarin    Returned call to Tanmay and relayed above plan. Tanmay verbalized understanding and agrees to plan.    Anticoagulation Calendar updated.  ?   Rubina Nation, PharmD    Subjective/Objective:      Tanmay Israel, a 78 y.o. male    Reason for Anticoagulation: Atrial Fibrillation    Goal INR Range: 2-3    Patient bridged in past: No    Pertinent History: Crohn's disease, CKD,  Ischemic cardiomyopathy, hypertension, CAGB (8/2004)    Wt Readings from Last 3 Encounters:   01/09/19 194 lb (88 kg)   01/04/19 194 lb (88 kg)   12/24/18 192 lb (87.1 kg)     Lab Results   Component Value Date    INR 2.90 (H) 01/03/2019    INR 2.50 (!) 12/17/2018    INR 2.70 (H) 12/04/2018     Lab Results   Component Value Date    HGB 10.6 (L) 12/21/2018    HCT 31.0 (L) 12/21/2018     12/21/2018

## 2021-06-22 NOTE — PROGRESS NOTES
NEUROSURGERY CONSULTATION NOTE    1/9/2019     Tanmay Israel is a 78 y.o. male who is sent to us in consultation by Jyoti Her  for evaluation of lumbar stenosis         CONSULTATION ASSESSMENT AND PLAN:    77 yo male with a h/o of a L5-S1 laminectomy presents with right lower back and leg pain and weakness. Patient appears to have SI joint pain in addition to neurogenic claudication from lumbar stenosis most prominent at L4-5 with anterolisthesis at this level. Only CT lumbar spine wo contrast for review. Patient has a pacemaker.  Also describes increased difficulties with fine motor tasks with known history of cervical-thoracic spine fx 2/2 a fall from 6 feet. Slightly brisk in reflexes especially given his age.  We will obtain a CT myelogram of cervical, thoracic and lumbar spine as well as flexion/extension films of the lumbar spine. Return to clinic after imaging obtained.       I spent more than 45 minutes in this apt, examining the pt, reviewing the scans, reviewing notes from chart, discussing treatment options with risks and benefits and coordinating care. >50 % clinic time was spent in face to face counseling and coordinating care    Liza Cesar       HPI:  77 yo male presents with right lower back and leg pain and weakness. Patient has two different types of pain. He has a sharp pain in his right lower back which woresens with walking and improves with sitting and laying down. This has not changed significantly over time, however, the injection he gets regularly are not reliving his pain as long. Most recently he had an SI joint injection on the R on 12/17/18. He immediately felt relief and had significant improvement in all of his symptoms for 2 weeks. Also was recently given a SI joint belt with some improvement in his pain.     In regards to his lumbar spine, he is s/p a L5-S1 laminectomy in the late 1990s or early 2000s. This was preformed for he believes was back pain at [facility]. He had improvement of his symptoms. He also has pain in his posterior thighs down to his knees. This pain is aching in character. The pain begins as he stands and walking and worsens as he walks. This has progressively worsened over the years and he is limited to 1/2 a block in distance now. He denies radicular leg pain, leg numbness, bowel or bladder dysfunction or saddle anesthesia. He also notes increased difficulty with standing from sitting and now has to use his arms to assist himself to get up. Interestingly the SI joint injection has also helped with these symptoms. His most recent lumbar injections also improved his pain for several months including a R L4-5 TFESI on 2/15/18 and R L5-S1 TFESI on 5/23/18. He had a failed R L3-5 MBB on 6/15/18.     He is still taking voltaren gel and tylenol for his pains with some improvement in both. Tramadol he was more recently placed on with also some improvement.     He also notes that when going to a [employer] party in 2016 he was carrying a bowl down a yard and walked off a 6 foot retaining wall. He fell and sustained multiple cervical and thoracic spine fx for which he saw Dr Rodriguez for and was placed in a collar for 6 months for. He had neck pain at that time but that has resolved. He denies arm pain, numbness and weakness. He is having difficulties with fine motor tasks since that time but recent worsening.     Past Medical History:   Diagnosis Date     Anemia      Arthritis      Atrial fibrillation (H)      Back pain      Callus      Cerebral aneurysm      Chronic kidney disease      Chronic systolic congestive heart failure (H)      Coronary artery disease      Crohn's disease (H)      Dysphagia      GERD (gastroesophageal reflux disease)      Hyperlipidemia      Hypertension      ICD (implantable cardioverter-defibrillator) in place     Medtronic     Ischemic cardiomyopathy      Kidney stone      Low back pain      Macular degeneration       Permanent atrial fibrillation (H)     [id number] risk score = 5 (age1/ CAD/ HTN/ CHF) Chronic warfarin     Sleep apnea      Past Surgical History:   Procedure Laterality Date     APPENDECTOMY       CARDIAC PACEMAKER PLACEMENT  2013    ICD Medtronic     CATARACT EXTRACTION W/  INTRAOCULAR LENS IMPLANT Bilateral      CHOLECYSTECTOMY       ESOPHAGOGASTRODUODENOSCOPY       WY CABG, VEIN, FOUR      Description: Coronary Artery Quadruple Venous Bypass Graft;  Recorded: 10/21/2008;  Comments: 8/2004     ROTATOR CUFF REPAIR Right      TONSILLECTOMY               REVIEW OF SYSTEMS:  ROS reviewed with pt as documented on pt health form of 1/9/2019.    Negative cardiac, pulmonary, hematological with exception of shortness of breath (on diuretic) and history as above.  No family hx of anesthetic reactions.  No family hx of hypercoagulability.       MEDICATIONS:  Current Outpatient Medications   Medication Sig Dispense Refill     acetaminophen (TYLENOL) 325 MG tablet Take 650 mg by mouth every 4 (four) hours as needed for pain. Indications: Pain       bumetanide (BUMEX) 1 MG tablet Take 0.5 tablets (0.5 mg total) by mouth every other day. 360 tablet 3     carvedilol (COREG) 25 MG tablet Take 1.5 tablets (37.5 mg total) by mouth 2 (two) times a day. 270 tablet 1     cholecalciferol, vitamin D3, 1,000 unit tablet Take 2,000 Units by mouth daily.       diphenoxylate-atropine (LOMOTIL) 2.5-0.025 mg per tablet Take 1 tablet by mouth 2 (two) times a day as needed.       FERROUS FUMARATE (IRON ORAL) Take 1 tablet by mouth daily.        losartan (COZAAR) 50 MG tablet Take 1 tablet (50 mg total) by mouth daily.       lovastatin (MEVACOR) 40 MG tablet Take 1 tablet by mouth daily.       magnesium L-lactate (MAGTAB) 84 mg TbER Take 1 tablet (84 mg total) by mouth daily.  0     multivitamin with iron (ONE DAILY WITH IRON) Tab tablet Take 1 tablet by mouth daily.        omeprazole (PRILOSEC) 20 MG capsule Take 20 mg by mouth 2 (two) times  a day.       ondansetron (ZOFRAN) 8 MG tablet Take 1 tablet (8 mg total) by mouth every 8 (eight) hours as needed for nausea. 30 tablet 1     rosuvastatin (CRESTOR) 20 MG tablet Take 1 tablet (20 mg total) by mouth at bedtime. 90 tablet 3     warfarin (COUMADIN) 2.5 MG tablet Take 1.25-2.5 mg by mouth See Admin Instructions. Take 1.25 mg (0.5 tablet) on Wednesdays/Saturdays and 2.5 mg rest of week. Adjust dose based on INR results as directed.       No current facility-administered medications for this visit.          ALLERGIES/SENSITIVITIES:     Denies drug allergies    PERTINENT SOCIAL HISTORY:   Social History     Socioeconomic History     Marital status: Single     Spouse name: Not on file     Number of children: Not on file     Years of education: Not on file     Highest education level: Not on file   Social Needs     Financial resource strain: Not on file     Food insecurity - worry: Not on file     Food insecurity - inability: Not on file     Transportation needs - medical: Not on file     Transportation needs - non-medical: Not on file   Occupational History     Occupation: retired     Comment: [employer]   Tobacco Use     Smoking status: Former Smoker     Smokeless tobacco: Never Used   Substance and Sexual Activity     Alcohol use: No     Drug use: No     Sexual activity: Not on file   Other Topics Concern     Not on file   Social History Narrative     Not on file         FAMILY HISTORY:  Family History   Problem Relation Age of Onset     Heart disease Mother      Stroke Mother      Crohn's disease Father      Alcohol abuse Brother      No Medical Problems Daughter      No Medical Problems Son      No Medical Problems Maternal Aunt      No Medical Problems Maternal Uncle      No Medical Problems Paternal Aunt      No Medical Problems Paternal Uncle      No Medical Problems Maternal Grandmother      No Medical Problems Maternal Grandfather      No Medical Problems Paternal Grandmother      No Medical Problems  "Paternal Grandfather      Cancer Brother      Urolithiasis Neg Hx      Gout Neg Hx         PHYSICAL EXAM:   Constitutional: /81   Pulse (!) 59   Ht 5' 9\" (1.753 m)   Wt 194 lb (88 kg)   SpO2 98%   BMI 28.65 kg/m       Mental Status: A & O in no acute distress.  Affect is appropriate.  Speech is fluent.  Recent and remote memory are intact.  Attention span and concentration are normal.     Cranial Nerves: CN1: grossly intact per patient recall. CN2: No funduscopic exam performed. CN3,4 & 6: Pupillary light response OD normal, surgical pupil on OS, lateral and vertical gaze normal.  No nystagmus.  Visual fields are full to confrontation. CN5: Intact to touch CN7: No facial weakness, smile, facial symmetry intact. CN8: Intact to spoken voice. CN9&10: Gag reflex, uvula midline, palate rises with phonation. CN11: Shoulder shrug 5/5 intact bilaterally. CN12: Tongue midline and moves freely from side to side.     Motor: No pronator drift of upper extremity. Normal bulk and tone all muscle groups of upper and lower extremities. Difficulty standing from siting but appears 5/5 in HF strength     Sensory: Sensation intact bilaterally to light touch.      Coordination:  Heel/toe/  gait intact.  Unable to tandem walk      Reflexes; supinator, biceps, triceps, knee/ ankle jerk intact- reflexes throughotu are brisker than expected for age. No hoffmans/ No babinski/ clonus.    SI joint tenderness on R only     IMAGING: I personally reviewed all radiographic images      Cc:   Go Ramírez MD  [address]  [facility] [address]  "

## 2021-06-22 NOTE — TELEPHONE ENCOUNTER
Reviewed OptiVol report.  Chronic afib with good rate control, no arrhythmias.     OptiVol and thoracic impedence indicate worsening fluid retention.  He has increased shortness of breath and edema.  Possible orthopnea this morning when waking up.     He is currently taking bumex 0.5 mg every other day.  Increase bumex to 1 mg today and tomorrow.  Then take bumex 0.5 mg daily. Have him see me in clinic on Monday, January 14.    If breathing worsens, he may need to go to ED.

## 2021-06-22 NOTE — TELEPHONE ENCOUNTER
Patient calling to report worsening shortness of breath since yesterday. He states he is short of breath at rest. He has ankle swelling and states it may be a little better today than yesterday    He denies weight gain, lightheadedness, dizziness, chest pain and palpitations. He was able to lay flat for sleep last night; however, had to get out of bed this morning due to shortness of breath.    Remote check with [facility] was scheduled 1/8/19 @ 12:00 am.    ===========================    Debra,  Please see patient concerns above.  Tanmay is wondering if he should go to the ED or if he can come in to see you.  Any new orders or recommendations?  Thank you,  Madyson

## 2021-06-22 NOTE — PROGRESS NOTES
Patient ID: Tanmay Israel is a 78 y.o. male.  /60   Pulse (!) 54   Wt 196 lb (88.9 kg)   SpO2 99%   BMI 28.94 kg/m      Assessment/Plan:                Diagnoses and all orders for this visit:    ANA (acute kidney injury) (H)  -     Basic Metabolic Panel    Anemia, unspecified type  -     HM2(CBC w/o Differential)    Permanent atrial fibrillation (H)  -     INR    Nephrolithiasis    Chronic systolic congestive heart failure (H)    Stage 3 chronic kidney disease (H)    Crohn's disease with complication, unspecified gastrointestinal tract location (H)    Diarrhea, unspecified type      DISCUSSION  Obtain the labs as above and make further recommendations.  Subjective:     HPI    Tanmay Israel is a 78 y.o. male he has a complex history he is here today for follow-up at my request.  Had acute kidney injury noted on laboratory work obtained at his cardiologist's office.  His diuretic doses were reduced.  I evaluated him last week and we made sure that there were not any other confounding factors none were found.  He does report he is continued to have some intermittent bouts of diarrhea.  With his history of colitis, possibly Crohn's colitis this is been somewhat concerning.  He is continuing to report intermittent but rather severe bouts of diarrhea.  He states this past Saturday 3 days ago he had diarrhea that was profuse and lasted through much of the night but then in the last 2 days he reports he has not had such symptoms.  He is reporting more abdominal tenderness but not all right abdominal pain currently.  He states he has been able to eat and drink.  Denies dizziness lightheadedness or syncope.  He has had no hematuria.  He does have a known history of nephrolithiasis.  There was no sign of this on my clinical assessment last week.  There is nothing clinically to suggest that this would be a concern at this point either.  We reviewed his clinical course and discussed all of these items in depth  including recent electrolyte disturbances.    Review of Systems  Complete review of systems is obtained.  Other than the specific considerations noted above complete review of systems is negative.        Objective:   Medications:  Current Outpatient Medications   Medication Sig     acetaminophen (TYLENOL) 325 MG tablet Take 650 mg by mouth every 4 (four) hours as needed for pain. Indications: Pain     bumetanide (BUMEX) 1 MG tablet Take 0.5 tablets (0.5 mg total) by mouth every other day.     carvedilol (COREG) 25 MG tablet Take 1.5 tablets (37.5 mg total) by mouth 2 (two) times a day.     cholecalciferol, vitamin D3, 1,000 unit tablet Take 2,000 Units by mouth daily.     diphenoxylate-atropine (LOMOTIL) 2.5-0.025 mg per tablet Take 1 tablet by mouth 2 (two) times a day as needed.     FERROUS FUMARATE (IRON ORAL) Take 1 tablet by mouth daily.      losartan (COZAAR) 50 MG tablet Take 1 tablet (50 mg total) by mouth daily.     lovastatin (MEVACOR) 40 MG tablet Take 1 tablet by mouth daily.     magnesium L-lactate (MAGTAB) 84 mg TbER Take 1 tablet (84 mg total) by mouth daily.     multivitamin with iron (ONE DAILY WITH IRON) Tab tablet Take 1 tablet by mouth daily.      omeprazole (PRILOSEC) 20 MG capsule Take 20 mg by mouth 2 (two) times a day.     ondansetron (ZOFRAN) 8 MG tablet Take 1 tablet (8 mg total) by mouth every 8 (eight) hours as needed for nausea.     potassium chloride (KLOR-CON) 10 MEQ CR tablet Take 2 tablets (20 mEq total) by mouth 2 (two) times a day.     rosuvastatin (CRESTOR) 20 MG tablet Take 1 tablet (20 mg total) by mouth at bedtime.     warfarin (COUMADIN) 2.5 MG tablet Take 1.25-2.5 mg by mouth See Admin Instructions. Take 1.25 mg (0.5 tablet) on Wednesdays/Saturdays and 2.5 mg rest of week. Adjust dose based on INR results as directed.     Allergies:  No Known Allergies    Tobacco:   reports that he has quit smoking. he has never used smokeless tobacco.     Physical Exam      /60    Pulse (!) 54   Wt 196 lb (88.9 kg)   SpO2 99%   BMI 28.94 kg/m            General Appearance:    Alert, cooperative, no distress, appears stated age   Eyes:   No scleral icterus or conjunctival irritation       Throat:   Lips, mucosa, and tongue normal; teeth and gums normal   Neck:   Supple, symmetrical, trachea midline, no adenopathy;        thyroid:  No enlargement/tenderness/nodules   Lungs:     Clear to auscultation bilaterally, respirations unlabored, no wheezes or crackles   Heart:    Regular rate and rhythm,  no murmur, rub   or gallop   Abdomen:     Soft, mild diffuse tenderness.  No masses or organomegaly are noted.     Extremities:   Extremities normal, atraumatic, no cyanosis or edema   Skin:   Skin color, texture, turgor normal, no rashes or lesions   Neurologic:   Normal strength and sensation        throughout on gross examination.

## 2021-06-22 NOTE — PATIENT INSTRUCTIONS - HE
CT myelogram of Cervical, thoracic, and Lumbar and flex/ext x-rays of lumbar and then follow up with Dr. Cesar for results.

## 2021-06-22 NOTE — TELEPHONE ENCOUNTER
ANTICOAGULATION  MANAGEMENT    Assessment     Today's INR result of 2.9 is Therapeutic (goal INR of 2.0-3.0)        Warfarin taken as previously instructed    No new diet changes affecting INR    No new medication/supplements affecting INR    Continues to tolerate warfarin with no reported s/s of bleeding or thromboembolism     Previous INR was Therapeutic    Plan:     Spoke with Tanmay regarding INR result and instructed:     Warfarin Dosing Instructions:  Continue current warfarin dose 2.5 mg daily.    Instructed patient to follow up no later than: 4 weeks.     Education provided: importance of taking warfarin as instructed    Tanmay verbalizes understanding and agrees to warfarin dosing plan.    Instructed to call the [facility] for any changes, questions or concerns. (#[phone])   ?   Edgar Ortez RN    Subjective/Objective:      Tanmay Israel, a 78 y.o. male is on warfarin.     Tanmay reports:     Home warfarin dose: verbally confirmed home dose with pt and updated on anticoagulation calendar     Missed doses: No     Medication changes:  No     S/S of bleeding or thromboembolism:  No     New Injury or illness:  No     Changes in diet or alcohol consumption:  No     Upcoming surgery, procedure or cardioversion:  No    Anticoagulation Episode Summary     Current INR goal:   2.0-3.0   TTR:   57.5 % (3.9 y)   Next INR check:   1/31/2019   INR from last check:   2.90 (1/3/2019)   Weekly max warfarin dose:      Target end date:      INR check location:      Preferred lab:      Send INR reminders to:   ANTICOAGULATION POOL B (YOSHIW,ANA CRISTINAG,STW,RVL,OAK,RLN)       Comments:            Anticoagulation Care Providers     Provider Role Specialty Phone number    Go Ramírez MD Referring Family Medicine [phone]

## 2021-06-22 NOTE — PROGRESS NOTES
Assessment:     Diagnoses and all orders for this visit:    Chronic right-sided low back pain without sciatica  -     Ambulatory referral to Neurosurgery  -     Ambulatory referral for Orthotics / Prosthetics    Right lumbar radiculitis    Sacroiliac joint dysfunction  -     Ambulatory referral for Orthotics / Prosthetics    Right buttock pain  -     Ambulatory referral for Orthotics / Prosthetics    Lumbar facet arthropathy    Lumbar foraminal stenosis  -     Ambulatory referral to Neurosurgery  -     Ambulatory referral for Orthotics / Prosthetics       Tanmay Israel is a 78 y.o. y.o. male with past medical history significant for knee osteoarthritis, GERD, DANIEL, hyperlipidemia, Crohn's colitis, hypertension, coronary atherosclerosis, ischemic cardiomyopathy/chronic systolic CHF, paroxysmal ventricular tachycardia, internal defibrillator, lumbar spinal listhesis/SI joint dysfunction, chronic kidney disease stage III, cerebral aneurysm, osteoporosis who presents today for follow-up regarding:    -Ongoing chronic right-sided low back pain with right buttock pain and right posterior thigh pain that is severe with walking.  Previously patient failed medial branch block indicating pain is not facet mediated in nature.  He got short-term but no lasting relief with L4-5 and L5-S1 TF JOSUÉ's, short-term relief only with SI joint steroid injection unfortunately with no relief with physical therapy or medications.    Patient is neurologically intact on exam.     Plan:     A shared decision making plan was used. The patient's values and choices were respected. Prior medical records from 12/14/18 were reviewed today. The following represents what was discussed and decided upon by the provider and the patient.        -DIAGNOSTIC TESTS: Images were personally reviewed and interpreted.   --Lumbar CT scan 5/14/2018 reveals L5-S1 laminectomy and right facetectomy.  L4-5 severe bilateral foraminal stenosis with moderate to severe  spinal canal stenosis that has worsened since 2015.  There is also similar L5-S1 bilateral foraminal stenosis.  L3-4 moderate bilateral foraminal and bilateral lateral recess stenosis.  Nondisplaced right L4 pars defect noted with unchanged L4-5 spondylolisthesis, 2 mm.  Spondylolisthesis also noted 4 mm at L4-5 unchanged and 2 mm L5-S1 unchanged.  --Lumbar spine CT myelogram from 2015 does reveal chronic L4-5 moderately severe central canal stenosis and severe right/moderate left foraminal stenosis.  L5-S1 moderately severe foraminal stenosis without central canal stenosis.  --Scoliosis panel 2015 shows 65  lordosis, thoracic kyphosis 51 , 8  scoliosis T11 through L3.  5 mm L4-5 and 3 mm L5-S1 spondylolisthesis.    -DME: Did order an SI joint belt today as well to see if we can limit the movement of the SI joint during aggravating activities such as prolonged walking in order to decrease pain.    -INTERVENTIONS: Patient has had 4 steroid injections at this point since February 2018 therefore I would recommend holding off at least another month or 2 prior to considering a further steroid injection which patient is in agreements with.    Referral: Referral sent to [facility] neurosurgery Dr. Cesar or Dr. Kirkland to obtain second surgical opinion at this point per his request as his pain is significant and debilitating and we have gotten minimal lasting relief with conservative treatments thus far.    -MEDICATIONS: No change in medications, patient is currently taking tramadol as previous prescription as needed recommend 1 tablet twice daily as needed for severe breakthrough pain, he does not need refills today.  -Advised patient to continue Tylenol as needed for mild to moderate pain.  Discussed side effects of medications and proper use. Patient verbalized understanding.    -PHYSICAL THERAPY: Encouraged patient to continue physical therapy home exercise program which he does diligently on a regular basis, if he  is a nonsurgical candidate we could consider further physical therapy either traditional or possibly pool therapy.  Discussed the importance of core strengthening, ROM, stretching exercises with the patient and how each of these entities is important in decreasing pain.  Explained to the patient that the purpose of physical therapy is to teach the patient a home exercise program.  These exercises need to be performed every day in order to decrease pain and prevent future occurrences of pain.        -PATIENT EDUCATION:  25 minutes of total visit time was spent face to face with the patient today, 60 % of the visit was spent on counseling, education, and coordinating care.   -5 minutes spent outside of visit time, non-face-to-face time, reviewing chart.    -FOLLOW UP: Follow-up for neurosurgical evaluation then as needed here at the spine center if he is a nonsurgical candidate.  Advised to contact clinic if symptoms worsen or change.    Subjective:     Tanmay Israel is a 78 y.o. male who presents today for follow-up regarding ongoing significant right-sided low back pain that radiates to the right buttock posterior thigh that stops at the knee that is significant with walking up to a 9 at its worst.  He does report that he got good relief initially with right SI joint steroid injection 12/17/2018 but no relief beyond a couple of weeks and pain is back to baseline at this time.  Patient is very frustrated with the amount of pain and does find it debilitating at this time and is open to discussing surgical options again.  Patient denies any numbness or tingling sensations, denies left leg symptoms, denies recent trips or falls or balance changes, does feel generalized weakness worse on his right leg however.    **Patient saw Dr. Alicia last 9/27/2017 who recommended SI joint steroid injection however did discuss that if he fails to get further relief with SI injection/therapy, can trial injection to target stenosis at  the L4-5 level.  Both of these injections have been completed however with minimal benefit.      Treatment to Date: History right L5-S1 laminectomy [facility] orthopedics.  Physical therapy [facility] ×4 sessions 11/29/2017.  [facility] on his own in the past however unable to do currently due to increased pain.  Acupuncture recently with some relief.      SPR Right L4-5 TF JOSUÉ 7/12/2016 with no benefit.  Procedure 8/10, postprocedure 0/10 on procedure note.  SPR CT-guided Right SI joint steroid injection 9/20/2016.  SPR Right SI joint steroid injection 2/15/2017, 5/23/2017, 9/8/2017, with typical relief except the last 9/8/2017 he reports he got minimal relief.      Right SI joint steroid injection Dr. Cage 11/29/2017 with 10% relief.  Right L4-5 TF JOSUÉ 2/15/2018 with 40% relief ×3 months.  Preprocedure pain 8/10, post 1/10.  Right L5-S1 TF JOSUÉ 5/23/2018 with 90% relief ×3-4 days, NO lasting relief. Preprocedure pain 8/10, post 3/10.  FAILED Right L3, L4, L5 MBB 6/15/2018.  Right SI joint 7/11/2018 with moderate relief per patient.  Preprocedure pain 8/10, post 0/10.  Right SI joint steroid injection 12/17/2018 with moderate relief times 2 weeks only then returned to baseline pain.  Preprocedure pain 8/10, post 7/10.      Medications:  Voltaren gel is helping  Lidoderm patches  Tylenol occasional with minimal relief.  Previous tramadol prescription via PCP currently taking very infrequently as he does not want to take this regularly.  Does provide some benefit however.    Patient Active Problem List   Diagnosis     Right Rotator Cuff Tendonitis     Osteoarthritis Of The Knee     Joint Pain, Localized In The Right Shoulder     Difficulty Breathing (Dyspnea)     Esophageal Reflux     Muscle Aches, Generalized (Myalgias)     Lumbago     Obstructive sleep apnea     Sciatica     Joint Pain, Localized In The Knee     Ptosis Of Eyelid     Hyperlipidemia     Anemia     Crohn's (Granulomatous) Colitis     Benign  Essential Hypertension     Coronary atherosclerosis of native coronary artery     Ischemic cardiomyopathy     Paroxysmal ventricular tachycardia (H)     Dry Nonexudative Macular Degeneration     Serum Enzyme Levels - ALT (SGPT) Elevated     Serum Enzyme Levels - Alkaline Phosphatase Elevated     Dysphagia     Presence of automatic cardioverter/defibrillator (AICD)--- Medtronic single lead     Spondylolisthesis of lumbar region     Lumbar stenosis with neurogenic claudication     Sacroiliac joint dysfunction of right side     Stage 3 chronic kidney disease (H)     Fall     Multiple fractures of cervical spine, closed, initial encounter (H)     Closed fracture of distal end of left radius, unspecified fracture morphology, initial encounter     Closed fracture of distal end of right radius, unspecified fracture morphology, initial encounter     Warfarin-induced coagulopathy (H)     Closed fracture of first thoracic vertebra, unspecified fracture morphology, initial encounter (H)     A-fib (H)     Anemia, unspecified type     Cerebral aneurysm     Osteoporosis     Ileitis     Calculus of ureter     Pyuria     Heart failure with reduced ejection fraction (H)     Diaphragmatic hernia     Pharyngoesophageal dysphagia     History of Crohn's disease     Hypertension     Iron deficiency anemia     Polyp of duodenum     Polyp of stomach and duodenum     Reflux esophagitis     Rotator cuff tear arthropathy of right shoulder     Schatzki's ring       Current Outpatient Medications on File Prior to Encounter   Medication Sig Dispense Refill     acetaminophen (TYLENOL) 325 MG tablet Take 650 mg by mouth every 4 (four) hours as needed for pain. Indications: Pain       bumetanide (BUMEX) 1 MG tablet Take 0.5 tablets (0.5 mg total) by mouth every other day. 360 tablet 3     carvedilol (COREG) 25 MG tablet Take 1.5 tablets (37.5 mg total) by mouth 2 (two) times a day. 270 tablet 1     cholecalciferol, vitamin D3, 1,000 unit tablet  Take 2,000 Units by mouth daily.       diphenoxylate-atropine (LOMOTIL) 2.5-0.025 mg per tablet Take 1 tablet by mouth 2 (two) times a day as needed.       FERROUS FUMARATE (IRON ORAL) Take 1 tablet by mouth daily.        losartan (COZAAR) 50 MG tablet Take 1 tablet (50 mg total) by mouth daily.       lovastatin (MEVACOR) 40 MG tablet Take 1 tablet by mouth daily.       magnesium L-lactate (MAGTAB) 84 mg TbER Take 1 tablet (84 mg total) by mouth daily.  0     multivitamin with iron (ONE DAILY WITH IRON) Tab tablet Take 1 tablet by mouth daily.        omeprazole (PRILOSEC) 20 MG capsule Take 20 mg by mouth 2 (two) times a day.       ondansetron (ZOFRAN) 8 MG tablet Take 1 tablet (8 mg total) by mouth every 8 (eight) hours as needed for nausea. 30 tablet 1     rosuvastatin (CRESTOR) 20 MG tablet Take 1 tablet (20 mg total) by mouth at bedtime. 90 tablet 3     warfarin (COUMADIN) 2.5 MG tablet Take 1.25-2.5 mg by mouth See Admin Instructions. Take 1.25 mg (0.5 tablet) on Wednesdays/Saturdays and 2.5 mg rest of week. Adjust dose based on INR results as directed.       No current facility-administered medications on file prior to encounter.        No Known Allergies    Past Medical History:   Diagnosis Date     Anemia      Arthritis      Atrial fibrillation (H)      Back pain      Callus      Cerebral aneurysm      Chronic kidney disease      Chronic systolic congestive heart failure (H)      Coronary artery disease      Crohn's disease (H)      Dysphagia      GERD (gastroesophageal reflux disease)      Hyperlipidemia      Hypertension      ICD (implantable cardioverter-defibrillator) in place     Medtronic     Ischemic cardiomyopathy      Kidney stone      Low back pain      Macular degeneration      Permanent atrial fibrillation (H)     [id number] risk score = 5 (age3/ CAD/ HTN/ CHF) Chronic warfarin     Sleep apnea         Review of Systems  ROS: Positive for generalized weakness left greater than right lower  extremity.  Specifically negative for bowel/bladder dysfunction, balance changes, headache, dizziness, foot drop, fevers, chills, appetite changes, nausea/vomiting, unexplained weight loss. Otherwise 13 systems reviewed are negative. Please see the patient's intake questionnaire from today for details.    Reviewed Social, Family, Past Medical and Past Surgical history with patient, no significant changes noted since prior visit.     Objective:     /58 (Patient Site: Right Arm, Patient Position: Sitting)   Pulse 73   Wt 194 lb (88 kg)   SpO2 97%   BMI 28.65 kg/m      PHYSICAL EXAMINATION:    --CONSTITUTIONAL: Well developed, well nourished, healthy appearing individual.  --PSYCHIATRIC: Appropriate mood and affect. No difficulty interacting due to temper, social withdrawal, or memory issues.  --SKIN: Lumbar region is dry and intact.   --RESPIRATORY: Normal rhythm and effort. No abnormal accessory muscle breathing patterns noted.   --MUSCULOSKELETAL:  Normal lumbar lordosis noted, no lateral shift.  --GROSS MOTOR: Easily arises from a seated position. Gait is non-antalgic  --LUMBAR SPINE:  Inspection reveals no evidence of deformity. Range of motion is not limited in lumbar flexion, extension, lateral rotation. No tenderness to palpation lumbar spine. Straight leg raising in the supine position is negative to radicular pain. Sciatic notch non-tender.   --SACROILIAC JOINT: One Finger point test positive.  --LOWER EXTREMITY MOTOR TESTING:  Plantar flexion left 5/5, right 5/5   Dorsiflexion left 5/5, right 5/5   Great toe MTP extension left 5/5, right 5/5  Knee flexion left 5/5, right 5/5  Knee extension left 5/5, right 5/5   Hip flexion left 5/5, right 5/5  Hip abduction left 5/5, right 5/5  Hip adduction left 5/5, right 5/5   --HIPS: Full range of motion bilaterally. Negative FABERs on the involved lower extremity.   --NEUROLOGIC: Bilateral patellar and achilles reflexes are physiologic and symmetric.  Sensation to light touch is intact in the bilateral L4, L5, and S1 dermatomes.    RESULTS:   Imaging: Lumbar spine imaging was reviewed today. The images were shown to the patient and the findings were explained using a spine model.      Ct Lumbar Spine Without Contrast  Result Date: 5/14/2018  INDICATION: Spinal stenosis, lumbar. Ongoing right lumbar radiculitis. TECHNIQUE: Helical thin-section CT scan of the spine was performed using bone reconstruction algorithm. From the axial source data, sagittal and coronal thin reformats. Dose reduction techniques were used. IV CONTRAST: None. COMPARISON: 9/9/2015. FINDINGS:  Nomenclature is based on 5 lumbar type vertebral bodies. Vertebral body heights are unremarkable and unchanged from 9/9/2015. No destructive bony lesions are demonstrated. L5-S1 laminectomy and right facetectomy have been performed. Nondisplaced right L4 pars defect again demonstrated. There is no evidence of an acute displaced fracture. The imaged portions of the bony pelvis appear intact. There are mild degenerative changes of the sacroiliac joints. There is L4-L5 anterolisthesis of approximately 4 mm, not appreciably changed. L3-L4 retrolisthesis of 2 mm is also unchanged. L5-S1 anterolisthesis of 2 mm is similar to prior. Alignment is otherwise unremarkable in the sagittal plane. There is mild levoconvex lower lumbar curvature. Grossly unremarkable paraspinal soft tissues. There is scattered atherosclerotic change of the abdominal aorta and its branches. No abdominal aortic aneurysm is demonstrated. There is colonic diverticulosis.   T12-L1: Mild intervertebral disc height loss. No significant posterior disc abnormality. Prominent anterior and lateral disc osteophyte complexes. Mild facet arthropathy. No significant spinal canal stenosis. No right neural foraminal stenosis. No left neural foraminal stenosis.   L1-L2: Mild intervertebral disc height loss. Mild broad-based disc bulging. Mild  ligamentum flavum thickening and mild facet arthropathy. Mild to moderate spinal canal stenosis. Mild right neural foraminal stenosis. Mild left neural foraminal stenosis.   L2-L3: Moderate intervertebral disc height loss, with broad-based disc osteophyte complex. Mild facet arthropathy. Bilateral lateral recess stenosis. Mild to moderate spinal canal stenosis. Moderate right neural foraminal stenosis. Mild to moderate left neural foraminal stenosis.   L3-L4: Mild intervertebral disc height loss with broad-based disc osteophyte complex. Ligamentum flavum thickening and moderate facet arthropathy. Moderate spinal canal stenosis. Moderate right neural foraminal stenosis. Moderate left neural foraminal stenosis. Bilateral lateral recess stenosis.   L4-L5: Unchanged anterolisthesis as above, with uncovering of the dorsal disc margin. There is superimposed broad-based disc bulging. Severe bilateral facet arthropathy, left greater than right. Moderate to severe spinal canal stenosis. Severe right neural foraminal stenosis. Severe left neural foraminal stenosis.   L5-S1: Normal disc height. Mild bulge. No definite right-sided facet articulation demonstrated, similar to prior. Moderate left facet arthropathy. Mild spinal canal stenosis. Severe right neural foraminal stenosis. Severe left neural foraminal stenosis.   CONCLUSION:   1.  Postoperative changes of L5-S1 laminectomy and right facetectomy.   2.  At L4-L5, there is severe bilateral neural foraminal stenosis and moderate to severe spinal canal stenosis, slightly progressed.   3.  At L5-S1, there is severe bilateral neural foraminal stenosis, similar to prior.   4.  At L3-L4, there is moderate bilateral neural foraminal stenosis and bilateral lateral recess stenosis.   5.  Additional degenerative changes as described.        XR SCOLIOSIS AP OR PA AND LATERAL STANDING  10/19/2015   INDICATION: Low back pain. Thoracic kyphosis.  COMPARISON: None.  FINDINGS: Twelve  rib-bearing thoracic-type vertebral bodies. Five lumbar type vertebral bodies. Thoracic kyphosis estimated at 51 degrees. Lumbar lordosis between L1 and S1 estimated at 65 degrees. Right convex asymmetry of the thoracolumbar spine   measuring 8 degrees between T11 and L3.  Several bridging osteophytes in the thoracic spine, pattern that is consistent with diffuse idiopathic skeletal hyperostosis. Prominent left lateral T12-L1 osteophyte and ventral L2-L3 and L3-L4 osteophytes, but not definitely bridging. 5 mm   anterolisthesis of L4 on L5 and 3 mm anterolisthesis of L5 on S1. Prominent facet arthropathy lower lumbar spine. No acute superimposed bony abnormality of the thoracolumbar spine, without definite fracture.  Dense aortic calcification, normal caliber. Left hemithorax generator with two pacemaker leads to the heart. Sternal wires. Reverse right shoulder arthroplasty. Moderate degenerative change both hips.          CT LUMBAR SPINE MYELOGRAM  9/9/2015  INDICATION: Lumbar stenosis.  TECHNIQUE: Helical images were reconstructed in the axial plane at 1.25-mm increments, with sagittal and coronal reformations. Intrathecal contrast introduced under separate procedure and described with that procedure.  COMPARISON: None.  FINDINGS: Five lumbar-type vertebral bodies. Slightly exaggerated lordosis at L4-L5 on a degenerative basis. 4-mm anterior subluxation of L4 on L5. 2-mm anterior subluxation of L5 on S1. Slight retrolisthesis of L2 on L3 and L3 on L4. Left convex   scoliosis measuring 14 degrees between L3 and L5. Much of this is at L4-L5 from asymmetric disc height loss. Right-sided L4 pars defect. L5-S1 wide laminectomy with right facetectomy. No destructive bony lesion is apparent. No fracture is evident.  Conus tip ends at T12-L1. Cauda equina generally unremarkable. Paraspinous soft tissues are grossly normal. Moderate paraspinous muscle volume loss. Normal aortic caliber, moderate calcification. Mild to  moderate degenerative change SI joints.  T12-L1: Slight disc height loss. Left anterior interbody spurring. Mild facet arthropathy. No central canal stenosis. No foraminal stenosis.  L1-L2: Slight disc height loss. Mild circumferential disc bulging. Left anterolateral interbody spur. Mild to moderate facet arthropathy. No central canal stenosis. No foraminal stenosis.  L2-L3: Moderate disc height loss. Moderate circumferential interbody spur and disc bulging, more pronounced anteriorly. Mild facet arthropathy. No central canal stenosis. Mild right foraminal stenosis. No left foraminal stenosis.  L3-L4: Mild disc height loss. Mild to moderate circumferential disc bulging. Mild to moderate facet arthropathy. Some calcification of the right facet cartilage. Mild central canal stenosis. No lateral recess stenosis. Mild to moderate right foraminal   stenosis. Borderline moderate left foraminal stenosis.  L4-L5: Mild disc height loss greater dorsally. At least moderate circumferential disc bulging. Prominent facet arthropathy with diastatic left facet. Moderately severe central canal stenosis. Despite this, no definite lateral recess stenosis. Severe   right and moderate left foraminal stenoses from disc bulging.  L5-S1: Disc height normal. Slight disc bulging. Sagittally oriented left facet. Effectively absent right facet. No central canal stenosis. Moderately severe bilateral foraminal stenoses.  CONCLUSION:  1.  Previous lumbar spine surgery with L5-S1 wide laminectomy and right facetectomy.  2.  Degenerative changes most prominent at L4-L5 and L5-S1. The facets are sagittally oriented. This predisposes to subluxation.  3.  At L4-L5, moderately severe central canal stenosis without grossly apparent lateral recess stenosis. Severe right and moderate left foraminal stenoses.  4.  At L5-S1, moderately severe bilateral foraminal stenoses.  5.  At L3-L4, borderline moderate left foraminal stenosis. Mild central canal  stenosis.

## 2021-06-22 NOTE — TELEPHONE ENCOUNTER
Who is calling:  Latasha from [facility]  Reason for Call:  Latasha called to inform us that patient needs to have warfarin held for 4 days prior to procedure and bridging if needed. No specific INR range just a hold.  Date of last appointment with primary care: 12/4/18  Has the patient been recently seen:  Yes  Okay to leave a detailed message: Yes

## 2021-06-22 NOTE — PATIENT INSTRUCTIONS - HE
Please call [facility] to schedule your appointment. [facility] do accept walk-in visit as well.     [place] [facility]  [address] [place] [address], [address]  [place] [address]  [phone] (phone)  [phone] (fax)      Discussed the importance of core strengthening, ROM, stretching exercises with the patient and how each of these entities is important in decreasing pain.  Explained to the patient that the purpose of physical therapy is to teach the patient a home exercise program.  These exercises need to be performed every day in order to decrease pain and prevent future occurrences of pain.        ~Please call Nurse Navigation line [phone] with any questions or concerns about your treatment plan, if symptoms worsen and you would like to be seen urgently, or if you have problems controlling bladder and bowel function.  ~Follow Up Appointment time slots with Jyoti Her CNP with the [facility], are also available at the [facility] location near [facility] on the first and third THURSDAY afternoons of each month.

## 2021-06-22 NOTE — TELEPHONE ENCOUNTER
[facility]    Returned call to [place]. Spoke to Shelley and relayed pre-procedure orders for colonoscopy:      Okay to hold warfarin 4 days prior to procedure    No bridge    INR check 1-2 weeks prior to procedure      Rubina Nation, PharmD  [facility]

## 2021-06-22 NOTE — TELEPHONE ENCOUNTER
RENETTA-PROCEDURAL ANTICOAGULATION  MANAGEMENT    Assessment     Warfarin interruption plan for [facility] procedure.    Hx of Atrial fibrillation      Risk stratification for thromboembolism: moderate. (2017 ACC periprocedure pathway for NVAF)     [id number] = 5 (CHF (EF 35%), Hypertension, Age > 75, Vascular)    No noted hx of stroke or TIA      ACC guidance suggests no bridge for most moderate risk patients without hx of stroke/TIA    Recommendation:      Okay to hold warfarin 4 days prior to procedure    No bridge    INR 1-2 weeks prior to procedure and ~ 1 week after restarting warfarin.    Restart warfarin evening of procedure if okay with provider doing procedure.    Rubina Nation, PharmD    Subjective/Objective:      Tanmay Israel, a 78 y.o. male    Reason for Anticoagulation: Atrial Fibrillation    Goal INR Range: 2-3    Patient bridged in past: No    Pertinent History: Crohn's disease, CKD,  Ischemic cardiomyopathy, hypertension, CAGB (8/2004)    Wt Readings from Last 3 Encounters:   01/04/19 194 lb (88 kg)   12/24/18 192 lb (87.1 kg)   12/14/18 191 lb (86.6 kg)      Lab Results   Component Value Date    INR 2.90 (H) 01/03/2019    INR 2.50 (!) 12/17/2018    INR 2.70 (H) 12/04/2018     Lab Results   Component Value Date    HGB 10.6 (L) 12/21/2018    HCT 31.0 (L) 12/21/2018     12/21/2018     Lab Results   Component Value Date    CREATININE 1.43 (H) 12/21/2018    CREATININE 2.01 (H) 12/04/2018    CREATININE 2.67 (H) 11/26/2018

## 2021-06-22 NOTE — PROGRESS NOTES
Neurosurgery consultation was requested by: Jyoti Her CNP  Pain: LBP  Radicular Pain is present: Pain radiates into buttock and back of both legs to the knees  Lhermitte sign: No  Motor complaints: Weakness in both legs   Sensory complaints: Denies numbness and tingling   Gait and balance issues: NO   Bowel or bladder issues: Some bladder issues   Duration of SX is: Many years   The symptoms are worse with: Walking   The symptoms are better with: Injections   Injury: Denies   Severity is: Chronic   Patient has tried the following conservative measures: He has done PT and did not help. He has also done injections and states he has had significant relief.  CANDIDA score is:  JSSANDY tipton

## 2021-06-22 NOTE — TELEPHONE ENCOUNTER
Reached out to patient with recommendations.  Patient verbalized understanding and will take an extra bumex 0.5 mg(1/2 tablet) today and 1 mg tomorrow.  He will go to the ED for worsening shortness of breath.    Message sent to device  to call patient for f/u appointment with Debra on Monday, January 14th.

## 2021-06-23 NOTE — TELEPHONE ENCOUNTER
ANTICOAGULATION  MANAGEMENT    Assessment     Today's INR result of 2.4 is Therapeutic (goal INR of 2.0-3.0)        Warfarin taken as previously instructed    No new diet changes affecting INR    No new medication/supplements affecting INR    Continues to tolerate warfarin with no reported s/s of bleeding or thromboembolism     Previous INR was Therapeutic     Colonoscopy 4/11/19- hold warfarin 4 days without bridge (see encounter 1/4/19).     Plan:     Spoke with Tanmay regarding INR result and instructed:     Warfarin Dosing Instructions:  Continue current warfarin dose 2.5 mg daily.  Instructed patient to follow up no later than: 2 weeks.     Education provided: importance of taking warfarin as instructed    Tanmay verbalizes understanding and agrees to warfarin dosing plan.    Instructed to call the [facility] for any changes, questions or concerns. (#[phone])   ?   Edgar Ortez RN    Subjective/Objective:      Tanmay Israel, a 78 y.o. male is on warfarin.     Tanmay reports:     Home warfarin dose: verbally confirmed home dose with pt and updated on anticoagulation calendar     Missed doses: No     Medication changes:  No     S/S of bleeding or thromboembolism:  No     New Injury or illness:  No     Changes in diet or alcohol consumption:  No     Upcoming surgery, procedure or cardioversion:  Yes: colonoscopy 4/11.     Anticoagulation Episode Summary     Current INR goal:   2.0-3.0   TTR:   57.9 % (4 y)   Next INR check:   2/15/2019   INR from last check:   2.40 (2/1/2019)   Weekly max warfarin dose:      Target end date:      INR check location:      Preferred lab:      Send INR reminders to:   ANTICOAGULATION POOL B (JENNA,MARTA,STW,RVL,OAK,RLN)       Comments:            Anticoagulation Care Providers     Provider Role Specialty Phone number    Go Ramírez MD Referring Family Medicine [phone]

## 2021-06-23 NOTE — TELEPHONE ENCOUNTER
Madyson, please call Tanmay.    OptiVol reviewed and he continues to retain fluid. Increase bumex to 1 mg twice daily.     I want to see him in clinic tomorrow but he declined when I offered this yesterday.  There is a RAC appointment today at [facility] which is another option.  He needs labs checked and I may need to adjust his diuretics further.     If he refuses to come in for an appointment, he needs a BMP today or tomorrow at least.    If his breathing has worsened since yesterday, he should go to ED.

## 2021-06-23 NOTE — PROGRESS NOTES
Labs overall stable.  He is not responding to increasing bumex.  OptiVol remains elevated and he has shortness of breath with minimal activity.  Continue bumex 1 mg twice daily. Take metolazone 2.5 mg once tomorrow 30 minutes before morning dose of bumex.  Prescription sent.  He should follow up with me next week and call if there are any worsening symptoms.

## 2021-06-23 NOTE — PROCEDURES
[facility] Radiology Post Procedure    Procedure: lumbar puncture for myelogram    Radiologist: Gurjit Allen    Contrast: 15 ml omnipaque 180  Fluoro Time: 6.1 minutes  Number of images: 4     EBL: minimal  Complications: none    Preliminary findings: (see dictation for full detail)  Severe spinal stenosis at L4-5    Assess/Plan:   To CT for myelogram  Bedrest for 2 hours.    Gurjit Allen MD

## 2021-06-23 NOTE — PROGRESS NOTES
Upon arrival to IR post following procedure, pt c/o shortness of breath. Pt reports he became short of breath during the myelogram.  Pt request to take his home medications that he did not take this am, pt permitted to do so.  Pt lungs sounds are decreased but clear, pt is tachypnic with a RR in the 20's on arrival to IR post, O2 sats decreased from pre-procedure.  Radiologist informed, will continue to monitor.

## 2021-06-23 NOTE — TELEPHONE ENCOUNTER
----- Message from Debra Rowe CNP sent at 1/29/2019 11:26 AM CST -----  I was supposed to see Tanamy tomorrow but appointment was cancelled.  Can you please see how he is doing after taking a dose of metolazone last week?  I am seeing him 2/1 now.    Thanks

## 2021-06-23 NOTE — PROGRESS NOTES
Per pts request pt cardiology NP paged.  Return call from Debra Mendez NP received. Debra agrees with care plan to refer pt to ED if RR, shortness of breath and work of breathing not improved by DC time at 1400.  If pts Sx improved Cards NP agrees pt may DC to home per Neuro Rad direction, with Cards NP additional direction pt may take second dose of Bumex this evening PRN return of shortness of breath.  Pt updated on communication with Debra, he verbalizes understanding of plan.

## 2021-06-23 NOTE — PATIENT INSTRUCTIONS - HE
Tanmay Israel,    It was a pleasure to see you today at the [facility].     My recommendations after this visit include:  - You will be called with the results of your labs.   - Your INR nurse will call today with your results.   - Continue to monitor for signs of retaining fluid (increasing weights, shortness of breath, swelling) and call with any concerns. [phone]   - Follow up in 1 month      Debra Rowe, CNP

## 2021-06-23 NOTE — PROGRESS NOTES
No change in pts c/o shortness of breath.  O2 applied at 2L via NC r/t pt work of breathing and tachynpnea.  Radiologist, Dr Allen paged to update on pt status.

## 2021-06-23 NOTE — TELEPHONE ENCOUNTER
I will see him on February 12 and make a determination on that day what we need to do.  I have spoken with the anticoagulation team and we should be able to stop anticoagulation without bridging for the procedure.  I think it would be reasonable to begin holding antic regulation after tomorrow for a surgery date of February 18, 2019.  We will look at his cardiac situation more depth and make a determination if we need to get him in to see cardiology before moving forward.

## 2021-06-23 NOTE — TELEPHONE ENCOUNTER
ANTICOAGULATION  MANAGEMENT    Assessment     1/17's INR result of 1.58 is Subtherapeutic (goal INR of 2.0-3.0)        Warfarin recently held as instructed which may be affecting INR- Resumed warfarin yesterday 1/17 after procedure.     No new diet changes affecting INR    No new medication/supplements affecting INR    Continues to tolerate warfarin with no reported s/s of bleeding or thromboembolism     Previous INR was Therapeutic     ED visit 1/17 for shortness of breath.     Plan:     Spoke with Tanmay regarding INR result and instructed:     Warfarin Dosing Instructions: Pt took booster dose of 5 mg 1/17 as instructed. Continue current warfarin dose 2.5 mg daily.     Instructed patient to follow up no later than: 1 week. Appt is made for 1/25.    Education provided: importance of taking warfarin as instructed    Tanmay verbalizes understanding and agrees to warfarin dosing plan.    Instructed to call the [facility] for any changes, questions or concerns. (#[phone])   ?   Edgar Ortez RN    Subjective/Objective:      Tanmayumberto Israel, a 78 y.o. male is on warfarin.     Tanmay reports:     Home warfarin dose: verbally confirmed home dose with pt and updated on anticoagulation calendar     Missed doses: No     Medication changes:  Yes, increased Bumex     S/S of bleeding or thromboembolism:  No     New Injury or illness:  No     Changes in diet or alcohol consumption:  No     Upcoming surgery, procedure or cardioversion:  No    Anticoagulation Episode Summary     Current INR goal:   2.0-3.0   TTR:   57.6 % (4 y)   Next INR check:   1/24/2019   INR from last check:   1.58! (1/17/2019)   Weekly max warfarin dose:      Target end date:      INR check location:      Preferred lab:      Send INR reminders to:   ANTICOAGULATION POOL B (MPW,ANA CRISTINAG,STW,RVL,OAK,RLN)       Comments:            Anticoagulation Care Providers     Provider Role Specialty Phone number    Go Ramírez MD Referring Family Medicine  [phone]

## 2021-06-23 NOTE — PROGRESS NOTES
"NEUROSURGERY FOLLOWUP  NOTE    Tanmay Israel comes today in f/u after prior apt on 1/9/19 for low back and right leg pain. Has a h/o of a L5-S1 laminectomy presents with right lower back and leg pain and weakness. Patient appears to have SI joint pain in addition to neurogenic claudication from lumbar stenosis most prominent at L4-5 with anterolisthesis at this level. Only CT lumbar spine wo contrast for review at that time. Patient has a pacemaker.  Also described increased difficulties with fine motor tasks with known history of cervical-thoracic spine fx 2/2 a fall from 6 feet. Slightly brisk in reflexes and marshall on the L. CT myelogram was obtained showing severe cervical stenosis most significant at C5-6.      The pts PMH, PSH, ROS, Meds, Allergies, SH, FH are all unchanged and summarized in the pts health history from last visit        PHYSICAL EXAM:   Constitutional: /62   Pulse 68   Ht 5' 9\" (1.753 m)   Wt 192 lb (87.1 kg)   SpO2 97%   BMI 28.35 kg/m       Mental Status: A & O in no acute distress.  Affect is appropriate.  Speech is fluent.  Recent and remote memory are intact.  Attention span and concentration are normal.     Cranial Nerves: CN1: grossly intact per patient recall. CN2: No funduscopic exam performed. CN3,4 & 6: Pupillary light response, lateral and vertical gaze normal.  No nystagmus.  Visual fields are full to confrontation. CN5: Intact to touch CN7: No facial weakness, smile, facial symmetry intact. CN8: Intact to spoken voice. CN9&10: Gag reflex, uvula midline, palate rises with phonation. CN11: Shoulder shrug 5/5 intact bilaterally. CN12: Tongue midline and moves freely from side to side.     Motor: No pronator drift of upper extremity. Normal bulk and tone all muscle groups of upper and lower extremities.     Sensory: Sensation intact bilaterally to light touch.      Coordination:   Heel/toe/ gait intact.  Unable to tandem gait      Reflexes; supinator, biceps, triceps, " knee/ ankle jerk intact- brisk in BUE and LLE, less brisk in RLE.  L hoffmans/   No babinski/ clonus.    IMAGING:   I personally reviewed all radiographic images        CONSULTATION ASSESSMENT AND PLAN:    77 yo male with L4-5 symptomatic stenosis and recent CT myelogram also showing severe cervical stenosis. We discussed the results of the CT myelogram including that it revealed cord compression severe at the C5-6 level and moderate canal narrowing at the C4-5 level. This would explain his cervical myelopathy symptoms included decline fine motor skills of his hands as well as hyperreflexia and hoffmans on exam. I recommended cervical laminoplasty C4-6 prior to proceeding with a lumbar decompression given that he is symptomatic from cervical cord compression. This was finding was surprising to himself and his niece. They would like to think about proceeding with a laminoplasty and get a second opinion from one of my partners. We will coordinate this today for him.     I spent more than 25 minutes in this apt, examining the pt, reviewing the scans, reviewing notes from chart, discussing treatment options with risks and benefits and coordinating care. >50 % clinic time was spent in face to face counseling and coordinating care    Addendum: Patient met with Dr Rodrigues who recommended the cervical surgery prior to addressing his lumbar spine as well. He is agreeable now to a C4-6 laminoplasty.     Liza Cesar      CC:     Go Ramírez MD  [id number] [address]

## 2021-06-23 NOTE — PROGRESS NOTES
Neurosurgery consultation was requested by: Jyoti Her CNP and Dr. Cesar   Pain: LBP  Radicular Pain is present: Pain radiates into buttock and back of both legs to the knees  Lhermitte sign: No  Motor complaints: Weakness in both legs   Sensory complaints: Denies numbness and tingling   Gait and balance issues: NO   Bowel or bladder issues: Some bladder issues   Duration of SX is: Many years   The symptoms are worse with: Walking   The symptoms are better with: Injections   Injury: Denies   Severity is: Chronic   Patient has tried the following conservative measures: He has done PT and did not help. He has also done injections and states he has had significant relief.  CANDIDA score is:  JSSANDY tipton

## 2021-06-23 NOTE — TELEPHONE ENCOUNTER
Dr. Ramírez,    Reviewed chart:      Hx Atrial fibrillation with goal INR 2-3    Nayan has gone off warfarin in the past without bridge ( 2015, 2016, 2018)    Lab Results   Component Value Date    INR 2.40 (!) 02/01/2019    INR 2.50 (H) 01/24/2019    INR 1.58 (H) 01/17/2019       Assessment      Risk stratification appears unchanged      Risk stratification for thromboembolism: moderate. (2017 ACC periprocedure pathway for NVAF)  ? [id number] = 5 (CHF  with EF 35%, HTN, Age > 75, Vascular)  ? No hx stroke/TIA     ACC guidance suggests no bridge for most moderate risk patients without hx of stroke/TIA      Previously tolerated hold without bridge      Recent INRs therapeutic    ________________________________________  Recommendation:      Okay to hold warfarin 5 days prior to procedure      No bridge      Post surgical: inpatient evaluation for VTE prophylaxis based on surgical bleed risk and duration of hospitalization as warfarin is resumed.    Rubina Nation, PharmD

## 2021-06-23 NOTE — PATIENT INSTRUCTIONS - HE
Tanmay Israel,    It was a pleasure to see you today at the [facility].     My recommendations after this visit include:  - Increase bumex to 0.5 mg (1/2 tablet) daily.   - You will be called with the results of your labs.   - Limit salt in your diet.   - Do not use ibuprofen, Advil, or Aleve.  These medications can make you retain fluid and can hurt your kidneys. You can use acetaminophen (Tylenol) for pain.   - I will get a remote device check next Monday.  We will call once this report is back.   - Follow up in 1 month.  - Continue to monitor for signs of retaining fluid (increasing weights, shortness of breath, swelling) and call with any concerns. [phone]      Debra Rowe, CNP

## 2021-06-23 NOTE — PROGRESS NOTES
NEUROSURGERY CONSULTATION NOTE:    Assessment:    1. Cord compression myelopathy (H)     2. Sacroiliac joint pain     3. Lumbar radiculopathy           Plan: I concur with Dr. Cesar that a cervical laminoplasty is in order.  I did explain the risks and benefits and the logic and rationale of this recommendation.  He has sacroiliac pain as he responded to the injection for nearly 2 weeks.  He may have a component of lumbar radiculopathy on the right due to right L4-5 lateral stenosis.  He also has foraminal stenosis at L5-S1.  This should be addressed after his cervical spine is addressed.  He would like to proceed with both myself and Dr. Cesar.  I will try to arrange this.  He will need to have cardiac clearance as he does have congestive heart failure.  He will also need to be cleared by Dr. Ramírez.  Time spent with him and his niece was 45 minutes in review of information, personal interpretation of imaging, documentation, coordination of care and face-to-face discussion.    Tanmay Israel   [address]  [facility] [address]  78 y.o. male is sent to me in consultation   by Go Ramírez MD     CC: Cord compression myelopathy    HPI:  Neurosurgery consultation was requested by: Jyoti Her CNP and Dr. Cesar   Pain: LBP  Radicular Pain is present: Pain radiates into buttock and back of both legs to the knees  Lhermitte sign: No  Motor complaints: Weakness in both legs   Sensory complaints: Denies numbness and tingling   Gait and balance issues: NO   Bowel or bladder issues: Some bladder issues   Duration of SX is: Many years   The symptoms are worse with: Walking   The symptoms are better with: Injections   Injury: Denies   Severity is: Chronic   Patient has tried the following conservative measures: He has done PT and did not help. He has also done injections and states he has had significant relief.        PROBLEM LIST:  1. Cord compression myelopathy (H)     2. Sacroiliac joint pain      3. Lumbar radiculopathy            REVIEW OF SYSTEMS:  A 12 point review is positive for personal history of arthritis.  The neurological review is listed above.  Otherwise, the review is negative.      Past Medical History:   Diagnosis Date     Anemia      Arthritis      Atrial fibrillation (H)      Back pain      Callus      Cerebral aneurysm      Chronic kidney disease      Chronic systolic congestive heart failure (H)      Coronary artery disease      Crohn's disease (H)      Dysphagia      GERD (gastroesophageal reflux disease)      Hyperlipidemia      Hypertension      ICD (implantable cardioverter-defibrillator) in place     Medtronic     Ischemic cardiomyopathy      Kidney stone      Low back pain      Macular degeneration      Permanent atrial fibrillation (H)     [id number] risk score = 5 (age3/ CAD/ HTN/ CHF) Chronic warfarin     Sleep apnea          Past Surgical History:   Procedure Laterality Date     APPENDECTOMY       CARDIAC PACEMAKER PLACEMENT  2013    ICD Medtronic     CATARACT EXTRACTION W/  INTRAOCULAR LENS IMPLANT Bilateral      CHOLECYSTECTOMY       ESOPHAGOGASTRODUODENOSCOPY       MI CABG, VEIN, FOUR      Description: Coronary Artery Quadruple Venous Bypass Graft;  Recorded: 10/21/2008;  Comments: 8/2004     ROTATOR CUFF REPAIR Right      TONSILLECTOMY       XR MYELOGRAM CERVICAL THORACIC LUMBAR  1/17/2019         MEDICATIONS:  Current Outpatient Medications   Medication Sig Dispense Refill     acetaminophen (TYLENOL) 325 MG tablet Take 650 mg by mouth every 4 (four) hours as needed for pain. Indications: Pain       bumetanide (BUMEX) 1 MG tablet Take 1 tablet (1 mg total) by mouth 2 (two) times a day at 9am and 6pm. 360 tablet 3     carvedilol (COREG) 25 MG tablet Take 1.5 tablets (37.5 mg total) by mouth 2 (two) times a day. 270 tablet 1     cholecalciferol, vitamin D3, 1,000 unit tablet Take 2,000 Units by mouth daily.       FERROUS FUMARATE (IRON ORAL) Take 1 tablet by mouth daily.         losartan (COZAAR) 50 MG tablet Take 1 tablet (50 mg total) by mouth daily.       lovastatin (MEVACOR) 40 MG tablet Take 1 tablet by mouth daily.       metOLazone (ZAROXOLYN) 2.5 MG tablet TAKE 1 TABLET BY MOUTH ONCE FOR 1 DOSE 3 tablet 0     multivitamin with iron (ONE DAILY WITH IRON) Tab tablet Take 1 tablet by mouth daily.        omeprazole (PRILOSEC) 20 MG capsule Take 20 mg by mouth 2 (two) times a day.       ondansetron (ZOFRAN) 8 MG tablet Take 1 tablet (8 mg total) by mouth every 8 (eight) hours as needed for nausea. 30 tablet 1     rosuvastatin (CRESTOR) 20 MG tablet Take 1 tablet (20 mg total) by mouth at bedtime. 90 tablet 3     warfarin (COUMADIN) 2.5 MG tablet Take 1.25-2.5 mg by mouth See Admin Instructions. Take 1.25 mg (0.5 tablet) on Wednesdays/Saturdays and 2.5 mg rest of week. Adjust dose based on INR results as directed.       No current facility-administered medications for this visit.          ALLERGIES/SENSITIVITIES:     No Known Allergies    PERTINENT SOCIAL HISTORY:   Social History     Socioeconomic History     Marital status: Single     Spouse name: None     Number of children: None     Years of education: None     Highest education level: None   Social Needs     Financial resource strain: None     Food insecurity - worry: None     Food insecurity - inability: None     Transportation needs - medical: None     Transportation needs - non-medical: None   Occupational History     Occupation: retired     Comment: [employer]   Tobacco Use     Smoking status: Former Smoker     Smokeless tobacco: Never Used   Substance and Sexual Activity     Alcohol use: No     Drug use: No     Sexual activity: None       FAMILY HISTORY:  Family History   Problem Relation Age of Onset     Heart disease Mother      Stroke Mother      Crohn's disease Father      Alcohol abuse Brother      No Medical Problems Daughter      No Medical Problems Son      No Medical Problems Maternal Aunt      No Medical Problems  "Maternal Uncle      No Medical Problems Paternal Aunt      No Medical Problems Paternal Uncle      No Medical Problems Maternal Grandmother      No Medical Problems Maternal Grandfather      No Medical Problems Paternal Grandmother      No Medical Problems Paternal Grandfather      Cancer Brother      Urolithiasis Neg Hx      Gout Neg Hx         PHYSICAL EXAM:   Constitutional: BP (!) 129/94   Pulse 75   Ht 5' 9\" (1.753 m)   Wt 192 lb (87.1 kg)   SpO2 98%   BMI 28.35 kg/m      General appearance: Appropriately groomed.  No acute distress.  Interactive.     Mental Status: Mental status: Alert and oriented, mood and affect appropriate, language reception and expression normal, recent and remote memory is normal, higher cortical function normal. Attention span, concentration and ability to follow commands is normal.       Cranial Nerves: Face is symmetric.  Extraocular movements are full, conjugate and without nystagmus.  Hearing is preserved.  Shoulder position is symmetric.  Tongue is midline with normal motion.      Motor: Proximal leg weakness with hip flexor weakness.  This is 4+/5.  He also has to push himself out of the chair.  The remaining muscles in the lower extremities and upper extremities are intact to gross confrontation testing.    Sensory: Sensory exam by subjective report intact to LT,PP,Position and Vib. in the UE and  LE.     Station and Gait: Gait is slow with some wide stance.  He also has some balance issues.  Unable to tandem walk.     Reflexes; hyperreflexia is present with spreading of reflexes.    IMAGING:  I have personally reviewed the images and discussed the findings with Tanmay Israel and his niece.  The myelogram CT shows moderate stenosis at C5-6.  He also has mild to moderate spinal canal narrowing at C4-5.  He has mild spinal canal narrowing at C6-7.  The thoracic spine CT with myelogram does not show any high-grade spinal canal narrowing.  With regards to the lumbar spine, he " has lateral recess stenosis at L4-5 there is bilateral with moderate spinal canal narrowing.  He has moderate to severe right neuroforaminal narrowing at this level and moderate to severe left neuroforaminal narrowing.  At L5-S1 he has moderate to severe bilateral foraminal narrowing.  He has degenerative changes elsewhere in the lumbar spine but less pronounced foraminal stenosis.    CC:     Go Ramírez, [id number] [address] [address]

## 2021-06-23 NOTE — TELEPHONE ENCOUNTER
Called Tanmay to get an update after he was seen in ED yesterday.  ED note reviewed.     He states that his breathing is better today but he still has more shortness of breath than his baseline.      Increase bumex to 1 mg today, Saturday, and Sunday.  He is sending a remote OptiVol check on Monday and I will call him Monday.    He was given the after hours phone number to call with any worsening symptoms over the weekend.

## 2021-06-23 NOTE — PROGRESS NOTES
Interventional Neuroradiology-remote note    Received a call from the nurse caring for patient.  Since getting back from myelogram patient is complaining of shortness of breath, requiring oxygen, and has been tachypnic.   Patient did not take his morning medications (including bumex) but took them when done with procedure.   Patient has not improved.    Discussed with Dr Allen.  He states patient c/o shortness of breath following the injection when positioned in trendelenburg.  Stated his breathing did not feel comfortable in that position.      Management:  -More likely a positional/lung function issue as opposed to an allergic reaction but will give benadryl 50mg IV now to cover that possibility  -If patient does not improve he should be referred to the ER for further evaluation  -Patients nurse, Litzy informed  -Dr Palmer pager [phone]    KILLIAN Fortune, CNP

## 2021-06-23 NOTE — TELEPHONE ENCOUNTER
Spoke with Tanmay about lab results.  Creatinine more elevated.  No symptoms of fluid retention.  He has had renal problems on higher doses of bumex in the past.    Decrease bumex from 1 mg twice daily to 1 mg daily. He will monitor for signs of fluid retention closely and call.    Follow up in 3 weeks.

## 2021-06-23 NOTE — TELEPHONE ENCOUNTER
Called Tanmay with recommendations.  He will increase Bumex to 1 mg twice a day. He is not willing to come into clinic today or tomorrow as he has an appointment tomorrow with the spine surgeon and he does not drive. He declined RAC appointment as well. He will go to the outpatient lab at [place] for lab draw while he is there.    He states his breathing is not getting better but is not worse then yesterday. He continues to loose weight (about a pound) every day.    Encouraged Tanmay to go to the ED for worsening shortness of breath. He verbalized understanding and has no questions at this time.

## 2021-06-23 NOTE — PROGRESS NOTES
Return call from Niki Nava NP. Niki spoke with Dr Allen, per Alejandro pt became shortness of breath when tipped head down during procedure.  Per Niki administer Benadryl and monitor.  Pt does not have wheezing, no rash, no hives, no angio edema, no Sx allergic reaction.  If pt remains shortness of breath after meds ordered DC to the ED at 1400 for f/u.

## 2021-06-23 NOTE — TELEPHONE ENCOUNTER
Called Tanmay for an update on symptoms.  I increased bumex to 1 mg daily after talking with him on 1/18. He denies edema or abdominal bloating.  Weight is decreasing about 1 pound daily but he thinks this is due to decreased appetite.  He continues to have shortness of breath with minimal activity.     OptiVol report sent this morning but have not received it yet.  He will send again this afternoon.     Offered appointment in clinic on Wednesday but he declines at this time.

## 2021-06-23 NOTE — PATIENT INSTRUCTIONS - HE
Provided complete information about approaching surgery.  Discussed discharge planning and expected outcomes after surgery as well as follow-ups and restrictions.  Emphasized on stop taking ASA, NSAID's, vitamins and /or OTC herbal supplements within 10 days and any anticoagulant meds within 7 days prior to surgery.  Reminded patient to bring all pertinent films to hospital the day of surgery.    NPO after midnight    Provided written pamphlet about surgery.  Answered all questions.  The  will call patient with all pre-op orders and instructions.  Patient to complete all required diagnostic tests prior to surgery.  If test are not completed this will cancel the surgery; contact the clinic nurses at [phone] if unable to complete test.

## 2021-06-23 NOTE — TELEPHONE ENCOUNTER
Upcoming Appointment Question  When is the appointment: Tomorrow 2/12/2019  What is your appointment for?: Pre-Op Exam  Who is your appointment scheduled with?: PCP only  What is your question/concern?: Samina with [facility] is calling regarding the patient. He is scheduled for surgery on 2/18/19 for neck surgery. He is scheduled for his pre-op exam with Go Ramírez MD on 2/12/2019    1. Patient is currently on Warfarin therapy. Will there be enough time to start holding orders and bridging if this starts tomorrow? Or will they need to extend the surgery to the following week?  2. Will the patient also need a cardiac clearance?    Okay to leave a detailed message?: Yes

## 2021-06-23 NOTE — TELEPHONE ENCOUNTER
ANTICOAGULATION  MANAGEMENT    Assessment     Today's INR result of 2.5 is Therapeutic (goal INR of 2.0-3.0)        Warfarin taken as previously instructed    No new diet changes affecting INR    No new medication/supplements affecting INR    Continues to tolerate warfarin with no reported s/s of bleeding or thromboembolism     Previous INR was Subtherapeutic     Colonoscopy 4/11- hold warfarin 4 days without bridge (see encounter 1/4/19).    Plan:     Spoke with Tanmay regarding INR result and instructed:     Warfarin Dosing Instructions:  Continue current warfarin dose 2.5 mg daily.    Instructed patient to follow up no later than: 1-2 weeks.     Education provided: importance of taking warfarin as instructed    Tanmay verbalizes understanding and agrees to warfarin dosing plan.    Instructed to call the [facility] for any changes, questions or concerns. (#[phone])   ?   Edgar Ortez RN    Subjective/Objective:      Tanmay Israel, a 78 y.o. male is on warfarin.     Tanmay reports:     Home warfarin dose: verbally confirmed home dose with pt and updated on anticoagulation calendar     Missed doses: No     Medication changes:  No     S/S of bleeding or thromboembolism:  No     New Injury or illness:  No     Changes in diet or alcohol consumption:  No     Upcoming surgery, procedure or cardioversion:  Yes, colonoscopy 4/11/19.    Anticoagulation Episode Summary     Current INR goal:   2.0-3.0   TTR:   57.6 % (4 y)   Next INR check:   2/7/2019   INR from last check:   2.50 (1/24/2019)   Weekly max warfarin dose:      Target end date:      INR check location:      Preferred lab:      Send INR reminders to:   ANTICOAGULATION POOL B (YOSHIW,MARTA,STW,RVL,OAK,RLN)       Comments:            Anticoagulation Care Providers     Provider Role Specialty Phone number    Go Ramírez MD Referring Family Medicine [phone]

## 2021-06-23 NOTE — TELEPHONE ENCOUNTER
Called Tanmay for an update on symptoms. He did take 1 dose of metolazone and feels it did help with fluid retention.  He has no symptoms of fluid retention. His breathing is at base line and he denies lower extremity edema, fatigue, lightheadedness, chest pain and abdominal bloating.    Tanmay will call before 2/1 with any questions or concerns.

## 2021-06-24 NOTE — PROGRESS NOTES
[facility]      Facility:    [facility] [[id number]]  Code Status: UNKNOWN      Chief Complaint/Reason for Visit:  Chief Complaint   Patient presents with     H & P     Acute cholecystitis with status post laparoscopic cholecystectomy, essential hypertension, coronary disease, ischemic cardiomyopathy, systolic congestive heart failure, paroxysmal atrial fibrillation, status post ICD placement, stage III chronic kidney disease, Crohn's colitis, diverticulosis, history of gallstones.  Distant hospital hospitalization for cervical 4 5 and 6 laminoplasty.       HPI:   Tanmay is a 78 y.o. male who was recently admitted to the hospital on 2/27/2019.  He had signs and symptoms of right upper quadrant abdominal pain rating to the back and he did have some nausea.  He does have a history of Crohn's disease as well as systolic congestive heart failure and essential hypertension with coronary disease.  He recently had a cervical 4 5 and 6 laminoplasty was discharged on 2/25/2019.  And he also had for signs and symptoms of watery diarrhea.  He was admitted to the hospital was worked up very thoroughly and found to have cholecystitis.  ALT and AST will normal bilirubin was normal alk phosphatase was mildly elevated.  Lipid lipase was within normal limits and there is no leukocytosis he was seen by general surgery and he underwent laparoscopic cholecystectomy on 2/28/2019.  There were no perioperative complications and no postoperative complications.  He did continue Bumex as well as Coreg and losartan.  Hemoglobin was low at 8 but apparently stable.  We did start him on Coumadin yesterday was discharged on Lovenox for total 14 days 7 more days from discharge however we have, 20 and so I did start him on the Coumadin.  However I am not sure when I did my research she did not have any Coumadin when he was admitted to the hospital so do not know about resolution of Coumadin.  His current  Lovenox at this time we did start Coumadin yesterday thinking therapeutic in the next for 5 days.    Patient denies any issues at this time since pain is in good control and he is moving his bowels.  He claims he had some upset stomach this morning but no real issues or vomiting at this time.  He is moving his bowels okay and denies any other issues.  He has progressed as anticipated with physical and occupational therapy.    Past Medical History:  Past Medical History:   Diagnosis Date     Anemia      Arthritis      Atrial fibrillation (H)      Back pain      Callus      Cerebral aneurysm      Cervical spinal stenosis      Chronic kidney disease     stage 3     Chronic systolic congestive heart failure (H)      Coronary artery disease      Crohn's disease (H)      Dysphagia      GERD (gastroesophageal reflux disease)      Hyperlipidemia      Hypertension      ICD (implantable cardioverter-defibrillator) in place     Medtronic     Ischemic cardiomyopathy      Kidney stone      Low back pain      Macular degeneration      Permanent atrial fibrillation (H)     [id number] risk score = 5 (age3/ CAD/ HTN/ CHF) Chronic warfarin     Right rotator cuff tendinitis      Schatzki's ring      Shortness of breath      Sleep apnea     no CPAP           Surgical History:  Past Surgical History:   Procedure Laterality Date     APPENDECTOMY       CARDIAC PACEMAKER PLACEMENT  2013    ICD Medtronic     CATARACT EXTRACTION W/  INTRAOCULAR LENS IMPLANT Bilateral      CHOLECYSTECTOMY       ESOPHAGOGASTRODUODENOSCOPY       ID C- LAMINOPLASTY, 2 OR MORE Left 2/22/2019    Procedure: LEFT CERVICAL 4, 5, 6 LAMINOPLASTY;  Surgeon: Liza Cesar MD;  Location: [facility];  Service: Spine     ID CABG, VEIN, FOUR      Description: Coronary Artery Quadruple Venous Bypass Graft;  Recorded: 10/21/2008;  Comments: 8/2004     ID LAP,CHOLECYSTECTOMY N/A 2/28/2019    Procedure: CHOLECYSTECTOMY, LAPAROSCOPIC;  Surgeon: Mana Roman  MD LAZARO;  Location: [facility] OR;  Service: General     ROTATOR CUFF REPAIR Right      TONSILLECTOMY       XR MYELOGRAM CERVICAL THORACIC LUMBAR  1/17/2019       Family History:   Family History   Problem Relation Age of Onset     Heart disease Mother      Stroke Mother      Crohn's disease Father      Alcohol abuse Brother      No Medical Problems Daughter      No Medical Problems Son      No Medical Problems Maternal Aunt      No Medical Problems Maternal Uncle      No Medical Problems Paternal Aunt      No Medical Problems Paternal Uncle      No Medical Problems Maternal Grandmother      No Medical Problems Maternal Grandfather      No Medical Problems Paternal Grandmother      No Medical Problems Paternal Grandfather      Cancer Brother      Urolithiasis Neg Hx      Gout Neg Hx        Social History:    Social History     Socioeconomic History     Marital status: Single     Spouse name: None     Number of children: None     Years of education: None     Highest education level: None   Occupational History     Occupation: retired     Comment: [employer]   Social Needs     Financial resource strain: None     Food insecurity:     Worry: None     Inability: None     Transportation needs:     Medical: None     Non-medical: None   Tobacco Use     Smoking status: Former Smoker     Smokeless tobacco: Never Used   Substance and Sexual Activity     Alcohol use: No     Drug use: No     Sexual activity: None   Lifestyle     Physical activity:     Days per week: None     Minutes per session: None     Stress: None   Relationships     Social connections:     Talks on phone: None     Gets together: None     Attends Tenri[employer] service: None     Active member of club or organization: None     Attends meetings of clubs or organizations: None     Relationship status: None     Intimate partner violence:     Fear of current or ex partner: None     Emotionally abused: None     Physically abused: None     Forced sexual activity: None    Other Topics Concern     None   Social History Narrative     None          Review of Systems   Constitutional:        Patient denies any pain fevers chills nausea vomiting diarrhea change in vision hearing taste or smell weakness one-sided suspensions of breath.  Denies incontinent of stool polyphagia polydipsia polyuria depression or anxiety and the remainder review of systems is negative.       Vitals:    03/07/19 1257   BP: 120/58   Pulse: 63   Resp: 20   Temp: 97  F (36.1  C)   SpO2: 95%       Physical Exam   Constitutional: No distress.   HENT:   Head: Normocephalic and atraumatic.   Nose: Nose normal.   Mouth/Throat: No oropharyngeal exudate.   Eyes: Conjunctivae are normal. Right eye exhibits no discharge. Left eye exhibits no discharge.   Neck: Neck supple. No thyromegaly present.   Cardiovascular: Normal rate and normal heart sounds.   Patient's rhythm is irregularly irregular.   Pulmonary/Chest: Effort normal and breath sounds normal. No respiratory distress. He has no wheezes.   Abdominal: Soft. Bowel sounds are normal. He exhibits distension. There is no tenderness. There is no rebound and no guarding.   Musculoskeletal:   Trace edema bilateral.   Neurological: He is alert. No cranial nerve deficit. He exhibits normal muscle tone.   Skin: Skin is warm and dry. He is not diaphoretic. No erythema.   Psychiatric: He has a normal mood and affect. His behavior is normal.       Medication List:  Current Outpatient Medications   Medication Sig     acetaminophen (TYLENOL) 325 MG tablet Take 2 tablets (650 mg total) by mouth every 6 (six) hours for 10 days.     baclofen (LIORESAL) 10 MG tablet Take 1 tablet (10 mg total) by mouth every 8 (eight) hours as needed.     bumetanide (BUMEX) 1 MG tablet Take 1 tablet (1 mg total) by mouth daily.     carvedilol (COREG) 25 MG tablet Take 1.5 tablets (37.5 mg total) by mouth 2 (two) times a day.     cholecalciferol, vitamin D3, 1,000 unit tablet Take 2,000 Units by mouth  daily.     enoxaparin (LOVENOX) 40 mg/0.4 mL syringe Inject 0.4 mL (40 mg total) under the skin daily for 7 days.     FERROUS FUMARATE (IRON ORAL) Take 1 tablet by mouth daily.      lidocaine 4 % patch Place 1 patch on the skin daily. Remove and discard patch with 12 hours or as directed by MD.     losartan (COZAAR) 50 MG tablet Take 1 tablet (50 mg total) by mouth daily.     multivitamin with iron (ONE DAILY WITH IRON) Tab tablet Take 1 tablet by mouth daily.      omeprazole (PRILOSEC) 20 MG capsule Take 20 mg by mouth 2 (two) times a day.     oxyCODONE (ROXICODONE) 5 MG immediate release tablet 1 tab PO for pain rated 6-8 and 2 tabs for pain rated 9-10 every 4 hours as needed.     polyethylene glycol (MIRALAX) 17 gram packet Take 1 packet (17 g total) by mouth 2 (two) times a day.     rosuvastatin (CRESTOR) 20 MG tablet Take 1 tablet (20 mg total) by mouth at bedtime.     senna-docusate (PERICOLACE) 8.6-50 mg tablet Take 1 tablet by mouth 2 (two) times a day.       Labs: Hospital labs from 3/4/2019 are as follows; creatinine 1.01, GFR is greater than 60, potassium 3.6, white count was 8.8, hemoglobin was 8.0, platelets 132,000.  White count was high as 10.8.  Potassium slightly elevated but do not have an exact number and normal bilirubin normal AST and normal ALT      Assessment:    ICD-10-CM    1. Acute cholecystitis K81.0    2. Status post laparoscopic cholecystectomy Z90.49    3. Pain management R52    4. Essential hypertension I10    5. Chronic systolic congestive heart failure (H) I50.22        Plan: Plan at this time will get a hemoglobin next lab day secondary to low hemoglobin however in review of his labs and see anything was too concerning.  He is healing very well from his acute cholecystitis and his blood pressure had been pretty much normotensive.  His chronic systolic heart failure seems about stable at this time and I will continue to monitor above medical problems no other changes to care plan at  this time.        Electronically signed by: Juan Sotelo,

## 2021-06-24 NOTE — TELEPHONE ENCOUNTER
I placed a referral and an additional message to get him set up with [facility] to [facility] him.  [facility] at [facility]

## 2021-06-24 NOTE — TELEPHONE ENCOUNTER
ANTICOAGULATION  MANAGEMENT    Assessment     Today's INR result of 2.3 is Therapeutic (goal INR of 2.0-3.0)        Previous INR was Subtherapeutic    Warfarin given as previously instructed    No new health/diet changes affecting INR    No new medication/supplements affecting INR    Continues to tolerate warfarin with no reported s/s of bleeding or thromboembolism       Plan:     Warfarin Dosing Orders:  Continue current warfarin dose 2.5 mg daily.  Discontinue Lovenox now (see orders from Dr. Sotelo 3/6).    Next INR: Friday 3/15.     Telephone orders given to nurseJenny.  Orders read back correctly.     Edgar Ortez RN    Subjective/Objective:      Tanmay Israel, a 78 y.o. male is on warfarin. Facility nurse reports for Tanmay:    Other anticoagulants: No    Medication changes: No     Missed warfarin doses since last INR: No     Abnormal bleeding since last INR: No    New symptoms, injury or illness: No     Upcoming surgery, procedure or cardioversion: No    Recent INR Results:    Lab Results   Component Value Date    INR 2.30 (!) 03/13/2019    INR 1.70 (!) 03/11/2019    INR 1.20 (!) 03/08/2019       Anticoagulation Episode Summary     Current INR goal:   2.0-3.0   TTR:   57.7 % (4.1 y)   Next INR check:   3/15/2019   INR from last check:   2.30 (3/13/2019)   Weekly max warfarin dose:      Target end date:      INR check location:      Preferred lab:      Send INR reminders to:   ANTICOAGULATION POOL E ([facility]ICAL [facility] FOR [facility])       Comments:            Anticoagulation Care Providers     Provider Role Specialty Phone number    Go Ramírez MD Referring Family Medicine [phone]

## 2021-06-24 NOTE — PROGRESS NOTES
Preoperative Exam    Scheduled Procedure: CERVICAL 4, 5, 6 LAMINOPLASTY  Surgery Date:  2/18  Surgery Location: [facility], fax [phone]    Surgeon:  CERVICAL 4, 5, 6 LAMINOPLASTY    Assessment/Plan:     Tanmay was seen today for pre-op exam and head injury.    Preoperative examination  -     [facility] referral [facility]    Cervical stenosis of spine    Chronic systolic congestive heart failure (H)  -     Ambulatory referral to [facility]    Stage 3 chronic kidney disease (H)    Anemia, unspecified type    Permanent atrial fibrillation (H)  -     Ambulatory referral to [facility]    Colitis    Atherosclerosis of native coronary artery of native heart without angina pectoris  -     Ambulatory referral to [facility]    Obstructive sleep apnea    Chronic atrial fibrillation (H)  -     INR    Pre-op exam  -     HM2(CBC w/o Differential)  -     Basic Metabolic Panel  -     APTT(PTT)  -     Platelet Function Test  -     Urinalysis-UC if Indicated        Surgical Procedure Risk: Intermediate (reported cardiac risk generally 1-5%)  Have you had prior anesthesia?: Yes  Have you or any family members had a previous anesthesia reaction:  No  Do you or any family members have a history of a clotting or bleeding disorder?: Anemia  Cardiac Risk Assessment: no increased risk for major cardiac complications    Patient is currently doing well and appears to be at baseline.  We will arrange for cardiology clearance prior to scheduled procedure.      Functional Status: Independent  Patient plans to recover at home with family.     Subjective:      Tanmay Israel is a 78 y.o. male who presents for a preoperative consultation.      He has cervical spine stenosis causing significant symptoms and is scheduled for surgical intervention.    His medical history significant for coronary artery disease with chronic systolic congestive heart failure.  Patient currently stable.  Patient had  exacerbation of symptoms in December and January and has now returned to his usual baseline weight and is symptom-free.  He remains on diuretic therapy.  He was last seen by cardiology on February 1, 2019.  Patient denies chest pain or shortness of breath and reports that he feels well.  He has atrial fibrillation he is anticoagulated for stroke risk reduction.  Anti-coagulation was held beginning February 12, 2019 for anticipated surgery date of February 18, 2019.  He is rate controlled without any symptoms.  His blood pressure is well controlled.    He has a history of chronic kidney disease stage III.  He had been taking NSAIDs for management of pain.  NSAIDs are discouraged based on his health history including chronic kidney disease and history of Crohn's disease and acid reflux.  Patient understands this and will not take any additional NSAIDs.  Kidney disease has been stable.  Discussed recheck of kidney function measurements.  Patient has a history of obstructive sleep apnea but does not use CPAP.  He is not currently reports snoring symptoms.  He has a history of colitis that was thought to be possibly Crohn's colitis.  The colitis is currently not active.  He does have a chronic anemia which is likely multifactorial.  Anemia has been stable without any concern for acute blood loss.    Patient denies any acute injury.  Patient states he feels well overall.  Patient states he shoveled snow today was able to do this with taking short breaks he did not become dyspneic or have chest pain.  By his report patient is able to generate a fair amount of activity without current concerning symptoms.  Patient has no prior history of any problems with anesthesia or surgical procedures.      All other systems reviewed and are negative, other than those listed in the HPI.    Pertinent History  Do you have difficulty breathing or chest pain after walking up a flight of stairs: No  History of obstructive sleep apnea:  No  Steroid use in the last 6 months: No  Frequent Aspirin/NSAID use: No - only tylenol prn  Prior Blood Transfusion: No  Prior Blood Transfusion Reaction: No  If for some reason prior to, during or after the procedure, if it is medically indicated, would you be willing to have a blood transfusion?:  There is no transfusion refusal.    Current Outpatient Medications   Medication Sig Dispense Refill     acetaminophen (TYLENOL) 325 MG tablet Take 650 mg by mouth every 4 (four) hours as needed for pain. Indications: Pain       bumetanide (BUMEX) 1 MG tablet Take 1 tablet (1 mg total) by mouth daily. 360 tablet 3     carvedilol (COREG) 25 MG tablet Take 1.5 tablets (37.5 mg total) by mouth 2 (two) times a day. 270 tablet 1     cholecalciferol, vitamin D3, 1,000 unit tablet Take 2,000 Units by mouth daily.       FERROUS FUMARATE (IRON ORAL) Take 1 tablet by mouth daily.        losartan (COZAAR) 50 MG tablet Take 1 tablet (50 mg total) by mouth daily.       lovastatin (MEVACOR) 40 MG tablet Take 1 tablet by mouth daily.       multivitamin with iron (ONE DAILY WITH IRON) Tab tablet Take 1 tablet by mouth daily.        omeprazole (PRILOSEC) 20 MG capsule Take 20 mg by mouth 2 (two) times a day.       rosuvastatin (CRESTOR) 20 MG tablet Take 1 tablet (20 mg total) by mouth at bedtime. 90 tablet 3     warfarin (COUMADIN) 2.5 MG tablet Take 1.25-2.5 mg by mouth See Admin Instructions. Take 1.25 mg (0.5 tablet) on Wednesdays/Saturdays and 2.5 mg rest of week. Adjust dose based on INR results as directed.       No current facility-administered medications for this visit.         No Known Allergies    Patient Active Problem List   Diagnosis     Right Rotator Cuff Tendonitis     Osteoarthritis Of The Knee     Joint Pain, Localized In The Right Shoulder     Difficulty Breathing (Dyspnea)     Esophageal Reflux     Muscle Aches, Generalized (Myalgias)     Lumbago     Obstructive sleep apnea     Sciatica     Joint Pain, Localized In  The Knee     Ptosis Of Eyelid     Hyperlipidemia     Anemia     Crohn's (Granulomatous) Colitis     Benign Essential Hypertension     Coronary atherosclerosis of native coronary artery     Ischemic cardiomyopathy     Paroxysmal ventricular tachycardia (H)     Dry Nonexudative Macular Degeneration     Serum Enzyme Levels - ALT (SGPT) Elevated     Serum Enzyme Levels - Alkaline Phosphatase Elevated     Dysphagia     Presence of automatic cardioverter/defibrillator (AICD)--- Medtronic single lead     Spondylolisthesis of lumbar region     Lumbar stenosis with neurogenic claudication     Sacroiliac joint dysfunction of right side     Stage 3 chronic kidney disease (H)     Fall     Multiple fractures of cervical spine, closed, initial encounter (H)     Closed fracture of distal end of left radius, unspecified fracture morphology, initial encounter     Closed fracture of distal end of right radius, unspecified fracture morphology, initial encounter     Warfarin-induced coagulopathy (H)     Closed fracture of first thoracic vertebra, unspecified fracture morphology, initial encounter (H)     A-fib (H)     Anemia, unspecified type     Cerebral aneurysm     Osteoporosis     Ileitis     Calculus of ureter     Pyuria     Heart failure with reduced ejection fraction (H)     Diaphragmatic hernia     Pharyngoesophageal dysphagia     History of Crohn's disease     Hypertension     Iron deficiency anemia     Polyp of duodenum     Polyp of stomach and duodenum     Reflux esophagitis     Rotator cuff tear arthropathy of right shoulder     Schatzki's ring     Diverticulosis of colon     Flatulence, eructation and gas pain     Personal history of colonic polyps       Past Medical History:   Diagnosis Date     Anemia      Arthritis      Atrial fibrillation (H)      Back pain      Callus      Cerebral aneurysm      Chronic kidney disease      Chronic systolic congestive heart failure (H)      Coronary artery disease      Crohn's disease  "(H)      Dysphagia      GERD (gastroesophageal reflux disease)      Hyperlipidemia      Hypertension      ICD (implantable cardioverter-defibrillator) in place     Medtronic     Ischemic cardiomyopathy      Kidney stone      Low back pain      Macular degeneration      Permanent atrial fibrillation (H)     [id number] risk score = 5 (age3/ CAD/ HTN/ CHF) Chronic warfarin     Sleep apnea        Past Surgical History:   Procedure Laterality Date     APPENDECTOMY       CARDIAC PACEMAKER PLACEMENT  2013    ICD Medtronic     CATARACT EXTRACTION W/  INTRAOCULAR LENS IMPLANT Bilateral      CHOLECYSTECTOMY       ESOPHAGOGASTRODUODENOSCOPY       AK CABG, VEIN, FOUR      Description: Coronary Artery Quadruple Venous Bypass Graft;  Recorded: 10/21/2008;  Comments: 8/2004     ROTATOR CUFF REPAIR Right      TONSILLECTOMY       XR MYELOGRAM CERVICAL THORACIC LUMBAR  1/17/2019       Social History     Socioeconomic History     Marital status: Single     Spouse name: Not on file     Number of children: Not on file     Years of education: Not on file     Highest education level: Not on file   Social Needs     Financial resource strain: Not on file     Food insecurity - worry: Not on file     Food insecurity - inability: Not on file     Transportation needs - medical: Not on file     Transportation needs - non-medical: Not on file   Occupational History     Occupation: retired     Comment: [employer]   Tobacco Use     Smoking status: Former Smoker     Smokeless tobacco: Never Used   Substance and Sexual Activity     Alcohol use: No     Drug use: No     Sexual activity: Not on file   Other Topics Concern     Not on file   Social History Narrative     Not on file       Patient Care Team:  Go Ramírez MD as PCP - General      Objective:     Vitals:    02/12/19 1402   BP: 122/58   Pulse: 60   SpO2: 99%   Weight: 183 lb (83 kg)   Height: 5' 9\" (1.753 m)         Physical Exam:        General Appearance:    Alert, cooperative, no " distress   Eyes:   No scleral icterus or conjunctival irritation       Ears:    Normal TM's and external ear canals, both ears   Throat:   Lips, mucosa, and tongue normal; teeth and gums normal   Neck:   Supple, symmetrical, trachea midline, no adenopathy;        thyroid:  No enlargement/tenderness/nodules   Lungs:     Clear to auscultation bilaterally, respirations unlabored, no wheezes or crackles   Heart:   Irregularly irregular no murmur heard   Abdomen:    Soft, no distention, no tenderness on palpation, no masses, no organomegaly     Extremities:  No edema, no joint swelling or redness, no evidence of any injuries   Skin:  No concerning skin findings, no suspicious moles, no rashes   Neurologic:  On gross examination there is no motor or sensory deficit.  Patient walks with a normal gait               Recent Results (from the past 240 hour(s))   Remote Device Check    Collection Time: 02/09/19  9:43 PM   Result Value Ref Range    Device Manufacture [employer]     Device Model [id number] Protecta XT VR     Device Serial Number [id number]     Device Type ICD     Device Implanting Provider ARAVIND Rolon     Comments/Summary       Type: routine remote ICD transmission.  Presenting rhythm: ventricular sensing, rate 70 bpm.  Battery/lead status: stable  Arrhythmias; since 1/21/19, no VT/VF detected.  Comments; normal ICD function.   Device/lead alerts: none. prd  warfarin     INR    Collection Time: 02/12/19  2:49 PM   Result Value Ref Range    INR 2.90 (H) 0.90 - 1.10   HM2(CBC w/o Differential)    Collection Time: 02/12/19  2:49 PM   Result Value Ref Range    WBC 6.3 4.0 - 11.0 thou/uL    RBC 3.47 (L) 4.40 - 6.20 mill/uL    Hemoglobin 9.9 (L) 14.0 - 18.0 g/dL    Hematocrit 30.4 (L) 40.0 - 54.0 %    MCV 88 80 - 100 fL    MCH 28.6 27.0 - 34.0 pg    MCHC 32.6 32.0 - 36.0 g/dL    RDW 13.2 11.0 - 14.5 %    Platelets 159 140 - 440 thou/uL    MPV 6.5 (L) 7.0 - 10.0 fL   Basic Metabolic Panel    Collection Time:  02/12/19  2:49 PM   Result Value Ref Range    Sodium 142 136 - 145 mmol/L    Potassium 3.5 3.5 - 5.0 mmol/L    Chloride 105 98 - 107 mmol/L    CO2 25 22 - 31 mmol/L    Anion Gap, Calculation 12 5 - 18 mmol/L    Glucose 110 70 - 125 mg/dL    Calcium 9.2 8.5 - 10.5 mg/dL    BUN 22 8 - 28 mg/dL    Creatinine 1.66 (H) 0.70 - 1.30 mg/dL    GFR MDRD Af Amer 49 (L) >60 mL/min/1.73m2    GFR MDRD Non Af Amer 40 (L) >60 mL/min/1.73m2   APTT(PTT)    Collection Time: 02/12/19  2:49 PM   Result Value Ref Range    PTT 46 (H) 24 - 37 seconds   Platelet Function Test    Collection Time: 02/12/19  2:49 PM   Result Value Ref Range    PFA-COL/ 1 - 180 sec   Urinalysis-UC if Indicated    Collection Time: 02/12/19  3:00 PM   Result Value Ref Range    Color, UA Yellow Colorless, Yellow, Straw, Light Yellow    Clarity, UA Clear Clear    Glucose, UA Negative Negative    Bilirubin, UA Negative Negative    Ketones, UA Negative Negative    Specific Gravity, UA 1.015 1.005 - 1.030    Blood, UA Negative Negative    pH, UA 5.5 5.0 - 8.0    Protein, UA Negative Negative mg/dL    Urobilinogen, UA 0.2 E.U./dL 0.2 E.U./dL, 1.0 E.U./dL    Nitrite, UA Negative Negative    Leukocytes, UA Negative Negative       Immunization History   Administered Date(s) Administered     DT (pediatric) 03/01/2001     Hep B, Adult 10/05/2018     Influenza V6i3-36, 01/27/2010     Influenza high dose, seasonal 09/17/2015, 09/27/2016, 10/17/2017, 09/11/2018     Influenza, Seasonal, Inj PF IIV3 09/09/2010     Influenza, inj, historic,unspecified 10/15/2007, 10/21/2008, 09/09/2010     Influenza, seasonal,quad inj 6-35 mos 09/16/2009, 11/22/2011, 09/28/2012, 10/01/2014     Influenza,inj,MDCK,PF,Quad >4yrs 09/30/2018     Influenza,seasonal, Inj IIV3 10/21/2005, 11/28/2006, 10/15/2007, 10/21/2008, 09/16/2009, 11/22/2011, 09/28/2012, 10/01/2014     Pneumo Conj 13-V (2010&after) 10/01/2014     Pneumo Polysac 23-V 09/08/2005     Td, Adult, Absorbed 03/01/2001      Td,adult,historic,unspecified 03/01/2001     Tdap 11/09/2015     ZOSTER, LIVE 12/16/2010           Electronically signed by Go Ramírez MD 02/13/19 11:23 AM

## 2021-06-24 NOTE — ANESTHESIA CARE TRANSFER NOTE
Last vitals:   Vitals:    02/28/19 1750   BP: 162/74   Pulse: 77   Resp: 16   Temp: 37  C (98.6  F)   SpO2: 100%     Patient's level of consciousness is drowsy  Spontaneous respirations: yes  Maintains airway independently: yes  Dentition unchanged: yes  Oropharynx: oropharynx clear of all foreign objects    QCDR Measures:  ASA# 20 - Surgical Safety Checklist: WHO surgical safety checklist completed prior to induction    PQRS# 430 - Adult PONV Prevention: 4558F - Pt received => 2 anti-emetic agents (different classes) preop & intraop  ASA# 8 - Peds PONV Prevention: NA - Not pediatric patient, not GA or 2 or more risk factors NOT present  PQRS# 424 - Tarah-op Temp Management: 4559F - At least one body temp DOCUMENTED => 35.5C or 95.9F within required timeframe  PQRS# 426 - PACU Transfer Protocol: - Transfer of care checklist used  ASA# 14 - Acute Post-op Pain: ASA14B - Patient did NOT experience pain >= 7 out of 10

## 2021-06-24 NOTE — PATIENT INSTRUCTIONS - HE
Tanmay Israel,    It was a pleasure to see you today at the [facility].     My recommendations after this visit include:    Go ahead with neck surgery    NISA Rangel MD, FACC, NEHEMIAH

## 2021-06-24 NOTE — ANESTHESIA CARE TRANSFER NOTE
Last vitals:   Vitals:    02/22/19 1626   BP: 145/65   Pulse:    Resp:    Temp:    SpO2:      Patient's level of consciousness is drowsy  Spontaneous respirations: yes  Maintains airway independently: yes  Dentition unchanged: yes  Oropharynx: oropharynx clear of all foreign objects    QCDR Measures:  ASA# 20 - Surgical Safety Checklist: WHO surgical safety checklist completed prior to induction    PQRS# 430 - Adult PONV Prevention: 4558F - Pt received => 2 anti-emetic agents (different classes) preop & intraop  ASA# 8 - Peds PONV Prevention: NA - Not pediatric patient, not GA or 2 or more risk factors NOT present  PQRS# 424 - Tarah-op Temp Management: 4559F - At least one body temp DOCUMENTED => 35.5C or 95.9F within required timeframe  PQRS# 426 - PACU Transfer Protocol: - Transfer of care checklist used  ASA# 14 - Acute Post-op Pain: ASA14B - Patient did NOT experience pain >= 7 out of 10

## 2021-06-24 NOTE — PROGRESS NOTES
[facility]    Facility:   [facility] [[id number]]   Code Status: FULL CODE      CHIEF COMPLAINT/REASON FOR VISIT:  Chief Complaint   Patient presents with     Follow Up     rehab, sulma       HISTORY:      HPI: Tanmay is a 78 y.o. male who I am seeing today secondary to hospitalization February 27 secondary to acute cholecystitis status post laparoscopic cholecystectomy with all history of cervical fusion C4-6 along with a history of hypertension CKD stage III.  He currently is actually doing pretty well he is on the Tylenol scheduled 650 mg every 6 hours he also has a lidocaine patch.  He can have baclofen as needed and rarely takes when his last dose was on the eighth he also has oxycodone as needed has not taken any since the sixth.  He is on Coumadin and he is being followed by the Coumadin clinic.  He has been normotensive and afebrile and also on room air.  His only issue these days is with constipation he recently was started on stool softeners twice daily we will go ahead and give him a dose or 2 of sorbitol to help start things going for him.  Does have anemia currently on iron.  He is on omeprazole for GERD twice daily.  He does wear a cervical collar is got good strong upper extremity .  Visit with the neurosurgeon on April 3.    Past Medical History:   Diagnosis Date     Anemia      Arthritis      Atrial fibrillation (H)      Back pain      Callus      Cerebral aneurysm      Cervical spinal stenosis      Chronic kidney disease     stage 3     Chronic systolic congestive heart failure (H)      Coronary artery disease      Crohn's disease (H)      Dysphagia      GERD (gastroesophageal reflux disease)      Hyperlipidemia      Hypertension      ICD (implantable cardioverter-defibrillator) in place     Medtronic     Ischemic cardiomyopathy      Kidney stone      Low back pain      Macular degeneration      Permanent atrial fibrillation (H)     [id number] risk  score = 5 (age1/ CAD/ HTN/ CHF) Chronic warfarin     Right rotator cuff tendinitis      Schatzki's ring      Shortness of breath      Sleep apnea     no CPAP             Family History   Problem Relation Age of Onset     Heart disease Mother      Stroke Mother      Crohn's disease Father      Alcohol abuse Brother      No Medical Problems Daughter      No Medical Problems Son      No Medical Problems Maternal Aunt      No Medical Problems Maternal Uncle      No Medical Problems Paternal Aunt      No Medical Problems Paternal Uncle      No Medical Problems Maternal Grandmother      No Medical Problems Maternal Grandfather      No Medical Problems Paternal Grandmother      No Medical Problems Paternal Grandfather      Cancer Brother      Urolithiasis Neg Hx      Gout Neg Hx      Social History     Socioeconomic History     Marital status: Single     Spouse name: Not on file     Number of children: Not on file     Years of education: Not on file     Highest education level: Not on file   Occupational History     Occupation: retired     Comment: [employer]   Social Needs     Financial resource strain: Not on file     Food insecurity:     Worry: Not on file     Inability: Not on file     Transportation needs:     Medical: Not on file     Non-medical: Not on file   Tobacco Use     Smoking status: Former Smoker     Smokeless tobacco: Never Used   Substance and Sexual Activity     Alcohol use: No     Drug use: No     Sexual activity: Not on file   Lifestyle     Physical activity:     Days per week: Not on file     Minutes per session: Not on file     Stress: Not on file   Relationships     Social connections:     Talks on phone: Not on file     Gets together: Not on file     Attends Worship service: Not on file     Active member of club or organization: Not on file     Attends meetings of clubs or organizations: Not on file     Relationship status: Not on file     Intimate partner violence:     Fear of current or ex partner:  Not on file     Emotionally abused: Not on file     Physically abused: Not on file     Forced sexual activity: Not on file   Other Topics Concern     Not on file   Social History Narrative     Not on file         Review of Systems  Currently denies chills and fever coughing wheezing chest pain dizziness or vertigo nausea vomiting diarrhea or flulike symptoms headache or rashes or sores.  History of recent laparoscopic cholecystectomy hypertension CAD ICD CKD stage III heart failure cervical laminoplasty    Current Outpatient Medications   Medication Sig     baclofen (LIORESAL) 10 MG tablet Take 1 tablet (10 mg total) by mouth every 8 (eight) hours as needed.     bumetanide (BUMEX) 1 MG tablet Take 1 tablet (1 mg total) by mouth daily.     carvedilol (COREG) 25 MG tablet Take 1.5 tablets (37.5 mg total) by mouth 2 (two) times a day.     cholecalciferol, vitamin D3, 1,000 unit tablet Take 2,000 Units by mouth daily.     enoxaparin (LOVENOX) 40 mg/0.4 mL syringe Inject 0.4 mL (40 mg total) under the skin daily for 7 days.     FERROUS FUMARATE (IRON ORAL) Take 1 tablet by mouth daily.      lidocaine 4 % patch Place 1 patch on the skin daily. Remove and discard patch with 12 hours or as directed by MD.     losartan (COZAAR) 50 MG tablet Take 1 tablet (50 mg total) by mouth daily.     multivitamin with iron (ONE DAILY WITH IRON) Tab tablet Take 1 tablet by mouth daily.      omeprazole (PRILOSEC) 20 MG capsule Take 20 mg by mouth 2 (two) times a day.     oxyCODONE (ROXICODONE) 5 MG immediate release tablet 1 tab PO for pain rated 6-8 and 2 tabs for pain rated 9-10 every 4 hours as needed.     polyethylene glycol (MIRALAX) 17 gram packet Take 1 packet (17 g total) by mouth 2 (two) times a day.     rosuvastatin (CRESTOR) 20 MG tablet Take 1 tablet (20 mg total) by mouth at bedtime.     senna-docusate (PERICOLACE) 8.6-50 mg tablet Take 1 tablet by mouth 2 (two) times a day.       .There were no vitals filed for this  visit.  Blood pressure 134/75 pulse 80 temperature 96.9 saturation room air 100%  Physical Exam   Constitutional: No distress.   HENT:   Head: Normocephalic.   Eyes: Pupils are equal, round, and reactive to light.   Neck: Neck supple. No thyromegaly present.   Cardiovascular:   Irregularity.  Pacemaker.   Pulmonary/Chest: Breath sounds normal.   Abdominal: Bowel sounds are normal. There is no rebound.   Musculoskeletal:   Improving with a strength and conditioning.  Good CMS bilateral arms.  Cervical collar.   Lymphadenopathy:     He has no cervical adenopathy.   Neurological: He is alert.   Skin: Skin is warm and dry. No rash noted.   Abdominal stab wounds are clean and dry.   Psychiatric: His behavior is normal.         LABS:   /\  Lab Results   Component Value Date    WBC 8.8 03/03/2019    HGB 8.2 (L) 03/08/2019    HCT 25.2 (L) 03/03/2019    MCV 91 03/03/2019     (L) 03/03/2019     Results for orders placed or performed during the hospital encounter of 02/22/19   Basic Metabolic Panel   Result Value Ref Range    Sodium 140 136 - 145 mmol/L    Potassium 3.4 (L) 3.5 - 5.0 mmol/L    Chloride 106 98 - 107 mmol/L    CO2 27 22 - 31 mmol/L    Anion Gap, Calculation 7 5 - 18 mmol/L    Glucose 91 70 - 125 mg/dL    Calcium 9.4 8.5 - 10.5 mg/dL    BUN 20 8 - 28 mg/dL    Creatinine 1.38 (H) 0.70 - 1.30 mg/dL    GFR MDRD Af Amer 60 (L) >60 mL/min/1.73m2    GFR MDRD Non Af Amer 50 (L) >60 mL/min/1.73m2           ASSESSMENT:      ICD-10-CM    1. S/P cervical spinal fusion Z98.1    2. S/P laparoscopic cholecystectomy Z90.49    3. Essential hypertension I10    4. Constipation, unspecified constipation type K59.00        PLAN:    Adding sorbitol for the next 2 days and noontime.  Also encouraging a house nutrient supplement.  Being followed by the Coumadin clinic.  Recheck hemoglobin in another week the last was 8.2 is on iron daily.  Does feel he is making pretty good progress overall.  Neurosurgeon visit April  3.    Electronically signed by: Michael Duane Johnson, CNP

## 2021-06-24 NOTE — TELEPHONE ENCOUNTER
ANTICOAGULATION  MANAGEMENT    Assessment     Today's INR result of 1.2 is Subtherapeutic (goal INR of 2.0-3.0)        Previous INR was Subtherapeutic    Just resumed warfarin 3/6    No new health/diet changes affecting INR    Concurrent use of Lovenox and warfarin may increase risk of bleeding, but not expected to affect INR    Continues to tolerate warfarin with no reported s/s of bleeding or thromboembolism       Plan:     Warfarin Dosing Orders:  Continue current warfarin dose 2.5 mg daily.    Continue Lovenox q24h.    Next INR: Mon 3/11.    Telephone orders given to nurseDc.  Orders read back correctly.     Edgar Ortez RN    Subjective/Objective:      Tanmay Israel, a 78 y.o. male is on warfarin. Facility nurse reports for Tamnay:    Other anticoagulants: No    Medication changes: No     Missed warfarin doses since last INR: No     Abnormal bleeding since last INR: No    New symptoms, injury or illness: No     Upcoming surgery, procedure or cardioversion: No    Recent INR Results:    Lab Results   Component Value Date    INR 1.20 (!) 03/08/2019    INR 1.10 03/06/2019    INR 1.12 (H) 02/27/2019       Anticoagulation Episode Summary     Current INR goal:   2.0-3.0   TTR:   57.9 % (4.1 y)   Next INR check:   3/11/2019   INR from last check:   1.20! (3/8/2019)   Weekly max warfarin dose:      Target end date:      INR check location:      Preferred lab:      Send INR reminders to:   ANTICOAGULATION POOL E ([facility])       Comments:            Anticoagulation Care Providers     Provider Role Specialty Phone number    Go Ramírez MD Referring Family Medicine [phone]

## 2021-06-24 NOTE — TELEPHONE ENCOUNTER
----- Message from Andrew Gonzalez sent at 2/13/2019  4:13 PM CST -----  Contact: Tanmay iPno phone call:    Caller: Madyson  Primary cardiologist: Debra  Detailed reason for call: Patient returning call   New or active symptoms? Active  Best phone number: [phone]  Best time to contact: anytime  Ok to leave a detailed message? yes  Device? no    Additional Info:

## 2021-06-24 NOTE — ANESTHESIA POSTPROCEDURE EVALUATION
Patient: Tanmay Israel  LEFT CERVICAL 4, 5, 6 LAMINOPLASTY  Anesthesia type: general    Patient location: floor  Last vitals:   Vitals:    02/22/19 1808   BP: 169/72   Pulse: 66   Resp:    Temp: 36.5  C (97.7  F)   SpO2: 99%     Post vital signs: stable  Level of consciousness: awake and responds to simple questions  Post-anesthesia pain: pain controlled  Post-anesthesia nausea and vomiting: no  Pulmonary: unassisted, return to baseline  Cardiovascular: stable and blood pressure at baseline  Hydration: adequate  Anesthetic events: no    QCDR Measures:  ASA# 11 - Tarah-op Cardiac Arrest: ASA11B - Patient did NOT experience unanticipated cardiac arrest  ASA# 12 - Tarah-op Mortality Rate: ASA12B - Patient did NOT die  ASA# 13 - PACU Re-Intubation Rate: ASA13B - Patient did NOT require a new airway mgmt  ASA# 10 - Composite Anes Safety: ASA10A - No serious adverse event    Additional Notes:

## 2021-06-24 NOTE — TELEPHONE ENCOUNTER
ANTICOAGULATION  MANAGEMENT    Assessment     Today's INR result of 1.7 is Subtherapeutic (goal INR of 2.0-3.0)        Previous INR was Subtherapeutic    Warfarin given as previously instructed    No new health/diet changes affecting INR    Concurrent use of Lovenox and warfarin may increase risk of bleeding, but not expected to affect INR    Continues to tolerate warfarin with no reported s/s of bleeding or thromboembolism       Plan:     Warfarin Dosing Orders:  Take booster dose of 3.75 mg today only then continue current warfarin dose 2.5 mg daily.    Continue Lovenox q24h.     Next INR: Wed 3/13.     Telephone orders given to nurseLamine.  Orders read back correctly.     Edgar Ortez RN    Subjective/Objective:      Tanmay Israel, a 78 y.o. male is on warfarin. Facility nurse reports for Tanmay:    Other anticoagulants: Yes: Lovenox q24h.     Medication changes: No     Missed warfarin doses since last INR: No     Abnormal bleeding since last INR: No    New symptoms, injury or illness: No     Upcoming surgery, procedure or cardioversion: No    Recent INR Results:    Lab Results   Component Value Date    INR 1.70 (!) 03/11/2019    INR 1.20 (!) 03/08/2019    INR 1.10 03/06/2019       Anticoagulation Episode Summary     Current INR goal:   2.0-3.0   TTR:   57.7 % (4.1 y)   Next INR check:   3/13/2019   INR from last check:   1.70! (3/11/2019)   Weekly max warfarin dose:      Target end date:      INR check location:      Preferred lab:      Send INR reminders to:   ANTICOAGULATION POOL E ([facility])       Comments:            Anticoagulation Care Providers     Provider Role Specialty Phone number    Go Ramírez MD Referring Family Medicine [phone]

## 2021-06-24 NOTE — PROGRESS NOTES
[facility]    Facility:   [facility] [[id number]]   Code Status: FULL CODE  PCP: Go Ramírez MD   Phone: [phone]   Fax: [phone]      CHIEF COMPLAINT/REASON FOR VISIT:  Chief Complaint   Patient presents with     Discharge Summary       HISTORY COURSE:  Tanmay is a 78-year-old gentleman who was hospitalized February 27 - March 4, 2019 secondary to right upper quadrant discomfort and diagnosed with acute cholecystitis and did have a laparoscopic cholecystectomy.  He also has a history of hypertension CAD ischemic cardiomyopathy systolic congestive heart failure paroxysmal atrial fibrillation status post ICD, CKD stage III, Crohn's colitis, diverticulosis.  He also has recent hospitalization for left cervical 4, 5 and 6 laminoplasty and was recently discharged on February 25, 2019.  He did present to the emergency department secondary to complaints of nausea vomiting and watery diarrhea also complaining of right upper quadrant discomfort radiating to his back.  CT of the abdomen initially did show dilated gallbladder with wall thickening and mild thickening of the terminal ileum consistent with a history of inflammatory bowel disease without evidence of obstruction or active inflammation, there is also diverticulosis without diverticulitis.  He also has a nonobstructing right ureteral vesicular junction calculus which is unchanged.  On February 28 he underwent a laparoscopic cholecystectomy and intraoperative or immediate postoperative complications.  He remained afebrile without nausea vomiting abdominal pain tolerates diet well.  Negative stool cultures diarrhea did resolve.  He currently is on the transitional care unit working with therapy to try to improve his strength balance and overall conditioning.  For his muscular spasms can have baclofen as needed at 10 mg he did take 1 dose and 11 prior to that March 8.  He also can have oxycodone as needed he did  take 1 dose and probably prior to that was March 6.  Not having any other bowel issues at this time he has been normotensive and afebrile.  Is being monitored by the Coumadin clinic.  Sleeping well at night.  Is on iron and multivitamins and minerals.    Review of Systems  Currently denies chills and fever coughing wheezing chest pain dizziness or vertigo nausea vomiting diarrhea or flulike symptoms headache or rashes or sores.  History of recent laparoscopic cholecystectomy hypertension CAD ICD CKD stage III heart failure cervical laminoplasty  There were no vitals filed for this visit.  Blood pressure 137/66 pulse 68 temperature 97.9 saturation room air 92%  Physical Exam  Constitutional: No distress.   HENT:    Eyes: Pupils are equal, round, and reactive to light.   Neck: Neck supple. No thyromegaly present.   Cardiovascular:   Irregularity.  Pacemaker.   Pulmonary/Chest: Breath sounds normal.   Abdominal: Bowel sounds are normal. There is no rebound.   Musculoskeletal:   Improving with a strength and conditioning.  Good CMS bilateral arms.  Cervical collar.   Lymphadenopathy:     He has no cervical adenopathy.   Neurological: He is alert.   Skin:    Abdominal stab wounds are clean and dry.   Psychiatric: His behavior is normal.   Lab Results   Component Value Date    WBC 8.8 03/03/2019    HGB 8.2 (L) 03/08/2019    HCT 25.2 (L) 03/03/2019    MCV 91 03/03/2019     (L) 03/03/2019     Results for orders placed or performed during the hospital encounter of 02/22/19   Basic Metabolic Panel   Result Value Ref Range    Sodium 140 136 - 145 mmol/L    Potassium 3.4 (L) 3.5 - 5.0 mmol/L    Chloride 106 98 - 107 mmol/L    CO2 27 22 - 31 mmol/L    Anion Gap, Calculation 7 5 - 18 mmol/L    Glucose 91 70 - 125 mg/dL    Calcium 9.4 8.5 - 10.5 mg/dL    BUN 20 8 - 28 mg/dL    Creatinine 1.38 (H) 0.70 - 1.30 mg/dL    GFR MDRD Af Amer 60 (L) >60 mL/min/1.73m2    GFR MDRD Non Af Amer 50 (L) >60 mL/min/1.73m2          MEDICATION LIST:  Current Outpatient Medications   Medication Sig     baclofen (LIORESAL) 10 MG tablet Take 1 tablet (10 mg total) by mouth every 8 (eight) hours as needed.     bumetanide (BUMEX) 1 MG tablet Take 1 tablet (1 mg total) by mouth daily.     carvedilol (COREG) 25 MG tablet Take 1.5 tablets (37.5 mg total) by mouth 2 (two) times a day.     cholecalciferol, vitamin D3, 1,000 unit tablet Take 2,000 Units by mouth daily.     FERROUS FUMARATE (IRON ORAL) Take 1 tablet by mouth daily.      lidocaine 4 % patch Place 1 patch on the skin daily. Remove and discard patch with 12 hours or as directed by MD.     losartan (COZAAR) 50 MG tablet Take 1 tablet (50 mg total) by mouth daily.     multivitamin with iron (ONE DAILY WITH IRON) Tab tablet Take 1 tablet by mouth daily.      omeprazole (PRILOSEC) 20 MG capsule Take 20 mg by mouth 2 (two) times a day.     oxyCODONE (ROXICODONE) 5 MG immediate release tablet 1 tab PO for pain rated 6-8 and 2 tabs for pain rated 9-10 every 4 hours as needed.     rosuvastatin (CRESTOR) 20 MG tablet Take 1 tablet (20 mg total) by mouth at bedtime.     senna-docusate (PERICOLACE) 8.6-50 mg tablet Take 1 tablet by mouth 2 (two) times a day.       DISCHARGE DIAGNOSIS:    ICD-10-CM    1. Cervical myelopathy (H) G95.9    2. Gastroesophageal reflux disease without esophagitis K21.9    3. S/P laparoscopic cholecystectomy Z90.49    4. Stage 3 chronic kidney disease (H) N18.3        MEDICAL EQUIPMENT NEEDS:  None    DISCHARGE PLAN/FACE TO FACE:  I certify that services are/were furnished while this patient was under the care of a physician and that a physician or an allowed non-physician practitioner (NPP), had a face-to-face encounter that meets the physician face-to-face encounter requirements. The encounter was in whole, or in part, related to the primary reason for home health. The patient is confined to his/her home and needs intermittent skilled nursing, physical therapy,  speech-language pathology, or the continued need for occupational therapy. A plan of care has been established by a physician and is periodically reviewed by a physician.  Date of Face-to-Face Encounter: March 14, 2019    I certify that, based on my findings, the following services are medically necessary home health services: He will be discharging to home with current medications and narcotics he will also receive physical and occupational therapy home health aide and nursing.  His home discharge date has been set for March 15.    My clinical findings support the need for the above skilled services because: (Please write a brief narrative summary that describes what the RN, PT, SLP, or other services will be doing in the home. A list of diagnoses in this section does not meet the CMS requirements.)  He will require the skilled services secondary to his generalized debility recent hospitalization for acute cholecystectomy but also his cervical laminoplasty and home safety self-care deficits and medication management.    This patient is homebound because: (Please write a brief narrative summary describing the functional limitations as to why this patient is homebound and specifically what makes this patient homebound.)  Secondary to multiple chronic medical conditions including recent hospitalization for cervical laminoplasty along with a recent hospitalization for cholecystectomy self-care deficits home safety medication management.    The patient is, or has been, under my care and I have initiated the establishment of the plan of care. This patient will be followed by a physician who will periodically review the plan of care.    Schedule follow up visit with primary care provider within 7 days to reestablish care.  He will follow-up with his surgeon as previously arranged also follow-up with neurosurgery as previously arranged.  Follow-up with cardiology as previously arranged.  Is an apparent colonoscopy  scheduled for April 11, 2019.  I believe his neck spinal surgery recheck his on April 3.  He also follow-up with his primary care doctor regarding medication management and any future laboratory studies.  He does feel comfortable with his overall care as well as the rehabilitation process as well as his stay on the transitional care unit.  Had no further questions.    Discharge coordination of care greater than 30 minutes  Electronically signed by: Michael Duane Johnson, CNP

## 2021-06-24 NOTE — TELEPHONE ENCOUNTER
----- Message from Go Ramírez MD sent at 2/14/2019  2:43 PM CST -----  Regarding: RE: Cardiology Clearance Prior to Surgery  Please call [facility] neurosurgery: Inform them that we are having difficulty getting Tanmay into cardiology to obtain clearance and will likely not be able to get him in prior to his scheduled surgery on the 18th.  While Tanmay does have an extensive cardiac history he is at his usual baseline without any current symptoms.  If they require that he has cardiology clearance then we are at the [facility] of cardiology to get him in for evaluation and his surgery will likely need to be rescheduled.  If they think it is reasonable to forego cardiology clearance since he is at his baseline without any symptomatic concerns then we can proceed.  See what they say.  ----- Message -----  From: Katelynn Watkins CMA  Sent: 2/14/2019   1:18 PM  To: Go Ramírez MD  Subject: FW: Cardiology Clearance Prior to Surgery        I have sent a message to Cara to help patient schedule this appointment. He said cardiology called him and were unable to schedule him before Monday.   ----- Message -----  From: Go Ramírez MD  Sent: 2/14/2019  12:27 PM  To: [facility]/Ob Support Pool  Subject: Cardiology Clearance Prior to Surgery            He was seen for a preop physical on the 12th.  I placed an order on the 13th for him to be seen at rapid access clinic to have cardiology clearance prior to scheduled procedure on February 18.    Patient's chart does not indicate any appointment scheduled with cardiology at this time.    I need to know if this is going to be scheduled before his procedure.

## 2021-06-24 NOTE — TELEPHONE ENCOUNTER
Please see message from Dr. Ramírez and advise if cardiology clearance is necessary prior to scheduled procedure?

## 2021-06-24 NOTE — ANESTHESIA PROCEDURE NOTES
Arterial Line  Reason for Procedure: hemodynamic monitoring  Patient location during procedure: Pre-op  Start time: 2/22/2019 1:16 PM  End time: 2/22/2019 1:22 PM  Staffing:  Performing  Anesthesiologist: Kane Moeller MD  Sterile Precautions:  sterile barriers used during insertion: cap, mask, sterile gloves, large sheet, and hand hygiene used.  Arterial Line:   Immediately prior to procedure a time out was called to verify the correct patient, procedure, equipment, support staff and site/side marked as required  Laterality: left  Location: brachial  Prepped with: ChloroPrep    Needle gauge: 20 G  Number of Attempts: 1  Secured with: tape and transparent dressing  Flushed with: saline  1% lidocaine local anesthesia used for skin prep.   See MAR for additional medications given.

## 2021-06-24 NOTE — ANESTHESIA PREPROCEDURE EVALUATION
Anesthesia Evaluation      Patient summary reviewed   No history of anesthetic complications     Airway   Mallampati: I  Neck ROM: full   Pulmonary - normal exam   (+) sleep apnea on no CPAP, ,   (-) shortness of breath                         Cardiovascular - normal exam  Exercise tolerance: > or = 4 METS  (+) hypertension, CAD, CABG/stent, dysrhythmias, CHF, cardiomyopathy, hypercholesterolemia, PVD     Neuro/Psych - negative ROS   (-) no neuromuscular disease    Endo/Other    (+) arthritis,      GI/Hepatic/Renal    (+) GERD well controlled,   chronic renal disease CRI,      Other findings: CABG 2008. Ischemic cardiomyopathy, euvolemic now but prone to fluid overload.  Echo 11/12/18:  When compared to the previous study dated 10/13/2017, no significant change.  Normal left ventricular size, wall thickness and moderate global hypokinesis. Elevated left ventricular filling pressure. Left ventricle ejection fraction is moderately decreased. The estimated left ventricular ejection fraction is 35%.  Normal right ventricular size with moderately reduced right ventricular systolic performance. Pacemaker leads are identified.  Severely enlarged left atrium. Moderately enlarged right atrium.  Mild mitral valve regurgitation. Mild tricuspid valve regurgitation with estimated right ventricular systolic pressure 45 mmHg.  NMST 5/2016 negative for ischemia.    May have cerebral aneurysm, no details.  Lumbar spondylolisthesis with sciatica.  Schatzki's ring.  Intolerant of his CPAP so doesn't use it.   Compensated systolic CHF.  A-fib plus paroxysmal VT.  Combination AICD/pacer.  Shortness of breath has improved, able to shovel snow w/o symptoms if he pauses occasionally.  Possible Crohn's. Chronic anemia, macular degeneration.  Labs today:  Hg 10, K 3.4, GFR 50, PTT 37, INR sl elevated at 1.38 (surgeon will be notified).        Dental - normal exam                        Anesthesia Plan  Planned anesthetic: general  endotracheal    ASA 4   Induction: intravenous   Anesthetic plan and risks discussed with: patient  Anesthesia plan special considerations: arterial catheterization,   Post-op plan: routine recovery

## 2021-06-24 NOTE — TELEPHONE ENCOUNTER
ANTICOAGULATION  MANAGEMENT    Assessment     Today's INR result of 1.1 is Subtherapeutic (goal INR of 2.0-3.0)        Previous INR was Subtherapeutic    Warfarin recently held for cervical laminoplasty which may be affecting INR    Acute health changes, acute cholecystitis, may be affecting INR    Concurrent use of Lovenox and warfarin may increase risk of bleeding, but not expected to affect INR     ACN called [facility]. They said Dr. Sotelo confirmed that pt should keep bridging with Lovenox until INR is therapeutic.     Continues to tolerate warfarin with no reported s/s of bleeding or thromboembolism     Plan:     Warfarin Dosing Orders:  Take booster dose of 3.75 mg today only then resume warfarin dose 2.5 mg daily.    Continue Lovenox 40 mg q24h until INR is therapeutic.     Next INR: Mon 3/11    Telephone orders given to nurseLamine.  Orders read back correctly.     Edgar Ortez RN    Subjective/Objective:      Tanmay Israel, a 78 y.o. male is on warfarin recently admitted to TCU under care of [facility].  Chart reviewed:    Outpatient anticoagulation information:     Anticoagulation management provider: [facility]    Reason for anticoagulation: Atrial Fibrillation    Home INR goal: 2-3    Home warfarin dose:  2.5 mg daily     Recent hospitalization review:    Reason for hospitalization prior to TCU admission: acute cholecystitis    Hospital warfarin management: Warfarin held    Hospital medication changes pertinent to anticoagulation: Yes: bridging with Lovenox    Health changes pertinent to anticoauglation during hospitalization: Yes: acute cholecystitis      TCU Facility nurse report since admission:    Other anticoagulants: Yes: Lovenox 40 mg q24h.     Medication changes: No    Missed warfarin doses since last INR: No     Abnormal bleeding since last INR: No    New symptoms, injury or illness: No     Upcoming surgery, procedure or cardioversion:  No      Recent INR Results:    Lab Results   Component Value Date    INR 1.10 03/06/2019    INR 1.12 (H) 02/27/2019    INR 1.38 (H) 02/22/2019       Anticoagulation Episode Summary     Current INR goal:   2.0-3.0   TTR:   57.9 % (4.1 y)   Next INR check:   3/11/2019   INR from last check:   1.10! (3/6/2019)   Weekly max warfarin dose:      Target end date:      INR check location:      Preferred lab:      Send INR reminders to:   ANTICOAGULATION POOL E ([facility] FOR [facility])       Comments:            Anticoagulation Care Providers     Provider Role Specialty Phone number    Go Ramírez MD Referring Family Medicine [phone]

## 2021-06-24 NOTE — TELEPHONE ENCOUNTER
Patient states the surgery clinic called him last evening stating he is ok to proceed with surgery without any further work up.    In review of telephone encounter last evening placed by Samina it is the PA approval - not clearance for surgery.    2/13/19: Dr. Ramírez has recommended patient be seen in [facility].    Call transferred to  for [facility] appointment for cardiac risk assessment.

## 2021-06-24 NOTE — PATIENT INSTRUCTIONS - HE
Using a washcloth and a bottle of provided Hibiclens, wash your body, avoiding your face and genitals. Preferably, shower the night before surgery and the morning of surgery using a half a bottle each time for your whole body shower. If you are unable to take a shower in the morning of surgery, please discuss your options with the nurse at your readiness visit.

## 2021-06-24 NOTE — PROGRESS NOTES
Naomi is here for his staple removal. He is here with his niece. He is s/p LEFT CERVICAL 4, 5, 6 LAMINOPLASTY on 2/22/19 by Dr. Cesar and Dr. Rodrigues. Pt asked when he can go back on coumadin, he is still getting shots in stomach. Informed pt that he can go back on Coumadin from our surgical standpoint, per Dr. Cesar. Answered questions for naomi and niece. Instructed to still wear collar. Use muscle relaxer for muscle spasms not the pain medication. He needs to also use ice and lidocaine patches for spasms as needed. He understood. Gauze from incision was removed and incision looked great. Surgical wound WNL - CDI, no signs of infection or skin breakdown.  Incision well-healed: good skin approximation, no redness or visible/palpable edema, no tenderness to palpation.  PT. AF, denies fever, chills or sweats.  Pt. reports that the symptoms are improved from pre-op.  Staples - intact removed without difficulty. Wound prepped with Betadine before and after removal.  Surrounding skin has no signs of breakdown.  Verbal instructions regarding incision care are given.  Pt. advised to call us if any s/s of infection noted - all discussed in details.  One steri strip was placed on bottom of incision where there was skin overlap healing. Scheduled pt's 6 week post op with Dr. Cesar.   Christiano,CMA

## 2021-06-24 NOTE — TELEPHONE ENCOUNTER
ANTICOAGULATION  MANAGEMENT: Discharge Continuity of Care Review    Hospital admission on  2/27-3/4 for acute cholecystitis.    Discharge disposition: TCU    INR Results:       Recent labs: (last 7 days)     02/27/19  2142   INR 1.12*       Warfarin inpatient management: Warfarin held    Warfarin discharge instructions: Continue to hold warfarin until f/u with Neuro on 3/6.      Medication Changes Affecting Anticoagulation: Yes, Bridging with Lovenox q24h.     Additional Factors Affecting Anticoagulation: No    Plan     No adjustment to anticoagulation plan needed      Edgar Ortez RN

## 2021-06-24 NOTE — TELEPHONE ENCOUNTER
PATIENT NAME:  Tanmay Israel  YOB: 1940  MRN: [id number]  SURGEON: Dr. Cesar and Dr. Rodrigues  DATE of SURGERY: 2-22-19  PROCEDURE: LEFT CERVICAL 4, 5, 6 LAMINOPLASTY    FOLLOW-UP:    Staples Out : 14 Days [On nurse schedule unless noted otherwise] on a day with Dr. Cesar in clinic as she would like to see pt  Post Op Visit: 4 weeks   Post-op Provider: Dr. Cesar in [facility]  DIAGNOSTICS:  radiology: X-Ray: cervical, ap/lat standing in brace  (ordered 2-25-19)  DISPOSITION:  Home with home care and neighbor's support 2-25-19    ADDITIONAL INSTRUCTIONS FOR MEDICAL STAFF:      Okay for collar only when out of bed or upright, okay to remove at night    Start Subq heparin 2-24-19, coumadin POD #7

## 2021-06-24 NOTE — ANESTHESIA POSTPROCEDURE EVALUATION
Patient: Tanmay Israel  CHOLECYSTECTOMY, LAPAROSCOPIC  Anesthesia type: general    Patient location: PACU  Last vitals:   Vitals:    02/28/19 1900   BP: 165/75   Pulse: 76   Resp: 21   Temp:    SpO2: 95%     Post vital signs: stable  Level of consciousness: awake and responds to simple questions  Post-anesthesia pain: pain controlled  Post-anesthesia nausea and vomiting: no  Pulmonary: unassisted, spontaneous ventilation, nasal cannula  Cardiovascular: stable and blood pressure at baseline  Hydration: adequate  Anesthetic events: no    QCDR Measures:  ASA# 11 - Tarah-op Cardiac Arrest: ASA11B - Patient did NOT experience unanticipated cardiac arrest  ASA# 12 - Tarah-op Mortality Rate: ASA12B - Patient did NOT die  ASA# 13 - PACU Re-Intubation Rate: ASA13B - Patient did NOT require a new airway mgmt  ASA# 10 - Composite Anes Safety: ASA10A - No serious adverse event    Additional Notes:

## 2021-06-24 NOTE — TELEPHONE ENCOUNTER
ORDER FROM: Dr. Cesar    PRE AUTHORIZATION: PA approved. Ok to schedule.    METHOD OF PATIENT CONTACT: Spoke with Tanmay on the phone. Best number to reach: [phone].    PROCEDURE: Cervical 4,5, 6 laminoplasty, left    SURGICAL DATE: 19 @ 1:00 PM ([facility])    CARDIAC CLEARANCE: 19 @ 3:20 PM  [facility]    [facility]    PCP, CLINIC, PHONE #: Dr. Go Ramírez, [facility], [phone].    PRE-OP PHYSICAL: 19 @ 2:00 PM with Dr. Ramírez    FILM INFO: CERVICAL/THORACIC MYELOGRAM & X-RAY MYELOGRAM:  19 @ SAL    SURGICAL LETTER: Mailed to patient 02/15/19

## 2021-06-24 NOTE — TELEPHONE ENCOUNTER
Debra Casas is not able to clear Tanmay for surgery.  Do you want him set up in [facility] for a cardiac risk assessment?  Thank you,  Madyson

## 2021-06-24 NOTE — TELEPHONE ENCOUNTER
This patient doesn't have an appointment scheduled with rapid access yet.  He states he is unable to get scheduled for an appointment before Monday but he has surgery scheduled for Monday.      Cara can you help facilitate an appointment for him?  He is unable to get cleared for surgery unless he has cardiac clearance.

## 2021-06-24 NOTE — TELEPHONE ENCOUNTER
Who is calling:  [facility] pre op  Reason for Call:  Requesting the pre op physical 2/12/19 to be completed for surgery 2/18/19.   Date of last appointment with primary care: 2/12/19  Has the patient been recently seen:  Yes  Okay to leave a detailed message: Yes

## 2021-06-24 NOTE — TELEPHONE ENCOUNTER
ANTICOAGULATION  MANAGEMENT    Assessment     Today's INR result of 1.8 is Subtherapeutic (goal INR of 2.0-3.0)        Warfarin recently held as instructed which may be affecting INR- held the past two days for cervical laminoplasty which was initially scheduled for 2/18. However procedure has been moved to 2/22 (needed Cardiology clearance on 2/19).     No new diet changes affecting INR    No new medication/supplements affecting INR    Continues to tolerate warfarin with no reported s/s of bleeding or thromboembolism     Previous INR was Therapeutic    Plan:     Spoke with Tanmay regarding INR result and instructed:     Warfarin Dosing Instructions:  Continue current warfarin dose 2.5 mg daily.    Start holding on 2/17 x5 days for Cervical Laminoplasty (2/22). (Orders in 2/11 encounter).   Resume warfarin the evening of 2/22 if ok with surgeon.     Instructed patient to follow up no later than: 1 week after resuming warfarin.    Education provided: importance of taking warfarin as instructed    Tanmay verbalizes understanding and agrees to warfarin dosing plan.    Instructed to call the [facility] for any changes, questions or concerns. (#[phone])   ?   Edgar Ortez RN    Subjective/Objective:      Tanmay Israel, a 78 y.o. male is on warfarin.     Tanmay reports:     Home warfarin dose: verbally confirmed home dose with pt and updated on anticoagulation calendar     Missed doses: No     Medication changes:  No     S/S of bleeding or thromboembolism:  No     New Injury or illness:  No     Changes in diet or alcohol consumption:  No     Upcoming surgery, procedure or cardioversion:  Yes: cervical laminoplasty 2/22, and colonoscopy 4/11.     Anticoagulation Episode Summary     Current INR goal:   2.0-3.0   TTR:   58.2 % (4 y)   Next INR check:   3/1/2019   INR from last check:   1.80! (2/15/2019)   Weekly max warfarin dose:      Target end date:      INR check location:      Preferred lab:      Send INR  reminders to:   ANTICOAGULATION POOL B (JENNA,MARTA,STW,RVL,OAK,RLN)       Comments:            Anticoagulation Care Providers     Provider Role Specialty Phone number    Go Ramírez MD Referring Family Medicine [phone]

## 2021-06-24 NOTE — ANESTHESIA PREPROCEDURE EVALUATION
Anesthesia Evaluation      Patient summary reviewed   No history of anesthetic complications     Airway   Mallampati: II  Neck ROM: limited  Comment: Cervical collar in place   Pulmonary - normal exam   (+) sleep apnea on no CPAP, ,   (-) shortness of breath                         Cardiovascular - normal exam  Exercise tolerance: > or = 4 METS  (+) hypertension, CAD, CABG/stent, dysrhythmias, CHF, cardiomyopathy, hypercholesterolemia, PVD     Neuro/Psych - negative ROS   (-) no neuromuscular disease    Endo/Other    (+) arthritis,      GI/Hepatic/Renal    (+) GERD well controlled,   chronic renal disease CRI,      Other findings: CABG 2008. Ischemic cardiomyopathy, euvolemic now but prone to fluid overload.  Echo 11/12/18:  When compared to the previous study dated 10/13/2017, no significant change.  Normal left ventricular size, wall thickness and moderate global hypokinesis. Elevated left ventricular filling pressure. Left ventricle ejection fraction is moderately decreased. The estimated left ventricular ejection fraction is 35%.  Normal right ventricular size with moderately reduced right ventricular systolic performance. Pacemaker leads are identified.  Severely enlarged left atrium. Moderately enlarged right atrium.  Mild mitral valve regurgitation. Mild tricuspid valve regurgitation with estimated right ventricular systolic pressure 45 mmHg.  NMST 5/2016 negative for ischemia.    May have cerebral aneurysm, no details.  Lumbar spondylolisthesis with sciatica.  Schatzki's ring.  Intolerant of his CPAP so doesn't use it.   Compensated systolic CHF.  A-fib plus paroxysmal VT.  Combination AICD/pacer.  Shortness of breath has improved, able to shovel snow w/o symptoms if he pauses occasionally.  Possible Crohn's. Chronic anemia, macular degeneration.   Recent cervical spine surgery, now with new acute cholecystitis.   Per patient, able to take off cervical collar when supine.         Dental    (+) upper dentures                          Anesthesia Plan  Planned anesthetic: general endotracheal    ASA 4   Induction: intravenous   Anesthetic plan and risks discussed with: patient  Anesthesia plan special considerations: arterial catheterization,   Post-op plan: routine recovery        Chemistry        Component Value Date/Time     02/27/2019 2249    K 4.0 02/28/2019 1151     02/27/2019 2249    CO2 29 02/27/2019 2249    BUN 13 02/27/2019 2249    CREATININE 1.08 02/27/2019 2249    GLU 92 02/27/2019 2249        Component Value Date/Time    CALCIUM 9.0 02/27/2019 2249    ALKPHOS 182 (H) 02/27/2019 2249    AST 13 02/27/2019 2249    ALT 11 02/27/2019 2249    BILITOT 1.0 02/27/2019 2249        Lab Results   Component Value Date    WBC 8.7 02/27/2019    HGB 9.3 (L) 02/27/2019    HCT 29.1 (L) 02/27/2019    MCV 89 02/27/2019     02/27/2019     Lab Results   Component Value Date    INR 1.12 (H) 02/27/2019    INR 1.38 (H) 02/22/2019    INR 1.80 (H) 02/15/2019

## 2021-06-24 NOTE — TELEPHONE ENCOUNTER
ANTICOAGULATION  MANAGEMENT    Assessment     Today's INR result of 2.9 is Therapeutic (goal INR of 2.0-3.0)        Warfarin taken as previously instructed    No new diet changes affecting INR    No new medication/supplements affecting INR    Continues to tolerate warfarin with no reported s/s of bleeding or thromboembolism     Previous INR was Therapeutic     Cervical laminoplasty Mon 2/18, preop was today. Per encounter 2/11 patient approved for 5 day warfarin hold with no bridge      Plan:     Left a detailed message for Tanmay regarding INR result and instructed:     Warfarin Dosing Instructions:  Continue current warfarin dose 2.5 mg daily, last dose is tonight Tue 2/12. Begin warfarin hold x 5 on Wed 2/13.     Noted that Tanmay wrote on his template that he was told to hold starting tonight. As he just had his preop today and the note is not yet completed advised for him to do whatever PCP told him to do, hold starting tonight if that is what was instructed in clinic today.     Instructed patient to follow up no later than: likely about 1 week after resuming warfarin which may be the night of his procedure 2/18. Did tell patient that he should receive clear instructions about when to resume and when to follow up from his surgeon at hospital discharge. Asked him to call if there are any questions about what to do with dosing post op or when to follow up    Education provided: importance of therapeutic range and target INR goal and significance of current INR result    Instructed to call the [facility] for any changes, questions or concerns. (#[phone])   ?   Renay Li RN    Subjective/Objective:      Tanmay Israel, a 78 y.o. male is on warfarin.     Tanmay reports:     Home warfarin dose: as updated on anticoagulation calendar per template     Missed doses: No     Medication changes:  No     S/S of bleeding or thromboembolism:  No     New Injury or illness:  No     Changes in diet or alcohol consumption:   No     Upcoming surgery, procedure or cardioversion:  Yes: cervical laminoplasty 2/18, preop today    Anticoagulation Episode Summary     Current INR goal:   2.0-3.0   TTR:   58.2 % (4 y)   Next INR check:   2/25/2019   INR from last check:   2.90 (2/12/2019)   Weekly max warfarin dose:      Target end date:      INR check location:      Preferred lab:      Send INR reminders to:   ANTICOAGULATION POOL B (MPW,MARTA,STW,RVL,OAK,RLN)       Comments:            Anticoagulation Care Providers     Provider Role Specialty Phone number    Go Ramírez MD Referring Family Medicine [phone]

## 2021-06-24 NOTE — PROGRESS NOTES
Tanmay was here for a collar fitting. Pt fitted with [place] J collar. Pt had his own Fallon collar from previous neck injury. I did try Fallon collar on and fits pt well. Pt was also given Hibiclens soap and instructed on how to use it. Pt was comfortable with Collar and soap instructions. Pt is currently not taking Coumadin but had INR done this AM, still waiting for results. Tanmay states he is still taking his multi vitamin and I instructed him to stop that today. Pt understood. Al questions were answered.   JScharlesen,CMA

## 2021-06-24 NOTE — TELEPHONE ENCOUNTER
Called patient, discussed surgery, post-op course, expectations, follow up plan.    Reviewed H&P from 2/12/2019 - cleared for surgery  Labs - WNL (repeat INR and APTT in am of sx)  Cardio clearance on 2/20/2019    MRI done on  - in Nil    To OR as planned.     Check in - 1030    Nothing to eat or drink after midnight the night before surgery.     Bring all pertinent films to hospital the day of surgery.     Bring both collars to hospital.    Continue to refrain from NSAIDS (Ibuprofen, Aleve, Naprosyn), ASA, Over the counter herbal medications or supplements, anti-coagulants and blood thinners.     Patient confirmed they have help/assistance in place at home upon discharge    Instructions: using a washcloth and a bottle of provided Hibiclens, wash your body, avoiding your face and genitals. Preferably, shower the night before surgery and the morning of surgery using a half a bottle each time for your whole body shower.    Answered all questions to patient's satisfaction.    Teodora Castañeda RN, CNRN

## 2021-06-24 NOTE — TELEPHONE ENCOUNTER
----- Message from Go Ramírez MD sent at 2/12/2019  2:55 PM CST -----  Regarding: Preoperative clearance  Please call the cardiology office:    Tanmay has been seen regularly in the clinic most recently by Debra Rowe CNP on February 1.  There is mention in that office visit note of his upcoming cervical spine surgery.  The note mentions that he is at his baseline and doing well.  I saw him today for preoperative clearance for upcoming spine surgery that is now scheduled for February 18.  He continues to do well and remains at his baseline weight without symptoms.  My question to them is do they feel they need to see him prior to a surgical procedure to have cardiac clearance or are they comfortable based on the last assessment with him proceeding with surgery?

## 2021-06-25 ENCOUNTER — AMBULATORY - HEALTHEAST (OUTPATIENT)
Dept: CARDIOLOGY | Facility: CLINIC | Age: 81
End: 2021-06-25

## 2021-06-25 NOTE — PROGRESS NOTES
[facility] Patient Outreach:     Discharge Date::  3/15/19      Reason for TCU stay (discharge diagnosis)::  Acute cholecystitisN S/P lap sulma, CKD, GERD, cervical myelopathy       Are you feeling better, the same or worse since your discharge?:  Patient is feeling better          As part of your discharge plan, did they discuss home care with you?: Yes        Have your seen them yet, or are they scheduled to visit?: Yes                Do you have any follow up visits scheduled with your PCP or Specialist?:  No          I'm glad to hear you're doing well and we want you to continue to do well. Your PCP would like to see you for a follow-up visit. Can we help set that up for your today?: Yes            (RN) Patient transferred to [facility]? **If immediate concers (e.g. patient is feeling worse and/or not taking new medictations), send in basket message to PCP with quick summary of concern.: Yes

## 2021-06-25 NOTE — TELEPHONE ENCOUNTER
INR result is 2.3  INR   Date Value Ref Range Status   03/15/2019 2.90 (!) 0.9 - 1.1 Final       Will the patient be seen, or did they already see, MD or CNP today? No    Most Recent Warfarin dose day/week  Sunday Monday Tuesday Wednesday Thursday Friday Saturday   2.5 2.5 inj inj 2.5 2.5 2.5     Sunday Monday Tuesday Wednesday Thursday Friday Saturday                Has the patient missed any doses of Coumadin, Warfarin, Jantoven in the past 7 days? No    Has the patients medications changed since the last visit? No    Has the patient experienced any bleeding recently? No    Has the patient experienced any injuries or illness recently? No    Has the patient experienced any 'new' shortness of breath, severe headaches, or changes in vision recently? No    Has the patient had any changes in their diet, or alcohol consumption? Yes, one beer last week    Is the patient here today to prepare for any type of upcoming surgery, procedure, or for a cardioversion procedure? No    What phone number can we reach the patient at today? home phone listed in demographics.

## 2021-06-25 NOTE — TELEPHONE ENCOUNTER
ANTICOAGULATION  MANAGEMENT    Assessment     Today's INR result of 2.9 is Therapeutic (goal INR of 2.0-3.0)        Previous INR was Therapeutic    Warfarin given as previously instructed    No new health/diet changes affecting INR    No new medication/supplements affecting INR    Continues to tolerate warfarin with no reported s/s of bleeding or thromboembolism       Plan:     Warfarin Dosing Orders:  Continue current warfarin dose 2.5 mg daily.    Next INR: Tues 3/19.    Telephone orders given to nurseLamine.  Orders read back correctly.     Edgar Ortez RN    Subjective/Objective:      Tanmay Israel, a 78 y.o. male is on warfarin. Facility nurse reports for Tanmay:    Other anticoagulants: No    Medication changes: No     Missed warfarin doses since last INR: No     Abnormal bleeding since last INR: No    New symptoms, injury or illness: No     Upcoming surgery, procedure or cardioversion: No    Recent INR Results:    Lab Results   Component Value Date    INR 2.90 (!) 03/15/2019    INR 2.30 (!) 03/13/2019    INR 1.70 (!) 03/11/2019       Anticoagulation Episode Summary     Current INR goal:   2.0-3.0   TTR:   57.8 % (4.1 y)   Next INR check:   3/19/2019   INR from last check:   2.90 (3/15/2019)   Weekly max warfarin dose:      Target end date:      INR check location:      Preferred lab:      Send INR reminders to:   ANTICOAGULATION POOL E ([facility])       Comments:            Anticoagulation Care Providers     Provider Role Specialty Phone number    Go Ramírez MD Referring Family Medicine [phone]

## 2021-06-25 NOTE — TELEPHONE ENCOUNTER
ANTICOAGULATION  MANAGEMENT- Home Care/Care Facility Result    Assessment     Today's INR result of 2.3 is Therapeutic (goal INR of 2.0-3.0)        Warfarin taken as previously instructed    No new diet changes affecting INR    No new medication/supplements affecting INR    Continues to tolerate warfarin with no reported s/s of bleeding or thromboembolism     Previous INR was Therapeutic    Plan:     Spoke with [facility] nurse Elisa discussed INR result and instructed:     Warfarin Dosing Instructions: Continue current warfarin dose 2.5 mg daily.    Next INR to be drawn: OV on 3/25. (Last day of home care today)    Education provided: importance of taking warfarin as instructed    Elisa verbalizes understanding and agrees to warfarin dosing plan.   ?   Edgar Ortez RN    Subjective/Objective:      Tanmay Israel, a 78 y.o. male is established on warfarin.     Home care/care facility RN's report of Tanmay INR, recent warfarin dosing, diet changes, medication changes, and symptoms is documented below.    Additional findings: verbally confirmed home dose with Elisa and updated on anticoagulation calendar    Anticoagulation Episode Summary     Current INR goal:   2.0-3.0   TTR:   57.9 % (4.1 y)   Next INR check:   3/25/2019   INR from last check:   2.30 (3/19/2019)   Weekly max warfarin dose:      Target end date:      INR check location:      Preferred lab:      Send INR reminders to:   ANTICOAGULATION POOL B (MPW,MARTA,STW,RVL,OAK,RLN)       Comments:            Anticoagulation Care Providers     Provider Role Specialty Phone number    Go Ramírez MD Referring Family Medicine [phone]

## 2021-06-25 NOTE — TELEPHONE ENCOUNTER
FYI--     Patient was referred back to [facility] to resume home care services after discharging from [facility] on 3/15. Patient is unsure if he would like to resume home care services. Patient would like to take the weekend to think about it. [facility] will follow up with patient on 3/18 to check in with patient and verify if he would like to resume home care services.

## 2021-06-25 NOTE — TELEPHONE ENCOUNTER
Request for Orders  Patient states he doing ADL's himself and doesn't think he needs home care anymore, but is willing to have a nurse see if he is alright with out home care. Confirmed SN CHRISTOPHER (Skilled Nurse Resumption of Care) for 3/19.    Who s Requesting: Bernadette    Orders being requested: SN, PT, OT, HHA to start on 3/19/19    Where to send Orders: Simply respond to this Epic Telephone Call message string, and we can take as a verbal order if appropriate.   Thank you.

## 2021-06-26 NOTE — PROGRESS NOTES
Progress Notes by Debra Rowe CNP at 10/22/2018 10:30 AM     Author: Debra Rowe CNP Service: -- Author Type: Nurse Practitioner    Filed: 10/22/2018 11:35 AM Encounter Date: 10/22/2018 Status: Signed    : Debra Rowe CNP (Nurse Practitioner)           Click to link to [facility] HEART CARE NOTE      Assessment/Recommendations   Assessment:    1. Ischemic cardiomyopathy, heart failure with reduced ejection fraction, ejection fraction 35%, NYHA class III: Tanmay has had improvement of his symptoms since changing to Bumex last week.  OptiVol reviewed today.  OptiVol is no longer increasing but has leveled out.  Thoracic impedance is improving and nearing reference line.    2.  Chronic atrial fibrillation: Heart rates have been higher since he has been retaining fluid.  Average heart rate remains around 80-90 bpm.  He continues to take warfarin and has an INR scheduled tomorrow.    3.  Hypertension: Blood pressure better controlled today at 126/56.    4.  Crohn's disease: He had a flare of Crohn's disease in September.  His dose of steroid was decreased this week and he will be done taking steroids next week.  Steroid may be contributing to acute heart failure symptoms with fluid retention.    Plan:  1.   Heart failure medications:  - Beta blocker therapy with carvedilol 37.5 mg twice daily  - ARB therapy with losartan 100 mg daily  - Diuretic therapy with bumetanide 2 mg in the morning 1 mg in the afternoon  2.  BMP pending  3.  He is scheduled for urinary tract stone surgery this week.  I recommended that he call and cancel this procedure at this time due to acute heart failure.    Tanmay Israel will follow up in the heart failure clinic in 1 week.     History of Present Illness    Mr. Tanmay Israel is a 78 y.o. male seen at [facility] heart failure clinic today for continued follow-up.  He has a history of ischemic cardiomyopathy, heart  failure with reduced ejection fraction, hypertension, coronary artery bypass surgery, and permanent atrial fibrillation.  Echocardiogram from October 2017 showed an ejection fraction of 35% and mild mitral regurgitation.    He had a Crohn's flare in September and has been on steroids.  He was eating many high sodium foods.  He also had a colonoscopy with Gatorade for bowel prep.  He has been seen frequently in the heart failure clinic for acute heart failure over the past few weeks.  He did not respond to increasing doses of Lasix.  Lasix was discontinued last week and he was started on Bumex.  He notes that Bumex is working better than Lasix.  His weight is only down 3-4 pounds in the past week.  He no longer has edema.  He continues to have shortness of breath with activity and has noticed some improvement.  He now denies orthopnea. He denies lightheadedness and chest pain.      His home weight is now 183 pounds.  His steroid for Crohn's flare was decreased this week and he will discontinued steroid next week.     Physical Examination Review of Systems   Vitals:    10/22/18 1056   BP: 126/56   Pulse: 80     Body mass index is 28.06 kg/(m^2).  Wt Readings from Last 3 Encounters:   10/22/18 190 lb (86.2 kg)   10/16/18 194 lb (88 kg)   10/10/18 192 lb (87.1 kg)       General Appearance:     Alert, cooperative and in no acute distress.   ENT/Mouth: membranes moist, no oral lesions or bleeding gums.      EYES:  no scleral icterus, normal conjunctivae   Chest/Lungs:   lungs are clear to auscultation, no rales or wheezing, respirations unlabored   Cardiovascular:    Irregularly irregular. Normal first and second heart sounds; Jugular venous pressure elevated, no edema bilateral lower extremities    Abdomen:  Soft, nontender, nondistended, bowel sounds present   Extremities: no cyanosis or clubbing   Skin: warm, dry.    Neurologic: mood and affect are appropriate, alert and oriented x3      General: WNL  Eyes:  WNL  Ears/Nose/Throat: WNL  Lungs: Shortness of Breath  Heart: Irregular Heartbeat  Stomach: WNL  Bladder: WNL  Muscle/Joints: WNL  Skin: WNL  Nervous System: WNL  Mental Health: WNL     Blood: WNL     Medical History  Surgical History Family History Social History   Past Medical History:   Diagnosis Date   ? Anemia    ? Arthritis    ? Atrial fibrillation (H)    ? Back pain    ? Callus    ? Cerebral aneurysm    ? Chronic kidney disease    ? Chronic systolic congestive heart failure (H)    ? Coronary artery disease    ? Crohn's disease (H)    ? Dysphagia    ? GERD (gastroesophageal reflux disease)    ? Hyperlipidemia    ? Hypertension    ? ICD (implantable cardioverter-defibrillator) in place     Medtronic   ? Ischemic cardiomyopathy    ? Kidney stone    ? Low back pain    ? Macular degeneration    ? Permanent atrial fibrillation (H)     [id number] risk score = 5 (age3/ CAD/ HTN/ CHF) Chronic warfarin   ? Sleep apnea     Past Surgical History:   Procedure Laterality Date   ? APPENDECTOMY     ? CARDIAC PACEMAKER PLACEMENT  2013    ICD Medtronic   ? CATARACT EXTRACTION W/  INTRAOCULAR LENS IMPLANT Bilateral    ? CHOLECYSTECTOMY     ? ESOPHAGOGASTRODUODENOSCOPY     ? AR CABG, VEIN, FOUR      Description: Coronary Artery Quadruple Venous Bypass Graft;  Recorded: 10/21/2008;  Comments: 8/2004   ? ROTATOR CUFF REPAIR Right    ? TONSILLECTOMY      Family History   Problem Relation Age of Onset   ? Heart disease Mother    ? Stroke Mother    ? Crohn's disease Father    ? Alcohol abuse Brother    ? No Medical Problems Daughter    ? No Medical Problems Son    ? No Medical Problems Maternal Aunt    ? No Medical Problems Maternal Uncle    ? No Medical Problems Paternal Aunt    ? No Medical Problems Paternal Uncle    ? No Medical Problems Maternal Grandmother    ? No Medical Problems Maternal Grandfather    ? No Medical Problems Paternal Grandmother    ? No Medical Problems Paternal Grandfather    ? Cancer Brother    ?  Urolithiasis Neg Hx    ? Gout Neg Hx     Social History     Social History   ? Marital status: Single     Spouse name: N/A   ? Number of children: N/A   ? Years of education: N/A     Occupational History   ? retired      [employer]     Social History Main Topics   ? Smoking status: Former Smoker   ? Smokeless tobacco: Never Used   ? Alcohol use No   ? Drug use: No   ? Sexual activity: Not on file     Other Topics Concern   ? Not on file     Social History Narrative          Medications  Allergies   Current Outpatient Prescriptions   Medication Sig Dispense Refill   ? acetaminophen (TYLENOL) 325 MG tablet Take 650 mg by mouth every 4 (four) hours as needed for pain. Indications: Pain     ? budesonide (ENTOCORT EC) 3 mg 24 hr capsule Take 9 mg by mouth every morning.   0   ? bumetanide (BUMEX) 1 MG tablet TAKE 2 TABLETS BY MOUTH EVERY MORNING AND 1 TABLET IN THE AFTERNOON 270 tablet 2   ? carvedilol (COREG) 25 MG tablet Take 1.5 tablets (37.5 mg total) by mouth 2 (two) times a day. 270 tablet 1   ? cholecalciferol, vitamin D3, 1,000 unit tablet Take 2,000 Units by mouth daily.     ? FERROUS FUMARATE (IRON ORAL) Take 1 tablet by mouth daily.      ? losartan (COZAAR) 100 MG tablet Take 1 tablet (100 mg total) by mouth daily. 90 tablet 3   ? magnesium L-lactate (MAGTAB) 84 mg TbER Take 1 tablet (84 mg total) by mouth daily.  0   ? multivitamin with iron (ONE DAILY WITH IRON) Tab tablet Take 1 tablet by mouth daily.      ? omeprazole (PRILOSEC) 20 MG capsule Take 20 mg by mouth 2 (two) times a day.     ? potassium chloride (KLOR-CON) 10 MEQ CR tablet Take 2 tablets (20 mEq total) by mouth 2 (two) times a day. 120 tablet 11   ? rosuvastatin (CRESTOR) 20 MG tablet Take 1 tablet (20 mg total) by mouth at bedtime. 90 tablet 3   ? warfarin (COUMADIN) 2.5 MG tablet Take 1.25-2.5 mg by mouth See Admin Instructions. Take 1.25 mg (0.5 tablet) on Wednesdays/Saturdays and 2.5 mg rest of week. Adjust dose based on INR results as  directed.     ? warfarin (COUMADIN/JANTOVEN) 2.5 MG tablet Take 1/2 to 1 tablet (1.25 to 2.5 mg) by mouth daily. Adjust dose per INR results as instructed. 90 tablet 1     No current facility-administered medications for this visit.       No Known Allergies      Lab Results    Chemistry CBC BNP   Lab Results   Component Value Date    CREATININE 1.04 10/16/2018    BUN 20 10/16/2018     10/16/2018    K 3.7 10/16/2018     10/16/2018    CO2 30 10/16/2018     Creatinine (mg/dL)   Date Value   10/16/2018 1.04   10/10/2018 0.94   10/04/2018 1.07   10/03/2018 0.93    Lab Results   Component Value Date    WBC 13.8 (H) 10/04/2018    HGB 9.1 (L) 10/04/2018    HCT 28.3 (L) 10/04/2018    MCV 93 10/04/2018     10/04/2018    Lab Results   Component Value Date     (H) 10/04/2018     BNP (pg/mL)   Date Value   10/04/2018 779 (H)   10/03/2018 789 (H)   09/07/2018 173 (H)          25 minutes were spent with the patient with greater than 50% spent on education and counseling.      Debra Rowe, formerly [facility] Care   Heart Failure Clinic

## 2021-06-26 NOTE — ANESTHESIA CARE TRANSFER NOTE
Last vitals:   Vitals:    06/16/21 1537   BP: 132/63   Pulse: 81   Resp: 18   Temp:    SpO2: 96%     Patient's level of consciousness is drowsy  Spontaneous respirations: yes  Maintains airway independently: yes  Dentition unchanged: yes  Oropharynx: oropharynx clear of all foreign objects    QCDR Measures:  ASA# 20 - Surgical Safety Checklist: WHO surgical safety checklist completed prior to induction    PQRS# 430 - Adult PONV Prevention: 4558F - Pt received => 2 anti-emetic agents (different classes) preop & intraop  ASA# 8 - Peds PONV Prevention: NA - Not pediatric patient, not GA or 2 or more risk factors NOT present  PQRS# 424 - Tarah-op Temp Management: NA - MAC anesthesia or case < 60 minutes  PQRS# 426 - PACU Transfer Protocol: - Transfer of care checklist used  ASA# 14 - Acute Post-op Pain: ASA14B - Patient did NOT experience pain >= 7 out of 10

## 2021-06-26 NOTE — PROGRESS NOTES
Clinic Care Coordination Contact  Community Health Worker Initial Outreach    Patient accepts CC: No, patient stated he is doing fine and has no current need for CCC. Patient will be sent Care Coordination introduction letter for future reference.    The clinic Community Health Worker talked with the patient today at the request of the PCP to discuss possible Clinic Care Coordination enrollment.  The service was described to the patient and immediate needs were discussed.  The patient declined enrollement at this time.  The PCP is encouraged to refer in the future if the patient's needs change.    Waleska Arguello  Community Health Worker   [facility]  Care Coordination  [facility]  [email or web address]   Office:  [phone]

## 2021-06-26 NOTE — PROGRESS NOTES
Progress Notes by Debra Rowe CNP at 10/16/2018  8:30 AM     Author: Debra Rowe CNP Service: -- Author Type: Nurse Practitioner    Filed: 10/16/2018  9:17 AM Encounter Date: 10/16/2018 Status: Signed    : Debra Rowe CNP (Nurse Practitioner)           Click to link to [facility] Heart Care     [facility] HEART CARE NOTE      Assessment/Recommendations   Assessment:    1. Ischemic cardiomyopathy, heart failure with reduced ejection fraction, ejection fraction 35%, NYHA class III: Tanmay continues to have acute heart failure symptoms with shortness of breath, orthopnea, and lower extremity edema.  He has had no improvement with increasing his Lasix last week.  OptiVol reviewed today which shows continued fluid retention.  OptiVol seems to be leveling off but has not improved yet.    2.  Chronic atrial fibrillation: Heart rates have been higher since he has been retaining fluid.  Average heart rate still remains around 80-90 bpm.  He continues to take warfarin.    3.  Hypertension: Blood pressure elevated today at 170/86 with a recheck of 140/60.  He has not taken any of his morning medications which include losartan and carvedilol.    4.  Crohn's disease: He had a flare of Crohn's disease in September.  He is currently on high-dose steroids.  Crohn's flare has resolved.  Steroid may be contributing to acute heart failure symptoms with the fluid retention.    Plan:  1.   Heart failure medications:  - Beta blocker therapy with carvedilol 37.5 mg twice daily  - ARB therapy with losartan 100 mg daily  - Diuretic therapy with furosemide 60 mg twice daily discontinued.  Start Bumex 2 mg in the morning 1 mg in the afternoon.  2.  BMP pending  3.  Our clinic will call Tanmay on Thursday or Friday this week to get an update on symptoms.  He will follow-up in the heart failure clinic on Monday.  4.  Tanmay will call his GI doctor today to see if he can begin to taper of steroid since Crohn's  flare has resolved.       History of Present Illness    Mr. Tanmay Israel is a 78 y.o. male seen at [facility] heart failure clinic today for continued follow-up.  He has a history of ischemic cardiomyopathy, heart failure with reduced ejection fraction, hypertension, coronary artery bypass surgery, and permanent atrial fibrillation.  Echocardiogram from October 2017 showed ejection fraction of 35% and mild mitral regurgitation.    He had a Crohn's flare in September and has been on steroids.  He was eating many high sodium foods.  He also had a colonoscopy last week with Gatorade for bowel prep.  He was seen last week for acute heart failure.  His Lasix was increased to 60 mg twice daily.  He has had no improvement in his shortness of breath.  He continues to have shortness of breath with minimal activity and orthopnea.  He has mild lower extremity edema.  He denies lightheadedness and chest pain.      His clinic weight is up 2 pounds from last week.  He is working on following a low-sodium diet.     Physical Examination Review of Systems   Vitals:    10/16/18 0849   BP: 140/60   Pulse:    Resp:      Body mass index is 28.65 kg/(m^2).  Wt Readings from Last 3 Encounters:   10/16/18 194 lb (88 kg)   10/10/18 192 lb (87.1 kg)   10/05/18 195 lb (88.5 kg)       General Appearance:     Alert, cooperative and in no acute distress.   ENT/Mouth: membranes moist, no oral lesions or bleeding gums.      EYES:  no scleral icterus, normal conjunctivae   Chest/Lungs:    Scattered crackles in bases bilaterally   Cardiovascular:    Irregularly irregular. Normal first and second heart sounds.  1+ edema bilateral ankles   Abdomen:  Soft, nontender, nondistended, bowel sounds present   Extremities: no cyanosis or clubbing   Skin: warm, dry.    Neurologic: mood and affect are appropriate, alert and oriented x3      General: WNL  Eyes: WNL  Ears/Nose/Throat: WNL  Lungs: Shortness of Breath  Heart: Shortness of Breath with  activity, Irregular Heartbeat  Stomach: WNL  Bladder: Frequent Urination at Night  Muscle/Joints: Muscle Weakness  Skin: WNL  Nervous System: WNL  Mental Health: WNL     Blood: WNL     Medical History  Surgical History Family History Social History   Past Medical History:   Diagnosis Date   ? Arthritis    ? Atrial fibrillation (H)    ? Back pain    ? Callus    ? Chronic systolic congestive heart failure (H)    ? Coronary artery disease    ? GERD (gastroesophageal reflux disease)    ? Hyperlipidemia    ? Hypertension    ? ICD (implantable cardioverter-defibrillator) in place     Medtronic   ? Kidney stone    ? Permanent atrial fibrillation (H)     [id number] risk score = 5 (age1/ CAD/ HTN/ CHF) Chronic warfarin    Past Surgical History:   Procedure Laterality Date   ? APPENDECTOMY     ? CARDIAC PACEMAKER PLACEMENT  2013    ICD Medtronic   ? CATARACT EXTRACTION W/  INTRAOCULAR LENS IMPLANT Bilateral    ? CHOLECYSTECTOMY     ? ESOPHAGOGASTRODUODENOSCOPY     ? VA CABG, VEIN, FOUR      Description: Coronary Artery Quadruple Venous Bypass Graft;  Recorded: 10/21/2008;  Comments: 8/2004   ? ROTATOR CUFF REPAIR Right    ? TONSILLECTOMY      Family History   Problem Relation Age of Onset   ? Heart disease Mother    ? Stroke Mother    ? Crohn's disease Father    ? Alcohol abuse Brother    ? No Medical Problems Daughter    ? No Medical Problems Son    ? No Medical Problems Maternal Aunt    ? No Medical Problems Maternal Uncle    ? No Medical Problems Paternal Aunt    ? No Medical Problems Paternal Uncle    ? No Medical Problems Maternal Grandmother    ? No Medical Problems Maternal Grandfather    ? No Medical Problems Paternal Grandmother    ? No Medical Problems Paternal Grandfather    ? Cancer Brother    ? Urolithiasis Neg Hx    ? Gout Neg Hx     Social History     Social History   ? Marital status: Single     Spouse name: N/A   ? Number of children: N/A   ? Years of education: N/A     Occupational History   ? retired       [employer]     Social History Main Topics   ? Smoking status: Former Smoker   ? Smokeless tobacco: Never Used   ? Alcohol use No   ? Drug use: No   ? Sexual activity: Not on file     Other Topics Concern   ? Not on file     Social History Narrative          Medications  Allergies   Current Outpatient Prescriptions   Medication Sig Dispense Refill   ? acetaminophen (TYLENOL) 325 MG tablet Take 650 mg by mouth every 4 (four) hours as needed for pain. Indications: Pain     ? budesonide (ENTOCORT EC) 3 mg 24 hr capsule Take 9 mg by mouth every morning.   0   ? carvedilol (COREG) 25 MG tablet Take 1.5 tablets (37.5 mg total) by mouth 2 (two) times a day. 270 tablet 1   ? cholecalciferol, vitamin D3, 1,000 unit tablet Take 2,000 Units by mouth daily.     ? FERROUS FUMARATE (IRON ORAL) Take 1 tablet by mouth daily.      ? losartan (COZAAR) 100 MG tablet Take 1 tablet (100 mg total) by mouth daily. 90 tablet 3   ? magnesium L-lactate (MAGTAB) 84 mg TbER Take 1 tablet (84 mg total) by mouth daily.  0   ? multivitamin with iron (ONE DAILY WITH IRON) Tab tablet Take 1 tablet by mouth daily.      ? omeprazole (PRILOSEC) 20 MG capsule Take 20 mg by mouth 2 (two) times a day.     ? potassium chloride (KLOR-CON) 10 MEQ CR tablet TAKE 2 TABLETS(20 MEQ) BY MOUTH DAILY 180 tablet 1   ? rosuvastatin (CRESTOR) 20 MG tablet Take 1 tablet (20 mg total) by mouth at bedtime. 90 tablet 3   ? warfarin (COUMADIN) 2.5 MG tablet Take 1.25-2.5 mg by mouth See Admin Instructions. Take 1.25 mg (0.5 tablet) on Wednesdays/Saturdays and 2.5 mg rest of week. Adjust dose based on INR results as directed.     ? warfarin (COUMADIN/JANTOVEN) 2.5 MG tablet Take 1/2 to 1 tablet (1.25 to 2.5 mg) by mouth daily. Adjust dose per INR results as instructed. 90 tablet 1   ? bumetanide (BUMEX) 1 MG tablet 2 mg in the morning and 1 mg in the afternoon 90 tablet 2     No current facility-administered medications for this visit.       No Known Allergies      Lab Results     Chemistry CBC BNP   Lab Results   Component Value Date    CREATININE 0.94 10/10/2018    BUN 19 10/10/2018     10/10/2018    K 3.6 10/10/2018     10/10/2018    CO2 27 10/10/2018     Creatinine (mg/dL)   Date Value   10/10/2018 0.94   10/04/2018 1.07   10/03/2018 0.93   09/11/2018 1.77 (H)    Lab Results   Component Value Date    WBC 13.8 (H) 10/04/2018    HGB 9.1 (L) 10/04/2018    HCT 28.3 (L) 10/04/2018    MCV 93 10/04/2018     10/04/2018    Lab Results   Component Value Date     (H) 10/04/2018     BNP (pg/mL)   Date Value   10/04/2018 779 (H)   10/03/2018 789 (H)   09/07/2018 173 (H)          25 minutes were spent with the patient with greater than 50% spent on education and counseling.      Debra Rowe, [facility]   Heart Failure Clinic

## 2021-06-26 NOTE — PROGRESS NOTES
Patient ID: Tanmay Israel is a 81 y.o. male.  /58   Pulse 77   Wt 161 lb (73 kg)   SpO2 94%   BMI 23.78 kg/m      Assessment/Plan:                   Diagnoses and all orders for this visit:    Cough    Fever, unspecified fever cause    Dyspnea, unspecified type    Chronic systolic congestive heart failure (H)    Anemia, unspecified type    Stage 3a chronic kidney disease    Ischemic cardiomyopathy    Chronic atrial fibrillation (H)           DISCUSSION  Given his situation that he lives alone, but he is more short of breath with decreasing oxygen saturation (although not hypoxic) and decreasing blood pressure although not distinctly hypotensive I think he needs further evaluation in a more emergent manner than I can provide here.  His clinical picture to me seems still to be most consistent with a infectious etiology.  We have elected to send him to the emergency room and he agrees.  I do not think that he has distress or findings to a point where we need to have him transported via ambulance.  He is comfortable driving himself and agrees to have right to the emergency department.  I did speak with the emergency department physician Dr. Bonilla who is aware of his impending arrival.  Subjective:     HPI    Tanmay Israel is a 81 y.o. male is here today at my request to follow-up on symptoms of respiratory infection which we discussed in more detail on June 8, 2021.    He presented with complaints of fever although no fever was recorded at the time of his visit, cough, dyspnea and general malaise.  He had a chest x-ray which showed primarily chronic findings.  EKG did not show any acute findings.  Lab testing overall reassuring he has a chronic anemia.  He did not have any significant concerns with vital signs at the time of his visit.  He is immunized for Covid with 2 vaccinations greater than 2 weeks prior.  He had no known exposure to infection.  Covid test was done and was negative.  He was empirically  started on azithromycin for probable pneumonia.  He was asked to return today for reevaluation.    He states he feels more short of breath even just at rest.  His oxygen saturation is 94% on room air.  Denies chest pain, denies dizziness, denies lightheadedness or syncope.  Appetite is reduced.  Blood pressure noted to be somewhat lower today but no symptoms of hypotension.  He does not have any signs or symptoms to suggest decompensation of his underlying heart failure.  Specifically weight is down, no edema.    His underlying adequate history does include coronary artery disease, chronic systolic congestive heart failure with last ejection fraction of 48%, chronic atrial fibrillation anticoagulated, chronic kidney disease stable, chronic anemia stable, obstructive sleep apnea.    Review of Systems  Complete review of systems is obtained.  Other than the specific considerations noted above complete review of systems is negative.        Objective:   Medications:  Current Outpatient Medications   Medication Sig     acetaminophen (TYLENOL) 500 MG tablet Take 2 tablets (1,000 mg total) by mouth 3 (three) times a day.     amLODIPine (NORVASC) 5 MG tablet Take 1 tablet (5 mg total) by mouth daily.     azithromycin (ZITHROMAX Z-DELFINA) 250 MG tablet Take 2 tablets (500 mg) on  Day 1,  followed by 1 tablet (250 mg) once daily on Days 2 through 5.     bumetanide (BUMEX) 1 MG tablet TAKE ONE TABLET (1MG) BY MOUTH ONCE DAILY     carvediloL (COREG) 25 MG tablet TAKE ONE AND ONE-HALF TABLETS (37.5MG) BY MOUTH TWICE DAILY WITH MEALS     cholecalciferol, vitamin D3, 1,000 unit tablet Take 2,000 Units by mouth daily.     codeine-guaiFENesin (GUAIFENESIN AC)  mg/5 mL liquid Take 5 mL by mouth daily as needed for cough.     cyanocobalamin 500 MCG tablet Take 500 mcg by mouth daily.     cyclobenzaprine (FLEXERIL) 10 MG tablet Take 10 mg by mouth as needed for muscle spasms.     diclofenac sodium (VOLTAREN) 1 % Gel APPLY 2 TO 4  GRAMS TOPICALLY THREE TIMES DAILY     erythromycin ophthalmic ointment Administer 1 application to both eyes 2 (two) times a day.     ferrous sulfate 325 (65 FE) MG tablet Take 1 tablet by mouth daily.     lisinopriL (PRINIVIL,ZESTRIL) 2.5 MG tablet Take 1 tablet (2.5 mg total) by mouth daily.     melatonin 3 mg Tab tablet Take 1 tablet (3 mg total) by mouth at bedtime as needed.     multivitamin with iron (ONE DAILY WITH IRON) Tab tablet Take 1 tablet by mouth daily.      neomycin-polymyxin-dexamethamethasone (POLYDEX) 3.5 mg/g-10,000 unit/g-0.1 % Oint Administer 1 application to both eyes 2 (two) times a day.     omeprazole (PRILOSEC) 20 MG capsule Take 1 capsule (20 mg total) by mouth daily before breakfast.     ondansetron (ZOFRAN) 8 MG tablet Take 1 tablet (8 mg total) by mouth every 8 (eight) hours as needed.     potassium chloride (K-DUR,KLOR-CON) 20 MEQ tablet TAKE ONE TABLET BY MOUTH ONCE DAILY     RESTASIS 0.05 % ophthalmic emulsion Administer 1 drop to both eyes every 12 (twelve) hours.      rosuvastatin (CRESTOR) 20 MG tablet TAKE 1 TABLET BY MOUTH DAILY AT BEDTIME     warfarin ANTICOAGULANT (COUMADIN/JANTOVEN) 2.5 MG tablet Take 1-1.5 tablets (2.5-3.75 mg) by mouth daily. Adjust dose per INR as directed.     Allergies:  No Known Allergies    Tobacco:   reports that he has quit smoking. He has never used smokeless tobacco.     Physical Exam      /58   Pulse 77   Wt 161 lb (73 kg)   SpO2 94%   BMI 23.78 kg/m      General: He is alert he is not in physical distress but appears to be somewhat more fatigued than his usual state.    HEENT: His mouth is moist, no obvious lesions in the oral cavity or posterior pharynx.  Neck is supple he has a firm nodule within the skin on the left-hand side but does not have any distinct palpable lymphadenopathy masses or other concerns.    Lungs: Rough lung sounds heard at the left base, no distinct wheeze, no distinct crackles.    Heart: Rhythm sounds regular on  auscultation at this time, no distinct murmur    Abdomen: Benign  Extremities: Warm without edema        Previous testing:    Recent Results (from the past 240 hour(s))   INR    Collection Time: 06/08/21  9:51 AM   Result Value Ref Range    INR 1.70 (H) 0.90 - 1.10   HM2(CBC w/o Differential)    Collection Time: 06/08/21 11:08 AM   Result Value Ref Range    WBC 9.4 4.0 - 11.0 thou/uL    RBC 3.30 (L) 4.40 - 6.20 mill/uL    Hemoglobin 10.1 (L) 14.0 - 18.0 g/dL    Hematocrit 30.7 (L) 40.0 - 54.0 %    MCV 93 80 - 100 fL    MCH 30.6 27.0 - 34.0 pg    MCHC 32.9 32.0 - 36.0 g/dL    RDW 14.6 (H) 11.0 - 14.5 %    Platelets 113 (L) 140 - 440 thou/uL    MPV 9.0 7.0 - 10.0 fL   Electrocardiogram Perform and Read    Collection Time: 06/08/21 11:32 AM   Result Value Ref Range    SYSTOLIC BLOOD PRESSURE      DIASTOLIC BLOOD PRESSURE      VENTRICULAR RATE 79 BPM    ATRIAL RATE 78 BPM    P-R INTERVAL      QRS DURATION 156 ms    Q-T INTERVAL 412 ms    QTC CALCULATION (BEZET) 472 ms    P Axis      R AXIS 259 degrees    T AXIS 89 degrees    MUSE DIAGNOSIS       Atrial fibrillation with a competing junctional pacemaker  Right superior axis deviation  Non-specific intra-ventricular conduction block  Abnormal ECG  When compared with ECG of 07-FEB-2020 10:04,  QRS duration has decreased  Borderline criteria for Lateral infarct are no longer Present  Confirmed by SANCHO BHATTI MD LOC:[facility] ([id number]) on 6/8/2021 2:18:02 PM     Comprehensive Metabolic Panel    Collection Time: 06/08/21 11:40 AM   Result Value Ref Range    Sodium 141 136 - 145 mmol/L    Potassium 4.1 3.5 - 5.0 mmol/L    Chloride 107 98 - 107 mmol/L    CO2 23 22 - 31 mmol/L    Anion Gap, Calculation 11 5 - 18 mmol/L    Glucose 92 70 - 125 mg/dL    BUN 24 8 - 28 mg/dL    Creatinine 1.27 0.70 - 1.30 mg/dL    GFR MDRD Af Amer >60 >60 mL/min/1.73m2    GFR MDRD Non Af Amer 54 (L) >60 mL/min/1.73m2    Bilirubin, Total 0.8 0.0 - 1.0 mg/dL    Calcium 8.4 (L) 8.5 - 10.5 mg/dL    Protein,  Total 6.6 6.0 - 8.0 g/dL    Albumin 3.9 3.5 - 5.0 g/dL    Alkaline Phosphatase 243 (H) 45 - 120 U/L    AST 17 0 - 40 U/L    ALT 26 0 - 45 U/L   COVID-19 Virus PCR MRF    Collection Time: 06/08/21 11:52 AM    Specimen: Respiratory   Result Value Ref Range    COVID-19 VIRUS SPECIMEN SOURCE Nasopharyngeal     2019-nCOV       Test received-See reflex to IDDL test SARS CoV2 (COVID-19) Virus RT-PCR   SARS-CoV-2 (COVID-19)-PCR    Collection Time: 06/08/21 11:52 AM    Specimen: Respiratory   Result Value Ref Range    SARS-CoV-2 Virus Specimen Source Nasopharyngeal     SARS-CoV-2 PCR Result NEGATIVE     SARS-COV-2 PCR COMMENT       Testing was performed using the trevon SARS-CoV-2 assay on the trevon [employer]

## 2021-06-26 NOTE — PROGRESS NOTES
Progress Notes by Debra Rowe CNP at 10/29/2018 10:30 AM     Author: Debra Rowe CNP Service: -- Author Type: Nurse Practitioner    Filed: 10/29/2018 11:34 AM Encounter Date: 10/29/2018 Status: Signed    : Debra Rowe CNP (Nurse Practitioner)           Click to link to [facility] HEART CARE NOTE      Assessment/Recommendations   Assessment:    1. Ischemic cardiomyopathy, heart failure with reduced ejection fraction, ejection fraction 35%, NYHA class II-III: Tanmay has had improvement of his shortness of breath over the past week.  His OptiVol is decreasing slightly.  However, his thoracic impedance has been close to reference line for the past week.    2.  Chronic atrial fibrillation: Heart rates have been higher since he has been retaining fluid.  Heart failure device check today shows average heart rate in 80s-90s.  He continues to take warfarin and has an INR next week.    3.  Crohn's disease: He had a flare of Crohn's disease in September.  He is no longer on steroids.  He has no symptoms of acute Crohn's flare at this time.    Plan:  1.   Heart failure medications:  - Beta blocker therapy with carvedilol 37.5 mg twice daily  - ARB therapy with losartan 100 mg daily  - Diuretic therapy with bumetanide 2 mg twice daily  2.  BMP pending  3.  Remote device check in 7-10 days to recheck OptiVol/thoracic impedance and heart rates  4.  Continue low-sodium diet  5.  Echocardiogram to reevaluate ejection fraction    Tanmay Israel will follow up in the heart failure clinic on November 26.     History of Present Illness    Mr. Tanmay Israel is a 78 y.o. male seen at [facility] heart failure clinic today for continued follow-up.  He has a history of ischemic cardiomyopathy, heart failure with reduced ejection fraction, hypertension, coronary artery bypass surgery, and permanent atrial fibrillation.  Echocardiogram from October 2017 showed an  ejection fraction of 35% and mild mitral regurgitation.    Tanmay had a Crohn's flare in September and has been on steroids.  He was eating many high sodium foods and also had a colonoscopy with Gatorade for bowel prep.  He has been followed closely over the past few weeks in the heart failure clinic due to acute heart failure symptoms.  He did not respond to increasing doses of Lasix so this was replaced with Bumex.  Bumex was increased to 2 mg twice daily last week.  He has now stopped his steroid for Crohn's.  Crohn's disease is controlled at this time with no acute symptoms.  Over the past week, Tanmay has noticed improvement of his breathing.  He feels that he is doing much better in the past week.  His appetite is back to normal.  His weight has increased 5 pounds in the past week which he attributes to improved appetite.  He did lose about 10-15 pounds with Crohn's flare in September.  His home weight today was 187 pounds.  I reviewed his previous clinic weights.  In June 2018, his weight was 195 pounds an in December 2017, he was 202 pounds.  He denies lightheadedness, orthopnea, chest pain, abdominal fullness/bloating and lower extremity edema.         Physical Examination Review of Systems   Vitals:    10/29/18 1042   BP: 96/42   Pulse: 94   Resp: 18     Body mass index is 28.35 kg/(m^2).  Wt Readings from Last 3 Encounters:   10/29/18 192 lb (87.1 kg)   10/22/18 190 lb (86.2 kg)   10/16/18 194 lb (88 kg)       General Appearance:     Alert, cooperative and in no acute distress.   ENT/Mouth: membranes moist, no oral lesions or bleeding gums.      EYES:  no scleral icterus, normal conjunctivae   Chest/Lungs:   lungs are clear to auscultation, no rales or wheezing, respirations unlabored   Cardiovascular:    Irregularly irregular. Normal first and second heart sounds;Jugular venous pressure normal, no edema bilateral lower extremities    Abdomen:  Soft, nontender, nondistended, bowel sounds present    Extremities: no cyanosis or clubbing   Skin: warm, dry.    Neurologic: mood and affect are appropriate, alert and oriented x3      General: WNL  Eyes: WNL  Ears/Nose/Throat: WNL  Lungs: Shortness of Breath  Heart: WNL  Stomach: WNL  Bladder: WNL  Muscle/Joints: WNL  Skin: WNL  Nervous System: WNL  Mental Health: WNL     Blood: WNL     Medical History  Surgical History Family History Social History   Past Medical History:   Diagnosis Date   ? Anemia    ? Arthritis    ? Atrial fibrillation (H)    ? Back pain    ? Callus    ? Cerebral aneurysm    ? Chronic kidney disease    ? Chronic systolic congestive heart failure (H)    ? Coronary artery disease    ? Crohn's disease (H)    ? Dysphagia    ? GERD (gastroesophageal reflux disease)    ? Hyperlipidemia    ? Hypertension    ? ICD (implantable cardioverter-defibrillator) in place     Medtronic   ? Ischemic cardiomyopathy    ? Kidney stone    ? Low back pain    ? Macular degeneration    ? Permanent atrial fibrillation (H)     [id number] risk score = 5 (age1/ CAD/ HTN/ CHF) Chronic warfarin   ? Sleep apnea     Past Surgical History:   Procedure Laterality Date   ? APPENDECTOMY     ? CARDIAC PACEMAKER PLACEMENT  2013    ICD Medtronic   ? CATARACT EXTRACTION W/  INTRAOCULAR LENS IMPLANT Bilateral    ? CHOLECYSTECTOMY     ? ESOPHAGOGASTRODUODENOSCOPY     ? ID CABG, VEIN, FOUR      Description: Coronary Artery Quadruple Venous Bypass Graft;  Recorded: 10/21/2008;  Comments: 8/2004   ? ROTATOR CUFF REPAIR Right    ? TONSILLECTOMY      Family History   Problem Relation Age of Onset   ? Heart disease Mother    ? Stroke Mother    ? Crohn's disease Father    ? Alcohol abuse Brother    ? No Medical Problems Daughter    ? No Medical Problems Son    ? No Medical Problems Maternal Aunt    ? No Medical Problems Maternal Uncle    ? No Medical Problems Paternal Aunt    ? No Medical Problems Paternal Uncle    ? No Medical Problems Maternal Grandmother    ? No Medical Problems Maternal  Grandfather    ? No Medical Problems Paternal Grandmother    ? No Medical Problems Paternal Grandfather    ? Cancer Brother    ? Urolithiasis Neg Hx    ? Gout Neg Hx     Social History     Social History   ? Marital status: Single     Spouse name: N/A   ? Number of children: N/A   ? Years of education: N/A     Occupational History   ? retired      [employer]     Social History Main Topics   ? Smoking status: Former Smoker   ? Smokeless tobacco: Never Used   ? Alcohol use No   ? Drug use: No   ? Sexual activity: Not on file     Other Topics Concern   ? Not on file     Social History Narrative          Medications  Allergies   Current Outpatient Prescriptions   Medication Sig Dispense Refill   ? acetaminophen (TYLENOL) 325 MG tablet Take 650 mg by mouth every 4 (four) hours as needed for pain. Indications: Pain     ? bumetanide (BUMEX) 1 MG tablet Take 2 tablets (2 mg total) by mouth 2 (two) times a day. 360 tablet 3   ? carvedilol (COREG) 25 MG tablet Take 1.5 tablets (37.5 mg total) by mouth 2 (two) times a day. 270 tablet 1   ? cholecalciferol, vitamin D3, 1,000 unit tablet Take 2,000 Units by mouth daily.     ? FERROUS FUMARATE (IRON ORAL) Take 1 tablet by mouth daily.      ? losartan (COZAAR) 100 MG tablet Take 1 tablet (100 mg total) by mouth daily. 90 tablet 3   ? lovastatin (MEVACOR) 40 MG tablet Take 1 tablet by mouth daily.     ? magnesium L-lactate (MAGTAB) 84 mg TbER Take 1 tablet (84 mg total) by mouth daily.  0   ? multivitamin with iron (ONE DAILY WITH IRON) Tab tablet Take 1 tablet by mouth daily.      ? omeprazole (PRILOSEC) 20 MG capsule Take 20 mg by mouth 2 (two) times a day.     ? potassium chloride (KLOR-CON) 10 MEQ CR tablet Take 2 tablets (20 mEq total) by mouth 2 (two) times a day. 120 tablet 11   ? rosuvastatin (CRESTOR) 20 MG tablet Take 1 tablet (20 mg total) by mouth at bedtime. 90 tablet 3   ? warfarin (COUMADIN) 2.5 MG tablet Take 1.25-2.5 mg by mouth See Admin Instructions. Take 1.25 mg  (0.5 tablet) on Wednesdays/Saturdays and 2.5 mg rest of week. Adjust dose based on INR results as directed.     ? warfarin (COUMADIN/JANTOVEN) 2.5 MG tablet Take 1/2 to 1 tablet (1.25 to 2.5 mg) by mouth daily. Adjust dose per INR results as instructed. 90 tablet 1     No current facility-administered medications for this visit.       No Known Allergies      Lab Results    Chemistry CBC BNP   Lab Results   Component Value Date    CREATININE 1.09 10/22/2018    BUN 25 10/22/2018     10/22/2018    K 4.1 10/22/2018     10/22/2018    CO2 30 10/22/2018     Creatinine (mg/dL)   Date Value   10/22/2018 1.09   10/16/2018 1.04   10/10/2018 0.94   10/04/2018 1.07    Lab Results   Component Value Date    WBC 13.8 (H) 10/04/2018    HGB 9.1 (L) 10/04/2018    HCT 28.3 (L) 10/04/2018    MCV 93 10/04/2018     10/04/2018    Lab Results   Component Value Date     (H) 10/04/2018     BNP (pg/mL)   Date Value   10/04/2018 779 (H)   10/03/2018 789 (H)   09/07/2018 173 (H)            Debra Rowe, [facility]   Heart Failure Clinic

## 2021-06-26 NOTE — TELEPHONE ENCOUNTER
ANTICOAGULATION MANAGEMENT     Tanmayumberto Israel 81 y.o., male is on warfarin with Therapeutic INR result (goal range 2.0-3.0)    Recent labs: (last 7 days)     06/22/21  1005   INR 2.90*       ASSESSMENT     Source: Chart Review and Patient/Caregiver Call      Warfarin dosing taken: Less warfarin taken than planned which may be affecting INR    Diet: No new diet changes affecting INR    Illness, Injury or hospitalization: Yes: hospitalized last week with pneumonia and CHF exacerbation, also had an EGD    Medication changes: was on cefdinir and prednisone while in the hospital. Cefdinir finished 2 days ago, pred finished yesterday      Signs or symptoms of bleeding or clotting: No    Previous INR: therapeutic at hospital discharge    Additional findings: Recent surgery/procedure EGD while hospitalized .Resumed warfarin 6 days ago     PLAN     Recommended plan for temporary change(s) affecting INR:     Dosing instructions: Continue your current warfarin dose    2.5 mg every Mon, Wed, Fri; 1.25 mg all other days       Follow up no later than: 1 week     Telephone call with Tanmay who verbalizes understanding and agrees to plan    Lab visit scheduled    Education provided: target INR goal and significance of current INR result and potential interaction between warfarin and CHF, prednisone, abx    Plan made per [facility] anticoagulation protocol    Renay Li  Anticoagulation Clinic   [phone]

## 2021-06-26 NOTE — PROGRESS NOTES
Progress Notes by Debra Rowe CNP at 11/26/2018  9:50 AM     Author: Debra Rowe CNP Service: -- Author Type: Nurse Practitioner    Filed: 11/26/2018 10:44 AM Encounter Date: 11/26/2018 Status: Signed    : Debra Rowe CNP (Nurse Practitioner)           Click to link to [facility] HEART CARE NOTE      Assessment/Recommendations   Assessment:    1. Ischemic cardiomyopathy, heart failure with reduced ejection fraction, ejection fraction 35%: He has no symptoms of acute heart failure.  OptiVol is pending.    2.  Chronic atrial fibrillation: Heart rate is controlled.  He continues to take warfarin and has an INR checked regularly.    3.  Crohn's disease: He had a Crohn's flare in September and was on steroids.  Steroids most likely exacerbated heart failure.  For the past 2 days, he has had diarrhea and he is concerned about another Crohn's flare.  He plans to call his GI doctor today.  We discussed the risk of dehydration with diarrhea and diuretic use.    4.  Hypertension: Blood pressure controlled today at 120/48.  He continues to take losartan and carvedilol.    Plan:  1.   Heart failure medications:  - Beta blocker therapy with carvedilol 37.5 mg twice daily  - ARB therapy with losartan 100 mg daily  - Diuretic therapy with bumetanide 0.5 mg daily  2.  OptiVol pending  3.  BMP pending.  If renal function remains abnormal, will need to adjust Bumex and/or losartan.  4.  Tanmay will call his GI doctor today due to diarrhea    Tanmay Israel will follow up in the heart failure clinic in 1 month.     History of Present Illness    Mr. Tanmay Israel is a 78 y.o. male seen at [facility] heart failure clinic today for continued follow-up.  He has a history of ischemic cardiomyopathy, heart failure with reduced ejection fraction, hypertension, coronary artery bypass surgery, and permanent atrial fibrillation.  Echocardiogram from November 12, 2018  showed an ejection fraction of 35% and elevated LV filling pressures.    Tanmay had a Crohn's flare in September and was started on steroids.  This caused an exacerbation of heart failure.  His Lasix was changed to Bumex.  Heart failure symptoms resolved with Bumex and stopping steroid.  However, his renal function has worsened over the past few weeks.  Bumex dose has been tapered down to 0.5 mg daily.  Today, he reports no symptoms of acute heart failure.  He is worried he is having another Crohn's flare.  He has had diarrhea for the past 2 days.  He denies any fevers, chills, or vomiting.  He has chronic left upper quadrant abdominal pain but denies any worsening.  He denies lightheadedness, shortness of breath, chest pain and lower extremity edema.      ECHO 11/12/18:   Results for orders placed during the hospital encounter of 11/12/18   Echo Complete [ECH10] 11/12/2018    Narrative   When compared to the previous study dated 10/13/2017, no significant   change.    Normal left ventricular size, wall thickness and moderate global   hypokinesis. Elevated left ventricular filling pressure. Left ventricle   ejection fraction is moderately decreased. The estimated left ventricular   ejection fraction is 35%.    Normal right ventricular size with moderately reduced right ventricular   systolic performance. Pacemaker leads are identified.    Severely enlarged left atrium. Moderately enlarged right atrium.    Mild mitral valve regurgitation. Mild tricuspid valve regurgitation with   estimated right ventricular systolic pressure 45 mmHg.           Physical Examination Review of Systems   Vitals:    11/26/18 0956   BP: 120/48   Pulse: (!) 56   Resp: 16     Body mass index is 27.47 kg/m .  Wt Readings from Last 3 Encounters:   11/26/18 186 lb (84.4 kg)   11/12/18 192 lb (87.1 kg)   10/29/18 192 lb (87.1 kg)       General Appearance:     Alert, cooperative and in no acute distress.   ENT/Mouth: membranes moist, no oral  lesions or bleeding gums.      EYES:  no scleral icterus, normal conjunctivae   Chest/Lungs:   lungs are clear to auscultation, no rales or wheezing, respirations unlabored   Cardiovascular:    Irregularly irregular. Normal first and second heart sounds, no edema bilateral lower extremities    Abdomen:  Soft, nontender, nondistended, bowel sounds present   Extremities: no cyanosis or clubbing   Skin: warm, dry.    Neurologic: mood and affect are appropriate, alert and oriented x3      General: WNL  Eyes: WNL  Ears/Nose/Throat: WNL  Lungs: WNL  Heart: Irregular Heartbeat  Stomach: Diarrhea  Bladder: WNL  Muscle/Joints: Muscle Weakness  Skin: WNL  Nervous System: WNL  Mental Health: WNL     Blood: WNL     Medical History  Surgical History Family History Social History   Past Medical History:   Diagnosis Date   ? Anemia    ? Arthritis    ? Atrial fibrillation (H)    ? Back pain    ? Callus    ? Cerebral aneurysm    ? Chronic kidney disease    ? Chronic systolic congestive heart failure (H)    ? Coronary artery disease    ? Crohn's disease (H)    ? Dysphagia    ? GERD (gastroesophageal reflux disease)    ? Hyperlipidemia    ? Hypertension    ? ICD (implantable cardioverter-defibrillator) in place     Medtronic   ? Ischemic cardiomyopathy    ? Kidney stone    ? Low back pain    ? Macular degeneration    ? Permanent atrial fibrillation (H)     [id number] risk score = 5 (age1/ CAD/ HTN/ CHF) Chronic warfarin   ? Sleep apnea     Past Surgical History:   Procedure Laterality Date   ? APPENDECTOMY     ? CARDIAC PACEMAKER PLACEMENT  2013    ICD Medtronic   ? CATARACT EXTRACTION W/  INTRAOCULAR LENS IMPLANT Bilateral    ? CHOLECYSTECTOMY     ? ESOPHAGOGASTRODUODENOSCOPY     ? TN CABG, VEIN, FOUR      Description: Coronary Artery Quadruple Venous Bypass Graft;  Recorded: 10/21/2008;  Comments: 8/2004   ? ROTATOR CUFF REPAIR Right    ? TONSILLECTOMY      Family History   Problem Relation Age of Onset   ? Heart disease Mother     ? Stroke Mother    ? Crohn's disease Father    ? Alcohol abuse Brother    ? No Medical Problems Daughter    ? No Medical Problems Son    ? No Medical Problems Maternal Aunt    ? No Medical Problems Maternal Uncle    ? No Medical Problems Paternal Aunt    ? No Medical Problems Paternal Uncle    ? No Medical Problems Maternal Grandmother    ? No Medical Problems Maternal Grandfather    ? No Medical Problems Paternal Grandmother    ? No Medical Problems Paternal Grandfather    ? Cancer Brother    ? Urolithiasis Neg Hx    ? Gout Neg Hx     Social History     Socioeconomic History   ? Marital status: Single     Spouse name: Not on file   ? Number of children: Not on file   ? Years of education: Not on file   ? Highest education level: Not on file   Social Needs   ? Financial resource strain: Not on file   ? Food insecurity - worry: Not on file   ? Food insecurity - inability: Not on file   ? Transportation needs - medical: Not on file   ? Transportation needs - non-medical: Not on file   Occupational History   ? Occupation: retired     Comment: [employer]   Tobacco Use   ? Smoking status: Former Smoker   ? Smokeless tobacco: Never Used   Substance and Sexual Activity   ? Alcohol use: No   ? Drug use: No   ? Sexual activity: Not on file   Other Topics Concern   ? Not on file   Social History Narrative   ? Not on file          Medications  Allergies   Current Outpatient Medications   Medication Sig Dispense Refill   ? acetaminophen (TYLENOL) 325 MG tablet Take 650 mg by mouth every 4 (four) hours as needed for pain. Indications: Pain     ? bumetanide (BUMEX) 1 MG tablet Take 0.5 tablets (0.5 mg total) by mouth daily. 360 tablet 3   ? carvedilol (COREG) 25 MG tablet Take 1.5 tablets (37.5 mg total) by mouth 2 (two) times a day. 270 tablet 1   ? cholecalciferol, vitamin D3, 1,000 unit tablet Take 2,000 Units by mouth daily.     ? diphenoxylate-atropine (LOMOTIL) 2.5-0.025 mg per tablet Take 1 tablet by mouth 2 (two) times a  day as needed.     ? FERROUS FUMARATE (IRON ORAL) Take 1 tablet by mouth daily.      ? losartan (COZAAR) 100 MG tablet Take 1 tablet (100 mg total) by mouth daily. 90 tablet 3   ? lovastatin (MEVACOR) 40 MG tablet Take 1 tablet by mouth daily.     ? magnesium L-lactate (MAGTAB) 84 mg TbER Take 1 tablet (84 mg total) by mouth daily.  0   ? multivitamin with iron (ONE DAILY WITH IRON) Tab tablet Take 1 tablet by mouth daily.      ? omeprazole (PRILOSEC) 20 MG capsule Take 20 mg by mouth 2 (two) times a day.     ? potassium chloride (KLOR-CON) 10 MEQ CR tablet Take 2 tablets (20 mEq total) by mouth 2 (two) times a day. 120 tablet 11   ? rosuvastatin (CRESTOR) 20 MG tablet Take 1 tablet (20 mg total) by mouth at bedtime. 90 tablet 3   ? warfarin (COUMADIN) 2.5 MG tablet Take 1.25-2.5 mg by mouth See Admin Instructions. Take 1.25 mg (0.5 tablet) on Wednesdays/Saturdays and 2.5 mg rest of week. Adjust dose based on INR results as directed.       No current facility-administered medications for this visit.       No Known Allergies      Lab Results    Chemistry CBC BNP   Lab Results   Component Value Date    CREATININE 2.49 (H) 11/19/2018    BUN 38 (H) 11/19/2018     11/19/2018    K 4.5 11/19/2018     11/19/2018    CO2 22 11/19/2018     Creatinine (mg/dL)   Date Value   11/19/2018 2.49 (H)   11/12/2018 2.31 (H)   10/29/2018 1.44 (H)   10/22/2018 1.09    Lab Results   Component Value Date    WBC 13.8 (H) 10/04/2018    HGB 9.1 (L) 10/04/2018    HCT 28.3 (L) 10/04/2018    MCV 93 10/04/2018     10/04/2018    Lab Results   Component Value Date     (H) 10/04/2018     BNP (pg/mL)   Date Value   10/04/2018 779 (H)   10/03/2018 789 (H)   09/07/2018 173 (H)          25 minutes were spent with the patient with greater than 50% spent on education and counseling.      Debra Rowe, [facility]   Heart Failure Clinic

## 2021-06-26 NOTE — ANESTHESIA PREPROCEDURE EVALUATION
Anesthesia Evaluation      Patient summary reviewed     Airway   Mallampati: III   Pulmonary    (+) pneumonia, shortness of breath, sleep apnea, rhonchi (mild),                          Cardiovascular   (+) pacemaker (Paced at 60%/ICD), hypertension, valvular problems/murmurs (Mild to mod MR) MR, CAD, CABG/stent (CABG), dysrhythmias (afib - coumadin), CHF (Acute on chronic), ,     Rhythm: regular        Neuro/Psych    (+) neuromuscular disease,      Endo/Other    (+) arthritis,      GI/Hepatic/Renal    (+) GERD (Schatzki's ring),   chronic renal disease CRI,     Comments: GI bleed - received 4 FFP     Other findings:     Seen by cardiology today         Results for YOKASTA MILTON (MRN [id number]) as of 6/16/2021 6/16/2021 12:39  INR: 1.81 (H)        Results for YOKASTA MILTON (MRN [id number]) as of 6/16/2021 6/16/2021 04:40  Potassium: 4.5  Magnesium: 1.9      ECHO 6/16/21:    Indications    Congestive heart failure  Summary      Severe left atrial and moderate right atrial enlargement    Left ventricle ejection fraction is severely decreased. The calculated left ventricular ejection fraction is 24% with global hypokinesis. Abnormal septal motion is consistent with paced rhythm    Mildly dilated right ventricle with moderately decreased systolic function    Aortic valve sclerosis    Mild to moderate mitral and moderate tricuspid insufficiency. Moderate to severe pulmonary hypertension noted.    When compared to the previous study dated 11/12/2018, left ventricular ejection fraction has decreased. There is an increase in the degree of mitral and tricuspid insufficiency with further increase in pulmonary artery pressures.              Dental      Comment: edentulous                       Anesthesia Plan  Planned anesthetic: MAC      Avoid versed  Propofol for MAC    ASA 4     Anesthetic plan and risks discussed with: patient  Anesthesia plan special considerations: antiemetics,   Post-op plan: routine  recovery  DNR/DNI status   DNR/DNI status discussed with patient.  Plan is for suspension of DNR

## 2021-06-26 NOTE — TELEPHONE ENCOUNTER
Received MTM referral from Hospital Discharge Transitions of Care     Patient was not reachable after several attempts, will route to MTM Pharmacist/Provider as an FYI. Left MTM scheduling information on patients voicemail.    Thank you for the referral,    Brad Agee, MTM coordinator

## 2021-06-26 NOTE — ANESTHESIA POSTPROCEDURE EVALUATION
Patient: Tanmay Israel  Procedure(s):  ESOPHAGOGASTRODUODENOSCOPY (EGD), WITH ESOPHAGEAL DILATION AND BIOPSY  Anesthesia type: MAC    Patient location: [facility] Surgical Floor  Last vitals:   Vitals Value Taken Time   /62 06/16/21 1922   Temp 36.8  C (98.2  F) 06/16/21 1922   Pulse 62 06/16/21 2133   Resp 18 06/16/21 1949   SpO2 95 % 06/16/21 1949   Vitals shown include unvalidated device data.  Post vital signs: stable  Level of consciousness: awake and responds to simple questions  Post-anesthesia pain: pain controlled  Post-anesthesia nausea and vomiting: no  Pulmonary: unassisted, return to baseline  Cardiovascular: stable and blood pressure at baseline  Hydration: adequate  Anesthetic events: no    QCDR Measures:  ASA# 11 - Tarah-op Cardiac Arrest: ASA11B - Patient did NOT experience unanticipated cardiac arrest  ASA# 12 - Tarah-op Mortality Rate: ASA12B - Patient did NOT die  ASA# 13 - PACU Re-Intubation Rate: NA - No ETT / LMA used for case  ASA# 10 - Composite Anes Safety: ASA10A - No serious adverse event    Additional Notes:

## 2021-06-26 NOTE — TELEPHONE ENCOUNTER
ANTICOAGULATION  MANAGEMENT: Discharge Review    Tanmay Israel chart reviewed for anticoagulation continuity of care    Hospital admission from 6/11-6/17 for CHF exacerbation, pneumonia.  Hospitalization Course Review    Presented with: increased cough and shortness of breath x1 week despite ABX  Diagnostic tests/labs: CXR, CTA, ECHO, NM cardiac stress test, BNP  Procedures: EGD with dilation and biopsy  Treatments/interventions: dilation of Schatzki's ring  Warfarin reversal? 4u FFP prior to EGD     Discharge disposition: Home    INR Results:   Recent labs: (last 7 days)     06/11/21  1306 06/12/21  0440 06/13/21  0454 06/14/21  0459 06/15/21  0505 06/16/21  0440 06/16/21  1239 06/17/21  0440   INR 1.92* 2.65* 3.37* 3.39* 3.15* 2.30* 1.81* 2.13*     Warfarin inpatient management: see anticoag calendar    Warfarin discharge instructions:     Warfarin dosing: decrease dose to: 1.25mg M/W/F, 2.5mg AOD    Bridging: No  INR goal Change: No     Medication Changes Affecting Anticoagulation: Yes: started on prednisone and cefdinir    Additional Factors Affecting Anticoagulation: Yes: CHF exacerbation, pneumonia    Plan     No adjustment to anticoagulation plan needed      Left a detailed message for Tanmay    Anticoagulation Calendar updated    Yaneth Wells RN

## 2021-06-26 NOTE — PROGRESS NOTES
Progress Notes by Debra Rowe CNP at 10/10/2018  2:10 PM     Author: Debra Rowe CNP Service: -- Author Type: Nurse Practitioner    Filed: 10/10/2018  3:04 PM Encounter Date: 10/10/2018 Status: Signed    : Debra Rowe CNP (Nurse Practitioner)           Click to link to [facility]     [facility] HEART CARE NOTE      Assessment/Recommendations   Assessment:    1. Ischemic cardiomyopathy, heart failure with reduced ejection fraction, ejection fraction 35%, NYHA class III: Tanmay has acute heart failure symptoms which have been persistent over the past week.  Acute heart failure exacerbation most likely related to steroid use for Crohn's flare and increased sodium in his diet.  He has not noted any improvement in his symptoms since Lasix was increased from 40 mg daily to 60 mg daily.  He states that Lasix typically works very well for him. BMP was stable in ED last week.     OptiVol was downloaded from his Medtronic device.  OptiVol and thoracic impedance have been abnormal since mid September indicating fluid retention.    2.  Chronic atrial fibrillation: Heart failure download from his device today shows slight increase in heart rates with heart failure exacerbation.  Average heart rate are less than 90 bpm.  He continues to take warfarin.  This was held for recent colonoscopy.  He is back on his normal dose and will have an INR checked next week.    3.  Hypertension: Blood pressure mildly elevated today at 156/60.  This is most likely related to acute heart failure.  We will continue to monitor.    4.  Coronary artery disease: History of coronary artery bypass surgery.  He denies any chest pain.    Plan:  1.   Heart failure medications:  - Beta blocker therapy with carvedilol 37.5 mg twice daily  - ARB therapy with losartan 100 mg daily  - Diuretic therapy with furosemide increased to 60 mg twice daily  2.  BMP pending  3.  Our clinic will call to get an update on  symptoms on Friday.  If he has not had any improvement with increased Lasix dose, will plan to change to torsemide or Bumex.  4.  Steroid may be contributing to acute heart failure exacerbation.  Tanmay will be talking to his GI doctor this week with colonoscopy results.  He will ask if duration of steroids can be decreased since Crohn's flare has resolved.    Tanmay Israel will follow up in the heart failure clinic in 1 week.  We discussed that he should be seen in the emergency room if he has worsening shortness of breath or develops chest pain.     History of Present Illness    Mr. Tanmay Israel is a 78 y.o. male seen at [facility] heart failure clinic today for urgent follow-up.  He has a history of ischemic cardiomyopathy, heart failure with reduced ejection fraction, hypertension, coronary artery bypass surgery, ICD, and permanent atrial fibrillation.  Echocardiogram from October 2017 showed an ejection fraction of 35% and mild mitral regurgitation.    He called into the clinic this morning with continued shortness of breath with minimal activity.  He had a Crohn's flare in September and has been on steroids for the past 2 weeks (steroids recommended for 6 weeks).  He was eating many high sodium foods including chili daily.  He also had a colonoscopy yesterday and had Gatorade for bowel prep.  He was seen in the emergency room on October 3 and October 4 for shortness of breath.  His Lasix was increased from 40 mg daily to to 60 mg daily for 7 days.  He has not noticed any improvement in his symptoms.    He states that his shortness of breath is worse in the morning.  He has shortness of breath with minimal activity and occasionally at rest.  Tanmay has orthopnea.  He has mild lower extremity edema.  He states that his weight at home has been stable around 171 pounds; however, his clinic/ED weights has varied greatly in the past month. He denies lightheadedness and chest pain.      ECHO 10/13/17:     When  compared to the previous study dated 4/26/2016, no significant change    Left ventricle ejection fraction is moderately decreased. The estimated left ventricular ejection fraction is 35%.    Mild mitral regurgitation    Pacemaker lead in right heart chambers     Physical Examination Review of Systems   Vitals:    10/10/18 1408   BP: 156/60   Pulse: 88   Resp: 16     Body mass index is 28.35 kg/(m^2).  Wt Readings from Last 3 Encounters:   10/10/18 192 lb (87.1 kg)   10/05/18 195 lb (88.5 kg)   10/04/18 175 lb (79.4 kg)       General Appearance:     Alert, cooperative and in no acute distress.   ENT/Mouth: membranes moist, no oral lesions or bleeding gums.      EYES:  no scleral icterus, normal conjunctivae   Chest/Lungs:    Scattered crackles in bases bilaterally   Cardiovascular:    Irregularly. Normal first and second heart sounds.  Jugular venous pressure elevated, positive HJR, 1+ edema bilateral ankles   Abdomen:  Soft, nontender, nondistended, bowel sounds present   Extremities: no cyanosis or clubbing   Skin: warm, dry.    Neurologic: mood and affect are appropriate, alert and oriented x3      General: WNL  Eyes: WNL  Ears/Nose/Throat: WNL  Lungs: WNL  Heart: Shortness of Breath with activity  Stomach: WNL  Bladder: WNL  Muscle/Joints: WNL  Skin: WNL  Nervous System: WNL  Mental Health: WNL     Blood: WNL     Medical History  Surgical History Family History Social History   Past Medical History:   Diagnosis Date   ? Arthritis    ? Atrial fibrillation (H)    ? Back pain    ? Callus    ? Chronic systolic congestive heart failure (H)    ? Coronary artery disease    ? GERD (gastroesophageal reflux disease)    ? Hyperlipidemia    ? Hypertension    ? ICD (implantable cardioverter-defibrillator) in place     Medtronic   ? Kidney stone    ? Permanent atrial fibrillation (H)     [id number] risk score = 5 (age3/ CAD/ HTN/ CHF) Chronic warfarin    Past Surgical History:   Procedure Laterality Date   ? APPENDECTOMY      ? CARDIAC PACEMAKER PLACEMENT  2013    ICD Medtronic   ? CATARACT EXTRACTION W/  INTRAOCULAR LENS IMPLANT Bilateral    ? CHOLECYSTECTOMY     ? ESOPHAGOGASTRODUODENOSCOPY     ? MI CABG, VEIN, FOUR      Description: Coronary Artery Quadruple Venous Bypass Graft;  Recorded: 10/21/2008;  Comments: 8/2004   ? ROTATOR CUFF REPAIR Right    ? TONSILLECTOMY      Family History   Problem Relation Age of Onset   ? Heart disease Mother    ? Stroke Mother    ? Crohn's disease Father    ? Alcohol abuse Brother    ? No Medical Problems Daughter    ? No Medical Problems Son    ? No Medical Problems Maternal Aunt    ? No Medical Problems Maternal Uncle    ? No Medical Problems Paternal Aunt    ? No Medical Problems Paternal Uncle    ? No Medical Problems Maternal Grandmother    ? No Medical Problems Maternal Grandfather    ? No Medical Problems Paternal Grandmother    ? No Medical Problems Paternal Grandfather    ? Cancer Brother    ? Urolithiasis Neg Hx    ? Gout Neg Hx     Social History     Social History   ? Marital status: Single     Spouse name: N/A   ? Number of children: N/A   ? Years of education: N/A     Occupational History   ? retired      [employer]     Social History Main Topics   ? Smoking status: Former Smoker   ? Smokeless tobacco: Never Used   ? Alcohol use No   ? Drug use: No   ? Sexual activity: Not on file     Other Topics Concern   ? Not on file     Social History Narrative          Medications  Allergies   Current Outpatient Prescriptions   Medication Sig Dispense Refill   ? acetaminophen (TYLENOL) 325 MG tablet Take 650 mg by mouth every 4 (four) hours as needed for pain. Indications: Pain     ? budesonide (ENTOCORT EC) 3 mg 24 hr capsule Take 9 mg by mouth every morning.   0   ? carvedilol (COREG) 25 MG tablet Take 1.5 tablets (37.5 mg total) by mouth 2 (two) times a day. 270 tablet 1   ? cholecalciferol, vitamin D3, 1,000 unit tablet Take 2,000 Units by mouth daily.     ? FERROUS FUMARATE (IRON ORAL) Take 1  tablet by mouth daily.      ? furosemide (LASIX) 20 MG tablet Take 3 tablets (60 mg total) by mouth 2 (two) times a day. 42 tablet 0   ? losartan (COZAAR) 100 MG tablet Take 1 tablet (100 mg total) by mouth daily. 90 tablet 3   ? magnesium L-lactate (MAGTAB) 84 mg TbER Take 1 tablet (84 mg total) by mouth daily.  0   ? multivitamin with iron (ONE DAILY WITH IRON) Tab tablet Take 1 tablet by mouth daily.      ? omeprazole (PRILOSEC) 20 MG capsule Take 20 mg by mouth 2 (two) times a day.     ? rosuvastatin (CRESTOR) 20 MG tablet Take 1 tablet (20 mg total) by mouth at bedtime. 90 tablet 3   ? warfarin (COUMADIN) 2.5 MG tablet Take 1.25-2.5 mg by mouth See Admin Instructions. Take 1.25 mg (0.5 tablet) on Wednesdays/Saturdays and 2.5 mg rest of week. Adjust dose based on INR results as directed.     ? warfarin (COUMADIN/JANTOVEN) 2.5 MG tablet Take 1/2 to 1 tablet (1.25 to 2.5 mg) by mouth daily. Adjust dose per INR results as instructed. 90 tablet 1     No current facility-administered medications for this visit.       No Known Allergies      Lab Results    Chemistry CBC BNP   Lab Results   Component Value Date    CREATININE 1.07 10/04/2018    BUN 21 10/04/2018     10/04/2018    K 3.6 10/04/2018     10/04/2018    CO2 27 10/04/2018     Creatinine (mg/dL)   Date Value   10/04/2018 1.07   10/03/2018 0.93   09/11/2018 1.77 (H)   09/08/2018 1.56 (H)    Lab Results   Component Value Date    WBC 13.8 (H) 10/04/2018    HGB 9.1 (L) 10/04/2018    HCT 28.3 (L) 10/04/2018    MCV 93 10/04/2018     10/04/2018    Lab Results   Component Value Date     (H) 10/04/2018     BNP (pg/mL)   Date Value   10/04/2018 779 (H)   10/03/2018 789 (H)   09/07/2018 173 (H)            Debra Rowe, [facility]   Heart Failure Clinic

## 2021-06-26 NOTE — PROGRESS NOTES
Patient ID: Tanmay Israel is a 81 y.o. male.  /62   Pulse 75   Temp 97.7  F (36.5  C)   Wt 164 lb (74.4 kg)   SpO2 98%   BMI 24.22 kg/m      Assessment/Plan:                   Diagnoses and all orders for this visit:    Cough  -     XR Chest 2 Views  -     HM2(CBC w/o Differential)  -     Symptomatic COVID-19 Virus (CORONAVIRUS) PCR  -     azithromycin (ZITHROMAX Z-DELFINA) 250 MG tablet; Take 2 tablets (500 mg) on  Day 1,  followed by 1 tablet (250 mg) once daily on Days 2 through 5.  Dispense: 6 tablet; Refill: 0  -     codeine-guaiFENesin (GUAIFENESIN AC)  mg/5 mL liquid; Take 5 mL by mouth daily as needed for cough.  Dispense: 240 mL; Refill: 0    Fever, unspecified fever cause  -     XR Chest 2 Views  -     HM2(CBC w/o Differential)  -     Symptomatic COVID-19 Virus (CORONAVIRUS) PCR  -     azithromycin (ZITHROMAX Z-DELFINA) 250 MG tablet; Take 2 tablets (500 mg) on  Day 1,  followed by 1 tablet (250 mg) once daily on Days 2 through 5.  Dispense: 6 tablet; Refill: 0    Chronic systolic congestive heart failure (H)  -     Electrocardiogram Perform and Read  -     Comprehensive Metabolic Panel        DISCUSSION  He has an acute respiratory infection. Given consideration must rule out Covid. Discussed isolation. Reviewed symptoms and discussed with him my concerns regarding his current state but feel that there is not an absolute indication for hospitalization. He agrees that should he have worsening such as development of increasing respiratory difficulty or if he cannot maintain oral intake or feels he has a difficult time caring for himself he will seek help and be transported to the hospital for further evaluation. Given the severity of symptoms and the fact that he has received an immunization for Covid we will treat empirically for pneumonia with azithromycin. He requests symptom treatment with cough syrup which I think is reasonable prescription provided discussed appropriate use and potential side  effects of the codeine component. He does not have signs or symptoms of an acute heart concern or volume overload. EKG is unchanged from prior. Hemogram shows white count within normal range and is stable hemoglobin, platelets are somewhat low.    Proceed with course as mentioned above and have him follow-up with me this Friday and he should present to the emergency room if there is worsening.  Subjective:     WELLINGTON Israel is a 81 y.o. male with a complex medical history who is here today concerned regarding a cough.    Medical history significant coronary artery disease, chronic systolic congestive heart failure with ejection fraction 48%, atrial fibrillation on anticoagulation, chronic kidney disease stage III, obstructive sleep apnea.    He states that Sunday, about 3 days ago woke up with cough. Has felt poorly but denies fever. Denies any specific infectious exposure. Has received immunizations for COVID-19. Has not had any previous concerns for COVID-19. States he feels a little more short of breath. His appetite is poor, he is maintaining hydration. Does not feel he is in distress. Lives alone, feels he can adequately care for himself with his current state. Denies dizziness or lightheadedness. Denies muscle aches or pains but reports overall malaise and fatigue. Denies any lower extremity edema. Denies orthopnea. No unexpected weight gain. No specific chest pain.    Review of Systems  Complete review of systems is obtained.  Other than the specific considerations noted above complete review of systems is negative.          Objective:   Medications:  Current Outpatient Medications   Medication Sig     acetaminophen (TYLENOL) 500 MG tablet Take 2 tablets (1,000 mg total) by mouth 3 (three) times a day.     amLODIPine (NORVASC) 5 MG tablet Take 1 tablet (5 mg total) by mouth daily.     bumetanide (BUMEX) 1 MG tablet TAKE ONE TABLET (1MG) BY MOUTH ONCE DAILY     carvediloL (COREG) 25 MG tablet TAKE ONE  AND ONE-HALF TABLETS (37.5MG) BY MOUTH TWICE DAILY WITH MEALS     cholecalciferol, vitamin D3, 1,000 unit tablet Take 2,000 Units by mouth daily.     cyanocobalamin 500 MCG tablet Take 500 mcg by mouth daily.     cyclobenzaprine (FLEXERIL) 10 MG tablet Take 10 mg by mouth as needed for muscle spasms.     diclofenac sodium (VOLTAREN) 1 % Gel APPLY 2 TO 4 GRAMS TOPICALLY THREE TIMES DAILY     erythromycin ophthalmic ointment Administer 1 application to both eyes 2 (two) times a day.     ferrous sulfate 325 (65 FE) MG tablet Take 1 tablet by mouth daily.     lisinopriL (PRINIVIL,ZESTRIL) 2.5 MG tablet Take 1 tablet (2.5 mg total) by mouth daily.     melatonin 3 mg Tab tablet Take 1 tablet (3 mg total) by mouth at bedtime as needed.     multivitamin with iron (ONE DAILY WITH IRON) Tab tablet Take 1 tablet by mouth daily.      neomycin-polymyxin-dexamethamethasone (POLYDEX) 3.5 mg/g-10,000 unit/g-0.1 % Oint Administer 1 application to both eyes 2 (two) times a day.     omeprazole (PRILOSEC) 20 MG capsule Take 1 capsule (20 mg total) by mouth daily before breakfast.     ondansetron (ZOFRAN) 8 MG tablet Take 1 tablet (8 mg total) by mouth every 8 (eight) hours as needed.     potassium chloride (K-DUR,KLOR-CON) 20 MEQ tablet TAKE ONE TABLET BY MOUTH ONCE DAILY     RESTASIS 0.05 % ophthalmic emulsion Administer 1 drop to both eyes every 12 (twelve) hours.      rosuvastatin (CRESTOR) 20 MG tablet TAKE 1 TABLET BY MOUTH DAILY AT BEDTIME     warfarin ANTICOAGULANT (COUMADIN/JANTOVEN) 2.5 MG tablet Take 1-1.5 tablets (2.5-3.75 mg) by mouth daily. Adjust dose per INR as directed.     azithromycin (ZITHROMAX Z-DELFINA) 250 MG tablet Take 2 tablets (500 mg) on  Day 1,  followed by 1 tablet (250 mg) once daily on Days 2 through 5.     codeine-guaiFENesin (GUAIFENESIN AC)  mg/5 mL liquid Take 5 mL by mouth daily as needed for cough.       Allergies:  No Known Allergies    Tobacco:   reports that he has quit smoking. He has never  used smokeless tobacco.     Physical Exam          /62   Pulse 75   Temp 97.7  F (36.5  C)   Wt 164 lb (74.4 kg)   SpO2 98%   BMI 24.22 kg/m            General Appearance:    Alert, cooperative, no distress   Eyes:   No scleral icterus or conjunctival irritation       Ears:    Normal TM's and external ear canals, both ears   Nose/throat:  Clear rhinorrhea noted, no lesions noted in the mouth or pharynx   Neck:   Supple, symmetrical, trachea midline, no adenopathy;        thyroid:  No enlargement/tenderness/nodules, skin lump noted on the left side of the neck suspect cyst.   Lungs:    Generally good air movement without crackles, slight expiratory wheeze heard at lung bases, coughs occasionally during course of encounter   Heart:   Irregular heart rhythm without murmur   Abdomen:    Soft, no distention, no tenderness on palpation, no masses, no organomegaly     Extremities:  No edema, no joint swelling or redness, no evidence of any injuries   Skin:  No concerning skin findings, no suspicious moles, no rashes   Neurologic:  On gross examination there is no motor or sensory deficit.  Patient walks with a normal gait         Chest x-ray shows chronic findings but there is no distinct acute infiltrate or signs of fluid.

## 2021-06-27 NOTE — PROGRESS NOTES
Progress Notes by Debra Rowe CNP at 1/14/2019  9:10 AM     Author: Debra Rowe CNP Service: -- Author Type: Nurse Practitioner    Filed: 1/14/2019  9:41 AM Encounter Date: 1/14/2019 Status: Signed    : Debra Rowe CNP (Nurse Practitioner)           Click to link to [facility] HEART CARE NOTE      Assessment/Recommendations   Assessment:    1. Ischemic cardiomyopathy, heart failure with reduced ejection fraction, ejection fraction 35%, NYHA class III: OptiVol and thoracic impedance checked again today.  They remain abnormal indicating fluid retention.  Tanmay was confused about medication instructions given last week on the phone.  He initially increased his Bumex but then went back to his normal dose.  Overall, his symptoms have improved since last week but he continues to have shortness of breath with activity and lower extremity edema.  He continues to follow a low-sodium diet.  He has been taking Advil most days which may be causing fluid retention.    2.  Chronic atrial fibrillation: Heart rate is controlled.  Warfarin is on hold per spine clinic for testing this week.     3.  Crohn's disease: Tanmay states that he saw his gastroenterologist and diagnosis is not clear whether he has Crohn's or not.  He denies any diarrhea recently.    Plan:  1.   Heart failure medications:  - Beta blocker therapy with carvedilol 37.5 mg twice daily  - ARB therapy with losartan 50 mg daily  - Diuretic therapy with bumetanide increased to 0.5 mg daily  2.  BMP pending  3.  Low-sodium diet  4.  Stop taking Advil/Aleve/ibuprofen.  He can use Tylenol for pain.  5.  Remote device check on January 21 to check OptiVol and thoracic impedance.    Tanmay Israel will follow up in the heart failure clinic in 1 month or sooner if needed.     History of Present Illness    Mr. Tanmay Israel is a 78 y.o. male seen at [facility] [place] heart failure clinic today for continued  follow-up.  He has a history of ischemic cardiomyopathy, heart failure with reduced ejection fraction, hypertension, coronary artery bypass surgery, and permanent atrial fibrillation.  Echocardiogram on November 12, 2018 showed an ejection fraction of 35%, elevated LV filling pressures, moderately decreased right ventricular function, and mild mitral regurgitation.    He called into the clinic last week with acute heart failure symptoms.  OptiVol and thoracic impedance indicated fluid retention.  His Bumex was increased to 1 mg daily for 2 days and then 0.5 mg daily.  Tanmay increased his Bumex initially to 1 mg but then decreased back to 0.5 mg every other day as he was taking previously.  He states that his symptoms have improved significantly since last week.  He continues to have shortness of breath with activity and is not at baseline.  He no longer has shortness of breath at rest.  Lower extremity edema has improved significantly.  He had gained about 2-3 pounds but is now back to his baseline weight.  He denies fatigue, lightheadedness, orthopnea, chest pain and abdominal fullness/bloating.      He continues to follow a low-sodium diet.  He is using Advil daily due to back pain.  He has a CT this week and will follow-up with spine clinic.    ECHO:   Results for orders placed during the hospital encounter of 11/12/18   Echo Complete [ECH10] 11/12/2018    Narrative   When compared to the previous study dated 10/13/2017, no significant   change.    Normal left ventricular size, wall thickness and moderate global   hypokinesis. Elevated left ventricular filling pressure. Left ventricle   ejection fraction is moderately decreased. The estimated left ventricular   ejection fraction is 35%.    Normal right ventricular size with moderately reduced right ventricular   systolic performance. Pacemaker leads are identified.    Severely enlarged left atrium. Moderately enlarged right atrium.    Mild mitral valve  regurgitation. Mild tricuspid valve regurgitation with   estimated right ventricular systolic pressure 45 mmHg.           Physical Examination Review of Systems   Vitals:    01/14/19 0852   BP: 130/52   Pulse: 60   Resp: 18     Body mass index is 28.35 kg/m .  Wt Readings from Last 3 Encounters:   01/14/19 192 lb (87.1 kg)   01/09/19 194 lb (88 kg)   01/04/19 194 lb (88 kg)       General Appearance:     Alert, cooperative and in no acute distress.   ENT/Mouth: membranes moist, no oral lesions or bleeding gums.      EYES:  no scleral icterus, normal conjunctivae   Chest/Lungs:   lungs are clear to auscultation, no rales or wheezing, respirations unlabored   Cardiovascular:    Irregularly irregular. Normal first and second heart sounds; Jugular venous pressure elevated, 1+ edema bilateral lower extremities    Abdomen:  Soft, nontender, nondistended, bowel sounds present   Extremities: no cyanosis or clubbing   Skin: warm, dry.    Neurologic: mood and affect are appropriate, alert and oriented x3      General: Weight Gain, Weight Loss  Eyes: WNL  Ears/Nose/Throat: WNL  Lungs: Shortness of Breath  Heart: Shortness of Breath with activity, Leg Swelling  Stomach: WNL  Bladder: WNL  Muscle/Joints: WNL  Skin: WNL  Nervous System: WNL  Mental Health: WNL     Blood: WNL     Medical History  Surgical History Family History Social History   Past Medical History:   Diagnosis Date   ? Anemia    ? Arthritis    ? Atrial fibrillation (H)    ? Back pain    ? Callus    ? Cerebral aneurysm    ? Chronic kidney disease    ? Chronic systolic congestive heart failure (H)    ? Coronary artery disease    ? Crohn's disease (H)    ? Dysphagia    ? GERD (gastroesophageal reflux disease)    ? Hyperlipidemia    ? Hypertension    ? ICD (implantable cardioverter-defibrillator) in place     Medtronic   ? Ischemic cardiomyopathy    ? Kidney stone    ? Low back pain    ? Macular degeneration    ? Permanent atrial fibrillation (H)     [id number] risk  score = 5 (age1/ CAD/ HTN/ CHF) Chronic warfarin   ? Sleep apnea     Past Surgical History:   Procedure Laterality Date   ? APPENDECTOMY     ? CARDIAC PACEMAKER PLACEMENT  2013    ICD Medtronic   ? CATARACT EXTRACTION W/  INTRAOCULAR LENS IMPLANT Bilateral    ? CHOLECYSTECTOMY     ? ESOPHAGOGASTRODUODENOSCOPY     ? NH CABG, VEIN, FOUR      Description: Coronary Artery Quadruple Venous Bypass Graft;  Recorded: 10/21/2008;  Comments: 8/2004   ? ROTATOR CUFF REPAIR Right    ? TONSILLECTOMY      Family History   Problem Relation Age of Onset   ? Heart disease Mother    ? Stroke Mother    ? Crohn's disease Father    ? Alcohol abuse Brother    ? No Medical Problems Daughter    ? No Medical Problems Son    ? No Medical Problems Maternal Aunt    ? No Medical Problems Maternal Uncle    ? No Medical Problems Paternal Aunt    ? No Medical Problems Paternal Uncle    ? No Medical Problems Maternal Grandmother    ? No Medical Problems Maternal Grandfather    ? No Medical Problems Paternal Grandmother    ? No Medical Problems Paternal Grandfather    ? Cancer Brother    ? Urolithiasis Neg Hx    ? Gout Neg Hx     Social History     Socioeconomic History   ? Marital status: Single     Spouse name: Not on file   ? Number of children: Not on file   ? Years of education: Not on file   ? Highest education level: Not on file   Social Needs   ? Financial resource strain: Not on file   ? Food insecurity - worry: Not on file   ? Food insecurity - inability: Not on file   ? Transportation needs - medical: Not on file   ? Transportation needs - non-medical: Not on file   Occupational History   ? Occupation: retired     Comment: [employer]   Tobacco Use   ? Smoking status: Former Smoker   ? Smokeless tobacco: Never Used   Substance and Sexual Activity   ? Alcohol use: No   ? Drug use: No   ? Sexual activity: Not on file   Other Topics Concern   ? Not on file   Social History Narrative   ? Not on file          Medications  Allergies   Current  Outpatient Medications   Medication Sig Dispense Refill   ? acetaminophen (TYLENOL) 325 MG tablet Take 650 mg by mouth every 4 (four) hours as needed for pain. Indications: Pain     ? bumetanide (BUMEX) 1 MG tablet Take 0.5 tablets (0.5 mg total) by mouth daily. 360 tablet 3   ? carvedilol (COREG) 25 MG tablet Take 1.5 tablets (37.5 mg total) by mouth 2 (two) times a day. 270 tablet 1   ? cholecalciferol, vitamin D3, 1,000 unit tablet Take 2,000 Units by mouth daily.     ? FERROUS FUMARATE (IRON ORAL) Take 1 tablet by mouth daily.      ? losartan (COZAAR) 50 MG tablet Take 1 tablet (50 mg total) by mouth daily.     ? lovastatin (MEVACOR) 40 MG tablet Take 1 tablet by mouth daily.     ? multivitamin with iron (ONE DAILY WITH IRON) Tab tablet Take 1 tablet by mouth daily.      ? omeprazole (PRILOSEC) 20 MG capsule Take 20 mg by mouth 2 (two) times a day.     ? ondansetron (ZOFRAN) 8 MG tablet Take 1 tablet (8 mg total) by mouth every 8 (eight) hours as needed for nausea. 30 tablet 1   ? rosuvastatin (CRESTOR) 20 MG tablet Take 1 tablet (20 mg total) by mouth at bedtime. 90 tablet 3   ? warfarin (COUMADIN) 2.5 MG tablet Take 1.25-2.5 mg by mouth See Admin Instructions. Take 1.25 mg (0.5 tablet) on Wednesdays/Saturdays and 2.5 mg rest of week. Adjust dose based on INR results as directed.       No current facility-administered medications for this visit.       No Known Allergies      Lab Results    Chemistry CBC BNP   Lab Results   Component Value Date    CREATININE 1.43 (H) 12/21/2018    BUN 19 12/21/2018     12/21/2018    K 3.5 12/21/2018     12/21/2018    CO2 32 (H) 12/21/2018     Creatinine (mg/dL)   Date Value   12/21/2018 1.43 (H)   12/04/2018 2.01 (H)   11/26/2018 2.67 (H)   11/19/2018 2.49 (H)    Lab Results   Component Value Date    WBC 8.0 12/21/2018    HGB 10.6 (L) 12/21/2018    HCT 31.0 (L) 12/21/2018    MCV 85 12/21/2018     12/21/2018    Lab Results   Component Value Date     (H)  10/04/2018     BNP (pg/mL)   Date Value   10/04/2018 779 (H)   10/03/2018 789 (H)   09/07/2018 173 (H)              Debra Rowe [facility]   Heart Failure Clinic

## 2021-06-27 NOTE — PROGRESS NOTES
Progress Notes by Sharon Arora PT at 9/6/2019 10:00 AM     Author: Sharon Arora PT Service: -- Author Type: Physical Therapist    Filed: 9/6/2019  3:28 PM Encounter Date: 9/6/2019 Status: Attested    : Sharon Arora PT (Physical Therapist) Cosigner: Jyoti Her CNP at 9/6/2019  3:30 PM    Attestation signed by Jyoti Her CNP at 9/6/2019  3:30 PM    Follow therapist recommendations please                    [facility] Certification Request    September 6, 2019      Patient: Tanmay Israel  MR Number: [id number]  YOB: 1940  Date of Visit: 9/6/2019      Dear Jyoti Her:     Thank you for this referral.   We are seeing Tanmay Israel for Physical Therapy of his neck and low back pain.    Medicare and/or Medicaid requires physician review and approval of the treatment plan. Please review the plan of care and verify that you agree with the therapy plan of care by co-signing this note.      Plan of Care  Authorization / Certification Start Date: 09/06/19  Authorization / Certification End Date: 12/05/19  Authorization / Certification Number of Visits: 6-8  Communication with: Referral Source  Patient Related Instruction: Nature of Condition;Treatment plan and rationale;Self Care instruction;Basis of treatment;Posture;Expected outcome;Precautions;Body mechanics;Next steps  Times per Week: 1  Number of Weeks: 12  Number of Visits: 6-8  Discharge Planning: when goals are met or pt has reached a plateau in progress  Therapeutic Exercise: ROM;Stretching;Strengthening  Neuromuscular Reeducation: kinesio tape;posture;balance/proprioception;TNE;core  Manual Therapy: soft tissue mobilization;myofascial release;joint mobilization;muscle energy  Modalities: electrical stimulation;TENS;cold pack;hot pack (prn)  Gait Training: decrease falls risk and improve function  Equipment: theraband      Goals:  Pt. will be independent with home exercise program in : 4  weeks    Pt will: be able to walk 1 mile with <4/10 pain in juanis posterior thighs in 12 weeks.   Pt will: be able to perform all bed mobility with <4/10 pain in 12 weeks.        If you have any questions or concerns, please don't hesitate to call.    Sincerely,      Sharon Arora, PT        Physician recommendation:     ___ Follow therapist's recommendation        ___ Modify therapy      *Physician co-signature indicates they certify the need for these services furnished within this plan and while under their care.        [facility]   [facility]-Pelvic/Cervico-Thoracic Initial Evaluation    Patient Name: Tanmay Israel  Date of evaluation: 9/6/2019  Referral Diagnosis:  Chronic right-sided low back pain without sciatica [M54.5, G89.29]  - Primary       Sacroiliac pain [M53.3]       Sacroiliac joint dysfunction [M53.3]       History of lumbar surgery [Z98.890]       Hx of cervical spine surgery [Z98.890]       Hamstring tightness of both lower extremities [M62.9]       Spinal stenosis of lumbar region without neurogenic claudication [M48.061]       Lumbar foraminal stenosis [M99.83]         Referring provider: Jyoti Her C*  Visit Diagnosis:     ICD-10-CM    1. Chronic right sacroiliac joint pain M53.3     G89.29    2. Hamstring tightness of both lower extremities M62.9    3. Generalized muscle weakness M62.81    4. Pain in thoracic spine M54.6    5. Hx of cervical spine surgery Z98.890        Assessment:      Impairments in  pain, posture, ROM, joint mobility, strength, motor function, ADL's, gait/locomotion and balance  The POC is dynamic and will be modified on an ongoing basis.  Barriers to achieving goals as noted in the assessment section may affect outcome.  Prognosis to achieve goals is  fair   Pt. is appropriate for skilled PT intervention as outlined in the Plan of Care (POC).  Pt. is a good candidate for skilled PT services to improve pain levels and function.     Tanmay Israel is a 79  y.o. male who presents to therapy today with chief complaints of cervical spine, upper thoracic, R buttocks and juanis posterior thigh pain. Onset date of sx was years ago for his SI joint pain and reports his neck and upper thoracic pain started several months after his neck surgery in Feb 2019. See extensive PMH in subjective.  Pain symptoms are worse in his posterior lower legs with standing/walking, also limited with turning his head, looking up/down, and rolling in bed. Pt demo's signs and sx consistent with chronic R SI joint and likely stenosis with neurogenic claudication resulting in juanis posterior leg pain. Pt's neck and thoracic spine pain is likely secondary to poor posture and mm weakness.      Goals:  Pt. will be independent with home exercise program in : 4 weeks    Pt will: be able to walk 1 mile with <4/10 pain in juanis posterior thighs in 12 weeks.   Pt will: be able to perform all bed mobility with <4/10 pain in 12 weeks.      Patient's expectations/goals are realistic.    Barriers to Learning or Achieving Goals:  Chronicity of the problem.  Co-morbidities or other medical factors.  see above  Age.         Plan / Patient Instructions:        Plan of Care:   Authorization / Certification Start Date: 09/06/19  Authorization / Certification End Date: 12/05/19  Authorization / Certification Number of Visits: 6-8  Communication with: Referral Source  Patient Related Instruction: Nature of Condition;Treatment plan and rationale;Self Care instruction;Basis of treatment;Posture;Expected outcome;Precautions;Body mechanics;Next steps  Times per Week: 1  Number of Weeks: 12  Number of Visits: 6-8  Discharge Planning: when goals are met or pt has reached a plateau in progress  Therapeutic Exercise: ROM;Stretching;Strengthening  Neuromuscular Reeducation: kinesio tape;posture;balance/proprioception;TNE;core  Manual Therapy: soft tissue mobilization;myofascial release;joint mobilization;muscle energy  Modalities:  electrical stimulation;TENS;cold pack;hot pack (prn)  Gait Training: decrease falls risk and improve function  Equipment: theraband      Plan for next visit: pt to follow up in 2-3 weeks due to high co-pay, review HEP and progress as tolerated (standing ex as able)     Subjective:         Social information:   Occupation:retired   Work Status:NA   Equipment Available: has walker and SEC but does not use    History of Present Illness:    Tanmay is a 79 y.o. male who presents to therapy today with complaints of R buttocks pain and juanis posterior thigh pain. He walks about 1 mile a day with pain in the posterior thighs. The pain improves with sitting and doing some leg stretches (LAQ). He also has pain between his shoulder blades.  The pain started a few months after his neck surgery. The SI joint pain has been present for years. They have talked about surgery for his low back but it is very extensive.     Per chart review: past medical history significant for knee osteoarthritis, GERD, DANIEL, hyperlipidemia, Crohn's colitis, hypertension, coronary atherosclerosis, ischemic cardiomyopathy/chronic systolic CHF, paroxysmal ventricular tachycardia, internal defibrillator, lumbar spinal listhesis/SI joint dysfunction, chronic kidney disease stage III, cerebral aneurysm, osteoporosis, left cervical C4, C5, C6 laminoplasty Dr. Cesar 2019, cholecystectomy 2019 with C. difficile infection    Pain Ratin  Pain rating at best: 2  Pain rating at worst: 8    Functional limitations are described as occurring with:   Walk 1 mile  Change sleeping positioning  Walk on uneven surfaces  Kneel/squat  Look up/down/turn head             Objective:      Note: Items left blank indicates the item was not performed or not indicated at the time of the evaluation.    Examination  1. Chronic right sacroiliac joint pain     2. Hamstring tightness of both lower extremities     3. Generalized muscle weakness     4. Pain in thoracic spine      5. Hx of cervical spine surgery       Precautions/Restrictions:     Posture Observation: mod forward head and shoulders    Lumbar ROM:    Date: 9/6/2019     *Indicate scale AROM AROM AROM   Lumbar Flexion To mid-lower leg     Lumbar Extension major      Right Left Right Left Right Left   Lumbar Sidebending mod mod       Lumbar Rotation severe severe         Cervical ROM:    Date: 9/6/2019     *Indicate scale AROM AROM AROM   Cervical Flexion mod     Cervical Extension severe      Right Left Right Left Right Left   Cervical Sidebending severe severe       Cervical Rotation severe mod       Cervical Protraction      Cervical Retraction        Lower Extremity Strength:     5Date: 9/6/2019     LE strength/5 Right Left Right Left Right Left   Hip Flexion (L1-3) 4 4       Hip Extension (L5-S1) Weakness noted in bridge ex Weakness noted in bridge ex       Hip Abduction (L4-5)         Hip Adduction (L2-3)         Hip External Rotation         Hip Internal Rotation         Knee Extension (L3-4) 5 5       Knee Flexion         Ankle Dorsiflexion (L4-5) 5 5       Great Toe Extension (L5)         Ankle Plantar flexion (S1)         Abdominals          Lumbar Sensation    Did not assess   Reflex Testing  Lumbar Dermatomes Right Left UE Reflexes Right Left   Iliac Crest and Groin (L1)   Biceps (C5-6)     Anterior Medial Thigh (L2)   Brachioradialis (C5-6)     Anterior Thigh, Medial Epicondyle Femur (L3)   Triceps (C7-8)     Lateral Thigh, Anterior Knee, Medial Leg/Malleolus (L4)   Hoffmanns test     Lateral Leg, Dorsal Foot (L5)   LE Reflexes     Lateral Foot (S1)   Patellar (L3-4)     Posterior Leg (S2)   Achilles (S1-2)     Other:   Babinski Response       Palpation: tender along juanis upper thoracic paraspinals    Lumbar Special Tests:     Lumbar Special Tests Right Left SI Tests Right  Left   Quadrant test   SI Compression     Straight leg raise - - SI Distraction     Crossover response   POSH Test     Slump - - Sacral Thrust      Sit-up test  FADIR     Trunk extensor endurance test  Resisted Abduction     Prone instability test  Other:scour - -   Pubic shotgun  Other:           LE Screen:  Severe limitations in hip PROM IR juanis, mod restrictions in ER and flexion   Hamstring tightness at 20-30 deg in SLR juanis             Treatment Today     TREATMENT MINUTES COMMENTS   Evaluation 30 -cervical and lumbar spine   Self-care/ Home management     Manual therapy     Neuromuscular Re-education     Therapeutic Activity     Therapeutic Exercises 16 -see exercise flow sheet for date completed  -educated on POC, diagnosis and HEP   Gait training     Modality__________________                Total 46    Blank areas are intentional and mean the treatment did not include these items.     PT Evaluation Code: (Please list factors)  Patient History/Comorbidities: see above  Examination: cervical and lumbar spine  Clinical Presentation: stable  Clinical Decision Making: low    Patient History/  Comorbidities Examination  (body structures and functions, activity limitations, and/or participation restrictions) Clinical Presentation Clinical Decision Making (Complexity)   No documented Comorbidities or personal factors 1-2 Elements Stable and/or uncomplicated Low   1-2 documented comorbidities or personal factor 3 Elements Evolving clinical presentation with changing characteristics Moderate   3-4 documented comorbidities or personal factors 4 or more Unstable and unpredictable High                Sharon Arora, PT, DPT  9/6/2019  2:25 PM

## 2021-06-27 NOTE — PROGRESS NOTES
Progress Notes by Luis Rangel MD (Ted) at 2/19/2019  3:20 PM     Author: Luis Rangel MD (Ted) Service: -- Author Type: Physician    Filed: 2/19/2019  3:53 PM Encounter Date: 2/19/2019 Status: Signed    : Luis Rangel MD (Ted) (Physician)           Click to link to [facility] HEART [facility] NOTE    Thank you, Dr. Ramírez, for asking the [facility] to evaluate Mr. Tanmay Israel.      Assessment/Recommendations   Assessment:    Preop cardiac evaluation-compensated from cardiac standpoint.  Moderate risk because of LV and RV dysfunction.  Coronary artery disease, status post CABG in 2008, no angina  Ischemic cardiomyopathy with moderately depressed LV systolic function, euvolemic  Borderline elevation of pulmonary pressures  Permanent atrial fibrillation, rate controlled, chronic anticoagulation  AICD    Plan:  He appears to be stable from cardiac standpoint.  He can undergo cervical neck surgery at reasonable risk of cardiac complications.  Close attention to fluid management should be exercised during perioperative period  as he is prone to fluid overload.  Warfarin can be stopped without bridging       History of Present Illness    Mr. Tanmay Israel is a 78 y.o. male who comes in for preop cardiac evaluation.  He is to undergo cervical laminectomy.  He denies recent episode of chest pain.  Shortness of breath has been under good control.  His weight is stable .  He denies PND and orthopnea.  He has not had heart palpitations or syncope.  He is compliant with cardiac medications.  He follows up with CHF nurse practitioner and Dr. Oneil.  His exercise tolerance is reasonable.  He does not have to use walker or cane when he ambulates.    ECG: Personally reviewed.  January 2019 atrial fibrillation 100% ventricular paced    Echocardiogram: November 2019    When compared to the previous study dated 10/13/2017, no significant change.    Normal  left ventricular size, wall thickness and moderate global hypokinesis. Elevated left ventricular filling pressure. Left ventricle ejection fraction is moderately decreased. The estimated left ventricular ejection fraction is 35%.    Normal right ventricular size with moderately reduced right ventricular systolic performance. Pacemaker leads are identified.    Severely enlarged left atrium. Moderately enlarged right atrium.    Mild mitral valve regurgitation. Mild tricuspid valve regurgitation with estimated right ventricular systolic pressure 45 mmHg.    Stress Test: May 2016  1. Lexiscan stress nuclear study is negative for inducible myocardial ischemia or infarction.   2.  Imdur is moderately global hypokinesis with a quantitate the left ventricular ejection fraction of 40%.           Physical Examination Review of Systems   Vitals:    02/19/19 1531   BP: 138/58   Pulse: 68   Resp: 16     Body mass index is 27.32 kg/m .  Wt Readings from Last 3 Encounters:   02/19/19 185 lb (83.9 kg)   02/15/19 183 lb (83 kg)   02/12/19 183 lb (83 kg)     General Appearance:   Alert, cooperative, no distress, appears stated age   Head/ENT: Normocephalic, without obvious abnormality. Membranes moist      EYES:  no scleral icterus, normal conjunctivae   Neck: Supple, symmetrical, trachea midline, no adenopathy, thyroid: not enlarged, symmetric, no carotid bruit or JVD   Chest/Lungs:   Lungs are clear to auscultation, respirations unlabored. No tenderness or deformity    Cardiovascular:   Irregular rhythm, S1, S2 normal, no murmur, rub or gallop.   Abdomen:  Soft, non-tender, bowel sounds active all four quadrants,  no masses, no organomegaly   Extremities: no cyanosis or clubbing. No edema   Skin: Skin color, texture, turgor normal, no rashes or lesions.    Psychiatric: Normal affect, calm   Neurologic: Alert and oriented x 3, moving all four extremities.     General: WNL  Eyes: WNL  Ears/Nose/Throat: WNL  Lungs: WNL  Heart:  WNL  Stomach: WNL  Bladder: WNL  Muscle/Joints: WNL  Skin: WNL  Nervous System: WNL  Mental Health: WNL     Blood: WNL     Medical History  Surgical History Family History Social History   Past Medical History:   Diagnosis Date   ? Anemia    ? Arthritis    ? Atrial fibrillation (H)    ? Back pain    ? Callus    ? Cerebral aneurysm    ? Chronic kidney disease    ? Chronic systolic congestive heart failure (H)    ? Coronary artery disease    ? Crohn's disease (H)    ? Dysphagia    ? GERD (gastroesophageal reflux disease)    ? Hyperlipidemia    ? Hypertension    ? ICD (implantable cardioverter-defibrillator) in place     Medtronic   ? Ischemic cardiomyopathy    ? Kidney stone    ? Low back pain    ? Macular degeneration    ? Permanent atrial fibrillation (H)     [id number] risk score = 5 (age1/ CAD/ HTN/ CHF) Chronic warfarin   ? Sleep apnea     Past Surgical History:   Procedure Laterality Date   ? APPENDECTOMY     ? CARDIAC PACEMAKER PLACEMENT  2013    ICD Medtronic   ? CATARACT EXTRACTION W/  INTRAOCULAR LENS IMPLANT Bilateral    ? CHOLECYSTECTOMY     ? ESOPHAGOGASTRODUODENOSCOPY     ? IN CABG, VEIN, FOUR      Description: Coronary Artery Quadruple Venous Bypass Graft;  Recorded: 10/21/2008;  Comments: 8/2004   ? ROTATOR CUFF REPAIR Right    ? TONSILLECTOMY     ? XR MYELOGRAM CERVICAL THORACIC LUMBAR  1/17/2019    no family history of premature coronary artery disease Social History     Socioeconomic History   ? Marital status: Single     Spouse name: Not on file   ? Number of children: Not on file   ? Years of education: Not on file   ? Highest education level: Not on file   Social Needs   ? Financial resource strain: Not on file   ? Food insecurity - worry: Not on file   ? Food insecurity - inability: Not on file   ? Transportation needs - medical: Not on file   ? Transportation needs - non-medical: Not on file   Occupational History   ? Occupation: retired     Comment: [employer]   Tobacco Use   ? Smoking status:  Former Smoker   ? Smokeless tobacco: Never Used   Substance and Sexual Activity   ? Alcohol use: No   ? Drug use: No   ? Sexual activity: Not on file   Other Topics Concern   ? Not on file   Social History Narrative   ? Not on file          Medications  Allergies   Current Outpatient Medications   Medication Sig Dispense Refill   ? acetaminophen (TYLENOL) 325 MG tablet Take 650 mg by mouth every 4 (four) hours as needed for pain. Indications: Pain     ? bumetanide (BUMEX) 1 MG tablet Take 1 tablet (1 mg total) by mouth daily. 360 tablet 3   ? carvedilol (COREG) 25 MG tablet Take 1.5 tablets (37.5 mg total) by mouth 2 (two) times a day. 270 tablet 1   ? cholecalciferol, vitamin D3, 1,000 unit tablet Take 2,000 Units by mouth daily.     ? FERROUS FUMARATE (IRON ORAL) Take 1 tablet by mouth daily.      ? losartan (COZAAR) 50 MG tablet Take 1 tablet (50 mg total) by mouth daily.     ? lovastatin (MEVACOR) 40 MG tablet Take 1 tablet by mouth daily.     ? multivitamin with iron (ONE DAILY WITH IRON) Tab tablet Take 1 tablet by mouth daily.      ? omeprazole (PRILOSEC) 20 MG capsule Take 20 mg by mouth 2 (two) times a day.     ? rosuvastatin (CRESTOR) 20 MG tablet Take 1 tablet (20 mg total) by mouth at bedtime. 90 tablet 3   ? warfarin (COUMADIN) 2.5 MG tablet Take 1.25-2.5 mg by mouth See Admin Instructions. Take 1.25 mg (0.5 tablet) on Wednesdays/Saturdays and 2.5 mg rest of week. Adjust dose based on INR results as directed.       No current facility-administered medications for this visit.       No Known Allergies      Lab Results    Chemistry/lipid CBC Cardiac Enzymes/BNP/TSH/INR   Lab Results   Component Value Date    CHOL 149 10/17/2017    HDL 41 10/17/2017    LDLCALC 96 10/17/2017    TRIG 59 10/17/2017    CREATININE 1.66 (H) 02/12/2019    BUN 22 02/12/2019    K 3.5 02/12/2019     02/12/2019     02/12/2019    CO2 25 02/12/2019    Lab Results   Component Value Date    WBC 6.3 02/12/2019    HGB 9.9 (L)  02/12/2019    HCT 30.4 (L) 02/12/2019    MCV 88 02/12/2019     02/12/2019    Lab Results   Component Value Date    CKTOTAL 89 06/22/2012    CKMB 1 01/31/2013    TROPONINI 0.02 01/17/2019     (H) 01/17/2019    TSH 2.1 07/26/2013    INR 1.80 (H) 02/15/2019

## 2021-06-27 NOTE — PROGRESS NOTES
Progress Notes by Debra Rowe CNP at 12/24/2018  9:50 AM     Author: Debra Rowe CNP Service: -- Author Type: Nurse Practitioner    Filed: 12/24/2018 10:41 AM Encounter Date: 12/24/2018 Status: Signed    : Debra Rowe CNP (Nurse Practitioner)           Click to link to [facility] HEART CARE NOTE      Assessment/Recommendations   Assessment:    1. Ischemic cardiomyopathy, heart failure with reduced ejection fraction, ejection fraction 35%: He has no symptoms of acute heart failure.  OptiVol and thoracic impedance check today indicate possible fluid retention over the past 1-2 weeks.  He is asymptomatic and I am concerned about risk of dehydration with diarrhea.  He will monitor for worsening heart failure symptoms at home and call us.  Plan to recheck OptiVol remotely in 2 weeks.    2.  Chronic atrial fibrillation: Heart rate is controlled.  He continues to take warfarin and has his INR checked regularly.    3.  Crohn's disease: He is struggling with diarrhea most days.  He will see his GI doctor in January.    4.  Acute kidney injury: Renal function abnormal in October 2018.  Diuretics and losartan were decreased.  Creatinine continues to improve.  Creatinine on December 21, 2018 was 1.43.    Plan:  1.   Heart failure medications:  - Beta blocker therapy with carvedilol 37.5 mg twice daily  - ARB therapy with losartan 50 mg daily  - Diuretic therapy with bumetanide 0.5 mg every other day  2.  Recheck OptiVol in 2 weeks  3.  Low-sodium diet and daily weights  4.  We reviewed heart failure symptoms to monitor for and Tanmay will call if he notices any worsening.    Tanmay Israel will follow up in the heart failure clinic in 2-3 months.     History of Present Illness    Mr. Tanmay Israel is a 78 y.o. male seen at [facility] heart failure clinic today for continued follow-up.  He has a history of ischemic cardiomyopathy, heart failure with  reduced ejection fraction, hypertension, coronary artery bypass surgery, and permanent atrial fibrillation.  Echocardiogram on November 12, 2018 showed an ejection fraction of 35%, elevated LV filling pressures, moderately decreased right ventricular function, and mild mitral regurgitation.    Tanmay had a Crohn's flare in September 2018 and was started on steroids.  This caused an exacerbation of heart failure.  Diuretics were adjusted and symptoms resolved after steroid was discontinued.  He developed acute kidney injury and diuretic dose has been decreased.  Losartan was also decreased.  BMP on December 21 shows proving renal function.  He continues to struggle with diarrhea and will be seeing GI in the next month.  He denies fatigue, lightheadedness, shortness of breath, dyspnea on exertion, chest pain and lower extremity edema.      His home weight has remained stable.    ECHO:   Results for orders placed during the hospital encounter of 11/12/18   Echo Complete [ECH10] 11/12/2018    Narrative   When compared to the previous study dated 10/13/2017, no significant   change.    Normal left ventricular size, wall thickness and moderate global   hypokinesis. Elevated left ventricular filling pressure. Left ventricle   ejection fraction is moderately decreased. The estimated left ventricular   ejection fraction is 35%.    Normal right ventricular size with moderately reduced right ventricular   systolic performance. Pacemaker leads are identified.    Severely enlarged left atrium. Moderately enlarged right atrium.    Mild mitral valve regurgitation. Mild tricuspid valve regurgitation with   estimated right ventricular systolic pressure 45 mmHg.           Physical Examination Review of Systems   Vitals:    12/24/18 0947   BP: 120/48   Pulse: 66   Resp: 18     Body mass index is 28.35 kg/m .  Wt Readings from Last 3 Encounters:   12/24/18 192 lb (87.1 kg)   12/14/18 191 lb (86.6 kg)   12/04/18 196 lb (88.9 kg)        General Appearance:     Alert, cooperative and in no acute distress.   ENT/Mouth: membranes moist, no oral lesions or bleeding gums.      EYES:  no scleral icterus, normal conjunctivae   Chest/Lungs:   lungs are clear to auscultation, no rales or wheezing, respirations unlabored   Cardiovascular:    Irregularly irregular. Normal first and second heart sounds. no edema bilateral lower extremities    Abdomen:  Soft, nontender, nondistended, bowel sounds present   Extremities: no cyanosis or clubbing   Skin: warm, dry.    Neurologic: mood and affect are appropriate, alert and oriented x3      General: WNL  Eyes: WNL  Ears/Nose/Throat: WNL  Lungs: WNL  Heart: Irregular Heartbeat  Stomach: Diarrhea  Bladder: WNL  Muscle/Joints: WNL  Skin: WNL  Nervous System: WNL  Mental Health: WNL     Blood: WNL     Medical History  Surgical History Family History Social History   Past Medical History:   Diagnosis Date   ? Anemia    ? Arthritis    ? Atrial fibrillation (H)    ? Back pain    ? Callus    ? Cerebral aneurysm    ? Chronic kidney disease    ? Chronic systolic congestive heart failure (H)    ? Coronary artery disease    ? Crohn's disease (H)    ? Dysphagia    ? GERD (gastroesophageal reflux disease)    ? Hyperlipidemia    ? Hypertension    ? ICD (implantable cardioverter-defibrillator) in place     Medtronic   ? Ischemic cardiomyopathy    ? Kidney stone    ? Low back pain    ? Macular degeneration    ? Permanent atrial fibrillation (H)     [id number] risk score = 5 (age1/ CAD/ HTN/ CHF) Chronic warfarin   ? Sleep apnea     Past Surgical History:   Procedure Laterality Date   ? APPENDECTOMY     ? CARDIAC PACEMAKER PLACEMENT  2013    ICD Medtronic   ? CATARACT EXTRACTION W/  INTRAOCULAR LENS IMPLANT Bilateral    ? CHOLECYSTECTOMY     ? ESOPHAGOGASTRODUODENOSCOPY     ? GA CABG, VEIN, FOUR      Description: Coronary Artery Quadruple Venous Bypass Graft;  Recorded: 10/21/2008;  Comments: 8/2004   ? ROTATOR CUFF REPAIR  Right    ? TONSILLECTOMY      Family History   Problem Relation Age of Onset   ? Heart disease Mother    ? Stroke Mother    ? Crohn's disease Father    ? Alcohol abuse Brother    ? No Medical Problems Daughter    ? No Medical Problems Son    ? No Medical Problems Maternal Aunt    ? No Medical Problems Maternal Uncle    ? No Medical Problems Paternal Aunt    ? No Medical Problems Paternal Uncle    ? No Medical Problems Maternal Grandmother    ? No Medical Problems Maternal Grandfather    ? No Medical Problems Paternal Grandmother    ? No Medical Problems Paternal Grandfather    ? Cancer Brother    ? Urolithiasis Neg Hx    ? Gout Neg Hx     Social History     Socioeconomic History   ? Marital status: Single     Spouse name: Not on file   ? Number of children: Not on file   ? Years of education: Not on file   ? Highest education level: Not on file   Social Needs   ? Financial resource strain: Not on file   ? Food insecurity - worry: Not on file   ? Food insecurity - inability: Not on file   ? Transportation needs - medical: Not on file   ? Transportation needs - non-medical: Not on file   Occupational History   ? Occupation: retired     Comment: [employer]   Tobacco Use   ? Smoking status: Former Smoker   ? Smokeless tobacco: Never Used   Substance and Sexual Activity   ? Alcohol use: No   ? Drug use: No   ? Sexual activity: Not on file   Other Topics Concern   ? Not on file   Social History Narrative   ? Not on file          Medications  Allergies   Current Outpatient Medications   Medication Sig Dispense Refill   ? acetaminophen (TYLENOL) 325 MG tablet Take 650 mg by mouth every 4 (four) hours as needed for pain. Indications: Pain     ? bumetanide (BUMEX) 1 MG tablet Take 0.5 tablets (0.5 mg total) by mouth every other day. 360 tablet 3   ? carvedilol (COREG) 25 MG tablet Take 1.5 tablets (37.5 mg total) by mouth 2 (two) times a day. 270 tablet 1   ? cholecalciferol, vitamin D3, 1,000 unit tablet Take 2,000 Units by  mouth daily.     ? diphenoxylate-atropine (LOMOTIL) 2.5-0.025 mg per tablet Take 1 tablet by mouth 2 (two) times a day as needed.     ? FERROUS FUMARATE (IRON ORAL) Take 1 tablet by mouth daily.      ? losartan (COZAAR) 50 MG tablet Take 1 tablet (50 mg total) by mouth daily.     ? lovastatin (MEVACOR) 40 MG tablet Take 1 tablet by mouth daily.     ? magnesium L-lactate (MAGTAB) 84 mg TbER Take 1 tablet (84 mg total) by mouth daily.  0   ? multivitamin with iron (ONE DAILY WITH IRON) Tab tablet Take 1 tablet by mouth daily.      ? omeprazole (PRILOSEC) 20 MG capsule Take 20 mg by mouth 2 (two) times a day.     ? ondansetron (ZOFRAN) 8 MG tablet Take 1 tablet (8 mg total) by mouth every 8 (eight) hours as needed for nausea. 30 tablet 1   ? rosuvastatin (CRESTOR) 20 MG tablet Take 1 tablet (20 mg total) by mouth at bedtime. 90 tablet 3   ? warfarin (COUMADIN) 2.5 MG tablet Take 1.25-2.5 mg by mouth See Admin Instructions. Take 1.25 mg (0.5 tablet) on Wednesdays/Saturdays and 2.5 mg rest of week. Adjust dose based on INR results as directed.       No current facility-administered medications for this visit.       No Known Allergies      Lab Results    Chemistry CBC BNP   Lab Results   Component Value Date    CREATININE 1.43 (H) 12/21/2018    BUN 19 12/21/2018     12/21/2018    K 3.5 12/21/2018     12/21/2018    CO2 32 (H) 12/21/2018     Creatinine (mg/dL)   Date Value   12/21/2018 1.43 (H)   12/04/2018 2.01 (H)   11/26/2018 2.67 (H)   11/19/2018 2.49 (H)    Lab Results   Component Value Date    WBC 8.0 12/21/2018    HGB 10.6 (L) 12/21/2018    HCT 31.0 (L) 12/21/2018    MCV 85 12/21/2018     12/21/2018    Lab Results   Component Value Date     (H) 10/04/2018     BNP (pg/mL)   Date Value   10/04/2018 779 (H)   10/03/2018 789 (H)   09/07/2018 173 (H)              Debra Rowe [facility]   Heart Failure Clinic

## 2021-06-27 NOTE — PROGRESS NOTES
Progress Notes by Debra Rowe CNP at 8/27/2019  7:50 AM     Author: Debra Rowe CNP Service: -- Author Type: Nurse Practitioner    Filed: 8/27/2019  9:12 AM Encounter Date: 8/27/2019 Status: Signed    : Debra Rowe CNP (Nurse Practitioner)           Click to link to [facility] HEART CARE NOTE      Assessment/Recommendations   Assessment:    1. Ischemic cardiomyopathy, heart failure with reduced ejection fraction, ejection fraction 35%, NYHA class II: He has no symptoms of acute fluid retention.  He has chronic shortness of breath and lower extremity edema which are stable.  His home weight has remained stable.  He continues to work on following a low-sodium diet.    2.  Chronic atrial fibrillation: Heart rate controlled.  He continues to take warfarin and has his INR checked regularly.    3.  Back pain: He continues to have back pain after surgery this past winter.  He has follow-up with the spine center this week.    Plan:  1.   Heart failure medications:  - Beta blocker therapy with carvedilol 37.5 mg twice daily  - His losartan was discontinued during his hospitalization in May due to acute kidney injury.  Creatinine 1.44 on August 6, 2019.  May consider restarting losartan if renal function remains stable.  2.  Continue current medications  3.  Low-sodium diet and daily weights    Tanmay Israel will follow up with Dr. Baker on September 19 and in the heart failure clinic in 3 to 4 months.     History of Present Illness    Mr. Tanmay Israel is a 79 y.o. male seen at [facility] heart failure clinic today for continued follow-up.  He has a history of ischemic cardiomyopathy, heart failure with reduced ejection fraction, hypertension, coronary artery bypass surgery, and permanent atrial fibrillation.  Echocardiogram from November 2018 showed ejection fraction of 35%, elevated LV filling pressures, moderately decreased right ventricular  function, and mild mitral regurgitation.    Tanmay was last hospitalized in July with vertigo.  He states that he has been feeling well since this last hospitalization.  He denies any symptoms of acute heart failure.  He has chronic shortness of breath with strenuous activity but denies any worsening.  He walks 1 mile daily.  Tanmay has occasional lower extremity edema.  His home weight has been stable around 164 pounds.  He denies fatigue, lightheadedness, orthopnea, chest pain, abdominal fullness/bloating and lower extremity edema.        ECHO:   Results for orders placed during the hospital encounter of 11/12/18   Echo Complete [ECH10] 11/12/2018    Narrative   When compared to the previous study dated 10/13/2017, no significant   change.    Normal left ventricular size, wall thickness and moderate global   hypokinesis. Elevated left ventricular filling pressure. Left ventricle   ejection fraction is moderately decreased. The estimated left ventricular   ejection fraction is 35%.    Normal right ventricular size with moderately reduced right ventricular   systolic performance. Pacemaker leads are identified.    Severely enlarged left atrium. Moderately enlarged right atrium.    Mild mitral valve regurgitation. Mild tricuspid valve regurgitation with   estimated right ventricular systolic pressure 45 mmHg.           Physical Examination Review of Systems   Vitals:    08/27/19 0801   BP: 130/60   Pulse: 62   Resp: 16     Body mass index is 25.25 kg/m .  Wt Readings from Last 3 Encounters:   08/27/19 171 lb (77.6 kg)   08/06/19 162 lb 8 oz (73.7 kg)   07/15/19 158 lb 14.4 oz (72.1 kg)       General Appearance:     Alert, cooperative and in no acute distress.   ENT/Mouth: membranes moist, no oral lesions or bleeding gums.      EYES:  no scleral icterus, normal conjunctivae   Chest/Lungs:   lungs are clear to auscultation, no rales or wheezing, respirations unlabored   Cardiovascular:    Irregularly irregular. Normal  first and second heart sounds, trace edema bilateral lower extremities    Abdomen:  Soft, nontender, nondistended, bowel sounds present   Extremities: no cyanosis or clubbing   Skin: warm, dry.    Neurologic: mood and affect are appropriate, alert and oriented x3      General: WNL  Eyes: WNL  Ears/Nose/Throat: WNL  Lungs: WNL  Heart: Irregular Heartbeat  Stomach: WNL  Bladder: WNL  Muscle/Joints: WNL  Skin: WNL  Nervous System: WNL  Mental Health: WNL     Blood: WNL     Medical History  Surgical History Family History Social History   Past Medical History:   Diagnosis Date   ? Acute cholecystitis 2/28/2019   ? Acute post-operative pain    ? Anemia    ? Arthritis    ? Atrial fibrillation (H)    ? Back pain    ? C. difficile diarrhea 4/21/2019   ? Calculus of ureter    ? Callus    ? Cerebral aneurysm    ? Cervical myelopathy (H) 2/22/2019   ? Cervical spinal stenosis    ? Chronic kidney disease     stage 3   ? Chronic systolic congestive heart failure (H)    ? Closed fracture of distal end of left radius, unspecified fracture morphology, initial encounter    ? Closed fracture of distal end of right radius, unspecified fracture morphology, initial encounter    ? Closed fracture of first thoracic vertebra, unspecified fracture morphology, initial encounter (H)    ? Coronary artery disease    ? Crohn's disease (H)    ? Difficulty Breathing (Dyspnea)     Created by Conversion    ? Dysphagia    ? Fall 10/22/2016   ? GERD (gastroesophageal reflux disease)    ? Hospital discharge follow-up 5/21/2019   ? Hyperlipidemia    ? Hypertension    ? Hypotension, unspecified hypotension type    ? ICD (implantable cardioverter-defibrillator) in place     Medtronic   ? Ischemic cardiomyopathy    ? Joint Pain, Localized In The Knee     Created by Conversion    ? Joint Pain, Localized In The Right Shoulder     Created by Conversion    ? Kidney stone    ? Low back pain    ? Lumbago     Created by Conversion    ? Macular degeneration    ?  Muscle Aches, Generalized (Myalgias)     Created by Conversion    ? Permanent atrial fibrillation (H)     [id number] risk score = 5 (age1/ CAD/ HTN/ CHF) Chronic warfarin   ? Personal history of colonic polyps 2/12/2019   ? Polyp of duodenum 10/17/2016   ? Pyuria    ? Right arm weakness 4/2/2019   ? Right rotator cuff tendinitis    ? Right Rotator Cuff Tendonitis     Created by Conversion  Replacement Utility updated for latest IMO load   ? Schatzki's ring    ? Sciatica     Created by Conversion    ? Serum Enzyme Levels - ALT (SGPT) Elevated     Created by Conversion    ? Shortness of breath    ? Sleep apnea     no CPAP   ? Spondylolisthesis of lumbar region 7/5/2016   ? Weakness 4/20/2019    Past Surgical History:   Procedure Laterality Date   ? APPENDECTOMY     ? CARDIAC PACEMAKER PLACEMENT  2013    ICD Medtronic   ? CATARACT EXTRACTION W/  INTRAOCULAR LENS IMPLANT Bilateral    ? CHOLECYSTECTOMY     ? ESOPHAGOGASTRODUODENOSCOPY     ? MN C- LAMINOPLASTY, 2 OR MORE Left 2/22/2019    Procedure: LEFT CERVICAL 4, 5, 6 LAMINOPLASTY;  Surgeon: Liza Cesar MD;  Location: [facility];  Service: Spine   ? MN CABG, VEIN, FOUR      Description: Coronary Artery Quadruple Venous Bypass Graft;  Recorded: 10/21/2008;  Comments: 8/2004   ? MN LAP,CHOLECYSTECTOMY N/A 2/28/2019    Procedure: CHOLECYSTECTOMY, LAPAROSCOPIC;  Surgeon: Mana Roman MD;  Location: [facility];  Service: General   ? ROTATOR CUFF REPAIR Right    ? TONSILLECTOMY     ? XR MYELOGRAM CERVICAL THORACIC LUMBAR  1/17/2019    Family History   Problem Relation Age of Onset   ? Heart disease Mother    ? Stroke Mother    ? Crohn's disease Father    ? Alcohol abuse Brother    ? No Medical Problems Daughter    ? No Medical Problems Son    ? No Medical Problems Maternal Aunt    ? No Medical Problems Maternal Uncle    ? No Medical Problems Paternal Aunt    ? No Medical Problems Paternal Uncle    ? No Medical Problems Maternal Grandmother     ? No Medical Problems Maternal Grandfather    ? No Medical Problems Paternal Grandmother    ? No Medical Problems Paternal Grandfather    ? Cancer Brother    ? Urolithiasis Neg Hx    ? Gout Neg Hx     Social History     Socioeconomic History   ? Marital status: Single     Spouse name: Not on file   ? Number of children: Not on file   ? Years of education: Not on file   ? Highest education level: Not on file   Occupational History   ? Occupation: retired     Comment: [employer]   Social Needs   ? Financial resource strain: Not on file   ? Food insecurity:     Worry: Not on file     Inability: Not on file   ? Transportation needs:     Medical: Not on file     Non-medical: Not on file   Tobacco Use   ? Smoking status: Former Smoker   ? Smokeless tobacco: Never Used   Substance and Sexual Activity   ? Alcohol use: No   ? Drug use: No   ? Sexual activity: Not on file   Lifestyle   ? Physical activity:     Days per week: Not on file     Minutes per session: Not on file   ? Stress: Not on file   Relationships   ? Social connections:     Talks on phone: Not on file     Gets together: Not on file     Attends Jewish service: Not on file     Active member of club or organization: Not on file     Attends meetings of clubs or organizations: Not on file     Relationship status: Not on file   ? Intimate partner violence:     Fear of current or ex partner: Not on file     Emotionally abused: Not on file     Physically abused: Not on file     Forced sexual activity: Not on file   Other Topics Concern   ? Not on file   Social History Narrative   ? Not on file          Medications  Allergies   Current Outpatient Medications   Medication Sig Dispense Refill   ? acetaminophen (TYLENOL) 500 MG tablet Take 2 tablets (1,000 mg total) by mouth 3 (three) times a day.  0   ? amLODIPine (NORVASC) 5 MG tablet Take 1 tablet (5 mg total) by mouth daily. 30 tablet 0   ? carvedilol (COREG) 25 MG tablet Take 37.5 mg by mouth 2 (two) times a day  with meals.     ? cholecalciferol, vitamin D3, 1,000 unit tablet Take 2,000 Units by mouth daily.     ? cyanocobalamin 500 MCG tablet Take 500 mcg by mouth daily.     ? diclofenac sodium (VOLTAREN) 1 % Gel Apply 4 g topically 4 (four) times a day as needed.     ? FERROUS FUMARATE (IRON ORAL) Take 1 tablet by mouth daily.      ? melatonin 3 mg Tab tablet Take 1 tablet (3 mg total) by mouth at bedtime as needed.  0   ? multivitamin with iron (ONE DAILY WITH IRON) Tab tablet Take 1 tablet by mouth daily.      ? omeprazole (PRILOSEC) 20 MG capsule Take 20 mg by mouth daily before breakfast.            ? potassium chloride (K-DUR,KLOR-CON) 20 MEQ tablet TAKE 1 TABLET(20 MEQ) BY MOUTH DAILY 90 tablet 3   ? rosuvastatin (CRESTOR) 20 MG tablet TAKE 1 TABLET(20 MG) BY MOUTH AT BEDTIME 90 tablet 1   ? senna-docusate (PERICOLACE) 8.6-50 mg tablet Take 1 tablet by mouth daily as needed for constipation.  0   ? warfarin (COUMADIN/JANTOVEN) 2.5 MG tablet Take 1 tablet 2.5 mg daily, repeat INR and adjust dose as directed  0     No current facility-administered medications for this visit.       No Known Allergies      Lab Results    Chemistry CBC BNP   Lab Results   Component Value Date    CREATININE 1.44 (H) 08/06/2019    BUN 23 08/06/2019     08/06/2019    K 4.8 08/06/2019     (H) 08/06/2019    CO2 19 (L) 08/06/2019     Creatinine (mg/dL)   Date Value   08/06/2019 1.44 (H)   07/30/2019 1.08   07/25/2019 1.33 (H)   07/18/2019 1.05    Lab Results   Component Value Date    WBC 5.5 08/06/2019    HGB 9.1 (L) 08/06/2019    HCT 28.5 (L) 08/06/2019    MCV 90 08/06/2019     08/06/2019    Lab Results   Component Value Date     (H) 05/24/2019     BNP (pg/mL)   Date Value   05/24/2019 581 (H)   04/01/2019 176 (H)   01/17/2019 560 (H)          Debra Rowe, [facility]   Heart Failure Clinic

## 2021-06-27 NOTE — PROGRESS NOTES
Progress Notes by Debra Rowe CNP at 2/1/2019  9:10 AM     Author: Debra Rowe CNP Service: -- Author Type: Nurse Practitioner    Filed: 2/1/2019  9:58 AM Encounter Date: 2/1/2019 Status: Signed    : Debra Rowe CNP (Nurse Practitioner)           Click to link to [facility] HEART CARE NOTE      Assessment/Recommendations   Assessment:    1. Ischemic cardiomyopathy, heart failure with reduced ejection fraction, ejection fraction 35%, NYHA class II: His symptoms have improved dramatically since taking metolazone once last week.  He has minimal shortness of breath and is back to baseline.  He no longer has lower extremity edema.  His weight has decreased 10 pounds.  He had no side effects with taking metolazone.  OptiVol today shows no indication of fluid retention.  He continues to follow a low-sodium diet.  He only uses ibuprofen occasionally and tries to limit his use.    2.  Chronic atrial fibrillation: Heart rate controlled.  INR drawn today and sent to primary care office for dosing.    3.  Back pain: He was seen in the spine clinic and is planning on having cervical laminoplasty.    Plan:  1.   Heart failure medications:  - Beta blocker therapy with carvedilol 37.5 mg twice daily  - ARB therapy with losartan 50 mg daily  - Diuretic therapy with bumetanide 1 mg twice daily.  Metolazone 2.5 mg as needed.  Patient will call clinic if he feels he is retaining fluid before taking metolazone on his own.  2.  BMP pending  3.  He is a candidate for changing losartan to Entresto.  Cost is about $8 for a month prescription.  He is stable today and is having cervical laminoplasty in the coming weeks.  We will reevaluate switching to Entresto following surgery.  4.  Continue low-sodium diet and daily weights    Tanmay Israel will follow up in 1 month.  He is due to see Dr. Baker in June.     History of Present Illness    Mr. Tanmay Israel is a 78 y.o. male  seen at [facility] heart failure clinic today for continued follow-up.  He has a history of ischemic cardiomyopathy, heart failure with reduced ejection fraction, hypertension, coronary artery bypass surgery, and permanent atrial fibrillation.  Echocardiogram on November 12, 2018 showed an ejection fraction of 35%, elevated LV filling pressures, moderately decreased right ventricular function, and mild mitral regurgitation.    He has struggled with acute heart failure symptoms over the past 2 weeks.  He did not respond to increasing doses of Bumex.  He took a one-time dose of metolazone 1 week ago which improved his symptoms dramatically.  He has minimal shortness of breath and is back to baseline.  He denies any orthopnea.  He no longer has edema or abdominal bloating.  His clinic weight is down 10 pounds.  He denies any side effects from taking metolazone.  He denies fatigue, lightheadedness and chest pain.      He is not currently weighing himself at home since he has been spending the last few days at his friend's house.  He continues to follow a low-sodium diet.    He was seen in the spine clinic and is planning to have a cervical laminoplasty.  Surgery date has not been scheduled.    ECHO:   Results for orders placed during the hospital encounter of 11/12/18   Echo Complete [ECH10] 11/12/2018    Narrative   When compared to the previous study dated 10/13/2017, no significant   change.    Normal left ventricular size, wall thickness and moderate global   hypokinesis. Elevated left ventricular filling pressure. Left ventricle   ejection fraction is moderately decreased. The estimated left ventricular   ejection fraction is 35%.    Normal right ventricular size with moderately reduced right ventricular   systolic performance. Pacemaker leads are identified.    Severely enlarged left atrium. Moderately enlarged right atrium.    Mild mitral valve regurgitation. Mild tricuspid valve regurgitation with    estimated right ventricular systolic pressure 45 mmHg.           Physical Examination Review of Systems   Vitals:    02/01/19 0852   BP: 110/60   Pulse: 60   Resp: 16     Body mass index is 26.8 kg/m .  Wt Readings from Last 3 Encounters:   02/01/19 181 lb 8 oz (82.3 kg)   01/24/19 192 lb (87.1 kg)   01/23/19 192 lb (87.1 kg)       General Appearance:     Alert, cooperative and in no acute distress.   ENT/Mouth: membranes moist, no oral lesions or bleeding gums.      EYES:  no scleral icterus, normal conjunctivae   Chest/Lungs:   lungs are clear to auscultation, no rales or wheezing, respirations unlabored   Cardiovascular:    Irregularly irregular. Normal first and second heart sounds, Jugular venous pressure normal, no edema bilateral lower extremities    Abdomen:  Soft, nontender, nondistended, bowel sounds present   Extremities: no cyanosis or clubbing   Skin: warm, dry.    Neurologic: mood and affect are appropriate, alert and oriented x3      General: WNL  Eyes: WNL  Ears/Nose/Throat: WNL  Lungs: WNL  Heart: Irregular Heartbeat  Stomach: WNL  Bladder: WNL  Muscle/Joints: WNL  Skin: WNL  Nervous System: WNL  Mental Health: WNL     Blood: WNL     Medical History  Surgical History Family History Social History   Past Medical History:   Diagnosis Date   ? Anemia    ? Arthritis    ? Atrial fibrillation (H)    ? Back pain    ? Callus    ? Cerebral aneurysm    ? Chronic kidney disease    ? Chronic systolic congestive heart failure (H)    ? Coronary artery disease    ? Crohn's disease (H)    ? Dysphagia    ? GERD (gastroesophageal reflux disease)    ? Hyperlipidemia    ? Hypertension    ? ICD (implantable cardioverter-defibrillator) in place     Medtronic   ? Ischemic cardiomyopathy    ? Kidney stone    ? Low back pain    ? Macular degeneration    ? Permanent atrial fibrillation (H)     [id number] risk score = 5 (age3/ CAD/ HTN/ CHF) Chronic warfarin   ? Sleep apnea     Past Surgical History:   Procedure  Laterality Date   ? APPENDECTOMY     ? CARDIAC PACEMAKER PLACEMENT  2013    ICD Medtronic   ? CATARACT EXTRACTION W/  INTRAOCULAR LENS IMPLANT Bilateral    ? CHOLECYSTECTOMY     ? ESOPHAGOGASTRODUODENOSCOPY     ? [place] CABG, VEIN, FOUR      Description: Coronary Artery Quadruple Venous Bypass Graft;  Recorded: 10/21/2008;  Comments: 8/2004   ? ROTATOR CUFF REPAIR Right    ? TONSILLECTOMY     ? XR MYELOGRAM CERVICAL THORACIC LUMBAR  1/17/2019    Family History   Problem Relation Age of Onset   ? Heart disease Mother    ? Stroke Mother    ? Crohn's disease Father    ? Alcohol abuse Brother    ? No Medical Problems Daughter    ? No Medical Problems Son    ? No Medical Problems Maternal Aunt    ? No Medical Problems Maternal Uncle    ? No Medical Problems Paternal Aunt    ? No Medical Problems Paternal Uncle    ? No Medical Problems Maternal Grandmother    ? No Medical Problems Maternal Grandfather    ? No Medical Problems Paternal Grandmother    ? No Medical Problems Paternal Grandfather    ? Cancer Brother    ? Urolithiasis Neg Hx    ? Gout Neg Hx     Social History     Socioeconomic History   ? Marital status: Single     Spouse name: Not on file   ? Number of children: Not on file   ? Years of education: Not on file   ? Highest education level: Not on file   Social Needs   ? Financial resource strain: Not on file   ? Food insecurity - worry: Not on file   ? Food insecurity - inability: Not on file   ? Transportation needs - medical: Not on file   ? Transportation needs - non-medical: Not on file   Occupational History   ? Occupation: retired     Comment: [employer]   Tobacco Use   ? Smoking status: Former Smoker   ? Smokeless tobacco: Never Used   Substance and Sexual Activity   ? Alcohol use: No   ? Drug use: No   ? Sexual activity: Not on file   Other Topics Concern   ? Not on file   Social History Narrative   ? Not on file          Medications  Allergies   Current Outpatient Medications   Medication Sig Dispense Refill    ? acetaminophen (TYLENOL) 325 MG tablet Take 650 mg by mouth every 4 (four) hours as needed for pain. Indications: Pain     ? bumetanide (BUMEX) 1 MG tablet Take 1 tablet (1 mg total) by mouth 2 (two) times a day at 9am and 6pm. 360 tablet 3   ? carvedilol (COREG) 25 MG tablet Take 1.5 tablets (37.5 mg total) by mouth 2 (two) times a day. 270 tablet 1   ? cholecalciferol, vitamin D3, 1,000 unit tablet Take 2,000 Units by mouth daily.     ? FERROUS FUMARATE (IRON ORAL) Take 1 tablet by mouth daily.      ? losartan (COZAAR) 50 MG tablet Take 1 tablet (50 mg total) by mouth daily.     ? lovastatin (MEVACOR) 40 MG tablet Take 1 tablet by mouth daily.     ? multivitamin with iron (ONE DAILY WITH IRON) Tab tablet Take 1 tablet by mouth daily.      ? omeprazole (PRILOSEC) 20 MG capsule Take 20 mg by mouth 2 (two) times a day.     ? ondansetron (ZOFRAN) 8 MG tablet Take 1 tablet (8 mg total) by mouth every 8 (eight) hours as needed for nausea. 30 tablet 1   ? rosuvastatin (CRESTOR) 20 MG tablet Take 1 tablet (20 mg total) by mouth at bedtime. 90 tablet 3   ? warfarin (COUMADIN) 2.5 MG tablet Take 1.25-2.5 mg by mouth See Admin Instructions. Take 1.25 mg (0.5 tablet) on Wednesdays/Saturdays and 2.5 mg rest of week. Adjust dose based on INR results as directed.     ? metOLazone (ZAROXOLYN) 2.5 MG tablet TAKE 1 TABLET BY MOUTH ONCE FOR 1 DOSE 3 tablet 0     No current facility-administered medications for this visit.       No Known Allergies      Lab Results    Chemistry CBC BNP   Lab Results   Component Value Date    CREATININE 1.32 (H) 01/23/2019    BUN 10 01/23/2019     01/23/2019    K 4.1 01/23/2019     01/23/2019    CO2 30 01/23/2019     Creatinine (mg/dL)   Date Value   01/23/2019 1.32 (H)   01/17/2019 1.02   01/14/2019 1.11   12/21/2018 1.43 (H)    Lab Results   Component Value Date    WBC 7.6 01/17/2019    HGB 10.0 (L) 01/17/2019    HCT 30.9 (L) 01/17/2019    MCV 89 01/17/2019     01/17/2019    Lab  Results   Component Value Date     (H) 01/17/2019     BNP (pg/mL)   Date Value   01/17/2019 560 (H)   10/04/2018 779 (H)   10/03/2018 789 (H)          Debra Rowe [facility]   Heart Failure Clinic

## 2021-06-27 NOTE — PROGRESS NOTES
Progress Notes by Adela Sauceda RN at 1/8/2019 10:34 AM     Author: Adela Sauceda RN Service: -- Author Type: Registered Nurse    Filed: 1/10/2019  1:34 PM Encounter Date: 1/8/2019 Status: Signed    : Adela Sauceda RN (Registered Nurse)       Type: add on remote ICD transmission per Debra OTERO CNP, pt reports HF symptoms.  Presenting rhythm: ventricular pacing and sensing, rate 50-60's.  Battery/lead status: stable  Arrhythmias: since 11/11/18, no VT/VF detected.  Anticoagulant: warfarin  Comments: normal ICD function. Routed to device RN for review of HF diagnostics. prd      Transmission reviewed, agree with above. No new arrhythmias detected, good device function. Chronic A.fib with good rate control,  Seldom over 90 bpm, ~ 20% V-paced.   OptiVol Fluid index has been on the rise since beginning of December, current level ~180, with correlating dip in Thoracic impedance, well below reference line at ~60. See snapshot below. Will forward to Debra ORTEGA CNP.   Adela Sauceda, [facility]

## 2021-06-27 NOTE — PROGRESS NOTES
Progress Notes by Tatyana Bolden NP at 7/9/2019  8:30 AM     Author: Tatyana Bolden NP Service: -- Author Type: Nurse Practitioner    Filed: 7/9/2019  9:40 AM Encounter Date: 7/9/2019 Status: Addendum    : Tatyana Bolden NP (Nurse Practitioner)    Related Notes: Original Note by Tatyana Bolden NP (Nurse Practitioner) filed at 7/9/2019  9:39 AM           Click to link to [facility]     [facility] HEART CARE NOTE      Assessment/Recommendations   Assessment:    1. Ischemic cardiomyopathy with systolic dysfunction, NYHA class II with EF of 35% s/p BIV ICD:OptiVol shows significant rise in fluid levels since early June 2019.  He has 2+ lower extremity edema.  He denies shortness of breath although his physical activity is limited from back pain.  He does try to stay active.  He denies shortness of breath at rest, PND orthopnea.  He reports following low-sodium diet.  He has not used PRN Bumex since discharged from the hospital back in May.    Device check today showed normal device function with presenting heart rhythm A. fib 50 bpm and we paced at 33.2%.    2. History of chronic kidney disease stage III: Recent hospitalization with acute renal failure with creatinine level at 5.1 losartan was discontinued and Bumex was changed PRN.  Creatinine normalized to 1.6 on discharge.  Patient was followed by nephrology team.  Dr. Trini Jean, nephrologist have recommended to avoid ARB in the setting of acute renal failure and hypotension.    Most recent creatinine level 1.28 on 7/2/2019 but potassium level was 3.2.  Therefore was started on potassium supplement 20 mEq daily.    3. Hypertension: Blood pressure today is 150/70.  Losartan was discontinued due to hypotension and acute renal failure.  Patient is currently on carvedilol 37.5 mg twice a day.      Plan:  1. We discussed about adding low-dose of Bumex which he agreed.  Patient was encouraged to call back in the next 3 to 5 days if no improvement in  his weight and lower extremity edema.  Will monitor his renal function very closely      Heart failure medications:  - Beta blocker therapy with carvedilol 37.5 mg twice a day  - ARB therapy- losartan was discontinued with recent hospitalization due to acute renal failure  -  Diuretic therapy with bumetanide 0.5 mg daily  -Potassium chloride 20 mEq daily started today per patient    2.  Will obtain BMP level today and adjust his potassium supplement as needed    3.  We will repeat BMP level in a week during next heart failure follow-up visit    4.  Recommended to follow-up with Dr. Trini Jean    Follow up with Tatyana or Alaina in a week     History of Present Illness    Mr. Tanmay Israel is a 79 y.o. male with a significant past medical history of recurrent C. difficile with diarrhea, hypotension, bradycardia, chronic atrial fibrillation on warfarin therapy, hypertension, chronic systolic heart failure, ischemic cardiomyopathy, and acute kidney injury on chronic kidney disease stage III who is seen at [facility] heart failure clinic today for posthospitalization follow-up.  Patient was recently hospitalized from May 22 through May 27, 2019 for evaluation of acute renal failure.  Patient presented to the hospital with a creatinine level of 5.1 which was suspected prerenal from ongoing GI loss, hypotension and concurrent use of diuretic therapy and losartan.  He underwent renal ultrasound showed no obstructions.  He does have a history of nonobstructing right UVJ stone per CT in February 2019.  His blood pressure was noted to be down in the low systolic 70s in ED.  Nephrology was consulted and recommended to avoid if possible. ARB he was treated with an antibiotic for his recurrent C. difficile.  His creatinine level normalized to baseline at 1.6 at discharge.  He was recommended to follow-up with the primary nephrologist Dr. Trini Jean in 4 to 6 weeks.    Patient saw his PCP in late June and was noted to  have potassium level low around 3.2.  He was initiated on potassium supplement which he started this morning.  He reports not eating adequate diet that are high in potassium.    Today, patient tells me that he has been doing well and his home weight has been stable between 159 to 161 pounds with no increased shortness of breath or any heart failure symptoms except mild lower extremity edema.  OptiVol showed consistent rise and fluid level since early June when he was taken off of Bumex and kept on as needed due to recent renal failure.  Patient tells me that he has not taken any Bumex since discharge from the hospital.  He reports following low-sodium diet.  He is able to do short walks without having increased shortness of breath.      He denies fatigue, lightheadedness, shortness of breath, dyspnea on exertion, orthopnea, PND, palpitations, chest pain and abdominal fullness/bloating.  He does not do any regular physical exercise due to low back pain.  He denies further diarrhea, fever or chills.     ECHO-Reviewed:   Results for orders placed during the hospital encounter of 11/12/18   Echo Complete [ECH10] 11/12/2018    Narrative   When compared to the previous study dated 10/13/2017, no significant   change.    Normal left ventricular size, wall thickness and moderate global   hypokinesis. Elevated left ventricular filling pressure. Left ventricle   ejection fraction is moderately decreased. The estimated left ventricular   ejection fraction is 35%.    Normal right ventricular size with moderately reduced right ventricular   systolic performance. Pacemaker leads are identified.    Severely enlarged left atrium. Moderately enlarged right atrium.    Mild mitral valve regurgitation. Mild tricuspid valve regurgitation with   estimated right ventricular systolic pressure 45 mmHg.        Physical Examination Review of Systems   Vitals:    07/09/19 0745   BP: 154/68   Pulse: 62   Resp: 18     Body mass index is 25.7  kg/m .  Wt Readings from Last 3 Encounters:   07/09/19 174 lb (78.9 kg)   06/26/19 165 lb (74.8 kg)   06/24/19 164 lb (74.4 kg)       General Appearance:     Alert, cooperative and in no acute distress.   ENT/Mouth: membranes moist, no oral lesions or bleeding gums.      EYES:  no scleral icterus, normal conjunctivae   Neck: no carotid bruits or thyromegaly   Chest/Lungs:   lungs are clear to auscultation, no rales or wheezing, respirations unlabored   Cardiovascular:    Normal first and second heart sounds with no murmurs, rubs, or gallops; the carotid, radial and posterior tibial pulses are intact,  2+ pitting edema bilateral lower extremities    Abdomen:  Large but soft, nontender, nondistended, bowel sounds present   Extremities: no cyanosis or clubbing   Skin: warm, dry.    Neurologic: mood and affect are appropriate, alert and oriented x3      General: WNL  Eyes: Visual Distubance  Ears/Nose/Throat: WNL  Lungs: WNL  Heart: Irregular Heartbeat  Stomach: Diarrhea  Bladder: WNL  Muscle/Joints: Joint Pain  Skin: WNL  Nervous System: WNL  Mental Health: WNL     Blood: WNL     Medical History  Surgical History Family History Social History   Past Medical History:   Diagnosis Date   ? Acute cholecystitis 2/28/2019   ? Acute post-operative pain    ? Anemia    ? Arthritis    ? Atrial fibrillation (H)    ? Back pain    ? C. difficile diarrhea 4/21/2019   ? Calculus of ureter    ? Callus    ? Cerebral aneurysm    ? Cervical myelopathy (H) 2/22/2019   ? Cervical spinal stenosis    ? Chronic kidney disease     stage 3   ? Chronic systolic congestive heart failure (H)    ? Closed fracture of distal end of left radius, unspecified fracture morphology, initial encounter    ? Closed fracture of distal end of right radius, unspecified fracture morphology, initial encounter    ? Closed fracture of first thoracic vertebra, unspecified fracture morphology, initial encounter (H)    ? Coronary artery disease    ? Crohn's disease (H)     ? Difficulty Breathing (Dyspnea)     Created by Conversion    ? Dysphagia    ? Fall 10/22/2016   ? GERD (gastroesophageal reflux disease)    ? Hospital discharge follow-up 5/21/2019   ? Hyperlipidemia    ? Hypertension    ? Hypotension, unspecified hypotension type    ? ICD (implantable cardioverter-defibrillator) in place     Medtronic   ? Ischemic cardiomyopathy    ? Joint Pain, Localized In The Knee     Created by Conversion    ? Joint Pain, Localized In The Right Shoulder     Created by Conversion    ? Kidney stone    ? Low back pain    ? Lumbago     Created by Conversion    ? Macular degeneration    ? Muscle Aches, Generalized (Myalgias)     Created by Conversion    ? Permanent atrial fibrillation (H)     [id number] risk score = 5 (age2/ CAD/ HTN/ CHF) Chronic warfarin   ? Personal history of colonic polyps 2/12/2019   ? Polyp of duodenum 10/17/2016   ? Pyuria    ? Right arm weakness 4/2/2019   ? Right rotator cuff tendinitis    ? Right Rotator Cuff Tendonitis     Created by Conversion  Replacement Utility updated for latest IMO load   ? Schatzki's ring    ? Sciatica     Created by Conversion    ? Serum Enzyme Levels - ALT (SGPT) Elevated     Created by Conversion    ? Shortness of breath    ? Sleep apnea     no CPAP   ? Spondylolisthesis of lumbar region 7/5/2016   ? Weakness 4/20/2019    Past Surgical History:   Procedure Laterality Date   ? APPENDECTOMY     ? CARDIAC PACEMAKER PLACEMENT  2013    ICD Medtronic   ? CATARACT EXTRACTION W/  INTRAOCULAR LENS IMPLANT Bilateral    ? CHOLECYSTECTOMY     ? ESOPHAGOGASTRODUODENOSCOPY     ? GA C- LAMINOPLASTY, 2 OR MORE Left 2/22/2019    Procedure: LEFT CERVICAL 4, 5, 6 LAMINOPLASTY;  Surgeon: Liza Cesar MD;  Location: [facility];  Service: Spine   ? GA CABG, VEIN, FOUR      Description: Coronary Artery Quadruple Venous Bypass Graft;  Recorded: 10/21/2008;  Comments: 8/2004   ? GA LAP,CHOLECYSTECTOMY N/A 2/28/2019    Procedure:  CHOLECYSTECTOMY, LAPAROSCOPIC;  Surgeon: Mana Roman MD;  Location: [facility] OR;  Service: General   ? ROTATOR CUFF REPAIR Right    ? TONSILLECTOMY     ? XR MYELOGRAM CERVICAL THORACIC LUMBAR  1/17/2019    Family History   Problem Relation Age of Onset   ? Heart disease Mother    ? Stroke Mother    ? Crohn's disease Father    ? Alcohol abuse Brother    ? No Medical Problems Daughter    ? No Medical Problems Son    ? No Medical Problems Maternal Aunt    ? No Medical Problems Maternal Uncle    ? No Medical Problems Paternal Aunt    ? No Medical Problems Paternal Uncle    ? No Medical Problems Maternal Grandmother    ? No Medical Problems Maternal Grandfather    ? No Medical Problems Paternal Grandmother    ? No Medical Problems Paternal Grandfather    ? Cancer Brother    ? Urolithiasis Neg Hx    ? Gout Neg Hx     Social History     Socioeconomic History   ? Marital status: Single     Spouse name: Not on file   ? Number of children: Not on file   ? Years of education: Not on file   ? Highest education level: Not on file   Occupational History   ? Occupation: retired     Comment: [employer]   Social Needs   ? Financial resource strain: Not on file   ? Food insecurity:     Worry: Not on file     Inability: Not on file   ? Transportation needs:     Medical: Not on file     Non-medical: Not on file   Tobacco Use   ? Smoking status: Former Smoker   ? Smokeless tobacco: Never Used   Substance and Sexual Activity   ? Alcohol use: No   ? Drug use: No   ? Sexual activity: Not on file   Lifestyle   ? Physical activity:     Days per week: Not on file     Minutes per session: Not on file   ? Stress: Not on file   Relationships   ? Social connections:     Talks on phone: Not on file     Gets together: Not on file     Attends Christian service: Not on file     Active member of club or organization: Not on file     Attends meetings of clubs or organizations: Not on file     Relationship status: Not on file   ? Intimate  partner violence:     Fear of current or ex partner: Not on file     Emotionally abused: Not on file     Physically abused: Not on file     Forced sexual activity: Not on file   Other Topics Concern   ? Not on file   Social History Narrative   ? Not on file          Medications  Allergies   Current Outpatient Medications   Medication Sig Dispense Refill   ? bumetanide (BUMEX) 1 MG tablet Take 0.5 tablets (0.5 mg total) by mouth see administration instructions. For weight gain >3lbs. 30 tablet 0   ? carvedilol (COREG) 25 MG tablet Take 37.5 mg by mouth 2 (two) times a day with meals.     ? cholecalciferol, vitamin D3, 1,000 unit tablet Take 2,000 Units by mouth daily.     ? cyanocobalamin 500 MCG tablet Take 500 mcg by mouth daily.     ? FERROUS FUMARATE (IRON ORAL) Take 1 tablet by mouth daily.      ? multivitamin with iron (ONE DAILY WITH IRON) Tab tablet Take 1 tablet by mouth daily.      ? omeprazole (PRILOSEC) 20 MG capsule Take 20 mg by mouth daily before breakfast.            ? potassium chloride (K-DUR,KLOR-CON) 20 MEQ tablet TAKE 1 TABLET(20 MEQ) BY MOUTH DAILY 90 tablet 3   ? rosuvastatin (CRESTOR) 20 MG tablet TAKE 1 TABLET(20 MG) BY MOUTH AT BEDTIME 90 tablet 1   ? warfarin (COUMADIN/JANTOVEN) 2.5 MG tablet Take 1.25-2.5 mg by mouth See Admin Instructions. Take 1.25 mg (half-tablet) on Wednesdays. Take 2.5 mg (1 tablet) all other days of the week.     ? oxyCODONE (ROXICODONE) 5 MG immediate release tablet Take 5 mg by mouth every 4 (four) hours as needed.       No current facility-administered medications for this visit.       No Known Allergies      Lab Results    Chemistry CBC BNP   Lab Results   Component Value Date    CREATININE 1.28 07/02/2019    BUN 17 07/02/2019     07/02/2019    K 3.2 (L) 07/02/2019     (H) 07/02/2019    CO2 24 07/02/2019     Creatinine (mg/dL)   Date Value   07/02/2019 1.28   06/24/2019 1.58 (H)   06/03/2019 1.45 (H)   05/26/2019 1.65 (H)    Lab Results   Component  Value Date    WBC 5.8 06/24/2019    HGB 9.6 (L) 06/24/2019    HCT 29.4 (L) 06/24/2019    MCV 89 06/24/2019     06/24/2019    Lab Results   Component Value Date     (H) 05/24/2019     BNP (pg/mL)   Date Value   05/24/2019 581 (H)   04/01/2019 176 (H)   01/17/2019 560 (H)      Tatyana Bolden CNP  [facility]   Heart Failure Clinic         This note has been dictated using voice recognition software. Any grammatical, typographical, or context distortions are unintentional and inherent to the software

## 2021-06-28 NOTE — PROGRESS NOTES
Progress Notes by Debra Rowe CNP at 11/20/2019  9:10 AM     Author: Debra Rowe CNP Service: -- Author Type: Nurse Practitioner    Filed: 11/20/2019  9:34 AM Encounter Date: 11/20/2019 Status: Signed    : Debra Rowe CNP (Nurse Practitioner)             Assessment/Recommendations   Assessment:    1. Ischemic cardiomyopathy, heart failure with reduced ejection fraction, ejection fraction 48%: He has no symptoms of acute fluid retention.  He has trace edema in his ankles bilaterally.  OptiVol showed fluid retention in September 2019 but has been improving since then.  He is still above threshold but is asymptomatic.  Thoracic impedance is normalizing.    2.  Chronic atrial fibrillation: Heart rate controlled.  He continues to take warfarin and had an INR checked today.    3.  Primary care provider sent him to GI due to abnormal liver function and decreased hemoglobin.  He saw GI yesterday and will have a colonoscopy in February.    Plan:  1.  Continue current medications  2.  Low-sodium diet and daily weights    Tanmay Israel will follow up with Dr. Baker in January and in the heart failure clinic in 6 months.       History of Present Illness/Subjective    Mr. Tanmay Israel is a 79 y.o. male seen at [facility] today for continued follow-up.  He has a history of ischemic cardiomyopathy, heart failure with reduced ejection fraction, hypertension, coronary artery bypass surgery, and permanent atrial fibrillation.  Echocardiogram from November 2018 showed ejection fraction of 35%, elevated LV filling pressures, moderately decreased right ventricular function, and mild mitral regurgitation.  Stress test on September 27, 2019 was negative for ischemia or infarction and showed ejection fraction of 48%.    He was seen by Dr. Baker in September with decreased exercise tolerance.  He feels that this has improved.  He walks about a mile daily.  He notes  shortness of breath with walking upstairs.  He denies fatigue, lightheadedness, orthopnea and chest pain.  He has mild lower extremity edema.    His weight has been stable.  He continues to follow a low-sodium diet.    Stress Test 9/27/19:    The pharmacologic nuclear stress test is negative for inducible myocardial ischemia or infarction.    The left ventricular ejection fraction is 48%. The following segments are hypokinetic: basal anteroseptal, basal inferoseptal, mid anteroseptal, mid inferoseptal and apical septal.    When compared to the images of 5/13/2016, the ejection fraction has increased from 40% to 48%.    ECHO:   Results for orders placed during the hospital encounter of 11/12/18   Echo Complete [ECH10] 11/12/2018    Narrative   When compared to the previous study dated 10/13/2017, no significant   change.    Normal left ventricular size, wall thickness and moderate global   hypokinesis. Elevated left ventricular filling pressure. Left ventricle   ejection fraction is moderately decreased. The estimated left ventricular   ejection fraction is 35%.    Normal right ventricular size with moderately reduced right ventricular   systolic performance. Pacemaker leads are identified.    Severely enlarged left atrium. Moderately enlarged right atrium.    Mild mitral valve regurgitation. Mild tricuspid valve regurgitation with   estimated right ventricular systolic pressure 45 mmHg.           Physical Examination Review of Systems   Vitals:    11/20/19 0901   BP: 120/46   Pulse: 60   Resp: 16     Body mass index is 25.84 kg/m .  Wt Readings from Last 3 Encounters:   11/20/19 175 lb (79.4 kg)   09/24/19 169 lb 14.4 oz (77.1 kg)   09/19/19 172 lb (78 kg)       General Appearance:     Alert, cooperative and in no acute distress.   ENT/Mouth: membranes moist, no oral lesions or bleeding gums.      EYES:  no scleral icterus, normal conjunctivae   Chest/Lungs:   lungs are clear to auscultation, no rales or wheezing,  respirations unlabored   Cardiovascular:    Irregular. Normal first and second heart sounds, trace edema bilateral lower extremities    Abdomen:  Soft, nontender, nondistended, bowel sounds present   Extremities: no cyanosis or clubbing   Skin: warm, dry.    Neurologic: mood and affect are appropriate, alert and oriented x3      General: WNL  Eyes: WNL  Ears/Nose/Throat: WNL  Lungs: WNL  Heart: WNL  Stomach: WNL  Bladder: WNL  Muscle/Joints: WNL  Skin: WNL  Nervous System: WNL  Mental Health: WNL     Blood: WNL     Medical History  Surgical History Family History Social History   Past Medical History:   Diagnosis Date   ? Acute cholecystitis 2/28/2019   ? Acute post-operative pain    ? Anemia    ? Arthritis    ? Atrial fibrillation (H)    ? C. difficile diarrhea 4/21/2019   ? Calculus of ureter    ? Callus    ? Cerebral aneurysm    ? Cervical myelopathy (H) 2/22/2019   ? Cervical spinal stenosis    ? Chronic kidney disease     stage 3   ? Chronic systolic congestive heart failure (H)    ? Closed fracture of distal end of left radius, unspecified fracture morphology, initial encounter    ? Closed fracture of distal end of right radius, unspecified fracture morphology, initial encounter    ? Closed fracture of first thoracic vertebra, unspecified fracture morphology, initial encounter (H)    ? Coronary artery disease    ? Crohn's disease (H)    ? Dysphagia    ? Fall 10/22/2016   ? GERD (gastroesophageal reflux disease)    ? Hyperlipidemia    ? Hypertension    ? Hypotension, unspecified hypotension type    ? ICD (implantable cardioverter-defibrillator) in place     Medtronic   ? Ischemic cardiomyopathy    ? Kidney stone    ? Low back pain    ? Lumbago     Created by Conversion    ? Macular degeneration    ? Muscle Aches, Generalized (Myalgias)     Created by Conversion    ? Permanent atrial fibrillation (H)     [id number] risk score = 5 (age2/ CAD/ HTN/ CHF) Chronic warfarin   ? Personal history of colonic polyps  2/12/2019   ? Polyp of duodenum 10/17/2016   ? Pyuria    ? Right arm weakness 4/2/2019   ? Right Rotator Cuff Tendonitis     Created by Conversion  Replacement Utility updated for latest IMO load   ? Schatzki's ring    ? Sciatica     Created by Conversion    ? Serum Enzyme Levels - ALT (SGPT) Elevated     Created by Conversion    ? Sleep apnea     no CPAP   ? Spondylolisthesis of lumbar region 7/5/2016   ? Weakness 4/20/2019    Past Surgical History:   Procedure Laterality Date   ? APPENDECTOMY     ? CARDIAC DEFIBRILLATOR PLACEMENT  2013    ICD Medtronic   ? CATARACT EXTRACTION W/  INTRAOCULAR LENS IMPLANT Bilateral    ? CHOLECYSTECTOMY     ? CORONARY ARTERY BYPASS GRAFT  08/2004    Coronary Artery Quadruple Venous Bypass Graft   ? ESOPHAGOGASTRODUODENOSCOPY     ? RI C- LAMINOPLASTY, 2 OR MORE Left 2/22/2019    Procedure: LEFT CERVICAL 4, 5, 6 LAMINOPLASTY;  Surgeon: Liza Cesar MD;  Location: [facility];  Service: Spine   ? RI LAP,CHOLECYSTECTOMY N/A 2/28/2019    CHOLECYSTECTOMY, LAPAROSCOPIC;  Surgeon: Mana Roman MD;  Location: [facility];  Service: General   ? ROTATOR CUFF REPAIR Right    ? TONSILLECTOMY     ? XR MYELOGRAM CERVICAL THORACIC LUMBAR  1/17/2019    Family History   Problem Relation Age of Onset   ? Heart disease Mother    ? Stroke Mother    ? Crohn's disease Father    ? Alcohol abuse Brother    ? No Medical Problems Daughter    ? No Medical Problems Son    ? No Medical Problems Maternal Aunt    ? No Medical Problems Maternal Uncle    ? No Medical Problems Paternal Aunt    ? No Medical Problems Paternal Uncle    ? No Medical Problems Maternal Grandmother    ? No Medical Problems Maternal Grandfather    ? No Medical Problems Paternal Grandmother    ? No Medical Problems Paternal Grandfather    ? Cancer Brother    ? Urolithiasis Neg Hx    ? Gout Neg Hx     Social History     Socioeconomic History   ? Marital status: Single     Spouse name: Not on file   ? Number of  children: Not on file   ? Years of education: Not on file   ? Highest education level: Not on file   Occupational History   ? Occupation: [employer]     Employer: RETIRED   Social Needs   ? Financial resource strain: Not on file   ? Food insecurity:     Worry: Not on file     Inability: Not on file   ? Transportation needs:     Medical: Not on file     Non-medical: Not on file   Tobacco Use   ? Smoking status: Former Smoker   ? Smokeless tobacco: Never Used   Substance and Sexual Activity   ? Alcohol use: No   ? Drug use: No   ? Sexual activity: Not on file   Lifestyle   ? Physical activity:     Days per week: Not on file     Minutes per session: Not on file   ? Stress: Not on file   Relationships   ? Social connections:     Talks on phone: Not on file     Gets together: Not on file     Attends Druze service: Not on file     Active member of club or organization: Not on file     Attends meetings of clubs or organizations: Not on file     Relationship status: Not on file   ? Intimate partner violence:     Fear of current or ex partner: Not on file     Emotionally abused: Not on file     Physically abused: Not on file     Forced sexual activity: Not on file   Other Topics Concern   ? Not on file   Social History Narrative   ? Not on file          Medications  Allergies   Current Outpatient Medications   Medication Sig Dispense Refill   ? acetaminophen (TYLENOL) 500 MG tablet Take 2 tablets (1,000 mg total) by mouth 3 (three) times a day.  0   ? bumetanide (BUMEX) 1 MG tablet Take 1 tablet (1 mg total) by mouth daily. 90 tablet 3   ? carvedilol (COREG) 25 MG tablet Take 37.5 mg by mouth 2 (two) times a day with meals.     ? cholecalciferol, vitamin D3, 1,000 unit tablet Take 2,000 Units by mouth daily.     ? cyanocobalamin 500 MCG tablet Take 500 mcg by mouth daily.     ? diclofenac sodium (VOLTAREN) 1 % Gel Apply 4 g topically 4 (four) times a day as needed.     ? FERROUS FUMARATE (IRON ORAL) Take 1 tablet by mouth  daily.      ? melatonin 3 mg Tab tablet Take 1 tablet (3 mg total) by mouth at bedtime as needed.  0   ? multivitamin with iron (ONE DAILY WITH IRON) Tab tablet Take 1 tablet by mouth daily.      ? omeprazole (PRILOSEC) 20 MG capsule Take 20 mg by mouth daily before breakfast.            ? potassium chloride (K-DUR,KLOR-CON) 20 MEQ tablet TAKE 1 TABLET(20 MEQ) BY MOUTH DAILY 90 tablet 3   ? rosuvastatin (CRESTOR) 20 MG tablet TAKE 1 TABLET(20 MG) BY MOUTH AT BEDTIME 90 tablet 1   ? senna-docusate (PERICOLACE) 8.6-50 mg tablet Take 1 tablet by mouth daily as needed for constipation.  0   ? warfarin (COUMADIN/JANTOVEN) 2.5 MG tablet Take 1 tablet 2.5 mg daily, repeat INR and adjust dose as directed  0   ? amLODIPine (NORVASC) 5 MG tablet Take 1 tablet (5 mg total) by mouth daily. 90 tablet 3     No current facility-administered medications for this visit.     No Known Allergies      Lab Results    Chemistry/lipid CBC Cardiac Enzymes/BNP/TSH/INR   Lab Results   Component Value Date    CHOL 149 10/17/2017    HDL 41 10/17/2017    LDLCALC 96 10/17/2017    TRIG 59 10/17/2017    CREATININE 1.92 (H) 11/06/2019    BUN 23 11/06/2019    K 3.9 11/06/2019     11/06/2019     11/06/2019    CO2 26 11/06/2019    Lab Results   Component Value Date    WBC 5.6 11/06/2019    HGB 8.7 (L) 11/06/2019    HCT 26.2 (L) 11/06/2019    MCV 86 11/06/2019     11/06/2019    Lab Results   Component Value Date    CKTOTAL 40 06/19/2019    CKMB 1 01/31/2013    TROPONINI 0.02 04/19/2019     (H) 05/24/2019    TSH 1.22 04/19/2019    INR 1.90 (!) 11/20/2019

## 2021-06-28 NOTE — PROGRESS NOTES
Progress Notes by Bryan Baker MD at 1/8/2020 10:10 AM     Author: Bryan Baker MD Service: -- Author Type: Physician    Filed: 1/8/2020 10:53 AM Encounter Date: 1/8/2020 Status: Signed    : Bryan Baker MD (Physician)       Heart Care Office Note    Assessment / Plan:    1.  Permanent atrial fibrillation.  Rate control adequate.  2.  Ischemic cardiomyopathy, well compensated with left ventricular ejection fraction 48%.  3.  Coronary artery disease.  Stable with no anginal symptoms.    Follow-up 1 year    ______________________________________________________________________    Subjective:    I had the opportunity to see Tanmay Israel at the [facility]. Tanmay Israel is a 79 y.o. male with a history of atrial fibrillation, coronary artery disease status post bypass revascularization , and ischemic cardiomyopathy who returns for routine follow-up.  I saw him last fall and he was noting increased fatigue.  He does have chronic kidney disease.  Pharmacologic nuclear stress testing  showed no evidence of ischemia or infarction and an improvement in his ejection fraction up to 48%.    Since I saw him 4 months ago, his activity tolerance is improved.  He has had no lower extremity edema.  He is walking in his apartment building up and down 3 flights of steps without difficulties.  He does note that he is cold when he visits friends homes, but this is minimally bothersome.  He is sleeping well and otherwise offers no complaints.      ______________________________________________________________________    Problem List:  Patient Active Problem List   Diagnosis   ? Osteoarthritis Of The Knee   ? Esophageal Reflux   ? Obstructive sleep apnea   ? Ptosis Of Eyelid   ? Hyperlipidemia   ? Anemia   ? Crohn's (Granulomatous) Colitis   ? Benign Essential Hypertension   ? Coronary atherosclerosis of native coronary artery   ? Ischemic cardiomyopathy   ? Paroxysmal ventricular  tachycardia (H)   ? Dry Nonexudative Macular Degeneration   ? Serum Enzyme Levels - Alkaline Phosphatase Elevated   ? Dysphagia   ? ICD (implantable cardioverter-defibrillator) in place   ? Lumbar stenosis with neurogenic claudication   ? Sacroiliac joint dysfunction of right side   ? Stage 3 chronic kidney disease (H)   ? Warfarin-induced coagulopathy (H)   ? A-fib (H)   ? Anemia, unspecified type   ? Cerebral aneurysm   ? Osteoporosis   ? Ileitis   ? Heart failure with reduced ejection fraction (H)   ? Diaphragmatic hernia   ? Pharyngoesophageal dysphagia   ? History of Crohn's disease   ? Iron deficiency anemia   ? Polyp of stomach and duodenum   ? Reflux esophagitis   ? Rotator cuff tear arthropathy of right shoulder   ? Schatzki's ring   ? Diverticulosis of colon   ? Flatulence, eructation and gas pain   ? S/P cervical spinal fusion   ? S/P laparoscopic cholecystectomy   ? Hypomagnesemia   ? Acute renal failure (ARF) (H)   ? Dizziness     Medical History:  Past Medical History:   Diagnosis Date   ? Acute cholecystitis 2/28/2019   ? Acute post-operative pain    ? Anemia    ? Arthritis    ? Atrial fibrillation (H)    ? C. difficile diarrhea 4/21/2019   ? Calculus of ureter    ? Callus    ? Cerebral aneurysm    ? Cervical myelopathy (H) 2/22/2019   ? Cervical spinal stenosis    ? Chronic kidney disease     stage 3   ? Chronic systolic congestive heart failure (H)    ? Closed fracture of distal end of left radius, unspecified fracture morphology, initial encounter    ? Closed fracture of distal end of right radius, unspecified fracture morphology, initial encounter    ? Closed fracture of first thoracic vertebra, unspecified fracture morphology, initial encounter (H)    ? Coronary artery disease    ? Crohn's disease (H)    ? Dysphagia    ? Fall 10/22/2016   ? GERD (gastroesophageal reflux disease)    ? Hyperlipidemia    ? Hypertension    ? Hypotension, unspecified hypotension type    ? ICD (implantable  cardioverter-defibrillator) in place     Medtronic   ? Ischemic cardiomyopathy    ? Kidney stone    ? Low back pain    ? Lumbago     Created by Conversion    ? Macular degeneration    ? Muscle Aches, Generalized (Myalgias)     Created by Conversion    ? Permanent atrial fibrillation     [id number] risk score = 5 (age2/ CAD/ HTN/ CHF) Chronic warfarin   ? Personal history of colonic polyps 2/12/2019   ? Polyp of duodenum 10/17/2016   ? Pyuria    ? Right arm weakness 4/2/2019   ? Right Rotator Cuff Tendonitis     Created by Conversion  Replacement Utility updated for latest IMO load   ? Schatzki's ring    ? Sciatica     Created by Conversion    ? Serum Enzyme Levels - ALT (SGPT) Elevated     Created by Conversion    ? Sleep apnea     no CPAP   ? Spondylolisthesis of lumbar region 7/5/2016   ? Weakness 4/20/2019     Surgical History:  Past Surgical History:   Procedure Laterality Date   ? APPENDECTOMY     ? CARDIAC DEFIBRILLATOR PLACEMENT  2013    ICD Medtronic   ? CATARACT EXTRACTION W/  INTRAOCULAR LENS IMPLANT Bilateral    ? CHOLECYSTECTOMY     ? CORONARY ARTERY BYPASS GRAFT  08/2004    Coronary Artery Quadruple Venous Bypass Graft   ? ESOPHAGOGASTRODUODENOSCOPY     ? UT C- LAMINOPLASTY, 2 OR MORE Left 2/22/2019    Procedure: LEFT CERVICAL 4, 5, 6 LAMINOPLASTY;  Surgeon: Liza Cesar MD;  Location: [facility] OR;  Service: Spine   ? UT LAP,CHOLECYSTECTOMY N/A 2/28/2019    CHOLECYSTECTOMY, LAPAROSCOPIC;  Surgeon: Mana Roman MD;  Location: [facility] OR;  Service: General   ? ROTATOR CUFF REPAIR Right    ? TONSILLECTOMY     ? XR MYELOGRAM CERVICAL THORACIC LUMBAR  1/17/2019     Social History:  Social History     Socioeconomic History   ? Marital status: Single     Spouse name: Not on file   ? Number of children: Not on file   ? Years of education: Not on file   ? Highest education level: Not on file   Occupational History   ? Occupation: [employer]     Employer: RETIRED   Social Needs   ?  Financial resource strain: Not on file   ? Food insecurity:     Worry: Not on file     Inability: Not on file   ? Transportation needs:     Medical: Not on file     Non-medical: Not on file   Tobacco Use   ? Smoking status: Former Smoker   ? Smokeless tobacco: Never Used   Substance and Sexual Activity   ? Alcohol use: No   ? Drug use: No   ? Sexual activity: Not on file   Lifestyle   ? Physical activity:     Days per week: Not on file     Minutes per session: Not on file   ? Stress: Not on file   Relationships   ? Social connections:     Talks on phone: Not on file     Gets together: Not on file     Attends Denominational service: Not on file     Active member of club or organization: Not on file     Attends meetings of clubs or organizations: Not on file     Relationship status: Not on file   ? Intimate partner violence:     Fear of current or ex partner: Not on file     Emotionally abused: Not on file     Physically abused: Not on file     Forced sexual activity: Not on file   Other Topics Concern   ? Not on file   Social History Narrative   ? Not on file     Sleep History:  Sleeps with the head of bed up on a 2 x 4.  No daytime sleepiness  Exercise History:  Walks in his building, up and down 3 flights of steps for less than 10 minutes daily.    Review of Systems:   General: WNL  Eyes: WNL  Ears/Nose/Throat: WNL  Lungs: WNL  Heart: Irregular Heartbeat  Stomach: Diarrhea  Bladder: WNL  Muscle/Joints: WNL  Skin: WNL  Nervous System: WNL  Mental Health: WNL     Blood: WNL          Family History:  Family History   Problem Relation Age of Onset   ? Heart disease Mother    ? Stroke Mother    ? Crohn's disease Father    ? Alcohol abuse Brother    ? No Medical Problems Daughter    ? No Medical Problems Son    ? No Medical Problems Maternal Aunt    ? No Medical Problems Maternal Uncle    ? No Medical Problems Paternal Aunt    ? No Medical Problems Paternal Uncle    ? No Medical Problems Maternal Grandmother    ? No Medical  Problems Maternal Grandfather    ? No Medical Problems Paternal Grandmother    ? No Medical Problems Paternal Grandfather    ? Cancer Brother    ? Urolithiasis Neg Hx    ? Gout Neg Hx          Allergies:  No Known Allergies  Medications:  Current Outpatient Medications   Medication Sig Dispense Refill   ? acetaminophen (TYLENOL) 500 MG tablet Take 2 tablets (1,000 mg total) by mouth 3 (three) times a day.  0   ? amLODIPine (NORVASC) 5 MG tablet Take 1 tablet (5 mg total) by mouth daily. 90 tablet 3   ? bumetanide (BUMEX) 1 MG tablet Take 1 tablet (1 mg total) by mouth daily. 90 tablet 3   ? carvedilol (COREG) 25 MG tablet Take 37.5 mg by mouth 2 (two) times a day with meals.     ? cholecalciferol, vitamin D3, 1,000 unit tablet Take 2,000 Units by mouth daily.     ? cyanocobalamin 500 MCG tablet Take 500 mcg by mouth daily.     ? diclofenac sodium (VOLTAREN) 1 % Gel Apply 4 g topically 4 (four) times a day as needed.     ? FERROUS FUMARATE (IRON ORAL) Take 1 tablet by mouth daily.      ? melatonin 3 mg Tab tablet Take 1 tablet (3 mg total) by mouth at bedtime as needed.  0   ? multivitamin with iron (ONE DAILY WITH IRON) Tab tablet Take 1 tablet by mouth daily.      ? omeprazole (PRILOSEC) 20 MG capsule Take 20 mg by mouth daily before breakfast.            ? omeprazole (PRILOSEC) 20 MG capsule TAKE 1 CAPSULE BY MOUTH TWICE DAILY 180 capsule 3   ? potassium chloride (K-DUR,KLOR-CON) 20 MEQ tablet TAKE 1 TABLET(20 MEQ) BY MOUTH DAILY 90 tablet 3   ? rosuvastatin (CRESTOR) 20 MG tablet TAKE 1 TABLET(20 MG) BY MOUTH AT BEDTIME 90 tablet 1   ? senna-docusate (PERICOLACE) 8.6-50 mg tablet Take 1 tablet by mouth daily as needed for constipation.  0   ? warfarin (COUMADIN/JANTOVEN) 2.5 MG tablet Take 1 tablet 2.5 mg daily, repeat INR and adjust dose as directed  0     No current facility-administered medications for this visit.        Objective:   Wt Readings from Last 3 Encounters:   01/08/20 170 lb (77.1 kg)   11/20/19  "175 lb (79.4 kg)   09/24/19 169 lb 14.4 oz (77.1 kg)     Vital signs:  /54 (Patient Site: Left Arm, Patient Position: Sitting, Cuff Size: Adult Regular)   Pulse 64   Resp 16   Ht 5' 9\" (1.753 m)   Wt 170 lb (77.1 kg)   BMI 25.10 kg/m        Physical Exam:    Eyes     Conjunctiva Findings: Noninjected  Sclerae: Clear, nonicteric.    ENT    Mouth: Oral mucuous membranes are pink and moist.    Neck    Carotid pulses: Carotid pulses palpaple with normal contours and symmetric, no bruits.    Jugular Veins: Normal jugular venous pressure.    Thyroid: No visible thyromegaly.    Pulmonary    Examination of lungs: Clear to auscultation, no crackles, or wheezing.    Chest    Examination of Chest incision: Clear, dry and intact.  Pectoral asymmetry  Cardiovascular     The heart rate was normal. Regular S1 and S2 with no murmur or gallop  Pulses: Normal    Extremities: No edema, no clubbing.  Superficial varicosities particularly on the right .  Multiple excoriations over anterior tibia bilaterally.  Pes planus  Gastrointestinal    Bowel sounds were normal. The abdomen was soft and nontender.    Musculoskeletal    Spine: spine straight upon visual inspection.    Skin    Skin and subcutaneous tissue: No xanthelasma.    Neurologic    Orientation to person, place and time: Normal.    Psychiatric    Mood and affect: Normal.     Lab Results:  LIPIDS:  Lab Results   Component Value Date    CHOL 149 10/17/2017    CHOL 170 09/27/2016    CHOL 144 07/27/2015     Lab Results   Component Value Date    HDL 41 10/17/2017    HDL 46 09/27/2016    HDL 33 (L) 07/27/2015     Lab Results   Component Value Date    LDLCALC 96 10/17/2017    LDLCALC 106 09/27/2016    LDLCALC 82 07/27/2015     Lab Results   Component Value Date    TRIG 59 10/17/2017    TRIG 88 09/27/2016    TRIG 144 07/27/2015         Echocardiogram 11/2018:    When compared to the previous study dated 10/13/2017, no significant change.    Normal left ventricular size, wall " thickness and moderate global hypokinesis. Elevated left ventricular filling pressure. Left ventricle ejection fraction is moderately decreased. The estimated left ventricular ejection fraction is 35%.    Normal right ventricular size with moderately reduced right ventricular systolic performance. Pacemaker leads are identified.    Severely enlarged left atrium. Moderately enlarged right atrium.    Mild mitral valve regurgitation. Mild tricuspid valve regurgitation with estimated right ventricular systolic pressure 45 mmHg.      Pharmacologic nuclear stress test 9/2019:    The pharmacologic nuclear stress test is negative for inducible myocardial ischemia or infarction.    The left ventricular ejection fraction is 48%. The following segments are hypokinetic: basal anteroseptal, basal inferoseptal, mid anteroseptal, mid inferoseptal and apical septal.    When compared to the images of 5/13/2016, the ejection fraction has increased from 40% to 48%.      LOUISA BLOCK MD Located within [facility]    [phone]    This note created using Dragon voice recognition software.  Sound alike errors may have escaped editing.

## 2021-06-29 ENCOUNTER — COMMUNICATION - HEALTHEAST (OUTPATIENT)
Dept: ANTICOAGULATION | Facility: CLINIC | Age: 81
End: 2021-06-29

## 2021-06-29 ENCOUNTER — AMBULATORY - HEALTHEAST (OUTPATIENT)
Dept: LAB | Facility: CLINIC | Age: 81
End: 2021-06-29

## 2021-06-29 DIAGNOSIS — I48.91 ATRIAL FIBRILLATION, UNSPECIFIED TYPE (H): ICD-10-CM

## 2021-06-29 LAB — INR PPP: 3.2 (ref 0.9–1.1)

## 2021-06-29 NOTE — PROGRESS NOTES
"Progress Notes by Debra Rowe CNP at 5/6/2020  9:50 AM     Author: Debra Rowe CNP Service: -- Author Type: Nurse Practitioner    Filed: 5/6/2020 12:47 PM Encounter Date: 5/6/2020 Status: Signed    : Debra Rowe CNP (Nurse Practitioner)           The patient has been notified of following:     \"This telephone visit will be conducted via a call between you and your physician/provider. We have found that certain health care needs can be provided without the need for a physical exam.  This service lets us provide the care you need with a phone conversation.  If a prescription is necessary we can send it directly to your pharmacy.  If lab work is needed we can place an order for that and you can then stop by our lab to have the test done at a later time. If during the course of the call the physician/provider feels a telephone visit is not appropriate, you will not be charged for this service.\" Verbal consent has been obtained for this service by care team member:         HEART CARE PHONE ENCOUNTER        The patient has chosen to have the visit conducted as a telephone visit, to reduce risk of exposure given the current status of Coronavirus in our community. This telephone visit is being conducted via a call between the patient and physician/provider. Health care needs are being provided without a physical exam.     Assessment/Recommendations   Assessment:    1. Ischemic cardiomyopathy, heart failure with reduced ejection fraction, ejection fraction 48%: He has no symptoms of acute heart failure.  Device check in April showed normal OptiVol.     2.  Chronic atrial fibrillation: He will have an INR checked on May 19.    3.  Chronic kidney disease: Labs in February 2020 with stable creatinine of 1.62.    Plan:  1.  Continue current medications  2.  Continue low-sodium diet and daily weights      Follow Up Plan: Follow up with me in 6 months.  I have reviewed the note as " documented.  This accurately captures the substance of my conversation with the patient.    Total time of call between patient and provider was 5 minutes   Start Time: 10:01   Stop Time: 10:06       History of Present Illness/Subjective    Tanmay Israel is a 80 y.o. male who is being evaluated via a billable telephone visit.  He has a history of ischemic cardiomyopathy, heart failure with reduced ejection fraction, hypertension, coronary artery bypass surgery, and permanent atrial fibrillation.  Echocardiogram from November 2018 showed ejection fraction of 35%, elevated LV filling pressures, moderately decreased right ventricular function, and mild mitral regurgitation.  Stress test on September 27, 2019 was negative for ischemia or infarction and showed ejection fraction of 48%.    Tanmay continues to do well with no symptoms of acute heart failure.  He denies lightheadedness, shortness of breath, dyspnea on exertion, orthopnea, chest pain and lower extremity edema.  He has chronic hip pain.  Weight has been stable around 162 pounds.  He continues to follow a low-sodium diet.    ECHO:   Results for orders placed during the hospital encounter of 11/12/18   Echo Complete [ECH10] 11/12/2018    Narrative   When compared to the previous study dated 10/13/2017, no significant   change.    Normal left ventricular size, wall thickness and moderate global   hypokinesis. Elevated left ventricular filling pressure. Left ventricle   ejection fraction is moderately decreased. The estimated left ventricular   ejection fraction is 35%.    Normal right ventricular size with moderately reduced right ventricular   systolic performance. Pacemaker leads are identified.    Severely enlarged left atrium. Moderately enlarged right atrium.    Mild mitral valve regurgitation. Mild tricuspid valve regurgitation with   estimated right ventricular systolic pressure 45 mmHg.            I have reviewed and updated the patient's Past Medical  History, Social History, Family History and Medication List.     Physical Examination not performed given phone encounter Review of Systems                                                Medical History  Surgical History Family History Social History   Past Medical History:   Diagnosis Date   ? Acute cholecystitis 2/28/2019   ? Acute post-operative pain    ? Anemia    ? Arthritis    ? Atrial fibrillation (H)    ? C. difficile diarrhea 4/21/2019   ? Calculus of ureter    ? Callus    ? Cerebral aneurysm    ? Cervical myelopathy (H) 2/22/2019   ? Cervical spinal stenosis    ? Chronic kidney disease     stage 3   ? Chronic systolic congestive heart failure (H)    ? Closed fracture of distal end of left radius, unspecified fracture morphology, initial encounter    ? Closed fracture of distal end of right radius, unspecified fracture morphology, initial encounter    ? Closed fracture of first thoracic vertebra, unspecified fracture morphology, initial encounter (H)    ? Coronary artery disease    ? Crohn's disease (H)    ? Dysphagia    ? Fall 10/22/2016   ? GERD (gastroesophageal reflux disease)    ? Hyperlipidemia    ? Hypertension    ? Hypotension, unspecified hypotension type    ? ICD (implantable cardioverter-defibrillator) in place     Medtronic   ? Ischemic cardiomyopathy    ? Kidney stone    ? Low back pain    ? Lumbago     Created by Conversion    ? Macular degeneration    ? Muscle Aches, Generalized (Myalgias)     Created by Conversion    ? Permanent atrial fibrillation     [id number] risk score = 5 (age1/ CAD/ HTN/ CHF) Chronic warfarin   ? Personal history of colonic polyps 2/12/2019   ? Polyp of duodenum 10/17/2016   ? Pyuria    ? Right arm weakness 4/2/2019   ? Right Rotator Cuff Tendonitis     Created by Conversion  Replacement Utility updated for latest IMO load   ? Schatzki's ring    ? Sciatica     Created by Conversion    ? Serum Enzyme Levels - ALT (SGPT) Elevated     Created by Conversion    ? Sleep  apnea     no CPAP   ? Spondylolisthesis of lumbar region 7/5/2016   ? Weakness 4/20/2019    Past Surgical History:   Procedure Laterality Date   ? APPENDECTOMY     ? CARDIAC DEFIBRILLATOR PLACEMENT  2013    ICD Medtronic   ? CATARACT EXTRACTION W/  INTRAOCULAR LENS IMPLANT Bilateral    ? CHOLECYSTECTOMY     ? COLONOSCOPY N/A 2/19/2020    Procedure: COLONOSCOPY;  Surgeon: Houston Medina MD;  Location: [facility];  Service: Gastroenterology   ? CORONARY ARTERY BYPASS GRAFT  08/2004    Coronary Artery Quadruple Venous Bypass Graft   ? ESOPHAGOGASTRODUODENOSCOPY     ? NJ C- LAMINOPLASTY, 2 OR MORE Left 2/22/2019    Procedure: LEFT CERVICAL 4, 5, 6 LAMINOPLASTY;  Surgeon: Liza Cesar MD;  Location: [facility] OR;  Service: Spine   ? NJ LAP,CHOLECYSTECTOMY N/A 2/28/2019    CHOLECYSTECTOMY, LAPAROSCOPIC;  Surgeon: Mana Roman MD;  Location: [facility] OR;  Service: General   ? ROTATOR CUFF REPAIR Right    ? TONSILLECTOMY     ? XR MYELOGRAM CERVICAL THORACIC LUMBAR  1/17/2019    Family History   Problem Relation Age of Onset   ? Heart disease Mother    ? Stroke Mother    ? Crohn's disease Father    ? Alcohol abuse Brother    ? No Medical Problems Daughter    ? No Medical Problems Son    ? No Medical Problems Maternal Aunt    ? No Medical Problems Maternal Uncle    ? No Medical Problems Paternal Aunt    ? No Medical Problems Paternal Uncle    ? No Medical Problems Maternal Grandmother    ? No Medical Problems Maternal Grandfather    ? No Medical Problems Paternal Grandmother    ? No Medical Problems Paternal Grandfather    ? Cancer Brother    ? Urolithiasis Neg Hx    ? Gout Neg Hx     Social History     Socioeconomic History   ? Marital status: Single     Spouse name: Not on file   ? Number of children: Not on file   ? Years of education: Not on file   ? Highest education level: Not on file   Occupational History   ? Occupation: yang     Employer: RETIRED   Social Needs   ? Financial  resource strain: Not on file   ? Food insecurity     Worry: Not on file     Inability: Not on file   ? Transportation needs     Medical: Not on file     Non-medical: Not on file   Tobacco Use   ? Smoking status: Former Smoker   ? Smokeless tobacco: Never Used   Substance and Sexual Activity   ? Alcohol use: No     Comment: rarely   ? Drug use: No   ? Sexual activity: Not on file   Lifestyle   ? Physical activity     Days per week: Not on file     Minutes per session: Not on file   ? Stress: Not on file   Relationships   ? Social connections     Talks on phone: Not on file     Gets together: Not on file     Attends Taoism service: Not on file     Active member of club or organization: Not on file     Attends meetings of clubs or organizations: Not on file     Relationship status: Not on file   ? Intimate partner violence     Fear of current or ex partner: Not on file     Emotionally abused: Not on file     Physically abused: Not on file     Forced sexual activity: Not on file   Other Topics Concern   ? Not on file   Social History Narrative   ? Not on file          Medications  Allergies   Current Outpatient Medications   Medication Sig Dispense Refill   ? acetaminophen (TYLENOL) 500 MG tablet Take 2 tablets (1,000 mg total) by mouth 3 (three) times a day.  0   ? amLODIPine (NORVASC) 5 MG tablet Take 1 tablet (5 mg total) by mouth daily. 90 tablet 3   ? bumetanide (BUMEX) 1 MG tablet Take 1 tablet (1 mg total) by mouth daily. 90 tablet 3   ? carvediloL (COREG) 25 MG tablet Take 1.5 tablets (37.5 mg total) by mouth 2 (two) times a day with meals. 270 tablet 1   ? cholecalciferol, vitamin D3, 1,000 unit tablet Take 2,000 Units by mouth daily.     ? cyanocobalamin 500 MCG tablet Take 500 mcg by mouth daily.     ? diclofenac sodium (VOLTAREN) 1 % Gel APPLY 2 TO 4 GRAMS TOPICALLY THREE TIMES DAILY 100 g 0   ? ferrous sulfate 325 (65 FE) MG tablet Take 1 tablet by mouth daily.     ? melatonin 3 mg Tab tablet Take 1  tablet (3 mg total) by mouth at bedtime as needed.  0   ? multivitamin with iron (ONE DAILY WITH IRON) Tab tablet Take 1 tablet by mouth daily.      ? omeprazole (PRILOSEC) 20 MG capsule TAKE 1 CAPSULE BY MOUTH TWICE DAILY (Patient taking differently: Take 20 mg by mouth daily. ) 180 capsule 3   ? ondansetron (ZOFRAN) 8 MG tablet Take 1 tablet (8 mg total) by mouth every 8 (eight) hours as needed. 30 tablet 0   ? potassium chloride (K-DUR,KLOR-CON) 20 MEQ tablet TAKE 1 TABLET(20 MEQ) BY MOUTH DAILY 90 tablet 3   ? rosuvastatin (CRESTOR) 20 MG tablet Take 1 tablet (20 mg total) by mouth at bedtime. 90 tablet 1   ? warfarin ANTICOAGULANT (COUMADIN/JANTOVEN) 2.5 MG tablet Take 1 to 1.5 tablets (2.5 to 3.75 mg) by mouth daily. Adjust dose per INR results as instructed. 100 tablet 1     No current facility-administered medications for this visit.     No Known Allergies      Lab Results    Chemistry/lipid CBC Cardiac Enzymes/BNP/TSH/INR   Lab Results   Component Value Date    CHOL 149 10/17/2017    HDL 41 10/17/2017    LDLCALC 96 10/17/2017    TRIG 59 10/17/2017    CREATININE 1.62 (H) 02/07/2020    BUN 20 02/07/2020    K 4.1 02/07/2020     02/07/2020     02/07/2020    CO2 27 02/07/2020    Lab Results   Component Value Date    WBC 8.0 02/07/2020    HGB 9.7 (L) 02/07/2020    HCT 29.0 (L) 02/07/2020    MCV 85 02/07/2020     02/07/2020    Lab Results   Component Value Date    CKTOTAL 40 06/19/2019    CKMB 1 01/31/2013    TROPONINI 0.02 04/19/2019     (H) 05/24/2019    TSH 1.22 04/19/2019    INR 2.80 (H) 04/08/2020

## 2021-06-30 ENCOUNTER — OFFICE VISIT (OUTPATIENT)
Dept: PHYSICAL MEDICINE AND REHAB | Facility: CLINIC | Age: 81
End: 2021-06-30
Payer: COMMERCIAL

## 2021-06-30 VITALS — DIASTOLIC BLOOD PRESSURE: 75 MMHG | OXYGEN SATURATION: 98 % | SYSTOLIC BLOOD PRESSURE: 132 MMHG | HEART RATE: 90 BPM

## 2021-06-30 DIAGNOSIS — G57.01 PIRIFORMIS SYNDROME, RIGHT: Primary | ICD-10-CM

## 2021-06-30 DIAGNOSIS — M79.18 PIRIFORMIS MUSCLE PAIN: ICD-10-CM

## 2021-06-30 DIAGNOSIS — M53.3 SACROILIAC JOINT DYSFUNCTION: ICD-10-CM

## 2021-06-30 DIAGNOSIS — M48.07 NEURAL FORAMINAL STENOSIS OF LUMBOSACRAL SPINE: ICD-10-CM

## 2021-06-30 DIAGNOSIS — Z79.01 WARFARIN ANTICOAGULATION: ICD-10-CM

## 2021-06-30 PROCEDURE — 99214 OFFICE O/P EST MOD 30 MIN: CPT | Performed by: PHYSICAL MEDICINE & REHABILITATION

## 2021-06-30 RX ORDER — CYCLOBENZAPRINE HCL 5 MG
5-10 TABLET ORAL 2 TIMES DAILY PRN
Qty: 60 TABLET | Refills: 1 | Status: SHIPPED | OUTPATIENT
Start: 2021-06-30 | End: 2021-07-13

## 2021-06-30 ASSESSMENT — PAIN SCALES - GENERAL: PAINLEVEL: SEVERE PAIN (6)

## 2021-06-30 NOTE — LETTER
2021       RE: Tanmay Israel  [address]  [facility] [address]     Dear Colleague,    Thank you for referring your patient, Tanmay Israel, to the [facility] PHYSICAL [facility]INE AND REH[facility]ILIT[facility] CLINIC [place] at [facility]. Please see a copy of my visit note below.    PM&R Follow-Up Visit -     Date of Initial Visit: 2020  LOV: 2021  TD: 2021     Recall: Tanmay Israel is a 81 year old male with CAD/ICM s/p ICD/PPM on warfarin, s/p L5 lumbar laminectomy who follows up for right gluteal and lumbar radicular pain.     INTERVAL HISTORY:  Patient was last seen in clinic May 26, 2021. At that visit, he underwent US-guided right piriformis muscle injection. He returns today for post-procedure follow-up.     Since last time, he reports 50 to 60% improvement in his pain and symptoms.  He is quite pleased with his response.  Unfortunately, he states that he was hospitalized with pneumonia and underwent a procedure for esophageal stricture.  He states that following the hospitalization, he has been fatigued and feeling overall weaker.    He was able to go out to the driving range yesterday with minimal pain, particularly today.  He has also been taking Flexeril 5 to 10 mg as needed which has been helpful.      PRIOR INJURIES/TREATMENT:   Ice/Heat: some help  Brace: none  Physical Therapy:   Feels that it was not helpful in the past      - Current Pain Medications -   Flexeril 5-10mg at daily PRN    - Prior/Trialed Pain Medications -   Aleve/Tylenol OTC    Prior Procedures:  Prior SI joint injection got 30% relief       Date      Procedure     Improvement (%)  21   R Piriformis mm (US)   50-60%    Prior Related Surgery: prior history of L5 laminectomy, but prior to this pain.   Other (acupuncture, OMT, CMM, TENS, DME, etc.): none    Specialists Seen:   1. Orthopedic spine surgery - Dr. Houston Durán     IMAGIN/3/2020 XR Spine     Findings:   Outlet and lateral  view(s) of the pelvis were obtained.    No acute osseous abnormality.   Extensive enthesopathic changes of innominate bones and trochanters.   Bones appear osteopenic.     Mild to moderate vertebral body height loss of L4 and L5, similar to prior. 8 mm anterolisthesis of L4 on L5 with moderate to severe disc space loss posteriorly. 6 mm retrolisthesis L3 on L4.     Vascular calcifications.     Degenerative changes of bilateral hips, greater on the right which is moderate to severe. Radiodensity right lower quadrant, presumably ingested material.                                                                   IMPRESSION:  1. Osteopenia.  2. 8 mm anterolisthesis of L4 on L5 with moderate to severe disc space  loss posteriorly.    Medical History:  History of CAD, atrial fibrillation, ischemic cardiomyopathy status post ICD/PPM on warfarin, status post lumbar laminectomy and facetectomy, rotator cuff disease, GERD, rotator cuff arthropathy, iron deficiency anemia, hyperlipidemia, benign essential hypertension, history of tonsillectomy, left cervical 4, 5, 6 laminoplasty-surgeon Dr. Liza Cesar at [facility]    Family History  His family history includes alcohol abuse in his brother cancer in his brother, Crohn's disease in his father, stroke in his mother, gout and his son    Social History:  Work: retired  History of any legal related pain issues: none  Current living situation: independent apartment  Smoking: none   rare EtOH, none Illicit      Current Medications:   He has a current medication list which includes the following prescription(s): acetaminophen, amlodipine, bextra, bumetanide, carvedilol, cholecalciferol, cyclobenzaprine, cyclobenzaprine, cyclobenzaprine, diclofenac, ferrous sulfate, melatonin, multivitamin w/minerals, omeprazole, ondansetron, potassium chloride er, rosuvastatin, vitamin b-12, and warfarin anticoagulant.       Allergies:   No  Known Allergies    PHYSICAL EXAMINATION:  /75   Pulse 90   SpO2 98%    General: Pleasant, straightforward, WDWN individual.  Mental Status: Pleasant, direct, appropriate mood and affect  Resp: breathing is unlabored without audible wheeze  Vascular: no visbile cyanosis, no venous stasis changes  Heme: no visible ecchymosis or erythema on extremities  Skin: No notable rash     Neurologic:  Strength: All major muscle groups of the bilateral lower extremities have normal and symmetric muscle strength       Musculoskeletal:   Tender over R GMax and Piriformis and with piriformis stretch     ASSESSMENT:  Tanmay Israel is a very pleasant 81 year old gentleman who presents with:    #. Piriformis syndrome, right  (primary encounter diagnosis)  #. Piriformis muscle pain  #. Neural foraminal stenosis of lumbosacral spine  #. Sacroiliac joint dysfunction    Complicating co-morbidities include:   #. CAD/ICM s/p ICD/PPM on systemic warfarin anticoagulation      PLAN:  - Refill flexeril today  - Can repeat sonographically-guided right piriformis muscle injection with corticosteroid v submit for Botox. Repeating corticosteroid injections could be considered, not exceeding 3 injections into anyone area in a calender year.   - RTC x2 months.       Ready to learn, no apparent learning barriers.  Education provided on treatment plan according to patient's preferred learning style.  Patient verbalizes understanding.      ________________________________   Leonardo Smart MD  Department of Physical Medicine & Rehabilitation            Again, thank you for allowing me to participate in the care of your patient.      Sincerely,    Leonardo Smart MD

## 2021-06-30 NOTE — PROGRESS NOTES
PM&R Follow-Up Visit -     Date of Initial Visit: 2020  LOV: 2021  TD: 2021     Recall: Tanmay Israel is a 81 year old male with CAD/ICM s/p ICD/PPM on warfarin, s/p L5 lumbar laminectomy who follows up for right gluteal and lumbar radicular pain.     INTERVAL HISTORY:  Patient was last seen in clinic May 26, 2021. At that visit, he underwent US-guided right piriformis muscle injection. He returns today for post-procedure follow-up.     Since last time, he reports 50 to 60% improvement in his pain and symptoms.  He is quite pleased with his response.  Unfortunately, he states that he was hospitalized with pneumonia and underwent a procedure for esophageal stricture.  He states that following the hospitalization, he has been fatigued and feeling overall weaker.    He was able to go out to the driving range yesterday with minimal pain, particularly today.  He has also been taking Flexeril 5 to 10 mg as needed which has been helpful.      PRIOR INJURIES/TREATMENT:   Ice/Heat: some help  Brace: none  Physical Therapy:   Feels that it was not helpful in the past      - Current Pain Medications -   Flexeril 5-10mg at daily PRN    - Prior/Trialed Pain Medications -   Aleve/Tylenol OTC    Prior Procedures:  Prior SI joint injection got 30% relief       Date      Procedure     Improvement (%)  21   R Piriformis mm (US)   50-60%    Prior Related Surgery: prior history of L5 laminectomy, but prior to this pain.   Other (acupuncture, OMT, CMM, TENS, DME, etc.): none    Specialists Seen:   1. Orthopedic spine surgery - Dr. Houston Durán     IMAGIN/3/2020 XR Spine    Findings:   Outlet and lateral  view(s) of the pelvis were obtained.    No acute osseous abnormality.   Extensive enthesopathic changes of innominate bones and trochanters.   Bones appear osteopenic.     Mild to moderate vertebral body height loss of L4 and L5, similar to prior. 8 mm anterolisthesis of L4 on L5 with moderate to severe disc  space loss posteriorly. 6 mm retrolisthesis L3 on L4.     Vascular calcifications.     Degenerative changes of bilateral hips, greater on the right which is moderate to severe. Radiodensity right lower quadrant, presumably ingested material.                                                                   IMPRESSION:  1. Osteopenia.  2. 8 mm anterolisthesis of L4 on L5 with moderate to severe disc space  loss posteriorly.    Medical History:  History of CAD, atrial fibrillation, ischemic cardiomyopathy status post ICD/PPM on warfarin, status post lumbar laminectomy and facetectomy, rotator cuff disease, GERD, rotator cuff arthropathy, iron deficiency anemia, hyperlipidemia, benign essential hypertension, history of tonsillectomy, left cervical 4, 5, 6 laminoplasty-surgeon Dr. Liza Cesar at [facility]    Family History  His family history includes alcohol abuse in his brother cancer in his brother, Crohn's disease in his father, stroke in his mother, gout and his son    Social History:  Work: retired  History of any legal related pain issues: none  Current living situation: independent apartment  Smoking: none   rare EtOH, none Illicit      Current Medications:   He has a current medication list which includes the following prescription(s): acetaminophen, amlodipine, bextra, bumetanide, carvedilol, cholecalciferol, cyclobenzaprine, cyclobenzaprine, cyclobenzaprine, diclofenac, ferrous sulfate, melatonin, multivitamin w/minerals, omeprazole, ondansetron, potassium chloride er, rosuvastatin, vitamin b-12, and warfarin anticoagulant.       Allergies:   No Known Allergies    PHYSICAL EXAMINATION:  /75   Pulse 90   SpO2 98%    General: Pleasant, straightforward, WDWN individual.  Mental Status: Pleasant, direct, appropriate mood and affect  Resp: breathing is unlabored without audible wheeze  Vascular: no visbile cyanosis, no venous stasis changes  Heme: no visible ecchymosis or erythema  on extremities  Skin: No notable rash     Neurologic:  Strength: All major muscle groups of the bilateral lower extremities have normal and symmetric muscle strength       Musculoskeletal:   Tender over R GMax and Piriformis and with piriformis stretch     ASSESSMENT:  Tanmay Israel is a very pleasant 81 year old gentleman who presents with:    #. Piriformis syndrome, right  (primary encounter diagnosis)  #. Piriformis muscle pain  #. Neural foraminal stenosis of lumbosacral spine  #. Sacroiliac joint dysfunction    Complicating co-morbidities include:   #. CAD/ICM s/p ICD/PPM on systemic warfarin anticoagulation      PLAN:  - Refill flexeril today  - Can repeat sonographically-guided right piriformis muscle injection with corticosteroid v submit for Botox. Repeating corticosteroid injections could be considered, not exceeding 3 injections into anyone area in a calender year.   - RTC x2 months.       Ready to learn, no apparent learning barriers.  Education provided on treatment plan according to patient's preferred learning style.  Patient verbalizes understanding.      ________________________________   Leonardo Smart MD  Department of Physical Medicine & Rehabilitation

## 2021-06-30 NOTE — PROGRESS NOTES
Progress Notes by Bryan Baker MD at 4/16/2021  8:30 AM     Author: Bryan Baker MD Service: -- Author Type: Physician    Filed: 4/16/2021  9:48 AM Encounter Date: 4/16/2021 Status: Signed    : Bryan Baker MD (Physician)       Heart Care Office Note    Assessment / Plan:    1.  Permanent atrial fibrillation.  Rate control adequate.  2.  Ischemic cardiomyopathy, well compensated with left ventricular ejection fraction 48%.  3.  Coronary artery disease.  Stable with no anginal symptoms.  4.  Essential hypertension.  Second consecutive check with systolic greater than 130.  We will add low-dose lisinopril 2.5 mg daily to his current medical regimen.  Basic metabolic profile in 1 week  5.  Sternal wound.  Nontender.  Advised bacitracin and covering it for a week.  If persistent, may warrant surgical evaluation as a broken sternal wire could be contributing to this wound    Follow-up 1 year    ______________________________________________________________________    Subjective:    I had the opportunity to see Tanmay Israel at the [facility]. Tanmay Israel is a 80 y.o. male with a history of atrial fibrillation, coronary artery disease status post bypass revascularization , and ischemic cardiomyopathy who returns for routine follow-up.  He does have chronic kidney disease stage III.  Pharmacologic nuclear stress testing 2019 showed no evidence of ischemia or infarction and an improvement in his ejection fraction up to 48%.  He returns today for routine follow-up.    Over the last year, he has felt well.  He is avoided contact outside his home due to Covid but has had his vaccination.  He does walk half a mile daily and feels that his stamina is stable.  He has not been aware of any palpitations.  Device checks have been normal.  He has not experienced any peripheral edema or palpitations.  He does not check his blood pressure.      Since I saw him 4 months ago, his activity  tolerance is improved.  He has had no lower extremity edema.  He is walking in his apartment building up and down 3 flights of steps without difficulties.  He does note that he is cold when he visits friends homes, but this is minimally bothersome.  He is sleeping well and otherwise offers no complaints.      ______________________________________________________________________    Problem List:  Patient Active Problem List   Diagnosis   ? Osteoarthritis Of The Knee   ? Esophageal Reflux   ? Obstructive sleep apnea   ? Ptosis Of Eyelid   ? Hyperlipidemia   ? Anemia   ? Crohn's (Granulomatous) Colitis   ? Benign Essential Hypertension   ? Coronary atherosclerosis of native coronary artery   ? Ischemic cardiomyopathy   ? Paroxysmal ventricular tachycardia (H)   ? Dry Nonexudative Macular Degeneration   ? Serum Enzyme Levels - Alkaline Phosphatase Elevated   ? Dysphagia   ? ICD (implantable cardioverter-defibrillator), single, in situ   ? Lumbar stenosis with neurogenic claudication   ? Sacroiliac joint dysfunction of right side   ? Stage 3 chronic kidney disease   ? Warfarin-induced coagulopathy (H)   ? A-fib (H)   ? Anemia, unspecified type   ? Cerebral aneurysm   ? Osteoporosis   ? Ileitis   ? Heart failure with reduced ejection fraction (H)   ? Diaphragmatic hernia   ? Pharyngoesophageal dysphagia   ? History of Crohn's disease   ? Iron deficiency anemia   ? Polyp of stomach and duodenum   ? Reflux esophagitis   ? Rotator cuff tear arthropathy of right shoulder   ? Schatzki's ring   ? Diverticulosis of colon   ? Flatulence, eructation and gas pain   ? S/P cervical spinal fusion   ? S/P laparoscopic cholecystectomy   ? Hypomagnesemia   ? Acute renal failure (ARF) (H)   ? Dizziness   ? Polyp of colon   ? ICD (implantable cardioverter-defibrillator) in place     Medical History:  Past Medical History:   Diagnosis Date   ? Acute cholecystitis 2/28/2019   ? Acute post-operative pain    ? Anemia    ? Arthritis    ?  Atrial fibrillation (H)    ? C. difficile diarrhea 4/21/2019   ? Calculus of ureter    ? Callus    ? Cerebral aneurysm    ? Cervical myelopathy (H) 2/22/2019   ? Cervical spinal stenosis    ? Chronic kidney disease     stage 3   ? Chronic systolic congestive heart failure (H)    ? Closed fracture of distal end of left radius, unspecified fracture morphology, initial encounter    ? Closed fracture of distal end of right radius, unspecified fracture morphology, initial encounter    ? Closed fracture of first thoracic vertebra, unspecified fracture morphology, initial encounter (H)    ? Coronary artery disease    ? Crohn's disease (H)    ? Dysphagia    ? Fall 10/22/2016   ? GERD (gastroesophageal reflux disease)    ? Hyperlipidemia    ? Hypertension    ? Hypotension, unspecified hypotension type    ? ICD (implantable cardioverter-defibrillator) in place     Medtronic   ? Ischemic cardiomyopathy    ? Kidney stone    ? Low back pain    ? Lumbago     Created by Conversion    ? Macular degeneration    ? Muscle Aches, Generalized (Myalgias)     Created by Conversion    ? Permanent atrial fibrillation (H)     [id number] risk score = 5 (age1/ CAD/ HTN/ CHF) Chronic warfarin   ? Personal history of colonic polyps 2/12/2019   ? Polyp of duodenum 10/17/2016   ? Pyuria    ? Right arm weakness 4/2/2019   ? Right Rotator Cuff Tendonitis     Created by Conversion  Replacement Utility updated for latest IMO load   ? Schatzki's ring    ? Sciatica     Created by Conversion    ? Serum Enzyme Levels - ALT (SGPT) Elevated     Created by Conversion    ? Sleep apnea     no CPAP   ? Spondylolisthesis of lumbar region 7/5/2016   ? Weakness 4/20/2019     Surgical History:  Past Surgical History:   Procedure Laterality Date   ? APPENDECTOMY     ? CARDIAC DEFIBRILLATOR PLACEMENT  2013    ICD Medtronic   ? CATARACT EXTRACTION W/  INTRAOCULAR LENS IMPLANT Bilateral    ? CHOLECYSTECTOMY     ? COLONOSCOPY N/A 2/19/2020    Procedure: COLONOSCOPY;   Surgeon: Houston Medina MD;  Location: [facility] OR;  Service: Gastroenterology   ? CORONARY ARTERY BYPASS GRAFT  08/2004    Coronary Artery Quadruple Venous Bypass Graft   ? ESOPHAGOGASTRODUODENOSCOPY     ? [place] C- LAMINOPLASTY, 2 OR MORE Left 2/22/2019    Procedure: LEFT CERVICAL 4, 5, 6 LAMINOPLASTY;  Surgeon: Liza Cesar MD;  Location: [facility] Main OR;  Service: Spine   ? NY LAP,CHOLECYSTECTOMY N/A 2/28/2019    CHOLECYSTECTOMY, LAPAROSCOPIC;  Surgeon: Mana Roman MD;  Location: [facility] Main OR;  Service: General   ? ROTATOR CUFF REPAIR Right    ? TONSILLECTOMY     ? XR MYELOGRAM CERVICAL THORACIC LUMBAR  1/17/2019     Social History:  Social History     Socioeconomic History   ? Marital status: Single     Spouse name: Not on file   ? Number of children: Not on file   ? Years of education: Not on file   ? Highest education level: Not on file   Occupational History   ? Occupation: yang     Employer: RETIRED   Social Needs   ? Financial resource strain: Not on file   ? Food insecurity     Worry: Not on file     Inability: Not on file   ? Transportation needs     Medical: Not on file     Non-medical: Not on file   Tobacco Use   ? Smoking status: Former Smoker   ? Smokeless tobacco: Never Used   Substance and Sexual Activity   ? Alcohol use: No     Comment: rarely   ? Drug use: No   ? Sexual activity: Not on file   Lifestyle   ? Physical activity     Days per week: Not on file     Minutes per session: Not on file   ? Stress: Not on file   Relationships   ? Social connections     Talks on phone: Not on file     Gets together: Not on file     Attends Jewish service: Not on file     Active member of club or organization: Not on file     Attends meetings of clubs or organizations: Not on file     Relationship status: Not on file   ? Intimate partner violence     Fear of current or ex partner: Not on file     Emotionally abused: Not on file     Physically abused: Not on file     Forced  sexual activity: Not on file   Other Topics Concern   ? Not on file   Social History Narrative   ? Not on file   Retired [employer]      Sleep History:  Sleeps with the head of bed up on a 2 x 4.  Does nap occasionally, but otherwise denies daytime sleepiness  Exercise History:  Walks in his building, or 1/2 mile daily outside.    Review of Systems:   General: WNL  Eyes: Visual Distubance  Ears/Nose/Throat: WNL  Lungs: WNL  Heart: Irregular Heartbeat  Stomach: WNL  Bladder: WNL  Muscle/Joints: Muscle Weakness  Skin: Rash  Nervous System: WNL  Mental Health: WNL     Blood: WNL          Family History:  Family History   Problem Relation Age of Onset   ? Heart disease Mother    ? Stroke Mother    ? Crohn's disease Father    ? Alcohol abuse Brother    ? No Medical Problems Daughter    ? No Medical Problems Son    ? No Medical Problems Maternal Aunt    ? No Medical Problems Maternal Uncle    ? No Medical Problems Paternal Aunt    ? No Medical Problems Paternal Uncle    ? No Medical Problems Maternal Grandmother    ? No Medical Problems Maternal Grandfather    ? No Medical Problems Paternal Grandmother    ? No Medical Problems Paternal Grandfather    ? Cancer Brother    ? Urolithiasis Neg Hx    ? Gout Neg Hx          Allergies:  No Known Allergies  Medications:  Current Outpatient Medications   Medication Sig Dispense Refill   ? acetaminophen (TYLENOL) 500 MG tablet Take 2 tablets (1,000 mg total) by mouth 3 (three) times a day.  0   ? amLODIPine (NORVASC) 5 MG tablet Take 1 tablet (5 mg total) by mouth daily. 90 tablet 3   ? bumetanide (BUMEX) 1 MG tablet TAKE ONE TABLET (1MG) BY MOUTH ONCE DAILY 90 tablet 0   ? carvediloL (COREG) 25 MG tablet TAKE ONE AND ONE-HALF TABLETS (37.5MG) BY MOUTH TWICE DAILY WITH MEALS 270 tablet 1   ? cholecalciferol, vitamin D3, 1,000 unit tablet Take 2,000 Units by mouth daily.     ? cyanocobalamin 500 MCG tablet Take 500 mcg by mouth daily.     ? cyclobenzaprine (FLEXERIL) 10 MG tablet Take 10  "mg by mouth as needed for muscle spasms.     ? diclofenac sodium (VOLTAREN) 1 % Gel APPLY 2 TO 4 GRAMS TOPICALLY THREE TIMES DAILY 100 g 0   ? erythromycin ophthalmic ointment Administer 1 application to both eyes 2 (two) times a day.     ? ferrous sulfate 325 (65 FE) MG tablet Take 1 tablet by mouth daily.     ? melatonin 3 mg Tab tablet Take 1 tablet (3 mg total) by mouth at bedtime as needed.  0   ? multivitamin with iron (ONE DAILY WITH IRON) Tab tablet Take 1 tablet by mouth daily.      ? neomycin-polymyxin-dexamethamethasone (POLYDEX) 3.5 mg/g-10,000 unit/g-0.1 % Oint Administer 1 application to both eyes 2 (two) times a day.     ? omeprazole (PRILOSEC) 20 MG capsule Take 1 capsule (20 mg total) by mouth daily before breakfast. 90 capsule 3   ? ondansetron (ZOFRAN) 8 MG tablet Take 1 tablet (8 mg total) by mouth every 8 (eight) hours as needed. 30 tablet 0   ? potassium chloride (K-DUR,KLOR-CON) 20 MEQ tablet TAKE ONE TABLET BY MOUTH ONCE DAILY 90 tablet 3   ? RESTASIS 0.05 % ophthalmic emulsion Administer 1 drop to both eyes every 12 (twelve) hours.      ? rosuvastatin (CRESTOR) 20 MG tablet TAKE 1 TABLET BY MOUTH DAILY AT BEDTIME 90 tablet 1   ? warfarin ANTICOAGULANT (COUMADIN/JANTOVEN) 2.5 MG tablet Take 1-1.5 tablets (2.5-3.75 mg) by mouth daily. Adjust dose per INR as directed. 90 tablet 1     No current facility-administered medications for this visit.        Objective:   Wt Readings from Last 3 Encounters:   04/16/21 171 lb (77.6 kg)   11/25/20 172 lb (78 kg)   09/08/20 173 lb (78.5 kg)     Vital signs:  /52 (Patient Site: Right Arm, Patient Position: Sitting, Cuff Size: Adult Regular)   Pulse (!) 58   Resp 16   Ht 5' 7\" (1.702 m)   Wt 171 lb (77.6 kg) Comment: With shoes.  SpO2 98%   BMI 26.78 kg/m        Physical Exam:    Eyes     Conjunctiva Findings: Noninjected  Sclerae: Clear, nonicteric.    ENT    Mouth: Oral mucuous membranes are pink and moist.    Neck    Carotid pulses: Carotid " pulses palpaple with normal contours and symmetric, no bruits.    Jugular Veins: Normal jugular venous pressure.    Thyroid: No visible thyromegaly.    Pulmonary    Examination of lungs: Clear to auscultation, no crackles, or wheezing.    Chest    Examination of Chest incision:  Small ulceration over incision without erythema or tenderness.  Pectoral asymmetry  Cardiovascular     The heart rate was normal.  Irregular S1 and S2 with no murmur or gallop  Pulses: Normal    Extremities: No edema, no clubbing.  Superficial varicosities particularly on the right .  Pes planus  Gastrointestinal    Bowel sounds were normal. The abdomen was soft and nontender.    Musculoskeletal    Spine: spine straight upon visual inspection.    Skin    Skin and subcutaneous tissue: No xanthelasma.    Neurologic    Orientation to person, place and time: Normal.    Psychiatric    Mood and affect: Normal.     Lab Results:  LIPIDS:  Lab Results   Component Value Date    CHOL 99 05/19/2020    CHOL 149 10/17/2017    CHOL 170 09/27/2016     Lab Results   Component Value Date    HDL 36 (L) 05/19/2020    HDL 41 10/17/2017    HDL 46 09/27/2016     Lab Results   Component Value Date    LDLCALC 47 05/19/2020    LDLCALC 96 10/17/2017    LDLCALC 106 09/27/2016     Lab Results   Component Value Date    TRIG 81 05/19/2020    TRIG 59 10/17/2017    TRIG 88 09/27/2016         Echocardiogram 11/2018:    When compared to the previous study dated 10/13/2017, no significant change.    Normal left ventricular size, wall thickness and moderate global hypokinesis. Elevated left ventricular filling pressure. Left ventricle ejection fraction is moderately decreased. The estimated left ventricular ejection fraction is 35%.    Normal right ventricular size with moderately reduced right ventricular systolic performance. Pacemaker leads are identified.    Severely enlarged left atrium. Moderately enlarged right atrium.    Mild mitral valve regurgitation. Mild tricuspid  valve regurgitation with estimated right ventricular systolic pressure 45 mmHg.      Pharmacologic nuclear stress test 9/2019:    The pharmacologic nuclear stress test is negative for inducible myocardial ischemia or infarction.    The left ventricular ejection fraction is 48%. The following segments are hypokinetic: basal anteroseptal, basal inferoseptal, mid anteroseptal, mid inferoseptal and apical septal.    When compared to the images of 5/13/2016, the ejection fraction has increased from 40% to 48%.      LOUISA BLOCK MD [facility]    [phone]    This note created using Dragon voice recognition software.  Sound alike errors may have escaped editing.

## 2021-06-30 NOTE — NURSING NOTE
Chief Complaint   Patient presents with     RECHECK     F/u after piriformis      Mersadibeba Tamayo, CMA     show

## 2021-06-30 NOTE — PROGRESS NOTES
Progress Notes by Debra Gómez CNP at 11/25/2020  2:50 PM     Author: Debra Gómez CNP Service: -- Author Type: Nurse Practitioner    Filed: 11/25/2020  3:27 PM Encounter Date: 11/25/2020 Status: Signed    : Debra Gómez CNP (Nurse Practitioner)             Assessment/Recommendations   Assessment:    1. Ischemic cardiomyopathy, heart failure with reduced ejection fraction, ejection fraction 48%: He has no symptoms of acute fluid retention. OptiVol today is normal.    2.  Chronic atrial fibrillation: Heart rate controlled.  He continues to take warfarin and has his INR checked regularly.    3.  ICD: See full device report from today.  No VT. Nearing end of battery life; device team will monitor.      Plan:  1.  Continue current medications  2.  Low-sodium diet and daily weights    Tanmay Israel would prefer to wait to follow-up with Dr. Baker until March due to COVID-19.  Follow-up in heart failure clinic in August.       History of Present Illness/Subjective    Mr. Tanmay Israel is a 80 y.o. male seen at [facility] today for continued follow-up.  He has a history of ischemic cardiomyopathy, heart failure with reduced ejection fraction, hypertension, coronary artery bypass surgery, and permanent atrial fibrillation.  Echocardiogram from November 2018 showed ejection fraction of 35%, elevated LV filling pressures, moderately decreased right ventricular function, and mild mitral regurgitation.  Stress test on September 27, 2019 was negative for ischemia or infarction and showed ejection fraction of 48%.    He has no symptoms of acute heart failure today.  Device check today shows normal OptiVol.  He denies fatigue, lightheadedness, shortness of breath, dyspnea on exertion, orthopnea, chest pain and lower extremity edema.      His weight has been stable.  He continues to follow a low-sodium diet.    Stress Test 9/27/19:    The pharmacologic nuclear stress  test is negative for inducible myocardial ischemia or infarction.    The left ventricular ejection fraction is 48%. The following segments are hypokinetic: basal anteroseptal, basal inferoseptal, mid anteroseptal, mid inferoseptal and apical septal.    When compared to the images of 5/13/2016, the ejection fraction has increased from 40% to 48%.    ECHO:   Results for orders placed during the hospital encounter of 11/12/18   Echo Complete [ECH10] 11/12/2018    Narrative   When compared to the previous study dated 10/13/2017, no significant   change.    Normal left ventricular size, wall thickness and moderate global   hypokinesis. Elevated left ventricular filling pressure. Left ventricle   ejection fraction is moderately decreased. The estimated left ventricular   ejection fraction is 35%.    Normal right ventricular size with moderately reduced right ventricular   systolic performance. Pacemaker leads are identified.    Severely enlarged left atrium. Moderately enlarged right atrium.    Mild mitral valve regurgitation. Mild tricuspid valve regurgitation with   estimated right ventricular systolic pressure 45 mmHg.           Physical Examination Review of Systems   Vitals:    11/25/20 1420   BP: 134/56   Pulse: (!) 53   Resp: 16   SpO2: 96%     Body mass index is 26.94 kg/m .  Wt Readings from Last 3 Encounters:   11/25/20 172 lb (78 kg)   09/08/20 173 lb (78.5 kg)   05/06/20 162 lb (73.5 kg)       General Appearance:     Alert, cooperative and in no acute distress.   ENT/Mouth: membranes moist, no oral lesions or bleeding gums.      EYES:  no scleral icterus, normal conjunctivae   Chest/Lungs:   lungs are clear to auscultation, no rales or wheezing, respirations unlabored   Cardiovascular:    Normal first and second heart sounds, no edema bilateral lower extremities    Abdomen:  Soft, nontender, nondistended, bowel sounds present   Extremities: no cyanosis or clubbing   Skin: warm, dry.    Neurologic: mood and  affect are appropriate, alert and oriented x3                                                Medical History  Surgical History Family History Social History   Past Medical History:   Diagnosis Date   ? Acute cholecystitis 2/28/2019   ? Acute post-operative pain    ? Anemia    ? Arthritis    ? Atrial fibrillation (H)    ? C. difficile diarrhea 4/21/2019   ? Calculus of ureter    ? Callus    ? Cerebral aneurysm    ? Cervical myelopathy (H) 2/22/2019   ? Cervical spinal stenosis    ? Chronic kidney disease     stage 3   ? Chronic systolic congestive heart failure (H)    ? Closed fracture of distal end of left radius, unspecified fracture morphology, initial encounter    ? Closed fracture of distal end of right radius, unspecified fracture morphology, initial encounter    ? Closed fracture of first thoracic vertebra, unspecified fracture morphology, initial encounter (H)    ? Coronary artery disease    ? Crohn's disease (H)    ? Dysphagia    ? Fall 10/22/2016   ? GERD (gastroesophageal reflux disease)    ? Hyperlipidemia    ? Hypertension    ? Hypotension, unspecified hypotension type    ? ICD (implantable cardioverter-defibrillator) in place     Medtronic   ? Ischemic cardiomyopathy    ? Kidney stone    ? Low back pain    ? Lumbago     Created by Conversion    ? Macular degeneration    ? Muscle Aches, Generalized (Myalgias)     Created by Conversion    ? Permanent atrial fibrillation (H)     [id number] risk score = 5 (age1/ CAD/ HTN/ CHF) Chronic warfarin   ? Personal history of colonic polyps 2/12/2019   ? Polyp of duodenum 10/17/2016   ? Pyuria    ? Right arm weakness 4/2/2019   ? Right Rotator Cuff Tendonitis     Created by Conversion  Replacement Utility updated for latest IMO load   ? Schatzki's ring    ? Sciatica     Created by Conversion    ? Serum Enzyme Levels - ALT (SGPT) Elevated     Created by Conversion    ? Sleep apnea     no CPAP   ? Spondylolisthesis of lumbar region 7/5/2016   ? Weakness 4/20/2019     Past Surgical History:   Procedure Laterality Date   ? APPENDECTOMY     ? CARDIAC DEFIBRILLATOR PLACEMENT  2013    ICD Medtronic   ? CATARACT EXTRACTION W/  INTRAOCULAR LENS IMPLANT Bilateral    ? CHOLECYSTECTOMY     ? COLONOSCOPY N/A 2/19/2020    Procedure: COLONOSCOPY;  Surgeon: Houston Medina MD;  Location: [facility];  Service: Gastroenterology   ? CORONARY ARTERY BYPASS GRAFT  08/2004    Coronary Artery Quadruple Venous Bypass Graft   ? ESOPHAGOGASTRODUODENOSCOPY     ? IA C- LAMINOPLASTY, 2 OR MORE Left 2/22/2019    Procedure: LEFT CERVICAL 4, 5, 6 LAMINOPLASTY;  Surgeon: Liza Cesar MD;  Location: [facility] OR;  Service: Spine   ? IA LAP,CHOLECYSTECTOMY N/A 2/28/2019    CHOLECYSTECTOMY, LAPAROSCOPIC;  Surgeon: Mana Roman MD;  Location: [facility];  Service: General   ? ROTATOR CUFF REPAIR Right    ? TONSILLECTOMY     ? XR MYELOGRAM CERVICAL THORACIC LUMBAR  1/17/2019    Family History   Problem Relation Age of Onset   ? Heart disease Mother    ? Stroke Mother    ? Crohn's disease Father    ? Alcohol abuse Brother    ? No Medical Problems Daughter    ? No Medical Problems Son    ? No Medical Problems Maternal Aunt    ? No Medical Problems Maternal Uncle    ? No Medical Problems Paternal Aunt    ? No Medical Problems Paternal Uncle    ? No Medical Problems Maternal Grandmother    ? No Medical Problems Maternal Grandfather    ? No Medical Problems Paternal Grandmother    ? No Medical Problems Paternal Grandfather    ? Cancer Brother    ? Urolithiasis Neg Hx    ? Gout Neg Hx     Social History     Socioeconomic History   ? Marital status: Single     Spouse name: Not on file   ? Number of children: Not on file   ? Years of education: Not on file   ? Highest education level: Not on file   Occupational History   ? Occupation: yang     Employer: RETIRED   Social Needs   ? Financial resource strain: Not on file   ? Food insecurity     Worry: Not on file     Inability: Not  on file   ? Transportation needs     Medical: Not on file     Non-medical: Not on file   Tobacco Use   ? Smoking status: Former Smoker   ? Smokeless tobacco: Never Used   Substance and Sexual Activity   ? Alcohol use: No     Comment: rarely   ? Drug use: No   ? Sexual activity: Not on file   Lifestyle   ? Physical activity     Days per week: Not on file     Minutes per session: Not on file   ? Stress: Not on file   Relationships   ? Social connections     Talks on phone: Not on file     Gets together: Not on file     Attends Zoroastrianism service: Not on file     Active member of club or organization: Not on file     Attends meetings of clubs or organizations: Not on file     Relationship status: Not on file   ? Intimate partner violence     Fear of current or ex partner: Not on file     Emotionally abused: Not on file     Physically abused: Not on file     Forced sexual activity: Not on file   Other Topics Concern   ? Not on file   Social History Narrative   ? Not on file          Medications  Allergies   Current Outpatient Medications   Medication Sig Dispense Refill   ? acetaminophen (TYLENOL) 500 MG tablet Take 2 tablets (1,000 mg total) by mouth 3 (three) times a day.  0   ? amLODIPine (NORVASC) 5 MG tablet Take 1 tablet (5 mg total) by mouth daily. 90 tablet 3   ? bumetanide (BUMEX) 1 MG tablet TAKE 1 TABLET BY MOUTH DAILY 90 tablet 0   ? carvediloL (COREG) 25 MG tablet TAKE ONE AND ONE-HALF TABLETS (37.5MG) BY MOUTH TWICE DAILY WITH MEALS 270 tablet 1   ? cholecalciferol, vitamin D3, 1,000 unit tablet Take 2,000 Units by mouth daily.     ? cyanocobalamin 500 MCG tablet Take 500 mcg by mouth daily.     ? diclofenac sodium (VOLTAREN) 1 % Gel APPLY 2 TO 4 GRAMS TOPICALLY THREE TIMES DAILY 100 g 0   ? ferrous sulfate 325 (65 FE) MG tablet Take 1 tablet by mouth daily.     ? melatonin 3 mg Tab tablet Take 1 tablet (3 mg total) by mouth at bedtime as needed.  0   ? multivitamin with iron (ONE DAILY WITH IRON) Tab  tablet Take 1 tablet by mouth daily.      ? omeprazole (PRILOSEC) 20 MG capsule TAKE 1 CAPSULE BY MOUTH TWICE DAILY (Patient taking differently: Take 20 mg by mouth daily. ) 180 capsule 3   ? ondansetron (ZOFRAN) 8 MG tablet Take 1 tablet (8 mg total) by mouth every 8 (eight) hours as needed. 30 tablet 0   ? potassium chloride (K-DUR,KLOR-CON) 20 MEQ tablet TAKE ONE TABLET BY MOUTH ONCE DAILY 90 tablet 3   ? rosuvastatin (CRESTOR) 20 MG tablet TAKE 1 TABLET BY MOUTH DAILY AT BEDTIME 90 tablet 0   ? warfarin ANTICOAGULANT (COUMADIN/JANTOVEN) 2.5 MG tablet TAKE 1 TABLET (2.5 MG) BY MOUTH DAILY. ADJUST DOSE PER INR AS DIRECTED 90 tablet 1     No current facility-administered medications for this visit.     No Known Allergies      Lab Results    Chemistry/lipid CBC Cardiac Enzymes/BNP/TSH/INR   Lab Results   Component Value Date    CHOL 99 05/19/2020    HDL 36 (L) 05/19/2020    LDLCALC 47 05/19/2020    TRIG 81 05/19/2020    CREATININE 1.55 (H) 09/08/2020    BUN 23 09/08/2020    K 4.2 09/08/2020     09/08/2020     (H) 09/08/2020    CO2 22 09/08/2020    Lab Results   Component Value Date    WBC 8.0 09/08/2020    HGB 10.6 (L) 09/08/2020    HCT 32.6 (L) 09/08/2020    MCV 88 09/08/2020     09/08/2020    Lab Results   Component Value Date    CKTOTAL 40 06/19/2019    CKMB 1 01/31/2013    TROPONINI 0.02 04/19/2019     (H) 05/24/2019    TSH 1.22 04/19/2019    INR 2.70 (H) 11/17/2020

## 2021-07-03 NOTE — ADDENDUM NOTE
Addendum Note by Liza Cesar MD at 1/9/2019  8:00 AM     Author: Liza Cesar MD Service: -- Author Type: Physician    Filed: 1/9/2019  2:46 PM Encounter Date: 1/9/2019 Status: Signed    : Liza Cesar MD (Physician)    Addended by: LIZA CESAR on: 1/9/2019 02:46 PM        Modules accepted: Level of Service

## 2021-07-03 NOTE — ADDENDUM NOTE
Addendum Note by Frederick Castañeda RN at 8/22/2017  4:12 PM     Author: Frederick Castañeda RN Service: -- Author Type: Registered Nurse    Filed: 8/22/2017  4:12 PM Encounter Date: 8/21/2017 Status: Signed    : Frederick Castañeda RN (Registered Nurse)    Addended by: FREDERICK CASTAÑEDA on: 8/22/2017 04:12 PM        Modules accepted: Orders

## 2021-07-03 NOTE — ADDENDUM NOTE
Addendum Note by Frederick Castañeda RN at 2/12/2019  4:04 PM     Author: Frederick Castañeda RN Service: -- Author Type: Registered Nurse    Filed: 2/13/2019 12:00 PM Encounter Date: 2/12/2019 Status: Signed    : Frederick Castañeda RN (Registered Nurse)    Addended by: FREDERICK CASTAÑEDA on: 2/13/2019 12:00 PM        Modules accepted: Orders

## 2021-07-03 NOTE — ADDENDUM NOTE
Addendum Note by Nai Ivan MD at 9/24/2019 10:40 AM     Author: Nai Ivan MD Service: -- Author Type: Physician    Filed: 9/26/2019 11:03 AM Encounter Date: 9/24/2019 Status: Signed    : Nai Ivan MD (Physician)    Addended by: NAI IVAN on: 9/26/2019 11:03 AM        Modules accepted: Orders

## 2021-07-03 NOTE — ADDENDUM NOTE
Addendum Note by Jyoti Her CNP at 12/11/2017 11:59 PM     Author: Jyoti Her CNP Service: -- Author Type: Nurse Practitioner    Filed: 12/21/2017  8:00 AM Date of Service: 12/11/2017 11:59 PM Status: Signed    : Jyoti Her CNP (Nurse Practitioner)    Encounter addended by: Jyoti Her CNP on: 12/21/2017  8:00 AM<BR>     Actions taken: Visit diagnoses modified, Sign clinical note

## 2021-07-04 NOTE — LETTER
Letter by Osiris Kaur RN at      Author: Osiris Kaur RN Service: -- Author Type: --    Filed:  Encounter Date: 5/28/2021 Status: (Other)         Tanmay Israel  [address]  [place] [address]      May 28, 2021      Dear Mr. Israel,    RE: Remote Results    We are writing to you regarding your recent Remote ICD check from home. Your transmission was received successfully. Battery status is satisfactory at this time.     Your results are showing no significant changes.    Your next device appointment will be a remote check on 6/23/2021; this will occur automatically.    To schedule or reschedule, please call [phone] and press 1.    NOTE: If you would like to do an extra transmission, please call [phone] and press 3 to speak to a nurse BEFORE transmitting. This ensures that the Device Clinic staff is aware of the reason you are sending a transmission, and can follow-up with you after it has been reviewed.    We will be checking your implanted device from home (remotely) every month unless otherwise instructed. We will need to see you in the clinic at least once a year. You may need to be seen in the clinic sooner depending on the results of your check.    Please be aware:    The follow-up schedule is like a Physician prescription.    Your remote monitor is paired to your specific implanted device.      Sincerely,    [facility]

## 2021-07-04 NOTE — TELEPHONE ENCOUNTER
Telephone Encounter by Yaneth Wells RN at 6/8/2021  1:19 PM     Author: Yaneth Wells RN Service: -- Author Type: Registered Nurse    Filed: 6/8/2021  1:27 PM Encounter Date: 6/8/2021 Status: Signed    : Yaneth Wells RN (Registered Nurse)       ANTICOAGULATION  MANAGEMENT    Assessment     Today's INR result of 1.7 is Subtherapeutic (goal INR of 2.0-3.0)        Warfarin taken as previously instructed    No new diet changes affecting INR    Potential interaction between azithromycin and warfarin which may affect subsequent INRs     Duration: 6 day course from 6/8 to 6/13    Indication: cough, fever    Continues to tolerate warfarin with no reported s/s of bleeding or thromboembolism     Previous INR was Therapeutic    Plan:     Spoke on phone with Tanmay regarding INR result and instructed      Warfarin Dosing Instructions:  Change warfarin dose to 1.25 mg daily on M/F; and 2.5 mg daily rest of week  (9.1 % change)    Instructed patient to follow up no later than: will have INR checked at f/u OV on 6/11    Education provided: target INR goal and significance of current INR result, potential interaction between warfarin and azithromycin, monitoring for bleeding signs and symptoms and monitoring for clotting signs and symptoms    Tanmay verbalizes understanding and agrees to warfarin dosing plan.    Instructed to call the [facility] for any changes, questions or concerns. (#[phone])   ?   Yaneth Wells RN    Subjective/Objective:      Tanmay Israel, a 81 y.o. male is on warfarin.       Tanmay reports:     Home warfarin dose: as updated on anticoagulation calendar per template     Missed doses: No     Medication changes:  Yes: starting azithromycin today     S/S of bleeding or thromboembolism:  No     New Injury or illness:  Yes-- having cough and shortness of breath     Changes in diet or alcohol consumption:  No     Upcoming surgery, procedure or cardioversion:   No    Anticoagulation Episode Summary     Current INR goal:  2.0-3.0   TTR:  70.5 % (1 y)   Next INR check:  6/11/2021   INR from last check:  1.70 (6/8/2021)   Weekly max warfarin dose:     Target end date:     INR check location:     Preferred lab:     Send INR reminders to:  CHANI RIOS       Comments:           Anticoagulation Care Providers     Provider Role Specialty Phone number    Go Ramírez MD Referring Family Medicine [phone]

## 2021-07-06 VITALS
WEIGHT: 154.7 LBS | HEIGHT: 69 IN | WEIGHT: 153.3 LBS | WEIGHT: 153.6 LBS | WEIGHT: 154.2 LBS | BODY MASS INDEX: 22.64 KG/M2 | BODY MASS INDEX: 22.77 KG/M2 | WEIGHT: 154.6 LBS | BODY MASS INDEX: 23.78 KG/M2 | BODY MASS INDEX: 22.83 KG/M2 | HEIGHT: 69 IN | WEIGHT: 153.6 LBS | BODY MASS INDEX: 22.68 KG/M2 | BODY MASS INDEX: 22.68 KG/M2 | BODY MASS INDEX: 23.78 KG/M2 | BODY MASS INDEX: 22.85 KG/M2 | WEIGHT: 161 LBS | WEIGHT: 154.2 LBS | BODY MASS INDEX: 22.77 KG/M2 | BODY MASS INDEX: 22.83 KG/M2

## 2021-07-07 NOTE — PROGRESS NOTES
Progress Notes by Tanja Hsu, RN at 6/25/2021 12:17 PM     Author: Tanja Hsu RN Service: -- Author Type: Registered Nurse    Filed: 6/25/2021 12:23 PM Encounter Date: 6/25/2021 Status: Signed    : Tanja Hsu RN (Registered Nurse)       Houston Rolon MD Cliffe, Charles M, MD   Cc: P [facility] Ep Rn Support Pool             OK for upgrade to CDT when he reaches [facility].  dd    Previous Messages    ----- Message -----   From: Bryan Baker MD   Sent: 6/21/2021   3:23 PM CDT   To: Houston Rolon MD, Cruzito Rogers DO     His LV function has been variable over the years, but most recently his LVEF is 24 to 32%.  He is at [facility] for his current device, would likely benefit from CRT with advice replacement.  Nonischemic cardiomyopathy.   ----- Message -----   From: Cruzito Rogers DO   Sent: 6/16/2021  12:38 PM CDT   To: Houston Rolon MD, MD Fernando Abdi,     Patient with progressive LV decline. RV pacing 60%. No ischemia on recent stress.  ? CRT.  Dr. Baker is primary cardiologist.  Patient inpatient Likely d/c and follow-up on this as outpatient.       Thanks,     Henok

## 2021-07-07 NOTE — TELEPHONE ENCOUNTER
Telephone Encounter by Yaneth Wells, RN at 6/29/2021 10:25 AM     Author: Yaneth Wells RN Service: -- Author Type: Registered Nurse    Filed: 6/29/2021 10:29 AM Encounter Date: 6/29/2021 Status: Signed    : Yaneth Wells RN (Registered Nurse)       ANTICOAGULATION MANAGEMENT     Tanmay Israel 81 y.o., male is on warfarin with Supratherapeutic INR result (goal range 2.0-3.0)    Recent labs: (last 7 days)     06/29/21  0900   INR 3.20*       ASSESSMENT     Source: Patient/Caregiver Call and Template      Warfarin dosing taken: Warfarin taken as instructed    Diet: No new diet changes affecting INR    Illness, Injury or hospitalization: No    Medication changes: None    Signs or symptoms of bleeding or clotting: No    Previous INR: therapeutic last 2(+) visits    Additional findings: None     PLAN     Recommended plan for no diet, medication or health factor changes affecting INR:     Dosing instructions: Continue your current warfarin dose 2.5 mg daily on M/W/F; and 1.25 mg daily rest of week (0% change)    Follow up no later than: 2 weeks     Telephone call with Tanmay who verbalizes understanding and agrees to plan    Lab visit scheduled for 7/12    Education provided: target INR goal and significance of current INR result, importance of notifying clinic for changes in medications and importance of notifying clinic for diarrhea, nausea/vomiting, reduced intake and/or illness    Plan made per [facility] anticoagulation protocol    Yaneth Wells  Anticoagulation Clinic   [phone]    Anticoagulation Episode Summary     Current INR goal:  2.0-3.0   TTR:  68.9 % (11.8 mo)   Next INR check:  7/13/2021   INR from last check:  3.20 (6/29/2021)   Weekly max warfarin dose:     Target end date:     INR check location:     Preferred lab:     Send INR reminders to:  CHANI RIOS       Comments:           Anticoagulation Care Providers     Provider Role Specialty Phone number    Go Ramírez  MD [facility] [phone]

## 2021-07-07 NOTE — PROGRESS NOTES
Noted.  Per device check on 6-23, device has reached [facility] by voltage but not by device alert, pt will continue with monthly checks.  When [facility] is reached he will be upgraded to CRT.

## 2021-07-08 ENCOUNTER — COMMUNICATION - HEALTHEAST (OUTPATIENT)
Dept: FAMILY MEDICINE | Facility: CLINIC | Age: 81
End: 2021-07-08

## 2021-07-08 NOTE — TELEPHONE ENCOUNTER
Telephone Encounter by Fany England MA at 7/8/2021 10:31 AM     Author: Fany England MA Service: -- Author Type: Medical Assistant    Filed: 7/8/2021 10:34 AM Encounter Date: 7/8/2021 Status: Signed    : Fany England MA (Medical Assistant)       Dr. Zee wanted to check on patient as he came to see him yesterday for a office visit and patient wasn't feeling that well. We received his labs back and they were a little concerning. Reached out to Tanmay to get him a follow up appointment with his primary- Dr. Ramírez and also to see if he's feeling any better. Pleas assist with scheduling a follow up- first available and update on how's Tanmay feeling.

## 2021-07-10 DIAGNOSIS — I48.20 CHRONIC ATRIAL FIBRILLATION (H): Primary | ICD-10-CM

## 2021-07-12 ENCOUNTER — ANTICOAGULATION THERAPY VISIT (OUTPATIENT)
Dept: ANTICOAGULATION | Facility: CLINIC | Age: 81
End: 2021-07-12

## 2021-07-12 ENCOUNTER — ALLIED HEALTH/NURSE VISIT (OUTPATIENT)
Dept: LAB | Facility: CLINIC | Age: 81
End: 2021-07-12
Payer: COMMERCIAL

## 2021-07-12 DIAGNOSIS — I48.91 ATRIAL FIBRILLATION (H): Primary | ICD-10-CM

## 2021-07-12 DIAGNOSIS — I48.20 CHRONIC ATRIAL FIBRILLATION (H): ICD-10-CM

## 2021-07-12 DIAGNOSIS — I26.99 PULMONARY EMBOLISM WITH INFARCTION (H): ICD-10-CM

## 2021-07-12 DIAGNOSIS — I25.10 CORONARY ARTERY DISEASE INVOLVING NATIVE HEART WITHOUT ANGINA PECTORIS, UNSPECIFIED VESSEL OR LESION TYPE: Primary | ICD-10-CM

## 2021-07-12 LAB — INR BLD: 2.5 (ref 0.9–1.1)

## 2021-07-12 PROCEDURE — 85610 PROTHROMBIN TIME: CPT

## 2021-07-12 PROCEDURE — 36416 COLLJ CAPILLARY BLOOD SPEC: CPT

## 2021-07-12 RX ORDER — CYCLOSPORINE 0.5 MG/ML
1 EMULSION OPHTHALMIC 2 TIMES DAILY
Status: ON HOLD | COMMUNITY
Start: 2021-03-10 | End: 2022-01-01

## 2021-07-12 RX ORDER — LISINOPRIL 2.5 MG/1
5 TABLET ORAL
COMMUNITY
Start: 2021-06-17 | End: 2021-08-09

## 2021-07-12 RX ORDER — ERYTHROMYCIN 5 MG/G
0.5 OINTMENT OPHTHALMIC DAILY
COMMUNITY
Start: 2021-05-18

## 2021-07-12 RX ORDER — ALBUTEROL SULFATE 90 UG/1
1-2 AEROSOL, METERED RESPIRATORY (INHALATION) EVERY 4 HOURS PRN
COMMUNITY
Start: 2021-06-17

## 2021-07-12 RX ORDER — PREDNISONE 10 MG/1
TABLET ORAL
COMMUNITY
Start: 2021-06-17 | End: 2021-07-13

## 2021-07-12 NOTE — PROGRESS NOTES
"ANTICOAGULATION MANAGEMENT     Tanmay Israel 81 year old male is on warfarin with therapeutic INR result. (Goal INR 2.0-3.0)    Recent labs: (last 7 days)     07/12/21  0950   INR 2.5*       ASSESSMENT     Source(s): Chart Review, Patient/Caregiver Call and Template       Warfarin doses taken: Warfarin taken as instructed    Diet: No new diet changes identified    New illness, injury, or hospitalization: Yes: reports not feeling well, not eating, \"losing weight like crazy\"-- sees  tomorrow    Medication/supplement changes: None noted    Signs or symptoms of bleeding or clotting: No    Previous INR: Supratherapeutic    Additional findings: None     PLAN     Recommended plan for no diet, medication or health factor changes affecting INR     Dosing Instructions: Continue your current warfarin dose with next INR in 3 weeks       Summary  As of 7/12/2021    Full warfarin instructions:  2.5 mg every Mon, Wed, Fri; 1.25 mg all other days   Next INR check:               Telephone call with Tanmay who verbalizes understanding and agrees to plan    Patient offered & declined to schedule next visit    Education provided: Target INR goal and significance of current INR result and Contact [phone] with any changes, questions or concerns.     Plan made per [facility] anticoagulation protocol    Yaneth Wells RN  Anticoagulation Clinic  7/12/2021    _______________________________________________________________________     Anticoagulation Episode Summary     Current INR goal:  2.0-3.0   TTR:  73.0 % (1 y)   Target end date:     Send INR reminders to:  CHANI RIOS    Indications    Atrial fibrillation (H) [I48.91]  Pulmonary embolism with infarction (H) [I26.99]           Comments:           Anticoagulation Care Providers     Provider Role Specialty Phone number    Go Ramírez MD Referring Family Medicine [phone]          "

## 2021-07-13 ENCOUNTER — OFFICE VISIT (OUTPATIENT)
Dept: FAMILY MEDICINE | Facility: CLINIC | Age: 81
End: 2021-07-13
Payer: COMMERCIAL

## 2021-07-13 VITALS
BODY MASS INDEX: 22.59 KG/M2 | WEIGHT: 153 LBS | DIASTOLIC BLOOD PRESSURE: 60 MMHG | OXYGEN SATURATION: 96 % | HEART RATE: 69 BPM | TEMPERATURE: 97.7 F | SYSTOLIC BLOOD PRESSURE: 114 MMHG

## 2021-07-13 DIAGNOSIS — R53.81 MALAISE: Primary | ICD-10-CM

## 2021-07-13 DIAGNOSIS — I25.10 ATHEROSCLEROSIS OF NATIVE CORONARY ARTERY OF NATIVE HEART WITHOUT ANGINA PECTORIS: ICD-10-CM

## 2021-07-13 DIAGNOSIS — R63.0 POOR APPETITE: ICD-10-CM

## 2021-07-13 DIAGNOSIS — N17.9 AKI (ACUTE KIDNEY INJURY) (H): ICD-10-CM

## 2021-07-13 DIAGNOSIS — N18.31 STAGE 3A CHRONIC KIDNEY DISEASE (H): ICD-10-CM

## 2021-07-13 DIAGNOSIS — I50.22 CHRONIC SYSTOLIC CONGESTIVE HEART FAILURE (H): ICD-10-CM

## 2021-07-13 DIAGNOSIS — R19.7 DIARRHEA, UNSPECIFIED TYPE: ICD-10-CM

## 2021-07-13 DIAGNOSIS — E86.0 DEHYDRATION: ICD-10-CM

## 2021-07-13 DIAGNOSIS — I25.5 ISCHEMIC CARDIOMYOPATHY: ICD-10-CM

## 2021-07-13 DIAGNOSIS — R74.8 ELEVATED ALKALINE PHOSPHATASE LEVEL: ICD-10-CM

## 2021-07-13 DIAGNOSIS — D64.9 ANEMIA, UNSPECIFIED TYPE: ICD-10-CM

## 2021-07-13 LAB
ALBUMIN SERPL-MCNC: 3 G/DL (ref 3.5–5)
ALP SERPL-CCNC: 261 U/L (ref 45–120)
ALT SERPL W P-5'-P-CCNC: 10 U/L (ref 0–45)
ANION GAP SERPL CALCULATED.3IONS-SCNC: 14 MMOL/L (ref 5–18)
AST SERPL W P-5'-P-CCNC: 13 U/L (ref 0–40)
BILIRUB SERPL-MCNC: 0.4 MG/DL (ref 0–1)
BUN SERPL-MCNC: 30 MG/DL (ref 8–28)
CALCIUM SERPL-MCNC: 9.1 MG/DL (ref 8.5–10.5)
CHLORIDE BLD-SCNC: 103 MMOL/L (ref 98–107)
CO2 SERPL-SCNC: 21 MMOL/L (ref 22–31)
CREAT SERPL-MCNC: 1.89 MG/DL (ref 0.7–1.3)
ERYTHROCYTE [DISTWIDTH] IN BLOOD BY AUTOMATED COUNT: 13.1 % (ref 10–15)
GFR SERPL CREATININE-BSD FRML MDRD: 32 ML/MIN/1.73M2
GLUCOSE BLD-MCNC: 115 MG/DL (ref 70–125)
HCT VFR BLD AUTO: 31.5 % (ref 40–53)
HGB BLD-MCNC: 10.2 G/DL (ref 13.3–17.7)
MCH RBC QN AUTO: 30.3 PG (ref 26.5–33)
MCHC RBC AUTO-ENTMCNC: 32.4 G/DL (ref 31.5–36.5)
MCV RBC AUTO: 94 FL (ref 78–100)
PLATELET # BLD AUTO: 225 10E3/UL (ref 150–450)
POTASSIUM BLD-SCNC: 4.9 MMOL/L (ref 3.5–5)
PROT SERPL-MCNC: 6.5 G/DL (ref 6–8)
RBC # BLD AUTO: 3.37 10E6/UL (ref 4.4–5.9)
SODIUM SERPL-SCNC: 138 MMOL/L (ref 136–145)
WBC # BLD AUTO: 6.7 10E3/UL (ref 4–11)

## 2021-07-13 PROCEDURE — 36415 COLL VENOUS BLD VENIPUNCTURE: CPT | Performed by: FAMILY MEDICINE

## 2021-07-13 PROCEDURE — 99214 OFFICE O/P EST MOD 30 MIN: CPT | Performed by: FAMILY MEDICINE

## 2021-07-13 PROCEDURE — 80053 COMPREHEN METABOLIC PANEL: CPT | Performed by: FAMILY MEDICINE

## 2021-07-13 PROCEDURE — 85027 COMPLETE CBC AUTOMATED: CPT | Performed by: FAMILY MEDICINE

## 2021-07-13 RX ORDER — ROSUVASTATIN CALCIUM 20 MG/1
TABLET, COATED ORAL
Qty: 90 TABLET | Refills: 3 | Status: SHIPPED | OUTPATIENT
Start: 2021-07-13

## 2021-07-13 NOTE — PATIENT INSTRUCTIONS
I think you feel poorly because you are so dehydrated from your recent bout of diarrhea and nausea combined with the effects of your current medications including blood pressure medications and a water pill.    We will check lab test today.    I would like you to make the following medication changes which will be temporary.    Stop taking the bumetanide which is the water pill until our follow-up visit on Friday.    Stop taking both amlodipine and lisinopril until our follow-up visit on Friday.    Reduce the dose of carvedilol down to 1 tablet twice daily    When the lab test results return I will let you know if we need to make any additional changes.  When you receive a call to discuss the lab test results we will see how you are doing and decide if based on how you feel we need to do anything different.    If you feel like your condition worsens do not hesitate to go to the emergency department for further evaluation and to get intravenous fluids.

## 2021-07-13 NOTE — PROGRESS NOTES
Tanmay Israel  /60   Pulse 69   Temp 97.7  F (36.5  C)   Wt 69.4 kg (153 lb)   SpO2 96%   BMI 22.59 kg/m       Assessment/Plan:                Tanmay was seen today for fatigue, extremity weakness and weight loss.    Diagnoses and all orders for this visit:    Malaise    Anemia, unspecified type  -     CBC with platelets; Future  -     CBC with platelets    Atherosclerosis of native coronary artery of native heart without angina pectoris    Ischemic cardiomyopathy  -     **Comprehensive metabolic panel FUTURE 14d; Future    ANA (acute kidney injury) (H)  -     **Comprehensive metabolic panel FUTURE 14d; Future    Dehydration    Diarrhea, unspecified type    Poor appetite    Stage 3a chronic kidney disease    Chronic systolic congestive heart failure (H)    Elevated alkaline phosphatase level         DISCUSSION  I suspect that he has a significant dehydration following a recent gastroenteritis.  I do not think that he is septic or in need of more emergent therapy.  I think it would be most reasonable to stop blood pressure medications to his diuretic temporarily, focus on oral hydration, recheck labs and have him follow-up later this week.  We will plan to call him tomorrow with lab test results and check and see how he is doing to decide if additional action is necessary.  He is counseled to proceed to the emergency department should he feel that his symptoms worsened.  Patient is provided with written instructions regarding temporary changes to medications.  Subjective:     HPI:    Tanmay Israel is a 81 year old male is here today with a friend to discuss ongoing illness symptoms.  Was seen last week after onset of nausea, poor appetite and diarrhea last week.  He is evaluate lab testing and suggest dehydration.  Was given ondansetron.  His diarrhea has stopped he reports a bowel movement that was normal this morning.  He did take 1 dose of antidiarrheal medication late last week.  He is not currently taking  antinausea medication.  States he feels poorly overall.  He aches everywhere.  He is not coughing or having any breathing difficulty.  He does occasionally feel lightheaded.  He does not feel feverish.  He has absolutely no appetite and has not been eating.  He is drinking some fluid including 2 boost shakes a day.  He has not passed out.  Denies chest pain.  Denies abdominal pain.    Medical history significant for heart disease including coronary artery disease and ischemic cardiomyopathy with ejection fraction of 24% obtained in June on an echocardiogram.  He has a pacemaker in place.  He is on chronic diuretic therapy.  He continues all of his usual medications.    ROS:  Complete review of systems is obtained.  Other than the specific considerations noted above complete review of systems is negative.          Objective:   Medications:  Current Outpatient Medications   Medication     albuterol (PROAIR HFA/PROVENTIL HFA/VENTOLIN HFA) 108 (90 Base) MCG/ACT inhaler     cycloSPORINE (RESTASIS) 0.05 % ophthalmic emulsion     erythromycin (ROMYCIN) 5 MG/GM ophthalmic ointment     lisinopril (ZESTRIL) 2.5 MG tablet     neomycin-polymixin-dexamethasone (MAXITROL) ophthalmic ointment     acetaminophen (TYLENOL) 500 MG tablet     amLODIPine (NORVASC) 5 MG tablet     bumetanide (BUMEX) 1 MG tablet     carvedilol (COREG) 25 MG tablet     cholecalciferol 25 MCG (1000 UT) TABS     diclofenac (VOLTAREN) 1 % topical gel     ferrous sulfate (FEROSUL) 325 (65 Fe) MG tablet     melatonin 3 MG tablet     multivitamin w/minerals (MULTI-VITAMIN) tablet     omeprazole (PRILOSEC) 20 MG DR capsule     ondansetron (ZOFRAN) 8 MG tablet     potassium chloride ER (KLOR-CON M) 20 MEQ CR tablet     rosuvastatin (CRESTOR) 20 MG tablet     vitamin B-12 (CYANOCOBALAMIN) 500 MCG tablet     warfarin ANTICOAGULANT (COUMADIN) 2.5 MG tablet     No current facility-administered medications for this visit.        Allergies:   No Known Allergies      Social History     Socioeconomic History     Marital status: Single     Spouse name: Not on file     Number of children: Not on file     Years of education: Not on file     Highest education level: Not on file   Occupational History     Not on file   Tobacco Use     Smoking status: Former Smoker     Smokeless tobacco: Never Used   Substance and Sexual Activity     Alcohol use: Not on file     Drug use: Not on file     Sexual activity: Not on file   Other Topics Concern     Not on file   Social History Narrative     Not on file     Social Determinants of Health     Financial Resource Strain:      Difficulty of Paying Living Expenses:    Food Insecurity:      Worried About Running Out of Food in the Last Year:      Ran Out of Food in the Last Year:    Transportation Needs:      Lack of Transportation (Medical):      Lack of Transportation (Non-Medical):    Physical Activity:      Days of Exercise per Week:      Minutes of Exercise per Session:    Stress:      Feeling of Stress :    Social Connections:      Frequency of Communication with Friends and Family:      Frequency of Social Gatherings with Friends and Family:      Attends Religion Services:      Active Member of Clubs or Organizations:      Attends Club or Organization Meetings:      Marital Status:    Intimate Partner Violence:      Fear of Current or Ex-Partner:      Emotionally Abused:      Physically Abused:      Sexually Abused:        Family History   Problem Relation Age of Onset     Heart Disease Mother      Cerebrovascular Disease Mother      Crohn's Disease Father      Alcoholism Brother      No Known Problems Daughter      No Known Problems Son      No Known Problems Maternal Aunt      No Known Problems Maternal Uncle      No Known Problems Paternal Aunt      No Known Problems Paternal Uncle      No Known Problems Maternal Grandmother      No Known Problems Maternal Grandfather      No Known Problems Paternal Grandmother      No Known Problems  Paternal Grandfather      Cancer Brother      Urolithiasis No family hx of      Gout No family hx of         Most Recent Immunizations   Administered Date(s) Administered     COVID-19,PF,Pfizer 03/04/2021     DT (PEDS <7y) 03/01/2001     FLU 6-35 months 09/09/2010     Flu, Unspecified 09/09/2010     HepB-Adult 10/05/2018     Influenza (H1N1) 01/27/2010     Influenza (High Dose) 3 valent vaccine 09/24/2019     Influenza (IIV3) PF 10/01/2014     Influenza Vaccine, 6+MO IM (QUADRIVALENT W/PRESERVATIVES) 10/01/2014     Influenza, Quad, High Dose, Pf, 65yr + 09/08/2020     Influenza,INJ,MDCK,PF,Quad >4yrs 09/30/2018     Pneumo Conj 13-V (2010&after) 10/01/2014     Pneumococcal 23 valent 09/08/2005     Td (Adult), Adsorbed 03/01/2001     Tdap (Adacel,Boostrix) 11/09/2015     Tdap (Adult) Unspecified Formulation 03/01/2001     Zoster vaccine recombinant adjuvanted (SHINGRIX) 12/08/2020     Zoster vaccine, live 12/16/2010        Wt Readings from Last 3 Encounters:   07/13/21 69.4 kg (153 lb)   07/07/21 70.9 kg (156 lb 3.2 oz)   06/22/21 73.3 kg (161 lb 8 oz)        BP Readings from Last 6 Encounters:   07/13/21 114/60   07/07/21 112/58   06/30/21 132/75   06/22/21 120/60   06/11/21 108/58   06/08/21 122/62        PHYSICAL EXAM:    /60   Pulse 69   Temp 97.7  F (36.5  C)   Wt 69.4 kg (153 lb)   SpO2 96%   BMI 22.59 kg/m       General: Patient alert no signs of distress    HEENT: No lesions of the mouth or oral cavity mouth is dry, dependent membranes are clear, neck is supple no palpable masses.    Lungs: Good air movement no wheeze or crackles    Heart: Regular sounding heart rhythm without murmur    Abdomen: Soft nondistended does report some mild nonspecific tenderness more so on the right side of the abdomen no rebound tenderness or guarding degree of tenderness is mild no organomegaly or masses.    Extremities: Warm no edema varicosities present as prior no evidence of any redness swelling or skin  breakdown.    Neurologic: No focal motor or sensory deficit although patient is noted to move slowly and have more difficulty with balance compared to his usual state of health.

## 2021-07-16 ENCOUNTER — OFFICE VISIT (OUTPATIENT)
Dept: FAMILY MEDICINE | Facility: CLINIC | Age: 81
End: 2021-07-16
Payer: COMMERCIAL

## 2021-07-16 VITALS
OXYGEN SATURATION: 99 % | SYSTOLIC BLOOD PRESSURE: 114 MMHG | DIASTOLIC BLOOD PRESSURE: 66 MMHG | BODY MASS INDEX: 22.59 KG/M2 | HEART RATE: 77 BPM | WEIGHT: 153 LBS

## 2021-07-16 DIAGNOSIS — R53.81 MALAISE: Primary | ICD-10-CM

## 2021-07-16 DIAGNOSIS — I25.5 ISCHEMIC CARDIOMYOPATHY: ICD-10-CM

## 2021-07-16 DIAGNOSIS — E86.0 DEHYDRATION: ICD-10-CM

## 2021-07-16 DIAGNOSIS — N18.31 STAGE 3A CHRONIC KIDNEY DISEASE (H): ICD-10-CM

## 2021-07-16 DIAGNOSIS — D64.9 ANEMIA, UNSPECIFIED TYPE: ICD-10-CM

## 2021-07-16 DIAGNOSIS — E87.6 HYPOKALEMIA: Primary | ICD-10-CM

## 2021-07-16 DIAGNOSIS — I25.10 ATHEROSCLEROSIS OF NATIVE CORONARY ARTERY OF NATIVE HEART WITHOUT ANGINA PECTORIS: ICD-10-CM

## 2021-07-16 DIAGNOSIS — R74.8 ELEVATED ALKALINE PHOSPHATASE LEVEL: ICD-10-CM

## 2021-07-16 DIAGNOSIS — R19.7 DIARRHEA, UNSPECIFIED TYPE: ICD-10-CM

## 2021-07-16 DIAGNOSIS — R63.0 POOR APPETITE: ICD-10-CM

## 2021-07-16 DIAGNOSIS — I50.22 CHRONIC SYSTOLIC CONGESTIVE HEART FAILURE (H): ICD-10-CM

## 2021-07-16 PROCEDURE — 99213 OFFICE O/P EST LOW 20 MIN: CPT | Performed by: FAMILY MEDICINE

## 2021-07-16 NOTE — PROGRESS NOTES
Tanmay Israel  /66   Pulse 77   Wt 69.4 kg (153 lb)   SpO2 99%   BMI 22.59 kg/m       Assessment/Plan:                Tanmay was seen today for recheck medication.    Diagnoses and all orders for this visit:    Malaise    Anemia, unspecified type    Atherosclerosis of native coronary artery of native heart without angina pectoris    Ischemic cardiomyopathy    Stage 3a chronic kidney disease    Poor appetite    Diarrhea, unspecified type    Dehydration    Elevated alkaline phosphatase level    Chronic systolic congestive heart failure (H)       DISCUSSION  Suspect symptoms caused by dehydration and relatively low blood pressure from being on blood pressure medications after a recent illness.  He is improving.  Continue current course.  Subjective:     HPI:    Tanmay Israel is a 81 year old male he is here today with a friend to discuss symptoms that were evaluated on July 13.  At that time felt he had dehydration from a recent bout of gastroenteritis and was likely dehydrated from continuous use of diuretic along with being on blood pressure lowering medications.  His diuretic was held.  His weight is remained stable he has no shortness of breath or development of edema.  He feels overall slightly better.  He has been able to eat a little bit more.  He does not feel that he is having any lightheadedness.  No chest pain.  Reviewed labs from last visit.  No significant findings.  His chronic anemia improved.  Kidney measurements were stable.  No significant electrolyte disturbances.  Discussed continuing to hold the diuretic and the lisinopril and proceeding with the reduced dose of the carvedilol into the first part of next week at which time we will arrange a phone visit follow-up to decide on further course of action.  He is again cautioned that should he have any worsening symptoms he may need more immediate reevaluation but I do believe based on his improvement we are on the right path.    ROS:  Complete  review of systems is obtained.  Other than the specific considerations noted above complete review of systems is negative.    Objective:   Medications:  Current Outpatient Medications   Medication     acetaminophen (TYLENOL) 500 MG tablet     albuterol (PROAIR HFA/PROVENTIL HFA/VENTOLIN HFA) 108 (90 Base) MCG/ACT inhaler     bumetanide (BUMEX) 1 MG tablet     carvedilol (COREG) 25 MG tablet     cholecalciferol 25 MCG (1000 UT) TABS     cycloSPORINE (RESTASIS) 0.05 % ophthalmic emulsion     diclofenac (VOLTAREN) 1 % topical gel     erythromycin (ROMYCIN) 5 MG/GM ophthalmic ointment     ferrous sulfate (FEROSUL) 325 (65 Fe) MG tablet     lisinopril (ZESTRIL) 2.5 MG tablet     melatonin 3 MG tablet     multivitamin w/minerals (MULTI-VITAMIN) tablet     neomycin-polymixin-dexamethasone (MAXITROL) ophthalmic ointment     omeprazole (PRILOSEC) 20 MG DR capsule     ondansetron (ZOFRAN) 8 MG tablet     potassium chloride ER (KLOR-CON M) 20 MEQ CR tablet     rosuvastatin (CRESTOR) 20 MG tablet     vitamin B-12 (CYANOCOBALAMIN) 500 MCG tablet     warfarin ANTICOAGULANT (COUMADIN) 2.5 MG tablet     No current facility-administered medications for this visit.      Allergies:   No Known Allergies     Social History     Socioeconomic History     Marital status: Single     Spouse name: Not on file     Number of children: Not on file     Years of education: Not on file     Highest education level: Not on file   Occupational History     Not on file   Tobacco Use     Smoking status: Former Smoker     Smokeless tobacco: Never Used   Substance and Sexual Activity     Alcohol use: Not on file     Drug use: Not on file     Sexual activity: Not on file   Other Topics Concern     Not on file   Social History Narrative     Not on file     Social Determinants of Health     Financial Resource Strain:      Difficulty of Paying Living Expenses:    Food Insecurity:      Worried About Running Out of Food in the Last Year:      Ran Out of Food in  the Last Year:    Transportation Needs:      Lack of Transportation (Medical):      Lack of Transportation (Non-Medical):    Physical Activity:      Days of Exercise per Week:      Minutes of Exercise per Session:    Stress:      Feeling of Stress :    Social Connections:      Frequency of Communication with Friends and Family:      Frequency of Social Gatherings with Friends and Family:      Attends Faith Services:      Active Member of Clubs or Organizations:      Attends Club or Organization Meetings:      Marital Status:    Intimate Partner Violence:      Fear of Current or Ex-Partner:      Emotionally Abused:      Physically Abused:      Sexually Abused:      Family History   Problem Relation Age of Onset     Heart Disease Mother      Cerebrovascular Disease Mother      Crohn's Disease Father      Alcoholism Brother      No Known Problems Daughter      No Known Problems Son      No Known Problems Maternal Aunt      No Known Problems Maternal Uncle      No Known Problems Paternal Aunt      No Known Problems Paternal Uncle      No Known Problems Maternal Grandmother      No Known Problems Maternal Grandfather      No Known Problems Paternal Grandmother      No Known Problems Paternal Grandfather      Cancer Brother      Urolithiasis No family hx of      Gout No family hx of       Most Recent Immunizations   Administered Date(s) Administered     COVID-19,PF,Pfizer 03/04/2021     DT (PEDS <7y) 03/01/2001     FLU 6-35 months 09/09/2010     Flu, Unspecified 09/09/2010     HepB-Adult 10/05/2018     Influenza (H1N1) 01/27/2010     Influenza (High Dose) 3 valent vaccine 09/24/2019     Influenza (IIV3) PF 10/01/2014     Influenza Vaccine, 6+MO IM (QUADRIVALENT W/PRESERVATIVES) 10/01/2014     Influenza, Quad, High Dose, Pf, 65yr + 09/08/2020     Influenza,INJ,MDCK,PF,Quad >4yrs 09/30/2018     Pneumo Conj 13-V (2010&after) 10/01/2014     Pneumococcal 23 valent 09/08/2005     Td (Adult), Adsorbed 03/01/2001     Tdap  (Adacel,Boostrix) 11/09/2015     Tdap (Adult) Unspecified Formulation 03/01/2001     Zoster vaccine recombinant adjuvanted (SHINGRIX) 12/08/2020     Zoster vaccine, live 12/16/2010      Wt Readings from Last 3 Encounters:   07/16/21 69.4 kg (153 lb)   07/13/21 69.4 kg (153 lb)   07/07/21 70.9 kg (156 lb 3.2 oz)      BP Readings from Last 6 Encounters:   07/16/21 114/66   07/13/21 114/60   07/07/21 112/58   06/30/21 132/75   06/22/21 120/60   06/11/21 108/58      PHYSICAL EXAM:    /66   Pulse 77   Wt 69.4 kg (153 lb)   SpO2 99%   BMI 22.59 kg/m       General: Patient alert no signs of distress     Lungs: Good air movement no wheeze or crackles     Heart: Regular sounding heart rhythm without murmur     Abdomen: Soft nondistended does report some mild nonspecific tenderness      Extremities: Warm no edema varicosities present as prior no evidence of any redness swelling or skin breakdown.     Neurologic: No focal motor or sensory deficit although patient is noted to move slowly and have more difficulty with balance compared to his usual state of health.

## 2021-07-20 ENCOUNTER — VIRTUAL VISIT (OUTPATIENT)
Dept: FAMILY MEDICINE | Facility: CLINIC | Age: 81
End: 2021-07-20
Payer: COMMERCIAL

## 2021-07-20 DIAGNOSIS — E86.0 DEHYDRATION: ICD-10-CM

## 2021-07-20 DIAGNOSIS — R19.7 DIARRHEA, UNSPECIFIED TYPE: Primary | ICD-10-CM

## 2021-07-20 DIAGNOSIS — I25.5 ISCHEMIC CARDIOMYOPATHY: ICD-10-CM

## 2021-07-20 DIAGNOSIS — R53.81 MALAISE: ICD-10-CM

## 2021-07-20 DIAGNOSIS — N18.31 STAGE 3A CHRONIC KIDNEY DISEASE (H): ICD-10-CM

## 2021-07-20 DIAGNOSIS — R63.0 POOR APPETITE: ICD-10-CM

## 2021-07-20 DIAGNOSIS — I50.22 CHRONIC SYSTOLIC CONGESTIVE HEART FAILURE (H): ICD-10-CM

## 2021-07-20 PROCEDURE — 99213 OFFICE O/P EST LOW 20 MIN: CPT | Mod: 95 | Performed by: FAMILY MEDICINE

## 2021-07-20 NOTE — PROGRESS NOTES
Tanmay is a 81 year old who is being evaluated via a billable telephone visit.      What phone number would you like to be contacted at? [phone]   How would you like to obtain your AVS? MyChart       1. Diarrhea, unspecified type      2. Malaise      3. Poor appetite      4. Ischemic cardiomyopathy      5. Stage 3a chronic kidney disease      6. Dehydration      7. Chronic systolic congestive heart failure (H)       He has had some improvement in overall stability in relation to his chronic systolic congestive heart failure symptoms.  His weight is stable and he does not have any other signs or symptoms to suggest decompensation with his medications being reduced and/or held as described below.  He reports his appetite is gradually returning.  I expressed my concern about the diarrhea recurrence, this likely based on the clinical history is simply related to diet and not necessarily indicating ongoing or recurrent infection, certainly with his history of C. difficile need to take this into account.  Discouraged further use of Imodium, if he has additional signs of diarrhea he should contact me and we will arrange for further testing.  Based on the clinical scenario continue to hold and/or reduce medications as described below.  Reassess by phone on Friday to decide a further course of action.    Subjective   Tanmay is a 81 year old has been evaluated for this phone visit for follow-up on recent health concerns.    HPI     He has a complex medical history that includes heart failure, chronic kidney disease, coronary artery disease, history of Crohn's disease, history of C. difficile colitis.    He was evaluated last week for malaise, poor appetite, lightheadedness and other symptoms as described in the last 2 office visit notes.    He had had symptoms that were consistent with gastroenteritis including nausea and diarrhea preceding my first visit with him on 13 July.  I felt that he was dehydrated and we need to  make temporary medication adjustments to recover.  At the time of our initial consultation on July 13 reported that he did have some bouts of diarrhea but it has largely resolved, on follow-up on the 16th he did not report additional episodes of diarrhea.    He states that over this past weekend he had an increase in his appetite and he ate some rich food including a turkey gravy which seemed to set him back and cause diarrhea.  He decided to take additional Imodium to treat diarrhea symptoms.  He reports having diarrhea over the course of about 36 hours but has not had further episodes.  Denies dizziness or lightheadedness, he is not feeling short of breath he denies chest pain or abdominal pain.  He has not noticed any swelling.  He is monitoring his weight at home which he reports to be stable.  He continues to hold his diuretic, his lisinopril and the dose of his carvedilol has been reduced.     He states that after not feeling so well last couple of days with the diarrhea he again is feeling somewhat better.      Review of Systems   Complete review of systems is obtained.  Other than the specific considerations noted above complete review of systems is negative.        Objective           Vitals:  No vitals were obtained today due to virtual visit.    Physical Exam   N/A        Phone call duration:8 minutes

## 2021-07-21 RX ORDER — POTASSIUM CHLORIDE 1500 MG/1
TABLET, EXTENDED RELEASE ORAL
Qty: 90 TABLET | Refills: 2 | Status: SHIPPED | OUTPATIENT
Start: 2021-07-21 | End: 2022-01-01

## 2021-07-21 NOTE — TELEPHONE ENCOUNTER
"potassium chloride (K-DUR,KLOR-CON) 20 MEQ [id number]: TAKE ONE TABLET BY MOUTH ONCE DAILYSent to pharmacy as: potassium chloride ER 20 mEq tablet,extended release(part/cryst) (K-DUR,KLOR-CON)E-Prescribing Status: Receipt confirmed by pharmacy (7/14/2020  1:01 PM CDT)     Last Written Prescription Date:  7/14/20  Last Fill Quantity: 90,  # refills: 3   Last office visit provider:  7/7/21     Requested Prescriptions   Pending Prescriptions Disp Refills     potassium chloride ER (KLOR-CON M) 20 MEQ CR tablet [Pharmacy Med Name: POTASSIUM CHLORIDE ER 20MEQ SUSTAINED ACTION TABLET] 90 tablet      Sig: TAKE ONE TABLET BY MOUTH ONCE DAILY       Potassium Supplements Protocol Passed - 7/16/2021  9:07 AM        Passed - Recent (12 mo) or future (30 days) visit within the authorizing provider's department     Patient has had an office visit with the authorizing provider or a provider within the authorizing providers department within the previous 12 mos or has a future within next 30 days. See \"Patient Info\" tab in inbasket, or \"Choose Columns\" in Meds & Orders section of the refill encounter.              Passed - Medication is active on med list        Passed - Patient is age 18 or older        Passed - Normal serum potassium in past 12 months     Recent Labs   Lab Test 07/13/21  1345   POTASSIUM 4.9                         Juan Farrell RN 07/21/21 11:29 AM  "

## 2021-07-22 ENCOUNTER — TELEPHONE (OUTPATIENT)
Dept: FAMILY MEDICINE | Facility: CLINIC | Age: 81
End: 2021-07-22

## 2021-07-22 DIAGNOSIS — R19.7 DIARRHEA, UNSPECIFIED TYPE: Primary | ICD-10-CM

## 2021-07-22 NOTE — TELEPHONE ENCOUNTER
Reason for Call:  Other call back    Detailed comments: Recvd call from pt/Tanmay, he would like a C-diff completed sooner rather than later and would like [facility] to contact him to discuss. Tanmay is having bowl issues    Tanmay is scheduled to see Dr Ramírez for a VV tomorrow 07.23.2021 at 2:05 but Tanmay would like this taken care of prior to this apptointment    Phone Number Patient can be reached at: Home number on file [phone] (home)    Best Time: anytime    Can we leave a detailed message on this number? YES    Call taken on 7/22/2021 at 1:15 PM by Cara Martinez

## 2021-07-23 ENCOUNTER — VIRTUAL VISIT (OUTPATIENT)
Dept: FAMILY MEDICINE | Facility: CLINIC | Age: 81
End: 2021-07-23
Payer: COMMERCIAL

## 2021-07-23 DIAGNOSIS — R19.7 DIARRHEA, UNSPECIFIED TYPE: Primary | ICD-10-CM

## 2021-07-23 DIAGNOSIS — R53.81 MALAISE: ICD-10-CM

## 2021-07-23 DIAGNOSIS — I25.5 ISCHEMIC CARDIOMYOPATHY: ICD-10-CM

## 2021-07-23 DIAGNOSIS — E86.0 DEHYDRATION: ICD-10-CM

## 2021-07-23 DIAGNOSIS — I50.22 CHRONIC SYSTOLIC CONGESTIVE HEART FAILURE (H): ICD-10-CM

## 2021-07-23 DIAGNOSIS — R63.0 POOR APPETITE: ICD-10-CM

## 2021-07-23 DIAGNOSIS — N18.31 STAGE 3A CHRONIC KIDNEY DISEASE (H): ICD-10-CM

## 2021-07-23 PROCEDURE — 99213 OFFICE O/P EST LOW 20 MIN: CPT | Mod: 95 | Performed by: FAMILY MEDICINE

## 2021-07-23 NOTE — PROGRESS NOTES
Tanmay is a 81 year old who is being evaluated via a billable telephone visit.      What phone number would you like to be contacted at? [phone]  How would you like to obtain your AVS?    Tanmay was seen today for recheck.    Diagnoses and all orders for this visit:    Diarrhea, unspecified type    Malaise    Poor appetite    Ischemic cardiomyopathy    Stage 3a chronic kidney disease    Dehydration    Chronic systolic congestive heart failure (H)         Report any concerning symptoms to me and consider stool collection if diarrhea returns.  Maintain nutrition and hydration.  We will plan in person follow-up next week to reevaluate and decide on changes to medication therapy.    Subjective   Tanmay is a 81 year old who has a complex history including heart failure, chronic any disease, coronary disease, history of Crohn's disease and history of C. difficile colitis.    HPI     He has been evaluated several times over the last couple of weeks for concerns including malaise, poor appetite, lightheadedness, diarrhea and nausea.    We have held blood pressure medications and his diuretic.  He continues on his carvedilol.    He has had some slow improvement but has had some recurrent diarrhea.  I talked to him previously and we have made some arrangements to do testing to ensure that there was not recurrent C. difficile colitis however he reports upon his phone call to report the symptoms he has had no further episodes of diarrhea so he has not collected any samples.  He is optimistic that he is feeling better at the time of our conversation.  He thinks he is overall slowly improving.  Is reporting that he has had an improving appetite and relatively normal intake without nausea vomiting or episodes of diarrhea for greater than 24 hours.  Denies shortness of breath or chest pain.  Reports that his weight is up 1 pound compared to 1 day ago.  Denies any swelling shortness of breath chest pain or other concerning symptoms.   Continue to hold Bumex and lisinopril.  Continue reduced dose of carvedilol.    Review of Systems   Complete review of systems is obtained.  Other than the specific considerations noted above complete review of systems is negative.        Objective           Vitals:  No vitals were obtained today due to virtual visit.    Physical Exam   N/A            Phone call duration: 5 minutes

## 2021-07-30 ENCOUNTER — ANTICOAGULATION THERAPY VISIT (OUTPATIENT)
Dept: ANTICOAGULATION | Facility: CLINIC | Age: 81
End: 2021-07-30

## 2021-07-30 ENCOUNTER — OFFICE VISIT (OUTPATIENT)
Dept: FAMILY MEDICINE | Facility: CLINIC | Age: 81
End: 2021-07-30
Payer: COMMERCIAL

## 2021-07-30 VITALS
HEART RATE: 64 BPM | OXYGEN SATURATION: 99 % | DIASTOLIC BLOOD PRESSURE: 62 MMHG | BODY MASS INDEX: 23.33 KG/M2 | WEIGHT: 158 LBS | SYSTOLIC BLOOD PRESSURE: 116 MMHG

## 2021-07-30 DIAGNOSIS — R53.81 MALAISE: ICD-10-CM

## 2021-07-30 DIAGNOSIS — R19.7 DIARRHEA, UNSPECIFIED TYPE: ICD-10-CM

## 2021-07-30 DIAGNOSIS — I25.5 ISCHEMIC CARDIOMYOPATHY: Primary | ICD-10-CM

## 2021-07-30 DIAGNOSIS — I48.91 ATRIAL FIBRILLATION (H): Primary | ICD-10-CM

## 2021-07-30 DIAGNOSIS — I50.22 CHRONIC SYSTOLIC CONGESTIVE HEART FAILURE (H): ICD-10-CM

## 2021-07-30 DIAGNOSIS — D64.9 ANEMIA, UNSPECIFIED TYPE: ICD-10-CM

## 2021-07-30 DIAGNOSIS — N18.31 STAGE 3A CHRONIC KIDNEY DISEASE (H): ICD-10-CM

## 2021-07-30 DIAGNOSIS — I48.20 CHRONIC ATRIAL FIBRILLATION (H): ICD-10-CM

## 2021-07-30 DIAGNOSIS — I26.99 PULMONARY EMBOLISM WITH INFARCTION (H): ICD-10-CM

## 2021-07-30 LAB
ANION GAP SERPL CALCULATED.3IONS-SCNC: 9 MMOL/L (ref 5–18)
BUN SERPL-MCNC: 12 MG/DL (ref 8–28)
CALCIUM SERPL-MCNC: 8.7 MG/DL (ref 8.5–10.5)
CHLORIDE BLD-SCNC: 108 MMOL/L (ref 98–107)
CO2 SERPL-SCNC: 24 MMOL/L (ref 22–31)
CREAT SERPL-MCNC: 1.05 MG/DL (ref 0.7–1.3)
ERYTHROCYTE [DISTWIDTH] IN BLOOD BY AUTOMATED COUNT: 14 % (ref 10–15)
GFR SERPL CREATININE-BSD FRML MDRD: 66 ML/MIN/1.73M2
GLUCOSE BLD-MCNC: 85 MG/DL (ref 70–125)
HCT VFR BLD AUTO: 29.5 % (ref 40–53)
HGB BLD-MCNC: 9.7 G/DL (ref 13.3–17.7)
INR BLD: 3.1 (ref 0.9–1.1)
MCH RBC QN AUTO: 30.5 PG (ref 26.5–33)
MCHC RBC AUTO-ENTMCNC: 32.9 G/DL (ref 31.5–36.5)
MCV RBC AUTO: 93 FL (ref 78–100)
PLATELET # BLD AUTO: 163 10E3/UL (ref 150–450)
POTASSIUM BLD-SCNC: 4.4 MMOL/L (ref 3.5–5)
RBC # BLD AUTO: 3.18 10E6/UL (ref 4.4–5.9)
SODIUM SERPL-SCNC: 141 MMOL/L (ref 136–145)
WBC # BLD AUTO: 8.2 10E3/UL (ref 4–11)

## 2021-07-30 PROCEDURE — 80048 BASIC METABOLIC PNL TOTAL CA: CPT | Performed by: FAMILY MEDICINE

## 2021-07-30 PROCEDURE — 36415 COLL VENOUS BLD VENIPUNCTURE: CPT | Performed by: FAMILY MEDICINE

## 2021-07-30 PROCEDURE — 36416 COLLJ CAPILLARY BLOOD SPEC: CPT | Performed by: FAMILY MEDICINE

## 2021-07-30 PROCEDURE — 99214 OFFICE O/P EST MOD 30 MIN: CPT | Performed by: FAMILY MEDICINE

## 2021-07-30 PROCEDURE — 85027 COMPLETE CBC AUTOMATED: CPT | Performed by: FAMILY MEDICINE

## 2021-07-30 PROCEDURE — 85610 PROTHROMBIN TIME: CPT | Performed by: FAMILY MEDICINE

## 2021-07-30 NOTE — PROGRESS NOTES
ANTICOAGULATION MANAGEMENT     Tanmay Israel 81 year old male is on warfarin with supratherapeutic INR result. (Goal INR 2.0-3.0)    Recent labs: (last 7 days)     07/30/21  1518   INR 3.1*       ASSESSMENT     Source(s): Patient/Caregiver Call and Template       Warfarin doses taken: Warfarin taken as instructed    Diet: No new diet changes identified    New illness, injury, or hospitalization: No    Medication/supplement changes: None noted    Signs or symptoms of bleeding or clotting: No    Previous INR: Therapeutic last 2(+) visits    Additional findings: None     PLAN     Recommended plan for no diet, medication or health factor changes affecting INR     Dosing Instructions: Continue your current warfarin dose with next INR in 2 weeks       Summary  As of 7/30/2021    Full warfarin instructions:  2.5 mg every Mon, Wed, Fri; 1.25 mg all other days   Next INR check:  8/13/2021             Telephone call with Tanmay who verbalizes understanding and agrees to plan    Patient offered & declined to schedule next visit    Education provided: Target INR goal and significance of current INR result and Contact [phone] with any changes, questions or concerns.     Plan made per [facility] anticoagulation protocol    Yaneth Wells RN  Anticoagulation Clinic  7/30/2021    _______________________________________________________________________     Anticoagulation Episode Summary     Current INR goal:  2.0-3.0   TTR:  67.2 % (1 y)   Target end date:     Send INR reminders to:  CHANI RIOS    Indications    Atrial fibrillation (H) [I48.91]  Pulmonary embolism with infarction (H) [I26.99]           Comments:           Anticoagulation Care Providers     Provider Role Specialty Phone number    Go Ramírez MD Referring Family Medicine [phone]

## 2021-07-30 NOTE — PROGRESS NOTES
Tanmay Israel  /62   Pulse 64   Wt 71.7 kg (158 lb)   SpO2 99%   BMI 23.33 kg/m       Assessment/Plan:                Tanmay was seen today for recheck medication and medication question.    Diagnoses and all orders for this visit:    Ischemic cardiomyopathy    Stage 3a chronic kidney disease  -     CBC with platelets; Future  -     Basic metabolic panel  (Ca, Cl, CO2, Creat, Gluc, K, Na, BUN); Future    Chronic systolic congestive heart failure (H)    Anemia, unspecified type  -     CBC with platelets; Future    Diarrhea, unspecified type    Malaise    Other orders  -     Med Therapy Management Referral         DISCUSSION  I suspect his symptoms were from an infection, likely a gastroenteritis.  It has now resolved and he continues to recover.  We will restart his diuretic, Bumex.  We will recheck kidney function with plan to restart lisinopril in the near future.  He would like to visit with our [facility] pharmacist to discuss his medications overall with hope that he may be able to reduce some medications.  He would definitely benefit from medication education and evaluation given the complexity of his chronic concerns.  Overall doing quite well without significant complaints today.  They will know which other I think a friend seem to know or so think Carmen is a famous local yang practically  Subjective:     HPI:    Tanmay Israel is a 81 year old male with a complex medical history including heart failure, chronic kidney disease, coronary artery disease, history of Crohn's disease and history of C. difficile colitis who has been battling unknown illness over the past several weeks.    He has been evaluated several times with concerns including malaise, poor appetite, lightheadedness, intermittent diarrhea and nausea.    Laboratory testing showed slightly declined renal function but no other significant abnormalities.  He had some diarrhea which then got better then got worse again we were going to obtain  stool test but then it got better and has not recurred.  He reports to me today upon this follow-up visit that he feels close to his normal self.  He does still feel generally weak but appetite is much improved and he overall feels much better.  He reports his weight at home is going up, here it is increased by 5 pounds since his last visit but he is not noticing shortness of breath or lower extremity edema.  He has been holding both his    Bumex and lisinopril because of concerns of low blood pressure and dehydration.  We discussed restarting his Bumex today.  We discussed rechecking labs.  Based on our discussion there is no need for stool testing or other evaluation at this time.    ROS:  Complete review of systems is obtained.  Other than the specific considerations noted above complete review of systems is negative.          Objective:   Medications:  Current Outpatient Medications   Medication     acetaminophen (TYLENOL) 500 MG tablet     albuterol (PROAIR HFA/PROVENTIL HFA/VENTOLIN HFA) 108 (90 Base) MCG/ACT inhaler     bumetanide (BUMEX) 1 MG tablet     carvedilol (COREG) 25 MG tablet     cholecalciferol 25 MCG (1000 UT) TABS     cycloSPORINE (RESTASIS) 0.05 % ophthalmic emulsion     diclofenac (VOLTAREN) 1 % topical gel     erythromycin (ROMYCIN) 5 MG/GM ophthalmic ointment     ferrous sulfate (FEROSUL) 325 (65 Fe) MG tablet     lisinopril (ZESTRIL) 2.5 MG tablet     melatonin 3 MG tablet     multivitamin w/minerals (MULTI-VITAMIN) tablet     neomycin-polymixin-dexamethasone (MAXITROL) ophthalmic ointment     omeprazole (PRILOSEC) 20 MG DR capsule     ondansetron (ZOFRAN) 8 MG tablet     potassium chloride ER (KLOR-CON M) 20 MEQ CR tablet     rosuvastatin (CRESTOR) 20 MG tablet     vitamin B-12 (CYANOCOBALAMIN) 500 MCG tablet     warfarin ANTICOAGULANT (COUMADIN) 2.5 MG tablet     No current facility-administered medications for this visit.        Allergies:   No Known Allergies     Social History      Socioeconomic History     Marital status: Single     Spouse name: Not on file     Number of children: Not on file     Years of education: Not on file     Highest education level: Not on file   Occupational History     Not on file   Tobacco Use     Smoking status: Former Smoker     Smokeless tobacco: Never Used   Substance and Sexual Activity     Alcohol use: Not on file     Drug use: Not on file     Sexual activity: Not on file   Other Topics Concern     Not on file   Social History Narrative     Not on file     Social Determinants of Health     Financial Resource Strain:      Difficulty of Paying Living Expenses:    Food Insecurity:      Worried About Running Out of Food in the Last Year:      Ran Out of Food in the Last Year:    Transportation Needs:      Lack of Transportation (Medical):      Lack of Transportation (Non-Medical):    Physical Activity:      Days of Exercise per Week:      Minutes of Exercise per Session:    Stress:      Feeling of Stress :    Social Connections:      Frequency of Communication with Friends and Family:      Frequency of Social Gatherings with Friends and Family:      Attends Caodaism Services:      Active Member of Clubs or Organizations:      Attends Club or Organization Meetings:      Marital Status:    Intimate Partner Violence:      Fear of Current or Ex-Partner:      Emotionally Abused:      Physically Abused:      Sexually Abused:        Family History   Problem Relation Age of Onset     Heart Disease Mother      Cerebrovascular Disease Mother      Crohn's Disease Father      Alcoholism Brother      No Known Problems Daughter      No Known Problems Son      No Known Problems Maternal Aunt      No Known Problems Maternal Uncle      No Known Problems Paternal Aunt      No Known Problems Paternal Uncle      No Known Problems Maternal Grandmother      No Known Problems Maternal Grandfather      No Known Problems Paternal Grandmother      No Known Problems Paternal  Grandfather      Cancer Brother      Urolithiasis No family hx of      Gout No family hx of         Most Recent Immunizations   Administered Date(s) Administered     COVID-19,PF,Pfizer 03/04/2021     DT (PEDS <7y) 03/01/2001     FLU 6-35 months 09/09/2010     Flu, Unspecified 09/09/2010     HepB-Adult 10/05/2018     Influenza (H1N1) 01/27/2010     Influenza (High Dose) 3 valent vaccine 09/24/2019     Influenza (IIV3) PF 10/01/2014     Influenza Vaccine, 6+MO IM (QUADRIVALENT W/PRESERVATIVES) 10/01/2014     Influenza, Quad, High Dose, Pf, 65yr + 09/08/2020     Influenza,INJ,MDCK,PF,Quad >4yrs 09/30/2018     Pneumo Conj 13-V (2010&after) 10/01/2014     Pneumococcal 23 valent 09/08/2005     Td (Adult), Adsorbed 03/01/2001     Tdap (Adacel,Boostrix) 11/09/2015     Tdap (Adult) Unspecified Formulation 03/01/2001     Zoster vaccine recombinant adjuvanted (SHINGRIX) 12/08/2020     Zoster vaccine, live 12/16/2010        Wt Readings from Last 3 Encounters:   07/30/21 71.7 kg (158 lb)   07/16/21 69.4 kg (153 lb)   07/13/21 69.4 kg (153 lb)        BP Readings from Last 6 Encounters:   07/30/21 116/62   07/16/21 114/66   07/13/21 114/60   07/07/21 112/58   06/30/21 132/75   06/22/21 120/60      PHYSICAL EXAM:    /62   Pulse 64   Wt 71.7 kg (158 lb)   SpO2 99%   BMI 23.33 kg/m           General Appearance:    Alert, cooperative, no distress   Eyes:   No scleral icterus or conjunctival irritation       Lungs:     Clear to auscultation bilaterally, respirations unlabored, no wheezes or crackles   Heart:    Regular rate and rhythm,  No murmur   Abdomen:    Soft, no distention, no tenderness on palpation, no masses, no organomegaly     Extremities:  No edema, no joint swelling or redness, no evidence of any injuries   Skin:  No concerning skin findings, no suspicious moles, no rashes   Neurologic:  On gross examination there is no motor or sensory deficit.  Patient walks with a normal gait

## 2021-08-03 PROBLEM — G95.9 CERVICAL MYELOPATHY (H): Status: RESOLVED | Noted: 2019-02-22 | Resolved: 2019-07-09

## 2021-08-05 ENCOUNTER — ANCILLARY PROCEDURE (OUTPATIENT)
Dept: CARDIOLOGY | Facility: CLINIC | Age: 81
End: 2021-08-05
Attending: INTERNAL MEDICINE
Payer: COMMERCIAL

## 2021-08-05 ENCOUNTER — OFFICE VISIT (OUTPATIENT)
Dept: CARDIOLOGY | Facility: CLINIC | Age: 81
End: 2021-08-05
Payer: COMMERCIAL

## 2021-08-05 VITALS
HEART RATE: 76 BPM | SYSTOLIC BLOOD PRESSURE: 120 MMHG | HEIGHT: 69 IN | DIASTOLIC BLOOD PRESSURE: 60 MMHG | BODY MASS INDEX: 23.11 KG/M2 | RESPIRATION RATE: 24 BRPM | WEIGHT: 156 LBS

## 2021-08-05 DIAGNOSIS — I44.7 LBBB (LEFT BUNDLE BRANCH BLOCK): ICD-10-CM

## 2021-08-05 DIAGNOSIS — I10 BENIGN ESSENTIAL HYPERTENSION: ICD-10-CM

## 2021-08-05 DIAGNOSIS — Z95.810 ICD (IMPLANTABLE CARDIOVERTER-DEFIBRILLATOR) IN PLACE: ICD-10-CM

## 2021-08-05 DIAGNOSIS — I25.5 ISCHEMIC CARDIOMYOPATHY: Primary | ICD-10-CM

## 2021-08-05 DIAGNOSIS — I48.21 PERMANENT ATRIAL FIBRILLATION (H): ICD-10-CM

## 2021-08-05 LAB
MDC_IDC_LEAD_IMPLANT_DT: NORMAL
MDC_IDC_LEAD_LOCATION: NORMAL
MDC_IDC_LEAD_LOCATION_DETAIL_1: NORMAL
MDC_IDC_LEAD_MFG: NORMAL
MDC_IDC_LEAD_MODEL: NORMAL
MDC_IDC_LEAD_POLARITY_TYPE: NORMAL
MDC_IDC_LEAD_SERIAL: NORMAL
MDC_IDC_MSMT_BATTERY_DTM: NORMAL
MDC_IDC_MSMT_BATTERY_RRT_TRIGGER: 2.63
MDC_IDC_MSMT_BATTERY_STATUS: NORMAL
MDC_IDC_MSMT_BATTERY_VOLTAGE: 2.63 V
MDC_IDC_MSMT_CAP_CHARGE_DTM: NORMAL
MDC_IDC_MSMT_CAP_CHARGE_ENERGY: 35 J
MDC_IDC_MSMT_CAP_CHARGE_TIME: 13.4
MDC_IDC_MSMT_CAP_CHARGE_TYPE: NORMAL
MDC_IDC_MSMT_LEADCHNL_RV_IMPEDANCE_VALUE: 304 OHM
MDC_IDC_MSMT_LEADCHNL_RV_PACING_THRESHOLD_AMPLITUDE: 0.75 V
MDC_IDC_MSMT_LEADCHNL_RV_PACING_THRESHOLD_PULSEWIDTH: 0.4 MS
MDC_IDC_MSMT_LEADCHNL_RV_SENSING_INTR_AMPL: 13.88 MV
MDC_IDC_MSMT_LEADCHNL_RV_SENSING_INTR_AMPL: 8.62 MV
MDC_IDC_PG_IMPLANT_DTM: NORMAL
MDC_IDC_PG_MFG: NORMAL
MDC_IDC_PG_MODEL: NORMAL
MDC_IDC_PG_SERIAL: NORMAL
MDC_IDC_PG_TYPE: NORMAL
MDC_IDC_SESS_CLINIC_NAME: NORMAL
MDC_IDC_SESS_DTM: NORMAL
MDC_IDC_SESS_TYPE: NORMAL
MDC_IDC_SET_BRADY_HYSTRATE: NORMAL
MDC_IDC_SET_BRADY_LOWRATE: 50 {BEATS}/MIN
MDC_IDC_SET_BRADY_MODE: NORMAL
MDC_IDC_SET_LEADCHNL_RV_PACING_AMPLITUDE: 1.5 V
MDC_IDC_SET_LEADCHNL_RV_PACING_ANODE_ELECTRODE_1: NORMAL
MDC_IDC_SET_LEADCHNL_RV_PACING_ANODE_LOCATION_1: NORMAL
MDC_IDC_SET_LEADCHNL_RV_PACING_CAPTURE_MODE: NORMAL
MDC_IDC_SET_LEADCHNL_RV_PACING_CATHODE_ELECTRODE_1: NORMAL
MDC_IDC_SET_LEADCHNL_RV_PACING_CATHODE_LOCATION_1: NORMAL
MDC_IDC_SET_LEADCHNL_RV_PACING_POLARITY: NORMAL
MDC_IDC_SET_LEADCHNL_RV_PACING_PULSEWIDTH: 0.4 MS
MDC_IDC_SET_LEADCHNL_RV_SENSING_ANODE_ELECTRODE_1: NORMAL
MDC_IDC_SET_LEADCHNL_RV_SENSING_ANODE_LOCATION_1: NORMAL
MDC_IDC_SET_LEADCHNL_RV_SENSING_CATHODE_ELECTRODE_1: NORMAL
MDC_IDC_SET_LEADCHNL_RV_SENSING_CATHODE_LOCATION_1: NORMAL
MDC_IDC_SET_LEADCHNL_RV_SENSING_POLARITY: NORMAL
MDC_IDC_SET_LEADCHNL_RV_SENSING_SENSITIVITY: 0.45 MV
MDC_IDC_SET_ZONE_DETECTION_BEATS_DENOMINATOR: 40 {BEATS}
MDC_IDC_SET_ZONE_DETECTION_BEATS_NUMERATOR: 30 {BEATS}
MDC_IDC_SET_ZONE_DETECTION_INTERVAL: 240 MS
MDC_IDC_SET_ZONE_DETECTION_INTERVAL: 310 MS
MDC_IDC_SET_ZONE_DETECTION_INTERVAL: 330 MS
MDC_IDC_SET_ZONE_DETECTION_INTERVAL: 450 MS
MDC_IDC_SET_ZONE_TYPE: NORMAL
MDC_IDC_STAT_BRADY_DTM_END: NORMAL
MDC_IDC_STAT_BRADY_DTM_START: NORMAL
MDC_IDC_STAT_BRADY_RV_PERCENT_PACED: 48.1 %
MDC_IDC_STAT_EPISODE_RECENT_COUNT: 0
MDC_IDC_STAT_EPISODE_RECENT_COUNT_DTM_END: NORMAL
MDC_IDC_STAT_EPISODE_RECENT_COUNT_DTM_START: NORMAL
MDC_IDC_STAT_EPISODE_TOTAL_COUNT: 0
MDC_IDC_STAT_EPISODE_TOTAL_COUNT: 9
MDC_IDC_STAT_EPISODE_TOTAL_COUNT_DTM_END: NORMAL
MDC_IDC_STAT_EPISODE_TOTAL_COUNT_DTM_START: NORMAL
MDC_IDC_STAT_EPISODE_TYPE: NORMAL
MDC_IDC_STAT_TACHYTHERAPY_ATP_DELIVERED_RECENT: 0
MDC_IDC_STAT_TACHYTHERAPY_ATP_DELIVERED_TOTAL: 0
MDC_IDC_STAT_TACHYTHERAPY_RECENT_DTM_END: NORMAL
MDC_IDC_STAT_TACHYTHERAPY_RECENT_DTM_START: NORMAL
MDC_IDC_STAT_TACHYTHERAPY_SHOCKS_ABORTED_RECENT: 0
MDC_IDC_STAT_TACHYTHERAPY_SHOCKS_ABORTED_TOTAL: 0
MDC_IDC_STAT_TACHYTHERAPY_SHOCKS_DELIVERED_RECENT: 0
MDC_IDC_STAT_TACHYTHERAPY_SHOCKS_DELIVERED_TOTAL: 0
MDC_IDC_STAT_TACHYTHERAPY_TOTAL_DTM_END: NORMAL
MDC_IDC_STAT_TACHYTHERAPY_TOTAL_DTM_START: NORMAL

## 2021-08-05 PROCEDURE — 93282 PRGRMG EVAL IMPLANTABLE DFB: CPT | Performed by: INTERNAL MEDICINE

## 2021-08-05 PROCEDURE — 99215 OFFICE O/P EST HI 40 MIN: CPT | Performed by: INTERNAL MEDICINE

## 2021-08-05 ASSESSMENT — MIFFLIN-ST. JEOR: SCORE: 1397.99

## 2021-08-05 NOTE — LETTER
8/5/2021    Go Ramírez MD, MD  [address]  [facility] [address]    RE: Tanmay Israel       Dear Colleague,    I had the pleasure of seeing Tanmay Israel in the [facility].             ELECTROPHYSIOLOGY NOTE    Today I had the opportunity to see  Tanmay Israel for follow-up evaluation of ICD and ischemic cardiomyopathy.      Assessment/Recommendations   Clinic Problem List:  (I25.5) Ischemic cardiomyopathy  (primary encounter diagnosis)  Comment: Ischemic cardiomyopathy with chronic systolic failure probably functional class I, decrease in ejection fraction noted  Plan: Upgrade ICD to CRT D at the time of replacement of his generator for battery depletion    (I48.21) Permanent atrial fibrillation (H)  Comment: Controlled ventricular response on warfarin    (I10) Benign essential hypertension  Comment: Controlled    (I44.7) LBBB (left bundle branch block)  Comment: A candidate for CRT     42 minutes spent on this encounter date for chart review, history, physical exam,documentation and activities detailed below.    Plan:  Upgrade ICD to CRT device at the time of replacement for battery depletion       History of Present Illness     Tanmay Israel is a 81 year old male with a long history of ischemic cardiomyopathy and chronic atrial fibrillation.  He underwent coronary artery bypass graft surgery in [place] over 10 years ago.  Has been in a chronic atrial fibrillation also for quite a number of years maintained on warfarin anticoagulation.  A single-chamber ICD was implanted in 2013 for primary prophylaxis.  He has done reasonably well on medications.  Ejection fraction was felt to be 40% in 2016.  Patient was hospitalized with a community-acquired pneumonia on 6/11/2021 at [facility].  Cardiac echo showed ejection fraction of 24% aortic valve sclerosis moderate tricuspid insufficiency and some pulmonary hypertension.  Stress nuclear imaging  "showed ejection fraction at 32% there was a suggestion of distal anterior myocardial infarction but left bundle branch block and pacing could potentially produce this is an artifact.  There is no evidence for ischemia.  Tanmay states he is doing reasonably well now he can walk a mile and golfs once a week without symptoms of chest pain or shortness of breath.  He denies palpitations he has not had recent shocks from his implantable defibrillator.  There is been no syncope or presyncope.    Personally reviewed.  Studies described above ECG 6/11/2021 shows atrial fibrillation with left bundle branch block aberrancy and QRS duration of 160 ms.  ICD check today shows no VT episodes detected.  Patient has excellent right ventricular pacing and sensing thresholds.  He is pacing from the right ventricle approximately 48% of the time.  Battery charge time is 2.63 V right at the elective or placement interval but this is not triggered yet but will trigger in the near future.       Physical Examination Review of Systems   /60 (BP Location: Left arm, Patient Position: Sitting, Cuff Size: Adult Regular)   Pulse 76   Resp 24   Ht 1.753 m (5' 9\")   Wt 70.8 kg (156 lb)   BMI 23.04 kg/m    Body mass index is 23.04 kg/m .  Wt Readings from Last 3 Encounters:   08/05/21 70.8 kg (156 lb)   07/30/21 71.7 kg (158 lb)   07/16/21 69.4 kg (153 lb)        Appearance:   no distress, elderly gentleman no acute distress   HEENT:   no scleral icterus, normal conjunctivae    Neck: no carotid bruits or thyromegaly   Chest/Lungs:   lungs are clear to auscultation, no rales or wheezing,    Cardiovascular:   Jugular venous pressure 5 cm, Apical pulse is irregular. Normal S1,S2 with no murmurs or gallops,   Abdomen:  no  Hepatosplenomegaly., nontender,  bowel sounds are present   Extremities: no cyanosis or clubbing, No edema   Skin: no xanthelasma, warm.    Neurologic: No gross focal neurologic deficits   Mood/Affect: Alert, cooperative    " "Enc Vitals  BP: 120/60  Pulse: 76  Resp: 24  Weight: 70.8 kg (156 lb)  Height: 175.3 cm (5' 9\")                                         Medical History  Surgical History Family History Social History   Past Medical History:   Diagnosis Date     Acute cholecystitis 2/28/2019     Acute post-operative pain      Anemia      Arthritis      Atrial fibrillation (H)      C. difficile diarrhea 4/21/2019     Calculus of ureter      Callus      Cerebral aneurysm      Cervical myelopathy (H) 2/22/2019     Cervical spinal stenosis      Chronic kidney disease     stage 3     Chronic systolic congestive heart failure (H)      Closed fracture of distal end of left radius, unspecified fracture morphology, initial encounter      Closed fracture of distal end of right radius, unspecified fracture morphology, initial encounter      Closed fracture of first thoracic vertebra, unspecified fracture morphology, initial encounter (H)      Coronary artery disease      Crohn's disease (H)      Disorder of bursae and tendons in shoulder region     Created by Conversion  Replacement Utility updated for latest IMO load     Dysphagia      Fall 10/22/2016     GERD (gastroesophageal reflux disease)      Hyperlipidemia      Hypertension      Hypotension, unspecified hypotension type      ICD (implantable cardioverter-defibrillator) in place     Medtronic     Ischemic cardiomyopathy      Kidney stone      Low back pain      Lumbago     Created by Conversion      Macular degeneration      Myalgia and myositis, unspecified     Created by Conversion      Nonspecific elevation of levels of transaminase or lactic acid dehydrogenase (LDH)     Created by Conversion      Permanent atrial fibrillation (H)     [id number] risk score = 5 (age1/ CAD/ HTN/ CHF) Chronic warfarin     Personal history of colonic polyps 2/12/2019     Polyp of duodenum 10/17/2016     Pyuria      Right arm weakness 4/2/2019     Schatzki's ring      Sciatica     Created by Conversion  "     Sleep apnea     no CPAP     Spondylolisthesis of lumbar region 7/5/2016     Weakness 4/20/2019    Past Surgical History:   Procedure Laterality Date     APPENDECTOMY       ARTHROSCOPY SHOULDER ROTATOR CUFF REPAIR Right      BYPASS GRAFT ARTERY CORONARY  08/2004    Coronary Artery Quadruple Venous Bypass Graft     C LAP,CHOLECYSTECTOMY/EXPLORE N/A 2/28/2019    CHOLECYSTECTOMY, LAPAROSCOPIC;  Surgeon: Mana Roman MD;  Location: [facility];  Service: General     CARDIAC DEFIBRILLATOR PLACEMENT  2013    ICD Medtronic     CHOLECYSTECTOMY       COLONOSCOPY N/A 2/19/2020    Procedure: COLONOSCOPY;  Surgeon: Houston Medina MD;  Location: [facility];  Service: Gastroenterology     ESOPHAGOSCOPY, GASTROSCOPY, DUODENOSCOPY (EGD), COMBINED       IR LUMBAR EPIDURAL STEROID INJECTION  6/29/2005     IR LUMBAR EPIDURAL STEROID INJECTION  7/15/2005     IR LUMBAR EPIDURAL STEROID INJECTION  9/28/2005     IR LUMBAR EPIDURAL STEROID INJECTION  12/28/2005     IR LUMBAR EPIDURAL STEROID INJECTION  3/29/2006     IR LUMBAR EPIDURAL STEROID INJECTION  6/8/2006     PHACOEMULSIFICATION CLEAR CORNEA WITH STANDARD INTRAOCULAR LENS IMPLANT Bilateral      [place] C- LAMINOPLASTY, 2 OR MORE Left 2/22/2019    Procedure: LEFT CERVICAL 4, 5, 6 LAMINOPLASTY;  Surgeon: Liza Cesar MD;  Location: [facility];  Service: Spine     WV ESOPHAGOGASTRODUODENOSCOPY TRANSORAL DIAGNOSTIC N/A 5/10/2021    Procedure: ESOPHAGOGASTRODUODENOSCOPY (EGD);  Surgeon: Franklin Mendoza MD;  Location: [facility];  Service: Gastroenterology     WV ESOPHAGOGASTRODUODENOSCOPY TRANSORAL DIAGNOSTIC N/A 6/16/2021    Procedure: ESOPHAGOGASTRODUODENOSCOPY (EGD), WITH ESOPHAGEAL DILATION AND BIOPSY;  Surgeon: Paramjit Brown MD;  Location: [facility];  Service: Gastroenterology     TONSILLECTOMY       XR MYELOGRAM CERVICAL THORACIC LUMBAR  1/17/2019    Family History   Problem Relation Age of Onset     Heart Disease Mother       Cerebrovascular Disease Mother      Crohn's Disease Father      Alcoholism Brother      No Known Problems Daughter      No Known Problems Son      No Known Problems Maternal Aunt      No Known Problems Maternal Uncle      No Known Problems Paternal Aunt      No Known Problems Paternal Uncle      No Known Problems Maternal Grandmother      No Known Problems Maternal Grandfather      No Known Problems Paternal Grandmother      No Known Problems Paternal Grandfather      Cancer Brother      Urolithiasis No family hx of      Gout No family hx of     Social History     Socioeconomic History     Marital status: Single     Spouse name: Not on file     Number of children: Not on file     Years of education: Not on file     Highest education level: Not on file   Occupational History     Not on file   Tobacco Use     Smoking status: Former Smoker     Smokeless tobacco: Never Used   Substance and Sexual Activity     Alcohol use: Not on file     Drug use: Not on file     Sexual activity: Not on file   Other Topics Concern     Not on file   Social History Narrative     Not on file     Social Determinants of Health     Financial Resource Strain:      Difficulty of Paying Living Expenses:    Food Insecurity:      Worried About Running Out of Food in the Last Year:      Ran Out of Food in the Last Year:    Transportation Needs:      Lack of Transportation (Medical):      Lack of Transportation (Non-Medical):    Physical Activity:      Days of Exercise per Week:      Minutes of Exercise per Session:    Stress:      Feeling of Stress :    Social Connections:      Frequency of Communication with Friends and Family:      Frequency of Social Gatherings with Friends and Family:      Attends Mandaeism Services:      Active Member of Clubs or Organizations:      Attends Club or Organization Meetings:      Marital Status:    Intimate Partner Violence:      Fear of Current or Ex-Partner:      Emotionally Abused:      Physically Abused:       Sexually Abused:           Medications  Allergies   Current Outpatient Medications   Medication Sig Dispense Refill     acetaminophen (TYLENOL) 500 MG tablet Take 1,000 mg by mouth 3 times daily       albuterol (PROAIR HFA/PROVENTIL HFA/VENTOLIN HFA) 108 (90 Base) MCG/ACT inhaler        bumetanide (BUMEX) 1 MG tablet Take 1 tablet by mouth daily       carvedilol (COREG) 25 MG tablet Take 1-1.5 tablets by mouth 2 times daily       cholecalciferol 25 MCG (1000 UT) TABS Take 2,000 Units by mouth daily       cycloSPORINE (RESTASIS) 0.05 % ophthalmic emulsion 1 drop       diclofenac (VOLTAREN) 1 % topical gel 3 times daily       ferrous sulfate (FEROSUL) 325 (65 Fe) MG tablet Take 1 tablet by mouth daily       lisinopril (ZESTRIL) 2.5 MG tablet Take 5 mg by mouth       melatonin 3 MG tablet Take 3 mg by mouth nightly as needed       multivitamin w/minerals (MULTI-VITAMIN) tablet Take 1 tablet by mouth daily       omeprazole (PRILOSEC) 20 MG DR capsule Take 1 capsule by mouth 2 times daily       ondansetron (ZOFRAN) 8 MG tablet Take 8 mg by mouth as needed       potassium chloride ER (KLOR-CON M) 20 MEQ CR tablet TAKE ONE TABLET BY MOUTH ONCE DAILY 90 tablet 2     rosuvastatin (CRESTOR) 20 MG tablet TAKE 1 TABLET BY MOUTH DAILY AT BEDTIME 90 tablet 3     vitamin B-12 (CYANOCOBALAMIN) 500 MCG tablet Take 500 mcg by mouth daily       warfarin ANTICOAGULANT (COUMADIN) 2.5 MG tablet daily       erythromycin (ROMYCIN) 5 MG/GM ophthalmic ointment  (Patient not taking: Reported on 8/5/2021)       neomycin-polymixin-dexamethasone (MAXITROL) ophthalmic ointment 1 Application (Patient not taking: Reported on 8/5/2021)        No Known Allergies                                                                    Thank you for allowing me to participate in the care of your patient.      Sincerely,     Houston Rolon MD     [facility]  cc:   No referring provider defined for  this encounter.

## 2021-08-05 NOTE — PROGRESS NOTES
ELECTROPHYSIOLOGY NOTE    Today I had the opportunity to see  Tanmay Israel for follow-up evaluation of ICD and ischemic cardiomyopathy.      Assessment/Recommendations   Clinic Problem List:  (I25.5) Ischemic cardiomyopathy  (primary encounter diagnosis)  Comment: Ischemic cardiomyopathy with chronic systolic failure probably functional class I, decrease in ejection fraction noted  Plan: Upgrade ICD to CRT D at the time of replacement of his generator for battery depletion    (I48.21) Permanent atrial fibrillation (H)  Comment: Controlled ventricular response on warfarin    (I10) Benign essential hypertension  Comment: Controlled    (I44.7) LBBB (left bundle branch block)  Comment: A candidate for CRT     42 minutes spent on this encounter date for chart review, history, physical exam,documentation and activities detailed below.    Plan:  Upgrade ICD to CRT device at the time of replacement for battery depletion       History of Present Illness     Tanmay Israel is a 81 year old male with a long history of ischemic cardiomyopathy and chronic atrial fibrillation.  He underwent coronary artery bypass graft surgery in [place] over 10 years ago.  Has been in a chronic atrial fibrillation also for quite a number of years maintained on warfarin anticoagulation.  A single-chamber ICD was implanted in 2013 for primary prophylaxis.  He has done reasonably well on medications.  Ejection fraction was felt to be 40% in 2016.  Patient was hospitalized with a community-acquired pneumonia on 6/11/2021 at [facility].  Cardiac echo showed ejection fraction of 24% aortic valve sclerosis moderate tricuspid insufficiency and some pulmonary hypertension.  Stress nuclear imaging showed ejection fraction at 32% there was a suggestion of distal anterior myocardial infarction but left bundle branch block and pacing could potentially produce this is an artifact.  There is no evidence for ischemia.  Tanmay states he is doing  "reasonably well now he can walk a mile and golfs once a week without symptoms of chest pain or shortness of breath.  He denies palpitations he has not had recent shocks from his implantable defibrillator.  There is been no syncope or presyncope.    Personally reviewed.  Studies described above ECG 6/11/2021 shows atrial fibrillation with left bundle branch block aberrancy and QRS duration of 160 ms.  ICD check today shows no VT episodes detected.  Patient has excellent right ventricular pacing and sensing thresholds.  He is pacing from the right ventricle approximately 48% of the time.  Battery charge time is 2.63 V right at the elective or placement interval but this is not triggered yet but will trigger in the near future.       Physical Examination Review of Systems   /60 (BP Location: Left arm, Patient Position: Sitting, Cuff Size: Adult Regular)   Pulse 76   Resp 24   Ht 1.753 m (5' 9\")   Wt 70.8 kg (156 lb)   BMI 23.04 kg/m    Body mass index is 23.04 kg/m .  Wt Readings from Last 3 Encounters:   08/05/21 70.8 kg (156 lb)   07/30/21 71.7 kg (158 lb)   07/16/21 69.4 kg (153 lb)        Appearance:   no distress, elderly gentleman no acute distress   HEENT:   no scleral icterus, normal conjunctivae    Neck: no carotid bruits or thyromegaly   Chest/Lungs:   lungs are clear to auscultation, no rales or wheezing,    Cardiovascular:   Jugular venous pressure 5 cm, Apical pulse is irregular. Normal S1,S2 with no murmurs or gallops,   Abdomen:  no  Hepatosplenomegaly., nontender,  bowel sounds are present   Extremities: no cyanosis or clubbing, No edema   Skin: no xanthelasma, warm.    Neurologic: No gross focal neurologic deficits   Mood/Affect: Alert, cooperative    Enc Vitals  BP: 120/60  Pulse: 76  Resp: 24  Weight: 70.8 kg (156 lb)  Height: 175.3 cm (5' 9\")                                         Medical History  Surgical History Family History Social History   Past Medical History:   Diagnosis Date     " Acute cholecystitis 2/28/2019     Acute post-operative pain      Anemia      Arthritis      Atrial fibrillation (H)      C. difficile diarrhea 4/21/2019     Calculus of ureter      Callus      Cerebral aneurysm      Cervical myelopathy (H) 2/22/2019     Cervical spinal stenosis      Chronic kidney disease     stage 3     Chronic systolic congestive heart failure (H)      Closed fracture of distal end of left radius, unspecified fracture morphology, initial encounter      Closed fracture of distal end of right radius, unspecified fracture morphology, initial encounter      Closed fracture of first thoracic vertebra, unspecified fracture morphology, initial encounter (H)      Coronary artery disease      Crohn's disease (H)      Disorder of bursae and tendons in shoulder region     Created by Conversion  Replacement Utility updated for latest IMO load     Dysphagia      Fall 10/22/2016     GERD (gastroesophageal reflux disease)      Hyperlipidemia      Hypertension      Hypotension, unspecified hypotension type      ICD (implantable cardioverter-defibrillator) in place     Medtronic     Ischemic cardiomyopathy      Kidney stone      Low back pain      Lumbago     Created by Conversion      Macular degeneration      Myalgia and myositis, unspecified     Created by Conversion      Nonspecific elevation of levels of transaminase or lactic acid dehydrogenase (LDH)     Created by Conversion      Permanent atrial fibrillation (H)     [id number] risk score = 5 (age1/ CAD/ HTN/ CHF) Chronic warfarin     Personal history of colonic polyps 2/12/2019     Polyp of duodenum 10/17/2016     Pyuria      Right arm weakness 4/2/2019     Schatzki's ring      Sciatica     Created by Conversion      Sleep apnea     no CPAP     Spondylolisthesis of lumbar region 7/5/2016     Weakness 4/20/2019    Past Surgical History:   Procedure Laterality Date     APPENDECTOMY       ARTHROSCOPY SHOULDER ROTATOR CUFF REPAIR Right      BYPASS GRAFT  ARTERY CORONARY  08/2004    Coronary Artery Quadruple Venous Bypass Graft     C LAP,CHOLECYSTECTOMY/EXPLORE N/A 2/28/2019    CHOLECYSTECTOMY, LAPAROSCOPIC;  Surgeon: Mana Roman MD;  Location: [facility];  Service: General     CARDIAC DEFIBRILLATOR PLACEMENT  2013    ICD Medtronic     CHOLECYSTECTOMY       COLONOSCOPY N/A 2/19/2020    Procedure: COLONOSCOPY;  Surgeon: Houston Medina MD;  Location: [facility];  Service: Gastroenterology     ESOPHAGOSCOPY, GASTROSCOPY, DUODENOSCOPY (EGD), COMBINED       IR LUMBAR EPIDURAL STEROID INJECTION  6/29/2005     IR LUMBAR EPIDURAL STEROID INJECTION  7/15/2005     IR LUMBAR EPIDURAL STEROID INJECTION  9/28/2005     IR LUMBAR EPIDURAL STEROID INJECTION  12/28/2005     IR LUMBAR EPIDURAL STEROID INJECTION  3/29/2006     IR LUMBAR EPIDURAL STEROID INJECTION  6/8/2006     PHACOEMULSIFICATION CLEAR CORNEA WITH STANDARD INTRAOCULAR LENS IMPLANT Bilateral      NV C- LAMINOPLASTY, 2 OR MORE Left 2/22/2019    Procedure: LEFT CERVICAL 4, 5, 6 LAMINOPLASTY;  Surgeon: Liza Cesar MD;  Location: [facility];  Service: Spine     NV ESOPHAGOGASTRODUODENOSCOPY TRANSORAL DIAGNOSTIC N/A 5/10/2021    Procedure: ESOPHAGOGASTRODUODENOSCOPY (EGD);  Surgeon: Franklin Mendoza MD;  Location: [facility];  Service: Gastroenterology     NV ESOPHAGOGASTRODUODENOSCOPY TRANSORAL DIAGNOSTIC N/A 6/16/2021    Procedure: ESOPHAGOGASTRODUODENOSCOPY (EGD), WITH ESOPHAGEAL DILATION AND BIOPSY;  Surgeon: Paramjit Brown MD;  Location: [facility];  Service: Gastroenterology     TONSILLECTOMY       XR MYELOGRAM CERVICAL THORACIC LUMBAR  1/17/2019    Family History   Problem Relation Age of Onset     Heart Disease Mother      Cerebrovascular Disease Mother      Crohn's Disease Father      Alcoholism Brother      No Known Problems Daughter      No Known Problems Son      No Known Problems Maternal Aunt      No Known Problems Maternal Uncle      No Known  Problems Paternal Aunt      No Known Problems Paternal Uncle      No Known Problems Maternal Grandmother      No Known Problems Maternal Grandfather      No Known Problems Paternal Grandmother      No Known Problems Paternal Grandfather      Cancer Brother      Urolithiasis No family hx of      Gout No family hx of     Social History     Socioeconomic History     Marital status: Single     Spouse name: Not on file     Number of children: Not on file     Years of education: Not on file     Highest education level: Not on file   Occupational History     Not on file   Tobacco Use     Smoking status: Former Smoker     Smokeless tobacco: Never Used   Substance and Sexual Activity     Alcohol use: Not on file     Drug use: Not on file     Sexual activity: Not on file   Other Topics Concern     Not on file   Social History Narrative     Not on file     Social Determinants of Health     Financial Resource Strain:      Difficulty of Paying Living Expenses:    Food Insecurity:      Worried About Running Out of Food in the Last Year:      Ran Out of Food in the Last Year:    Transportation Needs:      Lack of Transportation (Medical):      Lack of Transportation (Non-Medical):    Physical Activity:      Days of Exercise per Week:      Minutes of Exercise per Session:    Stress:      Feeling of Stress :    Social Connections:      Frequency of Communication with Friends and Family:      Frequency of Social Gatherings with Friends and Family:      Attends [employer] Services:      Active Member of Clubs or Organizations:      Attends Club or Organization Meetings:      Marital Status:    Intimate Partner Violence:      Fear of Current or Ex-Partner:      Emotionally Abused:      Physically Abused:      Sexually Abused:           Medications  Allergies   Current Outpatient Medications   Medication Sig Dispense Refill     acetaminophen (TYLENOL) 500 MG tablet Take 1,000 mg by mouth 3 times daily       albuterol (PROAIR  HFA/PROVENTIL HFA/VENTOLIN HFA) 108 (90 Base) MCG/ACT inhaler        bumetanide (BUMEX) 1 MG tablet Take 1 tablet by mouth daily       carvedilol (COREG) 25 MG tablet Take 1-1.5 tablets by mouth 2 times daily       cholecalciferol 25 MCG (1000 UT) TABS Take 2,000 Units by mouth daily       cycloSPORINE (RESTASIS) 0.05 % ophthalmic emulsion 1 drop       diclofenac (VOLTAREN) 1 % topical gel 3 times daily       ferrous sulfate (FEROSUL) 325 (65 Fe) MG tablet Take 1 tablet by mouth daily       lisinopril (ZESTRIL) 2.5 MG tablet Take 5 mg by mouth       melatonin 3 MG tablet Take 3 mg by mouth nightly as needed       multivitamin w/minerals (MULTI-VITAMIN) tablet Take 1 tablet by mouth daily       omeprazole (PRILOSEC) 20 MG DR capsule Take 1 capsule by mouth 2 times daily       ondansetron (ZOFRAN) 8 MG tablet Take 8 mg by mouth as needed       potassium chloride ER (KLOR-CON M) 20 MEQ CR tablet TAKE ONE TABLET BY MOUTH ONCE DAILY 90 tablet 2     rosuvastatin (CRESTOR) 20 MG tablet TAKE 1 TABLET BY MOUTH DAILY AT BEDTIME 90 tablet 3     vitamin B-12 (CYANOCOBALAMIN) 500 MCG tablet Take 500 mcg by mouth daily       warfarin ANTICOAGULANT (COUMADIN) 2.5 MG tablet daily       erythromycin (ROMYCIN) 5 MG/GM ophthalmic ointment  (Patient not taking: Reported on 8/5/2021)       neomycin-polymixin-dexamethasone (MAXITROL) ophthalmic ointment 1 Application (Patient not taking: Reported on 8/5/2021)        No Known Allergies

## 2021-08-05 NOTE — PATIENT INSTRUCTIONS
Tanmay Israel    It was a pleasure to see you today for a clinic visit.    Your defibrillator is nearing battery depletion and will be replaced in the near future.  At the time of replacement, a second wire will be added to improve pumping function  Continue current medications upgrade ICD to CRT device    Follow up appointment:   Dr. Baker as scheduled    Contact EP Nurse Clinicians at [phone] with questions or concerns.    Houston Rolon MD

## 2021-08-05 NOTE — PROGRESS NOTES
Medication Therapy Management (MTM) Encounter    ASSESSMENT:                            Hypertension: Appears the patient has remained on a lower dose of carvedilol and no lisinopril since 7/13/2021 and his blood pressure has been well controlled, therefore we will continue current doses and update med list.     Recurrent CDiff: This has resolved, but appears to remain off and on.  Encouraged him to remain hydrated and future labs have been placed by Dr. Ramírez if the diarrhea returns and he would like to get checked.     CHF:  Stable today, no weight gain and he appears to be not swollen today.  Last potassium at goal, despite being off lisinopril.     Atrial Fibrillation: Patient to continue current regimen and follow-up with cardiology.  Last INR slightly elevated, but he is having it rechecked in 2 days.     Pain:   Stable at this time, he is following with physical medicine.  Encouraged him to continue to use Tylenol and topical therapies.  Reviewed using cyclobenzaprine only as needed.  He will get rid of the tramadol today since it is old but he is not using.  We discussed how to safely dispose.     GERD/nausea:  Okay to continue at this time.  He will continue once daily use of omeprazole and ondansetron as needed.     CAD:  Patient is on a high intensity statin which is indicated due to his ASCVD history.    Shortness of breath: Okay to continue as needed.     Supplements: Okay to continue current supplements.  I recommended that we recheck vitamin D and B12 level in the future.  Encouraged her to switch back to ferrous sulfate 1 done with current bottle of iron glycinate, since it has a much lower amount of elemental iron.  Okay to continue the multivitamin since he has a limited diet and it contains calcium.  He is due for a DEXA scan.    PLAN:                            1.  Future DEXA scan.    Follow-up: As needed with MTM    SUBJECTIVE/OBJECTIVE:                          Tanmay Israel is a 81 year old male  coming in for an initial visit. He was referred to me from Dr. Ramírez. . Of note, I last met with patient in 2019 for MJ.      Reason for visit: Patient would like to hopefully reduce some medications  Medication Adherence/Access: He sets up his medications on his own now.  One pill box for AM and another pill box for afternoon, only at night is APAP. forgets to take only once in a while.      Hypertension: Continues carvedilol 25 mg BID. He brings a paper from Dr. Ramírez on 7/13/21 holding lisinopril -- reports he has not been taking ever since. He used to take one and half tablets of carvedilol but also reports that was decreased to one tablet. Patient does not monitor BP at home.  Denies lightheadedness/dizziness.   BP Readings from Last 3 Encounters:   08/05/21 120/60   07/30/21 116/62   07/16/21 114/66        Recurrent CDiff: Patient did have recent diarrhea, but resolved. Some days it is better and some days going 6-7 times, right now it is ok but this weekend was loose. Reports colonoscopy was normal therefore he does not have an underlying cause.      CHF: Current medications include carvedilol, Bumex 1 mg daily afternoon, and potassium cl 20 meq daily.   ECHO: Date 6/12/21, EF 24%  Pt is measuring daily weights:  Losing weight. No swelling.  Last K = 4.4 on 7/30/21     Atrial Fibrillation: Currently taking carvedilol 25 mg BID and warfarin.  Currently sees a cardiologist.  Denies significant bleeding/bruising.   [id number] score = 5   Patient does have a pacemaker.   INR   Date Value Ref Range Status   07/30/2021 3.1 (H) 0.9 - 1.1 Final     Comment:     Venous specimen being collected to validate result.   06/29/2021 3.20 (H) 0.90 - 1.10 Final         Pain:  Currently taking diclofenac gel 1% and APAP 1000 mg TID. He also brings cyclobenzaprine 5 mg and reports that he only uses at night once tablet as needd since it makes him a little tired. It is helpful. Reports having a backache and he follows with   [facility] (next appt 9/1/21). Brings tramadol from 2019 -- does not use it all the time and wonders if he should get rid of it. No ibuprofen. Has diclofenac gel which helps a little, he puts on his back. Sometimes uses a patch or ice. Walks a mile in the morning.      GERD/nausea: Currently taking omeprazole 20 mg once daily. No heartburn or acid reflex.  Has ondansetron 8 mg every 8 hours as needed, but is not currently taking.  Denies current nausea. Reports that he uses it once in a while and it is very helpful.     CAD: Currently taking rosuvastatin 20 mg daily. Denies myalgias. Last lipids checked 5/19/20. Hx CABG x4.     Shortness of breath: Continues albuterol HFA PRN. Reports that he only uses once in a while.      Supplements: Currently taking vitamin D 2000 IU x2 (4000 units) daily, MVI, B12 2000 mcg daily, and iron glycinate 28 mg daily. He does not feel like he eats well and lost a lot of weight. Only eats lunch and breakfast, but no supper.   Patient does have osteoporosis. Last T score -2.5 in 2016.  calcium 210 mg is in the MVI, has skim milk with cereal and drinks 1-2 glasses daily. Cheese not daily, not much yogurt.   Last Vitamin D = 68.1 on 9/8/20  Last Hgb level = 9.7 on 7/30/21  Last Mag level = 2 on 6/17/21    Today's Vitals: There were no vitals taken for this visit.     Allergies/ADRs: Reviewed in chart  Past Medical History: Reviewed in chart  Tobacco: He reports that he has quit smoking. He has never used smokeless tobacco.  Alcohol: reviewed in chart    ----------------  I spent 25 minutes with this patient today. All changes were made via collaborative practice agreement with Dr. Ramírez. A copy of the visit note was provided to the patient's primary care provider.    The patient was given a summary of these recommendations via MAP.     Massiel Medina, Pharm.D., BCACP   Medication Therapy Management Pharmacist   [facility]      Medication Therapy  Recommendations  No medication therapy recommendations to display

## 2021-08-09 ENCOUNTER — OFFICE VISIT (OUTPATIENT)
Dept: PHARMACY | Facility: CLINIC | Age: 81
End: 2021-08-09
Attending: FAMILY MEDICINE
Payer: COMMERCIAL

## 2021-08-09 DIAGNOSIS — Z98.1 S/P CERVICAL SPINAL FUSION: ICD-10-CM

## 2021-08-09 DIAGNOSIS — I10 BENIGN ESSENTIAL HYPERTENSION: Primary | ICD-10-CM

## 2021-08-09 DIAGNOSIS — R13.14 PHARYNGOESOPHAGEAL DYSPHAGIA: ICD-10-CM

## 2021-08-09 DIAGNOSIS — K50.919 CROHN'S DISEASE WITH COMPLICATION, UNSPECIFIED GASTROINTESTINAL TRACT LOCATION (H): ICD-10-CM

## 2021-08-09 DIAGNOSIS — Z95.810 ICD (IMPLANTABLE CARDIOVERTER-DEFIBRILLATOR) IN PLACE: ICD-10-CM

## 2021-08-09 DIAGNOSIS — I25.5 ISCHEMIC CARDIOMYOPATHY: ICD-10-CM

## 2021-08-09 DIAGNOSIS — I25.119 ATHEROSCLEROSIS OF NATIVE CORONARY ARTERY OF NATIVE HEART WITH ANGINA PECTORIS (H): ICD-10-CM

## 2021-08-09 DIAGNOSIS — J18.9 PNEUMONIA DUE TO INFECTIOUS ORGANISM, UNSPECIFIED LATERALITY, UNSPECIFIED PART OF LUNG: ICD-10-CM

## 2021-08-09 DIAGNOSIS — D64.9 ANEMIA, UNSPECIFIED TYPE: ICD-10-CM

## 2021-08-09 PROCEDURE — 99605 MTMS BY PHARM NP 15 MIN: CPT | Performed by: PHARMACIST

## 2021-08-09 PROCEDURE — 99607 MTMS BY PHARM ADDL 15 MIN: CPT | Performed by: PHARMACIST

## 2021-08-09 RX ORDER — CYCLOBENZAPRINE HCL 5 MG
5-10 TABLET ORAL 3 TIMES DAILY PRN
COMMUNITY
End: 2021-08-18

## 2021-08-09 RX ORDER — MULTIVIT-MIN/IRON/FOLIC ACID/K 18-600-40
4000 CAPSULE ORAL DAILY
COMMUNITY
End: 2021-08-09

## 2021-08-09 NOTE — LETTER
"        Date: 2021    Tanmay Israel  [address]   [facility] [address]    Dear Mr. Israel,    Thank you for talking with me on 21 about your health and medications. Medicare s MTM (Medication Therapy Management) program helps you understand your medications and use them safely.      This letter includes an action plan (Medication Action Plan) and medication list (Personal Medication List). The action plan has steps you should take to help you get the best results from your medications. The medication list will help you keep track of your medications and how to use them the right way.       Have your action plan and medication list with you when you talk with your doctors, pharmacists, and other healthcare providers in your care team.     Ask your doctors, pharmacists, and other healthcare providers to update the action plan and medication list at every visit.     Take your medication list with you if you go to the hospital or emergency room.     Give a copy of the action plan and medication list to your family or caregivers.     If you want to talk about this letter or any of the papers with it, please call   [phone].We look forward to working with you, your doctors, and other healthcare providers to help you stay healthy through the [employer] [place] MTM program.    Sincerely,  Massiel Medina, PharmD    Enclosed: Medication Action Plan and Personal Medication List    MEDICATION ACTION PLAN FOR Tanmay Israel,  1940     This action plan will help you get the best results from your medications if you:   1. Read \"What we talked about.\"   2. Take the steps listed in the \"What I need to do\" boxes.   3. Fill in \"What I did and when I did it.\"   4. Fill in \"My follow-up plan\" and \"Questions I want to ask.\"     Have this action plan with you when you talk with your doctors, pharmacists, and other healthcare providers in your care team. Share this with your family or " caregivers too.  DATE PREPARED: 2021  What we talked about: Since you have osteoporosis, we should closely monitor your bone density                                                  What I need to do: You are due for a bone density check. Talk to Dr. Ramírez about this further.  What I did and when I did it:                                              My follow-up plan:                 Questions I want to ask:              If you have any questions about your action plan, call Massiel Medina PharmD, Phone: [phone] , Monday-Friday 8-4:30pm.           PERSONAL MEDICATION LIST FOR Tanmay Israel,  1940     This medication list was made for you after we talked. We also used information from your doctor's chart.      Use blank rows to add new medications. Then fill in the dates you started using them.    Cross out medications when you no longer use them. Then write the date and why you stopped using them.    Ask your doctors, pharmacists, and other healthcare providers to update this list at every visit. Keep this list up-to-date with:       Prescription medications    Over the counter drugs     Herbals    Vitamins    Minerals      If you go to the hospital or emergency room, take this list with you. Share this with your family or caregivers too.     DATE PREPARED: 2021  Allergies or side effects: Patient has no known allergies.     Medication:  ACETAMINOPHEN 500 MG PO TABS      How I use it:  Take 1,000 mg by mouth 3 times daily      Why I use it:  Pain    Prescriber:  Go Ramírez MD      Date I started using it:       Date I stopped using it:         Why I stopped using it:            Medication:  ALBUTEROL SULFATE  (90 BASE) MCG/ACT       How I use it:   Take 1 puff every 8 hours as needed      Why I use it:  Shortness of breath    Prescriber:  Go Ramírez MD      Date I started using it:       Date I stopped using it:         Why I stopped using it:            Medication:   BUMETANIDE 1 MG PO TABS      How I use it:  Take 1 tablet by mouth daily      Why I use it:  Swelling    Prescriber:  Go Ramírez MD      Date I started using it:       Date I stopped using it:         Why I stopped using it:            Medication:  CARVEDILOL 25 MG PO TABS      How I use it:  Take 25 mg by mouth 2 times daily      Why I use it:  Blood pressure    Prescriber:  Go Ramírez MD      Date I started using it:       Date I stopped using it:         Why I stopped using it:            Medication:  CYANOCOBALAMIN 2000 MCG PO TABS      How I use it:  Take 2,000 mcg by mouth daily      Why I use it:  General health    Prescriber:  Go Ramírez MD      Date I started using it:       Date I stopped using it:         Why I stopped using it:            Medication:  CYCLOBENZAPRINE HCL 5 MG PO TABS      How I use it:  Take 5-10 mg by mouth 3 times daily as needed      Why I use it:  Muscle spasms    Prescriber:  Go Ramírez MD      Date I started using it:       Date I stopped using it:         Why I stopped using it:            Medication:  DICLOFENAC SODIUM 1 % EX GEL      How I use it:   Apply topically up to 3 times daily      Why I use it:  Pain    Prescriber:  Go Ramírez MD      Date I started using it:       Date I stopped using it:         Why I stopped using it:               Medication:  FERROUS SULFATE 325 (65 FE) MG PO TABS      How I use it:  Take 1 tablet by mouth daily      Why I use it:  Anemia    Prescriber:  Go Ramírez MD      Date I started using it:       Date I stopped using it:         Why I stopped using it:                                Medication:  OMEPRAZOLE 20 MG PO CPDR      How I use it:  Take 1 capsule by mouth daily      Why I use it:  Heartburn    Prescriber:  Go Ramírez MD      Date I started using it:       Date I stopped using it:         Why I stopped using it:            Medication:  ONDANSETRON HCL 8 MG PO TABS      How I use it:  Take 8 mg  by mouth as needed      Why I use it:  Nausea    Prescriber:  Go Ramírez MD      Date I started using it:       Date I stopped using it:         Why I stopped using it:            Medication:  POTASSIUM CHLORIDE INDIA ER 20 MEQ PO TBCR      How I use it:  TAKE ONE TABLET BY MOUTH ONCE DAILY      Why I use it:  Low potassium    Prescriber:  Go Ramírez MD      Date I started using it:       Date I stopped using it:         Why I stopped using it:            Medication:  RESTASIS 0.05 % OP EMUL      How I use it:  1 drop into each eye as needed      Why I use it:  Dry eye    Prescriber:  Go Ramírez MD      Date I started using it:       Date I stopped using it:         Why I stopped using it:            Medication:  ROSUVASTATIN CALCIUM 20 MG PO TABS      How I use it:  TAKE 1 TABLET BY MOUTH DAILY AT BEDTIME      Why I use it:  Cholesterol    Prescriber:  Bryan Baker MD      Date I started using it:       Date I stopped using it:         Why I stopped using it:            Medication:  THERA M PLUS PO TABS      How I use it:  Take 1 tablet by mouth daily      Why I use it:  General health    Prescriber:  Go Ramírez MD      Date I started using it:       Date I stopped using it:         Why I stopped using it:            Medication:  WARFARIN SODIUM 2.5 MG PO TABS      How I use it:   Take 1 to 1 and 1/2 tablets daily, dose adjusted by INR nurse      Why I use it:  Atrial fibrillation    Prescriber:  Go Ramírez MD      Date I started using it:       Date I stopped using it:         Why I stopped using it:            Medication:         How I use it:         Why I use it:      Prescriber:         Date I started using it:       Date I stopped using it:         Why I stopped using it:            Medication:         How I use it:         Why I use it:      Prescriber:         Date I started using it:       Date I stopped using it:         Why I stopped using it:            Medication:          How I use it:         Why I use it:      Prescriber:         Date I started using it:       Date I stopped using it:         Why I stopped using it:              Other Information:     If you have any questions about your medication list, call Massiel Medina PharmD, Phone: [phone] , Monday-Friday 8-4:30pm.    According to the Paperwork Reduction Act of 1995, no persons are required to respond to a collection of information unless it displays a valid OMB control number. The valid OMB number for this information collection is [id number]. The time required to complete this information collection is estimated to average 40 minutes per response, including the time to review instructions, searching existing data resources, gather the data needed, and complete and review the information collection. If you have any comments concerning the accuracy of the time estimate(s) or suggestions for improving this form, please write to: CMS, Attn: GILBERT Reports Clearance Officer, [address] [place] [phone].

## 2021-08-11 ENCOUNTER — LAB (OUTPATIENT)
Dept: LAB | Facility: CLINIC | Age: 81
End: 2021-08-11
Payer: COMMERCIAL

## 2021-08-11 ENCOUNTER — ANTICOAGULATION THERAPY VISIT (OUTPATIENT)
Dept: ANTICOAGULATION | Facility: CLINIC | Age: 81
End: 2021-08-11

## 2021-08-11 DIAGNOSIS — I26.99 PULMONARY EMBOLISM WITH INFARCTION (H): ICD-10-CM

## 2021-08-11 DIAGNOSIS — I48.91 ATRIAL FIBRILLATION (H): Primary | ICD-10-CM

## 2021-08-11 LAB — INR PPP: 3.2 (ref 0.85–1.15)

## 2021-08-11 PROCEDURE — 36416 COLLJ CAPILLARY BLOOD SPEC: CPT | Performed by: FAMILY MEDICINE

## 2021-08-11 PROCEDURE — 85610 PROTHROMBIN TIME: CPT | Performed by: FAMILY MEDICINE

## 2021-08-11 NOTE — PROGRESS NOTES
ANTICOAGULATION MANAGEMENT     Tanmay Israel 81 year old male is on warfarin with supratherapeutic INR result. (Goal INR 2.0-3.0)    Recent labs: (last 7 days)     08/11/21  0941   INR 3.20*       ASSESSMENT     Source(s): Patient/Caregiver Call and Template       Warfarin doses taken: Warfarin taken as instructed    Diet: No new diet changes identified    New illness, injury, or hospitalization: No    Medication/supplement changes: None noted    Signs or symptoms of bleeding or clotting: No    Previous INR: Supratherapeutic    Additional findings: None     PLAN     Recommended plan for no diet, medication or health factor changes affecting INR     Dosing Instructions:  Decrease your warfarin dose (10% change) with next INR in 2 weeks       Summary  As of 8/11/2021    Full warfarin instructions:  2.5 mg every Mon, Fri; 1.25 mg all other days   Next INR check:  8/25/2021             Telephone call with Tanmay who verbalizes understanding and agrees to plan    Patient offered & declined to schedule next visit    Education provided: Target INR goal and significance of current INR result and Contact [phone] with any changes, questions or concerns.     Plan made per [facility] anticoagulation protocol    Yaneth Wells RN  Anticoagulation Clinic  8/11/2021    _______________________________________________________________________     Anticoagulation Episode Summary     Current INR goal:  2.0-3.0   TTR:  64.0 % (1 y)   Target end date:     Send INR reminders to:  CHANI RIOS    Indications    Atrial fibrillation (H) [I48.91]  Pulmonary embolism with infarction (H) [I26.99]           Comments:           Anticoagulation Care Providers     Provider Role Specialty Phone number    Go Ramírez MD Referring Family Medicine [phone]

## 2021-08-18 ENCOUNTER — APPOINTMENT (OUTPATIENT)
Dept: RADIOLOGY | Facility: CLINIC | Age: 81
End: 2021-08-18
Attending: EMERGENCY MEDICINE
Payer: COMMERCIAL

## 2021-08-18 ENCOUNTER — HOSPITAL ENCOUNTER (EMERGENCY)
Facility: CLINIC | Age: 81
Discharge: HOME | End: 2021-08-18
Attending: EMERGENCY MEDICINE | Admitting: EMERGENCY MEDICINE
Payer: COMMERCIAL

## 2021-08-18 VITALS
HEART RATE: 74 BPM | RESPIRATION RATE: 17 BRPM | SYSTOLIC BLOOD PRESSURE: 146 MMHG | TEMPERATURE: 97.8 F | BODY MASS INDEX: 22.15 KG/M2 | DIASTOLIC BLOOD PRESSURE: 68 MMHG | WEIGHT: 150 LBS | OXYGEN SATURATION: 99 %

## 2021-08-18 DIAGNOSIS — S42.212D: ICD-10-CM

## 2021-08-18 DIAGNOSIS — M25.522 PAIN IN JOINT, UPPER ARM, LEFT: ICD-10-CM

## 2021-08-18 DIAGNOSIS — S70.02XA CONTUSION OF LEFT HIP, INITIAL ENCOUNTER: ICD-10-CM

## 2021-08-18 PROCEDURE — 250N000011 HC RX IP 250 OP 636: Performed by: EMERGENCY MEDICINE

## 2021-08-18 PROCEDURE — 250N000013 HC RX MED GY IP 250 OP 250 PS 637: Performed by: EMERGENCY MEDICINE

## 2021-08-18 PROCEDURE — 99284 EMERGENCY DEPT VISIT MOD MDM: CPT | Mod: 25

## 2021-08-18 PROCEDURE — 96372 THER/PROPH/DIAG INJ SC/IM: CPT | Performed by: EMERGENCY MEDICINE

## 2021-08-18 PROCEDURE — 73502 X-RAY EXAM HIP UNI 2-3 VIEWS: CPT

## 2021-08-18 RX ORDER — KETOROLAC TROMETHAMINE 15 MG/ML
15 INJECTION, SOLUTION INTRAMUSCULAR; INTRAVENOUS ONCE
Status: COMPLETED | OUTPATIENT
Start: 2021-08-18 | End: 2021-08-18

## 2021-08-18 RX ORDER — CYCLOBENZAPRINE HCL 10 MG
10 TABLET ORAL ONCE
Status: COMPLETED | OUTPATIENT
Start: 2021-08-18 | End: 2021-08-18

## 2021-08-18 RX ORDER — LIDOCAINE 4 G/G
1 PATCH TOPICAL ONCE
Status: DISCONTINUED | OUTPATIENT
Start: 2021-08-18 | End: 2021-08-18 | Stop reason: HOSPADM

## 2021-08-18 RX ORDER — CYCLOBENZAPRINE HCL 5 MG
10 TABLET ORAL 3 TIMES DAILY PRN
Qty: 30 TABLET | Refills: 0 | Status: SHIPPED | OUTPATIENT
Start: 2021-08-18 | End: 2021-10-01

## 2021-08-18 RX ORDER — OXYCODONE HYDROCHLORIDE 5 MG/1
5 TABLET ORAL EVERY 6 HOURS PRN
Qty: 12 TABLET | Refills: 0 | Status: SHIPPED | OUTPATIENT
Start: 2021-08-18 | End: 2021-08-21

## 2021-08-18 RX ORDER — OXYCODONE HYDROCHLORIDE 5 MG/1
5 TABLET ORAL ONCE
Status: COMPLETED | OUTPATIENT
Start: 2021-08-18 | End: 2021-08-18

## 2021-08-18 RX ADMIN — CYCLOBENZAPRINE HYDROCHLORIDE 10 MG: 10 TABLET, FILM COATED ORAL at 10:41

## 2021-08-18 RX ADMIN — KETOROLAC TROMETHAMINE 15 MG: 15 INJECTION, SOLUTION INTRAMUSCULAR; INTRAVENOUS at 10:46

## 2021-08-18 RX ADMIN — LIDOCAINE 1 PATCH: 246 PATCH TOPICAL at 10:44

## 2021-08-18 RX ADMIN — OXYCODONE HYDROCHLORIDE 5 MG: 5 TABLET ORAL at 10:42

## 2021-08-18 ASSESSMENT — ENCOUNTER SYMPTOMS
ABDOMINAL PAIN: 0
CONFUSION: 0
ARTHRALGIAS: 1
APPETITE CHANGE: 1
WEAKNESS: 0

## 2021-08-18 NOTE — ED TRIAGE NOTES
Pt states he had a fall Saturday and lost his balance and was seen at [facility], he hit his head, landed on left side. Pt states they checked his head and was fine, is on Coumadin. Pt states this morning his left shoulder and left hip are very sore, pt does have known humerus fracture from the fall, states he did not have an xray of hip area.

## 2021-08-18 NOTE — ED PROVIDER NOTES
EMERGENCY DEPARTMENT ENCOUNTER      NAME: Tanmay Israel  AGE: 81 year old male  YOB: 1940  MRN: [id number]  EVALUATION DATE & TIME: 8/18/2021 10:11 AM    PCP: Go Ramírez    ED PROVIDER: Corin Blair M.D.      Chief Complaint   Patient presents with     Shoulder Pain     Hip Pain         FINAL IMPRESSION:  1. Pain in joint, upper arm, left    2. Closed fracture of neck of humerus with routine healing, left    3. Contusion of left hip, initial encounter        MEDICAL DECISION MAKING:    10:17 AM I met with the patient, obtained history, performed an initial exam, and discussed options and plan for diagnostics and treatment here in the ED. PPE: Provider wore eye protection, N95 & surgical mask and gloves.   11:09 AM I rechecked and updated the patient.     Pertinent Labs & Imaging studies reviewed. (See chart for details)     Tanmay Israel is a 81 year oldmale who presents with residual left shoulder pain.  The patient has a known humeral fracture that was diagnosed a few days ago at [facility].  He is on blood thinning medication but a full trauma work-up was done during this visit which showed no intracranial hemorrhage or cervical fracture.  He has no headache, neurologic deficits, changes in his vision, confusion or any other symptoms to suggest the need for repeat head CT.  He is having residual pain in the left hip however he has been able to ambulate.  I will get x-rays to evaluate for a missed fracture as pelvic x-rays were not done at his prior visit.  Today it seems his most pressing concern is the pain he is experiencing in the left arm.  He came in with the sling not properly applied and I do feel that the excessive movement is worsening his pain.  I will attempt to get better pain control with additional oral medications a single dose of IV Toradol and by placing a shoulder immobilizer.    X-rays of the hip do not show any fracture.  He is able to bear weight so no need for MRI at this  point.  He is feeling significantly improved after all of the combination analgesics as well as the shoulder immobilizer.  I feel he can be safely discharged home with close follow-up in his primary clinic on Friday.  He will need to follow-up with orthopedics next week for repeat evaluation of the shoulder.  His prescriptions were refilled.  We discussed warning signs and indications to return to the emergency department.  He understands these warning signs and will return with any concerns.         MEDICATIONS GIVEN IN THE EMERGENCY:  Medications   Lidocaine (LIDOCARE) 4 % Patch 1 patch (1 patch Transdermal Patch/Med Applied 8/18/21 1044)   ketorolac (TORADOL) injection 15 mg (15 mg Intramuscular Given 8/18/21 1046)   oxyCODONE (ROXICODONE) tablet 5 mg (5 mg Oral Given 8/18/21 1042)   cyclobenzaprine (FLEXERIL) tablet 10 mg (10 mg Oral Given 8/18/21 1041)       NEW PRESCRIPTIONS STARTED AT TODAY'S ER VISIT:  New Prescriptions    OXYCODONE (ROXICODONE) 5 MG TABLET    Take 1 tablet (5 mg) by mouth every 6 hours as needed for pain          =================================================================    HPI    Patient information was obtained from: Patient and patient's friend    Use of : N/A         Tanmay Israel is a 81 year old male with a history of HTN, CAD s/p CABG, and atrial fibrillation (on Coumadin) who presents for evaluation of left shoulder and left hip pain.     Per chart review, patient was seen at [facility] ED on 8/14/2021 for evaluation after a fall where he struck the back of his head and landed on his left shoulder and arm. X-ray left elbow was without fracture, dislocation, or effusion. Laceration to the left elbow was irrigated and closed with sutures. CT head and neck was negative. X-ray left shoulder demonstrated a comminuted proximal humeral fracture. He was placed in a sling. Discharged home with a prescription for Percocet.     Patient reports that he took a fall on  Saturday (4 days ago) where he hit his head and landed on his left side. He was seen at [facility] ED at that time found to have a left humerus fracture. Patient presents to the ED today with ongoing pain in his left shoulder and upper arm as well as pain in his left hip. He states that he is having difficulty bearing weight on his left hip. Patient's friend states that the patient spends a lot of time in bed and sitting in his chair. Friend adds that the patient has not been eating well. The patient has been taking oxycodone every 6 hours as well as occasional Tylenol in between without relief of his pain. Patient states that his head feels fine since the fall. No vision changes, unilateral weakness, or confusion. He denies abdominal pain or additional symptoms at this time.         REVIEW OF SYSTEMS   Review of Systems   Constitutional: Positive for appetite change (decreased oral intake).   Eyes: Negative for visual disturbance.   Gastrointestinal: Negative for abdominal pain.   Musculoskeletal: Positive for arthralgias (left shoulder, left upper arm, and left hip pain).   Neurological: Negative for weakness.   Psychiatric/Behavioral: Negative for confusion.   All other systems reviewed and are negative.       PAST MEDICAL HISTORY:  Past Medical History:   Diagnosis Date     Acute cholecystitis 2/28/2019     Acute post-operative pain      Anemia      Arthritis      Atrial fibrillation (H)      C. difficile diarrhea 4/21/2019     Calculus of ureter      Callus      Cerebral aneurysm      Cervical myelopathy (H) 2/22/2019     Cervical spinal stenosis      Chronic kidney disease     stage 3     Chronic systolic congestive heart failure (H)      Closed fracture of distal end of left radius, unspecified fracture morphology, initial encounter      Closed fracture of distal end of right radius, unspecified fracture morphology, initial encounter      Closed fracture of first thoracic vertebra, unspecified fracture  morphology, initial encounter (H)      Coronary artery disease      Crohn's disease (H)      Disorder of bursae and tendons in shoulder region     Created by Conversion  Replacement Utility updated for latest IMO load     Dysphagia      Fall 10/22/2016     GERD (gastroesophageal reflux disease)      Hyperlipidemia      Hypertension      Hypotension, unspecified hypotension type      ICD (implantable cardioverter-defibrillator) in place     Medtronic     Ischemic cardiomyopathy      Kidney stone      Low back pain      Lumbago     Created by Conversion      Macular degeneration      Myalgia and myositis, unspecified     Created by Conversion      Nonspecific elevation of levels of transaminase or lactic acid dehydrogenase (LDH)     Created by Conversion      Permanent atrial fibrillation (H)     [id number] risk score = 5 (age1/ CAD/ HTN/ CHF) Chronic warfarin     Personal history of colonic polyps 2/12/2019     Polyp of duodenum 10/17/2016     Pyuria      Right arm weakness 4/2/2019     Schatzki's ring      Sciatica     Created by Conversion      Sleep apnea     no CPAP     Spondylolisthesis of lumbar region 7/5/2016     Weakness 4/20/2019       PAST SURGICAL HISTORY:  Past Surgical History:   Procedure Laterality Date     APPENDECTOMY       ARTHROSCOPY SHOULDER ROTATOR CUFF REPAIR Right      BYPASS GRAFT ARTERY CORONARY  08/2004    Coronary Artery Quadruple Venous Bypass Graft     C LAP,CHOLECYSTECTOMY/EXPLORE N/A 2/28/2019    CHOLECYSTECTOMY, LAPAROSCOPIC;  Surgeon: Mana Roman MD;  Location: [facility] OR;  Service: General     CARDIAC DEFIBRILLATOR PLACEMENT  2013    ICD Medtronic     CHOLECYSTECTOMY       COLONOSCOPY N/A 2/19/2020    Procedure: COLONOSCOPY;  Surgeon: Houston Medina MD;  Location: [facility] OR;  Service: Gastroenterology     ESOPHAGOSCOPY, GASTROSCOPY, DUODENOSCOPY (EGD), COMBINED       IR LUMBAR EPIDURAL STEROID INJECTION  6/29/2005     IR LUMBAR EPIDURAL STEROID INJECTION   7/15/2005     IR LUMBAR EPIDURAL STEROID INJECTION  9/28/2005     IR LUMBAR EPIDURAL STEROID INJECTION  12/28/2005     IR LUMBAR EPIDURAL STEROID INJECTION  3/29/2006     IR LUMBAR EPIDURAL STEROID INJECTION  6/8/2006     PHACOEMULSIFICATION CLEAR CORNEA WITH STANDARD INTRAOCULAR LENS IMPLANT Bilateral      IN C- LAMINOPLASTY, 2 OR MORE Left 2/22/2019    Procedure: LEFT CERVICAL 4, 5, 6 LAMINOPLASTY;  Surgeon: Liza Cesar MD;  Location: [facility] OR;  Service: Spine     IN ESOPHAGOGASTRODUODENOSCOPY TRANSORAL DIAGNOSTIC N/A 5/10/2021    Procedure: ESOPHAGOGASTRODUODENOSCOPY (EGD);  Surgeon: Franklin Mendoza MD;  Location: [facility];  Service: Gastroenterology     IN ESOPHAGOGASTRODUODENOSCOPY TRANSORAL DIAGNOSTIC N/A 6/16/2021    Procedure: ESOPHAGOGASTRODUODENOSCOPY (EGD), WITH ESOPHAGEAL DILATION AND BIOPSY;  Surgeon: Paramjit Brown MD;  Location: [facility] OR;  Service: Gastroenterology     TONSILLECTOMY       XR MYELOGRAM CERVICAL THORACIC LUMBAR  1/17/2019       CURRENT MEDICATIONS:      Current Facility-Administered Medications:      Lidocaine (LIDOCARE) 4 % Patch 1 patch, 1 patch, Transdermal, Once, Corin Blair MD, 1 patch at 08/18/21 1044    Current Outpatient Medications:      acetaminophen (TYLENOL) 500 MG tablet, Take 1,000 mg by mouth 3 times daily, Disp: , Rfl:      albuterol (PROAIR HFA/PROVENTIL HFA/VENTOLIN HFA) 108 (90 Base) MCG/ACT inhaler, , Disp: , Rfl:      bumetanide (BUMEX) 1 MG tablet, Take 1 tablet by mouth daily, Disp: , Rfl:      carvedilol (COREG) 25 MG tablet, Take 25 mg by mouth 2 times daily, Disp: , Rfl:      cyclobenzaprine (FLEXERIL) 5 MG tablet, Take 5-10 mg by mouth 3 times daily as needed, Disp: , Rfl:      cycloSPORINE (RESTASIS) 0.05 % ophthalmic emulsion, 1 drop, Disp: , Rfl:      diclofenac (VOLTAREN) 1 % topical gel, 3 times daily, Disp: , Rfl:      erythromycin (ROMYCIN) 5 MG/GM ophthalmic ointment, , Disp: , Rfl:      ferrous  sulfate (FEROSUL) 325 (65 Fe) MG tablet, Take 1 tablet by mouth daily, Disp: , Rfl:      multivitamin w/minerals (MULTI-VITAMIN) tablet, Take 1 tablet by mouth daily, Disp: , Rfl:      neomycin-polymixin-dexamethasone (MAXITROL) ophthalmic ointment, 1 Application (Patient not taking: Reported on 8/5/2021), Disp: , Rfl:      omeprazole (PRILOSEC) 20 MG DR capsule, Take 1 capsule by mouth daily, Disp: , Rfl:      ondansetron (ZOFRAN) 8 MG tablet, Take 8 mg by mouth as needed, Disp: , Rfl:      potassium chloride ER (KLOR-CON M) 20 MEQ CR tablet, TAKE ONE TABLET BY MOUTH ONCE DAILY, Disp: 90 tablet, Rfl: 2     rosuvastatin (CRESTOR) 20 MG tablet, TAKE 1 TABLET BY MOUTH DAILY AT BEDTIME, Disp: 90 tablet, Rfl: 3     vitamin B-12 (CYANOCOBALAMIN) 2000 MCG tablet, Take 2,000 mcg by mouth daily, Disp: , Rfl:      warfarin ANTICOAGULANT (COUMADIN) 2.5 MG tablet, daily, Disp: , Rfl:     ALLERGIES:  No Known Allergies    FAMILY HISTORY:  Family History   Problem Relation Age of Onset     Heart Disease Mother      Cerebrovascular Disease Mother      Crohn's Disease Father      Alcoholism Brother      No Known Problems Daughter      No Known Problems Son      No Known Problems Maternal Aunt      No Known Problems Maternal Uncle      No Known Problems Paternal Aunt      No Known Problems Paternal Uncle      No Known Problems Maternal Grandmother      No Known Problems Maternal Grandfather      No Known Problems Paternal Grandmother      No Known Problems Paternal Grandfather      Cancer Brother      Urolithiasis No family hx of      Gout No family hx of        SOCIAL HISTORY:   Social History     Tobacco Use     Smoking status: Former Smoker     Smokeless tobacco: Never Used   Substance Use Topics     Alcohol use: None     Drug use: None        PHYSICAL EXAM:    Vitals: /51   Pulse 83   Temp 97.8  F (36.6  C) (Oral)   Resp 18   Wt 68 kg (150 lb)   SpO2 97%   BMI 22.15 kg/m     General:. Alert and interactive,  comfortable appearing.  HENT: Oropharynx without erythema or exudates. MMM.  TMs clear bilaterally.  Eyes: Pupils mid-sized and equally reactive.   Neck: Full AROM.  No midline tenderness to palpation.  Cardiovascular: Regular rate and rhythm. Peripheral pulses 2+ bilaterally.  Chest/Pulmonary: Normal work of breathing. Lung sounds clear and equal throughout, no wheezes or crackles. No chest wall tenderness or deformities. Pacemaker to left anterior chest.   Abdomen: Soft, nondistended. Nontender without guarding or rebound.  Back/Spine: No CVA or midline tenderness.  Extremities: Significant tenderness, swelling and bruising over the left proximal humerus, Limited ROM due to pain, Distal circulation intact. Lower extremities with full ROM, no tenderness to palpation and good strength. No lower extremity edema.   Skin: Warm and dry. Normal skin color.   Neuro: Speech clear. CNs grossly intact. Moves all extremities appropriately. Strength and sensation grossly intact to all extremities.   Psych: Normal affect/mood, cooperative, memory appropriate.     LAB:  All pertinent labs reviewed and interpreted.  Labs Ordered and Resulted from Time of ED Arrival Up to the Time of Departure from the ED - No data to display    RADIOLOGY:  XR Pelvis and Hip Left 2 Views   Final Result   IMPRESSION: No fracture or dislocation. Osteopenia. Advanced degenerative changes in the left hip. Arterial calcifications. Advanced degenerative changes in the right hip.            I, Susie Price, am serving as a scribe to document services personally performed by Dr. Corin Blair  based on my observation and the provider's statements to me. I, Corin Blair MD attest that Susie Price is acting in a scribe capacity, has observed my performance of the services and has documented them in accordance with my direction.      Corin Blair M.D.  Emergency Medicine  [facility] EMERGENCY  ROOM  [address] [place] [id number]  [phone]  Dept: [phone]         Corin Blair MD  08/18/21 1208

## 2021-08-18 NOTE — DISCHARGE INSTRUCTIONS
Use ibuprofen 400 mg every 6 hours as needed for the next 3 days.  Take with a small amount of food.  Discontinue after 3 days.  Use the lidocaine patch.  These are sold over-the-counter under the brand name Aspercreme or Salonpas.  You may apply a new patch every 12 hours.  Remove the old patch before placing a new one.  Continue taking Tylenol 650 mg every 6 hours.   You need to use the oxycodone 5 mg every 6 hours for pain.  Use the cyclobenzaprine 10 mg 3 times daily in addition to the above medications.  Use the shoulder immobilizer until you are able to follow-up with orthopedics and they recommend beginning range of motion exercises with the arm.  These follow-up in 1 week.

## 2021-08-19 ENCOUNTER — OFFICE VISIT (OUTPATIENT)
Dept: PEDIATRICS | Facility: CLINIC | Age: 81
End: 2021-08-19
Payer: COMMERCIAL

## 2021-08-19 VITALS
BODY MASS INDEX: 22.22 KG/M2 | TEMPERATURE: 97.7 F | HEART RATE: 76 BPM | HEIGHT: 69 IN | SYSTOLIC BLOOD PRESSURE: 100 MMHG | WEIGHT: 150 LBS | RESPIRATION RATE: 20 BRPM | DIASTOLIC BLOOD PRESSURE: 64 MMHG

## 2021-08-19 DIAGNOSIS — S51.012S ELBOW LACERATION, LEFT, SEQUELA: ICD-10-CM
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
## 2021-08-19 DIAGNOSIS — S42.202D CLOSED FRACTURE OF PROXIMAL END OF LEFT HUMERUS WITH ROUTINE HEALING, UNSPECIFIED FRACTURE MORPHOLOGY, SUBSEQUENT ENCOUNTER: Primary | ICD-10-CM

## 2021-08-19 PROCEDURE — 99214 OFFICE O/P EST MOD 30 MIN: CPT | Mod: GC | Performed by: STUDENT IN AN ORGANIZED HEALTH CARE EDUCATION/TRAINING PROGRAM

## 2021-08-19 RX ORDER — LIDOCAINE 4 G/G
1 PATCH TOPICAL EVERY 24 HOURS
Status: CANCELLED | OUTPATIENT
Start: 2021-08-19

## 2021-08-19 ASSESSMENT — MIFFLIN-ST. JEOR: SCORE: 1370.78

## 2021-08-19 NOTE — PATIENT INSTRUCTIONS
1. Plan to transition to acetaminophen (up to 1 g at a time), no more than 3 g/day and stop scheduled ibuprofen in the 1-2 days.  2. Tentatively plan to follow up in 1 week with Dr. Ramírez.

## 2021-08-19 NOTE — PROGRESS NOTES
Assessment & Plan   Mr. Tanmay Israel is an 81-year-old man with extensive past medical history including atrial fibrillation (on chronic warfarin) who presents for ED follow-up (seen at ED in [place], [place]) after a mechanical fall on Saturday 08/14/2021 wherein patient sustained a proximal left humerus fracture for which he was seen at a different ED ([facility]) yesterday Wednesday 08/20/2021 for uncontrolled pain. Hemodynamically, vital signs within normal limits. Clinically, patient's pain is reportedly markedly improved on the regimen on which she was started in the ED yesterday. At this time, patient has 11 tablets of oxycodone 5 mg remaining. We did not refill the patient's opioids and instructed him to continue taking them every 6 hours as needed. We recommended that in the next 1-2 days patient transition from scheduled ibuprofen to scheduled acetaminophen given his higher risk for bleeding on chronic warfarin (no more than 1 g at a time and 3 g/day). We otherwise recommended the patient continue daily lidocaine patches and cyclobenzaprine as needed. The patient's laceration infero-lateral to the left elbow was well-healing, and we removed the stitches in place. For the left humerus fracture, the patient already has an appointment with orthopedics scheduled for Tuesday 08/24/2021. We instructed the patient to schedule a follow-up appointment with his regular primary care physician (Dr. Ramírez) next week should he require any additional follow-up, most notably to ensure appropriate healing/well-controlled pain off opioids and on acetaminophen in place of ibuprofen.      ICD-10-CM    1. Closed fracture of proximal end of left humerus with routine healing, unspecified fracture morphology, subsequent encounter  S42.202D    2. Fall down stairs, subsequent encounter  W10.8XXD    3. Elbow laceration, left, sequela  S51.012S        45 minutes spent on the date of the encounter doing chart review, history and exam,  documentation and further activities per the note       Remy Aguilar MD  PGY-4 Internal Medicine/Pediatrics  Pager [phone]  Thursday 08/19/2021  [facility]    Patient staffed with the attending physician, Dr. Rodrigues.    I have seen the patient, discussed with the resident, was present during critical portion of visit, and was available to furnish services throughout the visit.  I agree with the history, physical and plan as documented above.    Chante Rodrigues MD  Internal Medicine - Pediatrics      Subjective   Mr. Tanmay Israel is an 81-year-old man who presents for the following health issues:    - Here with partner, Latasha.    - Fell on left side walking into a restaurant this past Saturday 08/14/2021.  - Went to ED in [place], [place] (received some, maybe 10 tablets of oxycodone, finished yesterday 08/18/2021).    - Patient reports having a difficult time walking.    - Left shoulder pain prompted return to ED ([facility]) yesterday 08/18/2021  - Pain has reportedly gotten much better since ED visit yesterday.  - Last night slept through the night for the 1st time in some days.  - Pain is currently at an 8/10 in severity, (although patient notably in no distress as also noted by the patient's partner, Latasha).  - Lidocaine patch seems to help.  - Taking oxycodone 5 mg ~every 6 hours (last took at 7:30 AM); received 12 tablets from the ED yesterday, has taken 1 of those tablets (after finishing tablets prescribed by the [facility] ED on Saturday 08/14/2021)  - Taking scheduled ibuprofen 400 mg every 6 hours (11:30 AM).  - Also has acetaminophen extra-strength PRN: has taken none today.  - Cyclobenzaprine 5 mg TID PRN (last took last night 7 PM), mostly taking 1x/day in the evenings.    - Stitches still in place in left elbow, placed on Saturday 08/14/2021.    - Orthopedics appointment scheduled for Tuesday 08/24/2021    - Head feels okay.  - No headaches.  - No vision problems.    HPI     ED/  "Followup:    Facility:  [facility]  Date of visit: 08/14/2021 and 08/18/2021  Reason for visit: Fall  Current Status: Pain shoulder     Review of Systems   10-point ROS negative except for as listed above or below.      Objective    /64   Pulse 76   Temp 97.7  F (36.5  C) (Oral)   Resp 20   Ht 5' 9\" (1.753 m)   Wt 150 lb (68 kg)   BMI 22.15 kg/m    Body mass index is 22.15 kg/m .     Physical Exam   GENERAL: frail older gentleman sitting in wheelchair wearing a left arm immobilizer sling in no apparent distress; awake, alert, interactive  EYES: left pupil larger than right (reportedly per baseline); pupils reactive to light bilaterally; EOEMi; conjunctivae and sclerae normal; wearing glasses  RESP: lungs clear to auscultation - no rales, rhonchi, or wheezes  CV: regular rate and rhythm, normal S1 S2, no S3 or S4, no murmur, click, or rub, no peripheral edema and peripheral pulses strong  ABDOMEN: soft, non-tender, non-distended no hepatosplenomegaly, no masses and bowel sounds present  MS: no gross musculoskeletal defects noted, no edema  SKIN: well-healing laceration inferior/lateral to left elbow  with a few interrupted, non-absorbable stitches in place   NEURO: requires an assist of 2 to stand; slow, shuffling, unstable gait; mentation intact and speech normal  PSYCH: mentation appears normal, affect normal/bright            "

## 2021-08-20 ENCOUNTER — DOCUMENTATION ONLY (OUTPATIENT)
Dept: ANTICOAGULATION | Facility: CLINIC | Age: 81
End: 2021-08-20

## 2021-08-20 NOTE — PROGRESS NOTES
ANTICOAGULATION  MANAGEMENT: Discharge Review    Tanmay Israel chart reviewed for anticoagulation continuity of care    Emergency room visit on 8/18 for LUE pain, closed fracture of L humerus, contusion of the L hip.    Discharge disposition: Home    Results:  No results for input(s): INR, [id number], ALMWH, AAUFH in the last 168 hours.    Anticoagulation inpatient management:     not applicable     Anticoagulation discharge instructions:     Warfarin dosing: home regimen continued   Bridging: No   INR goal change: No      Medication changes affecting anticoagulation: No    Additional factors affecting anticoagulation: Yes: pain, inflammation    Plan     No adjustment to anticoagulation plan needed    Recommended follow up is scheduled  Patient not contacted    No adjustment to Anticoagulation Calendar was required    Yaneth Wells RN

## 2021-08-23 ENCOUNTER — HOSPITAL ENCOUNTER (EMERGENCY)
Facility: CLINIC | Age: 81
Discharge: ANOTHER HOSPITAL | End: 2021-08-24
Attending: EMERGENCY MEDICINE | Admitting: EMERGENCY MEDICINE
Payer: COMMERCIAL

## 2021-08-23 ENCOUNTER — TELEPHONE (OUTPATIENT)
Dept: FAMILY MEDICINE | Facility: CLINIC | Age: 81
End: 2021-08-23

## 2021-08-23 ENCOUNTER — APPOINTMENT (OUTPATIENT)
Dept: RADIOLOGY | Facility: CLINIC | Age: 81
End: 2021-08-23
Attending: EMERGENCY MEDICINE
Payer: COMMERCIAL

## 2021-08-23 ENCOUNTER — VIRTUAL VISIT (OUTPATIENT)
Dept: FAMILY MEDICINE | Facility: CLINIC | Age: 81
End: 2021-08-23
Payer: COMMERCIAL

## 2021-08-23 DIAGNOSIS — S42.202D CLOSED FRACTURE OF PROXIMAL END OF LEFT HUMERUS WITH ROUTINE HEALING, UNSPECIFIED FRACTURE MORPHOLOGY, SUBSEQUENT ENCOUNTER: Primary | ICD-10-CM

## 2021-08-23 DIAGNOSIS — M62.81 GENERALIZED MUSCLE WEAKNESS: ICD-10-CM

## 2021-08-23 LAB
ALBUMIN UR-MCNC: NEGATIVE MG/DL
ANION GAP SERPL CALCULATED.3IONS-SCNC: 8 MMOL/L (ref 5–18)
APPEARANCE UR: CLEAR
BACTERIA #/AREA URNS HPF: ABNORMAL /HPF
BILIRUB UR QL STRIP: NEGATIVE
BUN SERPL-MCNC: 26 MG/DL (ref 8–28)
CALCIUM SERPL-MCNC: 9.2 MG/DL (ref 8.5–10.5)
CHLORIDE BLD-SCNC: 98 MMOL/L (ref 98–107)
CO2 SERPL-SCNC: 30 MMOL/L (ref 22–31)
COLOR UR AUTO: ABNORMAL
CREAT SERPL-MCNC: 1.64 MG/DL (ref 0.7–1.3)
ERYTHROCYTE [DISTWIDTH] IN BLOOD BY AUTOMATED COUNT: 13.3 % (ref 10–15)
GFR SERPL CREATININE-BSD FRML MDRD: 38 ML/MIN/1.73M2
GLUCOSE BLD-MCNC: 103 MG/DL (ref 70–125)
GLUCOSE UR STRIP-MCNC: NEGATIVE MG/DL
HCT VFR BLD AUTO: 31 % (ref 40–53)
HGB BLD-MCNC: 9.8 G/DL (ref 13.3–17.7)
HGB UR QL STRIP: NEGATIVE
HYALINE CASTS: 43 /LPF
INR PPP: 1.69 (ref 0.85–1.15)
KETONES UR STRIP-MCNC: NEGATIVE MG/DL
LEUKOCYTE ESTERASE UR QL STRIP: NEGATIVE
MCH RBC QN AUTO: 29.6 PG (ref 26.5–33)
MCHC RBC AUTO-ENTMCNC: 31.6 G/DL (ref 31.5–36.5)
MCV RBC AUTO: 94 FL (ref 78–100)
MUCOUS THREADS #/AREA URNS LPF: PRESENT /LPF
NITRATE UR QL: NEGATIVE
PH UR STRIP: 5 [PH] (ref 5–7)
PLATELET # BLD AUTO: 238 10E3/UL (ref 150–450)
POTASSIUM BLD-SCNC: 4.6 MMOL/L (ref 3.5–5)
RBC # BLD AUTO: 3.31 10E6/UL (ref 4.4–5.9)
RBC URINE: 2 /HPF
SARS-COV-2 RNA RESP QL NAA+PROBE: NEGATIVE
SODIUM SERPL-SCNC: 136 MMOL/L (ref 136–145)
SP GR UR STRIP: 1.01 (ref 1–1.03)
UROBILINOGEN UR STRIP-MCNC: <2 MG/DL
WBC # BLD AUTO: 10.3 10E3/UL (ref 4–11)
WBC URINE: 2 /HPF

## 2021-08-23 PROCEDURE — 80048 BASIC METABOLIC PNL TOTAL CA: CPT | Performed by: EMERGENCY MEDICINE

## 2021-08-23 PROCEDURE — 87635 SARS-COV-2 COVID-19 AMP PRB: CPT | Performed by: EMERGENCY MEDICINE

## 2021-08-23 PROCEDURE — 81001 URINALYSIS AUTO W/SCOPE: CPT | Performed by: EMERGENCY MEDICINE

## 2021-08-23 PROCEDURE — 258N000003 HC RX IP 258 OP 636: Performed by: EMERGENCY MEDICINE

## 2021-08-23 PROCEDURE — 96360 HYDRATION IV INFUSION INIT: CPT

## 2021-08-23 PROCEDURE — 85027 COMPLETE CBC AUTOMATED: CPT | Performed by: EMERGENCY MEDICINE

## 2021-08-23 PROCEDURE — 96361 HYDRATE IV INFUSION ADD-ON: CPT

## 2021-08-23 PROCEDURE — 999N001193 HC VIDEO/TELEPHONE VISIT; NO CHARGE: Performed by: FAMILY MEDICINE

## 2021-08-23 PROCEDURE — 36415 COLL VENOUS BLD VENIPUNCTURE: CPT | Performed by: EMERGENCY MEDICINE

## 2021-08-23 PROCEDURE — C9803 HOPD COVID-19 SPEC COLLECT: HCPCS

## 2021-08-23 PROCEDURE — 85610 PROTHROMBIN TIME: CPT | Performed by: EMERGENCY MEDICINE

## 2021-08-23 PROCEDURE — 71045 X-RAY EXAM CHEST 1 VIEW: CPT

## 2021-08-23 PROCEDURE — 99285 EMERGENCY DEPT VISIT HI MDM: CPT | Mod: 25

## 2021-08-23 RX ORDER — LOVASTATIN 40 MG
40 TABLET ORAL
COMMUNITY
End: 2021-08-23

## 2021-08-23 RX ORDER — LIDOCAINE 4 G/G
1 PATCH TOPICAL DAILY PRN
COMMUNITY
End: 2021-10-01

## 2021-08-23 RX ORDER — OXYCODONE AND ACETAMINOPHEN 5; 325 MG/1; MG/1
1 TABLET ORAL 3 TIMES DAILY PRN
COMMUNITY
Start: 2021-08-14 | End: 2021-09-08

## 2021-08-23 RX ORDER — OXYCODONE HYDROCHLORIDE 5 MG/1
5 TABLET ORAL 3 TIMES DAILY PRN
COMMUNITY
End: 2021-10-01

## 2021-08-23 RX ORDER — NITROGLYCERIN 0.4 MG/1
0.4 TABLET SUBLINGUAL
COMMUNITY
End: 2021-10-01

## 2021-08-23 RX ORDER — IBUPROFEN 200 MG
400 TABLET ORAL EVERY 4 HOURS PRN
COMMUNITY
End: 2021-10-01

## 2021-08-23 RX ORDER — FUROSEMIDE 40 MG
40 TABLET ORAL
COMMUNITY
End: 2021-08-23

## 2021-08-23 RX ADMIN — SODIUM CHLORIDE 1000 ML: 9 INJECTION, SOLUTION INTRAVENOUS at 19:56

## 2021-08-23 ASSESSMENT — ENCOUNTER SYMPTOMS
ABDOMINAL PAIN: 0
FEVER: 0
WEAKNESS: 1
CHILLS: 0
APPETITE CHANGE: 1
COUGH: 0
DIARRHEA: 0

## 2021-08-23 ASSESSMENT — MIFFLIN-ST. JEOR: SCORE: 1370.78

## 2021-08-23 ASSESSMENT — ACTIVITIES OF DAILY LIVING (ADL): DEPENDENT_IADLS:: CLEANING;COOKING;LAUNDRY;SHOPPING;TRANSPORTATION;MEAL PREPARATION

## 2021-08-23 NOTE — TELEPHONE ENCOUNTER
Called and spoke with Mary Kay.  Tanmay (the patient) is in the emergency department currently.  I referred him there to assess his pain, current situation and need for additional services.  We will await recommendations from his assessment before making further plans for placement at this point in time.

## 2021-08-23 NOTE — PROGRESS NOTES
Tanmay is a 81 year old who is being evaluated via a billable telephone visit.      What phone number would you like to be contacted at? [phone]  How would you like to obtain your AVS? Mane Donato was seen today for fall, fracture and referral.    Diagnoses and all orders for this visit:    Closed fracture of proximal end of left humerus with routine healing, unspecified fracture morphology, subsequent encounter       Subjective   Tanmay is a 81 year    HPI   On August 14, 2021 he had a fall there was evaluated at the [facility] emergency department.  He was found to have a comminuted proximal humeral fracture.    He fell and landed on his left side striking the back of his head and his shoulder primarily.  He returned to the emergency department on 18 August where he was reevaluated including for some hip pain that was evolving since the time of his initial injury.  No additional significant pathology was found.  Efforts to obtain better pain control during his emergency department visit were successful and it was determined he should be sent home for ongoing care and follow-up.    A phone message came in over the weekend the patient requesting to be admitted to a nursing facility because he is having pain and difficulty caring for himself.  Patient does reside alone.  He does have a significant other that has helped.  He reports that pain is difficult to control and that he is having difficulty performing any tasks that he needs to perform to take care of himself at home.  We discussed options for arranging for home care he did not feel that that would be adequate.  Discussed that the most appropriate means for reevaluation and placement would be for him to return to the emergency department for further evaluation.    He denies chest pain shortness of breath dizziness lightheadedness.    Review of Systems   Complete review of systems is obtained.  Other than the specific considerations noted above  complete review of systems is negative.        Objective       Vitals:  No vitals were obtained today due to virtual visit.    Physical Exam       Phone call duration: 3 minutes

## 2021-08-23 NOTE — ED PROVIDER NOTES
EMERGENCY DEPARTMENT ENCOUNTER      NAME: Tanmay Israel  AGE: 81 year old male  YOB: 1940  MRN: [id number]  EVALUATION DATE & TIME: 8/23/2021  5:27 PM    PCP: Go Ramírez    ED PROVIDER: Oneil Miguel M.D.      Chief Complaint   Patient presents with     Extremity Weakness         FINAL IMPRESSION:  1. Generalized muscle weakness          ED COURSE & MEDICAL DECISION MAKING:    Pertinent Labs & Imaging studies reviewed. (See chart for details)  ED Course as of Aug 23 2336   Mon Aug 23, 2021   1752 The patient is an 82-year-old gentleman who presents with his partner, brought in for difficulty with cares at home.  He broke his left proximal humerus 2 weeks ago, and she can no longer care for him due to his generalized weakness.  He denies any recent infectious symptoms, and aside from difficulty with transitioning, and is doing well from the pain standpoint.  He is on low-dose oxycodone because he is on blood thinners.  Plan to screen for underlying infection, UA, chest x-ray, and for anemia.  Otherwise will admit for treatment, PT/, OT, and placement.      1935 Sternotomy. Pacemaker leads over the right ventricle. Heart size mildly enlarged. Hyperinflation and some scattered fibrosis likely due to COPD. Old left lower rib fracture. Postop changes right shoulder.     No convincing evidence for acute new findings.   XR Chest Port 1 View   1935 UA is clean, BMP shows mild elevation of creatinine at 1.64, his white blood cell count of 10.3 and hemoglobin of 9.8.      1936 baseline   Hemoglobin(!): 9.8   1936 Relatively unchanged from baseline   Creatinine(!): 1.64   2333 There are no beds at our facility nor any other facility in the [facility].  We did find an available bed at [facility] in [place].  The patient is willing to transfer there.  The hospitalist also stated that they were able to help find transitional care facilities in the general [facility] area so that he would  [facility]        5:50 PM I met with the patient, obtained an initial history, performed an examination and discussed the plan. PPE worn throughout all interactions with the patient, including gloves, surgical mask, N95 mask, safety glasses, and surgical cap.     At the conclusion of the encounter I discussed the results of all of the tests and the disposition. The questions were answered. The patient or family acknowledged understanding and was agreeable with the care plan.       MEDICATIONS GIVEN IN THE EMERGENCY:  Medications   0.9% sodium chloride BOLUS (1,000 mLs Intravenous New Bag 8/23/21 1956)         NEW PRESCRIPTIONS STARTED AT TODAY'S ER VISIT  New Prescriptions    No medications on file          =================================================================    HPI    Patient information was obtained from: Patient      Use of : N/A       Tanmay Israel is a 81 year old male with a pertinent history of GERD, Sciatica, Crohn's disease, Afib, HTN, HLD, cervical myelopathy, cerebral aneurysm, ICD who presents to this ED by means of private car for evaluation of extremity weakness.     The patient reports a fall that occurred on the 14th of August where he broke his left shoulder. Since then he has noticed increased lower extremity weakness. His spouse reports that she has had to call her neighbor/son 4 times since the fall to help her get him off the toilet because he is too weak to do so himself. He endorses leg weakness, difficulty ambulating, and lack of appetite. He reports he has an ortho visit on Wednesday to look at his shoulder. The patient currently takes 5 mg of oxycodone every six hours for pain along with tylenol as needed. He is otherwise in his usual state of health and denies any fever, chills, cough, diarrhea, leg pain, abdominal pain, or any other concerns at this time.       REVIEW OF SYSTEMS   Review of Systems   Constitutional: Positive for  appetite change. Negative for chills and fever.   Respiratory: Negative for cough.    Cardiovascular: Negative for leg swelling.   Gastrointestinal: Negative for abdominal pain and diarrhea.   Neurological: Positive for weakness.   All other systems reviewed and are negative.       PAST MEDICAL HISTORY:  Past Medical History:   Diagnosis Date     Acute cholecystitis 2/28/2019     Acute post-operative pain      Anemia      Arthritis      Atrial fibrillation (H)      C. difficile diarrhea 4/21/2019     Calculus of ureter      Callus      Cerebral aneurysm      Cervical myelopathy (H) 2/22/2019     Cervical spinal stenosis      Chronic kidney disease     stage 3     Chronic systolic congestive heart failure (H)      Closed fracture of distal end of left radius, unspecified fracture morphology, initial encounter      Closed fracture of distal end of right radius, unspecified fracture morphology, initial encounter      Closed fracture of first thoracic vertebra, unspecified fracture morphology, initial encounter (H)      Coronary artery disease      Crohn's disease (H)      Disorder of bursae and tendons in shoulder region     Created by Conversion  Replacement Utility updated for latest IMO load     Dysphagia      Fall 10/22/2016     GERD (gastroesophageal reflux disease)      Hyperlipidemia      Hypertension      Hypotension, unspecified hypotension type      ICD (implantable cardioverter-defibrillator) in place     Medtronic     Ischemic cardiomyopathy      Kidney stone      Low back pain      Lumbago     Created by Conversion      Macular degeneration      Myalgia and myositis, unspecified     Created by Conversion      Nonspecific elevation of levels of transaminase or lactic acid dehydrogenase (LDH)     Created by Conversion      Permanent atrial fibrillation (H)     [id number] risk score = 5 (age1/ CAD/ HTN/ CHF) Chronic warfarin     Personal history of colonic polyps 2/12/2019     Polyp of duodenum 10/17/2016      Pyuria      Right arm weakness 4/2/2019     Schatzki's ring      Sciatica     Created by Conversion      Sleep apnea     no CPAP     Spondylolisthesis of lumbar region 7/5/2016     Weakness 4/20/2019       PAST SURGICAL HISTORY:  Past Surgical History:   Procedure Laterality Date     APPENDECTOMY       ARTHROSCOPY SHOULDER ROTATOR CUFF REPAIR Right      BYPASS GRAFT ARTERY CORONARY  08/2004    Coronary Artery Quadruple Venous Bypass Graft     C LAP,CHOLECYSTECTOMY/EXPLORE N/A 2/28/2019    CHOLECYSTECTOMY, LAPAROSCOPIC;  Surgeon: Mana Roman MD;  Location: [facility];  Service: General     CARDIAC DEFIBRILLATOR PLACEMENT  2013    ICD Medtronic     CHOLECYSTECTOMY       COLONOSCOPY N/A 2/19/2020    Procedure: COLONOSCOPY;  Surgeon: Houston Medina MD;  Location: [facility];  Service: Gastroenterology     ESOPHAGOSCOPY, GASTROSCOPY, DUODENOSCOPY (EGD), COMBINED       IR LUMBAR EPIDURAL STEROID INJECTION  6/29/2005     IR LUMBAR EPIDURAL STEROID INJECTION  7/15/2005     IR LUMBAR EPIDURAL STEROID INJECTION  9/28/2005     IR LUMBAR EPIDURAL STEROID INJECTION  12/28/2005     IR LUMBAR EPIDURAL STEROID INJECTION  3/29/2006     IR LUMBAR EPIDURAL STEROID INJECTION  6/8/2006     PHACOEMULSIFICATION CLEAR CORNEA WITH STANDARD INTRAOCULAR LENS IMPLANT Bilateral      CT C- LAMINOPLASTY, 2 OR MORE Left 2/22/2019    Procedure: LEFT CERVICAL 4, 5, 6 LAMINOPLASTY;  Surgeon: Liza Cesar MD;  Location: [facility];  Service: Spine     CT ESOPHAGOGASTRODUODENOSCOPY TRANSORAL DIAGNOSTIC N/A 5/10/2021    Procedure: ESOPHAGOGASTRODUODENOSCOPY (EGD);  Surgeon: Franklin Mendoza MD;  Location: [facility];  Service: Gastroenterology     CT ESOPHAGOGASTRODUODENOSCOPY TRANSORAL DIAGNOSTIC N/A 6/16/2021    Procedure: ESOPHAGOGASTRODUODENOSCOPY (EGD), WITH ESOPHAGEAL DILATION AND BIOPSY;  Surgeon: Paramjit Brown MD;  Location: [facility];  Service: Gastroenterology      TONSILLECTOMY       XR MYELOGRAM CERVICAL THORACIC LUMBAR  1/17/2019           CURRENT MEDICATIONS:    No current facility-administered medications for this encounter.     Current Outpatient Medications   Medication     acetaminophen (TYLENOL) 500 MG tablet     albuterol (PROAIR HFA/PROVENTIL HFA/VENTOLIN HFA) 108 (90 Base) MCG/ACT inhaler     bumetanide (BUMEX) 1 MG tablet     calcium carbonate-vitamin D 600-200 MG-UNIT TABS     carvedilol (COREG) 25 MG tablet     cyclobenzaprine (FLEXERIL) 5 MG tablet     cycloSPORINE (RESTASIS) 0.05 % ophthalmic emulsion     diclofenac (VOLTAREN) 1 % topical gel     erythromycin (ROMYCIN) 5 MG/GM ophthalmic ointment     ferrous sulfate (FEROSUL) 325 (65 Fe) MG tablet     ibuprofen (ADVIL/MOTRIN) 200 MG tablet     Lidocaine (LIDOCARE) 4 % Patch     multivitamin w/minerals (MULTI-VITAMIN) tablet     neomycin-polymixin-dexamethasone (MAXITROL) ophthalmic ointment     nitroGLYcerin (NITROSTAT) 0.4 MG sublingual tablet     omeprazole (PRILOSEC) 20 MG DR capsule     ondansetron (ZOFRAN) 8 MG tablet     oxyCODONE (ROXICODONE) 5 MG tablet     oxyCODONE-acetaminophen (PERCOCET) 5-325 MG tablet     potassium chloride ER (KLOR-CON M) 20 MEQ CR tablet     rosuvastatin (CRESTOR) 20 MG tablet     vitamin B-12 (CYANOCOBALAMIN) 2000 MCG tablet     warfarin ANTICOAGULANT (COUMADIN) 2.5 MG tablet       ALLERGIES:  No Known Allergies    FAMILY HISTORY:  Family History   Problem Relation Age of Onset     Heart Disease Mother      Cerebrovascular Disease Mother      Crohn's Disease Father      Alcoholism Brother      No Known Problems Daughter      No Known Problems Son      No Known Problems Maternal Aunt      No Known Problems Maternal Uncle      No Known Problems Paternal Aunt      No Known Problems Paternal Uncle      No Known Problems Maternal Grandmother      No Known Problems Maternal Grandfather      No Known Problems Paternal Grandmother      No Known Problems Paternal Grandfather       "Cancer Brother      Urolithiasis No family hx of      Gout No family hx of        SOCIAL HISTORY:   Social History     Socioeconomic History     Marital status: Single     Spouse name: Not on file     Number of children: Not on file     Years of education: Not on file     Highest education level: Not on file   Occupational History     Not on file   Tobacco Use     Smoking status: Former Smoker     Smokeless tobacco: Never Used   Substance and Sexual Activity     Alcohol use: Not Currently     Drug use: Never     Sexual activity: Yes     Partners: Female   Other Topics Concern     Parent/sibling w/ CABG, MI or angioplasty before 65F 55M? Not Asked   Social History Narrative     Not on file     Social Determinants of Health     Financial Resource Strain:      Difficulty of Paying Living Expenses:    Food Insecurity:      Worried About Running Out of Food in the Last Year:      Ran Out of Food in the Last Year:    Transportation Needs:      Lack of Transportation (Medical):      Lack of Transportation (Non-Medical):    Physical Activity:      Days of Exercise per Week:      Minutes of Exercise per Session:    Stress:      Feeling of Stress :    Social Connections:      Frequency of Communication with Friends and Family:      Frequency of Social Gatherings with Friends and Family:      Attends Mosque Services:      Active Member of Clubs or Organizations:      Attends Club or Organization Meetings:      Marital Status:    Intimate Partner Violence:      Fear of Current or Ex-Partner:      Emotionally Abused:      Physically Abused:      Sexually Abused:        VITALS:  /60   Pulse 64   Temp 97.7  F (36.5  C) (Oral)   Resp 16   Ht 1.753 m (5' 9\")   Wt 68 kg (150 lb)   SpO2 96%   BMI 22.15 kg/m      PHYSICAL EXAM    Constitutional: Well developed, well nourished. Comfortable appearing. Wearing a left shoulder immobilizer.   HENT: Normocephalic, atraumatic, Dry mucous membranes, nose normal. Neck- Supple, " gross ROM intact.   Eyes: Pupils mid-range, conjunctiva without injection, no discharge.   Respiratory: Clear to auscultation bilaterally, no respiratory distress, no wheezing, speaks full sentences easily. No cough.  Cardiovascular: Normal heart rate, regular rhythm, no murmurs. No pedal edema, 2+ DP pulses.   GI: Soft, no tenderness to deep palpation in all quadrants, no masses.  Musculoskeletal: Moving all 4 extremities intentionally and without pain. No obvious deformity. Able to lift both legs off of the bed.   Skin: Warm, dry, no rash.  Neurologic: Alert & oriented x 3, cranial nerves grossly intact.  Psychiatric: Affect normal, cooperative.         LAB:  All pertinent labs reviewed and interpreted.  Labs Ordered and Resulted from Time of ED Arrival Up to the Time of Departure from the ED   CBC WITH PLATELETS - Abnormal; Notable for the following components:       Result Value    RBC Count 3.31 (*)     Hemoglobin 9.8 (*)     Hematocrit 31.0 (*)     All other components within normal limits   BASIC METABOLIC PANEL - Abnormal; Notable for the following components:    Creatinine 1.64 (*)     GFR Estimate 38 (*)     All other components within normal limits   INR - Abnormal; Notable for the following components:    INR 1.69 (*)     All other components within normal limits   ROUTINE UA WITH MICROSCOPIC REFLEX TO CULTURE - Abnormal; Notable for the following components:    Bacteria Urine Few (*)     Mucus Urine Present (*)     Hyaline Casts Urine 43 (*)     All other components within normal limits    Narrative:     Urine Culture not indicated   COVID-19 VIRUS (CORONAVIRUS) BY PCR - Normal    Narrative:     Testing was performed using the trevon  SARS-CoV-2 & Influenza A/B Assay on the trevon  Cuca  System.  This test should be ordered for the detection of SARS-COV-2 in individuals who meet SARS-CoV-2 clinical and/or epidemiological criteria. Test performance is unknown in asymptomatic patients.  This test is for in  vitro diagnostic use under the FDA EUA for laboratories certified under CLIA to perform moderate and/or high complexity testing. This test has not been FDA cleared or approved.  A negative test does not rule out the presence of PCR inhibitors in the specimen or target RNA in concentration below the limit of detection for the assay. The possibility of a false negative should be considered if the patient's recent exposure or clinical presentation suggests COVID-19.  [facility] are certified under the Clinical Laboratory Improvement Amendments of 1988 (CLIA-88) as qualified to perform moderate and/or high complexity laboratory testing.   PERIPHERAL IV CATHETER       RADIOLOGY:  Reviewed all pertinent imaging. Please see official radiology report.  XR Chest Port 1 View   Final Result   IMPRESSION: Sternotomy. Pacemaker leads over the right ventricle. Heart size mildly enlarged. Hyperinflation and some scattered fibrosis likely due to COPD. Old left lower rib fracture. Postop changes right shoulder.      No convincing evidence for acute new findings.          EKG:    All EKG interpretations will be found in ED course above.    I, Lamin Roman am serving as a scribe to document services personally performed by Dr. Oneil Miguel based on my observation and the provider's statements to me. I, Oneil Miguel MD attest that Lamin Roman is acting in a scribe capacity, has observed my performance of the services and has documented them in accordance with my direction.    Oneil Miguel M.D.  Emergency Medicine  [facility] EMERGENCY ROOM  [address] [address]  [phone]  Dept: [phone]     Oneil Miguel MD  08/23/21 9098

## 2021-08-23 NOTE — TELEPHONE ENCOUNTER
Reason for Call:  Letter/pre-auth    Detailed comments: Recvd call from pt's niece/Mary Kay, Mary Kay spoke with [facility] [employer]. [facility] stated that Dr Go Ramírez can submit a pre-authoriztion request, marking it as an urgent need for pt/Tanmay to have transitional care. Mary Kay is not sure if a  will need to be involved with all this.    Tanmay is staying with a dear friend at this time that is exhausted with caring for Tanmay.    Any questions please reach out to Mary Kay at [phone]    Best Time: anytime    Can we leave a detailed message on this number? YES    Call taken on 8/23/2021 at 3:48 PM by Cara Martinez

## 2021-08-24 ENCOUNTER — TRANSFERRED RECORDS (OUTPATIENT)
Dept: HEALTH INFORMATION MANAGEMENT | Facility: CLINIC | Age: 81
End: 2021-08-24

## 2021-08-24 ENCOUNTER — DOCUMENTATION ONLY (OUTPATIENT)
Dept: ANTICOAGULATION | Facility: CLINIC | Age: 81
End: 2021-08-24

## 2021-08-24 VITALS
WEIGHT: 150 LBS | TEMPERATURE: 97.7 F | HEART RATE: 61 BPM | DIASTOLIC BLOOD PRESSURE: 61 MMHG | HEIGHT: 69 IN | SYSTOLIC BLOOD PRESSURE: 131 MMHG | BODY MASS INDEX: 22.22 KG/M2 | OXYGEN SATURATION: 96 % | RESPIRATION RATE: 16 BRPM

## 2021-08-24 NOTE — PHARMACY-ADMISSION MEDICATION HISTORY
"Pharmacy Note - Admission Medication History    Pertinent Provider Information:    Patient's friend, Latasha, took all medications and topical/eye drops home. Unable to contact her to bring in topicals/eye drops. Takes at albuterol at least least once daily.   1. Patient manages his own medications and sets up 2 pill boxes (AM and PM). Utilized MTM note on 8/9/21 to supplement this interview. Patient stated he takes lovastatin during interview however noted that a nurse reported \"not taking\" and no fill history (taking Crestor).   2. Patient reported taking 1/2 tab warfarin on MWF and 1 full tab ROW; per anticoag note on 8/11 was to take 2.5 mg MF and 1.25 mg ROW. Unclear if he took warfarin today, but did yesterday.    ______________________________________________________________________    Prior To Admission (PTA) med list completed and updated in EMR.       PTA Med List   Medication Sig Note Last Dose     acetaminophen (TYLENOL) 500 MG tablet Take 1,000 mg by mouth every 8 hours as needed   Unknown     albuterol (PROAIR HFA/PROVENTIL HFA/VENTOLIN HFA) 108 (90 Base) MCG/ACT inhaler Inhale 1-2 puffs into the lungs every 4 hours as needed Normally takes ~1 puff/ day  8/23/2021 at am     bumetanide (BUMEX) 1 MG tablet Take 1 tablet by mouth daily  8/23/2021 at am     calcium carbonate-vitamin D 600-200 MG-UNIT TABS Take 2 tablets by mouth  Unknown     carvedilol (COREG) 25 MG tablet Take 25 mg by mouth 2 times daily  8/23/2021 at am     cyclobenzaprine (FLEXERIL) 5 MG tablet Take 2 tablets (10 mg) by mouth 3 times daily as needed for muscle spasms  8/23/2021 at am     cycloSPORINE (RESTASIS) 0.05 % ophthalmic emulsion Place 1 drop into both eyes 2 times daily   8/23/2021     diclofenac (VOLTAREN) 1 % topical gel Apply 2 g topically 2 times daily   8/23/2021 at am     erythromycin (ROMYCIN) 5 MG/GM ophthalmic ointment Place 0.5 inches into both eyes daily   8/23/2021 at AM     ferrous sulfate (FEROSUL) 325 (65 Fe) MG " tablet Take 1 tablet by mouth daily  8/22/2021 at Unknown time     ibuprofen (ADVIL/MOTRIN) 200 MG tablet Take 400 mg by mouth every 4 hours as needed for mild pain  Unknown     Lidocaine (LIDOCARE) 4 % Patch Place 1 patch onto the skin daily as needed for moderate pain To prevent lidocaine toxicity, patient should be patch free for 12 hrs daily. 8/23/2021: Not on in the ED Unknown     multivitamin w/minerals (MULTI-VITAMIN) tablet Take 1 tablet by mouth daily  Unknown     neomycin-polymixin-dexamethasone (MAXITROL) ophthalmic ointment Place 1 Application into both eyes daily   8/23/2021 at AM     nitroGLYcerin (NITROSTAT) 0.4 MG sublingual tablet Place 0.4 mg under the tongue  Unknown     omeprazole (PRILOSEC) 20 MG DR capsule Take 1 capsule by mouth daily  8/23/2021 at AM     ondansetron (ZOFRAN) 8 MG tablet Take 8 mg by mouth as needed  Unknown     oxyCODONE (ROXICODONE) 5 MG tablet Take 5 mg by mouth every 6 hours as needed for severe pain  8/23/2021 at am     oxyCODONE-acetaminophen (PERCOCET) 5-325 MG tablet Take 1 tablet by mouth every 6 hours as needed   Unknown at RARE USE     potassium chloride ER (KLOR-CON M) 20 MEQ CR tablet TAKE ONE TABLET BY MOUTH ONCE DAILY  Unknown     rosuvastatin (CRESTOR) 20 MG tablet TAKE 1 TABLET BY MOUTH DAILY AT BEDTIME  8/22/2021 at pm     vitamin B-12 (CYANOCOBALAMIN) 2000 MCG tablet Take 2,000 mcg by mouth daily  Unknown     warfarin ANTICOAGULANT (COUMADIN) 2.5 MG tablet Take 1.25-2.5 mg by mouth See Admin Instructions Take 1.25 mg Mon, Wed, Fri and 2.5 mg Sun, Tues, Thurs, Sat. 8/23/2021: Unclear if patient took dose on 8/23. Typically takes in the morning 8/22/2021 at May have also taken 8/23, but can't recall       Information source(s): Patient, [facility] records and [facility]  Method of interview communication: in-person    Summary of Changes to PTA Med List  New: oxycodone, lidocaine, ibuprofen  Discontinued: lovastatin, furosemide  Changed:  acetaminophen to prn, added albuterol sigs, restasis to both eyes bid, diclofenac to bid, erythromycin to both eyes bid, maxitrol to both eyes every day,     Patient was asked about OTC/herbal products specifically.  PTA med list reflects this.    In the past week, patient estimated taking medication this percent of the time:  greater than 90%.    Allergies were reviewed, assessed, and updated with the patient.      Patient did not bring any medications to the hospital and can't retrieve from home. No multi-dose medications are available for use during hospital stay.     The information provided in this note is only as accurate as the sources available at the time of the update(s).    Thank you for the opportunity to participate in the care of this patient.    Francisco Kaur [facility]  8/23/2021 8:45 PM

## 2021-08-24 NOTE — CONSULTS
Care Management Initial Consult    General Information  Assessment completed with: Patient,    Type of CM/SW Visit: Initial Assessment    Primary Care Provider verified and updated as needed:     Readmission within the last 30 days:        Reason for Consult: discharge planning  Advance Care Planning:            Communication Assessment  Patient's communication style: spoken language (English or Bilingual)    Hearing Difficulty or Deaf: yes   Wear Glasses or Blind: yes    Cognitive  Cognitive/Neuro/Behavioral: WDL                      Living Environment:   People in home: significant other     Current living Arrangements: independent living facility      Able to return to prior arrangements:    Living Arrangement Comments:  (Pending progression of care)    Family/Social Support:  Care provided by: spouse/significant other  Provides care for: no one  Marital Status: Lives with Significant Other  Significant Other          Description of Support System: Supportive         Current Resources:   Patient receiving home care services: No     Community Resources: None  Equipment currently used at home:  (Left arm sling)  Supplies currently used at home: None    Employment/Financial:  Employment Status: retired        Financial Concerns:             Lifestyle & Psychosocial Needs:  Social Determinants of Health     Tobacco Use: Medium Risk     Smoking Tobacco Use: Former Smoker     Smokeless Tobacco Use: Never Used   Alcohol Use:      Frequency of Alcohol Consumption:      Average Number of Drinks:      Frequency of Binge Drinking:    Financial Resource Strain:      Difficulty of Paying Living Expenses:    Food Insecurity:      Worried About Running Out of Food in the Last Year:      Ran Out of Food in the Last Year:    Transportation Needs:      Lack of Transportation (Medical):      Lack of Transportation (Non-Medical):    Physical Activity:      Days of Exercise per Week:      Minutes of Exercise per Session:    Stress:       Feeling of Stress :    Social Connections:      Frequency of Communication with Friends and Family:      Frequency of Social Gatherings with Friends and Family:      Attends Baptism Services:      Active Member of Clubs or Organizations:      Attends Club or Organization Meetings:      Marital Status:    Intimate Partner Violence:      Fear of Current or Ex-Partner:      Emotionally Abused:      Physically Abused:      Sexually Abused:    Depression: Not at risk     PHQ-2 Score: 0   Housing Stability:      Unable to Pay for Housing in the Last Year:      Number of Places Lived in the Last Year:      Unstable Housing in the Last Year:        Functional Status:  Prior to admission patient needed assistance:   Dependent ADLs:: Bathing, Dressing  Dependent IADLs:: Cleaning, Cooking, Laundry, Shopping, Transportation, Meal Preparation  Assesssment of Functional Status: Not at baseline with ADL Functioning, Not at baseline with mobility, Needs placement in a SNF/TCF for rehabilitation, Needs assistance with dressing, Needs assistance with bathing, Needs assistance with laundry/houskeeping    Mental Health Status:          Chemical Dependency Status:                Values/Beliefs:  Spiritual, Cultural Beliefs, Baptism Practices, Values that affect care:                 Additional Information:  AIDET completed.  Pt lives with his lady friend Latasha in a [facility] apt. Prior to his fall and shoulder fracture, he is independent with all ADL's, has no services and no DME used. However, now pt is having difficulty with basic ADL's and feels weak. Discussed DC planning including HC vs TCU. Pt feels he cannot care for himself right now at home. Several TCU referrals sent. Anticipate pt will DC with possible HC needs vs TCU.  Informed that CM will follow progression of care.   SHERI Blackburn      Abbreviation Code:  [facility] home care (HEHC), Home care (HC), Patient (Pt), Transitional Care Unit (TCU), Skilled  Nursing Facility (SNF), Assisted Living (AL), Independent Living (IL), Physical Therapy (PT), Occupational Therapy (OT)        Jyoti Reed RN

## 2021-08-24 NOTE — PROGRESS NOTES
ANTICOAGULATION  MANAGEMENT: Discharge Review    Tanmay Israel chart reviewed for anticoagulation continuity of care    Emergency room visit on 8/23 for generalized weakness.    Discharge disposition: Transferred hospitals to [facility] in [place]    Results:    Recent labs: (last 7 days)     08/23/21  1845   INR 1.69*     Anticoagulation inpatient management:     not applicable     Anticoagulation discharge instructions:     Warfarin dosing: for acute care hospital to manage   Bridging: No   INR goal change: No      Medication changes affecting anticoagulation: No    Additional factors affecting anticoagulation: No    Plan     ACC will resume monitoring upon discharge from [facility] (and eventual TCU)    No adjustment to Anticoagulation Calendar was required       Yaneth Wells RN

## 2021-08-30 ENCOUNTER — TRANSITIONAL CARE UNIT VISIT (OUTPATIENT)
Dept: GERIATRICS | Facility: CLINIC | Age: 81
End: 2021-08-30
Payer: COMMERCIAL

## 2021-08-30 ENCOUNTER — TELEPHONE (OUTPATIENT)
Dept: FAMILY MEDICINE | Facility: CLINIC | Age: 81
End: 2021-08-30

## 2021-08-30 VITALS
SYSTOLIC BLOOD PRESSURE: 144 MMHG | TEMPERATURE: 98.3 F | HEIGHT: 69 IN | RESPIRATION RATE: 18 BRPM | HEART RATE: 66 BPM | DIASTOLIC BLOOD PRESSURE: 67 MMHG | OXYGEN SATURATION: 99 % | WEIGHT: 143.4 LBS | BODY MASS INDEX: 21.24 KG/M2

## 2021-08-30 DIAGNOSIS — S42.202D CLOSED FRACTURE OF PROXIMAL END OF LEFT HUMERUS WITH ROUTINE HEALING, UNSPECIFIED FRACTURE MORPHOLOGY, SUBSEQUENT ENCOUNTER: ICD-10-CM

## 2021-08-30 DIAGNOSIS — I50.20 HEART FAILURE WITH REDUCED EJECTION FRACTION (H): ICD-10-CM

## 2021-08-30 DIAGNOSIS — I10 BENIGN ESSENTIAL HYPERTENSION: Primary | ICD-10-CM

## 2021-08-30 DIAGNOSIS — I48.21 PERMANENT ATRIAL FIBRILLATION (H): ICD-10-CM

## 2021-08-30 PROCEDURE — 99305 1ST NF CARE MODERATE MDM 35: CPT | Performed by: NURSE PRACTITIONER

## 2021-08-30 ASSESSMENT — MIFFLIN-ST. JEOR: SCORE: 1340.84

## 2021-08-30 NOTE — PROGRESS NOTES
[facility] GERIATRIC SERVICES  PRIMARY CARE PROVIDER AND CLINIC:  Go Ramírez MD, MD, [address] [place] [address] / [facility] [address]  Chief Complaint   Patient presents with     Hospital F/U      [facility] Medical Record Number:  [id number]  Place of Service where encounter took place:  [facility] (U) [[id number]]    Tanmay Israel  is a 81 year old  (1940), admitted to the above facility from  [facility]. Hospital stay 8/24 through 8/27..   HPI:, Patient is a history of GERD, heart failure and Crohn's, anemia, pretension, A. Fib, who transferred from [facility] ER for generalized weakness and a left proximal humerus fracture.  He experienced a fracture 2 weeks prior to his ER visit and subsequent hospital visit.  OT suggested patient to discharge to TCU.  Pain control he is using Tylenol and oxycodone.  He is otherwise denying any acute concerns.  He is sitting up on the side of his bed with a immobilizer on the left humerus.  He is endorsing moderate pain.  Reports that he lives independently but does have a close friend, and also named Latasha who follows up and assist with his appointments.    CODE STATUS/ADVANCE DIRECTIVES DISCUSSION:  No Order  DNR / DNI  ALLERGIES: No Known Allergies   PAST MEDICAL HISTORY:   Past Medical History:   Diagnosis Date     Acute cholecystitis 2/28/2019     Acute post-operative pain      Anemia      Arthritis      Atrial fibrillation (H)      C. difficile diarrhea 4/21/2019     Calculus of ureter      Callus      Cerebral aneurysm      Cervical myelopathy (H) 2/22/2019     Cervical spinal stenosis      Chronic kidney disease     stage 3     Chronic systolic congestive heart failure (H)      Closed fracture of distal end of left radius, unspecified fracture morphology, initial encounter      Closed fracture of distal end of right radius, unspecified fracture morphology, initial encounter      Closed fracture of first thoracic vertebra, unspecified fracture morphology, initial  encounter (H)      Coronary artery disease      Crohn's disease (H)      Disorder of bursae and tendons in shoulder region     Created by Conversion  Replacement Utility updated for latest IMO load     Dysphagia      Fall 10/22/2016     GERD (gastroesophageal reflux disease)      Hyperlipidemia      Hypertension      Hypotension, unspecified hypotension type      ICD (implantable cardioverter-defibrillator) in place     Medtronic     Ischemic cardiomyopathy      Kidney stone      Low back pain      Lumbago     Created by Conversion      Macular degeneration      Myalgia and myositis, unspecified     Created by Conversion      Nonspecific elevation of levels of transaminase or lactic acid dehydrogenase (LDH)     Created by Conversion      Permanent atrial fibrillation (H)     [id number] risk score = 5 (age1/ CAD/ HTN/ CHF) Chronic warfarin     Personal history of colonic polyps 2/12/2019     Polyp of duodenum 10/17/2016     Pyuria      Right arm weakness 4/2/2019     Schatzki's ring      Sciatica     Created by Conversion      Sleep apnea     no CPAP     Spondylolisthesis of lumbar region 7/5/2016     Weakness 4/20/2019      PAST SURGICAL HISTORY:   has a past surgical history that includes IR Lumbar Epidural Steroid Injection (6/29/2005); IR Lumbar Epidural Steroid Injection (7/15/2005); IR Lumbar Epidural Steroid Injection (9/28/2005); IR Lumbar Epidural Steroid Injection (12/28/2005); IR Lumbar Epidural Steroid Injection (3/29/2006); IR Lumbar Epidural Steroid Injection (6/8/2006); Esophagoscopy, gastroscopy, duodenoscopy (EGD), combined; Tonsillectomy; appendectomy; Cholecystectomy; Arthroscopy shoulder rotator cuff repair (Right); Phacoemulsification clear cornea with standard intraocular lens implant (Bilateral); Xr Myelogram Cervical Thoracic Lumbar (1/17/2019); Pr C- Laminoplasty, 2 Or More (Left, 2/22/2019); LAP,CHOLECYSTECTOMY/EXPLORE (N/A, 2/28/2019); Cardiac Defibrillator Placement (2013); Bypass graft  artery coronary (08/2004); Colonoscopy (N/A, 2/19/2020); Pr Esophagogastroduodenoscopy Transoral Diagnostic (N/A, 5/10/2021); and Pr Esophagogastroduodenoscopy Transoral Diagnostic (N/A, 6/16/2021).  FAMILY HISTORY: family history includes Alcoholism in his brother; Cancer in his brother; Cerebrovascular Disease in his mother; Crohn's Disease in his father; Heart Disease in his mother; No Known Problems in his daughter, maternal aunt, maternal grandfather, maternal grandmother, maternal uncle, paternal aunt, paternal grandfather, paternal grandmother, paternal uncle, and son.  SOCIAL HISTORY:   reports that he has quit smoking. He has never used smokeless tobacco. He reports previous alcohol use. He reports that he does not use drugs.  Patient's living condition: lives alone    Post Discharge Medication Reconciliation Status: discharge medications reconciled, continue medications without change  Current Outpatient Medications   Medication Sig     acetaminophen (TYLENOL) 500 MG tablet Take 1,000 mg by mouth every 8 hours as needed      albuterol (PROAIR HFA/PROVENTIL HFA/VENTOLIN HFA) 108 (90 Base) MCG/ACT inhaler Inhale 1-2 puffs into the lungs every 4 hours as needed Normally takes ~1 puff/ day     bumetanide (BUMEX) 1 MG tablet Take 1 tablet by mouth daily     calcium carbonate-vitamin D 600-200 MG-UNIT TABS Take 2 tablets by mouth     carvedilol (COREG) 25 MG tablet Take 25 mg by mouth 2 times daily     cyclobenzaprine (FLEXERIL) 5 MG tablet Take 2 tablets (10 mg) by mouth 3 times daily as needed for muscle spasms     cycloSPORINE (RESTASIS) 0.05 % ophthalmic emulsion Place 1 drop into both eyes 2 times daily      diclofenac (VOLTAREN) 1 % topical gel Apply 2 g topically 2 times daily      erythromycin (ROMYCIN) 5 MG/GM ophthalmic ointment Place 0.5 inches into both eyes daily      ferrous sulfate (FEROSUL) 325 (65 Fe) MG tablet Take 1 tablet by mouth daily     ibuprofen (ADVIL/MOTRIN) 200 MG tablet Take 400 mg  "by mouth every 4 hours as needed for mild pain     Lidocaine (LIDOCARE) 4 % Patch Place 1 patch onto the skin daily as needed for moderate pain To prevent lidocaine toxicity, patient should be patch free for 12 hrs daily.     multivitamin w/minerals (MULTI-VITAMIN) tablet Take 1 tablet by mouth daily     neomycin-polymixin-dexamethasone (MAXITROL) ophthalmic ointment Place 1 Application into both eyes daily      nitroGLYcerin (NITROSTAT) 0.4 MG sublingual tablet Place 0.4 mg under the tongue     omeprazole (PRILOSEC) 20 MG DR capsule Take 1 capsule by mouth daily     ondansetron (ZOFRAN) 8 MG tablet Take 8 mg by mouth as needed     oxyCODONE (ROXICODONE) 5 MG tablet Take 5 mg by mouth every 6 hours as needed for severe pain     oxyCODONE-acetaminophen (PERCOCET) 5-325 MG tablet Take 1 tablet by mouth every 6 hours as needed      potassium chloride ER (KLOR-CON M) 20 MEQ CR tablet TAKE ONE TABLET BY MOUTH ONCE DAILY     rosuvastatin (CRESTOR) 20 MG tablet TAKE 1 TABLET BY MOUTH DAILY AT BEDTIME     vitamin B-12 (CYANOCOBALAMIN) 2000 MCG tablet Take 2,000 mcg by mouth daily     warfarin ANTICOAGULANT (COUMADIN) 2.5 MG tablet Take 1.25-2.5 mg by mouth See Admin Instructions Take 1.25 mg Mon, Wed, Fri and 2.5 mg Sun, Tues, Thurs, Sat.     No current facility-administered medications for this visit.       ROS:  10 point ROS of systems including Constitutional, Eyes, Respiratory, Cardiovascular, Gastroenterology, Genitourinary, Integumentary, Musculoskeletal, Psychiatric were all negative except for pertinent positives noted in my HPI.    Vitals:  BP (!) 144/67   Pulse 66   Temp 98.3  F (36.8  C)   Resp 18   Ht 1.753 m (5' 9\")   Wt 65 kg (143 lb 6.4 oz)   SpO2 99%   BMI 21.18 kg/m    Exam:  Physical Exam   General appearance: alert, appears stated age and cooperative.   Head: Normocephalic, without obvious abnormality, atraumatic, Eyes: sclera anicteric.  Lungs: respirations without effort, LSCTA; no wheezing or " rales.   Cardiovascular: S1, S2. Regular rate and rhythm.   ABDOMEN: Globular and soft.  Extremities: Left upper extremity in immobilizer.  Fingers wiggle.  Sensation intact.  Skin: Skin color, texture, turgor normal. No rashes or lesions  Neurologic:oriented. No focal deficits.   Psych: interacts well with caregivers, exhibits logical thought processes and connections, pleasant    Lab/Diagnostic data:  Recent labs in [facility] reviewed by me today.     ASSESSMENT/PLAN:  (I10) Benign essential hypertension  (primary encounter diagnosis)  Plan: PTA losartan and carvedilol.     (S42.202D) Closed fracture of proximal end of left humerus with routine healing, unspecified fracture morphology, subsequent encounter  Comment: endorses moderate pain.   Plan:   -Oxycodone 2.5-5mg q 4 hours prn. Wean as able.     (I48.21) Permanent atrial fibrillation (H)  Plan: carvedilol for rate control. On coumadin with therapuetic INR today.   Recheck Thursday.     (I50.20) Heart failure with reduced ejection fraction (H)  Comment: Monitoring weights, resp status.  Plan: Lasix 60 daily. 3x weekly weights.   Patient would like to change low sodium diet to regular as he eats that at home.     Electronically signed by:  Chela Gallardo CNP

## 2021-08-30 NOTE — LETTER
8/30/2021        RE: Tanmay Israel  [address]  [facility] [address]        [facility] GERIATRIC SERVICES  PRIMARY CARE PROVIDER AND CLINIC:  Go Ramírez MD, MD, [address] [facility] [address] [facility] [address] / [place] [address]  Chief Complaint   Patient presents with     Hospital F/U      [place] Medical Record Number:  [id number]  Place of Service where encounter took place:  [facility] (U) [[id number]]    Tanmay Israel  is a 81 year old  (1940), admitted to the above facility from  [facility]. Hospital stay 8/24 through 8/27..   HPI:, Patient is a history of GERD, heart failure and Crohn's, anemia, pretension, A. Fib, who transferred from [facility] ER for generalized weakness and a left proximal humerus fracture.  He experienced a fracture 2 weeks prior to his ER visit and subsequent hospital visit.  OT suggested patient to discharge to TCU.  Pain control he is using Tylenol and oxycodone.  He is otherwise denying any acute concerns.  He is sitting up on the side of his bed with a immobilizer on the left humerus.  He is endorsing moderate pain.  Reports that he lives independently but does have a close friend, and also named Latasha who follows up and assist with his appointments.    CODE STATUS/ADVANCE DIRECTIVES DISCUSSION:  No Order  DNR / DNI  ALLERGIES: No Known Allergies   PAST MEDICAL HISTORY:   Past Medical History:   Diagnosis Date     Acute cholecystitis 2/28/2019     Acute post-operative pain      Anemia      Arthritis      Atrial fibrillation (H)      C. difficile diarrhea 4/21/2019     Calculus of ureter      Callus      Cerebral aneurysm      Cervical myelopathy (H) 2/22/2019     Cervical spinal stenosis      Chronic kidney disease     stage 3     Chronic systolic congestive heart failure (H)      Closed fracture of distal end of left radius, unspecified fracture morphology, initial encounter      Closed fracture of distal end of right radius, unspecified fracture morphology, initial encounter       Closed fracture of first thoracic vertebra, unspecified fracture morphology, initial encounter (H)      Coronary artery disease      Crohn's disease (H)      Disorder of bursae and tendons in shoulder region     Created by Conversion  Replacement Utility updated for latest IMO load     Dysphagia      Fall 10/22/2016     GERD (gastroesophageal reflux disease)      Hyperlipidemia      Hypertension      Hypotension, unspecified hypotension type      ICD (implantable cardioverter-defibrillator) in place     Medtronic     Ischemic cardiomyopathy      Kidney stone      Low back pain      Lumbago     Created by Conversion      Macular degeneration      Myalgia and myositis, unspecified     Created by Conversion      Nonspecific elevation of levels of transaminase or lactic acid dehydrogenase (LDH)     Created by Conversion      Permanent atrial fibrillation (H)     [id number] risk score = 5 (age3/ CAD/ HTN/ CHF) Chronic warfarin     Personal history of colonic polyps 2/12/2019     Polyp of duodenum 10/17/2016     Pyuria      Right arm weakness 4/2/2019     Schatzki's ring      Sciatica     Created by Conversion      Sleep apnea     no CPAP     Spondylolisthesis of lumbar region 7/5/2016     Weakness 4/20/2019      PAST SURGICAL HISTORY:   has a past surgical history that includes IR Lumbar Epidural Steroid Injection (6/29/2005); IR Lumbar Epidural Steroid Injection (7/15/2005); IR Lumbar Epidural Steroid Injection (9/28/2005); IR Lumbar Epidural Steroid Injection (12/28/2005); IR Lumbar Epidural Steroid Injection (3/29/2006); IR Lumbar Epidural Steroid Injection (6/8/2006); Esophagoscopy, gastroscopy, duodenoscopy (EGD), combined; Tonsillectomy; appendectomy; Cholecystectomy; Arthroscopy shoulder rotator cuff repair (Right); Phacoemulsification clear cornea with standard intraocular lens implant (Bilateral); Xr Myelogram Cervical Thoracic Lumbar (1/17/2019); Pr C- Laminoplasty, 2 Or More (Left, 2/22/2019);  LAP,CHOLECYSTECTOMY/EXPLORE (N/A, 2/28/2019); Cardiac Defibrillator Placement (2013); Bypass graft artery coronary (08/2004); Colonoscopy (N/A, 2/19/2020); Pr Esophagogastroduodenoscopy Transoral Diagnostic (N/A, 5/10/2021); and Pr Esophagogastroduodenoscopy Transoral Diagnostic (N/A, 6/16/2021).  FAMILY HISTORY: family history includes Alcoholism in his brother; Cancer in his brother; Cerebrovascular Disease in his mother; Crohn's Disease in his father; Heart Disease in his mother; No Known Problems in his daughter, maternal aunt, maternal grandfather, maternal grandmother, maternal uncle, paternal aunt, paternal grandfather, paternal grandmother, paternal uncle, and son.  SOCIAL HISTORY:   reports that he has quit smoking. He has never used smokeless tobacco. He reports previous alcohol use. He reports that he does not use drugs.  Patient's living condition: lives alone    Post Discharge Medication Reconciliation Status: discharge medications reconciled, continue medications without change  Current Outpatient Medications   Medication Sig     acetaminophen (TYLENOL) 500 MG tablet Take 1,000 mg by mouth every 8 hours as needed      albuterol (PROAIR HFA/PROVENTIL HFA/VENTOLIN HFA) 108 (90 Base) MCG/ACT inhaler Inhale 1-2 puffs into the lungs every 4 hours as needed Normally takes ~1 puff/ day     bumetanide (BUMEX) 1 MG tablet Take 1 tablet by mouth daily     calcium carbonate-vitamin D 600-200 MG-UNIT TABS Take 2 tablets by mouth     carvedilol (COREG) 25 MG tablet Take 25 mg by mouth 2 times daily     cyclobenzaprine (FLEXERIL) 5 MG tablet Take 2 tablets (10 mg) by mouth 3 times daily as needed for muscle spasms     cycloSPORINE (RESTASIS) 0.05 % ophthalmic emulsion Place 1 drop into both eyes 2 times daily      diclofenac (VOLTAREN) 1 % topical gel Apply 2 g topically 2 times daily      erythromycin (ROMYCIN) 5 MG/GM ophthalmic ointment Place 0.5 inches into both eyes daily      ferrous sulfate (FEROSUL) 325 (65  "Fe) MG tablet Take 1 tablet by mouth daily     ibuprofen (ADVIL/MOTRIN) 200 MG tablet Take 400 mg by mouth every 4 hours as needed for mild pain     Lidocaine (LIDOCARE) 4 % Patch Place 1 patch onto the skin daily as needed for moderate pain To prevent lidocaine toxicity, patient should be patch free for 12 hrs daily.     multivitamin w/minerals (MULTI-VITAMIN) tablet Take 1 tablet by mouth daily     neomycin-polymixin-dexamethasone (MAXITROL) ophthalmic ointment Place 1 Application into both eyes daily      nitroGLYcerin (NITROSTAT) 0.4 MG sublingual tablet Place 0.4 mg under the tongue     omeprazole (PRILOSEC) 20 MG DR capsule Take 1 capsule by mouth daily     ondansetron (ZOFRAN) 8 MG tablet Take 8 mg by mouth as needed     oxyCODONE (ROXICODONE) 5 MG tablet Take 5 mg by mouth every 6 hours as needed for severe pain     oxyCODONE-acetaminophen (PERCOCET) 5-325 MG tablet Take 1 tablet by mouth every 6 hours as needed      potassium chloride ER (KLOR-CON M) 20 MEQ CR tablet TAKE ONE TABLET BY MOUTH ONCE DAILY     rosuvastatin (CRESTOR) 20 MG tablet TAKE 1 TABLET BY MOUTH DAILY AT BEDTIME     vitamin B-12 (CYANOCOBALAMIN) 2000 MCG tablet Take 2,000 mcg by mouth daily     warfarin ANTICOAGULANT (COUMADIN) 2.5 MG tablet Take 1.25-2.5 mg by mouth See Admin Instructions Take 1.25 mg Mon, Wed, Fri and 2.5 mg Sun, Tues, Thurs, Sat.     No current facility-administered medications for this visit.       ROS:  10 point ROS of systems including Constitutional, Eyes, Respiratory, Cardiovascular, Gastroenterology, Genitourinary, Integumentary, Musculoskeletal, Psychiatric were all negative except for pertinent positives noted in my HPI.    Vitals:  BP (!) 144/67   Pulse 66   Temp 98.3  F (36.8  C)   Resp 18   Ht 1.753 m (5' 9\")   Wt 65 kg (143 lb 6.4 oz)   SpO2 99%   BMI 21.18 kg/m    Exam:  Physical Exam   General appearance: alert, appears stated age and cooperative.   Head: Normocephalic, without obvious abnormality, " atraumatic, Eyes: sclera anicteric.  Lungs: respirations without effort, LSCTA; no wheezing or rales.   Cardiovascular: S1, S2. Regular rate and rhythm.   ABDOMEN: Globular and soft.  Extremities: Left upper extremity in immobilizer.  Fingers wiggle.  Sensation intact.  Skin: Skin color, texture, turgor normal. No rashes or lesions  Neurologic:oriented. No focal deficits.   Psych: interacts well with caregivers, exhibits logical thought processes and connections, pleasant    Lab/Diagnostic data:  Recent labs in [facility] reviewed by me today.     ASSESSMENT/PLAN:  (I10) Benign essential hypertension  (primary encounter diagnosis)  Plan: PTA losartan and carvedilol.     (S42.202D) Closed fracture of proximal end of left humerus with routine healing, unspecified fracture morphology, subsequent encounter  Comment: endorses moderate pain.   Plan:   -Oxycodone 2.5-5mg q 4 hours prn. Wean as able.     (I48.21) Permanent atrial fibrillation (H)  Plan: carvedilol for rate control. On coumadin with therapuetic INR today.   Recheck Thursday.     (I50.20) Heart failure with reduced ejection fraction (H)  Comment: Monitoring weights, resp status.  Plan: Lasix 60 daily. 3x weekly weights.   Patient would like to change low sodium diet to regular as he eats that at home.     Electronically signed by:  Chela Gallardo CNP                       Sincerely,        Chela Gallardo CNP

## 2021-08-30 NOTE — TELEPHONE ENCOUNTER
TRACE Ramírez.    Pt called to update you - Pt is currently in transitional care at [facility] due to a back injury. Pt is very weak and unable to do go to his appt tomorrow at the [facility] to see Dr. Lenoardo Smart.     Please advise if further action is needed.

## 2021-08-31 ENCOUNTER — LAB REQUISITION (OUTPATIENT)
Dept: LAB | Facility: CLINIC | Age: 81
End: 2021-08-31
Payer: COMMERCIAL

## 2021-08-31 DIAGNOSIS — R76.12 NONSPECIFIC REACTION TO CELL MEDIATED IMMUNITY MEASUREMENT OF GAMMA INTERFERON ANTIGEN RESPONSE WITHOUT ACTIVE TUBERCULOSIS: ICD-10-CM

## 2021-08-31 PROCEDURE — P9604 ONE-WAY ALLOW PRORATED TRIP: HCPCS | Mod: ORL | Performed by: INTERNAL MEDICINE

## 2021-08-31 PROCEDURE — 86481 TB AG RESPONSE T-CELL SUSP: CPT | Mod: ORL | Performed by: INTERNAL MEDICINE

## 2021-08-31 PROCEDURE — 36415 COLL VENOUS BLD VENIPUNCTURE: CPT | Mod: ORL | Performed by: INTERNAL MEDICINE

## 2021-09-01 LAB
GAMMA INTERFERON BACKGROUND BLD IA-ACNC: 0 IU/ML
M TB IFN-G BLD-IMP: NEGATIVE
M TB IFN-G CD4+ BCKGRND COR BLD-ACNC: 0.86 IU/ML
MITOGEN IGNF BCKGRD COR BLD-ACNC: 0.01 IU/ML
MITOGEN IGNF BCKGRD COR BLD-ACNC: 0.01 IU/ML
QUANTIFERON MITOGEN: 0.86 IU/ML
QUANTIFERON NIL TUBE: 0 IU/ML
QUANTIFERON TB1 TUBE: 0.01 IU/ML
QUANTIFERON TB2 TUBE: 0.01

## 2021-09-02 ENCOUNTER — LAB REQUISITION (OUTPATIENT)
Dept: LAB | Facility: CLINIC | Age: 81
End: 2021-09-02
Payer: COMMERCIAL

## 2021-09-02 ENCOUNTER — TRANSITIONAL CARE UNIT VISIT (OUTPATIENT)
Dept: GERIATRICS | Facility: CLINIC | Age: 81
End: 2021-09-02
Payer: COMMERCIAL

## 2021-09-02 ENCOUNTER — TRANSFERRED RECORDS (OUTPATIENT)
Dept: HEALTH INFORMATION MANAGEMENT | Facility: CLINIC | Age: 81
End: 2021-09-02

## 2021-09-02 VITALS
HEIGHT: 69 IN | TEMPERATURE: 96.4 F | HEART RATE: 66 BPM | WEIGHT: 145 LBS | OXYGEN SATURATION: 97 % | RESPIRATION RATE: 18 BRPM | SYSTOLIC BLOOD PRESSURE: 144 MMHG | BODY MASS INDEX: 21.48 KG/M2 | DIASTOLIC BLOOD PRESSURE: 67 MMHG

## 2021-09-02 DIAGNOSIS — I25.10 ATHEROSCLEROTIC HEART DISEASE OF NATIVE CORONARY ARTERY WITHOUT ANGINA PECTORIS: ICD-10-CM

## 2021-09-02 DIAGNOSIS — I50.20 HEART FAILURE WITH REDUCED EJECTION FRACTION (H): ICD-10-CM

## 2021-09-02 DIAGNOSIS — S42.202D CLOSED FRACTURE OF PROXIMAL END OF LEFT HUMERUS WITH ROUTINE HEALING, UNSPECIFIED FRACTURE MORPHOLOGY, SUBSEQUENT ENCOUNTER: Primary | ICD-10-CM

## 2021-09-02 DIAGNOSIS — I10 ESSENTIAL HYPERTENSION, BENIGN: ICD-10-CM

## 2021-09-02 PROCEDURE — 99309 SBSQ NF CARE MODERATE MDM 30: CPT | Performed by: NURSE PRACTITIONER

## 2021-09-02 ASSESSMENT — MIFFLIN-ST. JEOR: SCORE: 1348.1

## 2021-09-02 NOTE — LETTER
9/2/2021        RE: Tanmay sIrael  [address]  [facility] [address]        M [facility] GERIATRIC SERVICES  PRIMARY CARE PROVIDER AND CLINIC:  Go Ramírez MD, MD, [address] [facility] [address] [facility] [address] / [place] [address]  Chief Complaint   Patient presents with     RECHECK      [facility] Medical Record Number:  [id number]  Place of Service where encounter took place:  [facility] ([facility]) [[id number]]    Tanmay Israel  is a 81 year old  (1940), admitted to the above facility from  [facility]. Hospital stay 8/24 through 8/27..   HPI:, Patient is a history of GERD, heart failure and Crohn's, anemia, pretension, A. Fib, who transferred from [facility] ER for generalized weakness and a left proximal humerus fracture.  He experienced a fracture 2 weeks prior to his ER visit and subsequent hospital visit.  OT suggested patient to discharge to TCU.  Pain control he is using Tylenol and oxycodone. Reports that he lives independently but does have a close friend, and also named Latasha who follows up and assist with his appointments.    TCU: Seen for review of weight loss and pain management. Weight loss about 6 pounds down; seen by dietician. Discussed decreased oxycodone as pain has improved quite a bit. Agrees to decreased oxycodone to 2.5mg q 8 hours as needed.   History of CHF on lasix. Reports hes never had LE edema or chf exacerbation. Currently on 60mg daily and now with weight loss in the past week. Appetite is improving. Will decrease to 40mg daily and recheck bmp on Tuesday.   INR therapeutic today.       CODE STATUS/ADVANCE DIRECTIVES DISCUSSION:  No Order  DNR / DNI  ALLERGIES: No Known Allergies   PAST MEDICAL HISTORY:   Past Medical History:   Diagnosis Date     Acute cholecystitis 2/28/2019     Acute post-operative pain      Anemia      Arthritis      Atrial fibrillation (H)      C. difficile diarrhea 4/21/2019     Calculus of ureter      Callus      Cerebral aneurysm      Cervical myelopathy (H) 2/22/2019      Cervical spinal stenosis      Chronic kidney disease     stage 3     Chronic systolic congestive heart failure (H)      Closed fracture of distal end of left radius, unspecified fracture morphology, initial encounter      Closed fracture of distal end of right radius, unspecified fracture morphology, initial encounter      Closed fracture of first thoracic vertebra, unspecified fracture morphology, initial encounter (H)      Coronary artery disease      Crohn's disease (H)      Disorder of bursae and tendons in shoulder region     Created by Conversion  Replacement Utility updated for latest IMO load     Dysphagia      Fall 10/22/2016     GERD (gastroesophageal reflux disease)      Hyperlipidemia      Hypertension      Hypotension, unspecified hypotension type      ICD (implantable cardioverter-defibrillator) in place     Medtronic     Ischemic cardiomyopathy      Kidney stone      Low back pain      Lumbago     Created by Conversion      Macular degeneration      Myalgia and myositis, unspecified     Created by Conversion      Nonspecific elevation of levels of transaminase or lactic acid dehydrogenase (LDH)     Created by Conversion      Permanent atrial fibrillation (H)     [id number] risk score = 5 (age3/ CAD/ HTN/ CHF) Chronic warfarin     Personal history of colonic polyps 2/12/2019     Polyp of duodenum 10/17/2016     Pyuria      Right arm weakness 4/2/2019     Schatzki's ring      Sciatica     Created by Conversion      Sleep apnea     no CPAP     Spondylolisthesis of lumbar region 7/5/2016     Weakness 4/20/2019      PAST SURGICAL HISTORY:   has a past surgical history that includes IR Lumbar Epidural Steroid Injection (6/29/2005); IR Lumbar Epidural Steroid Injection (7/15/2005); IR Lumbar Epidural Steroid Injection (9/28/2005); IR Lumbar Epidural Steroid Injection (12/28/2005); IR Lumbar Epidural Steroid Injection (3/29/2006); IR Lumbar Epidural Steroid Injection (6/8/2006); Esophagoscopy,  gastroscopy, duodenoscopy (EGD), combined; Tonsillectomy; appendectomy; Cholecystectomy; Arthroscopy shoulder rotator cuff repair (Right); Phacoemulsification clear cornea with standard intraocular lens implant (Bilateral); Xr Myelogram Cervical Thoracic Lumbar (1/17/2019); Pr C- Laminoplasty, 2 Or More (Left, 2/22/2019); LAP,CHOLECYSTECTOMY/EXPLORE (N/A, 2/28/2019); Cardiac Defibrillator Placement (2013); Bypass graft artery coronary (08/2004); Colonoscopy (N/A, 2/19/2020); Pr Esophagogastroduodenoscopy Transoral Diagnostic (N/A, 5/10/2021); and Pr Esophagogastroduodenoscopy Transoral Diagnostic (N/A, 6/16/2021).  FAMILY HISTORY: family history includes Alcoholism in his brother; Cancer in his brother; Cerebrovascular Disease in his mother; Crohn's Disease in his father; Heart Disease in his mother; No Known Problems in his daughter, maternal aunt, maternal grandfather, maternal grandmother, maternal uncle, paternal aunt, paternal grandfather, paternal grandmother, paternal uncle, and son.  SOCIAL HISTORY:   reports that he has quit smoking. He has never used smokeless tobacco. He reports previous alcohol use. He reports that he does not use drugs.  Patient's living condition: lives alone    Post Discharge Medication Reconciliation Status: discharge medications reconciled, continue medications without change  Current Outpatient Medications   Medication Sig     furosemide (LASIX) 20 MG tablet Take 40 mg by mouth daily     acetaminophen (TYLENOL) 500 MG tablet Take 1,000 mg by mouth every 8 hours as needed      albuterol (PROAIR HFA/PROVENTIL HFA/VENTOLIN HFA) 108 (90 Base) MCG/ACT inhaler Inhale 1-2 puffs into the lungs every 4 hours as needed Normally takes ~1 puff/ day     calcium carbonate-vitamin D 600-200 MG-UNIT TABS Take 2 tablets by mouth     carvedilol (COREG) 25 MG tablet Take 25 mg by mouth 2 times daily     cyclobenzaprine (FLEXERIL) 5 MG tablet Take 2 tablets (10 mg) by mouth 3 times daily as needed for  muscle spasms     cycloSPORINE (RESTASIS) 0.05 % ophthalmic emulsion Place 1 drop into both eyes 2 times daily      diclofenac (VOLTAREN) 1 % topical gel Apply 2 g topically 2 times daily      erythromycin (ROMYCIN) 5 MG/GM ophthalmic ointment Place 0.5 inches into both eyes daily      ferrous sulfate (FEROSUL) 325 (65 Fe) MG tablet Take 1 tablet by mouth daily     ibuprofen (ADVIL/MOTRIN) 200 MG tablet Take 400 mg by mouth every 4 hours as needed for mild pain     Lidocaine (LIDOCARE) 4 % Patch Place 1 patch onto the skin daily as needed for moderate pain To prevent lidocaine toxicity, patient should be patch free for 12 hrs daily.     multivitamin w/minerals (MULTI-VITAMIN) tablet Take 1 tablet by mouth daily     neomycin-polymixin-dexamethasone (MAXITROL) ophthalmic ointment Place 1 Application into both eyes daily      nitroGLYcerin (NITROSTAT) 0.4 MG sublingual tablet Place 0.4 mg under the tongue     omeprazole (PRILOSEC) 20 MG DR capsule Take 1 capsule by mouth daily     ondansetron (ZOFRAN) 8 MG tablet Take 8 mg by mouth as needed     oxyCODONE (ROXICODONE) 5 MG tablet Take 5 mg by mouth 3 times daily as needed for severe pain     potassium chloride ER (KLOR-CON M) 20 MEQ CR tablet TAKE ONE TABLET BY MOUTH ONCE DAILY     rosuvastatin (CRESTOR) 20 MG tablet TAKE 1 TABLET BY MOUTH DAILY AT BEDTIME     vitamin B-12 (CYANOCOBALAMIN) 2000 MCG tablet Take 2,000 mcg by mouth daily     warfarin ANTICOAGULANT (COUMADIN) 2.5 MG tablet Take 1.25-2.5 mg by mouth See Admin Instructions Take 1.25 mg Mon, Wed, Fri and 2.5 mg Sun, Tues, Thurs, Sat.     No current facility-administered medications for this visit.       ROS:  10 point ROS of systems including Constitutional, Eyes, Respiratory, Cardiovascular, Gastroenterology, Genitourinary, Integumentary, Musculoskeletal, Psychiatric were all negative except for pertinent positives noted in my HPI.    Vitals:  BP (!) 144/67   Pulse 66   Temp (!) 96.4  F (35.8  C)   Resp  "18   Ht 1.753 m (5' 9\")   Wt 65.8 kg (145 lb)   SpO2 97%   BMI 21.41 kg/m    Exam:  Physical Exam   General appearance: alert, appears stated age and cooperative.   Head: Normocephalic, without obvious abnormality, atraumatic, Eyes: sclera anicteric.  Lungs: respirations without effort, LSCTA; no wheezing or rales.   Cardiovascular: S1, S2. Regular rate and rhythm.   ABDOMEN: Globular and soft.  Extremities: Left upper extremity in immobilizer.  Fingers wiggle.  Sensation intact.  Skin: Skin color, texture, turgor normal. No rashes or lesions  Neurologic:oriented. No focal deficits.   Psych: interacts well with caregivers, exhibits logical thought processes and connections, pleasant    Lab/Diagnostic data:  Recent labs in [facility] reviewed by me today.     ASSESSMENT/PLAN:  (I10) Benign essential hypertension.   Plan: PTA losartan and carvedilol.      (S42.202D) Closed fracture of proximal end of left humerus with routine healing, unspecified fracture morphology, subsequent encounter  Comment: endorses moderate pain.   Plan:   -Decrease Oxycodone 2.5mg q 8 hours prn.     (I48.21) Permanent atrial fibrillation (H)  Plan: carvedilol for rate control. On coumadin with therapuetic INR today.   Recheck Tuesday.     (I50.20) Heart failure with reduced ejection fraction (H)   Comment: Monitoring weights, resp status.   Plan:   -Decrease lasix to 40mg daily.   -Recheck bmp   Patient would like to change low sodium diet to regular as he eats that at home.     Electronically signed by:  Chela Gallardo CNP                     Sincerely,        Chela Gallardo CNP      "

## 2021-09-07 ENCOUNTER — TRANSITIONAL CARE UNIT VISIT (OUTPATIENT)
Dept: GERIATRICS | Facility: CLINIC | Age: 81
End: 2021-09-07
Payer: COMMERCIAL

## 2021-09-07 VITALS
RESPIRATION RATE: 18 BRPM | TEMPERATURE: 98.4 F | HEART RATE: 60 BPM | DIASTOLIC BLOOD PRESSURE: 63 MMHG | BODY MASS INDEX: 22.66 KG/M2 | WEIGHT: 153 LBS | SYSTOLIC BLOOD PRESSURE: 124 MMHG | HEIGHT: 69 IN | OXYGEN SATURATION: 96 %

## 2021-09-07 DIAGNOSIS — S42.202D CLOSED FRACTURE OF PROXIMAL END OF LEFT HUMERUS WITH ROUTINE HEALING, UNSPECIFIED FRACTURE MORPHOLOGY, SUBSEQUENT ENCOUNTER: ICD-10-CM

## 2021-09-07 DIAGNOSIS — E87.6 HYPOKALEMIA: Primary | ICD-10-CM

## 2021-09-07 DIAGNOSIS — I50.20 HEART FAILURE WITH REDUCED EJECTION FRACTION (H): ICD-10-CM

## 2021-09-07 DIAGNOSIS — R63.5 WEIGHT GAIN: ICD-10-CM

## 2021-09-07 LAB
ANION GAP SERPL CALCULATED.3IONS-SCNC: 7 MMOL/L (ref 5–18)
BUN SERPL-MCNC: 16 MG/DL (ref 8–28)
CALCIUM SERPL-MCNC: 8.2 MG/DL (ref 8.5–10.5)
CHLORIDE BLD-SCNC: 105 MMOL/L (ref 98–107)
CO2 SERPL-SCNC: 27 MMOL/L (ref 22–31)
CREAT SERPL-MCNC: 0.95 MG/DL (ref 0.7–1.3)
GFR SERPL CREATININE-BSD FRML MDRD: 74 ML/MIN/1.73M2
GLUCOSE BLD-MCNC: 75 MG/DL (ref 70–125)
POTASSIUM BLD-SCNC: 2.9 MMOL/L (ref 3.5–5)
SODIUM SERPL-SCNC: 139 MMOL/L (ref 136–145)

## 2021-09-07 PROCEDURE — P9604 ONE-WAY ALLOW PRORATED TRIP: HCPCS | Mod: ORL | Performed by: NURSE PRACTITIONER

## 2021-09-07 PROCEDURE — 36415 COLL VENOUS BLD VENIPUNCTURE: CPT | Mod: ORL | Performed by: NURSE PRACTITIONER

## 2021-09-07 PROCEDURE — 99310 SBSQ NF CARE HIGH MDM 45: CPT | Performed by: NURSE PRACTITIONER

## 2021-09-07 PROCEDURE — 80048 BASIC METABOLIC PNL TOTAL CA: CPT | Mod: ORL | Performed by: NURSE PRACTITIONER

## 2021-09-07 ASSESSMENT — MIFFLIN-ST. JEOR: SCORE: 1384.38

## 2021-09-07 NOTE — LETTER
9/7/2021        RE: Tanmay Israel  [address]  [facility] [address]        [facility] GERIATRIC SERVICES  PRIMARY CARE PROVIDER AND CLINIC:  Go Ramírez MD, MD, [address] [facility] [address] [facility] [address] / [place] [address]  Chief Complaint   Patient presents with     RECHECK     K 2.9 , weight increased 153      [facility] Medical Record Number:  [id number]  Place of Service where encounter took place:  No question data found.    Tanmay Israel  is a 81 year old  (1940), admitted to the above facility from  [facility]. Hospital stay 8/24 through 8/27..   HPI:, Patient is a history of GERD, heart failure and Crohn's, anemia, pretension, A. Fib, who transferred from [facility] ER for generalized weakness and a left proximal humerus fracture.  He experienced a fracture 2 weeks prior to his ER visit and subsequent hospital visit.  OT suggested patient to discharge to TCU.  Pain control he is using Tylenol and oxycodone. Reports that he lives independently but does have a close friend, and also named Latasha who follows up and assist with his appointments.    TCU: Seen today for hypokalemia, follow-up to recent weight loss, and pain management.  Potassium 2.9 on BMP today.  He reports that he was previously taking potassium chloride daily and is on Lasix.  This was discontinued in the hospital.  We decreased his Lasix on Friday down from 60 to 40 mg daily and he has had weight increase after a large weight loss.  He has no lower extremity swelling, no breathing issues.  We will monitor this closely but continue the 40 mg daily for now.  INR was also done today which was 2.2 and we will continue the current dose and recheck Friday as well.  I also followed up on his pain management as we recently reduced his oxycodone to every 8 hours as needed.  He does report pain in the left shoulder but says that his current pain regimen is working well.  Currently on 2.5 of oxycodone every 8 hours as needed.      CODE STATUS/ADVANCE  DIRECTIVES DISCUSSION:  No Order  DNR / DNI  ALLERGIES: No Known Allergies   PAST MEDICAL HISTORY:   Past Medical History:   Diagnosis Date     Acute cholecystitis 2/28/2019     Acute post-operative pain      Anemia      Arthritis      Atrial fibrillation (H)      C. difficile diarrhea 4/21/2019     Calculus of ureter      Callus      Cerebral aneurysm      Cervical myelopathy (H) 2/22/2019     Cervical spinal stenosis      Chronic kidney disease     stage 3     Chronic systolic congestive heart failure (H)      Closed fracture of distal end of left radius, unspecified fracture morphology, initial encounter      Closed fracture of distal end of right radius, unspecified fracture morphology, initial encounter      Closed fracture of first thoracic vertebra, unspecified fracture morphology, initial encounter (H)      Coronary artery disease      Crohn's disease (H)      Disorder of bursae and tendons in shoulder region     Created by Conversion  Replacement Utility updated for latest IMO load     Dysphagia      Fall 10/22/2016     GERD (gastroesophageal reflux disease)      Hyperlipidemia      Hypertension      Hypotension, unspecified hypotension type      ICD (implantable cardioverter-defibrillator) in place     Medtronic     Ischemic cardiomyopathy      Kidney stone      Low back pain      Lumbago     Created by Conversion      Macular degeneration      Myalgia and myositis, unspecified     Created by Conversion      Nonspecific elevation of levels of transaminase or lactic acid dehydrogenase (LDH)     Created by Conversion      Permanent atrial fibrillation (H)     [id number] risk score = 5 (age1/ CAD/ HTN/ CHF) Chronic warfarin     Personal history of colonic polyps 2/12/2019     Polyp of duodenum 10/17/2016     Pyuria      Right arm weakness 4/2/2019     Schatzki's ring      Sciatica     Created by Conversion      Sleep apnea     no CPAP     Spondylolisthesis of lumbar region 7/5/2016     Weakness 4/20/2019       PAST SURGICAL HISTORY:   has a past surgical history that includes IR Lumbar Epidural Steroid Injection (6/29/2005); IR Lumbar Epidural Steroid Injection (7/15/2005); IR Lumbar Epidural Steroid Injection (9/28/2005); IR Lumbar Epidural Steroid Injection (12/28/2005); IR Lumbar Epidural Steroid Injection (3/29/2006); IR Lumbar Epidural Steroid Injection (6/8/2006); Esophagoscopy, gastroscopy, duodenoscopy (EGD), combined; Tonsillectomy; appendectomy; Cholecystectomy; Arthroscopy shoulder rotator cuff repair (Right); Phacoemulsification clear cornea with standard intraocular lens implant (Bilateral); Xr Myelogram Cervical Thoracic Lumbar (1/17/2019); Pr C- Laminoplasty, 2 Or More (Left, 2/22/2019); LAP,CHOLECYSTECTOMY/EXPLORE (N/A, 2/28/2019); Cardiac Defibrillator Placement (2013); Bypass graft artery coronary (08/2004); Colonoscopy (N/A, 2/19/2020); Pr Esophagogastroduodenoscopy Transoral Diagnostic (N/A, 5/10/2021); and Pr Esophagogastroduodenoscopy Transoral Diagnostic (N/A, 6/16/2021).  FAMILY HISTORY: family history includes Alcoholism in his brother; Cancer in his brother; Cerebrovascular Disease in his mother; Crohn's Disease in his father; Heart Disease in his mother; No Known Problems in his daughter, maternal aunt, maternal grandfather, maternal grandmother, maternal uncle, paternal aunt, paternal grandfather, paternal grandmother, paternal uncle, and son.  SOCIAL HISTORY:   reports that he has quit smoking. He has never used smokeless tobacco. He reports previous alcohol use. He reports that he does not use drugs.  Patient's living condition: lives alone    Post Discharge Medication Reconciliation Status: discharge medications reconciled, continue medications without change  Current Outpatient Medications   Medication Sig     acetaminophen (TYLENOL) 500 MG tablet Take 1,000 mg by mouth every 8 hours as needed      albuterol (PROAIR HFA/PROVENTIL HFA/VENTOLIN HFA) 108 (90 Base) MCG/ACT inhaler Inhale 1-2  puffs into the lungs every 4 hours as needed Normally takes ~1 puff/ day     calcium carbonate-vitamin D 600-200 MG-UNIT TABS Take 2 tablets by mouth     carvedilol (COREG) 25 MG tablet Take 25 mg by mouth 2 times daily     cyclobenzaprine (FLEXERIL) 5 MG tablet Take 2 tablets (10 mg) by mouth 3 times daily as needed for muscle spasms     cycloSPORINE (RESTASIS) 0.05 % ophthalmic emulsion Place 1 drop into both eyes 2 times daily      diclofenac (VOLTAREN) 1 % topical gel Apply 2 g topically 2 times daily      erythromycin (ROMYCIN) 5 MG/GM ophthalmic ointment Place 0.5 inches into both eyes daily      ferrous sulfate (FEROSUL) 325 (65 Fe) MG tablet Take 1 tablet by mouth daily     furosemide (LASIX) 20 MG tablet Take 40 mg by mouth daily     ibuprofen (ADVIL/MOTRIN) 200 MG tablet Take 400 mg by mouth every 4 hours as needed for mild pain     Lidocaine (LIDOCARE) 4 % Patch Place 1 patch onto the skin daily as needed for moderate pain To prevent lidocaine toxicity, patient should be patch free for 12 hrs daily.     multivitamin w/minerals (MULTI-VITAMIN) tablet Take 1 tablet by mouth daily     neomycin-polymixin-dexamethasone (MAXITROL) ophthalmic ointment Place 1 Application into both eyes daily      nitroGLYcerin (NITROSTAT) 0.4 MG sublingual tablet Place 0.4 mg under the tongue     omeprazole (PRILOSEC) 20 MG DR capsule Take 1 capsule by mouth daily     ondansetron (ZOFRAN) 8 MG tablet Take 8 mg by mouth as needed     oxyCODONE (ROXICODONE) 5 MG tablet Take 5 mg by mouth 3 times daily as needed for severe pain     potassium chloride ER (KLOR-CON M) 20 MEQ CR tablet TAKE ONE TABLET BY MOUTH ONCE DAILY     rosuvastatin (CRESTOR) 20 MG tablet TAKE 1 TABLET BY MOUTH DAILY AT BEDTIME     vitamin B-12 (CYANOCOBALAMIN) 2000 MCG tablet Take 2,000 mcg by mouth daily     warfarin ANTICOAGULANT (COUMADIN) 2.5 MG tablet Take 1.25-2.5 mg by mouth See Admin Instructions Take 1.25 mg Mon, Wed, Fri and 2.5 mg Sun, Tues, Thurs,  "Sat.     No current facility-administered medications for this visit.       ROS:  10 point ROS of systems including Constitutional, Eyes, Respiratory, Cardiovascular, Gastroenterology, Genitourinary, Integumentary, Musculoskeletal, Psychiatric were all negative except for pertinent positives noted in my HPI.    Vitals:  /63   Pulse 60   Temp 98.4  F (36.9  C)   Resp 18   Ht 1.753 m (5' 9\")   Wt 69.4 kg (153 lb)   SpO2 96%   BMI 22.59 kg/m    Exam:  Physical Exam   General appearance: alert, appears stated age and cooperative.   Head: Normocephalic, without obvious abnormality, atraumatic, Eyes: sclera anicteric.  Lungs: respirations without effort, LSCTA; no wheezing or rales.   Cardiovascular: S1, S2. Regular rate and rhythm.   ABDOMEN: Globular and soft.  Extremities: Left upper extremity in immobilizer.  Fingers wiggle.  Sensation intact.  Skin: Skin color, texture, turgor normal. No rashes or lesions  Neurologic:oriented. No focal deficits.   Psych: interacts well with caregivers, exhibits logical thought processes and connections, pleasant    Lab/Diagnostic data:  Recent labs in [facility] reviewed by me today.     ASSESSMENT/PLAN:  (I10) Benign essential hypertension.   Plan: PTA losartan and carvedilol.     Hypokalemia: 2.9 today.  Unstable.  Previously was on potassium however that was discontinued in the hospital.   -Give 40 mEq potassium chloride p.o. every 6 hours x2 doses.  -Tomorrow, start potassium chloride 10 mEq daily.  -Recheck potassium on Friday.     (S42.202D) Closed fracture of proximal end of left humerus with routine healing, unspecified fracture morphology, subsequent encounter, unstable.  Comment: endorses moderate pain.   Plan:   -Tolerating oxycodone 2.5mg q 8 hours prn.     (I48.21) Permanent atrial fibrillation (H)  Plan: carvedilol for rate control. On coumadin with therapuetic INR today.   Recheck Friday.    (I50.20) Heart failure with reduced ejection fraction (H) " unstable.  Comment: Monitoring weights, resp status.  Weight has improved, 153 today.  No lower extremity swelling, no shortness of breath.  Eating and drinking well.  Plan:   -Continue Lasix 40mg daily.   -Recheck bmp Friday.  Patient would like to change low sodium diet to regular as he eats that at home.     Electronically signed by:  Chela Gallardo CNP                   Sincerely,        Chela Gallardo, CNP

## 2021-09-08 RX ORDER — FUROSEMIDE 20 MG
40 TABLET ORAL DAILY
COMMUNITY
End: 2021-10-11

## 2021-09-08 NOTE — PROGRESS NOTES
[facility] GERIATRIC SERVICES  PRIMARY CARE PROVIDER AND CLINIC:  Go Ramírez MD, MD, [address] [place] [address] / [facility] [address]  Chief Complaint   Patient presents with     RECHECK      [place] Medical Record Number:  [id number]  Place of Service where encounter took place:  [facility] [[id number]]    Tanmay Israel  is a 81 year old  (1940), admitted to the above facility from  [facility]. Hospital stay 8/24 through 8/27..   HPI:, Patient is a history of GERD, heart failure and Crohn's, anemia, pretension, A. Fib, who transferred from [facility] ER for generalized weakness and a left proximal humerus fracture.  He experienced a fracture 2 weeks prior to his ER visit and subsequent hospital visit.  OT suggested patient to discharge to TCU.  Pain control he is using Tylenol and oxycodone. Reports that he lives independently but does have a close friend, and also named Latasha who follows up and assist with his appointments.    TCU: Seen for review of weight loss and pain management. Weight loss about 6 pounds down; seen by dietician. Discussed decreased oxycodone as pain has improved quite a bit. Agrees to decreased oxycodone to 2.5mg q 8 hours as needed.   History of CHF on lasix. Reports hes never had LE edema or chf exacerbation. Currently on 60mg daily and now with weight loss in the past week. Appetite is improving. Will decrease to 40mg daily and recheck bmp on Tuesday.   INR therapeutic today.       CODE STATUS/ADVANCE DIRECTIVES DISCUSSION:  No Order  DNR / DNI  ALLERGIES: No Known Allergies   PAST MEDICAL HISTORY:   Past Medical History:   Diagnosis Date     Acute cholecystitis 2/28/2019     Acute post-operative pain      Anemia      Arthritis      Atrial fibrillation (H)      C. difficile diarrhea 4/21/2019     Calculus of ureter      Callus      Cerebral aneurysm      Cervical myelopathy (H) 2/22/2019     Cervical spinal stenosis      Chronic kidney disease     stage 3     Chronic systolic  congestive heart failure (H)      Closed fracture of distal end of left radius, unspecified fracture morphology, initial encounter      Closed fracture of distal end of right radius, unspecified fracture morphology, initial encounter      Closed fracture of first thoracic vertebra, unspecified fracture morphology, initial encounter (H)      Coronary artery disease      Crohn's disease (H)      Disorder of bursae and tendons in shoulder region     Created by Conversion  Replacement Utility updated for latest IMO load     Dysphagia      Fall 10/22/2016     GERD (gastroesophageal reflux disease)      Hyperlipidemia      Hypertension      Hypotension, unspecified hypotension type      ICD (implantable cardioverter-defibrillator) in place     Medtronic     Ischemic cardiomyopathy      Kidney stone      Low back pain      Lumbago     Created by Conversion      Macular degeneration      Myalgia and myositis, unspecified     Created by Conversion      Nonspecific elevation of levels of transaminase or lactic acid dehydrogenase (LDH)     Created by Conversion      Permanent atrial fibrillation (H)     [id number] risk score = 5 (age1/ CAD/ HTN/ CHF) Chronic warfarin     Personal history of colonic polyps 2/12/2019     Polyp of duodenum 10/17/2016     Pyuria      Right arm weakness 4/2/2019     Schatzki's ring      Sciatica     Created by Conversion      Sleep apnea     no CPAP     Spondylolisthesis of lumbar region 7/5/2016     Weakness 4/20/2019      PAST SURGICAL HISTORY:   has a past surgical history that includes IR Lumbar Epidural Steroid Injection (6/29/2005); IR Lumbar Epidural Steroid Injection (7/15/2005); IR Lumbar Epidural Steroid Injection (9/28/2005); IR Lumbar Epidural Steroid Injection (12/28/2005); IR Lumbar Epidural Steroid Injection (3/29/2006); IR Lumbar Epidural Steroid Injection (6/8/2006); Esophagoscopy, gastroscopy, duodenoscopy (EGD), combined; Tonsillectomy; appendectomy; Cholecystectomy;  Arthroscopy shoulder rotator cuff repair (Right); Phacoemulsification clear cornea with standard intraocular lens implant (Bilateral); Xr Myelogram Cervical Thoracic Lumbar (1/17/2019); Pr C- Laminoplasty, 2 Or More (Left, 2/22/2019); LAP,CHOLECYSTECTOMY/EXPLORE (N/A, 2/28/2019); Cardiac Defibrillator Placement (2013); Bypass graft artery coronary (08/2004); Colonoscopy (N/A, 2/19/2020); Pr Esophagogastroduodenoscopy Transoral Diagnostic (N/A, 5/10/2021); and Pr Esophagogastroduodenoscopy Transoral Diagnostic (N/A, 6/16/2021).  FAMILY HISTORY: family history includes Alcoholism in his brother; Cancer in his brother; Cerebrovascular Disease in his mother; Crohn's Disease in his father; Heart Disease in his mother; No Known Problems in his daughter, maternal aunt, maternal grandfather, maternal grandmother, maternal uncle, paternal aunt, paternal grandfather, paternal grandmother, paternal uncle, and son.  SOCIAL HISTORY:   reports that he has quit smoking. He has never used smokeless tobacco. He reports previous alcohol use. He reports that he does not use drugs.  Patient's living condition: lives alone    Post Discharge Medication Reconciliation Status: discharge medications reconciled, continue medications without change  Current Outpatient Medications   Medication Sig     furosemide (LASIX) 20 MG tablet Take 40 mg by mouth daily     acetaminophen (TYLENOL) 500 MG tablet Take 1,000 mg by mouth every 8 hours as needed      albuterol (PROAIR HFA/PROVENTIL HFA/VENTOLIN HFA) 108 (90 Base) MCG/ACT inhaler Inhale 1-2 puffs into the lungs every 4 hours as needed Normally takes ~1 puff/ day     calcium carbonate-vitamin D 600-200 MG-UNIT TABS Take 2 tablets by mouth     carvedilol (COREG) 25 MG tablet Take 25 mg by mouth 2 times daily     cyclobenzaprine (FLEXERIL) 5 MG tablet Take 2 tablets (10 mg) by mouth 3 times daily as needed for muscle spasms     cycloSPORINE (RESTASIS) 0.05 % ophthalmic emulsion Place 1 drop into  "both eyes 2 times daily      diclofenac (VOLTAREN) 1 % topical gel Apply 2 g topically 2 times daily      erythromycin (ROMYCIN) 5 MG/GM ophthalmic ointment Place 0.5 inches into both eyes daily      ferrous sulfate (FEROSUL) 325 (65 Fe) MG tablet Take 1 tablet by mouth daily     ibuprofen (ADVIL/MOTRIN) 200 MG tablet Take 400 mg by mouth every 4 hours as needed for mild pain     Lidocaine (LIDOCARE) 4 % Patch Place 1 patch onto the skin daily as needed for moderate pain To prevent lidocaine toxicity, patient should be patch free for 12 hrs daily.     multivitamin w/minerals (MULTI-VITAMIN) tablet Take 1 tablet by mouth daily     neomycin-polymixin-dexamethasone (MAXITROL) ophthalmic ointment Place 1 Application into both eyes daily      nitroGLYcerin (NITROSTAT) 0.4 MG sublingual tablet Place 0.4 mg under the tongue     omeprazole (PRILOSEC) 20 MG DR capsule Take 1 capsule by mouth daily     ondansetron (ZOFRAN) 8 MG tablet Take 8 mg by mouth as needed     oxyCODONE (ROXICODONE) 5 MG tablet Take 5 mg by mouth 3 times daily as needed for severe pain     potassium chloride ER (KLOR-CON M) 20 MEQ CR tablet TAKE ONE TABLET BY MOUTH ONCE DAILY     rosuvastatin (CRESTOR) 20 MG tablet TAKE 1 TABLET BY MOUTH DAILY AT BEDTIME     vitamin B-12 (CYANOCOBALAMIN) 2000 MCG tablet Take 2,000 mcg by mouth daily     warfarin ANTICOAGULANT (COUMADIN) 2.5 MG tablet Take 1.25-2.5 mg by mouth See Admin Instructions Take 1.25 mg Mon, Wed, Fri and 2.5 mg Sun, Tues, Thurs, Sat.     No current facility-administered medications for this visit.       ROS:  10 point ROS of systems including Constitutional, Eyes, Respiratory, Cardiovascular, Gastroenterology, Genitourinary, Integumentary, Musculoskeletal, Psychiatric were all negative except for pertinent positives noted in my HPI.    Vitals:  BP (!) 144/67   Pulse 66   Temp (!) 96.4  F (35.8  C)   Resp 18   Ht 1.753 m (5' 9\")   Wt 65.8 kg (145 lb)   SpO2 97%   BMI 21.41 kg/m  "   Exam:  Physical Exam   General appearance: alert, appears stated age and cooperative.   Head: Normocephalic, without obvious abnormality, atraumatic, Eyes: sclera anicteric.  Lungs: respirations without effort, LSCTA; no wheezing or rales.   Cardiovascular: S1, S2. Regular rate and rhythm.   ABDOMEN: Globular and soft.  Extremities: Left upper extremity in immobilizer.  Fingers wiggle.  Sensation intact.  Skin: Skin color, texture, turgor normal. No rashes or lesions  Neurologic:oriented. No focal deficits.   Psych: interacts well with caregivers, exhibits logical thought processes and connections, pleasant    Lab/Diagnostic data:  Recent labs in [facility] reviewed by me today.     ASSESSMENT/PLAN:  (I10) Benign essential hypertension.   Plan: PTA losartan and carvedilol.      (S42.202D) Closed fracture of proximal end of left humerus with routine healing, unspecified fracture morphology, subsequent encounter  Comment: endorses moderate pain.   Plan:   -Decrease Oxycodone 2.5mg q 8 hours prn.     (I48.21) Permanent atrial fibrillation (H)  Plan: carvedilol for rate control. On coumadin with therapuetic INR today.   Recheck Tuesday.     (I50.20) Heart failure with reduced ejection fraction (H)   Comment: Monitoring weights, resp status.   Plan:   -Decrease lasix to 40mg daily.   -Recheck bmp   Patient would like to change low sodium diet to regular as he eats that at home.     Electronically signed by:  Chela Gallardo CNP

## 2021-09-09 ENCOUNTER — LAB REQUISITION (OUTPATIENT)
Dept: LAB | Facility: CLINIC | Age: 81
End: 2021-09-09
Payer: COMMERCIAL

## 2021-09-09 DIAGNOSIS — R79.89 OTHER SPECIFIED ABNORMAL FINDINGS OF BLOOD CHEMISTRY: ICD-10-CM

## 2021-09-09 NOTE — PROGRESS NOTES
[facility] GERIATRIC SERVICES  PRIMARY CARE PROVIDER AND CLINIC:  Go Ramírez MD, MD, [address] [facility] [address] / [facility] [address]  Chief Complaint   Patient presents with     RECHECK     K 2.9 , weight increased 153      [place] Medical Record Number:  [id number]  Place of Service where encounter took place:  No question data found.    Tanmay Israel  is a 81 year old  (1940), admitted to the above facility from  [facility]. Hospital stay 8/24 through 8/27..   HPI:, Patient is a history of GERD, heart failure and Crohn's, anemia, pretension, A. Fib, who transferred from [facility] ER for generalized weakness and a left proximal humerus fracture.  He experienced a fracture 2 weeks prior to his ER visit and subsequent hospital visit.  OT suggested patient to discharge to TCU.  Pain control he is using Tylenol and oxycodone. Reports that he lives independently but does have a close friend, and also named Latasha who follows up and assist with his appointments.    TCU: Seen today for hypokalemia, follow-up to recent weight loss, and pain management.  Potassium 2.9 on BMP today.  He reports that he was previously taking potassium chloride daily and is on Lasix.  This was discontinued in the hospital.  We decreased his Lasix on Friday down from 60 to 40 mg daily and he has had weight increase after a large weight loss.  He has no lower extremity swelling, no breathing issues.  We will monitor this closely but continue the 40 mg daily for now.  INR was also done today which was 2.2 and we will continue the current dose and recheck Friday as well.  I also followed up on his pain management as we recently reduced his oxycodone to every 8 hours as needed.  He does report pain in the left shoulder but says that his current pain regimen is working well.  Currently on 2.5 of oxycodone every 8 hours as needed.      CODE STATUS/ADVANCE DIRECTIVES DISCUSSION:  No Order  DNR / DNI  ALLERGIES: No Known Allergies   PAST MEDICAL  HISTORY:   Past Medical History:   Diagnosis Date     Acute cholecystitis 2/28/2019     Acute post-operative pain      Anemia      Arthritis      Atrial fibrillation (H)      C. difficile diarrhea 4/21/2019     Calculus of ureter      Callus      Cerebral aneurysm      Cervical myelopathy (H) 2/22/2019     Cervical spinal stenosis      Chronic kidney disease     stage 3     Chronic systolic congestive heart failure (H)      Closed fracture of distal end of left radius, unspecified fracture morphology, initial encounter      Closed fracture of distal end of right radius, unspecified fracture morphology, initial encounter      Closed fracture of first thoracic vertebra, unspecified fracture morphology, initial encounter (H)      Coronary artery disease      Crohn's disease (H)      Disorder of bursae and tendons in shoulder region     Created by Conversion  Replacement Utility updated for latest IMO load     Dysphagia      Fall 10/22/2016     GERD (gastroesophageal reflux disease)      Hyperlipidemia      Hypertension      Hypotension, unspecified hypotension type      ICD (implantable cardioverter-defibrillator) in place     Medtronic     Ischemic cardiomyopathy      Kidney stone      Low back pain      Lumbago     Created by Conversion      Macular degeneration      Myalgia and myositis, unspecified     Created by Conversion      Nonspecific elevation of levels of transaminase or lactic acid dehydrogenase (LDH)     Created by Conversion      Permanent atrial fibrillation (H)     [id number] risk score = 5 (age1/ CAD/ HTN/ CHF) Chronic warfarin     Personal history of colonic polyps 2/12/2019     Polyp of duodenum 10/17/2016     Pyuria      Right arm weakness 4/2/2019     Schatzki's ring      Sciatica     Created by Conversion      Sleep apnea     no CPAP     Spondylolisthesis of lumbar region 7/5/2016     Weakness 4/20/2019      PAST SURGICAL HISTORY:   has a past surgical history that includes IR Lumbar Epidural  Steroid Injection (6/29/2005); IR Lumbar Epidural Steroid Injection (7/15/2005); IR Lumbar Epidural Steroid Injection (9/28/2005); IR Lumbar Epidural Steroid Injection (12/28/2005); IR Lumbar Epidural Steroid Injection (3/29/2006); IR Lumbar Epidural Steroid Injection (6/8/2006); Esophagoscopy, gastroscopy, duodenoscopy (EGD), combined; Tonsillectomy; appendectomy; Cholecystectomy; Arthroscopy shoulder rotator cuff repair (Right); Phacoemulsification clear cornea with standard intraocular lens implant (Bilateral); Xr Myelogram Cervical Thoracic Lumbar (1/17/2019); Pr C- Laminoplasty, 2 Or More (Left, 2/22/2019); LAP,CHOLECYSTECTOMY/EXPLORE (N/A, 2/28/2019); Cardiac Defibrillator Placement (2013); Bypass graft artery coronary (08/2004); Colonoscopy (N/A, 2/19/2020); Pr Esophagogastroduodenoscopy Transoral Diagnostic (N/A, 5/10/2021); and Pr Esophagogastroduodenoscopy Transoral Diagnostic (N/A, 6/16/2021).  FAMILY HISTORY: family history includes Alcoholism in his brother; Cancer in his brother; Cerebrovascular Disease in his mother; Crohn's Disease in his father; Heart Disease in his mother; No Known Problems in his daughter, maternal aunt, maternal grandfather, maternal grandmother, maternal uncle, paternal aunt, paternal grandfather, paternal grandmother, paternal uncle, and son.  SOCIAL HISTORY:   reports that he has quit smoking. He has never used smokeless tobacco. He reports previous alcohol use. He reports that he does not use drugs.  Patient's living condition: lives alone    Post Discharge Medication Reconciliation Status: discharge medications reconciled, continue medications without change  Current Outpatient Medications   Medication Sig     acetaminophen (TYLENOL) 500 MG tablet Take 1,000 mg by mouth every 8 hours as needed      albuterol (PROAIR HFA/PROVENTIL HFA/VENTOLIN HFA) 108 (90 Base) MCG/ACT inhaler Inhale 1-2 puffs into the lungs every 4 hours as needed Normally takes ~1 puff/ day     calcium  carbonate-vitamin D 600-200 MG-UNIT TABS Take 2 tablets by mouth     carvedilol (COREG) 25 MG tablet Take 25 mg by mouth 2 times daily     cyclobenzaprine (FLEXERIL) 5 MG tablet Take 2 tablets (10 mg) by mouth 3 times daily as needed for muscle spasms     cycloSPORINE (RESTASIS) 0.05 % ophthalmic emulsion Place 1 drop into both eyes 2 times daily      diclofenac (VOLTAREN) 1 % topical gel Apply 2 g topically 2 times daily      erythromycin (ROMYCIN) 5 MG/GM ophthalmic ointment Place 0.5 inches into both eyes daily      ferrous sulfate (FEROSUL) 325 (65 Fe) MG tablet Take 1 tablet by mouth daily     furosemide (LASIX) 20 MG tablet Take 40 mg by mouth daily     ibuprofen (ADVIL/MOTRIN) 200 MG tablet Take 400 mg by mouth every 4 hours as needed for mild pain     Lidocaine (LIDOCARE) 4 % Patch Place 1 patch onto the skin daily as needed for moderate pain To prevent lidocaine toxicity, patient should be patch free for 12 hrs daily.     multivitamin w/minerals (MULTI-VITAMIN) tablet Take 1 tablet by mouth daily     neomycin-polymixin-dexamethasone (MAXITROL) ophthalmic ointment Place 1 Application into both eyes daily      nitroGLYcerin (NITROSTAT) 0.4 MG sublingual tablet Place 0.4 mg under the tongue     omeprazole (PRILOSEC) 20 MG DR capsule Take 1 capsule by mouth daily     ondansetron (ZOFRAN) 8 MG tablet Take 8 mg by mouth as needed     oxyCODONE (ROXICODONE) 5 MG tablet Take 5 mg by mouth 3 times daily as needed for severe pain     potassium chloride ER (KLOR-CON M) 20 MEQ CR tablet TAKE ONE TABLET BY MOUTH ONCE DAILY     rosuvastatin (CRESTOR) 20 MG tablet TAKE 1 TABLET BY MOUTH DAILY AT BEDTIME     vitamin B-12 (CYANOCOBALAMIN) 2000 MCG tablet Take 2,000 mcg by mouth daily     warfarin ANTICOAGULANT (COUMADIN) 2.5 MG tablet Take 1.25-2.5 mg by mouth See Admin Instructions Take 1.25 mg Mon, Wed, Fri and 2.5 mg Sun, Tues, Thurs, Sat.     No current facility-administered medications for this visit.       ROS:  10  "point ROS of systems including Constitutional, Eyes, Respiratory, Cardiovascular, Gastroenterology, Genitourinary, Integumentary, Musculoskeletal, Psychiatric were all negative except for pertinent positives noted in my HPI.    Vitals:  /63   Pulse 60   Temp 98.4  F (36.9  C)   Resp 18   Ht 1.753 m (5' 9\")   Wt 69.4 kg (153 lb)   SpO2 96%   BMI 22.59 kg/m    Exam:  Physical Exam   General appearance: alert, appears stated age and cooperative.   Head: Normocephalic, without obvious abnormality, atraumatic, Eyes: sclera anicteric.  Lungs: respirations without effort, LSCTA; no wheezing or rales.   Cardiovascular: S1, S2. Regular rate and rhythm.   ABDOMEN: Globular and soft.  Extremities: Left upper extremity in immobilizer.  Fingers wiggle.  Sensation intact.  Skin: Skin color, texture, turgor normal. No rashes or lesions  Neurologic:oriented. No focal deficits.   Psych: interacts well with caregivers, exhibits logical thought processes and connections, pleasant    Lab/Diagnostic data:  Recent labs in [facility] reviewed by me today.     ASSESSMENT/PLAN:  (I10) Benign essential hypertension.   Plan: PTA losartan and carvedilol.     Hypokalemia: 2.9 today.  Unstable.  Previously was on potassium however that was discontinued in the hospital.   -Give 40 mEq potassium chloride p.o. every 6 hours x2 doses.  -Tomorrow, start potassium chloride 10 mEq daily.  -Recheck potassium on Friday.     (S42.202D) Closed fracture of proximal end of left humerus with routine healing, unspecified fracture morphology, subsequent encounter, unstable.  Comment: endorses moderate pain.   Plan:   -Tolerating oxycodone 2.5mg q 8 hours prn.     (I48.21) Permanent atrial fibrillation (H)  Plan: carvedilol for rate control. On coumadin with therapuetic INR today.   Recheck Friday.    (I50.20) Heart failure with reduced ejection fraction (H) unstable.  Comment: Monitoring weights, resp status.  Weight has improved, 153 today.  No lower " extremity swelling, no shortness of breath.  Eating and drinking well.  Plan:   -Continue Lasix 40mg daily.   -Recheck bmp Friday.  Patient would like to change low sodium diet to regular as he eats that at home.     Electronically signed by:  Chela Gallardo CNP

## 2021-09-10 ENCOUNTER — TRANSITIONAL CARE UNIT VISIT (OUTPATIENT)
Dept: GERIATRICS | Facility: CLINIC | Age: 81
End: 2021-09-10
Payer: COMMERCIAL

## 2021-09-10 VITALS
DIASTOLIC BLOOD PRESSURE: 54 MMHG | OXYGEN SATURATION: 98 % | HEIGHT: 69 IN | SYSTOLIC BLOOD PRESSURE: 120 MMHG | RESPIRATION RATE: 20 BRPM | TEMPERATURE: 96 F | BODY MASS INDEX: 22.47 KG/M2 | HEART RATE: 52 BPM | WEIGHT: 151.7 LBS

## 2021-09-10 DIAGNOSIS — I50.20 HEART FAILURE WITH REDUCED EJECTION FRACTION (H): ICD-10-CM

## 2021-09-10 DIAGNOSIS — E87.6 HYPOKALEMIA: Primary | ICD-10-CM

## 2021-09-10 DIAGNOSIS — S42.202D CLOSED FRACTURE OF PROXIMAL END OF LEFT HUMERUS WITH ROUTINE HEALING, UNSPECIFIED FRACTURE MORPHOLOGY, SUBSEQUENT ENCOUNTER: ICD-10-CM

## 2021-09-10 DIAGNOSIS — R63.5 WEIGHT GAIN: ICD-10-CM

## 2021-09-10 LAB
ANION GAP SERPL CALCULATED.3IONS-SCNC: 9 MMOL/L (ref 5–18)
BUN SERPL-MCNC: 15 MG/DL (ref 8–28)
CALCIUM SERPL-MCNC: 8.3 MG/DL (ref 8.5–10.5)
CHLORIDE BLD-SCNC: 106 MMOL/L (ref 98–107)
CO2 SERPL-SCNC: 25 MMOL/L (ref 22–31)
CREAT SERPL-MCNC: 1 MG/DL (ref 0.7–1.3)
GFR SERPL CREATININE-BSD FRML MDRD: 70 ML/MIN/1.73M2
GLUCOSE BLD-MCNC: 75 MG/DL (ref 70–125)
POTASSIUM BLD-SCNC: 3.3 MMOL/L (ref 3.5–5)
SODIUM SERPL-SCNC: 140 MMOL/L (ref 136–145)

## 2021-09-10 PROCEDURE — 80048 BASIC METABOLIC PNL TOTAL CA: CPT | Mod: ORL | Performed by: NURSE PRACTITIONER

## 2021-09-10 PROCEDURE — 36415 COLL VENOUS BLD VENIPUNCTURE: CPT | Mod: ORL | Performed by: NURSE PRACTITIONER

## 2021-09-10 PROCEDURE — P9604 ONE-WAY ALLOW PRORATED TRIP: HCPCS | Mod: ORL | Performed by: NURSE PRACTITIONER

## 2021-09-10 PROCEDURE — 99310 SBSQ NF CARE HIGH MDM 45: CPT | Performed by: NURSE PRACTITIONER

## 2021-09-10 ASSESSMENT — MIFFLIN-ST. JEOR: SCORE: 1378.49

## 2021-09-10 NOTE — LETTER
9/10/2021        RE: Tanmay Israel  [address]  [facility] [address]        M [facility] GERIATRIC SERVICES  PRIMARY CARE PROVIDER AND CLINIC:  Go Ramírez MD, MD, [address] [facility] [address] [facility] [[email or web address]] [address] / [place] [address]  Chief Complaint   Patient presents with     [facility] Medical Record Number:  [id number]  Place of Service where encounter took place:  [facility] ([facility]) [[id number]]    Tanmay Israel  is a 81 year old  (1940), admitted to the above facility from  [facility]. Hospital stay 8/24 through 8/27..   HPI:, Patient is a history of GERD, heart failure and Crohn's, anemia, pretension, A. Fib, who transferred from [facility] ER for generalized weakness and a left proximal humerus fracture.  He experienced a fracture 2 weeks prior to his ER visit and subsequent hospital visit.  OT suggested patient to discharge to TCU.  Pain control he is using Tylenol and oxycodone. Reports that he lives independently but does have a close friend, and also named Latasha who follows up and assist with his appointments.    TCU: Seen today to follow-up on pain management as well as electrolyte derangements.  Has hypokalemia today improved to 3.3.  Will increase back to 20 mEq daily as he was on prior to hospitalization.  Also complaining of pain throughout the day and nursing thinks he would be better off with a scheduled dose of oxycodone in the evening as he is asking for this each night.  He agrees that his overnights are the most difficult for his pain.  He is agreeable to continuing at the lower dose of 2.5 and scheduling each night as well as adding Tylenol scheduled.  He is otherwise doing well in the TCU and shoulder remains in the immobilizer, attending therapies.  Fluid status stable for the past week on lower dose of Lasix at 40 mg daily.      CODE STATUS/ADVANCE DIRECTIVES DISCUSSION:  No Order  DNR / DNI  ALLERGIES: No Known Allergies   PAST MEDICAL HISTORY:   Past Medical History:   Diagnosis  Date     Acute cholecystitis 2/28/2019     Acute post-operative pain      Anemia      Arthritis      Atrial fibrillation (H)      C. difficile diarrhea 4/21/2019     Calculus of ureter      Callus      Cerebral aneurysm      Cervical myelopathy (H) 2/22/2019     Cervical spinal stenosis      Chronic kidney disease     stage 3     Chronic systolic congestive heart failure (H)      Closed fracture of distal end of left radius, unspecified fracture morphology, initial encounter      Closed fracture of distal end of right radius, unspecified fracture morphology, initial encounter      Closed fracture of first thoracic vertebra, unspecified fracture morphology, initial encounter (H)      Coronary artery disease      Crohn's disease (H)      Disorder of bursae and tendons in shoulder region     Created by Conversion  Replacement Utility updated for latest IMO load     Dysphagia      Fall 10/22/2016     GERD (gastroesophageal reflux disease)      Hyperlipidemia      Hypertension      Hypotension, unspecified hypotension type      ICD (implantable cardioverter-defibrillator) in place     Medtronic     Ischemic cardiomyopathy      Kidney stone      Low back pain      Lumbago     Created by Conversion      Macular degeneration      Myalgia and myositis, unspecified     Created by Conversion      Nonspecific elevation of levels of transaminase or lactic acid dehydrogenase (LDH)     Created by Conversion      Permanent atrial fibrillation (H)     [id number] risk score = 5 (age3/ CAD/ HTN/ CHF) Chronic warfarin     Personal history of colonic polyps 2/12/2019     Polyp of duodenum 10/17/2016     Pyuria      Right arm weakness 4/2/2019     Schatzki's ring      Sciatica     Created by Conversion      Sleep apnea     no CPAP     Spondylolisthesis of lumbar region 7/5/2016     Weakness 4/20/2019      PAST SURGICAL HISTORY:   has a past surgical history that includes IR Lumbar Epidural Steroid Injection (6/29/2005); IR Lumbar  Epidural Steroid Injection (7/15/2005); IR Lumbar Epidural Steroid Injection (9/28/2005); IR Lumbar Epidural Steroid Injection (12/28/2005); IR Lumbar Epidural Steroid Injection (3/29/2006); IR Lumbar Epidural Steroid Injection (6/8/2006); Esophagoscopy, gastroscopy, duodenoscopy (EGD), combined; Tonsillectomy; appendectomy; Cholecystectomy; Arthroscopy shoulder rotator cuff repair (Right); Phacoemulsification clear cornea with standard intraocular lens implant (Bilateral); Xr Myelogram Cervical Thoracic Lumbar (1/17/2019); Pr C- Laminoplasty, 2 Or More (Left, 2/22/2019); LAP,CHOLECYSTECTOMY/EXPLORE (N/A, 2/28/2019); Cardiac Defibrillator Placement (2013); Bypass graft artery coronary (08/2004); Colonoscopy (N/A, 2/19/2020); Pr Esophagogastroduodenoscopy Transoral Diagnostic (N/A, 5/10/2021); and Pr Esophagogastroduodenoscopy Transoral Diagnostic (N/A, 6/16/2021).  FAMILY HISTORY: family history includes Alcoholism in his brother; Cancer in his brother; Cerebrovascular Disease in his mother; Crohn's Disease in his father; Heart Disease in his mother; No Known Problems in his daughter, maternal aunt, maternal grandfather, maternal grandmother, maternal uncle, paternal aunt, paternal grandfather, paternal grandmother, paternal uncle, and son.  SOCIAL HISTORY:   reports that he has quit smoking. He has never used smokeless tobacco. He reports previous alcohol use. He reports that he does not use drugs.  Patient's living condition: lives alone    Post Discharge Medication Reconciliation Status: discharge medications reconciled, continue medications without change  Current Outpatient Medications   Medication Sig     acetaminophen (TYLENOL) 500 MG tablet Take 1,000 mg by mouth every 8 hours as needed      albuterol (PROAIR HFA/PROVENTIL HFA/VENTOLIN HFA) 108 (90 Base) MCG/ACT inhaler Inhale 1-2 puffs into the lungs every 4 hours as needed Normally takes ~1 puff/ day     calcium carbonate-vitamin D 600-200 MG-UNIT TABS  Take 2 tablets by mouth     carvedilol (COREG) 25 MG tablet Take 25 mg by mouth 2 times daily     cyclobenzaprine (FLEXERIL) 5 MG tablet Take 2 tablets (10 mg) by mouth 3 times daily as needed for muscle spasms     cycloSPORINE (RESTASIS) 0.05 % ophthalmic emulsion Place 1 drop into both eyes 2 times daily      diclofenac (VOLTAREN) 1 % topical gel Apply 2 g topically 2 times daily      erythromycin (ROMYCIN) 5 MG/GM ophthalmic ointment Place 0.5 inches into both eyes daily      ferrous sulfate (FEROSUL) 325 (65 Fe) MG tablet Take 1 tablet by mouth daily     furosemide (LASIX) 20 MG tablet Take 40 mg by mouth daily     ibuprofen (ADVIL/MOTRIN) 200 MG tablet Take 400 mg by mouth every 4 hours as needed for mild pain     Lidocaine (LIDOCARE) 4 % Patch Place 1 patch onto the skin daily as needed for moderate pain To prevent lidocaine toxicity, patient should be patch free for 12 hrs daily.     multivitamin w/minerals (MULTI-VITAMIN) tablet Take 1 tablet by mouth daily     neomycin-polymixin-dexamethasone (MAXITROL) ophthalmic ointment Place 1 Application into both eyes daily      nitroGLYcerin (NITROSTAT) 0.4 MG sublingual tablet Place 0.4 mg under the tongue     omeprazole (PRILOSEC) 20 MG DR capsule Take 1 capsule by mouth daily     ondansetron (ZOFRAN) 8 MG tablet Take 8 mg by mouth as needed     oxyCODONE (ROXICODONE) 5 MG tablet Take 5 mg by mouth 3 times daily as needed for severe pain     potassium chloride ER (KLOR-CON M) 20 MEQ CR tablet TAKE ONE TABLET BY MOUTH ONCE DAILY     rosuvastatin (CRESTOR) 20 MG tablet TAKE 1 TABLET BY MOUTH DAILY AT BEDTIME     vitamin B-12 (CYANOCOBALAMIN) 2000 MCG tablet Take 2,000 mcg by mouth daily     warfarin ANTICOAGULANT (COUMADIN) 2.5 MG tablet Take 1.25-2.5 mg by mouth See Admin Instructions Take 1.25 mg Mon, Wed, Fri and 2.5 mg Sun, Tues, Thurs, Sat.     No current facility-administered medications for this visit.       ROS:  10 point ROS of systems including  "Constitutional, Eyes, Respiratory, Cardiovascular, Gastroenterology, Genitourinary, Integumentary, Musculoskeletal, Psychiatric were all negative except for pertinent positives noted in my HPI.    Vitals:  /54   Pulse 52   Temp (!) 96  F (35.6  C)   Resp 20   Ht 1.753 m (5' 9\")   Wt 68.8 kg (151 lb 11.2 oz)   SpO2 98%   BMI 22.40 kg/m    Exam:  Physical Exam   General appearance: alert, appears stated age and cooperative.   Head: Normocephalic, without obvious abnormality, atraumatic, Eyes: sclera anicteric.  Lungs: Breathing comfortably on room air.  ABDOMEN: Globular and soft.  Extremities: Left upper extremity in immobilizer.  Fingers wiggle.  Sensation intact.  Skin: Skin color, texture, turgor normal. No rashes or lesions  Neurologic:oriented. No focal deficits.   Psych: interacts well with caregivers, exhibits logical thought processes and connections, pleasant    Lab/Diagnostic data:  Recent labs in [facility] reviewed by me today.     ASSESSMENT/PLAN:  (I10) Benign essential hypertension.  Stable.  Plan: PTA losartan and carvedilol.     Hypokalemia: 3.3 today.  Unstable.  Previously was on potassium however that was discontinued in the hospital.   -Increase potassium chloride to 20 mEq daily.  -Recheck potassium on Friday.     (S42.202D) Closed fracture of proximal end of left humerus with routine healing, unspecified fracture morphology, subsequent encounter, unstable.  Comment: endorses moderate pain.   Plan:   -Schedule Tylenol at 1000 mg p.o. 3 times daily.  -Schedule oxycodone 2.5 mg p.o. nightly.  -Continue oxycodone 2.5 twice daily as needed.  -Recheck CBC on Tuesday.    (I48.21) Permanent atrial fibrillation (H)  Plan: carvedilol for rate control. On coumadin with therapuetic INR today.   -Recheck INR 1 week.    (I50.20) Heart failure with reduced ejection fraction (H) unstable.  Comment: Monitoring weights, resp status.  Weight has improved, 151 today.  No lower extremity swelling, no " shortness of breath.  Eating and drinking well.  Plan:   -Continue Lasix 40mg daily.   -Recheck bmp Tuesday.  Patient would like to change low sodium diet to regular as he eats that at home.     Electronically signed by:  Chela Gallardo CNP                 Sincerely,        Chela Gallardo CNP

## 2021-09-13 ENCOUNTER — LAB REQUISITION (OUTPATIENT)
Dept: LAB | Facility: CLINIC | Age: 81
End: 2021-09-13
Payer: COMMERCIAL

## 2021-09-13 ENCOUNTER — TRANSITIONAL CARE UNIT VISIT (OUTPATIENT)
Dept: GERIATRICS | Facility: CLINIC | Age: 81
End: 2021-09-13
Payer: COMMERCIAL

## 2021-09-13 VITALS
SYSTOLIC BLOOD PRESSURE: 120 MMHG | TEMPERATURE: 97.9 F | BODY MASS INDEX: 22.47 KG/M2 | OXYGEN SATURATION: 99 % | HEART RATE: 52 BPM | HEIGHT: 69 IN | WEIGHT: 151.7 LBS | RESPIRATION RATE: 20 BRPM | DIASTOLIC BLOOD PRESSURE: 54 MMHG

## 2021-09-13 DIAGNOSIS — R79.89 OTHER SPECIFIED ABNORMAL FINDINGS OF BLOOD CHEMISTRY: ICD-10-CM

## 2021-09-13 DIAGNOSIS — K50.919 CROHN'S DISEASE WITH COMPLICATION, UNSPECIFIED GASTROINTESTINAL TRACT LOCATION (H): ICD-10-CM

## 2021-09-13 DIAGNOSIS — E87.6 HYPOKALEMIA: ICD-10-CM

## 2021-09-13 DIAGNOSIS — R68.89 OTHER GENERAL SYMPTOMS AND SIGNS: ICD-10-CM

## 2021-09-13 DIAGNOSIS — R19.7 DIARRHEA, UNSPECIFIED TYPE: Primary | ICD-10-CM

## 2021-09-13 PROCEDURE — 99309 SBSQ NF CARE MODERATE MDM 30: CPT | Performed by: NURSE PRACTITIONER

## 2021-09-13 ASSESSMENT — MIFFLIN-ST. JEOR: SCORE: 1378.49

## 2021-09-13 NOTE — LETTER
9/13/2021        RE: Tanmay Israel  [address]  [facility] [address]        M [facility] GERIATRIC SERVICES  PRIMARY CARE PROVIDER AND CLINIC:  Go Ramírez MD, MD, [address] [facility] [address] [facility] [address] / [place] [address]  Chief Complaint   Patient presents with     RECHECK      [place] Medical Record Number:  [id number]  Place of Service where encounter took place:  [facility] ([facility]) [[id number]]    Tanmay Israel  is a 81 year old  (1940), admitted to the above facility from  [facility] Hospital stay 8/24 through 8/27..   HPI:, Patient is a history of GERD, heart failure and Crohn's, anemia, pretension, A. Fib, who transferred from [facility] ER for generalized weakness and a left proximal humerus fracture.  He experienced a fracture 2 weeks prior to his ER visit and subsequent hospital visit.  OT suggested patient to discharge to TCU.  Pain control he is using Tylenol and oxycodone. Reports that he lives independently but does have a close friend, and also named Latasha who follows up and assist with his appointments.    TCU: Seen per request today for c/o loose stools; requesting imodium. Discussed history- he reports crohns disease but that some doctors say he does not have it and some day he does.. denies bloody stools; not on a DMARD or immune suppressant. Endorses hx of c-diff about 1 year ago. Says these symptoms are different and that they've been long standing for many many years.   Also followed up on pain control; he is doing well on current therapy.   [facility] recheck for tomorrow for hypokalemia.     CODE STATUS/ADVANCE DIRECTIVES DISCUSSION:  No Order  DNR / DNI  ALLERGIES: No Known Allergies   PAST MEDICAL HISTORY:   Past Medical History:   Diagnosis Date     Acute cholecystitis 2/28/2019     Acute post-operative pain      Anemia      Arthritis      Atrial fibrillation (H)      C. difficile diarrhea 4/21/2019     Calculus of ureter      Callus      Cerebral aneurysm      Cervical myelopathy (H)  2/22/2019     Cervical spinal stenosis      Chronic kidney disease     stage 3     Chronic systolic congestive heart failure (H)      Closed fracture of distal end of left radius, unspecified fracture morphology, initial encounter      Closed fracture of distal end of right radius, unspecified fracture morphology, initial encounter      Closed fracture of first thoracic vertebra, unspecified fracture morphology, initial encounter (H)      Coronary artery disease      Crohn's disease (H)      Disorder of bursae and tendons in shoulder region     Created by Conversion  Replacement Utility updated for latest IMO load     Dysphagia      Fall 10/22/2016     GERD (gastroesophageal reflux disease)      Hyperlipidemia      Hypertension      Hypotension, unspecified hypotension type      ICD (implantable cardioverter-defibrillator) in place     Medtronic     Ischemic cardiomyopathy      Kidney stone      Low back pain      Lumbago     Created by Conversion      Macular degeneration      Myalgia and myositis, unspecified     Created by Conversion      Nonspecific elevation of levels of transaminase or lactic acid dehydrogenase (LDH)     Created by Conversion      Permanent atrial fibrillation (H)     [id number] risk score = 5 (age1/ CAD/ HTN/ CHF) Chronic warfarin     Personal history of colonic polyps 2/12/2019     Polyp of duodenum 10/17/2016     Pyuria      Right arm weakness 4/2/2019     Schatzki's ring      Sciatica     Created by Conversion      Sleep apnea     no CPAP     Spondylolisthesis of lumbar region 7/5/2016     Weakness 4/20/2019      PAST SURGICAL HISTORY:   has a past surgical history that includes IR Lumbar Epidural Steroid Injection (6/29/2005); IR Lumbar Epidural Steroid Injection (7/15/2005); IR Lumbar Epidural Steroid Injection (9/28/2005); IR Lumbar Epidural Steroid Injection (12/28/2005); IR Lumbar Epidural Steroid Injection (3/29/2006); IR Lumbar Epidural Steroid Injection (6/8/2006); Esophagoscopy,  gastroscopy, duodenoscopy (EGD), combined; Tonsillectomy; appendectomy; Cholecystectomy; Arthroscopy shoulder rotator cuff repair (Right); Phacoemulsification clear cornea with standard intraocular lens implant (Bilateral); Xr Myelogram Cervical Thoracic Lumbar (1/17/2019); Pr C- Laminoplasty, 2 Or More (Left, 2/22/2019); LAP,CHOLECYSTECTOMY/EXPLORE (N/A, 2/28/2019); Cardiac Defibrillator Placement (2013); Bypass graft artery coronary (08/2004); Colonoscopy (N/A, 2/19/2020); Pr Esophagogastroduodenoscopy Transoral Diagnostic (N/A, 5/10/2021); and Pr Esophagogastroduodenoscopy Transoral Diagnostic (N/A, 6/16/2021).  FAMILY HISTORY: family history includes Alcoholism in his brother; Cancer in his brother; Cerebrovascular Disease in his mother; Crohn's Disease in his father; Heart Disease in his mother; No Known Problems in his daughter, maternal aunt, maternal grandfather, maternal grandmother, maternal uncle, paternal aunt, paternal grandfather, paternal grandmother, paternal uncle, and son.  SOCIAL HISTORY:   reports that he has quit smoking. He has never used smokeless tobacco. He reports previous alcohol use. He reports that he does not use drugs.  Patient's living condition: lives alone    Post Discharge Medication Reconciliation Status: discharge medications reconciled, continue medications without change  Current Outpatient Medications   Medication Sig     acetaminophen (TYLENOL) 500 MG tablet Take 1,000 mg by mouth every 8 hours as needed      albuterol (PROAIR HFA/PROVENTIL HFA/VENTOLIN HFA) 108 (90 Base) MCG/ACT inhaler Inhale 1-2 puffs into the lungs every 4 hours as needed Normally takes ~1 puff/ day     calcium carbonate-vitamin D 600-200 MG-UNIT TABS Take 2 tablets by mouth     carvedilol (COREG) 25 MG tablet Take 25 mg by mouth 2 times daily     cyclobenzaprine (FLEXERIL) 5 MG tablet Take 2 tablets (10 mg) by mouth 3 times daily as needed for muscle spasms     cycloSPORINE (RESTASIS) 0.05 % ophthalmic  emulsion Place 1 drop into both eyes 2 times daily      diclofenac (VOLTAREN) 1 % topical gel Apply 2 g topically 2 times daily      erythromycin (ROMYCIN) 5 MG/GM ophthalmic ointment Place 0.5 inches into both eyes daily      ferrous sulfate (FEROSUL) 325 (65 Fe) MG tablet Take 1 tablet by mouth daily     furosemide (LASIX) 20 MG tablet Take 40 mg by mouth daily     ibuprofen (ADVIL/MOTRIN) 200 MG tablet Take 400 mg by mouth every 4 hours as needed for mild pain     Lidocaine (LIDOCARE) 4 % Patch Place 1 patch onto the skin daily as needed for moderate pain To prevent lidocaine toxicity, patient should be patch free for 12 hrs daily.     multivitamin w/minerals (MULTI-VITAMIN) tablet Take 1 tablet by mouth daily     neomycin-polymixin-dexamethasone (MAXITROL) ophthalmic ointment Place 1 Application into both eyes daily      nitroGLYcerin (NITROSTAT) 0.4 MG sublingual tablet Place 0.4 mg under the tongue     omeprazole (PRILOSEC) 20 MG DR capsule Take 1 capsule by mouth daily     ondansetron (ZOFRAN) 8 MG tablet Take 8 mg by mouth as needed     oxyCODONE (ROXICODONE) 5 MG tablet Take 5 mg by mouth 3 times daily as needed for severe pain     potassium chloride ER (KLOR-CON M) 20 MEQ CR tablet TAKE ONE TABLET BY MOUTH ONCE DAILY     rosuvastatin (CRESTOR) 20 MG tablet TAKE 1 TABLET BY MOUTH DAILY AT BEDTIME     vitamin B-12 (CYANOCOBALAMIN) 2000 MCG tablet Take 2,000 mcg by mouth daily     warfarin ANTICOAGULANT (COUMADIN) 2.5 MG tablet Take 1.25-2.5 mg by mouth See Admin Instructions Take 1.25 mg Mon, Wed, Fri and 2.5 mg Sun, Tues, Thurs, Sat.     No current facility-administered medications for this visit.       ROS:  10 point ROS of systems including Constitutional, Eyes, Respiratory, Cardiovascular, Gastroenterology, Genitourinary, Integumentary, Musculoskeletal, Psychiatric were all negative except for pertinent positives noted in my HPI.  Loose stools.     Vitals:  /54   Pulse 52   Temp 97.9  F (36.6  C)  "  Resp 20   Ht 1.753 m (5' 9\")   Wt 68.8 kg (151 lb 11.2 oz)   SpO2 99%   BMI 22.40 kg/m    Exam:  Physical Exam   General appearance: alert, appears stated age and cooperative.   Head: Normocephalic, without obvious abnormality, atraumatic, Eyes: sclera anicteric.  Lungs: Breathing comfortably on room air.  ABDOMEN: Globular and soft.  Extremities: Left upper extremity in immobilizer.  Fingers wiggle.  Sensation intact.  Skin: Skin color, texture, turgor normal. No rashes or lesions  Neurologic:oriented. No focal deficits.   Psych: interacts well with caregivers, exhibits logical thought processes and connections, pleasant    Lab/Diagnostic data:  Recent labs in [facility] reviewed by me today.     ASSESSMENT/PLAN:    Loose stools: asking for imodium or pepto. He endorses a hx of crohns but is unsure \"who to believe\" because hes received conflicting information from multiple doctors. Also with a hx of c-dff one year ago. He declines to give a sample at this time and says his loose stools are infrequent, less than 3/day.   -Okay for imodium 2mg po bid prn; if no improvement, update primary.     (I10) Benign essential hypertension.  Stable.  Plan: PTA losartan and carvedilol.     Hypokalemia: 3.3 on Friday; recheck tomorrow.   -Continue potassium chloride to 20 mEq daily.  -Recheck potassium onTuesday.      (S42.202D) Closed fracture of proximal end of left humerus with routine healing, unspecified fracture morphology, subsequent encounter, unstable.  Comment: endorses moderate pain.   Plan:   -Schedule Tylenol at 1000 mg p.o. 3 times daily.  -Schedule oxycodone 2.5 mg p.o. nightly.  -Continue oxycodone 2.5 twice daily as needed.  -Recheck CBC on Tuesday.    (I48.21) Permanent atrial fibrillation (H)  Plan: carvedilol for rate control. On coumadin with therapuetic INR today.   -Recheck INR 1 week.    (I50.20) Heart failure with reduced ejection fraction (H) unstable.  Comment: Monitoring weights, resp status.  Weight " has improved, 151 today.  No lower extremity swelling, no shortness of breath.  Eating and drinking well.  Plan:   -Continue Lasix 40mg daily.   -Recheck bmp Tuesday.  Patient would like to change low sodium diet to regular as he eats that at home.     Electronically signed by:  Chela Gallardo CNP               Sincerely,        Chela Gallardo CNP

## 2021-09-14 LAB
ANION GAP SERPL CALCULATED.3IONS-SCNC: 8 MMOL/L (ref 5–18)
BASOPHILS # BLD AUTO: 0.1 10E3/UL (ref 0–0.2)
BASOPHILS NFR BLD AUTO: 1 %
BUN SERPL-MCNC: 12 MG/DL (ref 8–28)
CALCIUM SERPL-MCNC: 8.7 MG/DL (ref 8.5–10.5)
CHLORIDE BLD-SCNC: 107 MMOL/L (ref 98–107)
CO2 SERPL-SCNC: 25 MMOL/L (ref 22–31)
CREAT SERPL-MCNC: 1.04 MG/DL (ref 0.7–1.3)
EOSINOPHIL # BLD AUTO: 0.4 10E3/UL (ref 0–0.7)
EOSINOPHIL NFR BLD AUTO: 8 %
ERYTHROCYTE [DISTWIDTH] IN BLOOD BY AUTOMATED COUNT: 13.2 % (ref 10–15)
GFR SERPL CREATININE-BSD FRML MDRD: 67 ML/MIN/1.73M2
GLUCOSE BLD-MCNC: 79 MG/DL (ref 70–125)
HCT VFR BLD AUTO: 23.9 % (ref 40–53)
HGB BLD-MCNC: 7.6 G/DL (ref 13.3–17.7)
IMM GRANULOCYTES # BLD: 0 10E3/UL
IMM GRANULOCYTES NFR BLD: 0 %
LYMPHOCYTES # BLD AUTO: 1.1 10E3/UL (ref 0.8–5.3)
LYMPHOCYTES NFR BLD AUTO: 21 %
MAGNESIUM SERPL-MCNC: 1.3 MG/DL (ref 1.8–2.6)
MCH RBC QN AUTO: 29.9 PG (ref 26.5–33)
MCHC RBC AUTO-ENTMCNC: 31.8 G/DL (ref 31.5–36.5)
MCV RBC AUTO: 94 FL (ref 78–100)
MONOCYTES # BLD AUTO: 0.6 10E3/UL (ref 0–1.3)
MONOCYTES NFR BLD AUTO: 11 %
NEUTROPHILS # BLD AUTO: 3 10E3/UL (ref 1.6–8.3)
NEUTROPHILS NFR BLD AUTO: 59 %
NRBC # BLD AUTO: 0 10E3/UL
NRBC BLD AUTO-RTO: 0 /100
PLATELET # BLD AUTO: 190 10E3/UL (ref 150–450)
POTASSIUM BLD-SCNC: 3.6 MMOL/L (ref 3.5–5)
RBC # BLD AUTO: 2.54 10E6/UL (ref 4.4–5.9)
SODIUM SERPL-SCNC: 140 MMOL/L (ref 136–145)
WBC # BLD AUTO: 5.2 10E3/UL (ref 4–11)

## 2021-09-14 PROCEDURE — 36415 COLL VENOUS BLD VENIPUNCTURE: CPT | Mod: ORL

## 2021-09-14 PROCEDURE — P9604 ONE-WAY ALLOW PRORATED TRIP: HCPCS | Mod: ORL

## 2021-09-14 PROCEDURE — 83735 ASSAY OF MAGNESIUM: CPT | Mod: ORL

## 2021-09-14 PROCEDURE — 85025 COMPLETE CBC W/AUTO DIFF WBC: CPT | Mod: ORL

## 2021-09-14 PROCEDURE — 80048 BASIC METABOLIC PNL TOTAL CA: CPT | Mod: ORL

## 2021-09-14 NOTE — PROGRESS NOTES
[facility] GERIATRIC SERVICES  PRIMARY CARE PROVIDER AND CLINIC:  Go Ramírez MD, MD, [address] [place] [address] / [facility] [address]  Chief Complaint   Patient presents with     [facility] Medical Record Number:  [id number]  Place of Service where encounter took place:  [facility] (U) [[id number]]    Tanmay Israel  is a 81 year old  (1940), admitted to the above facility from  [facility]. Hospital stay 8/24 through 8/27..   HPI:, Patient is a history of GERD, heart failure and Crohn's, anemia, pretension, A. Fib, who transferred from [facility] ER for generalized weakness and a left proximal humerus fracture.  He experienced a fracture 2 weeks prior to his ER visit and subsequent hospital visit.  OT suggested patient to discharge to TCU.  Pain control he is using Tylenol and oxycodone. Reports that he lives independently but does have a close friend, and also named Latasha who follows up and assist with his appointments.    TCU: Seen today to follow-up on pain management as well as electrolyte derangements.  Has hypokalemia today improved to 3.3.  Will increase back to 20 mEq daily as he was on prior to hospitalization.  Also complaining of pain throughout the day and nursing thinks he would be better off with a scheduled dose of oxycodone in the evening as he is asking for this each night.  He agrees that his overnights are the most difficult for his pain.  He is agreeable to continuing at the lower dose of 2.5 and scheduling each night as well as adding Tylenol scheduled.  He is otherwise doing well in the TCU and shoulder remains in the immobilizer, attending therapies.  Fluid status stable for the past week on lower dose of Lasix at 40 mg daily.      CODE STATUS/ADVANCE DIRECTIVES DISCUSSION:  No Order  DNR / DNI  ALLERGIES: No Known Allergies   PAST MEDICAL HISTORY:   Past Medical History:   Diagnosis Date     Acute cholecystitis 2/28/2019     Acute post-operative pain      Anemia       Arthritis      Atrial fibrillation (H)      C. difficile diarrhea 4/21/2019     Calculus of ureter      Callus      Cerebral aneurysm      Cervical myelopathy (H) 2/22/2019     Cervical spinal stenosis      Chronic kidney disease     stage 3     Chronic systolic congestive heart failure (H)      Closed fracture of distal end of left radius, unspecified fracture morphology, initial encounter      Closed fracture of distal end of right radius, unspecified fracture morphology, initial encounter      Closed fracture of first thoracic vertebra, unspecified fracture morphology, initial encounter (H)      Coronary artery disease      Crohn's disease (H)      Disorder of bursae and tendons in shoulder region     Created by Conversion  Replacement Utility updated for latest IMO load     Dysphagia      Fall 10/22/2016     GERD (gastroesophageal reflux disease)      Hyperlipidemia      Hypertension      Hypotension, unspecified hypotension type      ICD (implantable cardioverter-defibrillator) in place     Medtronic     Ischemic cardiomyopathy      Kidney stone      Low back pain      Lumbago     Created by Conversion      Macular degeneration      Myalgia and myositis, unspecified     Created by Conversion      Nonspecific elevation of levels of transaminase or lactic acid dehydrogenase (LDH)     Created by Conversion      Permanent atrial fibrillation (H)     [id number] risk score = 5 (age3/ CAD/ HTN/ CHF) Chronic warfarin     Personal history of colonic polyps 2/12/2019     Polyp of duodenum 10/17/2016     Pyuria      Right arm weakness 4/2/2019     Schatzki's ring      Sciatica     Created by Conversion      Sleep apnea     no CPAP     Spondylolisthesis of lumbar region 7/5/2016     Weakness 4/20/2019      PAST SURGICAL HISTORY:   has a past surgical history that includes IR Lumbar Epidural Steroid Injection (6/29/2005); IR Lumbar Epidural Steroid Injection (7/15/2005); IR Lumbar Epidural Steroid Injection (9/28/2005);  IR Lumbar Epidural Steroid Injection (12/28/2005); IR Lumbar Epidural Steroid Injection (3/29/2006); IR Lumbar Epidural Steroid Injection (6/8/2006); Esophagoscopy, gastroscopy, duodenoscopy (EGD), combined; Tonsillectomy; appendectomy; Cholecystectomy; Arthroscopy shoulder rotator cuff repair (Right); Phacoemulsification clear cornea with standard intraocular lens implant (Bilateral); Xr Myelogram Cervical Thoracic Lumbar (1/17/2019); Pr C- Laminoplasty, 2 Or More (Left, 2/22/2019); LAP,CHOLECYSTECTOMY/EXPLORE (N/A, 2/28/2019); Cardiac Defibrillator Placement (2013); Bypass graft artery coronary (08/2004); Colonoscopy (N/A, 2/19/2020); Pr Esophagogastroduodenoscopy Transoral Diagnostic (N/A, 5/10/2021); and Pr Esophagogastroduodenoscopy Transoral Diagnostic (N/A, 6/16/2021).  FAMILY HISTORY: family history includes Alcoholism in his brother; Cancer in his brother; Cerebrovascular Disease in his mother; Crohn's Disease in his father; Heart Disease in his mother; No Known Problems in his daughter, maternal aunt, maternal grandfather, maternal grandmother, maternal uncle, paternal aunt, paternal grandfather, paternal grandmother, paternal uncle, and son.  SOCIAL HISTORY:   reports that he has quit smoking. He has never used smokeless tobacco. He reports previous alcohol use. He reports that he does not use drugs.  Patient's living condition: lives alone    Post Discharge Medication Reconciliation Status: discharge medications reconciled, continue medications without change  Current Outpatient Medications   Medication Sig     acetaminophen (TYLENOL) 500 MG tablet Take 1,000 mg by mouth every 8 hours as needed      albuterol (PROAIR HFA/PROVENTIL HFA/VENTOLIN HFA) 108 (90 Base) MCG/ACT inhaler Inhale 1-2 puffs into the lungs every 4 hours as needed Normally takes ~1 puff/ day     calcium carbonate-vitamin D 600-200 MG-UNIT TABS Take 2 tablets by mouth     carvedilol (COREG) 25 MG tablet Take 25 mg by mouth 2 times daily      cyclobenzaprine (FLEXERIL) 5 MG tablet Take 2 tablets (10 mg) by mouth 3 times daily as needed for muscle spasms     cycloSPORINE (RESTASIS) 0.05 % ophthalmic emulsion Place 1 drop into both eyes 2 times daily      diclofenac (VOLTAREN) 1 % topical gel Apply 2 g topically 2 times daily      erythromycin (ROMYCIN) 5 MG/GM ophthalmic ointment Place 0.5 inches into both eyes daily      ferrous sulfate (FEROSUL) 325 (65 Fe) MG tablet Take 1 tablet by mouth daily     furosemide (LASIX) 20 MG tablet Take 40 mg by mouth daily     ibuprofen (ADVIL/MOTRIN) 200 MG tablet Take 400 mg by mouth every 4 hours as needed for mild pain     Lidocaine (LIDOCARE) 4 % Patch Place 1 patch onto the skin daily as needed for moderate pain To prevent lidocaine toxicity, patient should be patch free for 12 hrs daily.     multivitamin w/minerals (MULTI-VITAMIN) tablet Take 1 tablet by mouth daily     neomycin-polymixin-dexamethasone (MAXITROL) ophthalmic ointment Place 1 Application into both eyes daily      nitroGLYcerin (NITROSTAT) 0.4 MG sublingual tablet Place 0.4 mg under the tongue     omeprazole (PRILOSEC) 20 MG DR capsule Take 1 capsule by mouth daily     ondansetron (ZOFRAN) 8 MG tablet Take 8 mg by mouth as needed     oxyCODONE (ROXICODONE) 5 MG tablet Take 5 mg by mouth 3 times daily as needed for severe pain     potassium chloride ER (KLOR-CON M) 20 MEQ CR tablet TAKE ONE TABLET BY MOUTH ONCE DAILY     rosuvastatin (CRESTOR) 20 MG tablet TAKE 1 TABLET BY MOUTH DAILY AT BEDTIME     vitamin B-12 (CYANOCOBALAMIN) 2000 MCG tablet Take 2,000 mcg by mouth daily     warfarin ANTICOAGULANT (COUMADIN) 2.5 MG tablet Take 1.25-2.5 mg by mouth See Admin Instructions Take 1.25 mg Mon, Wed, Fri and 2.5 mg Sun, Tues, Thurs, Sat.     No current facility-administered medications for this visit.       ROS:  10 point ROS of systems including Constitutional, Eyes, Respiratory, Cardiovascular, Gastroenterology, Genitourinary, Integumentary,  "Musculoskeletal, Psychiatric were all negative except for pertinent positives noted in my HPI.    Vitals:  /54   Pulse 52   Temp (!) 96  F (35.6  C)   Resp 20   Ht 1.753 m (5' 9\")   Wt 68.8 kg (151 lb 11.2 oz)   SpO2 98%   BMI 22.40 kg/m    Exam:  Physical Exam   General appearance: alert, appears stated age and cooperative.   Head: Normocephalic, without obvious abnormality, atraumatic, Eyes: sclera anicteric.  Lungs: Breathing comfortably on room air.  ABDOMEN: Globular and soft.  Extremities: Left upper extremity in immobilizer.  Fingers wiggle.  Sensation intact.  Skin: Skin color, texture, turgor normal. No rashes or lesions  Neurologic:oriented. No focal deficits.   Psych: interacts well with caregivers, exhibits logical thought processes and connections, pleasant    Lab/Diagnostic data:  Recent labs in [facility] reviewed by me today.     ASSESSMENT/PLAN:  (I10) Benign essential hypertension.  Stable.  Plan: PTA losartan and carvedilol.     Hypokalemia: 3.3 today.  Unstable.  Previously was on potassium however that was discontinued in the hospital.   -Increase potassium chloride to 20 mEq daily.  -Recheck potassium on Friday.     (S42.202D) Closed fracture of proximal end of left humerus with routine healing, unspecified fracture morphology, subsequent encounter, unstable.  Comment: endorses moderate pain.   Plan:   -Schedule Tylenol at 1000 mg p.o. 3 times daily.  -Schedule oxycodone 2.5 mg p.o. nightly.  -Continue oxycodone 2.5 twice daily as needed.  -Recheck CBC on Tuesday.    (I48.21) Permanent atrial fibrillation (H)  Plan: carvedilol for rate control. On coumadin with therapuetic INR today.   -Recheck INR 1 week.    (I50.20) Heart failure with reduced ejection fraction (H) unstable.  Comment: Monitoring weights, resp status.  Weight has improved, 151 today.  No lower extremity swelling, no shortness of breath.  Eating and drinking well.  Plan:   -Continue Lasix 40mg daily.   -Recheck bmp " Tuesday.  Patient would like to change low sodium diet to regular as he eats that at home.     Electronically signed by:  Chela Gallardo CNP

## 2021-09-15 PROBLEM — E87.6 HYPOKALEMIA: Status: ACTIVE | Noted: 2021-09-15

## 2021-09-16 ENCOUNTER — TRANSITIONAL CARE UNIT VISIT (OUTPATIENT)
Dept: GERIATRICS | Facility: CLINIC | Age: 81
End: 2021-09-16
Payer: COMMERCIAL

## 2021-09-16 ENCOUNTER — LAB REQUISITION (OUTPATIENT)
Dept: LAB | Facility: CLINIC | Age: 81
End: 2021-09-16
Payer: COMMERCIAL

## 2021-09-16 ENCOUNTER — TRANSFERRED RECORDS (OUTPATIENT)
Dept: HEALTH INFORMATION MANAGEMENT | Facility: CLINIC | Age: 81
End: 2021-09-16

## 2021-09-16 VITALS
HEIGHT: 69 IN | WEIGHT: 156.2 LBS | OXYGEN SATURATION: 100 % | TEMPERATURE: 98.1 F | BODY MASS INDEX: 23.13 KG/M2 | HEART RATE: 59 BPM | SYSTOLIC BLOOD PRESSURE: 136 MMHG | DIASTOLIC BLOOD PRESSURE: 57 MMHG | RESPIRATION RATE: 18 BRPM

## 2021-09-16 DIAGNOSIS — D64.9 ANEMIA, UNSPECIFIED: ICD-10-CM

## 2021-09-16 DIAGNOSIS — D68.32 WARFARIN-INDUCED COAGULOPATHY (H): Primary | ICD-10-CM

## 2021-09-16 DIAGNOSIS — D64.9 ANEMIA, UNSPECIFIED TYPE: ICD-10-CM

## 2021-09-16 DIAGNOSIS — R19.5 LOOSE STOOLS: ICD-10-CM

## 2021-09-16 DIAGNOSIS — T45.515A WARFARIN-INDUCED COAGULOPATHY (H): Primary | ICD-10-CM

## 2021-09-16 DIAGNOSIS — E87.6 HYPOKALEMIA: ICD-10-CM

## 2021-09-16 DIAGNOSIS — S42.202D CLOSED FRACTURE OF PROXIMAL END OF LEFT HUMERUS WITH ROUTINE HEALING, UNSPECIFIED FRACTURE MORPHOLOGY, SUBSEQUENT ENCOUNTER: ICD-10-CM

## 2021-09-16 PROCEDURE — 99309 SBSQ NF CARE MODERATE MDM 30: CPT | Performed by: NURSE PRACTITIONER

## 2021-09-16 ASSESSMENT — MIFFLIN-ST. JEOR: SCORE: 1398.9

## 2021-09-16 NOTE — LETTER
9/16/2021        RE: Tanmay Israel  [address]  [facility] [address]        [facility] GERIATRIC SERVICES  PRIMARY CARE PROVIDER AND CLINIC:  Go Ramírez MD, MD, [address] [facility] [address] [facility] [address] / [place] [address]  Chief Complaint   Patient presents with     RECHECK      [facility] Medical Record Number:  [id number]  Place of Service where encounter took place:  [facility] (U) [[id number]]    Tanmay Israel  is a 81 year old  (1940), admitted to the above facility from  [facility]. Hospital stay 8/24 through 8/27..   HPI:, Patient is a history of GERD, heart failure and Crohn's, anemia, pretension, A. Fib, who transferred from [facility] ER for generalized weakness and a left proximal humerus fracture.  He experienced a fracture 2 weeks prior to his ER visit and subsequent hospital visit.  OT suggested patient to discharge to TCU.  Pain control he is using Tylenol and oxycodone. Reports that he lives independently but does have a close friend, and also named Latasha who follows up and assist with his appointments.    TCU: Seen to follow-up on loose stools, INR check.  He requested Imodium a day however has not taken it as an improvement from the pharmacy he says his loose stools did improve however he is to be on insulin is requesting that.  I am happy to restart his Anusol.  He also had an INR check today which went up quite a bit from 2.2-3.0.  I asked him for his home dosing which will resume here as described in plan.  He is reporting that his pain is tolerable on current therapies.  Currently on scheduled Tylenol 3 times a day and oxycodone scheduled once daily with twice daily as needed.   Additionally, his hemoglobin dropped from mid 8 to 7.6.  We will recheck this on Friday and add on iron studies.  Potassium stable at 3.7 now on 20 mEq daily.    CODE STATUS/ADVANCE DIRECTIVES DISCUSSION:  No Order  DNR / DNI  ALLERGIES: No Known Allergies   PAST MEDICAL HISTORY:   Past Medical History:    Diagnosis Date     Acute cholecystitis 2/28/2019     Acute post-operative pain      Anemia      Arthritis      Atrial fibrillation (H)      C. difficile diarrhea 4/21/2019     Calculus of ureter      Callus      Cerebral aneurysm      Cervical myelopathy (H) 2/22/2019     Cervical spinal stenosis      Chronic kidney disease     stage 3     Chronic systolic congestive heart failure (H)      Closed fracture of distal end of left radius, unspecified fracture morphology, initial encounter      Closed fracture of distal end of right radius, unspecified fracture morphology, initial encounter      Closed fracture of first thoracic vertebra, unspecified fracture morphology, initial encounter (H)      Coronary artery disease      Crohn's disease (H)      Disorder of bursae and tendons in shoulder region     Created by Conversion  Replacement Utility updated for latest IMO load     Dysphagia      Fall 10/22/2016     GERD (gastroesophageal reflux disease)      Hyperlipidemia      Hypertension      Hypotension, unspecified hypotension type      ICD (implantable cardioverter-defibrillator) in place     Medtronic     Ischemic cardiomyopathy      Kidney stone      Low back pain      Lumbago     Created by Conversion      Macular degeneration      Myalgia and myositis, unspecified     Created by Conversion      Nonspecific elevation of levels of transaminase or lactic acid dehydrogenase (LDH)     Created by Conversion      Permanent atrial fibrillation (H)     [id number] risk score = 5 (age3/ CAD/ HTN/ CHF) Chronic warfarin     Personal history of colonic polyps 2/12/2019     Polyp of duodenum 10/17/2016     Pyuria      Right arm weakness 4/2/2019     Schatzki's ring      Sciatica     Created by Conversion      Sleep apnea     no CPAP     Spondylolisthesis of lumbar region 7/5/2016     Weakness 4/20/2019      PAST SURGICAL HISTORY:   has a past surgical history that includes IR Lumbar Epidural Steroid Injection (6/29/2005); IR  Lumbar Epidural Steroid Injection (7/15/2005); IR Lumbar Epidural Steroid Injection (9/28/2005); IR Lumbar Epidural Steroid Injection (12/28/2005); IR Lumbar Epidural Steroid Injection (3/29/2006); IR Lumbar Epidural Steroid Injection (6/8/2006); Esophagoscopy, gastroscopy, duodenoscopy (EGD), combined; Tonsillectomy; appendectomy; Cholecystectomy; Arthroscopy shoulder rotator cuff repair (Right); Phacoemulsification clear cornea with standard intraocular lens implant (Bilateral); Xr Myelogram Cervical Thoracic Lumbar (1/17/2019); Pr C- Laminoplasty, 2 Or More (Left, 2/22/2019); LAP,CHOLECYSTECTOMY/EXPLORE (N/A, 2/28/2019); Cardiac Defibrillator Placement (2013); Bypass graft artery coronary (08/2004); Colonoscopy (N/A, 2/19/2020); Pr Esophagogastroduodenoscopy Transoral Diagnostic (N/A, 5/10/2021); and Pr Esophagogastroduodenoscopy Transoral Diagnostic (N/A, 6/16/2021).  FAMILY HISTORY: family history includes Alcoholism in his brother; Cancer in his brother; Cerebrovascular Disease in his mother; Crohn's Disease in his father; Heart Disease in his mother; No Known Problems in his daughter, maternal aunt, maternal grandfather, maternal grandmother, maternal uncle, paternal aunt, paternal grandfather, paternal grandmother, paternal uncle, and son.  SOCIAL HISTORY:   reports that he has quit smoking. He has never used smokeless tobacco. He reports previous alcohol use. He reports that he does not use drugs.  Patient's living condition: lives alone    Post Discharge Medication Reconciliation Status: discharge medications reconciled, continue medications without change  Current Outpatient Medications   Medication Sig     acetaminophen (TYLENOL) 500 MG tablet Take 1,000 mg by mouth every 8 hours as needed      albuterol (PROAIR HFA/PROVENTIL HFA/VENTOLIN HFA) 108 (90 Base) MCG/ACT inhaler Inhale 1-2 puffs into the lungs every 4 hours as needed Normally takes ~1 puff/ day     calcium carbonate-vitamin D 600-200 MG-UNIT  TABS Take 2 tablets by mouth     carvedilol (COREG) 25 MG tablet Take 25 mg by mouth 2 times daily     cyclobenzaprine (FLEXERIL) 5 MG tablet Take 2 tablets (10 mg) by mouth 3 times daily as needed for muscle spasms     cycloSPORINE (RESTASIS) 0.05 % ophthalmic emulsion Place 1 drop into both eyes 2 times daily      diclofenac (VOLTAREN) 1 % topical gel Apply 2 g topically 2 times daily      erythromycin (ROMYCIN) 5 MG/GM ophthalmic ointment Place 0.5 inches into both eyes daily      ferrous sulfate (FEROSUL) 325 (65 Fe) MG tablet Take 1 tablet by mouth daily     furosemide (LASIX) 20 MG tablet Take 40 mg by mouth daily     ibuprofen (ADVIL/MOTRIN) 200 MG tablet Take 400 mg by mouth every 4 hours as needed for mild pain     Lidocaine (LIDOCARE) 4 % Patch Place 1 patch onto the skin daily as needed for moderate pain To prevent lidocaine toxicity, patient should be patch free for 12 hrs daily.     multivitamin w/minerals (MULTI-VITAMIN) tablet Take 1 tablet by mouth daily     neomycin-polymixin-dexamethasone (MAXITROL) ophthalmic ointment Place 1 Application into both eyes daily      nitroGLYcerin (NITROSTAT) 0.4 MG sublingual tablet Place 0.4 mg under the tongue     omeprazole (PRILOSEC) 20 MG DR capsule Take 1 capsule by mouth daily     ondansetron (ZOFRAN) 8 MG tablet Take 8 mg by mouth as needed     oxyCODONE (ROXICODONE) 5 MG tablet Take 5 mg by mouth 3 times daily as needed for severe pain     potassium chloride ER (KLOR-CON M) 20 MEQ CR tablet TAKE ONE TABLET BY MOUTH ONCE DAILY     rosuvastatin (CRESTOR) 20 MG tablet TAKE 1 TABLET BY MOUTH DAILY AT BEDTIME     vitamin B-12 (CYANOCOBALAMIN) 2000 MCG tablet Take 2,000 mcg by mouth daily     warfarin ANTICOAGULANT (COUMADIN) 2.5 MG tablet Take 1.25-2.5 mg by mouth See Admin Instructions Take 1.25 mg Mon, Wed, Fri and 2.5 mg Sun, Tues, Thurs, Sat.     No current facility-administered medications for this visit.       ROS:  10 point ROS of systems including  "Constitutional, Eyes, Respiratory, Cardiovascular, Gastroenterology, Genitourinary, Integumentary, Musculoskeletal, Psychiatric were all negative except for pertinent positives noted in my HPI.  Loose stools.     Vitals:  /57   Pulse 59   Temp 98.1  F (36.7  C)   Resp 18   Ht 1.753 m (5' 9\")   Wt 70.9 kg (156 lb 3.2 oz)   SpO2 100%   BMI 23.07 kg/m    Exam:  Physical Exam   General appearance: alert, appears stated age and cooperative.   Head: Normocephalic, without obvious abnormality, atraumatic, Eyes: sclera anicteric.  Lungs: Breathing comfortably on room air.  ABDOMEN: Globular and soft.  Extremities: Left upper extremity in immobilizer.  Fingers wiggle.  Sensation intact.  Skin: Skin color, texture, turgor normal. No rashes or lesions  Neurologic:oriented. No focal deficits.   Psych: interacts well with caregivers, exhibits logical thought processes and connections, pleasant    Lab/Diagnostic data:  Recent labs in [facility] reviewed by me today.     ASSESSMENT/PLAN:    Loose stools: Infrequent; never used imodium.   -Discontinue imodium.   -Start metamucil 1 capful po daily with 8oz water/      Anemia: Hemoglobin 7.6 today.  -Recheck hemoglobin on Friday, as well as T sat, iron, ferritin TIBC.    (I10) Benign essential hypertension.  Stable.  Plan: PTA losartan and carvedilol.     Hypokalemia: 3.7.   -Continue potassium chloride to 20 mEq daily.     (S42.202D) Closed fracture of proximal end of left humerus with routine healing, unspecified fracture morphology, subsequent encounter, unstable.  Comment: endorses moderate pain.   Plan:   -Schedule Tylenol at 1000 mg p.o. 3 times daily.  -Schedule oxycodone 2.5 mg p.o. nightly.  -Continue oxycodone 2.5 twice daily as needed.    (I48.21) Permanent atrial fibrillation (H)  Plan:   -carvedilol for rate control.   -On coumadin INR 3.0 today.   -Change coumadin to 1.25 on M,W,F and 2.5 all other days.   -Recheck INR 1 week.    (I50.20) Heart failure with " reduced ejection fraction (H) unstable.  Comment: Monitoring weights, resp status.  Weight has improved, 149 today.  No lower extremity swelling, no shortness of breath.  Eating and drinking well.  Plan:   -Continue Lasix 40mg daily.   -Regular diet at patient request.     Electronically signed by:  Chela Gallardo CNP             Sincerely,        Chela Gallardo CNP

## 2021-09-17 LAB
FERRITIN SERPL-MCNC: 366 NG/ML (ref 27–300)
HGB BLD-MCNC: 8.4 G/DL (ref 13.3–17.7)
IRON SATN MFR SERPL: 17 % (ref 20–50)
IRON SERPL-MCNC: 38 UG/DL (ref 42–175)
IRON SERPL-MCNC: 38 UG/DL (ref 42–175)
MAGNESIUM SERPL-MCNC: 1.2 MG/DL (ref 1.8–2.6)
TIBC SERPL-MCNC: 229 UG/DL (ref 313–563)
TRANSFERRIN SERPL-MCNC: 183 MG/DL (ref 212–360)

## 2021-09-17 PROCEDURE — 83735 ASSAY OF MAGNESIUM: CPT | Mod: ORL

## 2021-09-17 PROCEDURE — 82728 ASSAY OF FERRITIN: CPT | Mod: ORL

## 2021-09-17 PROCEDURE — 84466 ASSAY OF TRANSFERRIN: CPT | Mod: ORL

## 2021-09-17 PROCEDURE — P9604 ONE-WAY ALLOW PRORATED TRIP: HCPCS | Mod: ORL

## 2021-09-17 PROCEDURE — 85018 HEMOGLOBIN: CPT | Mod: ORL

## 2021-09-17 PROCEDURE — 83540 ASSAY OF IRON: CPT | Mod: ORL

## 2021-09-17 PROCEDURE — 36415 COLL VENOUS BLD VENIPUNCTURE: CPT | Mod: ORL

## 2021-09-20 PROBLEM — R19.5 LOOSE STOOLS: Status: ACTIVE | Noted: 2021-09-20

## 2021-09-20 NOTE — PROGRESS NOTES
[facility] GERIATRIC SERVICES  PRIMARY CARE PROVIDER AND CLINIC:  Go Ramírez MD, MD, [address] [place] [address] / [facility] [address]  Chief Complaint   Patient presents with     RECHECK      [place] Medical Record Number:  [id number]  Place of Service where encounter took place:  [facility] [[id number]]    Tanmay Israel  is a 81 year old  (1940), admitted to the above facility from  [facility]. Hospital stay 8/24 through 8/27..   HPI:, Patient is a history of GERD, heart failure and Crohn's, anemia, pretension, A. Fib, who transferred from [facility] ER for generalized weakness and a left proximal humerus fracture.  He experienced a fracture 2 weeks prior to his ER visit and subsequent hospital visit.  OT suggested patient to discharge to TCU.  Pain control he is using Tylenol and oxycodone. Reports that he lives independently but does have a close friend, and also named Latasha who follows up and assist with his appointments.    TCU: Seen to follow-up on loose stools, INR check.  He requested Imodium a day however has not taken it as an improvement from the pharmacy he says his loose stools did improve however he is to be on insulin is requesting that.  I am happy to restart his Anusol.  He also had an INR check today which went up quite a bit from 2.2-3.0.  I asked him for his home dosing which will resume here as described in plan.  He is reporting that his pain is tolerable on current therapies.  Currently on scheduled Tylenol 3 times a day and oxycodone scheduled once daily with twice daily as needed.   Additionally, his hemoglobin dropped from mid 8 to 7.6.  We will recheck this on Friday and add on iron studies.  Potassium stable at 3.7 now on 20 mEq daily.    CODE STATUS/ADVANCE DIRECTIVES DISCUSSION:  No Order  DNR / DNI  ALLERGIES: No Known Allergies   PAST MEDICAL HISTORY:   Past Medical History:   Diagnosis Date     Acute cholecystitis 2/28/2019     Acute post-operative pain      Anemia       Arthritis      Atrial fibrillation (H)      C. difficile diarrhea 4/21/2019     Calculus of ureter      Callus      Cerebral aneurysm      Cervical myelopathy (H) 2/22/2019     Cervical spinal stenosis      Chronic kidney disease     stage 3     Chronic systolic congestive heart failure (H)      Closed fracture of distal end of left radius, unspecified fracture morphology, initial encounter      Closed fracture of distal end of right radius, unspecified fracture morphology, initial encounter      Closed fracture of first thoracic vertebra, unspecified fracture morphology, initial encounter (H)      Coronary artery disease      Crohn's disease (H)      Disorder of bursae and tendons in shoulder region     Created by Conversion  Replacement Utility updated for latest IMO load     Dysphagia      Fall 10/22/2016     GERD (gastroesophageal reflux disease)      Hyperlipidemia      Hypertension      Hypotension, unspecified hypotension type      ICD (implantable cardioverter-defibrillator) in place     [employer]     Ischemic cardiomyopathy      Kidney stone      Low back pain      Lumbago     Created by Conversion      Macular degeneration      Myalgia and myositis, unspecified     Created by Conversion      Nonspecific elevation of levels of transaminase or lactic acid dehydrogenase (LDH)     Created by Conversion      Permanent atrial fibrillation (H)     [id number] risk score = 5 (age1/ CAD/ HTN/ CHF) Chronic warfarin     Personal history of colonic polyps 2/12/2019     Polyp of duodenum 10/17/2016     Pyuria      Right arm weakness 4/2/2019     Schatzki's ring      Sciatica     Created by Conversion      Sleep apnea     no CPAP     Spondylolisthesis of lumbar region 7/5/2016     Weakness 4/20/2019      PAST SURGICAL HISTORY:   has a past surgical history that includes IR Lumbar Epidural Steroid Injection (6/29/2005); IR Lumbar Epidural Steroid Injection (7/15/2005); IR Lumbar Epidural Steroid Injection (9/28/2005);  IR Lumbar Epidural Steroid Injection (12/28/2005); IR Lumbar Epidural Steroid Injection (3/29/2006); IR Lumbar Epidural Steroid Injection (6/8/2006); Esophagoscopy, gastroscopy, duodenoscopy (EGD), combined; Tonsillectomy; appendectomy; Cholecystectomy; Arthroscopy shoulder rotator cuff repair (Right); Phacoemulsification clear cornea with standard intraocular lens implant (Bilateral); Xr Myelogram Cervical Thoracic Lumbar (1/17/2019); Pr C- Laminoplasty, 2 Or More (Left, 2/22/2019); LAP,CHOLECYSTECTOMY/EXPLORE (N/A, 2/28/2019); Cardiac Defibrillator Placement (2013); Bypass graft artery coronary (08/2004); Colonoscopy (N/A, 2/19/2020); Pr Esophagogastroduodenoscopy Transoral Diagnostic (N/A, 5/10/2021); and Pr Esophagogastroduodenoscopy Transoral Diagnostic (N/A, 6/16/2021).  FAMILY HISTORY: family history includes Alcoholism in his brother; Cancer in his brother; Cerebrovascular Disease in his mother; Crohn's Disease in his father; Heart Disease in his mother; No Known Problems in his daughter, maternal aunt, maternal grandfather, maternal grandmother, maternal uncle, paternal aunt, paternal grandfather, paternal grandmother, paternal uncle, and son.  SOCIAL HISTORY:   reports that he has quit smoking. He has never used smokeless tobacco. He reports previous alcohol use. He reports that he does not use drugs.  Patient's living condition: lives alone    Post Discharge Medication Reconciliation Status: discharge medications reconciled, continue medications without change  Current Outpatient Medications   Medication Sig     acetaminophen (TYLENOL) 500 MG tablet Take 1,000 mg by mouth every 8 hours as needed      albuterol (PROAIR HFA/PROVENTIL HFA/VENTOLIN HFA) 108 (90 Base) MCG/ACT inhaler Inhale 1-2 puffs into the lungs every 4 hours as needed Normally takes ~1 puff/ day     calcium carbonate-vitamin D 600-200 MG-UNIT TABS Take 2 tablets by mouth     carvedilol (COREG) 25 MG tablet Take 25 mg by mouth 2 times daily      cyclobenzaprine (FLEXERIL) 5 MG tablet Take 2 tablets (10 mg) by mouth 3 times daily as needed for muscle spasms     cycloSPORINE (RESTASIS) 0.05 % ophthalmic emulsion Place 1 drop into both eyes 2 times daily      diclofenac (VOLTAREN) 1 % topical gel Apply 2 g topically 2 times daily      erythromycin (ROMYCIN) 5 MG/GM ophthalmic ointment Place 0.5 inches into both eyes daily      ferrous sulfate (FEROSUL) 325 (65 Fe) MG tablet Take 1 tablet by mouth daily     furosemide (LASIX) 20 MG tablet Take 40 mg by mouth daily     ibuprofen (ADVIL/MOTRIN) 200 MG tablet Take 400 mg by mouth every 4 hours as needed for mild pain     Lidocaine (LIDOCARE) 4 % Patch Place 1 patch onto the skin daily as needed for moderate pain To prevent lidocaine toxicity, patient should be patch free for 12 hrs daily.     multivitamin w/minerals (MULTI-VITAMIN) tablet Take 1 tablet by mouth daily     neomycin-polymixin-dexamethasone (MAXITROL) ophthalmic ointment Place 1 Application into both eyes daily      nitroGLYcerin (NITROSTAT) 0.4 MG sublingual tablet Place 0.4 mg under the tongue     omeprazole (PRILOSEC) 20 MG DR capsule Take 1 capsule by mouth daily     ondansetron (ZOFRAN) 8 MG tablet Take 8 mg by mouth as needed     oxyCODONE (ROXICODONE) 5 MG tablet Take 5 mg by mouth 3 times daily as needed for severe pain     potassium chloride ER (KLOR-CON M) 20 MEQ CR tablet TAKE ONE TABLET BY MOUTH ONCE DAILY     rosuvastatin (CRESTOR) 20 MG tablet TAKE 1 TABLET BY MOUTH DAILY AT BEDTIME     vitamin B-12 (CYANOCOBALAMIN) 2000 MCG tablet Take 2,000 mcg by mouth daily     warfarin ANTICOAGULANT (COUMADIN) 2.5 MG tablet Take 1.25-2.5 mg by mouth See Admin Instructions Take 1.25 mg Mon, Wed, Fri and 2.5 mg Sun, Tues, Thurs, Sat.     No current facility-administered medications for this visit.       ROS:  10 point ROS of systems including Constitutional, Eyes, Respiratory, Cardiovascular, Gastroenterology, Genitourinary, Integumentary,  "Musculoskeletal, Psychiatric were all negative except for pertinent positives noted in my HPI.  Loose stools.     Vitals:  /57   Pulse 59   Temp 98.1  F (36.7  C)   Resp 18   Ht 1.753 m (5' 9\")   Wt 70.9 kg (156 lb 3.2 oz)   SpO2 100%   BMI 23.07 kg/m    Exam:  Physical Exam   General appearance: alert, appears stated age and cooperative.   Head: Normocephalic, without obvious abnormality, atraumatic, Eyes: sclera anicteric.  Lungs: Breathing comfortably on room air.  ABDOMEN: Globular and soft.  Extremities: Left upper extremity in immobilizer.  Fingers wiggle.  Sensation intact.  Skin: Skin color, texture, turgor normal. No rashes or lesions  Neurologic:oriented. No focal deficits.   Psych: interacts well with caregivers, exhibits logical thought processes and connections, pleasant    Lab/Diagnostic data:  Recent labs in [facility] reviewed by me today.     ASSESSMENT/PLAN:    Loose stools: Infrequent; never used imodium.   -Discontinue imodium.   -Start metamucil 1 capful po daily with 8oz water/      Anemia: Hemoglobin 7.6 today.  -Recheck hemoglobin on Friday, as well as T sat, iron, ferritin TIBC.    (I10) Benign essential hypertension.  Stable.  Plan: PTA losartan and carvedilol.     Hypokalemia: 3.7.   -Continue potassium chloride to 20 mEq daily.     (S42.202D) Closed fracture of proximal end of left humerus with routine healing, unspecified fracture morphology, subsequent encounter, unstable.  Comment: endorses moderate pain.   Plan:   -Schedule Tylenol at 1000 mg p.o. 3 times daily.  -Schedule oxycodone 2.5 mg p.o. nightly.  -Continue oxycodone 2.5 twice daily as needed.    (I48.21) Permanent atrial fibrillation (H)  Plan:   -carvedilol for rate control.   -On coumadin INR 3.0 today.   -Change coumadin to 1.25 on M,W,F and 2.5 all other days.   -Recheck INR 1 week.    (I50.20) Heart failure with reduced ejection fraction (H) unstable.  Comment: Monitoring weights, resp status.  Weight has improved, " 149 today.  No lower extremity swelling, no shortness of breath.  Eating and drinking well.  Plan:   -Continue Lasix 40mg daily.   -Regular diet at patient request.     Electronically signed by:  Chela Gallardo CNP

## 2021-09-28 ENCOUNTER — TELEPHONE (OUTPATIENT)
Dept: FAMILY MEDICINE | Facility: CLINIC | Age: 81
End: 2021-09-28
Payer: COMMERCIAL

## 2021-09-28 NOTE — TELEPHONE ENCOUNTER
[facility]  [facility]/Physical Therapy [phone]    Requesting verbal orders:     PT:   2 X week for 6 weeks  1 X week for 2 weeks   For balance, strengthening, ambulation

## 2021-09-29 ENCOUNTER — TELEPHONE (OUTPATIENT)
Dept: FAMILY MEDICINE | Facility: CLINIC | Age: 81
End: 2021-09-29

## 2021-09-29 NOTE — TELEPHONE ENCOUNTER
Mana from [facility] calling requesting verbal orders for OT    OT:   1x a week for 1 week  2x a week for 3 weeks to address upper extermity strengthening, standing balance, range of motion, fall precaution, and ADL    Mana can be reached at [phone] if needed

## 2021-09-29 NOTE — TELEPHONE ENCOUNTER
FYI: Verbal orders given per Dr. Ramírez . Please notify us if any adjustments need to be made.

## 2021-09-30 ENCOUNTER — ANTICOAGULATION THERAPY VISIT (OUTPATIENT)
Dept: FAMILY MEDICINE | Facility: CLINIC | Age: 81
End: 2021-09-30

## 2021-09-30 DIAGNOSIS — I26.99 PULMONARY EMBOLISM WITH INFARCTION (H): Primary | ICD-10-CM

## 2021-09-30 LAB — INR (EXTERNAL): 2.8 (ref 2–3)

## 2021-09-30 RX ORDER — SENNOSIDES A AND B 8.6 MG/1
8.6-17.2 TABLET, FILM COATED ORAL
COMMUNITY
Start: 2021-08-27 | End: 2021-10-01

## 2021-09-30 RX ORDER — BUDESONIDE 3 MG/1
3 CAPSULE, COATED PELLETS ORAL
COMMUNITY
Start: 2021-08-27 | End: 2021-10-01

## 2021-09-30 NOTE — PROGRESS NOTES
"ANTICOAGULATION MANAGEMENT     Tanmay Israel 81 year old male is on warfarin with therapeutic INR result. (Goal INR 2.0-3.0)    Recent labs: (last 7 days)     09/30/21  1150   INR 2.8       ASSESSMENT     Source(s): Chart Review and Home Care/Facility Nurse     Warfarin doses taken: Reviewed in chart and Pt was dischaged from Geriatric services and homecare now on board--first visit today--INR 2.8--see notes on 9/16 for last INR dose and maintenance change--updated in calendar 9/16 MD note: \"On coumadin INR 3.0 today.   -Change coumadin to 1.25 on M,W,F and 2.5 all other days.     -Recheck INR 1 week.\"    Diet: No new diet changes identified    New illness, injury, or hospitalization and medication changes: Yes: discharged from geriatric services  (I50.20) Heart failure with reduced ejection fraction (H) unstable.  Comment: Monitoring weights, resp status.  Weight has improved, 149 today.  No lower extremity swelling, no shortness of breath.  Eating and drinking well.  Plan:   -Continue Lasix 40mg daily.   -Regular diet at patient request.   (S41.202D) Closed fracture of proximal end of left humerus with routine healing, unspecified fracture morphology, subsequent encounter, unstable.  Comment: endorses moderate pain.   Plan:   -Schedule Tylenol at 1000 mg p.o. 3 times daily.  -Schedule oxycodone 2.5 mg p.o. nightly.  -Continue oxycodone 2.5 twice daily as needed.  Loose stools: Infrequent; never used imodium.   -Discontinue imodium.   -Start metamucil 1 capful po daily with 8oz water/       Anemia: Hemoglobin 7.6 today.  -Recheck hemoglobin on Friday, as well as T sat, iron, ferritin TIBC.     (I10) Benign essential hypertension.  Stable.  Plan: PTA losartan and carvedilol.      Hypokalemia: 3.7.   -Continue potassium chloride to 20 mEq daily.    Medication/supplement changes: SEE ABOVE    Signs or symptoms of bleeding or clotting: No    Previous INR: therapeutic on 9/16 at 3.0    Additional findings: None     PLAN "     Recommended plan for ongoing change(s) affecting INR     Dosing Instructions: Continue your current warfarin dose with next INR in 1 week       Summary  As of 9/30/2021    Full warfarin instructions:  1.25 mg every Mon, Wed, Fri; 2.5 mg all other days   Next INR check:  10/7/2021             Telephone call with home care nurse Halie [phone] who verbalizes understanding and agrees to plan and who agrees to plan and repeated back plan correctly    Orders given to  Homecare nurse/facility to recheck    Education provided: Please call back if any changes to your diet, medications or how you've been taking warfarin    Plan made per [facility] anticoagulation protocol    Amber Rogers, RN  Anticoagulation Clinic  9/30/2021    _______________________________________________________________________     Anticoagulation Episode Summary     Current INR goal:  2.0-3.0   TTR:  60.0 % (1 y)   Target end date:     Send INR reminders to:  CHANI CRAIG    Indications    Atrial fibrillation (H) [I48.91]  Pulmonary embolism with infarction (H) [I26.99]           Comments:           Anticoagulation Care Providers     Provider Role Specialty Phone number    Go Ramírez MD Referring Family Medicine [phone]

## 2021-10-01 ENCOUNTER — OFFICE VISIT (OUTPATIENT)
Dept: FAMILY MEDICINE | Facility: CLINIC | Age: 81
End: 2021-10-01
Payer: COMMERCIAL

## 2021-10-01 VITALS — DIASTOLIC BLOOD PRESSURE: 68 MMHG | HEART RATE: 55 BPM | OXYGEN SATURATION: 99 % | SYSTOLIC BLOOD PRESSURE: 126 MMHG

## 2021-10-01 DIAGNOSIS — I50.22 CHRONIC SYSTOLIC CONGESTIVE HEART FAILURE (H): ICD-10-CM

## 2021-10-01 DIAGNOSIS — S42.202D CLOSED FRACTURE OF PROXIMAL END OF LEFT HUMERUS WITH ROUTINE HEALING, UNSPECIFIED FRACTURE MORPHOLOGY, SUBSEQUENT ENCOUNTER: Primary | ICD-10-CM

## 2021-10-01 DIAGNOSIS — R19.7 DIARRHEA, UNSPECIFIED TYPE: ICD-10-CM

## 2021-10-01 DIAGNOSIS — N18.31 STAGE 3A CHRONIC KIDNEY DISEASE (H): ICD-10-CM

## 2021-10-01 DIAGNOSIS — I25.5 ISCHEMIC CARDIOMYOPATHY: ICD-10-CM

## 2021-10-01 DIAGNOSIS — D64.9 ANEMIA, UNSPECIFIED TYPE: ICD-10-CM

## 2021-10-01 LAB
ANION GAP SERPL CALCULATED.3IONS-SCNC: 13 MMOL/L (ref 5–18)
BUN SERPL-MCNC: 22 MG/DL (ref 8–28)
CALCIUM SERPL-MCNC: 9.1 MG/DL (ref 8.5–10.5)
CHLORIDE BLD-SCNC: 110 MMOL/L (ref 98–107)
CO2 SERPL-SCNC: 18 MMOL/L (ref 22–31)
CREAT SERPL-MCNC: 1.4 MG/DL (ref 0.7–1.3)
ERYTHROCYTE [DISTWIDTH] IN BLOOD BY AUTOMATED COUNT: 13.8 % (ref 10–15)
GFR SERPL CREATININE-BSD FRML MDRD: 46 ML/MIN/1.73M2
GLUCOSE BLD-MCNC: 98 MG/DL (ref 70–125)
HCT VFR BLD AUTO: 28 % (ref 40–53)
HGB BLD-MCNC: 9 G/DL (ref 13.3–17.7)
MCH RBC QN AUTO: 29.8 PG (ref 26.5–33)
MCHC RBC AUTO-ENTMCNC: 32.1 G/DL (ref 31.5–36.5)
MCV RBC AUTO: 93 FL (ref 78–100)
PLATELET # BLD AUTO: 138 10E3/UL (ref 150–450)
POTASSIUM BLD-SCNC: 4.3 MMOL/L (ref 3.5–5)
RBC # BLD AUTO: 3.02 10E6/UL (ref 4.4–5.9)
SODIUM SERPL-SCNC: 141 MMOL/L (ref 136–145)
WBC # BLD AUTO: 6.9 10E3/UL (ref 4–11)

## 2021-10-01 PROCEDURE — 85027 COMPLETE CBC AUTOMATED: CPT | Performed by: FAMILY MEDICINE

## 2021-10-01 PROCEDURE — 90662 IIV NO PRSV INCREASED AG IM: CPT | Performed by: FAMILY MEDICINE

## 2021-10-01 PROCEDURE — G0008 ADMIN INFLUENZA VIRUS VAC: HCPCS | Performed by: FAMILY MEDICINE

## 2021-10-01 PROCEDURE — 80048 BASIC METABOLIC PNL TOTAL CA: CPT | Performed by: FAMILY MEDICINE

## 2021-10-01 PROCEDURE — 99214 OFFICE O/P EST MOD 30 MIN: CPT | Mod: 25 | Performed by: FAMILY MEDICINE

## 2021-10-01 PROCEDURE — 36415 COLL VENOUS BLD VENIPUNCTURE: CPT | Performed by: FAMILY MEDICINE

## 2021-10-01 RX ORDER — LIDOCAINE 4 G/G
1 PATCH TOPICAL DAILY PRN
Qty: 10 PATCH | Refills: 3 | Status: SHIPPED | OUTPATIENT
Start: 2021-10-01

## 2021-10-01 RX ORDER — LOSARTAN POTASSIUM 100 MG/1
100 TABLET ORAL DAILY
Qty: 90 TABLET | Refills: 0
Start: 2021-10-01 | End: 2021-10-11

## 2021-10-01 RX ORDER — OXYCODONE HYDROCHLORIDE 5 MG/1
2.5-5 TABLET ORAL 2 TIMES DAILY PRN
Qty: 15 TABLET | Refills: 0 | Status: ON HOLD | OUTPATIENT
Start: 2021-10-01 | End: 2022-01-01

## 2021-10-01 NOTE — PROGRESS NOTES
Tanmay Israel  /68   Pulse 55   SpO2 99%      Assessment/Plan:                Tanmay was seen today for pain, shoulder pain, refill request and medication question.    Diagnoses and all orders for this visit:    Closed fracture of proximal end of left humerus with routine healing, unspecified fracture morphology, subsequent encounter  -     Lidocaine (LIDOCARE) 4 % Patch; Place 1 patch onto the skin daily as needed for moderate pain To prevent lidocaine toxicity, patient should be patch free for 12 hrs daily.  -     oxyCODONE (ROXICODONE) 5 MG tablet; Take 0.5-1 tablets (2.5-5 mg) by mouth 2 times daily as needed for severe pain    Ischemic cardiomyopathy  -     losartan (COZAAR) 100 MG tablet; Take 1 tablet (100 mg) by mouth daily    Stage 3a chronic kidney disease (H)  -     losartan (COZAAR) 100 MG tablet; Take 1 tablet (100 mg) by mouth daily  -     Basic metabolic panel; Future  -     Basic metabolic panel    Chronic systolic congestive heart failure (H)  -     losartan (COZAAR) 100 MG tablet; Take 1 tablet (100 mg) by mouth daily    Anemia, unspecified type  -     CBC with platelets; Future  -     CBC with platelets    Diarrhea, unspecified type    Other orders  -     INFLUENZA, QUAD, HD, PF, 65+ (FLUZONE HD)         DISCUSSION  Continue cautious use of oxycodone for pain control.    Medications as noted in medication list at this time.  Discontinue budesonide at his request as he is not having any gastrointestinal symptoms and there is some question about whether or not he has actually had Crohn's disease he certainly has had reasons to have diarrhea in the recent and distant past to that or not connected to Crohn's disease.  We will continue to monitor this closely.    Flu shot today.    Obtain labs as above.    Follow-up with me in 3 months to reassess all concerns sooner if needed.  Subjective:     HPI:    Tanmay Israel is a 81 year old male is here today with his niece for follow-up after being  discharged from transitional care.  In August he had a fall that resulted in a fracture of his upper humerus.  Initially it was attempted to have him return home for self-care but this proved impossible and necessitated that he return to the hospital for reevaluation on August 23, 2021.  From [facility] emergency department he was admitted to [facility] in shock at the [place] due to bed availability.  From there he was discharged to transitional care at [facility] where he stayed and underwent therapy until last Friday when he was discharged to home.    He has undergone continued conservative care for his humerus fracture.  He has had therapy.  He currently has home nursing care with therapy that has come out already.  He has had follow-up with [facility].  [facility] has recommended continued conservative care with nonoperative treatment.  Tanmay continues to report ongoing pain.  He has been utilizing oxycodone usually 1/2-1 whole tablet per day for controlling pain.  He reports that this is an important aspect of controlling his pain currently and he needs a refill of the medication.  He denies any constipation, balance difficulty or confusion.  We discussed continued caution with utilizing medication and a limited refill was provided at this time.    His other chronic medical concerns were managed during the course of his stay.  It is noted that he developed diarrhea.  There is some question about his gastrointestinal history of that at some point he was told he had Crohn's disease.  There was enough concern during the course of his hospital in transitional care unit stay that he had a flareup of Crohn's disease and was at some point started on budesonide.  Since being discharged home he has made the decision not to take this.  He reports currently he is not having diarrhea or any gastrointestinal symptoms.  He would like to discontinue the budesonide and continue to  monitor which I think is quite reasonable.    From the standpoint of his underlying heart disease and kidney disease as well as chronic anemia there are no symptomatic concerns.  No evidence of any swelling, shortness of breath weight gain or significant fatigue symptoms.  Discussed laboratory testing today to assess his current status in these regards.    Discussed administering influenza vaccine.  Discussed making arrangements in the near future to receive Covid booster shot.    We spent time reconciling his medication list today and overall he seems to have good familiarity and comfort with his current medication list.    ROS:  Complete review of systems is obtained.  Other than the specific considerations noted above complete review of systems is negative.          Objective:   Medications:  Current Outpatient Medications   Medication     acetaminophen (TYLENOL) 500 MG tablet     albuterol (PROAIR HFA/PROVENTIL HFA/VENTOLIN HFA) 108 (90 Base) MCG/ACT inhaler     calcium carbonate-vitamin D 600-200 MG-UNIT TABS     carvedilol (COREG) 25 MG tablet     cycloSPORINE (RESTASIS) 0.05 % ophthalmic emulsion     diclofenac (VOLTAREN) 1 % topical gel     erythromycin (ROMYCIN) 5 MG/GM ophthalmic ointment     ferrous sulfate (FEROSUL) 325 (65 Fe) MG tablet     furosemide (LASIX) 20 MG tablet     Lidocaine (LIDOCARE) 4 % Patch     losartan (COZAAR) 100 MG tablet     multivitamin w/minerals (MULTI-VITAMIN) tablet     omeprazole (PRILOSEC) 20 MG DR capsule     ondansetron (ZOFRAN) 8 MG tablet     oxyCODONE (ROXICODONE) 5 MG tablet     potassium chloride ER (KLOR-CON M) 20 MEQ CR tablet     rosuvastatin (CRESTOR) 20 MG tablet     vitamin B-12 (CYANOCOBALAMIN) 2000 MCG tablet     warfarin ANTICOAGULANT (COUMADIN) 2.5 MG tablet     No current facility-administered medications for this visit.        Allergies:   No Known Allergies     Social History     Socioeconomic History     Marital status: Single     Spouse name: Not on file      Number of children: Not on file     Years of education: Not on file     Highest education level: Not on file   Occupational History     Not on file   Tobacco Use     Smoking status: Former Smoker     Smokeless tobacco: Never Used   Substance and Sexual Activity     Alcohol use: Not Currently     Drug use: Never     Sexual activity: Yes     Partners: Female   Other Topics Concern     Parent/sibling w/ CABG, MI or angioplasty before 65F 55M? Not Asked   Social History Narrative     Not on file     Social Determinants of Health     Financial Resource Strain:      Difficulty of Paying Living Expenses:    Food Insecurity:      Worried About Running Out of Food in the Last Year:      Ran Out of Food in the Last Year:    Transportation Needs:      Lack of Transportation (Medical):      Lack of Transportation (Non-Medical):    Physical Activity:      Days of Exercise per Week:      Minutes of Exercise per Session:    Stress:      Feeling of Stress :    Social Connections:      Frequency of Communication with Friends and Family:      Frequency of Social Gatherings with Friends and Family:      Attends Yazidism Services:      Active Member of Clubs or Organizations:      Attends Club or Organization Meetings:      Marital Status:    Intimate Partner Violence:      Fear of Current or Ex-Partner:      Emotionally Abused:      Physically Abused:      Sexually Abused:        Family History   Problem Relation Age of Onset     Heart Disease Mother      Cerebrovascular Disease Mother      Crohn's Disease Father      Alcoholism Brother      No Known Problems Daughter      No Known Problems Son      No Known Problems Maternal Aunt      No Known Problems Maternal Uncle      No Known Problems Paternal Aunt      No Known Problems Paternal Uncle      No Known Problems Maternal Grandmother      No Known Problems Maternal Grandfather      No Known Problems Paternal Grandmother      No Known Problems Paternal Grandfather      Cancer  Brother      Urolithiasis No family hx of      Gout No family hx of         Most Recent Immunizations   Administered Date(s) Administered     COVID-19,PF,Pfizer 03/04/2021     DT (PEDS <7y) 03/01/2001     FLU 6-35 months 09/09/2010     Flu, Unspecified 09/09/2010     HepB-Adult 10/05/2018     Influenza (H1N1) 01/27/2010     Influenza (High Dose) 3 valent vaccine 09/08/2020     Influenza (IIV3) PF 10/01/2014     Influenza Vaccine, 6+MO IM (QUADRIVALENT W/PRESERVATIVES) 10/01/2014     Influenza, Quad, High Dose, Pf, 65yr+ (Fluzone HD) 10/01/2021     Influenza,INJ,MDCK,PF,Quad >4yrs 09/30/2018     Pneumo Conj 13-V (2010&after) 10/01/2014     Pneumococcal 23 valent 09/08/2005     TD (ADULT, 7+) 08/14/2021     Td (Adult), Adsorbed 08/14/2021     Tdap (Adacel,Boostrix) 11/09/2015     Tdap (Adult) Unspecified Formulation 03/01/2001     Zoster vaccine recombinant adjuvanted (SHINGRIX) 12/08/2020     Zoster vaccine, live 12/16/2010        Wt Readings from Last 3 Encounters:   09/16/21 70.9 kg (156 lb 3.2 oz)   09/13/21 68.8 kg (151 lb 11.2 oz)   09/10/21 68.8 kg (151 lb 11.2 oz)        BP Readings from Last 6 Encounters:   10/01/21 126/68   09/16/21 136/57   09/13/21 120/54   09/10/21 120/54   09/07/21 124/63   09/02/21 (!) 144/67        No results found for: A1C, HEMOGLOBINA1           PHYSICAL EXAM:    /68   Pulse 55   SpO2 99%          General Appearance:    Alert, cooperative, no distress   Eyes:   No scleral icterus or conjunctival irritation       Lungs:     Clear to auscultation bilaterally, respirations unlabored, no wheezes or crackles   Heart:   Irregularly irregular heart rhythm but is rate controlled without tachycardia or bradycardia concerns.  No heart murmur   Abdomen:    Soft, no distention, no tenderness on palpation, no masses, no organomegaly     Extremities:  No edema, muscle atrophy noted about the left upper arm, no bruising sores or other concerns visible at this time.   Skin:  No concerning  skin findings, no suspicious moles, no rashes   Neurologic:  On gross examination there is no motor or sensory deficit.  Patient walks with a normal gait

## 2021-10-01 NOTE — LETTER
October 4, 2021      Tanmay Israel  [address]   [facility] [address]        Dear ,    We are writing to inform you of your test results.    His red blood cell count has improved from 8.4 up to 9.  It still remains low but has shown improvement.  His kidney function has declined slightly.  We need to recheck this again in about 2 weeks.  He can do this with a lab only appointment.  If it does not improve or continues to decline we may need to consider adjustments to his water pill dosage.  For now he should continue all current medications without change.       Resulted Orders   CBC with platelets   Result Value Ref Range    WBC Count 6.9 4.0 - 11.0 10e3/uL    RBC Count 3.02 (L) 4.40 - 5.90 10e6/uL    Hemoglobin 9.0 (L) 13.3 - 17.7 g/dL    Hematocrit 28.0 (L) 40.0 - 53.0 %    MCV 93 78 - 100 fL    MCH 29.8 26.5 - 33.0 pg    MCHC 32.1 31.5 - 36.5 g/dL    RDW 13.8 10.0 - 15.0 %    Platelet Count 138 (L) 150 - 450 10e3/uL   Basic metabolic panel   Result Value Ref Range    Sodium 141 136 - 145 mmol/L    Potassium 4.3 3.5 - 5.0 mmol/L    Chloride 110 (H) 98 - 107 mmol/L    Carbon Dioxide (CO2) 18 (L) 22 - 31 mmol/L    Anion Gap 13 5 - 18 mmol/L    Urea Nitrogen 22 8 - 28 mg/dL    Creatinine 1.40 (H) 0.70 - 1.30 mg/dL    Calcium 9.1 8.5 - 10.5 mg/dL    Glucose 98 70 - 125 mg/dL    GFR Estimate 46 (L) >60 mL/min/1.73m2      Comment:      As of July 11, 2021, eGFR is calculated by the CKD-EPI creatinine equation, without race adjustment. eGFR can be influenced by muscle mass, exercise, and diet. The reported eGFR is an estimation only and is only applicable if the renal function is stable.       If you have any questions or concerns, please call the clinic at the number listed above.       Sincerely,      Go Ramírez MD

## 2021-10-06 ENCOUNTER — ANTICOAGULATION THERAPY VISIT (OUTPATIENT)
Dept: FAMILY MEDICINE | Facility: CLINIC | Age: 81
End: 2021-10-06

## 2021-10-06 DIAGNOSIS — I48.91 ATRIAL FIBRILLATION (H): Primary | ICD-10-CM

## 2021-10-06 DIAGNOSIS — I26.99 PULMONARY EMBOLISM WITH INFARCTION (H): ICD-10-CM

## 2021-10-06 LAB — INR (EXTERNAL): 2.4 (ref 0.9–1.1)

## 2021-10-06 NOTE — PROGRESS NOTES
ANTICOAGULATION MANAGEMENT     Tanmay Israel 81 year old male is on warfarin with therapeutic INR result. (Goal INR 2.0-3.0)    Recent labs: (last 7 days)     10/06/21  0951   INR 2.4*       ASSESSMENT     Source(s): Home Care/Facility Nurse       Warfarin doses taken: Warfarin taken as instructed    Diet: No new diet changes identified    New illness, injury, or hospitalization: No    Medication/supplement changes: None noted    Signs or symptoms of bleeding or clotting: No    Previous INR: Therapeutic last visit; previously outside of goal range    Additional findings: None     PLAN     Recommended plan for no diet, medication or health factor changes affecting INR     Dosing Instructions: Continue your current warfarin dose with next INR in 2 weeks       Summary  As of 10/6/2021    Full warfarin instructions:  1.25 mg every Mon, Wed, Fri; 2.5 mg all other days   Next INR check:  10/20/2021             Telephone call with home care nurse Halie who verbalizes understanding and agrees to plan    Orders given to  Homecare nurse/facility to recheck    Education provided: Goal range and significance of current result    Plan made per [facility] anticoagulation protocol    Kane Ratliff RN  Anticoagulation Clinic  10/6/2021    _______________________________________________________________________     Anticoagulation Episode Summary     Current INR goal:  2.0-3.0   TTR:  60.0 % (1 y)   Target end date:     Send INR reminders to:  SAUD RAFA    Indications    Atrial fibrillation (H) [I48.91]  Pulmonary embolism with infarction (H) [I26.99]           Comments:           Anticoagulation Care Providers     Provider Role Specialty Phone number    Go Ramírez MD Referring Family Medicine [phone]

## 2021-10-11 DIAGNOSIS — N18.31 STAGE 3A CHRONIC KIDNEY DISEASE (H): ICD-10-CM

## 2021-10-11 DIAGNOSIS — I50.22 CHRONIC SYSTOLIC CONGESTIVE HEART FAILURE (H): ICD-10-CM

## 2021-10-11 DIAGNOSIS — I25.5 ISCHEMIC CARDIOMYOPATHY: ICD-10-CM

## 2021-10-11 DIAGNOSIS — I50.22 CHRONIC SYSTOLIC CONGESTIVE HEART FAILURE (H): Primary | ICD-10-CM

## 2021-10-11 RX ORDER — FUROSEMIDE 20 MG
40 TABLET ORAL DAILY
Qty: 180 TABLET | Refills: 1 | Status: ON HOLD | OUTPATIENT
Start: 2021-10-11 | End: 2022-01-01

## 2021-10-11 NOTE — TELEPHONE ENCOUNTER
Reason for Call:  Other prescription    Detailed comments: refill request;    Furosemide 20 MG    Phone Number Patient can be reached at: Other phone number:  [phone]    Best Time: anytime    Can we leave a detailed message on this number? YES    Call taken on 10/11/2021 at 11:54 AM by Cara Martinez

## 2021-10-12 RX ORDER — LOSARTAN POTASSIUM 100 MG/1
100 TABLET ORAL DAILY
Qty: 90 TABLET | Refills: 3 | Status: ON HOLD | OUTPATIENT
Start: 2021-10-12 | End: 2022-01-01

## 2021-10-12 RX ORDER — FUROSEMIDE 20 MG
40 TABLET ORAL DAILY
Qty: 180 TABLET | Refills: 3 | OUTPATIENT
Start: 2021-10-12

## 2021-10-12 NOTE — TELEPHONE ENCOUNTER
"Routing refill request to provider for review/approval because:  Early refill request.    Last Written Prescription Date:  10/1/21  Last Fill Quantity: 90,  # refills: 0   Last office visit provider:  10/1/21     Requested Prescriptions   Pending Prescriptions Disp Refills     losartan (COZAAR) 100 MG tablet 90 tablet 3     Sig: Take 1 tablet (100 mg) by mouth daily       Angiotensin-II Receptors Failed - 10/11/2021  1:41 PM        Failed - Normal serum creatinine on file in past 12 months     Recent Labs   Lab Test 10/01/21  1525   CR 1.40*       Ok to refill medication if creatinine is low          Passed - Last blood pressure under 140/90 in past 12 months     BP Readings from Last 3 Encounters:   10/01/21 126/68   09/16/21 136/57   09/13/21 120/54                 Passed - Recent (12 mo) or future (30 days) visit within the authorizing provider's specialty     Patient has had an office visit with the authorizing provider or a provider within the authorizing providers department within the previous 12 mos or has a future within next 30 days. See \"Patient Info\" tab in inbasket, or \"Choose Columns\" in Meds & Orders section of the refill encounter.              Passed - Medication is active on med list        Passed - Patient is age 18 or older        Passed - Normal serum potassium on file in past 12 months     Recent Labs   Lab Test 10/01/21  1525   POTASSIUM 4.3                     Refused Prescriptions Disp Refills     furosemide (LASIX) 20 MG tablet 180 tablet 3     Sig: Take 2 tablets (40 mg) by mouth daily       Diuretics (Including Combos) Protocol Failed - 10/11/2021  1:41 PM        Failed - Normal serum creatinine on file in past 12 months     Recent Labs   Lab Test 10/01/21  1525   CR 1.40*              Passed - Blood pressure under 140/90 in past 12 months     BP Readings from Last 3 Encounters:   10/01/21 126/68   09/16/21 136/57   09/13/21 120/54                 Passed - Recent (12 mo) or future (30 " "days) visit within the authorizing provider's specialty     Patient has had an office visit with the authorizing provider or a provider within the authorizing providers department within the previous 12 mos or has a future within next 30 days. See \"Patient Info\" tab in inbasket, or \"Choose Columns\" in Meds & Orders section of the refill encounter.              Passed - Medication is active on med list        Passed - Patient is age 18 or older        Passed - Normal serum potassium on file in past 12 months     Recent Labs   Lab Test 10/01/21  1525   POTASSIUM 4.3                    Passed - Normal serum sodium on file in past 12 months     Recent Labs   Lab Test 10/01/21  1525                      Clara Hernandez RN 10/12/21 1:34 PM  "

## 2021-10-15 ENCOUNTER — TELEPHONE (OUTPATIENT)
Dept: LAB | Facility: CLINIC | Age: 81
End: 2021-10-15

## 2021-10-15 DIAGNOSIS — N18.32 STAGE 3B CHRONIC KIDNEY DISEASE (H): ICD-10-CM

## 2021-10-15 DIAGNOSIS — D64.9 ANEMIA, UNSPECIFIED TYPE: Primary | ICD-10-CM

## 2021-10-15 NOTE — PROGRESS NOTES
Tanmay is on Monday's Lab schedule for recheck HM2, BMP.  No orders are in the chart.      Please place appropriate orders.  If nothing is needed, please advise pt.    Thanks

## 2021-10-15 NOTE — TELEPHONE ENCOUNTER
It looks like he needed his hm2 and bmp rechecked, for anemia and ckd.  I have set up orders.  Please review and add anything else if you would like. Thank you

## 2021-10-16 ENCOUNTER — HOSPITAL ENCOUNTER (EMERGENCY)
Facility: HOSPITAL | Age: 81
Discharge: HOME | End: 2021-10-16
Attending: EMERGENCY MEDICINE | Admitting: EMERGENCY MEDICINE
Payer: COMMERCIAL

## 2021-10-16 VITALS
SYSTOLIC BLOOD PRESSURE: 137 MMHG | OXYGEN SATURATION: 100 % | TEMPERATURE: 97 F | RESPIRATION RATE: 18 BRPM | HEART RATE: 60 BPM | DIASTOLIC BLOOD PRESSURE: 59 MMHG

## 2021-10-16 DIAGNOSIS — M54.50 RIGHT-SIDED LOW BACK PAIN WITHOUT SCIATICA, UNSPECIFIED CHRONICITY: ICD-10-CM

## 2021-10-16 LAB — INR PPP: 2.66 (ref 0.85–1.15)

## 2021-10-16 PROCEDURE — 99283 EMERGENCY DEPT VISIT LOW MDM: CPT

## 2021-10-16 PROCEDURE — 85610 PROTHROMBIN TIME: CPT | Performed by: EMERGENCY MEDICINE

## 2021-10-16 PROCEDURE — 36415 COLL VENOUS BLD VENIPUNCTURE: CPT | Performed by: EMERGENCY MEDICINE

## 2021-10-16 PROCEDURE — 250N000013 HC RX MED GY IP 250 OP 250 PS 637: Performed by: EMERGENCY MEDICINE

## 2021-10-16 RX ORDER — LIDOCAINE 4 G/G
1 PATCH TOPICAL
Status: DISCONTINUED | OUTPATIENT
Start: 2021-10-16 | End: 2021-10-16 | Stop reason: HOSPADM

## 2021-10-16 RX ADMIN — ACETAMINOPHEN AND CODEINE PHOSPHATE 2 TABLET: 300; 30 TABLET ORAL at 18:55

## 2021-10-16 NOTE — ED PROVIDER NOTES
Emergency Department Encounter     Evaluation Date & Time:   10/16/2021  6:18 PM    CHIEF COMPLAINT:  Back Pain      Triage Note:Pt having PT at home with hx of shoulder fx in August. Had PT yesterday. After PT developed pain rt lower back into rt buttock, much worse today. Having trouble standing and transfering now, afraid he may fall. Hx hx of chronic back problems.         Impression and Plan       FINAL IMPRESSION:    ICD-10-CM    1. Right-sided low back pain without sciatica, unspecified chronicity  M54.50          ED COURSE & MEDICAL DECISION MAKIN:25 PM I evaluated the patient to gather history and perform initial exam. ED course and treatment plan was discussed. PPE worn: gown, surgical mask, gloves and eye protection    7:48 PM I reassessed the patient and discussed plans for discharge with extensive anticipatory guidance and given return precautions. Patient was agreeable with the plan.      81 year old male, history of atrial fibrillation (on Coumadin), ischemic cardiomyopathy with CHF, paroxysmal ventricular tachycardia s/p ICD placement, previous PE, HTN, HLD, CKD stage 3, Crohn's disease, GERD and lumbar stenosis, who presents for evaluation of atraumatic, non-radicular, aching right back pain that started gradually last night and worsened this morning.     He reports that he has been having this pain intermittently for years for which he has received cortisone injections; I do not suspect aortic dissection or AAA.    Patient given T#3 and lidocaine patch with improvement.      He is neuro intact without bowel / bladder dysfunction and I do not suspect acute cauda equina syndrome, epidural abscess, epidural hematoma, osteomyelitis, discitis, transverse myelitis or other neurosurgical emergency I do not think emergent MRI is indicated.  Patient does have an ICD, thus MRI would be complicated.      Given his age, I did offer to perform CT scans, however the patient is feeling better after  analgesics and declined.     INR 2.66.    Patient independently ambulatory with his cane.     Patient discharged home with follow-up PMD.  He was given a prescription for Tylenol #3 and advised to use the lidocaine patches OTC (12 hours on and then 12 hours off).  Return precautions provided.  Patient stable throughout ED course.      At the conclusion of the encounter I discussed the results of all the tests and the disposition. The questions were answered. The patient and family acknowledged understanding and were agreeable with the care plan.      MEDICATIONS GIVEN IN THE EMERGENCY DEPARTMENT:  Medications   Lidocaine (LIDOCARE) 4 % Patch 1 patch (has no administration in time range)     And   lidocaine patch in PLACE (has no administration in time range)   acetaminophen-codeine (TYLENOL #3) 300-30 MG per tablet 2 tablet (2 tablets Oral Given 10/16/21 1855)       NEW PRESCRIPTIONS STARTED AT TODAY'S ED VISIT:  Discharge Medication List as of 10/16/2021  8:04 PM      START taking these medications    Details   acetaminophen-codeine (TYLENOL #3) 300-30 MG tablet Take 1 tablet by mouth every 8 hours as needed for severe pain, Disp-6 tablet, R-0, Local Print             HPI       Tanmay Israel is a 81 year old male, history of atrial fibrillation (on Coumadin), ischemic cardiomyopathy with CHF, paroxysmal ventricular tachycardia s/p ICD placement, previous PE, HTN, HLD, CKD stage 3, Crohn's disease, GERD and lumbar stenosis, who presents to this ED for evaluation of back pain.    Patient reports intermittent right low back pain without radiation down either leg that has been ongoing for years for which he has received cortisone injections; last injection was in July. Late yesterday, he had gradual onset of his typical right low back pain, which was severe when he awoke this morning.  The pain is aching in nature and worse with activity / movement. He denies associated lower extremity weakness and paresthesias. No  urinary retention or urinary or fecal incontinence.  He denies any falls or other injuries, however does report that he has been doing physical therapy for a shoulder fracture.  No associated fevers or urinary symptoms.    He otherwise has been in his usual state of health and denies chest pain, shortness of breath, abdominal pain, N/V/D, cough or other concerns.    Per chart review, patient was admitted on 8/24/2021 at [facility] for a closed nondisplaced fracture of proximal end of left humerus. He was discharged home to [facility].       REVIEW OF SYSTEMS:  All other systems reviewed and are negative.      Medical History     Past Medical History:   Diagnosis Date     Acute cholecystitis 2/28/2019     Acute post-operative pain      Anemia      Arthritis      Atrial fibrillation (H)      C. difficile diarrhea 4/21/2019     Calculus of ureter      Callus      Cerebral aneurysm      Cervical myelopathy (H) 2/22/2019     Cervical spinal stenosis      Chronic kidney disease      Chronic systolic congestive heart failure (H)      Closed fracture of distal end of left radius, unspecified fracture morphology, initial encounter      Closed fracture of distal end of right radius, unspecified fracture morphology, initial encounter      Closed fracture of first thoracic vertebra, unspecified fracture morphology, initial encounter (H)      Coronary artery disease      Crohn's disease (H)      Disorder of bursae and tendons in shoulder region      Dysphagia      Fall 10/22/2016     GERD (gastroesophageal reflux disease)      Hyperlipidemia      Hypertension      Hypotension, unspecified hypotension type      ICD (implantable cardioverter-defibrillator) in place      Ischemic cardiomyopathy      Kidney stone      Low back pain      Lumbago      Macular degeneration      Myalgia and myositis, unspecified      Nonspecific elevation of levels of transaminase or lactic acid dehydrogenase  (LDH)      Permanent atrial fibrillation (H)      Personal history of colonic polyps 2/12/2019     Polyp of duodenum 10/17/2016     Pyuria      Right arm weakness 4/2/2019     Schatzki's ring      Sciatica      Sleep apnea      Spondylolisthesis of lumbar region 7/5/2016     Weakness 4/20/2019       Past Surgical History:   Procedure Laterality Date     APPENDECTOMY       ARTHROSCOPY SHOULDER ROTATOR CUFF REPAIR Right      BYPASS GRAFT ARTERY CORONARY  08/2004    Coronary Artery Quadruple Venous Bypass Graft     C LAP,CHOLECYSTECTOMY/EXPLORE N/A 2/28/2019    CHOLECYSTECTOMY, LAPAROSCOPIC;  Surgeon: Mana Roman MD;  [place] [facility] [place]  Service: General     CARDIAC DEFIBRILLATOR PLACEMENT  2013    ICD Medtr[employer]     CHOLECYSTECTOMY       COLONOSCOPY N/A 2/19/2020    Procedure: COLONOSCOPY;  Surgeon: Houston Medina MD;  Location: [facility];  Service: Gastroenterology     ESOPHAGOSCOPY, GASTROSCOPY, DUODENOSCOPY (EGD), COMBINED       IR LUMBAR EPIDURAL STEROID INJECTION  6/29/2005     IR LUMBAR EPIDURAL STEROID INJECTION  7/15/2005     IR LUMBAR EPIDURAL STEROID INJECTION  9/28/2005     IR LUMBAR EPIDURAL STEROID INJECTION  12/28/2005     IR LUMBAR EPIDURAL STEROID INJECTION  3/29/2006     IR LUMBAR EPIDURAL STEROID INJECTION  6/8/2006     PHACOEMULSIFICATION CLEAR CORNEA WITH STANDARD INTRAOCULAR LENS IMPLANT Bilateral      SC C- LAMINOPLASTY, 2 OR MORE Left 2/22/2019    Procedure: LEFT CERVICAL 4, 5, 6 LAMINOPLASTY;  Surgeon: Liza Cesar MD;  Location: [facility];  Service: Spine     SC ESOPHAGOGASTRODUODENOSCOPY TRANSORAL DIAGNOSTIC N/A 5/10/2021    Procedure: ESOPHAGOGASTRODUODENOSCOPY (EGD);  Surgeon: Franklin Mendoza MD;  Location: [facility] GI;  Service: Gastroenterology     SC ESOPHAGOGASTRODUODENOSCOPY TRANSORAL DIAGNOSTIC N/A 6/16/2021    Procedure: ESOPHAGOGASTRODUODENOSCOPY (EGD), WITH ESOPHAGEAL DILATION AND BIOPSY;  Surgeon: Paramjit Brown MD;   Location: [facility] OR;  Service: Gastroenterology     TONSILLECTOMY       XR MYELOGRAM CERVICAL THORACIC LUMBAR  1/17/2019       Family History   Problem Relation Age of Onset     Heart Disease Mother      Cerebrovascular Disease Mother      Crohn's Disease Father      Alcoholism Brother      No Known Problems Daughter      No Known Problems Son      No Known Problems Maternal Aunt      No Known Problems Maternal Uncle      No Known Problems Paternal Aunt      No Known Problems Paternal Uncle      No Known Problems Maternal Grandmother      No Known Problems Maternal Grandfather      No Known Problems Paternal Grandmother      No Known Problems Paternal Grandfather      Cancer Brother      Urolithiasis No family hx of      Gout No family hx of        Social History     Tobacco Use     Smoking status: Former Smoker     Smokeless tobacco: Never Used   Substance Use Topics     Alcohol use: Not Currently     Drug use: Never       acetaminophen-codeine (TYLENOL #3) 300-30 MG tablet  losartan (COZAAR) 100 MG tablet  acetaminophen (TYLENOL) 500 MG tablet  albuterol (PROAIR HFA/PROVENTIL HFA/VENTOLIN HFA) 108 (90 Base) MCG/ACT inhaler  calcium carbonate-vitamin D 600-200 MG-UNIT TABS  carvedilol (COREG) 25 MG tablet  cycloSPORINE (RESTASIS) 0.05 % ophthalmic emulsion  diclofenac (VOLTAREN) 1 % topical gel  erythromycin (ROMYCIN) 5 MG/GM ophthalmic ointment  ferrous sulfate (FEROSUL) 325 (65 Fe) MG tablet  furosemide (LASIX) 20 MG tablet  Lidocaine (LIDOCARE) 4 % Patch  multivitamin w/minerals (MULTI-VITAMIN) tablet  omeprazole (PRILOSEC) 20 MG DR capsule  ondansetron (ZOFRAN) 8 MG tablet  oxyCODONE (ROXICODONE) 5 MG tablet  potassium chloride ER (KLOR-CON M) 20 MEQ CR tablet  rosuvastatin (CRESTOR) 20 MG tablet  vitamin B-12 (CYANOCOBALAMIN) 2000 MCG tablet  warfarin ANTICOAGULANT (COUMADIN) 2.5 MG tablet        Physical Exam     First Vitals:  Patient Vitals for the past 24 hrs:   BP Temp Temp src Pulse Resp SpO2    10/16/21 2020 137/59 -- -- 60 18 100 %   10/16/21 1719 121/71 97  F (36.1  C) Oral 50 16 99 %       PHYSICAL EXAM:   Physical Exam    GENERAL: Awake, alert.  In no acute distress.   HEENT: Normocephalic, atraumatic. Left pupil ~2mm larger than the right pupil. Conjunctiva normal.   NECK: No stridor.  PULMONARY: Symmetrical breath sounds without distress.  Lungs clear to auscultation bilaterally without wheezes, rhonchi or rales.  CARDIO: Regular rate and rhythm.  No significant murmur, rub or gallop.  Radial and pedal pulses strong and symmetrical.  ABDOMINAL: Abdomen soft, non-distended and non-tender to palpation.  No CVAT, BL.  BACK:  Back is atraumatic.  No midline tenderness to palpation of thoracic and lumbar spines.  There is mild tenderness to palpation over the right sciatic notch.  EXTREMITIES: No lower extremity swelling or edema.      NEURO: Alert and oriented to person, place and time.  Cranial nerves grossly intact.  No focal motor deficit.  Strength 5/5 BL lower extremities (hip flexion, knee flexion / extension, plantarflexion / dorsiflexion ankles and great toes) with sensation to light touch grossly intact.  Normal gait.  PSYCH: Normal mood and affect.  SKIN: No rashes.     Results     LAB:  All pertinent labs reviewed and interpreted  Labs Ordered and Resulted from Time of ED Arrival Up to the Time of Departure from the ED   INR - Abnormal; Notable for the following components:       Result Value    INR 2.66 (*)     All other components within normal limits       I, Isabelle Sherman, am serving as a scribe to document services personally performed by Sheri Ro MD based on my observation and the provider's statements to me. I, Sheri Ro MD attest that Isabelle Sherman is acting in a scribe capacity, has observed my performance of the services and has documented them in accordance with my direction.    Sheri Ro MD  Emergency Medicine  [facility] EMERGENCY  DEPARTMENT         RoSheri bell MD  10/16/21 7922

## 2021-10-16 NOTE — ED NOTES
Pt is able to stand and get into exam bed. Reports that he was working with his home care PT yesterday. Notes R low back pain today. Hx back pain has cortisone injections in the past.

## 2021-10-16 NOTE — ED TRIAGE NOTES
Pt having PT at home with hx of shoulder fx in August. Had PT yesterday. After PT developed pain rt lower back into rt buttock, much worse today. Having trouble standing and transfering now, afraid he may fall. Hx hx of chronic back problems.

## 2021-10-17 NOTE — DISCHARGE INSTRUCTIONS
Please follow-up with your primary care provider this upcoming week for recheck; call to arrange appointment.    Return to the ER for worsening symptoms, worsening pain, weakness or numbness in either leg, if you are unable to urinate, if you lose control of your bowels or bladder, fever or other concerns.    Do not drink alcohol, drive or take other products that contain tylenol (acetaminophen) while using the Tylenol with codeine.

## 2021-10-17 NOTE — ED NOTES
Walked in hallway with his cane he did well.  Said he was still a little sore but felt better then when he got here.  I asked if he felt safe to go home and he said yes

## 2021-10-18 NOTE — PROGRESS NOTES
ANTICOAGULATION  MANAGEMENT: Discharge Review    Tanmay Israel chart reviewed for anticoagulation continuity of care    Emergency room visit on 10/16/2021 for low back and buttock pain.    Discharge disposition: Home with Home Care    Results:    Recent labs: (last 7 days)     10/16/21  1858   INR 2.66*     Anticoagulation inpatient management:     not applicable     Anticoagulation discharge instructions:     Warfarin dosing: home regimen continued   Bridging: No   INR goal change: No      Medication changes affecting anticoagulation: No    Additional factors affecting anticoagulation: No    Plan     No adjustment to anticoagulation plan needed    Patient not contacted. HC planning to recheck INR 10/20.    No adjustment to Anticoagulation Calendar was required.     Kane Ratliff RN

## 2021-10-18 NOTE — PROGRESS NOTES
ANTICOAGULATION MANAGEMENT     Tanmay Israel 81 year old male is on warfarin with supratherapeutic INR result. (Goal INR 2.0-3.0)    Recent labs: (last 7 days)     10/18/21  0957   INR 3.1*       ASSESSMENT     Source(s): Chart Review and Home Care/Facility Nurse       Warfarin doses taken: Warfarin taken as instructed    Diet: No new diet changes identified    New illness, injury, or hospitalization: No    Medication/supplement changes: None noted    Signs or symptoms of bleeding or clotting: No    Previous INR: Therapeutic last 2(+) visits    Additional findings: Patient had INR done with labs today. Home care already setup for Wednesday     PLAN     Recommended plan for no diet, medication or health factor changes affecting INR     Dosing Instructions: Partial hold then continue your current warfarin dose with next INR in 2 days       Summary  As of 10/18/2021    Full warfarin instructions:  10/19: 1.25 mg; Otherwise 1.25 mg every Mon, Wed, Fri; 2.5 mg all other days   Next INR check:  10/20/2021             Telephone call with home care nurse Halie who verbalizes understanding and agrees to plan    Orders given to  Homecare nurse/facility to recheck    Education provided: Please call back if any changes to your diet, medications or how you've been taking warfarin    Plan made per [facility] anticoagulation protocol    Sowmya Dawn, RN  Anticoagulation Clinic  10/18/2021    _______________________________________________________________________     Anticoagulation Episode Summary     Current INR goal:  2.0-3.0   TTR:  59.9 % (1 y)   Target end date:     Send INR reminders to:  SAUD KASPETERSON    Indications    Atrial fibrillation (H) [I48.91]  Pulmonary embolism with infarction (H) [I26.99]           Comments:           Anticoagulation Care Providers     Provider Role Specialty Phone number    Go Ramírez MD Referring Family Medicine [phone]

## 2021-10-18 NOTE — PROGRESS NOTES
Clinic Care Coordination Contact  [facility]/Voicemail       Clinical Data: Care Coordinator Outreach  Outreach attempted x 1.  Left message on patient's voicemail with call back information and requested return call.  Plan: Care Coordinator will try to reach patient again in 1-2 business days.    BERKLEY Mcneil  [phone]  [facility]

## 2021-10-19 NOTE — PROGRESS NOTES
Clinic Care Coordination Contact  [facility]/Voicemail       Clinical Data: Care Coordinator Outreach  Outreach attempted x 2.  Left message on patient's voicemail with call back information and requested return call.  Plan: Care Coordinator will do no further outreaches at this time.    BERKLEY Mcneil  [phone]  [facility]

## 2021-10-20 NOTE — TELEPHONE ENCOUNTER
ANTICOAGULATION MANAGEMENT     Tanmay Israel 81 year old male is on warfarin with therapeutic INR result. (Goal INR )    Recent labs: (last 7 days)     10/20/21  1001   INR 3.0*       ASSESSMENT     Source(s): Chart Review and Home Care/Facility Nurse       Warfarin doses taken: Warfarin taken as instructed    Diet: No new diet changes identified    New illness, injury, or hospitalization: No    Medication/supplement changes: None noted    Signs or symptoms of bleeding or clotting: No    Previous INR: Supratherapeutic    Additional findings: None     PLAN     Recommended plan for no diet, medication or health factor changes affecting INR     Dosing Instructions:  Decrease your warfarin dose (9% change) with next INR in 1 week       Summary  As of 10/20/2021    Full warfarin instructions:  2.5 mg every Sun, Tue, Thu; 1.25 mg all other days   Next INR check:               Telephone call with home care nurse Halie who verbalizes understanding and agrees to plan    Orders given to  Homecare nurse/facility to recheck    Education provided: Please call back if any changes to your diet, medications or how you've been taking warfarin    Plan made per [facility] anticoagulation protocol    Sowmya Dawn, RN  Anticoagulation Clinic  10/20/2021    _______________________________________________________________________     Anticoagulation Episode Summary     Current INR goal:  2.0-3.0   TTR:  59.3 % (1 y)   Target end date:     Send INR reminders to:  CHANI CRAIG    Indications    Atrial fibrillation (H) [I48.91]  Pulmonary embolism with infarction (H) [I26.99]           Comments:           Anticoagulation Care Providers     Provider Role Specialty Phone number    Go Ramírez MD Referring Family Medicine [phone]

## 2021-10-20 NOTE — TELEPHONE ENCOUNTER
Reason for call:  INR   Who is calling? Caregiver    Phone number: [phone]    Fax number: N/A    Name of caller: Halie    INR Value: 3.0    Are there any other concerns: No  Route this INR message to [facility] # [id number]    Phone number to reach patient:  Home number on file [phone] (home)    Best Time:  Anytime    Can we leave a detailed message on this number?  YES    Travel screening: Not Applicable

## 2021-10-28 NOTE — PROGRESS NOTES
ANTICOAGULATION MANAGEMENT     Tanmay Israel 81 year old male is on warfarin with therapeutic INR result. (Goal INR 2.0-3.0)    Recent labs: (last 7 days)     10/28/21  1233   INR 1.8*       ASSESSMENT     Source(s): Chart Review and Home Care/Facility Nurse       Warfarin doses taken: Warfarin taken as instructed    Diet: No new diet changes identified    New illness, injury, or hospitalization: No    Medication/supplement changes: None noted    Signs or symptoms of bleeding or clotting: No    Previous INR: Supratherapeutic    Additional findings: None     PLAN     Recommended plan for no diet, medication or health factor changes affecting INR     Dosing Instructions: Continue your current warfarin dose with next INR in 2 weeks       Summary  As of 10/28/2021    Full warfarin instructions:  2.5 mg every Sun, Tue, Thu; 1.25 mg all other days   Next INR check:               Telephone call with home care nurse Maritza who verbalizes understanding and agrees to plan    Orders given to  Homecare nurse/facility to recheck    Education provided: Please call back if any changes to your diet, medications or how you've been taking warfarin    Plan made per [facility] anticoagulation protocol    Chari Coley RN  Anticoagulation Clinic  10/28/2021    _______________________________________________________________________     Anticoagulation Episode Summary     Current INR goal:  2.0-3.0   TTR:  59.0 % (1 y)   Target end date:     Send INR reminders to:  SAUD RAFA    Indications    Atrial fibrillation (H) [I48.91]  Pulmonary embolism with infarction (H) [I26.99]           Comments:           Anticoagulation Care Providers     Provider Role Specialty Phone number    Go Ramírez MD Referring Family Medicine [phone]

## 2021-11-05 NOTE — TELEPHONE ENCOUNTER
----- Message from Samina ALEX Stevens sent at 11/5/2021 10:03 AM CDT -----  Regarding: device RN review  Routine ICD remote  Optivol elevated, please review HF diagnostics     Type: routine remote ICD transmission.   Presenting rhythm: ventricular sensing, appears sinus 60's.  Battery/lead status: device at [place] by voltage, not yet triggered in device.   Arrhythmias: since 8/5/21; none detected.  Comments: normal ICD function. Optivol elevated. Routed to device RN for review.  Device/lead alerts: none. E. Pivec, Device Specialist      Transmission reviewed, OptiVol changes noted beginning around August, and currently above Fluid index >200 since mid September. Patient had fall 8/14/21 and eventually went to TCU and then discharged home, has been seen by PMD in October and had several ED visits since as well. No reported/documented signs/concerns of HF during those visits.     Call placed to patient to review assess for any changes, s/s of HF. Patient states he has no acute changes since last in PMD office, no edema, weight changes or any unusual shortness of breath. Patient states he has been working with Physical therapy, they check his vitals often and no reports of any abnormal findings or concerns. Advised to be on alert for any of the above changes and to call with symptoms. Patient verbalized understanding.     Will continue to monitor for now, Quite abnormal OptiVol readings but no s/s of HF at this time. Has repeat remote in 1 month for battery check, very near [place].     Adela Sauceda RN

## 2021-11-11 NOTE — PROGRESS NOTES
ANTICOAGULATION MANAGEMENT     Tanmay Israel 81 year old male is on warfarin with therapeutic INR result. (Goal INR 2.0-3.0)    Recent labs: (last 7 days)     11/11/21  1302   INR 2.2*       ASSESSMENT     Source(s): Home Care/Facility Nurse       Warfarin doses taken: Warfarin taken as instructed    Diet: No new diet changes identified    New illness, injury, or hospitalization: No    Medication/supplement changes: None noted    Signs or symptoms of bleeding or clotting: No    Previous INR: Subtherapeutic    Additional findings: Low back injection next Friday [facility]. Pt talked to [facility] this AM and they said he does not need to hold warfarin.     PLAN     Recommended plan for no diet, medication or health factor changes affecting INR     Dosing Instructions: Continue your current warfarin dose with next INR in 11 days       Summary  As of 11/11/2021    Full warfarin instructions:  2.5 mg every Sun, Tue, Thu; 1.25 mg all other days   Next INR check:  11/22/2021             Telephone call with Tanmay who verbalizes understanding and agrees to plan    Lab visit scheduled    Education provided: Goal range and significance of current result    Plan made per [facility] anticoagulation protocol    Kane Ratliff RN  Anticoagulation Clinic  11/11/2021    _______________________________________________________________________     Anticoagulation Episode Summary     Current INR goal:  2.0-3.0   TTR:  57.0 % (1 y)   Target end date:     Send INR reminders to:  CHANI RIOS    Indications    Atrial fibrillation (H) [I48.91]  Pulmonary embolism with infarction (H) [I26.99]           Comments:           Anticoagulation Care Providers     Provider Role Specialty Phone number    Go Ramírez MD Referring Family Medicine [phone]

## 2021-11-19 PROBLEM — Z78.9 ADVISED ABOUT MANAGEMENT OF WEIGHT: Status: ACTIVE | Noted: 2018-05-21

## 2021-11-19 PROBLEM — Z86.0100 PERSONAL HISTORY OF COLONIC POLYPS: Status: ACTIVE | Noted: 2021-01-01

## 2021-11-22 NOTE — PROGRESS NOTES
Tanmay Israel  /62   Pulse 73   Wt 65.8 kg (145 lb)   SpO2 99%   BMI 21.41 kg/m       Assessment/Plan:                Tanmay was seen today for recheck medication, inr followup, imm/inj, referral, orders, medication question, labs only and pain.    Diagnoses and all orders for this visit:    Sacroiliitis (H)  -     HOME CARE NURSING REFERRAL    High priority for 2019-nCoV vaccine    Closed fracture of proximal end of left humerus with routine healing, unspecified fracture morphology, subsequent encounter  -     HOME CARE NURSING REFERRAL    Spinal stenosis, lumbar region, without neurogenic claudication  -     HOME CARE NURSING REFERRAL    Permanent atrial fibrillation (H)    Stage 3a chronic kidney disease (H)  -     CBC with platelets  -     Basic metabolic panel  (Ca, Cl, CO2, Creat, Gluc, K, Na, BUN)    Chronic systolic congestive heart failure (H)  -     HOME CARE NURSING REFERRAL    Anemia, unspecified type  -     CBC with platelets  -     HOME CARE NURSING REFERRAL    Other orders  -     COVID-19,PF,PFIZER (12+ Yrs PURPLE LABEL)         DISCUSSION  Referral to home care for help with providing services of a home health aide.  Would benefit from ongoing assessment and help with managing his complex medical concerns and medications as well.  Subjective:     HPI:    Tanmay Israel is a 81 year old male is here today with family members to discuss recent health concerns.    This past summer he suffered a humerus fracture which required placement in TCU for recovery.  He returned home and I saw him on October 1 and was doing reasonably well.  Since that time he has had development and increasing severity of back pain from sacroiliitis and lumbar stenosis.  He is following with [facility] and has had 2 corticosteroid injections including one on 18 November.  Unfortunately he continues to struggle with severe pain.  He is taking tramadol and acetaminophen.  Pain is now well controlled and his mobility  is decreased to a point where his functioning at home is questionable.  He has had home care including occupational therapy.  They feel there are cognitive concerns as well as concerns in his ability to care for himself and his family substantiates those concerns.  They request help in keeping him at home.  Discussed rereferral to home care which has since ended for possibly obtaining a home health aide.  Discussed the only alternative would be to consider returning to transitional care for having more skilled help available 24 hours.  We reviewed considerations related to pain management, discussed I would continue to support the recommendations of the orthopedic providers.  Reminded him that he cannot take NSAIDs because of his chronic kidney disease, history of severe gastrointestinal symptoms with chronic anemia.  He would be at high risk for gastrointestinal bleeding as well as acute kidney failure.    We discussed considerations regarding return to driving.  Discussed how his shoulder functioning is 1 aspect of this but there are multiple others.  Based on his functionality at this point including the amount of pain, the necessary medications for managing the pain as well as his inability to perform many activities of daily living I recommend he not drive.  All present are in agreement with this for the time being.    In terms of his other medical problems there is no evidence of any significant problem from a symptom standpoint.  He is due for an INR today.  We discussed recheck of his kidney function and his chronic anemia.  Reviewed and discussed other recent results.    ROS:  Complete review of systems is obtained.  Other than the specific considerations noted above complete review of systems is negative.    Objective:   Medications:  Current Outpatient Medications   Medication     acetaminophen (TYLENOL) 500 MG tablet     albuterol (PROAIR HFA/PROVENTIL HFA/VENTOLIN HFA) 108 (90 Base) MCG/ACT inhaler      bumetanide (BUMEX) 1 MG tablet     calcium carbonate-vitamin D 600-200 MG-UNIT TABS     carvedilol (COREG) 25 MG tablet     cycloSPORINE (RESTASIS) 0.05 % ophthalmic emulsion     diclofenac (VOLTAREN) 1 % topical gel     erythromycin (ROMYCIN) 5 MG/GM ophthalmic ointment     ferrous sulfate (FEROSUL) 325 (65 Fe) MG tablet     furosemide (LASIX) 20 MG tablet     Lidocaine (LIDOCARE) 4 % Patch     losartan (COZAAR) 100 MG tablet     multivitamin w/minerals (MULTI-VITAMIN) tablet     omeprazole (PRILOSEC) 20 MG DR capsule     ondansetron (ZOFRAN) 8 MG tablet     oxyCODONE (ROXICODONE) 5 MG tablet     potassium chloride ER (KLOR-CON M) 20 MEQ CR tablet     rosuvastatin (CRESTOR) 20 MG tablet     traMADol (ULTRAM) 50 MG tablet     vitamin B-12 (CYANOCOBALAMIN) 2000 MCG tablet     warfarin ANTICOAGULANT (COUMADIN) 2.5 MG tablet     acetaminophen-codeine (TYLENOL #3) 300-30 MG tablet     No current facility-administered medications for this visit.        Allergies:   No Known Allergies     Social History     Socioeconomic History     Marital status:      Spouse name: Not on file     Number of children: Not on file     Years of education: Not on file     Highest education level: Not on file   Occupational History     Not on file   Tobacco Use     Smoking status: Former Smoker     Smokeless tobacco: Never Used   Substance and Sexual Activity     Alcohol use: Not Currently     Drug use: Never     Sexual activity: Yes     Partners: Female   Other Topics Concern     Parent/sibling w/ CABG, MI or angioplasty before 65F 55M? Not Asked   Social History Narrative     Not on file     Social Determinants of Health     Financial Resource Strain: Not on file   Food Insecurity: Not on file   Transportation Needs: Not on file   Physical Activity: Not on file   Stress: Not on file   Social Connections: Not on file   Intimate Partner Violence: Not on file   Housing Stability: Not on file       Family History   Problem Relation Age  of Onset     Heart Disease Mother      Cerebrovascular Disease Mother      Crohn's Disease Father      Alcoholism Brother      No Known Problems Daughter      No Known Problems Son      No Known Problems Maternal Aunt      No Known Problems Maternal Uncle      No Known Problems Paternal Aunt      No Known Problems Paternal Uncle      No Known Problems Maternal Grandmother      No Known Problems Maternal Grandfather      No Known Problems Paternal Grandmother      No Known Problems Paternal Grandfather      Cancer Brother      Urolithiasis No family hx of      Gout No family hx of         Most Recent Immunizations   Administered Date(s) Administered     COVID-19,PF,Pfizer (12+ Yrs) 11/22/2021     DT (PEDS <7y) 03/01/2001     FLU 6-35 months 09/09/2010     Flu, Unspecified 09/09/2010     HepB-Adult 10/05/2018     Influenza (H1N1) 01/27/2010     Influenza (High Dose) 3 valent vaccine 09/08/2020     Influenza (IIV3) PF 10/01/2014     Influenza Vaccine, 6+MO IM (QUADRIVALENT W/PRESERVATIVES) 10/01/2014     Influenza, Quad, High Dose, Pf, 65yr+ (Fluzone HD) 10/01/2021     Influenza,INJ,MDCK,PF,Quad >4yrs 09/30/2018     Pneumo Conj 13-V (2010&after) 10/01/2014     Pneumococcal 23 valent 09/08/2005     TD (ADULT, 7+) 08/14/2021     TDAP Vaccine (Boostrix) 11/09/2015     Td (Adult), Adsorbed 08/14/2021     Tdap (Adacel,Boostrix) 11/09/2015     Tdap (Adult) Unspecified Formulation 03/01/2001     Zoster vaccine recombinant adjuvanted (SHINGRIX) 12/08/2020     Zoster vaccine, live 12/16/2010        Wt Readings from Last 3 Encounters:   11/22/21 65.8 kg (145 lb)   09/16/21 70.9 kg (156 lb 3.2 oz)   09/13/21 68.8 kg (151 lb 11.2 oz)        BP Readings from Last 6 Encounters:   11/22/21 116/62   10/16/21 137/59   10/01/21 126/68   09/16/21 136/57   09/13/21 120/54   09/10/21 120/54        No results found for: A1C, HEMOGLOBINA1           PHYSICAL EXAM:    /62   Pulse 73   Wt 65.8 kg (145 lb)   SpO2 99%   BMI 21.41 kg/m        General: Alert no signs of distress.  Is seated calmly in a wheelchair.  Participates in the conversation in a manner which is consistent with my prior encounters.  Does not seem to be exhibiting any significant cognitive decline in my opinion.    Lungs: Good air movement no wheeze or crackle    Heart: Regular rate and rhythm no murmur    Extremities: No significant edema.  He has had loss of muscle mass progressively over the past 6 months with all of his concerns.

## 2021-11-22 NOTE — PROGRESS NOTES
ANTICOAGULATION MANAGEMENT     Tanmay Israel 81 year old male is on warfarin with subtherapeutic INR result. (Goal INR 2.0-3.0)    Recent labs: (last 7 days)     11/22/21  1148   INR 1.4*       ASSESSMENT     Source(s): Chart Review, Patient/Caregiver Call and Template       Warfarin doses taken: Missed dose(s) may be affecting INR    Diet: No new diet changes identified    New illness, injury, or hospitalization: No    Medication/supplement changes: None noted    Signs or symptoms of bleeding or clotting: No    Previous INR: Therapeutic last visit; previously outside of goal range    Additional findings: None     PLAN     Recommended plan for temporary change(s) affecting INR     Dosing Instructions: Booster dose then continue your current warfarin dose with next INR in 1 week       Summary  As of 11/22/2021    Full warfarin instructions:  11/22: 2.5 mg; Otherwise 2.5 mg every Sun, Tue, Thu; 1.25 mg all other days   Next INR check:               Telephone call with Tanmay who agrees to plan and repeated back plan correctly    Patient offered & declined to schedule next visit    Education provided: Goal range and significance of current result, Monitoring for clotting signs and symptoms and Contact [phone] with any changes, questions or concerns.     Plan made per [facility] anticoagulation protocol    Yaneth Wells RN  Anticoagulation Clinic  11/22/2021    _______________________________________________________________________     Anticoagulation Episode Summary     Current INR goal:  2.0-3.0   TTR:  54.8 % (1 y)   Target end date:     Send INR reminders to:  CHANI RIOS    Indications    Atrial fibrillation (H) [I48.91]  Pulmonary embolism with infarction (H) [I26.99]           Comments:           Anticoagulation Care Providers     Provider Role Specialty Phone number    Go Ramírez MD Referring Family Medicine [phone]

## 2021-11-22 NOTE — LETTER
November 29, 2021      Tanmay Israel  [address]   [facility] [address]    Dear ,  We are writing to inform you of your test results.  Kidney function and electrolytes are stable.    Blood counts have continued to improve.    Resulted Orders   CBC with platelets   Result Value Ref Range    WBC Count 7.7 4.0 - 11.0 10e3/uL    RBC Count 3.66 (L) 4.40 - 5.90 10e6/uL    Hemoglobin 10.9 (L) 13.3 - 17.7 g/dL    Hematocrit 33.5 (L) 40.0 - 53.0 %    MCV 92 78 - 100 fL    MCH 29.8 26.5 - 33.0 pg    MCHC 32.5 31.5 - 36.5 g/dL    RDW 14.6 10.0 - 15.0 %    Platelet Count 163 150 - 450 10e3/uL   Basic metabolic panel  (Ca, Cl, CO2, Creat, Gluc, K, Na, BUN)   Result Value Ref Range    Sodium 142 136 - 145 mmol/L    Potassium 4.0 3.5 - 5.0 mmol/L    Chloride 104 98 - 107 mmol/L    Carbon Dioxide (CO2) 27 22 - 31 mmol/L    Anion Gap 11 5 - 18 mmol/L    Urea Nitrogen 21 8 - 28 mg/dL    Creatinine 1.57 (H) 0.70 - 1.30 mg/dL    Calcium 9.8 8.5 - 10.5 mg/dL    Glucose 102 70 - 125 mg/dL    GFR Estimate 40 (L) >60 mL/min/1.73m2      Comment:      As of July 11, 2021, eGFR is calculated by the CKD-EPI creatinine equation, without race adjustment. eGFR can be influenced by muscle mass, exercise, and diet. The reported eGFR is an estimation only and is only applicable if the renal function is stable.     If you have any questions or concerns, please call the clinic at the number listed above.   Sincerely,    Go Ramírez MD

## 2021-11-23 NOTE — TELEPHONE ENCOUNTER
Reason for Call:  Nuvia with [facility]     Detailed comments: Nuvia was calling wanting to get 2 messages to Dr. Ramírez.  1. Last week a call was made regarding back pain that the patient was experiencing. During Nuvia's visit with Pt he complained of extreme back pain. He rated it 10/10. Patient is refusing any assistance from staff as he is having a really hard time walking from his back pain. Patient is refusing any sort of help, wont go to ER or wont let Nuvia call for any more assistance from additional staff or ambulance. Patient is refusing to get out of bed. Very limited right now due to back px    2. Patient requests to be discharged from home care effective today 11/23. Patient had fallen and broken his L shoulder about 1/2 months ago and is still definitely in need of PT to address shoulder further. Patient sees a Doctor at [facility] in [place] and asked a referral for PT be sent there so he can receive outpatient therapy.     Phone Number Nurse can be reached at: Other phone number:  [phone]*    Best Time: anytime     Can we leave a detailed message on this number? YES    Call taken on 11/23/2021 at 12:32 PM by Corin Stahl

## 2021-11-23 NOTE — TELEPHONE ENCOUNTER
Reason for Call:  New order placement    Detailed comments: Recvd call from Antonietta, she is calling regarding Homecare orders that were placed. Per the order it looks like the patient could use PCA services rather than homecare. [facility] day a week for homehealth aid and only for showering. The PCA services would be able to offer more services being recommended.    Phone Number [employer] can be reached at: Other phone number:  [phone]    Best Time: anytime    Can we leave a detailed message on this number? YES    Call taken on 11/23/2021 at 9:14 AM by Cara Martinez

## 2021-11-24 NOTE — TELEPHONE ENCOUNTER
Spoke with Dr. Salinas and was not sure how to handle this. I called [facility] and states he needs pca. Dr. Salinas put in an order for PCA referral. I am not sure how quickly this needs to be taken care of.

## 2021-11-24 NOTE — TELEPHONE ENCOUNTER
I am aware of the situation with his back and have actually seen him in the office to assess the situation since initially receiving the message.    We are in the process of working out a new pathway for his care.    There is a previous message from yesterday regarding PCA services, please follow up on previous message if possible.

## 2021-11-26 NOTE — PROGRESS NOTES
Community Health Worker Initial Outreach    CHW Initial Information Gathering:  Referral Source: PCP  Preferred Hospital: [facility]  [phone]  Preferred Urgent Care: [facility] [phone]  Current living arrangement:: I live alone  Type of residence:: Apartment  Community Resources: None  Supplies used at home:: None  Equipment Currently Used at Home: walker, rolling  Informal Support system:: Family  No PCP office visit in Past Year: No  Transportation means:: Regular car  CHW Additional Questions  MyChart active?: No  Patient agreeable to assistance with activating MyChart?: No    Patient accepts CC: Yes. Patient scheduled for assessment with CCC RN on 11/29/21 at 1 pm. Patient noted desire to discuss managing back pain and services. Will need  support as well..

## 2021-11-27 NOTE — TELEPHONE ENCOUNTER
I have prescribed a limited amount of the tramadol.  
Medication Request  Medication name:   traMADol (ULTRAM) 50 mg tablet 30 tablet 0 7/9/2018 7/19/2018 No   Sig - Route: Take 1 tablet (50 mg total) by mouth every 6 (six) hours as needed for pain. - Oral       Pharmacy Name and Location: [facility]   Reason for request: low back pain  When did you use medication last?:  n/a  Okay to leave a detailed message: yes    
Patient notified   
Please advise   This is not on the patients medication list  He saw neurosurgery today  
Please call pt and find out more information.  
Reason contacted:  symptom  Information relayed:  Patient states Dr. Ramírez already knows about his back pain but was willing to discuss further with me.    Back pain for a couple years  Lower back  Pain is an 8/10    Nothing makes pain better or worse    Pain radiates to the back of both legs  Right is worse than the left.    Denies loss of bowel or bladder control    Requesting a prescription for tramadol for pain  States Dr. Ramírez has prescribed this for him in the past and it seemed to help.    He will complete some imaging ordered by neurosurgery today on 1/17/2019    Additional questions:  No  Further follow-up needed:  Yes  Okay to leave a detailed message:  Yes  
yes

## 2021-11-29 NOTE — LETTER
[facility]  Patient Centered Plan of Care  About Me:        Patient Name:  Tanmay Israel    YOB: 1940  Age:         81 year old   Tay MRN:    [id number] Telephone Information:  Home Phone [phone]   Mobile [phone]       Address:  [address]  [place] [id number] Email address:  No e-mail address on record      Emergency Contact(s)    Name Relationship Lgl Grd Work Phone Home Phone Mobile Phone   1. CHECO WILSON No [phone]   2. PHILLY ISRAEL Nephmartin No  [phone]           Primary language:  English     needed? No   [employer]:  [phone] op. 1  Other communication barriers:None    Preferred Method of Communication:     Current living arrangement: I live alone    Mobility Status/ Medical Equipment: Independent w/Device        Health Maintenance  Health Maintenance Reviewed:     Health Maintenance Due   Topic Date Due     HF ACTION PLAN  Never done     MEDICARE ANNUAL WELLNESS VISIT  10/17/2018     MICROALBUMIN  05/22/2020     LIPID  05/19/2021         My Access Plan  Medical Emergency 911   Primary Clinic Line [facility] [phone]   24 Hour Appointment Line [phone] or  [phone]) (toll-free)   24 Hour Nurse Line [phone] (toll-free)   Preferred Urgent Care [facility] [phone]     [facility]  [phone]     Preferred Pharmacy [facility] [place] [id number] [facility]     Behavioral Health Crisis Line The National Suicide Prevention Lifeline at 1-886.514.1952 or 911             My Care Team Members  Patient Care Team       Relationship Specialty Notifications Start End    Go Ramírez MD PCP - General Family Medicine  12/3/20     Phone: [phone] Fax: [phone] [address] [facility] [address]    Adam  Massiel Pérez, PharmD Pharmacist Pharmacist Admissions 6/10/19     Phone: [phone] Fax: [phone]         [facility] [address] [place] [address]    Houston Durán MD Assigned Musculoskeletal Provider   12/6/20     Phone: [phone] Fax: [phone]         [address] [facility] [address]    Leonardo Smart MD Assigned Neuroscience Provider   12/20/20     Phone: [phone] Fax: [phone]         [address] [facility] [address] [facility] [address]    Go Ramírez MD Assigned PCP   6/16/21     Phone: [phone] Fax: [phone]         [address] [facility] [address] [facility] [address]    Remy Aguilar MD Resident Internal Medicine - Pediatrics  8/19/21     Phone: [phone] Fax: [phone]         [address] [facility] [address] [facility] [address]    Houston Rolon MD Assigned Heart and Vascular Provider   11/7/21     Phone: [phone] Fax: [phone]         [address] [facility]  [facility] HEART [facility] [address]    Myhre, David J, RN Lead Care Coordinator Primary Care - CC Admissions 11/30/21     Phone: [phone]    Casandra Pandey Community Health Worker Primary Care - CC Admissions 11/30/21     Phone: [phone] Fax: [phone]        Kylie Suazo LSW Clinic Care Coordinator Primary Care - [facility] Admissions 11/30/21     Fax: [phone]                 My Care Plans  Self Management and Treatment Plan  Goals and (Comments)  Goals        General     1. Reducing Risks (pt-stated)      Notes - Note edited  11/30/2021 12:46 PM by Myhre, David J, RN     Goal Statement: I would like to have a home care RN assist me with my medications for better compliance in the next 2 months.   Date Goal set: 11/29/21  Barriers: Patient said he has difficulty managing his medications. Homebound.  Strengths: Strong advocate for self good family support.  Date to Achieve By: 1/29/21  Patient expressed understanding of goal: Yes  Action steps to achieve this  goal:  1. I understand the [facility] RN will request a home care order from Dr. Ramríez to have an RN come out and assist me with my medications.   2. I will continue to take my medications daily as best as I can until the home care nurse is able to come out. I will ask my niece for assistance when needed.   3. I will report progress towards this goal at scheduled outreach calls from my [facility] team.       2. Psychosocial (pt-stated)      Notes - Note edited  11/30/2021  1:49 PM by Myhre, David J, RN     Goal Statement: I would like to have in-home services on a regular basis to assist me with maintaining my apartment in the next 3 months. I would also like to know if I am   Date Goal set: 11/29/21  Barriers: Physical limitations makes it difficult for him to maintain on his own.  Strengths: Strong advocate for himself. Strong family support  Date to Achieve By: 2/28/21  Patient expressed understanding of goal: Yes  Action steps to achieve this goal:  1. I will speak with the [facility] Kylie MURPHY at schedule phone visit on 12/1/21 to discuss resource for in-home services and possible programs that may be available to me to assist me with paying for the services.   2. I will provide any necessary documentation, complete any required assessments and complete any paperwork that may be associated with this goal in a timely manner.   3. I will report progress towards this goal at scheduled outreach telephone call from my [facility] team.         3. Functional (pt-stated)      Notes - Note created  11/30/2021 12:29 PM by Myhre, David J, RN     Goal Statement: I would like to get a new walker to assist me with safe ambulation in the next 3 months.   Date Goal set: 11/29/21  Barriers: Currently experiencing difficulty with ambulation.   Strengths: Strong advocate for self. Good family support.   Date to Achieve By: 3/29/22  Patient expressed understanding of goal: Yes  Action steps to achieve this goal:  1. I will understand the [facility] JUANCARLOS ivey  request a DME order for a walker from Dr Ramírez.   2. Dr. Ramírez will review the request and will have his assistant fax a DME order to [facility] is he feels the equipment is necessary.   3. I will follow up with [facility] in order to get the walker.   4. I will report progress towards this goal at scheduled outreach telephone calls from my [facility] team. If I have any difficulty getting the walker, I will contact my [facility] team.          4. Psychosocial (pt-stated)      Notes - Note created  11/30/2021 12:52 PM by Myhre, David J, RN     Goal Statement: I would like to have resource for in-home meal delivery programs in the next 30 days.   Date Goal set: 11/29/21  Barriers: Patient is currently dependent on family to bring food/groceries in.    Strengths: Strong advocate for himself. Strong family support.   Date to Achieve By: 12/29/21  Patient expressed understanding of goal: Yes  Action steps to achieve this goal:  1. I will speak with the [facility] LSW Kylie at scheduled outreach telephone call on 12/1/21 to discuss resources for delivered meals that may be available to me.   2. I decide which resource best suits my needs and will contact them directly.   3. I will report progress towards this goal at scheduled outreach telephone calls from my [facility] team.               Action Plans on File:                       Advance Care Plans/Directives Type:   No data recorded    My Medical and Care Information  Problem List   Patient Active Problem List   Diagnosis     Osteoarthritis Of The Knee     Esophageal reflux     Obstructive sleep apnea     Ptosis Of Eyelid     Hyperlipidemia     Anemia     Crohn's (Granulomatous) Colitis     Benign Essential Hypertension     Coronary atherosclerosis of native coronary artery     Ischemic cardiomyopathy     Paroxysmal ventricular tachycardia (H)     Dry Nonexudative Macular Degeneration     Serum Enzyme Levels - Alkaline Phosphatase Elevated     Dysphagia      ICD (implantable cardioverter-defibrillator), single, in situ     Lumbar stenosis with neurogenic claudication     Sacroiliac joint dysfunction of right side     Stage 3 chronic kidney disease (H)     Warfarin-induced coagulopathy (H)     A-fib (H)     Anemia, unspecified type     Cerebral aneurysm     Osteoporosis     Ileitis     Heart failure with reduced ejection fraction (H)     Diaphragmatic hernia     Pharyngoesophageal dysphagia     History of Crohn's disease     Iron deficiency anemia     Polyp of stomach and duodenum     Reflux esophagitis     Rotator cuff tear arthropathy of right shoulder     Schatzki's ring     Diverticulosis of colon     Flatulence, eructation and gas pain     S/P cervical spinal fusion     S/P laparoscopic cholecystectomy     Hypomagnesemia     Acute renal failure (ARF) (H)     Dizziness     Polyp of colon     ICD (implantable cardioverter-defibrillator) in place     Esophageal obstruction due to food impaction     ACP (advance care planning)     SOB (shortness of breath)     Acute systolic (congestive) heart failure (H)     Acute on chronic systolic heart failure (H)     Permanent atrial fibrillation (H)     Supratherapeutic INR     Cardiac pacemaker in situ     Pneumonia due to infectious organism, unspecified laterality, unspecified part of lung     Anemia     Benign essential hypertension     CAD (coronary artery disease), native coronary artery     Atrial fibrillation (H)     Pulmonary embolism with infarction (H)     LBBB (left bundle branch block)     Sciatica     Presbyesophagus     Gastroenteritis     Chronic anticoagulation     Acute blood loss anemia     Closed fracture of proximal end of left humerus with routine healing     Hypokalemia     Loose stools     Weakness     Polyp of duodenum     Personal history of colonic polyps     Diarrhea     Advised about management of weight      Current Medications and Allergies:  See printed Medication Report.    Care Coordination  Start Date: 11/29/2021   Frequency of Care Coordination: No data recorded   Form Last Updated: 11/30/2021

## 2021-11-29 NOTE — LETTER
[facility] CARE COORDINATION  [facility]    November 30, 2021    Tanmayumberto Israel  [address]   [place] [address]      Dear Tanmay,    I am a clinic care coordinator who works with Go Ramírez MD, MD at the [facility]. I wanted to thank you for spending the time to talk with me.  Below is a description of clinic care coordination and how I can further assist you.      The clinic care coordination team is made up of a registered nurse,  and community health worker who understand the health care system. The goal of clinic care coordination is to help you manage your health and improve access to the health care system in the most efficient manner. The team can assist you in meeting your health care goals by providing education, coordinating services, strengthening the communication among your providers and supporting you with any resource needs.    Please feel free to contact me at [phone] with any questions or concerns. We are focused on providing you with the highest-quality healthcare experience possible and that all starts with you.     Sincerely,     David Myhre, RN    Enclosed: I have enclosed a copy of the Patient Centered Plan of Care. This has helpful information and goals that we have talked about. Please keep this in an easy to access place to use as needed.

## 2021-11-30 NOTE — TELEPHONE ENCOUNTER
Dawson Ramírez,     I spoke with patient and his niece Mary Kay late yesterday afternoon for enrollment in [facility].   Patient would like an order for home care to have an RN assist him with managing his medications for better compliance. Patient is basically homebound at this time related mobility issues. It sounds as though he may benefit from PT and OT too, but he was apprehensive about working with these disciplines a this time. Please place order if you agree with this request.     Patient is also in need of a walker for safe ambulation. He stated he was given a walker, but it is too short for him and has other condition issues. Please send DME order to [facility] in [place] if you agree with this request. His last face to face with you was on 11/22/21.    The Kylie MANCERA will be assisting him with getting delivered meals and with in-home services to assist him with maintaining his apartment.       Thanks for your help,     Dave Myhre, RN  [facility] RN

## 2021-11-30 NOTE — PROGRESS NOTES
Clinic Care Coordination Contact    Clinic Care Coordination Contact  OUTREACH    Referral Information:  Referral Source: PCP         Chief Complaint   Patient presents with     Clinic Care Coordination - Initial        Universal Utilization:   Clinic Utilization  Difficulty keeping appointments:: No  Compliance Concerns: No  No-Show Concerns: No  No PCP office visit in Past Year: No  Utilization    Hospital Admissions  1             ED Visits  4             No Show Count (past year)  1                Current as of: 11/30/2021  1:48 PM              Clinical Concerns:  Current Medical Concerns:  Patient is a 81 year old man with a history of HTN, DANIEL, esophageal reflux, Chron's colitis, diverticulosis, hyperlipidemia, hypomagnesemia, hypokalemia, coronary atherosclerosis of native coronary artery, ICD in situ, cardiac pacemaker in situ, CAD, LBBB, rotator cuff tear arthropathy of right shoulder, sacroiliac joint dysfunction of right side, stage 3 chronic kidney disease, chronic anticoagulation, weakness.   Patient reported a history of chronic shoulder and sciatic pain currently. He stated his pan has limited his ability to ambulate long distances at this time and to perform tasks he was previously able to do independently. He stated he is basically homebound as he cannot drive in his current state. He stated he has been working with a provider at [facility] regarding his pain. They have currently prescribed tramadol for his pain, but he said this medications has not been effective and would like something stronger. He was encouraged to contact his provider at [facility] to discuss this concern. Patient said he has worked with physical therapy with regards to his pain, but found this to be unhelpful. He was not in favor of getting in-home PT again.   Patient said he does have some difficulty managing his medications. He open to the idea of having a home care RN assist him with managing his  medications. Writer will forward this request for home care RN to his PCP. In the meantime, patient was encouraged to ask his niece for assistance with setting up his medications. Patient reported he has good support from his niece and nephews. Patient centered goal around getting home care assistance with his medications was established during today's assessment.   Patient said she would like a new walker to assist him with ambulation. He stated he is currently using a cane and a walker that was given to him. He stated his current walker is too short for him and has other condition issues. Writer will forward request for new walker to his PCP. Goal around getting a new walker was established during assessment today.   Current Behavioral Concerns: Patient denied any mental health concerns at this time.     Education Provided to patient: Discussed the importance of taking his medications daily as directed. Encouraged him to request assistance from family if needed. Encouraged him to contact [facility] regarding concerns about pain management. Encouraged him to attend all upcoming scheduled appointments including lab appointments. Discussed safety methods to implement in his home to prevent falls/inury.    Pain  Pain (GOAL):: No  Type: Chronic (>3mo)  Location of chronic pain:: sciatic pain, shoulder pain  Radiating: No  Progression: Unchanged  Description of pain: Aching,Nagging,Sharp,Shooting  Chronic pain severity:: 8  Limitation of routine activities due to chronic pain:: Yes  Description: Able to do light housework  Alleviating Factors: Rest,Heat,Pain Medication,Stretching  Aggravating Factors: Activity  Patient currently working with [facility] regarding shoulder and back pain.   Health Maintenance Reviewed:    Health Maintenance Due   Topic Date Due     HF ACTION PLAN  Never done     MEDICARE ANNUAL WELLNESS VISIT  10/17/2018     MICROALBUMIN  05/22/2020     LIPID  05/19/2021         Medication  Management:  Medication review status: Medications reviewed and no changes reported per patient.          As noted above patient did report difficulty managing medications at times and compliance issues. Order for home care will be requested from PCP to help address this concern. Encouraged patient to ask for assistance from family in the meantime. CCC RN will be available to assist with medications set up in the Clinic additionally.     Functional Status:  Dependent ADLs:: Ambulation-cane  Dependent IADLs:: Cleaning,Laundry,Shopping,Meal Preparation  Bed or wheelchair confined:: No  Mobility Status: Independent w/Device  Fallen 2 or more times in the past year?: No  Any fall with injury in the past year?: No  Patient's niece and nephews are currently assisting him with IADLs. Patient would like to get in-home services to assist him at home on regular basis. He will speak with the [facility] LSW at scheduled phone visit on 12/1/21 to discuss possible resources and programs that may assist him with paying for these services.   Living Situation:  Current living arrangement:: I live alone  Type of residence:: Apartment    Lifestyle & Psychosocial Needs:    Social Determinants of Health     Tobacco Use: Medium Risk     Smoking Tobacco Use: Former Smoker     Smokeless Tobacco Use: Never Used   Alcohol Use: Not on file   Financial Resource Strain: Not on file   Food Insecurity: Not on file   Transportation Needs: Not on file   Physical Activity: Not on file   Stress: Not on file   Social Connections: Not on file   Intimate Partner Violence: Not on file   Depression: Not at risk     PHQ-2 Score: 0   Housing Stability: Not on file     Diet:: No added salt  Inadequate nutrition (GOAL):: No  Tube Feeding: No  Inadequate activity/exercise (GOAL):: No  Significant changes in sleep pattern (GOAL): No  Transportation means:: Medical transport,Family     Anglican or spiritual beliefs that impact treatment:: No  Mental health DX::  No  Mental health management concern (GOAL):: No  Chemical Dependency Status: No Current Concerns  Chemical Dependency Management: Previous treatment  Informal Support system:: Family,Neighbors        Financial Concerns: Patient denied any financial concerns at this time.      Resources and Interventions:  Current Resources:      Community Resources: None     Equipment Currently Used at Home: cane, straight,walker, rolling,grab bar, toilet,shower chair  Employment Status: retired         Advance Care Plan/Directive  Advanced Care Plans/Directives on file:: Yes  Type Advanced Care Plans/Directives: Advanced Directive - On File          Goals:   Goals        General     Functional (pt-stated)      Notes - Note created  11/30/2021 12:29 PM by Myhre, David J, RN     Goal Statement: I would like to get a new walker to assist me with safe ambulation in the next 3 months.   Date Goal set: 11/29/21  Barriers: Currently experiencing difficulty with ambulation.   Strengths: Strong advocate for self. Good family support.   Date to Achieve By: 3/29/22  Patient expressed understanding of goal: Yes  Action steps to achieve this goal:  1. I will understand the [facility] RN [place] will request a DME order for a walker from Dr Ramírez.   2. Dr. Ramírez will review the request and will have his assistant fax a DME order to [facility] is he feels the equipment is necessary.   3. I will follow up with [facility] in order to get the walker.   4. I will report progress towards this goal at scheduled outreach telephone calls from my [facility] team. If I have any difficulty getting the walker, I will contact my [facility] team.          Psychosocial (pt-stated)      Notes - Note created  11/30/2021 12:52 PM by Myhre, David J, RN     Goal Statement: I would like to have resource for in-home meal delivery programs in the next 30 days.   Date Goal set: 11/29/21  Barriers: Patient is currently dependent on family to bring  food/groceries in.    Strengths: Strong advocate for himself. Strong family support.   Date to Achieve By: 12/29/21  Patient expressed understanding of goal: Yes  Action steps to achieve this goal:  1. I will speak with the [facility] LSW Kylie at scheduled outreach telephone call on 12/1/21 to discuss resources for delivered meals that may be available to me.   2. I decide which resource best suits my needs and will contact them directly.   3. I will report progress towards this goal at scheduled outreach telephone calls from my [facility] team.         Psychosocial (pt-stated)      Notes - Note edited  11/30/2021  1:49 PM by Myhre, David J, RN     Goal Statement: I would like to have in-home services on a regular basis to assist me with maintaining my apartment in the next 3 months. I would also like to know if I am   Date Goal set: 11/29/21  Barriers: Physical limitations makes it difficult for him to maintain on his own.  Strengths: Strong advocate for himself. Strong family support  Date to Achieve By: 2/28/21  Patient expressed understanding of goal: Yes  Action steps to achieve this goal:  1. I will speak with the [facility] LSWKylie at schedule phone visit on 12/1/21 to discuss resource for in-home services and possible programs that may be available to me to assist me with paying for the services.   2. I will provide any necessary documentation, complete any required assessments and complete any paperwork that may be associated with this goal in a timely manner.   3. I will report progress towards this goal at scheduled outreach telephone call from my [facility] team.         Reducing Risks (pt-stated)      Notes - Note edited  11/30/2021 12:46 PM by Myhre, David J, RN     Goal Statement: I would like to have a home care RN assist me with my medications for better compliance in the next 2 months.   Date Goal set: 11/29/21  Barriers: Patient said he has difficulty managing his medications. Homebound.  Strengths: Strong advocate for  self good family support.  Date to Achieve By: 1/29/21  Patient expressed understanding of goal: Yes  Action steps to achieve this goal:  1. I understand the [facility] RN will request a home care order from Dr. Ramírez to have an RN come out and assist me with my medications.   2. I will continue to take my medications daily as best as I can until the home care nurse is able to come out. I will ask my niece for assistance when needed.   3. I will report progress towards this goal at scheduled outreach calls from my [facility] team.            Patient/Caregiver understanding: Verbalized understanding of goals and other information discussed at today's assessment.        Future Appointments              Tomorrow [facility] JESSICA    In 1 week [facility]          Plan: CCC RN will continue to monitor, support patient with current goals and will be available to assist as nursing needs arise. [facility] LSW will speak with patient and his niece Mary Kay at scheduled outreach phone call on 12/1/21 to discuss possible resources for in-home services and regarding programs that may assist in paying for these services. [facility] LSW will also discuss possible resources for delivered meals. [facility] CHW will continue to reach out to patient on a monthly basis to discuss progression of his goals.

## 2021-12-01 NOTE — TELEPHONE ENCOUNTER
Thank you for the clarification.    I amended the home care order from 11/22 - could you print and fax the home care order?

## 2021-12-01 NOTE — TELEPHONE ENCOUNTER
Returned call to patient, informed that he was due to have his INR checked on Monday 11/29/21.   Pt requests to have appt scheduled on 12/6/21. [facility] RN scheduled appt for patient.  Denies any further questions or concerns.     Alvaro D eDios RN

## 2021-12-01 NOTE — PROGRESS NOTES
Clinic Care Coordination Contact    Follow Up Progress Note      Assessment: Talked to patient and his niece Mary Kay. Reviewed the meal options including [facility].  They have [place] for 12-3.  Reviewed that in home services other than Medicare home care provides free support at this time.  If eligible, waiver services can be provided for him. He really would like to have life alert, and a nurse to set up his meds and do INR.  He could use help with housekeeping and laundry.  He would like to get [facility] meals and completed the referral form. He would prefer to get meals five days a week instead of 7. He will discuss with them when they call to set up the services.  They would deliver on Wednesdays.      He was ordered home care on 11-22 and when they called him to set up services he declined as he felt like it was a scam.  He wants another order sent and RN CC worked with PCP to get it done.  He will be using the same home care organization he has worked with in the past.     Care Gaps:    Health Maintenance Due   Topic Date Due     HF ACTION PLAN  Never done     MEDICARE ANNUAL WELLNESS VISIT  10/17/2018     MICROALBUMIN  05/22/2020     LIPID  05/19/2021       Postponed to next appt.     Goals addressed this encounter:   Goals Addressed                    This Visit's Progress       Patient Stated       1. Reducing Risks (pt-stated)   20%      Goal Statement: I would like to have a home care RN assist me with my medications for better compliance in the next 2 months.   Date Goal set: 11/29/21  Barriers: Patient said he has difficulty managing his medications. Homebound.  Strengths: Strong advocate for self good family support.  Date to Achieve By: 1/29/21  Patient expressed understanding of goal: Yes  Action steps to achieve this goal:  1. I understand the [facility] RN will request a home care order from Dr. Ramírez to have an RN come out and assist me with my medications.   2. I will continue to  take my medications daily as best as I can until the home care nurse is able to come out. I will ask my niece for assistance when needed.   3. I will report progress towards this goal at scheduled outreach calls from my [facility] team.  12-1 discussed, home care order sent again.          2. Psychosocial (pt-stated)   20%      Goal Statement: I would like to have in-home services on a regular basis to assist me with maintaining my apartment in the next 3 months. I would also like to know if I am   Date Goal set: 11/29/21  Barriers: Physical limitations makes it difficult for him to maintain on his own.  Strengths: Strong advocate for himself. Strong family support  Date to Achieve By: 2/28/21  Patient expressed understanding of goal: Yes  Action steps to achieve this goal:  1. I will speak with the [facility] Kylie MURPHY at schedule phone visit on 12/1/21 to discuss resource for in-home services and possible programs that may be available to me to assist me with paying for the services.   2. I will provide any necessary documentation, complete any required assessments and complete any paperwork that may be associated with this goal in a timely manner.   3. I will report progress towards this goal at scheduled outreach telephone call from my [facility] team.    12-1 [place] for 12-3 discussed         3. Functional (pt-stated)   10%      Goal Statement: I would like to get a new walker to assist me with safe ambulation in the next 3 months.   Date Goal set: 11/29/21  Barriers: Currently experiencing difficulty with ambulation.   Strengths: Strong advocate for self. Good family support.   Date to Achieve By: 3/29/22  Patient expressed understanding of goal: Yes  Action steps to achieve this goal:  1. I will understand the [facility] RN [place] will request a DME order for a walker from Dr Ramírez.   2. Dr. Ramírez will review the request and will have his assistant fax a DME order to [facility] is he feels  the equipment is necessary.   3. I will follow up with [facility] in order to get the walker.   4. I will report progress towards this goal at scheduled outreach telephone calls from my [facility] team. If I have any difficulty getting the walker, I will contact my [facility] team.   12-1 discussed, PCP is reviewing request for order         4. Psychosocial (pt-stated)   20%      Goal Statement: I would like to have resource for in-home meal delivery programs in the next 30 days.   Date Goal set: 11/29/21  Barriers: Patient is currently dependent on family to bring food/groceries in.    Strengths: Strong advocate for himself. Strong family support.   Date to Achieve By: 12/29/21  Patient expressed understanding of goal: Yes  Action steps to achieve this goal:  1. I will speak with the [facility] LSW Kylie at scheduled outreach telephone call on 12/1/21 to discuss resources for delivered meals that may be available to me.   2. I decide which resource best suits my needs and will contact them directly.   3. I will report progress towards this goal at scheduled outreach telephone calls from my [facility] team.      12-[place] for 12-3 discussed, referral to [facility]            Intervention/Education provided during outreach:  Help for in home and meals.      Outreach Frequency: monthly    Plan: Will follow up with patient and his niece in 2-3 weeks and as needed.     Kylie Suazo,   [facility]  [phone]

## 2021-12-01 NOTE — PROGRESS NOTES
CHW reviewed CC RN initial assessment from 11/29/21.    CHW delegations: CCC CHW will continue to reach out to patient on a monthly basis to discuss progression of his goals.    Next outreach due: 12/27/21

## 2021-12-01 NOTE — TELEPHONE ENCOUNTER
Thank you for your help.    An order for a wheeled walker has been placed, could you send it to [facility]?    I also need help with home care.  We have been going around in circles with this.    The purpose of the visit on 11/22 was for a Home Care referral - so I whole heartedly agree it is necessary including nurse, PT and OT.    He had home care in place as of 11/22 and informed me he was told it would end.  He clearly needed and wanted additional services including home health aide, but no longer wanted to do therapy this is why we placed a new referral.   The following day I received a message from home care stating he requested discharge as of 11/23.    In regard to the new referral made 11/22 a home health aide was sought as part of the process, this triggered a message from home care to set up PCA services instead.  I believe a colleague of mine covering messages at that time place a referral for PCA assessment, but I am not sure where that stands.      I am just not sure who needs what from me, I feel like I spend time entering orders repeatedly which triggers more messages and more people involved with less certainty what is happening.    I think contacting home care and determining what they need form me would be a great place to start - could you let me know what they need?  Any other help regarding PCA assessment and anything else you could help with would be great.

## 2021-12-01 NOTE — TELEPHONE ENCOUNTER
Dawson Ramírez,     I will fax the order for the walker to [facility].     I contacted [facility] today. They are willing to work with patient again, but need a new order for home care faxed to them. Their fax number is [phone]. Tanmay said he received a call from them after you placed the order on 11/22/21, but he thought it was a scam call and declined their services.     Patient has an assessment on Friday with [facility] to determine if he is eligible for PCA services, life alert, meals, RN services for home INRs and medication management. They will probably determine his eligibility by the end of the month.     In the meantime, we have assisted him with getting free delivered meals through [facility].    We will continue to follow up with patient to make sure his needs are getting met.     Thanks your for your help.     Dave Myhre, RN   CCC RN

## 2021-12-02 NOTE — TELEPHONE ENCOUNTER
FYI:  Recvd call from [facility], regarding the referral, this can not be accepted at this time as patient has been discharged. For a new referral patient would have had to been hospitalized again, a new DX or major change in status.    Services that have been requested again with recent referral have already been completed.

## 2021-12-06 NOTE — PROGRESS NOTES
"Community Paramedic Program  Community Health Worker Initial Outreach    Referral source: Pt's PCP & CC RN  Referral reason:  Reason(s) for visit: Med Check/Setup  Labs/Injections (please ensure orders have been placed in Epic)    Goals for visit(s): Medication Compliance  Other INR   How often should patient be seen: Weekly     Preference on when patient should be seen: 3-7 Days     Comments   PCP: Go Ramírez  Patient currently living alone. Home Care ended, but patient needs assistance with medications set-up and INRs. Patient has mobility issues and is primarily homebound. Good family support. Patient has MA is being evaluated for a waiver by [facility] today, so we hope this is only for a short time. Please let me know if you need any additional information regarding this patient. Dave Myhre, RN CCC. [phone]     Initial visit:  TBD      Additional information:  Called and spoke with pt. After I introduced myself, pt said \"I'm not interested, thank you.\" I let pt know that we are an F program and that I was asked to reach out by his doctor and clinic-based care coordinators. Pt said \"oh, well what do you want to do now?\" I confirmed referral information with him. I asked if he is interested in help with his medications at home, and he said yes. I told him that our CPs can do a few INR draws while he's working on establishing a long-term plan. Pt said \"I am using [facility] for this right now, is that you?\" I told him no. He asked if I could call him back next week to follow up, and I agreed. I offered to give him my name and phone number, but he declined. He said \"just call me back at this number and tell me who you are again next time.\" I thanked him for speaking with me before pt ended the call.              "

## 2021-12-06 NOTE — TELEPHONE ENCOUNTER
Staffed called wanting to get the verbal ok to HOLD warfarin for 4 days starting today because patient has a myelography on Friday, then restarting warfarin on 12/11.     Spoke with abdelrahman and verbal ok was given and if Dr. Ramírez would like to adjust anything to call them back at [phone] for further instruction.

## 2021-12-06 NOTE — PROGRESS NOTES
ANTICOAGULATION MANAGEMENT     Tanmay Israel 81 year old male is on warfarin with subtherapeutic INR result. (Goal INR 2.0-3.0)    Recent labs: (last 7 days)     12/06/21  1022   INR 1.7*       ASSESSMENT     Source(s): Chart Review and Patient/Caregiver Call       Warfarin doses taken: Warfarin taken as instructed    Diet: No new diet changes identified    New illness, injury, or hospitalization: No    Medication/supplement changes: None noted    Signs or symptoms of bleeding or clotting: No    Previous INR: Subtherapeutic    Additional findings: None     PLAN     Recommended plan for no diet, medication or health factor changes affecting INR     Dosing Instructions: Booster dose then Increase your warfarin dose (10% change) with next INR in 2 weeks       Summary  As of 12/6/2021    Full warfarin instructions:  1.25 mg every Mon, Fri, Sat; 2.5 mg all other days   Next INR check:  12/20/2021             Telephone call with Tanmay who agrees to plan and repeated back plan correctly    Patient offered & declined to schedule next visit    Education provided: Goal range and significance of current result and Contact [phone] with any changes, questions or concerns.     Plan made per [facility] anticoagulation protocol    Yaneth Wells RN  Anticoagulation Clinic  12/6/2021    _______________________________________________________________________     Anticoagulation Episode Summary     Current INR goal:  2.0-3.0   TTR:  51.0 % (1 y)   Target end date:     Send INR reminders to:  CHANI RIOS    Indications    Atrial fibrillation (H) [I48.91]  Pulmonary embolism with infarction (H) [I26.99]           Comments:           Anticoagulation Care Providers     Provider Role Specialty Phone number    Go Ramírez MD Referring Family Medicine [phone]

## 2021-12-08 NOTE — PROGRESS NOTES
Follow Up Progress Note      Assessment: Spoke with patient's niece Mary Kay today to follow up [facility] program, goals and any other needs or concerns. Mary Kay was aware [facility] CHW did try to reach out to patient. She said patient is willing to work with the [facility] for INRs and Medication Check. She confirmed he is not getting home care at this time. Mary Kay informed writer patient was approved for the waiver and said she has been in touch with [place] .  She stated she is unsure when waiver services will begin for patient, but she will be in contact with his  in the near future. Writer will reach out [facility] to let her know patient is interested in the program.   Mary Kay reported patient was approved for [facility] delivered meals. They will start delivering meals to them next week.   Mary Kay is aware the DME order for his walker was sent to [facility] location by Dr. Ramírez. She will follow up with them to get the walker.   No other needs or concerns at this time.     Care Gaps:    Health Maintenance Due   Topic Date Due     HF ACTION PLAN  Never done     MEDICARE ANNUAL WELLNESS VISIT  10/17/2018     MICROALBUMIN  05/22/2020     LIPID  05/19/2021       Patient accepted scheduling phone number for PCP  to schedule independently     Goals addressed this encounter:   Goals Addressed                    This Visit's Progress       1. Reducing Risks (pt-stated)   40%      Goal Statement: I would like to have a home care RN assist me with my medications for better compliance in the next 2 months.   Date Goal set: 11/29/21  Barriers: Patient said he has difficulty managing his medications. Homebound.  Strengths: Strong advocate for self good family support.  Date to Achieve By: 1/29/21  Patient expressed understanding of goal: Yes  Action steps to achieve this goal:  1. I understand the [facility] RN will request a  home care order from Dr. Ramírez to have an RN come out and assist me with my medications.   2. I will continue to take my medications daily as best as I can until the home care nurse is able to come out. I will ask my niece for assistance when needed.   3. I will report progress towards this goal at scheduled outreach calls from my [facility] team.  12-1 discussed, home care order sent again.          2. Psychosocial (pt-stated)   40%      Goal Statement: I would like to have in-home services on a regular basis to assist me with maintaining my apartment in the next 3 months. I would also like to know if I am   Date Goal set: 11/29/21  Barriers: Physical limitations makes it difficult for him to maintain on his own.  Strengths: Strong advocate for himself. Strong family support  Date to Achieve By: 2/28/21  Patient expressed understanding of goal: Yes  Action steps to achieve this goal:  1. I will speak with the [facility] Kylie MURPHY at schedule phone visit on 12/1/21 to discuss resource for in-home services and possible programs that may be available to me to assist me with paying for the services.   2. I will provide any necessary documentation, complete any required assessments and complete any paperwork that may be associated with this goal in a timely manner.   3. I will report progress towards this goal at scheduled outreach telephone call from my [facility] team.    12-1 has [place] for 12-3 discussed         3. Functional (pt-stated)   40%      Goal Statement: I would like to get a new walker to assist me with safe ambulation in the next 3 months.   Date Goal set: 11/29/21  Barriers: Currently experiencing difficulty with ambulation.   Strengths: Strong advocate for self. Good family support.   Date to Achieve By: 3/29/22  Patient expressed understanding of goal: Yes  Action steps to achieve this goal:  1. I will understand the [facility] RN [place] will request a DME order for a walker from Dr Ramírez.   2. Dr. Ramírez  will review the request and will have his assistant fax a DME order to [facility] is he feels the equipment is necessary.   3. I will follow up with [facility] in order to get the walker.   4. I will report progress towards this goal at scheduled outreach telephone calls from my [facility] team. If I have any difficulty getting the walker, I will contact my [facility] team.   12-1 discussed, PCP is reviewing request for order         4. Psychosocial (pt-stated)   50%      Goal Statement: I would like to have resource for in-home meal delivery programs in the next 30 days.   Date Goal set: 11/29/21  Barriers: Patient is currently dependent on family to bring food/groceries in.    Strengths: Strong advocate for himself. Strong family support.   Date to Achieve By: 12/29/21  Patient expressed understanding of goal: Yes  Action steps to achieve this goal:  1. I will speak with the [facility] LSW Kylie at scheduled outreach telephone call on 12/1/21 to discuss resources for delivered meals that may be available to me.   2. I decide which resource best suits my needs and will contact them directly.   3. I will report progress towards this goal at scheduled outreach telephone calls from my [facility] team.      12-1 [place] for 12-3 discussed, referral to [facility]            Intervention/Education provided during outreach: Discussed Paramedic Program.           Plan: [facility] RN will continue to monitor, support patient with current goals and will be available to assist as nursing needs arise. [facility] CHW will continue to reach out to patient on a monthly basis to discuss progression of her goals.     Care Coordinator will perform Chart Review in 45 days.

## 2021-12-10 NOTE — PROCEDURES
Procedure: Fluoroscopic guided lumbar puncture for CT myelogram    Radiologist: Italo Sebastian MD    Contrast: 20 ml omnipaque 180  Fluoro Time: 0.6 minutes  Number of images: 2     EBL: Minimal  Complications: None    Preliminary findings: (see dictation for full detail)  1. Technically successful fluoroscopic guided lumbar puncture at L4-5 for lumbar myelogram    Assess/Plan:   Routine post procedure monitoring  Bedrest for 1 hour  Report to ordering provider    Italo Sebastian MD  Pager: [phone]  Emergency pager: [phone]  Office: [phone]

## 2021-12-10 NOTE — DISCHARGE INSTRUCTIONS
The contrast used during myelography is water-based and will be absorbed by your body and removed in your urine. It may cause some side effects including nausea, vomiting, headaches, and rarely a seizure. If a headache develops, it is usually mild by can be more severe, and can last a few days to a week.     The day of the myelogram:   1. A responsible adult must stay with you overnight. It is very important that someone is with you or is available if you become ill.   2. Do not drive or operate mechanical equipment.   3. Stay in bed with your head elevated 20-30 degrees (two pillows) for the rest of the day. You may get up to go to the bathroom, but immediately return to bed.   4. Refrain from excessive exertion and movements. Excessive movement increases the possibility of severity of the headache.   5. Return to your normal diet.   6. Drink plenty of fluids. This helps to flush the contrast through your kidneys. You are encouraged to drink several glasses of fluid each hour.   7. Do not drink alcoholic beverages.     The day after the myelogram:   1. You may return to normal daily activities, but avoid strenuous work or exercise.   2. If you develop a severe headache, lie absolutely flat for 4-6 hours. If it continues or worsens, please call your referring physician.    The radiologist will study and interpret the films, and will report these findings to your doctor. Your own personal doctor will explain the results with you.    If you have questions or concerns about the myelogram, you may call the [facility] Radiology Department listed below:    [facility]    [phone]

## 2021-12-10 NOTE — IP AVS SNAPSHOT
[facility]  [address] [place] [id number]  Phone: [phone]  Fax: [phone]                                  After Visit Summary   12/10/2021    Tanmay Israel   MRN: [id number]           After Visit Summary Signature Page    I have received my discharge instructions, and my questions have been answered. I have discussed any challenges I see with this plan with the nurse or doctor.    ..........................................................................................................................................  Patient/Patient Representative Signature      ..........................................................................................................................................  Patient Representative Print Name and Relationship to Patient    ..................................................               ................................................  Date                                   Time    ..........................................................................................................................................  Reviewed by Signature/Title    ...................................................              ..............................................  Date                                               Time          22EPIC Rev 08/18

## 2021-12-10 NOTE — IP AVS SNAPSHOT
After Visit Summary Template Not Found    This Print Group is only intended to be used in the After Visit Summary and can only be used in a report that uses a released After Visit Summary Template.                      MRN:[id number]                   After Visit Summary   12/10/2021    Tanmay Israel   MRN: [id number]           Visit Information        Provider Department      12/10/2021 12:00 PM LIVE IMAGING NURSE; LIVE RADIOLOGIST 1; LIVE [place] 2 [facility]           Review of your medicines      UNREVIEWED medicines. Ask your doctor about these medicines       Dose / Directions   acetaminophen 500 MG tablet  Commonly known as: TYLENOL      Dose: 1,000 mg  Take 1,000 mg by mouth every 8 hours as needed  Refills: 0     acetaminophen-codeine 300-30 MG tablet  Commonly known as: TYLENOL #3      Dose: 1 tablet  Take 1 tablet by mouth every 8 hours as needed for severe pain  Quantity: 6 tablet  Refills: 0     albuterol 108 (90 Base) MCG/ACT inhaler  Commonly known as: PROAIR HFA/PROVENTIL HFA/VENTOLIN HFA      Dose: 1-2 puff  Inhale 1-2 puffs into the lungs every 4 hours as needed Normally takes ~1 puff/ day  Refills: 0     bumetanide 1 MG tablet  Commonly known as: BUMEX      Refills: 0     calcium carbonate-vitamin D 600-200 MG-UNIT Tabs      Dose: 2 tablet  Take 2 tablets by mouth  Refills: 0     carvedilol 25 MG tablet  Commonly known as: COREG      Dose: 25 mg  Take 25 mg by mouth 2 times daily  Refills: 0     diclofenac 1 % topical gel  Commonly known as: VOLTAREN      Dose: 2 g  Apply 2 g topically 2 times daily  Refills: 0     erythromycin 5 MG/GM ophthalmic ointment  Commonly known as: ROMYCIN      Dose: 0.5 inch  Place 0.5 inches into both eyes daily  Refills: 0     ferrous sulfate 325 (65 Fe) MG tablet  Commonly known as: FEROSUL      Dose: 1 tablet  Take 1 tablet by mouth daily  Refills: 0     furosemide 20 MG tablet  Commonly known as: LASIX  Used for: Chronic  systolic congestive heart failure (H)      Dose: 40 mg  Take 2 tablets (40 mg) by mouth daily  Quantity: 180 tablet  Refills: 1     Lidocaine 4 % Patch  Commonly known as: LIDOCARE  Used for: Closed fracture of proximal end of left humerus with routine healing, unspecified fracture morphology, subsequent encounter      Dose: 1 patch  Place 1 patch onto the skin daily as needed for moderate pain To prevent lidocaine toxicity, patient should be patch free for 12 hrs daily.  Quantity: 10 patch  Refills: 3     losartan 100 MG tablet  Commonly known as: COZAAR  Used for: Ischemic cardiomyopathy, Stage 3a chronic kidney disease (H), Chronic systolic congestive heart failure (H)      Dose: 100 mg  Take 1 tablet (100 mg) by mouth daily  Quantity: 90 tablet  Refills: 3     Multi-vitamin tablet      Dose: 1 tablet  Take 1 tablet by mouth daily  Refills: 0     omeprazole 20 MG DR capsule  Commonly known as: priLOSEC      Dose: 1 capsule  Take 1 capsule by mouth daily  Refills: 0     ondansetron 8 MG tablet  Commonly known as: ZOFRAN      Dose: 8 mg  Take 8 mg by mouth as needed  Refills: 0     oxyCODONE 5 MG tablet  Commonly known as: ROXICODONE  Used for: Closed fracture of proximal end of left humerus with routine healing, unspecified fracture morphology, subsequent encounter      Dose: 2.5-5 mg  Take 0.5-1 tablets (2.5-5 mg) by mouth 2 times daily as needed for severe pain  Quantity: 15 tablet  Refills: 0     potassium chloride ER 20 MEQ CR tablet  Commonly known as: KLOR-CON M  Used for: Hypokalemia      TAKE ONE TABLET BY MOUTH ONCE DAILY  Quantity: 90 tablet  Refills: 2     Restasis 0.05 % ophthalmic emulsion  Generic drug: cycloSPORINE      Dose: 1 drop  Place 1 drop into both eyes 2 times daily  Refills: 0     rosuvastatin 20 MG tablet  Commonly known as: CRESTOR  Used for: Coronary artery disease involving native heart without angina pectoris, unspecified vessel or lesion type      TAKE 1 TABLET BY MOUTH DAILY AT  BEDTIME  Quantity: 90 tablet  Refills: 3     traMADol 50 MG tablet  Commonly known as: ULTRAM      Refills: 0     vitamin B-12 2000 MCG tablet  Commonly known as: CYANOCOBALAMIN      Dose: 2,000 mcg  Take 2,000 mcg by mouth daily  Refills: 0     warfarin ANTICOAGULANT 2.5 MG tablet  Commonly known as: COUMADIN      Dose: 1.25-2.5 mg  Take as directed by the anticoagulation clinic. If you are unsure how to take this medication, talk to your nurse or doctor.  Original instructions: Take 1.25-2.5 mg by mouth See Admin Instructions Take 1.25 mg Mon, Wed, Fri and 2.5 mg Sun, Tues, Thurs, Sat.  Refills: 0              Protect others around you: Learn how to safely use, store and throw away your medicines at www.disposemymeds.org.       Follow-ups after your visit       Your next 10 appointments already scheduled    Mar 24, 2022 12:00 AM  CARDIAC DEVICE CHECK - REMOTE with JAVED [facility] REMOTE DEVICE CHECK FROM HOME  [facility] ([facility] [address]  [place] [address]  [phone]         Care Instructions       After Care Instructions     Activity: Bedrest OUTPATIENT      Bedrest with head of bed flat for 1 hour then discharge. Patient is allowed to have head up for meals and for bathroom privileges only.           Further instructions from your care team       The contrast used during myelography is water-based and will be absorbed by your body and removed in your urine. It may cause some side effects including nausea, vomiting, headaches, and rarely a seizure. If a headache develops, it is usually mild by can be more severe, and can last a few days to a week.     The day of the myelogram:   1. A responsible adult must stay with you overnight. It is very important that someone is with you or is available if you become ill.   2. Do not drive or operate mechanical equipment.   3. Stay in bed with your head elevated 20-30 degrees (two  pillows) for the rest of the day. You may get up to go to the bathroom, but immediately return to bed.   4. Refrain from excessive exertion and movements. Excessive movement increases the possibility of severity of the headache.   5. Return to your normal diet.   6. Drink plenty of fluids. This helps to flush the contrast through your kidneys. You are encouraged to drink several glasses of fluid each hour.   7. Do not drink alcoholic beverages.     The day after the myelogram:   1. You may return to normal daily activities, but avoid strenuous work or exercise.   2. If you develop a severe headache, lie absolutely flat for 4-6 hours. If it continues or worsens, please call your referring physician.    The radiologist will study and interpret the films, and will report these findings to your doctor. Your own personal doctor will explain the results with you.    If you have questions or concerns about the myelogram, you may call the [facility] Radiology Department listed below:    [facility]    [phone]    Additional Information About Your Visit       Care EveryWhere ID    This is your Care EveryWhere ID. This could be used by other organizations to access your [facility] medical records  [id number]       Your Vitals Were  Most recent update: 12/10/2021  1:31 PM    Blood Pressure   153/61    Pulse   66    Temperature   97.6  F (36.4  C) (Oral)    Respirations   18    Pulse Oximetry   99%          Primary Care Provider Office Phone # Fax #    Go Ramírez [phone]      Equal Access to Services    [facility] HospitalROSEMARY : Hadii jm haas hadasho Soomaali, waaxda luqadaha, qaybta kaalmada adeegyada, merary miller . So [facility] [phone].    ATENCIÓN: Si habla español, tiene a rodriguez disposición servicios gratuitos de asistencia lingüística. Llame al [phone].    We comply with applicable federal and state civil rights laws,  including the [place] Human Rights Act. We do not discriminate on the basis of race, color, creed, Judaism, national origin, marital status, age, disability, sex, sexual orientation, or gender identity.    If you would like an itemization of your charges they will now be available in [employer] 30 days after discharge. To access the itemized statements in [employer] go to billing/billing summary. From there select view account. There will be multiple tabs showing an overview of your account, detail, payments, and communications. From the communications tab you can see your monthly statements, your itemized statements, and any billing letters generated for your account. If you do not have a [employer] account and need help getting access please contact [employer] support at [phone].  If you would prefer to have your itemized statements mailed please contact our automated itemized bill request line at [phone] option  2.       Thank you!    Thank you for choosing [facility] for your care. Our goal is always to provide you with excellent care. Hearing back from our patients is one way we can continue to improve our services. Please take a few minutes to complete the written survey that you may receive in the mail after you visit with us. Thank you!            Medication List      ASK your doctor about these medications          Morning Afternoon Evening Bedtime As Needed    acetaminophen 500 MG tablet  Also known as: TYLENOL  INSTRUCTIONS: Take 1,000 mg by mouth every 8 hours as needed  LAST TAKEN: 650 mg on December 10, 2021 12:53 PM                     acetaminophen-codeine 300-30 MG tablet  Also known as: TYLENOL #3  INSTRUCTIONS: Take 1 tablet by mouth every 8 hours as needed for severe pain                     albuterol 108 (90 Base) MCG/ACT inhaler  Also known as: PROAIR HFA/PROVENTIL HFA/VENTOLIN HFA  INSTRUCTIONS: Inhale 1-2 puffs into the lungs every 4 hours as needed Normally takes ~1 puff/ day                      bumetanide 1 MG tablet  Also known as: BUMEX                     calcium carbonate-vitamin D 600-200 MG-UNIT Tabs  INSTRUCTIONS: Take 2 tablets by mouth                     carvedilol 25 MG tablet  Also known as: COREG  INSTRUCTIONS: Take 25 mg by mouth 2 times daily                     diclofenac 1 % topical gel  Also known as: VOLTAREN  INSTRUCTIONS: Apply 2 g topically 2 times daily                     erythromycin 5 MG/GM ophthalmic ointment  Also known as: ROMYCIN  INSTRUCTIONS: Place 0.5 inches into both eyes daily                     ferrous sulfate 325 (65 Fe) MG tablet  Also known as: FEROSUL  INSTRUCTIONS: Take 1 tablet by mouth daily                     furosemide 20 MG tablet  Also known as: LASIX  INSTRUCTIONS: Take 2 tablets (40 mg) by mouth daily                     Lidocaine 4 % Patch  Also known as: LIDOCARE  INSTRUCTIONS: Place 1 patch onto the skin daily as needed for moderate pain To prevent lidocaine toxicity, patient should be patch free for 12 hrs daily.                     losartan 100 MG tablet  Also known as: COZAAR  INSTRUCTIONS: Take 1 tablet (100 mg) by mouth daily                     Multi-vitamin tablet  INSTRUCTIONS: Take 1 tablet by mouth daily                     omeprazole 20 MG DR capsule  Also known as: priLOSEC  INSTRUCTIONS: Take 1 capsule by mouth daily                     ondansetron 8 MG tablet  Also known as: ZOFRAN  INSTRUCTIONS: Take 8 mg by mouth as needed                     oxyCODONE 5 MG tablet  Also known as: ROXICODONE  INSTRUCTIONS: Take 0.5-1 tablets (2.5-5 mg) by mouth 2 times daily as needed for severe pain                     potassium chloride ER 20 MEQ CR tablet  Also known as: KLOR-CON M  INSTRUCTIONS: TAKE ONE TABLET BY MOUTH ONCE DAILY                     Restasis 0.05 % ophthalmic emulsion  INSTRUCTIONS: Place 1 drop into both eyes 2 times daily  Generic drug: cycloSPORINE                     rosuvastatin 20 MG tablet  Also known as:  CRESTOR  INSTRUCTIONS: TAKE 1 TABLET BY MOUTH DAILY AT BEDTIME                     traMADol 50 MG tablet  Also known as: ULTRAM                     vitamin B-12 2000 MCG tablet  Also known as: CYANOCOBALAMIN  INSTRUCTIONS: Take 2,000 mcg by mouth daily                     warfarin ANTICOAGULANT 2.5 MG tablet  Also known as: COUMADIN  Take as directed by the anticoagulation clinic. If you are unsure how to take this medication, talk to your nurse or doctor.  Original instructions: Take 1.25-2.5 mg by mouth See Admin Instructions Take 1.25 mg Mon, Wed, Fri and 2.5 mg Sun, Tues, Thurs, Sat.

## 2021-12-13 NOTE — PROGRESS NOTES
"Heart Care Device Change-Out Checklist (HARLEY Checklist)    Device Data  ICD and Single    :  Medtronic   Model:  [id number] Protecta XT VR  Serial Number:  [id number]  Implant location: Left chest     Implant Date: 2/7/2013  HARLEY Date:  12/11/2021  Device Diagnosis:  Ischemic cardiomyopathy; Secondary prevention     Device Alert(s):  No    Lead Data  (Print Device History and attach to this document. Enter each lead  and model number.)      Last Clinic Interrogation Date: 8/5/2021      Right Ventricle: Medtronic [id number] Sprint Quattro Secure S   Lead Imp:  304 Ohms, Pacing Threshold:  0.75V @ 0.4 ms  and Sensing Threshold:  13.875 mV   Shock Imp: 70 Ohms       Old Leads Present/Abandoned: No    Lead Alert(s):  No    Diagnostic Information  Intrinsic Rhythm:  Atrial Fibrillation, v-rates 50-80s    Atrial Fibrillation:  Chronic Atrial-Fib  Takes Anticoagulant? Yes  Description:  Warfarin    Pacing Percentages  Atrial Pacing n/a% and Ventricle Pacing 27%  Pacemaker Dependent? No    History of VT/VF therapy    ATP: No  Appropriate Shocks:  N/A    Ejection Fraction  Last EF Date:  6/12/2021    By Echocardiogram  Last EF Measurement:  24%      Special Instructions/Timeframe for change-out:  Change out by 3/11/2022. Per last OV notes by Dr. Rolon: \" Ischemic cardiomyopathy with chronic systolic failure probably functional class I, decrease in ejection fraction notedPlan: Upgrade ICD to CRT D at the time of replacement of his generator for battery depletion.\"    RV lead is MRI compatible, use of  Medtronic MRI compatible CRT-D generator and LV lead at change out would make whole system MRI compatible.     Routed to EP:  Yes: Dr. Houston Rolon      Device RN: Adela Sauceda, JUANCARLOS         "

## 2021-12-13 NOTE — TELEPHONE ENCOUNTER
----- Message from Samina Stevens sent at 12/13/2021  7:07 AM CST -----  Regarding: device at HARLEY  device reached HARLEY on 12/11/21    Type: alert remote ICD transmission for device reaching HARLEY.   Presenting rhythm: ventricular pacing and sensing 50-75 bpm.  Battery/lead status: device reached HARLYE on 12/11/21.  Arrhythmias: since 12/8/21; no VT/VF detected.  Comments: normal ICD function. Routed to device RN to begin change out process.   Device/lead alerts: none. E. Pivec, Device Specialist    Transmission reviewed, agree with above. Device now at HARLEY, call placed to patient to review findings. LVM for callback. Will forward HARLEY checklist to Dr. Rolon.       Adela Sauceda RN

## 2021-12-14 NOTE — PROGRESS NOTES
EP MD/EP NC HARLEY Review  Dr Rolon reviewed HARLEY checklist  Reviewed by Sharon Chapin, RN 12/14/2021 9:45 AM    Pt aware of above orders and Order for procedure placed in EPIC and  aware    Houston Rolon MD  P [facility] Ep Support Pool - [facility]  Caller: Unspecified (Yesterday,  1:39 PM)  OK for replacement with upgrade to CRT-D from single chamber ICD.  Same vendor ? Thur.  No DFTs DD

## 2021-12-14 NOTE — PROGRESS NOTES
"Community Paramedic Program Contact  [facility]/Voicemail    CP Community Health Worker Outreach  Outreach attempted x 1.  Left message on pt's krystin Muñiz's voicemail with call back information and requested return call.  Plan: Community Health Worker will try to reach patient again in 1-2 business days.    Called and spoke briefly with pt. He said \"I have too much going on right now\" and asked me to call him back in a week. Left msg for pt's krystin Muñiz.    Note:  Available with CP2 on 12/28 in the pm?    Referral source: Pt's PCP & CC RN  Referral reason:  Reason(s) for visit: Med Check/Setup  Labs/Injections (please ensure orders have been placed in Epic)    Goals for visit(s): Medication Compliance  Other INR   How often should patient be seen: Weekly     Preference on when patient should be seen: 3-7 Days     Comments   PCP: Go Ramírez  Patient currently living alone. Home Care ended, but patient needs assistance with medications set-up and INRs. Patient has mobility issues and is primarily homebound. Good family support. Patient has MA is being evaluated for a waiver by [facility] today, so we hope this is only for a short time. Please let me know if you need any additional information regarding this patient. Dave Myhre, RN [facility]. [phone]                 "

## 2021-12-14 NOTE — TELEPHONE ENCOUNTER
Pre-Procedure Education Phone Call    Phone call to patients Mary Kay mcclelland to answer questions and she requested to review all instructions at this time.    Procedure: ICD generator change with with Dr Rolon on 12-23-21  Education: Reviewed Pre-Intra-Post education and instructions reviewed via phone  COIVD: scheduled on 12-21-21 at a  facility, results will be viewable in EPIC  Pre-Op: scheduled on 12-21-21 at PMD  Medications: Pt verbalized understanding of medication instructions pre procedure   Pt does live alone and explained that he will need an adult to stay with him for 24 hours after procedure.  12/14/2021 3:43 PM  Tanja Hsu RN

## 2021-12-14 NOTE — PROGRESS NOTES
H&P  PMD [x]  Received [] OV: []  Date: Teach  []   Orders  I [x] P  [x]  Letter []   COVID  FV/EPIC []  Date:  External  []  Date: AC:Warfarin  None []  Continue  [x]   Hold:  AM Meds: Take all [x]   Hold:  Bumex anf Lasix, pt has HF don't hold     INR Prior _______    1940  Home:[phone] (home) Cell:[phone] (mobile)  Emergency Contact: Mary Kay Jovel [phone]  PCP: Go Ramírez, [phone]      Important patient information for CSC/Cath Lab staff : PT IS IN A WHEELCHAIR AND IS A 2 PERSON ASSIST/ ALSO RECOVERING FROM A LEFT SHOULDER FRACTURE    [facility] EP Cath Lab Procedure Order     Device Implant/Revision:  Procedure: Generator Change Out  Device Type: Single ICD  Device Company/Device Rep Needed for Procedure: [employer]  Ordering Provider: Dr Rolon  Ordering Date: 12/14/2021    Diagnosis:  Generator Change- Device at HARLEY  Scheduling Needs Timeframe:  Pt hit HARLEY on 12/11/21, please change out within 3mo from HARLEY date  Scheduling Restrictions: Will need INR 3-4 days prior  Scheduling Contact: Please contact pt to schedule, if you are unable to schedule date within the next 24 hours please contact pt to update on scheduling process  Scheduling Follow-up apt for NEW HIS/CRT Implants: NA  Pre-Procedural Testing needed: COVID 19 nasal/lab test within 48hrs of procedure  Anesthesia:  Conscious Sedation- CV RN to administer    [facility] EP Cath Lab Prep   H&P:  Pt to schedule with PMD to complete    Pre-op Labs: Ordered AM of procedure    Medical Records Pertinent for Procedure:  Stress Test and Device Implant Record 2/7/13  Most Recent Lab Results: BMP- Within Acceptable Limits for Procedure Heme- Within Acceptable Limits for Procedure    Patient Education:    Teach with Patient: Will be completed via phone prior to procedure.    Risks Reviewed:      Internal Cardiac Defibrillator     <1% for each of the following: infection, bleeding, hematoma,   pneumothorax, subclavian vein thrombosis, cardiac  perforation, cardiac tamponade, arrhythmias, pectoral or diaphragmatic stimulation, air embolus, pocket erosion.    <4 % lead dislodgment; <1% lead fracture or generator malfunction.    <0.5% CVA or MI.     <0.1% death.    If external defibrillation is needed, 25% risk for superficial burn.    <5% risk of ICD system revision.    >95% VT/VF success implant rate.    Risks associated with general anesthesia will be addressed by the Anesthesiology Department.    For patients on anticoagulation, the risk of bleeding, hematoma, tamponade, and stroke with partial withdrawal are increased.    Patient education also completed on LIANE, spurious shocks, driving limitation, and psychological and social aspects of having an ICD.      Pre-Procedure Instruction:  NPO after midnight, Remove all jewelry prior to coming in for procedure, Shower prior to arrival, Notified patient of time and date of procedure by CV , Transportation arrangements needed s/p procedure, Post-procedure follow up process, Sedation plan/orders and Pre-procedure letter was sent to pt by CV     Pre-Procedure Medication Instructions:  Instructions given to pt regarding anticoagulants: Coumadin- instructed to continue anticoagulation uninterrupted through their procedure  Instructions given to pt regarding antiarrhythmic medication: N/A for this type of procedure; N/A  Instructions for medication, other than anticoagulants/antiarrhythmics listed above, given to pt: to take all morning medications with small sips of water, with the exception of OTC supplements and MVI  Allergies: Reviewed allergies, no concerns regarding orders for procedure    No Known Allergies    Current Outpatient Medications:      acetaminophen (TYLENOL) 500 MG tablet, Take 1,000 mg by mouth every 8 hours as needed , Disp: , Rfl:      acetaminophen-codeine (TYLENOL #3) 300-30 MG tablet, Take 1 tablet by mouth every 8 hours as needed for severe pain, Disp: 6 tablet, Rfl: 0      albuterol (PROAIR HFA/PROVENTIL HFA/VENTOLIN HFA) 108 (90 Base) MCG/ACT inhaler, Inhale 1-2 puffs into the lungs every 4 hours as needed Normally takes ~1 puff/ day, Disp: , Rfl:      bumetanide (BUMEX) 1 MG tablet, , Disp: , Rfl:      calcium carbonate-vitamin D 600-200 MG-UNIT TABS, Take 2 tablets by mouth, Disp: , Rfl:      carvedilol (COREG) 25 MG tablet, Take 25 mg by mouth 2 times daily, Disp: , Rfl:      cycloSPORINE (RESTASIS) 0.05 % ophthalmic emulsion, Place 1 drop into both eyes 2 times daily , Disp: , Rfl:      diclofenac (VOLTAREN) 1 % topical gel, Apply 2 g topically 2 times daily , Disp: , Rfl:      erythromycin (ROMYCIN) 5 MG/GM ophthalmic ointment, Place 0.5 inches into both eyes daily , Disp: , Rfl:      ferrous sulfate (FEROSUL) 325 (65 Fe) MG tablet, Take 1 tablet by mouth daily, Disp: , Rfl:      furosemide (LASIX) 20 MG tablet, Take 2 tablets (40 mg) by mouth daily, Disp: 180 tablet, Rfl: 1     Lidocaine (LIDOCARE) 4 % Patch, Place 1 patch onto the skin daily as needed for moderate pain To prevent lidocaine toxicity, patient should be patch free for 12 hrs daily., Disp: 10 patch, Rfl: 3     losartan (COZAAR) 100 MG tablet, Take 1 tablet (100 mg) by mouth daily, Disp: 90 tablet, Rfl: 3     multivitamin w/minerals (MULTI-VITAMIN) tablet, Take 1 tablet by mouth daily, Disp: , Rfl:      omeprazole (PRILOSEC) 20 MG DR capsule, Take 1 capsule by mouth daily, Disp: , Rfl:      ondansetron (ZOFRAN) 8 MG tablet, Take 8 mg by mouth as needed, Disp: , Rfl:      oxyCODONE (ROXICODONE) 5 MG tablet, Take 0.5-1 tablets (2.5-5 mg) by mouth 2 times daily as needed for severe pain, Disp: 15 tablet, Rfl: 0     potassium chloride ER (KLOR-CON M) 20 MEQ CR tablet, TAKE ONE TABLET BY MOUTH ONCE DAILY, Disp: 90 tablet, Rfl: 2     rosuvastatin (CRESTOR) 20 MG tablet, TAKE 1 TABLET BY MOUTH DAILY AT BEDTIME, Disp: 90 tablet, Rfl: 3     traMADol (ULTRAM) 50 MG tablet, , Disp: , Rfl:      vitamin B-12 (CYANOCOBALAMIN)  2000 MCG tablet, Take 2,000 mcg by mouth daily, Disp: , Rfl:      warfarin ANTICOAGULANT (COUMADIN) 2.5 MG tablet, Take 1.25-2.5 mg by mouth See Admin Instructions Take 1.25 mg Mon, Wed, Fri and 2.5 mg Sun, Tues, Thurs, Sat., Disp: , Rfl:     Documentation Date:12/14/2021 9:38 AM  Sharon Chapin RN

## 2021-12-15 NOTE — TELEPHONE ENCOUNTER
Phone call from Mary Kay becerril.  She states that the patient would like to reschedule his procedure until after the holiday's.  Will notify our scheduling team and pt will be rescheduled.

## 2021-12-17 NOTE — PROGRESS NOTES
"Community Paramedic Program  Community Health Worker Initial Outreach    Referral source: Pt's PCP & CC RN  Referral reason:  Reason(s) for visit: Med Check/Setup  Labs/Injections (please ensure orders have been placed in Epic)    Goals for visit(s): Medication Compliance  Other INR   How often should patient be seen: Weekly     Preference on when patient should be seen: 3-7 Days     Comments   PCP: Go Ramírez  Patient currently living alone. Home Care ended, but patient needs assistance with medications set-up and INRs. Patient has mobility issues and is primarily homebound. Good family support. Patient has MA is being evaluated for a waiver by [facility] today, so we hope this is only for a short time. Please let me know if you need any additional information regarding this patient. Dave Myhre, RN CCC. [phone]     Initial visit:  Scheduled for Monday, January 10th at 10 am at pt's apartment in [address].      Additional information:  Called and spoke with pt's nijaylin Muñiz. She said \"thank you so much for calling.\" Confirmed referral information with her, which she agreed to. She said \"I spoke with my uncle yesterday and asked if you'd called, and he said he told you to call him back at a different time.\" She also said \"I heard that he told you he had home care, which isn't correct.\" I confirmed this and did let her know that he speak with me and was open to hearing about our program. Mary Kay shared that pt currently has a \"problem with his sciatic nerve and he can't walk.\" She also shared that he's scheduled to get the battery in his pacemaker replaced on January 3rd. Mary Kay said \"I am just trying to help him get through all of this. He's feeling pretty overwhelmed.\" I let Mary Kay know that pt said the same thing when I called him earlier this week.    Mary Kay also shared that she spoke with pt's [facility]  today. She said \"Uncle Tanmay was approved for the Elderly Waiver, and " "she's coming out to meet with us on Tuesday afternoon. We're planning to talk about his needs and see what the [facility] can do to help.\" I thanked her for this and let her know I was happy to hear that news. She said they plan to ask about a \"transport chair and lifeline pendant.\" Mary Kay said \"transporting my uncle is really hard. He's on the second floor of his building and it's challenging to get him out and into the car.\" She said her two brothers \"help with moving him since I can't do it safely on my own.\"     Pt's niece also shared that pt fell and broke his left shoulder in August. She said \"while it's getting better, it still isn't totally healed. It's hard for him to get up and out of a chair because of it.\" She said he saw an orthopedic doctor in November and has a follow up appointment coming up in February.     Offered to schedule an initial visit with pt, and Mary Kay agreed. Gave her examples of what to expect during the initial visit and answered her questions about the program. We discussed the timing of the appointment and agreed on January 10th. I let Mary Kay know that if it needs to be adjusted for any reason, we are happy to be flexible and work with her and pt. Gave her CP Leyla's name and phone number. Also gave her my contact information per her request. She wrote it down and said \"I've been scribbling down notes as you've been talking.\"     Confirmed pt's address with Mary Kay. She said \"Leyla will need to use the call box in the lobby of his building to get in. She just needs to put in 206 and my uncle will buzz her in on his phone.\" Mary Kay said \"he's been having problems with his phone, so please tell her it may take more than once for her to call and for him to let her in.\" Thanked Mary Kay for this information and let her know I will pass it along to the CP.     Before ending the call, Mary Kay expressed her appreciation for the program. She said \"between the [facility] and your call today, I " "feel relieved and like we're moving forward.\" She said \"this is such a great resource for people who really need it, thank you so much.\" Wished her a happy weekend before hanging up.          "

## 2021-12-20 PROBLEM — I48.91 ATRIAL FIBRILLATION, UNSPECIFIED TYPE (H): Status: ACTIVE | Noted: 2021-01-01

## 2021-12-20 NOTE — PROGRESS NOTES
ANTICOAGULATION MANAGEMENT     Tanmay Israel 81 year old male is on warfarin with therapeutic INR result. (Goal INR 2.0-3.0)    Recent labs: (last 7 days)     12/20/21  1336   INR 2.9*       ASSESSMENT     Source(s): Chart Review and Patient/Caregiver Call       Warfarin doses taken: Warfarin taken as instructed    Diet: No new diet changes identified    New illness, injury, or hospitalization: No    Medication/supplement changes: None noted    Signs or symptoms of bleeding or clotting: No    Previous INR: Subtherapeutic    Additional findings: Upcoming surgery/procedure EP implantable cardio-defibrillator generator change on 1/3/22-- per prep note from 12/14, Tanmay does not need to hold warfarin.     PLAN     Recommended plan for no diet, medication or health factor changes affecting INR     Dosing Instructions: Continue your current warfarin dose with next INR in 2 weeks       Summary  As of 12/20/2021    Full warfarin instructions:  1.25 mg every Mon, Wed, Fri; 2.5 mg all other days   Next INR check:  1/3/2022             Telephone call with Tanmay who agrees to plan and repeated back plan correctly    will have INR checked prior to procedure on 1/3    Education provided: Goal range and significance of current result and Contact [phone] with any changes, questions or concerns.     Plan made per [facility] anticoagulation protocol    Yaneth Wells RN  Anticoagulation Clinic  12/20/2021    _______________________________________________________________________     Anticoagulation Episode Summary     Current INR goal:  2.0-3.0   TTR:  50.0 % (1 y)   Target end date:     Send INR reminders to:  CHANI RIOS    Indications    Atrial fibrillation (H) [I48.91]  Pulmonary embolism with infarction (H) [I26.99]           Comments:           Anticoagulation Care Providers     Provider Role Specialty Phone number    Go Ramírez MD Referring Family Medicine [phone]

## 2021-12-20 NOTE — PROGRESS NOTES
ANTICOAGULATION MANAGEMENT      Tanmay Israel due for annual renewal of referral to anticoagulation monitoring. Order pended for your review and signature.      ANTICOAGULATION SUMMARY      Warfarin indication(s)     Atrial fibrillation  PE    Heart valve present?  NO       Current goal range   INR: 2.0-3.0     Goal appropriate for indication? Yes, INR 2-3 appropriate for hx of DVT, PE, hypercoagulable state, Afib, LVAD, or bileaflet AVR without risk factors     Current duration of therapy not indicated   Time in Therapeutic Range (TTR)  (Goal > 60%) 50%       Office visit with referring provider's group within last year yes on 11/22/21       Yaneth Wells, RN

## 2021-12-22 NOTE — PROGRESS NOTES
Contact   Chart Review     Situation: Patient chart reviewed by .    Background: enrolled in care coordination.     Assessment: RN CC working closely with family and patient on goals.     Plan/Recommendations: No outreach needed by [facility] at this time. Available as needs arise.     Kylie Suazo,   [facility]  [phone]

## 2021-12-24 NOTE — TELEPHONE ENCOUNTER
Pre-Procedure Education Phone Call    PC to krystin Muñiz pts caregiver  Procedure: ICD with with Dr Rolon on 1/3 630am arrival  Education: Reviewed Pre-Intra-Post education and instructions reviewed via phone- refer to procedure prep for instructions that were reviewed  COIVD: scheduled on 12/30 at a  facility, results will be viewable in [facility]  Pre-Op: scheduled on 12/30 in EPIC  Medications: Pt verbalized understanding of medication instructions pre procedure  Important patient information for staff: Pt has broken left shoulder from Aug that is still recovering from, and has limited movement  12/24/2021 8:45 AM  Sharon Chapin RN

## 2021-12-28 NOTE — PROGRESS NOTES
Patient spoke with CCC RN on 12/8/21 and discussed goals     CHW delegations: CCC CHW will continue to reach out to patient on a monthly basis to discuss progression of her goals    Next outreach due: 1/7/22

## 2021-12-30 NOTE — PROGRESS NOTES
ANTICOAGULATION MANAGEMENT     Tanmay Israel 81 year old male is on warfarin with supratherapeutic INR result. (Goal INR 2.0-3.0)    Recent labs: (last 7 days)     12/30/21  1032   INR 3.2*       ASSESSMENT     Source(s): Chart Review and Patient/Caregiver Call       Warfarin doses taken: Warfarin taken as instructed    Diet: No new diet changes identified    New illness, injury, or hospitalization: No    Medication/supplement changes: None noted    Signs or symptoms of bleeding or clotting: No    Previous INR: Therapeutic last visit; previously outside of goal range    Additional findings: Upcoming surgery/procedure: EP implantable cardio-defibrillator generator change on 1/3     PLAN     Recommended plan for no diet, medication or health factor changes affecting INR     Dosing Instructions: Continue your current warfarin dose with next INR in 2 weeks       Summary  As of 12/30/2021    Full warfarin instructions:  1.25 mg every Mon, Wed, Fri; 2.5 mg all other days   Next INR check:  1/13/2022             Telephone call with Tanmay who verbalizes understanding and agrees to plan    Patient offered & declined to schedule next visit    Education provided: Goal range and significance of current result, When to seek medical attention/emergency care and Contact [phone] with any changes, questions or concerns.     Plan made per [facility] anticoagulation protocol    Yaneth Wells RN  Anticoagulation Clinic  12/30/2021    _______________________________________________________________________     Anticoagulation Episode Summary     Current INR goal:  2.0-3.0   TTR:  49.8 % (1 y)   Target end date:  Indefinite   Send INR reminders to:  CHANI RIOS    Indications    Atrial fibrillation (H) [I48.91]  Pulmonary embolism with infarction (H) [I26.99]  Atrial fibrillation  unspecified type (H) [I48.91]           Comments:           Anticoagulation Care Providers     Provider Role Specialty Phone number    Go Ramírez,  MD [facility] Family Medicine [phone]

## 2021-12-30 NOTE — PATIENT INSTRUCTIONS
Hold all supplements, aspirin and NSAIDs for 7 days prior to surgery.    Continue on your warfarin throughout the perioperative course.    Reach out to your cardiology team for clarification on what to do with your diuretics     Follow your surgeon's direction on when to stop eating and drinking prior to surgery.    Your surgeon will be managing your pain after your surgery.      Remove all jewelry and metal piercings before your surgery.     Remove nail polish from fingers before surgery.    If you use a CPAP machine, bring this with you to surgery.

## 2021-12-30 NOTE — H&P (VIEW-ONLY)
[facility]  [address] [facility] [address]  Phone: [phone]  Fax: [phone]  Primary Provider: Go Ramírez  Pre-op Performing Provider: KYLIE PEARCE      PREOPERATIVE EVALUATION:  Today's date: 12/30/2021    Tanmay Israel is a 81 year old male who presents for a preoperative evaluation.    Surgical Information:  Surgery/Procedure: Cardioverter defibrillator implant  Battery change   Surgery Location: [facility]   Surgeon: Dr Houston Rolon   Surgery Date: / 01/03/22  Time of Surgery: 8 am  Where patient plans to recover: At home with family  Fax number for surgical facility: [phone]    Type of Anesthesia Anticipated: to be determined    Assessment & Plan     The proposed surgical procedure is considered INTERMEDIATE risk.    Preop general physical exam  No contraindications for planned procedure    Encounter for screening for other viral diseases  - Asymptomatic COVID-19 Virus (Coronavirus) by PCR Nose    Ischemic cardiomyopathy with chronic systolic failure   Decrease in ejection fraction noted.  Plan is to upgrade ICD to CRT-D at time of replacement of his generator for battery depletion.  Appears well compensated today.  Euvolemic.  Excellent vital signs.  No orthopnea.    Benign Essential Hypertension  Controlled on current regimen.  There is a question of whether or not he should continue on his diuretics throughout the perioperative course.  I encouraged the patient to reach out to his cardiology team for clarification on this    Closed fracture of proximal end of left humerus with routine healing, unspecified fracture morphology, subsequent encounter  He fell and fractured his left shoulder this past August and is still having a bit of tenderness.  On tramadol    Stage 3b chronic kidney disease (H)  Recheck BMP today       Implanted Device:  ICD    Risks and Recommendations:  The patient has the following additional  risks and recommendations for perioperative complications:   - No identified additional risk factors other than previously addressed    Medication Instructions:  As above    RECOMMENDATION:  APPROVAL GIVEN to proceed with proposed procedure, without further diagnostic evaluation.    17 minutes spent on the date of the encounter doing chart review, history and exam, documentation and further activities per the note      Subjective     HPI related to upcoming procedure: As above    Preop Questions 12/30/2021   1. Have you ever had a heart attack or stroke? No   2. Have you ever had surgery on your heart or blood vessels, such as a stent placement, a coronary artery bypass, or surgery on an artery in your head, neck, heart, or legs? UNKNOWN - Maybe PCI?   3. Do you have chest pain with activity? No   4. Do you have a history of  heart failure? No   5. Do you currently have a cold, bronchitis or symptoms of other infection? No   6. Do you have a cough, shortness of breath, or wheezing? No   7. Do you or anyone in your family have previous history of blood clots? No   8. Do you or does anyone in your family have a serious bleeding problem such as prolonged bleeding following surgeries or cuts? No   9. Have you e/carlie had problems with anemia or been told to take iron pills? YES - recheck CBC   10. Have you had any abnormal blood loss such as black, tarry or bloody stools? No   11. Have you ever had a blood transfusion? No   12. Are you willing to have a blood transfusion if it is medically needed before, during, or after your surgery? Yes   13. Have you or any of your relatives ever had problems with anesthesia? No   14. Do you have sleep apnea, excessive snoring or daytime drowsiness? No   15. Do you have any artifical heart valves or other implanted medical devices like a pacemaker, defibrillator, or continuous glucose monitor? YES - ICD   15a. What type of device do you have? Pacemaker   15b. Name of the clinic that  manages your device:  [facility]   16. Do you have artificial joints? YES - Right Shoulder   17. Are you allergic to latex? No       Health Care Directive:  Patient has a Health Care Directive on file      Preoperative Review of :   reviewed - controlled substances reflected in medication list.    Status of Chronic Conditions:  See problem list for active medical problems.  Problems all longstanding and stable, except as noted/documented.  See ROS for pertinent symptoms related to these conditions.      Review of Systems  CONSTITUTIONAL: NEGATIVE for fever, chills, change in weight  INTEGUMENTARY/SKIN: NEGATIVE for worrisome rashes, moles or lesions  EYES: NEGATIVE for vision changes or irritation  ENT/MOUTH: NEGATIVE for ear, mouth and throat problems  RESP: NEGATIVE for significant cough or SOB  CV: NEGATIVE for chest pain, palpitations or peripheral edema  GI: NEGATIVE for nausea, abdominal pain, heartburn, or change in bowel habits  : NEGATIVE for frequency, dysuria, or hematuria  MUSCULOSKELETAL: NEGATIVE for significant arthralgias or myalgia  NEURO: NEGATIVE for weakness, dizziness or paresthesias  ENDOCRINE: NEGATIVE for temperature intolerance, skin/hair changes  HEME: NEGATIVE for bleeding problems  PSYCHIATRIC: NEGATIVE for changes in mood or affect    Patient Active Problem List    Diagnosis Date Noted     Atrial fibrillation, unspecified type (H) 12/20/2021     Priority: Medium     Personal history of colonic polyps 11/19/2021     Priority: Medium     Loose stools 09/20/2021     Priority: Medium     Hypokalemia 09/15/2021     Priority: Medium     Closed fracture of proximal end of left humerus with routine healing 09/02/2021     Priority: Medium     Weakness 08/24/2021     Priority: Medium     LBBB (left bundle branch block) 08/05/2021     Priority: Medium     Atrial fibrillation (H) 07/12/2021     Priority: Medium     Pulmonary embolism with infarction (H) 07/12/2021     Priority: Medium      Anemia      Priority: Medium     Created by Conversion  Formatting of this note might be different from the original.  Created by Conversion         Benign essential hypertension      Priority: Medium     Created by Conversion  Formatting of this note might be different from the original.  Created by Conversion         CAD (coronary artery disease), native coronary artery      Priority: Medium     CAB×4   Formatting of this note might be different from the original.  CAB×4         Pneumonia due to infectious organism, unspecified laterality, unspecified part of lung      Priority: Medium     Cardiac pacemaker in situ      Priority: Medium     Supratherapeutic INR      Priority: Medium     Acute systolic (congestive) heart failure (H) 06/12/2021     Priority: Medium     Acute on chronic systolic heart failure (H) 06/12/2021     Priority: Medium     Crohn's (Granulomatous) Colitis      Priority: Medium     Created by Conversion  Replacement Utility updated for latest IMO load         Benign Essential Hypertension      Priority: Medium     Created by Conversion         Coronary atherosclerosis of native coronary artery      Priority: Medium     CAB×4          Ischemic cardiomyopathy      Priority: Medium     LVEF 35% April 2016         A-fib (H)      Priority: Medium     [id number] risk score = 5 (age3/ CAD/ HTN/ CHF)  Chronic warfarin  Formatting of this note might be different from the original.  [id number] risk score = 5 (age3/ CAD/ HTN/ CHF)  Chronic warfarin         Permanent atrial fibrillation (H)      Priority: Medium     [id number]-[id number] risk score = 5 (age3/ CAD/ HTN/ CHF) Chronic warfarin  Formatting of this note might be different from the original.  [id number] risk score = 5 (age3/ CAD/ HTN/ CHF) Chronic warfarin         SOB (shortness of breath) 06/11/2021     Priority: Medium     Esophageal obstruction due to food impaction 05/10/2021     Priority: Medium     Added automatically from request for  surgery [id number]  Formatting of this note might be different from the original.  Added automatically from request for surgery [id number]         ICD (implantable cardioverter-defibrillator) in place 02/25/2021     Priority: Medium     Polyp of colon 09/04/2020     Priority: Medium     Dizziness 07/15/2019     Priority: Medium     Dysphagia      Priority: Medium     Created by Conversion  Formatting of this note might be different from the original.  Created by Conversion         Acute renal failure (ARF) (H) 05/22/2019     Priority: Medium     Warfarin-induced coagulopathy (H)      Priority: Medium     Hypomagnesemia 04/01/2019     Priority: Medium     S/P cervical spinal fusion 03/11/2019     Priority: Medium     S/P laparoscopic cholecystectomy 03/11/2019     Priority: Medium     Pharyngoesophageal dysphagia 12/04/2018     Priority: Medium     Polyp of stomach and duodenum 12/04/2018     Priority: Medium     Heart failure with reduced ejection fraction (H) 10/10/2018     Priority: Medium     Ileitis 09/07/2018     Priority: Medium     Diarrhea 05/21/2018     Priority: Medium     Advised about management of weight 05/21/2018     Priority: Medium     Obstructive sleep apnea      Priority: Medium     Created by Conversion         Paroxysmal ventricular tachycardia (H)      Priority: Medium     Created by Conversion         Osteoporosis 04/28/2017     Priority: Medium     Cerebral aneurysm 11/16/2016     Priority: Medium     Anemia, unspecified type      Priority: Medium     Polyp of duodenum 10/17/2016     Priority: Medium     Stage 3 chronic kidney disease (H) 09/27/2016     Priority: Medium     Hyperlipidemia      Priority: Medium     Created by Conversion         Sacroiliac joint dysfunction of right side 08/22/2016     Priority: Medium     Osteoarthritis Of The Knee      Priority: Medium     Created by Conversion  Replacement Utility updated for latest IMO load    Replacing diagnoses that were inactivated after the  10/1/2021 regulatory import.       Ptosis Of Eyelid      Priority: Medium     Created by Conversion  Replacement Utility updated for latest IMO load         Serum Enzyme Levels - Alkaline Phosphatase Elevated      Priority: Medium     Created by Conversion  Replacement Utility updated for latest IMO load         Lumbar stenosis with neurogenic claudication 07/05/2016     Priority: Medium     ICD (implantable cardioverter-defibrillator), single, in situ 10/20/2015     Priority: Medium     Anemia      Priority: Medium     Created by Conversion         Esophageal reflux      Priority: Medium     Created by Conversion  Formatting of this note might be different from the original.  Created by Conversion         Dry Nonexudative Macular Degeneration      Priority: Medium     Created by Conversion         Iron deficiency anemia 01/04/2013     Priority: Medium     History of Crohn's disease 12/06/2012     Priority: Medium     Overview:    Status post small bowel resection 1998- 12 inches  Formatting of this note might be different from the original.  Overview:    Status post small bowel resection 1998- 12 inches         Schatzki's ring 12/06/2012     Priority: Medium     Overview:    Multiple dilatations         ACP (advance care planning) 12/06/2012     Priority: Medium     Formatting of this note might be different from the original.   Full code  Formatting of this note might be different from the original.  Formatting of this note might be different from the original.   Full code         Sciatica 12/06/2012     Priority: Medium     Presbyesophagus 12/06/2012     Priority: Medium     Gastroenteritis 12/06/2012     Priority: Medium     Chronic anticoagulation 12/06/2012     Priority: Medium     Acute blood loss anemia 12/06/2012     Priority: Medium     Rotator cuff tear arthropathy of right shoulder 11/26/2012     Priority: Medium     Diaphragmatic hernia 11/20/2012     Priority: Medium     Diverticulosis of colon  05/12/2009     Priority: Medium     Flatulence, eructation and gas pain 12/09/2008     Priority: Medium     Reflux esophagitis 06/30/2008     Priority: Medium      Past Medical History:   Diagnosis Date     Acute cholecystitis 2/28/2019     Acute post-operative pain      Anemia      Arthritis      Atrial fibrillation (H)      C. difficile diarrhea 4/21/2019     Calculus of ureter      Callus      Cerebral aneurysm      Cervical myelopathy (H) 2/22/2019     Cervical spinal stenosis      Chronic kidney disease     stage 3     Chronic systolic congestive heart failure (H)      Closed fracture of distal end of left radius, unspecified fracture morphology, initial encounter      Closed fracture of distal end of right radius, unspecified fracture morphology, initial encounter      Closed fracture of first thoracic vertebra, unspecified fracture morphology, initial encounter (H)      Coronary artery disease      Crohn's disease (H)      Disorder of bursae and tendons in shoulder region     Created by Conversion  Replacement Utility updated for latest IMO load     Dysphagia      Fall 10/22/2016     GERD (gastroesophageal reflux disease)      Hyperlipidemia      Hypertension      Hypotension, unspecified hypotension type      ICD (implantable cardioverter-defibrillator) in place     Medtronic     Ischemic cardiomyopathy      Kidney stone      Low back pain      Lumbago     Created by Conversion      Macular degeneration      Myalgia and myositis, unspecified     Created by Conversion      Nonspecific elevation of levels of transaminase or lactic acid dehydrogenase (LDH)     Created by Conversion      Permanent atrial fibrillation (H)     [id number] risk score = 5 (age1/ CAD/ HTN/ CHF) Chronic warfarin     Personal history of colonic polyps 2/12/2019     Polyp of duodenum 10/17/2016     Pyuria      Right arm weakness 4/2/2019     Schatzki's ring      Sciatica     Created by Conversion      Sleep apnea     no CPAP      Spondylolisthesis of lumbar region 7/5/2016     Weakness 4/20/2019     Past Surgical History:   Procedure Laterality Date     APPENDECTOMY       ARTHROSCOPY SHOULDER ROTATOR CUFF REPAIR Right      BYPASS GRAFT ARTERY CORONARY  08/2004    Coronary Artery Quadruple Venous Bypass Graft     CARDIAC DEFIBRILLATOR PLACEMENT  2013    ICD Medtronic     CHOLECYSTECTOMY       COLONOSCOPY N/A 2/19/2020    Procedure: COLONOSCOPY;  Surgeon: Houston Medina MD;  Location: [facility];  Service: Gastroenterology     ESOPHAGOSCOPY, GASTROSCOPY, DUODENOSCOPY (EGD), COMBINED       IR LUMBAR EPIDURAL STEROID INJECTION  6/29/2005     IR LUMBAR EPIDURAL STEROID INJECTION  7/15/2005     IR LUMBAR EPIDURAL STEROID INJECTION  9/28/2005     IR LUMBAR EPIDURAL STEROID INJECTION  12/28/2005     IR LUMBAR EPIDURAL STEROID INJECTION  3/29/2006     IR LUMBAR EPIDURAL STEROID INJECTION  6/8/2006     PHACOEMULSIFICATION CLEAR CORNEA WITH STANDARD INTRAOCULAR LENS IMPLANT Bilateral      WI C- LAMINOPLASTY, 2 OR MORE Left 2/22/2019    Procedure: LEFT CERVICAL 4, 5, 6 LAMINOPLASTY;  Surgeon: Liza Cesar MD;  Location: [facility];  Service: Spine     WI ESOPHAGOGASTRODUODENOSCOPY TRANSORAL DIAGNOSTIC N/A 5/10/2021    Procedure: ESOPHAGOGASTRODUODENOSCOPY (EGD);  Surgeon: Franklin Mendoza MD;  Location: [facility];  Service: Gastroenterology     WI ESOPHAGOGASTRODUODENOSCOPY TRANSORAL DIAGNOSTIC N/A 6/16/2021    Procedure: ESOPHAGOGASTRODUODENOSCOPY (EGD), WITH ESOPHAGEAL DILATION AND BIOPSY;  Surgeon: Paramjit Brown MD;  Location: [facility];  Service: Gastroenterology     TONSILLECTOMY       XR MYELOGRAM CERVICAL THORACIC LUMBAR  1/17/2019     ZZC LAP,CHOLECYSTECTOMY/EXPLORE N/A 2/28/2019    CHOLECYSTECTOMY, LAPAROSCOPIC;  Surgeon: Mana Roman MD;  Location: [facility];  Service: General     Current Outpatient Medications   Medication Sig Dispense Refill     acetaminophen (TYLENOL) 500 MG  tablet Take 1,000 mg by mouth every 8 hours as needed        acetaminophen-codeine (TYLENOL #3) 300-30 MG tablet Take 1 tablet by mouth every 8 hours as needed for severe pain 6 tablet 0     albuterol (PROAIR HFA/PROVENTIL HFA/VENTOLIN HFA) 108 (90 Base) MCG/ACT inhaler Inhale 1-2 puffs into the lungs every 4 hours as needed Normally takes ~1 puff/ day       bumetanide (BUMEX) 1 MG tablet        calcium carbonate-vitamin D 600-200 MG-UNIT TABS Take 2 tablets by mouth       carvedilol (COREG) 25 MG tablet Take 25 mg by mouth 2 times daily       cycloSPORINE (RESTASIS) 0.05 % ophthalmic emulsion Place 1 drop into both eyes 2 times daily        diclofenac (VOLTAREN) 1 % topical gel Apply 2 g topically 2 times daily        erythromycin (ROMYCIN) 5 MG/GM ophthalmic ointment Place 0.5 inches into both eyes daily        ferrous sulfate (FEROSUL) 325 (65 Fe) MG tablet Take 1 tablet by mouth daily       furosemide (LASIX) 20 MG tablet Take 2 tablets (40 mg) by mouth daily 180 tablet 1     Lidocaine (LIDOCARE) 4 % Patch Place 1 patch onto the skin daily as needed for moderate pain To prevent lidocaine toxicity, patient should be patch free for 12 hrs daily. 10 patch 3     losartan (COZAAR) 100 MG tablet Take 1 tablet (100 mg) by mouth daily 90 tablet 3     multivitamin w/minerals (MULTI-VITAMIN) tablet Take 1 tablet by mouth daily       omeprazole (PRILOSEC) 20 MG DR capsule TAKE ONE CAPSULE BY MOUTH ONCE DAILY BEFORE BREAKFAST 90 capsule 1     ondansetron (ZOFRAN) 8 MG tablet Take 8 mg by mouth as needed       oxyCODONE (ROXICODONE) 5 MG tablet Take 0.5-1 tablets (2.5-5 mg) by mouth 2 times daily as needed for severe pain 15 tablet 0     potassium chloride ER (KLOR-CON M) 20 MEQ CR tablet TAKE ONE TABLET BY MOUTH ONCE DAILY 90 tablet 2     rosuvastatin (CRESTOR) 20 MG tablet TAKE 1 TABLET BY MOUTH DAILY AT BEDTIME 90 tablet 3     traMADol (ULTRAM) 50 MG tablet        vitamin B-12 (CYANOCOBALAMIN) 2000 MCG tablet Take 2,000  "mcg by mouth daily       warfarin ANTICOAGULANT (COUMADIN) 2.5 MG tablet Take 1.25-2.5 mg by mouth See Admin Instructions Take 1.25 mg Mon, Wed, Fri and 2.5 mg Sun, Tues, Thurs, Sat.         No Known Allergies     Social History     Tobacco Use     Smoking status: Former Smoker     Smokeless tobacco: Never Used   Substance Use Topics     Alcohol use: Not Currently     Family History   Problem Relation Age of Onset     Heart Disease Mother      Cerebrovascular Disease Mother      Crohn's Disease Father      Alcoholism Brother      No Known Problems Daughter      No Known Problems Son      No Known Problems Maternal Aunt      No Known Problems Maternal Uncle      No Known Problems Paternal Aunt      No Known Problems Paternal Uncle      No Known Problems Maternal Grandmother      No Known Problems Maternal Grandfather      No Known Problems Paternal Grandmother      No Known Problems Paternal Grandfather      Cancer Brother      Urolithiasis No family hx of      Gout No family hx of      History   Drug Use Unknown         Objective     /78   Pulse 68   Ht 1.689 m (5' 6.5\")   Wt 65.1 kg (143 lb 9.6 oz)   BMI 22.83 kg/m      Physical Exam    GENERAL APPEARANCE: healthy, alert and no distress     EYES: EOMI,  PERRL     HENT: ear canals and TM's normal and nose and mouth without ulcers or lesions     NECK: no adenopathy, no asymmetry, masses, or scars and thyroid normal to palpation     RESP: lungs clear to auscultation - no rales, rhonchi or wheezes     CV: regular rates and rhythm, normal S1 S2, no S3 or S4 and no murmur, click or rub     ABDOMEN:  soft, nontender, no HSM or masses and bowel sounds normal     MS: extremities normal- no gross deformities noted, no evidence of inflammation in joints, FROM in all extremities.     SKIN: no suspicious lesions or rashes     NEURO: Normal strength and tone, sensory exam grossly normal, mentation intact and speech normal     PSYCH: mentation appears normal. and affect " normal/bright     LYMPHATICS: No cervical adenopathy    Recent Labs   Lab Test 12/20/21  1336 12/06/21  1022 11/22/21  1302 10/18/21  0957 10/18/21  0954   HGB  --   --  10.9*  --  9.4*   PLT  --   --  163  --  133*   INR 2.9* 1.7*  --    < >  --    NA  --   --  142  --  141   POTASSIUM  --   --  4.0  --  4.0   CR  --   --  1.57*  --  1.37*    < > = values in this interval not displayed.        Diagnostics:  Labs pending at this time.  Results will be reviewed when available.         Revised Cardiac Risk Index (RCRI):  The patient has the following serious cardiovascular risks for perioperative complications:   - Coronary Artery Disease (MI, positive stress test, angina, Qs on EKG) = 1 point   - Congestive Heart Failure (pulmonary edema, PND, s3 reece, CXR with pulmonary congestion, basilar rales) = 1 point     RCRI Interpretation: 1 point: Class II (low risk - 0.9% complication rate)           Signed Electronically by: Dhaval Rashid CNP  Copy of this evaluation report is provided to requesting physician.

## 2021-12-30 NOTE — PROGRESS NOTES
[facility]  [address] [address]  Phone: [phone]  Fax: [phone]  Primary Provider: Go Ramírez  Pre-op Performing Provider: KYLIE PEARCE      PREOPERATIVE EVALUATION:  Today's date: 12/30/2021    Tanmay Israel is a 81 year old male who presents for a preoperative evaluation.    Surgical Information:  Surgery/Procedure: Cardioverter defibrillator implant  Battery change   Surgery Location: [facility]   Surgeon: Dr Houston Rolon   Surgery Date: / 01/03/22  Time of Surgery: 8 am  Where patient plans to recover: At home with family  Fax number for surgical facility: [phone]    Type of Anesthesia Anticipated: to be determined    Assessment & Plan     The proposed surgical procedure is considered INTERMEDIATE risk.    Preop general physical exam  No contraindications for planned procedure    Encounter for screening for other viral diseases  - Asymptomatic COVID-19 Virus (Coronavirus) by PCR Nose    Ischemic cardiomyopathy with chronic systolic failure   Decrease in ejection fraction noted.  Plan is to upgrade ICD to CRT-D at time of replacement of his generator for battery depletion.  Appears well compensated today.  Euvolemic.  Excellent vital signs.  No orthopnea.    Benign Essential Hypertension  Controlled on current regimen.  There is a question of whether or not he should continue on his diuretics throughout the perioperative course.  I encouraged the patient to reach out to his cardiology team for clarification on this    Closed fracture of proximal end of left humerus with routine healing, unspecified fracture morphology, subsequent encounter  He fell and fractured his left shoulder this past August and is still having a bit of tenderness.  On tramadol    Stage 3b chronic kidney disease (H)  Recheck BMP today       Implanted Device:  ICD    Risks and Recommendations:  The patient has the following additional  risks and recommendations for perioperative complications:   - No identified additional risk factors other than previously addressed    Medication Instructions:  As above    RECOMMENDATION:  APPROVAL GIVEN to proceed with proposed procedure, without further diagnostic evaluation.    17 minutes spent on the date of the encounter doing chart review, history and exam, documentation and further activities per the note      Subjective     HPI related to upcoming procedure: As above    Preop Questions 12/30/2021   1. Have you ever had a heart attack or stroke? No   2. Have you ever had surgery on your heart or blood vessels, such as a stent placement, a coronary artery bypass, or surgery on an artery in your head, neck, heart, or legs? UNKNOWN - Maybe PCI?   3. Do you have chest pain with activity? No   4. Do you have a history of  heart failure? No   5. Do you currently have a cold, bronchitis or symptoms of other infection? No   6. Do you have a cough, shortness of breath, or wheezing? No   7. Do you or anyone in your family have previous history of blood clots? No   8. Do you or does anyone in your family have a serious bleeding problem such as prolonged bleeding following surgeries or cuts? No   9. Have you e/carlie had problems with anemia or been told to take iron pills? YES - recheck CBC   10. Have you had any abnormal blood loss such as black, tarry or bloody stools? No   11. Have you ever had a blood transfusion? No   12. Are you willing to have a blood transfusion if it is medically needed before, during, or after your surgery? Yes   13. Have you or any of your relatives ever had problems with anesthesia? No   14. Do you have sleep apnea, excessive snoring or daytime drowsiness? No   15. Do you have any artifical heart valves or other implanted medical devices like a pacemaker, defibrillator, or continuous glucose monitor? YES - ICD   15a. What type of device do you have? Pacemaker   15b. Name of the clinic that  manages your device:  [facility]   16. Do you have artificial joints? YES - Right Shoulder   17. Are you allergic to latex? No       Health Care Directive:  Patient has a Health Care Directive on file      Preoperative Review of :   reviewed - controlled substances reflected in medication list.    Status of Chronic Conditions:  See problem list for active medical problems.  Problems all longstanding and stable, except as noted/documented.  See ROS for pertinent symptoms related to these conditions.      Review of Systems  CONSTITUTIONAL: NEGATIVE for fever, chills, change in weight  INTEGUMENTARY/SKIN: NEGATIVE for worrisome rashes, moles or lesions  EYES: NEGATIVE for vision changes or irritation  ENT/MOUTH: NEGATIVE for ear, mouth and throat problems  RESP: NEGATIVE for significant cough or SOB  CV: NEGATIVE for chest pain, palpitations or peripheral edema  GI: NEGATIVE for nausea, abdominal pain, heartburn, or change in bowel habits  : NEGATIVE for frequency, dysuria, or hematuria  MUSCULOSKELETAL: NEGATIVE for significant arthralgias or myalgia  NEURO: NEGATIVE for weakness, dizziness or paresthesias  ENDOCRINE: NEGATIVE for temperature intolerance, skin/hair changes  HEME: NEGATIVE for bleeding problems  PSYCHIATRIC: NEGATIVE for changes in mood or affect    Patient Active Problem List    Diagnosis Date Noted     Atrial fibrillation, unspecified type (H) 12/20/2021     Priority: Medium     Personal history of colonic polyps 11/19/2021     Priority: Medium     Loose stools 09/20/2021     Priority: Medium     Hypokalemia 09/15/2021     Priority: Medium     Closed fracture of proximal end of left humerus with routine healing 09/02/2021     Priority: Medium     Weakness 08/24/2021     Priority: Medium     LBBB (left bundle branch block) 08/05/2021     Priority: Medium     Atrial fibrillation (H) 07/12/2021     Priority: Medium     Pulmonary embolism with infarction (H) 07/12/2021     Priority: Medium      Anemia      Priority: Medium     Created by Conversion  Formatting of this note might be different from the original.  Created by Conversion         Benign essential hypertension      Priority: Medium     Created by Conversion  Formatting of this note might be different from the original.  Created by Conversion         CAD (coronary artery disease), native coronary artery      Priority: Medium     CAB×4   Formatting of this note might be different from the original.  CAB×4         Pneumonia due to infectious organism, unspecified laterality, unspecified part of lung      Priority: Medium     Cardiac pacemaker in situ      Priority: Medium     Supratherapeutic INR      Priority: Medium     Acute systolic (congestive) heart failure (H) 06/12/2021     Priority: Medium     Acute on chronic systolic heart failure (H) 06/12/2021     Priority: Medium     Crohn's (Granulomatous) Colitis      Priority: Medium     Created by Conversion  Replacement Utility updated for latest IMO load         Benign Essential Hypertension      Priority: Medium     Created by Conversion         Coronary atherosclerosis of native coronary artery      Priority: Medium     CAB×4          Ischemic cardiomyopathy      Priority: Medium     LVEF 35% April 2016         A-fib (H)      Priority: Medium     [id number] risk score = 5 (age1/ CAD/ HTN/ CHF)  Chronic warfarin  Formatting of this note might be different from the original.  [id number] risk score = 5 (age1/ CAD/ HTN/ CHF)  Chronic warfarin         Permanent atrial fibrillation (H)      Priority: Medium     [id number] risk score = 5 (age1/ CAD/ HTN/ CHF) Chronic warfarin  Formatting of this note might be different from the original.  [id number]-[id number] risk score = 5 (age1/ CAD/ HTN/ CHF) Chronic warfarin         SOB (shortness of breath) 06/11/2021     Priority: Medium     Esophageal obstruction due to food impaction 05/10/2021     Priority: Medium     Added automatically from request for  surgery [id number]  Formatting of this note might be different from the original.  Added automatically from request for surgery [id number]         ICD (implantable cardioverter-defibrillator) in place 02/25/2021     Priority: Medium     Polyp of colon 09/04/2020     Priority: Medium     Dizziness 07/15/2019     Priority: Medium     Dysphagia      Priority: Medium     Created by Conversion  Formatting of this note might be different from the original.  Created by Conversion         Acute renal failure (ARF) (H) 05/22/2019     Priority: Medium     Warfarin-induced coagulopathy (H)      Priority: Medium     Hypomagnesemia 04/01/2019     Priority: Medium     S/P cervical spinal fusion 03/11/2019     Priority: Medium     S/P laparoscopic cholecystectomy 03/11/2019     Priority: Medium     Pharyngoesophageal dysphagia 12/04/2018     Priority: Medium     Polyp of stomach and duodenum 12/04/2018     Priority: Medium     Heart failure with reduced ejection fraction (H) 10/10/2018     Priority: Medium     Ileitis 09/07/2018     Priority: Medium     Diarrhea 05/21/2018     Priority: Medium     Advised about management of weight 05/21/2018     Priority: Medium     Obstructive sleep apnea      Priority: Medium     Created by Conversion         Paroxysmal ventricular tachycardia (H)      Priority: Medium     Created by Conversion         Osteoporosis 04/28/2017     Priority: Medium     Cerebral aneurysm 11/16/2016     Priority: Medium     Anemia, unspecified type      Priority: Medium     Polyp of duodenum 10/17/2016     Priority: Medium     Stage 3 chronic kidney disease (H) 09/27/2016     Priority: Medium     Hyperlipidemia      Priority: Medium     Created by Conversion         Sacroiliac joint dysfunction of right side 08/22/2016     Priority: Medium     Osteoarthritis Of The Knee      Priority: Medium     Created by Conversion  Replacement Utility updated for latest IMO load    Replacing diagnoses that were inactivated after the  10/1/2021 regulatory import.       Ptosis Of Eyelid      Priority: Medium     Created by Conversion  Replacement Utility updated for latest IMO load         Serum Enzyme Levels - Alkaline Phosphatase Elevated      Priority: Medium     Created by Conversion  Replacement Utility updated for latest IMO load         Lumbar stenosis with neurogenic claudication 07/05/2016     Priority: Medium     ICD (implantable cardioverter-defibrillator), single, in situ 10/20/2015     Priority: Medium     Anemia      Priority: Medium     Created by Conversion         Esophageal reflux      Priority: Medium     Created by Conversion  Formatting of this note might be different from the original.  Created by Conversion         Dry Nonexudative Macular Degeneration      Priority: Medium     Created by Conversion         Iron deficiency anemia 01/04/2013     Priority: Medium     History of Crohn's disease 12/06/2012     Priority: Medium     Overview:    Status post small bowel resection 1998- 12 inches  Formatting of this note might be different from the original.  Overview:    Status post small bowel resection 1998- 12 inches         Schatzki's ring 12/06/2012     Priority: Medium     Overview:    Multiple dilatations         ACP (advance care planning) 12/06/2012     Priority: Medium     Formatting of this note might be different from the original.   Full code  Formatting of this note might be different from the original.  Formatting of this note might be different from the original.   Full code         Sciatica 12/06/2012     Priority: Medium     Presbyesophagus 12/06/2012     Priority: Medium     Gastroenteritis 12/06/2012     Priority: Medium     Chronic anticoagulation 12/06/2012     Priority: Medium     Acute blood loss anemia 12/06/2012     Priority: Medium     Rotator cuff tear arthropathy of right shoulder 11/26/2012     Priority: Medium     Diaphragmatic hernia 11/20/2012     Priority: Medium     Diverticulosis of colon  05/12/2009     Priority: Medium     Flatulence, eructation and gas pain 12/09/2008     Priority: Medium     Reflux esophagitis 06/30/2008     Priority: Medium      Past Medical History:   Diagnosis Date     Acute cholecystitis 2/28/2019     Acute post-operative pain      Anemia      Arthritis      Atrial fibrillation (H)      C. difficile diarrhea 4/21/2019     Calculus of ureter      Callus      Cerebral aneurysm      Cervical myelopathy (H) 2/22/2019     Cervical spinal stenosis      Chronic kidney disease     stage 3     Chronic systolic congestive heart failure (H)      Closed fracture of distal end of left radius, unspecified fracture morphology, initial encounter      Closed fracture of distal end of right radius, unspecified fracture morphology, initial encounter      Closed fracture of first thoracic vertebra, unspecified fracture morphology, initial encounter (H)      Coronary artery disease      Crohn's disease (H)      Disorder of bursae and tendons in shoulder region     Created by Conversion  Replacement Utility updated for latest IMO load     Dysphagia      Fall 10/22/2016     GERD (gastroesophageal reflux disease)      Hyperlipidemia      Hypertension      Hypotension, unspecified hypotension type      ICD (implantable cardioverter-defibrillator) in place     Medtronic     Ischemic cardiomyopathy      Kidney stone      Low back pain      Lumbago     Created by Conversion      Macular degeneration      Myalgia and myositis, unspecified     Created by Conversion      Nonspecific elevation of levels of transaminase or lactic acid dehydrogenase (LDH)     Created by Conversion      Permanent atrial fibrillation (H)     [id number] risk score = 5 (age1/ CAD/ HTN/ CHF) Chronic warfarin     Personal history of colonic polyps 2/12/2019     Polyp of duodenum 10/17/2016     Pyuria      Right arm weakness 4/2/2019     Schatzki's ring      Sciatica     Created by Conversion      Sleep apnea     no CPAP      Spondylolisthesis of lumbar region 7/5/2016     Weakness 4/20/2019     Past Surgical History:   Procedure Laterality Date     APPENDECTOMY       ARTHROSCOPY SHOULDER ROTATOR CUFF REPAIR Right      BYPASS GRAFT ARTERY CORONARY  08/2004    Coronary Artery Quadruple Venous Bypass Graft     CARDIAC DEFIBRILLATOR PLACEMENT  2013    ICD Medtronic     CHOLECYSTECTOMY       COLONOSCOPY N/A 2/19/2020    Procedure: COLONOSCOPY;  Surgeon: Houston Medina MD;  Location: [facility];  Service: Gastroenterology     ESOPHAGOSCOPY, GASTROSCOPY, DUODENOSCOPY (EGD), COMBINED       IR LUMBAR EPIDURAL STEROID INJECTION  6/29/2005     IR LUMBAR EPIDURAL STEROID INJECTION  7/15/2005     IR LUMBAR EPIDURAL STEROID INJECTION  9/28/2005     IR LUMBAR EPIDURAL STEROID INJECTION  12/28/2005     IR LUMBAR EPIDURAL STEROID INJECTION  3/29/2006     IR LUMBAR EPIDURAL STEROID INJECTION  6/8/2006     PHACOEMULSIFICATION CLEAR CORNEA WITH STANDARD INTRAOCULAR LENS IMPLANT Bilateral      CO C- LAMINOPLASTY, 2 OR MORE Left 2/22/2019    Procedure: LEFT CERVICAL 4, 5, 6 LAMINOPLASTY;  Surgeon: Liza Cesar MD;  Location: [facility];  Service: Spine     CO ESOPHAGOGASTRODUODENOSCOPY TRANSORAL DIAGNOSTIC N/A 5/10/2021    Procedure: ESOPHAGOGASTRODUODENOSCOPY (EGD);  Surgeon: Franklin Mendoza MD;  Location: [facility];  Service: Gastroenterology     CO ESOPHAGOGASTRODUODENOSCOPY TRANSORAL DIAGNOSTIC N/A 6/16/2021    Procedure: ESOPHAGOGASTRODUODENOSCOPY (EGD), WITH ESOPHAGEAL DILATION AND BIOPSY;  Surgeon: Paramjit Brown MD;  Location: [facility];  Service: Gastroenterology     TONSILLECTOMY       XR MYELOGRAM CERVICAL THORACIC LUMBAR  1/17/2019     ZZC LAP,CHOLECYSTECTOMY/EXPLORE N/A 2/28/2019    CHOLECYSTECTOMY, LAPAROSCOPIC;  Surgeon: Mana Roman MD;  Location: [facility];  Service: General     Current Outpatient Medications   Medication Sig Dispense Refill     acetaminophen (TYLENOL) 500 MG  tablet Take 1,000 mg by mouth every 8 hours as needed        acetaminophen-codeine (TYLENOL #3) 300-30 MG tablet Take 1 tablet by mouth every 8 hours as needed for severe pain 6 tablet 0     albuterol (PROAIR HFA/PROVENTIL HFA/VENTOLIN HFA) 108 (90 Base) MCG/ACT inhaler Inhale 1-2 puffs into the lungs every 4 hours as needed Normally takes ~1 puff/ day       bumetanide (BUMEX) 1 MG tablet        calcium carbonate-vitamin D 600-200 MG-UNIT TABS Take 2 tablets by mouth       carvedilol (COREG) 25 MG tablet Take 25 mg by mouth 2 times daily       cycloSPORINE (RESTASIS) 0.05 % ophthalmic emulsion Place 1 drop into both eyes 2 times daily        diclofenac (VOLTAREN) 1 % topical gel Apply 2 g topically 2 times daily        erythromycin (ROMYCIN) 5 MG/GM ophthalmic ointment Place 0.5 inches into both eyes daily        ferrous sulfate (FEROSUL) 325 (65 Fe) MG tablet Take 1 tablet by mouth daily       furosemide (LASIX) 20 MG tablet Take 2 tablets (40 mg) by mouth daily 180 tablet 1     Lidocaine (LIDOCARE) 4 % Patch Place 1 patch onto the skin daily as needed for moderate pain To prevent lidocaine toxicity, patient should be patch free for 12 hrs daily. 10 patch 3     losartan (COZAAR) 100 MG tablet Take 1 tablet (100 mg) by mouth daily 90 tablet 3     multivitamin w/minerals (MULTI-VITAMIN) tablet Take 1 tablet by mouth daily       omeprazole (PRILOSEC) 20 MG DR capsule TAKE ONE CAPSULE BY MOUTH ONCE DAILY BEFORE BREAKFAST 90 capsule 1     ondansetron (ZOFRAN) 8 MG tablet Take 8 mg by mouth as needed       oxyCODONE (ROXICODONE) 5 MG tablet Take 0.5-1 tablets (2.5-5 mg) by mouth 2 times daily as needed for severe pain 15 tablet 0     potassium chloride ER (KLOR-CON M) 20 MEQ CR tablet TAKE ONE TABLET BY MOUTH ONCE DAILY 90 tablet 2     rosuvastatin (CRESTOR) 20 MG tablet TAKE 1 TABLET BY MOUTH DAILY AT BEDTIME 90 tablet 3     traMADol (ULTRAM) 50 MG tablet        vitamin B-12 (CYANOCOBALAMIN) 2000 MCG tablet Take 2,000  "mcg by mouth daily       warfarin ANTICOAGULANT (COUMADIN) 2.5 MG tablet Take 1.25-2.5 mg by mouth See Admin Instructions Take 1.25 mg Mon, Wed, Fri and 2.5 mg Sun, Tues, Thurs, Sat.         No Known Allergies     Social History     Tobacco Use     Smoking status: Former Smoker     Smokeless tobacco: Never Used   Substance Use Topics     Alcohol use: Not Currently     Family History   Problem Relation Age of Onset     Heart Disease Mother      Cerebrovascular Disease Mother      Crohn's Disease Father      Alcoholism Brother      No Known Problems Daughter      No Known Problems Son      No Known Problems Maternal Aunt      No Known Problems Maternal Uncle      No Known Problems Paternal Aunt      No Known Problems Paternal Uncle      No Known Problems Maternal Grandmother      No Known Problems Maternal Grandfather      No Known Problems Paternal Grandmother      No Known Problems Paternal Grandfather      Cancer Brother      Urolithiasis No family hx of      Gout No family hx of      History   Drug Use Unknown         Objective     /78   Pulse 68   Ht 1.689 m (5' 6.5\")   Wt 65.1 kg (143 lb 9.6 oz)   BMI 22.83 kg/m      Physical Exam    GENERAL APPEARANCE: healthy, alert and no distress     EYES: EOMI,  PERRL     HENT: ear canals and TM's normal and nose and mouth without ulcers or lesions     NECK: no adenopathy, no asymmetry, masses, or scars and thyroid normal to palpation     RESP: lungs clear to auscultation - no rales, rhonchi or wheezes     CV: regular rates and rhythm, normal S1 S2, no S3 or S4 and no murmur, click or rub     ABDOMEN:  soft, nontender, no HSM or masses and bowel sounds normal     MS: extremities normal- no gross deformities noted, no evidence of inflammation in joints, FROM in all extremities.     SKIN: no suspicious lesions or rashes     NEURO: Normal strength and tone, sensory exam grossly normal, mentation intact and speech normal     PSYCH: mentation appears normal. and affect " normal/bright     LYMPHATICS: No cervical adenopathy    Recent Labs   Lab Test 12/20/21  1336 12/06/21  1022 11/22/21  1302 10/18/21  0957 10/18/21  0954   HGB  --   --  10.9*  --  9.4*   PLT  --   --  163  --  133*   INR 2.9* 1.7*  --    < >  --    NA  --   --  142  --  141   POTASSIUM  --   --  4.0  --  4.0   CR  --   --  1.57*  --  1.37*    < > = values in this interval not displayed.        Diagnostics:  Labs pending at this time.  Results will be reviewed when available.         Revised Cardiac Risk Index (RCRI):  The patient has the following serious cardiovascular risks for perioperative complications:   - Coronary Artery Disease (MI, positive stress test, angina, Qs on EKG) = 1 point   - Congestive Heart Failure (pulmonary edema, PND, s3 reece, CXR with pulmonary congestion, basilar rales) = 1 point     RCRI Interpretation: 1 point: Class II (low risk - 0.9% complication rate)           Signed Electronically by: Dhaval Rashid CNP  Copy of this evaluation report is provided to requesting physician.

## 2022-01-01 ENCOUNTER — TELEPHONE (OUTPATIENT)
Dept: PHYSICAL MEDICINE AND REHAB | Facility: CLINIC | Age: 82
End: 2022-01-01

## 2022-01-01 ENCOUNTER — LAB REQUISITION (OUTPATIENT)
Dept: LAB | Facility: CLINIC | Age: 82
End: 2022-01-01
Payer: COMMERCIAL

## 2022-01-01 ENCOUNTER — TELEPHONE (OUTPATIENT)
Dept: CARDIOLOGY | Facility: CLINIC | Age: 82
End: 2022-01-01

## 2022-01-01 ENCOUNTER — PATIENT OUTREACH (OUTPATIENT)
Dept: CARE COORDINATION | Facility: CLINIC | Age: 82
End: 2022-01-01

## 2022-01-01 ENCOUNTER — LAB (OUTPATIENT)
Dept: LAB | Facility: CLINIC | Age: 82
End: 2022-01-01
Payer: COMMERCIAL

## 2022-01-01 ENCOUNTER — ANCILLARY PROCEDURE (OUTPATIENT)
Dept: CARDIOLOGY | Facility: CLINIC | Age: 82
End: 2022-01-01
Attending: INTERNAL MEDICINE
Payer: COMMERCIAL

## 2022-01-01 ENCOUNTER — TELEPHONE (OUTPATIENT)
Dept: PHYSICAL MEDICINE AND REHAB | Facility: CLINIC | Age: 82
End: 2022-01-01
Payer: COMMERCIAL

## 2022-01-01 ENCOUNTER — CARE COORDINATION (OUTPATIENT)
Dept: NEUROSURGERY | Facility: CLINIC | Age: 82
End: 2022-01-01
Payer: COMMERCIAL

## 2022-01-01 ENCOUNTER — PATIENT OUTREACH (OUTPATIENT)
Dept: CARE COORDINATION | Facility: CLINIC | Age: 82
End: 2022-01-01
Payer: COMMERCIAL

## 2022-01-01 ENCOUNTER — OFFICE VISIT (OUTPATIENT)
Dept: PODIATRY | Facility: CLINIC | Age: 82
End: 2022-01-01
Attending: FAMILY MEDICINE
Payer: COMMERCIAL

## 2022-01-01 ENCOUNTER — ANTICOAGULATION THERAPY VISIT (OUTPATIENT)
Dept: ANTICOAGULATION | Facility: CLINIC | Age: 82
End: 2022-01-01

## 2022-01-01 ENCOUNTER — LAB (OUTPATIENT)
Dept: LAB | Facility: CLINIC | Age: 82
End: 2022-01-01

## 2022-01-01 ENCOUNTER — NURSE TRIAGE (OUTPATIENT)
Dept: NURSING | Facility: CLINIC | Age: 82
End: 2022-01-01

## 2022-01-01 ENCOUNTER — TELEPHONE (OUTPATIENT)
Dept: FAMILY MEDICINE | Facility: CLINIC | Age: 82
End: 2022-01-01

## 2022-01-01 ENCOUNTER — TELEPHONE (OUTPATIENT)
Dept: CARE COORDINATION | Facility: CLINIC | Age: 82
End: 2022-01-01
Payer: COMMERCIAL

## 2022-01-01 ENCOUNTER — TRANSFERRED RECORDS (OUTPATIENT)
Dept: HEALTH INFORMATION MANAGEMENT | Facility: CLINIC | Age: 82
End: 2022-01-01
Payer: COMMERCIAL

## 2022-01-01 ENCOUNTER — ALLIED HEALTH/NURSE VISIT (OUTPATIENT)
Dept: OTHER | Facility: CLINIC | Age: 82
End: 2022-01-01
Payer: COMMERCIAL

## 2022-01-01 ENCOUNTER — TELEPHONE (OUTPATIENT)
Dept: CARE COORDINATION | Facility: CLINIC | Age: 82
End: 2022-01-01

## 2022-01-01 ENCOUNTER — PREP FOR PROCEDURE (OUTPATIENT)
Dept: CARDIOLOGY | Facility: CLINIC | Age: 82
End: 2022-01-01

## 2022-01-01 ENCOUNTER — DOCUMENTATION ONLY (OUTPATIENT)
Dept: ANTICOAGULATION | Facility: CLINIC | Age: 82
End: 2022-01-01

## 2022-01-01 ENCOUNTER — TELEPHONE (OUTPATIENT)
Dept: ANTICOAGULATION | Facility: CLINIC | Age: 82
End: 2022-01-01
Payer: COMMERCIAL

## 2022-01-01 ENCOUNTER — APPOINTMENT (OUTPATIENT)
Dept: PHYSICAL THERAPY | Facility: HOSPITAL | Age: 82
End: 2022-01-01
Payer: COMMERCIAL

## 2022-01-01 ENCOUNTER — OFFICE VISIT (OUTPATIENT)
Dept: NEUROLOGY | Facility: CLINIC | Age: 82
End: 2022-01-01
Attending: FAMILY MEDICINE
Payer: COMMERCIAL

## 2022-01-01 ENCOUNTER — ANESTHESIA EVENT (OUTPATIENT)
Dept: SURGERY | Facility: HOSPITAL | Age: 82
End: 2022-01-01
Payer: COMMERCIAL

## 2022-01-01 ENCOUNTER — DOCUMENTATION ONLY (OUTPATIENT)
Dept: ANTICOAGULATION | Facility: CLINIC | Age: 82
End: 2022-01-01
Payer: COMMERCIAL

## 2022-01-01 ENCOUNTER — APPOINTMENT (OUTPATIENT)
Dept: NURSING | Facility: CLINIC | Age: 82
End: 2022-01-01
Payer: COMMERCIAL

## 2022-01-01 ENCOUNTER — APPOINTMENT (OUTPATIENT)
Dept: PHYSICAL THERAPY | Facility: HOSPITAL | Age: 82
End: 2022-01-01
Attending: INTERNAL MEDICINE
Payer: COMMERCIAL

## 2022-01-01 ENCOUNTER — PATIENT OUTREACH (OUTPATIENT)
Dept: NURSING | Facility: CLINIC | Age: 82
End: 2022-01-01
Payer: COMMERCIAL

## 2022-01-01 ENCOUNTER — OFFICE VISIT (OUTPATIENT)
Dept: PODIATRY | Facility: CLINIC | Age: 82
End: 2022-01-01
Payer: COMMERCIAL

## 2022-01-01 ENCOUNTER — OFFICE VISIT (OUTPATIENT)
Dept: FAMILY MEDICINE | Facility: CLINIC | Age: 82
End: 2022-01-01
Payer: COMMERCIAL

## 2022-01-01 ENCOUNTER — LAB (OUTPATIENT)
Dept: CARDIOLOGY | Facility: CLINIC | Age: 82
End: 2022-01-01
Payer: COMMERCIAL

## 2022-01-01 ENCOUNTER — PATIENT OUTREACH (OUTPATIENT)
Dept: OTHER | Facility: CLINIC | Age: 82
End: 2022-01-01
Payer: COMMERCIAL

## 2022-01-01 ENCOUNTER — OFFICE VISIT (OUTPATIENT)
Dept: CARDIOLOGY | Facility: CLINIC | Age: 82
End: 2022-01-01
Payer: COMMERCIAL

## 2022-01-01 ENCOUNTER — LAB (OUTPATIENT)
Dept: LAB | Facility: CLINIC | Age: 82
End: 2022-01-01
Attending: THORACIC SURGERY (CARDIOTHORACIC VASCULAR SURGERY)

## 2022-01-01 ENCOUNTER — OFFICE VISIT (OUTPATIENT)
Dept: PHYSICAL MEDICINE AND REHAB | Facility: CLINIC | Age: 82
End: 2022-01-01
Payer: COMMERCIAL

## 2022-01-01 ENCOUNTER — ANESTHESIA (OUTPATIENT)
Dept: SURGERY | Facility: HOSPITAL | Age: 82
End: 2022-01-01
Payer: COMMERCIAL

## 2022-01-01 ENCOUNTER — NURSE TRIAGE (OUTPATIENT)
Dept: NURSING | Facility: CLINIC | Age: 82
End: 2022-01-01
Payer: COMMERCIAL

## 2022-01-01 ENCOUNTER — HOSPITAL ENCOUNTER (OUTPATIENT)
Facility: HOSPITAL | Age: 82
Discharge: HOME | End: 2022-01-13
Attending: THORACIC SURGERY (CARDIOTHORACIC VASCULAR SURGERY) | Admitting: THORACIC SURGERY (CARDIOTHORACIC VASCULAR SURGERY)
Payer: COMMERCIAL

## 2022-01-01 ENCOUNTER — TELEPHONE (OUTPATIENT)
Dept: NEUROSURGERY | Facility: CLINIC | Age: 82
End: 2022-01-01
Payer: COMMERCIAL

## 2022-01-01 ENCOUNTER — HOSPITAL ENCOUNTER (OUTPATIENT)
Dept: PHYSICAL THERAPY | Facility: REHABILITATION | Age: 82
Discharge: HOME | End: 2022-07-21
Attending: FAMILY MEDICINE
Payer: COMMERCIAL

## 2022-01-01 ENCOUNTER — APPOINTMENT (OUTPATIENT)
Dept: OCCUPATIONAL THERAPY | Facility: HOSPITAL | Age: 82
End: 2022-01-01
Payer: COMMERCIAL

## 2022-01-01 ENCOUNTER — OFFICE VISIT (OUTPATIENT)
Dept: OTHER | Facility: CLINIC | Age: 82
End: 2022-01-01
Payer: COMMERCIAL

## 2022-01-01 ENCOUNTER — TELEPHONE (OUTPATIENT)
Dept: NEUROSURGERY | Facility: CLINIC | Age: 82
End: 2022-01-01

## 2022-01-01 ENCOUNTER — APPOINTMENT (OUTPATIENT)
Dept: RADIOLOGY | Facility: HOSPITAL | Age: 82
End: 2022-01-01
Attending: INTERNAL MEDICINE
Payer: COMMERCIAL

## 2022-01-01 ENCOUNTER — HOSPITAL ENCOUNTER (OUTPATIENT)
Facility: HOSPITAL | Age: 82
Setting detail: OBSERVATION
Discharge: NURSING, REHAB OR LONG-TERM CARE | End: 2022-09-23
Attending: EMERGENCY MEDICINE | Admitting: INTERNAL MEDICINE
Payer: COMMERCIAL

## 2022-01-01 ENCOUNTER — ANTICOAGULATION THERAPY VISIT (OUTPATIENT)
Dept: FAMILY MEDICINE | Facility: CLINIC | Age: 82
End: 2022-01-01
Payer: COMMERCIAL

## 2022-01-01 ENCOUNTER — HOSPITAL ENCOUNTER (OUTPATIENT)
Facility: HOSPITAL | Age: 82
Discharge: HOME | End: 2022-01-03
Attending: INTERNAL MEDICINE | Admitting: INTERNAL MEDICINE
Payer: COMMERCIAL

## 2022-01-01 ENCOUNTER — TELEPHONE (OUTPATIENT)
Dept: NEUROLOGY | Facility: CLINIC | Age: 82
End: 2022-01-01

## 2022-01-01 ENCOUNTER — APPOINTMENT (OUTPATIENT)
Dept: OCCUPATIONAL THERAPY | Facility: HOSPITAL | Age: 82
End: 2022-01-01
Attending: INTERNAL MEDICINE
Payer: COMMERCIAL

## 2022-01-01 ENCOUNTER — ANTICOAGULATION THERAPY VISIT (OUTPATIENT)
Dept: ANTICOAGULATION | Facility: CLINIC | Age: 82
End: 2022-01-01
Payer: COMMERCIAL

## 2022-01-01 ENCOUNTER — VIRTUAL VISIT (OUTPATIENT)
Dept: FAMILY MEDICINE | Facility: CLINIC | Age: 82
End: 2022-01-01
Payer: COMMERCIAL

## 2022-01-01 ENCOUNTER — PATIENT OUTREACH (OUTPATIENT)
Dept: NURSING | Facility: CLINIC | Age: 82
End: 2022-01-01

## 2022-01-01 ENCOUNTER — ANCILLARY PROCEDURE (OUTPATIENT)
Dept: CARDIOLOGY | Facility: CLINIC | Age: 82
End: 2022-01-01
Payer: COMMERCIAL

## 2022-01-01 ENCOUNTER — DOCUMENTATION ONLY (OUTPATIENT)
Dept: CARDIOLOGY | Facility: CLINIC | Age: 82
End: 2022-01-01
Payer: COMMERCIAL

## 2022-01-01 ENCOUNTER — APPOINTMENT (OUTPATIENT)
Dept: CT IMAGING | Facility: HOSPITAL | Age: 82
End: 2022-01-01
Attending: EMERGENCY MEDICINE
Payer: COMMERCIAL

## 2022-01-01 ENCOUNTER — TELEPHONE (OUTPATIENT)
Dept: FAMILY MEDICINE | Facility: CLINIC | Age: 82
End: 2022-01-01
Payer: COMMERCIAL

## 2022-01-01 ENCOUNTER — IMMUNIZATION (OUTPATIENT)
Dept: NURSING | Facility: CLINIC | Age: 82
End: 2022-01-01

## 2022-01-01 VITALS — HEART RATE: 97 BPM | OXYGEN SATURATION: 100 % | BODY MASS INDEX: 22.07 KG/M2 | WEIGHT: 149 LBS | HEIGHT: 69 IN

## 2022-01-01 VITALS — SYSTOLIC BLOOD PRESSURE: 105 MMHG | HEART RATE: 60 BPM | DIASTOLIC BLOOD PRESSURE: 69 MMHG | OXYGEN SATURATION: 99 %

## 2022-01-01 VITALS
SYSTOLIC BLOOD PRESSURE: 110 MMHG | BODY MASS INDEX: 19.79 KG/M2 | HEART RATE: 68 BPM | RESPIRATION RATE: 18 BRPM | DIASTOLIC BLOOD PRESSURE: 60 MMHG | WEIGHT: 134 LBS

## 2022-01-01 VITALS
BODY MASS INDEX: 18.68 KG/M2 | SYSTOLIC BLOOD PRESSURE: 107 MMHG | DIASTOLIC BLOOD PRESSURE: 61 MMHG | OXYGEN SATURATION: 98 % | HEIGHT: 69 IN | TEMPERATURE: 97.8 F | HEART RATE: 73 BPM | RESPIRATION RATE: 16 BRPM | WEIGHT: 126.1 LBS

## 2022-01-01 VITALS
WEIGHT: 172 LBS | HEART RATE: 94 BPM | HEART RATE: 53 BPM | HEIGHT: 67 IN | BODY MASS INDEX: 27 KG/M2 | DIASTOLIC BLOOD PRESSURE: 56 MMHG | OXYGEN SATURATION: 96 % | SYSTOLIC BLOOD PRESSURE: 134 MMHG | RESPIRATION RATE: 16 BRPM | WEIGHT: 135 LBS | BODY MASS INDEX: 19.99 KG/M2 | OXYGEN SATURATION: 100 % | HEIGHT: 69 IN

## 2022-01-01 VITALS
OXYGEN SATURATION: 95 % | BODY MASS INDEX: 26.68 KG/M2 | WEIGHT: 170 LBS | HEART RATE: 77 BPM | SYSTOLIC BLOOD PRESSURE: 106 MMHG | DIASTOLIC BLOOD PRESSURE: 70 MMHG | HEIGHT: 67 IN

## 2022-01-01 VITALS
RESPIRATION RATE: 14 BRPM | WEIGHT: 145 LBS | DIASTOLIC BLOOD PRESSURE: 56 MMHG | HEART RATE: 64 BPM | SYSTOLIC BLOOD PRESSURE: 110 MMHG | BODY MASS INDEX: 21.41 KG/M2

## 2022-01-01 VITALS
SYSTOLIC BLOOD PRESSURE: 102 MMHG | HEIGHT: 69 IN | HEART RATE: 64 BPM | RESPIRATION RATE: 16 BRPM | BODY MASS INDEX: 25.18 KG/M2 | DIASTOLIC BLOOD PRESSURE: 54 MMHG | WEIGHT: 170 LBS

## 2022-01-01 VITALS
WEIGHT: 150 LBS | OXYGEN SATURATION: 98 % | DIASTOLIC BLOOD PRESSURE: 64 MMHG | RESPIRATION RATE: 20 BRPM | HEIGHT: 69 IN | HEART RATE: 80 BPM | TEMPERATURE: 98 F | BODY MASS INDEX: 22.22 KG/M2 | SYSTOLIC BLOOD PRESSURE: 140 MMHG

## 2022-01-01 VITALS
OXYGEN SATURATION: 98 % | DIASTOLIC BLOOD PRESSURE: 74 MMHG | HEART RATE: 82 BPM | RESPIRATION RATE: 16 BRPM | SYSTOLIC BLOOD PRESSURE: 109 MMHG | TEMPERATURE: 98.2 F | WEIGHT: 149 LBS | BODY MASS INDEX: 22.07 KG/M2 | HEIGHT: 69 IN

## 2022-01-01 VITALS
WEIGHT: 141.4 LBS | HEART RATE: 80 BPM | SYSTOLIC BLOOD PRESSURE: 121 MMHG | TEMPERATURE: 98.6 F | OXYGEN SATURATION: 100 % | BODY MASS INDEX: 20.88 KG/M2 | DIASTOLIC BLOOD PRESSURE: 63 MMHG | RESPIRATION RATE: 16 BRPM

## 2022-01-01 VITALS
TEMPERATURE: 98.1 F | DIASTOLIC BLOOD PRESSURE: 82 MMHG | OXYGEN SATURATION: 99 % | RESPIRATION RATE: 14 BRPM | SYSTOLIC BLOOD PRESSURE: 102 MMHG | HEART RATE: 81 BPM

## 2022-01-01 VITALS
RESPIRATION RATE: 16 BRPM | SYSTOLIC BLOOD PRESSURE: 116 MMHG | HEART RATE: 85 BPM | OXYGEN SATURATION: 100 % | DIASTOLIC BLOOD PRESSURE: 73 MMHG

## 2022-01-01 VITALS
DIASTOLIC BLOOD PRESSURE: 68 MMHG | SYSTOLIC BLOOD PRESSURE: 110 MMHG | RESPIRATION RATE: 18 BRPM | OXYGEN SATURATION: 97 % | TEMPERATURE: 98.2 F | HEART RATE: 83 BPM

## 2022-01-01 VITALS
RESPIRATION RATE: 14 BRPM | OXYGEN SATURATION: 99 % | TEMPERATURE: 98.3 F | HEART RATE: 86 BPM | SYSTOLIC BLOOD PRESSURE: 108 MMHG | DIASTOLIC BLOOD PRESSURE: 74 MMHG

## 2022-01-01 VITALS
WEIGHT: 171 LBS | SYSTOLIC BLOOD PRESSURE: 134 MMHG | BODY MASS INDEX: 26.84 KG/M2 | DIASTOLIC BLOOD PRESSURE: 52 MMHG | HEART RATE: 58 BPM | HEIGHT: 67 IN | OXYGEN SATURATION: 98 % | RESPIRATION RATE: 16 BRPM

## 2022-01-01 VITALS
BODY MASS INDEX: 27.1 KG/M2 | SYSTOLIC BLOOD PRESSURE: 122 MMHG | WEIGHT: 173 LBS | DIASTOLIC BLOOD PRESSURE: 60 MMHG | HEART RATE: 51 BPM | OXYGEN SATURATION: 99 %

## 2022-01-01 VITALS — BODY MASS INDEX: 24.29 KG/M2 | HEIGHT: 69 IN | WEIGHT: 164 LBS

## 2022-01-01 VITALS — BODY MASS INDEX: 25.37 KG/M2 | WEIGHT: 162 LBS

## 2022-01-01 VITALS
WEIGHT: 137.1 LBS | HEIGHT: 69 IN | TEMPERATURE: 98.1 F | DIASTOLIC BLOOD PRESSURE: 80 MMHG | DIASTOLIC BLOOD PRESSURE: 60 MMHG | SYSTOLIC BLOOD PRESSURE: 110 MMHG | RESPIRATION RATE: 14 BRPM | TEMPERATURE: 98.3 F | HEART RATE: 84 BPM | HEART RATE: 72 BPM | OXYGEN SATURATION: 85 % | SYSTOLIC BLOOD PRESSURE: 120 MMHG | OXYGEN SATURATION: 99 % | BODY MASS INDEX: 20.31 KG/M2

## 2022-01-01 VITALS
OXYGEN SATURATION: 94 % | BODY MASS INDEX: 23.78 KG/M2 | SYSTOLIC BLOOD PRESSURE: 108 MMHG | WEIGHT: 161 LBS | HEART RATE: 77 BPM | DIASTOLIC BLOOD PRESSURE: 58 MMHG

## 2022-01-01 VITALS — HEIGHT: 69 IN | BODY MASS INDEX: 21.03 KG/M2 | WEIGHT: 142 LBS | OXYGEN SATURATION: 100 % | HEART RATE: 86 BPM

## 2022-01-01 VITALS — DIASTOLIC BLOOD PRESSURE: 62 MMHG | SYSTOLIC BLOOD PRESSURE: 110 MMHG | WEIGHT: 132 LBS | BODY MASS INDEX: 19.49 KG/M2

## 2022-01-01 VITALS
HEART RATE: 77 BPM | DIASTOLIC BLOOD PRESSURE: 68 MMHG | OXYGEN SATURATION: 97 % | RESPIRATION RATE: 16 BRPM | SYSTOLIC BLOOD PRESSURE: 100 MMHG

## 2022-01-01 VITALS
WEIGHT: 164 LBS | SYSTOLIC BLOOD PRESSURE: 122 MMHG | TEMPERATURE: 97.7 F | OXYGEN SATURATION: 98 % | DIASTOLIC BLOOD PRESSURE: 62 MMHG | HEART RATE: 75 BPM | BODY MASS INDEX: 24.22 KG/M2

## 2022-01-01 VITALS — WEIGHT: 164 LBS | BODY MASS INDEX: 25.69 KG/M2

## 2022-01-01 VITALS
RESPIRATION RATE: 16 BRPM | HEART RATE: 100 BPM | SYSTOLIC BLOOD PRESSURE: 108 MMHG | DIASTOLIC BLOOD PRESSURE: 62 MMHG | HEIGHT: 69 IN | BODY MASS INDEX: 20.76 KG/M2 | WEIGHT: 140.2 LBS

## 2022-01-01 VITALS — HEIGHT: 69 IN | BODY MASS INDEX: 22.91 KG/M2 | WEIGHT: 154.7 LBS

## 2022-01-01 VITALS — TEMPERATURE: 98.9 F

## 2022-01-01 DIAGNOSIS — E83.42 HYPOMAGNESEMIA: ICD-10-CM

## 2022-01-01 DIAGNOSIS — I48.91 ATRIAL FIBRILLATION, UNSPECIFIED TYPE (H): ICD-10-CM

## 2022-01-01 DIAGNOSIS — K50.919 CROHN'S DISEASE WITH COMPLICATION, UNSPECIFIED GASTROINTESTINAL TRACT LOCATION (H): ICD-10-CM

## 2022-01-01 DIAGNOSIS — M79.18 PIRIFORMIS MUSCLE PAIN: ICD-10-CM

## 2022-01-01 DIAGNOSIS — R29.898 BILATERAL LEG WEAKNESS: ICD-10-CM

## 2022-01-01 DIAGNOSIS — S92.254D CLOSED NONDISPLACED FRACTURE OF NAVICULAR BONE OF RIGHT FOOT WITH ROUTINE HEALING, SUBSEQUENT ENCOUNTER: ICD-10-CM

## 2022-01-01 DIAGNOSIS — M62.838 MUSCLE SPASM: ICD-10-CM

## 2022-01-01 DIAGNOSIS — L60.2 ONYCHAUXIS: ICD-10-CM

## 2022-01-01 DIAGNOSIS — I48.11 LONGSTANDING PERSISTENT ATRIAL FIBRILLATION (H): ICD-10-CM

## 2022-01-01 DIAGNOSIS — I26.99 PULMONARY EMBOLISM WITH INFARCTION (H): ICD-10-CM

## 2022-01-01 DIAGNOSIS — Z95.810 ICD (IMPLANTABLE CARDIOVERTER-DEFIBRILLATOR) IN PLACE: ICD-10-CM

## 2022-01-01 DIAGNOSIS — I48.91 ATRIAL FIBRILLATION, UNSPECIFIED TYPE (H): Primary | ICD-10-CM

## 2022-01-01 DIAGNOSIS — I48.91 ATRIAL FIBRILLATION (H): Primary | ICD-10-CM

## 2022-01-01 DIAGNOSIS — L60.0 INGROWN TOENAIL: Primary | ICD-10-CM

## 2022-01-01 DIAGNOSIS — I25.5 ISCHEMIC CARDIOMYOPATHY: ICD-10-CM

## 2022-01-01 DIAGNOSIS — N28.9 DISORDER OF KIDNEY AND URETER, UNSPECIFIED: ICD-10-CM

## 2022-01-01 DIAGNOSIS — M79.18 PIRIFORMIS MUSCLE PAIN: Primary | ICD-10-CM

## 2022-01-01 DIAGNOSIS — I44.7 LBBB (LEFT BUNDLE BRANCH BLOCK): Primary | ICD-10-CM

## 2022-01-01 DIAGNOSIS — G57.02 PIRIFORMIS SYNDROME OF LEFT SIDE: ICD-10-CM

## 2022-01-01 DIAGNOSIS — E87.6 HYPOKALEMIA: ICD-10-CM

## 2022-01-01 DIAGNOSIS — R26.9 ALTERED GAIT: ICD-10-CM

## 2022-01-01 DIAGNOSIS — Z95.810 BIVENTRICULAR AUTOMATIC IMPLANTABLE CARDIOVERTER DEFIBRILLATOR IN SITU: ICD-10-CM

## 2022-01-01 DIAGNOSIS — I25.5 ISCHEMIC CARDIOMYOPATHY: Primary | ICD-10-CM

## 2022-01-01 DIAGNOSIS — K21.9 GASTROESOPHAGEAL REFLUX DISEASE WITHOUT ESOPHAGITIS: ICD-10-CM

## 2022-01-01 DIAGNOSIS — N18.9 CHRONIC KIDNEY DISEASE, UNSPECIFIED: ICD-10-CM

## 2022-01-01 DIAGNOSIS — M20.40 HAMMER TOE, UNSPECIFIED LATERALITY: Primary | ICD-10-CM

## 2022-01-01 DIAGNOSIS — Z74.09 IMPAIRED FUNCTIONAL MOBILITY, BALANCE, GAIT, AND ENDURANCE: Primary | ICD-10-CM

## 2022-01-01 DIAGNOSIS — R26.81 UNSTEADINESS ON FEET: ICD-10-CM

## 2022-01-01 DIAGNOSIS — T81.89XA PROTRUDING STERNAL WIRES: Primary | ICD-10-CM

## 2022-01-01 DIAGNOSIS — M54.32 SCIATICA OF LEFT SIDE: ICD-10-CM

## 2022-01-01 DIAGNOSIS — R63.0 ANOREXIA: ICD-10-CM

## 2022-01-01 DIAGNOSIS — U07.1 INFECTION DUE TO 2019 NOVEL CORONAVIRUS: Primary | ICD-10-CM

## 2022-01-01 DIAGNOSIS — Z71.89 OTHER SPECIFIED COUNSELING: Chronic | ICD-10-CM

## 2022-01-01 DIAGNOSIS — M62.838 MUSCLE SPASM: Primary | ICD-10-CM

## 2022-01-01 DIAGNOSIS — I50.22 CHRONIC SYSTOLIC CHF (CONGESTIVE HEART FAILURE) (H): ICD-10-CM

## 2022-01-01 DIAGNOSIS — M48.062 LUMBAR STENOSIS WITH NEUROGENIC CLAUDICATION: ICD-10-CM

## 2022-01-01 DIAGNOSIS — I44.7 LBBB (LEFT BUNDLE BRANCH BLOCK): ICD-10-CM

## 2022-01-01 DIAGNOSIS — I50.9 CHF (CONGESTIVE HEART FAILURE) (H): ICD-10-CM

## 2022-01-01 DIAGNOSIS — G57.01 PIRIFORMIS SYNDROME, RIGHT: Primary | ICD-10-CM

## 2022-01-01 DIAGNOSIS — T45.515A WARFARIN-INDUCED COAGULOPATHY (H): Primary | ICD-10-CM

## 2022-01-01 DIAGNOSIS — I50.22 CHRONIC SYSTOLIC CONGESTIVE HEART FAILURE (H): ICD-10-CM

## 2022-01-01 DIAGNOSIS — I47.29 PAROXYSMAL VENTRICULAR TACHYCARDIA (H): ICD-10-CM

## 2022-01-01 DIAGNOSIS — Z79.01 CURRENT USE OF ANTICOAGULANT THERAPY: ICD-10-CM

## 2022-01-01 DIAGNOSIS — N18.32 STAGE 3B CHRONIC KIDNEY DISEASE (H): ICD-10-CM

## 2022-01-01 DIAGNOSIS — Z79.01 CHRONIC ANTICOAGULATION: ICD-10-CM

## 2022-01-01 DIAGNOSIS — Z45.02 ICD (IMPLANTABLE CARDIOVERTER-DEFIBRILLATOR) BATTERY DEPLETION: ICD-10-CM

## 2022-01-01 DIAGNOSIS — U07.1 COVID-19 VIRUS RNA TEST RESULT POSITIVE AT LIMIT OF DETECTION: Primary | ICD-10-CM

## 2022-01-01 DIAGNOSIS — I47.29 PAROXYSMAL VENTRICULAR TACHYCARDIA (H): Primary | ICD-10-CM

## 2022-01-01 DIAGNOSIS — I25.810 CORONARY ARTERY DISEASE INVOLVING CORONARY BYPASS GRAFT OF NATIVE HEART WITHOUT ANGINA PECTORIS: ICD-10-CM

## 2022-01-01 DIAGNOSIS — D64.9 ANEMIA, UNSPECIFIED: ICD-10-CM

## 2022-01-01 DIAGNOSIS — M25.531 RIGHT WRIST PAIN: ICD-10-CM

## 2022-01-01 DIAGNOSIS — Z23 NEED FOR COVID-19 VACCINE: Primary | ICD-10-CM

## 2022-01-01 DIAGNOSIS — M48.07 NEURAL FORAMINAL STENOSIS OF LUMBOSACRAL SPINE: ICD-10-CM

## 2022-01-01 DIAGNOSIS — G25.2 RESTING TREMOR: Primary | ICD-10-CM

## 2022-01-01 DIAGNOSIS — I26.99 PULMONARY EMBOLISM WITH INFARCTION (H): Primary | ICD-10-CM

## 2022-01-01 DIAGNOSIS — M17.10 UNILATERAL PRIMARY OSTEOARTHRITIS, UNSPECIFIED KNEE: ICD-10-CM

## 2022-01-01 DIAGNOSIS — I50.22 CHRONIC SYSTOLIC CONGESTIVE HEART FAILURE (H): Primary | ICD-10-CM

## 2022-01-01 DIAGNOSIS — T81.89XD PROTRUDING STERNAL WIRES, SUBSEQUENT ENCOUNTER: Primary | ICD-10-CM

## 2022-01-01 DIAGNOSIS — R25.1 TREMOR: ICD-10-CM

## 2022-01-01 DIAGNOSIS — R29.898 WEAKNESS OF BOTH LOWER EXTREMITIES: ICD-10-CM

## 2022-01-01 DIAGNOSIS — I10 BENIGN ESSENTIAL HYPERTENSION: ICD-10-CM

## 2022-01-01 DIAGNOSIS — R41.89 COGNITIVE DECLINE: ICD-10-CM

## 2022-01-01 DIAGNOSIS — M20.40 HAMMER TOE, UNSPECIFIED LATERALITY: ICD-10-CM

## 2022-01-01 DIAGNOSIS — D68.32 WARFARIN-INDUCED COAGULOPATHY (H): ICD-10-CM

## 2022-01-01 DIAGNOSIS — T81.89XA PROTRUDING STERNAL WIRES: ICD-10-CM

## 2022-01-01 DIAGNOSIS — Z00.00 ENCOUNTER FOR MEDICARE ANNUAL WELLNESS EXAM: Primary | ICD-10-CM

## 2022-01-01 DIAGNOSIS — Z95.810 BIVENTRICULAR AUTOMATIC IMPLANTABLE CARDIOVERTER DEFIBRILLATOR IN SITU: Primary | ICD-10-CM

## 2022-01-01 DIAGNOSIS — M48.062 LUMBAR STENOSIS WITH NEUROGENIC CLAUDICATION: Primary | ICD-10-CM

## 2022-01-01 DIAGNOSIS — Z95.0 CARDIAC PACEMAKER IN SITU: ICD-10-CM

## 2022-01-01 DIAGNOSIS — L84 TYLOMA: ICD-10-CM

## 2022-01-01 DIAGNOSIS — R19.7 DIARRHEA, UNSPECIFIED TYPE: ICD-10-CM

## 2022-01-01 DIAGNOSIS — R26.9 ALTERED GAIT: Primary | ICD-10-CM

## 2022-01-01 DIAGNOSIS — I25.10 CORONARY ARTERY DISEASE INVOLVING NATIVE HEART WITHOUT ANGINA PECTORIS, UNSPECIFIED VESSEL OR LESION TYPE: ICD-10-CM

## 2022-01-01 DIAGNOSIS — G47.00 INSOMNIA, UNSPECIFIED TYPE: ICD-10-CM

## 2022-01-01 DIAGNOSIS — M54.32 SCIATICA OF LEFT SIDE: Primary | ICD-10-CM

## 2022-01-01 DIAGNOSIS — T45.515A WARFARIN-INDUCED COAGULOPATHY (H): ICD-10-CM

## 2022-01-01 DIAGNOSIS — E86.0 DEHYDRATION: ICD-10-CM

## 2022-01-01 DIAGNOSIS — T81.89XD PROTRUDING STERNAL WIRES, SUBSEQUENT ENCOUNTER: ICD-10-CM

## 2022-01-01 DIAGNOSIS — D68.32 WARFARIN-INDUCED COAGULOPATHY (H): Primary | ICD-10-CM

## 2022-01-01 DIAGNOSIS — M48.061 SPINAL STENOSIS OF LUMBAR REGION WITHOUT NEUROGENIC CLAUDICATION: ICD-10-CM

## 2022-01-01 DIAGNOSIS — E87.6 HYPOKALEMIA: Primary | ICD-10-CM

## 2022-01-01 DIAGNOSIS — Z79.01 LONG TERM (CURRENT) USE OF ANTICOAGULANTS: Primary | ICD-10-CM

## 2022-01-01 DIAGNOSIS — I48.21 PERMANENT ATRIAL FIBRILLATION (H): ICD-10-CM

## 2022-01-01 LAB
ALBUMIN SERPL BCG-MCNC: 3.5 G/DL (ref 3.5–5.2)
ALBUMIN SERPL-MCNC: 2.8 G/DL (ref 3.5–5)
ALBUMIN SERPL-MCNC: 2.8 G/DL (ref 3.5–5)
ALP SERPL-CCNC: 157 U/L (ref 45–120)
ALP SERPL-CCNC: 166 U/L (ref 45–120)
ALP SERPL-CCNC: 301 U/L (ref 40–129)
ALT SERPL W P-5'-P-CCNC: 16 U/L (ref 10–50)
ALT SERPL W P-5'-P-CCNC: 9 U/L (ref 0–45)
ALT SERPL W P-5'-P-CCNC: 9 U/L (ref 0–45)
ANION GAP SERPL CALCULATED.3IONS-SCNC: 11 MMOL/L (ref 5–18)
ANION GAP SERPL CALCULATED.3IONS-SCNC: 11 MMOL/L (ref 7–15)
ANION GAP SERPL CALCULATED.3IONS-SCNC: 5 MMOL/L (ref 5–18)
ANION GAP SERPL CALCULATED.3IONS-SCNC: 6 MMOL/L (ref 5–18)
ANION GAP SERPL CALCULATED.3IONS-SCNC: 6 MMOL/L (ref 5–18)
ANION GAP SERPL CALCULATED.3IONS-SCNC: 7 MMOL/L (ref 7–15)
ANION GAP SERPL CALCULATED.3IONS-SCNC: 7 MMOL/L (ref 7–15)
ANION GAP SERPL CALCULATED.3IONS-SCNC: 9 MMOL/L (ref 5–18)
AST SERPL W P-5'-P-CCNC: 13 U/L (ref 0–40)
AST SERPL W P-5'-P-CCNC: 14 U/L (ref 0–40)
AST SERPL W P-5'-P-CCNC: 22 U/L (ref 10–50)
BASOPHILS # BLD AUTO: 0 10E3/UL (ref 0–0.2)
BASOPHILS # BLD AUTO: 0.1 10E3/UL (ref 0–0.2)
BASOPHILS NFR BLD AUTO: 0 %
BASOPHILS NFR BLD AUTO: 1 %
BILIRUB SERPL-MCNC: 0.5 MG/DL
BILIRUB SERPL-MCNC: 0.5 MG/DL (ref 0–1)
BILIRUB SERPL-MCNC: 0.5 MG/DL (ref 0–1)
BUN SERPL-MCNC: 16.8 MG/DL (ref 8–23)
BUN SERPL-MCNC: 17.9 MG/DL (ref 8–23)
BUN SERPL-MCNC: 18 MG/DL (ref 8–28)
BUN SERPL-MCNC: 19 MG/DL (ref 8–28)
BUN SERPL-MCNC: 21 MG/DL (ref 8–28)
BUN SERPL-MCNC: 23 MG/DL (ref 8–28)
BUN SERPL-MCNC: 23.9 MG/DL (ref 8–23)
BUN SERPL-MCNC: 27 MG/DL (ref 8–28)
C REACTIVE PROTEIN LHE: 12.5 MG/DL (ref 0–?)
CALCIUM SERPL-MCNC: 10 MG/DL (ref 8.8–10.2)
CALCIUM SERPL-MCNC: 10.3 MG/DL (ref 8.8–10.2)
CALCIUM SERPL-MCNC: 7.2 MG/DL (ref 8.8–10.2)
CALCIUM SERPL-MCNC: 8.4 MG/DL (ref 8.5–10.5)
CALCIUM SERPL-MCNC: 8.8 MG/DL (ref 8.5–10.5)
CALCIUM SERPL-MCNC: 9.1 MG/DL (ref 8.5–10.5)
CALCIUM SERPL-MCNC: 9.3 MG/DL (ref 8.5–10.5)
CALCIUM SERPL-MCNC: 9.4 MG/DL (ref 8.5–10.5)
CHLORIDE BLD-SCNC: 101 MMOL/L (ref 98–107)
CHLORIDE BLD-SCNC: 103 MMOL/L (ref 98–107)
CHLORIDE BLD-SCNC: 104 MMOL/L (ref 98–107)
CHLORIDE BLD-SCNC: 105 MMOL/L (ref 98–107)
CHLORIDE BLD-SCNC: 108 MMOL/L (ref 98–107)
CHLORIDE SERPL-SCNC: 100 MMOL/L (ref 98–107)
CHLORIDE SERPL-SCNC: 100 MMOL/L (ref 98–107)
CHLORIDE SERPL-SCNC: 111 MMOL/L (ref 98–107)
CHOLEST SERPL-MCNC: 97 MG/DL
CK SERPL-CCNC: 38 U/L (ref 39–308)
CO2 SERPL-SCNC: 24 MMOL/L (ref 22–31)
CO2 SERPL-SCNC: 25 MMOL/L (ref 22–31)
CO2 SERPL-SCNC: 27 MMOL/L (ref 22–31)
CREAT SERPL-MCNC: 1.03 MG/DL (ref 0.7–1.3)
CREAT SERPL-MCNC: 1.05 MG/DL (ref 0.67–1.17)
CREAT SERPL-MCNC: 1.1 MG/DL (ref 0.67–1.17)
CREAT SERPL-MCNC: 1.18 MG/DL (ref 0.7–1.3)
CREAT SERPL-MCNC: 1.36 MG/DL (ref 0.7–1.3)
CREAT SERPL-MCNC: 1.53 MG/DL (ref 0.67–1.17)
CREAT SERPL-MCNC: 1.58 MG/DL (ref 0.7–1.3)
CREAT SERPL-MCNC: 1.7 MG/DL (ref 0.7–1.3)
DEPRECATED HCO3 PLAS-SCNC: 19 MMOL/L (ref 22–29)
DEPRECATED HCO3 PLAS-SCNC: 27 MMOL/L (ref 22–29)
DEPRECATED HCO3 PLAS-SCNC: 28 MMOL/L (ref 22–29)
EOSINOPHIL # BLD AUTO: 0.2 10E3/UL (ref 0–0.7)
EOSINOPHIL # BLD AUTO: 0.2 10E3/UL (ref 0–0.7)
EOSINOPHIL NFR BLD AUTO: 2 %
EOSINOPHIL NFR BLD AUTO: 2 %
ERYTHROCYTE [DISTWIDTH] IN BLOOD BY AUTOMATED COUNT: 12.8 % (ref 10–15)
ERYTHROCYTE [DISTWIDTH] IN BLOOD BY AUTOMATED COUNT: 12.9 % (ref 10–15)
ERYTHROCYTE [DISTWIDTH] IN BLOOD BY AUTOMATED COUNT: 13 % (ref 10–15)
ERYTHROCYTE [DISTWIDTH] IN BLOOD BY AUTOMATED COUNT: 13.5 % (ref 10–15)
ERYTHROCYTE [DISTWIDTH] IN BLOOD BY AUTOMATED COUNT: 13.7 % (ref 10–15)
ERYTHROCYTE [DISTWIDTH] IN BLOOD BY AUTOMATED COUNT: 15 % (ref 10–15)
ERYTHROCYTE [DISTWIDTH] IN BLOOD BY AUTOMATED COUNT: 16 % (ref 10–15)
GFR SERPL CREATININE-BSD FRML MDRD: 40 ML/MIN/1.73M2
GFR SERPL CREATININE-BSD FRML MDRD: 43 ML/MIN/1.73M2
GFR SERPL CREATININE-BSD FRML MDRD: 45 ML/MIN/1.73M2
GFR SERPL CREATININE-BSD FRML MDRD: 52 ML/MIN/1.73M2
GFR SERPL CREATININE-BSD FRML MDRD: 61 ML/MIN/1.73M2
GFR SERPL CREATININE-BSD FRML MDRD: 67 ML/MIN/1.73M2
GFR SERPL CREATININE-BSD FRML MDRD: 70 ML/MIN/1.73M2
GFR SERPL CREATININE-BSD FRML MDRD: 72 ML/MIN/1.73M2
GLUCOSE BLD-MCNC: 75 MG/DL (ref 70–125)
GLUCOSE BLD-MCNC: 77 MG/DL (ref 70–125)
GLUCOSE BLD-MCNC: 83 MG/DL (ref 70–125)
GLUCOSE BLD-MCNC: 83 MG/DL (ref 70–125)
GLUCOSE BLD-MCNC: 88 MG/DL (ref 70–125)
GLUCOSE SERPL-MCNC: 74 MG/DL (ref 70–99)
GLUCOSE SERPL-MCNC: 79 MG/DL (ref 70–99)
GLUCOSE SERPL-MCNC: 92 MG/DL (ref 70–99)
HCT VFR BLD AUTO: 25.2 % (ref 40–53)
HCT VFR BLD AUTO: 27.9 % (ref 40–53)
HCT VFR BLD AUTO: 28.5 % (ref 40–53)
HCT VFR BLD AUTO: 29.4 % (ref 40–53)
HCT VFR BLD AUTO: 29.9 % (ref 40–53)
HCT VFR BLD AUTO: 31.1 % (ref 40–53)
HCT VFR BLD AUTO: 32.5 % (ref 40–53)
HDLC SERPL-MCNC: 48 MG/DL
HGB BLD-MCNC: 10 G/DL (ref 13.3–17.7)
HGB BLD-MCNC: 10.3 G/DL (ref 13.3–17.7)
HGB BLD-MCNC: 8.1 G/DL (ref 13.3–17.7)
HGB BLD-MCNC: 8.9 G/DL (ref 13.3–17.7)
HGB BLD-MCNC: 9.1 G/DL (ref 13.3–17.7)
HGB BLD-MCNC: 9.4 G/DL (ref 13.3–17.7)
HGB BLD-MCNC: 9.7 G/DL (ref 13.3–17.7)
HOLD SPECIMEN: NORMAL
IMM GRANULOCYTES # BLD: 0 10E3/UL
IMM GRANULOCYTES # BLD: 0 10E3/UL
IMM GRANULOCYTES NFR BLD: 0 %
IMM GRANULOCYTES NFR BLD: 0 %
INR (EXTERNAL): 1.9 (ref 2–3)
INR BLD: 1.1 (ref 0.9–1.1)
INR BLD: 1.4 (ref 0.9–1.1)
INR BLD: 1.5 (ref 0.9–1.1)
INR BLD: 1.7 (ref 0.9–1.1)
INR BLD: 1.7 (ref 0.9–1.1)
INR BLD: 1.9 (ref 0.9–1.1)
INR BLD: 2.3 (ref 0.9–1.1)
INR BLD: 2.3 (ref 0.9–1.1)
INR BLD: 2.8 (ref 0.9–1.1)
INR BLD: 2.8 (ref 0.9–1.1)
INR BLD: 2.9 (ref 0.9–1.1)
INR BLD: 3.8 (ref 0.9–1.1)
INR BLD: 4.3 (ref 0.9–1.1)
INR BLD: 4.6 (ref 0.9–1.1)
INR BLD: 5.5 (ref 0.9–1.1)
INR POINT OF CARE: 2.5 (ref 0.9–1.1)
INR PPP: 1.2 (ref 0.85–1.15)
INR PPP: 1.27 (ref 0.85–1.15)
INR PPP: 1.4 (ref 0.85–1.15)
INR PPP: 1.48 (ref 0.85–1.15)
INR PPP: 1.52 (ref 0.9–1.15)
INR PPP: 1.55 (ref 0.85–1.15)
INR PPP: 1.64 (ref 0.85–1.15)
INR PPP: 1.68 (ref 0.85–1.15)
INR PPP: 1.95 (ref 0.85–1.15)
INR PPP: 1.98 (ref 0.85–1.15)
INR PPP: 2.08 (ref 0.85–1.15)
INR PPP: 2.1 (ref 0.85–1.15)
INR PPP: 2.11 (ref 0.85–1.15)
INR PPP: 2.2 (ref 0.85–1.15)
INR PPP: 2.73 (ref 0.9–1.15)
INR PPP: 3.2 (ref 0.85–1.15)
INR PPP: 3.9 (ref 0.85–1.15)
INR PPP: 4.43 (ref 0.85–1.15)
LACTATE SERPL-SCNC: 0.8 MMOL/L (ref 0.7–2)
LDLC SERPL CALC-MCNC: 38 MG/DL
LIPASE SERPL-CCNC: 17 U/L (ref 0–52)
LYMPHOCYTES # BLD AUTO: 0.8 10E3/UL (ref 0.8–5.3)
LYMPHOCYTES # BLD AUTO: 1.2 10E3/UL (ref 0.8–5.3)
LYMPHOCYTES NFR BLD AUTO: 10 %
LYMPHOCYTES NFR BLD AUTO: 13 %
MAGNESIUM SERPL-MCNC: 1.1 MG/DL (ref 1.8–2.6)
MAGNESIUM SERPL-MCNC: 2 MG/DL (ref 1.8–2.6)
MAGNESIUM SERPL-MCNC: 2.3 MG/DL (ref 1.8–2.6)
MCH RBC QN AUTO: 29.5 PG (ref 26.5–33)
MCH RBC QN AUTO: 30 PG (ref 26.5–33)
MCH RBC QN AUTO: 30.3 PG (ref 26.5–33)
MCH RBC QN AUTO: 30.9 PG (ref 26.5–33)
MCH RBC QN AUTO: 30.9 PG (ref 26.5–33)
MCH RBC QN AUTO: 31 PG (ref 26.5–33)
MCH RBC QN AUTO: 31.2 PG (ref 26.5–33)
MCHC RBC AUTO-ENTMCNC: 31.7 G/DL (ref 31.5–36.5)
MCHC RBC AUTO-ENTMCNC: 31.9 G/DL (ref 31.5–36.5)
MCHC RBC AUTO-ENTMCNC: 31.9 G/DL (ref 31.5–36.5)
MCHC RBC AUTO-ENTMCNC: 32 G/DL (ref 31.5–36.5)
MCHC RBC AUTO-ENTMCNC: 32.1 G/DL (ref 31.5–36.5)
MCHC RBC AUTO-ENTMCNC: 32.2 G/DL (ref 31.5–36.5)
MCHC RBC AUTO-ENTMCNC: 32.4 G/DL (ref 31.5–36.5)
MCV RBC AUTO: 92 FL (ref 78–100)
MCV RBC AUTO: 94 FL (ref 78–100)
MCV RBC AUTO: 95 FL (ref 78–100)
MCV RBC AUTO: 96 FL (ref 78–100)
MCV RBC AUTO: 97 FL (ref 78–100)
MCV RBC AUTO: 97 FL (ref 78–100)
MCV RBC AUTO: 98 FL (ref 78–100)
MDC_IDC_EPISODE_DTM: NORMAL
MDC_IDC_EPISODE_DURATION: 1 S
MDC_IDC_EPISODE_DURATION: 2 S
MDC_IDC_EPISODE_ID: 26
MDC_IDC_EPISODE_ID: 27
MDC_IDC_EPISODE_ID: 28
MDC_IDC_EPISODE_ID: 29
MDC_IDC_EPISODE_ID: 3
MDC_IDC_EPISODE_ID: 30
MDC_IDC_EPISODE_ID: 31
MDC_IDC_EPISODE_ID: 32
MDC_IDC_EPISODE_ID: 33
MDC_IDC_EPISODE_ID: 34
MDC_IDC_EPISODE_ID: 35
MDC_IDC_EPISODE_ID: 36
MDC_IDC_EPISODE_ID: 37
MDC_IDC_EPISODE_ID: 38
MDC_IDC_EPISODE_ID: 39
MDC_IDC_EPISODE_ID: 40
MDC_IDC_EPISODE_TYPE: NORMAL
MDC_IDC_LEAD_IMPLANT_DT: NORMAL
MDC_IDC_LEAD_LOCATION: NORMAL
MDC_IDC_LEAD_LOCATION_DETAIL_1: NORMAL
MDC_IDC_LEAD_MFG: NORMAL
MDC_IDC_LEAD_MODEL: NORMAL
MDC_IDC_LEAD_POLARITY_TYPE: NORMAL
MDC_IDC_LEAD_SERIAL: NORMAL
MDC_IDC_LEAD_SPECIAL_FUNCTION: NORMAL
MDC_IDC_MSMT_BATTERY_DTM: NORMAL
MDC_IDC_MSMT_BATTERY_REMAINING_LONGEVITY: 100 MO
MDC_IDC_MSMT_BATTERY_REMAINING_LONGEVITY: 105 MO
MDC_IDC_MSMT_BATTERY_REMAINING_LONGEVITY: 76 MO
MDC_IDC_MSMT_BATTERY_REMAINING_LONGEVITY: 94 MO
MDC_IDC_MSMT_BATTERY_REMAINING_LONGEVITY: 98 MO
MDC_IDC_MSMT_BATTERY_RRT_TRIGGER: NORMAL
MDC_IDC_MSMT_BATTERY_STATUS: NORMAL
MDC_IDC_MSMT_BATTERY_VOLTAGE: 2.99 V
MDC_IDC_MSMT_BATTERY_VOLTAGE: 3.02 V
MDC_IDC_MSMT_BATTERY_VOLTAGE: 3.06 V
MDC_IDC_MSMT_BATTERY_VOLTAGE: 3.14 V
MDC_IDC_MSMT_BATTERY_VOLTAGE: 3.15 V
MDC_IDC_MSMT_CAP_CHARGE_DTM: NORMAL
MDC_IDC_MSMT_CAP_CHARGE_ENERGY: 18 J
MDC_IDC_MSMT_CAP_CHARGE_ENERGY: 40 J
MDC_IDC_MSMT_CAP_CHARGE_ENERGY: 40 J
MDC_IDC_MSMT_CAP_CHARGE_TIME: 0 S
MDC_IDC_MSMT_CAP_CHARGE_TIME: 0 S
MDC_IDC_MSMT_CAP_CHARGE_TIME: 3.9 S
MDC_IDC_MSMT_CAP_CHARGE_TIME: 3.9 S
MDC_IDC_MSMT_CAP_CHARGE_TIME: 4 S
MDC_IDC_MSMT_CAP_CHARGE_TYPE: NORMAL
MDC_IDC_MSMT_LEADCHNL_LV_IMPEDANCE_VALUE: 209 OHM
MDC_IDC_MSMT_LEADCHNL_LV_IMPEDANCE_VALUE: 266 OHM
MDC_IDC_MSMT_LEADCHNL_LV_IMPEDANCE_VALUE: 285 OHM
MDC_IDC_MSMT_LEADCHNL_LV_IMPEDANCE_VALUE: 304 OHM
MDC_IDC_MSMT_LEADCHNL_LV_IMPEDANCE_VALUE: 323 OHM
MDC_IDC_MSMT_LEADCHNL_LV_IMPEDANCE_VALUE: 342 OHM
MDC_IDC_MSMT_LEADCHNL_LV_IMPEDANCE_VALUE: 361 OHM
MDC_IDC_MSMT_LEADCHNL_LV_IMPEDANCE_VALUE: 380 OHM
MDC_IDC_MSMT_LEADCHNL_LV_IMPEDANCE_VALUE: 380 OHM
MDC_IDC_MSMT_LEADCHNL_LV_IMPEDANCE_VALUE: 399 OHM
MDC_IDC_MSMT_LEADCHNL_LV_IMPEDANCE_VALUE: 399 OHM
MDC_IDC_MSMT_LEADCHNL_LV_IMPEDANCE_VALUE: 437 OHM
MDC_IDC_MSMT_LEADCHNL_LV_IMPEDANCE_VALUE: 437 OHM
MDC_IDC_MSMT_LEADCHNL_LV_IMPEDANCE_VALUE: 456 OHM
MDC_IDC_MSMT_LEADCHNL_LV_IMPEDANCE_VALUE: 475 OHM
MDC_IDC_MSMT_LEADCHNL_LV_IMPEDANCE_VALUE: 494 OHM
MDC_IDC_MSMT_LEADCHNL_LV_IMPEDANCE_VALUE: 513 OHM
MDC_IDC_MSMT_LEADCHNL_LV_IMPEDANCE_VALUE: 532 OHM
MDC_IDC_MSMT_LEADCHNL_LV_IMPEDANCE_VALUE: 551 OHM
MDC_IDC_MSMT_LEADCHNL_LV_IMPEDANCE_VALUE: 570 OHM
MDC_IDC_MSMT_LEADCHNL_LV_IMPEDANCE_VALUE: 589 OHM
MDC_IDC_MSMT_LEADCHNL_LV_IMPEDANCE_VALUE: 608 OHM
MDC_IDC_MSMT_LEADCHNL_LV_IMPEDANCE_VALUE: 646 OHM
MDC_IDC_MSMT_LEADCHNL_LV_IMPEDANCE_VALUE: 665 OHM
MDC_IDC_MSMT_LEADCHNL_LV_IMPEDANCE_VALUE: 684 OHM
MDC_IDC_MSMT_LEADCHNL_LV_IMPEDANCE_VALUE: 722 OHM
MDC_IDC_MSMT_LEADCHNL_LV_IMPEDANCE_VALUE: 760 OHM
MDC_IDC_MSMT_LEADCHNL_LV_IMPEDANCE_VALUE: 817 OHM
MDC_IDC_MSMT_LEADCHNL_LV_IMPEDANCE_VALUE: 836 OHM
MDC_IDC_MSMT_LEADCHNL_LV_PACING_THRESHOLD_AMPLITUDE: 0.75 V
MDC_IDC_MSMT_LEADCHNL_LV_PACING_THRESHOLD_AMPLITUDE: 0.88 V
MDC_IDC_MSMT_LEADCHNL_LV_PACING_THRESHOLD_AMPLITUDE: 1 V
MDC_IDC_MSMT_LEADCHNL_LV_PACING_THRESHOLD_AMPLITUDE: 1 V
MDC_IDC_MSMT_LEADCHNL_LV_PACING_THRESHOLD_AMPLITUDE: 1.12 V
MDC_IDC_MSMT_LEADCHNL_LV_PACING_THRESHOLD_AMPLITUDE: 1.25 V
MDC_IDC_MSMT_LEADCHNL_LV_PACING_THRESHOLD_AMPLITUDE: 1.5 V
MDC_IDC_MSMT_LEADCHNL_LV_PACING_THRESHOLD_PULSEWIDTH: 0.4 MS
MDC_IDC_MSMT_LEADCHNL_RA_IMPEDANCE_VALUE: 3000 OHM
MDC_IDC_MSMT_LEADCHNL_RV_IMPEDANCE_VALUE: 209 OHM
MDC_IDC_MSMT_LEADCHNL_RV_IMPEDANCE_VALUE: 247 OHM
MDC_IDC_MSMT_LEADCHNL_RV_IMPEDANCE_VALUE: 266 OHM
MDC_IDC_MSMT_LEADCHNL_RV_IMPEDANCE_VALUE: 285 OHM
MDC_IDC_MSMT_LEADCHNL_RV_IMPEDANCE_VALUE: 285 OHM
MDC_IDC_MSMT_LEADCHNL_RV_IMPEDANCE_VALUE: 304 OHM
MDC_IDC_MSMT_LEADCHNL_RV_IMPEDANCE_VALUE: 342 OHM
MDC_IDC_MSMT_LEADCHNL_RV_IMPEDANCE_VALUE: 361 OHM
MDC_IDC_MSMT_LEADCHNL_RV_PACING_THRESHOLD_AMPLITUDE: 0.62 V
MDC_IDC_MSMT_LEADCHNL_RV_PACING_THRESHOLD_AMPLITUDE: 0.75 V
MDC_IDC_MSMT_LEADCHNL_RV_PACING_THRESHOLD_PULSEWIDTH: 0.4 MS
MDC_IDC_MSMT_LEADCHNL_RV_SENSING_INTR_AMPL: 10.1 MV
MDC_IDC_MSMT_LEADCHNL_RV_SENSING_INTR_AMPL: 10.4 MV
MDC_IDC_MSMT_LEADCHNL_RV_SENSING_INTR_AMPL: 10.9 MV
MDC_IDC_MSMT_LEADCHNL_RV_SENSING_INTR_AMPL: 13.1 MV
MDC_IDC_MSMT_LEADCHNL_RV_SENSING_INTR_AMPL: 13.1 MV
MDC_IDC_MSMT_LEADCHNL_RV_SENSING_INTR_AMPL: 9.9 MV
MDC_IDC_PG_IMPLANT_DTM: NORMAL
MDC_IDC_PG_MFG: NORMAL
MDC_IDC_PG_MODEL: NORMAL
MDC_IDC_PG_SERIAL: NORMAL
MDC_IDC_PG_TYPE: NORMAL
MDC_IDC_SESS_CLINIC_NAME: NORMAL
MDC_IDC_SESS_DTM: NORMAL
MDC_IDC_SESS_TYPE: NORMAL
MDC_IDC_SET_BRADY_LOWRATE: 70 {BEATS}/MIN
MDC_IDC_SET_BRADY_LOWRATE: 80 {BEATS}/MIN
MDC_IDC_SET_BRADY_LOWRATE: 80 {BEATS}/MIN
MDC_IDC_SET_BRADY_MAX_SENSOR_RATE: 130 {BEATS}/MIN
MDC_IDC_SET_BRADY_MODE: NORMAL
MDC_IDC_SET_CRT_LVRV_DELAY: 0 MS
MDC_IDC_SET_CRT_PACED_CHAMBERS: NORMAL
MDC_IDC_SET_LEADCHNL_LV_PACING_AMPLITUDE: 2 V
MDC_IDC_SET_LEADCHNL_LV_PACING_AMPLITUDE: 2.5 V
MDC_IDC_SET_LEADCHNL_LV_PACING_AMPLITUDE: 2.75 V
MDC_IDC_SET_LEADCHNL_LV_PACING_AMPLITUDE: NORMAL
MDC_IDC_SET_LEADCHNL_LV_PACING_AMPLITUDE: NORMAL
MDC_IDC_SET_LEADCHNL_LV_PACING_ANODE_ELECTRODE_1: NORMAL
MDC_IDC_SET_LEADCHNL_LV_PACING_ANODE_LOCATION_1: NORMAL
MDC_IDC_SET_LEADCHNL_LV_PACING_CAPTURE_MODE: NORMAL
MDC_IDC_SET_LEADCHNL_LV_PACING_CATHODE_ELECTRODE_1: NORMAL
MDC_IDC_SET_LEADCHNL_LV_PACING_CATHODE_LOCATION_1: NORMAL
MDC_IDC_SET_LEADCHNL_LV_PACING_POLARITY: NORMAL
MDC_IDC_SET_LEADCHNL_LV_PACING_PULSEWIDTH: 0.4 MS
MDC_IDC_SET_LEADCHNL_RA_SENSING_ANODE_ELECTRODE_1: NORMAL
MDC_IDC_SET_LEADCHNL_RA_SENSING_ANODE_LOCATION_1: NORMAL
MDC_IDC_SET_LEADCHNL_RA_SENSING_CATHODE_ELECTRODE_1: NORMAL
MDC_IDC_SET_LEADCHNL_RA_SENSING_CATHODE_LOCATION_1: NORMAL
MDC_IDC_SET_LEADCHNL_RA_SENSING_POLARITY: NORMAL
MDC_IDC_SET_LEADCHNL_RA_SENSING_SENSITIVITY: NORMAL
MDC_IDC_SET_LEADCHNL_RV_PACING_AMPLITUDE: 1.5 V
MDC_IDC_SET_LEADCHNL_RV_PACING_AMPLITUDE: NORMAL
MDC_IDC_SET_LEADCHNL_RV_PACING_AMPLITUDE: NORMAL
MDC_IDC_SET_LEADCHNL_RV_PACING_ANODE_ELECTRODE_1: NORMAL
MDC_IDC_SET_LEADCHNL_RV_PACING_ANODE_LOCATION_1: NORMAL
MDC_IDC_SET_LEADCHNL_RV_PACING_CAPTURE_MODE: NORMAL
MDC_IDC_SET_LEADCHNL_RV_PACING_CATHODE_ELECTRODE_1: NORMAL
MDC_IDC_SET_LEADCHNL_RV_PACING_CATHODE_LOCATION_1: NORMAL
MDC_IDC_SET_LEADCHNL_RV_PACING_POLARITY: NORMAL
MDC_IDC_SET_LEADCHNL_RV_PACING_PULSEWIDTH: 0.4 MS
MDC_IDC_SET_LEADCHNL_RV_SENSING_ANODE_ELECTRODE_1: NORMAL
MDC_IDC_SET_LEADCHNL_RV_SENSING_ANODE_LOCATION_1: NORMAL
MDC_IDC_SET_LEADCHNL_RV_SENSING_CATHODE_ELECTRODE_1: NORMAL
MDC_IDC_SET_LEADCHNL_RV_SENSING_CATHODE_LOCATION_1: NORMAL
MDC_IDC_SET_LEADCHNL_RV_SENSING_POLARITY: NORMAL
MDC_IDC_SET_LEADCHNL_RV_SENSING_SENSITIVITY: 0.45 MV
MDC_IDC_SET_ZONE_DETECTION_BEATS_DENOMINATOR: 40 {BEATS}
MDC_IDC_SET_ZONE_DETECTION_BEATS_NUMERATOR: 30 {BEATS}
MDC_IDC_SET_ZONE_DETECTION_INTERVAL: 260 MS
MDC_IDC_SET_ZONE_DETECTION_INTERVAL: 310 MS
MDC_IDC_SET_ZONE_DETECTION_INTERVAL: 330 MS
MDC_IDC_SET_ZONE_DETECTION_INTERVAL: 400 MS
MDC_IDC_SET_ZONE_TYPE: NORMAL
MDC_IDC_STAT_AT_BURDEN_PERCENT: 0 %
MDC_IDC_STAT_AT_DTM_END: NORMAL
MDC_IDC_STAT_AT_DTM_START: NORMAL
MDC_IDC_STAT_BRADY_AP_VP_PERCENT: 0 %
MDC_IDC_STAT_BRADY_AP_VS_PERCENT: 0 %
MDC_IDC_STAT_BRADY_AS_VP_PERCENT: 95.92 %
MDC_IDC_STAT_BRADY_AS_VP_PERCENT: 98.95 %
MDC_IDC_STAT_BRADY_AS_VP_PERCENT: 99.38 %
MDC_IDC_STAT_BRADY_AS_VS_PERCENT: 0.62 %
MDC_IDC_STAT_BRADY_AS_VS_PERCENT: 1.05 %
MDC_IDC_STAT_BRADY_AS_VS_PERCENT: 4.08 %
MDC_IDC_STAT_BRADY_DTM_END: NORMAL
MDC_IDC_STAT_BRADY_DTM_START: NORMAL
MDC_IDC_STAT_BRADY_RA_PERCENT_PACED: NORMAL
MDC_IDC_STAT_BRADY_RV_PERCENT_PACED: 89.2 %
MDC_IDC_STAT_BRADY_RV_PERCENT_PACED: 95.92 %
MDC_IDC_STAT_BRADY_RV_PERCENT_PACED: 96.24 %
MDC_IDC_STAT_BRADY_RV_PERCENT_PACED: 99 %
MDC_IDC_STAT_BRADY_RV_PERCENT_PACED: 99.38 %
MDC_IDC_STAT_CRT_DTM_END: NORMAL
MDC_IDC_STAT_CRT_DTM_START: NORMAL
MDC_IDC_STAT_CRT_LV_PERCENT_PACED: 89.18 %
MDC_IDC_STAT_CRT_LV_PERCENT_PACED: 95.9 %
MDC_IDC_STAT_CRT_LV_PERCENT_PACED: 96.22 %
MDC_IDC_STAT_CRT_LV_PERCENT_PACED: 99 %
MDC_IDC_STAT_CRT_LV_PERCENT_PACED: 99.36 %
MDC_IDC_STAT_CRT_PERCENT_PACED: 89.17 %
MDC_IDC_STAT_CRT_PERCENT_PACED: 95.89 %
MDC_IDC_STAT_CRT_PERCENT_PACED: 96.21 %
MDC_IDC_STAT_CRT_PERCENT_PACED: 99 %
MDC_IDC_STAT_CRT_PERCENT_PACED: 99.36 %
MDC_IDC_STAT_EPISODE_RECENT_COUNT: 0
MDC_IDC_STAT_EPISODE_RECENT_COUNT: 1
MDC_IDC_STAT_EPISODE_RECENT_COUNT: 28
MDC_IDC_STAT_EPISODE_RECENT_COUNT_DTM_END: NORMAL
MDC_IDC_STAT_EPISODE_RECENT_COUNT_DTM_START: NORMAL
MDC_IDC_STAT_EPISODE_TOTAL_COUNT: 0
MDC_IDC_STAT_EPISODE_TOTAL_COUNT: 1
MDC_IDC_STAT_EPISODE_TOTAL_COUNT: 3
MDC_IDC_STAT_EPISODE_TOTAL_COUNT: 40
MDC_IDC_STAT_EPISODE_TOTAL_COUNT_DTM_END: NORMAL
MDC_IDC_STAT_EPISODE_TOTAL_COUNT_DTM_START: NORMAL
MDC_IDC_STAT_EPISODE_TYPE: NORMAL
MDC_IDC_STAT_TACHYTHERAPY_ATP_DELIVERED_RECENT: 0
MDC_IDC_STAT_TACHYTHERAPY_ATP_DELIVERED_TOTAL: 0
MDC_IDC_STAT_TACHYTHERAPY_RECENT_DTM_END: NORMAL
MDC_IDC_STAT_TACHYTHERAPY_RECENT_DTM_START: NORMAL
MDC_IDC_STAT_TACHYTHERAPY_SHOCKS_ABORTED_RECENT: 0
MDC_IDC_STAT_TACHYTHERAPY_SHOCKS_ABORTED_TOTAL: 0
MDC_IDC_STAT_TACHYTHERAPY_SHOCKS_DELIVERED_RECENT: 0
MDC_IDC_STAT_TACHYTHERAPY_SHOCKS_DELIVERED_RECENT: 0
MDC_IDC_STAT_TACHYTHERAPY_SHOCKS_DELIVERED_TOTAL: 0
MDC_IDC_STAT_TACHYTHERAPY_SHOCKS_DELIVERED_TOTAL: 0
MDC_IDC_STAT_TACHYTHERAPY_TOTAL_DTM_END: NORMAL
MDC_IDC_STAT_TACHYTHERAPY_TOTAL_DTM_START: NORMAL
MONOCYTES # BLD AUTO: 0.8 10E3/UL (ref 0–1.3)
MONOCYTES # BLD AUTO: 0.8 10E3/UL (ref 0–1.3)
MONOCYTES NFR BLD AUTO: 10 %
MONOCYTES NFR BLD AUTO: 9 %
NEUTROPHILS # BLD AUTO: 6.1 10E3/UL (ref 1.6–8.3)
NEUTROPHILS # BLD AUTO: 6.9 10E3/UL (ref 1.6–8.3)
NEUTROPHILS NFR BLD AUTO: 75 %
NEUTROPHILS NFR BLD AUTO: 77 %
NONHDLC SERPL-MCNC: 49 MG/DL
NRBC # BLD AUTO: 0 10E3/UL
NRBC BLD AUTO-RTO: 0 /100
PHOSPHATE SERPL-MCNC: 2.4 MG/DL (ref 2.5–4.5)
PLATELET # BLD AUTO: 131 10E3/UL (ref 150–450)
PLATELET # BLD AUTO: 142 10E3/UL (ref 150–450)
PLATELET # BLD AUTO: 159 10E3/UL (ref 150–450)
PLATELET # BLD AUTO: 160 10E3/UL (ref 150–450)
PLATELET # BLD AUTO: 170 10E3/UL (ref 150–450)
PLATELET # BLD AUTO: 179 10E3/UL (ref 150–450)
PLATELET # BLD AUTO: 221 10E3/UL (ref 150–450)
POTASSIUM BLD-SCNC: 3.9 MMOL/L (ref 3.5–5)
POTASSIUM BLD-SCNC: 4.1 MMOL/L (ref 3.5–5)
POTASSIUM BLD-SCNC: 4.4 MMOL/L (ref 3.5–5)
POTASSIUM SERPL-SCNC: 4 MMOL/L (ref 3.4–5.3)
POTASSIUM SERPL-SCNC: 4.1 MMOL/L (ref 3.4–5.3)
POTASSIUM SERPL-SCNC: 4.1 MMOL/L (ref 3.4–5.3)
PROT SERPL-MCNC: 5.8 G/DL (ref 6–8)
PROT SERPL-MCNC: 6.1 G/DL (ref 6–8)
PROT SERPL-MCNC: 6.6 G/DL (ref 6.4–8.3)
RBC # BLD AUTO: 2.61 10E6/UL (ref 4.4–5.9)
RBC # BLD AUTO: 2.97 10E6/UL (ref 4.4–5.9)
RBC # BLD AUTO: 3 10E6/UL (ref 4.4–5.9)
RBC # BLD AUTO: 3.04 10E6/UL (ref 4.4–5.9)
RBC # BLD AUTO: 3.11 10E6/UL (ref 4.4–5.9)
RBC # BLD AUTO: 3.33 10E6/UL (ref 4.4–5.9)
RBC # BLD AUTO: 3.39 10E6/UL (ref 4.4–5.9)
SARS-COV-2 RNA RESP QL NAA+PROBE: NEGATIVE
SARS-COV-2 RNA RESP QL NAA+PROBE: POSITIVE
SODIUM SERPL-SCNC: 134 MMOL/L (ref 136–145)
SODIUM SERPL-SCNC: 134 MMOL/L (ref 136–145)
SODIUM SERPL-SCNC: 135 MMOL/L (ref 136–145)
SODIUM SERPL-SCNC: 135 MMOL/L (ref 136–145)
SODIUM SERPL-SCNC: 136 MMOL/L (ref 136–145)
SODIUM SERPL-SCNC: 138 MMOL/L (ref 136–145)
SODIUM SERPL-SCNC: 139 MMOL/L (ref 136–145)
SODIUM SERPL-SCNC: 141 MMOL/L (ref 136–145)
TRIGL SERPL-MCNC: 56 MG/DL
TSH SERPL DL<=0.005 MIU/L-ACNC: 1.95 UIU/ML (ref 0.3–4.2)
VIT B12 SERPL-MCNC: 608 PG/ML (ref 232–1245)
WBC # BLD AUTO: 13.1 10E3/UL (ref 4–11)
WBC # BLD AUTO: 5.8 10E3/UL (ref 4–11)
WBC # BLD AUTO: 6.1 10E3/UL (ref 4–11)
WBC # BLD AUTO: 7 10E3/UL (ref 4–11)
WBC # BLD AUTO: 7 10E3/UL (ref 4–11)
WBC # BLD AUTO: 7.9 10E3/UL (ref 4–11)
WBC # BLD AUTO: 9.2 10E3/UL (ref 4–11)

## 2022-01-01 PROCEDURE — 85610 PROTHROMBIN TIME: CPT

## 2022-01-01 PROCEDURE — 11055 PARING/CUTG B9 HYPRKER LES 1: CPT | Performed by: PODIATRIST

## 2022-01-01 PROCEDURE — 99225 PR SUBSEQUENT OBSERVATION CARE,LEVEL II: CPT | Performed by: INTERNAL MEDICINE

## 2022-01-01 PROCEDURE — 99152 MOD SED SAME PHYS/QHP 5/>YRS: CPT | Performed by: INTERNAL MEDICINE

## 2022-01-01 PROCEDURE — 97112 NEUROMUSCULAR REEDUCATION: CPT | Mod: GP | Performed by: PHYSICAL THERAPIST

## 2022-01-01 PROCEDURE — 99219 PR INITIAL OBSERVATION CARE,LEVEL II: CPT | Performed by: INTERNAL MEDICINE

## 2022-01-01 PROCEDURE — 83735 ASSAY OF MAGNESIUM: CPT | Performed by: INTERNAL MEDICINE

## 2022-01-01 PROCEDURE — 99203 OFFICE O/P NEW LOW 30 MIN: CPT | Performed by: THORACIC SURGERY (CARDIOTHORACIC VASCULAR SURGERY)

## 2022-01-01 PROCEDURE — 36415 COLL VENOUS BLD VENIPUNCTURE: CPT

## 2022-01-01 PROCEDURE — 76942 ECHO GUIDE FOR BIOPSY: CPT | Performed by: PHYSICAL MEDICINE & REHABILITATION

## 2022-01-01 PROCEDURE — G0378 HOSPITAL OBSERVATION PER HR: HCPCS

## 2022-01-01 PROCEDURE — 80048 BASIC METABOLIC PNL TOTAL CA: CPT | Performed by: FAMILY MEDICINE

## 2022-01-01 PROCEDURE — 96372 THER/PROPH/DIAG INJ SC/IM: CPT | Performed by: INTERNAL MEDICINE

## 2022-01-01 PROCEDURE — 99207 PR COMMUNITY PARAMEDIC - PATIENT NOT BILLABLE: CPT

## 2022-01-01 PROCEDURE — 33263 RMVL & RPLCMT DFB GEN 2 LEAD: CPT | Performed by: INTERNAL MEDICINE

## 2022-01-01 PROCEDURE — 97116 GAIT TRAINING THERAPY: CPT | Mod: GP

## 2022-01-01 PROCEDURE — 91305 COVID-19,PF,PFIZER (12+ YRS): CPT

## 2022-01-01 PROCEDURE — 85025 COMPLETE CBC W/AUTO DIFF WBC: CPT | Performed by: INTERNAL MEDICINE

## 2022-01-01 PROCEDURE — 36416 COLLJ CAPILLARY BLOOD SPEC: CPT

## 2022-01-01 PROCEDURE — C1900 LEAD, CORONARY VENOUS: HCPCS | Performed by: INTERNAL MEDICINE

## 2022-01-01 PROCEDURE — 258N000003 HC RX IP 258 OP 636: Performed by: ANESTHESIOLOGY

## 2022-01-01 PROCEDURE — 250N000013 HC RX MED GY IP 250 OP 250 PS 637: Performed by: INTERNAL MEDICINE

## 2022-01-01 PROCEDURE — 93295 DEV INTERROG REMOTE 1/2/MLT: CPT | Performed by: INTERNAL MEDICINE

## 2022-01-01 PROCEDURE — G0463 HOSPITAL OUTPT CLINIC VISIT: HCPCS | Mod: 25

## 2022-01-01 PROCEDURE — 272N000001 HC OR GENERAL SUPPLY STERILE: Performed by: THORACIC SURGERY (CARDIOTHORACIC VASCULAR SURGERY)

## 2022-01-01 PROCEDURE — 96372 THER/PROPH/DIAG INJ SC/IM: CPT | Performed by: PHYSICAL MEDICINE & REHABILITATION

## 2022-01-01 PROCEDURE — 85610 PROTHROMBIN TIME: CPT | Performed by: FAMILY MEDICINE

## 2022-01-01 PROCEDURE — 11720 DEBRIDE NAIL 1-5: CPT | Mod: 59 | Performed by: PODIATRIST

## 2022-01-01 PROCEDURE — 83605 ASSAY OF LACTIC ACID: CPT | Performed by: EMERGENCY MEDICINE

## 2022-01-01 PROCEDURE — 71046 X-RAY EXAM CHEST 2 VIEWS: CPT

## 2022-01-01 PROCEDURE — 20670 REMOVAL IMPLANT SUPERFICIAL: CPT | Performed by: THORACIC SURGERY (CARDIOTHORACIC VASCULAR SURGERY)

## 2022-01-01 PROCEDURE — 99214 OFFICE O/P EST MOD 30 MIN: CPT | Performed by: FAMILY MEDICINE

## 2022-01-01 PROCEDURE — 272N000001 HC OR GENERAL SUPPLY STERILE: Performed by: INTERNAL MEDICINE

## 2022-01-01 PROCEDURE — 97530 THERAPEUTIC ACTIVITIES: CPT | Mod: GP

## 2022-01-01 PROCEDURE — 99153 MOD SED SAME PHYS/QHP EA: CPT | Performed by: INTERNAL MEDICINE

## 2022-01-01 PROCEDURE — 97535 SELF CARE MNGMENT TRAINING: CPT | Mod: GO

## 2022-01-01 PROCEDURE — 33225 L VENTRIC PACING LEAD ADD-ON: CPT | Performed by: INTERNAL MEDICINE

## 2022-01-01 PROCEDURE — 99285 EMERGENCY DEPT VISIT HI MDM: CPT | Mod: 25

## 2022-01-01 PROCEDURE — C9803 HOPD COVID-19 SPEC COLLECT: HCPCS

## 2022-01-01 PROCEDURE — 999N000141 HC STATISTIC PRE-PROCEDURE NURSING ASSESSMENT: Performed by: THORACIC SURGERY (CARDIOTHORACIC VASCULAR SURGERY)

## 2022-01-01 PROCEDURE — 36415 COLL VENOUS BLD VENIPUNCTURE: CPT | Performed by: FAMILY MEDICINE

## 2022-01-01 PROCEDURE — C1883 ADAPT/EXT, PACING/NEURO LEAD: HCPCS | Performed by: INTERNAL MEDICINE

## 2022-01-01 PROCEDURE — 93296 REM INTERROG EVL PM/IDS: CPT | Performed by: INTERNAL MEDICINE

## 2022-01-01 PROCEDURE — C1725 CATH, TRANSLUMIN NON-LASER: HCPCS | Performed by: INTERNAL MEDICINE

## 2022-01-01 PROCEDURE — 99217 PR OBSERVATION CARE DISCHARGE: CPT | Performed by: INTERNAL MEDICINE

## 2022-01-01 PROCEDURE — 255N000002 HC RX 255 OP 636: Performed by: INTERNAL MEDICINE

## 2022-01-01 PROCEDURE — 710N000012 HC RECOVERY PHASE 2, PER MINUTE: Performed by: THORACIC SURGERY (CARDIOTHORACIC VASCULAR SURGERY)

## 2022-01-01 PROCEDURE — 85610 PROTHROMBIN TIME: CPT | Performed by: INTERNAL MEDICINE

## 2022-01-01 PROCEDURE — 36415 COLL VENOUS BLD VENIPUNCTURE: CPT | Performed by: INTERNAL MEDICINE

## 2022-01-01 PROCEDURE — 97161 PT EVAL LOW COMPLEX 20 MIN: CPT | Mod: GP

## 2022-01-01 PROCEDURE — 360N000076 HC SURGERY LEVEL 3, PER MIN: Performed by: THORACIC SURGERY (CARDIOTHORACIC VASCULAR SURGERY)

## 2022-01-01 PROCEDURE — 250N000013 HC RX MED GY IP 250 OP 250 PS 637: Performed by: NURSE PRACTITIONER

## 2022-01-01 PROCEDURE — 96372 THER/PROPH/DIAG INJ SC/IM: CPT | Mod: XU | Performed by: INTERNAL MEDICINE

## 2022-01-01 PROCEDURE — C1887 CATHETER, GUIDING: HCPCS | Performed by: INTERNAL MEDICINE

## 2022-01-01 PROCEDURE — 370N000017 HC ANESTHESIA TECHNICAL FEE, PER MIN: Performed by: THORACIC SURGERY (CARDIOTHORACIC VASCULAR SURGERY)

## 2022-01-01 PROCEDURE — 97110 THERAPEUTIC EXERCISES: CPT | Mod: GP

## 2022-01-01 PROCEDURE — 97161 PT EVAL LOW COMPLEX 20 MIN: CPT | Mod: GP | Performed by: PHYSICAL THERAPIST

## 2022-01-01 PROCEDURE — C1882 AICD, OTHER THAN SING/DUAL: HCPCS | Performed by: INTERNAL MEDICINE

## 2022-01-01 PROCEDURE — P9603 ONE-WAY ALLOW PRORATED MILES: HCPCS | Performed by: FAMILY MEDICINE

## 2022-01-01 PROCEDURE — 36415 COLL VENOUS BLD VENIPUNCTURE: CPT | Performed by: EMERGENCY MEDICINE

## 2022-01-01 PROCEDURE — 20552 NJX 1/MLT TRIGGER POINT 1/2: CPT | Mod: 59 | Performed by: PHYSICAL MEDICINE & REHABILITATION

## 2022-01-01 PROCEDURE — U0003 INFECTIOUS AGENT DETECTION BY NUCLEIC ACID (DNA OR RNA); SEVERE ACUTE RESPIRATORY SYNDROME CORONAVIRUS 2 (SARS-COV-2) (CORONAVIRUS DISEASE [COVID-19]), AMPLIFIED PROBE TECHNIQUE, MAKING USE OF HIGH THROUGHPUT TECHNOLOGIES AS DESCRIBED BY CMS-2020-01-R: HCPCS | Performed by: EMERGENCY MEDICINE

## 2022-01-01 PROCEDURE — 36416 COLLJ CAPILLARY BLOOD SPEC: CPT | Performed by: FAMILY MEDICINE

## 2022-01-01 PROCEDURE — U0003 INFECTIOUS AGENT DETECTION BY NUCLEIC ACID (DNA OR RNA); SEVERE ACUTE RESPIRATORY SYNDROME CORONAVIRUS 2 (SARS-COV-2) (CORONAVIRUS DISEASE [COVID-19]), AMPLIFIED PROBE TECHNIQUE, MAKING USE OF HIGH THROUGHPUT TECHNOLOGIES AS DESCRIBED BY CMS-2020-01-R: HCPCS

## 2022-01-01 PROCEDURE — 250N000009 HC RX 250: Performed by: THORACIC SURGERY (CARDIOTHORACIC VASCULAR SURGERY)

## 2022-01-01 PROCEDURE — 84100 ASSAY OF PHOSPHORUS: CPT | Performed by: INTERNAL MEDICINE

## 2022-01-01 PROCEDURE — 82550 ASSAY OF CK (CPK): CPT

## 2022-01-01 PROCEDURE — 80048 BASIC METABOLIC PNL TOTAL CA: CPT | Performed by: INTERNAL MEDICINE

## 2022-01-01 PROCEDURE — 85027 COMPLETE CBC AUTOMATED: CPT | Performed by: INTERNAL MEDICINE

## 2022-01-01 PROCEDURE — 74176 CT ABD & PELVIS W/O CONTRAST: CPT

## 2022-01-01 PROCEDURE — 250N000009 HC RX 250: Performed by: INTERNAL MEDICINE

## 2022-01-01 PROCEDURE — 96365 THER/PROPH/DIAG IV INF INIT: CPT | Mod: XU

## 2022-01-01 PROCEDURE — 120N000001 HC R&B MED SURG/OB

## 2022-01-01 PROCEDURE — 93283 PRGRMG EVAL IMPLANTABLE DFB: CPT | Performed by: INTERNAL MEDICINE

## 2022-01-01 PROCEDURE — C1894 INTRO/SHEATH, NON-LASER: HCPCS | Performed by: INTERNAL MEDICINE

## 2022-01-01 PROCEDURE — 85025 COMPLETE CBC W/AUTO DIFF WBC: CPT | Performed by: FAMILY MEDICINE

## 2022-01-01 PROCEDURE — 250N000011 HC RX IP 250 OP 636: Performed by: EMERGENCY MEDICINE

## 2022-01-01 PROCEDURE — 250N000011 HC RX IP 250 OP 636: Performed by: INTERNAL MEDICINE

## 2022-01-01 PROCEDURE — 83690 ASSAY OF LIPASE: CPT | Performed by: EMERGENCY MEDICINE

## 2022-01-01 PROCEDURE — 258N000003 HC RX IP 258 OP 636

## 2022-01-01 PROCEDURE — 85610 PROTHROMBIN TIME: CPT | Performed by: EMERGENCY MEDICINE

## 2022-01-01 PROCEDURE — 82607 VITAMIN B-12: CPT

## 2022-01-01 PROCEDURE — 96366 THER/PROPH/DIAG IV INF ADDON: CPT | Mod: XU

## 2022-01-01 PROCEDURE — 99214 OFFICE O/P EST MOD 30 MIN: CPT | Performed by: INTERNAL MEDICINE

## 2022-01-01 PROCEDURE — 97129 THER IVNTJ 1ST 15 MIN: CPT | Mod: GO

## 2022-01-01 PROCEDURE — 250N000011 HC RX IP 250 OP 636: Performed by: NURSE ANESTHETIST, CERTIFIED REGISTERED

## 2022-01-01 PROCEDURE — 96374 THER/PROPH/DIAG INJ IV PUSH: CPT | Mod: XU

## 2022-01-01 PROCEDURE — 80061 LIPID PANEL: CPT | Performed by: FAMILY MEDICINE

## 2022-01-01 PROCEDURE — P9604 ONE-WAY ALLOW PRORATED TRIP: HCPCS | Performed by: FAMILY MEDICINE

## 2022-01-01 PROCEDURE — C1769 GUIDE WIRE: HCPCS | Performed by: INTERNAL MEDICINE

## 2022-01-01 PROCEDURE — 99213 OFFICE O/P EST LOW 20 MIN: CPT | Performed by: PHYSICAL MEDICINE & REHABILITATION

## 2022-01-01 PROCEDURE — 80053 COMPREHEN METABOLIC PANEL: CPT | Performed by: FAMILY MEDICINE

## 2022-01-01 PROCEDURE — 99205 OFFICE O/P NEW HI 60 MIN: CPT | Performed by: PSYCHIATRY & NEUROLOGY

## 2022-01-01 PROCEDURE — 82310 ASSAY OF CALCIUM: CPT | Performed by: INTERNAL MEDICINE

## 2022-01-01 PROCEDURE — 85014 HEMATOCRIT: CPT | Performed by: EMERGENCY MEDICINE

## 2022-01-01 PROCEDURE — 97165 OT EVAL LOW COMPLEX 30 MIN: CPT | Mod: GO

## 2022-01-01 PROCEDURE — 83735 ASSAY OF MAGNESIUM: CPT | Performed by: EMERGENCY MEDICINE

## 2022-01-01 PROCEDURE — 250N000011 HC RX IP 250 OP 636: Performed by: THORACIC SURGERY (CARDIOTHORACIC VASCULAR SURGERY)

## 2022-01-01 PROCEDURE — U0005 INFEC AGEN DETEC AMPLI PROBE: HCPCS

## 2022-01-01 PROCEDURE — 99214 OFFICE O/P EST MOD 30 MIN: CPT | Mod: 95 | Performed by: FAMILY MEDICINE

## 2022-01-01 PROCEDURE — 11719 TRIM NAIL(S) ANY NUMBER: CPT | Performed by: PODIATRIST

## 2022-01-01 PROCEDURE — 80053 COMPREHEN METABOLIC PANEL: CPT | Performed by: EMERGENCY MEDICINE

## 2022-01-01 PROCEDURE — 99203 OFFICE O/P NEW LOW 30 MIN: CPT | Mod: 25 | Performed by: PODIATRIST

## 2022-01-01 PROCEDURE — 99214 OFFICE O/P EST MOD 30 MIN: CPT | Mod: CS | Performed by: FAMILY MEDICINE

## 2022-01-01 PROCEDURE — 86140 C-REACTIVE PROTEIN: CPT | Performed by: INTERNAL MEDICINE

## 2022-01-01 PROCEDURE — 85027 COMPLETE CBC AUTOMATED: CPT | Performed by: FAMILY MEDICINE

## 2022-01-01 PROCEDURE — 84443 ASSAY THYROID STIM HORMONE: CPT

## 2022-01-01 PROCEDURE — 96375 TX/PRO/DX INJ NEW DRUG ADDON: CPT | Mod: XU

## 2022-01-01 PROCEDURE — 80053 COMPREHEN METABOLIC PANEL: CPT | Performed by: INTERNAL MEDICINE

## 2022-01-01 PROCEDURE — 96361 HYDRATE IV INFUSION ADD-ON: CPT

## 2022-01-01 PROCEDURE — 0054A COVID-19,PF,PFIZER (12+ YRS): CPT

## 2022-01-01 PROCEDURE — 999N000127 HC STATISTIC PERIPHERAL IV START W US GUIDANCE

## 2022-01-01 PROCEDURE — 82040 ASSAY OF SERUM ALBUMIN: CPT | Performed by: EMERGENCY MEDICINE

## 2022-01-01 PROCEDURE — 258N000003 HC RX IP 258 OP 636: Performed by: EMERGENCY MEDICINE

## 2022-01-01 PROCEDURE — 33264 RMVL & RPLCMT DFB GEN MLT LD: CPT

## 2022-01-01 PROCEDURE — G0438 PPPS, INITIAL VISIT: HCPCS | Performed by: FAMILY MEDICINE

## 2022-01-01 DEVICE — KIT ADAPTER PIN-PLUG LEAD 6725: Type: IMPLANTABLE DEVICE | Site: HEART | Status: FUNCTIONAL

## 2022-01-01 DEVICE — DEFIB ICD COBALT XT TITANIUM 74X51X13MM DTPA2QQ: Type: IMPLANTABLE DEVICE | Site: CHEST  WALL | Status: FUNCTIONAL

## 2022-01-01 DEVICE — IMPLANTABLE DEVICE: Type: IMPLANTABLE DEVICE | Site: HEART | Status: FUNCTIONAL

## 2022-01-01 RX ORDER — WARFARIN SODIUM 2.5 MG/1
2.5 TABLET ORAL
Status: COMPLETED | OUTPATIENT
Start: 2022-01-01 | End: 2022-01-01

## 2022-01-01 RX ORDER — GABAPENTIN 100 MG/1
100 CAPSULE ORAL 2 TIMES DAILY
Qty: 90 CAPSULE | Refills: 4
Start: 2022-01-01

## 2022-01-01 RX ORDER — CARBIDOPA AND LEVODOPA 25; 100 MG/1; MG/1
1 TABLET ORAL 3 TIMES DAILY
Status: DISCONTINUED | OUTPATIENT
Start: 2022-01-01 | End: 2022-01-01

## 2022-01-01 RX ORDER — CEFAZOLIN SODIUM 2 G/100ML
2 INJECTION, SOLUTION INTRAVENOUS SEE ADMIN INSTRUCTIONS
Status: DISCONTINUED | OUTPATIENT
Start: 2022-01-01 | End: 2022-01-01 | Stop reason: HOSPADM

## 2022-01-01 RX ORDER — CARVEDILOL 12.5 MG/1
37.25 TABLET ORAL 2 TIMES DAILY WITH MEALS
Status: DISCONTINUED | OUTPATIENT
Start: 2022-01-01 | End: 2022-01-01

## 2022-01-01 RX ORDER — SODIUM CHLORIDE, SODIUM LACTATE, POTASSIUM CHLORIDE, CALCIUM CHLORIDE 600; 310; 30; 20 MG/100ML; MG/100ML; MG/100ML; MG/100ML
INJECTION, SOLUTION INTRAVENOUS CONTINUOUS
Status: DISCONTINUED | OUTPATIENT
Start: 2022-01-01 | End: 2022-01-01 | Stop reason: HOSPADM

## 2022-01-01 RX ORDER — LIDOCAINE HYDROCHLORIDE 10 MG/ML
INJECTION, SOLUTION INFILTRATION; PERINEURAL PRN
Status: DISCONTINUED | OUTPATIENT
Start: 2022-01-01 | End: 2022-01-01 | Stop reason: HOSPADM

## 2022-01-01 RX ORDER — LIDOCAINE 40 MG/G
CREAM TOPICAL
Status: DISCONTINUED | OUTPATIENT
Start: 2022-01-01 | End: 2022-01-01 | Stop reason: HOSPADM

## 2022-01-01 RX ORDER — ACETAMINOPHEN 325 MG/1
650 TABLET ORAL EVERY 4 HOURS PRN
Status: DISCONTINUED | OUTPATIENT
Start: 2022-01-01 | End: 2022-01-01 | Stop reason: HOSPADM

## 2022-01-01 RX ORDER — CARVEDILOL 6.25 MG/1
6.25 TABLET ORAL 2 TIMES DAILY WITH MEALS
Start: 2022-01-01

## 2022-01-01 RX ORDER — MAGNESIUM SULFATE HEPTAHYDRATE 40 MG/ML
2 INJECTION, SOLUTION INTRAVENOUS ONCE
Status: COMPLETED | OUTPATIENT
Start: 2022-01-01 | End: 2022-01-01

## 2022-01-01 RX ORDER — TRAMADOL HYDROCHLORIDE 50 MG/1
50 TABLET ORAL EVERY 6 HOURS PRN
Status: DISCONTINUED | OUTPATIENT
Start: 2022-01-01 | End: 2022-01-01

## 2022-01-01 RX ORDER — ONDANSETRON 2 MG/ML
4 INJECTION INTRAMUSCULAR; INTRAVENOUS EVERY 30 MIN PRN
Status: DISCONTINUED | OUTPATIENT
Start: 2022-01-01 | End: 2022-01-01 | Stop reason: HOSPADM

## 2022-01-01 RX ORDER — HYDROMORPHONE HCL IN WATER/PF 6 MG/30 ML
0.2 PATIENT CONTROLLED ANALGESIA SYRINGE INTRAVENOUS EVERY 5 MIN PRN
Status: DISCONTINUED | OUTPATIENT
Start: 2022-01-01 | End: 2022-01-01 | Stop reason: HOSPADM

## 2022-01-01 RX ORDER — PROCHLORPERAZINE MALEATE 5 MG
5 TABLET ORAL EVERY 6 HOURS PRN
Status: DISCONTINUED | OUTPATIENT
Start: 2022-01-01 | End: 2022-01-01 | Stop reason: HOSPADM

## 2022-01-01 RX ORDER — SODIUM CHLORIDE 9 MG/ML
100 INJECTION, SOLUTION INTRAVENOUS CONTINUOUS
Status: DISCONTINUED | OUTPATIENT
Start: 2022-01-01 | End: 2022-01-01 | Stop reason: HOSPADM

## 2022-01-01 RX ORDER — MEPERIDINE HYDROCHLORIDE 25 MG/ML
12.5 INJECTION INTRAMUSCULAR; INTRAVENOUS; SUBCUTANEOUS
Status: DISCONTINUED | OUTPATIENT
Start: 2022-01-01 | End: 2022-01-01 | Stop reason: HOSPADM

## 2022-01-01 RX ORDER — CARBOXYMETHYLCELLULOSE SODIUM 5 MG/ML
1 SOLUTION/ DROPS OPHTHALMIC 3 TIMES DAILY PRN
Status: DISCONTINUED | OUTPATIENT
Start: 2022-01-01 | End: 2022-01-01 | Stop reason: HOSPADM

## 2022-01-01 RX ORDER — ALBUTEROL SULFATE 5 MG/ML
2.5 SOLUTION RESPIRATORY (INHALATION) EVERY 4 HOURS PRN
Status: DISCONTINUED | OUTPATIENT
Start: 2022-01-01 | End: 2022-01-01 | Stop reason: HOSPADM

## 2022-01-01 RX ORDER — WARFARIN SODIUM 2 MG/1
2 TABLET ORAL
Status: COMPLETED | OUTPATIENT
Start: 2022-01-01 | End: 2022-01-01

## 2022-01-01 RX ORDER — BENZTROPINE MESYLATE 1 MG/1
1 TABLET ORAL 2 TIMES DAILY
Qty: 180 TABLET | Refills: 0 | Status: ON HOLD | OUTPATIENT
Start: 2022-01-01 | End: 2022-01-01

## 2022-01-01 RX ORDER — CYCLOBENZAPRINE HCL 5 MG
5 TABLET ORAL 2 TIMES DAILY PRN
Qty: 60 TABLET | Refills: 1 | Status: SHIPPED | OUTPATIENT
Start: 2022-01-01 | End: 2022-01-01

## 2022-01-01 RX ORDER — TRIAMCINOLONE ACETONIDE 40 MG/ML
40 INJECTION, SUSPENSION INTRA-ARTICULAR; INTRAMUSCULAR ONCE
Status: COMPLETED | OUTPATIENT
Start: 2022-01-01 | End: 2022-01-01

## 2022-01-01 RX ORDER — NALOXONE HYDROCHLORIDE 1 MG/ML
0.4 INJECTION INTRAMUSCULAR; INTRAVENOUS; SUBCUTANEOUS
Status: DISCONTINUED | OUTPATIENT
Start: 2022-01-01 | End: 2022-01-01 | Stop reason: HOSPADM

## 2022-01-01 RX ORDER — CARVEDILOL 12.5 MG/1
12.5 TABLET ORAL 2 TIMES DAILY WITH MEALS
Status: DISCONTINUED | OUTPATIENT
Start: 2022-01-01 | End: 2022-01-01

## 2022-01-01 RX ORDER — CEFAZOLIN SODIUM 2 G/100ML
2 INJECTION, SOLUTION INTRAVENOUS
Status: CANCELLED | OUTPATIENT
Start: 2022-01-01

## 2022-01-01 RX ORDER — OLANZAPINE 10 MG/2ML
2.5 INJECTION, POWDER, FOR SOLUTION INTRAMUSCULAR EVERY 8 HOURS PRN
Status: DISCONTINUED | OUTPATIENT
Start: 2022-01-01 | End: 2022-01-01 | Stop reason: HOSPADM

## 2022-01-01 RX ORDER — TRAZODONE HYDROCHLORIDE 50 MG/1
50 TABLET, FILM COATED ORAL AT BEDTIME
Status: DISCONTINUED | OUTPATIENT
Start: 2022-01-01 | End: 2022-01-01 | Stop reason: HOSPADM

## 2022-01-01 RX ORDER — NALOXONE HYDROCHLORIDE 0.4 MG/ML
0.4 INJECTION, SOLUTION INTRAMUSCULAR; INTRAVENOUS; SUBCUTANEOUS
Status: DISCONTINUED | OUTPATIENT
Start: 2022-01-01 | End: 2022-01-01 | Stop reason: HOSPADM

## 2022-01-01 RX ORDER — CARVEDILOL 25 MG/1
37.25 TABLET ORAL 2 TIMES DAILY WITH MEALS
Qty: 90 TABLET | Refills: 1 | Status: SHIPPED | OUTPATIENT
Start: 2022-01-01 | End: 2022-01-01

## 2022-01-01 RX ORDER — GABAPENTIN 100 MG/1
100 CAPSULE ORAL 2 TIMES DAILY
Qty: 60 CAPSULE | Refills: 4 | Status: SHIPPED | OUTPATIENT
Start: 2022-01-01 | End: 2022-01-01

## 2022-01-01 RX ORDER — CYCLOSPORINE 0.5 MG/ML
1 EMULSION OPHTHALMIC 2 TIMES DAILY PRN
COMMUNITY

## 2022-01-01 RX ORDER — LOSARTAN POTASSIUM 50 MG/1
50 TABLET ORAL DAILY
Status: DISCONTINUED | OUTPATIENT
Start: 2022-01-01 | End: 2022-01-01 | Stop reason: HOSPADM

## 2022-01-01 RX ORDER — NALOXONE HYDROCHLORIDE 0.4 MG/ML
0.2 INJECTION, SOLUTION INTRAMUSCULAR; INTRAVENOUS; SUBCUTANEOUS
Status: DISCONTINUED | OUTPATIENT
Start: 2022-01-01 | End: 2022-01-01 | Stop reason: HOSPADM

## 2022-01-01 RX ORDER — NALOXONE HYDROCHLORIDE 1 MG/ML
0.2 INJECTION INTRAMUSCULAR; INTRAVENOUS; SUBCUTANEOUS
Status: DISCONTINUED | OUTPATIENT
Start: 2022-01-01 | End: 2022-01-01 | Stop reason: HOSPADM

## 2022-01-01 RX ORDER — WARFARIN SODIUM 2.5 MG/1
2.5 TABLET ORAL
Status: DISCONTINUED | OUTPATIENT
Start: 2022-01-01 | End: 2022-01-01

## 2022-01-01 RX ORDER — MAGNESIUM HYDROXIDE 1200 MG/15ML
LIQUID ORAL PRN
Status: DISCONTINUED | OUTPATIENT
Start: 2022-01-01 | End: 2022-01-01 | Stop reason: HOSPADM

## 2022-01-01 RX ORDER — OXYCODONE HYDROCHLORIDE 5 MG/1
5 TABLET ORAL EVERY 4 HOURS PRN
Status: DISCONTINUED | OUTPATIENT
Start: 2022-01-01 | End: 2022-01-01 | Stop reason: HOSPADM

## 2022-01-01 RX ORDER — BENZTROPINE MESYLATE 0.5 MG/1
1 TABLET ORAL 2 TIMES DAILY
Status: DISCONTINUED | OUTPATIENT
Start: 2022-01-01 | End: 2022-01-01

## 2022-01-01 RX ORDER — CARVEDILOL 25 MG/1
37.25 TABLET ORAL 2 TIMES DAILY WITH MEALS
Qty: 45 TABLET | Refills: 5 | Status: ON HOLD | OUTPATIENT
Start: 2022-01-01 | End: 2022-01-01

## 2022-01-01 RX ORDER — GABAPENTIN 100 MG/1
CAPSULE ORAL
Qty: 84 CAPSULE | Refills: 0 | Status: SHIPPED | OUTPATIENT
Start: 2022-01-01 | End: 2022-01-01

## 2022-01-01 RX ORDER — ONDANSETRON 4 MG/1
4 TABLET, ORALLY DISINTEGRATING ORAL EVERY 6 HOURS PRN
Status: DISCONTINUED | OUTPATIENT
Start: 2022-01-01 | End: 2022-01-01 | Stop reason: HOSPADM

## 2022-01-01 RX ORDER — FENTANYL CITRATE 50 UG/ML
25 INJECTION, SOLUTION INTRAMUSCULAR; INTRAVENOUS
Status: DISCONTINUED | OUTPATIENT
Start: 2022-01-01 | End: 2022-01-01 | Stop reason: HOSPADM

## 2022-01-01 RX ORDER — GABAPENTIN 100 MG/1
100 CAPSULE ORAL 3 TIMES DAILY
Qty: 90 CAPSULE | Refills: 4 | Status: ON HOLD | OUTPATIENT
Start: 2022-01-01 | End: 2022-01-01

## 2022-01-01 RX ORDER — LOSARTAN POTASSIUM 50 MG/1
50 TABLET ORAL DAILY
Start: 2022-01-01

## 2022-01-01 RX ORDER — FENTANYL CITRATE 50 UG/ML
INJECTION, SOLUTION INTRAMUSCULAR; INTRAVENOUS
Status: DISCONTINUED | OUTPATIENT
Start: 2022-01-01 | End: 2022-01-01 | Stop reason: HOSPADM

## 2022-01-01 RX ORDER — TRAZODONE HYDROCHLORIDE 50 MG/1
50 TABLET, FILM COATED ORAL AT BEDTIME
Start: 2022-01-01

## 2022-01-01 RX ORDER — VANCOMYCIN HYDROCHLORIDE 1 G/20ML
1000 INJECTION, POWDER, LYOPHILIZED, FOR SOLUTION INTRAVENOUS
Status: DISCONTINUED | OUTPATIENT
Start: 2022-01-01 | End: 2022-01-01 | Stop reason: HOSPADM

## 2022-01-01 RX ORDER — ASPIRIN 325 MG
325 TABLET, DELAYED RELEASE (ENTERIC COATED) ORAL DAILY
Start: 2022-01-01

## 2022-01-01 RX ORDER — CYCLOBENZAPRINE HCL 5 MG
5 TABLET ORAL 2 TIMES DAILY PRN
Qty: 30 TABLET | Refills: 3 | Status: SHIPPED | OUTPATIENT
Start: 2022-01-01

## 2022-01-01 RX ORDER — ENOXAPARIN SODIUM 100 MG/ML
40 INJECTION SUBCUTANEOUS EVERY 24 HOURS
Status: COMPLETED | OUTPATIENT
Start: 2022-01-01 | End: 2022-01-01

## 2022-01-01 RX ORDER — POTASSIUM CHLORIDE 1500 MG/1
TABLET, EXTENDED RELEASE ORAL
Qty: 90 TABLET | Refills: 2 | Status: ON HOLD | OUTPATIENT
Start: 2022-01-01 | End: 2022-01-01

## 2022-01-01 RX ORDER — ONDANSETRON 4 MG/1
4 TABLET, ORALLY DISINTEGRATING ORAL EVERY 30 MIN PRN
Status: DISCONTINUED | OUTPATIENT
Start: 2022-01-01 | End: 2022-01-01 | Stop reason: HOSPADM

## 2022-01-01 RX ORDER — LOSARTAN POTASSIUM 50 MG/1
100 TABLET ORAL DAILY
Status: DISCONTINUED | OUTPATIENT
Start: 2022-01-01 | End: 2022-01-01

## 2022-01-01 RX ORDER — ALBUTEROL SULFATE 90 UG/1
2 AEROSOL, METERED RESPIRATORY (INHALATION) EVERY 4 HOURS PRN
Status: DISCONTINUED | OUTPATIENT
Start: 2022-01-01 | End: 2022-01-01 | Stop reason: HOSPADM

## 2022-01-01 RX ORDER — GABAPENTIN 100 MG/1
100 CAPSULE ORAL 2 TIMES DAILY
Status: DISCONTINUED | OUTPATIENT
Start: 2022-01-01 | End: 2022-01-01 | Stop reason: HOSPADM

## 2022-01-01 RX ORDER — PROPOFOL 10 MG/ML
INJECTION, EMULSION INTRAVENOUS PRN
Status: DISCONTINUED | OUTPATIENT
Start: 2022-01-01 | End: 2022-01-01

## 2022-01-01 RX ORDER — BUMETANIDE 1 MG/1
TABLET ORAL
Qty: 90 TABLET | Refills: 1 | Status: SHIPPED | OUTPATIENT
Start: 2022-01-01 | End: 2022-01-01

## 2022-01-01 RX ORDER — BUMETANIDE 1 MG/1
TABLET ORAL
Qty: 90 TABLET | Refills: 1 | Status: ON HOLD | OUTPATIENT
Start: 2022-01-01 | End: 2022-01-01

## 2022-01-01 RX ORDER — IODIXANOL 320 MG/ML
INJECTION, SOLUTION INTRAVASCULAR
Status: DISCONTINUED | OUTPATIENT
Start: 2022-01-01 | End: 2022-01-01 | Stop reason: HOSPADM

## 2022-01-01 RX ORDER — ACETAMINOPHEN 650 MG/1
650 SUPPOSITORY RECTAL EVERY 4 HOURS PRN
Status: DISCONTINUED | OUTPATIENT
Start: 2022-01-01 | End: 2022-01-01 | Stop reason: HOSPADM

## 2022-01-01 RX ORDER — TRAMADOL HYDROCHLORIDE 50 MG/1
50 TABLET ORAL EVERY 6 HOURS PRN
Qty: 10 TABLET | Refills: 0 | Status: SHIPPED | OUTPATIENT
Start: 2022-01-01 | End: 2022-01-01

## 2022-01-01 RX ORDER — DIPHENOXYLATE HCL/ATROPINE 2.5-.025MG
1 TABLET ORAL 2 TIMES DAILY PRN
Status: DISCONTINUED | OUTPATIENT
Start: 2022-01-01 | End: 2022-01-01 | Stop reason: HOSPADM

## 2022-01-01 RX ORDER — CARBOXYMETHYLCELLULOSE SODIUM 5 MG/ML
1 SOLUTION/ DROPS OPHTHALMIC 4 TIMES DAILY PRN
COMMUNITY

## 2022-01-01 RX ORDER — CARBIDOPA AND LEVODOPA 25; 100 MG/1; MG/1
1 TABLET ORAL 3 TIMES DAILY
Qty: 90 TABLET | Refills: 1 | Status: SHIPPED | OUTPATIENT
Start: 2022-01-01

## 2022-01-01 RX ORDER — CARVEDILOL 12.5 MG/1
25 TABLET ORAL 2 TIMES DAILY WITH MEALS
Status: DISCONTINUED | OUTPATIENT
Start: 2022-01-01 | End: 2022-01-01

## 2022-01-01 RX ORDER — CARVEDILOL 3.12 MG/1
6.25 TABLET ORAL 2 TIMES DAILY WITH MEALS
Status: DISCONTINUED | OUTPATIENT
Start: 2022-01-01 | End: 2022-01-01 | Stop reason: HOSPADM

## 2022-01-01 RX ORDER — ONDANSETRON 2 MG/ML
4 INJECTION INTRAMUSCULAR; INTRAVENOUS EVERY 6 HOURS PRN
Status: DISCONTINUED | OUTPATIENT
Start: 2022-01-01 | End: 2022-01-01

## 2022-01-01 RX ORDER — PANTOPRAZOLE SODIUM 40 MG/1
40 TABLET, DELAYED RELEASE ORAL
Status: DISCONTINUED | OUTPATIENT
Start: 2022-01-01 | End: 2022-01-01 | Stop reason: HOSPADM

## 2022-01-01 RX ORDER — GABAPENTIN 100 MG/1
100 CAPSULE ORAL 3 TIMES DAILY
Status: DISCONTINUED | OUTPATIENT
Start: 2022-01-01 | End: 2022-01-01

## 2022-01-01 RX ORDER — VANCOMYCIN HYDROCHLORIDE 1 G/20ML
INJECTION, POWDER, LYOPHILIZED, FOR SOLUTION INTRAVENOUS CONTINUOUS PRN
Status: COMPLETED | OUTPATIENT
Start: 2022-01-01 | End: 2022-01-01

## 2022-01-01 RX ORDER — CEFAZOLIN SODIUM 2 G/100ML
2 INJECTION, SOLUTION INTRAVENOUS
Status: COMPLETED | OUTPATIENT
Start: 2022-01-01 | End: 2022-01-01

## 2022-01-01 RX ORDER — ROSUVASTATIN CALCIUM 10 MG/1
20 TABLET, COATED ORAL EVERY EVENING
Status: DISCONTINUED | OUTPATIENT
Start: 2022-01-01 | End: 2022-01-01 | Stop reason: HOSPADM

## 2022-01-01 RX ORDER — CARBIDOPA AND LEVODOPA 25; 100 MG/1; MG/1
1 TABLET ORAL 2 TIMES DAILY WITH MEALS
Status: DISCONTINUED | OUTPATIENT
Start: 2022-01-01 | End: 2022-01-01 | Stop reason: HOSPADM

## 2022-01-01 RX ORDER — FENTANYL CITRATE 50 UG/ML
25 INJECTION, SOLUTION INTRAMUSCULAR; INTRAVENOUS EVERY 5 MIN PRN
Status: DISCONTINUED | OUTPATIENT
Start: 2022-01-01 | End: 2022-01-01 | Stop reason: HOSPADM

## 2022-01-01 RX ORDER — PROCHLORPERAZINE 25 MG
12.5 SUPPOSITORY, RECTAL RECTAL EVERY 12 HOURS PRN
Status: DISCONTINUED | OUTPATIENT
Start: 2022-01-01 | End: 2022-01-01 | Stop reason: HOSPADM

## 2022-01-01 RX ORDER — PROPOFOL 10 MG/ML
INJECTION, EMULSION INTRAVENOUS CONTINUOUS PRN
Status: DISCONTINUED | OUTPATIENT
Start: 2022-01-01 | End: 2022-01-01

## 2022-01-01 RX ORDER — ONDANSETRON 2 MG/ML
4 INJECTION INTRAMUSCULAR; INTRAVENOUS ONCE
Status: COMPLETED | OUTPATIENT
Start: 2022-01-01 | End: 2022-01-01

## 2022-01-01 RX ORDER — CEFAZOLIN SODIUM 2 G/100ML
2 INJECTION, SOLUTION INTRAVENOUS SEE ADMIN INSTRUCTIONS
Status: CANCELLED | OUTPATIENT
Start: 2022-01-01

## 2022-01-01 RX ORDER — HALOPERIDOL 5 MG/ML
1 INJECTION INTRAMUSCULAR
Status: DISCONTINUED | OUTPATIENT
Start: 2022-01-01 | End: 2022-01-01 | Stop reason: HOSPADM

## 2022-01-01 RX ADMIN — GABAPENTIN 100 MG: 100 CAPSULE ORAL at 20:08

## 2022-01-01 RX ADMIN — ROSUVASTATIN CALCIUM 20 MG: 10 TABLET, FILM COATED ORAL at 20:08

## 2022-01-01 RX ADMIN — PANTOPRAZOLE SODIUM 40 MG: 40 TABLET, DELAYED RELEASE ORAL at 09:47

## 2022-01-01 RX ADMIN — ROSUVASTATIN CALCIUM 20 MG: 10 TABLET, FILM COATED ORAL at 20:01

## 2022-01-01 RX ADMIN — OLANZAPINE 2.5 MG: 10 INJECTION, POWDER, LYOPHILIZED, FOR SOLUTION INTRAMUSCULAR at 14:38

## 2022-01-01 RX ADMIN — PANTOPRAZOLE SODIUM 40 MG: 40 TABLET, DELAYED RELEASE ORAL at 07:55

## 2022-01-01 RX ADMIN — CARVEDILOL 6.25 MG: 3.12 TABLET, FILM COATED ORAL at 19:00

## 2022-01-01 RX ADMIN — CARBIDOPA AND LEVODOPA 1 TABLET: 25; 100 TABLET ORAL at 09:36

## 2022-01-01 RX ADMIN — CARVEDILOL 6.25 MG: 3.12 TABLET, FILM COATED ORAL at 08:04

## 2022-01-01 RX ADMIN — CARVEDILOL 6.25 MG: 3.12 TABLET, FILM COATED ORAL at 18:25

## 2022-01-01 RX ADMIN — CARVEDILOL 12.5 MG: 12.5 TABLET, FILM COATED ORAL at 09:01

## 2022-01-01 RX ADMIN — CARVEDILOL 12.5 MG: 12.5 TABLET, FILM COATED ORAL at 17:38

## 2022-01-01 RX ADMIN — LOSARTAN POTASSIUM 50 MG: 50 TABLET, FILM COATED ORAL at 08:29

## 2022-01-01 RX ADMIN — CARVEDILOL 6.25 MG: 3.12 TABLET, FILM COATED ORAL at 16:50

## 2022-01-01 RX ADMIN — CARBIDOPA AND LEVODOPA 1 TABLET: 25; 100 TABLET ORAL at 16:46

## 2022-01-01 RX ADMIN — PANTOPRAZOLE SODIUM 40 MG: 40 TABLET, DELAYED RELEASE ORAL at 08:04

## 2022-01-01 RX ADMIN — SODIUM CHLORIDE, POTASSIUM CHLORIDE, SODIUM LACTATE AND CALCIUM CHLORIDE: 600; 310; 30; 20 INJECTION, SOLUTION INTRAVENOUS at 13:34

## 2022-01-01 RX ADMIN — LOSARTAN POTASSIUM 100 MG: 50 TABLET, FILM COATED ORAL at 15:21

## 2022-01-01 RX ADMIN — WARFARIN SODIUM 2.5 MG: 2.5 TABLET ORAL at 18:41

## 2022-01-01 RX ADMIN — GABAPENTIN 100 MG: 100 CAPSULE ORAL at 19:59

## 2022-01-01 RX ADMIN — PROPOFOL 20 MG: 10 INJECTION, EMULSION INTRAVENOUS at 14:03

## 2022-01-01 RX ADMIN — CARVEDILOL 6.25 MG: 3.12 TABLET, FILM COATED ORAL at 09:14

## 2022-01-01 RX ADMIN — GABAPENTIN 100 MG: 100 CAPSULE ORAL at 20:28

## 2022-01-01 RX ADMIN — QUETIAPINE FUMARATE 12.5 MG: 25 TABLET ORAL at 21:56

## 2022-01-01 RX ADMIN — WARFARIN SODIUM 2.5 MG: 2.5 TABLET ORAL at 17:00

## 2022-01-01 RX ADMIN — LOSARTAN POTASSIUM 50 MG: 50 TABLET, FILM COATED ORAL at 09:26

## 2022-01-01 RX ADMIN — CARVEDILOL 25 MG: 12.5 TABLET, FILM COATED ORAL at 08:21

## 2022-01-01 RX ADMIN — CARBIDOPA AND LEVODOPA 1 TABLET: 25; 100 TABLET ORAL at 13:13

## 2022-01-01 RX ADMIN — GABAPENTIN 100 MG: 100 CAPSULE ORAL at 09:26

## 2022-01-01 RX ADMIN — GABAPENTIN 100 MG: 100 CAPSULE ORAL at 19:00

## 2022-01-01 RX ADMIN — SODIUM CHLORIDE 1000 ML: 9 INJECTION, SOLUTION INTRAVENOUS at 19:38

## 2022-01-01 RX ADMIN — CARBIDOPA AND LEVODOPA 1 TABLET: 25; 100 TABLET ORAL at 09:14

## 2022-01-01 RX ADMIN — GABAPENTIN 100 MG: 100 CAPSULE ORAL at 08:06

## 2022-01-01 RX ADMIN — ASPIRIN 325 MG: 325 TABLET, COATED ORAL at 09:26

## 2022-01-01 RX ADMIN — CARVEDILOL 6.25 MG: 3.12 TABLET, FILM COATED ORAL at 08:29

## 2022-01-01 RX ADMIN — CARVEDILOL 37.25 MG: 12.5 TABLET, FILM COATED ORAL at 15:22

## 2022-01-01 RX ADMIN — GABAPENTIN 100 MG: 100 CAPSULE ORAL at 08:29

## 2022-01-01 RX ADMIN — LOSARTAN POTASSIUM 50 MG: 50 TABLET, FILM COATED ORAL at 09:00

## 2022-01-01 RX ADMIN — LOSARTAN POTASSIUM 50 MG: 50 TABLET, FILM COATED ORAL at 07:58

## 2022-01-01 RX ADMIN — GABAPENTIN 100 MG: 100 CAPSULE ORAL at 13:50

## 2022-01-01 RX ADMIN — LOSARTAN POTASSIUM 50 MG: 50 TABLET, FILM COATED ORAL at 08:04

## 2022-01-01 RX ADMIN — PANTOPRAZOLE SODIUM 40 MG: 40 TABLET, DELAYED RELEASE ORAL at 06:57

## 2022-01-01 RX ADMIN — TRIAMCINOLONE ACETONIDE 40 MG: 40 INJECTION, SUSPENSION INTRA-ARTICULAR; INTRAMUSCULAR at 10:30

## 2022-01-01 RX ADMIN — MAGNESIUM SULFATE IN WATER 2 G: 40 INJECTION, SOLUTION INTRAVENOUS at 22:37

## 2022-01-01 RX ADMIN — ROSUVASTATIN CALCIUM 20 MG: 10 TABLET, FILM COATED ORAL at 20:09

## 2022-01-01 RX ADMIN — CARBIDOPA AND LEVODOPA 1 TABLET: 25; 100 TABLET ORAL at 16:50

## 2022-01-01 RX ADMIN — TRAZODONE HYDROCHLORIDE 50 MG: 50 TABLET ORAL at 19:49

## 2022-01-01 RX ADMIN — CARBIDOPA AND LEVODOPA 1 TABLET: 25; 100 TABLET ORAL at 08:19

## 2022-01-01 RX ADMIN — CARVEDILOL 6.25 MG: 3.12 TABLET, FILM COATED ORAL at 17:18

## 2022-01-01 RX ADMIN — PROPOFOL 100 MCG/KG/MIN: 10 INJECTION, EMULSION INTRAVENOUS at 14:03

## 2022-01-01 RX ADMIN — TRIAMCINOLONE ACETONIDE 40 MG: 40 INJECTION, SUSPENSION INTRA-ARTICULAR; INTRAMUSCULAR at 16:10

## 2022-01-01 RX ADMIN — CARBIDOPA AND LEVODOPA 1 TABLET: 25; 100 TABLET ORAL at 08:29

## 2022-01-01 RX ADMIN — WARFARIN SODIUM 2.5 MG: 2.5 TABLET ORAL at 21:27

## 2022-01-01 RX ADMIN — CARVEDILOL 6.25 MG: 3.12 TABLET, FILM COATED ORAL at 09:26

## 2022-01-01 RX ADMIN — ROSUVASTATIN CALCIUM 20 MG: 10 TABLET, FILM COATED ORAL at 19:49

## 2022-01-01 RX ADMIN — CARBIDOPA AND LEVODOPA 1 TABLET: 25; 100 TABLET ORAL at 09:25

## 2022-01-01 RX ADMIN — GABAPENTIN 100 MG: 100 CAPSULE ORAL at 08:19

## 2022-01-01 RX ADMIN — CARVEDILOL 37.25 MG: 12.5 TABLET, FILM COATED ORAL at 09:47

## 2022-01-01 RX ADMIN — GABAPENTIN 100 MG: 100 CAPSULE ORAL at 14:06

## 2022-01-01 RX ADMIN — CARBIDOPA AND LEVODOPA 1 TABLET: 25; 100 TABLET ORAL at 14:06

## 2022-01-01 RX ADMIN — PANTOPRAZOLE SODIUM 40 MG: 40 TABLET, DELAYED RELEASE ORAL at 09:01

## 2022-01-01 RX ADMIN — CARBIDOPA AND LEVODOPA 1 TABLET: 25; 100 TABLET ORAL at 17:18

## 2022-01-01 RX ADMIN — ROSUVASTATIN CALCIUM 20 MG: 10 TABLET, FILM COATED ORAL at 20:07

## 2022-01-01 RX ADMIN — ACETAMINOPHEN 650 MG: 325 TABLET, FILM COATED ORAL at 16:36

## 2022-01-01 RX ADMIN — LOSARTAN POTASSIUM 50 MG: 50 TABLET, FILM COATED ORAL at 09:14

## 2022-01-01 RX ADMIN — GABAPENTIN 100 MG: 100 CAPSULE ORAL at 09:48

## 2022-01-01 RX ADMIN — CARBIDOPA AND LEVODOPA 1 TABLET: 25; 100 TABLET ORAL at 09:01

## 2022-01-01 RX ADMIN — OXYCODONE HYDROCHLORIDE 2.5 MG: 5 TABLET ORAL at 08:52

## 2022-01-01 RX ADMIN — GABAPENTIN 100 MG: 100 CAPSULE ORAL at 19:49

## 2022-01-01 RX ADMIN — CARBIDOPA AND LEVODOPA 1 TABLET: 25; 100 TABLET ORAL at 09:48

## 2022-01-01 RX ADMIN — GABAPENTIN 100 MG: 100 CAPSULE ORAL at 20:01

## 2022-01-01 RX ADMIN — GABAPENTIN 100 MG: 100 CAPSULE ORAL at 09:14

## 2022-01-01 RX ADMIN — MAGNESIUM SULFATE IN WATER 2 G: 40 INJECTION, SOLUTION INTRAVENOUS at 21:13

## 2022-01-01 RX ADMIN — PANTOPRAZOLE SODIUM 40 MG: 40 TABLET, DELAYED RELEASE ORAL at 07:08

## 2022-01-01 RX ADMIN — GABAPENTIN 100 MG: 100 CAPSULE ORAL at 20:07

## 2022-01-01 RX ADMIN — LOSARTAN POTASSIUM 100 MG: 50 TABLET, FILM COATED ORAL at 09:48

## 2022-01-01 RX ADMIN — TRAZODONE HYDROCHLORIDE 50 MG: 50 TABLET ORAL at 20:08

## 2022-01-01 RX ADMIN — CARBIDOPA AND LEVODOPA 1 TABLET: 25; 100 TABLET ORAL at 21:08

## 2022-01-01 RX ADMIN — GABAPENTIN 100 MG: 100 CAPSULE ORAL at 09:01

## 2022-01-01 RX ADMIN — TRAMADOL HYDROCHLORIDE 50 MG: 50 TABLET ORAL at 04:45

## 2022-01-01 RX ADMIN — CARVEDILOL 6.25 MG: 3.12 TABLET, FILM COATED ORAL at 07:56

## 2022-01-01 RX ADMIN — CARBIDOPA AND LEVODOPA 1 TABLET: 25; 100 TABLET ORAL at 08:06

## 2022-01-01 RX ADMIN — PANTOPRAZOLE SODIUM 40 MG: 40 TABLET, DELAYED RELEASE ORAL at 06:52

## 2022-01-01 RX ADMIN — ROSUVASTATIN CALCIUM 20 MG: 10 TABLET, FILM COATED ORAL at 19:59

## 2022-01-01 RX ADMIN — CEFAZOLIN SODIUM 2 G: 2 INJECTION, SOLUTION INTRAVENOUS at 13:58

## 2022-01-01 RX ADMIN — CARBIDOPA AND LEVODOPA 1 TABLET: 25; 100 TABLET ORAL at 20:28

## 2022-01-01 RX ADMIN — CARBIDOPA AND LEVODOPA 1 TABLET: 25; 100 TABLET ORAL at 19:59

## 2022-01-01 RX ADMIN — WARFARIN SODIUM 1.25 MG: 2.5 TABLET ORAL at 17:38

## 2022-01-01 RX ADMIN — SODIUM CHLORIDE 500 ML: 9 INJECTION, SOLUTION INTRAVENOUS at 20:25

## 2022-01-01 RX ADMIN — GABAPENTIN 100 MG: 100 CAPSULE ORAL at 15:22

## 2022-01-01 RX ADMIN — CARBIDOPA AND LEVODOPA 1 TABLET: 25; 100 TABLET ORAL at 20:07

## 2022-01-01 RX ADMIN — ASPIRIN 325 MG: 325 TABLET, COATED ORAL at 09:36

## 2022-01-01 RX ADMIN — GABAPENTIN 100 MG: 100 CAPSULE ORAL at 09:36

## 2022-01-01 RX ADMIN — LOSARTAN POTASSIUM 100 MG: 50 TABLET, FILM COATED ORAL at 08:20

## 2022-01-01 RX ADMIN — CARVEDILOL 6.25 MG: 3.12 TABLET, FILM COATED ORAL at 17:08

## 2022-01-01 RX ADMIN — TRAZODONE HYDROCHLORIDE 50 MG: 50 TABLET ORAL at 21:07

## 2022-01-01 RX ADMIN — CARBIDOPA AND LEVODOPA 1 TABLET: 25; 100 TABLET ORAL at 13:50

## 2022-01-01 RX ADMIN — PANTOPRAZOLE SODIUM 40 MG: 40 TABLET, DELAYED RELEASE ORAL at 08:29

## 2022-01-01 RX ADMIN — ROSUVASTATIN CALCIUM 20 MG: 10 TABLET, FILM COATED ORAL at 20:28

## 2022-01-01 RX ADMIN — CARBIDOPA AND LEVODOPA 1 TABLET: 25; 100 TABLET ORAL at 09:26

## 2022-01-01 RX ADMIN — OLANZAPINE 2.5 MG: 10 INJECTION, POWDER, LYOPHILIZED, FOR SOLUTION INTRAMUSCULAR at 00:33

## 2022-01-01 RX ADMIN — ROSUVASTATIN CALCIUM 20 MG: 10 TABLET, FILM COATED ORAL at 19:00

## 2022-01-01 RX ADMIN — GABAPENTIN 100 MG: 100 CAPSULE ORAL at 09:24

## 2022-01-01 RX ADMIN — ENOXAPARIN SODIUM 40 MG: 40 INJECTION SUBCUTANEOUS at 16:51

## 2022-01-01 RX ADMIN — CARBIDOPA AND LEVODOPA 1 TABLET: 25; 100 TABLET ORAL at 20:09

## 2022-01-01 RX ADMIN — CARBIDOPA AND LEVODOPA 1 TABLET: 25; 100 TABLET ORAL at 13:30

## 2022-01-01 RX ADMIN — WARFARIN SODIUM 2.5 MG: 2.5 TABLET ORAL at 17:06

## 2022-01-01 RX ADMIN — PANTOPRAZOLE SODIUM 40 MG: 40 TABLET, DELAYED RELEASE ORAL at 09:26

## 2022-01-01 RX ADMIN — ENOXAPARIN SODIUM 40 MG: 40 INJECTION SUBCUTANEOUS at 15:29

## 2022-01-01 RX ADMIN — ONDANSETRON 4 MG: 2 INJECTION INTRAMUSCULAR; INTRAVENOUS at 18:31

## 2022-01-01 RX ADMIN — WARFARIN SODIUM 2 MG: 2 TABLET ORAL at 18:30

## 2022-01-01 RX ADMIN — ASPIRIN 325 MG: 325 TABLET, COATED ORAL at 08:06

## 2022-01-01 RX ADMIN — TRAMADOL HYDROCHLORIDE 50 MG: 50 TABLET ORAL at 20:01

## 2022-01-01 RX ADMIN — TRAZODONE HYDROCHLORIDE 50 MG: 50 TABLET ORAL at 21:22

## 2022-01-01 RX ADMIN — GABAPENTIN 100 MG: 100 CAPSULE ORAL at 21:07

## 2022-01-01 RX ADMIN — ROSUVASTATIN CALCIUM 20 MG: 10 TABLET, FILM COATED ORAL at 21:07

## 2022-01-01 ASSESSMENT — ACTIVITIES OF DAILY LIVING (ADL)
CONCENTRATING,_REMEMBERING_OR_MAKING_DECISIONS_DIFFICULTY: YES
ADLS_ACUITY_SCORE: 48
ADLS_ACUITY_SCORE: 35
TOILETING: 1-->ASSISTANCE (EQUIPMENT/PERSON) NEEDED
ADLS_ACUITY_SCORE: 41
TOILETING_ISSUES: YES
ADLS_ACUITY_SCORE: 45
ADLS_ACUITY_SCORE: 48
ADLS_ACUITY_SCORE: 48
ADLS_ACUITY_SCORE: 40
ADLS_ACUITY_SCORE: 36
ADLS_ACUITY_SCORE: 49
ADLS_ACUITY_SCORE: 35
ADLS_ACUITY_SCORE: 48
ADLS_ACUITY_SCORE: 49
ADLS_ACUITY_SCORE: 48
DEPENDENT_IADLS:: INDEPENDENT
ADLS_ACUITY_SCORE: 51
ADLS_ACUITY_SCORE: 49
ADLS_ACUITY_SCORE: 45
ADLS_ACUITY_SCORE: 46
ADLS_ACUITY_SCORE: 49
ADLS_ACUITY_SCORE: 46
ADLS_ACUITY_SCORE: 40
ADLS_ACUITY_SCORE: 49
ADLS_ACUITY_SCORE: 49
FALL_HISTORY_WITHIN_LAST_SIX_MONTHS: NO
ADLS_ACUITY_SCORE: 46
ADLS_ACUITY_SCORE: 50
ADLS_ACUITY_SCORE: 49
TOILETING_ASSISTANCE: TOILETING DIFFICULTY, ASSISTANCE 1 PERSON
ADLS_ACUITY_SCORE: 48
DEPENDENT_IADLS:: CLEANING;COOKING;LAUNDRY;SHOPPING;MEAL PREPARATION;MEDICATION MANAGEMENT;TRANSPORTATION
DEPENDENT_IADLS:: CLEANING;LAUNDRY;TRANSPORTATION
ADLS_ACUITY_SCORE: 49
ADLS_ACUITY_SCORE: 35
DEPENDENT_IADLS:: CLEANING;LAUNDRY;TRANSPORTATION
ADLS_ACUITY_SCORE: 49
ADLS_ACUITY_SCORE: 46
ADLS_ACUITY_SCORE: 41
ADLS_ACUITY_SCORE: 46
BATHING: 1-->ASSISTANCE NEEDED
ADLS_ACUITY_SCORE: 48
ADLS_ACUITY_SCORE: 41
ADLS_ACUITY_SCORE: 35
ADLS_ACUITY_SCORE: 51
ADLS_ACUITY_SCORE: 49
ADLS_ACUITY_SCORE: 48
DIFFICULTY_EATING/SWALLOWING: NO
ADLS_ACUITY_SCORE: 48
ADLS_ACUITY_SCORE: 46
ADLS_ACUITY_SCORE: 48
ADLS_ACUITY_SCORE: 35
HEARING_DIFFICULTY_OR_DEAF: NO
ADLS_ACUITY_SCORE: 44
ADLS_ACUITY_SCORE: 51
ADLS_ACUITY_SCORE: 48
ADLS_ACUITY_SCORE: 49
ADLS_ACUITY_SCORE: 48
ADLS_ACUITY_SCORE: 35
DRESSING/BATHING_DIFFICULTY: YES
ADLS_ACUITY_SCORE: 46
DRESS: 1-->ASSISTANCE (EQUIPMENT/PERSON) NEEDED
ADLS_ACUITY_SCORE: 48
ADLS_ACUITY_SCORE: 45
ADLS_ACUITY_SCORE: 44
ADLS_ACUITY_SCORE: 49
ADLS_ACUITY_SCORE: 40
ADLS_ACUITY_SCORE: 51
ADLS_ACUITY_SCORE: 45
ADLS_ACUITY_SCORE: 48
ADLS_ACUITY_SCORE: 35
ADLS_ACUITY_SCORE: 45
ADLS_ACUITY_SCORE: 35
ADLS_ACUITY_SCORE: 49
ADLS_ACUITY_SCORE: 50
ADLS_ACUITY_SCORE: 48
ADLS_ACUITY_SCORE: 46
ADLS_ACUITY_SCORE: 40
ADLS_ACUITY_SCORE: 35
ADLS_ACUITY_SCORE: 36
ADLS_ACUITY_SCORE: 49
TRANSFERRING: 1-->ASSISTANCE (EQUIPMENT/PERSON) NEEDED
ADLS_ACUITY_SCORE: 35
ADLS_ACUITY_SCORE: 45
ADLS_ACUITY_SCORE: 45
ADLS_ACUITY_SCORE: 49
ADLS_ACUITY_SCORE: 48
ADLS_ACUITY_SCORE: 49
ADLS_ACUITY_SCORE: 35
ADLS_ACUITY_SCORE: 48
WALKING_OR_CLIMBING_STAIRS_DIFFICULTY: YES
ADLS_ACUITY_SCORE: 45
ADLS_ACUITY_SCORE: 51
WALKING_OR_CLIMBING_STAIRS: AMBULATION DIFFICULTY, ASSISTANCE 1 PERSON
ADLS_ACUITY_SCORE: 48
ADLS_ACUITY_SCORE: 36
CURRENT_FUNCTION: SHOPPING REQUIRES ASSISTANCE
ADLS_ACUITY_SCORE: 35
ADLS_ACUITY_SCORE: 49
ADLS_ACUITY_SCORE: 46
ADLS_ACUITY_SCORE: 48
DEPENDENT_IADLS:: INDEPENDENT
ADLS_ACUITY_SCORE: 49
ADLS_ACUITY_SCORE: 48
ADLS_ACUITY_SCORE: 49
CURRENT_FUNCTION: TRANSPORTATION REQUIRES ASSISTANCE
ADLS_ACUITY_SCORE: 41
TOILETING: 1-->ASSISTANCE (EQUIPMENT/PERSON) NEEDED (NOT DEVELOPMENTALLY APPROPRIATE)
ADLS_ACUITY_SCORE: 48
ADLS_ACUITY_SCORE: 51
ADLS_ACUITY_SCORE: 35
ADLS_ACUITY_SCORE: 40
ADLS_ACUITY_SCORE: 35
ADLS_ACUITY_SCORE: 41
ADLS_ACUITY_SCORE: 49
ADLS_ACUITY_SCORE: 49
ADLS_ACUITY_SCORE: 48
TRANSFERRING: 1-->ASSISTANCE (EQUIPMENT/PERSON) NEEDED (NOT DEVELOPMENTALLY APPROPRIATE)
ADLS_ACUITY_SCORE: 51
ADLS_ACUITY_SCORE: 49
WEAR_GLASSES_OR_BLIND: YES
ADLS_ACUITY_SCORE: 35
ADLS_ACUITY_SCORE: 48
ADLS_ACUITY_SCORE: 46
ADLS_ACUITY_SCORE: 35
ADLS_ACUITY_SCORE: 45
ADLS_ACUITY_SCORE: 41
ADLS_ACUITY_SCORE: 41
ADLS_ACUITY_SCORE: 49
DIFFICULTY_COMMUNICATING: NO
ADLS_ACUITY_SCORE: 48

## 2022-01-01 ASSESSMENT — PAIN SCALES - GENERAL
PAINLEVEL: NO PAIN (0)
PAINLEVEL: SEVERE PAIN (6)
PAINLEVEL: SEVERE PAIN (6)
PAINLEVEL: MODERATE PAIN (4)
PAINLEVEL: EXTREME PAIN (9)
PAINLEVEL: EXTREME PAIN (8)
PAINLEVEL: SEVERE PAIN (6)
PAINLEVEL: EXTREME PAIN (8)
PAINLEVEL: EXTREME PAIN (8)

## 2022-01-01 ASSESSMENT — ENCOUNTER SYMPTOMS
FEVER: 0
WEAKNESS: 1
ABDOMINAL PAIN: 0
VOMITING: 1
DYSRHYTHMIAS: 1
DIARRHEA: 1
UNEXPECTED WEIGHT CHANGE: 1
NAUSEA: 1
APPETITE CHANGE: 1

## 2022-01-01 ASSESSMENT — MIFFLIN-ST. JEOR
SCORE: 1334.49
SCORE: 1370.78
SCORE: 1326.32

## 2022-01-03 PROBLEM — Z95.810 ICD (IMPLANTABLE CARDIOVERTER-DEFIBRILLATOR) IN PLACE: Status: ACTIVE | Noted: 2021-02-25

## 2022-01-03 PROBLEM — Z45.02 ICD (IMPLANTABLE CARDIOVERTER-DEFIBRILLATOR) BATTERY DEPLETION: Status: ACTIVE | Noted: 2022-01-01

## 2022-01-03 NOTE — DISCHARGE INSTRUCTIONS
Electrophysiology  Discharge instructions for Implantable Cardioverter Defibrillators (ICD)    1. For four weeks, with your defibrillator arm,  Do not:    Raise your arm above the height of your shoulder    Perform any vigorous arm movements    Swim, golf, wash windows, shovel snow or vacuum    Lift greater than 10-15 pounds    2. Check the implant site daily for the first week.  Contact the [facility] clinic [phone] should you experience any of the following:    Swelling    Redness    Drainage    Fever greater than 100.5 lasting more than 24 hours    3.  You may shower in 3 days. If you have steri strips over your incision, leave them on.  They are glued on and will be removed at your follow up appointment.    4. It is not necessary to cover your incision with a dressing. Do not put any lotions or creams over the incision site.    5. To reduce the risk of infection, avoid dental procedures for the first 6 weeks.  Contact your cardiology clinic for an antibiotic should you need to see the dentist in the first 6 weeks post defibrillator implant.    6. You may travel by any mode of transportation; just show your defibrillator identification card.  Follow the recommendation by [facility] staff if you are traveling by air.    7. For future surgery or colonoscopy let your doctor know you have a defibrillator.    8. What to do if you get a shock from your defibrillator:    If you receive 1 shock, your symptoms subside and you feel ok, call the [facility] [phone].    If this occurs after hours call the next morning, providing you are feeling ok    If you are experiencing light-headedness, dizziness, shortness of breath, or feel like you may black out  call 9-1-1.    9. Most household appliances including a microwave, telephone, or cell phone will not interfere with your defibrillator function.  If you suspect interference, simply move away from the source.  Do not place a cell  phone in a shirt pocket over your defibrillator.    10. No driving for 3 days.    11. If the defibrillator site is uncomfortable you may place an ice pack (with a small dish cloth between skin and ice pack) over the site; alternate on 20 minutes - off 20 minutes.      Your Procedural Physician was: Dr. Houston Rolon   To reach the Electrophysiology Registered Nurses working with Dr Rolon please call [phone]     To reach the Device Registered Nurses regarding questions about your device incision or device function please call [phone] Option #3      [facility]:  [phone]  If you are calling after hours, please listen to the entire voicemail, a live  will answer at the end of the message.

## 2022-01-03 NOTE — PROGRESS NOTES
I personally discussed with the patient (and/or spouse/POA) the rational for ICD placement, alternate therapies, technical aspects of the surgical procedure, risk/benefits of therapy, and long term follow up in the Device Clinic. The [place] Decision Making Tool and further ICD education completed by the Electrophysiology Registered Nurse, attached is the documentation of this. At this time the patient verbalized understanding and has agreed to proceed with ICD implantation.    [place] Patient Decision Making Tool

## 2022-01-03 NOTE — Clinical Note
dry, intact, no bleeding and no hematoma. Left chest. Sutured and covered with steri strips. Primapore dressing in place.

## 2022-01-03 NOTE — PRE-PROCEDURE
GENERAL PRE-PROCEDURE:   Procedure:  CRT-D upgrade for indication of ICM, HFrEF  Date/Time:  1/3/2022 6:58 AM    Written consent obtained?: Yes    Risks and benefits: Risks, benefits and alternatives were discussed    Consent given by:  Patient  Patient states understanding of procedure being performed: Yes    Patient's understanding of procedure matches consent: Yes    Procedure consent matches procedure scheduled: Yes    Expected level of sedation:  Moderate  Appropriately NPO:  Yes  ASA Class:  3 (Medtronic Single chamber ICD in situ implanted 2/7/2013; at [facility]/EOL, ICM; EF 24%, HFrEF, CAD; s/p CABG, LBBB, Atrial Fibrillation, HTN, pHTN, CKD Stage III)  Mallampati  :  Grade 1- soft palate, uvula, tonsillar pillars, and posterior pharyngeal wall visible  Lungs:  Lungs clear with good breath sounds bilaterally  Heart:  A-fib  History & Physical reviewed:  History and physical reviewed and no updates needed  Statement of review:  I have reviewed the lab findings, diagnostic data, medications, and the plan for sedation

## 2022-01-03 NOTE — PROGRESS NOTES
Discharged to home post ICD upgrade, accompanied by his nephew. Pt stable Left chest site intact. Pt given written and verbal instructions and he states a good understanding. Pt will also follow up with Dr Lees regarding open chest wound from previous CABG surgery.

## 2022-01-03 NOTE — PROGRESS NOTES
ICD Patient Decision Making Process Document    ICD Implant Criteria  Diagnosis: Structural  Indication/Per CMS ([employer] for Medicare & Medicaid Services): Primary prevention    Records and Chart Review  Assessment of most recent EF:Test  EK2021 @ 13:10:03, AF with CVR, LBBB, HR 84 bpm, QRSd 160ms, QT 428ms, QTc 505ms  Angiogram/Intervention: N/A  Holter/ACT: N/A    Patient Decision Discussion  Present for discussion: Patient  Discussion was: Face to Face in hospital on 1/3/2022 with Amber Alas CNP  ICD Educational Handout and [place] Decision Making Tool: Provided to patient in hospital  Education/Review completed: Rational, reasoning, and indication for ICD., Risks as listed below., Benifits of ICD., Alternative therapies., Verified pt received and reviewed educational handout on ICD implants. and Addressed pts questions/concerns.  Verification of understanding: Verbalized understanding after above reviewed, declined needing further consultion prior to procedure, and agrees to proceed with ICD implant    Risks for ICD Implant Procedure  Internal Cardiac Defibrillator     <1% for each of the following: infection, bleeding, hematoma,   pneumothorax, subclavian vein thrombosis, cardiac perforation, cardiac tamponade, arrhythmias, pectoral or diaphragmatic stimulation, air embolus, pocket erosion.    <4 % lead dislodgment; <1% lead fracture or generator malfunction.    <0.5% CVA or MI.     <0.1% death.    If external defibrillation is needed, 25% risk for superficial burn.    <5% risk of ICD system revision.    >95% VT/VF success implant rate.    Risks associated with general anesthesia will be addressed by the Anesthesiology Department.    For patients on anticoagulation, the risk of bleeding, hematoma, tamponade, and stroke with partial withdrawal are increased.    Patient education also completed on LIANE, spurious shocks, driving limitation, and psychological and social aspects of having an  ICD.    Documentation Date:1/3/2022 7:05 AM  Amber Alas CNP    [place] Patient Decision Making Tool

## 2022-01-03 NOTE — DISCHARGE SUMMARY
Post procedure discharge summary    Tanmay Israel  1/3/2022      Provider: Houston Rolon MD, MD    Principal diagnosis: Ischemic cardiomyopathy  Chronic atrial fibrillation, left bundle branch block, chronic systolic heart failure, ICD battery depletion    Procedure: Left ventricular lead placement with new generator  See operative report today.    Operative site without hematoma  Good thresholds on ring 3.  Chest x-ray showed left ventricular lead pulled back approximately 2 cm but electrodes 3 4 still within the high lateral vein.  Discharge instructions: No driving on the day of the procedure    Medications: See medication reconciliation sheet    Diet: Resume outpatient preprocedure diet    Scheduled follow-up: Device clinic in 1 week    Condition upon discharge: Stable

## 2022-01-04 NOTE — PROGRESS NOTES
ANTICOAGULATION  MANAGEMENT: Discharge Review    Tanmay Israel chart reviewed for anticoagulation continuity of care    Outpatient surgery/procedure on 1/3/22 for EP implantable Cardio-defibrillator to bivent.    Discharge disposition: Home    Results:    Recent labs: (last 7 days)     12/30/21  1032 01/03/22  0722   INR 3.2* 2.73*     Anticoagulation inpatient management:     not applicable     Anticoagulation discharge instructions:     Warfarin dosing: home regimen continued   Bridging: No   INR goal change: No      Medication changes affecting anticoagulation: No    Additional factors affecting anticoagulation: No    Plan     No adjustment to anticoagulation plan needed    Patient not contacted    No adjustment to Anticoagulation Calendar was required -  Next INR recommended for 1/13/22 on 12/30/21 remains appropriate.     Alvaro De Dios RN

## 2022-01-05 NOTE — LETTER
1/5/2022    Go Ramírez MD, MD  [address]  [facility] [address]    RE: Tanmay Israel       Dear Colleague,     I had the pleasure of seeing Tanmay Israel in the [facility].  ASKED BY REFERRING PHYSICIAN: Dr. Rolon to evaluate this patient for removal of a protruding sternal wire.    CHIEF COMPLAINT:  Sternal wire is poking out.    HPI: Tanmay is a 81 year old male who presents with a protruding sternal wire.  It is in the middle of the sternum and readily visible.  There is no associated purulence or cellulitis.  He underwent a CABG x 4 over 20 years ago.  The wire has been visible for at least 5 years.  It is not very painful for him.    PAST MEDICAL HISTORY:  Past Medical History:   Diagnosis Date     Acute cholecystitis 2/28/2019     Acute post-operative pain      Anemia      Arthritis      Atrial fibrillation (H)      C. difficile diarrhea 4/21/2019     Calculus of ureter      Callus      Cerebral aneurysm      Cervical myelopathy (H) 2/22/2019     Cervical spinal stenosis      Chronic kidney disease     stage 3     Chronic systolic congestive heart failure (H)      Closed fracture of distal end of left radius, unspecified fracture morphology, initial encounter      Closed fracture of distal end of right radius, unspecified fracture morphology, initial encounter      Closed fracture of first thoracic vertebra, unspecified fracture morphology, initial encounter (H)      Coronary artery disease      Crohn's disease (H)      Disorder of bursae and tendons in shoulder region     Created by Conversion  Replacement Utility updated for latest IMO load     Dysphagia      Fall 10/22/2016     GERD (gastroesophageal reflux disease)      Hyperlipidemia      Hypertension      Hypotension, unspecified hypotension type      ICD (implantable cardioverter-defibrillator) in place     Medtronic     Ischemic cardiomyopathy      Kidney stone      Low back pain      Lumbago     Created by Conversion       Macular degeneration      Myalgia and myositis, unspecified     Created by Conversion      Nonspecific elevation of levels of transaminase or lactic acid dehydrogenase (LDH)     Created by Conversion      Permanent atrial fibrillation (H)     [id number] risk score = 5 (age1/ CAD/ HTN/ CHF) Chronic warfarin     Personal history of colonic polyps 2/12/2019     Polyp of duodenum 10/17/2016     Pyuria      Right arm weakness 4/2/2019     Schatzki's ring      Sciatica     Created by Conversion      Sleep apnea     no CPAP     Spondylolisthesis of lumbar region 7/5/2016     Weakness 4/20/2019       PAST SURGICAL HISTORY:  Past Surgical History:   Procedure Laterality Date     APPENDECTOMY       ARTHROSCOPY SHOULDER ROTATOR CUFF REPAIR Right      BYPASS GRAFT ARTERY CORONARY  08/2004    Coronary Artery Quadruple Venous Bypass Graft     CARDIAC DEFIBRILLATOR PLACEMENT  2013    ICD Medtronic     CHOLECYSTECTOMY       COLONOSCOPY N/A 2/19/2020    Procedure: COLONOSCOPY;  Surgeon: Houston Medina MD;  Location: [facility];  Service: Gastroenterology     ESOPHAGOSCOPY, GASTROSCOPY, DUODENOSCOPY (EGD), COMBINED       IR LUMBAR EPIDURAL STEROID INJECTION  6/29/2005     IR LUMBAR EPIDURAL STEROID INJECTION  7/15/2005     IR LUMBAR EPIDURAL STEROID INJECTION  9/28/2005     IR LUMBAR EPIDURAL STEROID INJECTION  12/28/2005     IR LUMBAR EPIDURAL STEROID INJECTION  3/29/2006     IR LUMBAR EPIDURAL STEROID INJECTION  6/8/2006     PHACOEMULSIFICATION CLEAR CORNEA WITH STANDARD INTRAOCULAR LENS IMPLANT Bilateral      KY C- LAMINOPLASTY, 2 OR MORE Left 2/22/2019    Procedure: LEFT CERVICAL 4, 5, 6 LAMINOPLASTY;  Surgeon: Liza Cesar MD;  Location: [facility] OR;  Service: Spine     [place] ESOPHAGOGASTRODUODENOSCOPY TRANSORAL DIAGNOSTIC N/A 5/10/2021    Procedure: ESOPHAGOGASTRODUODENOSCOPY (EGD);  Surgeon: Franklin Mendoza MD;  Location: [facility];  Service: Gastroenterology     [place]  ESOPHAGOGASTRODUODENOSCOPY TRANSORAL DIAGNOSTIC N/A 6/16/2021    Procedure: ESOPHAGOGASTRODUODENOSCOPY (EGD), WITH ESOPHAGEAL DILATION AND BIOPSY;  Surgeon: Paramjit Brown MD;  Location: [facility];  Service: Gastroenterology     TONSILLECTOMY       XR MYELOGRAM CERVICAL THORACIC LUMBAR  1/17/2019     ZZC LAP,CHOLECYSTECTOMY/EXPLORE N/A 2/28/2019    CHOLECYSTECTOMY, LAPAROSCOPIC;  Surgeon: Mana Roman MD;  Location: [facility];  Service: General       FAMILY HISTORY:   Family History   Problem Relation Age of Onset     Heart Disease Mother      Cerebrovascular Disease Mother      Crohn's Disease Father      Alcoholism Brother      No Known Problems Daughter      No Known Problems Son      No Known Problems Maternal Aunt      No Known Problems Maternal Uncle      No Known Problems Paternal Aunt      No Known Problems Paternal Uncle      No Known Problems Maternal Grandmother      No Known Problems Maternal Grandfather      No Known Problems Paternal Grandmother      No Known Problems Paternal Grandfather      Cancer Brother      Urolithiasis No family hx of      Gout No family hx of        SOCIAL HISTORY:  Social History     Socioeconomic History     Marital status:      Spouse name: Not on file     Number of children: Not on file     Years of education: Not on file     Highest education level: Not on file   Occupational History     Not on file   Tobacco Use     Smoking status: Former Smoker     Smokeless tobacco: Never Used   Substance and Sexual Activity     Alcohol use: Not Currently     Drug use: Never     Sexual activity: Yes     Partners: Female   Other Topics Concern     Parent/sibling w/ CABG, MI or angioplasty before 65F 55M? Not Asked   Social History Narrative     Not on file     Social Determinants of Health     Financial Resource Strain: Medium Risk     Difficulty of Paying Living Expenses: Somewhat hard   Food Insecurity: No Food Insecurity     Worried About Running  Out of Food in the Last Year: Never true     Ran Out of Food in the Last Year: Never true   Transportation Needs: No Transportation Needs     Lack of Transportation (Medical): No     Lack of Transportation (Non-Medical): No   Physical Activity: Not on file   Stress: Not on file   Social Connections: Not on file   Intimate Partner Violence: Not on file   Housing Stability: Low Risk      Unable to Pay for Housing in the Last Year: No     Number of Places Lived in the Last Year: 1     Unstable Housing in the Last Year: No        ALLERGIES:   No Known Allergies    CURRENT MEDICATIONS:   Prescription Medications as of 2022       Rx Number Disp Refills Start End Last Dispensed Date Next Fill Date Owning Pharmacy    acetaminophen (TYLENOL) 500 MG tablet    2019        Sig: Take 1,000 mg by mouth every 8 hours as needed     Class: Historical    Route: Oral    albuterol (PROAIR HFA/PROVENTIL HFA/VENTOLIN HFA) 108 (90 Base) MCG/ACT inhaler    2021        Sig: Inhale 1-2 puffs into the lungs every 4 hours as needed Normally takes ~1 puff/ day    Class: Historical    Route: Inhalation    bumetanide (BUMEX) 1 MG tablet    10/9/2021        Sig: Take by mouth daily     Class: Historical    Route: Oral    calcium carbonate-vitamin D 600-200 MG-UNIT TABS            Sig: Take 2 tablets by mouth daily     Class: Historical    Route: Oral    carvedilol (COREG) 25 MG tablet  45 tablet 5 1/3/2022    [place] [address]    Sig: Take 1.5 tablets (37.25 mg) by mouth 2 times daily (with meals)    Class: E-Prescribe    Route: Oral    cycloSPORINE (RESTASIS) 0.05 % ophthalmic emulsion        [place] [address]    Si drop 2 times daily as needed for dry eyes    Class: Historical    diclofenac (VOLTAREN) 1 % topical gel    2020        Sig: Apply 2 g topically 2 times daily     Class: Historical    Route: Topical    erythromycin (ROMYCIN) 5  MG/GM ophthalmic ointment    5/18/2021        Sig: Place 0.5 inches into both eyes daily     Class: Historical    Route: Both Eyes    ferrous sulfate (FEROSUL) 325 (65 Fe) MG tablet            Sig: Take 1 tablet by mouth daily    Class: Historical    Route: Oral    furosemide (LASIX) 20 MG tablet  180 tablet 1 10/11/2021    [place] [address]    Sig: Take 2 tablets (40 mg) by mouth daily    Class: E-Prescribe    Route: Oral    Lidocaine (LIDOCARE) 4 % Patch  10 patch 3 10/1/2021    [place] [address]    Sig: Place 1 patch onto the skin daily as needed for moderate pain To prevent lidocaine toxicity, patient should be patch free for 12 hrs daily.    Class: E-Prescribe    Route: Transdermal    losartan (COZAAR) 100 MG tablet  90 tablet 3 10/12/2021    [place] [address]    Sig: Take 1 tablet (100 mg) by mouth daily    Class: E-Prescribe    Route: Oral    multivitamin w/minerals (MULTI-VITAMIN) tablet            Sig: Take 1 tablet by mouth daily    Class: Historical    Route: Oral    omeprazole (PRILOSEC) 20 MG DR capsule  90 capsule 1 12/20/2021    [place] [address]    Sig: TAKE ONE CAPSULE BY MOUTH ONCE DAILY BEFORE BREAKFAST    Class: E-Prescribe    ondansetron (ZOFRAN) 8 MG tablet    3/11/2020        Sig: Take 8 mg by mouth as needed    Class: Historical    Route: Oral    potassium chloride ER (KLOR-CON M) 20 MEQ CR tablet  90 tablet 2 7/21/2021    [place] [address]    Sig: TAKE ONE TABLET BY MOUTH ONCE DAILY    Class: E-Prescribe    rosuvastatin (CRESTOR) 20 MG tablet  90 tablet 3 7/13/2021    [place] [address]    Sig: TAKE 1 TABLET BY MOUTH DAILY AT BEDTIME    Class: E-Prescribe    Renewals     Renewal provider: Zoltan Rangel MD          traMADol (ULTRAM) 50 MG tablet  "   11/11/2021        Sig: every 6 hours as needed     Class: Historical    vitamin B-12 (CYANOCOBALAMIN) 2000 MCG tablet            Sig: Take 2,000 mcg by mouth daily    Class: Historical    Route: Oral    warfarin ANTICOAGULANT (COUMADIN) 2.5 MG tablet    9/14/2020        Sig: Take 1.25-2.5 mg by mouth See Admin Instructions Take 1.25 mg Mon, Wed, Fri and 2.5 mg Sun, Tues, Thurs, Sat.    Class: Historical    Route: Oral          REVIEW OF SYSTEMS:   Gen: denies frequent headaches, double/blurry vision, insomnia, fatigue, unexplained weight loss/gain. No previous anesthesia reactions.  CV: denies chest pain, shortness of breath, palpitations, peripheral edema.    Pulm: denies shortness of breath, asthma or wheezing  GI/: denies liver or kidney problems, blood in stool or BRBPR, difficulty urinating  Endo: denies thyroid problems or Diabetes  Heme/Onc: denies bleeding or clotting disorders, no family problems with bleeding/clotting diorders  MS: no weakness, tremors or gait instability  Neuro: denies depression, memory problems, no dysesthesias, no previous strokes, no migraines, no dysphagia  Skin: No petechiae, purpura or rash.    PHYSICAL EXAMINATION:   /62 (BP Location: Left arm, Patient Position: Sitting, Cuff Size: Adult Regular)   Pulse 100   Resp 16   Ht 1.753 m (5' 9\")   Wt 63.6 kg (140 lb 3.2 oz)   BMI 20.70 kg/m    General: alert and oriented x 3, pleasant, no acute distress  CV: Visible sternal wire mid-sternum. Appears to be standard # 6 stainless steel sternal wire. S1 S2, no murmurs, rubs or gallops, regular rate and rhythm, no peripheral edema, no carotid or abdominal bruits, pulses in upper and lower extremities palpable  Pulm: bilateral breath sounds, clear to auscultation, easy work of breathing  GI: (+) bowel sounds, soft non-tender and non-distended  : voiding without problems  MS: moves all extremities x 4,  5+/5+ equal strength bilaterally  Neuro: pupils equal round and reactive " to light, cranial nerves, II-XII grossly intact, no gross neurologic deficits noted    LABS: Pending     PROCEDURES/IMAGING:  Chest X-Ray: Pacemaker present. Lungs are clear.  Angio: Not done  Echo: LVEF 24 percent  CT: Not done  MRI: Not done  Carotid US: Not done     ASSESSMENT/PLAN:   Tanmay is an 81 year old gentleman who presents with multiple medical problems and a protruding sternal wire.  There is no apparent associated infection, but the wire should be removed.  Enough of the wire is visible to allow this to be removed using local anesthetic.  This will be done on January 12, 2022 to follow a CABG.  The patient and his nephew understand that the risks for this procedure include: bleeding, infection, and the need to remove more sternal wires.  They accept these risks and are agreeable with this plan.    1. Complete outpatient work-up   2. Sternal wire removal with local anesthetic on 1-12-22.     Approximately 30 minutes were spent with the patient in clinic at this visit.    CC  Patient Care Team:  Go Ramírez MD as PCP - General (Family Medicine)  Massiel Medina, PharmD as Pharmacist (Pharmacist)  Houston Durán MD as Assigned Musculoskeletal Provider  Leonardo Smart MD as Assigned Neuroscience Provider  Go Ramírez MD as Assigned PCP  Remy Aguilar MD as Resident (Internal Medicine - Pediatrics)  Houston Rolon MD as Assigned Heart and Vascular Provider  Myhre, David J, RN as Lead Care Coordinator (Primary Care - CC)  Casandra Pandey as Community Health Worker (Primary Care - CC)  Kylie Suazo LSW as Clinic Care Coordinator (Primary Care - CC)  Leyla Cruz NRP, CP as Community Paramedic    Thank you for allowing me to participate in the care of your patient.      Sincerely,     Raquel De Leon MD     [facility]  cc:   No referring provider defined for this encounter.

## 2022-01-05 NOTE — PATIENT INSTRUCTIONS
You were seen today in the [facility]    We will get you scheduled for your wire removal on 1/12 Wednesday.    Please call JUANCARLOS Herrera surgery coordinator with any questions.  Thank you.    Phone [phone]  Fax [phone]

## 2022-01-05 NOTE — PROGRESS NOTES
ASKED BY REFERRING PHYSICIAN: Dr. Rooln to evaluate this patient for removal of a protruding sternal wire.    CHIEF COMPLAINT:  Sternal wire is poking out.    HPI: Tanmay is a 81 year old male who presents with a protruding sternal wire.  It is in the middle of the sternum and readily visible.  There is no associated purulence or cellulitis.  He underwent a CABG x 4 over 20 years ago.  The wire has been visible for at least 5 years.  It is not very painful for him.    PAST MEDICAL HISTORY:  Past Medical History:   Diagnosis Date     Acute cholecystitis 2/28/2019     Acute post-operative pain      Anemia      Arthritis      Atrial fibrillation (H)      C. difficile diarrhea 4/21/2019     Calculus of ureter      Callus      Cerebral aneurysm      Cervical myelopathy (H) 2/22/2019     Cervical spinal stenosis      Chronic kidney disease     stage 3     Chronic systolic congestive heart failure (H)      Closed fracture of distal end of left radius, unspecified fracture morphology, initial encounter      Closed fracture of distal end of right radius, unspecified fracture morphology, initial encounter      Closed fracture of first thoracic vertebra, unspecified fracture morphology, initial encounter (H)      Coronary artery disease      Crohn's disease (H)      Disorder of bursae and tendons in shoulder region     Created by Conversion  Replacement Utility updated for latest IMO load     Dysphagia      Fall 10/22/2016     GERD (gastroesophageal reflux disease)      Hyperlipidemia      Hypertension      Hypotension, unspecified hypotension type      ICD (implantable cardioverter-defibrillator) in place     Medtronic     Ischemic cardiomyopathy      Kidney stone      Low back pain      Lumbago     Created by Conversion      Macular degeneration      Myalgia and myositis, unspecified     Created by Conversion      Nonspecific elevation of levels of transaminase or lactic acid dehydrogenase (LDH)     Created by Conversion       Permanent atrial fibrillation (H)     [id number] risk score = 5 (age3/ CAD/ HTN/ CHF) Chronic warfarin     Personal history of colonic polyps 2/12/2019     Polyp of duodenum 10/17/2016     Pyuria      Right arm weakness 4/2/2019     Schatzki's ring      Sciatica     Created by Conversion      Sleep apnea     no CPAP     Spondylolisthesis of lumbar region 7/5/2016     Weakness 4/20/2019       PAST SURGICAL HISTORY:  Past Surgical History:   Procedure Laterality Date     APPENDECTOMY       ARTHROSCOPY SHOULDER ROTATOR CUFF REPAIR Right      BYPASS GRAFT ARTERY CORONARY  08/2004    Coronary Artery Quadruple Venous Bypass Graft     CARDIAC DEFIBRILLATOR PLACEMENT  2013    ICD Medtronic     CHOLECYSTECTOMY       COLONOSCOPY N/A 2/19/2020    Procedure: COLONOSCOPY;  Surgeon: Houston Medina MD;  Location: [facility];  Service: Gastroenterology     ESOPHAGOSCOPY, GASTROSCOPY, DUODENOSCOPY (EGD), COMBINED       IR LUMBAR EPIDURAL STEROID INJECTION  6/29/2005     IR LUMBAR EPIDURAL STEROID INJECTION  7/15/2005     IR LUMBAR EPIDURAL STEROID INJECTION  9/28/2005     IR LUMBAR EPIDURAL STEROID INJECTION  12/28/2005     IR LUMBAR EPIDURAL STEROID INJECTION  3/29/2006     IR LUMBAR EPIDURAL STEROID INJECTION  6/8/2006     PHACOEMULSIFICATION CLEAR CORNEA WITH STANDARD INTRAOCULAR LENS IMPLANT Bilateral      CO C- LAMINOPLASTY, 2 OR MORE Left 2/22/2019    Procedure: LEFT CERVICAL 4, 5, 6 LAMINOPLASTY;  Surgeon: Liza Cesar MD;  Location: [facility];  Service: Spine     CO ESOPHAGOGASTRODUODENOSCOPY TRANSORAL DIAGNOSTIC N/A 5/10/2021    Procedure: ESOPHAGOGASTRODUODENOSCOPY (EGD);  Surgeon: Franklin Mendoza MD;  Location: [facility];  Service: Gastroenterology     CO ESOPHAGOGASTRODUODENOSCOPY TRANSORAL DIAGNOSTIC N/A 6/16/2021    Procedure: ESOPHAGOGASTRODUODENOSCOPY (EGD), WITH ESOPHAGEAL DILATION AND BIOPSY;  Surgeon: Paramjit Brown MD;  Location: [facility];  Service:  Gastroenterology     TONSILLECTOMY       XR MYELOGRAM CERVICAL THORACIC LUMBAR  1/17/2019     ZZC LAP,CHOLECYSTECTOMY/EXPLORE N/A 2/28/2019    CHOLECYSTECTOMY, LAPAROSCOPIC;  Surgeon: Mana Roman MD;  Location: [facility];  Service: General       FAMILY HISTORY:   Family History   Problem Relation Age of Onset     Heart Disease Mother      Cerebrovascular Disease Mother      Crohn's Disease Father      Alcoholism Brother      No Known Problems Daughter      No Known Problems Son      No Known Problems Maternal Aunt      No Known Problems Maternal Uncle      No Known Problems Paternal Aunt      No Known Problems Paternal Uncle      No Known Problems Maternal Grandmother      No Known Problems Maternal Grandfather      No Known Problems Paternal Grandmother      No Known Problems Paternal Grandfather      Cancer Brother      Urolithiasis No family hx of      Gout No family hx of        SOCIAL HISTORY:  Social History     Socioeconomic History     Marital status:      Spouse name: Not on file     Number of children: Not on file     Years of education: Not on file     Highest education level: Not on file   Occupational History     Not on file   Tobacco Use     Smoking status: Former Smoker     Smokeless tobacco: Never Used   Substance and Sexual Activity     Alcohol use: Not Currently     Drug use: Never     Sexual activity: Yes     Partners: Female   Other Topics Concern     Parent/sibling w/ CABG, MI or angioplasty before 65F 55M? Not Asked   Social History Narrative     Not on file     Social Determinants of Health     Financial Resource Strain: Medium Risk     Difficulty of Paying Living Expenses: Somewhat hard   Food Insecurity: No Food Insecurity     Worried About Running Out of Food in the Last Year: Never true     Ran Out of Food in the Last Year: Never true   Transportation Needs: No Transportation Needs     Lack of Transportation (Medical): No     Lack of Transportation (Non-Medical): No    Physical Activity: Not on file   Stress: Not on file   Social Connections: Not on file   Intimate Partner Violence: Not on file   Housing Stability: Low Risk      Unable to Pay for Housing in the Last Year: No     Number of Places Lived in the Last Year: 1     Unstable Housing in the Last Year: No        ALLERGIES:   No Known Allergies    CURRENT MEDICATIONS:   Prescription Medications as of 2022       Rx Number Disp Refills Start End Last Dispensed Date Next Fill Date Owning Pharmacy    acetaminophen (TYLENOL) 500 MG tablet    2019        Sig: Take 1,000 mg by mouth every 8 hours as needed     Class: Historical    Route: Oral    albuterol (PROAIR HFA/PROVENTIL HFA/VENTOLIN HFA) 108 (90 Base) MCG/ACT inhaler    2021        Sig: Inhale 1-2 puffs into the lungs every 4 hours as needed Normally takes ~1 puff/ day    Class: Historical    Route: Inhalation    bumetanide (BUMEX) 1 MG tablet    10/9/2021        Sig: Take by mouth daily     Class: Historical    Route: Oral    calcium carbonate-vitamin D 600-200 MG-UNIT TABS            Sig: Take 2 tablets by mouth daily     Class: Historical    Route: Oral    carvedilol (COREG) 25 MG tablet  45 tablet 5 1/3/2022    [place] [address]    Sig: Take 1.5 tablets (37.25 mg) by mouth 2 times daily (with meals)    Class: E-Prescribe    Route: Oral    cycloSPORINE (RESTASIS) 0.05 % ophthalmic emulsion        [place] [address]    Si drop 2 times daily as needed for dry eyes    Class: Historical    diclofenac (VOLTAREN) 1 % topical gel    2020        Sig: Apply 2 g topically 2 times daily     Class: Historical    Route: Topical    erythromycin (ROMYCIN) 5 MG/GM ophthalmic ointment    2021        Sig: Place 0.5 inches into both eyes daily     Class: Historical    Route: Both Eyes    ferrous sulfate (FEROSUL) 325 (65 Fe) MG tablet            Sig: Take 1 tablet by mouth daily     Class: Historical    Route: Oral    furosemide (LASIX) 20 MG tablet  180 tablet 1 10/11/2021    [place] [address]    Sig: Take 2 tablets (40 mg) by mouth daily    Class: E-Prescribe    Route: Oral    Lidocaine (LIDOCARE) 4 % Patch  10 patch 3 10/1/2021    [place] [address]    Sig: Place 1 patch onto the skin daily as needed for moderate pain To prevent lidocaine toxicity, patient should be patch free for 12 hrs daily.    Class: E-Prescribe    Route: Transdermal    losartan (COZAAR) 100 MG tablet  90 tablet 3 10/12/2021    [place] [address]    Sig: Take 1 tablet (100 mg) by mouth daily    Class: E-Prescribe    Route: Oral    multivitamin w/minerals (MULTI-VITAMIN) tablet            Sig: Take 1 tablet by mouth daily    Class: Historical    Route: Oral    omeprazole (PRILOSEC) 20 MG DR capsule  90 capsule 1 12/20/2021    [place] [address]    Sig: TAKE ONE CAPSULE BY MOUTH ONCE DAILY BEFORE BREAKFAST    Class: E-Prescribe    ondansetron (ZOFRAN) 8 MG tablet    3/11/2020        Sig: Take 8 mg by mouth as needed    Class: Historical    Route: Oral    potassium chloride ER (KLOR-CON M) 20 MEQ CR tablet  90 tablet 2 7/21/2021    [place] [address]    Sig: TAKE ONE TABLET BY MOUTH ONCE DAILY    Class: E-Prescribe    rosuvastatin (CRESTOR) 20 MG tablet  90 tablet 3 7/13/2021    [place] [address]    Sig: TAKE 1 TABLET BY MOUTH DAILY AT BEDTIME    Class: E-Prescribe    Renewals     Renewal provider: Zoltan Rangel MD          traMADol (ULTRAM) 50 MG tablet    11/11/2021        Sig: every 6 hours as needed     Class: Historical    vitamin B-12 (CYANOCOBALAMIN) 2000 MCG tablet            Sig: Take 2,000 mcg by mouth daily    Class: Historical    Route: Oral    warfarin  "ANTICOAGULANT (COUMADIN) 2.5 MG tablet    9/14/2020        Sig: Take 1.25-2.5 mg by mouth See Admin Instructions Take 1.25 mg Mon, Wed, Fri and 2.5 mg Sun, Tues, Thurs, Sat.    Class: Historical    Route: Oral          REVIEW OF SYSTEMS:   Gen: denies frequent headaches, double/blurry vision, insomnia, fatigue, unexplained weight loss/gain. No previous anesthesia reactions.  CV: denies chest pain, shortness of breath, palpitations, peripheral edema.    Pulm: denies shortness of breath, asthma or wheezing  GI/: denies liver or kidney problems, blood in stool or BRBPR, difficulty urinating  Endo: denies thyroid problems or Diabetes  Heme/Onc: denies bleeding or clotting disorders, no family problems with bleeding/clotting diorders  MS: no weakness, tremors or gait instability  Neuro: denies depression, memory problems, no dysesthesias, no previous strokes, no migraines, no dysphagia  Skin: No petechiae, purpura or rash.    PHYSICAL EXAMINATION:   /62 (BP Location: Left arm, Patient Position: Sitting, Cuff Size: Adult Regular)   Pulse 100   Resp 16   Ht 1.753 m (5' 9\")   Wt 63.6 kg (140 lb 3.2 oz)   BMI 20.70 kg/m    General: alert and oriented x 3, pleasant, no acute distress  CV: Visible sternal wire mid-sternum. Appears to be standard # 6 stainless steel sternal wire. S1 S2, no murmurs, rubs or gallops, regular rate and rhythm, no peripheral edema, no carotid or abdominal bruits, pulses in upper and lower extremities palpable  Pulm: bilateral breath sounds, clear to auscultation, easy work of breathing  GI: (+) bowel sounds, soft non-tender and non-distended  : voiding without problems  MS: moves all extremities x 4,  5+/5+ equal strength bilaterally  Neuro: pupils equal round and reactive to light, cranial nerves, II-XII grossly intact, no gross neurologic deficits noted    LABS: Pending     PROCEDURES/IMAGING:  Chest X-Ray: Pacemaker present. Lungs are clear.  Angio: Not done  Echo: LVEF 24 " percent  CT: Not done  MRI: Not done  Carotid US: Not done     ASSESSMENT/PLAN:   Tanmay is an 81 year old gentleman who presents with multiple medical problems and a protruding sternal wire.  There is no apparent associated infection, but the wire should be removed.  Enough of the wire is visible to allow this to be removed using local anesthetic.  This will be done on January 12, 2022 to follow a CABG.  The patient and his nephew understand that the risks for this procedure include: bleeding, infection, and the need to remove more sternal wires.  They accept these risks and are agreeable with this plan.    1. Complete outpatient work-up   2. Sternal wire removal with local anesthetic on 1-12-22.     Approximately 30 minutes were spent with the patient in clinic at this visit.    CC  Patient Care Team:  Go Ramírez MD as PCP - General (Family Medicine)  Massiel Medina, PharmD as Pharmacist (Pharmacist)  [place] Durán MD as Assigned Musculoskeletal Provider  Leonardo Smart MD as Assigned Neuroscience Provider  Go Ramírez MD as Assigned PCP  Remy Aguilar MD as Resident (Internal Medicine - Pediatrics)  [place] Rolon MD as Assigned Heart and Vascular Provider  Myhre, David J, RN as Lead Care Coordinator (Primary Care - CC)  Casandra Pandey as Community Health Worker (Primary Care - CC)  Kylie Suazo LSW as Clinic Care Coordinator (Primary Care - CC)  Leyla Cruz NRP, CP as Community Paramedic

## 2022-01-06 NOTE — PROGRESS NOTES
RE: Recent ICD bi-v upgrade pt need sternal wire removal--proposed date 1/12/22  Received: Today  [place] Rolon MD Gebel, Mandy L, RN; Phyllis Ortiz  This should be fine, Dr. Rolon             Previous Messages       ----- Message -----   From: Adela Sauceda, RN   Sent: 1/5/2022   5:06 PM CST   To: [place] Rolon MD, Phyllis Ortiz   Subject: RE: Recent ICD bi-v upgrade pt need sternal *      He has a post-op check with us on 1/11/22. From Device standpoint I don't see why not- assuming they will be using a Device Rep/Magnet during procedure if needed? But I am not sure...    Dr. Rolon -thoughts?   ----- Message -----   From: Phyllis Ortiz   Sent: 1/5/2022   3:09 PM CST   To: [facility]   Subject: Recent ICD bi-v upgrade pt need sternal wire*     Hi Ladies,   It would be done under local anesthesia. Is there any reason he can't have this done next week--1/12?     Thanks!!   Michelle

## 2022-01-06 NOTE — TELEPHONE ENCOUNTER
[facility] Call Center    Phone Message    May a detailed message be left on voicemail: yes     Reason for Call: Other: Tanmay calling to request a call back to discuss scheduling options for a US GUIDED PIRIFORMIS INJECTION. Please call Tanmay at your earliest convenience to discuss.    Action Taken: Message routed to:  [facility] ([facility]    Travel Screening: Not Applicable

## 2022-01-07 NOTE — PROGRESS NOTES
"Follow Up Progress Note      Assessment: [facility] RN spoke with patient today to follow up on recent procedure for ICD battery replacement. Patient said he was doing well, \"no complaints\". [facility] with [facility] and plans to attend this appointment. Patient reported he is receiving meals from [facility] at this time. Goal around this issue was completed. Patient said he has not heard anything about his new walker yet. Writer did leave a message for his niece Mary Kay to see if she has had a chance to pick it up yet. Community paramedics will be coming out to his home on Monday, 1/10/22 to check his INR and assist with medications management.   No other concerns at this time.     Writer did get a call back from his niece Mary Kay. She stated patient did get a walker from a friend and it is working well for him. She agreed to complete his goal around getting a new walker. She stated he is getting a transfer wheelchair and a lift chair through his [employer] waiver. They should both arrive next week. Patient has not been able to get an RN or [place] help through the [place] yet related to staffing issues. Mary Kay said his [employer] worker continue to try to find these resources for him.   Writer went over upcoming appointments with Mary Kay. She stated she and her siblings will make sure he gets to his appointments.   No other needs or concerns.   Care Gaps:    Health Maintenance Due   Topic Date Due     HF ACTION PLAN  Never done     MEDICARE ANNUAL WELLNESS VISIT  10/17/2018     MICROALBUMIN  05/22/2020     LIPID  05/19/2021     PHQ-2  01/01/2022       Patient accepted scheduling phone number for PCP  to schedule independently     Goals addressed this encounter:   Goals Addressed                    This Visit's Progress       1. Reducing Risks (pt-stated)   60%      Goal Statement: I would like to have a home care RN assist me with my medications for better compliance in the next 2 months.   Date Goal set: 11/29/21  Barriers: " Patient said he has difficulty managing his medications. Homebound.  Strengths: Strong advocate for self good family support.  Date to Achieve By: 1/29/21  Patient expressed understanding of goal: Yes  Action steps to achieve this goal:  1. I understand the [facility] RN will request a home care order from Dr. Ramírez to have an RN come out and assist me with my medications.   2. I will continue to take my medications daily as best as I can until the home care nurse is able to come out. I will ask my niece for assistance when needed.   3. I will report progress towards this goal at scheduled outreach calls from my [facility] team.  12-1 discussed, home care order sent again.      12/8/21 - Referred to community paramedics until patient is able to get RN services through his waiver.   1/7/22 - Paramedic scheduled to see him on 1/10/22         2. Psychosocial (pt-stated)         Goal Statement: I would like to have in-home services on a regular basis to assist me with maintaining my apartment in the next 3 months. I would also like to know if I am   Date Goal set: 11/29/21  Barriers: Physical limitations makes it difficult for him to maintain on his own.  Strengths: Strong advocate for himself. Strong family support  Date to Achieve By: 2/28/21  Patient expressed understanding of goal: Yes  Action steps to achieve this goal:  1. I will speak with the [facility] Kylie MURPHY at schedule phone visit on 12/1/21 to discuss resource for in-home services and possible programs that may be available to me to assist me with paying for the services.   2. I will provide any necessary documentation, complete any required assessments and complete any paperwork that may be associated with this goal in a timely manner.   3. I will report progress towards this goal at scheduled outreach telephone call from my [facility] team.    12-1 has [place] Choices assessment set up for 12-3 discussed  12/8/21 - Family will continue to assist patient until he is able to get in-home  services through is waiver.   Discussed on 1/7/22 - family continuing to assist until he is able to get in-home services through his waiver.          3. Functional (pt-stated)         Goal Statement: I would like to get a new walker to assist me with safe ambulation in the next 3 months.   Date Goal set: 11/29/21  Barriers: Currently experiencing difficulty with ambulation.   Strengths: Strong advocate for self. Good family support.   Date to Achieve By: 3/29/22  Patient expressed understanding of goal: Yes  Action steps to achieve this goal:  1. I will understand the [facility] RN [place] will request a DME order for a walker from Dr Ramírez.   2. Dr. Ramírez will review the request and will have his assistant fax a DME order to [facility] is he feels the equipment is necessary.   3. I will follow up with [facility] in order to get the walker.   4. I will report progress towards this goal at scheduled outreach telephone calls from my [facility] team. If I have any difficulty getting the walker, I will contact my [facility] team.   12-1 discussed, PCP is reviewing request for order  12/8/21 - Informed Mary Kay his niece the DME order was sent to [facility].     Discussed on 1/7/22 - has not received new walker yet.            COMPLETED: 4. Psychosocial (pt-stated)         Goal Statement: I would like to have resource for in-home meal delivery programs in the next 30 days.   Date Goal set: 11/29/21  Barriers: Patient is currently dependent on family to bring food/groceries in.    Strengths: Strong advocate for himself. Strong family support.   Date to Achieve By: 12/29/21  Patient expressed understanding of goal: Yes  Action steps to achieve this goal:  1. I will speak with the [facility] KATHERINE Espinal at scheduled outreach telephone call on 12/1/21 to discuss resources for delivered meals that may be available to me.   2. I decide which resource best suits my needs and will contact them directly.    3. I will report progress towards this goal at scheduled outreach telephone calls from my [facility] team.      12-1 has [place] Cho[place] for 12-3 discussed, referral to [facility]  12/8/21 - [facility] will start delivery on 12/15/21  Discussed on 1/7/22 - Currently receiving meals from [facility].             Intervention/Education provided during outreach: Discussed the importance of taking his medications daily as directed. Encouraged him to attend all upcoming scheduled appointments.      Outreach Frequency: monthly    Plan: [facility] RN will continue to monitor, support patient with current goals and will be available to assist as nursing needs arise. [facility] CHW will continue to reach out to patient on a monthly basis to discuss progression of his goals.     Care Coordinator will perform Chart Review in 45 days.

## 2022-01-10 NOTE — PROGRESS NOTES
ANTICOAGULATION MANAGEMENT     Tanmay Israel 81 year old male is on warfarin with therapeutic INR result. (Goal INR 2.0-3.0)    Recent labs: (last 7 days)     01/10/22  1322   INR 2.3*       ASSESSMENT     Source(s): Chart Review and Patient/Caregiver Call       Warfarin doses taken: Warfarin taken as instructed    Diet: No new diet changes identified    New illness, injury, or hospitalization: No    Medication/supplement changes: None noted    Signs or symptoms of bleeding or clotting: No    Previous INR: Therapeutic last visit; previously outside of goal range    Additional findings: Upcoming surgery/procedure sternal wire removal 1/12     PLAN     Recommended plan for no diet, medication or health factor changes affecting INR     Dosing Instructions: Continue your current warfarin dose with next INR in 2 weeks       Summary  As of 1/10/2022    Full warfarin instructions:  1.25 mg every Mon, Wed, Fri; 2.5 mg all other days   Next INR check:  1/24/2022             Telephone call with Tanmay who verbalizes understanding and agrees to plan    Patient offered & declined to schedule next visit    Education provided: Goal range and significance of current result and Contact [phone] with any changes, questions or concerns.     Plan made per [facility] anticoagulation protocol    Yaneth Wells RN  Anticoagulation Clinic  1/10/2022    _______________________________________________________________________     Anticoagulation Episode Summary     Current INR goal:  2.0-3.0   TTR:  52.4 % (1 y)   Target end date:  Indefinite   Send INR reminders to:  CHANI RIOS    Indications    Atrial fibrillation (H) [I48.91]  Pulmonary embolism with infarction (H) [I26.99]  Atrial fibrillation  unspecified type (H) [I48.91]           Comments:           Anticoagulation Care Providers     Provider Role Specialty Phone number    Go Ramírez MD Referring Family Medicine [phone]

## 2022-01-10 NOTE — PROGRESS NOTES
"Community Paramedics Initial Visit  January 10, 2022 TIME:1000    Tanmay Israel is a 81 year old male being seen at home for a Community Paramedic Home visit.    Present at appointment: Patient Tanmay and Community Paramedic Leyla         Chief Complaint   Patient presents with     Outreach       [place] Utilization:   Clinic Utilization  Compliance Concerns: No  No-Show Concerns: No  No PCP office visit in Past Year: No  Utilization    Hospital Admissions  2             ED Visits  4             No Show Count (past year)  1                Current as of: 1/10/2022  9:34 AM              Clinical Concerns:  Current Medical Concerns:  INR    Current Behavioral Concerns: None    Education Provided to patient: On INR POC vs a INR lab draw     Vitals: /80 (BP Location: Right arm, Patient Position: Sitting, Cuff Size: Adult Regular)   Pulse 84   Temp 98.1  F (36.7  C)   Resp 14   SpO2 99%   BMI: There is no height or weight on file to calculate BMI.    Pain  Pain (GOAL):: Yes  Type: Chronic (>3mo)  Location of chronic pain:: Low back, sciatica  Radiating: Yes  Location pain radiates to: Right buttock  Progression: Intermittent  Description of pain: Aching,Electrical-like,Gnawing,Nagging  Chronic pain severity:: 6  Limitation of routine activities due to chronic pain:: Yes (He likes to walk. But since he fell down the steps in August and broke his left shoulder and he is still a little out of balance.)  Alleviating Factors: Rest,Ice,Heat,Exercise,Stretching,Procedures or Injections,Pain Medication (Had an injection in 2021 that helped.)  Aggravating Factors: Activity,Positioning  Health Maintenance Reviewed: Due/Overdue: See Epic    Clinical Pathway: None    Review of Symptoms/PE    Skin: negative  Eyes: glasses, is due for a check up. Patient advised he takes eye drops for \"something with my vision, I don't know what I've got.\"  Ears/Nose/Throat: negative. Over due for a dental exam.  Respiratory: No shortness of " breath, dyspnea on exertion, cough, or hemoptysis  Cardiovascular: irregular heart beat, Hx of A-Fib  Gastrointestinal: reflux, diarrhea but started taking Metamucil in July and it has helped a lot.  Genitourinary: nocturia-goes two to three times a night and urgency  Musculoskeletal: back pain, joint pain and injury to left shoulder-fracture from a fall in August 2021  Neurologic: negative  Psychiatric: negative    Medication Management:  Patient advised he is able to manage his own medications.    Medications need to be refilled:None     Medications set up: No  Pill Box issued: No  Scale issued: No  Flu Shot given: No  COVID Vaccine Given: No  Lab draw or specimen collection: No  Food box issued: No  Collaborative visit with PCP: No  Wound Care: No    Functional Status:  Dependent ADLs:: Independent  Dependent IADLs:: Cleaning,Laundry,Transportation (He describes cleaning, doing laundry and driving are difficult for him.)  Bed or wheelchair confined:: No  Mobility Status: Independent w/Device (Can ambulate okay around the house without his cane or walker. He uses them when he leaves his apartment home.)  Fallen 2 or more times in the past year?: No  Any fall with injury in the past year?: No    Living Situation:  Current living arrangement:: I live in a private home,I live alone  Type of residence:: Apartment - handicap accessible    Community Paramedics Home Safety Assessment  Floors:  Are there throw rugs on the floor?: No  Are there papers, books, towels, shoes, magazines on the floor?: No  Are there loose wires and cords you need to walk around? : No  Stairs and Steps  Are there papers, books, towels, shoes, magazines on the stairs?: No  Are some steps broken or uneven?: No  Is there a light over the stairway?:  (N/A)  Has the stairway bulb burned out?:  (N/A)  Is the carpet on the steps loose or torn?: No  Are the handrails loose or broken?: No  Kitchen:  Are the things you use often on high shelves?: Yes  Is  your step stool unsteady?: No  Bathrooms:  Is the tub or shower floor slippery?: No  Do you need support when you get in and out of the tub?: No  Bedrooms:  Is the light near the bed hard to reach?: No  Is the path from your bed to the bathroom dark?: No       Diet/Exercise/Sleep:  Diet:: Regular,No added salt  Inadequate nutrition (GOAL):: No  Tube Feeding: No  Inadequate activity/exercise (GOAL):: No  Significant changes in sleep pattern (GOAL): No    Transportation:     Transportation means:: Family,Accessible car     Psychosocial:  Hoahaoism or spiritual beliefs that impact treatment:: No  Mental health DX:: No  Mental health management concern (GOAL):: No  Chemical Dependency Status: No Current Concerns  Informal Support system:: Family,Friends,Neighbors,Amina based    Core Healthy Days Survey - Healthy Days Survey - (Centers for Disease Control and Prevention - Used with Permission.)  Would you say that in general your health is: : Fair  Now thinking about your physical health, which includes physical illness and injury, for how many days during the past 30 days was your physical health not good?: None  Now thinking about your mental health, which includes stress, depression, and problems with emotions, for how many days during the past 30 days was your mental health not good?: None  During the past 30 days, for about how many days did poor physical or mental health keep you from doing your usual activities, such as self-care, work, or recreation?: 17  CHDS SCORE: 0    No  Face to Face in Home / Community    Today's PHQ-2 Score:      Today's PHQ-9 Score: No flowsheet data found.     Today's GAD7 score: No flowsheet data found.         Financial/Insurance:      BCBS, Medicare and Medicaid     Resources and Interventions:  Current Resources:    ;   Community Resources: Financial/Insurance,Lifeline,Other (see comment)  Supplies used at home:: Nutritional Supplements  Equipment Currently Used at Home: walker,  rolling,cane, quad,grab bar, toilet,grab bar, tub/shower,hospital bed,raised toilet seat,shower chair    Advance Care Plan/Directive  Advanced Care Plans/Directives on file:: Yes  Type Advanced Care Plans/Directives: DNR/DNI    Referrals Placed: None     HEALTH CARE GOALS:      Patient Active Problem List   Diagnosis     Osteoarthritis Of The Knee     Esophageal reflux     Obstructive sleep apnea     Ptosis Of Eyelid     Hyperlipidemia     Anemia     Crohn's (Granulomatous) Colitis     Benign Essential Hypertension     Coronary atherosclerosis of native coronary artery     Ischemic cardiomyopathy     Paroxysmal ventricular tachycardia (H)     Dry Nonexudative Macular Degeneration     Serum Enzyme Levels - Alkaline Phosphatase Elevated     Dysphagia     ICD (implantable cardioverter-defibrillator), single, in situ     Lumbar stenosis with neurogenic claudication     Sacroiliac joint dysfunction of right side     Stage 3 chronic kidney disease (H)     Warfarin-induced coagulopathy (H)     A-fib (H)     Anemia, unspecified type     Cerebral aneurysm     Osteoporosis     Ileitis     Heart failure with reduced ejection fraction (H)     Diaphragmatic hernia     Pharyngoesophageal dysphagia     History of Crohn's disease     Iron deficiency anemia     Polyp of stomach and duodenum     Reflux esophagitis     Rotator cuff tear arthropathy of right shoulder     Schatzki's ring     Diverticulosis of colon     Flatulence, eructation and gas pain     S/P cervical spinal fusion     S/P laparoscopic cholecystectomy     Hypomagnesemia     Acute renal failure (ARF) (H)     Dizziness     Polyp of colon     ICD (implantable cardioverter-defibrillator) in place     Esophageal obstruction due to food impaction     ACP (advance care planning)     SOB (shortness of breath)     Acute systolic (congestive) heart failure (H)     Acute on chronic systolic heart failure (H)     Permanent atrial fibrillation (H)     Supratherapeutic INR      Cardiac pacemaker in situ     Pneumonia due to infectious organism, unspecified laterality, unspecified part of lung     Anemia     Benign essential hypertension     CAD (coronary artery disease), native coronary artery     Atrial fibrillation (H)     Pulmonary embolism with infarction (H)     LBBB (left bundle branch block)     Sciatica     Presbyesophagus     Gastroenteritis     Chronic anticoagulation     Acute blood loss anemia     Closed fracture of proximal end of left humerus with routine healing     Hypokalemia     Loose stools     Weakness     Polyp of duodenum     Personal history of colonic polyps     Diarrhea     Advised about management of weight     Atrial fibrillation, unspecified type (H)     ICD (implantable cardioverter-defibrillator) battery depletion         Current Outpatient Medications:      acetaminophen (TYLENOL) 500 MG tablet, Take 1,000 mg by mouth every 8 hours as needed , Disp: , Rfl:      albuterol (PROAIR HFA/PROVENTIL HFA/VENTOLIN HFA) 108 (90 Base) MCG/ACT inhaler, Inhale 1-2 puffs into the lungs every 4 hours as needed Normally takes ~1 puff/ day, Disp: , Rfl:      bumetanide (BUMEX) 1 MG tablet, Take by mouth daily , Disp: , Rfl:      calcium carbonate-vitamin D 600-200 MG-UNIT TABS, Take 2 tablets by mouth daily , Disp: , Rfl:      carvedilol (COREG) 25 MG tablet, Take 1.5 tablets (37.25 mg) by mouth 2 times daily (with meals), Disp: 45 tablet, Rfl: 5     cycloSPORINE (RESTASIS) 0.05 % ophthalmic emulsion, 1 drop 2 times daily as needed for dry eyes, Disp: , Rfl:      diclofenac (VOLTAREN) 1 % topical gel, Apply 2 g topically 2 times daily , Disp: , Rfl:      erythromycin (ROMYCIN) 5 MG/GM ophthalmic ointment, Place 0.5 inches into both eyes daily , Disp: , Rfl:      ferrous sulfate (FEROSUL) 325 (65 Fe) MG tablet, Take 1 tablet by mouth daily, Disp: , Rfl:      furosemide (LASIX) 20 MG tablet, Take 2 tablets (40 mg) by mouth daily, Disp: 180 tablet, Rfl: 1     Lidocaine  (LIDOCARE) 4 % Patch, Place 1 patch onto the skin daily as needed for moderate pain To prevent lidocaine toxicity, patient should be patch free for 12 hrs daily., Disp: 10 patch, Rfl: 3     losartan (COZAAR) 100 MG tablet, Take 1 tablet (100 mg) by mouth daily, Disp: 90 tablet, Rfl: 3     multivitamin w/minerals (MULTI-VITAMIN) tablet, Take 1 tablet by mouth daily, Disp: , Rfl:      omeprazole (PRILOSEC) 20 MG DR capsule, TAKE ONE CAPSULE BY MOUTH ONCE DAILY BEFORE BREAKFAST, Disp: 90 capsule, Rfl: 1     ondansetron (ZOFRAN) 8 MG tablet, Take 8 mg by mouth as needed, Disp: , Rfl:      potassium chloride ER (KLOR-CON M) 20 MEQ CR tablet, TAKE ONE TABLET BY MOUTH ONCE DAILY, Disp: 90 tablet, Rfl: 2     rosuvastatin (CRESTOR) 20 MG tablet, TAKE 1 TABLET BY MOUTH DAILY AT BEDTIME, Disp: 90 tablet, Rfl: 3     traMADol (ULTRAM) 50 MG tablet, every 6 hours as needed , Disp: , Rfl:      vitamin B-12 (CYANOCOBALAMIN) 2000 MCG tablet, Take 2,000 mcg by mouth daily, Disp: , Rfl:      warfarin ANTICOAGULANT (COUMADIN) 2.5 MG tablet, TAKE 1 TO 1.5 TABLETS (2.5-3.75MG) BY MOUTH DAILY. ADJUST DOSE PER INR AS DIRECTED., Disp: 90 tablet, Rfl: 3    Plan:  Continue care plan.    Functional Status:  Comment: Independent   Plan: Continue to live independently    Mental Health/Cognition:  Comment: Patient is neurologically intact, A&O to person, place, time, event.  Plan: None.    Health Care Goals:  Comment: Continue to heal from fall, L shoulder Fx.     Time spent with patient: 90 minutes    The patient meets one or more of the following criteria:  * Requires services to prevent readmission to a nursing home or hospital    Acute concern/Follow-up recommendations: I will continue to see Mr. Israel in his home as needed. I would like to recommend this patient have a home INR POC meter.     Next CP visit scheduled: Unknown at this time. Patient feels my services are not needed if I cannot check his INR.    Issues for Provider to follow up  on: I met with Mr. Israel in his home today. I was referred to assist him with medications, provide a home safety assessment, and to check his INR. However, the [facility] do not carry INR POC meters and the patient does not have his own. He does not have a standing order for an INR lab draw so I was not able to obtain his labs. He does have a pre-op COVID-19 test today at the [facility] lab so I called and scheduled him to have his INR POC done today at the clinic. If you would like to put in a standing order for an INR lab draw I am happy to assist him. Please note, this type of service is expected to be a temporary solution for our patients until a more permanent solution can be found.     Tanmay received his Guardian Life Assist device this week and asked me to check it and make sure it charged correctly, which it was. He was thankful for my help.  The patient advised he does not have any questions for his PCP at this time    Provider follow up visit needed: BRIT

## 2022-01-10 NOTE — TELEPHONE ENCOUNTER
Talked with patient and informed him of his piriformis injections with Dr. Smart on 3/7/2022 at 3 pm. Patient wanted to know if he could be seen sooner, so put him on the waitlist per request.     Carol Mitchell

## 2022-01-10 NOTE — PROGRESS NOTES
Patient spoke with CCC RN on 1/7/22 and discussed goals     CHW delegations: CCC CHW will continue to reach out to patient on a monthly basis to discuss progression of his goals.        Next outreach due: 2/7/22

## 2022-01-11 NOTE — NURSING NOTE
"DEVICE CLINIC RN POST-OP NOTE    Reason for visit: In-clinic post-operative wound and device check s/p upgrade from a single chamber (VR) ICD to a CRT-D system.  No atrial lead implanted secondary to documented permanent atrial fibrillation (AF)     Device System: Medtronic [id number] Cobalt XT HF Quad CRT-D MRI, LV Lead: Medtronic 4298 Attain Performa MRI SureScan. Pre-existing RV Defib Lead: Medtronic 6935M Sprint Quattro Secure S, implanted 02/07/2013 by Dr. Houston Rolon  Procedure date: 01/03/2022  Implant Diagnosis: Ischemic Cardiomyopathy, Chronic systolic heart failure, Left Bundle Branch Block, ICD battery depletion, Chronic AF  Implanting Physician: Dr. Houston Rolon      Assessment  Subjective: \"Everything's good.\"  Vitals: Temp 98.9  F (37.2  C) (Oral)   Heart Sounds: normal  Lung Sounds: clear to auscultation bilaterally  Incision/device pocket: Clean, dry and intact with resolving ecchymosis and edema.  There are no signs of infection present.  The Steri-strips were removed at today's visit and the area cleaned of residual adhesive.      Device Findings  Interrogation of device reveals normal sensing and capture thresholds  See the Paceart Report for a full summary of the device information.    Other: Intrinsic ventricular response is 30s-70s bpm.      Patient Education  Tanmay Israel was accompanied today by his niece, Mary Kay.     [facility]'s Partnership Agreement for Device Patients and Post-operative Checklist were presented and reviewed at today's appointment.    Signs and symptoms of infection, poor wound healing, and normal device function were reviewed. Range of motion and weight restrictions for the left arm are for four weeks. Mr. Israel continues to recuperate from a left clavicular fracture last year.  He was issued a VGBio Relay remote monitor and instructed on its set-up and use for remote monitoring by the Medtronic representative prior to hospital discharge. " These instructions were reviewed at today s visit.       Plan  - One month post-op remote transmission on 02/04/2022  - In-clinic three month post-op wound and device check on 04/04/2022 at our [facility] location    Leyla Rich, RN, MA  Device Nurse  [facility]

## 2022-01-11 NOTE — PATIENT INSTRUCTIONS
Implantable Cardiac Defibrillator (ICD) Post-operative Checklist      The Device Registered Nurse (RN) reviewed the ICD function.      The Device RN did a wound assessment and wound care teaching.    Please call the Device Nurses with any signs of infection or questions regarding wound healing. Device Nurse Line: [phone], Option#3.      The Device RN demonstrated and displayed the specific remote monitoring system for your ICD.      The Device RN reviewed the Partnership Agreement Form.    Patient Instructions    Do not lift your LEFT arm above the shoulder height, perform any vigorous arm movements such as swimming, golfing, washing windows, shoveling snow, sweeping, vacuuming or lifting greater than 10 lbs with the affected arm for FOUR weeks form the date of implant, through 01/31/2022.      To reduce the risk of infection, try to avoid any dental procedures for the first 6 weeks, after 02/14/2022 after your ICD implant. If you have an emergent or urgent dental need during this time, contact the device clinic for a prescription for an antibiotic.      You will receive a device identification (ID) card in the mail from the device  within 6 weeks to replace the temporary ID card you were given in the hospital.      You may travel by any mode of transportation; just show your ICD ID card. You may be subject to a hand search or use of a handheld wand, but official should not keep the wand over the implant site for greater than 5-10 seconds.       For any surgery, let your doctor know you have an ICD.      Your ICD is MRI safe after 02/14/2022      Most household appliances, including a microwave, will not interfere with your ICD function. If you suspect interference, simply move away from the source. Cell phones do not cause a problem. Please refer to the device booklet from the  or their website under the section on electromagnetic interference (LIANE) for further guidelines on things that  may interfere with your ICD.       If you receive a shock from your device:  1. Keep a diary of your symptoms and activities at the time of the shock.  2. If you receive 1 shock, your symptoms subside and you feel well, call the Device Clinic. If this occurs after hours call the next morning.   3. If you receive 1 shock and your symptoms do not subside, or you receive multiple shocks: Call 911.      Device Clinic Contact Information  Device Nurses: [phone], Option #3.  Device Remote Specialists: [phone], Option #2. For questions about your Remote Transmission or Transmission Schedule   Device Schedulers: [phone], Option #1

## 2022-01-12 NOTE — PROGRESS NOTES
"Community Paramedic Program  Community Health Worker Follow Up    Intervention and Education during outreach:     Received a call from pt's niece, Mary Kay. She said CP Leyla came out on Monday to visit her uncle, and she had a few questions. She shared that pt got his INR checked on Monday in clinic with the CP's assistance since he was going in anyway for a pre-op COVID test. According to Mary Kay, pt will need his INR checked again in 2 weeks. She asked if the CP would be coming back out to check pt's INR or if \"we need to get him into the doctor's office again.\" Checked appointment desk in pt's chart and also quickly reviewed CP's visit note. Let Mary Kay know that at this time, the CP does not have another visit scheduled with pt but I offered to reach out to the CP and discuss. Asked Mary Kay if she'd like myself or the CP to get back to her with an update, and she said yes.    Mary Kay asked about why the CP can't do a finger prick \"like they do in the clinic\" to check pt's INR. I explained that the CPs do not carry INR POC meters so they have to do a venous draw. Mary Kay also asked about \"standing orders\" in pt's chart and if pt or she needs to follow up with pt's PCP to request. I told her no, that the CP and I will follow up with pt's PCP to come up with a game plan. Reassured Mary Kay that we will reach out to update her once we've connected.    Pt's kenyece asked if pt could get an INR POC meter from his PCP and if I knew how much they cost. I told her no, I don't know what they cost but did share that some of our other pts rent the meter. She expressed interest in getting information about where pt could rent one, if his insurance would help cover the cost and how long it might take.     Mary Kay expressed appreciation for the information and for the CP's visit.     CHW Plan:  1. CP CHW will follow up with the CP and pt's PCP regarding INR checks in clinic vs at pt's home.  2. CP CHW will follow up with pt's CC RN regarding " pt's niece's interest in renting/buying an INR POC meter for more information and resources.  3. CP CHW or CP will follow up with pt's niece by phone to discuss plan for pt's next INR check in 2 weeks.

## 2022-01-13 NOTE — DISCHARGE INSTRUCTIONS
Anesthesia: Monitored Anesthesia Care (MAC)    You re due to have surgery. During surgery, you ll be given medicine called anesthesia. This will keep you comfortable and pain-free. Your surgeon will use monitored anesthesia care (MAC). This sheet tells you more about this type of anesthesia.   What is monitored anesthesia care?  MAC keeps you very drowsy during surgery. You may be awake, but you will likely not remember much. And you won t feel pain. With MAC, medicines are given through an IV line into a vein in your arm or hand. A local anesthetic will usually be injected into the skin and muscle around the surgical site to numb it. The anesthesia provider monitors you during the procedure. He or she checks your heart rate and rhythm, blood pressure, and blood oxygen level.   Anesthesia tools and medicines that may be near you during your procedure  You will likely have:    A pulse oximeter on the end of your finger. This measures your blood oxygen level.    Electrocardiography leads (electrodes) on your chest. These record your heart rate and rhythm.    Medicines given through an IV. These relax you and prevent pain. You may be awake or sleep lightly. If you have local anesthetic, it's injected directly into your skin.    A face mask to give you oxygen, if needed.  Possible risks  MAC has some risks. These include:    Breathing problems    Nausea and vomiting    Allergic reaction to the anesthetic   Anesthesia safety  Tips for anesthesia safety include the following:     Follow all instructions you are given for how long not to eat or drink before your procedure.    Be sure your healthcare provider knows what medicines you take, especially any anti-inflammatory medicine or blood thinners. This includes aspirin and any other over-the-counter medicines, herbs, and supplements.    Have an adult family member or friend drive you home after the procedure.    For the first 24 hours after your surgery:  ? Don't drive  or use heavy equipment.  ? Don't make important decisions or sign documents.  ? Don't drink alcohol.  ? Have someone stay with you, if possible. They can watch for problems and help keep you safe.  Luis Miguel last reviewed this educational content on 10/1/2019    6860-3242 [employer]. All rights reserved. This information is not intended as a substitute for professional medical care. Always follow your healthcare professional's instructions.

## 2022-01-13 NOTE — BRIEF OP NOTE
[facility]    Brief Operative Note    Pre-operative diagnosis: Protruding sternal wires [T81.89XA]  Post-operative diagnosis:  Same    Procedure: Procedure(s):  STERNAL WIRE REMOVAL  Surgeon: Surgeon(s) and Role:     * Raquel De Leon MD - Primary      * Marianne Pabon, Assisting  Anesthesia: Monitor Anesthesia Care with 1mL local xylocaine without epi  Estimated Blood Loss: Less than 1 mL  Drains: None  Specimens:  None  Findings: Wires removed in its entirety  Complications: None  Implants: None

## 2022-01-13 NOTE — OR NURSING
Dr De Leon and Dr Moeller were informed of the following; last warfarin was taken yesterday at 1300, INR 2.3 1/10/22.  I informed Dr Moeller that Tanmay did not take his beta blocker today.

## 2022-01-13 NOTE — ANESTHESIA PREPROCEDURE EVALUATION
Anesthesia Pre-Procedure Evaluation    Patient: Tanmay Israel   MRN: [id number] : 1940        Preoperative Diagnosis: Protruding sternal wires [T81.89XA]    Procedure : Procedure(s):  STERNAL WIRE REMOVAL          Past Medical History:   Diagnosis Date     Acute cholecystitis 2019     Acute post-operative pain      Anemia      Arthritis      Atrial fibrillation (H)      C. difficile diarrhea 2019     Calculus of ureter      Callus      Cerebral aneurysm      Cervical myelopathy (H) 2019     Cervical spinal stenosis      Chronic kidney disease     stage 3     Chronic systolic congestive heart failure (H)      Closed fracture of distal end of left radius, unspecified fracture morphology, initial encounter      Closed fracture of distal end of right radius, unspecified fracture morphology, initial encounter      Closed fracture of first thoracic vertebra, unspecified fracture morphology, initial encounter (H)      Coronary artery disease      Crohn's disease (H)      Disorder of bursae and tendons in shoulder region     Created by Conversion  Replacement Utility updated for latest IMO load     Dysphagia      Fall 10/22/2016     GERD (gastroesophageal reflux disease)      Hyperlipidemia      Hypertension      Hypotension, unspecified hypotension type      ICD (implantable cardioverter-defibrillator) in place     Medtronic     Ischemic cardiomyopathy      Kidney stone      Low back pain      Lumbago     Created by Conversion      Macular degeneration      Myalgia and myositis, unspecified     Created by Conversion      Nonspecific elevation of levels of transaminase or lactic acid dehydrogenase (LDH)     Created by Conversion      Permanent atrial fibrillation (H)     [id number] risk score = 5 (age3/ CAD/ HTN/ CHF) Chronic warfarin     Personal history of colonic polyps 2019     Polyp of duodenum 10/17/2016     Pyuria      Right arm weakness 2019     Schatzki's ring      Sciatica      Created by Conversion      Sleep apnea     no CPAP     Spondylolisthesis of lumbar region 7/5/2016     Weakness 4/20/2019      Past Surgical History:   Procedure Laterality Date     APPENDECTOMY       ARTHROSCOPY SHOULDER ROTATOR CUFF REPAIR Right      ATRIAL FIBRILLATION/FLUTTER       BYPASS GRAFT ARTERY CORONARY  08/01/2004    Coronary Artery Quadruple Venous Bypass Graft     CARDIAC DEFIBRILLATOR PLACEMENT  01/01/2013    ICD Medtronic     CHOLECYSTECTOMY       COLONOSCOPY N/A 02/19/2020    Procedure: COLONOSCOPY;  Surgeon: Houston Medina MD;  Location: [facility];  Service: Gastroenterology     EP ICD UPGRADE TO BIVENT Left 01/03/2022    Procedure: EP Implantable Cardio-Defibrillator Upgrade to Bivent;  Surgeon: Houston Rolon MD;  Location: [facility] CV     ESOPHAGOSCOPY, GASTROSCOPY, DUODENOSCOPY (EGD), COMBINED       IR LUMBAR EPIDURAL STEROID INJECTION  06/29/2005     IR LUMBAR EPIDURAL STEROID INJECTION  07/15/2005     IR LUMBAR EPIDURAL STEROID INJECTION  09/28/2005     IR LUMBAR EPIDURAL STEROID INJECTION  12/28/2005     IR LUMBAR EPIDURAL STEROID INJECTION  03/29/2006     IR LUMBAR EPIDURAL STEROID INJECTION  06/08/2006     PHACOEMULSIFICATION CLEAR CORNEA WITH STANDARD INTRAOCULAR LENS IMPLANT Bilateral      MT C- LAMINOPLASTY, 2 OR MORE Left 02/22/2019    Procedure: LEFT CERVICAL 4, 5, 6 LAMINOPLASTY;  Surgeon: Liza Cesar MD;  Location: [facility];  Service: Spine     MT ESOPHAGOGASTRODUODENOSCOPY TRANSORAL DIAGNOSTIC N/A 05/10/2021    Procedure: ESOPHAGOGASTRODUODENOSCOPY (EGD);  Surgeon: Franklin Mendoza MD;  Location: [facility];  Service: Gastroenterology     MT ESOPHAGOGASTRODUODENOSCOPY TRANSORAL DIAGNOSTIC N/A 06/16/2021    Procedure: ESOPHAGOGASTRODUODENOSCOPY (EGD), WITH ESOPHAGEAL DILATION AND BIOPSY;  Surgeon: Paramjit Brown MD;  Location: [facility];  Service: Gastroenterology     TONSILLECTOMY       XR MYELOGRAM CERVICAL THORACIC  LUMBAR  01/17/2019     ZZC LAP,CHOLECYSTECTOMY/EXPLORE N/A 02/28/2019    CHOLECYSTECTOMY, LAPAROSCOPIC;  Surgeon: Mana Roman MD;  Location: [facility] [place];  Service: General      No Known Allergies   Social History     Tobacco Use     Smoking status: Former Smoker     Smokeless tobacco: Never Used   Substance Use Topics     Alcohol use: Not Currently      Wt Readings from Last 1 Encounters:   01/13/22 64.1 kg (141 lb 6.4 oz)        Anesthesia Evaluation   Pt has had prior anesthetic.     No history of anesthetic complications       ROS/MED HX  ENT/Pulmonary:  - neg pulmonary ROS   (+) sleep apnea, doesn't use CPAP,     Neurologic:  - neg neurologic ROS     Cardiovascular:  - neg cardiovascular ROS   (+) Dyslipidemia hypertension--CAD -CABG-date: 2004. -CHF etiology: ischemic Last EF: 24 date: 6/2021 pacemaker, ICD (replaced 1/3/22 due to battery depletion) Reason placed:ischemic CM. dysrhythmias, a-fib,     METS/Exercise Tolerance:     Hematologic:     (+) History of blood clots, pt is anticoagulated,     Musculoskeletal: Comment: Left shoulder fx  S/p spinal fusion  (+) arthritis,     GI/Hepatic:  - neg GI/hepatic ROS   (+) GERD,     Renal/Genitourinary:  - neg Renal ROS   (+) renal disease, type: CRI, Pt does not require dialysis,     Endo:  - neg endo ROS     Psychiatric/Substance Use:  - neg psychiatric ROS     Infectious Disease:  - neg infectious disease ROS     Malignancy:  - neg malignancy ROS     Other:            Physical Exam    Airway        Mallampati: III   TM distance: > 3 FB   Neck ROM: full   Mouth opening: > 3 cm    Respiratory Devices and Support         Dental       (+) upper dentures and lower dentures      Cardiovascular   cardiovascular exam normal          Pulmonary           breath sounds clear to auscultation           OUTSIDE LABS:  CBC:   Lab Results   Component Value Date    WBC 6.1 01/03/2022    WBC 7.7 11/22/2021    HGB 10.0 (L) 01/03/2022    HGB 10.9 (L) 11/22/2021    HCT  31.1 (L) 01/03/2022    HCT 33.5 (L) 11/22/2021     (L) 01/03/2022     11/22/2021     BMP:   Lab Results   Component Value Date     01/03/2022     11/22/2021    POTASSIUM 4.4 01/03/2022    POTASSIUM 4.0 11/22/2021    CHLORIDE 108 (H) 01/03/2022    CHLORIDE 104 11/22/2021    CO2 24 01/03/2022    CO2 27 11/22/2021    BUN 23 01/03/2022    BUN 21 11/22/2021    CR 1.70 (H) 01/03/2022    CR 1.57 (H) 11/22/2021    GLC 83 01/03/2022     11/22/2021     COAGS:   Lab Results   Component Value Date    PTT 27 06/11/2021    INR 2.3 (H) 01/10/2022     POC: No results found for: BGM, HCG, HCGS  HEPATIC:   Lab Results   Component Value Date    ALBUMIN 3.0 (L) 07/13/2021    PROTTOTAL 6.5 07/13/2021    ALT 10 07/13/2021    AST 13 07/13/2021     (H) 11/06/2019    ALKPHOS 261 (H) 07/13/2021    BILITOTAL 0.4 07/13/2021     OTHER:   Lab Results   Component Value Date    LACT 1.1 07/15/2019    RUBÉN 9.3 01/03/2022    PHOS 3.6 05/24/2019    MAG 1.2 (L) 09/17/2021    LIPASE 23 06/11/2021    TSH 1.22 04/19/2019    CRP 0.5 04/01/2019       Anesthesia Plan    ASA Status:  4   NPO Status:  NPO Appropriate    Anesthesia Type: MAC.     - Reason for MAC: immobility needed, straight local not clinically adequate   Induction: Propofol.   Maintenance: TIVA.        Consents    Anesthesia Plan(s) and associated risks, benefits, and realistic alternatives discussed. Questions answered and patient/representative(s) expressed understanding.    - Discussed:     - Discussed with:  Patient         Postoperative Care    Pain management: Multi-modal analgesia.   PONV prophylaxis: Ondansetron (or other 5HT-3), Dexamethasone or Solumedrol     Comments:    Other Comments:     Reviewed anesthetic options and risks. Patient agrees to proceed.     Recent Results (from the past 120 hour(s))  -Asymptomatic COVID-19 Virus (Coronavirus) by PCR Nose:   Collection Time: 01/10/22  1:21 PM  Specimen: Nose; Swab       Result                                             Value                         Ref Range                       SARS CoV2 PCR                                     Negative                      Negative, Testing sent t*  -INR point of care:   Collection Time: 01/10/22  1:22 PM       Result                                            Value                         Ref Range                       INR                                               2.3 (H)                       0.9 - 1.1                            Kane Moeller MD

## 2022-01-13 NOTE — ANESTHESIA POSTPROCEDURE EVALUATION
Patient: Tanmay Israel    Procedure: Procedure(s):  STERNAL WIRE REMOVAL       Diagnosis:Protruding sternal wires [T81.89XA]  Diagnosis Additional Information: No value filed.    Anesthesia Type:  MAC    Note:  Disposition: Outpatient   Postop Pain Control: Uneventful            Sign Out: Well controlled pain   PONV: No   Neuro/Psych: Uneventful            Sign Out: Acceptable/Baseline neuro status   Airway/Respiratory: Uneventful            Sign Out: Acceptable/Baseline resp. status   CV/Hemodynamics: Uneventful            Sign Out: Acceptable CV status; No obvious hypovolemia; No obvious fluid overload   Other NRE: NONE   DID A NON-ROUTINE EVENT OCCUR?            Last vitals:  Vitals Value Taken Time   /60 01/13/22 1425   Temp     Pulse 80 01/13/22 1429   Resp     SpO2 100 % 01/13/22 1429   Vitals shown include unvalidated device data.    Electronically Signed By: Samina Gilman MD  January 13, 2022  2:30 PM

## 2022-01-13 NOTE — TELEPHONE ENCOUNTER
Yes we do set up the home monitor with the company's we work with. I will get this process started for Tanmay. You will be faxed the home monitor application that will already be filled out and you just need to sign and send back to [facility]. Thank you for reaching out.

## 2022-01-13 NOTE — PROGRESS NOTES
Anticoagulation Management    Discussed INR home monitoring program with Tanmay Israel reviewing:      [employer] requirements: >= 3 months of anticoagulation therapy, indication for chronic anticoagulation and order from provider    Required testing frequency (q1-2 weeks)    Home meters, testing supplies, meter training, and reporting of INR results done through an outside company. Patient would be contacted by home monitoring company to review insurance coverage with home monitoring company prior to enrolling.    [facility] would continue to receive and manage INR results.    Home monitoring application may take several weeks and must continue to follow up with recommended INR monitoring in clinic until receives monitor and training completed.     Home monitoring terms reviewed with patient      Patient agrees to frequency of testing as directed by referring provider ( weekly or biweekly) Yes    Testing to be performed during business hours of [facility] Yes    Patient agrees they have the skill (or a designated caregiver) necessary to perform the self test Yes    Patient agrees to report all INR results to INR home monitoring company Yes    Patient agrees to have additional INR test in clinic if a home result is critical Yes    Patient agrees to schedule an INR test at a [facility] clinic yearly for technique observation and quality check of INR results with their home meter Yes    Patient agrees to use a [facility] approved service provider and device for home monitoring Yes    [facility]"[employer], an [employer]"[facility]    Referring provider: Dr Ramírez    Referring providers Clinic Fax number [phone]    Tanmay Israel is interested home INR monitoring and requests order be submitted.

## 2022-01-13 NOTE — OP NOTE
Procedure Date: 2022    OPERATING AREA:  Room #4.    TITLE OF PROCEDURES:  Removal of an exposed sternal wire.    ATTENDING SURGEON:  Raquel De Leon MD    FIRST ASSISTANT:  ST Aleman SA-C    ANESTHESIA:  MAC with 1 mL Xylocaine without epinephrine.    SKIN PREP:  Betadine.    INCISIONS:  None.    DRAINS:  None.    SPECIMENS:  None.    CLOSURE:  Routine.    Preoperative Diagnoses: (1) Exposed sternal wire                                           (2) S/P CABG  Postoperative Diagnoses:  Same    BRIEF HISTORY:  Mr. Milton is an 81-year-old gentleman who underwent coronary artery bypass grafting 20 years ago.  More recently because of thinness, he has an exposed sternal wire.  There is no obvious associated infection and it has not grown into the bone.  The above procedure was planned.    FINDINGS AT OPERATION: The wire was removed in its entirety.  There was no evidence of osteomyelitis.    DESCRIPTION OF PROCEDURE:  The patient arrived in the operating room and was positioned supine.  MAC anesthesia was induced. The patient's neck, chest and abdomen were prepped and draped in a standard sterile fashion.  The area around the wire was infused with 1 mL of 1% Xylocaine without epinephrine.  The wire was grasped and divided, it was then pulled out in its entirety.  The associated wound was explored.  There was no evidence of ongoing infection.  Hemostasis was satisfactory.  A dressing was applied to the wound.  The sponge, needle and instrument counts were reported as correct.  The estimated blood loss was less than 1 mL.  Mr. Milton was brought to the recovery area in satisfactory condition.    Raquel De Leon MD        D: 2022   T: 2022   MT: mariano    Name:     YOKASTA MILTON  MRN:      [id number]        Account:        [id number]   :      1940           Procedure Date: 2022     Document: [id number]

## 2022-01-13 NOTE — PHARMACY-ADMISSION MEDICATION HISTORY
Pharmacy Note - Admission Medication History    Pertinent Provider Information:    ______________________________________________________________________    Prior To Admission (PTA) med list completed and updated in EMR.       PTA Med List   Medication Sig Last Dose     acetaminophen (TYLENOL) 500 MG tablet Take 1,000 mg by mouth every 8 hours as needed  1/13/2022 at am     albuterol (PROAIR HFA/PROVENTIL HFA/VENTOLIN HFA) 108 (90 Base) MCG/ACT inhaler Inhale 1-2 puffs into the lungs every 4 hours as needed  More than a month at Unknown time     bumetanide (BUMEX) 1 MG tablet Take 1 mg by mouth daily  1/12/2022 at am     calcium carbonate-vitamin D 600-200 MG-UNIT TABS Take 2 tablets by mouth daily  1/12/2022 at am     carvedilol (COREG) 25 MG tablet Take 1.5 tablets (37.25 mg) by mouth 2 times daily (with meals) 1/12/2022 at 1800     cycloSPORINE (RESTASIS) 0.05 % ophthalmic emulsion 1 drop 2 times daily as needed for dry eyes 1/12/2022 at Unknown time     diclofenac (VOLTAREN) 1 % topical gel Apply 2 g topically 2 times daily  1/12/2022 at hs     erythromycin (ROMYCIN) 5 MG/GM ophthalmic ointment Place 0.5 inches into both eyes daily  1/12/2022 at hs     ferrous sulfate (FEROSUL) 325 (65 Fe) MG tablet Take 1 tablet by mouth daily 1/12/2022 at am     Lidocaine (LIDOCARE) 4 % Patch Place 1 patch onto the skin daily as needed for moderate pain To prevent lidocaine toxicity, patient should be patch free for 12 hrs daily. 1/12/2022 at am     losartan (COZAAR) 100 MG tablet Take 1 tablet (100 mg) by mouth daily 1/12/2022 at am     multivitamin w/minerals (MULTI-VITAMIN) tablet Take 1 tablet by mouth daily 1/12/2022 at am     omeprazole (PRILOSEC) 20 MG DR capsule TAKE ONE CAPSULE BY MOUTH ONCE DAILY BEFORE BREAKFAST (Patient taking differently: Take 20 mg by mouth daily ) 1/12/2022 at am     potassium chloride ER (KLOR-CON M) 20 MEQ CR tablet TAKE ONE TABLET BY MOUTH ONCE DAILY (Patient taking differently: Take 20 mEq  by mouth daily ) 1/12/2022 at am     rosuvastatin (CRESTOR) 20 MG tablet TAKE 1 TABLET BY MOUTH DAILY AT BEDTIME (Patient taking differently: Take 20 mg by mouth daily ) 1/12/2022 at hs     traMADol (ULTRAM) 50 MG tablet Take 50 mg by mouth every 6 hours as needed  1/12/2022 at hs     vitamin B-12 (CYANOCOBALAMIN) 2000 MCG tablet Take 2,000 mcg by mouth daily 1/12/2022 at am     warfarin ANTICOAGULANT (COUMADIN) 2.5 MG tablet TAKE 1 TO 1.5 TABLETS (2.5-3.75MG) BY MOUTH DAILY. ADJUST DOSE PER INR AS DIRECTED. (Patient taking differently: Take 1.25-2.5 mg by mouth See Admin Instructions 1.25 mg every Mon, Wed, Fri; 2.5 mg all other days) 1/12/2022 at 1300       Information source(s): Patient    Method of interview communication: in-person    Patient was asked about OTC/herbal products specifically.  PTA med list reflects this.    Based on the pharmacist's assessment, the PTA med list information appears reliable    Allergies were reviewed, assessed, and updated with the patient.      Patient did not bring any medications to the hospital and can't retrieve from home. No multi-dose medications are available for use during hospital stay.      Thank you for the opportunity to participate in the care of this patient.      Jyoti Ennis RPH     1/13/2022     1:44 PM

## 2022-01-13 NOTE — PRE-PROCEDURE
Patient was seen and examined by me.  There has been no interval change in history or physical examination.  Patient is ready to have a single sternal wire removed under local anesthesia.  Informed consent has been obtained.

## 2022-01-13 NOTE — OR NURSING
Niece, Mary Kay, called to informed that pt will be ready to leave for home soon. Stated that she will be on her way to come and get him.

## 2022-01-14 NOTE — PROGRESS NOTES
ANTICOAGULATION  MANAGEMENT: Discharge Review    Tanmay Israel chart reviewed for anticoagulation continuity of care    Outpatient surgery/procedure on 11/13 for sternal wire removal.    Discharge disposition: Home    Results:    Recent labs: (last 7 days)     01/10/22  1322   INR 2.3*     Anticoagulation inpatient management:     not applicable     Anticoagulation discharge instructions:     Warfarin dosing: home regimen continued   Bridging: No   INR goal change: No      Medication changes affecting anticoagulation: No    Additional factors affecting anticoagulation: No    Plan     No adjustment to anticoagulation plan needed    Patient not contacted    No adjustment to Anticoagulation Calendar was required    Bekah Urban RN

## 2022-01-21 NOTE — TELEPHONE ENCOUNTER
Called and spoke to pt's niece Mary Kay. Mary Kay advised that the request for an INR home meter was initiated on 1/13/22. Mary Kay informed that the process could take up to 12 weeks. Multiple questions answered. Mary Kay given the central ACC number to call for further updates and inquires.

## 2022-01-21 NOTE — PROGRESS NOTES
"Community Paramedic Program  Community Health Worker Follow Up    Intervention and Education during outreach:   Spoke with pt's niece, Mary Kay. She asked if I knew if the CP was going back out to pt's home for an INR check. I confirmed that the CP did schedule a visit for 1/25 at 9 am at pt's home. She asked if the CP spoke directly with pt, and I told her I assume so but wasn't sure. Mary Kay said \"I only ask because when I talked to my uncle, he said he hasn't talked to anyone.\"     Also shared that pt's PCP, per our request, initiated the process for pt to get an at home INR POC meter. I told her that one of the ACC RNs at pt's clinic indicated that pt's PCP would need to complete and sign a form. She asked if I knew \"what the next steps are or how long this might take\" and I offered to follow up with the ACC RNs for more information. She agreed and thanked me for my time and help. She also added \"if it's not covered by his insurance, he won't get it because he won't be able to afford it.\" She said she plans to follow up with [facility] to see if the Elderly Waiver (EW) might be able to help cover the cost.     CHW Plan:   1. Pt will visit with the CP on Tuesday, January 25th at 9 am for a venous INR check.  2. CP CHW will request more details about the process for pt to get an at home INR POC meter from his clinic's ACC RNs.  3. Pt and/or pt's niece will contact the CP CHW and/or CP with questions, updates or to reschedule if needed.  4. CP CHW will follow up with pt's niece in 3-5 business days.  "

## 2022-01-25 NOTE — PROGRESS NOTES
Community Paramedics Follow-up Visit  January 25, 2022  TIME: 0930    Tanmay Israel is a 81 year old male being seen at home for a follow-up visit.    Present at appointment: Patient Tanmay and Community Paramedic Leyla         Chief Complaint   Patient presents with     Outreach       Justice Utilization:   Clinic Utilization  Compliance Concerns: No  No-Show Concerns: No  No PCP office visit in Past Year: No  Utilization    Hospital Admissions  3             ED Visits  4             No Show Count (past year)  1                Current as of: 1/24/2022  4:48 PM              /82 (BP Location: Right arm, Patient Position: Sitting, Cuff Size: Adult Regular)   Pulse 81   Temp 98.1  F (36.7  C)   Resp 14   SpO2 99%     Clinical Concerns:  Current Medical Concerns:  INR  Current Behavioral Concerns: None    Education Provided to patient: None   Medication set up? No  Pill Box issued: No  Scale issued: No  Flu Shot given: No  COVID Vaccine Given: No  Lab draw or specimen collection: Yes, INR  Food box issued: No  Collaborative visit with PCP: No  Wound Care: No    Health Maintenance Reviewed:      Clinical Pathway: None    No  Face to Face in Home / Community      Pain Management::  Pain (GOAL):: Yes  Type: Chronic (>3mo)  Location of chronic pain:: Low back, sciatica  Radiating: Yes  Location pain radiates to: Right buttock  Progression: Intermittent  Description of pain: Aching,Electrical-like,Gnawing,Nagging  Chronic pain severity:: 6  Limitation of routine activities due to chronic pain:: Yes (He likes to walk. But since he fell down the steps in August and broke his left shoulder and he is still a little out of balance.)  Alleviating Factors: Rest,Ice,Heat,Exercise,Stretching,Procedures or Injections,Pain Medication (Had an injection in 2021 that helped.)  Aggravating Factors: Activity,Positioning         Diet/Exercise/Sleep  Diet:: Regular,No added salt  Inadequate nutrition (GOAL):: No  Tube Feeding:  No  Inadequate activity/exercise (GOAL):: No  Significant changes in sleep pattern (GOAL): No    Plan:   Continue care plan    Time spent with patient: 45 minutes    The patient meets one or more of the following criteria:  * Requires services to prevent readmission to a nursing home or hospital    Acute concern/Follow-up recommendations: I met with Tanmay in his home today. He is a pleasant man and is thankful for my help. The purpose of the visit today is to draw his INR lab. He did not want me to help him with his medications and he his chief complaint today is his low back pain which he is getting help with from a physician at the [facility] on 3/7/22.     Next CP visit scheduled: TBD. Will return as needed.     Issues for Provider to follow up on: INR lab value.    Provider follow up visit needed: TBD

## 2022-01-25 NOTE — PROGRESS NOTES
Follow Up Progress Note      Assessment: [facility] RN spoke with patient's niece Mary Kay today. She was wondering if patient could be seen for a hammer toe deformity he feels is preventing him from walking correctly. Writer will request referral from PCP for Podiatry. Mary Kay said patient is doing well at home. She stated he is appetite has increased since getting his new pacemaker and he said he is feeling better overall. Community paramedics continue to come out to his home to check his INR. She stated they additional helped get his medications organized at last visit. Community paramedic was out to see him today.   Mary Kay will be following up with the [facility] today to see where the are with getting him in-home services. She stated they were able to get him a transfer wheelchair and a lift chair.   No other needs or concerns at this time.     Care Gaps:    Health Maintenance Due   Topic Date Due     HF ACTION PLAN  Never done     MEDICARE ANNUAL WELLNESS VISIT  10/17/2018     MICROALBUMIN  05/22/2020     LIPID  05/19/2021     PHQ-2  01/01/2022       Patient accepted scheduling phone number for PCP  to schedule independently     Goals addressed this encounter:   Goals Addressed                    This Visit's Progress       1. Reducing Risks (pt-stated)         Goal Statement: I would like to have a home care RN assist me with my medications for better compliance in the next 2 months.   Date Goal set: 11/29/21  Barriers: Patient said he has difficulty managing his medications. Homebound.  Strengths: Strong advocate for self good family support.  Date to Achieve By: 1/29/21  Patient expressed understanding of goal: Yes  Action steps to achieve this goal:  1. I understand the [facility] RN will request a home care order from Dr. Ramírez to have an RN come out and assist me with my medications.   2. I will continue to take my medications daily as best as I can until the home care nurse is able to come out. I will ask my niece  for assistance when needed.   3. I will report progress towards this goal at scheduled outreach calls from my [facility] team.  12-1 discussed, home care order sent again.      12/8/21 - Referred to community paramedics until patient is able to get RN services through his waiver.   1/7/22 - Paramedic scheduled to see him on 1/10/22  Discussed on 1/25/22 - Mary Kay following up with the [facility] today to follow up on in-home RN to assist with his medications. She stated the Community Paramedics are helping him out at this time.         2. Psychosocial (pt-stated)         Goal Statement: I would like to have in-home services on a regular basis to assist me with maintaining my apartment in the next 3 months. I would also like to know if I am   Date Goal set: 11/29/21  Barriers: Physical limitations makes it difficult for him to maintain on his own.  Strengths: Strong advocate for himself. Strong family support  Date to Achieve By: 2/28/21  Patient expressed understanding of goal: Yes  Action steps to achieve this goal:  1. I will speak with the [facility] Kylie MURPHY at schedule phone visit on 12/1/21 to discuss resource for in-home services and possible programs that may be available to me to assist me with paying for the services.   2. I will provide any necessary documentation, complete any required assessments and complete any paperwork that may be associated with this goal in a timely manner.   3. I will report progress towards this goal at scheduled outreach telephone call from my [facility] team.    12-1 has [place] Cho[place] assessment set up for 12-3 discussed  12/8/21 - Family will continue to assist patient until he is able to get in-home services through is waiver.   Discussed on 1/7/22 - family continuing to assist until he is able to get in-home services through his waiver.   Discussed on 1/25/22 - Mary Kay was going to contact the [facility] today to follow up on getting him in-home resources.          Increase physical activity         Goal  Statement: I want to be able to walk better.   Date Goal set: 1/25/22  Barriers: Had an accident that decreased his ability to walk well.   Strengths: Wants to be able to exercise and walk better.  Date to Achieve By: 3/25/22  Patient expressed understanding of goal: Yes  Action steps to achieve this goal:  1. I will start walking the halls of the building I live in.  2. I will work on getting help with his chronic back pain and conditions.               Intervention/Education provided during outreach: Encouraged attendance at all upcoming appointments.           Plan: [facility] RN will continue to monitor, support patient with current goals and will be available to assist as nursing needs arise. [facility] CHW will continue to reach out to patient on a monthly basis to discuss progression of his goals.     Care Coordinator will perform Chart Review in 45 days.

## 2022-01-25 NOTE — PROGRESS NOTES
ANTICOAGULATION MANAGEMENT     Tanmay Israel 81 year old male is on warfarin with subtherapeutic INR result. (Goal INR 2.0-3.0)    Recent labs: (last 7 days)     01/25/22  1104   INR 1.68*       ASSESSMENT     Source(s): Chart Review and Patient/Caregiver Call       Warfarin doses taken: Warfarin taken as instructed    Diet: not sure-- possibly more greens this past week    New illness, injury, or hospitalization: No    Medication/supplement changes: None noted    Signs or symptoms of bleeding or clotting: No    Previous INR: Therapeutic last 2(+) visits    Additional findings: None     PLAN     Recommended plan for temporary change(s) affecting INR     Dosing Instructions: Continue your current warfarin dose with next INR in 2 weeks       Summary  As of 1/25/2022    Full warfarin instructions:  1.25 mg every Mon, Wed, Fri; 2.5 mg all other days   Next INR check:  2/8/2022             Telephone call with Tanmay who verbalizes understanding and agrees to plan    Patient offered & declined to schedule next visit    Education provided: Goal range and significance of current result and Contact [phone] with any changes, questions or concerns.     Plan made per [facility] anticoagulation protocol    Yaneth Wells, RN  Anticoagulation Clinic  1/25/2022    _______________________________________________________________________     Anticoagulation Episode Summary     Current INR goal:  2.0-3.0   TTR:  54.4 % (1 y)   Target end date:  Indefinite   Send INR reminders to:  CHANI RIOS    Indications    Atrial fibrillation (H) [I48.91]  Pulmonary embolism with infarction (H) [I26.99]  Atrial fibrillation  unspecified type (H) [I48.91]           Comments:           Anticoagulation Care Providers     Provider Role Specialty Phone number    Go Ramírez MD Referring Family Medicine [phone]

## 2022-01-25 NOTE — PROGRESS NOTES
Contact   Chart Review     Situation: Patient chart reviewed by .    Background: Enrolled in care coordination.    Assessment: RN CC talked with niece today and updated goal attainment.  No outreach needed.     Plan/Recommendations: SW CC will stay on team for another 6 weeks to monitor needs.  If needs arise, SW CC available as needed.    Kylie Suazo,   [facility]  [phone]

## 2022-01-27 NOTE — TELEPHONE ENCOUNTER
I left a detailed VM on patient's main phone line. Advised patient to call us back in a few days if he has not heard from podiatry.

## 2022-01-27 NOTE — TELEPHONE ENCOUNTER
Please inform patient that a referral has been placed for podiatry due to the concerns regarding his toe.  He should be contacted to schedule an appointment.

## 2022-01-31 NOTE — TELEPHONE ENCOUNTER
Health Call Center    Phone Message    May a detailed message be left on voicemail: yes     Reason for Call: Other: Patient is requesting a call back to discuss scheduling sooner than 3/7/22 for injection, please call patient at [phone] to advise.     Action Taken: Message routed to:  [facility]    Travel Screening: Not Applicable

## 2022-02-03 NOTE — LETTER
2/3/2022         RE: Tanmay Israel  [address]  [place] [address]        Dear Colleague,    Thank you for referring your patient, Tanmay Israel, to the [facility]. Please see a copy of my visit note below.    FOOT AND ANKLE SURGERY/PODIATRY CONSULT NOTE         ASSESSMENT:   Hammertoes 2 through 5 bilateral feet  Ingrown toenail right great toe  Tyloma third toe right foot      TREATMENT:  Debrided the right great toenail today.  I also debrided the tyloma on the distal aspect of the third toe right foot.  I recommended the patient return to the clinic as needed.        HPI: I was asked to see Tanmay Israel today to evaluate and treat mild pain involving the right great toenail and the third toe right foot.  The patient stated that these 2 particular toes are quite aggravated with weightbearing and ambulation.  He is able to wear shoes fairly well without having too much discomfort he has not had any redness, swelling, drainage or bleeding.  His pain is mild to moderate.  He denies any trauma to his feet.  He denies any previous treatment.  The patient was seen in consultation at the request of Go Ramírez MD for evaluation and treatment of right foot pain.       Past Medical History:   Diagnosis Date     Acute cholecystitis 2/28/2019     Acute post-operative pain      Anemia      Arthritis      Atrial fibrillation (H)      C. difficile diarrhea 4/21/2019     Calculus of ureter      Callus      Cerebral aneurysm      Cervical myelopathy (H) 2/22/2019     Cervical spinal stenosis      Chronic kidney disease     stage 3     Chronic systolic congestive heart failure (H)      Closed fracture of distal end of left radius, unspecified fracture morphology, initial encounter      Closed fracture of distal end of right radius, unspecified fracture morphology, initial encounter      Closed fracture of first thoracic vertebra, unspecified fracture morphology, initial encounter (H)       Coronary artery disease      Crohn's disease (H)      Disorder of bursae and tendons in shoulder region     Created by Conversion  Replacement Utility updated for latest IMO load     Dysphagia      Fall 10/22/2016     GERD (gastroesophageal reflux disease)      Hyperlipidemia      Hypertension      Hypotension, unspecified hypotension type      ICD (implantable cardioverter-defibrillator) in place     [employer]     Ischemic cardiomyopathy      Kidney stone      Low back pain      Lumbago     Created by Conversion      Macular degeneration      Myalgia and myositis, unspecified     Created by Conversion      Nonspecific elevation of levels of transaminase or lactic acid dehydrogenase (LDH)     Created by Conversion      Permanent atrial fibrillation (H)     [id number] risk score = 5 (age3/ CAD/ HTN/ CHF) Chronic warfarin     Personal history of colonic polyps 2/12/2019     Polyp of duodenum 10/17/2016     Pyuria      Right arm weakness 4/2/2019     Schatzki's ring      Sciatica     Created by Conversion      Sleep apnea     no CPAP     Spondylolisthesis of lumbar region 7/5/2016     Weakness 4/20/2019       Social History     Socioeconomic History     Marital status:      Spouse name: Not on file     Number of children: Not on file     Years of education: Not on file     Highest education level: Not on file   Occupational History     Not on file   Tobacco Use     Smoking status: Former Smoker     Smokeless tobacco: Never Used   Substance and Sexual Activity     Alcohol use: Not Currently     Drug use: Never     Sexual activity: Yes     Partners: Female   Other Topics Concern     Parent/sibling w/ CABG, MI or angioplasty before 65F 55M? Not Asked   Social History Narrative     Not on file     Social Determinants of Health     Financial Resource Strain: Medium Risk     Difficulty of Paying Living Expenses: Somewhat hard   Food Insecurity: No Food Insecurity     Worried About Running Out of Food in the Last  Year: Never true     Ran Out of Food in the Last Year: Never true   Transportation Needs: No Transportation Needs     Lack of Transportation (Medical): No     Lack of Transportation (Non-Medical): No   Physical Activity: Not on file   Stress: Not on file   Social Connections: Not on file   Intimate Partner Violence: Not on file   Housing Stability: Low Risk      Unable to Pay for Housing in the Last Year: No     Number of Places Lived in the Last Year: 1     Unstable Housing in the Last Year: No        No Known Allergies       Current Outpatient Medications:      acetaminophen (TYLENOL) 500 MG tablet, Take 1,000 mg by mouth every 8 hours as needed , Disp: , Rfl:      albuterol (PROAIR HFA/PROVENTIL HFA/VENTOLIN HFA) 108 (90 Base) MCG/ACT inhaler, Inhale 1-2 puffs into the lungs every 4 hours as needed , Disp: , Rfl:      bumetanide (BUMEX) 1 MG tablet, TAKE ONE TABLET (1MG) BY MOUTH ONCE DAILY, Disp: 90 tablet, Rfl: 1     calcium carbonate-vitamin D 600-200 MG-UNIT TABS, Take 2 tablets by mouth daily , Disp: , Rfl:      carvedilol (COREG) 25 MG tablet, Take 1.5 tablets (37.25 mg) by mouth 2 times daily (with meals), Disp: 45 tablet, Rfl: 5     cycloSPORINE (RESTASIS) 0.05 % ophthalmic emulsion, 1 drop 2 times daily as needed for dry eyes, Disp: , Rfl:      diclofenac (VOLTAREN) 1 % topical gel, Apply 2 g topically 2 times daily , Disp: , Rfl:      erythromycin (ROMYCIN) 5 MG/GM ophthalmic ointment, Place 0.5 inches into both eyes daily , Disp: , Rfl:      ferrous sulfate (FEROSUL) 325 (65 Fe) MG tablet, Take 1 tablet by mouth daily, Disp: , Rfl:      Lidocaine (LIDOCARE) 4 % Patch, Place 1 patch onto the skin daily as needed for moderate pain To prevent lidocaine toxicity, patient should be patch free for 12 hrs daily., Disp: 10 patch, Rfl: 3     losartan (COZAAR) 100 MG tablet, Take 1 tablet (100 mg) by mouth daily, Disp: 90 tablet, Rfl: 3     multivitamin w/minerals (MULTI-VITAMIN) tablet, Take 1 tablet by mouth  daily, Disp: , Rfl:      omeprazole (PRILOSEC) 20 MG DR capsule, TAKE ONE CAPSULE BY MOUTH ONCE DAILY BEFORE BREAKFAST (Patient taking differently: Take 20 mg by mouth daily ), Disp: 90 capsule, Rfl: 1     potassium chloride ER (KLOR-CON M) 20 MEQ CR tablet, TAKE ONE TABLET BY MOUTH ONCE DAILY (Patient taking differently: Take 20 mEq by mouth daily ), Disp: 90 tablet, Rfl: 2     rosuvastatin (CRESTOR) 20 MG tablet, TAKE 1 TABLET BY MOUTH DAILY AT BEDTIME (Patient taking differently: Take 20 mg by mouth daily ), Disp: 90 tablet, Rfl: 3     traMADol (ULTRAM) 50 MG tablet, Take 50 mg by mouth every 6 hours as needed , Disp: , Rfl:      vitamin B-12 (CYANOCOBALAMIN) 2000 MCG tablet, Take 2,000 mcg by mouth daily, Disp: , Rfl:      warfarin ANTICOAGULANT (COUMADIN) 2.5 MG tablet, TAKE 1 TO 1.5 TABLETS (2.5-3.75MG) BY MOUTH DAILY. ADJUST DOSE PER INR AS DIRECTED. (Patient taking differently: Take 1.25-2.5 mg by mouth See Admin Instructions 1.25 mg every Mon, Wed, Fri; 2.5 mg all other days), Disp: 90 tablet, Rfl: 3     Family History   Problem Relation Age of Onset     Heart Disease Mother      Cerebrovascular Disease Mother      Crohn's Disease Father      Alcoholism Brother      No Known Problems Daughter      No Known Problems Son      No Known Problems Maternal Aunt      No Known Problems Maternal Uncle      No Known Problems Paternal Aunt      No Known Problems Paternal Uncle      No Known Problems Maternal Grandmother      No Known Problems Maternal Grandfather      No Known Problems Paternal Grandmother      No Known Problems Paternal Grandfather      Cancer Brother      Urolithiasis No family hx of      Gout No family hx of         Social History     Socioeconomic History     Marital status:      Spouse name: Not on file     Number of children: Not on file     Years of education: Not on file     Highest education level: Not on file   Occupational History     Not on file   Tobacco Use     Smoking status:  Former Smoker     Smokeless tobacco: Never Used   Substance and Sexual Activity     Alcohol use: Not Currently     Drug use: Never     Sexual activity: Yes     Partners: Female   Other Topics Concern     Parent/sibling w/ CABG, MI or angioplasty before 65F 55M? Not Asked   Social History Narrative     Not on file     Social Determinants of Health     Financial Resource Strain: Medium Risk     Difficulty of Paying Living Expenses: Somewhat hard   Food Insecurity: No Food Insecurity     Worried About Running Out of Food in the Last Year: Never true     Ran Out of Food in the Last Year: Never true   Transportation Needs: No Transportation Needs     Lack of Transportation (Medical): No     Lack of Transportation (Non-Medical): No   Physical Activity: Not on file   Stress: Not on file   Social Connections: Not on file   Intimate Partner Violence: Not on file   Housing Stability: Low Risk      Unable to Pay for Housing in the Last Year: No     Number of Places Lived in the Last Year: 1     Unstable Housing in the Last Year: No        Review of Systems - Patient denies fever, chills, rash, wound, stiffness, limping, numbness, weakness, heart burn, blood in stool, chest pain with activity, calf pain when walking, shortness of breath with activity, chronic cough, easy bleeding/bruising, swelling of ankles, excessive thirst, fatigue, depression, anxiety.  Patient admits to painful right great toenail and third toe right foot.      OBJECTIVE:  Appearance: alert, well appearing, and in no distress.    There were no vitals taken for this visit.     There is no height or weight on file to calculate BMI.     General appearance: Patient is alert and fully cooperative with history & exam.  No sign of distress is noted during the visit.  Psychiatric: Affect is pleasant & appropriate.  Patient appears motivated to improve health.  Respiratory: Breathing is regular & unlabored while sitting.  HEENT: Hearing is intact to spoken word.   Speech is clear.  No gross evidence of visual impairment that would impact ambulation.    Vascular: Dorsalis pedis and posterior tibial pulses are palpable. There is no pedal hair growth bilaterally.  CFT < 3 sec from anterior tibial surface to distal digits bilaterally. There is no appreciable edema noted.  Dermatologic: The distal medial aspect of the right great toenail is slightly incurvated.  There is a small hyperkeratotic lesion on the distal aspect of the third toe right foot.  Turgor and texture are within normal limits. No coloration or temperature changes. No other primary or secondary lesions noted.  Neurologic: All epicritic and proprioceptive sensations are grossly intact bilaterally.  Musculoskeletal: All active and passive ankle, subtalar, midtarsal, and 1st MPJ range of motion are grossly intact without pain or crepitus, with the exception of none. Manual muscle strength is within normal limits bilaterally. All dorsiflexors, plantarflexors, invertors, evertors are intact bilaterally. Tenderness present to medial border right great toenail on palpation.  No tenderness to bilateral feet or ankles with range of motion. Calf is soft/non-tender without warmth/induration    Imaging:       No images are attached to the encounter or orders placed in the encounter.     Cardiac Device Check - In Clinic (Post implant follow up)    Result Date: 1/11/2022  Type: In-clinic post-op wound and device check s/p upgrade to CRT-D system, no atrial lead implanted secondary to documented permanent atrial fibrillation (AF). Presenting: BiVp at 80 bpm, occasional PVC observed. Lead/Battery Status: Stable lead and battery measurements. Vent Arrhythmias: None Anticoagulant: warfarin Comments: Normal device function; no programming changes. BiVp 99%. Please see post-op note in today's Device RN Epic encounter for patient/wound assessment and education. Alerts: None. Leyla Rich RN I have reviewed and interpreted the  device interrogation, settings, programming, and encounter summary. The device is functioning within normal device parameters. I agree with the current findings, assessment and plan.     Cardiac Device Check - In Clinic (Post implant follow up)    Result Date: 1/11/2022  Type: In-clinic post-op wound and device check s/p upgrade to CRT-D system, no atrial lead implanted secondary to documented permanent atrial fibrillation (AF). Presenting: BiVp at 80 bpm, occasional PVC observed. Lead/Battery Status: Stable lead and battery measurements. Vent Arrhythmias: None Anticoagulant: warfarin Comments: Normal device function; no programming changes. BiVp 99%. Please see post-op note in today's Device RN Epic encounter for patient/wound assessment and education. Alerts: None. Leyla Rich RN I have reviewed and interpreted the device interrogation, settings, programming, and encounter summary. The device is functioning within normal device parameters. I agree with the current findings, assessment and plan.               Dhaval Kyle DPM  [facility]ry         Again, thank you for allowing me to participate in the care of your patient.        Sincerely,        Dhaval Almazan DPM

## 2022-02-03 NOTE — PROGRESS NOTES
FOOT AND ANKLE SURGERY/PODIATRY CONSULT NOTE         ASSESSMENT:   Hammertoes 2 through 5 bilateral feet  Ingrown toenail right great toe  Tyloma third toe right foot      TREATMENT:  Debrided the right great toenail today.  I also debrided the tyloma on the distal aspect of the third toe right foot.  I recommended the patient return to the clinic as needed.        HPI: I was asked to see Tanmay Israel today to evaluate and treat mild pain involving the right great toenail and the third toe right foot.  The patient stated that these 2 particular toes are quite aggravated with weightbearing and ambulation.  He is able to wear shoes fairly well without having too much discomfort he has not had any redness, swelling, drainage or bleeding.  His pain is mild to moderate.  He denies any trauma to his feet.  He denies any previous treatment.  The patient was seen in consultation at the request of Go Ramírez MD for evaluation and treatment of right foot pain.       Past Medical History:   Diagnosis Date     Acute cholecystitis 2/28/2019     Acute post-operative pain      Anemia      Arthritis      Atrial fibrillation (H)      C. difficile diarrhea 4/21/2019     Calculus of ureter      Callus      Cerebral aneurysm      Cervical myelopathy (H) 2/22/2019     Cervical spinal stenosis      Chronic kidney disease     stage 3     Chronic systolic congestive heart failure (H)      Closed fracture of distal end of left radius, unspecified fracture morphology, initial encounter      Closed fracture of distal end of right radius, unspecified fracture morphology, initial encounter      Closed fracture of first thoracic vertebra, unspecified fracture morphology, initial encounter (H)      Coronary artery disease      Crohn's disease (H)      Disorder of bursae and tendons in shoulder region     Created by Conversion  Replacement Utility updated for latest IMO load     Dysphagia      Fall 10/22/2016     GERD (gastroesophageal reflux  disease)      Hyperlipidemia      Hypertension      Hypotension, unspecified hypotension type      ICD (implantable cardioverter-defibrillator) in place     [employer]     Ischemic cardiomyopathy      Kidney stone      Low back pain      Lumbago     Created by Conversion      Macular degeneration      Myalgia and myositis, unspecified     Created by Conversion      Nonspecific elevation of levels of transaminase or lactic acid dehydrogenase (LDH)     Created by Conversion      Permanent atrial fibrillation (H)     [id number] risk score = 5 (age1/ CAD/ HTN/ CHF) Chronic warfarin     Personal history of colonic polyps 2/12/2019     Polyp of duodenum 10/17/2016     Pyuria      Right arm weakness 4/2/2019     Schatzki's ring      Sciatica     Created by Conversion      Sleep apnea     no CPAP     Spondylolisthesis of lumbar region 7/5/2016     Weakness 4/20/2019       Social History     Socioeconomic History     Marital status:      Spouse name: Not on file     Number of children: Not on file     Years of education: Not on file     Highest education level: Not on file   Occupational History     Not on file   Tobacco Use     Smoking status: Former Smoker     Smokeless tobacco: Never Used   Substance and Sexual Activity     Alcohol use: Not Currently     Drug use: Never     Sexual activity: Yes     Partners: Female   Other Topics Concern     Parent/sibling w/ CABG, MI or angioplasty before 65F 55M? Not Asked   Social History Narrative     Not on file     Social Determinants of Health     Financial Resource Strain: Medium Risk     Difficulty of Paying Living Expenses: Somewhat hard   Food Insecurity: No Food Insecurity     Worried About Running Out of Food in the Last Year: Never true     Ran Out of Food in the Last Year: Never true   Transportation Needs: No Transportation Needs     Lack of Transportation (Medical): No     Lack of Transportation (Non-Medical): No   Physical Activity: Not on file   Stress: Not on  file   Social Connections: Not on file   Intimate Partner Violence: Not on file   Housing Stability: Low Risk      Unable to Pay for Housing in the Last Year: No     Number of Places Lived in the Last Year: 1     Unstable Housing in the Last Year: No        No Known Allergies       Current Outpatient Medications:      acetaminophen (TYLENOL) 500 MG tablet, Take 1,000 mg by mouth every 8 hours as needed , Disp: , Rfl:      albuterol (PROAIR HFA/PROVENTIL HFA/VENTOLIN HFA) 108 (90 Base) MCG/ACT inhaler, Inhale 1-2 puffs into the lungs every 4 hours as needed , Disp: , Rfl:      bumetanide (BUMEX) 1 MG tablet, TAKE ONE TABLET (1MG) BY MOUTH ONCE DAILY, Disp: 90 tablet, Rfl: 1     calcium carbonate-vitamin D 600-200 MG-UNIT TABS, Take 2 tablets by mouth daily , Disp: , Rfl:      carvedilol (COREG) 25 MG tablet, Take 1.5 tablets (37.25 mg) by mouth 2 times daily (with meals), Disp: 45 tablet, Rfl: 5     cycloSPORINE (RESTASIS) 0.05 % ophthalmic emulsion, 1 drop 2 times daily as needed for dry eyes, Disp: , Rfl:      diclofenac (VOLTAREN) 1 % topical gel, Apply 2 g topically 2 times daily , Disp: , Rfl:      erythromycin (ROMYCIN) 5 MG/GM ophthalmic ointment, Place 0.5 inches into both eyes daily , Disp: , Rfl:      ferrous sulfate (FEROSUL) 325 (65 Fe) MG tablet, Take 1 tablet by mouth daily, Disp: , Rfl:      Lidocaine (LIDOCARE) 4 % Patch, Place 1 patch onto the skin daily as needed for moderate pain To prevent lidocaine toxicity, patient should be patch free for 12 hrs daily., Disp: 10 patch, Rfl: 3     losartan (COZAAR) 100 MG tablet, Take 1 tablet (100 mg) by mouth daily, Disp: 90 tablet, Rfl: 3     multivitamin w/minerals (MULTI-VITAMIN) tablet, Take 1 tablet by mouth daily, Disp: , Rfl:      omeprazole (PRILOSEC) 20 MG DR capsule, TAKE ONE CAPSULE BY MOUTH ONCE DAILY BEFORE BREAKFAST (Patient taking differently: Take 20 mg by mouth daily ), Disp: 90 capsule, Rfl: 1     potassium chloride ER (KLOR-CON M) 20 MEQ CR  tablet, TAKE ONE TABLET BY MOUTH ONCE DAILY (Patient taking differently: Take 20 mEq by mouth daily ), Disp: 90 tablet, Rfl: 2     rosuvastatin (CRESTOR) 20 MG tablet, TAKE 1 TABLET BY MOUTH DAILY AT BEDTIME (Patient taking differently: Take 20 mg by mouth daily ), Disp: 90 tablet, Rfl: 3     traMADol (ULTRAM) 50 MG tablet, Take 50 mg by mouth every 6 hours as needed , Disp: , Rfl:      vitamin B-12 (CYANOCOBALAMIN) 2000 MCG tablet, Take 2,000 mcg by mouth daily, Disp: , Rfl:      warfarin ANTICOAGULANT (COUMADIN) 2.5 MG tablet, TAKE 1 TO 1.5 TABLETS (2.5-3.75MG) BY MOUTH DAILY. ADJUST DOSE PER INR AS DIRECTED. (Patient taking differently: Take 1.25-2.5 mg by mouth See Admin Instructions 1.25 mg every Mon, Wed, Fri; 2.5 mg all other days), Disp: 90 tablet, Rfl: 3     Family History   Problem Relation Age of Onset     Heart Disease Mother      Cerebrovascular Disease Mother      Crohn's Disease Father      Alcoholism Brother      No Known Problems Daughter      No Known Problems Son      No Known Problems Maternal Aunt      No Known Problems Maternal Uncle      No Known Problems Paternal Aunt      No Known Problems Paternal Uncle      No Known Problems Maternal Grandmother      No Known Problems Maternal Grandfather      No Known Problems Paternal Grandmother      No Known Problems Paternal Grandfather      Cancer Brother      Urolithiasis No family hx of      Gout No family hx of         Social History     Socioeconomic History     Marital status:      Spouse name: Not on file     Number of children: Not on file     Years of education: Not on file     Highest education level: Not on file   Occupational History     Not on file   Tobacco Use     Smoking status: Former Smoker     Smokeless tobacco: Never Used   Substance and Sexual Activity     Alcohol use: Not Currently     Drug use: Never     Sexual activity: Yes     Partners: Female   Other Topics Concern     Parent/sibling w/ CABG, MI or angioplasty before  65F 55M? Not Asked   Social History Narrative     Not on file     Social Determinants of Health     Financial Resource Strain: Medium Risk     Difficulty of Paying Living Expenses: Somewhat hard   Food Insecurity: No Food Insecurity     Worried About Running Out of Food in the Last Year: Never true     Ran Out of Food in the Last Year: Never true   Transportation Needs: No Transportation Needs     Lack of Transportation (Medical): No     Lack of Transportation (Non-Medical): No   Physical Activity: Not on file   Stress: Not on file   Social Connections: Not on file   Intimate Partner Violence: Not on file   Housing Stability: Low Risk      Unable to Pay for Housing in the Last Year: No     Number of Places Lived in the Last Year: 1     Unstable Housing in the Last Year: No        Review of Systems - Patient denies fever, chills, rash, wound, stiffness, limping, numbness, weakness, heart burn, blood in stool, chest pain with activity, calf pain when walking, shortness of breath with activity, chronic cough, easy bleeding/bruising, swelling of ankles, excessive thirst, fatigue, depression, anxiety.  Patient admits to painful right great toenail and third toe right foot.      OBJECTIVE:  Appearance: alert, well appearing, and in no distress.    There were no vitals taken for this visit.     There is no height or weight on file to calculate BMI.     General appearance: Patient is alert and fully cooperative with history & exam.  No sign of distress is noted during the visit.  Psychiatric: Affect is pleasant & appropriate.  Patient appears motivated to improve health.  Respiratory: Breathing is regular & unlabored while sitting.  HEENT: Hearing is intact to spoken word.  Speech is clear.  No gross evidence of visual impairment that would impact ambulation.    Vascular: Dorsalis pedis and posterior tibial pulses are palpable. There is no pedal hair growth bilaterally.  CFT < 3 sec from anterior tibial surface to distal  digits bilaterally. There is no appreciable edema noted.  Dermatologic: The distal medial aspect of the right great toenail is slightly incurvated.  There is a small hyperkeratotic lesion on the distal aspect of the third toe right foot.  Turgor and texture are within normal limits. No coloration or temperature changes. No other primary or secondary lesions noted.  Neurologic: All epicritic and proprioceptive sensations are grossly intact bilaterally.  Musculoskeletal: All active and passive ankle, subtalar, midtarsal, and 1st MPJ range of motion are grossly intact without pain or crepitus, with the exception of none. Manual muscle strength is within normal limits bilaterally. All dorsiflexors, plantarflexors, invertors, evertors are intact bilaterally. Tenderness present to medial border right great toenail on palpation.  No tenderness to bilateral feet or ankles with range of motion. Calf is soft/non-tender without warmth/induration    Imaging:       No images are attached to the encounter or orders placed in the encounter.     Cardiac Device Check - In Clinic (Post implant follow up)    Result Date: 1/11/2022  Type: In-clinic post-op wound and device check s/p upgrade to CRT-D system, no atrial lead implanted secondary to documented permanent atrial fibrillation (AF). Presenting: BiVp at 80 bpm, occasional PVC observed. Lead/Battery Status: Stable lead and battery measurements. Vent Arrhythmias: None Anticoagulant: warfarin Comments: Normal device function; no programming changes. BiVp 99%. Please see post-op note in today's Device RN Epic encounter for patient/wound assessment and education. Alerts: None. Leyla Rich RN I have reviewed and interpreted the device interrogation, settings, programming, and encounter summary. The device is functioning within normal device parameters. I agree with the current findings, assessment and plan.     Cardiac Device Check - In Clinic (Post implant follow up)    Result  Date: 1/11/2022  Type: In-clinic post-op wound and device check s/p upgrade to CRT-D system, no atrial lead implanted secondary to documented permanent atrial fibrillation (AF). Presenting: BiVp at 80 bpm, occasional PVC observed. Lead/Battery Status: Stable lead and battery measurements. Vent Arrhythmias: None Anticoagulant: warfarin Comments: Normal device function; no programming changes. BiVp 99%. Please see post-op note in today's Device RN Epic encounter for patient/wound assessment and education. Alerts: None. Leyla Rich, JUANCARLOS I have reviewed and interpreted the device interrogation, settings, programming, and encounter summary. The device is functioning within normal device parameters. I agree with the current findings, assessment and plan.               Dhaval Kyle DPM  [facility]/Podiatry

## 2022-02-08 NOTE — PROGRESS NOTES
[facility] Follow-up Visit  February 8, 2022  TIME: 0930    Tanmay Israel is a 81 year old male being seen at home for a follow-up visit.    Present at appointment:  Patient Tanmay and Community Paramedic Leyla         Chief Complaint   Patient presents with     Outreach       [place] Utilization:      Utilization    Hospital Admissions  3             ED Visits  4             No Show Count (past year)  1                Current as of: 2/4/2022  7:45 PM              /68 (BP Location: Right arm, Patient Position: Sitting, Cuff Size: Adult Regular)   Pulse 83   Temp 98.2  F (36.8  C)   Resp 18   SpO2 97%     Clinical Concerns:  Current Medical Concerns:  INR level    Current Behavioral Concerns: None    Education Provided to patient: None   Medication set up? No  Pill Box issued: No  Scale issued: No  Flu Shot given: No  COVID Vaccine Given: No  Lab draw or specimen collection: Yes, INR  Food box issued: No  Collaborative visit with PCP: No  Wound Care: No    Health Maintenance Reviewed:      Clinical Pathway: None    No  Face to Face in Home / Community        Plan: TBD    Time spent with patient: 60    The patient meets one or more of the following criteria:  * Requires services to prevent readmission to a nursing home or hospital    Acute concern/Follow-up recommendations: I met with Tanmay at his apartment home today to draw his INR. He advised me he has a new ingrown toe nail. He was seen within the last week by podiatry who made no recommendations. Tanmay asked if I could rub OTC Aquaphor on his toes as it helps them feel better, which I did. The toes have no redness/swelling/blistering/bruising. The Hallux of the left foot has had recent debridement and CMS is intact in all toes and all extremities. He advised the second and third toes of the right foot are mildly sore with and without palpation. I recommended he follow up with podiatry if his pain worsens and he can request an OTC cream from his  pharmacist if desired as the Aquaphor has no pain relief properties. He agreed.     I brought his lab sample to the [facility] clinic immediately following the appt.     Next CP visit scheduled: TBD    Issues for Provider to follow up on: INR level.   I was requested by the Anti-coagulation clinic to see Mr. Israel for an INR lab draw. I have provided this service for him twice. He does have relatives who can get him to and from the clinic for lab. He did have an ICD placed this last week so I was able to provide the INR draw in his home today because of the circumstance. I am looping in the CC's of the clinic to help him with additional resources for getting his INR checked as needed/requested by the INR clinic. Unfortunately the Community Paramedic program is not able to provide this service for a patient as a long term solution. Please advise if I may be of assistance with working together to find a more sustainable solution.    Provider follow up visit needed: TBD

## 2022-02-08 NOTE — PROGRESS NOTES
Patient spoke with [facility] RN on 1/25/22 and discussed goals     CHW delegations: [facility] CHW will continue to reach out to patient on a monthly basis to discuss progression of his goals.     Next outreach due: 2/24/22

## 2022-02-08 NOTE — PROGRESS NOTES
ANTICOAGULATION MANAGEMENT     Tanmay Israel 81 year old male is on warfarin with subtherapeutic INR result. (Goal INR 2.0-3.0)    Recent labs: (last 7 days)     02/08/22  1044   INR 1.52*       ASSESSMENT     Source(s): Chart Review and Patient/Caregiver Call       Warfarin doses taken: Warfarin taken as instructed    Diet: No new diet changes identified    New illness, injury, or hospitalization: No    Medication/supplement changes: None noted    Signs or symptoms of bleeding or clotting: No    Previous INR: Subtherapeutic    Additional findings: None     PLAN     Recommended plan for no diet, medication or health factor changes affecting INR     Dosing Instructions: Booster dose then Increase your warfarin dose (18% change) with next INR in 10 days       Summary  As of 2/8/2022    Full warfarin instructions:  2/8: 5 mg; Otherwise 1.25 mg every Wed; 2.5 mg all other days   Next INR check:  2/18/2022             Telephone call with Tanmay who agrees to plan and repeated back plan correctly    Patient offered & declined to schedule next visit    Education provided: Goal range and significance of current result, Monitoring for clotting signs and symptoms, When to seek medical attention/emergency care and Contact [phone] with any changes, questions or concerns.     Plan made with [facility] Pharmacist Rubina Wells, RN  Anticoagulation Clinic  2/8/2022    _______________________________________________________________________     Anticoagulation Episode Summary     Current INR goal:  2.0-3.0   TTR:  52.2 % (1 y)   Target end date:  Indefinite   Send INR reminders to:  CHANI RIOS    Indications    Atrial fibrillation (H) [I48.91]  Pulmonary embolism with infarction (H) [I26.99]  Atrial fibrillation  unspecified type (H) [I48.91]           Comments:           Anticoagulation Care Providers     Provider Role Specialty Phone number    Go Ramírez MD Referring Family Medicine [phone]

## 2022-02-11 NOTE — PROGRESS NOTES
Clinic Care Coordination Contact  Care Team Conversations  Talked with RN JOSE DAVID and he will follow up with krystin to determine if in home nursing has been arranged through waiver.   Kylie Suazo   [facility]  [phone]           Contact  [facility]/Voicemail    Referral Source: Care Team  Clinical Data:  Outreach  Outreach attempted x 1 to krystin.  Left message on voicemail with call back information and requested return call.  Plan:  will try to reach patient again in 3-5 business days.  Social Leena Soto  [facility]  [phone]

## 2022-02-14 NOTE — PROGRESS NOTES
Returned patient call to explain that he does not need to hold warfarin for his scheduled injection but does need an INR lab draw the day of. Writer will assist patient with getting a lab appointment. Patient verbalizes understanding and agreement to the plan.    Patient wanted to review some of his medical history over the phone prior to appointment. He reports that on 8-14-21 he fell down a flight of stairs and injured his shoulder. He went to the hospital and followed up at [facility] in early fall. At [facility], he has received two SI joint injections that he reports no relief with. He is currently taking tramadol for pain and finds relief with this.

## 2022-02-16 NOTE — PATIENT INSTRUCTIONS
1. Completed right piriformis muscle steroid injection with ultrasound guidance     2. Refilled Flexeril 5mg twice daily as needed for pain.

## 2022-02-16 NOTE — NURSING NOTE
Chief Complaint   Patient presents with     Procedure     US Piriformis injection     Anthony Maloney

## 2022-02-16 NOTE — PROGRESS NOTES
PROCEDURE NOTE: Piriformis Muscle Injection Under Ultrasound Guidance    PROCEDURE DATE: 2/16/2022    PATIENT NAME: Tanmay Israel  YOB: 1940    ATTENDING PHYSICIAN: Leonardo Smart MD  FELLOW/RESIDENT PHYSICIAN: None     PREOPERATIVE DIAGNOSIS:   1. Piriformis syndrome, right    2. Piriformis muscle pain    3. Neural foraminal stenosis of lumbosacral spine       POSTOPERATIVE DIAGNOSIS: same    PROCEDURE PERFORMED: Right Piriformis Muscle Injection Under Ultrasound Guidance    ULTRASOUND WAS USED.    INDICATIONS FOR THE PROCEDURE:  Tanmay Israel is a 81 year old male who presents with piriformis syndrome on the right.     PROCEDURE AND FINDINGS:  He was greeted in the clinic. The risk, benefits and alternatives to the procedure were again reviewed with him and informed consent was obtained and the patient agreed to proceed. A time-out was performed. Following review alternatives, benefits and risks, the procedure was carried out under sterile prep with sterile gel. The use of direct sonographic guidance was used to ensure accurate placement of the needle (rather than non-guided injection) and required to minimize the risk of bleeding or injury to nearby neurovascular structures.     A 5-1MHz ultrasound transducer was used to visualize the relevant structures and determine the optimal needle path for the procedure.  2mL 1% lidocaine was infiltrated at the needle insertion site subcutaneously. Then, a 22 gauge 5-inch Quincke spinal needle was advanced from lateral to medial utilizing an in-plane approach in long axis, under continuous ultrasound guidance to the piriformis muscle on the right. After negative aspiration, slow injection of the treatment solution 4mL total consisting of 1mL Kenalog (40mg/mL) and 3mL of 1% Lidocaine was instilled into affected area. The tip of the needle was visualized throughout the procedure. The remainder of the single-use vials were discarded.      He tolerated the  procedure well, was discharged home in stable condition.     Follow-up will be in clinic     COMPLICATIONS: None    COMMENTS: None

## 2022-02-16 NOTE — LETTER
2/16/2022       RE: Tanmay Israel  [address]  [facility] [address]     Dear Colleague,    Thank you for referring your patient, Tanmay Israel, to the [facility] PHYSICAL MEDICINE AND REHABILITATION CLINIC [place] at [facility]. Please see a copy of my visit note below.    PROCEDURE NOTE: Piriformis Muscle Injection Under Ultrasound Guidance    PROCEDURE DATE: 2/16/2022    PATIENT NAME: Tanmay Israel  YOB: 1940    ATTENDING PHYSICIAN: Leonardo Smart MD  FELLOW/RESIDENT PHYSICIAN: None     PREOPERATIVE DIAGNOSIS:   1. Piriformis syndrome, right    2. Piriformis muscle pain    3. Neural foraminal stenosis of lumbosacral spine       POSTOPERATIVE DIAGNOSIS: same    PROCEDURE PERFORMED: Right Piriformis Muscle Injection Under Ultrasound Guidance    ULTRASOUND WAS USED.    INDICATIONS FOR THE PROCEDURE:  Tanmay Israel is a 81 year old male who presents with piriformis syndrome on the right.     PROCEDURE AND FINDINGS:  He was greeted in the clinic. The risk, benefits and alternatives to the procedure were again reviewed with him and informed consent was obtained and the patient agreed to proceed. A time-out was performed. Following review alternatives, benefits and risks, the procedure was carried out under sterile prep with sterile gel. The use of direct sonographic guidance was used to ensure accurate placement of the needle (rather than non-guided injection) and required to minimize the risk of bleeding or injury to nearby neurovascular structures.     A 5-1MHz ultrasound transducer was used to visualize the relevant structures and determine the optimal needle path for the procedure.  2mL 1% lidocaine was infiltrated at the needle insertion site subcutaneously. Then, a 22 gauge 5-inch Quincke spinal needle was advanced from lateral to medial utilizing an in-plane approach in long axis, under continuous ultrasound guidance to the piriformis  muscle on the right. After negative aspiration, slow injection of the treatment solution 4mL total consisting of 1mL Kenalog (40mg/mL) and 3mL of 1% Lidocaine was instilled into affected area. The tip of the needle was visualized throughout the procedure. The remainder of the single-use vials were discarded.      He tolerated the procedure well, was discharged home in stable condition.     Follow-up will be in clinic     COMPLICATIONS: None    COMMENTS: None          Again, thank you for allowing me to participate in the care of your patient.      Sincerely,    Leonardo Smart MD

## 2022-02-17 NOTE — PROGRESS NOTES
ANTICOAGULATION MANAGEMENT     Tanmay Israel 81 year old male is on warfarin with subtherapeutic INR result. (Goal INR 2.0-3.0)    Recent labs: (last 7 days)     02/16/22  1456   INR 1.5*       ASSESSMENT     Source(s): Chart Review and Patient/Caregiver Call       Warfarin doses taken: Warfarin taken as instructed    Diet: No new diet changes identified    New illness, injury, or hospitalization: No    Medication/supplement changes: None noted    Signs or symptoms of bleeding or clotting: No    Previous INR: Subtherapeutic    Additional findings: None     PLAN     Recommended plan for no diet, medication or health factor changes affecting INR     Dosing Instructions:  Increase your warfarin dose (15% change) with next INR in 1 week       Summary  As of 2/16/2022    Full warfarin instructions:  3.75 mg every Fri; 2.5 mg all other days   Next INR check:  2/23/2022             Telephone call with Tanmay who agrees to plan and repeated back plan correctly    Patient offered & declined to schedule next visit    Education provided: Goal range and significance of current result, Monitoring for clotting signs and symptoms, When to seek medical attention/emergency care and Contact [phone] with any changes, questions or concerns.     Plan made per [facility] anticoagulation protocol    Yaneth Wells RN  Anticoagulation Clinic  2/17/2022    _______________________________________________________________________     Anticoagulation Episode Summary     Current INR goal:  2.0-3.0   TTR:  49.8 % (1 y)   Target end date:  Indefinite   Send INR reminders to:  CHANI RIOS    Indications    Atrial fibrillation (H) [I48.91]  Pulmonary embolism with infarction (H) [I26.99]  Atrial fibrillation  unspecified type (H) [I48.91]           Comments:           Anticoagulation Care Providers     Provider Role Specialty Phone number    Go Ramírez MD Referring Family Medicine [phone]

## 2022-02-22 NOTE — PROGRESS NOTES
Dawson Mayer,     I spoke with Tanmay's niece Mary Kay today. She stated a nurse will be coming out to see Tanmay next Monday. She will ask the RN if they are planning take over medication management and INRs as he is now on a waiver through [facility]. She stated she will let me know as soon as she has the answers. I will relay the info to you as soon as I get it. She is aware the [facility] is time limited.     Thanks,     Dave Myhre, RN  [facility] RN

## 2022-02-23 NOTE — PROGRESS NOTES
[facility] Follow-up Visit  February 23, 2022  TIME: 1145    Tanmay Israel is a 81 year old male being seen at home for a follow-up visit.    Present at appointment: Patient Tanmay and Community [facility]ic Leyla         Chief Complaint   Patient presents with     Outreach       [place] Utilization:   Clinic Utilization  Difficulty keeping appointments:: No  Compliance Concerns: No  No-Show Concerns: No  No PCP office visit in Past Year: No  Utilization    Hospital Admissions  3             ED Visits  4             No Show Count (past year)  1                Current as of: 2/22/2022  7:23 PM              /74 (BP Location: Right arm, Patient Position: Sitting, Cuff Size: Adult Regular)   Pulse 86   Temp 98.3  F (36.8  C)   Resp 14   SpO2 99%     Clinical Concerns:  Current Medical Concerns:  INR    Current Behavioral Concerns: None    Education Provided to patient: None   Medication set up? No  Pill Box issued: No  Scale issued: No  Flu Shot given: No  COVID Vaccine Given: No  Lab draw or specimen collection: Yes  Food box issued: No  Collaborative visit with PCP: No  Wound Care: No    Health Maintenance Reviewed: Due/Overdue     Clinical Pathway: None    No  Face to Face in Home / Community      Pain Management::  Pain (GOAL):: Yes  Type: Chronic (>3mo)  Radiating: Yes  Progression: Unchanged  Description of pain: Aching, Nagging, Numb, Penetrating, Sharp, Tender, Throbbing  Chronic pain severity:: 8  Limitation of routine activities due to chronic pain:: Yes  Alleviating Factors: Rest, Ice, Heat, Pain Medication  Aggravating Factors: Positioning, Activity, Inactivity    Medication Review  Medication adherence problem (GOAL):: No  Knowledgeable about how to use meds:: Yes  Medication side effects suspected:: No    Diet/Exercise/Sleep  Diet:: Regular  Inadequate nutrition (GOAL):: No  Tube Feeding: No  Inadequate activity/exercise (GOAL):: No  Significant changes in sleep pattern (GOAL):  "No    Plan:   Assist patient as needed with home INR venous draws until he received his POC INR meter.    Time spent with patient: 30    The patient meets one or more of the following criteria:  * Requires services to prevent readmission to a nursing home or hospital    Acute concern/Follow-up recommendations: I met Tanmay in his apartment home today to draw his INR lab. I dropped the lab sample off at the [facility] for processing immediately following the appt. Tanmay is thankful for my assistance. It is becoming difficult for him to ambulate due to chronic low back pain.     Next CP visit scheduled: Unknown at this time.    Issues for Provider to follow up on: Patient had an spinal injection last week which helped him for approximately 6 days but he says \"now I'm back to hurting.\" He has a RX for Cyclobenzprine which he states gives him heartburn. He called the clinic and was advised to take the pills with food instead of just a liquid. He is going to  some Rolaids to see if it will help also.    Provider follow up visit needed: TBD      "

## 2022-02-24 NOTE — PROGRESS NOTES
ANTICOAGULATION MANAGEMENT     Tanmay Israel 81 year old male is on warfarin with subtherapeutic INR result. (Goal INR 2.0-3.0)    Recent labs: (last 7 days)     02/23/22  1318   INR 1.55*       ASSESSMENT     Source(s): Chart Review and Patient/Caregiver Call       Warfarin doses taken: Warfarin taken as instructed-- confirmed tablet strength with patient    Diet: No new diet changes identified    New illness, injury, or hospitalization: No    Medication/supplement changes: None noted    Signs or symptoms of bleeding or clotting: No    Previous INR: Subtherapeutic    Additional findings: None     PLAN     Recommended plan for no diet, medication or health factor changes affecting INR     Dosing Instructions:  Increase your warfarin dose (20% change) with next INR in 1 week       Summary  As of 2/24/2022    Full warfarin instructions:  5 mg every Sun, Thu; 2.5 mg all other days   Next INR check:  3/3/2022             Telephone call with Tanmay who agrees to plan and repeated back plan correctly    Patient offered & declined to schedule next visit    Education provided: Goal range and significance of current result, Monitoring for clotting signs and symptoms and Contact [phone] with any changes, questions or concerns.     Plan made with [facility] Pharmacist Rubina Wells, RN  Anticoagulation Clinic  2/24/2022    _______________________________________________________________________     Anticoagulation Episode Summary     Current INR goal:  2.0-3.0   TTR:  47.9 % (1 y)   Target end date:  Indefinite   Send INR reminders to:  CHANI RIOS    Indications    Atrial fibrillation (H) [I48.91]  Pulmonary embolism with infarction (H) [I26.99]  Atrial fibrillation  unspecified type (H) [I48.91]           Comments:           Anticoagulation Care Providers     Provider Role Specialty Phone number    Go Ramírez MD Referring Family Medicine [phone]

## 2022-02-24 NOTE — TELEPHONE ENCOUNTER
Reason for Call:  INR    Who is calling?  Tanmay    Phone number:  [phone]     Fax number:  NA    Name of caller: Tanmay    INR Value:  NA    Are there any other concerns:  Yes: Had his INR done yesterday and has not heard back from anyone.    Can we leave a detailed message on this number? YES    Phone number patient can be reached at: Home number on file [phone] (home)      Call taken on 2/24/2022 at 1:28 PM by Gracia Pinto

## 2022-02-28 NOTE — PATIENT INSTRUCTIONS
Patient Education   Personalized Prevention Plan  You are due for the preventive services outlined below.  Your care team is available to assist you in scheduling these services.  If you have already completed any of these items, please share that information with your care team to update in your medical record.  Health Maintenance Due   Topic Date Due     Heart Failure Action Plan  Never done     ANNUAL REVIEW OF HM ORDERS  Never done     Kidney Microalbumin Urine Test  05/22/2020     Cholesterol Lab  05/19/2021       Preventing Falls at Home  A person can fall for many reasons. Older adults may fall because reaction time slows down as we age. Your muscles and joints may get stiff, weak, or less flexible because of illness, medicines, or a physical condition.   Other health problems that make falls more likely include:     Arthritis    Dizziness or lightheadedness when you stand up (orthostatic hypotension)    History of a stroke    Dizziness    Anemia    Certain medicines taken for mental illness or to control blood pressure.    Problems with balance or gait    Bladder or urinary problems    History of falling    Changes in vision (vision impairment)    Changes in thinking skills and memory (cognitive impairment)  Injuries from a fall can include serious injuries such as broken bones, dislocated joints, internal bleeding and cuts. Injuries like these can limit your independence.   Prevention tips  To help prevent falls and fall-related injuries, follow the tips below.    Floors  To make floors safer:     Put nonskid pads under area rugs.    Remove small rugs.    Replace worn floor coverings.    Tack carpets firmly to each step on carpeted stairs. Put nonskid strips on the edges of uncarpeted stairs.    Keep floors and stairs free of clutter and cords.    Arrange furniture so there are clear pathways.    Clean up any spills right away.  Bathrooms    To make bathrooms safer:     Install grab bars in the tub or  shower.    Apply nonskid strips or put a nonskid rubber mat in the tub or shower.    Sit on a bath chair to bathe.    Use bathmats with nonskid backing.  Lighting  To improve visibility in your home:      Keep a flashlight in each room. Or put a lamp next to the bed within easy reach.    Put nightlights in the bedrooms, hallways, kitchen, and bathrooms.    Make sure all stairways have good lighting.    Take your time when going up and down stairs.    Put handrails on both sides of stairs and in walkways for more support. To prevent injury to your wrist or arm, don t use handrails to pull yourself up.    Install grab bars to pull yourself up.    Move or rearrange items that you use often. This will make them easier to find or reach.    Look at your home to find any safety hazards. Especially look at doorways, walkways, and the driveway. Remove or repair any safety problems that you find.  Other changes to make    Look around to find any safety hazards. Look closely at doorways, walkways, and the driveway. Remove or repair any safety problems that you find.    Wear shoes that fit well.    Take your time when going up and down stairs.    Put handrails on both sides of stairs and in walkways for more support. To prevent injury to your wrist or arm, don t use handrails to pull yourself up.    Install grab bars wherever needed to pull yourself up.    Arrange items that you use often. This will make them easier to find or reach.    [employer] last reviewed this educational content on 3/1/2020    6819-9588 [employer]. All rights reserved. This information is not intended as a substitute for professional medical care. Always follow your healthcare professional's instructions.           Patient Education   Personalized Prevention Plan  You are due for the preventive services outlined below.  Your care team is available to assist you in scheduling these services.  If you have already completed any of these items,  please share that information with your care team to update in your medical record.  Health Maintenance Due   Topic Date Due     Heart Failure Action Plan  Never done     ANNUAL REVIEW OF HM ORDERS  Never done     Kidney Microalbumin Urine Test  05/22/2020     Cholesterol Lab  05/19/2021       Preventing Falls at Home  A person can fall for many reasons. Older adults may fall because reaction time slows down as we age. Your muscles and joints may get stiff, weak, or less flexible because of illness, medicines, or a physical condition.   Other health problems that make falls more likely include:     Arthritis    Dizziness or lightheadedness when you stand up (orthostatic hypotension)    History of a stroke    Dizziness    Anemia    Certain medicines taken for mental illness or to control blood pressure.    Problems with balance or gait    Bladder or urinary problems    History of falling    Changes in vision (vision impairment)    Changes in thinking skills and memory (cognitive impairment)  Injuries from a fall can include serious injuries such as broken bones, dislocated joints, internal bleeding and cuts. Injuries like these can limit your independence.   Prevention tips  To help prevent falls and fall-related injuries, follow the tips below.    Floors  To make floors safer:     Put nonskid pads under area rugs.    Remove small rugs.    Replace worn floor coverings.    Tack carpets firmly to each step on carpeted stairs. Put nonskid strips on the edges of uncarpeted stairs.    Keep floors and stairs free of clutter and cords.    Arrange furniture so there are clear pathways.    Clean up any spills right away.  Bathrooms    To make bathrooms safer:     Install grab bars in the tub or shower.    Apply nonskid strips or put a nonskid rubber mat in the tub or shower.    Sit on a bath chair to bathe.    Use bathmats with nonskid backing.  Lighting  To improve visibility in your home:      Keep a flashlight in each  room. Or put a lamp next to the bed within easy reach.    Put nightlights in the bedrooms, hallways, kitchen, and bathrooms.    Make sure all stairways have good lighting.    Take your time when going up and down stairs.    Put handrails on both sides of stairs and in walkways for more support. To prevent injury to your wrist or arm, don t use handrails to pull yourself up.    Install grab bars to pull yourself up.    Move or rearrange items that you use often. This will make them easier to find or reach.    Look at your home to find any safety hazards. Especially look at doorways, walkways, and the driveway. Remove or repair any safety problems that you find.  Other changes to make    Look around to find any safety hazards. Look closely at doorways, walkways, and the driveway. Remove or repair any safety problems that you find.    Wear shoes that fit well.    Take your time when going up and down stairs.    Put handrails on both sides of stairs and in walkways for more support. To prevent injury to your wrist or arm, don t use handrails to pull yourself up.    Install grab bars wherever needed to pull yourself up.    Arrange items that you use often. This will make them easier to find or reach.    [employer] last reviewed this educational content on 3/1/2020    4437-5737 [employer]. All rights reserved. This information is not intended as a substitute for professional medical care. Always follow your healthcare professional's instructions.           Patient Education   Personalized Prevention Plan  You are due for the preventive services outlined below.  Your care team is available to assist you in scheduling these services.  If you have already completed any of these items, please share that information with your care team to update in your medical record.  Health Maintenance Due   Topic Date Due     Heart Failure Action Plan  Never done     ANNUAL REVIEW OF HM ORDERS  Never done     Kidney  Microalbumin Urine Test  05/22/2020     Cholesterol Lab  05/19/2021       Preventing Falls at Home  A person can fall for many reasons. Older adults may fall because reaction time slows down as we age. Your muscles and joints may get stiff, weak, or less flexible because of illness, medicines, or a physical condition.   Other health problems that make falls more likely include:     Arthritis    Dizziness or lightheadedness when you stand up (orthostatic hypotension)    History of a stroke    Dizziness    Anemia    Certain medicines taken for mental illness or to control blood pressure.    Problems with balance or gait    Bladder or urinary problems    History of falling    Changes in vision (vision impairment)    Changes in thinking skills and memory (cognitive impairment)  Injuries from a fall can include serious injuries such as broken bones, dislocated joints, internal bleeding and cuts. Injuries like these can limit your independence.   Prevention tips  To help prevent falls and fall-related injuries, follow the tips below.    Floors  To make floors safer:     Put nonskid pads under area rugs.    Remove small rugs.    Replace worn floor coverings.    Tack carpets firmly to each step on carpeted stairs. Put nonskid strips on the edges of uncarpeted stairs.    Keep floors and stairs free of clutter and cords.    Arrange furniture so there are clear pathways.    Clean up any spills right away.  Bathrooms    To make bathrooms safer:     Install grab bars in the tub or shower.    Apply nonskid strips or put a nonskid rubber mat in the tub or shower.    Sit on a bath chair to bathe.    Use bathmats with nonskid backing.  Lighting  To improve visibility in your home:      Keep a flashlight in each room. Or put a lamp next to the bed within easy reach.    Put nightlights in the bedrooms, hallways, kitchen, and bathrooms.    Make sure all stairways have good lighting.    Take your time when going up and down  stairs.    Put handrails on both sides of stairs and in walkways for more support. To prevent injury to your wrist or arm, don t use handrails to pull yourself up.    Install grab bars to pull yourself up.    Move or rearrange items that you use often. This will make them easier to find or reach.    Look at your home to find any safety hazards. Especially look at doorways, walkways, and the driveway. Remove or repair any safety problems that you find.  Other changes to make    Look around to find any safety hazards. Look closely at doorways, walkways, and the driveway. Remove or repair any safety problems that you find.    Wear shoes that fit well.    Take your time when going up and down stairs.    Put handrails on both sides of stairs and in walkways for more support. To prevent injury to your wrist or arm, don t use handrails to pull yourself up.    Install grab bars wherever needed to pull yourself up.    Arrange items that you use often. This will make them easier to find or reach.    [employer] last reviewed this educational content on 3/1/2020    4476-9206 [employer]. All rights reserved. This information is not intended as a substitute for professional medical care. Always follow your healthcare professional's instructions.           Patient Education   Personalized Prevention Plan  You are due for the preventive services outlined below.  Your care team is available to assist you in scheduling these services.  If you have already completed any of these items, please share that information with your care team to update in your medical record.  Health Maintenance Due   Topic Date Due     Heart Failure Action Plan  Never done     ANNUAL REVIEW OF HM ORDERS  Never done     Kidney Microalbumin Urine Test  05/22/2020     Cholesterol Lab  05/19/2021       Preventing Falls at Home  A person can fall for many reasons. Older adults may fall because reaction time slows down as we age. Your muscles and  joints may get stiff, weak, or less flexible because of illness, medicines, or a physical condition.   Other health problems that make falls more likely include:     Arthritis    Dizziness or lightheadedness when you stand up (orthostatic hypotension)    History of a stroke    Dizziness    Anemia    Certain medicines taken for mental illness or to control blood pressure.    Problems with balance or gait    Bladder or urinary problems    History of falling    Changes in vision (vision impairment)    Changes in thinking skills and memory (cognitive impairment)  Injuries from a fall can include serious injuries such as broken bones, dislocated joints, internal bleeding and cuts. Injuries like these can limit your independence.   Prevention tips  To help prevent falls and fall-related injuries, follow the tips below.    Floors  To make floors safer:     Put nonskid pads under area rugs.    Remove small rugs.    Replace worn floor coverings.    Tack carpets firmly to each step on carpeted stairs. Put nonskid strips on the edges of uncarpeted stairs.    Keep floors and stairs free of clutter and cords.    Arrange furniture so there are clear pathways.    Clean up any spills right away.  Bathrooms    To make bathrooms safer:     Install grab bars in the tub or shower.    Apply nonskid strips or put a nonskid rubber mat in the tub or shower.    Sit on a bath chair to bathe.    Use bathmats with nonskid backing.  Lighting  To improve visibility in your home:      Keep a flashlight in each room. Or put a lamp next to the bed within easy reach.    Put nightlights in the bedrooms, hallways, kitchen, and bathrooms.    Make sure all stairways have good lighting.    Take your time when going up and down stairs.    Put handrails on both sides of stairs and in walkways for more support. To prevent injury to your wrist or arm, don t use handrails to pull yourself up.    Install grab bars to pull yourself up.    Move or rearrange  items that you use often. This will make them easier to find or reach.    Look at your home to find any safety hazards. Especially look at doorways, walkways, and the driveway. Remove or repair any safety problems that you find.  Other changes to make    Look around to find any safety hazards. Look closely at doorways, walkways, and the driveway. Remove or repair any safety problems that you find.    Wear shoes that fit well.    Take your time when going up and down stairs.    Put handrails on both sides of stairs and in walkways for more support. To prevent injury to your wrist or arm, don t use handrails to pull yourself up.    Install grab bars wherever needed to pull yourself up.    Arrange items that you use often. This will make them easier to find or reach.    [employer] last reviewed this educational content on 3/1/2020    7064-8587 [employer]. All rights reserved. This information is not intended as a substitute for professional medical care. Always follow your healthcare professional's instructions.           Patient Education   Personalized Prevention Plan  You are due for the preventive services outlined below.  Your care team is available to assist you in scheduling these services.  If you have already completed any of these items, please share that information with your care team to update in your medical record.  Health Maintenance Due   Topic Date Due     Heart Failure Action Plan  Never done     ANNUAL REVIEW OF HM ORDERS  Never done     Kidney Microalbumin Urine Test  05/22/2020     Cholesterol Lab  05/19/2021     Your Health Risk Assessment indicates you feel you are not in good health    A healthy lifestyle helps keep the body fit and the mind alert. It helps protect you from disease, helps you fight disease, and helps prevent chronic disease (disease that doesn't go away) from getting worse. This is important as you get older and begin to notice twinges in muscles and joints and a  decline in the strength and stamina you once took for granted. A healthy lifestyle includes good healthcare, good nutrition, weight control, recreation, and regular exercise. Avoid harmful substances and do what you can to keep safe. Another part of a healthy lifestyle is stay mentally active and socially involved.    Good healthcare     Have a wellness visit every year.     If you have new symptoms, let us know right away. Don't wait until the next checkup.     Take medicines exactly as prescribed and keep your medicines in a safe place. Tell us if your medicine causes problems.   Healthy diet and weight control     Eat 3 or 4 small, nutritious, low-fat, high-fiber meals a day. Include a variety of fruits, vegetables, and whole-grain foods.     Make sure you get enough calcium in your diet. Calcium, vitamin D, and exercise help prevent osteoporosis (bone thinning).     If you live alone, try eating with others when you can. That way you get a good meal and have company while you eat it.     Try to keep a healthy weight. If you eat more calories than your body uses for energy, it will be stored as fat and you will gain weight.     Recreation   Recreation is not limited to sports and team events. It includes any activity that provides relaxation, interest, enjoyment, and exercise. Recreation provides an outlet for physical, mental, and social energy. It can give a sense of worth and achievement. It can help you stay healthy.    Mental Exercise and Social Involvement  Mental and emotional health is as important as physical health. Keep in touch with friends and family. Stay as active as possible. Continue to learn and challenge yourself.   Things you can do to stay mentally active are:    Learn something new, like a foreign language or musical instrument.     Play SCRABBLE or do crossword puzzles. If you cannot find people to play these games with you at home, you can play them with others on your computer through the  Internet.     Join a games club--anything from card games to chess or checkers or lawn bowling.     Start a new hobby.     Go back to school.     Volunteer.     Read.   Keep up with world events.  Activities of Daily Living    Your Health Risk Assessment indicates you have difficulties with activities of daily living such as housework, bathing, preparing meals, taking medication, etc. Please make a follow up appointment for us to address this issue in more detail.    Preventing Falls at Home  A person can fall for many reasons. Older adults may fall because reaction time slows down as we age. Your muscles and joints may get stiff, weak, or less flexible because of illness, medicines, or a physical condition.   Other health problems that make falls more likely include:     Arthritis    Dizziness or lightheadedness when you stand up (orthostatic hypotension)    History of a stroke    Dizziness    Anemia    Certain medicines taken for mental illness or to control blood pressure.    Problems with balance or gait    Bladder or urinary problems    History of falling    Changes in vision (vision impairment)    Changes in thinking skills and memory (cognitive impairment)  Injuries from a fall can include serious injuries such as broken bones, dislocated joints, internal bleeding and cuts. Injuries like these can limit your independence.   Prevention tips  To help prevent falls and fall-related injuries, follow the tips below.    Floors  To make floors safer:     Put nonskid pads under area rugs.    Remove small rugs.    Replace worn floor coverings.    Tack carpets firmly to each step on carpeted stairs. Put nonskid strips on the edges of uncarpeted stairs.    Keep floors and stairs free of clutter and cords.    Arrange furniture so there are clear pathways.    Clean up any spills right away.  Bathrooms    To make bathrooms safer:     Install grab bars in the tub or shower.    Apply nonskid strips or put a nonskid rubber  mat in the tub or shower.    Sit on a bath chair to bathe.    Use bathmats with nonskid backing.  Lighting  To improve visibility in your home:      Keep a flashlight in each room. Or put a lamp next to the bed within easy reach.    Put nightlights in the bedrooms, hallways, kitchen, and bathrooms.    Make sure all stairways have good lighting.    Take your time when going up and down stairs.    Put handrails on both sides of stairs and in walkways for more support. To prevent injury to your wrist or arm, don t use handrails to pull yourself up.    Install grab bars to pull yourself up.    Move or rearrange items that you use often. This will make them easier to find or reach.    Look at your home to find any safety hazards. Especially look at doorways, walkways, and the driveway. Remove or repair any safety problems that you find.  Other changes to make    Look around to find any safety hazards. Look closely at doorways, walkways, and the driveway. Remove or repair any safety problems that you find.    Wear shoes that fit well.    Take your time when going up and down stairs.    Put handrails on both sides of stairs and in walkways for more support. To prevent injury to your wrist or arm, don t use handrails to pull yourself up.    Install grab bars wherever needed to pull yourself up.    Arrange items that you use often. This will make them easier to find or reach.    [employer] last reviewed this educational content on 3/1/2020    7729-3280 [employer]. All rights reserved. This information is not intended as a substitute for professional medical care. Always follow your healthcare professional's instructions.           Patient Education   Personalized Prevention Plan  You are due for the preventive services outlined below.  Your care team is available to assist you in scheduling these services.  If you have already completed any of these items, please share that information with your care team to  update in your medical record.  Health Maintenance Due   Topic Date Due     Heart Failure Action Plan  Never done     ANNUAL REVIEW OF HM ORDERS  Never done     Kidney Microalbumin Urine Test  05/22/2020     Cholesterol Lab  05/19/2021     Your Health Risk Assessment indicates you feel you are not in good health    A healthy lifestyle helps keep the body fit and the mind alert. It helps protect you from disease, helps you fight disease, and helps prevent chronic disease (disease that doesn't go away) from getting worse. This is important as you get older and begin to notice twinges in muscles and joints and a decline in the strength and stamina you once took for granted. A healthy lifestyle includes good healthcare, good nutrition, weight control, recreation, and regular exercise. Avoid harmful substances and do what you can to keep safe. Another part of a healthy lifestyle is stay mentally active and socially involved.    Good healthcare     Have a wellness visit every year.     If you have new symptoms, let us know right away. Don't wait until the next checkup.     Take medicines exactly as prescribed and keep your medicines in a safe place. Tell us if your medicine causes problems.   Healthy diet and weight control     Eat 3 or 4 small, nutritious, low-fat, high-fiber meals a day. Include a variety of fruits, vegetables, and whole-grain foods.     Make sure you get enough calcium in your diet. Calcium, vitamin D, and exercise help prevent osteoporosis (bone thinning).     If you live alone, try eating with others when you can. That way you get a good meal and have company while you eat it.     Try to keep a healthy weight. If you eat more calories than your body uses for energy, it will be stored as fat and you will gain weight.     Recreation   Recreation is not limited to sports and team events. It includes any activity that provides relaxation, interest, enjoyment, and exercise. Recreation provides an outlet  for physical, mental, and social energy. It can give a sense of worth and achievement. It can help you stay healthy.    Mental Exercise and Social Involvement  Mental and emotional health is as important as physical health. Keep in touch with friends and family. Stay as active as possible. Continue to learn and challenge yourself.   Things you can do to stay mentally active are:    Learn something new, like a foreign language or musical instrument.     Play SCR[employer]BLE or do crossword puzzles. If you cannot find people to play these games with you at home, you can play them with others on your computer through the Internet.     Join a games club--anything from card games to chess or checkers or lawn bowling.     Start a new hobby.     Go back to school.     Volunteer.     Read.   Keep up with world events.  Activities of Daily Living    Your Health Risk Assessment indicates you have difficulties with activities of daily living such as housework, bathing, preparing meals, taking medication, etc. Please make a follow up appointment for us to address this issue in more detail.    Preventing Falls at Home  A person can fall for many reasons. Older adults may fall because reaction time slows down as we age. Your muscles and joints may get stiff, weak, or less flexible because of illness, medicines, or a physical condition.   Other health problems that make falls more likely include:     Arthritis    Dizziness or lightheadedness when you stand up (orthostatic hypotension)    History of a stroke    Dizziness    Anemia    Certain medicines taken for mental illness or to control blood pressure.    Problems with balance or gait    Bladder or urinary problems    History of falling    Changes in vision (vision impairment)    Changes in thinking skills and memory (cognitive impairment)  Injuries from a fall can include serious injuries such as broken bones, dislocated joints, internal bleeding and cuts. Injuries like these can  limit your independence.   Prevention tips  To help prevent falls and fall-related injuries, follow the tips below.    Floors  To make floors safer:     Put nonskid pads under area rugs.    Remove small rugs.    Replace worn floor coverings.    Tack carpets firmly to each step on carpeted stairs. Put nonskid strips on the edges of uncarpeted stairs.    Keep floors and stairs free of clutter and cords.    Arrange furniture so there are clear pathways.    Clean up any spills right away.  Bathrooms    To make bathrooms safer:     Install grab bars in the tub or shower.    Apply nonskid strips or put a nonskid rubber mat in the tub or shower.    Sit on a bath chair to bathe.    Use bathmats with nonskid backing.  Lighting  To improve visibility in your home:      Keep a flashlight in each room. Or put a lamp next to the bed within easy reach.    Put nightlights in the bedrooms, hallways, kitchen, and bathrooms.    Make sure all stairways have good lighting.    Take your time when going up and down stairs.    Put handrails on both sides of stairs and in walkways for more support. To prevent injury to your wrist or arm, don t use handrails to pull yourself up.    Install grab bars to pull yourself up.    Move or rearrange items that you use often. This will make them easier to find or reach.    Look at your home to find any safety hazards. Especially look at doorways, walkways, and the driveway. Remove or repair any safety problems that you find.  Other changes to make    Look around to find any safety hazards. Look closely at doorways, walkways, and the driveway. Remove or repair any safety problems that you find.    Wear shoes that fit well.    Take your time when going up and down stairs.    Put handrails on both sides of stairs and in walkways for more support. To prevent injury to your wrist or arm, don t use handrails to pull yourself up.    Install grab bars wherever needed to pull yourself up.    Arrange items  that you use often. This will make them easier to find or reach.    [employer] last reviewed this educational content on 3/1/2020    0970-7024 [employer]. All rights reserved. This information is not intended as a substitute for professional medical care. Always follow your healthcare professional's instructions.

## 2022-02-28 NOTE — PROGRESS NOTES
"  He is at risk for falling and has been provided with information to reduce the risk of falling at home.  Assessment & Plan     ICD-10-CM    1. Encounter for Medicare annual wellness exam  Z00.00        Follow Up/Next Steps  {RN  Document follow up- if applicable, be sure to add routing comments to the clinician  Link to Follow Up and Routing  :[id number]}  Return in about 53 weeks (around 3/6/2023) for Annual Wellness Visit.  Overdue for microalbumin and lipid test. Patient refused to do the cognitive test. Patient states\" I know what is coming next. I don't want to do the test.\" Per patient saw he PCP a few moths ago.Continue to follow up with PCP for chronic conditions.    Counseling and Education  Reviewed preventive health counseling, as reflected in patient instructions        Appropriate preventive services were discussed with this patient, including applicable screening as appropriate for cardiovascular disease, diabetes, osteopenia/osteoporosis, and glaucoma.  As appropriate for age/gender, discussed screening for colorectal cancer, prostate cancer, breast cancer, and cervical cancer. Checklist reviewing preventive services available has been given to the patient.    Reviewed patients plan of care and provided an AVS. The Basic Care Plan (routine screening as documented in Health Maintenance) for Tanmay meets the Care Plan requirement. This Care Plan has been established and reviewed with the Patient.    Visit Provider: Cuco Mccauley RN  Supervising Provider: Darline Eldridge MD, MD  [facility]       Subjective   Tanmay is a 81 year old who is being seen for an Annual Wellness Visit  accompanied by his none.    Healthy Habits:     In general, how would you rate your overall health?  Fair    Frequency of exercise:  6-7 days/week    Duration of exercise:  15-30 minutes    Do you usually eat at least 4 servings of fruit and vegetables a day, include whole grains    & " "fiber and avoid regularly eating high fat or \"junk\" foods?  Yes    Taking medications regularly:  Yes    Medication side effects:  None    Ability to successfully perform activities of daily living:  Transportation requires assistance and shopping requires assistance    Home Safety:  Lack of grab bars in the bathroom    Hearing Impairment:  No hearing concerns    In the past 6 months, have you been bothered by leaking of urine?  No    In general, how would you rate your overall mental or emotional health?  Good      PHQ-2 Total Score: 0    Additional concerns today:  No    Do you feel safe in your environment? Yes    Have you ever done Advance Care Planning? (For example, a Health Directive, POLST, or a discussion with a medical provider or your loved ones about your wishes): Yes, advance care planning is on file.    Fall risk  Fallen 2 or more times in the past year?: No  Any fall with injury in the past year?: Yes  Timed Up and Go Test (>13.5 is fall risk; contact physician) : 6  Cognitive Screening   1) Repeat 3 items (Leader, Season, Table)    2) Clock draw: unable to assess. Patient refused  3) 3 item recall: unable to assess . Patient refused  Results: unable to assess patient refused    Mini-CogTM Copyright ALEX Melendez. Licensed by the author for use in [facility]; reprinted with permission (soob@[email or web address]). All rights reserved.      Do you have sleep apnea, excessive snoring or daytime drowsiness?: no    Reviewed and updated as needed this visit by clinical staff    Allergies  Meds  Problems             Social History     Tobacco Use     Smoking status: Former Smoker     Smokeless tobacco: Never Used   Substance Use Topics     Alcohol use: Not Currently       Current providers sharing in care for this patient include:   Patient Care Team:  Go Raímrez MD as PCP - General (Family Medicine)  Massiel Medina PharmD as Pharmacist (Pharmacist)  Leonardo Smart MD as Assigned Neuroscience " "Provider  Go Ramírez MD as Assigned PCP  Remy Aguilar MD as Resident (Internal Medicine - Pediatrics)  Myhre, David J, RN as Lead Care Coordinator (Primary Care - CC)  Casandra Pandey as Community Health Worker (Primary Care - CC)  Kylie Suazo LSW as Clinic Care Coordinator (Primary Care - CC)  Leyla Cruz NRP, CP as Community Paramedic  Raquel De Leon MD as Assigned Heart and Vascular Provider  Dhaval Almazan DPM as Assigned Musculoskeletal Provider    The following health maintenance items are reviewed in Epic and correct as of today:  Health Maintenance Due   Topic Date Due     HF ACTION PLAN  Never done     ANNUAL REVIEW OF HM ORDERS  Never done     MICROALBUMIN  05/22/2020     LIPID  05/19/2021               Objective    /74 (BP Location: Left arm, Patient Position: Sitting, Cuff Size: Adult Regular)   Pulse 82   Temp 98.2  F (36.8  C) (Temporal)   Resp 16   Ht 1.753 m (5' 9\")   Wt 67.6 kg (149 lb)   SpO2 98%   BMI 22.00 kg/m   Estimated body mass index is 22 kg/m  as calculated from the following:    Height as of this encounter: 1.753 m (5' 9\").    Weight as of this encounter: 67.6 kg (149 lb).  Physical Exam  Patient appears comfortable and in no acute distress.        Identified Health Risks:  He is at risk for falling and has been provided with information to reduce the risk of falling at home.  He is at risk for falling and has been provided with information to reduce the risk of falling at home.  He is at risk for falling and has been provided with information to reduce the risk of falling at home.  The patient was provided with suggestions to help him develop a healthy physical lifestyle.  The patient reports that he has difficulty with activities of daily living. I have asked that the patient make a follow up appointment in *** weeks where this issue will be further evaluated and addressed.  He is at risk for falling and has been provided with information " to reduce the risk of falling at home.

## 2022-02-28 NOTE — TELEPHONE ENCOUNTER
"Spoke with patient who reports that he has found little relief from his last injection. He states that he has found \"maybe 10-15%\" improvement and continues to rotate ice and heat. He is taking flexeril as needed. He is wondering if he needs to wait longer for steroid full effect or if there may be an alternative plan. Will discuss with provider and follow up with the patient.  "

## 2022-02-28 NOTE — PROGRESS NOTES
"Assessment & Plan     ICD-10-CM    1. Encounter for Medicare annual wellness exam  Z00.00        Follow Up/Next Steps    Return in about 53 weeks (around 3/6/2023) for Annual Wellness Visit.  Overdue for microalbumin and lipid tests. Patient refused to do the cognitive test. Patient stated\" I know what is going to be next. I don't want to take the test.\" Continue follow up with PCP for chronic conditions.    Counseling and Education  Reviewed preventive health counseling, as reflected in patient instructions        Appropriate preventive services were discussed with this patient, including applicable screening as appropriate for cardiovascular disease, diabetes, osteopenia/osteoporosis, and glaucoma.  As appropriate for age/gender, discussed screening for colorectal cancer, prostate cancer, breast cancer, and cervical cancer. Checklist reviewing preventive services available has been given to the patient.    Reviewed patients plan of care and provided an AVS. The Basic Care Plan (routine screening as documented in Health Maintenance) for Tanmay meets the Care Plan requirement. This Care Plan has been established and reviewed with the Patient.    Visit Provider: Cuco Mccauley RN  Supervising Provider: Darline Eldridge MD, MD  [facility]       Subjective   Tanmay is a 81 year old who is being seen for an Annual Wellness Visit  accompanied by his none.    Healthy Habits:     In general, how would you rate your overall health?  Fair    Frequency of exercise:  6-7 days/week    Duration of exercise:  15-30 minutes    Do you usually eat at least 4 servings of fruit and vegetables a day, include whole grains    & fiber and avoid regularly eating high fat or \"junk\" foods?  Yes    Taking medications regularly:  Yes    Medication side effects:  None    Ability to successfully perform activities of daily living:  Transportation requires assistance and shopping requires assistance    " Home Safety:  Lack of grab bars in the bathroom    Hearing Impairment:  No hearing concerns    In the past 6 months, have you been bothered by leaking of urine?  No    In general, how would you rate your overall mental or emotional health?  Good      PHQ-2 Total Score: 0    Additional concerns today:  No    Do you feel safe in your environment? Yes    Have you ever done Advance Care Planning? (For example, a Health Directive, POLST, or a discussion with a medical provider or your loved ones about your wishes): Yes, advance care planning is on file.    Fall risk  Fallen 2 or more times in the past year?: No  Any fall with injury in the past year?: Yes  Timed Up and Go Test (>13.5 is fall risk; contact physician) : 6  Cognitive Screening   1) Repeat 3 items (Leader, Season, Table)    2) Clock draw: unable to assess. Patient refused  3) 3 item recall:  unable to assess patient refused  Results: unable to assess. Patient refused.    Mini-CogTM Copyright ALEX Melendez. Licensed by the author for use in [facility]; reprinted with permission ([email or web address]). All rights reserved.      Do you have sleep apnea, excessive snoring or daytime drowsiness?: no    Reviewed and updated as needed this visit by clinical staff    Allergies  Meds  Problems             Social History     Tobacco Use     Smoking status: Former Smoker     Smokeless tobacco: Never Used   Substance Use Topics     Alcohol use: Not Currently       Current providers sharing in care for this patient include:   Patient Care Team:  Go Ramírez MD as PCP - General (Family Medicine)  Massiel Medina PharmD as Pharmacist (Pharmacist)  Leonardo Smart MD as Assigned Neuroscience Provider  Go Ramríez MD as Assigned PCP  Remy Aguilar MD as Resident (Internal Medicine - Pediatrics)  Myhre, David J, RN as Lead Care Coordinator (Primary Care - CC)  Casandra Pandey as Community Health Worker (Primary Care - CC)  Kylie Suazo LSW  "as Clinic Care Coordinator (Primary Care - CC)  Leyla Cruz, NRP, CP as Community Paramedic  Raquel De Leon MD as Assigned Heart and Vascular Provider  Dhaval Almazan DPM as Assigned Musculoskeletal Provider    The following health maintenance items are reviewed in Epic and correct as of today:  Health Maintenance Due   Topic Date Due     HF ACTION PLAN  Never done     ANNUAL REVIEW OF HM ORDERS  Never done     MICROALBUMIN  05/22/2020     LIPID  05/19/2021               Objective    /74 (BP Location: Left arm, Patient Position: Sitting, Cuff Size: Adult Regular)   Pulse 82   Temp 98.2  F (36.8  C) (Temporal)   Resp 16   Ht 1.753 m (5' 9\")   Wt 67.6 kg (149 lb)   SpO2 98%   BMI 22.00 kg/m   Estimated body mass index is 22 kg/m  as calculated from the following:    Height as of this encounter: 1.753 m (5' 9\").    Weight as of this encounter: 67.6 kg (149 lb).  Physical Exam  Patient appears comfortable and in no acute distress.        Identified Health Risks:    The patient was provided with suggestions to help him develop a healthy physical lifestyle.  He is at risk for falling and has been provided with information to reduce the risk of falling at home.  "

## 2022-03-01 NOTE — TELEPHONE ENCOUNTER
Spoke with patient about next steps. Discussed botox injections as treatment and explained that prior authorization from insurance would need to be obtained prior to scheduling. Patient verbalizes understanding and agreement to this. He also reports that the flexeril seems to be helping and is wondering if there is another medication that could be added before trying another injection. Will discuss with provider and follow up with the patient.

## 2022-03-03 NOTE — PROGRESS NOTES
Community Health Worker Follow Up    Care Gaps:     Health Maintenance Due   Topic Date Due     HF ACTION PLAN  Never done     MICROALBUMIN  05/22/2020     LIPID  05/19/2021       Will address at next PCP visit    Goals:   Goals Addressed as of 3/3/2022 at 3:17 PM                    Today    1/7/22       1. Reducing Risks (pt-stated)   70%  60%    Added 11/30/21 by Myhre, David J, RN      Goal Statement: I would like to have a home care RN assist me with my medications for better compliance in the next 2 months.   Date Goal set: 11/29/21  Barriers: Patient said he has difficulty managing his medications. Homebound.  Strengths: Strong advocate for self good family support.  Date to Achieve By: 1/29/21  Patient expressed understanding of goal: Yes  Action steps to achieve this goal:  1. I understand the [facility] RN will request a home care order from Dr. Ramírez to have an RN come out and assist me with my medications.   2. I will continue to take my medications daily as best as I can until the home care nurse is able to come out. I will ask my niece for assistance when needed.   3. I will report progress towards this goal at scheduled outreach calls from my [facility] team.  12-1 discussed, home care order sent again.      12/8/21 - Referred to community paramedics until patient is able to get RN services through his waiver.   1/7/22 - Paramedic scheduled to see him on 1/10/22  Discussed on 1/25/22 - [place] to follow up on in-home RN to assist with his medications. She stated the [facility] are helping him out at this time.    Discussed 3/3/22         2. Psychosocial (pt-stated)   50%      Added 11/30/21 by Myhre, David J, RN      Goal Statement: I would like to have in-home services on a regular basis to assist me with maintaining my apartment in the next 3 months. I would also like to know if I am   Date Goal set: 11/29/21  Barriers: Physical limitations makes it difficult for him to  maintain on his own.  Strengths: Strong advocate for himself. Strong family support  Date to Achieve By: 2/28/21  Patient expressed understanding of goal: Yes  Action steps to achieve this goal:  1. I will [facility] with the [facility] LSWKylie at schedule phone visit on 12/1/21 to discuss resource for in-home services and possible programs that may be available to me to assist me with paying for the services.   2. I will provide any necessary documentation, complete any required assessments and complete any paperwork that may be associated with this goal in a timely manner.   3. I will report progress towards this goal at scheduled outreach telephone call from my [facility] team.    12-1 has [place] Choices assessment set up for 12-3 discussed  12/8/21 - Family will continue to assist patient until he is able to get in-home services through is waiver.   Discussed on 1/7/22 - family continuing to assist until he is able to get in-home services through his waiver.   Discussed on 1/25/22 - Mary Kay was going to contact the [facility] today to follow up on getting him in-home resources.     Discussed 3/3/22            Intervention and Education during outreach: Patient is getting his INR currently with the [facility], he has not heard back yet about getting his home INR monitor.  He has someone coming in weekly to help with housekeeping.    CHW Plan: CHW will continue to support patient with goals through routine scheduled outreach.     Next outreach due: 4/4/22

## 2022-03-04 NOTE — TELEPHONE ENCOUNTER
Patient called to report that he is feeling much better after starting the gabapentin prescription yesterday. He states that he is experiencing little to no pain and can walk much easier. He is currently taking 100mg once a day with instructions to increase up to 3 times a day over the course of 3 weeks. He states that he would like to keep it at 100mg once per day and will call if he needs to increase.

## 2022-03-08 NOTE — PROGRESS NOTES
Follow Up Progress Note      Assessment: [facility] RN spoke with patient's niece Mary Kay today. She stated patient appears to be doing well at home. Mary Kay said he is now receiving homemaking services through [facility]. Goal around this concerns was completed today.   Mary Kay has been in touch patient's CADI  Rebecca regarding medications assistance and INR lab draws. Mary Kay said that Rebecca told her she continues to look for resources, but has not been able to find nursing services for patient at this time. She said Rebecca informed her she would continue to look for these services for patient. Mary Kay said patient has been getting phone calls regarding getting an in-home INR monitor, but did not take the name down of the representative who called him. She said patient would be receptive to the idea of getting an in-home INR monitor if the cost would be covered by his waiver. Writer agreed to contact the Anticoagulation Clinic to determine if they are able to assist patient's with getting the home monitors. New goals around getting a home monitor was established during phone call today.   No other needs or concerns at this time.   Care Gaps:    Health Maintenance Due   Topic Date Due     HF ACTION PLAN  Never done     MICROALBUMIN  05/22/2020     LIPID  05/19/2021       Currently there are no Care Gaps.    Goals addressed this encounter:   Goals Addressed                    This Visit's Progress       1. Reducing Risks (pt-stated)   10%      Goal Statement: I would like to have a home care RN assist me with my medications for better compliance in the next 2 months.   Date Goal set: 11/29/21  Barriers: Patient said he has difficulty managing his medications. Homebound.  Strengths: Strong advocate for self good family support.  Date to Achieve By: 1/29/21  Patient expressed understanding of goal: Yes  Action steps to achieve this goal:  1. I understand the [facility] RN will request a home care order from Dr. Ramírez to have  an RN come out and assist me with my medications.   2. I will continue to take my medications daily as best as I can until the home care nurse is able to come out. I will ask my niece for assistance when needed.   3. I will report progress towards this goal at scheduled outreach calls from my [facility] team.  12-1 discussed, home care order sent again.      12/8/21 - Referred to community paramedics until patient is able to get RN services through his waiver.   1/7/22 - Paramedic scheduled to see him on 1/10/22  Discussed on 1/25/22 - [place] with [place] today to follow up on in-home RN to assist with his medications. She stated the [facility] are helping him out at this time.  Discussed 3/3/22  Discussed on 3/8/22 - Patient is managing his medications independently. CADI  said she was not able to find RN to assist with medication management at this time, but will continue to try to find.          COMPLETED: 2. Psychosocial (pt-stated)   100%      Goal Statement: I would like to have in-home services on a regular basis to assist me with maintaining my apartment in the next 3 months. I would also like to know if I am   Date Goal set: 11/29/21  Barriers: Physical limitations makes it difficult for him to maintain on his own.  Strengths: Strong advocate for himself. Strong family support  Date to Achieve By: 2/28/21  Patient expressed understanding of goal: Yes  Action steps to achieve this goal:  1. I will speak with the [facility] Kylie MURPHY at schedule phone visit on 12/1/21 to discuss resource for in-home services and possible programs that may be available to me to assist me with paying for the services.   2. I will provide any necessary documentation, complete any required assessments and complete any paperwork that may be associated with this goal in a timely manner.   3. I will report progress towards this goal at scheduled outreach telephone call from my [facility] team.    12-1 has RANJAN Chen  assessment set up for 12-3 discussed  12/8/21 - Family will continue to assist patient until he is able to get in-home services through is waiver.   Discussed on 1/7/22 - family continuing to assist until he is able to get in-home services through his waiver.   Discussed on 1/25/22 - Mary Kay was going to contact the [place] today to follow up on getting him in-home resources.   Discussed on 3/8/22 - Mary Kay reported patient is getting house keeping services through [employer].          Functional (pt-stated)   10%      Goal Statement: I would like to know if I am eligible for a in-home INR meter so I do not have to go the Clinic for lab draws in the next 2 months.   Date Goal set: 3/8/22  Barriers: Limited transportation options  Strengths: Strong advocate for himself. Good family support.   Date to Achieve By: 5/8/22  Patient expressed understanding of goal: Yes  Action steps to achieve this goal:  1. I understand the [facility] RN will contact the Anticoagulation Clinic to determine if I am a candidate for an in-home INR monitoring system.   2. I also understand the [facility] RN will contact my CADI  to determine if the home monitor would be covered by my waiver  3. I will report progress towards this goal as scheduled outreach telephone calls from my [facility] team.             Intervention/Education provided during outreach: Discussed the possibility of getting a INR home monitoring meter.      Outreach Frequency: monthly    Plan: CCC RN will continue to monitor, support patient with current goals and will be available to assist as nursing needs arise.     Care Coordinator will follow up in one month.

## 2022-03-10 NOTE — PROGRESS NOTES
ANTICOAGULATION MANAGEMENT     Tanmay Israel 81 year old male is on warfarin with subtherapeutic INR result. (Goal INR 2.0-3.0)    Recent labs: (last 7 days)     03/10/22  1117   INR 1.9*       ASSESSMENT       Source(s): Chart Review and Patient/Caregiver Call       Warfarin doses taken: Warfarin taken as instructed    Diet: No new diet changes identified    New illness, injury, or hospitalization: No    Medication/supplement changes: None noted    Signs or symptoms of bleeding or clotting: No    Previous INR: Therapeutic last visit; previously outside of goal range    Additional findings: if INR continues to drop after boost may need to increase back to previous maintenance.       PLAN     Recommended plan for temporary change(s) affecting INR     Dosing Instructions: Booster dose then continue your current warfarin dose with next INR in 1 week       Summary  As of 3/10/2022    Full warfarin instructions:  3/11: 2.5 mg; Otherwise 1.25 mg every Fri; 2.5 mg all other days   Next INR check:  3/17/2022             Telephone call with Tanmay who verbalizes understanding and agrees to plan and who agrees to plan and repeated back plan correctly    Patient to recheck with home meter    Education provided: Please call back if any changes to your diet, medications or how you've been taking warfarin and Contact [phone]  with any changes, questions or concerns.     Plan made per [facility] anticoagulation protocol    Amber Rogers, RN  Anticoagulation Clinic  3/10/2022    _______________________________________________________________________     Anticoagulation Episode Summary     Current INR goal:  2.0-3.0   TTR:  47.0 % (1 y)   Target end date:  Indefinite   Send INR reminders to:  [facility]    Indications    Atrial fibrillation (H) [I48.91]  Pulmonary embolism with infarction (H) [I26.99]  Atrial fibrillation  unspecified type (H) [I48.91]           Comments:           Anticoagulation Care Providers     Provider  Role Specialty Phone number    Go Ramírez MD Referring Family Medicine [phone]

## 2022-03-18 NOTE — TELEPHONE ENCOUNTER
ANTICOAGULATION     Tanmay Israel is overdue for INR check.      Spoke with Tanmay and scheduled lab appointment on 3/21/22     He was to start home INR testing today but went through several test strips and was not able to get enough blood.    Romelia Kelly RN

## 2022-03-21 NOTE — PROGRESS NOTES
Spoke to patient. Verified he understood warfarin dosing and recheck date. He hopes to be able to do his INR in 2 weeks with home monitor.  Romelia Kelly RN

## 2022-03-21 NOTE — PROGRESS NOTES
ANTICOAGULATION MANAGEMENT     Tanmay Israel 81 year old male is on warfarin with subtherapeutic INR result. (Goal INR 2.0-3.0)    Recent labs: (last 7 days)     03/21/22  1117   INR 1.7*       ASSESSMENT       Source(s): Chart Review and Template       Warfarin doses taken: Warfarin taken as instructed    Diet: No new diet changes identified    New illness, injury, or hospitalization: No    Medication/supplement changes: None noted    Signs or symptoms of bleeding or clotting: No    Previous INR: Subtherapeutic    Additional findings: Supposed to start home INR testing. Struggled with using strips and getting enough blood last week.       PLAN     Recommended plan for no diet, medication or health factor changes affecting INR     Dosing Instructions: Booster dose then Increase your warfarin dose (7.7% change) with next INR in 2 weeks       Summary  As of 3/21/2022    Full warfarin instructions:  2.5 mg every day   Next INR check:  4/4/2022             Detailed voice message left for Tanmay with dosing instructions and follow up date.     Contact [phone] to schedule and with any changes, questions or concerns.     Education provided: Importance of therapeutic range and Importance of following up at instructed interval    Plan made per [facility] anticoagulation protocol    Romelia Kelly RN  Anticoagulation Clinic  3/21/2022    _______________________________________________________________________     Anticoagulation Episode Summary     Current INR goal:  2.0-3.0   TTR:  47.0 % (1 y)   Target end date:  Indefinite   Send INR reminders to:  [facility] RIOS    Indications    Atrial fibrillation (H) [I48.91]  Pulmonary embolism with infarction (H) [I26.99]  Atrial fibrillation  unspecified type (H) [I48.91]           Comments:           Anticoagulation Care Providers     Provider Role Specialty Phone number    Go Ramírez MD Referring Family Medicine [phone]

## 2022-03-28 NOTE — TELEPHONE ENCOUNTER
"Patient reports that he has been \"doing pretty good\" but his back is starting to hurt again. He states that he got a little mixed up with his medications and was not taking the gabapentin. Writer reviewed med admin instructions for gabapentin and encouraged patient to resume taking. Patient verbalized understanding and intends to follow up next week.  "

## 2022-03-29 NOTE — TELEPHONE ENCOUNTER
Patient's niece, Mary Kay, called to discuss medications. She states that the patient had questions but was able to talk with a pharmacist to answer. Patient was present for conversation. He states that he is in a lot of pain and is wondering if he needs a new medication. After further review, it was found that the patient did not take the gabapentin today. He is encouraged to continue taking the gabapentin and increase as directed. He will report how he feels after taking and discussed that if tolerated, the gabapentin dose could be increased by Dr. Smart if needed. Patient and Mayr Kay verbalized understanding and will call with any other questions.

## 2022-04-04 NOTE — PROGRESS NOTES
ANTICOAGULATION MANAGEMENT     Tanmay Israel 81 year old male is on warfarin with supratherapeutic INR result. (Goal INR 2.0-3.0)    Recent labs: (last 7 days)     04/04/22  1116   INR 4.6*       ASSESSMENT       Source(s): Chart Review and Patient/Caregiver Call       Warfarin doses taken: Warfarin taken as instructed    Diet: No new diet changes identified    New illness, injury, or hospitalization: Yes: back pain flare for the past week or so    Medication/supplement changes: using PRN tylenol    Signs or symptoms of bleeding or clotting: No    Previous INR: Subtherapeutic    Additional findings: None       PLAN     Recommended plan for ongoing change(s) affecting INR     Dosing Instructions: hold dose then decrease your warfarin dose (7% change) with next INR in 1 week       Summary  As of 4/4/2022    Full warfarin instructions:  4/4: Hold; Otherwise 1.25 mg every Fri; 2.5 mg all other days   Next INR check:  4/11/2022             Telephone call with Tanmay who agrees to plan and repeated back plan correctly    Lab visit scheduled    Education provided: Goal range and significance of current result, Monitoring for bleeding signs and symptoms and Contact [phone] with any changes, questions or concerns.     Plan made per [facility] anticoagulation protocol    Yaneth Wells RN  Anticoagulation Clinic  4/4/2022    _______________________________________________________________________     Anticoagulation Episode Summary     Current INR goal:  2.0-3.0   TTR:  48.3 % (1 y)   Target end date:  Indefinite   Send INR reminders to:  [facility]    Indications    Atrial fibrillation (H) [I48.91]  Pulmonary embolism with infarction (H) [I26.99]  Atrial fibrillation  unspecified type (H) [I48.91]           Comments:           Anticoagulation Care Providers     Provider Role Specialty Phone number    Go Ramírez MD Referring Family Medicine [phone]

## 2022-04-04 NOTE — PROGRESS NOTES
I am graduating  Lindy from the CP program at this time. He has resources in place, CC is involved with his plan of care and the objectives for the CP referral have been met. A new CP referral can be placed should he need additional assistance in the future.

## 2022-04-06 NOTE — TELEPHONE ENCOUNTER
Patient's niece, Mary Kay, called to clarify patient medications. Writer explained that the patient should continue taking the gabapentin but flexeril is as needed. She states that the patient is currently taking the gabapentin 2x per day and has found great relief and is no longer having back pain. She is encouraged to call with any other questions or concerns.

## 2022-04-08 NOTE — TELEPHONE ENCOUNTER
Gabapentin 100 MG PO Cap       Last Written Prescription Date:  03/02/22  Last Fill Quantity: 84,   # refills: 0  Last Office Visit: 02/16/22  Future Office visit: N/A       Routing refill request to provider for review/approval because:  Last prescription had 0 refills

## 2022-04-11 NOTE — PROGRESS NOTES
ANTICOAGULATION MANAGEMENT     Tanmay Israel 81 year old male is on warfarin with supratherapeutic INR result. (Goal INR 2.0-3.0)    Recent labs: (last 7 days)     04/11/22  1149   INR 4.43*       ASSESSMENT       Source(s): Chart Review and Patient/Caregiver Call       Warfarin doses taken: Warfarin taken as instructed    Diet: No new diet changes identified    New illness, injury, or hospitalization: No    Medication/supplement changes: None noted-- continues with PRN tylenol    Signs or symptoms of bleeding or clotting: No    Previous INR: Supratherapeutic    Additional findings: None       PLAN     Recommended plan for no diet, medication or health factor changes affecting INR     Dosing Instructions: hold dose then decrease your warfarin dose (15% change) with next INR in 1 week       Summary  As of 4/11/2022    Full warfarin instructions:  4/11: Hold; Otherwise 1.25 mg every Mon, Wed, Fri; 2.5 mg all other days   Next INR check:  4/18/2022             Telephone call with Tanmay who agrees to plan and repeated back plan correctly    Lab visit scheduled    Education provided: Goal range and significance of current result, Monitoring for bleeding signs and symptoms, When to seek medical attention/emergency care and Contact [phone] with any changes, questions or concerns.     Plan made per [facility] anticoagulation protocol    Yaneth Wells RN  Anticoagulation Clinic  4/11/2022    _______________________________________________________________________     Anticoagulation Episode Summary     Current INR goal:  2.0-3.0   TTR:  46.6 % (1 y)   Target end date:  Indefinite   Send INR reminders to:  [facility] RIOS    Indications    Atrial fibrillation (H) [I48.91]  Pulmonary embolism with infarction (H) [I26.99]  Atrial fibrillation  unspecified type (H) [I48.91]           Comments:           Anticoagulation Care Providers     Provider Role Specialty Phone number    Go Ramírez MD Referring Family Medicine  [phone]

## 2022-04-18 NOTE — PROGRESS NOTES
ANTICOAGULATION MANAGEMENT     Tanmay Israel 81 year old male is on warfarin with supratherapeutic INR result. (Goal INR 2.0-3.0)    Recent labs: (last 7 days)     04/18/22  1117   INR 3.90*       ASSESSMENT       Source(s): Chart Review, Patient/Caregiver Call and Template       Warfarin doses taken: Patient took less warfarin than advised on 4/11. Calendar updated.    Diet: No new diet changes identified    New illness, injury, or hospitalization: No    Medication/supplement changes: None noted    Signs or symptoms of bleeding or clotting: No    Previous INR: Supratherapeutic    Additional findings: None       PLAN     Recommended plan for no diet, medication or health factor changes affecting INR     Dosing Instructions: hold dose then continue your current warfarin dose (same as patient took last week) with next INR in 1 week       Summary  As of 4/18/2022    Full warfarin instructions:  4/18: Hold; Otherwise 2.5 mg every Sun, Thu; 1.25 mg all other days   Next INR check:  4/25/2022             Telephone call with Tanmay who verbalizes understanding and agrees to plan and who agrees to plan and repeated back plan correctly    Lab visit scheduled    Education provided: Contact [phone] with any changes, questions or concerns.     Plan made per [facility] anticoagulation protocol    Romelia Kelly RN  Anticoagulation Clinic  4/18/2022    _______________________________________________________________________     Anticoagulation Episode Summary     Current INR goal:  2.0-3.0   TTR:  44.7 % (1 y)   Target end date:  Indefinite   Send INR reminders to:  [facility]    Indications    Atrial fibrillation (H) [I48.91]  Pulmonary embolism with infarction (H) [I26.99]  Atrial fibrillation  unspecified type (H) [I48.91]           Comments:           Anticoagulation Care Providers     Provider Role Specialty Phone number    Go Ramírez MD Referring Family Medicine [phone]

## 2022-04-19 NOTE — PROGRESS NOTES
Follow Up Progress Note      Assessment: [facility] RN spoke with patient's niece Mary Kay this afternoon. Mary Kay wanted to inform writer she would be taking a 2 week trip to [place] and her brother Anthony will be helping patient with his needs while she was out of the country. There is signed consent to communicate with Mary Kay and Anthony in his chart. Anthony's phone information is listed under demographics.   Mary Kay reported patient was not able to operate the INR meter by himself at home. She they tried working with him, but he just would not operate it effectively. She said she and her brother Anthony will bring Tanmay to the Clinic for all of his INR draws. Next INR will be on 4/25/22. Goal around this issue was deleted per Mary Kay's request.   Patient continues to manage his medications independently. Mary Kay said she just sets up is gabapentin and cyclobenzaprine on a weekly basis for patient as she said patient had some confusion around taking these two newer medications. She said Anthony will fill his medications boxes with these two medications while Mary Kay is gone. Mary Kay these two medications have been effective in controlling patient back pain and reported patient is up and walking more. She said they have not heard back from his CADI  regarding getting in-home RN support for his medications, but said for now family can assist.   No other needs or concerns at this time.     Care Gaps:    Health Maintenance Due   Topic Date Due     HF ACTION PLAN  Never done     MICROALBUMIN  05/22/2020     LIPID  05/19/2021       Scheduled with Cardiology on 6/6/22      Goals addressed this encounter:    Goals Addressed                    This Visit's Progress       1. Reducing Risks (pt-stated)         Goal Statement: I would like to have a home care RN assist me with my medications for better compliance in the next 2 months.   Date Goal set: 11/29/21  Barriers: Patient said he has difficulty managing his medications. Homebound.  Strengths:  Strong advocate for self good family support.  Date to Achieve By: 1/29/21  Patient expressed understanding of goal: Yes  Action steps to achieve this goal:  1. I understand the [facility] RN will request a home care order from Dr. Ramírez to have an RN come out and assist me with my medications.   2. I will continue to take my medications daily as best as I can until the home care nurse is able to come out. I will ask my niece for assistance when needed.   3. I will report progress towards this goal at scheduled outreach calls from my [facility] team.  12-1 discussed, home care order sent again.      12/8/21 - Referred to community paramedics until patient is able to get RN services through his waiver.   1/7/22 - Paramedic scheduled to see him on 1/10/22  Discussed on 1/25/22 - Mary Kay [place] with the [place] today to follow up on in-home RN to assist with his medications. She stated the [facility]amedics are helping him out at this time.  Discussed 3/3/22  Discussed on 3/8/22 - Patient is managing his medications independently. CAD  said she was not able to find RN to assist with medication management at this time, but will continue to try to find.   Discussed on 4/19/22. Patient manages his medications currently with assistance from family. CADI  has not been able to find an RN to manages his medications per Mary Kay, but is still looking.               Intervention/Education provided during outreach: Discussed the importance of patient taking his medications as directed. Encouraged attendance at all upcoming scheduled appointments.      Outreach Frequency: monthly        Plan: [facility] RN will continue to monitor, support patient with current goals and will be available to assist as nursing needs arise.     Care Coordinator will follow up in one month.

## 2022-04-19 NOTE — LETTER
[facility]  Patient Centered Plan of Care  About Me:        Patient Name:  Tanmay Donato,     Hope you are well. Here is a copy of your Care Plan with the goal we are supporting you with at this time. Please let me know if we can help you in any other way.     Thanks,     Dave Myhre, RN  CCC RN  [phone]    YOB: 1940  Age:         82 year old   Tay MRN:    [id number] Telephone Information:  Home Phone [phone]   Mobile [phone]       Address:  [address]  [place] [id number] Email address:  No e-mail address on record      Emergency Contact(s)    Name Relationship Lgl Grd Work Phone Home Phone Mobile Phone   1. CHECO WILSON Srinivasa No [phone]   2. PHILLY MILTON Nephmartin No  [phone]           Primary language:  English     needed? No   [employer]:  [phone] op. 1  Other communication barriers:None    Preferred Method of Communication:     Current living arrangement: I live in a private home    Mobility Status/ Medical Equipment: Independent w/Device        Health Maintenance  Health Maintenance Reviewed:   Health Maintenance Due   Topic Date Due     HF ACTION PLAN  Never done     MICROALBUMIN  05/22/2020     LIPID  05/19/2021       My Access Plan  Medical Emergency 911   Primary Clinic Line [facility] - [phone]   24 Hour Appointment Line [phone] or  [phone]) (toll-free)   24 Hour Nurse Line [phone] (toll-free)   Preferred Urgent Care [facility], [phone]     Preferred Hospital [facility]  [phone]     Preferred Pharmacy [facility] [place] [id number] [facility] Crisis Line The National Suicide Prevention Lifeline at 1-636.581.7744 or 911             My Care Team Members  Patient Care Team       Relationship  Specialty Notifications Start End    Go Ramírez MD PCP - General Family Medicine  12/3/20     Phone: [phone] Fax: [phone]         [address] [facility] [address]    Massiel Medina, PharmD Pharmacist Pharmacist Admissions 6/10/19     Phone: [phone] Fax: [phone]         [facility] [address] [place] [address]    Leonardo Smart MD Assigned Neuroscience Provider   12/20/20     Phone: [phone] Fax: [phone]         [address] [facility] [address] [facility] [address]    Go Ramírez MD Assigned PCP   6/16/21     Phone: [phone] Fax: [phone] [address] [facility] [address] [facility] [address]    Remy Aguilar MD Resident Internal Medicine - Pediatrics  8/19/21     Phone: [phone] Fax: [phone]         [address] [facility] [address] [facility] [address]    Myhre, David J, RN Lead Care Coordinator Primary Care - [facility] Admissions 11/30/21     Phone: [phone]    Raquel De Leon MD Assigned Heart and Vascular Provider   1/9/22     Phone: [phone] Fax: [phone]         [address] [facility] [address] [facility] [address]    Dhaval Almazan DPM Assigned Musculoskeletal Provider   2/6/22     Phone: [phone] Fax: [phone]         [facility] [facility] [address]    Patrizia Daniels MA Community Health Worker Primary Care - [facility]  3/10/22             My Care Plans  Self Management and Treatment Plan  Goals and (Comments)   Goals        General     1. Reducing Risks (pt-stated)      Notes - Note edited  4/19/2022 10:09 AM by Myhre, David J, RN     Goal Statement: I would like to have a home care RN assist me with my medications for better compliance in the next 2 months.   Date Goal set: 11/29/21  Barriers: Patient said he has difficulty managing his medications. Homebound.  Strengths: Strong advocate for self good family support.  Date to Achieve By: 1/29/21  Patient expressed understanding of goal: Yes  Action  steps to achieve this goal:  1. I understand the [facility] RN will request a home care order from Dr. Ramírez to have an RN come out and assist me with my medications.   2. I will continue to take my medications daily as best as I can until the home care nurse is able to come out. I will ask my niece for assistance when needed.   3. I will report progress towards this goal at scheduled outreach calls from my [facility] team.  12-1 discussed, home care order sent again.      12/8/21 - Referred to community paramedics until patient is able to get RN services through his waiver.   1/7/22 - Paramedic scheduled to see him on 1/10/22  Discussed on 1/25/22 - [place] to follow up on in-home RN to assist with his medications. She stated the [facility] Paramedics are helping him out at this time.  Discussed 3/3/22  Discussed on 3/8/22 - Patient is managing his medications independently. CAD  said she was not able to find RN to assist with medication management at this time, but will continue to try to find.   Discussed on 4/19/22. Patient manages his medications currently with assistance from family. CADI  has not been able to find an RN to manages his medications per Mary Kay, but is still looking.                Action Plans on File:                       Advance Care Plans/Directives Type:   DNR/DNI      My Medical and Care Information  Problem List   Patient Active Problem List   Diagnosis     Osteoarthritis Of The Knee     Esophageal reflux     Obstructive sleep apnea     Ptosis Of Eyelid     Hyperlipidemia     Anemia     Crohn's (Granulomatous) Colitis     Benign Essential Hypertension     Coronary atherosclerosis of native coronary artery     Ischemic cardiomyopathy     Paroxysmal ventricular tachycardia (H)     Dry Nonexudative Macular Degeneration     Serum Enzyme Levels - Alkaline Phosphatase Elevated     Dysphagia     ICD (implantable cardioverter-defibrillator), single, in  situ     Lumbar stenosis with neurogenic claudication     Sacroiliac joint dysfunction of right side     Stage 3 chronic kidney disease (H)     Warfarin-induced coagulopathy (H)     A-fib (H)     Anemia, unspecified type     Cerebral aneurysm     Osteoporosis     Ileitis     Heart failure with reduced ejection fraction (H)     Diaphragmatic hernia     Pharyngoesophageal dysphagia     History of Crohn's disease     Iron deficiency anemia     Polyp of stomach and duodenum     Reflux esophagitis     Rotator cuff tear arthropathy of right shoulder     Schatzki's ring     Diverticulosis of colon     Flatulence, eructation and gas pain     S/P cervical spinal fusion     S/P laparoscopic cholecystectomy     Hypomagnesemia     Acute renal failure (ARF) (H)     Dizziness     Polyp of colon     ICD (implantable cardioverter-defibrillator) in place     Esophageal obstruction due to food impaction     ACP (advance care planning)     SOB (shortness of breath)     Acute systolic (congestive) heart failure (H)     Acute on chronic systolic heart failure (H)     Permanent atrial fibrillation (H)     Supratherapeutic INR     Cardiac pacemaker in situ     Pneumonia due to infectious organism, unspecified laterality, unspecified part of lung     Anemia     Benign essential hypertension     CAD (coronary artery disease), native coronary artery     Atrial fibrillation (H)     Pulmonary embolism with infarction (H)     LBBB (left bundle branch block)     Sciatica     Presbyesophagus     Gastroenteritis     Chronic anticoagulation     Acute blood loss anemia     Closed fracture of proximal end of left humerus with routine healing     Hypokalemia     Loose stools     Weakness     Polyp of duodenum     Personal history of colonic polyps     Diarrhea     Advised about management of weight     Atrial fibrillation, unspecified type (H)     ICD (implantable cardioverter-defibrillator) battery depletion      Current Medications and Allergies:   See printed Medication Report.    Care Coordination Start Date: 11/29/2021   Frequency of Care Coordination: monthly     Form Last Updated: 04/19/2022

## 2022-04-25 NOTE — PROGRESS NOTES
ANTICOAGULATION MANAGEMENT     Tanmay Israel 82 year old male is on warfarin with therapeutic INR result. (Goal INR 2.0-3.0)    Recent labs: (last 7 days)     04/25/22  1106   INR 2.3*       ASSESSMENT       Source(s): Chart Review, Patient/Caregiver Call and Template       Warfarin doses taken: Warfarin taken as instructed    Diet: Drank some V8 this weekend    New illness, injury, or hospitalization: No    Medication/supplement changes: None noted    Signs or symptoms of bleeding or clotting: No    Previous INR: Supratherapeutic    Additional findings: None       PLAN     Recommended plan for no diet, medication or health factor changes affecting INR     Dosing Instructions: continue your current warfarin dose with next INR in 1 week       Summary  As of 4/25/2022    Full warfarin instructions:  2.5 mg every Sun, Thu; 1.25 mg all other days   Next INR check:  5/2/2022             Telephone call with Tanmay who verbalizes understanding and agrees to plan    Lab visit scheduled    Education provided: Please call back if any changes to your diet, medications or how you've been taking warfarin, Importance of consistent vitamin K intake, Vitamin K content of foods and Importance of following up at instructed interval    Plan made per [facility] anticoagulation protocol    Romelia Kelly RN  Anticoagulation Clinic  4/25/2022    _______________________________________________________________________     Anticoagulation Episode Summary     Current INR goal:  2.0-3.0   TTR:  45.2 % (1 y)   Target end date:  Indefinite   Send INR reminders to:  CHANI RIOS    Indications    Atrial fibrillation (H) [I48.91]  Pulmonary embolism with infarction (H) [I26.99]  Atrial fibrillation  unspecified type (H) [I48.91]           Comments:           Anticoagulation Care Providers     Provider Role Specialty Phone number    Go Ramírez MD Referring Family Medicine [phone]

## 2022-04-26 NOTE — PROGRESS NOTES
Clinic Care Coordination Contact  [facility]/Voicemail    Clinical Data: Care Coordinator Outreach  Outreach attempted x 1.  Left message on patient's nephew Anthony's voicemail with call back information and requested return call.    Plan: Care Coordinator will try to reach patient again in 10 business days.      Lawanda Hsu  Community Health Worker   [facility] Care Coordination  [facility]   [email or web address]  Office: [phone]

## 2022-04-28 NOTE — PROGRESS NOTES
Clinic Care Coordination Contact    Community Health Worker Follow Up    CHW received a call from patients valerie Cruz. Anthony stated that Mary Kay was on vacation and he would be assisting patient at this time. Anthony stated that patient received an in home INR meter, but had to return it due to it not working properly. Anthony stated at this time patient is having to go to the lab for his draws. Anthony stated at this time they are doing good and have no additional questions or concerns. CHW left contact information if any anything should arise.     Care Gaps:     Health Maintenance Due   Topic Date Due     HF ACTION PLAN  Never done     MICROALBUMIN  05/22/2020     LIPID  05/19/2021       Intervention and Education during outreach: CHW gave contact information and encouraged him to call if questions or concerns come up.     CHW Plan: CHW will follow up in one month on 5/28/22    Lawanda Hsu  Community Health Worker   [facility]  Clinic Care Coordination  [facility]   [email or web address]  Office: [phone]

## 2022-05-02 NOTE — PROGRESS NOTES
ANTICOAGULATION MANAGEMENT      Tanmay Israel 82 year old male is on warfarin with subtherapeutic INR result. (Goal INR 2.0-3.0)    Recent labs: (last 7 days)     05/02/22  1028   INR 1.9*       ASSESSMENT       Source(s): Chart Review, Patient/Caregiver Call and Template       Warfarin doses taken: Warfarin taken as instructed    Diet: No new diet changes identified    New illness, injury, or hospitalization: No    Medication/supplement changes: None noted    Signs or symptoms of bleeding or clotting: No    Previous INR: Therapeutic last visit; previously outside of goal range    Additional findings: None       PLAN     Recommended plan for no diet, medication or health factor changes affecting INR     Dosing Instructions: Increase your warfarin dose (11.1% change) with next INR in 1 week       Summary  As of 5/2/2022    Full warfarin instructions:  2.5 mg every Sun, Tue, Thu; 1.25 mg all other days   Next INR check:               Telephone call with Tanmay who agrees to plan and repeated back plan correctly.  Did discuss the potential of switching tablet strength at next check if INR is supratherapeutic-- explained the rationale and patient verbalized understanding and in agreement with this possibility.    Lab visit scheduled    Education provided: Goal range and significance of current result and Contact [phone] with any changes, questions or concerns.     Plan made with [facility] Pharmacist Rubina Wells, RN  Anticoagulation Clinic  5/2/2022    _______________________________________________________________________     Anticoagulation Episode Summary     Current INR goal:  2.0-3.0   TTR:  46.6 % (1 y)   Target end date:  Indefinite   Send INR reminders to:  CHANI RIOS    Indications    Atrial fibrillation (H) [I48.91]  Pulmonary embolism with infarction (H) [I26.99]  Atrial fibrillation  unspecified type (H) [I48.91]           Comments:           Anticoagulation Care Providers     Provider  Role Specialty Phone number    Go Ramírez MD Referring Family Medicine [phone]

## 2022-05-05 NOTE — PROGRESS NOTES
FOOT AND ANKLE SURGERY/PODIATRY Progress Note        ASSESSMENT:   Ingrown toenail right great toe        TREATMENT:  Debrided the right great toenail today. I recommended the patient return to the clinic as needed.           HPI:Tanmay Israel presented to the clinic today complaining of a painful ingrown toenail involving the right great toe.  The toe is  quite aggravated with weightbearing and ambulation.  He is able to wear shoes fairly well without having too much discomfort.  He has not had any redness, swelling, drainage or bleeding.  His pain is mild to moderate.  He denies any trauma to his feet.  The patient has had the right great toenail trimmed in the past which gave him significant relief.      Past Medical History:   Diagnosis Date     Acute cholecystitis 2/28/2019     Acute post-operative pain      Anemia      Arthritis      Atrial fibrillation (H)      C. difficile diarrhea 4/21/2019     Calculus of ureter      Callus      Cerebral aneurysm      Cervical myelopathy (H) 2/22/2019     Cervical spinal stenosis      Chronic kidney disease     stage 3     Chronic systolic congestive heart failure (H)      Closed fracture of distal end of left radius, unspecified fracture morphology, initial encounter      Closed fracture of distal end of right radius, unspecified fracture morphology, initial encounter      Closed fracture of first thoracic vertebra, unspecified fracture morphology, initial encounter (H)      Coronary artery disease      Crohn's disease (H)      Disorder of bursae and tendons in shoulder region     Created by Conversion  Replacement Utility updated for latest IMO load     Dysphagia      Fall 10/22/2016     GERD (gastroesophageal reflux disease)      Hyperlipidemia      Hypertension      Hypotension, unspecified hypotension type      ICD (implantable cardioverter-defibrillator) in place     Medtronic     Ischemic cardiomyopathy      Kidney stone      Low back pain      Lumbago     Created by  Conversion      Macular degeneration      Myalgia and myositis, unspecified     Created by Conversion      Nonspecific elevation of levels of transaminase or lactic acid dehydrogenase (LDH)     Created by Conversion      Permanent atrial fibrillation (H)     [id number] risk score = 5 (age3/ CAD/ HTN/ CHF) Chronic warfarin     Personal history of colonic polyps 2/12/2019     Polyp of duodenum 10/17/2016     Pyuria      Right arm weakness 4/2/2019     Schatzki's ring      Sciatica     Created by Conversion      Sleep apnea     no CPAP     Spondylolisthesis of lumbar region 7/5/2016     Weakness 4/20/2019        Past Surgical History:   Procedure Laterality Date     APPENDECTOMY       ARTHROSCOPY SHOULDER ROTATOR CUFF REPAIR Right      ATRIAL FIBRILLATION/FLUTTER       BYPASS GRAFT ARTERY CORONARY  08/01/2004    Coronary Artery Quadruple Venous Bypass Graft     CARDIAC DEFIBRILLATOR PLACEMENT  01/01/2013    ICD Medtronic     CHOLECYSTECTOMY       COLONOSCOPY N/A 02/19/2020    Procedure: COLONOSCOPY;  Surgeon: Houston Medina MD;  Location: [facility];  Service: Gastroenterology     EP ICD UPGRADE TO BIVENT Left 01/03/2022    Procedure: EP Implantable Cardio-Defibrillator Upgrade to Bivent;  Surgeon: Houston Rolon MD;  Location: [facility] CV     ESOPHAGOSCOPY, GASTROSCOPY, DUODENOSCOPY (EGD), COMBINED       IR LUMBAR EPIDURAL STEROID INJECTION  06/29/2005     IR LUMBAR EPIDURAL STEROID INJECTION  07/15/2005     IR LUMBAR EPIDURAL STEROID INJECTION  09/28/2005     IR LUMBAR EPIDURAL STEROID INJECTION  12/28/2005     IR LUMBAR EPIDURAL STEROID INJECTION  03/29/2006     IR LUMBAR EPIDURAL STEROID INJECTION  06/08/2006     PHACOEMULSIFICATION CLEAR CORNEA WITH STANDARD INTRAOCULAR LENS IMPLANT Bilateral      KS C- LAMINOPLASTY, 2 OR MORE Left 02/22/2019    Procedure: LEFT CERVICAL 4, 5, 6 LAMINOPLASTY;  Surgeon: Liza Cesar MD;  Location: [facility];  Service: Spine     KS  ESOPHAGOGASTRODUODENOSCOPY TRANSORAL DIAGNOSTIC N/A 05/10/2021    Procedure: ESOPHAGOGASTRODUODENOSCOPY (EGD);  Surgeon: Franklin Mendoza MD;  Location: [facility];  Service: Gastroenterology     NE ESOPHAGOGASTRODUODENOSCOPY TRANSORAL DIAGNOSTIC N/A 06/16/2021    Procedure: ESOPHAGOGASTRODUODENOSCOPY (EGD), WITH ESOPHAGEAL DILATION AND BIOPSY;  Surgeon: Paramjit Brown MD;  Location: [facility];  Service: Gastroenterology     REMOVE HARDWARE STERNUM N/A 1/13/2022    Procedure: STERNAL WIRE REMOVAL;  Surgeon: Raquel De Leon MD;  Location: [facility]     TONSILLECTOMY       XR MYELOGRAM CERVICAL THORACIC LUMBAR  01/17/2019     ZZC LAP,CHOLECYSTECTOMY/EXPLORE N/A 02/28/2019    CHOLECYSTECTOMY, LAPAROSCOPIC;  Surgeon: Mana Roman MD;  Location: [facility];  Service: General       No Known Allergies      Current Outpatient Medications:      acetaminophen (TYLENOL) 500 MG tablet, Take 1,000 mg by mouth every 8 hours as needed , Disp: , Rfl:      albuterol (PROAIR HFA/PROVENTIL HFA/VENTOLIN HFA) 108 (90 Base) MCG/ACT inhaler, Inhale 1-2 puffs into the lungs every 4 hours as needed , Disp: , Rfl:      bumetanide (BUMEX) 1 MG tablet, TAKE ONE TABLET (1MG) BY MOUTH ONCE DAILY, Disp: 90 tablet, Rfl: 1     calcium carbonate-vitamin D 600-200 MG-UNIT TABS, Take 2 tablets by mouth daily , Disp: , Rfl:      carvedilol (COREG) 25 MG tablet, Take 1.5 tablets (37.25 mg) by mouth 2 times daily (with meals), Disp: 45 tablet, Rfl: 5     cyclobenzaprine (FLEXERIL) 5 MG tablet, Take 1 tablet (5 mg) by mouth 2 times daily as needed for muscle spasms, Disp: 60 tablet, Rfl: 1     cycloSPORINE (RESTASIS) 0.05 % ophthalmic emulsion, 1 drop 2 times daily as needed for dry eyes, Disp: , Rfl:      diclofenac (VOLTAREN) 1 % topical gel, Apply 2 g topically 2 times daily , Disp: , Rfl:      erythromycin (ROMYCIN) 5 MG/GM ophthalmic ointment, Place 0.5 inches into both eyes daily , Disp: , Rfl:      ferrous  sulfate (FEROSUL) 325 (65 Fe) MG tablet, Take 1 tablet by mouth daily, Disp: , Rfl:      gabapentin (NEURONTIN) 100 MG capsule, Take 1 capsule (100 mg) by mouth in the morning and 1 capsule (100 mg) in the evening., Disp: 60 capsule, Rfl: 4     gabapentin (NEURONTIN) 100 MG capsule, Take 1 capsule (100 mg) by mouth every morning for 7 days, THEN 1 capsule (100 mg) 2 times daily for 7 days, THEN 3 capsules (300 mg) 3 times daily for 7 days. Do not take medication with other muscle relaxants (flexeril)., Disp: 84 capsule, Rfl: 0     Lidocaine (LIDOCARE) 4 % Patch, Place 1 patch onto the skin daily as needed for moderate pain To prevent lidocaine toxicity, patient should be patch free for 12 hrs daily., Disp: 10 patch, Rfl: 3     losartan (COZAAR) 100 MG tablet, Take 1 tablet (100 mg) by mouth daily, Disp: 90 tablet, Rfl: 3     multivitamin w/minerals (MULTI-VITAMIN) tablet, Take 1 tablet by mouth daily, Disp: , Rfl:      omeprazole (PRILOSEC) 20 MG DR capsule, TAKE ONE CAPSULE BY MOUTH ONCE DAILY BEFORE BREAKFAST (Patient taking differently: Take 20 mg by mouth daily ), Disp: 90 capsule, Rfl: 1     potassium chloride ER (KLOR-CON M) 20 MEQ CR tablet, TAKE ONE TABLET BY MOUTH ONCE DAILY (Patient taking differently: Take 20 mEq by mouth daily ), Disp: 90 tablet, Rfl: 2     rosuvastatin (CRESTOR) 20 MG tablet, TAKE 1 TABLET BY MOUTH DAILY AT BEDTIME (Patient taking differently: Take 20 mg by mouth daily ), Disp: 90 tablet, Rfl: 3     traMADol (ULTRAM) 50 MG tablet, Take 50 mg by mouth every 6 hours as needed , Disp: , Rfl:      vitamin B-12 (CYANOCOBALAMIN) 2000 MCG tablet, Take 2,000 mcg by mouth daily, Disp: , Rfl:      warfarin ANTICOAGULANT (COUMADIN) 2.5 MG tablet, TAKE 1 TO 1.5 TABLETS (2.5-3.75MG) BY MOUTH DAILY. ADJUST DOSE PER INR AS DIRECTED. (Patient taking differently: Take 1.25-2.5 mg by mouth See Admin Instructions 1.25 mg every Mon, Wed, Fri; 2.5 mg all other days), Disp: 90 tablet, Rfl: 3    Current  Facility-Administered Medications:      botulinum toxin type A (BOTOX) 100 units injection 100 Units, 100 Units, Intramuscular, Q90 Days, Leonardo Smart MD    Family History   Problem Relation Age of Onset     Heart Disease Mother      Cerebrovascular Disease Mother      Crohn's Disease Father      Alcoholism Brother      No Known Problems Daughter      No Known Problems Son      No Known Problems Maternal Aunt      No Known Problems Maternal Uncle      No Known Problems Paternal Aunt      No Known Problems Paternal Uncle      No Known Problems Maternal Grandmother      No Known Problems Maternal Grandfather      No Known Problems Paternal Grandmother      No Known Problems Paternal Grandfather      Cancer Brother      Urolithiasis No family hx of      Gout No family hx of        Social History     Socioeconomic History     Marital status:      Spouse name: Not on file     Number of children: Not on file     Years of education: Not on file     Highest education level: Not on file   Occupational History     Not on file   Tobacco Use     Smoking status: Former Smoker     Smokeless tobacco: Never Used   Substance and Sexual Activity     Alcohol use: Not Currently     Drug use: Never     Sexual activity: Yes     Partners: Female   Other Topics Concern     Parent/sibling w/ CABG, MI or angioplasty before 65F 55M? Not Asked   Social History Narrative     Not on file     Social Determinants of Health     Financial Resource Strain: Medium Risk     Difficulty of Paying Living Expenses: Somewhat hard   Food Insecurity: No Food Insecurity     Worried About Running Out of Food in the Last Year: Never true     Ran Out of Food in the Last Year: Never true   Transportation Needs: No Transportation Needs     Lack of Transportation (Medical): No     Lack of Transportation (Non-Medical): No   Physical Activity: Not on file   Stress: Not on file   Social Connections: Not on file   Intimate Partner Violence: Not on file  "  Housing Stability: Low Risk      Unable to Pay for Housing in the Last Year: No     Number of Places Lived in the Last Year: 1     Unstable Housing in the Last Year: No       10 point Review of Systems is negative     Pulse 97   Ht 1.753 m (5' 9\")   Wt 67.6 kg (149 lb)   SpO2 100%   BMI 22.00 kg/m      BMI= Body mass index is 22 kg/m .    OBJECTIVE:  General appearance: Patient is alert and fully cooperative with history & exam.  No sign of distress is noted during the visit.  Vascular: Dorsalis pedis and posterior tibial pulses are palpable. There is no pedal hair growth bilaterally.  CFT < 3 sec from anterior tibial surface to distal digits bilaterally. There is no appreciable edema noted.  Dermatologic: The distal medial aspect of the right great toenail is slightly incurvated.  There is a small hyperkeratotic lesion on the distal aspect of the third toe right foot.  Turgor and texture are within normal limits. No coloration or temperature changes. No other primary or secondary lesions noted.  Neurologic: All epicritic and proprioceptive sensations are grossly intact bilaterally.  Musculoskeletal: All active and passive ankle, subtalar, midtarsal, and 1st MPJ range of motion are grossly intact without pain or crepitus, with the exception of none. Manual muscle strength is within normal limits bilaterally. All dorsiflexors, plantarflexors, invertors, evertors are intact bilaterally. Tenderness present to medial border right great toenail on palpation.  No tenderness to bilateral feet or ankles with range of motion. Calf is soft/non-tender without warmth/induration  Imaging:         No results found.           Dhaval Almazan; NAM  [facility] Foot & Ankle Surgery/Podiatry     "

## 2022-05-05 NOTE — LETTER
5/5/2022         RE: Tanmay Israel  [address]  [place] [address]        Dear Colleague,    Thank you for referring your patient, Tanmay Israel, to the [facility]. Please see a copy of my visit note below.    FOOT AND ANKLE SURGERY/PODIATRY Progress Note        ASSESSMENT:   Ingrown toenail right great toe        TREATMENT:  Debrided the right great toenail today. I recommended the patient return to the clinic as needed.           HPI:Tanmay Israel presented to the clinic today complaining of a painful ingrown toenail involving the right great toe.  The toe is  quite aggravated with weightbearing and ambulation.  He is able to wear shoes fairly well without having too much discomfort.  He has not had any redness, swelling, drainage or bleeding.  His pain is mild to moderate.  He denies any trauma to his feet.  The patient has had the right great toenail trimmed in the past which gave him significant relief.      Past Medical History:   Diagnosis Date     Acute cholecystitis 2/28/2019     Acute post-operative pain      Anemia      Arthritis      Atrial fibrillation (H)      C. difficile diarrhea 4/21/2019     Calculus of ureter      Callus      Cerebral aneurysm      Cervical myelopathy (H) 2/22/2019     Cervical spinal stenosis      Chronic kidney disease     stage 3     Chronic systolic congestive heart failure (H)      Closed fracture of distal end of left radius, unspecified fracture morphology, initial encounter      Closed fracture of distal end of right radius, unspecified fracture morphology, initial encounter      Closed fracture of first thoracic vertebra, unspecified fracture morphology, initial encounter (H)      Coronary artery disease      Crohn's disease (H)      Disorder of bursae and tendons in shoulder region     Created by Conversion  Replacement Utility updated for latest IMO load     Dysphagia      Fall 10/22/2016     GERD (gastroesophageal reflux disease)       Hyperlipidemia      Hypertension      Hypotension, unspecified hypotension type      ICD (implantable cardioverter-defibrillator) in place     Medtronic     Ischemic cardiomyopathy      Kidney stone      Low back pain      Lumbago     Created by Conversion      Macular degeneration      Myalgia and myositis, unspecified     Created by Conversion      Nonspecific elevation of levels of transaminase or lactic acid dehydrogenase (LDH)     Created by Conversion      Permanent atrial fibrillation (H)     [id number] risk score = 5 (age1/ CAD/ HTN/ CHF) Chronic warfarin     Personal history of colonic polyps 2/12/2019     Polyp of duodenum 10/17/2016     Pyuria      Right arm weakness 4/2/2019     Schatzki's ring      Sciatica     Created by Conversion      Sleep apnea     no CPAP     Spondylolisthesis of lumbar region 7/5/2016     Weakness 4/20/2019        Past Surgical History:   Procedure Laterality Date     APPENDECTOMY       ARTHROSCOPY SHOULDER ROTATOR CUFF REPAIR Right      ATRIAL FIBRILLATION/FLUTTER       BYPASS GRAFT ARTERY CORONARY  08/01/2004    Coronary Artery Quadruple Venous Bypass Graft     CARDIAC DEFIBRILLATOR PLACEMENT  01/01/2013    ICD Medtronic     CHOLECYSTECTOMY       COLONOSCOPY N/A 02/19/2020    Procedure: COLONOSCOPY;  Surgeon: Houston Medina MD;  Location: [facility];  Service: Gastroenterology     EP ICD UPGRADE TO BIVENT Left 01/03/2022    Procedure: EP Implantable Cardio-Defibrillator Upgrade to Bivent;  Surgeon: Houston Rolon MD;  Location: [facility]     ESOPHAGOSCOPY, GASTROSCOPY, DUODENOSCOPY (EGD), COMBINED       IR LUMBAR EPIDURAL STEROID INJECTION  06/29/2005     IR LUMBAR EPIDURAL STEROID INJECTION  07/15/2005     IR LUMBAR EPIDURAL STEROID INJECTION  09/28/2005     IR LUMBAR EPIDURAL STEROID INJECTION  12/28/2005     IR LUMBAR EPIDURAL STEROID INJECTION  03/29/2006     IR LUMBAR EPIDURAL STEROID INJECTION  06/08/2006     PHACOEMULSIFICATION CLEAR CORNEA  WITH STANDARD INTRAOCULAR LENS IMPLANT Bilateral      KS C- LAMINOPLASTY, 2 OR MORE Left 02/22/2019    Procedure: LEFT CERVICAL 4, 5, 6 LAMINOPLASTY;  Surgeon: Liza Cesar MD;  Location: [facility];  Service: Spine     KS ESOPHAGOGASTRODUODENOSCOPY TRANSORAL DIAGNOSTIC N/A 05/10/2021    Procedure: ESOPHAGOGASTRODUODENOSCOPY (EGD);  Surgeon: Franklin Mendoza MD;  Location: [facility];  Service: Gastroenterology     KS ESOPHAGOGASTRODUODENOSCOPY TRANSORAL DIAGNOSTIC N/A 06/16/2021    Procedure: ESOPHAGOGASTRODUODENOSCOPY (EGD), WITH ESOPHAGEAL DILATION AND BIOPSY;  Surgeon: Paramjit Brown MD;  Location: [facility];  Service: Gastroenterology     REMOVE HARDWARE STERNUM N/A 1/13/2022    Procedure: STERNAL WIRE REMOVAL;  Surgeon: Raquel De Leon MD;  Location: [facility]     TONSILLECTOMY       XR MYELOGRAM CERVICAL THORACIC LUMBAR  01/17/2019     ZZC LAP,CHOLECYSTECTOMY/EXPLORE N/A 02/28/2019    CHOLECYSTECTOMY, LAPAROSCOPIC;  Surgeon: Mana Roman MD;  Location: [facility];  Service: General       No Known Allergies      Current Outpatient Medications:      acetaminophen (TYLENOL) 500 MG tablet, Take 1,000 mg by mouth every 8 hours as needed , Disp: , Rfl:      albuterol (PROAIR HFA/PROVENTIL HFA/VENTOLIN HFA) 108 (90 Base) MCG/ACT inhaler, Inhale 1-2 puffs into the lungs every 4 hours as needed , Disp: , Rfl:      bumetanide (BUMEX) 1 MG tablet, TAKE ONE TABLET (1MG) BY MOUTH ONCE DAILY, Disp: 90 tablet, Rfl: 1     calcium carbonate-vitamin D 600-200 MG-UNIT TABS, Take 2 tablets by mouth daily , Disp: , Rfl:      carvedilol (COREG) 25 MG tablet, Take 1.5 tablets (37.25 mg) by mouth 2 times daily (with meals), Disp: 45 tablet, Rfl: 5     cyclobenzaprine (FLEXERIL) 5 MG tablet, Take 1 tablet (5 mg) by mouth 2 times daily as needed for muscle spasms, Disp: 60 tablet, Rfl: 1     cycloSPORINE (RESTASIS) 0.05 % ophthalmic emulsion, 1 drop 2 times daily as needed  for dry eyes, Disp: , Rfl:      diclofenac (VOLTAREN) 1 % topical gel, Apply 2 g topically 2 times daily , Disp: , Rfl:      erythromycin (ROMYCIN) 5 MG/GM ophthalmic ointment, Place 0.5 inches into both eyes daily , Disp: , Rfl:      ferrous sulfate (FEROSUL) 325 (65 Fe) MG tablet, Take 1 tablet by mouth daily, Disp: , Rfl:      gabapentin (NEURONTIN) 100 MG capsule, Take 1 capsule (100 mg) by mouth in the morning and 1 capsule (100 mg) in the evening., Disp: 60 capsule, Rfl: 4     gabapentin (NEURONTIN) 100 MG capsule, Take 1 capsule (100 mg) by mouth every morning for 7 days, THEN 1 capsule (100 mg) 2 times daily for 7 days, THEN 3 capsules (300 mg) 3 times daily for 7 days. Do not take medication with other muscle relaxants (flexeril)., Disp: 84 capsule, Rfl: 0     Lidocaine (LIDOCARE) 4 % Patch, Place 1 patch onto the skin daily as needed for moderate pain To prevent lidocaine toxicity, patient should be patch free for 12 hrs daily., Disp: 10 patch, Rfl: 3     losartan (COZAAR) 100 MG tablet, Take 1 tablet (100 mg) by mouth daily, Disp: 90 tablet, Rfl: 3     multivitamin w/minerals (MULTI-VITAMIN) tablet, Take 1 tablet by mouth daily, Disp: , Rfl:      omeprazole (PRILOSEC) 20 MG DR capsule, TAKE ONE CAPSULE BY MOUTH ONCE DAILY BEFORE BREAKFAST (Patient taking differently: Take 20 mg by mouth daily ), Disp: 90 capsule, Rfl: 1     potassium chloride ER (KLOR-CON M) 20 MEQ CR tablet, TAKE ONE TABLET BY MOUTH ONCE DAILY (Patient taking differently: Take 20 mEq by mouth daily ), Disp: 90 tablet, Rfl: 2     rosuvastatin (CRESTOR) 20 MG tablet, TAKE 1 TABLET BY MOUTH DAILY AT BEDTIME (Patient taking differently: Take 20 mg by mouth daily ), Disp: 90 tablet, Rfl: 3     traMADol (ULTRAM) 50 MG tablet, Take 50 mg by mouth every 6 hours as needed , Disp: , Rfl:      vitamin B-12 (CYANOCOBALAMIN) 2000 MCG tablet, Take 2,000 mcg by mouth daily, Disp: , Rfl:      warfarin ANTICOAGULANT (COUMADIN) 2.5 MG tablet, TAKE 1 TO  1.5 TABLETS (2.5-3.75MG) BY MOUTH DAILY. ADJUST DOSE PER INR AS DIRECTED. (Patient taking differently: Take 1.25-2.5 mg by mouth See Admin Instructions 1.25 mg every Mon, Wed, Fri; 2.5 mg all other days), Disp: 90 tablet, Rfl: 3    Current Facility-Administered Medications:      botulinum toxin type A (BOTOX) 100 units injection 100 Units, 100 Units, Intramuscular, Q90 Days, Leonardo Smart MD    Family History   Problem Relation Age of Onset     Heart Disease Mother      Cerebrovascular Disease Mother      Crohn's Disease Father      Alcoholism Brother      No Known Problems Daughter      No Known Problems Son      No Known Problems Maternal Aunt      No Known Problems Maternal Uncle      No Known Problems Paternal Aunt      No Known Problems Paternal Uncle      No Known Problems Maternal Grandmother      No Known Problems Maternal Grandfather      No Known Problems Paternal Grandmother      No Known Problems Paternal Grandfather      Cancer Brother      Urolithiasis No family hx of      Gout No family hx of        Social History     Socioeconomic History     Marital status:      Spouse name: Not on file     Number of children: Not on file     Years of education: Not on file     Highest education level: Not on file   Occupational History     Not on file   Tobacco Use     Smoking status: Former Smoker     Smokeless tobacco: Never Used   Substance and Sexual Activity     Alcohol use: Not Currently     Drug use: Never     Sexual activity: Yes     Partners: Female   Other Topics Concern     Parent/sibling w/ CABG, MI or angioplasty before 65F 55M? Not Asked   Social History Narrative     Not on file     Social Determinants of Health     Financial Resource Strain: Medium Risk     Difficulty of Paying Living Expenses: Somewhat hard   Food Insecurity: No Food Insecurity     Worried About Running Out of Food in the Last Year: Never true     Ran Out of Food in the Last Year: Never true   Transportation Needs: No  "Transportation Needs     Lack of Transportation (Medical): No     Lack of Transportation (Non-Medical): No   Physical Activity: Not on file   Stress: Not on file   Social Connections: Not on file   Intimate Partner Violence: Not on file   Housing Stability: Low Risk      Unable to Pay for Housing in the Last Year: No     Number of Places Lived in the Last Year: 1     Unstable Housing in the Last Year: No       10 point Review of Systems is negative     Pulse 97   Ht 1.753 m (5' 9\")   Wt 67.6 kg (149 lb)   SpO2 100%   BMI 22.00 kg/m      BMI= Body mass index is 22 kg/m .    OBJECTIVE:  General appearance: Patient is alert and fully cooperative with history & exam.  No sign of distress is noted during the visit.  Vascular: Dorsalis pedis and posterior tibial pulses are palpable. There is no pedal hair growth bilaterally.  CFT < 3 sec from anterior tibial surface to distal digits bilaterally. There is no appreciable edema noted.  Dermatologic: The distal medial aspect of the right great toenail is slightly incurvated.  There is a small hyperkeratotic lesion on the distal aspect of the third toe right foot.  Turgor and texture are within normal limits. No coloration or temperature changes. No other primary or secondary lesions noted.  Neurologic: All epicritic and proprioceptive sensations are grossly intact bilaterally.  Musculoskeletal: All active and passive ankle, subtalar, midtarsal, and 1st MPJ range of motion are grossly intact without pain or crepitus, with the exception of none. Manual muscle strength is within normal limits bilaterally. All dorsiflexors, plantarflexors, invertors, evertors are intact bilaterally. Tenderness present to medial border right great toenail on palpation.  No tenderness to bilateral feet or ankles with range of motion. Calf is soft/non-tender without warmth/induration  Imaging:         No results found.           Dhaval Almazan; NAM  [facility] Foot & Ankle Surgery/Podiatry "         Again, thank you for allowing me to participate in the care of your patient.        Sincerely,        Dhaval Almazan DPM

## 2022-05-09 NOTE — PROGRESS NOTES
ANTICOAGULATION MANAGEMENT     Tanmay Israel 82 year old male is on warfarin with therapeutic INR result. (Goal INR 2.0-3.0)    Recent labs: (last 7 days)     05/09/22  1020   INR 2.10*       ASSESSMENT       Source(s): Chart Review, Patient/Caregiver Call and Template       Warfarin doses taken: Warfarin taken as instructed    Diet: No new diet changes identified    New illness, injury, or hospitalization: No    Medication/supplement changes: None noted    Signs or symptoms of bleeding or clotting: No    Previous INR: Subtherapeutic    Additional findings: None       PLAN     Recommended plan for no diet, medication or health factor changes affecting INR     Dosing Instructions: continue your current warfarin dose with next INR in 1-2 weeks       Summary  As of 5/9/2022    Full warfarin instructions:  2.5 mg every Sun, Tue, Thu; 1.25 mg all other days   Next INR check:  5/23/2022             Telephone call with Tanmay who verbalizes understanding and agrees to plan    Lab visit scheduled    Education provided: Please call back if any changes to your diet, medications or how you've been taking warfarin, Goal range and significance of current result and Importance of therapeutic range    Plan made per [facility] anticoagulation protocol    Bekah Urban RN  Anticoagulation Clinic  5/9/2022    _______________________________________________________________________     Anticoagulation Episode Summary     Current INR goal:  2.0-3.0   TTR:  47.6 % (1 y)   Target end date:  Indefinite   Send INR reminders to:  CHANI RIOS    Indications    Atrial fibrillation (H) [I48.91]  Pulmonary embolism with infarction (H) [I26.99]  Atrial fibrillation  unspecified type (H) [I48.91]           Comments:           Anticoagulation Care Providers     Provider Role Specialty Phone number    Go Ramírez MD Referring Family Medicine [phone]

## 2022-05-19 NOTE — TELEPHONE ENCOUNTER
Patient reports that he 75% improvement from piriformis injection in February. He feels that the duration of benefit lasted a couple months and feels that pain is worsening as of recent. He has continued taking Gabapentin 2x daily but feels that this is not effective as it was initially. Writer instructed patient that he may take the Gabapentin up to 3x daily per Dr. Smart. Instructed to avoid taking at the same time as flexeril. Patient verbalized understanding and will call to schedule appointment for another injection.

## 2022-05-23 NOTE — PROGRESS NOTES
ANTICOAGULATION MANAGEMENT     Tanmay Israel 82 year old male is on warfarin with supratherapeutic INR result. (Goal INR 2.0-3.0)    Recent labs: (last 7 days)     05/23/22  0945   INR 3.20*       ASSESSMENT       Source(s): Chart Review, Patient/Caregiver Call and Template       Warfarin doses taken: Warfarin taken as instructed    Diet: No new diet changes identified    New illness, injury, or hospitalization: No    Medication/supplement changes: None noted    Signs or symptoms of bleeding or clotting: No    Previous INR: Therapeutic last visit; previously outside of goal range    Additional findings: None       PLAN     Recommended plan for no diet, medication or health factor changes affecting INR     Dosing Instructions: decrease your warfarin dose (10% change) with next INR in 2 weeks       Summary  As of 5/23/2022    Full warfarin instructions:  2.5 mg every Sun, Thu; 1.25 mg all other days   Next INR check:  6/6/2022             Telephone call with Tanmay who verbalizes understanding and agrees to plan    Check at provider office visit    Education provided: Please call back if any changes to your diet, medications or how you've been taking warfarin, Goal range and significance of current result and Importance of therapeutic range    Plan made per [facility] anticoagulation protocol    Bekah Urban RN  Anticoagulation Clinic  5/23/2022    _______________________________________________________________________     Anticoagulation Episode Summary     Current INR goal:  2.0-3.0   TTR:  50.9 % (1 y)   Target end date:  Indefinite   Send INR reminders to:  CHANI RIOS    Indications    Atrial fibrillation (H) [I48.91]  Pulmonary embolism with infarction (H) [I26.99]  Atrial fibrillation  unspecified type (H) [I48.91]           Comments:           Anticoagulation Care Providers     Provider Role Specialty Phone number    Go Ramírez MD Referring Family Medicine [phone]

## 2022-05-25 NOTE — PROGRESS NOTES
Clinic Care Coordination Contact    Community Health Worker Follow Up    Care Gaps:     Health Maintenance Due   Topic Date Due     HF ACTION PLAN  Never done     MICROALBUMIN  05/22/2020     LIPID  05/19/2021         Goals:    Goals Addressed as of 5/25/2022 at 9:24 AM                    Today    3/8/22       1. Reducing Risks (pt-stated)   On Hold  10%    Added 11/30/21 by Myhre, David J, RN      Goal Statement: I would like to have a home care RN assist me with my medications for better compliance in the next 2 months.   Date Goal set: 11/29/21  Barriers: Patient said he has difficulty managing his medications. Homebound.  Strengths: Strong advocate for self good family support.  Date to Achieve By: 1/29/21  Patient expressed understanding of goal: Yes  Action steps to achieve this goal:  1. I understand the [facility] RN will request a home care order from Dr. Ramírez to have an RN come out and assist me with my medications.   2. I will continue to take my medications daily as best as I can until the home care nurse is able to come out. I will ask my niece for assistance when needed.   3. I will report progress towards this goal at scheduled outreach calls from my [facility] team.  12-1 discussed, home care order sent again.      12/8/21 - Referred to community paramedics until patient is able to get RN services through his waiver.   1/7/22 - Paramedic scheduled to see him on 1/10/22  Discussed on 1/25/22 - [place] [facility] today to follow up on in-home RN to assist with his medications. She stated the [facility] are helping him out at this time.  Discussed 3/3/22  Discussed on 3/8/22 - Patient is managing his medications independently. [facility]  said she was not able to find RN to assist with medication management at this time, but will continue to try to find.   Discussed on 4/19/22. Patient manages his medications currently with assistance from family. [facility]  has not been able  to find an RN to manages his medications per Mary Kay, but is still looking.     Discussed 5/25/22 Mary Kay feels at this time it is not needed, and recommended putting goal on hold until resource is needed            Medication 1 (pt-stated)         Added 5/25/22 by Lawanda Hsu      Goal Statement: I would like to make it to all of my scheduled appointments and take medications as prescribed.  Date Goal set: 5/25/22  Barriers: Patient is currently dependent on family  Strengths: patient is motivated and has help from family   Date to Achieve By: 11/25/22  Patient expressed understanding of goal: yes   Action steps to achieve this goal:  1. I will follow the doctors recommendations  2. I will go to my scheduled appointments  3. I will report progress towards this goal at scheduled outreach telephone calls from my [facility] team.                  CHW Note: CHW contacted patients krystin Muñiz to review [facility] goals. Mary Kay stated the patient is doing good, and feels like the patient has been improving. Mary Kay shared that the patient has been driving a little more now, just short distances but he is able to do that. Mary Kay shared that the patient has appointments coming up surrounding his heart and will be seen the beginning on June. At this time Mary Kay feels the home care RN is not necessary, and recommended putting the goal on hold until resource is needed. CHW started new goal for patient surrounding taking his medications on time and going to scheduled appointments. Mary Kay shared the patient received a in home INR monitor, but they had to return it as it was difficult for patient to manage. Mary Kay stated that the patient has someone coming to help him clean his apartment 1-2 times a week and that has been very helpful. Mary Kay denied any other needs or concerns at this time. Mary Kay was very thankful for phone call and the [facility] team.     Intervention and Education during outreach: CHW started new goal with Mary Kay around patient taking  his medications and going to scheduled appointments. CHW gave patient contact information and encouraged her to call with questions or concerns.     CHW Plan: CHW will continue to support patient with goals through routine scheduled outreach. CHW will outreach in one month on 6/24/22.    Lawanda Hsu  Community Health Worker   [facility]  Clinic Care Coordination  [facility]   [email or web address]  Office: [phone]

## 2022-05-26 NOTE — PROGRESS NOTES
PM&R Follow-Up Visit -     Date of Initial Visit: 2020  LOV: 2022  TD: 2022     Recall: Tanmay Israel is a 82 year old male with CAD/ICM s/p ICD/PPM on warfarin, s/p L5 lumbar laminectomy who follows up for right gluteal and lumbar radicular pain and acute left gluteal pain.    INTERVAL HISTORY:  Patient was last seen in clinic .  At that visit, he underwent repeat ultrasound-guided right piriformis muscle corticosteroid injection -he continues to report approximately 50% improvement which she is pleased about.  He states that over the last several days he began developing similar symptoms on the left side without apparent accident, injury or other inciting event.  He was interested in a similar type of injection to help treat his pain and symptoms on the left. Extreme Pain (8)/10 today.  Pain is localized to the left gluteal region extending to the posterior thigh to approximately the level of the knee.      PRIOR INJURIES/TREATMENT:   Ice/Heat: some help  Brace: none  Physical Therapy:   Feels that it was not helpful in the past      - Current Pain Medications -   Flexeril 5-10mg at daily PRN  Gabapentin 100 mg 3 times daily    - Prior/Trialed Pain Medications -   Aleve/Tylenol OTC    Prior Procedures:  Prior SI joint injection got 30% relief       Date    Procedure     Improvement (%)  21 R Piriformis mm CSI (US)  50-60%  22 R Piriformis mm CSI (US)  50% ongoing    Prior Related Surgery: prior history of L5 laminectomy, but prior to this pain.   Other (acupuncture, OMT, CMM, TENS, DME, etc.): none    Specialists Seen:   1. Orthopedic spine surgery - Dr. Houston Durán     IMAGIN/3/2020 XR Spine    Findings:   Outlet and lateral  view(s) of the pelvis were obtained.    No acute osseous abnormality.   Extensive enthesopathic changes of innominate bones and trochanters.   Bones appear osteopenic.     Mild to moderate vertebral body height loss of L4 and L5,  similar to prior. 8 mm anterolisthesis of L4 on L5 with moderate to severe disc space loss posteriorly. 6 mm retrolisthesis L3 on L4.     Vascular calcifications.     Degenerative changes of bilateral hips, greater on the right which is moderate to severe. Radiodensity right lower quadrant, presumably ingested material.                                                                   IMPRESSION:  1. Osteopenia.  2. 8 mm anterolisthesis of L4 on L5 with moderate to severe disc space  loss posteriorly.    Medical History:  History of CAD, atrial fibrillation, ischemic cardiomyopathy status post ICD/PPM on warfarin, status post lumbar laminectomy and facetectomy, rotator cuff disease, GERD, rotator cuff arthropathy, iron deficiency anemia, hyperlipidemia, benign essential hypertension, history of tonsillectomy, left cervical 4, 5, 6 laminoplasty-surgeon Dr. Liza Cesar at [facility]    Family History  His family history includes alcohol abuse in his brother cancer in his brother, Crohn's disease in his father, stroke in his mother, gout and his son    Social History:  Work: retired  History of any legal related pain issues: none  Current living situation: independent apartment  Smoking: none   rare EtOH, none Illicit      Current Medications:   He has a current medication list which includes the following prescription(s): acetaminophen, albuterol, bumetanide, calcium carbonate-vitamin d, carvedilol, cyclobenzaprine, cyclosporine, diclofenac, erythromycin, ferrous sulfate, gabapentin, lidocaine, losartan, multivitamin w/minerals, omeprazole, potassium chloride er, rosuvastatin, tramadol, tramadol, vitamin b-12, warfarin anticoagulant, and gabapentin, and the following Facility-Administered Medications: botulinum toxin type a.       Allergies:   No Known Allergies    PHYSICAL EXAMINATION:  /69   Pulse 60   SpO2 99%    General: Pleasant, straightforward, WDWN individual.  Mental Status:  Pleasant, direct, appropriate mood and affect  Resp: breathing is unlabored without audible wheeze  Vascular: no visbile cyanosis, no venous stasis changes  Heme: no visible ecchymosis or erythema on extremities  Skin: No notable rash     Neurologic:  Strength: All major muscle groups of the bilateral lower extremities have normal and symmetric muscle strength       Musculoskeletal:   Tender over Left GMax and Piriformis and with positive piriformis stretch     ASSESSMENT:  Tanmay Israel is a very pleasant 82 year old gentleman who presents with:    #. Piriformis syndrome, left (primary encounter diagnosis)  #. Piriformis muscle pain, acute on the left  #. Neural foraminal stenosis of lumbosacral spine  #. Sacroiliac joint dysfunction    Complicating co-morbidities include:   #. CAD/ICM s/p ICD/PPM on systemic warfarin anticoagulation      PLAN:  -We deferred ultrasound-guided left piriformis corticosteroid injection today due to supratherapeutic INR level earlier this week.  We will attempt to reschedule for next week with a day of INR value ideally less than 3.0  -Rx: Tramadol 50 mg #10 R- 0 for acute, severe pain only.  Patient understands interaction with warfarin and to use sparingly.  -Rx: Flexeril for acute left-sided muscle pain, spasm  -RTC x1wk for US-guided procedure.     Ready to learn, no apparent learning barriers.  Education provided on treatment plan according to patient's preferred learning style.  Patient verbalizes understanding.      ________________________________   Leonardo Smart MD  Department of Physical Medicine & Rehabilitation        25 minutes spent on the date of the encounter doing chart review, history and exam, documentation and further activities per the note

## 2022-05-26 NOTE — LETTER
5/26/2022       RE: Tanmay Israel  [address]  [place] [facility] [place] [facility] [address]     Dear Colleague,    Thank you for referring your patient, Tanmay Israel, to the [facility] [place] [facility]. Please see a copy of my visit note below.    PM&R Follow-Up Visit -     Date of Initial Visit: 12/16/2020  LOV: 02/16/2022  TD: 5/26/2022     Recall: Tanmay Israel is a 82 year old male with CAD/ICM s/p ICD/PPM on warfarin, s/p L5 lumbar laminectomy who follows up for right gluteal and lumbar radicular pain.     INTERVAL HISTORY:  Patient was last seen in clinic February 2, 2016 2022.  At that visit, he underwent repeat ultrasound-guided right piriformis muscle corticosteroid injection -he continues to report approximately 50% improvement which she is pleased about.  He states that over the last several days he began developing similar symptoms on the left side without apparent accident, injury or other inciting event.  He was interested in a similar type of injection to help treat his pain and symptoms on the left. Extreme Pain (8)/10 today.  Pain is localized to the left gluteal region extending to the posterior thigh to approximately the level of the knee.    PRIOR INJURIES/TREATMENT:   Ice/Heat: some help  Brace: none  Physical Therapy:   Feels that it was not helpful in the past      - Current Pain Medications -   Flexeril 5-10mg at daily PRN  Gabapentin 100 mg 3 times daily    - Prior/Trialed Pain Medications -   Aleve/Tylenol OTC    Prior Procedures:  Prior SI joint injection got 30% relief       Date    Procedure     Improvement (%)  05/26/21 R Piriformis mm CSI (US)  50-60%  02/16/22 R Piriformis mm CSI (US)  50% ongoing    Prior Related Surgery: prior history of L5 laminectomy, but prior to this pain.   Other (acupuncture, OMT, CMM, TENS, DME, etc.): none    Specialists Seen:   1. Orthopedic spine surgery - Dr. Conrad  Leeanna     IMAGIN/3/2020 XR Spine    Findings:   Outlet and lateral  view(s) of the pelvis were obtained.    No acute osseous abnormality.   Extensive enthesopathic changes of innominate bones and trochanters.   Bones appear osteopenic.     Mild to moderate vertebral body height loss of L4 and L5, similar to prior. 8 mm anterolisthesis of L4 on L5 with moderate to severe disc space loss posteriorly. 6 mm retrolisthesis L3 on L4.     Vascular calcifications.     Degenerative changes of bilateral hips, greater on the right which is moderate to severe. Radiodensity right lower quadrant, presumably ingested material.                                                                   IMPRESSION:  1. Osteopenia.  2. 8 mm anterolisthesis of L4 on L5 with moderate to severe disc space  loss posteriorly.    Medical History:  History of CAD, atrial fibrillation, ischemic cardiomyopathy status post ICD/PPM on warfarin, status post lumbar laminectomy and facetectomy, rotator cuff disease, GERD, rotator cuff arthropathy, iron deficiency anemia, hyperlipidemia, benign essential hypertension, history of tonsillectomy, left cervical 4, 5, 6 laminoplasty-surgeon Dr. Liza Cesar at [facility]    Family History  His family history includes alcohol abuse in his brother cancer in his brother, Crohn's disease in his father, stroke in his mother, gout and his son    Social History:  Work: retired  History of any legal related pain issues: none  Current living situation: independent apartment  Smoking: none   rare EtOH, none Illicit   Current Medications:   He has a current medication list which includes the following prescription(s): acetaminophen, albuterol, bumetanide, calcium carbonate-vitamin d, carvedilol, cyclobenzaprine, cyclosporine, diclofenac, erythromycin, ferrous sulfate, gabapentin, lidocaine, losartan, multivitamin w/minerals, omeprazole, potassium chloride er, rosuvastatin, tramadol, tramadol,  vitamin b-12, warfarin anticoagulant, and gabapentin, and the following Facility-Administered Medications: botulinum toxin type a.       Allergies:   No Known Allergies    PHYSICAL EXAMINATION:  /69   Pulse 60   SpO2 99%    General: Pleasant, straightforward, WDWN individual.  Mental Status: Pleasant, direct, appropriate mood and affect  Resp: breathing is unlabored without audible wheeze  Vascular: no visbile cyanosis, no venous stasis changes  Heme: no visible ecchymosis or erythema on extremities  Skin: No notable rash     Neurologic:  Strength: All major muscle groups of the bilateral lower extremities have normal and symmetric muscle strength       Musculoskeletal:   Tender over Left GMax and Piriformis and with positive piriformis stretch     ASSESSMENT:  Tanmay Israel is a very pleasant 82 year old gentleman who presents with:    #. Piriformis syndrome, left (primary encounter diagnosis)  #. Piriformis muscle pain  #. Neural foraminal stenosis of lumbosacral spine  #. Sacroiliac joint dysfunction    Complicating co-morbidities include:   #. CAD/ICM s/p ICD/PPM on systemic warfarin anticoagulation      PLAN:  -We deferred ultrasound-guided left piriformis corticosteroid injection today due to supratherapeutic INR level earlier this week.  We will attempt to reschedule for next week with a day of INR value ideally less than 3.0  -Rx: Tramadol 50 mg #10 R- 0 for acute, severe pain only.  Patient understands interaction with warfarin and to use sparingly.  -RTC x1wk for US-guided procedure.     Ready to learn, no apparent learning barriers.  Education provided on treatment plan according to patient's preferred learning style.  Patient verbalizes understanding.      Leonardo Smart MD  Department of Physical Medicine & Rehabilitation        25 minutes spent on the date of the encounter doing chart review, history and exam, documentation and further activities per the note

## 2022-05-26 NOTE — TELEPHONE ENCOUNTER
Health Call Center    Phone Message    May a detailed message be left on voicemail: yes     Reason for Call: Symptoms or Concerns     If patient has red-flag symptoms, warm transfer to triage line    Current symptom or concern: Pain now on left side.  Right side is where he received injection before.    Pain has been present on left side three days.  Pain is getting worse.    He would like you to call him back to see Dr. Smart and medication .    Please call  Him back.    Symptoms have been present for:  3 day(s)    By : Bing    Date: ASAP      Action Taken: Message routed to:  [facility] PM&R    Travel Screening: Not Applicable

## 2022-06-01 NOTE — PROGRESS NOTES
ANTICOAGULATION MANAGEMENT     Tanmay Israel 82 year old male is on warfarin with therapeutic INR result. (Goal INR 2.0-3.0)    Recent labs: (last 7 days)     06/01/22  0851   INR 2.8*       ASSESSMENT       Source(s): Chart Review and Template       Warfarin doses taken: not indicated on template    Diet: No new diet changes identified    New illness, injury, or hospitalization: No    Medication/supplement changes: None noted    Signs or symptoms of bleeding or clotting: No    Previous INR: Supratherapeutic    Additional findings: Upcoming surgery/procedure L piriformis corticosteroid injection tomorrow (per OV note from 5/26-- INR just needs to be < 3.0)       PLAN     Recommended plan for no diet, medication or health factor changes affecting INR     Dosing Instructions: continue your current warfarin dose with next INR in 2 weeks       Summary  As of 6/1/2022    Full warfarin instructions:  2.5 mg every Sun, Thu; 1.25 mg all other days   Next INR check:  6/15/2022             Detailed voice message left for Tanmay with dosing instructions and follow up date.     Contact [phone] to schedule and with any changes, questions or concerns.     Education provided: Please call back if any changes to your diet, medications or how you've been taking warfarin and Goal range and significance of current result    Plan made per [facility] anticoagulation protocol    Yaneth Wells, RN  Anticoagulation Clinic  6/1/2022    _______________________________________________________________________     Anticoagulation Episode Summary     Current INR goal:  2.0-3.0   TTR:  49.7 % (1 y)   Target end date:  Indefinite   Send INR reminders to:  [facility]    Indications    Atrial fibrillation (H) [I48.91]  Pulmonary embolism with infarction (H) [I26.99]  Atrial fibrillation  unspecified type (H) [I48.91]           Comments:           Anticoagulation Care Providers     Provider Role Specialty Phone number    Go Ramírez MD  [facility] [phone]

## 2022-06-02 NOTE — NURSING NOTE
Chief Complaint   Patient presents with     Procedure     Piriformis Injection     Felicia Palomares

## 2022-06-02 NOTE — LETTER
6/2/2022       RE: Tanmay Israel  [address]  [facility] [address]     Dear Colleague,    Thank you for referring your patient, Tanmay Israel, to the [facility] PHYSICAL MEDICINE AND REHABILITATION CLINIC [place] at [facility]. Please see a copy of my visit note below.    PROCEDURE NOTE: Piriformis Muscle Injection Under Ultrasound Guidance    PROCEDURE DATE: 6/2/2022    PATIENT NAME: Tanmay Israel  YOB: 1940    ATTENDING PHYSICIAN: Leonardo Smart MD  FELLOW/RESIDENT PHYSICIAN: None     PREOPERATIVE DIAGNOSIS:   1. Piriformis muscle pain    2. Piriformis syndrome of left side    3. Muscle spasm       POSTOPERATIVE DIAGNOSIS: same    PROCEDURE PERFORMED: Left Piriformis Muscle Injection Under Ultrasound Guidance    ULTRASOUND WAS USED.    INDICATIONS FOR THE PROCEDURE:  Tanmay Israel is a 82 year old male who presents with piriformis syndrome on the left.     PROCEDURE AND FINDINGS:  He was greeted in the clinic. The risk, benefits and alternatives to the procedure were again reviewed with him and informed consent was obtained and the patient agreed to proceed. A time-out was performed. Following review alternatives, benefits and risks, the procedure was carried out under sterile prep with sterile gel. The use of direct sonographic guidance was used to ensure accurate placement of the needle (rather than non-guided injection) and required to minimize the risk of bleeding or injury to nearby neurovascular structures.     A 5-1MHz ultrasound transducer was used to visualize the relevant structures and determine the optimal needle path for the procedure.  2mL 1% lidocaine was infiltrated at the needle insertion site subcutaneously. Then, a 22 gauge 5-inch Quincke spinal needle was advanced from lateral to medial utilizing an in-plane approach in long axis, under continuous ultrasound guidance to the piriformis muscle on the left. After  negative aspiration, slow injection of the treatment solution 3mL total consisting of 1mL Kenalog (40mg/mL) and 2mL of 1% Lidocaine was instilled into affected area. The tip of the needle was visualized throughout the procedure. The remainder of the single-use vials were discarded.      He tolerated the procedure well, was discharged home in stable condition.     Follow-up will be in clinic or as needed.     COMPLICATIONS: None    COMMENTS: None        Sincerely,    Leonardo Smart MD

## 2022-06-03 NOTE — TELEPHONE ENCOUNTER
[facility] Call Center    Phone Message    May a detailed message be left on voicemail: yes     Reason for Call: Other: Patient would like a copy of his after visit summary notes mailed to him from yesterdays appt with Dr. Smart.     Action Taken: Message routed to:  [facility] ([facility] physical medicine and rehab    Travel Screening: Not Applicable

## 2022-06-06 NOTE — PROGRESS NOTES
Patient and female called back. Confirmed dosing instructions. Made INR appointment.  Romelia Kelly RN

## 2022-06-06 NOTE — PROGRESS NOTES
[facility]   [address], [place] [address]   [email or web address]   OFFICE: [phone]         Thank you, Go Casas, for asking the [facility] Heart Care team to see Mr. Tanmay Israel to evaluate New Patient (Establish with general cardiology, multiple cardiac diagnoses.)         Assessment/Recommendations   Assessment:    1. Chronic heart failure with reduced LVEF - currently stable and compensated  2. Ischemic cardiomyopathy with LVEF 25-30%  3. Permanent atrial fibrillation - controlled rate on warfarin for stroke prevention  4. Biventricular ICD - stable function  5. PVCs and NSVT - both present on ICD interrogation.    Plan:  1. Decrease carvedilol to 25 mg BID.  2. Consider tying bumetanide as needed  3. Continue other mediations without changes  4. Follow up with me in 3 months         History of Present Illness   Mr. Tanmay Israel is a 82 year old male with a significant past history of permanent atrial fibrillation, ischemic cardiomyopathy with LVEF 25-30%, coronary artery disease with remote CABG and a BiV ICD who presents to establish with general cardiology. This is a new patient to me, and for the past several years has only followed in the EP clinic with Dr. Rolon.    Mr. Israel continues on warfarin for stroke prevention for his atrial fibrillation. His BiV pacing efficiency is 96% with fairly frequent PVCs. Tanmay is feeling generally well today but is asking if he can cut back on any of his medications. He tries to walk a mile per day and is able to walk 2-3 blocks before needing to stop and rest due to some fatigue and light headedness. He denies any symptoms of water retention, even if he misses doses of his diuretic.      Other than noted above, Mr. Israel denies any chest pain/pressure/tightness, shortness of breath at rest or with exertion, light headedness/dizziness, pre-syncope, syncope, lower extremity swelling,  palpitations, paroxysmal nocturnal dyspnea (PND), or orthopnea.     Cardiac Problems and Cardiac Diagnostics     Most Recent Cardiac testing:  ECG dated 6/11/21 (personaly reviewed and interpreted): atrial fibrillation with controlled HR. Left axis deviation and LBBB.    ICD interrogation 6/3/22  Data was compiled on 6/2/22 for review and interpretation on the scheduled date 6/3/22.  Type: remote CRT-D transmission done prior to clinic appointment with Dr. Fleming, checking PVCs and biVP%. Done as a courtesy check.  Presenting rhythm: biV pacing rate 69 bpm.   Battery/lead status: stable  Arrhythmias: since 4/28/22, one NSVT detected. From 4/4/22 - 4/28/22, 41.5 PVCs/hr; from 4/28/22 - 6/2/22, 6.6 PVCs/hr.  Anticoagulant: warfarin  Comments: normal ICD function. BiV pacing 96.2%. Recent Optivol/TI excursions, now back to baseline.   Device/lead alerts: none.  KERI Roberts, Device Specialist    ECHO (report reviewed):   TTE 6/12/21    Severe left atrial and moderate right atrial enlargement    Left ventricle ejection fraction is severely decreased. The calculated left ventricular ejection fraction is 24% with global hypokinesis. Abnormal septal motion is consistent with paced rhythm    Mildly dilated right ventricle with moderately decreased systolic function    Aortic valve sclerosis    Mild to moderate mitral and moderate tricuspid insufficiency. Moderate to severe pulmonary hypertension noted.    When compared to the previous study dated 11/12/2018, left ventricular ejection fraction has decreased. There is an increase in the degree of mitral and tricuspid insufficiency with further increase in pulmonary artery pressures.    Stress test: dated 6/14/21 revealed      The nuclear stress test is mildly abnormal.     Nuclear images demonstrate a small area of nontransmural infarction involving the distal anteroseptal wall.  No ischemia is identified.     Left ventricular ejection fraction at stress is 32% with global  hypokinesis.     A prior study was conducted on 2/1/2013.  The distal anteroseptal infarct is new.  Ejection fraction has decreased mildly.         Medications  Allergies   Current Outpatient Medications   Medication Sig Dispense Refill     acetaminophen (TYLENOL) 500 MG tablet Take 1,000 mg by mouth every 8 hours as needed        albuterol (PROAIR HFA/PROVENTIL HFA/VENTOLIN HFA) 108 (90 Base) MCG/ACT inhaler Inhale 1-2 puffs into the lungs every 4 hours as needed        bumetanide (BUMEX) 1 MG tablet TAKE ONE TABLET (1MG) BY MOUTH ONCE DAILY 90 tablet 1     calcium carbonate-vitamin D 600-200 MG-UNIT TABS Take 2 tablets by mouth daily        carvedilol (COREG) 25 MG tablet Take 1.5 tablets (37.25 mg) by mouth 2 times daily (with meals) 45 tablet 5     cyclobenzaprine (FLEXERIL) 5 MG tablet Take 1 tablet (5 mg) by mouth 2 times daily as needed for muscle spasms 30 tablet 3     cycloSPORINE (RESTASIS) 0.05 % ophthalmic emulsion 1 drop 2 times daily as needed for dry eyes       diclofenac (VOLTAREN) 1 % topical gel Apply 2 g topically 2 times daily        erythromycin (ROMYCIN) 5 MG/GM ophthalmic ointment Place 0.5 inches into both eyes daily        ferrous sulfate (FEROSUL) 325 (65 Fe) MG tablet Take 1 tablet by mouth daily       gabapentin (NEURONTIN) 100 MG capsule Take 1 capsule (100 mg) by mouth 3 times daily 90 capsule 4     Lidocaine (LIDOCARE) 4 % Patch Place 1 patch onto the skin daily as needed for moderate pain To prevent lidocaine toxicity, patient should be patch free for 12 hrs daily. 10 patch 3     losartan (COZAAR) 100 MG tablet Take 1 tablet (100 mg) by mouth daily 90 tablet 3     multivitamin w/minerals (THERA-VIT-M) tablet Take 1 tablet by mouth daily       omeprazole (PRILOSEC) 20 MG DR capsule TAKE ONE CAPSULE BY MOUTH ONCE DAILY BEFORE BREAKFAST (Patient taking differently: Take 20 mg by mouth daily) 90 capsule 1     potassium chloride ER (KLOR-CON M) 20 MEQ CR tablet TAKE ONE TABLET BY MOUTH  ONCE DAILY (Patient taking differently: Take 20 mEq by mouth daily) 90 tablet 2     rosuvastatin (CRESTOR) 20 MG tablet TAKE 1 TABLET BY MOUTH DAILY AT BEDTIME (Patient taking differently: Take 20 mg by mouth daily) 90 tablet 3     traMADol (ULTRAM) 50 MG tablet Take 50 mg by mouth every 6 hours as needed        vitamin B-12 (CYANOCOBALAMIN) 2000 MCG tablet Take 2,000 mcg by mouth daily       warfarin ANTICOAGULANT (COUMADIN) 2.5 MG tablet TAKE 1 TO 1.5 TABLETS (2.5-3.75MG) BY MOUTH DAILY. ADJUST DOSE PER INR AS DIRECTED. (Patient taking differently: Take 1.25-2.5 mg by mouth See Admin Instructions 1.25 mg every Mon, Wed, Fri; 2.5 mg all other days) 90 tablet 3      No Known Allergies     Physical Examination Review of Systems   Vitals: /56 (BP Location: Left arm, Patient Position: Sitting, Cuff Size: Adult Regular)   Pulse 64   Resp 14   Wt 65.8 kg (145 lb)   BMI 21.41 kg/m    BMI= Body mass index is 21.41 kg/m .  Wt Readings from Last 3 Encounters:   06/06/22 65.8 kg (145 lb)   05/05/22 67.6 kg (149 lb)   02/28/22 67.6 kg (149 lb)       General Appearance:   Pleasant male, appears stated age. no acute distress, thin body habitus, frail in appearance.   ENT/Mouth: membranes moist, no apparent gingival bleeding.      EYES:  no scleral icterus, normal conjunctivae   Neck: no carotid bruits. supple   Respiratory:   lungs are clear to auscultation, no rales or wheezing, equal chest wall expansion    Cardiovascular:   Irregularly irregular rhythm, normal rate. Normal first and second heart sounds with no murmurs, rubs, or gallops;  Jugular venous pressure normal, no edema bilaterally    Abdomen/GI:  Soft, non-tender   Extremities: no cyanosis or clubbing   Skin: no xanthelasma, warm.    Heme/lymph/ Immunology No apparent bleeding noted.   Neurologic: Alert and oriented.  no tremors   Psychiatric: Pleasant, calm, appropriate affect.         Please refer above for cardiac ROS details.       Past History   Past  Medical History:   Past Medical History:   Diagnosis Date     Acute cholecystitis 2/28/2019     Acute post-operative pain      Anemia      Arthritis      Atrial fibrillation (H)      C. difficile diarrhea 4/21/2019     Calculus of ureter      Callus      Cerebral aneurysm      Cervical myelopathy (H) 2/22/2019     Cervical spinal stenosis      Chronic kidney disease     stage 3     Chronic systolic congestive heart failure (H)      Closed fracture of distal end of left radius, unspecified fracture morphology, initial encounter      Closed fracture of distal end of right radius, unspecified fracture morphology, initial encounter      Closed fracture of first thoracic vertebra, unspecified fracture morphology, initial encounter (H)      Coronary artery disease      Crohn's disease (H)      Disorder of bursae and tendons in shoulder region     Created by Conversion  Replacement Utility updated for latest IMO load     Dysphagia      Fall 10/22/2016     GERD (gastroesophageal reflux disease)      Hyperlipidemia      Hypertension      Hypotension, unspecified hypotension type      ICD (implantable cardioverter-defibrillator) in place     Medtronic     Ischemic cardiomyopathy      Kidney stone      Low back pain      Lumbago     Created by Conversion      Macular degeneration      Myalgia and myositis, unspecified     Created by Conversion      Nonspecific elevation of levels of transaminase or lactic acid dehydrogenase (LDH)     Created by Conversion      Permanent atrial fibrillation (H)     [id number] risk score = 5 (age3/ CAD/ HTN/ CHF) Chronic warfarin     Personal history of colonic polyps 2/12/2019     Polyp of duodenum 10/17/2016     Pyuria      Right arm weakness 4/2/2019     Schatzki's ring      Sciatica     Created by Conversion      Sleep apnea     no CPAP     Spondylolisthesis of lumbar region 7/5/2016     Weakness 4/20/2019        Past Surgical History:   Past Surgical History:   Procedure Laterality Date      APPENDECTOMY       ARTHROSCOPY SHOULDER ROTATOR CUFF REPAIR Right      ATRIAL FIBRILLATION/FLUTTER       BYPASS GRAFT ARTERY CORONARY  08/01/2004    Coronary Artery Quadruple Venous Bypass Graft     CARDIAC DEFIBRILLATOR PLACEMENT  01/01/2013    ICD Medtronic     CHOLECYSTECTOMY       COLONOSCOPY N/A 02/19/2020    Procedure: COLONOSCOPY;  Surgeon: Houston Medina MD;  Location: [facility];  Service: Gastroenterology     EP ICD UPGRADE TO BIVENT Left 01/03/2022    Procedure: EP Implantable Cardio-Defibrillator Upgrade to Bivent;  Surgeon: Houston Rolon MD;  Location: [facility]     ESOPHAGOSCOPY, GASTROSCOPY, DUODENOSCOPY (EGD), COMBINED       IR LUMBAR EPIDURAL STEROID INJECTION  06/29/2005     IR LUMBAR EPIDURAL STEROID INJECTION  07/15/2005     IR LUMBAR EPIDURAL STEROID INJECTION  09/28/2005     IR LUMBAR EPIDURAL STEROID INJECTION  12/28/2005     IR LUMBAR EPIDURAL STEROID INJECTION  03/29/2006     IR LUMBAR EPIDURAL STEROID INJECTION  06/08/2006     PHACOEMULSIFICATION CLEAR CORNEA WITH STANDARD INTRAOCULAR LENS IMPLANT Bilateral      MT C- LAMINOPLASTY, 2 OR MORE Left 02/22/2019    Procedure: LEFT CERVICAL 4, 5, 6 LAMINOPLASTY;  Surgeon: Liza Cesar MD;  Location: [facility];  Service: Spine     MT ESOPHAGOGASTRODUODENOSCOPY TRANSORAL DIAGNOSTIC N/A 05/10/2021    Procedure: ESOPHAGOGASTRODUODENOSCOPY (EGD);  Surgeon: Franklin Mendoza MD;  Location: [facility];  Service: Gastroenterology     MT ESOPHAGOGASTRODUODENOSCOPY TRANSORAL DIAGNOSTIC N/A 06/16/2021    Procedure: ESOPHAGOGASTRODUODENOSCOPY (EGD), WITH ESOPHAGEAL DILATION AND BIOPSY;  Surgeon: Paramjit Brown MD;  Location: [facility];  Service: Gastroenterology     REMOVE HARDWARE STERNUM N/A 1/13/2022    Procedure: STERNAL WIRE REMOVAL;  Surgeon: Raquel De Leon MD;  Location: [facility]     TONSILLECTOMY       XR MYELOGRAM CERVICAL THORACIC LUMBAR  01/17/2019     [facility]  LAP,CHOLECYSTECTOMY/EXPLORE N/A 02/28/2019    CHOLECYSTECTOMY, LAPAROSCOPIC;  Surgeon: Mana Roman MD;  Location: [facility];  Service: General        Family History:   Family History   Problem Relation Age of Onset     Heart Disease Mother      Cerebrovascular Disease Mother      Crohn's Disease Father      Alcoholism Brother      No Known Problems Daughter      No Known Problems Son      No Known Problems Maternal Aunt      No Known Problems Maternal Uncle      No Known Problems Paternal Aunt      No Known Problems Paternal Uncle      No Known Problems Maternal Grandmother      No Known Problems Maternal Grandfather      No Known Problems Paternal Grandmother      No Known Problems Paternal Grandfather      Cancer Brother      Urolithiasis No family hx of      Gout No family hx of         Social History:   Social History     Socioeconomic History     Marital status:      Spouse name: Not on file     Number of children: Not on file     Years of education: Not on file     Highest education level: Not on file   Occupational History     Not on file   Tobacco Use     Smoking status: Former Smoker     Smokeless tobacco: Never Used   Substance and Sexual Activity     Alcohol use: Not Currently     Drug use: Never     Sexual activity: Yes     Partners: Female   Other Topics Concern     Parent/sibling w/ CABG, MI or angioplasty before 65F 55M? Not Asked   Social History Narrative     Not on file     Social Determinants of Health     Financial Resource Strain: Medium Risk     Difficulty of Paying Living Expenses: Somewhat hard   Food Insecurity: No Food Insecurity     Worried About Running Out of Food in the Last Year: Never true     Ran Out of Food in the Last Year: Never true   Transportation Needs: No Transportation Needs     Lack of Transportation (Medical): No     Lack of Transportation (Non-Medical): No   Physical Activity: Not on file   Stress: Not on file   Social Connections: Not on file    Intimate Partner Violence: Not on file   Housing Stability: Low Risk      Unable to Pay for Housing in the Last Year: No     Number of Places Lived in the Last Year: 1     Unstable Housing in the Last Year: No            Lab Results    Chemistry/lipid CBC Cardiac Enzymes/BNP/TSH/INR   Lab Results   Component Value Date    CHOL 99 05/19/2020    HDL 36 (L) 05/19/2020    TRIG 81 05/19/2020    BUN 23 01/03/2022     01/03/2022    CO2 24 01/03/2022    Lab Results   Component Value Date    WBC 6.1 01/03/2022    HGB 10.0 (L) 01/03/2022    HCT 31.1 (L) 01/03/2022    MCV 92 01/03/2022     (L) 01/03/2022    Lab Results   Component Value Date    TROPONINI 0.03 06/12/2021     (H) 06/11/2021    TSH 1.22 04/19/2019    INR 2.5 (A) 06/06/2022

## 2022-06-06 NOTE — PROGRESS NOTES
ANTICOAGULATION MANAGEMENT     Tanmay Israel 82 year old male is on warfarin with therapeutic INR result. (Goal INR 2.0-3.0)    Recent labs: (last 7 days)     06/06/22  0955   INR 2.5*       ASSESSMENT       Source(s): Chart Review and Template       Warfarin doses taken: Reviewed in chart    Diet: No new diet changes identified    New illness, injury, or hospitalization: No    Medication/supplement changes: Carvedilol dose decreased on 6/6/22 No interaction anticipated    Signs or symptoms of bleeding or clotting: No    Previous INR: Therapeutic last visit; previously outside of goal range    Additional findings: Visit with Dr. Fleming today with following directions:        PLAN     Recommended plan for ongoing change(s) affecting INR     Dosing Instructions: continue your current warfarin dose with next INR in 2 weeks       Summary  As of 6/6/2022    Full warfarin instructions:  2.5 mg every Sun, Thu; 1.25 mg all other days   Next INR check:  6/20/2022             Detailed voice message left for Tanmay with dosing instructions and follow up date.     Contact [phone] to schedule and with any changes, questions or concerns.     Education provided: Please call back if any changes to your diet, medications or how you've been taking warfarin and Importance of notifying clinic for changes in medications; a sooner lab recheck maybe needed.    Plan made per [facility] anticoagulation protocol    Romelia Kelly RN  Anticoagulation Clinic  6/6/2022    _______________________________________________________________________     Anticoagulation Episode Summary     Current INR goal:  2.0-3.0   TTR:  50.1 % (1 y)   Target end date:  Indefinite   Send INR reminders to:  [facility]    Indications    Atrial fibrillation (H) [I48.91]  Pulmonary embolism with infarction (H) [I26.99]  Atrial fibrillation  unspecified type (H) [I48.91]           Comments:           Anticoagulation Care Providers     Provider Role Specialty Phone number     Go Ramírez MD [facility] [phone]

## 2022-06-06 NOTE — LETTER
6/6/2022    Go Ramírez MD, MD  [address]  [facility] [address]    RE: Tanmay Israel       Dear Colleague,     I had the pleasure of seeing Tanmay Israel in the [facility].    [facility]   [address], [place] [address]   [email or web address]   OFFICE: [phone]         Thank you, Go Casas, for asking the [facility] team to see Mr. Tanmay Israel to evaluate New Patient (Establish with general cardiology, multiple cardiac diagnoses.)         Assessment/Recommendations   Assessment:    1. Chronic heart failure with reduced LVEF - currently stable and compensated  2. Ischemic cardiomyopathy with LVEF 25-30%  3. Permanent atrial fibrillation - controlled rate on warfarin for stroke prevention  4. Biventricular ICD - stable function  5. PVCs and NSVT - both present on ICD interrogation.    Plan:  1. Decrease carvedilol to 25 mg BID.  2. Consider tying bumetanide as needed  3. Continue other mediations without changes  4. Follow up with me in 3 months         History of Present Illness   Mr. Tanmay Israel is a 82 year old male with a significant past history of permanent atrial fibrillation, ischemic cardiomyopathy with LVEF 25-30%, coronary artery disease with remote CABG and a BiV ICD who presents to establish with general cardiology. This is a new patient to me, and for the past several years has only followed in the EP clinic with Dr. Rolon.    Mr. Israel continues on warfarin for stroke prevention for his atrial fibrillation. His BiV pacing efficiency is 96% with fairly frequent PVCs. Tanmay is feeling generally well today but is asking if he can cut back on any of his medications. He tries to walk a mile per day and is able to walk 2-3 blocks before needing to stop and rest due to some fatigue and light headedness. He denies any symptoms of water retention, even if he misses doses of his  diuretic.      Other than noted above, Mr. Israel denies any chest pain/pressure/tightness, shortness of breath at rest or with exertion, light headedness/dizziness, pre-syncope, syncope, lower extremity swelling, palpitations, paroxysmal nocturnal dyspnea (PND), or orthopnea.     Cardiac Problems and Cardiac Diagnostics     Most Recent Cardiac testing:  ECG dated 6/11/21 (personaly reviewed and interpreted): atrial fibrillation with controlled HR. Left axis deviation and LBBB.    ICD interrogation 6/3/22  Data was compiled on 6/2/22 for review and interpretation on the scheduled date 6/3/22.  Type: remote CRT-D transmission done prior to clinic appointment with Dr. Fleming, checking PVCs and biVP%. Done as a courtesy check.  Presenting rhythm: biV pacing rate 69 bpm.   Battery/lead status: stable  Arrhythmias: since 4/28/22, one NSVT detected. From 4/4/22 - 4/28/22, 41.5 PVCs/hr; from 4/28/22 - 6/2/22, 6.6 PVCs/hr.  Anticoagulant: warfarin  Comments: normal ICD function. BiV pacing 96.2%. Recent Optivol/TI excursions, now back to baseline.   Device/lead alerts: none.  KERI Roberts, Device Specialist    ECHO (report reviewed):   TTE 6/12/21    Severe left atrial and moderate right atrial enlargement    Left ventricle ejection fraction is severely decreased. The calculated left ventricular ejection fraction is 24% with global hypokinesis. Abnormal septal motion is consistent with paced rhythm    Mildly dilated right ventricle with moderately decreased systolic function    Aortic valve sclerosis    Mild to moderate mitral and moderate tricuspid insufficiency. Moderate to severe pulmonary hypertension noted.    When compared to the previous study dated 11/12/2018, left ventricular ejection fraction has decreased. There is an increase in the degree of mitral and tricuspid insufficiency with further increase in pulmonary artery pressures.    Stress test: dated 6/14/21 revealed      The nuclear stress test is mildly abnormal.      Nuclear images demonstrate a small area of nontransmural infarction involving the distal anteroseptal wall.  No ischemia is identified.     Left ventricular ejection fraction at stress is 32% with global hypokinesis.     A prior study was conducted on 2/1/2013.  The distal anteroseptal infarct is new.  Ejection fraction has decreased mildly.         Medications  Allergies   Current Outpatient Medications   Medication Sig Dispense Refill     acetaminophen (TYLENOL) 500 MG tablet Take 1,000 mg by mouth every 8 hours as needed        albuterol (PROAIR HFA/PROVENTIL HFA/VENTOLIN HFA) 108 (90 Base) MCG/ACT inhaler Inhale 1-2 puffs into the lungs every 4 hours as needed        bumetanide (BUMEX) 1 MG tablet TAKE ONE TABLET (1MG) BY MOUTH ONCE DAILY 90 tablet 1     calcium carbonate-vitamin D 600-200 MG-UNIT TABS Take 2 tablets by mouth daily        carvedilol (COREG) 25 MG tablet Take 1.5 tablets (37.25 mg) by mouth 2 times daily (with meals) 45 tablet 5     cyclobenzaprine (FLEXERIL) 5 MG tablet Take 1 tablet (5 mg) by mouth 2 times daily as needed for muscle spasms 30 tablet 3     cycloSPORINE (RESTASIS) 0.05 % ophthalmic emulsion 1 drop 2 times daily as needed for dry eyes       diclofenac (VOLTAREN) 1 % topical gel Apply 2 g topically 2 times daily        erythromycin (ROMYCIN) 5 MG/GM ophthalmic ointment Place 0.5 inches into both eyes daily        ferrous sulfate (FEROSUL) 325 (65 Fe) MG tablet Take 1 tablet by mouth daily       gabapentin (NEURONTIN) 100 MG capsule Take 1 capsule (100 mg) by mouth 3 times daily 90 capsule 4     Lidocaine (LIDOCARE) 4 % Patch Place 1 patch onto the skin daily as needed for moderate pain To prevent lidocaine toxicity, patient should be patch free for 12 hrs daily. 10 patch 3     losartan (COZAAR) 100 MG tablet Take 1 tablet (100 mg) by mouth daily 90 tablet 3     multivitamin w/minerals (THERA-VIT-M) tablet Take 1 tablet by mouth daily       omeprazole (PRILOSEC) 20 MG DR capsule  TAKE ONE CAPSULE BY MOUTH ONCE DAILY BEFORE BREAKFAST (Patient taking differently: Take 20 mg by mouth daily) 90 capsule 1     potassium chloride ER (KLOR-CON M) 20 MEQ CR tablet TAKE ONE TABLET BY MOUTH ONCE DAILY (Patient taking differently: Take 20 mEq by mouth daily) 90 tablet 2     rosuvastatin (CRESTOR) 20 MG tablet TAKE 1 TABLET BY MOUTH DAILY AT BEDTIME (Patient taking differently: Take 20 mg by mouth daily) 90 tablet 3     traMADol (ULTRAM) 50 MG tablet Take 50 mg by mouth every 6 hours as needed        vitamin B-12 (CYANOCOBALAMIN) 2000 MCG tablet Take 2,000 mcg by mouth daily       warfarin ANTICOAGULANT (COUMADIN) 2.5 MG tablet TAKE 1 TO 1.5 TABLETS (2.5-3.75MG) BY MOUTH DAILY. ADJUST DOSE PER INR AS DIRECTED. (Patient taking differently: Take 1.25-2.5 mg by mouth See Admin Instructions 1.25 mg every Mon, Wed, Fri; 2.5 mg all other days) 90 tablet 3      No Known Allergies     Physical Examination Review of Systems   Vitals: /56 (BP Location: Left arm, Patient Position: Sitting, Cuff Size: Adult Regular)   Pulse 64   Resp 14   Wt 65.8 kg (145 lb)   BMI 21.41 kg/m    BMI= Body mass index is 21.41 kg/m .  Wt Readings from Last 3 Encounters:   06/06/22 65.8 kg (145 lb)   05/05/22 67.6 kg (149 lb)   02/28/22 67.6 kg (149 lb)       General Appearance:   Pleasant male, appears stated age. no acute distress, thin body habitus, frail in appearance.   ENT/Mouth: membranes moist, no apparent gingival bleeding.      EYES:  no scleral icterus, normal conjunctivae   Neck: no carotid bruits. supple   Respiratory:   lungs are clear to auscultation, no rales or wheezing, equal chest wall expansion    Cardiovascular:   Irregularly irregular rhythm, normal rate. Normal first and second heart sounds with no murmurs, rubs, or gallops;  Jugular venous pressure normal, no edema bilaterally    Abdomen/GI:  Soft, non-tender   Extremities: no cyanosis or clubbing   Skin: no xanthelasma, warm.    Heme/lymph/ Immunology  No apparent bleeding noted.   Neurologic: Alert and oriented.  no tremors   Psychiatric: Pleasant, calm, appropriate affect.         Please refer above for cardiac ROS details.       Past History   Past Medical History:   Past Medical History:   Diagnosis Date     Acute cholecystitis 2/28/2019     Acute post-operative pain      Anemia      Arthritis      Atrial fibrillation (H)      C. difficile diarrhea 4/21/2019     Calculus of ureter      Callus      Cerebral aneurysm      Cervical myelopathy (H) 2/22/2019     Cervical spinal stenosis      Chronic kidney disease     stage 3     Chronic systolic congestive heart failure (H)      Closed fracture of distal end of left radius, unspecified fracture morphology, initial encounter      Closed fracture of distal end of right radius, unspecified fracture morphology, initial encounter      Closed fracture of first thoracic vertebra, unspecified fracture morphology, initial encounter (H)      Coronary artery disease      Crohn's disease (H)      Disorder of bursae and tendons in shoulder region     Created by Conversion  Replacement Utility updated for latest IMO load     Dysphagia      Fall 10/22/2016     GERD (gastroesophageal reflux disease)      Hyperlipidemia      Hypertension      Hypotension, unspecified hypotension type      ICD (implantable cardioverter-defibrillator) in place     Medtronic     Ischemic cardiomyopathy      Kidney stone      Low back pain      Lumbago     Created by Conversion      Macular degeneration      Myalgia and myositis, unspecified     Created by Conversion      Nonspecific elevation of levels of transaminase or lactic acid dehydrogenase (LDH)     Created by Conversion      Permanent atrial fibrillation (H)     [id number] risk score = 5 (age1/ CAD/ HTN/ CHF) Chronic warfarin     Personal history of colonic polyps 2/12/2019     Polyp of duodenum 10/17/2016     Pyuria      Right arm weakness 4/2/2019     Schatzki's ring      Sciatica      Created by Conversion      Sleep apnea     no CPAP     Spondylolisthesis of lumbar region 7/5/2016     Weakness 4/20/2019        Past Surgical History:   Past Surgical History:   Procedure Laterality Date     APPENDECTOMY       ARTHROSCOPY SHOULDER ROTATOR CUFF REPAIR Right      ATRIAL FIBRILLATION/FLUTTER       BYPASS GRAFT ARTERY CORONARY  08/01/2004    Coronary Artery Quadruple Venous Bypass Graft     CARDIAC DEFIBRILLATOR PLACEMENT  01/01/2013    ICD Medtronic     CHOLECYSTECTOMY       COLONOSCOPY N/A 02/19/2020    Procedure: COLONOSCOPY;  Surgeon: Houston Medina MD;  Location: [facility];  Service: Gastroenterology     EP ICD UPGRADE TO BIVENT Left 01/03/2022    Procedure: EP Implantable Cardio-Defibrillator Upgrade to Bivent;  Surgeon: Houston Rolon MD;  Location: [facility] CV     ESOPHAGOSCOPY, GASTROSCOPY, DUODENOSCOPY (EGD), COMBINED       IR LUMBAR EPIDURAL STEROID INJECTION  06/29/2005     IR LUMBAR EPIDURAL STEROID INJECTION  07/15/2005     IR LUMBAR EPIDURAL STEROID INJECTION  09/28/2005     IR LUMBAR EPIDURAL STEROID INJECTION  12/28/2005     IR LUMBAR EPIDURAL STEROID INJECTION  03/29/2006     IR LUMBAR EPIDURAL STEROID INJECTION  06/08/2006     PHACOEMULSIFICATION CLEAR CORNEA WITH STANDARD INTRAOCULAR LENS IMPLANT Bilateral      SD C- LAMINOPLASTY, 2 OR MORE Left 02/22/2019    Procedure: LEFT CERVICAL 4, 5, 6 LAMINOPLASTY;  Surgeon: Liza Cesar MD;  Location: [facility];  Service: Spine     SD ESOPHAGOGASTRODUODENOSCOPY TRANSORAL DIAGNOSTIC N/A 05/10/2021    Procedure: ESOPHAGOGASTRODUODENOSCOPY (EGD);  Surgeon: Franklin Mendoza MD;  Location: [facility];  Service: Gastroenterology     SD ESOPHAGOGASTRODUODENOSCOPY TRANSORAL DIAGNOSTIC N/A 06/16/2021    Procedure: ESOPHAGOGASTRODUODENOSCOPY (EGD), WITH ESOPHAGEAL DILATION AND BIOPSY;  Surgeon: Paramjit Brown MD;  Location: [facility];  Service: Gastroenterology     REMOVE HARDWARE STERNUM  N/A 1/13/2022    Procedure: STERNAL WIRE REMOVAL;  Surgeon: Raquel De Leon MD;  Location: [facility]     TONSILLECTOMY       XR MYELOGRAM CERVICAL THORACIC LUMBAR  01/17/2019     ZZC LAP,CHOLECYSTECTOMY/EXPLORE N/A 02/28/2019    CHOLECYSTECTOMY, LAPAROSCOPIC;  Surgeon: Mana Roman MD;  Location: [facility] [place] [place];  Service: General        Family History:   Family History   Problem Relation Age of Onset     Heart Disease Mother      Cerebrovascular Disease Mother      Crohn's Disease Father      Alcoholism Brother      No Known Problems Daughter      No Known Problems Son      No Known Problems Maternal Aunt      No Known Problems Maternal Uncle      No Known Problems Paternal Aunt      No Known Problems Paternal Uncle      No Known Problems Maternal Grandmother      No Known Problems Maternal Grandfather      No Known Problems Paternal Grandmother      No Known Problems Paternal Grandfather      Cancer Brother      Urolithiasis No family hx of      Gout No family hx of         Social History:   Social History     Socioeconomic History     Marital status:      Spouse name: Not on file     Number of children: Not on file     Years of education: Not on file     Highest education level: Not on file   Occupational History     Not on file   Tobacco Use     Smoking status: Former Smoker     Smokeless tobacco: Never Used   Substance and Sexual Activity     Alcohol use: Not Currently     Drug use: Never     Sexual activity: Yes     Partners: Female   Other Topics Concern     Parent/sibling w/ CABG, MI or angioplasty before 65F 55M? Not Asked   Social History Narrative     Not on file     Social Determinants of Health     Financial Resource Strain: Medium Risk     Difficulty of Paying Living Expenses: Somewhat hard   Food Insecurity: No Food Insecurity     Worried About Running Out of Food in the Last Year: Never true     Ran Out of Food in the Last Year: Never true   Transportation Needs: No  Transportation Needs     Lack of Transportation (Medical): No     Lack of Transportation (Non-Medical): No   Physical Activity: Not on file   Stress: Not on file   Social Connections: Not on file   Intimate Partner Violence: Not on file   Housing Stability: Low Risk      Unable to Pay for Housing in the Last Year: No     Number of Places Lived in the Last Year: 1     Unstable Housing in the Last Year: No            Lab Results    Chemistry/lipid CBC Cardiac Enzymes/BNP/TSH/INR   Lab Results   Component Value Date    CHOL 99 05/19/2020    HDL 36 (L) 05/19/2020    TRIG 81 05/19/2020    BUN 23 01/03/2022     01/03/2022    CO2 24 01/03/2022    Lab Results   Component Value Date    WBC 6.1 01/03/2022    HGB 10.0 (L) 01/03/2022    HCT 31.1 (L) 01/03/2022    MCV 92 01/03/2022     (L) 01/03/2022    Lab Results   Component Value Date    TROPONINI 0.03 06/12/2021     (H) 06/11/2021    TSH 1.22 04/19/2019    INR 2.5 (A) 06/06/2022                Thank you for allowing me to participate in the care of your patient.      Sincerely,     Chance Fleming MD     [facility]  cc:   No referring provider defined for this encounter.

## 2022-06-06 NOTE — PATIENT INSTRUCTIONS
It was a pleasure to meet with you today.      Below is a summary of your visit.   Try decreasing your carvedilol to 1 tablet twice daily (25 mg). See how you feel. If you feel worse after 1-2 weeks, then increase back to 1.5 tablets twice daily.  In about a month, you can try to not take your water pill (bumetanide). Keep the medication around and take it as needed for signs of water retention. Some people need their water pill every day, but others only take it as needed with signs of water retention.  Continue to walk daily as tolerated.  Follow up with me in about 3 months.     Please do not hesitate to call the [facility] clinic with any questions or concerns at [phone]. You can also reach my nurse, Kimberlee, during normal business hours at [phone].    Sincerely,

## 2022-06-14 NOTE — PROGRESS NOTES
"Tanmay is a 82 year old who is being evaluated via a billable telephone visit.           Assessment & Plan     83yo M with mulitple medical conditions including Crohn's disease, CKD3, afib on coumadin, cardiac pacemaker, CAD, HTN, at high risk of severe COVID presenting for COVID treatment   Infection due to 2019 novel coronavirus  At high risk of severe infection. Will start molnupiravir due to multiple medication interactions with paxlovid. He is unable to get to a [place] pharmacy, so I called the [facility] he requested and they do carry molnupiravir. Reviewed risks/side effects/beneifts.     Crohn's disease with complication, unspecified gastrointestinal tract location (H)  Stage 3b chronic kidney disease (H)   Longstanding persistent atrial fibrillation (H)   Benign essential hypertension   Cardiac pacemaker in situ   Chronic anticoagulation   at high risk of severe COVID due to age and multiple chronic medical conditions as noted above. Follow up with his PCP is recommended for his             COVID-19 positive patient.  Encounter for consideration of medication intervention. Patient does qualify for a prescription. Full discussion with patient including medication options, risks and benefits. Potential drug interactions reviewed with patient.     Treatment Planned Molnupiravir (patient is aware that effectiveness is less for this medication), Rx sent to [facility]    Estimated body mass index is 21.41 kg/m  as calculated from the following:    Height as of 5/5/22: 1.753 m (5' 9\").    Weight as of 6/6/22: 65.8 kg (145 lb).  GFR Estimate   Date Value Ref Range Status   01/03/2022 40 (L) >60 mL/min/1.73m2 Final     Comment:     Effective December 21, 2021 eGFRcr in adults is calculated using the 2021 CKD-EPI creatinine equation which includes age and gender (Katelyn bates al., NEJM, DOI: 10.1056/IPUZam0918608)   07/07/2021 36 (L) >60 mL/min/1.73m2 Final     Lab Results   Component Value Date    [id number] Negative " 01/10/2022       Return in about 1 week (around 6/21/2022) for Follow up if not improving or worsening.    Landy Mann MD   [facility]    Nereida Donato is a 82 year old who presents for the following health issues    HPI       COVID-19 Symptom Review  How many days ago did these symptoms start? 3 days   He tested yesterday and was positive. Symptoms started Saturday night. He has mostly a cough.   Are any of the following symptoms significant for you?    New or worsening difficulty breathing? No    Worsening cough? Yes, it's a dry cough.     Fever or chills? No    Headache: no    Sore throat: no    Chest pain: no    Diarrhea: no    Body aches? YES - just his regular body aches.     Feels not too bad right now. Just the cough.        Current Outpatient Medications   Medication     molnupiravir (LAGEVRIO) 200 MG capsule     acetaminophen (TYLENOL) 500 MG tablet     albuterol (PROAIR HFA/PROVENTIL HFA/VENTOLIN HFA) 108 (90 Base) MCG/ACT inhaler     bumetanide (BUMEX) 1 MG tablet     calcium carbonate-vitamin D 600-200 MG-UNIT TABS     carvedilol (COREG) 25 MG tablet     cyclobenzaprine (FLEXERIL) 5 MG tablet     cycloSPORINE (RESTASIS) 0.05 % ophthalmic emulsion     diclofenac (VOLTAREN) 1 % topical gel     erythromycin (ROMYCIN) 5 MG/GM ophthalmic ointment     ferrous sulfate (FEROSUL) 325 (65 Fe) MG tablet     gabapentin (NEURONTIN) 100 MG capsule     Lidocaine (LIDOCARE) 4 % Patch     losartan (COZAAR) 100 MG tablet     multivitamin w/minerals (THERA-VIT-M) tablet     omeprazole (PRILOSEC) 20 MG DR capsule     potassium chloride ER (KLOR-CON M) 20 MEQ CR tablet     rosuvastatin (CRESTOR) 20 MG tablet     traMADol (ULTRAM) 50 MG tablet     vitamin B-12 (CYANOCOBALAMIN) 2000 MCG tablet     warfarin ANTICOAGULANT (COUMADIN) 2.5 MG tablet     Current Facility-Administered Medications   Medication     botulinum toxin type A (BOTOX) 100 units injection 100 Units              Review of  Systems          Objective           Vitals:  No vitals were obtained today due to virtual visit.    Physical Exam   alert and no distress  PSYCH: Alert and oriented times 3; coherent speech, normal   rate and volume, able to articulate logical thoughts, able   to abstract reason, no tangential thoughts, no hallucinations   or delusions  His affect is normal  RESP: No cough, no audible wheezing, able to talk in full sentences  Remainder of exam unable to be completed due to telephone visits                  Phone call duration: 16 minutes

## 2022-06-14 NOTE — PATIENT INSTRUCTIONS
"  Instructions for Patients  Your COVID-19 test was positive. This means you have the virus. Please follow the \"How can I take care of myself\" and \"How do I self-isolate?\" guidelines in these instructions.    What treatments are available?  Over-the-counter medicines may help with your symptoms such as runny or stuffy nose, cough, chills, and fever. Talk to your care team about your options.     Some people are at high risk for severe illness (for example if you have a weak immune system, you're 65 or older, or you have certain medical problems). If your risk it high and your symptoms started in the last 5 to 7 days, we strongly recommend for you to get COVID treatment as soon as possible before you get really sick. Paxlovid, Molnupiravir and the monoclonal antibody treatments are proven safe and effective, make you feel better faster, and prevent hospitalization and death.       You can schedule an appointment to discuss COVID treatment by requesting an appointment on [facility] by selecting \"schedule COVID-19 Treatment\" or by calling [facility] ([phone]) and pressing 7.    What are the symptoms of COVID-19?  Symptoms can include fever, cough, shortness of breath, chills, headache, muscle pain sore throat, fatigue, runny or stuffy nose, and loss of taste and smell. Other less common symptoms include nausea, vomiting, or diarrhea (watery stools).    Know when to call 911. Emergency warning signs include:    Trouble breathing or shortness of breath    Pain or pressure in the chest that doesn't go away    Feeling confused like you haven't felt before, or not being able to wake up    Bluish-colored lips or face    How can I take care of myself?  1. Get lots of rest. Drink extra fluids (unless a doctor has told you not to).  2. Take Tylenol (acetaminophen) for fever or pain. If you have liver or kidney problems, ask your family doctor if it's okay to take Tylenol   Adults:   650 mg (two 325 mg pills or tablets) " every 4 to 6 hours, or...   1,000 mg (two 500 mg pills or tablets) every 8 hours as needed.  Note: Don't take more than 3,000 mg in one day. Acetaminophen is found in many medicines (both prescribed and over the counter). Read all labels to be sure you don't take too much.  For children, check the Tylenol bottle for the right dose. The dose is based on the child's age or weight.  3. Take over the counter medicines for your symptoms as needed. Talk to your pharmacist.  4. If you have other health problems (like cancer, heart failure, an organ transplant, or severe kidney disease): Call your specialty clinic if you don't feel better in the next 2 days.    These guidelines are for isolating and quarantining before returning to work, school or .     For employers, schools and day cares: This is an official notice for this person s medical guidelines for returning in-person.     For health care sites: The CDC gives different isolation and quarantine guidelines for healthcare sites, please check with these sites before arriving.     How do I self-isolate?  You isolate when you have symptoms of COVID or a test shows you have COVID, even if you don t have symptoms.     If you DO have symptoms:  o Stay home and away from others  - For at least 5 days after your symptoms started, AND   - You are fever free for 24 hours (with no medicine that reduces fever), AND  - Your other symptoms are better.  o Wear a mask for 10 full days any time you are around others.    If you DON T have symptoms:  o Stay at home and away from others for at least 5 days after your positive test.  o Wear a mask for 10 full days any time you are around others.    How and when do I quarantine?  You quarantine when you may have been exposed to the virus and DON T have symptoms.     Stay home and away from others.     You must quarantine for 5 days after your last contact with a person who has COVID.  o Note: If you are fully vaccinated, you don t  need to quarantine. You should still follow the steps below.     Wear a mask for 10 full days any time you re around others.    Get tested at least 5 days after you were exposed, even if you don t have symptoms.     If you start to have symptoms, isolate right away and get tested.    Where can I get more information?    [facility] COVID-19 Resource Hub: [email or web address]     CDC Quarantine & Isolation: https://www.cdc.gov/coronavirus/2019-ncov/your-health/quarantine-isolation.html     CDC - What to Do If You're Sick: https://www.cdc.gov/coronavirus/2019-ncov/if-you-are-sick/index.html    [facility] clinical trials (COVID-19 research studies): [email or web address]    [place] Department of Health COVID-19 Public Hotline: 1-641.881.4208

## 2022-06-15 NOTE — TELEPHONE ENCOUNTER
"Routing refill request to provider for review/approval because:  Diagnosis warning  Patient reports taking medication differently.    Last Written Prescription Date:  12/20/21  Last Fill Quantity: 90,  # refills: 1   Last office visit provider:  12/30/21     Last Written Prescription Date:  7/21/21  Last Fill Quantity: 90,  # refills: 2   Last office visit provider:  12/30/21    Requested Prescriptions   Pending Prescriptions Disp Refills     omeprazole (PRILOSEC) 20 MG DR capsule [Pharmacy Med Name: Omeprazole 20 MG Oral Capsule Delayed Release] 90 capsule 1     Sig: TAKE ONE CAPSULE BY MOUTH ONCE DAILY BEFORE BREAKFAST       PPI Protocol Failed - 6/15/2022 11:03 AM        Failed - No diagnosis of osteoporosis on record        Passed - Not on Clopidogrel (unless Pantoprazole ordered)        Passed - Recent (12 mo) or future (30 days) visit within the authorizing provider's specialty     Patient has had an office visit with the authorizing provider or a provider within the authorizing providers department within the previous 12 mos or has a future within next 30 days. See \"Patient Info\" tab in [facility], or \"Choose Columns\" in Meds & Orders section of the refill encounter.              Passed - Medication is active on med list        Passed - Patient is age 18 or older           potassium chloride ER (KLOR-CON M) 20 MEQ CR tablet [Pharmacy Med Name: Potassium Chloride Abby ER 20 MEQ Oral Tablet Extended Release (Klor-Con M20)] 90 tablet 2     Sig: TAKE ONE TABLET BY MOUTH ONCE DAILY       Potassium Supplements Protocol Passed - 6/15/2022 11:03 AM        Passed - Recent (12 mo) or future (30 days) visit within the authorizing provider's department     Patient has had an office visit with the authorizing provider or a provider within the authorizing providers department within the previous 12 mos or has a future within next 30 days. See \"Patient Info\" tab in [employer], or \"Choose Columns\" in Meds & Orders section of the " refill encounter.              Passed - Medication is active on med list        Passed - Patient is age 18 or older        Passed - Normal serum potassium in past 12 months     Recent Labs   Lab Test 01/03/22  0722   POTASSIUM 4.4                         Juan Farrell RN 06/15/22 11:03 AM

## 2022-06-16 NOTE — TELEPHONE ENCOUNTER
PA Initiation    Medication: cyclobenzaprine (FLEXERIL) 5 MG tablet   Insurance Company: [employer] - Phone [phone] Fax [phone]  Pharmacy Filling the Rx: [facility] - [place], [place] - [facility] [address]  Filling Pharmacy Phone: [phone]  Filling Pharmacy Fax: [phone]  Start Date: 6/16/2022

## 2022-06-16 NOTE — TELEPHONE ENCOUNTER
Received Prior Auth Request from [facility] for patient's:    Cyclobenzaprine (Flexeril) 5 mg, take one tablet by mouth 2 times daily as needed for muscle spasms.    Prescribed by Dr. Smart on 6/2/22, #30, 3 Refills    Patient has [employer]/ [employer].  They are requiring a Prior Authorization.- [id number].    Emelina Chadwick RN on 6/16/2022 at 4:33 PM

## 2022-06-17 NOTE — TELEPHONE ENCOUNTER
PRIOR AUTHORIZATION DENIED    Medication: cyclobenzaprine (FLEXERIL) 5 MG tablet--DENIED    Denial Date: 6/17/2022    Denial Rational: Plan covers for acute treatment of muscle spasms.  Patient has chronic muscle spasms.     Appeal Information:

## 2022-06-17 NOTE — TELEPHONE ENCOUNTER
Patient calling stating he is COVID 19 positive/patient had Virtual Visit with provider and prescribed molnupiravir.    Patient is requesting information on isolation and keeping INR appointment for 6/20/22.  Onset of symptoms 6/10/22 (day 7). Symptoms have resolved, afebrile.    Denies further questions.    These guidelines are for isolating before returning to work, school or .       If you DO have symptoms:  o Stay home and away from others  - For at least 5 days after your symptoms started, AND   - You are fever free for 24 hours (with no medicine that reduces fever), AND  - Your other symptoms are better.  o Wear a mask for 10 full days any time you are around others.    If you DON'T have symptoms:  o Stay at home and away from others for at least 5 days after your positive test.  o Wear a mask for 10 full days any time you are around others.    There may be different guidelines for healthcare facilities. Please check with the specific sites before arriving.     If you've been told by a doctor that you were severely ill with COVID-19 or are immunocompromised, you should isolate for at least 10 days.    You should not go back to work until you meet the guidelines above for ending your home isolation. You don't need to be retested for COVID-19 before going back to work--studies show that you won't spread the virus if it's been at least 10 days since your symptoms started (or 20 days, if you have a weak immune system).    Employers, schools, and daycares: This is an official notice for this person's medical guidelines for returning in-person. They must meet the above guidelines before going back to work, school, or  in person.    Elisa Thornton RN    Additional Information    Negative: Nursing judgment    Negative: Nursing judgment    Negative: Nursing judgment    Negative: Nursing judgment    Information only question and nurse able to answer    Protocols used: INFORMATION ONLY CALL - NO  TRIAGE-A-[place]

## 2022-06-20 NOTE — TELEPHONE ENCOUNTER
Reason for call:  Other     Patient called regarding (reason for call): call back    Additional comments: Needs help getting in touch with lab, scheduling blood draw. Would like Chandler to call him.    Phone number to reach patient:  Cell number on file:    Telephone Information:   Mobile [phone]       Best Time:  Any    Can we leave a detailed message on this number?  YES

## 2022-06-22 NOTE — PROGRESS NOTES
Clinic Care Coordination Contact    Community Health Worker Follow Up    Care Gaps:     Health Maintenance Due   Topic Date Due     HF ACTION PLAN  Never done     MICROALBUMIN  05/22/2020     LIPID  05/19/2021     BMP  07/03/2022     ALT  07/13/2022       Goals: No goals addressed this encounter, postponed to next [facility] outreach.     CHW Note: CHW received VM from patients krystin Muñiz requesting return call. CHW called patients krystin Muñiz back who was inquiring about COVID testing. Mary Kay stated that the patient started feeling ill on 6/11/22, and tested positive for COVID. Mary Kay was inquiring about how to proceed and if she should get the patient retested. CHW informed Mary Kay that the patient could still show a positive result for an unspecified amount of time, and recommended waiting before doing another test. Mary Kay states Tanmay is doing good at this time and seems to be recovering. CHW discussed the importance of wearing a mask and hand hygiene if she planned on being around the patient. CHW gave Mary Kay call back number and encouraged her to reach out with questions or concerns.     CHW Plan: CHW will continue to support patient with goals through routine scheduled outreach. CHW will outreach in one month on 7/22/22.      Lawanda Hsu  Community Health Worker   [facility] Care Coordination  [facility]   [email or web address]  Office: [phone]

## 2022-06-22 NOTE — PROGRESS NOTES
Clinic Care Coordination Contact.  Care Coordination Clinician Chart Review     Situation: Patient chart reviewed by care coordinator.?     Background: Initial assessment and enrollment to Care Coordination was 11/29/21.?? Patient centered goals were developed with participation from patient.? Lead CC handed patient off to CHW for continued outreach every 30 days.??     Assessment: Per chart review, patient outreach completed by CC CHW on 5/25/22.? Patient is actively working to accomplish goal(s).? Patient's goal(s) remain(s) appropriate at this time.? Patient is not due for updated Plan of Care.? Annual assessment will be due 11/28/22.      Goals        1. Reducing Risks (pt-stated)       Goal Statement: I would like to have a home care RN assist me with my medications for better compliance in the next 2 months.   Date Goal set: 11/29/21  Barriers: Patient said he has difficulty managing his medications. Homebound.  Strengths: Strong advocate for self good family support.  Date to Achieve By: 1/29/21  Patient expressed understanding of goal: Yes  Action steps to achieve this goal:  1. I understand the [facility] RN will request a home care order from Dr. Ramírez to have an RN come out and assist me with my medications.   2. I will continue to take my medications daily as best as I can until the home care nurse is able to come out. I will ask my niece for assistance when needed.   3. I will report progress towards this goal at scheduled outreach calls from my [facility] team.  12-1 discussed, home care order sent again.      12/8/21 - Referred to community paramedics until patient is able to get RN services through his waiver.   1/7/22 - Paramedic scheduled to see him on 1/10/22  Discussed on 1/25/22 - Mary Kay following up with the [facility] today to follow up on in-home RN to assist with his medications. She stated the [facility] are helping him out at this time.  Discussed 3/3/22  Discussed on 3/8/22 - Patient is managing  his medications independently. CADI  said she was not able to find RN to assist with medication management at this time, but will continue to try to find.   Discussed on 4/19/22. Patient manages his medications currently with assistance from family. [facility]  has not been able to find an RN to manages his medications per Mary Kay, but is still looking.     Discussed 5/25/22 Mary Kay feels at this time it is not needed, and recommended putting goal on hold until resource is needed            Medication 1 (pt-stated)       Goal Statement: I would like to make it to all of my scheduled appointments and take medications as prescribed.  Date Goal set: 5/25/22  Barriers: Patient is currently dependent on family  Strengths: patient is motivated and has help from family   Date to Achieve By: 11/25/22  Patient expressed understanding of goal: yes   Action steps to achieve this goal:  1. I will follow the doctors recommendations  2. I will go to my scheduled appointments  3. I will report progress towards this goal at scheduled outreach telephone calls from my [facility] team.                ??     Plan/Recommendations: The patient will continue working with Care Coordination to achieve above goal(s).? CHW will involve Lead CC as needed or if patient is ready to move to maintenance.? Lead CC will continue to monitor CHW s monthly outreaches and progress to goal(s) every 6 weeks.?     Plan of Care updated and sent to patient: No

## 2022-06-23 NOTE — TELEPHONE ENCOUNTER
Patient did in fact have presentation of acute muscle pain/spasm 5/26/2022 affecting his left gluteal musculature.  Flexeril should be indicated for this.

## 2022-06-23 NOTE — TELEPHONE ENCOUNTER
Patient calling to ask if he can still come in for his INR test tomorrow if he is still testing positive for Covid.     Patient first started having symptoms on 6/10/22 and has a lingering cough.     Spoke with Keren at [facility] who recommended patient keep his appointment tomorrow and she will alert the lab so they can take appropriate precautions.    Patient informed and verbalized understanding.    Christi Allen RN  06/23/22 11:10 AM  [facility] Nurse Advisor    Additional Information    Information only question and nurse able to answer    Protocols used: INFORMATION ONLY CALL - NO TRIAGE-A-OH

## 2022-06-24 NOTE — PROGRESS NOTES
ANTICOAGULATION MANAGEMENT     Tanmay Israel 82 year old male is on warfarin with subtherapeutic INR result. (Goal INR 2.0-3.0)    Recent labs: (last 7 days)     06/24/22  0935   INR 1.7*       ASSESSMENT       Source(s): Chart Review and Template       Warfarin doses taken: did not fill out on template; did check no to any missed doses though    Diet: No new diet changes identified    New illness, injury, or hospitalization: Yes: COVID+ on 6/14, still has lingering cough per triage note from last night     Medication/supplement changes: None noted    Signs or symptoms of bleeding or clotting: Not filled out on template    Previous INR: Therapeutic last 2(+) visits    Additional findings: None       PLAN     Recommended plan for temporary change(s) affecting INR     Dosing Instructions: continue your current warfarin dose with next INR in 2 weeks       Summary  As of 6/24/2022    Full warfarin instructions:  2.5 mg every Sun, Thu; 1.25 mg all other days   Next INR check:  7/8/2022             Detailed voice message left for Tanmay with dosing instructions and follow up date.     Contact [phone] to schedule and with any changes, questions or concerns.     Education provided: Please call back if any changes to your diet, medications or how you've been taking warfarin and Goal range and significance of current result    Plan made per [facility] anticoagulation protocol    Yaneth Wells RN  Anticoagulation Clinic  6/24/2022    _______________________________________________________________________     Anticoagulation Episode Summary     Current INR goal:  2.0-3.0   TTR:  50.4 % (1 y)   Target end date:  Indefinite   Send INR reminders to:  CHANI RIOS    Indications    Atrial fibrillation (H) [I48.91]  Pulmonary embolism with infarction (H) [I26.99]  Atrial fibrillation  unspecified type (H) [I48.91]           Comments:           Anticoagulation Care Providers     Provider Role Specialty Phone number    Ramírez  Go PEACE MD [facility] [phone]

## 2022-07-13 NOTE — PROGRESS NOTES
ANTICOAGULATION MANAGEMENT     Tanmay Israel 82 year old male is on warfarin with supratherapeutic INR result. (Goal INR 2.0-3.0)    Recent labs: (last 7 days)     07/13/22  0921   INR 5.5*       ASSESSMENT       Source(s): Chart Review and Template       Warfarin doses taken: More warfarin taken than planned which may be affecting INR    Diet: No new diet changes identified    New illness, injury, or hospitalization: No    Medication/supplement changes: None noted    Signs or symptoms of bleeding or clotting: No    Previous INR: Subtherapeutic    Additional findings: Unable to reach patient on phone today. Called and spoke with emergency contact, Mary Kay mcclelland who states patient has been having issues with phone. Unable to confirm dosing/changes with Mary Kay. Advised that she have patient hold warfarin until he talks with [facility] or until INR is rechecked on Friday at [facility]. Mary Kay will have Tanmay call [facility] if/when she reaches him.        PLAN     Unable to reach Tanmay today.    Spoke with Mary Kay and instruted to have patient hold 2 days of Warfarin and to have INR rechecked on [facility] on Friday. She will attempt to reach him and will have him call [facility] back for assessment.  ACN will continue to attempt to reach patient as well.     Follow up required to discuss out of range result     Lauryn Mercedes RN  Anticoagulation Clinic  7/13/2022

## 2022-07-14 NOTE — PROGRESS NOTES
ANTICOAGULATION MANAGEMENT     Tanmay Israel 82 year old male is on warfarin with supratherapeutic INR result. (Goal INR 2.0-3.0)    Recent labs: (last 7 days)     07/13/22  0921   INR 5.5*       ASSESSMENT       Source(s): Chart Review and Patient/Caregiver Call       Warfarin doses taken: More warfarin taken than planned which may be affecting INR    Diet: No new diet changes identified    New illness, injury, or hospitalization: No    Medication/supplement changes: None noted    Signs or symptoms of bleeding or clotting: No    Previous INR: Subtherapeutic    Additional findings: He did get instructions from his niece. So he has held his warfairn and will get checked at office visit tomorrow.       PLAN     Recommended plan for temporary change(s) affecting INR     Dosing Instructions: hold 2 doses then continue your current warfarin dose with next INR in 2 days       Summary  As of 7/13/2022    Full warfarin instructions:  7/13: Hold; 7/14: Hold; Otherwise 2.5 mg every Sun, Thu; 1.25 mg all other days   Next INR check:  7/15/2022             Telephone call with Tanmay who verbalizes understanding and agrees to plan and who agrees to plan and repeated back plan correctly    Check at provider office visit    Education provided: Please call back if any changes to your diet, medications or how you've been taking warfarin, Goal range and significance of current result, Importance of therapeutic range and Monitoring for bleeding signs and symptoms    Plan made per [facility] anticoagulation protocol    Bekah Urban RN  Anticoagulation Clinic  7/14/2022    _______________________________________________________________________     Anticoagulation Episode Summary     Current INR goal:  2.0-3.0   TTR:  48.8 % (1 y)   Target end date:  Indefinite   Send INR reminders to:  CHANI RIOS    Indications    Atrial fibrillation (H) [I48.91]  Pulmonary embolism with infarction (H) [I26.99]  Atrial fibrillation  unspecified type  (H) [I48.91]           Comments:           Anticoagulation Care Providers     Provider Role Specialty Phone number    Go Ramírez MD Referring Family Medicine [phone]

## 2022-07-15 NOTE — PROGRESS NOTES
ANTICOAGULATION MANAGEMENT     Tanmay Israel 82 year old male is on warfarin with supratherapeutic INR result. (Goal INR 2.0-3.0)    Recent labs: (last 7 days)     07/15/22  0843   INR 3.8*       ASSESSMENT       Source(s): Chart Review and Patient/Caregiver Call       Warfarin doses taken: Warfarin taken as instructed, held warfarin x 2 per ACN instructions    Diet: No new diet changes identified    New illness, injury, or hospitalization: No    Medication/supplement changes: None noted    Signs or symptoms of bleeding or clotting: No    Previous INR: Supratherapeutic    Additional findings: None       PLAN     Recommended plan for no diet, medication or health factor changes affecting INR     Dosing Instructions: decrease your warfarin dose (11% change) with next INR in 1 week       Summary  As of 7/15/2022    Full warfarin instructions:  2.5 mg every Thu; 1.25 mg all other days   Next INR check:  7/19/2022             Telephone call with Tanmay who verbalizes understanding and agrees to plan    Lab visit scheduled    Education provided: Goal range and significance of current result, Importance of therapeutic range and Importance of following up at instructed interval    Plan made per [facility] anticoagulation protocol    Angie Hall, RN  Anticoagulation Clinic  7/15/2022    _______________________________________________________________________     Anticoagulation Episode Summary     Current INR goal:  2.0-3.0   TTR:  48.4 % (1 y)   Target end date:  Indefinite   Send INR reminders to:  CHANI RIOS    Indications    Atrial fibrillation (H) [I48.91]  Pulmonary embolism with infarction (H) [I26.99]  Atrial fibrillation  unspecified type (H) [I48.91]           Comments:           Anticoagulation Care Providers     Provider Role Specialty Phone number    Go Ramírez MD Referring Family Medicine [phone]

## 2022-07-15 NOTE — LETTER
July 18, 2022      Tanmay Israel  [address]   [facility] [address]        Dear ,    We are writing to inform you of your test results.    {results letter list:[id number]}    Resulted Orders   Comprehensive metabolic panel   Result Value Ref Range    Sodium 141 136 - 145 mmol/L    Potassium 4.0 3.4 - 5.3 mmol/L    Creatinine 1.53 (H) 0.67 - 1.17 mg/dL    Urea Nitrogen 23.9 (H) 8.0 - 23.0 mg/dL    Chloride 111 (H) 98 - 107 mmol/L    Carbon Dioxide (CO2) 19 (L) 22 - 29 mmol/L    Anion Gap 11 7 - 15 mmol/L    Glucose 92 70 - 99 mg/dL    Calcium 7.2 (L) 8.8 - 10.2 mg/dL    Protein Total 6.6 6.4 - 8.3 g/dL    Albumin 3.5 3.5 - 5.2 g/dL    Bilirubin Total 0.5 <=1.2 mg/dL    Alkaline Phosphatase 301 (H) 40 - 129 U/L    AST 22 10 - 50 U/L    ALT 16 10 - 50 U/L    GFR Estimate 45 (L) >60 mL/min/1.73m2      Comment:      Effective December 21, 2021 eGFRcr in adults is calculated using the 2021 CKD-EPI creatinine equation which includes age and gender (Katelyn et al., NEJ, DOI: 10.1056/KNSTyx5756298)   Lipid panel reflex to direct LDL Fasting   Result Value Ref Range    Cholesterol 97 <200 mg/dL    Triglycerides 56 <150 mg/dL    Direct Measure HDL 48 >=40 mg/dL    LDL Cholesterol Calculated 38 <=100 mg/dL    Non HDL Cholesterol 49 <130 mg/dL    Narrative    Cholesterol  Desirable:  <200 mg/dL    Triglycerides  Normal:  Less than 150 mg/dL  Borderline High:  150-199 mg/dL  High:  200-499 mg/dL  Very High:  Greater than or equal to 500 mg/dL    Direct Measure HDL  Female:  Greater than or equal to 50 mg/dL   Male:  Greater than or equal to 40 mg/dL    LDL Cholesterol  Desirable:  <100mg/dL  Above Desirable:  100-129 mg/dL   Borderline High:  130-159 mg/dL   High:  160-189 mg/dL   Very High:  >= 190 mg/dL    Non HDL Cholesterol  Desirable:  130 mg/dL  Above Desirable:  130-159 mg/dL  Borderline High:  160-189 mg/dL  High:  190-219 mg/dL  Very High:  Greater than or equal to 220 mg/dL   CBC with platelets and  differential   Result Value Ref Range    WBC Count 9.2 4.0 - 11.0 10e3/uL    RBC Count 3.00 (L) 4.40 - 5.90 10e6/uL    Hemoglobin 9.1 (L) 13.3 - 17.7 g/dL    Hematocrit 28.5 (L) 40.0 - 53.0 %    MCV 95 78 - 100 fL    MCH 30.3 26.5 - 33.0 pg    MCHC 31.9 31.5 - 36.5 g/dL    RDW 15.0 10.0 - 15.0 %    Platelet Count 160 150 - 450 10e3/uL    % Neutrophils 75 %    % Lymphocytes 13 %    % Monocytes 9 %    % Eosinophils 2 %    % Basophils 0 %    % Immature Granulocytes 0 %    Absolute Neutrophils 6.9 1.6 - 8.3 10e3/uL    Absolute Lymphocytes 1.2 0.8 - 5.3 10e3/uL    Absolute Monocytes 0.8 0.0 - 1.3 10e3/uL    Absolute Eosinophils 0.2 0.0 - 0.7 10e3/uL    Absolute Basophils 0.0 0.0 - 0.2 10e3/uL    Absolute Immature Granulocytes 0.0 <=0.4 10e3/uL       If you have any questions or concerns, please call the clinic at the number listed above.       Sincerely,      Go Ramírez MD

## 2022-07-15 NOTE — PROGRESS NOTES
"Tanmay Israel  /60 (BP Location: Left arm, Patient Position: Sitting, Cuff Size: Adult Regular)   Pulse 72   Temp 98.3  F (36.8  C) (Oral)   Ht 1.753 m (5' 9\")   Wt 65.8 kg (145 lb)   SpO2 (!) 85%   BMI 21.41 kg/m       Assessment/Plan:                Tanmay was seen today for recheck.    Diagnoses and all orders for this visit:    Atrial fibrillation, unspecified type (H)  -     INR point of care    Pulmonary embolism with infarction (H)  -     INR point of care    Stage 3b chronic kidney disease (H)  -     Comprehensive metabolic panel  -     CBC with Platelets & Differential    Chronic systolic congestive heart failure (H)  -     Comprehensive metabolic panel  -     Lipid panel reflex to direct LDL Fasting    Right wrist pain  -     XR Wrist Right Navicular GE 3 Views; Future  -     Occupational Therapy Referral; Future    Tremor  -     Occupational Therapy Referral; Future    Altered gait  -     Adult Neurology  Referral; Future  -     Occupational Therapy Referral; Future    Bilateral leg weakness  -     Physical Therapy Referral; Future  -     Occupational Therapy Referral; Future         DISCUSSION  X-ray of the right wrist shows a nondisplaced fracture in the distal aspect of the ulna.  Placed patient in a thumb spica prefabricated splint and it felt much better.  Discussed possibility of referral but feel that based on the duration of time since injury, how the fracture appears on x-ray and being nondisplaced that a more conservative approach to treatment with immobilization using the prefabricated brace would be reasonable.  We will have him follow-up in a month for repeat x-ray and sooner if problems develop.    Obtain labs as above.    Occupational physical therapy referral    Neurology referral for tremor.        Subjective:     HPI:    Tanmay Israel is a 82 year old male with a complex medical history.  I have not seen him since November 2021.  He has had specialty follow-up visits.  " He was diagnosed and treated for COVID in June 2022.    Ischemic cardiomyopathy: No signs of volume overload.  No specific complaints in this regard.  Reviewed most recent cardiology consult note.  Recommendation for reducing dose of diuretic which patient requests to do because of frequent urination.  Discussed ways of monitoring for fluid gain.    Permanent A. fib: Rate seems to be controlled without concern of symptoms.  INR has fluctuated more recently.  Does not report any bleeding concerns.    Chronic kidney disease stage III: Most recent creatinine 1.7 representing GFR less than 40 in January 2022, baseline creatinine approximately 1.4-1.5.    Anemia: Most recent hemoglobin 10.0 from January 2022.    Diarrhea: Reports diarrhea is not a significant concern.    History of humerus fracture: Reports limited mobility in the shoulder which does affect his daily functioning.    Low back pain: Does not report significant back pain issues currently.    He has a prominent resting tremor in the left hand and arm.  He does not endorse specifically changes in gait or balance but over time has had significant progressive difficulties based on his clinical findings.  Does have masked facies as well.  Does not have a family history of parkinsonism and aura of a known familial tremor.  Discussed further assessment of this.    Has right wrist pain after a fall about a month ago.  Pain is most prominent at the base of the right thumb.  Is continuing to affect mobility and functioning.    ROS:  Complete review of systems is obtained.  Other than the specific considerations noted above complete review of systems is negative.          Objective:   Medications:  Current Outpatient Medications   Medication     acetaminophen (TYLENOL) 500 MG tablet     albuterol (PROAIR HFA/PROVENTIL HFA/VENTOLIN HFA) 108 (90 Base) MCG/ACT inhaler     bumetanide (BUMEX) 1 MG tablet     calcium carbonate-vitamin D 600-200 MG-UNIT TABS     carvedilol  (COREG) 25 MG tablet     cyclobenzaprine (FLEXERIL) 5 MG tablet     cycloSPORINE (RESTASIS) 0.05 % ophthalmic emulsion     diclofenac (VOLTAREN) 1 % topical gel     erythromycin (ROMYCIN) 5 MG/GM ophthalmic ointment     ferrous sulfate (FEROSUL) 325 (65 Fe) MG tablet     gabapentin (NEURONTIN) 100 MG capsule     Lidocaine (LIDOCARE) 4 % Patch     losartan (COZAAR) 100 MG tablet     multivitamin w/minerals (THERA-VIT-M) tablet     omeprazole (PRILOSEC) 20 MG DR capsule     potassium chloride ER (KLOR-CON M) 20 MEQ CR tablet     rosuvastatin (CRESTOR) 20 MG tablet     traMADol (ULTRAM) 50 MG tablet     vitamin B-12 (CYANOCOBALAMIN) 2000 MCG tablet     warfarin ANTICOAGULANT (COUMADIN) 2.5 MG tablet     Current Facility-Administered Medications   Medication     botulinum toxin type A (BOTOX) 100 units injection 100 Units        Allergies:   No Known Allergies     Social History     Socioeconomic History     Marital status:      Spouse name: Not on file     Number of children: Not on file     Years of education: Not on file     Highest education level: Not on file   Occupational History     Not on file   Tobacco Use     Smoking status: Former Smoker     Smokeless tobacco: Never Used   Substance and Sexual Activity     Alcohol use: Not Currently     Drug use: Never     Sexual activity: Yes     Partners: Female   Other Topics Concern     Parent/sibling w/ CABG, MI or angioplasty before 65F 55M? Not Asked   Social History Narrative     Not on file     Social Determinants of Health     Financial Resource Strain: Medium Risk     Difficulty of Paying Living Expenses: Somewhat hard   Food Insecurity: No Food Insecurity     Worried About Running Out of Food in the Last Year: Never true     Ran Out of Food in the Last Year: Never true   Transportation Needs: No Transportation Needs     Lack of Transportation (Medical): No     Lack of Transportation (Non-Medical): No   Physical Activity: Not on file   Stress: Not on file    Social Connections: Not on file   Intimate Partner Violence: Not on file   Housing Stability: Low Risk      Unable to Pay for Housing in the Last Year: No     Number of Places Lived in the Last Year: 1     Unstable Housing in the Last Year: No       Family History   Problem Relation Age of Onset     Heart Disease Mother      Cerebrovascular Disease Mother      Crohn's Disease Father      Alcoholism Brother      No Known Problems Daughter      No Known Problems Son      No Known Problems Maternal Aunt      No Known Problems Maternal Uncle      No Known Problems Paternal Aunt      No Known Problems Paternal Uncle      No Known Problems Maternal Grandmother      No Known Problems Maternal Grandfather      No Known Problems Paternal Grandmother      No Known Problems Paternal Grandfather      Cancer Brother      Urolithiasis No family hx of      Gout No family hx of         Most Recent Immunizations   Administered Date(s) Administered     COVID-19,PF,Pfizer (12+ Yrs) 11/22/2021     COVID-19,PF,Pfizer 12+ Yrs (2022 and After) 04/25/2022     DT (PEDS <7y) 03/01/2001     FLU 6-35 months 09/09/2010     Flu, Unspecified 09/09/2010     HepB-Adult 10/05/2018     Influenza (H1N1) 01/27/2010     Influenza (High Dose) 3 valent vaccine 09/08/2020     Influenza (IIV3) PF 10/01/2014     Influenza Vaccine, 6+MO IM (QUADRIVALENT W/PRESERVATIVES) 10/01/2014     Influenza, Quad, High Dose, Pf, 65yr+ (Fluzone HD) 10/01/2021     Influenza,INJ,MDCK,PF,Quad >4yrs 09/30/2018     Pneumo Conj 13-V (2010&after) 10/01/2014     Pneumococcal 23 valent 09/08/2005     TD (ADULT, 7+) 08/14/2021     TDAP Vaccine (Boostrix) 11/09/2015     Td (Adult), Adsorbed 08/14/2021     Td, Absorbed, Pf, Adult, Lf Unspecified 08/14/2021     Tdap (Adacel,Boostrix) 11/09/2015     Tdap (Adult) Unspecified Formulation 03/01/2001     Zoster vaccine recombinant adjuvanted (SHINGRIX) 12/08/2020     Zoster vaccine, live 12/16/2010        Wt Readings from Last 3  "Encounters:   07/15/22 65.8 kg (145 lb)   06/06/22 65.8 kg (145 lb)   05/05/22 67.6 kg (149 lb)        BP Readings from Last 6 Encounters:   07/15/22 110/60   06/06/22 110/56   06/02/22 100/68   05/26/22 105/69   02/28/22 109/74   02/23/22 108/74                PHYSICAL EXAM:    /60 (BP Location: Left arm, Patient Position: Sitting, Cuff Size: Adult Regular)   Pulse 72   Temp 98.3  F (36.8  C) (Oral)   Ht 1.753 m (5' 9\")   Wt 65.8 kg (145 lb)   SpO2 (!) 85%   BMI 21.41 kg/m           General Appearance:    Alert, cooperative, no distress   Eyes:   No scleral icterus or conjunctival irritation       Lungs:     Clear to auscultation bilaterally, respirations unlabored, no wheezes or crackles   Heart:   Irregularly irregular with rate controlled no murmur   Abdomen:    Soft, no distention, no tenderness on palpation, no masses, no organomegaly     Extremities:  No edema, close examination of the right wrist reveals limited flexion and extension with complaints of pain near the base of the thumb.  There is tenderness in the anatomical snuffbox.   Skin:  No concerning skin findings, no suspicious moles, no rashes   Neurologic:  On gross examination there is no motor or sensory deficit.  He walks with a cautious unsteady gait.  He does shuffle somewhat as he walks.  He has masked facies.  He has a resting tremor in the left arm.                                       "

## 2022-07-19 NOTE — PROGRESS NOTES
ANTICOAGULATION MANAGEMENT     Tanmay Israel 82 year old male is on warfarin with supratherapeutic INR result. (Goal INR 2.0-3.0)    Recent labs: (last 7 days)     07/19/22  0944   INR 4.3*       ASSESSMENT       Source(s): Chart Review and Template       Warfarin doses taken: Warfarin taken as instructed dose not on template    Diet: No new diet changes identified    New illness, injury, or hospitalization: No    Medication/supplement changes: None noted    Signs or symptoms of bleeding or clotting: No    Previous INR: Supratherapeutic    Additional findings: None       PLAN     Recommended plan for no diet, medication or health factor changes affecting INR     Dosing Instructions: hold 2 doses then decrease your warfarin dose (12.5% change) with next INR in 1 week       Summary  As of 7/19/2022    Full warfarin instructions:  7/19: Hold; Otherwise 1.25 mg every day   Next INR check:  7/26/2022             Detailed voice message left for Tanmay with dosing instructions and follow up date.     Contact [phone] to schedule and with any changes, questions or concerns.     Education provided: None required    Plan made per [facility] anticoagulation protocol    Evette Wise RN  Anticoagulation Clinic  7/19/2022    _______________________________________________________________________     Anticoagulation Episode Summary     Current INR goal:  2.0-3.0   TTR:  47.3 % (1 y)   Target end date:  Indefinite   Send INR reminders to:  CHANI RIOS    Indications    Atrial fibrillation (H) [I48.91]  Pulmonary embolism with infarction (H) [I26.99]  Atrial fibrillation  unspecified type (H) [I48.91]           Comments:           Anticoagulation Care Providers     Provider Role Specialty Phone number    Go Ramírez MD Referring Family Medicine [phone]

## 2022-07-19 NOTE — TELEPHONE ENCOUNTER
Reason for Call:  Patient returned call/per note in Chart Manjeet called but left no contact number/please reach out to patient for the next route of care/patient is home now.     Detailed comments: Call back    Phone Number Patient can be reached at: Home number on file [phone] (home)    Best Time: Now/patient is home    Can we leave a detailed message on this number? NO    Call taken on 7/19/2022 at 2:18 PM by Sue Lopez  '

## 2022-07-20 NOTE — NURSING NOTE
Chief Complaint   Patient presents with     Consult     Weakness - cant get out of chair     Sharon Singer MA,CMA,1:57 PM

## 2022-07-20 NOTE — PROGRESS NOTES
NEUROLOGY OUTPATIENT CONSULT NOTE   Jul 20, 2022     CHIEF COMPLAINT/REASON FOR VISIT/REASON FOR CONSULT  Patient presents with:  Consult: Weakness - cant get out of chair     REASON FOR CONSULTATION-leg weakness/evaluation for Parkinson's disease    REFERRAL SOURCE  Dr. Go Ramírez  CC Dr. Go Ramírez    HISTORY OF PRESENT ILLNESS  Tanmay Israel is a 82 year old male seen today for evaluation of bilateral leg weakness and evaluation for Parkinson's disease.  He reports that about 1 year ago he had a fall and he injured his left shoulder.  Since then he has been having difficulty getting out of chairs.  He did initially try riding a bicycle with some improvement in his symptoms but the symptoms persist.  Denies any numbness in the feet.  No issues when he is trying to walk it is mainly difficult getting out of chairs.  No difficulty with stairs.  He does have history of lumbar spine degeneration.  Has had shots in his back in the past which has helped the back pain.  Denies any significant neck pain.  There is a mild tremor in the left upper extremity.  Is present at rest.  Can happen with activities also.  He is unable to do an MRI because of pacemaker.  Denies any previous history of any mental illness.  Denies any new medications that he has been he has been on statin though this has been there for a long time.  Reports mild issues with balance.  Reports no new bowel or bladder issues.    Previous history is reviewed and this is unchanged.    PAST MEDICAL/SURGICAL HISTORY  Past Medical History:   Diagnosis Date     Acute cholecystitis 2/28/2019     Acute post-operative pain      Anemia      Arthritis      Atrial fibrillation (H)      C. difficile diarrhea 4/21/2019     Calculus of ureter      Callus      Cerebral aneurysm      Cervical myelopathy (H) 2/22/2019     Cervical spinal stenosis      Chronic kidney disease     stage 3     Chronic systolic congestive heart failure (H)      Closed fracture of  distal end of left radius, unspecified fracture morphology, initial encounter      Closed fracture of distal end of right radius, unspecified fracture morphology, initial encounter      Closed fracture of first thoracic vertebra, unspecified fracture morphology, initial encounter (H)      Coronary artery disease      Crohn's disease (H)      Disorder of bursae and tendons in shoulder region     Created by Conversion  Replacement Utility updated for latest IMO load     Dysphagia      Fall 10/22/2016     GERD (gastroesophageal reflux disease)      Hyperlipidemia      Hypertension      Hypotension, unspecified hypotension type      ICD (implantable cardioverter-defibrillator) in place     [employer]     Ischemic cardiomyopathy      Kidney stone      Low back pain      Lumbago     Created by Conversion      Macular degeneration      Myalgia and myositis, unspecified     Created by Conversion      Nonspecific elevation of levels of transaminase or lactic acid dehydrogenase (LDH)     Created by Conversion      Permanent atrial fibrillation (H)     [id number] risk score = 5 (age1/ CAD/ HTN/ CHF) Chronic warfarin     Personal history of colonic polyps 2/12/2019     Polyp of duodenum 10/17/2016     Pyuria      Right arm weakness 4/2/2019     Schatzki's ring      Sciatica     Created by Conversion      Sleep apnea     no CPAP     Spondylolisthesis of lumbar region 7/5/2016     Weakness 4/20/2019     Patient Active Problem List   Diagnosis     Osteoarthritis Of The Knee     Esophageal reflux     Obstructive sleep apnea     Ptosis Of Eyelid     Hyperlipidemia     Anemia     Crohn's (Granulomatous) Colitis     Benign Essential Hypertension     Coronary atherosclerosis of native coronary artery     Ischemic cardiomyopathy     Paroxysmal ventricular tachycardia (H)     Dry Nonexudative Macular Degeneration     Serum Enzyme Levels - Alkaline Phosphatase Elevated     Dysphagia     ICD (implantable cardioverter-defibrillator),  single, in situ     Lumbar stenosis with neurogenic claudication     Sacroiliac joint dysfunction of right side     Stage 3 chronic kidney disease (H)     A-fib (H)     Anemia, unspecified type     Cerebral aneurysm     Osteoporosis     Ileitis     Heart failure with reduced ejection fraction (H)     Diaphragmatic hernia     Pharyngoesophageal dysphagia     History of Crohn's disease     Iron deficiency anemia     Polyp of stomach and duodenum     Reflux esophagitis     Rotator cuff tear arthropathy of right shoulder     Schatzki's ring     Diverticulosis of colon     Flatulence, eructation and gas pain     S/P cervical spinal fusion     S/P laparoscopic cholecystectomy     Hypomagnesemia     Acute renal failure (ARF) (H)     Dizziness     Polyp of colon     ICD (implantable cardioverter-defibrillator) in place     Esophageal obstruction due to food impaction     ACP (advance care planning)     SOB (shortness of breath)     Acute systolic (congestive) heart failure (H)     Acute on chronic systolic heart failure (H)     Permanent atrial fibrillation (H)     Supratherapeutic INR     Cardiac pacemaker in situ     Pneumonia due to infectious organism, unspecified laterality, unspecified part of lung     Anemia     Benign essential hypertension     CAD (coronary artery disease), native coronary artery     Atrial fibrillation (H)     Pulmonary embolism with infarction (H)     LBBB (left bundle branch block)     Sciatica     Presbyesophagus     Gastroenteritis     Chronic anticoagulation     Acute blood loss anemia     Closed fracture of proximal end of left humerus with routine healing     Hypokalemia     Loose stools     Weakness     Polyp of duodenum     Personal history of colonic polyps     Diarrhea     Advised about management of weight     Atrial fibrillation, unspecified type (H)     ICD (implantable cardioverter-defibrillator) battery depletion   Significant for arthritis, tremor    FAMILY HISTORY  Family History    Problem Relation Age of Onset     Heart Disease Mother      Cerebrovascular Disease Mother      Crohn's Disease Father      Alcoholism Brother      No Known Problems Daughter      No Known Problems Son      No Known Problems Maternal Aunt      No Known Problems Maternal Uncle      No Known Problems Paternal Aunt      No Known Problems Paternal Uncle      No Known Problems Maternal Grandmother      No Known Problems Maternal Grandfather      No Known Problems Paternal Grandmother      No Known Problems Paternal Grandfather      Cancer Brother      Urolithiasis No family hx of      Gout No family hx of    Reviewed and otherwise noncontributory.  No known family history of parkinsonism.  His dad might have Crohn's disease.    SOCIAL HISTORY  Social History     Tobacco Use     Smoking status: Former Smoker     Smokeless tobacco: Never Used   Substance Use Topics     Alcohol use: Not Currently     Drug use: Never       SYSTEMS REVIEW  Twelve-system ROS was done and other than the HPI this was negative except for back pain, arm and leg pain, joint pain, weakness paralysis, difficulty walking, falling, balance coordination problems, dizziness, sleepiness during the day, cardiac/heart problems.    MEDICATIONS  acetaminophen (TYLENOL) 500 MG tablet, Take 1,000 mg by mouth every 8 hours as needed   albuterol (PROAIR HFA/PROVENTIL HFA/VENTOLIN HFA) 108 (90 Base) MCG/ACT inhaler, Inhale 1-2 puffs into the lungs every 4 hours as needed   bumetanide (BUMEX) 1 MG tablet, TAKE ONE TABLET (1MG) BY MOUTH ONCE DAILY  calcium carbonate-vitamin D 600-200 MG-UNIT TABS, Take 2 tablets by mouth daily   carvedilol (COREG) 25 MG tablet, Take 1.5 tablets (37.25 mg) by mouth 2 times daily (with meals)  cyclobenzaprine (FLEXERIL) 5 MG tablet, Take 1 tablet (5 mg) by mouth 2 times daily as needed for muscle spasms  cycloSPORINE (RESTASIS) 0.05 % ophthalmic emulsion, 1 drop 2 times daily as needed for dry eyes  diclofenac (VOLTAREN) 1 % topical  gel, Apply 2 g topically 2 times daily   erythromycin (ROMYCIN) 5 MG/GM ophthalmic ointment, Place 0.5 inches into both eyes daily   ferrous sulfate (FEROSUL) 325 (65 Fe) MG tablet, Take 1 tablet by mouth daily  gabapentin (NEURONTIN) 100 MG capsule, Take 1 capsule (100 mg) by mouth 3 times daily  Lidocaine (LIDOCARE) 4 % Patch, Place 1 patch onto the skin daily as needed for moderate pain To prevent lidocaine toxicity, patient should be patch free for 12 hrs daily.  losartan (COZAAR) 100 MG tablet, Take 1 tablet (100 mg) by mouth daily  multivitamin w/minerals (THERA-VIT-M) tablet, Take 1 tablet by mouth daily  omeprazole (PRILOSEC) 20 MG DR capsule, TAKE ONE CAPSULE BY MOUTH ONCE DAILY BEFORE BREAKFAST  potassium chloride ER (KLOR-CON M) 20 MEQ CR tablet, TAKE ONE TABLET BY MOUTH ONCE DAILY  rosuvastatin (CRESTOR) 20 MG tablet, TAKE 1 TABLET BY MOUTH DAILY AT BEDTIME (Patient taking differently: Take 20 mg by mouth daily)  traMADol (ULTRAM) 50 MG tablet, Take 50 mg by mouth every 6 hours as needed   vitamin B-12 (CYANOCOBALAMIN) 2000 MCG tablet, Take 2,000 mcg by mouth daily  warfarin ANTICOAGULANT (COUMADIN) 2.5 MG tablet, TAKE 1 TO 1.5 TABLETS (2.5-3.75MG) BY MOUTH DAILY. ADJUST DOSE PER INR AS DIRECTED. (Patient taking differently: Take 1.25-2.5 mg by mouth See Admin Instructions 1.25 mg every Mon, Wed, Fri; 2.5 mg all other days)    botulinum toxin type A (BOTOX) 100 units injection 100 Units         PHYSICAL EXAMINATION  VITALS: /60   Pulse 68   Resp 18   Wt 60.8 kg (134 lb)   BMI 19.79 kg/m    GENERAL: Healthy appearing, alert, no acute distress, normal habitus.  CARDIOVASCULAR: Extremities warm and well perfused. Pulses present.   EYES: Funduscopic exam.  NEUROLOGICAL:  Patient is awake and oriented to self, place and time.  Attention span is normal.  Memory is grossly intact.  Language is fluent and follows commands appropriately.  Appropriate fund of knowledge. Cranial nerves 2-12 are intact.  There is no pronator drift.  Motor exam shows 5/5 strength in all extremities except 4-5 bilateral hip flexion weakness.  Tone is symmetric bilaterally in upper and lower extremities.  Minimal left upper extremity resting tremor noted.  Reflexes are symmetric and 1+ in upper extremities and lower extremities. Sensory exam is grossly intact to light touch, pin prick and vibration.  Finger to nose and heel to shin is without dysmetria.  Romberg is negative.  Gait is normal mild decreased arm swing and the patient is able to do tandem walk and walk on toes and heels with mild difficulty.    DIAGNOSTICS  CT L spine  IMPRESSION:  1.  Postoperative changes of posterior decompression laminectomy and right-sided facetectomy at L5-S1.  2.  Grade 1-2 anterolisthesis of L4 on L5 likely on a degenerative basis. The right L4 facet appears to articulate with the posterosuperior L5 vertebral body. There is moderate to severe spinal canal narrowing at this level as well as severe right and   moderate to severe left neural foraminal narrowing.  3.  Degenerative findings at L5-S1 cause moderate to severe bilateral neural foraminal narrowings. No spinal canal narrowing at this level  4.  Degenerative disc changes at L2-L3 and L3-L4 cause moderate bilateral neural foraminal narrowings.  5.  At T11-T12, left foraminal disc protrusion causes mild spinal canal narrowing and moderate left neural foraminal narrowing.    CT C spine  CANAL/FORAMINA: Multilevel degenerative changes without high-grade spinal canal stenosis, however there is advanced bilateral neural foraminal narrowing at C3-C4 and on the left at C5-C6.     CT Head  HEAD CT:   1. Left posterior scalp soft tissue contusion/hematoma without acute intracranial abnormality.     2. Chronic left frontal lobe infarct superimposed upon a background of mild to moderate chronic microangiopathy.     RELEVANT LABS  Component      Latest Ref Rng & Units 7/15/2022   WBC      4.0 - 11.0  10e3/uL 9.2   RBC Count      4.40 - 5.90 10e6/uL 3.00 (L)   Hemoglobin      13.3 - 17.7 g/dL 9.1 (L)   Hematocrit      40.0 - 53.0 % 28.5 (L)   MCV      78 - 100 fL 95   MCH      26.5 - 33.0 pg 30.3   MCHC      31.5 - 36.5 g/dL 31.9   RDW      10.0 - 15.0 % 15.0   Platelet Count      150 - 450 10e3/uL 160   % Neutrophils      % 75   % Lymphocytes      % 13   % Monocytes      % 9   % Eosinophils      % 2   % Basophils      % 0   % Immature Granulocytes      % 0   Absolute Neutrophils      1.6 - 8.3 10e3/uL 6.9   Absolute Lymphocytes      0.8 - 5.3 10e3/uL 1.2   Absolute Monocytes      0.0 - 1.3 10e3/uL 0.8   Absolute Eosinophils      0.0 - 0.7 10e3/uL 0.2   Absolute Basophils      0.0 - 0.2 10e3/uL 0.0   Absolute Immature Granulocytes      <=0.4 10e3/uL 0.0   Sodium      136 - 145 mmol/L 141   Potassium      3.4 - 5.3 mmol/L 4.0   Creatinine      0.67 - 1.17 mg/dL 1.53 (H)   Urea Nitrogen      8.0 - 23.0 mg/dL 23.9 (H)   Chloride      98 - 107 mmol/L 111 (H)   Carbon Dioxide (CO2)      22 - 29 mmol/L 19 (L)   Anion Gap      7 - 15 mmol/L 11   Glucose      70 - 99 mg/dL 92   Calcium      8.8 - 10.2 mg/dL 7.2 (L)   Protein Total      6.4 - 8.3 g/dL 6.6   Albumin      3.5 - 5.2 g/dL 3.5   Bilirubin Total      <=1.2 mg/dL 0.5   Alkaline Phosphatase      40 - 129 U/L 301 (H)   AST      10 - 50 U/L 22   ALT      10 - 50 U/L 16   GFR Estimate      >60 mL/min/1.73m2 45 (L)       OUTSIDE RECORDS  Outside referral notes and chart notes were reviewed and pertinent information has been summarized (in addition to the HPI):-Referred from primary care            IMPRESSION/REPORT/PLAN  Resting tremor-question of parkinsonism  Weakness of both lower extremities  Spinal stenosis of lumbar region without neurogenic claudication    This is a 82 year old male with a fall a year ago and subsequent bilateral leg weakness with difficulty getting out of chairs.  On exam he does have bilateral hip flexion weakness.  Imaging studies have  shown severe spinal stenosis in the lumbar spine which I suspect is causing his symptoms.  CT of the C-spine and head have been noncontributory.  I will check blood work to look for other causes of myopathy which could be affecting/causing the leg weakness.  If testing is positive could consider an EMG of the lower extremities.    He does have mild left upper extremity tremor.  Overall I do not see a lot of evidence of parkinsonism.  We will hold off on a DaTscan for right now.  We will start with trial of Sinemet to see how he does and if it helps with the weakness also.    I can see him back in 6 weeks to assess response.    -     carbidopa-levodopa (SINEMET)  MG tablet; Take 1 tablet by mouth 3 times daily Take at least 30 min before meals or 2 hours after meals. Do not mix with food.  -     Vitamin B12; Future  -     TSH with free T4 reflex; Future  -     CK total; Future  -     Physical Therapy Referral; Future    Return in about 6 weeks (around 8/31/2022) for In-Clinic Visit (must), After testing.    Over 65 minutes were spent coordinating the care for the patient on the day of the encounter.  This includes previsit, during visit and post visit activities as documented above.  Counseling patient.  Reviewing chart.  Prescription management. Multiple problems reviewed/addressed.  Multiple imaging studies reviewed.  (Activities include but not inclusive of reviewing chart, reviewing outside records, reviewing labs and imaging study results as well as the images, patient visit time including getting history and exam,  use if applicable, review of test results with the patient and coming up with a plan in a shared model, counseling patient and family, education and answering patient questions, EMR , EMR diagnosis entry and problem list management, medication reconciliation and prescription management if applicable, paperwork if applicable, printing documents and documentation of the  visit activities.)  Billing:-4 data points, 4 problem points      Shoaib King MD  Neurologist  [facility]  Tel:- [phone]    This note was dictated using voice recognition software.  Any grammatical or context distortions are unintentional and inherent to the software.

## 2022-07-20 NOTE — LETTER
7/20/2022         RE: Tanmay Israel  [address]  [facility] [address]        Dear Colleague,    Thank you for referring your patient, Tanmya Israel, to the [facility] [place]. Please see a copy of my visit note below.    NEUROLOGY OUTPATIENT CONSULT NOTE   Jul 20, 2022     CHIEF COMPLAINT/REASON FOR VISIT/REASON FOR CONSULT  Patient presents with:  Consult: Weakness - cant get out of chair     REASON FOR CONSULTATION-leg weakness/evaluation for Parkinson's disease    REFERRAL SOURCE  Dr. Go Ramírez  CC Dr. Go Ramírez    HISTORY OF PRESENT ILLNESS  Tanmay Israel is a 82 year old male seen today for evaluation of bilateral leg weakness and evaluation for Parkinson's disease.  He reports that about 1 year ago he had a fall and he injured his left shoulder.  Since then he has been having difficulty getting out of chairs.  He did initially try riding a bicycle with some improvement in his symptoms but the symptoms persist.  Denies any numbness in the feet.  No issues when he is trying to walk it is mainly difficult getting out of chairs.  No difficulty with stairs.  He does have history of lumbar spine degeneration.  Has had shots in his back in the past which has helped the back pain.  Denies any significant neck pain.  There is a mild tremor in the left upper extremity.  Is present at rest.  Can happen with activities also.  He is unable to do an MRI because of pacemaker.  Denies any previous history of any mental illness.  Denies any new medications that he has been he has been on statin though this has been there for a long time.  Reports mild issues with balance.  Reports no new bowel or bladder issues.    Previous history is reviewed and this is unchanged.    PAST MEDICAL/SURGICAL HISTORY  Past Medical History:   Diagnosis Date     Acute cholecystitis 2/28/2019     Acute post-operative pain      Anemia      Arthritis      Atrial fibrillation (H)      C. difficile  diarrhea 4/21/2019     Calculus of ureter      Callus      Cerebral aneurysm      Cervical myelopathy (H) 2/22/2019     Cervical spinal stenosis      Chronic kidney disease     stage 3     Chronic systolic congestive heart failure (H)      Closed fracture of distal end of left radius, unspecified fracture morphology, initial encounter      Closed fracture of distal end of right radius, unspecified fracture morphology, initial encounter      Closed fracture of first thoracic vertebra, unspecified fracture morphology, initial encounter (H)      Coronary artery disease      Crohn's disease (H)      Disorder of bursae and tendons in shoulder region     Created by Conversion  Replacement Utility updated for latest IMO load     Dysphagia      Fall 10/22/2016     GERD (gastroesophageal reflux disease)      Hyperlipidemia      Hypertension      Hypotension, unspecified hypotension type      ICD (implantable cardioverter-defibrillator) in place     [employer]     Ischemic cardiomyopathy      Kidney stone      Low back pain      Lumbago     Created by Conversion      Macular degeneration      Myalgia and myositis, unspecified     Created by Conversion      Nonspecific elevation of levels of transaminase or lactic acid dehydrogenase (LDH)     Created by Conversion      Permanent atrial fibrillation (H)     [id number] risk score = 5 (age3/ CAD/ HTN/ CHF) Chronic warfarin     Personal history of colonic polyps 2/12/2019     Polyp of duodenum 10/17/2016     Pyuria      Right arm weakness 4/2/2019     Schatzki's ring      Sciatica     Created by Conversion      Sleep apnea     no CPAP     Spondylolisthesis of lumbar region 7/5/2016     Weakness 4/20/2019     Patient Active Problem List   Diagnosis     Osteoarthritis Of The Knee     Esophageal reflux     Obstructive sleep apnea     Ptosis Of Eyelid     Hyperlipidemia     Anemia     Crohn's (Granulomatous) Colitis     Benign Essential Hypertension     Coronary atherosclerosis of  native coronary artery     Ischemic cardiomyopathy     Paroxysmal ventricular tachycardia (H)     Dry Nonexudative Macular Degeneration     Serum Enzyme Levels - Alkaline Phosphatase Elevated     Dysphagia     ICD (implantable cardioverter-defibrillator), single, in situ     Lumbar stenosis with neurogenic claudication     Sacroiliac joint dysfunction of right side     Stage 3 chronic kidney disease (H)     A-fib (H)     Anemia, unspecified type     Cerebral aneurysm     Osteoporosis     Ileitis     Heart failure with reduced ejection fraction (H)     Diaphragmatic hernia     Pharyngoesophageal dysphagia     History of Crohn's disease     Iron deficiency anemia     Polyp of stomach and duodenum     Reflux esophagitis     Rotator cuff tear arthropathy of right shoulder     Schatzki's ring     Diverticulosis of colon     Flatulence, eructation and gas pain     S/P cervical spinal fusion     S/P laparoscopic cholecystectomy     Hypomagnesemia     Acute renal failure (ARF) (H)     Dizziness     Polyp of colon     ICD (implantable cardioverter-defibrillator) in place     Esophageal obstruction due to food impaction     ACP (advance care planning)     SOB (shortness of breath)     Acute systolic (congestive) heart failure (H)     Acute on chronic systolic heart failure (H)     Permanent atrial fibrillation (H)     Supratherapeutic INR     Cardiac pacemaker in situ     Pneumonia due to infectious organism, unspecified laterality, unspecified part of lung     Anemia     Benign essential hypertension     CAD (coronary artery disease), native coronary artery     Atrial fibrillation (H)     Pulmonary embolism with infarction (H)     LBBB (left bundle branch block)     Sciatica     Presbyesophagus     Gastroenteritis     Chronic anticoagulation     Acute blood loss anemia     Closed fracture of proximal end of left humerus with routine healing     Hypokalemia     Loose stools     Weakness     Polyp of duodenum     Personal  history of colonic polyps     Diarrhea     Advised about management of weight     Atrial fibrillation, unspecified type (H)     ICD (implantable cardioverter-defibrillator) battery depletion   Significant for arthritis, tremor    FAMILY HISTORY  Family History   Problem Relation Age of Onset     Heart Disease Mother      Cerebrovascular Disease Mother      Crohn's Disease Father      Alcoholism Brother      No Known Problems Daughter      No Known Problems Son      No Known Problems Maternal Aunt      No Known Problems Maternal Uncle      No Known Problems Paternal Aunt      No Known Problems Paternal Uncle      No Known Problems Maternal Grandmother      No Known Problems Maternal Grandfather      No Known Problems Paternal Grandmother      No Known Problems Paternal Grandfather      Cancer Brother      Urolithiasis No family hx of      Gout No family hx of    Reviewed and otherwise noncontributory.  No known family history of parkinsonism.  His dad might have Crohn's disease.    SOCIAL HISTORY  Social History     Tobacco Use     Smoking status: Former Smoker     Smokeless tobacco: Never Used   Substance Use Topics     Alcohol use: Not Currently     Drug use: Never       SYSTEMS REVIEW  Twelve-system ROS was done and other than the HPI this was negative except for back pain, arm and leg pain, joint pain, weakness paralysis, difficulty walking, falling, balance coordination problems, dizziness, sleepiness during the day, cardiac/heart problems.    MEDICATIONS  acetaminophen (TYLENOL) 500 MG tablet, Take 1,000 mg by mouth every 8 hours as needed   albuterol (PROAIR HFA/PROVENTIL HFA/VENTOLIN HFA) 108 (90 Base) MCG/ACT inhaler, Inhale 1-2 puffs into the lungs every 4 hours as needed   bumetanide (BUMEX) 1 MG tablet, TAKE ONE TABLET (1MG) BY MOUTH ONCE DAILY  calcium carbonate-vitamin D 600-200 MG-UNIT TABS, Take 2 tablets by mouth daily   carvedilol (COREG) 25 MG tablet, Take 1.5 tablets (37.25 mg) by mouth 2 times  daily (with meals)  cyclobenzaprine (FLEXERIL) 5 MG tablet, Take 1 tablet (5 mg) by mouth 2 times daily as needed for muscle spasms  cycloSPORINE (RESTASIS) 0.05 % ophthalmic emulsion, 1 drop 2 times daily as needed for dry eyes  diclofenac (VOLTAREN) 1 % topical gel, Apply 2 g topically 2 times daily   erythromycin (ROMYCIN) 5 MG/GM ophthalmic ointment, Place 0.5 inches into both eyes daily   ferrous sulfate (FEROSUL) 325 (65 Fe) MG tablet, Take 1 tablet by mouth daily  gabapentin (NEURONTIN) 100 MG capsule, Take 1 capsule (100 mg) by mouth 3 times daily  Lidocaine (LIDOCARE) 4 % Patch, Place 1 patch onto the skin daily as needed for moderate pain To prevent lidocaine toxicity, patient should be patch free for 12 hrs daily.  losartan (COZAAR) 100 MG tablet, Take 1 tablet (100 mg) by mouth daily  multivitamin w/minerals (THERA-VIT-M) tablet, Take 1 tablet by mouth daily  omeprazole (PRILOSEC) 20 MG DR capsule, TAKE ONE CAPSULE BY MOUTH ONCE DAILY BEFORE BREAKFAST  potassium chloride ER (KLOR-CON M) 20 MEQ CR tablet, TAKE ONE TABLET BY MOUTH ONCE DAILY  rosuvastatin (CRESTOR) 20 MG tablet, TAKE 1 TABLET BY MOUTH DAILY AT BEDTIME (Patient taking differently: Take 20 mg by mouth daily)  traMADol (ULTRAM) 50 MG tablet, Take 50 mg by mouth every 6 hours as needed   vitamin B-12 (CYANOCOBALAMIN) 2000 MCG tablet, Take 2,000 mcg by mouth daily  warfarin ANTICOAGULANT (COUMADIN) 2.5 MG tablet, TAKE 1 TO 1.5 TABLETS (2.5-3.75MG) BY MOUTH DAILY. ADJUST DOSE PER INR AS DIRECTED. (Patient taking differently: Take 1.25-2.5 mg by mouth See Admin Instructions 1.25 mg every Mon, Wed, Fri; 2.5 mg all other days)    botulinum toxin type A (BOTOX) 100 units injection 100 Units         PHYSICAL EXAMINATION  VITALS: /60   Pulse 68   Resp 18   Wt 60.8 kg (134 lb)   BMI 19.79 kg/m    GENERAL: Healthy appearing, alert, no acute distress, normal habitus.  CARDIOVASCULAR: Extremities warm and well perfused. Pulses present.   EYES:  Funduscopic exam.  NEUROLOGICAL:  Patient is awake and oriented to self, place and time.  Attention span is normal.  Memory is grossly intact.  Language is fluent and follows commands appropriately.  Appropriate fund of knowledge. Cranial nerves 2-12 are intact. There is no pronator drift.  Motor exam shows 5/5 strength in all extremities except 4-5 bilateral hip flexion weakness.  Tone is symmetric bilaterally in upper and lower extremities.  Minimal left upper extremity resting tremor noted.  Reflexes are symmetric and 1+ in upper extremities and lower extremities. Sensory exam is grossly intact to light touch, pin prick and vibration.  Finger to nose and heel to shin is without dysmetria.  Romberg is negative.  Gait is normal mild decreased arm swing and the patient is able to do tandem walk and walk on toes and heels with mild difficulty.    DIAGNOSTICS  CT L spine  IMPRESSION:  1.  Postoperative changes of posterior decompression laminectomy and right-sided facetectomy at L5-S1.  2.  Grade 1-2 anterolisthesis of L4 on L5 likely on a degenerative basis. The right L4 facet appears to articulate with the posterosuperior L5 vertebral body. There is moderate to severe spinal canal narrowing at this level as well as severe right and   moderate to severe left neural foraminal narrowing.  3.  Degenerative findings at L5-S1 cause moderate to severe bilateral neural foraminal narrowings. No spinal canal narrowing at this level  4.  Degenerative disc changes at L2-L3 and L3-L4 cause moderate bilateral neural foraminal narrowings.  5.  At T11-T12, left foraminal disc protrusion causes mild spinal canal narrowing and moderate left neural foraminal narrowing.    CT C spine  CANAL/FORAMINA: Multilevel degenerative changes without high-grade spinal canal stenosis, however there is advanced bilateral neural foraminal narrowing at C3-C4 and on the left at C5-C6.     CT Head  HEAD CT:   1. Left posterior scalp soft tissue  contusion/hematoma without acute intracranial abnormality.     2. Chronic left frontal lobe infarct superimposed upon a background of mild to moderate chronic microangiopathy.     RELEVANT LABS  Component      Latest Ref Rng & Units 7/15/2022   WBC      4.0 - 11.0 10e3/uL 9.2   RBC Count      4.40 - 5.90 10e6/uL 3.00 (L)   Hemoglobin      13.3 - 17.7 g/dL 9.1 (L)   Hematocrit      40.0 - 53.0 % 28.5 (L)   MCV      78 - 100 fL 95   MCH      26.5 - 33.0 pg 30.3   MCHC      31.5 - 36.5 g/dL 31.9   RDW      10.0 - 15.0 % 15.0   Platelet Count      150 - 450 10e3/uL 160   % Neutrophils      % 75   % Lymphocytes      % 13   % Monocytes      % 9   % Eosinophils      % 2   % Basophils      % 0   % Immature Granulocytes      % 0   Absolute Neutrophils      1.6 - 8.3 10e3/uL 6.9   Absolute Lymphocytes      0.8 - 5.3 10e3/uL 1.2   Absolute Monocytes      0.0 - 1.3 10e3/uL 0.8   Absolute Eosinophils      0.0 - 0.7 10e3/uL 0.2   Absolute Basophils      0.0 - 0.2 10e3/uL 0.0   Absolute Immature Granulocytes      <=0.4 10e3/uL 0.0   Sodium      136 - 145 mmol/L 141   Potassium      3.4 - 5.3 mmol/L 4.0   Creatinine      0.67 - 1.17 mg/dL 1.53 (H)   Urea Nitrogen      8.0 - 23.0 mg/dL 23.9 (H)   Chloride      98 - 107 mmol/L 111 (H)   Carbon Dioxide (CO2)      22 - 29 mmol/L 19 (L)   Anion Gap      7 - 15 mmol/L 11   Glucose      70 - 99 mg/dL 92   Calcium      8.8 - 10.2 mg/dL 7.2 (L)   Protein Total      6.4 - 8.3 g/dL 6.6   Albumin      3.5 - 5.2 g/dL 3.5   Bilirubin Total      <=1.2 mg/dL 0.5   Alkaline Phosphatase      40 - 129 U/L 301 (H)   AST      10 - 50 U/L 22   ALT      10 - 50 U/L 16   GFR Estimate      >60 mL/min/1.73m2 45 (L)       OUTSIDE RECORDS  Outside referral notes and chart notes were reviewed and pertinent information has been summarized (in addition to the HPI):-Referred from primary care            IMPRESSION/REPORT/PLAN  Resting tremor-question of parkinsonism  Weakness of both lower extremities  Spinal  stenosis of lumbar region without neurogenic claudication    This is a 82 year old male with a fall a year ago and subsequent bilateral leg weakness with difficulty getting out of chairs.  On exam he does have bilateral hip flexion weakness.  Imaging studies have shown severe spinal stenosis in the lumbar spine which I suspect is causing his symptoms.  CT of the C-spine and head have been noncontributory.  I will check blood work to look for other causes of myopathy which could be affecting/causing the leg weakness.  If testing is positive could consider an EMG of the lower extremities.    He does have mild left upper extremity tremor.  Overall I do not see a lot of evidence of parkinsonism.  We will hold off on a DaTscan for right now.  We will start with trial of Sinemet to see how he does and if it helps with the weakness also.    I can see him back in 6 weeks to assess response.    -     carbidopa-levodopa (SINEMET)  MG tablet; Take 1 tablet by mouth 3 times daily Take at least 30 min before meals or 2 hours after meals. Do not mix with food.  -     Vitamin B12; Future  -     TSH with free T4 reflex; Future  -     CK total; Future  -     Physical Therapy Referral; Future    Return in about 6 weeks (around 8/31/2022) for In-Clinic Visit (must), After testing.    Over 65 minutes were spent coordinating the care for the patient on the day of the encounter.  This includes previsit, during visit and post visit activities as documented above.  Counseling patient.  Reviewing chart.  Prescription management. Multiple problems reviewed/addressed.  Multiple imaging studies reviewed.  (Activities include but not inclusive of reviewing chart, reviewing outside records, reviewing labs and imaging study results as well as the images, patient visit time including getting history and exam,  use if applicable, review of test results with the patient and coming up with a plan in a shared model, counseling patient  and family, education and answering patient questions, EMR , EMR diagnosis entry and problem list management, medication reconciliation and prescription management if applicable, paperwork if applicable, printing documents and documentation of the visit activities.)  Billing:-4 data points, 4 problem points      Shoaib King MD  Neurologist  [facility]  Tel:- [phone]    This note was dictated using voice recognition software.  Any grammatical or context distortions are unintentional and inherent to the software.        Again, thank you for allowing me to participate in the care of your patient.        Sincerely,        Shoaib King MD

## 2022-07-20 NOTE — PROGRESS NOTES
Clinic Care Coordination Contact    Community Health Worker Follow Up    Care Gaps:     Health Maintenance Due   Topic Date Due     HF ACTION PLAN  Never done     MICROALBUMIN  05/22/2020         Goals:    Goals Addressed as of 7/20/2022 at 9:06 AM                    Today    5/25/22       1. Reducing Risks (pt-stated)   20%  On Hold    Added 11/30/21 by Myhre, David J, RN      Goal Statement: I would like to have a home care RN assist me with my medications for better compliance in the next 2 months.   Date Goal set: 11/29/21  Barriers: Patient said he has difficulty managing his medications. Homebound.  Strengths: Strong advocate for self good family support.  Date to Achieve By: 1/29/21  Patient expressed understanding of goal: Yes  Action steps to achieve this goal:  1. I understand the [facility] RN will request a home care order from Dr. Ramírez to have an RN come out and assist me with my medications.   2. I will continue to take my medications daily as best as I can until the home care nurse is able to come out. I will ask my niece for assistance when needed.   3. I will report progress towards this goal at scheduled outreach calls from my [facility] team.  12-1 discussed, home care order sent again.      12/8/21 - Referred to community paramedics until patient is able to get RN services through his waiver.   1/7/22 - Paramedic scheduled to see him on 1/10/22  Discussed on 1/25/22 - [place] [facility] [place] to follow up on in-home RN to assist with his medications. She stated the [facility] are helping him out at this time.  Discussed 3/3/22  Discussed on 3/8/22 - Patient is managing his medications independently. CAD  said she was not able to find RN to assist with medication management at this time, but will continue to try to find.   Discussed on 4/19/22. Patient manages his medications currently with assistance from family. CADI  has not been able to find an RN to  manages his medications per Mary Kay, but is still looking.     Discussed 5/25/22 Mary Kay feels at this time it is not needed, and recommended putting goal on hold until resource is needed   Discussed 7/20/22- Mary Kay stated patient had an intake yesterday for home health care               Intervention and Education during outreach: CHW gave patients krystin Muñiz contact information and encouraged her to reach out with any questions or concerns.    CHW Note:    CHW contacted patients krystin Mccain to review/update [facility] goals.     Mary Kay stated that the patient had an intake with a home health care agency on 7/19/22 and they are waiting to hear back. Mary Kay states that the patient could really use assistance in home to help with house work as well.     Mary Kay shared the patient had a fall a few weeks ago and fractured his right wrist. Mary Kay worries the patient will not be able to get his wrist splint tight enough. Mary Kay will follow up with patients PCP in one month for follow up appointment.     Mary Kay states the patient has been losing weight and is currently at 137lb. Mary Kay has been looking into more meal delivery services and found one that delivers 3 meals a week. Patient would have enough room for this in his fridge and it would help with patient not having to prepare his own meals. Mary Kay will report progress on meal delivery service at next [facility] outreach.     Mary Kay denied any other needs or concerns at this time but will reach out to [facility] as needed.     CHW Plan: CHW will continue to support patient with goals through routine scheduled outreach. CHW will outreach in one month on 8/22/22.      Lawanda Hsu  Community Health Worker   [facility] Care Coordination  [facility]   [email or web address]  Office: [phone]

## 2022-07-22 NOTE — PROGRESS NOTES
"   07/21/22 1300   Quick Adds   Quick Adds Certification   Type of Visit Initial OP PT Evaluation       Present No   General Information   Start of Care Date 07/21/22   Referring Physician Go Ramírez MD   Medical Diagnosis Bilateral leg weakness   Precautions/Limitations fall precautions   Pertinent history of current problem (include personal factors and/or comorbidities that impact the POC) Pt had a fall tripping over something at [employer]. Would like to come to PT to work on his LE strengthening and balance. He has a SEC that he uses and does not use his walker right now. He does some exercises lying in bed: shoulder mobility, leg knee flexion/ext, row with band. He has a hard time getting up out of a chair- uses pillows at home. Pt has a tremor in his left hand - just got pills from neurologist- Dr. King. Pt is having \"someone\" come into his home to help 2x/week for 8 hour.   Patient role/Employment history Retired   Living environment Apartment/condo   Home/Community Accessibility Comments Has stairs but there is an elevator- will do stairs to take trash out with step to gait pattern   Current Assistive Devices Standard Cane;Four Wheeled Walker   General Information Comments Will add PMH to note-  Past Medical History:   Diagnosis Date    Acute cholecystitis 2/28/2019    Acute post-operative pain     Anemia     Arthritis     Atrial fibrillation (H)     C. difficile diarrhea 4/21/2019    Calculus of ureter     Callus     Cerebral aneurysm     Cervical myelopathy (H) 2/22/2019    Cervical spinal stenosis     Chronic kidney disease     stage 3    Chronic systolic congestive heart failure (H)     Closed fracture of distal end of left radius, unspecified fracture morphology, initial encounter     Closed fracture of distal end of right radius, unspecified fracture morphology, initial encounter     Closed fracture of first thoracic vertebra, unspecified fracture morphology, initial " encounter (H)     Coronary artery disease     Crohn's disease (H)     Disorder of bursae and tendons in shoulder region     Created by Conversion  Replacement Utility updated for latest IMO load    Dysphagia     Fall 10/22/2016    GERD (gastroesophageal reflux disease)     Hyperlipidemia     Hypertension     Hypotension, unspecified hypotension type     ICD (implantable cardioverter-defibrillator) in place     [employer]    Ischemic cardiomyopathy     Kidney stone     Low back pain     Lumbago     Created by Conversion     Macular degeneration     Myalgia and myositis, unspecified     Created by Conversion     Nonspecific elevation of levels of transaminase or lactic acid dehydrogenase (LDH)     Created by Conversion     Permanent atrial fibrillation (H)     [id number]-MUGd risk score = 5 (age2/ CAD/ HTN/ CHF) Chronic warfarin    Personal history of colonic polyps 2/12/2019    Polyp of duodenum 10/17/2016    Pyuria     Right arm weakness 4/2/2019    Schatzki's ring     Sciatica     Created by Conversion     Sleep apnea     no CPAP    Spondylolisthesis of lumbar region 7/5/2016    Weakness 4/20/2019        Fall Risk Screen   Fall screen completed by PT   Have you fallen 2 or more times in the past year? No   Have you fallen and had an injury in the past year? Yes   Is patient a fall risk? Yes   Fall screen comments Provided with falls risk handout and pt to work on strengthening and balance in PT   Abuse Screen (yes response referral indicated)   Feels Unsafe at Home or Work/School no   Feels Threatened by Someone no   Does Anyone Try to Keep You From Having Contact with Others or Doing Things Outside Your Home? no   Physical Signs of Abuse Present no   Vitals Signs   Heart Rate 88  (at rest)   SpO2 96  (at rest)   Blood Pressure 95/65  (not symptomatic and encouraged pt to drink water)   Cognitive Status Examination   Orientation orientation to person, place and time   Level of Consciousness alert   Transfer Skills    Transfer Transfer Skill: Sit/Stand   Transfer Skill: Sit to Stand   Level of [place]: Sit to Stand other (see comments)  (Independent at times throughout session but can require [place])   Gait   Gait Comments slow gait speed, small step length juanis, decreased push off   Balance Special Tests   Balance Special Tests Timed up and go;Sit to stand reps   Balance Special Tests Timed Up and Go   Seconds 45 Seconds   Comments with SEC   Balance Special Tests Sit to Stand Reps in 30 Seconds   Reps in 30 seconds 0   Height standard   Comments 2 reps while holding onto chair at all times   Planned Therapy Interventions   Planned Therapy Interventions balance training;ADL retraining;bed mobility training;gait training;joint mobilization;neuromuscular re-education;ROM;stretching;strengthening;transfer training;manual therapy   Clinical Impression   Criteria for Skilled Therapeutic Interventions Met yes, treatment indicated   PT Diagnosis impaired gait, balance, mobility and endurance, LE weakness, unsteadiness on feet   Influenced by the following impairments LE weakness as demonstrated by poor functional mobility including STS transition, ambulation speed and reports of difficulty with all bed mobility, TUG score of 45 seconds, APTA 30 sec STS score of 0, pt requests assist with several STS transition during session   Functional limitations due to impairments standing, walking, transitions, bed mobility, balance, car transfers   Clinical Presentation Stable/Uncomplicated   Clinical Decision Making (Complexity) Low complexity   Therapy Frequency 1 time/week   Predicted Duration of Therapy Intervention (days/wks) 12   Risk & Benefits of therapy have been explained Yes   Patient, Family & other staff in agreement with plan of care Yes   Clinical Impression Comments Pt reports LE weakness, gait and balance impairment that has worsened over the past few months. He has a history of falls and demonstrates as a high falls risk  with his APTA 30 sec STS and TUG score. Pt is appropriate for skilled PT intervention to address impairments, increase LE strength and decrease falls risk.   GOALS   PT Eval Goals 1;3;2   Goal 1   Goal Identifier HEP   Goal Description Pt will be independent in HEP to address impairments and improve function and decrease falls risk   Target Date 10/19/22   Goal 2   Goal Identifier TUG   Goal Description Pt will demonstrate a decreased falls risk by decreasing their TUG score by >10 seconds   Target Date 10/19/22   Goal 3   Goal Identifier APTA STS   Goal Description Pt will be able to complete >5 reps of STS with use of UEs   Target Date 10/19/22   Total Evaluation Time   PT Eval, Low Complexity Minutes (84507) 35  (pt arrived 11 min late as he went to the wrong location first)   Therapy Certification   Certification date from 07/21/22   Certification date to 10/19/22   Medical Diagnosis Bilateral leg weakness   Certification I certify the need for these services furnished under this plan of treatment and while under my care.  (Physician co-signature of this document indicates review and certification of the therapy plan).     Sharon Arora, PT, DPT, CLT-NOE

## 2022-07-22 NOTE — PROGRESS NOTES
[facility]                                                                                   OUTPATIENT PHYSICAL THERAPY FUNCTIONAL EVALUATION  PLAN OF TREATMENT FOR OUTPATIENT REHABILITATION  (COMPLETE FOR INITIAL CLAIMS ONLY)  Patient's Last Name, First Name, M.I.  YOB: 1940  Tanmay Israel     Provider's Name   [facility]   Medical Record No.  [id number]     Start of Care Date:  07/21/22   Onset Date:   7/15/22   Type:     _X__PT   ____OT  ____SLP Medical Diagnosis:  Bilateral leg weakness     PT Diagnosis:  impaired gait, balance, mobility and endurance, LE weakness, unsteadiness on feet Visits from SOC:  1                              __________________________________________________________________________________  Plan of Treatment/Functional Goals:  balance training, ADL retraining, bed mobility training, gait training, joint mobilization, neuromuscular re-education, ROM, stretching, strengthening, transfer training, manual therapy           GOALS  (P) HEP  (P) Pt will be independent in HEP to address impairments and improve function and decrease falls risk  (P) 10/19/22    (P) TUG  (P) Pt will demonstrate a decreased falls risk by decreasing their TUG score by >10 seconds  (P) 10/19/22    (P) APTA STS  (P) Pt will be able to complete >5 reps of STS with use of UEs  (P) 10/19/22                                                           Therapy Frequency:  1 time/week   Predicted Duration of Therapy Intervention:  12    Sharon Arora, PT                                    I CERTIFY THE NEED FOR THESE SERVICES FURNISHED UNDER        THIS PLAN OF TREATMENT AND WHILE UNDER MY CARE     (Physician co-signature of this document indicates review and certification of the therapy plan).              Certification Date From:  07/21/22   Certification Date To:   10/19/22    Referring Provider:  Go Ramírez MD    Initial Assessment  See Epic Evaluation- Start of Care Date: 07/21/22

## 2022-07-25 NOTE — TELEPHONE ENCOUNTER
[facility] Call Center    Phone Message    May a detailed message be left on voicemail: yes     Reason for Call: Other: Patient is currently taking Sinemet and has been throwing up, per krystin. Pharmacist advise taking it with food, but patient is still throwing up. Please contact patient to advise. If unable to reach patient, please contact krystin Muñiz at [phone].    Action Taken: Message routed to:  [facility] ([facility] neurology    Travel Screening: Not Applicable

## 2022-07-26 NOTE — TELEPHONE ENCOUNTER
Called and spoke with patient and advised him to stop the medication. Also spoke with patients niece to let her know.   She asked if there was a different medication that he should try.   Sharon Singer MA,CMA,10:24 AM

## 2022-07-26 NOTE — TELEPHONE ENCOUNTER
I do not think he needs to be on medication.  His Parkinson symptoms were not very bad.  We will discuss during the next appointment.

## 2022-07-27 NOTE — PROGRESS NOTES
ANTICOAGULATION MANAGEMENT     Tanmay Israel 82 year old male is on warfarin with therapeutic INR result. (Goal INR 2.0-3.0)    Recent labs: (last 7 days)     07/27/22  0910   INR 2.20*       ASSESSMENT       Source(s): Chart Review, Patient/Caregiver Call and Template       Warfarin doses taken: More warfarin taken than planned which may be affecting INR-- took 2.5mg last Weds instead of holding    Diet: No new diet changes identified    New illness, injury, or hospitalization: No    Medication/supplement changes: None noted    Signs or symptoms of bleeding or clotting: No    Previous INR: Supratherapeutic    Additional findings: None       PLAN     Recommended plan for no diet, medication or health factor changes affecting INR     Dosing Instructions: Continue your current warfarin dose that you have been taking with next INR in 7-10 days       Summary  As of 7/27/2022    Full warfarin instructions:  2.5 mg every Wed; 1.25 mg all other days   Next INR check:  8/4/2022             Telephone call with Tanmay who verbalizes understanding and agrees to plan    Lab visit scheduled    Education provided: Contact [phone] with any changes, questions or concerns.     Plan made per [facility] anticoagulation protocol    Yaneth Wells RN  Anticoagulation Clinic  7/27/2022    _______________________________________________________________________     Anticoagulation Episode Summary     Current INR goal:  2.0-3.0   TTR:  46.0 % (1 y)   Target end date:  Indefinite   Send INR reminders to:  CHANI RIOS    Indications    Atrial fibrillation (H) [I48.91]  Pulmonary embolism with infarction (H) [I26.99]  Atrial fibrillation  unspecified type (H) [I48.91]           Comments:           Anticoagulation Care Providers     Provider Role Specialty Phone number    Go Ramírez MD Referring Family Medicine [phone]

## 2022-07-27 NOTE — PROGRESS NOTES
Clinic Care Coordination Contact    Community Health Worker Follow Up    Care Gaps:     Health Maintenance Due   Topic Date Due     HF ACTION PLAN  Never done     MICROALBUMIN  05/22/2020         Goals:    Goals Addressed as of 7/27/2022 at 1:59 PM                    Today    7/20/22       1. Reducing Risks (pt-stated)   20%  20%    Added 11/30/21 by Myhre, David J, RN      Goal Statement: I would like to have a home care RN assist me with my medications for better compliance in the next 2 months.   Date Goal set: 11/29/21  Barriers: Patient said he has difficulty managing his medications. Homebound.  Strengths: Strong advocate for self good family support.  Date to Achieve By: 1/29/21  Patient expressed understanding of goal: Yes  Action steps to achieve this goal:  1. I understand the [facility] RN will request a home care order from Dr. Ramírez to have an RN come out and assist me with my medications.   2. I will continue to take my medications daily as best as I can until the home care nurse is able to come out. I will ask my niece for assistance when needed.   3. I will report progress towards this goal at scheduled outreach calls from my [facility] team.  12-1 discussed, home care order sent again.      12/8/21 - Referred to community paramedics until patient is able to get RN services through his waiver.   1/7/22 - Paramedic scheduled to see him on 1/10/22  Discussed on 1/25/22 - [place] [facility] [place] to follow up on in-home RN to assist with his medications. She stated the [facility] are helping him out at this time.  Discussed 3/3/22  Discussed on 3/8/22 - Patient is managing his medications independently. CAD  said she was not able to find RN to assist with medication management at this time, but will continue to try to find.   Discussed on 4/19/22. Patient manages his medications currently with assistance from family. [facility]  has not been able to find an RN to manages  "his medications per Mary Kay, but is still looking.     Discussed 7/27/22           Medication 1 (pt-stated)   10%      Added 5/25/22 by Lawanda Hsu      Goal Statement: I would like to make it to all of my scheduled appointments and take medications as prescribed.  Date Goal set: 5/25/22  Barriers: Patient is currently dependent on family  Strengths: patient is motivated and has help from family   Date to Achieve By: 11/25/22  Patient expressed understanding of goal: yes   Action steps to achieve this goal:  1. I will follow the doctors recommendations  2. I will go to my scheduled appointments  3. I will report progress towards this goal at scheduled outreach telephone calls from my [facility] team.      Discussed: 7/27/22              Intervention and Education during outreach: CHW confirmed patients upcoming physical therapy appointment on 9/12/22. CHW gave patients krystin Muñiz the number to scheduling to see if patient can get earlier appointment.     CHW Note:    CHW received VM from patients krystin Muñiz who had some questions regarding his physical therapy appointment.    CHW called Mary Kay back as she requested. Mary Kay inquired about who the patient had seen at last PT appointment, date of last appointment and date of next appointment. CHW confirmed information with Mary Kay. Mary Kay stated she would call the scheduling line to see why the patients next physical therapy appointment is not until 9/12/22. Mary Kay will let [facility] team know if they can be of anymore assistance.     Mary Kay shared that the home health care agency they have been working with should be able to come out in two weeks. Mary Kay states the patient is in need of home health care and is worried it may get pushed out. Mary Kay will report progress to [facility] when she has more information.     Mary Kay stated that the patient has been having a hard time walking. Mary Kay shared the patient had seen a Podiatrist for his \"Hammer Toes\" but they were told it was bone on bone " and not much they can do. CHW will send message to Dr. Ramírez's team at request from Mary Kay, to address patients pain.    CHW Plan: CHW will continue to support patient with goals through routine scheduled outreach. CHW will outreach in one month on 8/29/22.      Lawanda Hsu  Community Health Worker   [facility] Care Coordination  [facility]   [email or web address]  Office: [phone]

## 2022-08-02 NOTE — LETTER
[facility]  Patient Centered Plan of Care  About Me:        Patient Name:  Tanmay Donato,     I hope you are doing well. Here is a copy of your Care Plan with the goals we are supporting you with at this time. Please let me know if there is anything else we can do for you.     Thanks,     Dave Myhre, RN  CCC RN  [phone]    YOB: 1940  Age:         82 year old   Tay MRN:    [id number] Telephone Information:  Home Phone [phone]   Mobile [phone]       Address:  [address]  [place] [id number] Email address:  No e-mail address on record      Emergency Contact(s)    Name Relationship Lgl Grd Work Phone Home Phone Mobile Phone   1. CHECO WILSON No [phone]   2. PHILLY MILTON [employer] No  [phone]           Primary language:  English     needed? No   [facility]:  [phone] op. 1  Other communication barriers:None    Preferred Method of Communication:     Current living arrangement: I live in a private home    Mobility Status/ Medical Equipment: Independent w/Device        Health Maintenance  Health Maintenance Reviewed: Due/Overdue    Health Maintenance Due   Topic Date Due     HF ACTION PLAN  Never done     MICROALBUMIN  05/22/2020       My Access Plan  Medical Emergency 911   Primary Clinic Line [facility] - [phone]   24 Hour Appointment Line [phone] or  [phone]) (toll-free)   24 Hour Nurse Line [phone] (toll-free)   Preferred Urgent Care [facility], [phone]     Preferred Hospital [facility]  [phone]     Preferred Pharmacy [facility] [place] [id number] Behavioral Health Crisis Line The National Suicide Prevention Lifeline at 1-428.809.3333 or 858             My Care Team Members  Patient Care Team       Relationship  Specialty Notifications Start End    Go Ramírez MD PCP - General Family Medicine  12/3/20     Phone: [phone] Fax: [phone]         [address] [facility] [address]    Massiel Medina, PharmD Pharmacist Pharmacist Admissions 6/10/19     Phone: [phone] Fax: [phone]         [place] [address] [place] [address]    Leonardo Smart MD Assigned Neuroscience Provider   12/20/20     Phone: [phone] Fax: [phone]         [address] [facility] [address] [facility] [address]    Go Ramírez MD Assigned PCP   6/16/21     Phone: [phone] Fax: [phone]         [address] [facility] [address]    Remy Aguilar MD Resident Internal Medicine - Pediatrics  8/19/21     Phone: [phone] Fax: [phone]         [address] [facility] [address] [facility] [address]    Myhre, David J, RN Lead Care Coordinator Primary Care - CC Admissions 11/30/21     Phone: [phone]    Dhaval Almazan DPM Assigned Musculoskeletal Provider   2/6/22     Phone: [phone] Fax: [phone]         [address] [facility] [address]    Lawanda Hsu MA Community Health Worker Primary Care - CC  4/25/22     Massiel Medina, PharmD Assigned MTM Pharmacist   5/28/22     Phone: [phone] Fax: [phone]         [facility] [address] [place] [address]    Chance Fleming MD Assigned Heart and Vascular Provider   6/11/22     Phone: [phone] Fax: [phone]         [address] [place] [address]    Shoaib King MD MD Neurology  7/19/22     Phone: [phone] Fax: [phone]         [address] [facility] [address]            My Care Plans  Self Management and Treatment Plan  Goals and (Comments)   Goals        General     2. Reducing Risks (pt-stated)      Notes - Note edited  8/2/2022  7:10 AM by Myhre, David J, RN     Goal Statement: I would like to have a home care RN assist me with my  medications for better compliance in the next 2 months.   Date Goal set: 11/29/21  Barriers: Patient said he has difficulty managing his medications. Homebound.  Strengths: Strong advocate for self good family support.  Date to Achieve By: 11/28/22  Patient expressed understanding of goal: Yes  Action steps to achieve this goal:  1. I understand the [facility] RN will request a home care order from Dr. Ramírez to have an RN come out and assist me with my medications.   2. I will continue to take my medications daily as best as I can until the home care nurse is able to come out. I will ask my niece for assistance when needed.   3. I will report progress towards this goal at scheduled outreach calls from my [facility] team.  12-1 discussed, home care order sent again.      12/8/21 - Referred to community paramedics until patient is able to get RN services through his waiver.   1/7/22 - Paramedic scheduled to see him on 1/10/22  Discussed on 1/25/22 - [place] to follow up on in-home RN to assist with his medications. She stated the [facility] are helping him out at this time.  Discussed 3/3/22  Discussed on 3/8/22 - Patient is managing his medications independently. CADI  said she was not able to find RN to assist with medication management at this time, but will continue to try to find.   Discussed on 4/19/22. Patient manages his medications currently with assistance from family. CADI  has not been able to find an RN to manages his medications per Mary Kay, but is still looking.   Discussed 7/27/22         Medication 1 (pt-stated)      Notes - Note edited  7/27/2022  1:58 PM by Lawanda Hsu MA     Goal Statement: I would like to make it to all of my scheduled appointments and take medications as prescribed.  Date Goal set: 5/25/22  Barriers: Patient is currently dependent on family  Strengths: patient is motivated and has help from family   Date to Achieve By:  11/25/22  Patient expressed understanding of goal: yes   Action steps to achieve this goal:  1. I will follow the doctors recommendations  2. I will go to my scheduled appointments  3. I will report progress towards this goal at scheduled outreach telephone calls from my CCC team.      Discussed: 7/27/22               Action Plans on File:                       Advance Care Plans/Directives Type:   DNR/DNI      My Medical and Care Information  Problem List   Patient Active Problem List   Diagnosis     Osteoarthritis Of The Knee     Esophageal reflux     Obstructive sleep apnea     Ptosis Of Eyelid     Hyperlipidemia     Anemia     Crohn's (Granulomatous) Colitis     Benign Essential Hypertension     Coronary atherosclerosis of native coronary artery     Ischemic cardiomyopathy     Paroxysmal ventricular tachycardia (H)     Dry Nonexudative Macular Degeneration     Serum Enzyme Levels - Alkaline Phosphatase Elevated     Dysphagia     ICD (implantable cardioverter-defibrillator), single, in situ     Lumbar stenosis with neurogenic claudication     Sacroiliac joint dysfunction of right side     Stage 3 chronic kidney disease (H)     A-fib (H)     Anemia, unspecified type     Cerebral aneurysm     Osteoporosis     Ileitis     Heart failure with reduced ejection fraction (H)     Diaphragmatic hernia     Pharyngoesophageal dysphagia     History of Crohn's disease     Iron deficiency anemia     Polyp of stomach and duodenum     Reflux esophagitis     Rotator cuff tear arthropathy of right shoulder     Schatzki's ring     Diverticulosis of colon     Flatulence, eructation and gas pain     S/P cervical spinal fusion     S/P laparoscopic cholecystectomy     Hypomagnesemia     Acute renal failure (ARF) (H)     Dizziness     Polyp of colon     ICD (implantable cardioverter-defibrillator) in place     Esophageal obstruction due to food impaction     ACP (advance care planning)     SOB (shortness of breath)     Acute systolic  (congestive) heart failure (H)     Acute on chronic systolic heart failure (H)     Permanent atrial fibrillation (H)     Supratherapeutic INR     Cardiac pacemaker in situ     Pneumonia due to infectious organism, unspecified laterality, unspecified part of lung     Anemia     Benign essential hypertension     CAD (coronary artery disease), native coronary artery     Atrial fibrillation (H)     Pulmonary embolism with infarction (H)     LBBB (left bundle branch block)     Sciatica     Presbyesophagus     Gastroenteritis     Chronic anticoagulation     Acute blood loss anemia     Closed fracture of proximal end of left humerus with routine healing     Hypokalemia     Loose stools     Weakness     Polyp of duodenum     Personal history of colonic polyps     Diarrhea     Advised about management of weight     Atrial fibrillation, unspecified type (H)     ICD (implantable cardioverter-defibrillator) battery depletion      Current Medications and Allergies:  See printed Medication Report.    Care Coordination Start Date: 11/29/2021   Frequency of Care Coordination: monthly     Form Last Updated: 08/02/2022

## 2022-08-02 NOTE — PROGRESS NOTES
Clinic Care Coordination Contact  Care Coordination Clinician Chart Review     Situation: Patient chart reviewed by care coordinator.?     Background: Initial assessment and enrollment to Care Coordination was 11/29/21.?? Patient centered goals were developed with participation from patient.? Lead CC handed patient off to CHW for continued outreach every 30 days.??     Assessment: Per chart review, patient outreach completed by CC CHW on 7/27/22.? Patient is actively working to accomplish goal(s).? Patient's goal(s) remain(s) appropriate at this time.? Patient is due for updated Plan of Care.? Annual assessment will be due 11/28/22.      Goals        1. Reducing Risks (pt-stated)       Goal Statement: I would like to have a home care RN assist me with my medications for better compliance in the next 2 months.   Date Goal set: 11/29/21  Barriers: Patient said he has difficulty managing his medications. Homebound.  Strengths: Strong advocate for self good family support.  Date to Achieve By: 1/29/21  Patient expressed understanding of goal: Yes  Action steps to achieve this goal:  1. I understand the [facility] RN will request a home care order from Dr. Ramírez to have an RN come out and assist me with my medications.   2. I will continue to take my medications daily as best as I can until the home care nurse is able to come out. I will ask my niece for assistance when needed.   3. I will report progress towards this goal at scheduled outreach calls from my [facility] team.  12-1 discussed, home care order sent again.      12/8/21 - Referred to community paramedics until patient is able to get RN services through his waiver.   1/7/22 - Paramedic scheduled to see him on 1/10/22  Discussed on 1/25/22 - Mary Kay following up with the [facility] today to follow up on in-home RN to assist with his medications. She stated the [facility] are helping him out at this time.  Discussed 3/3/22  Discussed on 3/8/22 - Patient is managing his  medications independently. CADI  said she was not able to find RN to assist with medication management at this time, but will continue to try to find.   Discussed on 4/19/22. Patient manages his medications currently with assistance from family. CADI  has not been able to find an RN to manages his medications per Mary Kay, but is still looking.     Discussed 7/27/22           Medication 1 (pt-stated)       Goal Statement: I would like to make it to all of my scheduled appointments and take medications as prescribed.  Date Goal set: 5/25/22  Barriers: Patient is currently dependent on family  Strengths: patient is motivated and has help from family   Date to Achieve By: 11/25/22  Patient expressed understanding of goal: yes   Action steps to achieve this goal:  1. I will follow the doctors recommendations  2. I will go to my scheduled appointments  3. I will report progress towards this goal at scheduled outreach telephone calls from my CCC team.      Discussed: 7/27/22          ??     Plan/Recommendations: The patient will continue working with Care Coordination to achieve above goal(s).? CHW will involve Lead CC as needed or if patient is ready to move to maintenance.? Lead CC will continue to monitor CHW s monthly outreaches and progress to goal(s) every 6 weeks.?     Plan of Care updated and sent to patient: Yes, on 8/2/22

## 2022-08-04 NOTE — PROGRESS NOTES
ANTICOAGULATION MANAGEMENT     Tanmay Israel 82 year old male is on warfarin with therapeutic INR result. (Goal INR 2.0-3.0)    Recent labs: (last 7 days)     08/04/22  0916   INR 2.9*       ASSESSMENT       Source(s): Chart Review, Patient/Caregiver Call and Template       Warfarin doses taken: Warfarin taken as instructed    Diet: No new diet changes identified    New illness, injury, or hospitalization: No    Medication/supplement changes: None noted    Signs or symptoms of bleeding or clotting: No    Previous INR: Therapeutic last visit; previously outside of goal range    Additional findings: None       PLAN     Recommended plan for no diet, medication or health factor changes affecting INR     Dosing Instructions: Continue your current warfarin dose with next INR in 2 weeks       Summary  As of 8/4/2022    Full warfarin instructions:  2.5 mg every Wed; 1.25 mg all other days   Next INR check:  8/22/2022             Telephone call with Tanmay who verbalizes understanding and agrees to plan    Check at provider office visit on 8/22/22    Education provided: Goal range and significance of current result, Importance of taking warfarin as instructed and Contact [phone] with any changes, questions or concerns.     Plan made per [facility] anticoagulation protocol    Romelia Kelly RN  Anticoagulation Clinic  8/4/2022    _______________________________________________________________________     Anticoagulation Episode Summary     Current INR goal:  2.0-3.0   TTR:  48.0 % (1 y)   Target end date:  Indefinite   Send INR reminders to:  CHANI RIOS    Indications    Atrial fibrillation (H) [I48.91]  Pulmonary embolism with infarction (H) [I26.99]  Atrial fibrillation  unspecified type (H) [I48.91]           Comments:           Anticoagulation Care Providers     Provider Role Specialty Phone number    Go Ramírez MD Referring Family Medicine [phone]

## 2022-08-10 NOTE — TELEPHONE ENCOUNTER
[facility] Call Center    Phone Message    May a detailed message be left on voicemail: yes     Reason for Call: Medication Question or concern regarding medication   Prescription Clarification  Name of Medication: carbidopa-levodopa (SINEMET)  MG tablet  Prescribing Provider: Shoaib King   Pharmacy: [facility] - [place], [place] - [address]   What on the order needs clarification?     Patient's niece Mary Kay is requesting a call back due to the patient vomiting when taking the carbidopa-levodopa (SINEMET)  MG tablet. Mary Kay would like to know if there is a different medication he can take. Please call Mary Kay back at # [phone] to advise.          Action Taken: Message routed to:  Other: [facility]     Travel Screening: Not Applicable

## 2022-08-11 NOTE — TELEPHONE ENCOUNTER
[facility] Call Center    Phone Message    May a detailed message be left on voicemail: yes     Reason for Call: Other: Patient's niece, Mary Kay, calling back Awilda Nieves, to discuss medication questions.  Please call Mary Kay back     Writer tried to [facility].    Action Taken: Message routed to:  [facility]    Travel Screening: Not Applicable

## 2022-08-11 NOTE — TELEPHONE ENCOUNTER
Dr. King-  Pt's niece, Mary Kay, returned the call. She states pt stopped taking the medication after a week due to vomiting.  Since stopping the medication, the vomiting has stopped as well.  She states pt did feel that it was helping with the tremors, but he just couldn't tolerate it to continue taking.  Pt has an upcoming appt on 9/14/22 with you. She is wondering if you want to try an alternative prior to seeing him back in September? Or discuss further at that appt?    Awilda Nieves LPN on 8/11/2022 at 11:04 AM

## 2022-08-11 NOTE — TELEPHONE ENCOUNTER
Left message for pt's niece, Mary Kay, to call back.      Awilda Nieves LPN on 8/11/2022 at 10:40 AM

## 2022-08-11 NOTE — LETTER
8/11/2022         RE: Tanmay Israel  [address]  [facility] [address]        Dear Colleague,    Thank you for referring your patient, Tanmay Israel, to the [facility]. Please see a copy of my visit note below.    FOOT AND ANKLE SURGERY/PODIATRY Progress Note        ASSESSMENT:   Hammertoes 2 through 5 bilaterally  Onychauxis    HPI: Tanmay Israel was seen again today complaining of pain in the tip of the toes both feet.  The patient stated that the toes are aggravated with weightbearing and ambulation.  The pain is mild to moderate.  He has no pain while resting.  He has not had any redness, swelling, drainage or bleeding.  He denies any trauma to his feet.  He denies any previous treatment.  The patient stated that his nails are quite long.  He is unable to take care of his nails.    Past Medical History:   Diagnosis Date     Acute cholecystitis 2/28/2019     Acute post-operative pain      Anemia      Arthritis      Atrial fibrillation (H)      C. difficile diarrhea 4/21/2019     Calculus of ureter      Callus      Cerebral aneurysm      Cervical myelopathy (H) 2/22/2019     Cervical spinal stenosis      Chronic kidney disease     stage 3     Chronic systolic congestive heart failure (H)      Closed fracture of distal end of left radius, unspecified fracture morphology, initial encounter      Closed fracture of distal end of right radius, unspecified fracture morphology, initial encounter      Closed fracture of first thoracic vertebra, unspecified fracture morphology, initial encounter (H)      Coronary artery disease      Crohn's disease (H)      Disorder of bursae and tendons in shoulder region     Created by Conversion  Replacement Utility updated for latest IMO load     Dysphagia      Fall 10/22/2016     GERD (gastroesophageal reflux disease)      Hyperlipidemia      Hypertension      Hypotension, unspecified hypotension type      ICD (implantable cardioverter-defibrillator)  in place     Medtronic     Ischemic cardiomyopathy      Kidney stone      Low back pain      Lumbago     Created by Conversion      Macular degeneration      Myalgia and myositis, unspecified     Created by Conversion      Nonspecific elevation of levels of transaminase or lactic acid dehydrogenase (LDH)     Created by Conversion      Permanent atrial fibrillation (H)     [id number] risk score = 5 (age1/ CAD/ HTN/ CHF) Chronic warfarin     Personal history of colonic polyps 2/12/2019     Polyp of duodenum 10/17/2016     Pyuria      Right arm weakness 4/2/2019     Schatzki's ring      Sciatica     Created by Conversion      Sleep apnea     no CPAP     Spondylolisthesis of lumbar region 7/5/2016     Weakness 4/20/2019        Past Surgical History:   Procedure Laterality Date     APPENDECTOMY       ARTHROSCOPY SHOULDER ROTATOR CUFF REPAIR Right      ATRIAL FIBRILLATION/FLUTTER       BYPASS GRAFT ARTERY CORONARY  08/01/2004    Coronary Artery Quadruple Venous Bypass Graft     CARDIAC DEFIBRILLATOR PLACEMENT  01/01/2013    ICD Medtronic     CHOLECYSTECTOMY       COLONOSCOPY N/A 02/19/2020    Procedure: COLONOSCOPY;  Surgeon: Houston Medina MD;  Location: [facility];  Service: Gastroenterology     EP ICD UPGRADE TO BIVENT Left 01/03/2022    Procedure: EP Implantable Cardio-Defibrillator Upgrade to Bivent;  Surgeon: Houston Rolon MD;  Location: [facility]     ESOPHAGOSCOPY, GASTROSCOPY, DUODENOSCOPY (EGD), COMBINED       IR LUMBAR EPIDURAL STEROID INJECTION  06/29/2005     IR LUMBAR EPIDURAL STEROID INJECTION  07/15/2005     IR LUMBAR EPIDURAL STEROID INJECTION  09/28/2005     IR LUMBAR EPIDURAL STEROID INJECTION  12/28/2005     IR LUMBAR EPIDURAL STEROID INJECTION  03/29/2006     IR LUMBAR EPIDURAL STEROID INJECTION  06/08/2006     PHACOEMULSIFICATION CLEAR CORNEA WITH STANDARD INTRAOCULAR LENS IMPLANT Bilateral      IL C- LAMINOPLASTY, 2 OR MORE Left 02/22/2019    Procedure: LEFT CERVICAL 4, 5,  6 LAMINOPLASTY;  Surgeon: Liza Cesar MD;  Location: [facility] OR;  Service: Spine     ME ESOPHAGOGASTRODUODENOSCOPY TRANSORAL DIAGNOSTIC N/A 05/10/2021    Procedure: ESOPHAGOGASTRODUODENOSCOPY (EGD);  Surgeon: Franklin Mendoza MD;  Location: [facility] GI;  Service: Gastroenterology     ME ESOPHAGOGASTRODUODENOSCOPY TRANSORAL DIAGNOSTIC N/A 06/16/2021    Procedure: ESOPHAGOGASTRODUODENOSCOPY (EGD), WITH ESOPHAGEAL DILATION AND BIOPSY;  Surgeon: Paramjit Brown MD;  Location: [facility] OR;  Service: Gastroenterology     REMOVE HARDWARE STERNUM N/A 1/13/2022    Procedure: STERNAL WIRE REMOVAL;  Surgeon: Raquel De Leon MD;  [place] [facility]     TONSILLECTOMY       XR MYELOGRAM CERVICAL THORACIC LUMBAR  01/17/2019     ZZC LAP,CHOLECYSTECTOMY/EXPLORE N/A 02/28/2019    CHOLECYSTECTOMY, LAPAROSCOPIC;  Surgeon: Mana Roman MD;  Location: [facility] OR;  Service: General       No Known Allergies      Current Outpatient Medications:      acetaminophen (TYLENOL) 500 MG tablet, Take 1,000 mg by mouth every 8 hours as needed , Disp: , Rfl:      albuterol (PROAIR HFA/PROVENTIL HFA/VENTOLIN HFA) 108 (90 Base) MCG/ACT inhaler, Inhale 1-2 puffs into the lungs every 4 hours as needed , Disp: , Rfl:      bumetanide (BUMEX) 1 MG tablet, TAKE ONE TABLET (1MG) BY MOUTH ONCE DAILY, Disp: 90 tablet, Rfl: 1     calcium carbonate-vitamin D 600-200 MG-UNIT TABS, Take 2 tablets by mouth daily , Disp: , Rfl:      carbidopa-levodopa (SINEMET)  MG tablet, Take 1 tablet by mouth 3 times daily Take at least 30 min before meals or 2 hours after meals. Do not mix with food., Disp: 90 tablet, Rfl: 1     carvedilol (COREG) 25 MG tablet, Take 1.5 tablets (37.25 mg) by mouth 2 times daily (with meals), Disp: 45 tablet, Rfl: 5     cyclobenzaprine (FLEXERIL) 5 MG tablet, Take 1 tablet (5 mg) by mouth 2 times daily as needed for muscle spasms, Disp: 30 tablet, Rfl: 3     cycloSPORINE (RESTASIS)  0.05 % ophthalmic emulsion, 1 drop 2 times daily as needed for dry eyes, Disp: , Rfl:      diclofenac (VOLTAREN) 1 % topical gel, Apply 2 g topically 2 times daily , Disp: , Rfl:      erythromycin (ROMYCIN) 5 MG/GM ophthalmic ointment, Place 0.5 inches into both eyes daily , Disp: , Rfl:      ferrous sulfate (FEROSUL) 325 (65 Fe) MG tablet, Take 1 tablet by mouth daily, Disp: , Rfl:      gabapentin (NEURONTIN) 100 MG capsule, Take 1 capsule (100 mg) by mouth 3 times daily, Disp: 90 capsule, Rfl: 4     Lidocaine (LIDOCARE) 4 % Patch, Place 1 patch onto the skin daily as needed for moderate pain To prevent lidocaine toxicity, patient should be patch free for 12 hrs daily., Disp: 10 patch, Rfl: 3     losartan (COZAAR) 100 MG tablet, Take 1 tablet (100 mg) by mouth daily, Disp: 90 tablet, Rfl: 3     multivitamin w/minerals (THERA-VIT-M) tablet, Take 1 tablet by mouth daily, Disp: , Rfl:      omeprazole (PRILOSEC) 20 MG DR capsule, TAKE ONE CAPSULE BY MOUTH ONCE DAILY BEFORE BREAKFAST, Disp: 90 capsule, Rfl: 1     potassium chloride ER (KLOR-CON M) 20 MEQ CR tablet, TAKE ONE TABLET BY MOUTH ONCE DAILY, Disp: 90 tablet, Rfl: 2     rosuvastatin (CRESTOR) 20 MG tablet, TAKE 1 TABLET BY MOUTH DAILY AT BEDTIME (Patient taking differently: Take 20 mg by mouth daily), Disp: 90 tablet, Rfl: 3     traMADol (ULTRAM) 50 MG tablet, Take 50 mg by mouth every 6 hours as needed , Disp: , Rfl:      vitamin B-12 (CYANOCOBALAMIN) 2000 MCG tablet, Take 2,000 mcg by mouth daily, Disp: , Rfl:      warfarin ANTICOAGULANT (COUMADIN) 2.5 MG tablet, TAKE 1 TO 1.5 TABLETS (2.5-3.75MG) BY MOUTH DAILY. ADJUST DOSE PER INR AS DIRECTED. (Patient taking differently: Take 1.25-2.5 mg by mouth See Admin Instructions 1.25 mg every Mon, Wed, Fri; 2.5 mg all other days), Disp: 90 tablet, Rfl: 3    Current Facility-Administered Medications:      botulinum toxin type A (BOTOX) 100 units injection 100 Units, 100 Units, Intramuscular, Q90 Days, Bing  MD Leonardo    Family History   Problem Relation Age of Onset     Heart Disease Mother      Cerebrovascular Disease Mother      Crohn's Disease Father      Alcoholism Brother      No Known Problems Daughter      No Known Problems Son      No Known Problems Maternal Aunt      No Known Problems Maternal Uncle      No Known Problems Paternal Aunt      No Known Problems Paternal Uncle      No Known Problems Maternal Grandmother      No Known Problems Maternal Grandfather      No Known Problems Paternal Grandmother      No Known Problems Paternal Grandfather      Cancer Brother      Urolithiasis No family hx of      Gout No family hx of        Social History     Socioeconomic History     Marital status:      Spouse name: Not on file     Number of children: Not on file     Years of education: Not on file     Highest education level: Not on file   Occupational History     Not on file   Tobacco Use     Smoking status: Former Smoker     Smokeless tobacco: Never Used   Substance and Sexual Activity     Alcohol use: Not Currently     Drug use: Never     Sexual activity: Yes     Partners: Female   Other Topics Concern     Parent/sibling w/ CABG, MI or angioplasty before 65F 55M? Not Asked   Social History Narrative     Not on file     Social Determinants of Health     Financial Resource Strain: Medium Risk     Difficulty of Paying Living Expenses: Somewhat hard   Food Insecurity: No Food Insecurity     Worried About Running Out of Food in the Last Year: Never true     Ran Out of Food in the Last Year: Never true   Transportation Needs: No Transportation Needs     Lack of Transportation (Medical): No     Lack of Transportation (Non-Medical): No   Physical Activity: Not on file   Stress: Not on file   Social Connections: Not on file   Intimate Partner Violence: Not on file   Housing Stability: Low Risk      Unable to Pay for Housing in the Last Year: No     Number of Places Lived in the Last Year: 1     Unstable Housing  "in the Last Year: No       10 point Review of Systems is negative      Pulse 94   Ht 1.753 m (5' 9\")   Wt 61.2 kg (135 lb)   SpO2 100%   BMI 19.94 kg/m      BMI= Body mass index is 19.94 kg/m .    OBJECTIVE:  General appearance: Patient is alert and fully cooperative with history & exam.  No sign of distress is noted during the visit.  Vascular: Dorsalis pedis and posterior tibial pulses are palpable. There is no pedal hair growth bilaterally.  CFT < 3 sec from anterior tibial surface to distal digits bilaterally. There is no appreciable edema noted.  Dermatologic: Elongated nails 1 through 5 bilaterally.  The distal medial aspect of the right great toenail is slightly incurvated.  Turgor and texture are within normal limits. No coloration or temperature changes. No other primary or secondary lesions noted.  Neurologic: All epicritic and proprioceptive sensations are grossly intact bilaterally.  Musculoskeletal: All active and passive ankle, subtalar, midtarsal, and 1st MPJ range of motion are grossly intact without pain or crepitus, with the exception of none. Manual muscle strength is within normal limits bilaterally. All dorsiflexors, plantarflexors, invertors, evertors are intact bilaterally.  No tenderness present to bilateral feet or ankles on palpation.  No tenderness to bilateral feet or ankles with range of motion.  Rigid flexion deformities noted at the proximal and distal interphalangeal joint digits 2 through 5 bilaterally.  Calf is soft/non-tender without warmth/induration  Imaging:         XR Wrist Right Navicular GE 3 Views    Result Date: 7/15/2022  EXAM: XR WRIST NAVICULAR RIGHT GE 3 VIEWS LOCATION: [facility] DATE/TIME: 7/15/2022 10:10 AM INDICATION:  Right wrist pain. COMPARISON: None.     IMPRESSION: Comminuted mildly displaced intra-articular fracture of the distal radius without significant angulation of the fracture fragments. Dorsal subluxation of the distal ulna. " Osteopenia.           TREATMENT:  Trim nails 1 through 5 bilateral feet today.  I recommended the patient return to the clinic as needed.        Dhaval Almazan; NAM  [facility] Foot & Ankle Surgery/Podiatry         Again, thank you for allowing me to participate in the care of your patient.        Sincerely,        Dhaval Almazan DPM

## 2022-08-11 NOTE — PATIENT INSTRUCTIONS
[employer] Hammer Toe Crest Cushions        Office Locations    [facility]  [address] [place] [address]  [facility], [address]   Phone: [phone]   Orthotics and Prosthetics, [address]   Phone: [phone]    [facility]  [address]   [place] [address]   Phone: [phone]    [facility]ld[facility]  [address]  [place] [address]  Phone: [phone]    [facility] Bldg[facility]   [address]  [place] [address]  Phone: [phone]    [facility]  [address]  [address]  [place] [address]  Phone: [phone]    [facility]  [address]  [address]  [place] [address]  Phone: [phone]    [place]   [address] [place] [address]   Phone: [phone]

## 2022-08-11 NOTE — PROGRESS NOTES
FOOT AND ANKLE SURGERY/PODIATRY Progress Note        ASSESSMENT:   Hammertoes 2 through 5 bilaterally  Onychauxis    HPI: Tanmay Israel was seen again today complaining of pain in the tip of the toes both feet.  The patient stated that the toes are aggravated with weightbearing and ambulation.  The pain is mild to moderate.  He has no pain while resting.  He has not had any redness, swelling, drainage or bleeding.  He denies any trauma to his feet.  He denies any previous treatment.  The patient stated that his nails are quite long.  He is unable to take care of his nails.    Past Medical History:   Diagnosis Date     Acute cholecystitis 2/28/2019     Acute post-operative pain      Anemia      Arthritis      Atrial fibrillation (H)      C. difficile diarrhea 4/21/2019     Calculus of ureter      Callus      Cerebral aneurysm      Cervical myelopathy (H) 2/22/2019     Cervical spinal stenosis      Chronic kidney disease     stage 3     Chronic systolic congestive heart failure (H)      Closed fracture of distal end of left radius, unspecified fracture morphology, initial encounter      Closed fracture of distal end of right radius, unspecified fracture morphology, initial encounter      Closed fracture of first thoracic vertebra, unspecified fracture morphology, initial encounter (H)      Coronary artery disease      Crohn's disease (H)      Disorder of bursae and tendons in shoulder region     Created by Conversion  Replacement Utility updated for latest IMO load     Dysphagia      Fall 10/22/2016     GERD (gastroesophageal reflux disease)      Hyperlipidemia      Hypertension      Hypotension, unspecified hypotension type      ICD (implantable cardioverter-defibrillator) in place     Medtronic     Ischemic cardiomyopathy      Kidney stone      Low back pain      Lumbago     Created by Conversion      Macular degeneration      Myalgia and myositis, unspecified     Created by Conversion      Nonspecific elevation of  levels of transaminase or lactic acid dehydrogenase (LDH)     Created by Conversion      Permanent atrial fibrillation (H)     [id number] risk score = 5 (age1/ CAD/ HTN/ CHF) Chronic warfarin     Personal history of colonic polyps 2/12/2019     Polyp of duodenum 10/17/2016     Pyuria      Right arm weakness 4/2/2019     Schatzki's ring      Sciatica     Created by Conversion      Sleep apnea     no CPAP     Spondylolisthesis of lumbar region 7/5/2016     Weakness 4/20/2019        Past Surgical History:   Procedure Laterality Date     APPENDECTOMY       ARTHROSCOPY SHOULDER ROTATOR CUFF REPAIR Right      ATRIAL FIBRILLATION/FLUTTER       BYPASS GRAFT ARTERY CORONARY  08/01/2004    Coronary Artery Quadruple Venous Bypass Graft     CARDIAC DEFIBRILLATOR PLACEMENT  01/01/2013    ICD Medtronic     CHOLECYSTECTOMY       COLONOSCOPY N/A 02/19/2020    Procedure: COLONOSCOPY;  Surgeon: Houston Medina MD;  Location: [facility];  Service: Gastroenterology     EP ICD UPGRADE TO BIVENT Left 01/03/2022    Procedure: EP Implantable Cardio-Defibrillator Upgrade to Bivent;  Surgeon: Houston Rolon MD;  Location: [facility] CV     ESOPHAGOSCOPY, GASTROSCOPY, DUODENOSCOPY (EGD), COMBINED       IR LUMBAR EPIDURAL STEROID INJECTION  06/29/2005     IR LUMBAR EPIDURAL STEROID INJECTION  07/15/2005     IR LUMBAR EPIDURAL STEROID INJECTION  09/28/2005     IR LUMBAR EPIDURAL STEROID INJECTION  12/28/2005     IR LUMBAR EPIDURAL STEROID INJECTION  03/29/2006     IR LUMBAR EPIDURAL STEROID INJECTION  06/08/2006     PHACOEMULSIFICATION CLEAR CORNEA WITH STANDARD INTRAOCULAR LENS IMPLANT Bilateral      NE C- LAMINOPLASTY, 2 OR MORE Left 02/22/2019    Procedure: LEFT CERVICAL 4, 5, 6 LAMINOPLASTY;  Surgeon: Liza Cesar MD;  Location: [facility];  Service: Spine     NE ESOPHAGOGASTRODUODENOSCOPY TRANSORAL DIAGNOSTIC N/A 05/10/2021    Procedure: ESOPHAGOGASTRODUODENOSCOPY (EGD);  Surgeon: Franklin Mendoza,  MD;  Location: [facility] GI;  Service: Gastroenterology     RI ESOPHAGOGASTRODUODENOSCOPY TRANSORAL DIAGNOSTIC N/A 06/16/2021    Procedure: ESOPHAGOGASTRODUODENOSCOPY (EGD), WITH ESOPHAGEAL DILATION AND BIOPSY;  Surgeon: Paramjit Brown MD;  Location: [facility] OR;  Service: Gastroenterology     REMOVE HARDWARE STERNUM N/A 1/13/2022    Procedure: STERNAL WIRE REMOVAL;  Surgeon: Raquel De Leon MD;  Location: [facility]     TONSILLECTOMY       XR MYELOGRAM CERVICAL THORACIC LUMBAR  01/17/2019     ZZC LAP,CHOLECYSTECTOMY/EXPLORE N/A 02/28/2019    CHOLECYSTECTOMY, LAPAROSCOPIC;  Surgeon: Mana Roman MD;  [facility];  Service: General       No Known Allergies      Current Outpatient Medications:      acetaminophen (TYLENOL) 500 MG tablet, Take 1,000 mg by mouth every 8 hours as needed , Disp: , Rfl:      albuterol (PROAIR HFA/PROVENTIL HFA/VENTOLIN HFA) 108 (90 Base) MCG/ACT inhaler, Inhale 1-2 puffs into the lungs every 4 hours as needed , Disp: , Rfl:      bumetanide (BUMEX) 1 MG tablet, TAKE ONE TABLET (1MG) BY MOUTH ONCE DAILY, Disp: 90 tablet, Rfl: 1     calcium carbonate-vitamin D 600-200 MG-UNIT TABS, Take 2 tablets by mouth daily , Disp: , Rfl:      carbidopa-levodopa (SINEMET)  MG tablet, Take 1 tablet by mouth 3 times daily Take at least 30 min before meals or 2 hours after meals. Do not mix with food., Disp: 90 tablet, Rfl: 1     carvedilol (COREG) 25 MG tablet, Take 1.5 tablets (37.25 mg) by mouth 2 times daily (with meals), Disp: 45 tablet, Rfl: 5     cyclobenzaprine (FLEXERIL) 5 MG tablet, Take 1 tablet (5 mg) by mouth 2 times daily as needed for muscle spasms, Disp: 30 tablet, Rfl: 3     cycloSPORINE (RESTASIS) 0.05 % ophthalmic emulsion, 1 drop 2 times daily as needed for dry eyes, Disp: , Rfl:      diclofenac (VOLTAREN) 1 % topical gel, Apply 2 g topically 2 times daily , Disp: , Rfl:      erythromycin (ROMYCIN) 5 MG/GM ophthalmic ointment, Place 0.5  inches into both eyes daily , Disp: , Rfl:      ferrous sulfate (FEROSUL) 325 (65 Fe) MG tablet, Take 1 tablet by mouth daily, Disp: , Rfl:      gabapentin (NEURONTIN) 100 MG capsule, Take 1 capsule (100 mg) by mouth 3 times daily, Disp: 90 capsule, Rfl: 4     Lidocaine (LIDOCARE) 4 % Patch, Place 1 patch onto the skin daily as needed for moderate pain To prevent lidocaine toxicity, patient should be patch free for 12 hrs daily., Disp: 10 patch, Rfl: 3     losartan (COZAAR) 100 MG tablet, Take 1 tablet (100 mg) by mouth daily, Disp: 90 tablet, Rfl: 3     multivitamin w/minerals (THERA-VIT-M) tablet, Take 1 tablet by mouth daily, Disp: , Rfl:      omeprazole (PRILOSEC) 20 MG DR capsule, TAKE ONE CAPSULE BY MOUTH ONCE DAILY BEFORE BREAKFAST, Disp: 90 capsule, Rfl: 1     potassium chloride ER (KLOR-CON M) 20 MEQ CR tablet, TAKE ONE TABLET BY MOUTH ONCE DAILY, Disp: 90 tablet, Rfl: 2     rosuvastatin (CRESTOR) 20 MG tablet, TAKE 1 TABLET BY MOUTH DAILY AT BEDTIME (Patient taking differently: Take 20 mg by mouth daily), Disp: 90 tablet, Rfl: 3     traMADol (ULTRAM) 50 MG tablet, Take 50 mg by mouth every 6 hours as needed , Disp: , Rfl:      vitamin B-12 (CYANOCOBALAMIN) 2000 MCG tablet, Take 2,000 mcg by mouth daily, Disp: , Rfl:      warfarin ANTICOAGULANT (COUMADIN) 2.5 MG tablet, TAKE 1 TO 1.5 TABLETS (2.5-3.75MG) BY MOUTH DAILY. ADJUST DOSE PER INR AS DIRECTED. (Patient taking differently: Take 1.25-2.5 mg by mouth See Admin Instructions 1.25 mg every Mon, Wed, Fri; 2.5 mg all other days), Disp: 90 tablet, Rfl: 3    Current Facility-Administered Medications:      botulinum toxin type A (BOTOX) 100 units injection 100 Units, 100 Units, Intramuscular, Q90 Days, Leonardo Smart MD    Family History   Problem Relation Age of Onset     Heart Disease Mother      Cerebrovascular Disease Mother      Crohn's Disease Father      Alcoholism Brother      No Known Problems Daughter      No Known Problems Son      No Known  "Problems Maternal Aunt      No Known Problems Maternal Uncle      No Known Problems Paternal Aunt      No Known Problems Paternal Uncle      No Known Problems Maternal Grandmother      No Known Problems Maternal Grandfather      No Known Problems Paternal Grandmother      No Known Problems Paternal Grandfather      Cancer Brother      Urolithiasis No family hx of      Gout No family hx of        Social History     Socioeconomic History     Marital status:      Spouse name: Not on file     Number of children: Not on file     Years of education: Not on file     Highest education level: Not on file   Occupational History     Not on file   Tobacco Use     Smoking status: Former Smoker     Smokeless tobacco: Never Used   Substance and Sexual Activity     Alcohol use: Not Currently     Drug use: Never     Sexual activity: Yes     Partners: Female   Other Topics Concern     Parent/sibling w/ CABG, MI or angioplasty before 65F 55M? Not Asked   Social History Narrative     Not on file     Social Determinants of Health     Financial Resource Strain: Medium Risk     Difficulty of Paying Living Expenses: Somewhat hard   Food Insecurity: No Food Insecurity     Worried About Running Out of Food in the Last Year: Never true     Ran Out of Food in the Last Year: Never true   Transportation Needs: No Transportation Needs     Lack of Transportation (Medical): No     Lack of Transportation (Non-Medical): No   Physical Activity: Not on file   Stress: Not on file   Social Connections: Not on file   Intimate Partner Violence: Not on file   Housing Stability: Low Risk      Unable to Pay for Housing in the Last Year: No     Number of Places Lived in the Last Year: 1     Unstable Housing in the Last Year: No       10 point Review of Systems is negative      Pulse 94   Ht 1.753 m (5' 9\")   Wt 61.2 kg (135 lb)   SpO2 100%   BMI 19.94 kg/m      BMI= Body mass index is 19.94 kg/m .    OBJECTIVE:  General appearance: Patient is alert " and fully cooperative with history & exam.  No sign of distress is noted during the visit.  Vascular: Dorsalis pedis and posterior tibial pulses are palpable. There is no pedal hair growth bilaterally.  CFT < 3 sec from anterior tibial surface to distal digits bilaterally. There is no appreciable edema noted.  Dermatologic: Elongated nails 1 through 5 bilaterally.  The distal medial aspect of the right great toenail is slightly incurvated.  Turgor and texture are within normal limits. No coloration or temperature changes. No other primary or secondary lesions noted.  Neurologic: All epicritic and proprioceptive sensations are grossly intact bilaterally.  Musculoskeletal: All active and passive ankle, subtalar, midtarsal, and 1st MPJ range of motion are grossly intact without pain or crepitus, with the exception of none. Manual muscle strength is within normal limits bilaterally. All dorsiflexors, plantarflexors, invertors, evertors are intact bilaterally.  No tenderness present to bilateral feet or ankles on palpation.  No tenderness to bilateral feet or ankles with range of motion.  Rigid flexion deformities noted at the proximal and distal interphalangeal joint digits 2 through 5 bilaterally.  Calf is soft/non-tender without warmth/induration  Imaging:         XR Wrist Right Navicular GE 3 Views    Result Date: 7/15/2022  EXAM: XR WRIST NAVICULAR RIGHT GE 3 VIEWS LOCATION: [facility] DATE/TIME: 7/15/2022 10:10 AM INDICATION:  Right wrist pain. COMPARISON: None.     IMPRESSION: Comminuted mildly displaced intra-articular fracture of the distal radius without significant angulation of the fracture fragments. Dorsal subluxation of the distal ulna. Osteopenia.           TREATMENT:  Trim nails 1 through 5 bilateral feet today.  I recommended the patient return to the clinic as needed.        Dhaval Almazan; NAM  [facility] Foot & Ankle Surgery/Podiatry

## 2022-08-11 NOTE — PROGRESS NOTES
Clinic Care Coordination Contact    Community Health Worker Follow Up    Care Gaps:     Health Maintenance Due   Topic Date Due     HF ACTION PLAN  Never done     MICROALBUMIN  05/22/2020         Goals:    Goals Addressed as of 8/11/2022 at 9:57 AM                    Today    7/27/22       2. Reducing Risks (pt-stated)   20%  20%1     Added 11/30/21 by Myhre, David J, RN      Goal Statement: I would like to have a home care RN assist me with my medications for better compliance in the next 2 months.   Date Goal set: 11/29/21  Barriers: Patient said he has difficulty managing his medications. Homebound.  Strengths: Strong advocate for self good family support.  Date to Achieve By: 11/28/22  Patient expressed understanding of goal: Yes  Action steps to achieve this goal:  1. I understand the [facility] RN will request a home care order from Dr. Ramírez to have an RN come out and assist me with my medications.   2. I will continue to take my medications daily as best as I can until the home care nurse is able to come out. I will ask my niece for assistance when needed.   3. I will report progress towards this goal at scheduled outreach calls from my [facility] team.  12-1 discussed, home care order sent again.      12/8/21 - Referred to community paramedics until patient is able to get RN services through his waiver.   1/7/22 - Paramedic scheduled to see him on 1/10/22  Discussed on 1/25/22 - [place] [facility] [place] to follow up on in-home RN to assist with his medications. She stated the [facility] are helping him out at this time.  Discussed 3/3/22  Discussed on 3/8/22 - Patient is managing his medications independently. CAD  said she was not able to find RN to assist with medication management at this time, but will continue to try to find.   Discussed on 4/19/22. Patient manages his medications currently with assistance from family. CADI  has not been able to find an RN to  manages his medications per Mary Kay, but is still looking.   Discussed 8/11/22           Medication 1 (pt-stated)   20%  10%    Added 5/25/22 by Lawanda Hsu      Goal Statement: I would like to make it to all of my scheduled appointments and take medications as prescribed.  Date Goal set: 5/25/22  Barriers: Patient is currently dependent on family  Strengths: patient is motivated and has help from family   Date to Achieve By: 11/25/22  Patient expressed understanding of goal: yes   Action steps to achieve this goal:  1. I will follow the doctors recommendations  2. I will go to my scheduled appointments  3. I will report progress towards this goal at scheduled outreach telephone calls from my CCC team.      Discussed: 8/11/22              1    Progress evaluated against:   1. Reducing Risks      Intervention and Education during outreach: CHW gave patients krystin Muñiz contact information and encouraged her to reach out with any questions or concerns.    CHW Note:    CHW received VM from patients krystin Muñiz requesting return call. CHW called Mary Kay back at her request.    Mary Kay shared that they had Home Health Care setup but Mary Kay was notified the aid would not be starting. Mary Kay is concerned about not having Home Health Care for the patient, and would like assistance finding additional resources.     Mary Kay stated she needs to have a POLST form filled out and would also like assistance with getting this form completed.     CHW scheduled patient with [facility] RN Fernando on 8/15/22 at 3PM to discuss home health care options/POLST form completion.    Mary Kay shared that the patient has an X-ray appointment on Monday for the small fracture in his wrist. Mary Kay will update CCC team at next scheduled outreach.    Mary Kay denied any other needs or concerns at this time but will reach out to CCC as needed.     CHW Plan: CHW will continue to support patient with goals through routine scheduled outreach. CHW will outreach in one month  on 9/12/22.      Lawanda Hsu  Community Health Worker   [facility]  Clinic Care Coordination  [facility]   [email or web address]  Office: [phone]

## 2022-08-15 NOTE — TELEPHONE ENCOUNTER
Called and spoke with Mary Kay. Let her know that a script was sent to pharmacy and advised to keep scheduled appointment. She verbalized understanding.

## 2022-08-16 NOTE — PROGRESS NOTES
Post injection F/U  --5/23/18 right L5-S1 TFESI, very minimal relief per pt  --C/O right low back pain, no change  --Trial Acupuncture x 1 sessions 6/4/18 which helped with his pain per pt  --PT x 4 sessions [facility] 11/29/17 for SI and knee pain    Medication  --Tylenol 325 mg 2 tab Q4H PRN  --Diclofenac 1% gel applies 1-2 times daily per pt which helps  --Old Rx of Tramadol from PCP that he would take PRN for severe pain per pt   Solaraze Counseling:  I discussed with the patient the risks of Solaraze including but not limited to erythema, scaling, itching, weeping, crusting, and pain.

## 2022-08-16 NOTE — PROGRESS NOTES
Follow Up Progress Note      Assessment: CCC RN spoke with patient's niece Mary Kay per her request. Mary Kay said patient has not been able to get a  to assist him with his needs at home. She state they did have a  set up, but was recently informed by patient's waiver , Latasha Bowen, through [employer] [facility] the person they has hired was unable to take the job because of health issues. Per Mary Kay's request, writer did contact his  Latasha. Latasha said she has been unable to find housekeeping services for patient related to the shortage of workers at this time. She stated she would continue to work with patient and will let him know if she is able to find resources to assist him at home.   Mary Kay request resource for in home nail trimming. Writer provided her with these resources during visit today.   Mary Kay said patient is managing his medications on his own. She stated she felt he was compliant with his medications, but has had difficultly new eye drops. She said he is currently using four eye medications and she said it having difficulty differentiating between them. Writer did discuss this concern with his  Latasha today. She said there is a possibility he may be eligible for nursing services through his waiver.   Mary Kay and her brother, who also provides care for patient, have been thinking it may time to discuss moving to an longterm with patient related to his increasing care needs and difficulty finding resources for home assistance at this time. Mary Kay said she would discuss with PCP at next Office visit on 9/20/22. She is also aware patient's waiver  can assist if this is the route patient wishes to pursue.           Care Gaps:    Health Maintenance Due   Topic Date Due     HF ACTION PLAN  Never done     MICROALBUMIN  05/22/2020       Scheduled with PCP on 9/20/22      Goals addressed this encounter:    Goals Addressed                    This Visit's  Progress       Functional (pt-stated)   10%      Goal Statement: I would like to get PCA services in the home to assist me my care needs in the next three months.   Date Goal set: 8/15/22  Barriers: PCA agencies are having a difficult time finding staff at this time.  Strengths: Strong family support  Date to Achieve By: 11/15/22  Patient expressed understanding of goal: Yes  Action steps to achieve this goal:  1. I will continue to work with Latasha Bowen through [facility] my waiver  to assist me with finding a PCA.   2. I will continue to ask my family for support until a PCA can be found.   3. I will report progress towards this goal at scheduled outreach telephone calls from my CCC team.             Medication 1 (pt-stated)         Goal Statement: I would like to make it to all of my scheduled appointments and take medications as prescribed.  Date Goal set: 5/25/22  Barriers: Patient is currently dependent on family  Strengths: patient is motivated and has help from family   Date to Achieve By: 11/25/22  Patient expressed understanding of goal: yes   Action steps to achieve this goal:  1. I will follow the doctors recommendations  2. I will go to my scheduled appointments  3. I will report progress towards this goal at scheduled outreach telephone calls from my CCC team.      Discussed: 8/15/22              Intervention/Education provided during outreach: Discussed the importance of patient taking his medications as directed. Encouraged attendance at all scheduled follow up appointments. Encouraged to continue to work with his waiver case manger for needs and concerns. Encouraged to contact CCC for any additional needs and concerns.      Outreach Frequency: monthly        Plan: CCC RN will continue to monitor, support patient with current goals and will be available to assist as nursing needs arise. [facility] CHW will continue to reach out to patient on a monthly basis to discuss progression of her  goals.     Care Coordinator will follow up in one month.

## 2022-08-22 NOTE — PROGRESS NOTES
ANTICOAGULATION MANAGEMENT     Tanmay Israel 82 year old male is on warfarin with subtherapeutic INR result. (Goal INR 2.0-3.0)    Recent labs: (last 7 days)     08/22/22  1040   INR 1.4*       ASSESSMENT       Source(s): Chart Review       Warfarin doses taken: Warfarin taken as instructed    Diet: Protein supplement/shake decreased which maybe affecting INR    Todays OV with Dr. Zee recommended to take Boost 3x daily (does not like the taste or flavor.)    New illness, injury, or hospitalization: Yes:    Foot pains r/t bilateral hammer toe.   Right wrist non-displace fracture d/t a fall.    Medication/supplement changes: None noted     Signs or symptoms of bleeding or clotting: No    Previous INR: Therapeutic last 2 visits    Additional findings:  Tanmay is also being assisted by David Myhre, RN Clinical Care Coordination in finding housekeeping help for patient.  Still waiting for assistance d/t short staffing problems.       PLAN     Recommended plan for ongoing change(s) affecting INR     Dosing Instructions:   (2.5mg tabs)    booster dose then Increase your warfarin dose (25% change) with next INR in 5-7 days       Summary  As of 8/22/2022    Full warfarin instructions:  8/22: 3.75 mg; Otherwise 2.5 mg every Mon, Wed, Fri; 1.25 mg all other days   Next INR check:  8/29/2022             Telephone call with  Mary Kay almendarez ([phone]) who verbalizes understanding and agrees to plan    -Tried to call Tanmay - phone just rang and rang.   - Mary Kay reported, he does not like the taste or flavor of the Boost.  (advised to refrigerate and add one ice cube, if that will help him drink the shake).  She will also see what other flavors there are that he will like.    Patient elected to schedule next visit INR on 8/29/22 @ [facility]    Patient schedules his own INR appts.    Education provided: Importance of consistent vitamin K intake, Impact of vitamin K foods on INR, Goal range and significance of current result and  Monitoring for clotting signs and symptoms    Plan made with [facility] Pharmacist Rubina Canales, RN  Anticoagulation Clinic  8/22/2022    _______________________________________________________________________     Anticoagulation Episode Summary     Current INR goal:  2.0-3.0   TTR:  48.8 % (1 y)   Target end date:  Indefinite   Send INR reminders to:  [facility] [place]    Indications    Atrial fibrillation (H) [I48.91]  Pulmonary embolism with infarction (H) [I26.99]  Atrial fibrillation  unspecified type (H) [I48.91]           Comments:           Anticoagulation Care Providers     Provider Role Specialty Phone number    Go Ramírez MD Referring Family Medicine [phone]

## 2022-08-22 NOTE — PROGRESS NOTES
Assessment & Plan     Atrial fibrillation, unspecified type (H)  No change in clinical presentation  - INR point of care    Pulmonary embolism with infarction (H)  Adjust INR as necessary  - INR point of care    Hypokalemia  Patient needs to take boost 3 times daily    Closed nondisplaced fracture of navicular bone of right foot with routine healing, subsequent encounter  Pain-free; may discontinue immobilization when he is ambulatory recheck 2 months elisabeth-ray                   No follow-ups on file.    Gurjit Zee MD  [facility] GABRIEL Donato is a 82 year old, presenting for the following health issues:  Wrist Pain (Right wrist pain- fell )      HPI patient is being seen today for multiple issues.  He is accompanied by his niece.  He is not eating very much and is also recovering from a right navicular fracture.  He is ambulatory his wrist is pain-free he had a comminuted fracture of his navicular x-rays obtained 1 month ago.  Based on his clinical presentation he may discontinue the immobilizer while he is not ambulatory.  He needs to increase his nutrition and I suggested boost 3 times a day.  He does get visits or phone calls on a daily basis.  This is very important to his overall health and for his post fracture healing.  I had a long discussion with him and his niece.  He did get his therapeutic INR monitoring today and we will see how that level is.  His medications were reviewed he is in atrial fibrillation.  Very alert pleasant gentleman.  No follow-up with Dr. Ramírez 2 months.  There was no ankle swelling today and he was not short of breath vital signs were all stable.  22 minutes were spent in reviewing the patient's chart and in face-to-face and non-face-to-face contact regarding patient care along with nutritional counseling.          Review of Systems   Constitutional, HEENT, cardiovascular, pulmonary, gi and gu systems are negative, except as otherwise noted.       Objective    /62 (BP Location: Left arm, Patient Position: Sitting, Cuff Size: Adult Regular)   Wt 59.9 kg (132 lb)   BMI 19.49 kg/m    Body mass index is 19.49 kg/m .  Physical Exam   GENERAL: healthy, alert and no distress  NECK: no adenopathy, no asymmetry, masses, or scars and thyroid normal to palpation  RESP: lungs clear to auscultation - no rales, rhonchi or wheezes  CV: Patient is in atrial fibrillation there was no S3-S4 no peripheral edema  ABDOMEN: soft, nontender, no hepatosplenomegaly, no masses and bowel sounds normal  MS: no gross musculoskeletal defects noted, no edema  Extremities- nontender at area of right navicular fracture.  He is gait was observed personally by me he does walk with a cane                  .  ..

## 2022-08-23 NOTE — TELEPHONE ENCOUNTER
Reason for call:  Other   Patient called regarding (reason for call): pt is calling regarding INR   Additional comments: please reach out to pt asap      Phone number to reach patient:  Cell number on file:    Telephone Information:   Mobile [phone]       Best Time:  anytime    Can we leave a detailed message on this number?  YES    Travel screening: Not Applicable

## 2022-08-23 NOTE — TELEPHONE ENCOUNTER
Called and spoke with patient-- reviewed dosing instructions. No further questions. Patient already scheduled for 8/29.    Yaneth Wells RN  [facility] Anticoagulation  [phone]

## 2022-08-29 NOTE — PROGRESS NOTES
"ANTICOAGULATION MANAGEMENT     Tanmay Israel 82 year old male is on warfarin with subtherapeutic INR result. (Goal INR 2.0-3.0)    Recent labs: (last 7 days)     08/29/22  1047   INR 1.20*       ASSESSMENT       Source(s): Chart Review, Patient/Caregiver Call and Template       Warfarin doses taken: Missed dose(s) may be affecting INR-- thinks he missed a dose, but not sure what day he missed    Diet: Protein supplement/shake started which maybe affecting INR-- started drinking 1 boost per day \"a few days ago\" and plans to continue this    New illness, injury, or hospitalization: No    Medication/supplement changes: None noted    Signs or symptoms of bleeding or clotting: No    Previous INR: Subtherapeutic    Additional findings: None       PLAN     Recommended plan for temporary change(s) and ongoing change(s) affecting INR     Dosing Instructions: booster dose then Increase your warfarin dose (20% change) with next INR in 1 week       Summary  As of 8/29/2022    Full warfarin instructions:  8/29: 5 mg; Otherwise 1.25 mg every Sun, Thu; 2.5 mg all other days   Next INR check:  9/6/2022             Telephone call with Tanmay who agrees to plan and repeated back plan correctly    Lab visit scheduled    Education provided: Monitoring for clotting signs and symptoms, When to seek medical attention/emergency care and Contact [phone] with any changes, questions or concerns.     Plan made per [facility] anticoagulation protocol    Yaneth Wells RN  Anticoagulation Clinic  8/29/2022    _______________________________________________________________________     Anticoagulation Episode Summary     Current INR goal:  2.0-3.0   TTR:  48.8 % (1 y)   Target end date:  Indefinite   Send INR reminders to:  CHANI RIOS    Indications    Atrial fibrillation (H) [I48.91]  Pulmonary embolism with infarction (H) [I26.99]  Atrial fibrillation  unspecified type (H) [I48.91]           Comments:           Anticoagulation Care " Providers     Provider Role Specialty Phone number    Go Ramírez MD Referring Family Medicine [phone]

## 2022-09-06 NOTE — PROGRESS NOTES
ANTICOAGULATION MANAGEMENT     Tanmay Israel 82 year old male is on warfarin with subtherapeutic INR result. (Goal INR 2.0-3.0)    Recent labs: (last 7 days)     09/06/22  1103   INR 1.40*       ASSESSMENT       Source(s): Chart Review and Template       Warfarin doses taken: Warfarin taken as instructed    Diet: No new diet changes identified    New illness, injury, or hospitalization: No    Medication/supplement changes: None noted    Signs or symptoms of bleeding or clotting: No    Previous INR: Subtherapeutic    Additional findings: None       PLAN     Unable to reach Tanmay today.    Left message to take a booster dose of warfarin,  5 mg tonight. Request call back for assessment.    Follow up required to confirm warfarin dose taken and assess for changes, confirm warfarin dose taken and discuss out of range result     Yaneth Wells RN  Anticoagulation Clinic  9/6/2022

## 2022-09-06 NOTE — TELEPHONE ENCOUNTER
Received; has appointment with us coming up on 9/20/22, would you like to look at it before the appointment

## 2022-09-06 NOTE — PROGRESS NOTES
Clinic Care Coordination Contact    Community Health Worker Follow Up    Care Gaps:     Health Maintenance Due   Topic Date Due     HF ACTION PLAN  Never done     MICROALBUMIN  05/22/2020     INFLUENZA VACCINE (1) 09/01/2022       Care Plan:   Care Plan: Medication Regimen     Problem: Medication adherence     Long-Range Goal: I will take my medications as prescribed.     Start Date: 5/25/2022 Expected End Date: 11/25/2022    This Visit's Progress: On track    Priority: High    Note:     Barriers: Patient is currently dependent on family  Strengths: patient is motivated and has help from family   Patient expressed understanding of goal: yes   Action steps to achieve this goal:  1. I will follow the doctors recommendations with regards to taking my medications.   2. I and my family will continue to work with my  Latasha in finding RN medication support at home.   3. I will report progress towards this goal at scheduled outreach telephone calls from my CCC team.      Discussed 9/6/22                    Care Plan: Social Support     Problem: Inadequate social support     Goal: Goal Statement: I would like to get homemaker services to assist me with maintaining my home.     Start Date: 8/15/2022 Expected End Date: 11/15/2022    This Visit's Progress: 10%    Priority: High    Note:     Goal Statement: I would like to get homemaker services in the home to assist me my care needs in the next three months.   Date Goal set: 8/15/22  Barriers: PCA agencies are having a difficult time finding staff at this time.  Strengths: Strong family support  Date to Achieve By: 11/15/22  Patient expressed understanding of goal: Yes  Action steps to achieve this goal:  1. I will continue to work with Latasha Bowen through [employer] my waiver  to assist me with finding a homemaker.   2. I will continue to ask my family for support until a homemaker can be found.   3. I will report progress towards this goal at  scheduled outreach telephone calls from my CCC team.                        Goals:                  Goals Addressed as of 8/11/2022 at 9:57 AM                               Today      7/27/22        2. Reducing Risks (pt-stated)     20%   20%1      Added 11/30/21 by Myhre, David J, RN         Goal Statement: I would like to have a home care RN assist me with my medications for better compliance in the next 2 months.   Date Goal set: 11/29/21  Barriers: Patient said he has difficulty managing his medications. Homebound.  Strengths: Strong advocate for self good family support.  Date to Achieve By: 11/28/22  Patient expressed understanding of goal: Yes  Action steps to achieve this goal:  1. I understand the [facility] RN will request a home care order from Dr. Ramírez to have an RN come out and assist me with my medications.   2. I will continue to take my medications daily as best as I can until the home care nurse is able to come out. I will ask my niece for assistance when needed.   3. I will report progress towards this goal at scheduled outreach calls from my [facility] team.  12-1 discussed, home care order sent again.      12/8/21 - Referred to community paramedics until patient is able to get RN services through his waiver.   1/7/22 - Paramedic scheduled to see him on 1/10/22  Discussed on 1/25/22 - Mary [place] with the [place] today to follow up on in-home RN to assist with his medications. She stated the [facility] are helping him out at this time.  Discussed 3/3/22  Discussed on 3/8/22 - Patient is managing his medications independently. CAD  said she was not able to find RN to assist with medication management at this time, but will continue to try to find.   Discussed on 4/19/22. Patient manages his medications currently with assistance from family. [facility]  has not been able to find an RN to manages his medications per Mary Kay, but is still looking.   Discussed 8/11/22               Medication 1 (pt-stated)     20%   10%     Added 5/25/22 by Lawanda Hsu         Goal Statement: I would like to make it to all of my scheduled appointments and take medications as prescribed.  Date Goal set: 5/25/22  Barriers: Patient is currently dependent on family  Strengths: patient is motivated and has help from family   Date to Achieve By: 11/25/22  Patient expressed understanding of goal: yes   Action steps to achieve this goal:  1. I will follow the doctors recommendations  2. I will go to my scheduled appointments  3. I will report progress towards this goal at scheduled outreach telephone calls from my [facility] team.       Discussed: 8/11/22                  1    Progress evaluated against:   1. Reducing Risks        Intervention and Education during outreach: CHW sent message to Dr. Ramírez's care team to address Boost coverage concern. CHW gave patients krystin Muñiz contact information and encouraged her to reach out with any questions or concerns.     CHW Note:    CHW received VM from patients krystin Muñiz requesting return call. CHW called Mary Kay back at her request.    Mary Kay shared the patient had an appointment with Dr. Zee on 8/22/22. Dr. Zee recommended that the patient start back on taking Boost drinks three times a day. Mary Kay shared that the patient had previously been taking these and has started him on them again. Mary Kay received an e-mail that [employer] would be sending a Letter of Medical Necessity to Dr. Ramírez for the boost approval. Mary Kay requested a message be sent to Dr. Ramírez's care team to follow-up on Boost coverage as they are still having to pay out of pocket.    Mary Kay shared that her and her brother are talking about moving the patient to an [facility] facility. Mary Kay continues to work with Latasha patients waiver  to find home care to assist patient. Mary Kay will report progress at next [facility] outreach.    Mary Kay denied any other needs or concerns at this time but will  reach out to CCC as needed.      CHW Plan: CHW will continue to support patient with goals through routine scheduled outreach. CHW will outreach in one month on 10/6/22.        Lawanda Hsu  Community Health Worker   [facility] Care Coordination  [facility]   [email or web address]  Office: [phone]

## 2022-09-06 NOTE — TELEPHONE ENCOUNTER
The form requires very specific information regarding nutrition and cannot be answered based on information I know at this time.

## 2022-09-07 NOTE — PROGRESS NOTES
ANTICOAGULATION MANAGEMENT     Tanmay Israel 82 year old male is on warfarin with subtherapeutic INR result. (Goal INR 2.0-3.0)    Recent labs: (last 7 days)     09/06/22  1103   INR 1.40*       ASSESSMENT       Source(s): Chart Review and Template       Warfarin doses taken: Warfarin taken as instructed    Diet: No new diet changes identified    New illness, injury, or hospitalization: No    Medication/supplement changes: None noted    Signs or symptoms of bleeding or clotting: No    Previous INR: Subtherapeutic    Additional findings: None       PLAN     Unable to reach Tanmay today.    Left message to take  2.5 mg tonight. Request call back for assessment.    Follow up required to confirm warfarin dose taken and assess for changes, discuss out of range result  and discuss dosing instructions and confirm understanding of instructions    Yaneth Wells RN  Anticoagulation Clinic  9/7/2022

## 2022-09-08 NOTE — TELEPHONE ENCOUNTER
Dawson Ramírez,     I spoke with Mary Kay Rae, patient's niece, morning. Patient has a virtual appointment with you on 9/12/22. Mary Kay will also be on the call. Mary Kay and her brother's, who are his primary caregivers, asked if you would address with patient how he feels living by himself and if he feels safe. She said she and her siblings are feeling he may need to move to higher level of care related to physical deconditioning and weight loss, but are apprehensive to discuss with patient. Mary Kay said currently patient needs assistance of another, sometimes two for ambulation.   Mary Kay said she was also wondering if you would be able to write a home care order for PT for him at the visit on Monday. She said they have him scheduled for outpatient PT, but she feels he is too weak to go to these appointments.     Thanks,     Dave Myhre, RN  CCC RN

## 2022-09-08 NOTE — PROGRESS NOTES
ANTICOAGULATION MANAGEMENT       ASSESSMENT       Source(s): Chart Review and Patient/Caregiver Call       Warfarin doses taken: Reviewed in chart and unable to confirm dosing. Pt is taking care of own meds, having trouble using phone, krystin has not been able to get through to pt either.    Diet: No new diet changes identified    New illness, injury, or hospitalization: No    Medication/supplement changes: None noted    Signs or symptoms of bleeding or clotting: No    Previous INR: Subtherapeutic    Additional findings: Pt is declining in ability for self care and is needing more assistance with ambulation and other ADL's. Family is working on getting documentation together and looking into pt receiving more help or moving to a facility       PLAN     Recommended plan for ongoing change(s) affecting INR     Dosing Instructions: Increase your warfarin dose (16.7% change) with next INR in 1 week      **Mary Kay Bernabe, unable to verify if patient got any messages from [facility] on 9/6 or 9/7 about warfarin dosing on those days. She will have one of her brothers go to patient house today and advise he take warfarin 2.5 mg daily from now on.    Summary  As of 9/6/2022    Full warfarin instructions:  9/6: 5 mg; Otherwise 2.5 mg every day   Next INR check:  9/13/2022             Telephone call with  tanesha Muñiz who verbalizes understanding and agrees to plan and who agrees to plan and repeated back plan correctly    Lab visit scheduled for 9/13/22    Education provided: Goal range and significance of current result, Importance of following up at instructed interval, Importance of taking warfarin as instructed, Monitoring for bleeding signs and symptoms, Monitoring for clotting signs and symptoms, Importance of notifying clinic for changes in medications; a sooner lab recheck maybe needed. and Contact [phone] with any changes, questions or concerns.     Plan made per [facility] anticoagulation protocol    Romelia Kelly,  RN  Anticoagulation Clinic  9/8/2022    _______________________________________________________________________     Anticoagulation Episode Summary     Current INR goal:  2.0-3.0   TTR:  47.7 % (1 y)   Target end date:  Indefinite   Send INR reminders to:  [facility]    Indications    Atrial fibrillation (H) [I48.91]  Pulmonary embolism with infarction (H) [I26.99]  Atrial fibrillation  unspecified type (H) [I48.91]           Comments:           Anticoagulation Care Providers     Provider Role Specialty Phone number    Go Ramírez MD Referring Family Medicine [phone]

## 2022-09-09 NOTE — TELEPHONE ENCOUNTER
Spoke with Anthony  He is setting up patient meds and wanted to verify warfarin dosing  No other questions  Romelia Kelly RN

## 2022-09-12 NOTE — PROGRESS NOTES
Tanmay is a 82 year old who is being evaluated via a billable telephone visit.      What phone number would you like to be contacted at? [phone]  How would you like to obtain your AVS? Mane Donato was seen today for forms, recheck medication and recheck.    Diagnoses and all orders for this visit:    Chronic systolic congestive heart failure (H)  -     Home Care Referral    Stage 3b chronic kidney disease (H)  -     Home Care Referral    Atrial fibrillation, unspecified type (H)  -     Home Care Referral    Pulmonary embolism with infarction (H)  -     Home Care Referral    Bilateral leg weakness  -     Home Care Referral    Cognitive decline  -     Home Care Referral           Subjective   Tanmay is a 82 year old with a complex medical history that includes ischemic cardiomyopathy, permanent atrial fibrillation, chronic kidney disease, anemia, diarrhea with history of colitis, history of humerus fracture and other musculoskeletal issues including significant low back pain resulting in disability.  He also has had more recently resting tremor and increasing difficulty with ambulation and has been evaluated by neurology for possible parkinsonism.    Currently resides at home in an apartment.  He does not have any additional services provided in his living situation.  His niece Mary Kay and her siblings are his primary caregivers.  He does require more assistance with activities of daily living.  He is dependent on others for transportation at this point in time.  Transportation to and from appointments is difficult even with assistance.  He has had a likely some cognitive decline with time although this is not fully characterized.  He also has complex medical problems which she needs help in managing.  We discussed arranging for home care.  He was to start physical therapy but we will try to arrange for home care physical therapy for leg weakness.    We discussed considering alternative living situation.  He is  very against the possibility of a nursing home but willing to consider something in between sessions assisted living.  He has a  through his [employer] insurance.  His niece Mary Kay will begin the inquiry to see if this  can help us initiate this process.    He does not receive adequate nutrition in part due to inability to adequately prepare and consume food combined with poor appetite.  He has complex medical concerns that require more time and energy for managing.  Medication side effects may in part contribute to some decreased appetite.  Weight has gradually trended down with a noted decrease on her last measurement here.  I am concerned about malnutrition.  I recommended that he have a boost 3 times daily.  Documentation completed today.      Review of Systems   Complete review of systems is obtained.  Other than the specific considerations noted above complete review of systems is negative.        Objective           Vitals:  No vitals were obtained today due to virtual visit.    Physical Exam   healthy, alert and no distress  PSYCH: Alert and oriented times 3; coherent speech, normal   rate and volume, able to articulate logical thoughts, able   to abstract reason, no tangential thoughts, no hallucinations   or delusions  His affect is normal  RESP: No cough, no audible wheezing, able to talk in full sentences  Remainder of exam unable to be completed due to telephone visits                Phone call duration: 15 minutes    Wt Readings from Last 3 Encounters:   08/22/22 59.9 kg (132 lb)   08/11/22 61.2 kg (135 lb)   07/20/22 60.8 kg (134 lb)        BP Readings from Last 6 Encounters:   08/22/22 110/62   07/20/22 110/60   07/15/22 110/60   06/06/22 110/56   06/02/22 100/68   05/26/22 105/69        No results found for: A1C, HEMOGLOBINA1

## 2022-09-13 PROBLEM — E86.0 DEHYDRATION: Status: ACTIVE | Noted: 2022-01-01

## 2022-09-13 PROBLEM — R19.7 DIARRHEA: Status: ACTIVE | Noted: 2018-05-21

## 2022-09-13 PROBLEM — R63.0 ANOREXIA: Status: ACTIVE | Noted: 2022-01-01

## 2022-09-13 NOTE — ED NOTES
"ED Provider In Triage Note  [facility]  Encounter Date: Sep 13, 2022    Chief Complaint   Patient presents with     Vomiting     Diarrhea       Brief HPI:   Tanmay Israel is a 82 year old male presenting to the Emergency Department with a chief complaint of tan diarrhea and vomiting that started today. History of C diff. Not sure if recent antibiotics. No fever. No abd pain. Fell 1 month ago.     Brief Physical Exam:  /77   Pulse 103   Temp (!) 96.4  F (35.8  C)   Resp 18   Ht 1.753 m (5' 9\")   Wt 68 kg (150 lb)   SpO2 96%   BMI 22.15 kg/m    General: Non-toxic appearing  HEENT: Atraumatic  Resp: No respiratory distress  Abdomen: Non-peritoneal  Neuro: Alert, oriented, answers questions appropriately  Psych: Behavior appropriate    Plan Initiated in Triage:  Orders Placed This Encounter     CBC (+ platelets, no diff)     Comprehensive metabolic panel     Lipase     INR     ondansetron (ZOFRAN) injection 4 mg       PIT Dispo:   Return to [facility] while awaiting workup and ED bed availability    Sheldon Richter MD on 9/13/2022 at 5:26 PM    Patient was evaluated by the Physician in Triage due to a limitation of available rooms in the Emergency Department. A plan of care was discussed based on the information obtained on the initial evaluation and patient was consuled to return back to the Emergency Department lobby after this initial evalutaiton until results were obtained or a room became available in the Emergency Department. Patient was counseled not to leave prior to receiving the results of their workup.     Sheldon Richter MD  [facility] EMERGENCY DEPARTMENT  [address] [place] [phone]     Sheldon Richter MD  09/13/22 1728    "

## 2022-09-13 NOTE — PROGRESS NOTES
ANTICOAGULATION MANAGEMENT     Tanmay Israel 82 year old male is on warfarin with subtherapeutic INR result. (Goal INR 2.0-3.0)    Recent labs: (last 7 days)     09/13/22  1026   INR 1.1       ASSESSMENT       Source(s): Chart Review, Patient/Caregiver Call and Template       Warfarin doses taken: Less warfarin taken than planned which may be affecting INR, Missed dose(s) may be affecting INR and family does not know what Tanmay is taking for sure.    Diet: No new diet changes identified    New illness, injury, or hospitalization: No    Medication/supplement changes: None noted    Signs or symptoms of bleeding or clotting: No    Previous INR: Subtherapeutic    Additional findings: Family has set up warfarin for Tanmay but they do not know if he is taking it or any other meds correctly or at all. Family is working with home health and PCP to get Tanmay home care or into assisted living as he is on his own and is not managing self care very well.       PLAN     Recommended plan for ongoing change(s) affecting INR     Dosing Instructions: Take boost dose then resume previous warfarin dosing as directed on 9/6/22, Recheck INR in 48-72 hours.    Summary  As of 9/13/2022    Full warfarin instructions:  9/13: 5 mg; Otherwise 2.5 mg every day   Next INR check:  9/16/2022             Telephone call with  Mary Kay almendarez who verbalizes understanding and agrees to plan and who agrees to plan and repeated back plan correctly. They will continue to set up pills for Tanmay but cannot be there all the time to make sure they are taken correctly or at all.    Lab visit scheduled    Education provided: Goal range and significance of current result, Importance of therapeutic range, Importance of following up at instructed interval, Importance of taking warfarin as instructed, Monitoring for clotting signs and symptoms and Contact [phone] with any changes, questions or concerns.     Plan made per [facility] anticoagulation protocol    Romelia Kelly,  RN  Anticoagulation Clinic  9/13/2022    _______________________________________________________________________     Anticoagulation Episode Summary     Current INR goal:  2.0-3.0   TTR:  46.0 % (1 y)   Target end date:  Indefinite   Send INR reminders to:  SAUDAG GABRIEL    Indications    Atrial fibrillation (H) [I48.91]  Pulmonary embolism with infarction (H) [I26.99]  Atrial fibrillation  unspecified type (H) [I48.91]           Comments:           Anticoagulation Care Providers     Provider Role Specialty Phone number    Go Ramírez MD Referring Family Medicine [phone]

## 2022-09-13 NOTE — ED NOTES
Upon meeting patient he had a small incontinent bowel episode due to having watery diarrhea. It was slimy and watery. He reports only about 1 episode of diarrhea each day for the last 2 days but vomiting about 4 times a day. Cdiff history was about 5 year ago. He was able to stand without weakness/lightheadedness. Vitals are normal. Could not get stool sample yet. Labs sent, zofran given, no significant nausea now.

## 2022-09-13 NOTE — ED TRIAGE NOTES
Pt states he's been feeling nauseous and having diarrhea since he left from getting his INR checked this morning. INR 1.1 taking warfarin for A fib. Denies pain     Triage Assessment     Row Name 09/13/22 1722       Triage Assessment (Adult)    Airway WDL WDL       Respiratory WDL    Respiratory WDL WDL       Skin Circulation/Temperature WDL    Skin Circulation/Temperature WDL WDL       Peripheral/Neurovascular WDL    Peripheral Neurovascular WDL WDL       Cognitive/Neuro/Behavioral WDL    Cognitive/Neuro/Behavioral WDL WDL

## 2022-09-14 PROBLEM — U07.1 COVID-19 VIRUS RNA TEST RESULT POSITIVE AT LIMIT OF DETECTION: Status: ACTIVE | Noted: 2022-01-01

## 2022-09-14 PROBLEM — R53.1 WEAKNESS: Status: ACTIVE | Noted: 2021-08-24

## 2022-09-14 PROBLEM — R62.7 FAILURE TO THRIVE IN ADULT: Status: ACTIVE | Noted: 2022-01-01

## 2022-09-14 PROBLEM — R62.7 FAILURE TO THRIVE IN ADULT: Status: RESOLVED | Noted: 2022-01-01 | Resolved: 2022-01-01

## 2022-09-14 PROBLEM — E87.6 HYPOKALEMIA: Status: RESOLVED | Noted: 2021-09-15 | Resolved: 2022-01-01

## 2022-09-14 NOTE — CONSULTS
"Care Management Initial Consult    General Information  Assessment completed with: Friend, Mary Kay mcclelland via phone  Type of CM/SW Visit: Initial Assessment    Primary Care Provider verified and updated as needed: Yes   Readmission within the last 30 days: no previous admission in last 30 days      Reason for Consult: discharge planning  Advance Care Planning: Advance Care Planning Reviewed: present on chart          Communication Assessment  Patient's communication style: spoken language (English or Bilingual)                         Living Environment:   People in home: alone     Current living Arrangements: apartment, independent living facility      Able to return to prior arrangements: other (see comments) (likley needs TCU)       Family/Social Support:  Care provided by: self, other (see comments) (\"4 nieces/nephews\")  Provides care for: no one, unable/limited ability to care for self     Other (specify) (\"4 nieces/nephews\")          Description of Support System: Supportive, Involved    Support Assessment: Adequate family and caregiver support, Adequate social supports, Patient communicates needs well met    Current Resources:   Patient receiving home care services: Yes ([facility] aide on Mon, Wed for 4hrs. Phone [phone].)  Skilled Home Care Services: Home Health Aid  Community Resources: [facility], [facility] Worker (Latashaalena Bowen [phone] [facility] Care Manager through [employer]; supposed to go to a PT clinic starting 9/15/22 for 6 wks twice a week; [facility])  Equipment currently used at home: cane, straight, walker, standard, wheelchair, manual (\"Uses the cane most of the time. Also has a walker, but rarely uses it. Has a wheelchair that family uses for transport to appointments\")  Supplies currently used at home: Nutritional Supplements, Other (\"boost; glasses\")    Employment/Financial:  Employment Status: retired     Employment/ Comments: \"no  " "benefits, he was only in the reserves\"  Financial Concerns:     Referral to Financial Worker: No       Lifestyle & Psychosocial Needs:  Social Determinants of Health     Tobacco Use: Medium Risk     Smoking Tobacco Use: Former Smoker     Smokeless Tobacco Use: Never Used   Alcohol Use: Not on file   Financial Resource Strain: Medium Risk     Difficulty of Paying Living Expenses: Somewhat hard   Food Insecurity: No Food Insecurity     Worried About Running Out of Food in the Last Year: Never true     Ran Out of Food in the Last Year: Never true   Transportation Needs: No Transportation Needs     Lack of Transportation (Medical): No     Lack of Transportation (Non-Medical): No   Physical Activity: Not on file   Stress: Not on file   Social Connections: Not on file   Intimate Partner Violence: Not on file   Depression: Not at risk     PHQ-2 Score: 0   Housing Stability: Low Risk      Unable to Pay for Housing in the Last Year: No     Number of Places Lived in the Last Year: 1     Unstable Housing in the Last Year: No       Functional Status:  Prior to admission patient needed assistance:   Dependent ADLs:: Ambulation-cane, Ambulation-walker, Bathing, Wheelchair-with assist  Dependent IADLs:: Cleaning, Cooking, Laundry, Shopping, Meal Preparation, Medication Management, Transportation  Assesssment of Functional Status: Not at baseline with ADL Functioning, Not at baseline with mobility, Not at  functional baseline    Mental Health Status:          Chemical Dependency Status:                Values/Beliefs:  Spiritual, Cultural Beliefs, Mandaen Practices, Values that affect care:                 Additional Information:  Tanmay was found to be COVID +. He lives in an apartment alone. He is independent with some ADLs. He does not drive. He has 4 nieces and nephews who check in on him and help with his needs. They help him get to his doctor appointments. They were going to help him get to his PT that was set up at the " "[facility] on [address]. He was to start the 6 weeks of PT on 9/15/22 and it was going to be twice a week. The clinic had attempted to find Home Care PT and OT and RN help and have not been able to find anyone.    He also has [facility] A[facility] help for 4hrs on Monday and Wednesday.    He uses the cane most of the time for mobility. Also has a walker, but rarely uses it. Has a wheelchair that family uses for transport to appointments.    Mary Kay states, \"I am the main family member coordinating his care needs. I have been working with his Care Manager to possibly get him to a higher level of care. He finally just agreed to possibly move to an Assisted Living but not to a Nursing Home. So we don't have him on waiting lists yet, but that is the next step\".    He likely needs TCU and wants [facility] as a first choice. He has been there before. Has also been most recently at [facility], but made aware that it closed. Awaiting PT and OT recommendations for TCU need and COVID needs.    Latasha [facility] [phone]. (contracted through [employer])  Mary Kay krystin [phone].    Evelyn Payan, RN      "

## 2022-09-14 NOTE — PROGRESS NOTES
Clinic Care Coordination Contact    Community Health Worker Follow Up    Care Gaps:     Health Maintenance Due   Topic Date Due     HF ACTION PLAN  Never done     MICROALBUMIN  05/22/2020     INFLUENZA VACCINE (1) 09/01/2022         Care Plan:   Care Plan: Medication Regimen     Problem: Medication adherence     Long-Range Goal: I will take my medications as prescribed.     Start Date: 5/25/2022 Expected End Date: 11/25/2022    This Visit's Progress: On track    Priority: High    Note:     Barriers: Patient is currently dependent on family  Strengths: patient is motivated and has help from family   Patient expressed understanding of goal: yes   Action steps to achieve this goal:  1. I will follow the doctors recommendations with regards to taking my medications.   2. I and my family will continue to work with my  Latasha in finding RN medication support at home.   3. I will report progress towards this goal at scheduled outreach telephone calls from my CCC team.      Discussed 9/6/22                    Care Plan: Social Support     Problem: Inadequate social support     Goal: Goal Statement: I would like to get homemaker services to assist me with maintaining my home.     Start Date: 8/15/2022 Expected End Date: 11/15/2022    This Visit's Progress: 10%    Priority: High    Note:     Goal Statement: I would like to get homemaker services in the home to assist me my care needs in the next three months.   Date Goal set: 8/15/22  Barriers: PCA agencies are having a difficult time finding staff at this time.  Strengths: Strong family support  Date to Achieve By: 11/15/22  Patient expressed understanding of goal: Yes  Action steps to achieve this goal:  1. I will continue to work with Latasha Bowen through [employer] my waiver  to assist me with finding a homemaker.   2. I will continue to ask my family for support until a homemaker can be found.   3. I will report progress towards this goal at  scheduled outreach telephone calls from my [facility] team.                          Intervention and Education during outreach: CHW will send updated list of patients medications as requested by Mary Kay. CHW called scheduling line for Mary Kay and cancelled patients appointment on 9/16/22 due to patient having COVID and being hospitalized. CHW gave patients krystin Muñiz contact information and encouraged her to reach out with any questions or concerns.     CHW Note:    CHW received VM from patients krystin Muñiz requesting return call. CHW called Mary Kay back at her request.    Mary Kay shared the patient is currently in the hospital with COVID. Mary Kay requested an updated list of the patients medications for her to get organized. CHW will send patients updated medication list via mail.     Mary Kay shared that the patient has an appointment on 9/16/22 to get his INR checked. Mary Kay was informed to cancel the appointment as the patient would not be able to attend due to having COVID and being in the hospital. CHW called scheduling line for Mary Kay and cancelled patients appointment on 9/16/22.    Mary Kay denied any other needs or concerns at this time but will reach out to [facility] as needed.      CHW Plan: CHW will continue to support patient with goals through routine scheduled outreach. CHW will outreach in one month on 10/14/22.        Lawanda Hsu  Community Health Worker   [facility] Care Coordination  [facility]   [email or web address]  Office: [phone]

## 2022-09-14 NOTE — ED PROVIDER NOTES
EMERGENCY DEPARTMENT ENCOUNTER      NAME: Tanmay Israel  AGE: 82 year old male  YOB: 1940  MRN: [id number]  EVALUATION DATE & TIME: 9/13/2022  6:05 PM    PCP: Go Ramírez    ED PROVIDER: Oneil Miguel M.D.      Chief Complaint   Patient presents with     Vomiting     Diarrhea         FINAL IMPRESSION:  1. Hypomagnesemia    2. Dehydration    3. Anorexia    4. Diarrhea, unspecified type          ED COURSE & MEDICAL DECISION MAKING:    Pertinent Labs & Imaging studies reviewed. (See chart for details)  ED Course as of 09/13/22 2248   Tue Sep 13, 2022   1932 Patient is a 81-year-old gentleman who presents with diarrhea 3 times a day for the past couple of weeks, in accordance with when he started using boost as a nutritional supplement.  He states that he has lost 20 pounds in the last couple of weeks despite doing this.  He has no appetite for anything else.  He vomited once yesterday as well.  On arrival here he is frail, mildly tachycardic with a heart rate of 103.  Has a benign abdomen.  Afebrile.  White blood cell count is minimally elevated at 13.1, normal INR despite taking Coumadin.  Plan to check electrolytes, kidney function.   2105 Lactic Acid: 0.8   2122 I ordered 2 mg of magnesium to get started given his level of 1.1.  He has a mild leukocytosis, I continues to have difficulty with oral intake, plan to admit for observation and hypomagnesemia.  I ordered a screening CT to rule out mass or intra-abdominal infection.   2136 Magnesium(!): 1.1   I spoke to the hospitalist who will keep an eye out for the results of the CT scan.  Patient was admitted inpatient bed.      Additional ED Course Timestamps:  7:19 PM Introduced myself to the patient, obtained history of present illness, and performed initial physical exam at this time. PPE: Provider wore gloves, N95 mask.  9:16 PM Checked on the patient and updated him on the current treatment plan.  9:35 PM Discussed the patient with   [facility] Hospitalist, who agrees to accept the patient at this time.    At the conclusion of the encounter I discussed the results of all of the tests and the disposition. The questions were answered. The patient or family acknowledged understanding and was agreeable with the care plan.       MEDICATIONS GIVEN IN THE EMERGENCY:  Medications   sodium chloride (PF) 0.9% PF flush 3 mL (has no administration in time range)   sodium chloride (PF) 0.9% PF flush 3 mL (3 mLs Intravenous Not Given 9/13/22 2055)   magnesium sulfate 2 g in water intermittent infusion (2 g Intravenous New Bag 9/13/22 2237)   ondansetron (ZOFRAN) injection 4 mg (4 mg Intravenous Given 9/13/22 1831)   0.9% sodium chloride BOLUS (1,000 mLs Intravenous New Bag 9/13/22 1938)   magnesium sulfate 2 g in water intermittent infusion (2 g Intravenous New Bag 9/13/22 2113)         NEW PRESCRIPTIONS STARTED AT TODAY'S ER VISIT  New Prescriptions    No medications on file          =================================================================    HPI    Patient information was obtained from: the patient and his niece and nephew    Use of : N/A      Tanmay Israel is a 82 year old male with a pertinent history of hyperlipidemia, crohn's colitis, hypertension, ischemic cardiomyopathy, paroxysmal ventricular tachycardia, stage 3 CKD, C. Diff, atrial fibrillation, crohn's disease, hypomagnesemia, Schatzki's ring, CHF, gastroenteritis, hypokalemia, and ICD in place, who presents to this ED for evaluation of vomiting and diarrhea.    For the past 2 weeks, the patient has been drinking 3 Boost nutrition drinks per day. He notes that his doctor was worried about his weight loss, so he recommended Boost as a calorie supplement. The patient states he does not eat very much as he does not have an appetite. The patient's niece reports that the patient is still losing weight quickly.     Since beginning this Boost regimen, the patient has started having  three episodes of diarrhea per day. He notes that his stool is soft and loose, but not watery. He endorses associated nausea and vomiting, but his niece reports that he did not vomit at all yesterday. The patient has a history of C. Diff, but states that his current symptoms are different than the symptoms he had then. He is anticoagulated with Coumadin for atrial fibrillation. The patient also endorses weakness. He denies abdominal pain, fevers, and any other symptoms at this time.    The patient lives alone, but his niece and nephew frequently check in. He has a home health nurse that comes twice a week and stays for four hours each time. The patient ambulates with a walker.        REVIEW OF SYSTEMS   Review of Systems   Constitutional: Positive for appetite change (loss of appetite) and unexpected weight change (weight loss). Negative for fever.   Gastrointestinal: Positive for diarrhea, nausea and vomiting. Negative for abdominal pain.   Neurological: Positive for weakness.   All other systems reviewed and are negative.       PAST MEDICAL HISTORY:  Past Medical History:   Diagnosis Date     Acute cholecystitis 2/28/2019     Acute post-operative pain      Anemia      Arthritis      Atrial fibrillation (H)      C. difficile diarrhea 4/21/2019     Calculus of ureter      Callus      Cerebral aneurysm      Cervical myelopathy (H) 2/22/2019     Cervical spinal stenosis      Chronic kidney disease     stage 3     Chronic systolic congestive heart failure (H)      Closed fracture of distal end of left radius, unspecified fracture morphology, initial encounter      Closed fracture of distal end of right radius, unspecified fracture morphology, initial encounter      Closed fracture of first thoracic vertebra, unspecified fracture morphology, initial encounter (H)      Coronary artery disease      Crohn's disease (H)      Disorder of bursae and tendons in shoulder region     Created by Conversion  Replacement Utility  updated for latest IMO load     Dysphagia      Fall 10/22/2016     GERD (gastroesophageal reflux disease)      Hyperlipidemia      Hypertension      Hypotension, unspecified hypotension type      ICD (implantable cardioverter-defibrillator) in place     Medtronic     Ischemic cardiomyopathy      Kidney stone      Low back pain      Lumbago     Created by Conversion      Macular degeneration      Myalgia and myositis, unspecified     Created by Conversion      Nonspecific elevation of levels of transaminase or lactic acid dehydrogenase (LDH)     Created by Conversion      Permanent atrial fibrillation (H)     [id number] risk score = 5 (age3/ CAD/ HTN/ CHF) Chronic warfarin     Personal history of colonic polyps 2/12/2019     Polyp of duodenum 10/17/2016     Pyuria      Right arm weakness 4/2/2019     Schatzki's ring      Sciatica     Created by Conversion      Sleep apnea     no CPAP     Spondylolisthesis of lumbar region 7/5/2016     Weakness 4/20/2019       PAST SURGICAL HISTORY:  Past Surgical History:   Procedure Laterality Date     APPENDECTOMY       ARTHROSCOPY SHOULDER ROTATOR CUFF REPAIR Right      ATRIAL FIBRILLATION/FLUTTER       BYPASS GRAFT ARTERY CORONARY  08/01/2004    Coronary Artery Quadruple Venous Bypass Graft     CARDIAC DEFIBRILLATOR PLACEMENT  01/01/2013    ICD Medtronic     CHOLECYSTECTOMY       COLONOSCOPY N/A 02/19/2020    Procedure: COLONOSCOPY;  Surgeon: Houston Medina MD;  Location[facility];  Service: Gastroenterology     EP ICD UPGRADE TO BIVENT Left 01/03/2022    Procedure: EP Implantable Cardio-Defibrillator Upgrade to Bivent;  Surgeon: Houston Rolon MD;  Location: [facility] [place]     ESOPHAGOSCOPY, GASTROSCOPY, DUODENOSCOPY (EGD), COMBINED       IR LUMBAR EPIDURAL STEROID INJECTION  06/29/2005     IR LUMBAR EPIDURAL STEROID INJECTION  07/15/2005     IR LUMBAR EPIDURAL STEROID INJECTION  09/28/2005     IR LUMBAR EPIDURAL STEROID INJECTION  12/28/2005     IR  LUMBAR EPIDURAL STEROID INJECTION  03/29/2006     IR LUMBAR EPIDURAL STEROID INJECTION  06/08/2006     PHACOEMULSIFICATION CLEAR CORNEA WITH STANDARD INTRAOCULAR LENS IMPLANT Bilateral      RI C- LAMINOPLASTY, 2 OR MORE Left 02/22/2019    Procedure: LEFT CERVICAL 4, 5, 6 LAMINOPLASTY;  Surgeon: Liza Cesar MD;  Location: [facility];  Service: Spine     RI ESOPHAGOGASTRODUODENOSCOPY TRANSORAL DIAGNOSTIC N/A 05/10/2021    Procedure: ESOPHAGOGASTRODUODENOSCOPY (EGD);  Surgeon: Franklin Mendoza MD;  Location: [facility];  Service: Gastroenterology     RI ESOPHAGOGASTRODUODENOSCOPY TRANSORAL DIAGNOSTIC N/A 06/16/2021    Procedure: ESOPHAGOGASTRODUODENOSCOPY (EGD), WITH ESOPHAGEAL DILATION AND BIOPSY;  Surgeon: Paramjit Brown MD;  Location: [facility];  Service: Gastroenterology     REMOVE HARDWARE STERNUM N/A 1/13/2022    Procedure: STERNAL WIRE REMOVAL;  Surgeon: Raquel De Leon MD;  [facility]     TONSILLECTOMY       XR MYELOGRAM CERVICAL THORACIC LUMBAR  01/17/2019     ZZC LAP,CHOLECYSTECTOMY/EXPLORE N/A 02/28/2019    CHOLECYSTECTOMY, LAPAROSCOPIC;  Surgeon: Mana Roman MD;  Location: [facility];  Service: General           CURRENT MEDICATIONS:    Current Facility-Administered Medications   Medication     botulinum toxin type A (BOTOX) 100 units injection 100 Units     magnesium sulfate 2 g in water intermittent infusion     sodium chloride (PF) 0.9% PF flush 3 mL     sodium chloride (PF) 0.9% PF flush 3 mL     Current Outpatient Medications   Medication     acetaminophen (TYLENOL) 500 MG tablet     albuterol (PROAIR HFA/PROVENTIL HFA/VENTOLIN HFA) 108 (90 Base) MCG/ACT inhaler     benztropine (COGENTIN) 1 MG tablet     bumetanide (BUMEX) 1 MG tablet     calcium carbonate-vitamin D 600-200 MG-UNIT TABS     carbidopa-levodopa (SINEMET)  MG tablet     carvedilol (COREG) 25 MG tablet     cyclobenzaprine (FLEXERIL) 5 MG tablet     cycloSPORINE  (RESTASIS) 0.05 % ophthalmic emulsion     diclofenac (VOLTAREN) 1 % topical gel     erythromycin (ROMYCIN) 5 MG/GM ophthalmic ointment     ferrous sulfate (FEROSUL) 325 (65 Fe) MG tablet     gabapentin (NEURONTIN) 100 MG capsule     Lidocaine (LIDOCARE) 4 % Patch     losartan (COZAAR) 100 MG tablet     multivitamin w/minerals (THERA-VIT-M) tablet     omeprazole (PRILOSEC) 20 MG DR capsule     potassium chloride ER (KLOR-CON M) 20 MEQ CR tablet     rosuvastatin (CRESTOR) 20 MG tablet     traMADol (ULTRAM) 50 MG tablet     vitamin B-12 (CYANOCOBALAMIN) 2000 MCG tablet     warfarin ANTICOAGULANT (COUMADIN) 2.5 MG tablet       ALLERGIES:  No Known Allergies    FAMILY HISTORY:  Family History   Problem Relation Age of Onset     Heart Disease Mother      Cerebrovascular Disease Mother      Crohn's Disease Father      Alcoholism Brother      No Known Problems Daughter      No Known Problems Son      No Known Problems Maternal Aunt      No Known Problems Maternal Uncle      No Known Problems Paternal Aunt      No Known Problems Paternal Uncle      No Known Problems Maternal Grandmother      No Known Problems Maternal Grandfather      No Known Problems Paternal Grandmother      No Known Problems Paternal Grandfather      Cancer Brother      Urolithiasis No family hx of      Gout No family hx of        SOCIAL HISTORY:   Social History     Socioeconomic History     Marital status:    Tobacco Use     Smoking status: Former Smoker     Smokeless tobacco: Never Used   Substance and Sexual Activity     Alcohol use: Not Currently     Drug use: Never     Sexual activity: Yes     Partners: Female     Social Determinants of Health     Financial Resource Strain: Medium Risk     Difficulty of Paying Living Expenses: Somewhat hard   Food Insecurity: No Food Insecurity     Worried About Running Out of Food in the Last Year: Never true     Ran Out of Food in the Last Year: Never true   Transportation Needs: No Transportation Needs  "    Lack of Transportation (Medical): No     Lack of Transportation (Non-Medical): No   Housing Stability: Low Risk      Unable to Pay for Housing in the Last Year: No     Number of Places Lived in the Last Year: 1     Unstable Housing in the Last Year: No       VITALS:  /77   Pulse 103   Temp (!) 96.4  F (35.8  C)   Resp 18   Ht 1.753 m (5' 9\")   Wt 68 kg (150 lb)   SpO2 96%   BMI 22.15 kg/m      PHYSICAL EXAM    Constitutional: Comfortable appearing. Cachectic and frail.  HENT: Normocephalic, atraumatic, nose normal. Dry mucous membranes. Dentures in place.  Neck- Supple, gross ROM intact.   Eyes: Pupils mid-range, conjunctiva without injection, no discharge.   Respiratory: Clear to auscultation bilaterally, no respiratory distress, no wheezing, speaks full sentences easily. No cough.  Cardiovascular: Normal heart rate, regular rhythm, no murmurs.   GI: Soft, no tenderness to deep palpation in all quadrants, no masses.  Musculoskeletal: Moving all 4 extremities intentionally and without pain. No obvious deformity.  Skin: Warm, dry, no rash.  Neurologic: Alert & oriented x 3, cranial nerves grossly intact.  Psychiatric: Affect normal, cooperative.       LAB:  All pertinent labs reviewed and interpreted.  Labs Ordered and Resulted from Time of ED Arrival to Time of ED Departure   CBC WITH PLATELETS - Abnormal       Result Value    WBC Count 13.1 (*)     RBC Count 3.33 (*)     Hemoglobin 10.3 (*)     Hematocrit 32.5 (*)     MCV 98      MCH 30.9      MCHC 31.7      RDW 13.7      Platelet Count 221     COMPREHENSIVE METABOLIC PANEL - Abnormal    Sodium 136      Potassium 3.9      Chloride 101      Carbon Dioxide (CO2) 24      Anion Gap 11      Urea Nitrogen 27      Creatinine 1.58 (*)     Calcium 9.4      Glucose 88      Alkaline Phosphatase 166 (*)     AST 14      ALT 9      Protein Total 6.1      Albumin 2.8 (*)     Bilirubin Total 0.5      GFR Estimate 43 (*)    INR - Abnormal    INR 1.27 (*)  "   MAGNESIUM - Abnormal    Magnesium 1.1 (*)    LIPASE - Normal    Lipase 17     LACTIC ACID WHOLE BLOOD - Normal    Lactic Acid 0.8     COVID-19 VIRUS (CORONAVIRUS) BY PCR   COVID-19 VIRUS (CORONAVIRUS) BY PCR   CLOSTRIDIUM DIFFICILE TOXIN B       RADIOLOGY:  Reviewed all pertinent imaging. Please see official radiology report.  CT Abdomen Pelvis w/o Contrast    (Results Pending)       EKG:    All EKG interpretations will be found in ED course above.        I, Phuong Metcalf am serving as a scribe to document services personally performed by Dr. Oneil Miguel based on my observation and the provider's statements to me. I, Oneil Miguel MD attest that Phuong Metcalf is acting in a scribe capacity, has observed my performance of the services and has documented them in accordance with my direction.    Oneil Miguel M.D.  Emergency Medicine  [facility] EMERGENCY DEPARTMENT  [facility] [facility] [id number] [phone]  Dept: [phone]       Oneil Miguel MD  09/13/22 3471

## 2022-09-14 NOTE — UTILIZATION REVIEW
Admission Status; Secondary Review Determination   CONDITIONAL REVIEW    Under the authority of the Utilization Management Committee, the utilization review process indicated a secondary review on the above patient. The review outcome is based on review of the medical records, discussions with staff, and applying clinical experience noted on the date of the review.     (x) Inpatient Status Appropriate - This patient's medical care is consistent with medical management for inpatient care and reasonable inpatient medical practice.     RATIONALE FOR DETERMINATION     Mr. Israel is a 83 yo male pt vaccinated for COVID x 4 , as well as, PMH significant for c diff, who presents to the ED with diarrhea, vomiting and weakness.  Noted to have acute renal failure and hypomagnesmia; both improved with supplementation and IVF.    Oxygen saturations in chart noted earlier today to be in the 70's on RA. I spoke with Dr. Milan, however, and he feels that this was a documentation error as pt is comfortable and in no acute respiratory distress.     If he does develop acute hypoxic respiratory failure requiring additional chest imaging and/or medications for COVID infection, then would consider changing to INPATIENT status.     At the time of admission with the information available to the attending physician more than 2 nights Hospital complex care was anticipated, based on patient risk of adverse outcome if treated as outpatient and complex care required. Inpatient admission is appropriate based on the Medicare guidelines.     This document was produced using voice recognition software       The information on this document is developed by the utilization review team in order for the business office to ensure compliance. This only denotes the appropriateness of proper admission status and does not reflect the quality of care rendered.   The definitions of Inpatient Status and Observation Status used in making the determination  above are those provided in the CMS Coverage Manual, Chapter 1 and Chapter 6, section 70.4.         Sincerely,     Jossie Padilla, DO  Utilization Review  Physician Advisor  [facility].

## 2022-09-14 NOTE — PHARMACY-ADMISSION MEDICATION HISTORY
Pharmacy Note - Admission Medication History    Pertinent Provider Information: Patient is on Coumadin with goal of 2.0-3.0. Patient has struggled with sub-therapeutic INR. He has 2.5 mg tablets and frequently changes his regimen, at visit with anticoag nurse on 09/13 INR was 1.1, plan was for him to take 5 mg yesterday and 2.5 mg the next 2 days with another INR check on 09/16. Patients niece told me he only took one 2.5 mg tablet yesterday. Niece also says that he struggles with adherence, the inconsistent fill history reflects this. Patient's niece also said that he had vomiting when he tried Sinement so he has not been taking it, neurologist switched to Cogentin but I don't see that it was ever picked up.     ______________________________________________________________________    Prior To Admission (PTA) med list completed and updated in EMR.       Current Facility-Administered Medications for the 9/13/22 encounter (Hospital Encounter)   Medication     botulinum toxin type A (BOTOX) 100 units injection 100 Units     PTA Med List   Medication Sig Note Last Dose     acetaminophen (TYLENOL) 500 MG tablet Take 1,000 mg by mouth every 8 hours as needed   Unknown at Unknown time     albuterol (PROAIR HFA/PROVENTIL HFA/VENTOLIN HFA) 108 (90 Base) MCG/ACT inhaler Inhale 1-2 puffs into the lungs every 4 hours as needed   Unknown at Unknown time     bumetanide (BUMEX) 1 MG tablet TAKE ONE TABLET (1MG) BY MOUTH ONCE DAILY  9/13/2022 at AM     calcium carbonate-vitamin D 600-200 MG-UNIT TABS Take 2 tablets by mouth daily   9/13/2022 at AM     carboxymethylcellulose PF (REFRESH PLUS) 0.5 % ophthalmic solution 1 drop 4 times daily as needed for dry eyes  9/13/2022 at AM     carvedilol (COREG) 25 MG tablet Take 1.5 tablets (37.25 mg) by mouth 2 times daily (with meals)  9/13/2022 at AM     cycloSPORINE (RESTASIS) 0.05 % ophthalmic emulsion 1 drop 2 times daily as needed for dry eyes  9/13/2022 at Unknown time     diclofenac  (VOLTAREN) 1 % topical gel Apply 2 g topically 2 times daily   Unknown at Unknown time     erythromycin (ROMYCIN) 5 MG/GM ophthalmic ointment Place 0.5 inches into both eyes daily   Past Week at Unknown time     ferrous sulfate (FEROSUL) 325 (65 Fe) MG tablet Take 1 tablet by mouth daily  9/13/2022 at AM     gabapentin (NEURONTIN) 100 MG capsule Take 1 capsule (100 mg) by mouth 3 times daily  Past Week at Unknown time     losartan (COZAAR) 100 MG tablet Take 1 tablet (100 mg) by mouth daily  9/13/2022 at AM     multivitamin w/minerals (THERA-VIT-M) tablet Take 1 tablet by mouth daily  9/13/2022 at AM     omeprazole (PRILOSEC) 20 MG DR capsule TAKE ONE CAPSULE BY MOUTH ONCE DAILY BEFORE BREAKFAST  9/13/2022 at AM     potassium chloride ER (KLOR-CON M) 20 MEQ CR tablet TAKE ONE TABLET BY MOUTH ONCE DAILY  9/13/2022 at AM     rosuvastatin (CRESTOR) 20 MG tablet TAKE 1 TABLET BY MOUTH DAILY AT BEDTIME (Patient taking differently: Take 20 mg by mouth daily)  Past Week at PM     traMADol (ULTRAM) 50 MG tablet Take 50 mg by mouth every 6 hours as needed   Unknown at Unknown time     vitamin B-12 (CYANOCOBALAMIN) 2000 MCG tablet Take 2,000 mcg by mouth daily  9/13/2022 at AM     warfarin ANTICOAGULANT (COUMADIN) 2.5 MG tablet TAKE 1 TO 1.5 TABLETS (2.5-3.75MG) BY MOUTH DAILY. ADJUST DOSE PER INR AS DIRECTED. (Patient taking differently: Take 1.25-2.5 mg by mouth See Admin Instructions 1.25 mg every Mon, Wed, Fri; 2.5 mg all other days) 9/14/2022: Patient has had labile INRs. He has 2.5 mg tablets, at INR check yesterday it was 1.1 and he was told to take 5 mg yesterday and 2.5 mg the next two days and have a re-check on Friday.  9/13/2022 at PM       Information source(s): Family member and CareEverywhere/SureScripts  Method of interview communication: phone    Summary of Changes to PTA Med List  New: Refresh Eye Drops  Discontinued: Sinemet (self),   Changed: Warfarin    Patient was asked about OTC/herbal products  specifically.  PTA med list reflects this.    In the past week, patient estimated taking medication this percent of the time:  50-90% due to illness.    Allergies were reviewed, assessed, and updated with the patient.      Patient did not bring any medications to the hospital and can't retrieve from home. No multi-dose medications are available for use during hospital stay.     The information provided in this note is only as accurate as the sources available at the time of the update(s).    Thank you for the opportunity to participate in the care of this patient.    PETER SOMMER RPH  9/14/2022 8:05 AM

## 2022-09-14 NOTE — PROGRESS NOTES
09/14/22 1300   Quick Adds   Type of Visit Initial PT Evaluation   Living Environment   People in Home alone   Current Living Arrangements independent living facility   Home Accessibility no concerns   Living Environment Comments Pt lives alone in an ILF.   Self-Care   Usual Activity Tolerance moderate   Current Activity Tolerance fair   Regular Exercise No   Equipment Currently Used at Home cane, straight;walker, standard;wheelchair, manual   Fall history within last six months yes   Number of times patient has fallen within last six months 1   Activity/Exercise/Self-Care Comment Pt used to work as a yang.   General Information   Onset of Illness/Injury or Date of Surgery 09/13/22   Referring Physician Dr. Baltazar Milan.   Patient/Family Therapy Goals Statement (PT) To go home.   Pertinent History of Current Problem (include personal factors and/or comorbidities that impact the POC) From chart: 82 year old male with past medical hx of HTN, who presents with weakness and diarrhea with decreased appetite.   Existing Precautions/Restrictions fall   Weight-Bearing Status - LLE full weight-bearing   Weight-Bearing Status - RLE full weight-bearing   Heart Disease Risk Factors Lack of physical activity;Medical history;Gender;Age   General Observations Male supine in bed.   Cognition   Affect/Mental Status (Cognition) WNL   Orientation Status (Cognition) oriented x 4   Follows Commands (Cognition) WNL   Cognitive Status Comments Pleasant, cooperative.   Pain Assessment   Patient Currently in Pain No   Integumentary/Edema   Integumentary/Edema Comments Not formally assessed.   Posture    Posture Forward head position   Range of Motion (ROM)   ROM Comment Grossly WFL.   Strength (Manual Muscle Testing)   Strength Comments Slightly grossly impaired during functional mobility.   Bed Mobility   Comment, (Bed Mobility) Supine-sit with Wilson.   Transfers   Comment, (Transfers) Sit-stand with walker, CGA, from bed,  min-modA from chair.   Gait/Stairs (Locomotion)   Comment, (Gait/Stairs) Able to ambulate between bed and chair with walker, CGA. Limited by lines.   Balance   Balance Comments Adequate for ambulation with AD.   Sensory Examination   Sensory Perception patient reports no sensory changes   Coordination   Coordination Comments Tremor noted in UEs at times.   Muscle Tone   Muscle Tone no deficits were identified   Clinical Impression   Criteria for Skilled Therapeutic Intervention Yes, treatment indicated   PT Diagnosis (PT) Impaired functional mobility and activity tolerance.   Influenced by the following impairments Medical.   Functional limitations due to impairments All functional mobility, endurance.   Clinical Presentation (PT Evaluation Complexity) Stable/Uncomplicated   Clinical Presentation Rationale Clinician judgment.   Clinical Decision Making (Complexity) low complexity   Planned Therapy Interventions (PT) bed mobility training;gait training;neuromuscular re-education;patient/family education;strengthening;transfer training   Risk & Benefits of therapy have been explained patient   PT Discharge Planning   PT Discharge Recommendation (DC Rec) home with home care physical therapy;home   PT Rationale for DC Rec Anticipate pt will be mobilizing well enough to return home once medically cleared. Would benefit from HHPT to progress strength and endurance.   PT Brief overview of current status PT eval completed. Pt requires Wilson for bed mobility, CGA-modA for sit-stand with walker, depending on surface height, can ambulate short distances in room with walker, CGA.   Total Evaluation Time   Total Evaluation Time (Minutes) 8   Physical Therapy Goals   PT Frequency Daily   PT Predicted Duration/Target Date for Goal Attainment 09/21/22   PT Goals Bed Mobility;Transfers;Gait   PT: Bed Mobility Independent;Supine to/from sit   PT: Transfers Modified independent;Sit to/from stand;Assistive device   PT: Gait Modified  independent;Assistive device;Greater than 200 feet

## 2022-09-14 NOTE — H&P
[facility]    History and Physical  Hospitalist       Date of Admission:  9/13/2022    Assessment & Plan   82 year old male with past medical hx of HTN, who presents with weakness and diarrhea with decreased appetite.      Summary:    Principal Problem:    Covid 19 Infection, Positive 9/13/22 -- suspect cause of decreased appetite and generalized weakness  Active Problems:    Benign Essential Hypertension    CRF (chronic renal failure), stage 3    Hypomagnesemia    Anemia of chronic disease    Atrial fibrillation (H)    Diarrhea    Anorexia    Dehydration       Plan: Will treat with IV fluids, nutritional support, will get PT and OT eval and may need TCU, or return to home once stronger.  Replace Mag and check in AM.     DVT Prophylaxis: Pneumatic Compression Devices  Code Status: DNR / DNI per patient today    Disposition: Expected discharge in 3-5 days (to TCU once covid recovered, vs back home once stronger)    Baltazar Chan MD  Pager: [phone]  Cell Phone:  [phone]     Primary Care Physician   Go Ramírez MD    Chief Complaint   Weakness and decreased appetite    History is obtained from Patient    History of Present Illness   82 year old with ischemic cardiomyopathy, permanent atrial fibrillation, chronic kidney disease, anemia, diarrhea with history of colitis, significant low back pain resulting in disability, resting tremor and increasing difficulty with ambulation and has been evaluated by neurology for possible parkinsonism.     Currently resides at home in an apartment.  He does not have any additional services provided in his living situation.  His niece Mary Kay and her siblings are his primary caregivers.  He does require more assistance with activities of daily living.  He is dependent on others for transportation at this point in time.  Transportation to and from appointments is difficult even with assistance.  He has had a likely some cognitive decline  with time although this is not fully characterized.  He also has complex medical problems which she needs help in managing.  We discussed arranging for home care.  He was to start physical therapy but we will try to arrange for home care physical therapy for leg weakness. He thinks he has lost 10 lbs in the last 1-2 weeks.     In ER, AVSS, Creat 1.58 (stable), Mag 1.1, WBC 13.1, Hgb 10.3 (stable), Covid 19 PCR positive, abd CT unremarkable.     PAST MEDICAL HISTORY    Past Medical History:   Diagnosis Date     Acute cholecystitis 2/28/2019     Acute post-operative pain      Anemia      Arthritis      Atrial fibrillation (H)      C. difficile diarrhea 4/21/2019     Calculus of ureter      Callus      Cerebral aneurysm      Cervical myelopathy (H) 2/22/2019     Cervical spinal stenosis      Chronic kidney disease     stage 3     Chronic systolic congestive heart failure (H)      Closed fracture of distal end of left radius, unspecified fracture morphology, initial encounter      Closed fracture of distal end of right radius, unspecified fracture morphology, initial encounter      Closed fracture of first thoracic vertebra, unspecified fracture morphology, initial encounter (H)      Coronary artery disease      Crohn's disease (H)      Disorder of bursae and tendons in shoulder region     Created by Conversion  Replacement Utility updated for latest IMO load     Dysphagia      Fall 10/22/2016     GERD (gastroesophageal reflux disease)      Hyperlipidemia      Hypertension      Hypotension, unspecified hypotension type      ICD (implantable cardioverter-defibrillator) in place     Medtronic     Ischemic cardiomyopathy      Kidney stone      Low back pain      Lumbago     Created by Conversion      Macular degeneration      Myalgia and myositis, unspecified     Created by Conversion      Nonspecific elevation of levels of transaminase or lactic acid dehydrogenase (LDH)     Created by Conversion      Permanent atrial  fibrillation (H)     [id number] risk score = 5 (age1/ CAD/ HTN/ CHF) Chronic warfarin     Personal history of colonic polyps 2/12/2019     Polyp of duodenum 10/17/2016     Pyuria      Right arm weakness 4/2/2019     Schatzki's ring      Sciatica     Created by Conversion      Sleep apnea     no CPAP     Spondylolisthesis of lumbar region 7/5/2016     Weakness 4/20/2019        PAST SURGICAL HISTORY    Past Surgical History:   Procedure Laterality Date     APPENDECTOMY       ARTHROSCOPY SHOULDER ROTATOR CUFF REPAIR Right      ATRIAL FIBRILLATION/FLUTTER       BYPASS GRAFT ARTERY CORONARY  08/01/2004    Coronary Artery Quadruple Venous Bypass Graft     CARDIAC DEFIBRILLATOR PLACEMENT  01/01/2013    ICD Medtronic     CHOLECYSTECTOMY       COLONOSCOPY N/A 02/19/2020    Procedure: COLONOSCOPY;  Surgeon: Houston Medina MD;  Location: [facility];  Service: Gastroenterology     EP ICD UPGRADE TO BIVENT Left 01/03/2022    Procedure: EP Implantable Cardio-Defibrillator Upgrade to Bivent;  Surgeon: Houston Rolon MD;  Location: [facility] CV     ESOPHAGOSCOPY, GASTROSCOPY, DUODENOSCOPY (EGD), COMBINED       IR LUMBAR EPIDURAL STEROID INJECTION  06/29/2005     IR LUMBAR EPIDURAL STEROID INJECTION  07/15/2005     IR LUMBAR EPIDURAL STEROID INJECTION  09/28/2005     IR LUMBAR EPIDURAL STEROID INJECTION  12/28/2005     IR LUMBAR EPIDURAL STEROID INJECTION  03/29/2006     IR LUMBAR EPIDURAL STEROID INJECTION  06/08/2006     PHACOEMULSIFICATION CLEAR CORNEA WITH STANDARD INTRAOCULAR LENS IMPLANT Bilateral      MN C- LAMINOPLASTY, 2 OR MORE Left 02/22/2019    Procedure: LEFT CERVICAL 4, 5, 6 LAMINOPLASTY;  Surgeon: Liza Cesar MD;  Location: [facility] OR;  Service: Spine     MN ESOPHAGOGASTRODUODENOSCOPY TRANSORAL DIAGNOSTIC N/A 05/10/2021    Procedure: ESOPHAGOGASTRODUODENOSCOPY (EGD);  Surgeon: Franklin Mendoza MD;  Location: [facility] GI;  Service: Gastroenterology     MN  ESOPHAGOGASTRODUODENOSCOPY TRANSORAL DIAGNOSTIC N/A 2021    Procedure: ESOPHAGOGASTRODUODENOSCOPY (EGD), WITH ESOPHAGEAL DILATION AND BIOPSY;  Surgeon: Paramjit Brown MD;  Location: [facility];  Service: Gastroenterology     REMOVE HARDWARE STERNUM N/A 2022    Procedure: STERNAL WIRE REMOVAL;  Surgeon: Raquel De Leon MD;  Location: [facility]     TONSILLECTOMY       XR MYELOGRAM CERVICAL THORACIC LUMBAR  2019     ZZC LAP,CHOLECYSTECTOMY/EXPLORE N/A 2019    CHOLECYSTECTOMY, LAPAROSCOPIC;  Surgeon: Mana Roman MD;  Location: [facility];  Service: General        Prior to Admission Medications   Prior to Admission Medications   Prescriptions Last Dose Informant Patient Reported? Taking?   Lidocaine (LIDOCARE) 4 % Patch   No No   Sig: Place 1 patch onto the skin daily as needed for moderate pain To prevent lidocaine toxicity, patient should be patch free for 12 hrs daily.   acetaminophen (TYLENOL) 500 MG tablet   Yes No   Sig: Take 1,000 mg by mouth every 8 hours as needed    albuterol (PROAIR HFA/PROVENTIL HFA/VENTOLIN HFA) 108 (90 Base) MCG/ACT inhaler   Yes No   Sig: Inhale 1-2 puffs into the lungs every 4 hours as needed    benztropine (COGENTIN) 1 MG tablet   No No   Sig: Take 1 tablet (1 mg) by mouth 2 times daily   bumetanide (BUMEX) 1 MG tablet   No No   Sig: TAKE ONE TABLET (1MG) BY MOUTH ONCE DAILY   calcium carbonate-vitamin D 600-200 MG-UNIT TABS   Yes No   Sig: Take 2 tablets by mouth daily    carbidopa-levodopa (SINEMET)  MG tablet   No No   Sig: Take 1 tablet by mouth 3 times daily Take at least 30 min before meals or 2 hours after meals. Do not mix with food.   carvedilol (COREG) 25 MG tablet   No No   Sig: Take 1.5 tablets (37.25 mg) by mouth 2 times daily (with meals)   cycloSPORINE (RESTASIS) 0.05 % ophthalmic emulsion   Yes No   Si drop 2 times daily as needed for dry eyes   cyclobenzaprine (FLEXERIL) 5 MG tablet   No No   Sig:  Take 1 tablet (5 mg) by mouth 2 times daily as needed for muscle spasms   diclofenac (VOLTAREN) 1 % topical gel   Yes No   Sig: Apply 2 g topically 2 times daily    erythromycin (ROMYCIN) 5 MG/GM ophthalmic ointment   Yes No   Sig: Place 0.5 inches into both eyes daily    ferrous sulfate (FEROSUL) 325 (65 Fe) MG tablet   Yes No   Sig: Take 1 tablet by mouth daily   gabapentin (NEURONTIN) 100 MG capsule   No No   Sig: Take 1 capsule (100 mg) by mouth 3 times daily   losartan (COZAAR) 100 MG tablet   No No   Sig: Take 1 tablet (100 mg) by mouth daily   multivitamin w/minerals (THERA-VIT-M) tablet   Yes No   Sig: Take 1 tablet by mouth daily   omeprazole (PRILOSEC) 20 MG DR capsule   No No   Sig: TAKE ONE CAPSULE BY MOUTH ONCE DAILY BEFORE BREAKFAST   potassium chloride ER (KLOR-CON M) 20 MEQ CR tablet   No No   Sig: TAKE ONE TABLET BY MOUTH ONCE DAILY   rosuvastatin (CRESTOR) 20 MG tablet   No No   Sig: TAKE 1 TABLET BY MOUTH DAILY AT BEDTIME   Patient taking differently: Take 20 mg by mouth daily   traMADol (ULTRAM) 50 MG tablet   Yes No   Sig: Take 50 mg by mouth every 6 hours as needed    vitamin B-12 (CYANOCOBALAMIN) 2000 MCG tablet   Yes No   Sig: Take 2,000 mcg by mouth daily   warfarin ANTICOAGULANT (COUMADIN) 2.5 MG tablet   No No   Sig: TAKE 1 TO 1.5 TABLETS (2.5-3.75MG) BY MOUTH DAILY. ADJUST DOSE PER INR AS DIRECTED.   Patient taking differently: Take 1.25-2.5 mg by mouth See Admin Instructions 1.25 mg every Mon, Wed, Fri; 2.5 mg all other days      Facility-Administered Medications Last Administration Doses Remaining   botulinum toxin type A (BOTOX) 100 units injection 100 Units None recorded 4        Allergies   No Known Allergies    SOCIAL HISTORY    Social History     Social History Narrative     Not on file      Social History     Tobacco Use     Smoking status: Former Smoker     Smokeless tobacco: Never Used   Substance Use Topics     Alcohol use: Not Currently     Drug use: Never        FAMILY  HISTORY    Family History   Problem Relation Age of Onset     Heart Disease Mother      Cerebrovascular Disease Mother      Crohn's Disease Father      Alcoholism Brother      No Known Problems Daughter      No Known Problems Son      No Known Problems Maternal Aunt      No Known Problems Maternal Uncle      No Known Problems Paternal Aunt      No Known Problems Paternal Uncle      No Known Problems Maternal Grandmother      No Known Problems Maternal Grandfather      No Known Problems Paternal Grandmother      No Known Problems Paternal Grandfather      Cancer Brother      Urolithiasis No family hx of      Gout No family hx of         Review of Systems   The 10 point Review of Systems is negative other than noted in the HPI or here.       PHYSICAL EXAM     Temp: (!) 96.4  F (35.8  C)   BP: 111/77 Pulse: 103   Resp: 18 SpO2: 96 %      Vital Signs with Ranges  Temp:  [96.4  F (35.8  C)] 96.4  F (35.8  C)  Pulse:  [103] 103  Resp:  [18] 18  BP: (111)/(77) 111/77  SpO2:  [96 %] 96 %  150 lbs 0 oz    Constitutional: Awake, alert, cooperative, no apparent distress.  Eyes: Conjunctiva and pupils examined and normal.  HEENT: Moist mucous membranes, normal dentition.  Respiratory: Clear to auscultation bilaterally, no crackles or wheezing.  Cardiovascular: Regular rate and rhythm, normal S1 and S2, and no murmur noted, no carotid bruits.  No ankle edema.   GI: Soft, non-distended, non-tender, normal bowel sounds.  Lymph/Hematologic: No anterior cervical, supraclavicular or axillary adenopathy.  Skin: No rashes, no cyanosis.   Musculoskeletal: No joint swelling, erythema or tenderness.  Neurologic: Alert, Ox3, Cranial nerves 2-12 intact, no focal weakness or numbness  Psychiatric:  No obvious anxiety or depression.    Data   Data reviewed today:  I personally reviewed no images or EKG's today.  Recent Labs   Lab 09/13/22  1950 09/13/22  1831 09/13/22  1026   WBC  --  13.1*  --    HGB  --  10.3*  --    MCV  --  98  --    PLT   --  221  --    INR  --  1.27* 1.1     --   --    POTASSIUM 3.9  --   --    CHLORIDE 101  --   --    CO2 24  --   --    BUN 27  --   --    CR 1.58*  --   --    ANIONGAP 11  --   --    RUBÉN 9.4  --   --    GLC 88  --   --    ALBUMIN 2.8*  --   --    PROTTOTAL 6.1  --   --    BILITOTAL 0.5  --   --    ALKPHOS 166*  --   --    ALT 9  --   --    AST 14  --   --    LIPASE  --  17  --        Imaging:  Recent Results (from the past 24 hour(s))   CT Abdomen Pelvis w/o Contrast    Narrative    EXAM: CT ABDOMEN PELVIS W/O CONTRAST  LOCATION: [facility]  DATE/TIME: 9/13/2022 11:39 PM    INDICATION: Anorexia and diarrhea.  COMPARISON: CTA chest 06/11/2021  TECHNIQUE: CT scan of the abdomen and pelvis was performed without IV contrast. Multiplanar reformats were obtained. Dose reduction techniques were used.  CONTRAST: None.    FINDINGS:   LOWER CHEST: Mild dependent atelectasis. No focal airspace disease.    HEPATOBILIARY: Liver appears normal. Status post cholecystectomy.    PANCREAS: Normal.    SPLEEN: Normal.    ADRENAL GLANDS: Normal.    KIDNEYS/BLADDER: 1.2 cm cyst in the right renal lower pole; no follow-up indicated. Left kidney appears normal. No hydronephrosis. 0.5 cm nonobstructing calculus in the urinary bladder. Urinary bladder otherwise appears normal.    BOWEL: Mild colonic diverticulosis. No obstruction or inflammatory change. Appendix not visualized, although no secondary signs of acute appendicitis.    LYMPH NODES: No lymphadenopathy.    VASCULATURE: Extensive atherosclerotic calcifications.    PELVIC ORGANS: Enlarged prostate.    MUSCULOSKELETAL: Bilateral fat-containing inguinal hernias. Multilevel degenerative changes of the spine. Anterolisthesis of L4 on L5.      Impression    IMPRESSION:   1.  No acute findings or inflammatory changes in the abdomen or pelvis.

## 2022-09-14 NOTE — PROGRESS NOTES
09/14/22 1301   Quick Adds   Type of Visit Initial Occupational Therapy Evaluation   Living Environment   People in Home alone   Current Living Arrangements independent living facility  (sr apt)   Home Accessibility no concerns   Self-Care   Current Activity Tolerance fair  (room air, o2 sats <90%)   Equipment Currently Used at Home cane, straight;walker, rolling;shower chair;grab bar, tub/shower, w/c  (4WW, walk in shower,)   Fall history within last six months yes   Number of times patient has fallen within last six months 1   Instrumental Activities of Daily Living (IADL)   IADL Comments reports in dep. w/ ADLs per chart,does no drive   General Information   Onset of Illness/Injury or Date of Surgery 09/13/22,   Dr. Fallon De La Cruz  Presenst with weakness, decreased appetite, diarrhea, COVID +   Patient/Family Therapy Goal Statement (OT) none stated   Existing Precautions/Restrictions fall   Cognitive Status Examination   Orientation Status person;place  (not oriented year, knew month)   Visual Perception   Visual Impairment/Limitations corrective lenses full-time   Range of Motion Comprehensive   General Range of Motion no range of motion deficits identified   Strength Comprehensive (MMT)   General Manual Muscle Testing (MMT) Assessment no strength deficits identified   Coordination   Upper Extremity Coordination No deficits were identified   Bed Mobility   Bed Mobility supine-sit;sit-supine   Supine-Sit [place] (Bed Mobility) moderate assist (50% patient effort)   Sit-Supine [place] (Bed Mobility) minimum assist (75% patient effort)   Transfers   Transfers bed-chair transfer   Transfer Skill: Bed to Chair/Chair to Bed   Bed-Chair [place] (Transfers) minimum assist (75% patient effort)   Assistive Device (Bed-Chair Transfers) rolling walker   Transfer Comments trsf bed>chair   Balance   Balance Assessment standing balance: dynamic   Balance Comments WFL   Grooming Assessment/Training    [place] Level (Grooming) moderate assist (50% patient effort)   Position (Grooming) supported sitting   Comment, (Grooming) washed hands   Toileting   [place] Level (Toileting) minimum assist (75% patient effort)   Position (Toileting) supported sitting   Comment, (Toileting) used urinal while in chair,   Clinical Impression   Criteria for Skilled Therapeutic Interventions Met (OT) Yes, treatment indicated   OT Diagnosis decreased ADLs   OT Problem List-Impairments impacting ADL cognition;activity tolerance impaired;mobility   Assessment of Occupational Performance 3-5 Performance Deficits   Identified Performance Deficits trsfs, bed mob., toileting, g/h,   Planned Therapy Interventions (OT) ADL retraining;cognition;progressive activity/exercise;transfer training   Clinical Decision Making Complexity (OT) low complexity   Anticipated Equipment Needs Upon Discharge (OT)   (cont.to assess)   Risk & Benefits of therapy have been explained evaluation/treatment results reviewed;care plan/treatment goals reviewed;risks/benefits reviewed;current/potential barriers reviewed;patient   OT Discharge Planning   OT Discharge Recommendation (DC Rec) Transitional Care Facility;home with home care occupational therapy   OT Rationale for DC Rec currently Ax1 for ADLs and mobility, lives alone, low activity tolerance and o2 sats <90 %,  if home rec. 24 hr supervision/assist w/ ADLs and mobility, may benefit from TCU to increase endurance and activity tolerance for ADLs   Total Evaluation Time (Minutes)   Total Evaluation Time (Minutes) 8   OT Goals   Therapy Frequency (OT) Daily   OT Predicted Duration/Target Date for Goal Attainment 09/19/22   OT Goals Cognition;Toilet Transfer/Toileting;Transfers   OT: Transfer Supervision/stand-by assist;with assistive device   OT: Toilet Transfer/Toileting cleaning and garment management   OT: Cognitive Patient/caregiver will verbalize understanding of cognitive assessment  results/recommendations as needed for safe discharge planning

## 2022-09-14 NOTE — UTILIZATION REVIEW
"Admission Status; Secondary Review Determination     Under the authority of the Utilization Management Committee, the utilization review process indicated a secondary review on the above patient.  The review outcome is based on review of the medical records, discussions with staff, and applying clinical experience noted on the date of the review.        (x) Observation Status Appropriate - This patient does not meet hospital inpatient criteria and is placed in observation status. If this patient's primary payer is Medicare and was admitted as an inpatient, Condition Code 44 should be used and patient status changed to \"observation\".       RATIONALE FOR DETERMINATION     Mr. Israel is a 81 yo male pt vaccinated for COVID x 4 , as well as, PMH significant for c diff, who presents to the ED with diarrhea, vomiting and weakness.  Noted to have acute renal failure and hypomagnesmia; both improved with supplementation and IVF.    Oxygen saturations in chart noted earlier today to be in the 70's on RA. I spoke with Dr. Milan, however, and he feels that this was a documentation error as pt is comfortable and in no acute respiratory distress.     If he does develop acute hypoxic respiratory failure requiring additional chest imaging and/or medications for COVID infection, then would consider changing to INPATIENT status.     The severity of illness, intensity of service provided, expected LOS and risk for adverse outcome make the care appropriate for further observation; however, doesn't meet criteria for hospital inpatient admission.        The information on this document is developed by the utilization review team in order for the business office to ensure compliance.  This only denotes the appropriateness of proper admission status and does not reflect the quality of care rendered.         The definitions of Inpatient Status and Observation Status used in making the determination above are those provided in the CMS " Coverage Manual, Chapter 1 and Chapter 6, section 70.4.        Sincerely,    Jossie Padilla, DO  Utilization Review  Physician Advisor  [facility]

## 2022-09-14 NOTE — PHARMACY-ANTICOAGULATION SERVICE
Clinical Pharmacy - Warfarin Dosing Consult     Pharmacy has been consulted to manage this patient s warfarin therapy.  Indication: Atrial Fibrillation  Therapy Goal: INR 2-3  Provider/Team: Bjorn SPARKS  Warfarin Prior to Admission: Yes  Warfarin PTA Regimen: 1.25 mg every Mon, Wed, Fri; 2.5 mg all other days but INR has been low so was suggested to take 5 mg yesterday and 2.5 mg for next 2 days with INR check on Friday.  Significant drug interactions: Tramadol  Recent documented change in oral intake/nutrition: Unknown  Dose Comments: 2.5 mg today ordered by MD. non-compliance concern at home so PTA doses may not be accurate    INR   Date Value Ref Range Status   09/13/2022 1.27 (H) 0.85 - 1.15 Final   09/13/2022 1.1 0.9 - 1.1 Final       Recommend warfarin 2.5 mg today.  Pharmacy will monitor Tanmay Israel daily and order warfarin doses to achieve specified goal.      Please contact pharmacy as soon as possible if the warfarin needs to be held for a procedure or if the warfarin goals change.

## 2022-09-15 PROBLEM — Z78.9 ADVISED ABOUT MANAGEMENT OF WEIGHT: Status: RESOLVED | Noted: 2018-05-21 | Resolved: 2022-01-01

## 2022-09-15 PROBLEM — I50.21 ACUTE SYSTOLIC (CONGESTIVE) HEART FAILURE (H): Status: RESOLVED | Noted: 2021-06-12 | Resolved: 2022-01-01

## 2022-09-15 PROBLEM — I50.22 CHRONIC SYSTOLIC CHF (CONGESTIVE HEART FAILURE) (H): Status: ACTIVE | Noted: 2021-06-12

## 2022-09-15 PROBLEM — R06.02 SOB (SHORTNESS OF BREATH): Status: RESOLVED | Noted: 2021-06-11 | Resolved: 2022-01-01

## 2022-09-15 PROBLEM — Z90.49 S/P LAPAROSCOPIC CHOLECYSTECTOMY: Status: RESOLVED | Noted: 2019-03-11 | Resolved: 2022-01-01

## 2022-09-15 PROBLEM — Z86.0100 PERSONAL HISTORY OF COLONIC POLYPS: Status: RESOLVED | Noted: 2021-01-01 | Resolved: 2022-01-01

## 2022-09-15 PROBLEM — I48.91 ATRIAL FIBRILLATION, UNSPECIFIED TYPE (H): Status: RESOLVED | Noted: 2021-01-01 | Resolved: 2022-01-01

## 2022-09-15 NOTE — PROGRESS NOTES
[facility]    Hospitalist Progress Note    Assessment & Plan   82 year old male who was admitted on 9/13/2022 with weakness and found to be Covid 19 positive:    Impression:   Principal Problem:    Covid 19 Infection, Positive 9/13/22   -- getting PT and OT   -- no hypoxia or SOB, not on antiviral therapy      Hypomagnesemia -- replaced      Chronic systolic CHF -- Echo 6/11/21 LVEF 24%. S/P BiV Pacer      Anemia of chronic disease      Chronic Atrial Fib -- on Warfarin   -- continue Warfarin, subtherapeutic on admit, cont 2.5 mg today      Weakness -- multifactorial (Covid, dehydration, diarrhea)   -- seems improved today per PT      Active Problems:    Benign Essential Hypertension      CRF (chronic renal failure), stage 3      Diarrhea -- appears chronic, or possibly related to covid    -- will try Lomotil bid prn       Anorexia    Dehydration   -- appears improved since yesterday, ?related to Covid      Plan:  As above, discussed with niece, mentioned he might be strong enough to return home with home care in next 1-2 days.     DVT Prophylaxis: Pneumatic Compression Devices  Code Status: Full Code    Disposition: Expected discharge Home with PT and home care in 1-2 days if strength continues to improve.      Baltazar Chan MD  Pager [phone]  Cell Phone [phone]  Text Page (7am to 6pm)    Interval History   Feels better today, appetite improved, still having loose stools but less so (one loose stool this AM so far).  No cough or SOB.      Physical Exam   Temp: (P) 97.7  F (36.5  C) Temp src: (P) Oral BP: (P) 110/69 Pulse: (P) 75   Resp: (P) 18 SpO2: (P) 100 % O2 Device: (P) None (Room air)    Vitals:    09/13/22 1726 09/15/22 1140   Weight: 68 kg (150 lb) (P) 63.6 kg (140 lb 3.2 oz)     Vital Signs with Ranges  Temp:  [97.7  F (36.5  C)-98.4  F (36.9  C)] (P) 97.7  F (36.5  C)  Pulse:  [69-88] (P) 75  Resp:  [18-21] (P) 18  BP: ()/(53-96) (P) 110/69  SpO2:  [99  %-100 %] (P) 100 %  I/O last 3 completed shifts:  In: 246 [P.O.:240; I.V.:6]  Out: -     # Pain Assessment:  Current Pain Score 9/15/2022   Patient currently in pain? denies   Pain score (0-10) -   Tanmay bell pain level was assessed and he currently denies pain.        Constitutional: Awake, alert, cooperative, no apparent distress  Respiratory: Slight expiratory wheeze bilaterally.   Cardiovascular: Regular rate and rhythm, normal S1 and S2, and no murmur noted  GI: Normal bowel sounds, soft, non-distended, non-tender  Extrem: No calf tenderness, no ankle edema  Neuro: Ox3, no focal motor or sensory deficits    Medications     - MEDICATION INSTRUCTIONS -         carbidopa-levodopa  1 tablet Oral TID     carvedilol  37.25 mg Oral BID w/meals     enoxaparin ANTICOAGULANT  40 mg Subcutaneous Q24H     gabapentin  100 mg Oral TID     losartan  100 mg Oral Daily     pantoprazole  40 mg Oral QAM AC     rosuvastatin  20 mg Oral QPM     sodium chloride (PF)  3 mL Intracatheter Q8H     warfarin ANTICOAGULANT  2.5 mg Oral ONCE at 18:00     Warfarin Therapy Reminder  1 each Oral See Admin Instructions     Warfarin Therapy Reminder  1 each Oral See Admin Instructions       Data   Recent Labs   Lab 09/15/22  0756 09/14/22  1000 09/13/22  1950 09/13/22  1831 09/13/22  1026   WBC  --  7.9  --  13.1*  --    HGB  --  9.4*  --  10.3*  --    MCV  --  97  --  98  --    PLT  --  170  --  221  --    INR 1.48*  --   --  1.27* 1.1   NA  --  139 136  --   --    POTASSIUM  --  4.1 3.9  --   --    CHLORIDE  --  105 101  --   --    CO2  --  25 24  --   --    BUN  --  21 27  --   --    CR  --  1.36* 1.58*  --   --    ANIONGAP  --  9 11  --   --    RUBÉN  --  9.1 9.4  --   --    GLC  --  83 88  --   --    ALBUMIN  --  2.8* 2.8*  --   --    PROTTOTAL  --  5.8* 6.1  --   --    BILITOTAL  --  0.5 0.5  --   --    ALKPHOS  --  157* 166*  --   --    ALT  --  9 9  --   --    AST  --  13 14  --   --    LIPASE  --   --   --  17  --        Imaging:   No results  found for this or any previous visit (from the past 24 hour(s)).

## 2022-09-15 NOTE — PROGRESS NOTES
Clinic Care Coordination Contact  Ambulatory Care Coordination to Inpatient Care Management   Hand-In Communication    Date:  September 15, 2022  Name: Tanmay Israel is enrolled in Ambulatory Care Coordination program and I am the Lead Care Coordinator.  CC Contact Information: Epic InR[place]2riosket + phone  Payor Source: Payor: BLUE PLUS / Plan: BLUE PLUS ADVANTAGE DUAL [employer] / Product Type: Medicare /   Current services in place:     Please see the CC Snaphot and Care Management Flowsheets for specific  details of this Tanmay Israel care plan.   Additional details/specific concerns r/t this admission:    Home Safety medication compliance    I will follow this admission in Epic. Please feel free to contact me with questions or for further collaboration in discharge planning.

## 2022-09-15 NOTE — PROGRESS NOTES
PRIMARY DIAGNOSIS: ACUTE PAIN  OUTPATIENT/OBSERVATION GOALS TO BE MET BEFORE DISCHARGE:  1. Pain Status: Pain free.    2. Return to near baseline physical activity: No    3. Cleared for discharge by consultants (if involved): No    Discharge Planner Nurse   Safe discharge environment identified: No  Barriers to discharge: Yes       Entered by: Lisa Andrew RN 09/15/2022 4:05 PM     Please review provider order for any additional goals.   Nurse to notify provider when observation goals have been met and patient is ready for discharge.

## 2022-09-15 NOTE — PLAN OF CARE
PRIMARY DIAGNOSIS: ACUTE PAIN  OUTPATIENT/OBSERVATION GOALS TO BE MET BEFORE DISCHARGE:  1. Pain Status:       2. Return to near baseline physical activity: Yes      3. Cleared for discharge by consultants (if involved): No    Discharge Planner Nurse   Safe discharge environment identified: No  Barriers to discharge: Yes       Entered by: Mindy De La Torre RN 09/15/2022 2:23 PM     Please review provider order for any additional goals.   Nurse to notify provider when observation goals have been met and patient is ready for discharge.Goal Outcome Evaluation:      Co pain left shoulder not new pain denies shortness of breathe confused  With situation needs assist of one and walker when up, PT OT  to see  Patients niece called and updated with plan of care.

## 2022-09-15 NOTE — PLAN OF CARE
[facility]      OUTPATIENT OCCUPATIONAL THERAPY  EVALUATION  PLAN OF TREATMENT FOR OUTPATIENT REHABILITATION  (COMPLETE FOR INITIAL CLAIMS ONLY)  Patient's Last Name, First Name, M.I.  YOB: 1940  Tanmay Israel                          Provider's Name  [facility] Medical Record No.  [id number]                               Onset Date:  09/13/22   Start of Care Date:        Type:     ___PT   _X_OT   ___SLP Medical Diagnosis:                           OT Diagnosis:  decreased ADLs   Visits from SOC:  1   _________________________________________________________________________________  Plan of Treatment/Functional Goals    Planned Interventions: ADL retraining, cognition, progressive activity/exercise, transfer training   Goals: See Occupational Therapy Goals on Care Plan in [facility] electronic health record.    Therapy Frequency: Daily  Predicted Duration of Therapy Intervention: 09/19/22  _________________________________________________________________________________    I CERTIFY THE NEED FOR THESE SERVICES FURNISHED UNDER        THIS PLAN OF TREATMENT AND WHILE UNDER MY CARE     (Physician co-signature of this document indicates review and certification of the therapy plan).                 ,      Referring Physician: Dr. Fallon De La Cruz            Initial Assessment        See Occupational Therapy evaluation dated   in Epic electronic health record.              Goal Outcome Evaluation:

## 2022-09-16 NOTE — PROGRESS NOTES
Med obs  PRIMARY DIAGNOSIS: ACUTE PAIN  OUTPATIENT/OBSERVATION GOALS TO BE MET BEFORE DISCHARGE:  1. Pain Status: Pain free.    2. Return to near baseline physical activity: No    3. Cleared for discharge by consultants (if involved): No    Discharge Planner Nurse   Safe discharge environment identified: No  Barriers to discharge: Yes       Entered by: Lisa Andrew RN 09/15/2022 8:33 PM   P is alert with some confusion. Pt denied any pain. Pt did not really eat dinner.  Pt was encouraged to drink or eat and he promised to do that. Call light within reach.  Please review provider order for any additional goals.   Nurse to notify provider when observation goals have been met and patient is ready for discharge.

## 2022-09-16 NOTE — PROGRESS NOTES
PRIMARY DIAGNOSIS: GENERALIZED WEAKNESS     OUTPATIENT/OBSERVATION GOALS TO BE MET BEFORE DISCHARGE  1. Orthostatic performed: N/A     2. Tolerating PO medications: Yes     3. Return to near baseline physical activity: Yes     4. Cleared for discharge by consultants (if involved): Yes        Discharge Planner Nurse      Safe discharge environment identified: Yes  Barriers to discharge: No       Entered by: Zayda Gomez RN 09/16/2022 2:30 AM        Please review provider order for any additional goals.   Nurse to notify provider when observation goals have been met and patient is ready for discharge.     BP soft, patient asymptomatic.

## 2022-09-16 NOTE — PROGRESS NOTES
PRIMARY DIAGNOSIS: GENERALIZED WEAKNESS    OUTPATIENT/OBSERVATION GOALS TO BE MET BEFORE DISCHARGE  1. Orthostatic performed: N/A    2. Tolerating PO medications: Yes    3. Return to near baseline physical activity: Yes    4. Cleared for discharge by consultants (if involved): Yes    Discharge Planner Nurse   Safe discharge environment identified: Yes  Barriers to discharge: No       Entered by: Zayda Gomez RN 09/16/2022 2:30 AM     Please review provider order for any additional goals.   Nurse to notify provider when observation goals have been met and patient is ready for discharge.    Pt disoriented to place and situation per baseline.  Denies pain.  Resting comfortably.

## 2022-09-16 NOTE — PROGRESS NOTES
"Dr. Milan requests home care for PT/RN. CM is trying to find an accepting home care. PT did not recommend TCU. Mary Kay Bernabe per discussion with MD said family and patient's National [employer] aide will not visit or work with patient until he is covid recovered. Mary Kay is primary contact, [phone]. Latasha is [facility] , [phone].    I spoke with  [facility] and they said they will not provide services for homemaking and PCA as long as patient\"tests positive for covid\" ([phone]) They do not offer skilled services.  "

## 2022-09-16 NOTE — PROGRESS NOTES
[facility]    Hospitalist Progress Note    Assessment & Plan   82 year old male who was admitted on 9/13/2022 with weakness and found to be Covid 19 positive:    Impression:   Principal Problem:    Covid 19 Infection, Positive 9/13/22   -- getting PT and OT   -- no hypoxia or SOB, not on antiviral therapy      Hypomagnesemia -- replaced      Chronic systolic CHF -- Echo 6/11/21 LVEF 24%. S/P BiV Pacer      Anemia of chronic disease      Chronic Atrial Fib -- on Warfarin   -- continue Warfarin, subtherapeutic on admit, cont 2.5 mg today      Weakness -- multifactorial (Covid, dehydration, diarrhea)   -- seems improved per PT eval       Active Problems:    Benign Essential Hypertension   -- will decrease Coreg to 25 mg bid       CRF (chronic renal failure), stage 3      Diarrhea     Ulcerative Colitis    -- Lomotil bid prn       Anorexia    Dehydration   -- appears improved since yesterday, ?related to Covid      Plan:  Home with Home care once home care available, discussed with Niece and with .     DVT Prophylaxis: Pneumatic Compression Devices  Code Status: Full Code    Disposition: Expected discharge Home with PT and home care tomorrow if home care available (he had [facility] -- but they will not see him until he is Covid recovered, which will be another week).     Baltazar Chan MD  Pager [phone]  Cell Phone [phone]  Text Page (7am to 6pm)    Interval History   Overall feels better, had 2 looses stools yesterday which is better than on admit.     Physical Exam   Temp: 97.6  F (36.4  C) Temp src: Oral BP: 119/67 Pulse: 71   Resp: 16 SpO2: 98 % O2 Device: None (Room air)    Vitals:    09/13/22 1726 09/15/22 1140   Weight: 68 kg (150 lb) 63.6 kg (140 lb 3.2 oz)     Vital Signs with Ranges  Temp:  [96.3  F (35.7  C)-98.1  F (36.7  C)] 97.6  F (36.4  C)  Pulse:  [69-74] 71  Resp:  [15-24] 16  BP: ()/(48-67) 119/67  SpO2:  [94 %-100 %] 98  %  I/O last 3 completed shifts:  In: 246 [P.O.:240; I.V.:6]  Out: -     # Pain Assessment:  Current Pain Score 9/16/2022   Patient currently in pain? no   Pain score (0-10) -   Tanmay bell pain level was assessed and he currently denies pain.        Constitutional: Awake, alert, cooperative, no apparent distress  Respiratory: Slight expiratory wheeze bilaterally.   Cardiovascular: Regular rate and rhythm, normal S1 and S2, and no murmur noted  GI: Normal bowel sounds, soft, non-distended, non-tender  Extrem: No calf tenderness, no ankle edema  Neuro: Ox3, no focal motor or sensory deficits    Medications     - MEDICATION INSTRUCTIONS -         carbidopa-levodopa  1 tablet Oral TID     carvedilol  25 mg Oral BID w/meals     gabapentin  100 mg Oral TID     losartan  100 mg Oral Daily     pantoprazole  40 mg Oral QAM AC     rosuvastatin  20 mg Oral QPM     sodium chloride (PF)  3 mL Intracatheter Q8H     warfarin ANTICOAGULANT  2.5 mg Oral ONCE at 18:00     Warfarin Therapy Reminder  1 each Oral See Admin Instructions     Warfarin Therapy Reminder  1 each Oral See Admin Instructions       Data   Recent Labs   Lab 09/16/22  0638 09/15/22  0756 09/14/22  1000 09/13/22  1950 09/13/22  1831   WBC  --   --  7.9  --  13.1*   HGB  --   --  9.4*  --  10.3*   MCV  --   --  97  --  98   PLT  --   --  170  --  221   INR 1.64* 1.48*  --   --  1.27*   NA  --   --  139 136  --    POTASSIUM  --   --  4.1 3.9  --    CHLORIDE  --   --  105 101  --    CO2  --   --  25 24  --    BUN  --   --  21 27  --    CR  --   --  1.36* 1.58*  --    ANIONGAP  --   --  9 11  --    RUBÉN  --   --  9.1 9.4  --    GLC  --   --  83 88  --    ALBUMIN  --   --  2.8* 2.8*  --    PROTTOTAL  --   --  5.8* 6.1  --    BILITOTAL  --   --  0.5 0.5  --    ALKPHOS  --   --  157* 166*  --    ALT  --   --  9 9  --    AST  --   --  13 14  --    LIPASE  --   --   --   --  17       Imaging:   No results found for this or any previous visit (from the past 24 hour(s)).

## 2022-09-17 NOTE — PROGRESS NOTES
Care Management Follow Up    Length of Stay (days): 1    Expected Discharge Date: 09/19/2022     Concerns to be Addressed: discharge planning     Patient plan of care discussed at interdisciplinary rounds: Yes    Anticipated Discharge Disposition: Home, Home Care     Anticipated Discharge Services: County Worker  Anticipated Discharge DME:      Patient/family educated on Medicare website which has current facility and service quality ratings:    Education Provided on the Discharge Plan:    Patient/Family in Agreement with the Plan: yes    Referrals Placed by CM/SW: Homecare  Private pay costs discussed: Not applicable    Additional Information:  CALDERON discussed with MD, anticipate pt will discharge to home when home care services are in place. CALDERON reviewed and noted that multiple home care referrals have been sent on the pt. SW placed call to pending referrals:  - [facility]- no nursing availability at this time  [facility] not in network with pts insurance  - [facility]- call did not go through at this time, SW will follow and attempt again as able.    Four previous home care referrals declined likely due to either staffing or pts [facility] insurance. Ability to speak with home care agencies is limited on the weekend.     CALDERON saw note from evening nurse who spoke with pts [facility] Care Coordinator, Latasha ([phone]), who expressed concerns about pt returning home. SW placed call to Latasha as requested and was able to speak with her. She reports to CALDERON that she is very concerned about a possible discharge to home. She reports that pt is not safe to be alone at home and reports that this is fairly new. She reports that he is currently only getting homemaking services (which per review pt would not be able to get until he is off COVID precautions) twice a week and that those services started about a month ago. She reports he had a recent visit with his PCP and was noted to have lost a  significant amount of weight (from 130# down to 115 per Latasha). She reports that prior to admission pt was not able to stand up out of a chair, had not been taking meds, and she notes having concerns about his cognition as well. She reports that she wants to be sure the team is aware of these concerns and that pt is alone most of the time at home. She reports that pt needs [facility] placement.     CALDERON asked Latasha about the ability to add additional services, such as PCA at home, but Latasha notes that pt would still be alone and need to get to the bathroom on his own. CALDERON asked if Latasha has been working with family on finding halfway for the pt and Latasha reports that the family will have to do this but she has encouraged them to begin looking.     CALDERON informed Latasha that SW would update MD of these concerns as she has requested and would provide PT with additional information about home for them to consider when making recommendations.  Latasha reports understanding.    CALDERON updated PT via [employer] of pts home situation, including that he is home alone most of the time- particularly while still COVID positive. CALDERON updated MD via [employer] messaging of concerns from Latasha.      Chante Robbins, [facility]    Addendum: CALDERON updated by PT that recommendation changed to TCU as pt requires assistance with all mobility and pt unable to get that at home. CALDERON updated MD. CALDERON attempted to call pt to discuss due to COVID precautions with no answer, per nursing notes pt has been disoriented. CALDERON spoke with pts krystin Mary Kay, over the phone. Provided update and discussed that the recommendation at this time is TCU but pending pt progression that could change. She reports understanding. She reports that at baseline pt has had some incidents of confusion in the last few weeks but he is much more acutely confused now at the hospital. Mary Kay agreeable to referrals to facilities that have taken COVID+ pts including hospitals swing beds. She is also agreeable to  referrals to [facility]. She reports understanding of the limitation of beds for COVID+ pts and plan for SW to follow for pt progression during this hospitalization. She notes many of the same concerns as pts care coordinator above regarding potential discharge to home.    CALDERON sent TCU referrals.  3:45 PM

## 2022-09-17 NOTE — PLAN OF CARE
"  Problem: Risk for Delirium  Goal: Improved Attention and Thought Clarity  9/17/2022 1435 by Liza Block, RN  Outcome: Ongoing, Not Progressing  9/17/2022 1432 by Liza Block, RN  Outcome: Ongoing, Progressing   Goal Outcome Evaluation:    Disoriented to place, [place]  Reorient to surroundings.  Is able to say it's September of 22.  Knows he's 82 and full name.  When asked if he knows how to use call button for help he said yes and pointed to the pop can \"Is this it?\".      Up with assist of one and walker with gait belt.   Bed alarm and chair alarm on.  Hasn't tried to get up unassisted.                          "

## 2022-09-17 NOTE — PLAN OF CARE
"PRIMARY DIAGNOSIS: \"GENERIC\" NURSING  OUTPATIENT/OBSERVATION GOALS TO BE MET BEFORE DISCHARGE:  1. ADLs back to baseline: No    2. Activity and level of assistance: Up with maximum assistance. Consider SW and/or PT evaluation.     3. Pain status: Pain free.    4. Return to near baseline physical activity: Yes     Discharge Planner Nurse   Safe discharge environment identified: No  Barriers to discharge: Yes       Entered by: Selwyn Beth RN 09/17/2022 4:14 AM   alert and oriented to self, on RA. Denies pain, call light within reach.  Please review provider order for any additional goals.   Nurse to notify provider when observation goals have been met and patient is ready for discharge.Goal Outcome Evaluation:                      "

## 2022-09-17 NOTE — PLAN OF CARE
"PRIMARY DIAGNOSIS: \"GENERIC\" NURSING  OUTPATIENT/OBSERVATION GOALS TO BE MET BEFORE DISCHARGE:  1. ADLs back to baseline: No    2. Activity and level of assistance: Up with maximum assistance. Consider SW and/or PT evaluation.     3. Pain status: Pain free.    4. Return to near baseline physical activity: No     Discharge Planner Nurse   Safe discharge environment identified: No  Barriers to discharge: Yes       Entered by: Christa Gomez RN 09/16/2022 10:17 PM     Soft BP, 97/52.  Held evening Coreg, Hospitalist notified, added parameters to BP meds. BP at HS 83/49.  Resident notified. Gave 500 ml bolus of NS. BP after bolus 91/44.    No urine charted since 9/15/2022 0600.  Cross cover hospitalist notified. PVR of 209ml.  "

## 2022-09-17 NOTE — PROGRESS NOTES
[facility]    Hospitalist Progress Note    Assessment & Plan   82 year old male who was admitted on 9/13/2022 with weakness and found to be Covid 19 positive:    Impression:   Principal Problem:    Covid 19 Infection, Positive 9/13/22   -- getting PT and OT   -- no hypoxia or SOB, not on antiviral therapy      Hypomagnesemia -- replaced      Chronic systolic CHF -- Echo 6/11/21 LVEF 24%. S/P BiV Pacer      Anemia of chronic disease      Chronic Atrial Fib -- on Warfarin   -- continue Warfarin, subtherapeutic on admit, cont warfarin      Weakness -- multifactorial (Covid, dehydration, diarrhea)   -- seems improved per PT eval       Active Problems:    Benign Essential Hypertension   -- will decrease Coreg to 25 mg bid       CRF (chronic renal failure), stage 3      Diarrhea     Ulcerative Colitis    -- Lomotil bid prn       Anorexia    Dehydration   -- appears improved since yesterday, ?related to Covid      Plan:  Home with Home care once home care available, or may need TCU if no homecare available.  Updated niece -- patient continues to remain marginal for discharge to home..     DVT Prophylaxis: Pneumatic Compression Devices  Code Status: Full Code    Disposition: Expected discharge Home with PT or TCU.     Baltazar Chan MD  Pager [phone]  Cell Phone [phone]  Text Page (7am to 6pm)    Interval History   Overall feels better, had 2 looses stools yesterday which is better than on admit.     Physical Exam   Temp: 98.1  F (36.7  C) Temp src: Oral BP: 97/51 Pulse: 70   Resp: 16 SpO2: 97 % O2 Device: None (Room air)    Vitals:    09/13/22 1726 09/15/22 1140 09/16/22 1714   Weight: 68 kg (150 lb) 63.6 kg (140 lb 3.2 oz) 61.8 kg (136 lb 3.2 oz)     Vital Signs with Ranges  Temp:  [97.7  F (36.5  C)-98.2  F (36.8  C)] 98.1  F (36.7  C)  Pulse:  [50-86] 70  Resp:  [16] 16  BP: ()/(42-65) 97/51  SpO2:  [97 %-100 %] 97 %  I/O last 3 completed shifts:  In: 120  [P.O.:120]  Out: -     # Pain Assessment:  Current Pain Score 9/17/2022   Patient currently in pain? no   Pain score (0-10) -   Tanmay bell pain level was assessed and he currently denies pain.        Constitutional: Awake, alert, cooperative, no apparent distress  Respiratory: Slight expiratory wheeze bilaterally.   Cardiovascular: Regular rate and rhythm, normal S1 and S2, and no murmur noted  GI: Normal bowel sounds, soft, non-distended, non-tender  Extrem: No calf tenderness, no ankle edema  Neuro: Ox3, no focal motor or sensory deficits    Medications     - MEDICATION INSTRUCTIONS -         carbidopa-levodopa  1 tablet Oral TID     carvedilol  12.5 mg Oral BID w/meals     gabapentin  100 mg Oral BID     losartan  50 mg Oral Daily     pantoprazole  40 mg Oral QAM AC     rosuvastatin  20 mg Oral QPM     sodium chloride (PF)  3 mL Intracatheter Q8H     warfarin ANTICOAGULANT  1.25 mg Oral ONCE at 18:00     Warfarin Therapy Reminder  1 each Oral See Admin Instructions     Warfarin Therapy Reminder  1 each Oral See Admin Instructions       Data   Recent Labs   Lab 09/17/22  0645 09/16/22  0638 09/15/22  0756 09/14/22  1000 09/13/22  1950 09/13/22  1831   WBC 5.8  --   --  7.9  --  13.1*   HGB 8.1*  --   --  9.4*  --  10.3*   MCV 97  --   --  97  --  98   *  --   --  170  --  221   INR 1.95* 1.64* 1.48*  --   --  1.27*   *  --   --  139 136  --    POTASSIUM 3.9  --   --  4.1 3.9  --    CHLORIDE 104  --   --  105 101  --    CO2 24  --   --  25 24  --    BUN 18  --   --  21 27  --    CR 1.18  --   --  1.36* 1.58*  --    ANIONGAP 6  --   --  9 11  --    RUBÉN 8.4*  --   --  9.1 9.4  --    GLC 77  --   --  83 88  --    ALBUMIN  --   --   --  2.8* 2.8*  --    PROTTOTAL  --   --   --  5.8* 6.1  --    BILITOTAL  --   --   --  0.5 0.5  --    ALKPHOS  --   --   --  157* 166*  --    ALT  --   --   --  9 9  --    AST  --   --   --  13 14  --    LIPASE  --   --   --   --   --  17       Imaging:   No results found for this  or any previous visit (from the past 24 hour(s)).

## 2022-09-17 NOTE — PLAN OF CARE
"PRIMARY DIAGNOSIS: \"GENERIC\" NURSING  OUTPATIENT/OBSERVATION GOALS TO BE MET BEFORE DISCHARGE:  ADLs back to baseline: No    Activity and level of assistance: Up with maximum assistance. Consider SW and/or PT evaluation.     Pain status: Pain free.    Return to near baseline physical activity: No     Discharge Planner Nurse   Safe discharge environment identified: No  Barriers to discharge: Yes       Entered by: Selwyn Beth RN 09/17/2022 12:46 AM     Please review provider order for any additional goals.   Nurse to notify provider when observation goals have been met and patient is ready for discharge.Goal Outcome Evaluation:                      "

## 2022-09-17 NOTE — PLAN OF CARE
"PRIMARY DIAGNOSIS: \"GENERIC\" NURSING  OUTPATIENT/OBSERVATION GOALS TO BE MET BEFORE DISCHARGE:  1. ADLs back to baseline: No    2. Activity and level of assistance: Up with maximum assistance. Consider SW and/or PT evaluation.     3. Pain status: Pain free.    4. Return to near baseline physical activity: No     Discharge Planner Nurse   Safe discharge environment identified: No  Barriers to discharge: Yes       Entered by: Christa Gomez RN 09/16/2022 10:19 PM     Care Coordinator, Latasha, from [facility] [[email or web address] called with concerns of patient returning home as a vulnerable adult. Suggested patient be discharge to assisted living.  Has some limited family assistance and couple hours of home care, but thinks patients is needing more. Left message for social work to call Latasha.  "

## 2022-09-18 NOTE — PLAN OF CARE
"PRIMARY DIAGNOSIS: \"GENERIC\" NURSING  OUTPATIENT/OBSERVATION GOALS TO BE MET BEFORE DISCHARGE:  ADLs back to baseline: No    Activity and level of assistance: *not out of bed overnight**    Pain status: Improved-controlled with oral pain medications.    Return to near baseline physical activity: No     Discharge Planner Nurse   Safe discharge environment identified: No  Barriers to discharge: Yes       Entered by: Keyana Peterson RN 09/18/2022 4:59 AM   Pt refusing to drink water overnight, but took a few ounces with a pain pill. Bathroom or urinal offered several  times,but pt states he doesn't have to go. He is alert and oriented to self, knows the month and year, and that he is in the hospital. No hallucinations noted overnight. Will probably need TCU at discharge.  Please review provider order for any additional goals.   Nurse to notify provider when observation goals have been met and patient is ready for discharge.Goal Outcome Evaluation:                      "

## 2022-09-18 NOTE — PLAN OF CARE
"PRIMARY DIAGNOSIS: \"GENERIC\" NURSING  OUTPATIENT/OBSERVATION GOALS TO BE MET BEFORE DISCHARGE:  ADLs back to baseline: No    Activity and level of assistance: Up with standby assistance.    Pain status: Pain free.    Return to near baseline physical activity: No     Discharge Planner Nurse   Safe discharge environment identified: Yes  Barriers to discharge: Yes       Entered by: Christa Gomez RN 09/18/2022 6:46 PM    Pt alert and oriented to person. Confused. Moves down bed, trying to get out of bed, asking to go home. Redirectable. Pt feeding self.  "

## 2022-09-18 NOTE — PLAN OF CARE
"PRIMARY DIAGNOSIS: \"GENERIC\" NURSING  OUTPATIENT/OBSERVATION GOALS TO BE MET BEFORE DISCHARGE:  1. ADLs back to baseline: No    2. Activity and level of assistance: Up with standby assistance.    3. Pain status: Pain free.    4. Return to near baseline physical activity: No     Discharge Planner Nurse   Safe discharge environment identified: Yes  Barriers to discharge: Yes       Entered by: Christa Gomez RN 09/17/2022 10:37 PM     Pt confused and hallucinating.  Asking about where he is going to stay tonight. Reoriented.   Pt asked \"where is she going to stay\". When asked who, pt stated, \"her that lady, that was just here.\"  Pt is cooperative and pleasant. Does not use call light in room, will yell out. Frequent checks.     "

## 2022-09-18 NOTE — PLAN OF CARE
"  Problem: Risk for Delirium  Goal: Optimal Coping  Outcome: Ongoing, Progressing   Goal Outcome Evaluation:    Was able to answer orientation questions correctly this morning.  May have been reading white board.  Later in the day asked \"Is this a bank? A bank transaction?\".  Reorient to place, and situation.      Up with walker and assistance of one staff.  Up in chair for lunch.  Slid down in chair and was able to use call button for help.  Bed and chair alarms on.     Decreased Appetite.  Ate 100% breakfast and 10% lunch.  Does better with assistance.  Provide soft food, multiple choices.                     "

## 2022-09-18 NOTE — PLAN OF CARE
"PRIMARY DIAGNOSIS: \"GENERIC\" NURSING  OUTPATIENT/OBSERVATION GOALS TO BE MET BEFORE DISCHARGE:  1. ADLs back to baseline: No    2. Activity and level of assistance: Up with standby assistance.    3. Pain status: Pain free.    4. Return to near baseline physical activity: No     Discharge Planner Nurse   Safe discharge environment identified: yes  Barriers to discharge: Yes       Entered by: Christa Gomez RN 09/17/2022 9:15 PM      Pt refused to order dinner, stated he was not hungry. Staff order something incase. Aid reports pt was fed and ate 25%. Encourage fluids when in room with patient.   "

## 2022-09-18 NOTE — PLAN OF CARE
"PRIMARY DIAGNOSIS: \"GENERIC\" NURSING  OUTPATIENT/OBSERVATION GOALS TO BE MET BEFORE DISCHARGE:  ADLs back to baseline: No    Activity and level of assistance: Up with standby assistance.    Pain status: Pain free.    Return to near baseline physical activity: No     Discharge Planner Nurse   Safe discharge environment identified: Yes  Barriers to discharge: Yes       Entered by: Keyana Peterson RN 09/18/2022 2:26 AM     Please review provider order for any additional goals.   Nurse to notify provider when observation goals have been met and patient is ready for discharge.Goal Outcome Evaluation:                      "

## 2022-09-18 NOTE — PROGRESS NOTES
[facility]    Hospitalist Progress Note    Assessment & Plan   82 year old male who was admitted on 9/13/2022 with weakness and found to be Covid 19 positive:    Impression:   Principal Problem:    Covid 19 Infection, Positive 9/13/22   -- getting PT and OT   -- no hypoxia or SOB, not on antiviral therapy      Hypomagnesemia -- replaced      Chronic systolic CHF -- Echo 6/11/21 LVEF 24%. S/P BiV Pacer      Anemia of chronic disease      Thrombocytopenia -- suspect 2nd to viral illness, resolved.       Chronic Atrial Fib -- on Warfarin   -- continue Warfarin, subtherapeutic on admit, cont warfarin      Weakness -- multifactorial (Covid, dehydration, diarrhea)   -- seems improved but still needs assistance when up      Active Problems:    Benign Essential Hypertension   -- will decrease Coreg to 6.25 mg bid, and Losartan to 50 mg daily   (may not have been taking his meds at all at home before admitted)      CRF (chronic renal failure), stage 3      Diarrhea     Ulcerative Colitis    -- Lomotil bid prn       Anorexia    Dehydration   -- improved      Plan:  Home with Home care if he gets strong enough, currently needs TCU yet and will not be Covid recovered until 9/23/22  Updated niece.     DVT Prophylaxis: Pneumatic Compression Devices  Code Status: Full Code    Disposition: Expected discharge to TCU on 9/23/22, or possible home with Home Care PT if strength improves (lives alone, no one to help)     Baltazar Chan MD  Pager [phone]  Cell Phone [phone]  Text Page (7am to 6pm)    Interval History   No new problems, he admits he is still weak and probably not able to care for himself at home -- agreeable to TCU now.     Physical Exam   Temp: 98.2  F (36.8  C) Temp src: Oral BP: 106/55 Pulse: 75   Resp: 20 SpO2: 98 % O2 Device: None (Room air)    Vitals:    09/15/22 1140 09/16/22 1714 09/17/22 1957   Weight: 63.6 kg (140 lb 3.2 oz) 61.8 kg (136 lb 3.2 oz) 62.1 kg (136 lb 14.4  "oz)     Vital Signs with Ranges  Temp:  [97.9  F (36.6  C)-98.3  F (36.8  C)] 98.2  F (36.8  C)  Pulse:  [71-75] 75  Resp:  [16-20] 20  BP: ()/(54-62) 106/55  SpO2:  [92 %-98 %] 98 %  I/O last 3 completed shifts:  In: 478 [P.O.:478]  Out: 100 [Urine:100]    # Pain Assessment:  Current Pain Score 9/18/2022   Patient currently in pain? no   Pain score (0-10) -   Tanmay bell pain level was assessed and he currently denies pain.        Constitutional: Awake, alert, cooperative, no apparent distress  Respiratory: Slight expiratory wheeze bilaterally.   Cardiovascular: Regular rate and rhythm, normal S1 and S2, and no murmur noted  GI: Normal bowel sounds, soft, non-distended, non-tender  Extrem: No calf tenderness, no ankle edema  Neuro: Ox2 (date \"Sept 27th, 2022\" place \"[facility] President \"I don't know\"), no focal motor or sensory deficits but generalized weakness.     Medications     - MEDICATION INSTRUCTIONS -         carbidopa-levodopa  1 tablet Oral TID     carvedilol  12.5 mg Oral BID w/meals     gabapentin  100 mg Oral BID     losartan  50 mg Oral Daily     pantoprazole  40 mg Oral QAM AC     rosuvastatin  20 mg Oral QPM     sodium chloride (PF)  3 mL Intracatheter Q8H     Warfarin Therapy Reminder  1 each Oral See Admin Instructions     Warfarin Therapy Reminder  1 each Oral See Admin Instructions       Data   Recent Labs   Lab 09/18/22  0934 09/17/22  0645 09/16/22  0638 09/15/22  0756 09/14/22  1000 09/13/22  1950 09/13/22  1831   WBC 7.0 5.8  --   --  7.9  --  13.1*   HGB 9.7* 8.1*  --   --  9.4*  --  10.3*   MCV 96 97  --   --  97  --  98    131*  --   --  170  --  221   INR 1.98* 1.95* 1.64*   < >  --   --  1.27*   NA  --  134*  --   --  139 136  --    POTASSIUM  --  3.9  --   --  4.1 3.9  --    CHLORIDE  --  104  --   --  105 101  --    CO2  --  24  --   --  25 24  --    BUN  --  18  --   --  21 27  --    CR  --  1.18  --   --  1.36* 1.58*  --    ANIONGAP  --  6  --   --  9 11  --    RUBÉN  --  " 8.4*  --   --  9.1 9.4  --    GLC  --  77  --   --  83 88  --    ALBUMIN  --   --   --   --  2.8* 2.8*  --    PROTTOTAL  --   --   --   --  5.8* 6.1  --    BILITOTAL  --   --   --   --  0.5 0.5  --    ALKPHOS  --   --   --   --  157* 166*  --    ALT  --   --   --   --  9 9  --    AST  --   --   --   --  13 14  --    LIPASE  --   --   --   --   --   --  17    < > = values in this interval not displayed.       Imaging:   No results found for this or any previous visit (from the past 24 hour(s)).

## 2022-09-19 PROBLEM — F03.90 DEMENTIA (H): Status: ACTIVE | Noted: 2022-01-01

## 2022-09-19 NOTE — PLAN OF CARE
"PRIMARY DIAGNOSIS: \"GENERIC\" NURSING  OUTPATIENT/OBSERVATION GOALS TO BE MET BEFORE DISCHARGE:  ADLs back to baseline: No    Activity and level of assistance: **assist of 2, walker,gait belt, unsteady*      Pain status: Improved-controlled with oral pain medications.    Return to near baseline physical activity: No     Discharge Planner Nurse   Safe discharge environment identified: No  Barriers to discharge: Yes       Entered by: Keyana Peterson RN 09/19/2022 5:34 AM   Pt very confused, kept demanding to go home, wanted police called. He got out of bed to ambulate to bathroom, assist of 2 with walker and belt. When he got there he had been incontinent of urine, so pull-ups were changed. Anticipate TCU at discharge.  Please review provider order for any additional goals.   Nurse to notify provider when observation goals have been met and patient is ready for discharge.Goal Outcome Evaluation:                      "

## 2022-09-19 NOTE — PROGRESS NOTES
[facility]    Hospitalist Progress Note    Assessment & Plan   82 year old male who was admitted on 9/13/2022 with weakness and found to be Covid 19 positive:    Impression:   Principal Problem:    Covid 19 Infection, Positive 9/13/22   -- getting PT and OT   -- no hypoxia or SOB, not on antiviral therapy      Mild Dementia with behaviors   -- slight agitation 9/19/2022, will start PRN seroquel and add Trazodone qhs      Hypomagnesemia -- replaced      Chronic systolic CHF -- Echo 6/11/21 LVEF 24%. S/P BiV Pacer      Anemia of chronic disease      Thrombocytopenia -- suspect 2nd to viral illness, resolved.       Chronic Atrial Fib -- on Warfarin   -- continue Warfarin, subtherapeutic on admit, cont warfarin      Weakness -- multifactorial (Covid, dehydration, diarrhea)   -- seems improved but still needs assistance when up      Benign Essential Hypertension   -- will decrease Coreg to 6.25 mg bid, and Losartan to 50 mg daily   (may not have been taking his meds at all at home before admitted)      ANA (acute kidney injury)     CRF (chronic renal failure), stage 3      Diarrhea     Ulcerative Colitis    -- Lomotil bid prn       Anorexia    Dehydration   -- improved      Benign Essential Hypertension    CRF (chronic renal failure), stage 3      Chronic systolic CHF -- Echo 6/11/21 LVEF 24%. S/P BiV Pacer    Anemia of chronic disease        Anorexia    Dehydration      Mild Dementia without behaviors     Plan:  TCU when Covid recovered (9/23) ... unless he gets dramatically better.     DVT Prophylaxis: Pneumatic Compression Devices  Code Status: Full Code    Disposition: Expected discharge to TCU on 9/23/22, or possible home with Home Care PT if strength improves (lives alone, no one to help)     Baltazar Chan MD  Pager [phone]  Cell Phone [phone]  Text Page (7am to 6pm)    Interval History   No new problems, he admits he is still weak and probably not able to care for  "himself at home -- agreeable to TCU now.     Physical Exam   Temp: 97.5  F (36.4  C) Temp src: Oral BP: (!) 148/68 Pulse: 77   Resp: 18 SpO2: 96 % O2 Device: None (Room air)    Vitals:    09/16/22 1714 09/17/22 1957 09/18/22 2209   Weight: 61.8 kg (136 lb 3.2 oz) 62.1 kg (136 lb 14.4 oz) 59.2 kg (130 lb 9.6 oz)     Vital Signs with Ranges  Temp:  [97.5  F (36.4  C)-98.2  F (36.8  C)] 97.5  F (36.4  C)  Pulse:  [72-80] 77  Resp:  [18-20] 18  BP: (120-148)/(59-74) 148/68  SpO2:  [96 %-99 %] 96 %  I/O last 3 completed shifts:  In: 480 [P.O.:480]  Out: -     # Pain Assessment:  Current Pain Score 9/19/2022   Patient currently in pain? yes   Pain score (0-10) -   Tanmay s pain level was assessed and he currently denies pain.        Constitutional: Awake, alert, cooperative, no apparent distress  Respiratory: Slight expiratory wheeze bilaterally.   Cardiovascular: Regular rate and rhythm, normal S1 and S2, and no murmur noted  GI: Normal bowel sounds, soft, non-distended, non-tender  Extrem: No calf tenderness, no ankle edema  Neuro: Ox2 (date \"Sept 27th, 2022\" place \"[facility]\", President \"I don't know\"), no focal motor or sensory deficits but generalized weakness.     Medications     - MEDICATION INSTRUCTIONS -         carbidopa-levodopa  1 tablet Oral TID     carvedilol  6.25 mg Oral BID w/meals     gabapentin  100 mg Oral BID     losartan  50 mg Oral Daily     pantoprazole  40 mg Oral QAM AC     rosuvastatin  20 mg Oral QPM     sodium chloride (PF)  3 mL Intracatheter Q8H     warfarin ANTICOAGULANT  2 mg Oral ONCE at 18:00     Warfarin Therapy Reminder  1 each Oral See Admin Instructions     Warfarin Therapy Reminder  1 each Oral See Admin Instructions     Warfarin Therapy Reminder  1 each Oral See Admin Instructions       Data   Recent Labs   Lab 09/19/22  0715 09/18/22  0934 09/17/22  0645 09/15/22  0756 09/14/22  1000 09/13/22  1950 09/13/22  1950 09/13/22  1831   WBC  --  7.0 5.8  --  7.9  --   --  13.1*   HGB  --  " 9.7* 8.1*  --  9.4*  --   --  10.3*   MCV  --  96 97  --  97  --   --  98   PLT  --  159 131*  --  170  --   --  221   INR 2.11* 1.98* 1.95*   < >  --   --   --  1.27*   *  --  134*  --  139   < > 136  --    POTASSIUM 3.9  --  3.9  --  4.1   < > 3.9  --    CHLORIDE 103  --  104  --  105   < > 101  --    CO2 27  --  24  --  25   < > 24  --    BUN 19  --  18  --  21   < > 27  --    CR 1.03  --  1.18  --  1.36*   < > 1.58*  --    ANIONGAP 5  --  6  --  9   < > 11  --    RUBÉN 8.8  --  8.4*  --  9.1   < > 9.4  --    GLC 75  --  77  --  83   < > 88  --    ALBUMIN  --   --   --   --  2.8*  --  2.8*  --    PROTTOTAL  --   --   --   --  5.8*  --  6.1  --    BILITOTAL  --   --   --   --  0.5  --  0.5  --    ALKPHOS  --   --   --   --  157*  --  166*  --    ALT  --   --   --   --  9  --  9  --    AST  --   --   --   --  13  --  14  --    LIPASE  --   --   --   --   --   --   --  17    < > = values in this interval not displayed.       Imaging:   No results found for this or any previous visit (from the past 24 hour(s)).

## 2022-09-19 NOTE — PLAN OF CARE
PRIMARY DIAGNOSIS: GENERALIZED WEAKNESS    OUTPATIENT/OBSERVATION GOALS TO BE MET BEFORE DISCHARGE  1. Orthostatic performed: N/A    2. Tolerating PO medications: Yes    3. Return to near baseline physical activity: No    4. Cleared for discharge by consultants (if involved): No    Discharge Planner Nurse   Safe discharge environment identified: No  Barriers to discharge: Yes       Entered by: Cori Cesar RN 09/19/2022 1:37 PM     Please review provider order for any additional goals.   Nurse to notify provider when observation goals have been met and patient is ready for discharge.Goal Outcome Evaluation:    Drowsy this afternoon.  Declined to eat lunch. Drinking fluids. In bed resting now.

## 2022-09-19 NOTE — PROVIDER NOTIFICATION
Pt became agitated after his VS were taken,insisting he get OOB and go home. Unable to redirect, and he started kicking. Charge Nurse obtained an order for IM Zyprexa, given at 0033. Pt calmed within 10 minutes, and asked for the lights to be turned off so he could sleep.

## 2022-09-19 NOTE — PLAN OF CARE
"PRIMARY DIAGNOSIS: \"GENERIC\" NURSING  OUTPATIENT/OBSERVATION GOALS TO BE MET BEFORE DISCHARGE:  ADLs back to baseline: No    Activity and level of assistance: Up with standby assistance.    Pain status: Pain free.    Return to near baseline physical activity: No     Discharge Planner Nurse   Safe discharge environment identified: No  Barriers to discharge: Yes       Entered by: Cori Cesar RN 09/19/2022 10:33 AM     Please review provider order for any additional goals.   Nurse to notify provider when observation goals have been met and patient is ready for discharge.Goal Outcome Evaluation:    Alert. Disoriented to place and situation.  Tolerating diet with tray set up.  Incontinent of urine. Assist of one to transfer. Sitting in the chair. No stool yet today.  No pain.                     "

## 2022-09-19 NOTE — PROGRESS NOTES
Care Management Follow Up    Length of Stay (days): 1    Expected Discharge Date: 09/19/2022     Concerns to be Addressed: discharge planning     Patient plan of care discussed at interdisciplinary rounds: Yes    Anticipated Discharge Disposition: TCU  Home, Home Care     Anticipated Discharge Services: County Worker  Anticipated Discharge DME:      Patient/family educated on Medicare website which has current facility and service quality ratings:  yes  Education Provided on the Discharge Plan:    Patient/Family in Agreement with the Plan: yes    Referrals Placed by CM/SW: Homecare  Private pay costs discussed: Not applicable    Additional Information:  [facility] Messages left regarding referral.   [facility] is having staffing difficulty.  SW spoke to pt nijaylin Muñiz family is trying to find an assisted living for pt to move to after TCU. SW updated pt krystin that there is a home care, but no TCU yet. She understands Covid + is a barrier to discharge. [facility] are POA and on pts checkbook. Pt is on the elderly waver.  SW provided [facility] with a list of assisted living memory care units that say they have openings for elderly waiver.      SINA Conte

## 2022-09-19 NOTE — PLAN OF CARE
"PRIMARY DIAGNOSIS: \"GENERIC\" NURSING  OUTPATIENT/OBSERVATION GOALS TO BE MET BEFORE DISCHARGE:  1. ADLs back to baseline: No    2. Activity and level of assistance: Up with standby assistance.    3. Pain status: Pain free.    4. Return to near baseline physical activity: No     Discharge Planner Nurse   Safe discharge environment identified: Yes  Barriers to discharge: Yes       Entered by: Christa Gomez RN 09/18/2022 10:05 PM     Aid called staff to assist pt in room. Pt has scooted down low to end of bed.  Pt stated he needs to return back home. After reorienting patient and suggesting patient to remain in hospital for his safety, pt agreed to stay the night.  Pt incontinent, brief changed.            "

## 2022-09-19 NOTE — PLAN OF CARE
"PRIMARY DIAGNOSIS: \"GENERIC\" NURSING  OUTPATIENT/OBSERVATION GOALS TO BE MET BEFORE DISCHARGE:  ADLs back to baseline: No    Activity and level of assistance: Up with standby assistance.    Pain status: Improved-controlled with oral pain medications.    Return to near baseline physical activity: No     Discharge Planner Nurse   Safe discharge environment identified: No  Barriers to discharge: Yes       Entered by: Keyana Peterson RN 09/19/2022 2:28 AM   Pt will need TCU at discharge.  Please review provider order for any additional goals.   Nurse to notify provider when observation goals have been met and patient is ready for discharge.Goal Outcome Evaluation:                      "

## 2022-09-19 NOTE — SIGNIFICANT EVENT
Pt was very agitated. Getting up out of bed. Stating he was leaving. Trying to get his clothes to leave. Explained where he was and he was not ready to discharge yet.  Pushing nurse out of the way. Zyprexa IM administered.

## 2022-09-20 NOTE — PLAN OF CARE
PRIMARY DIAGNOSIS: GENERALIZED WEAKNESS    OUTPATIENT/OBSERVATION GOALS TO BE MET BEFORE DISCHARGE  1. Orthostatic performed: No    2. Tolerating PO medications: Yes    3. Return to near baseline physical activity:  unknown baseline    4. Cleared for discharge by consultants (if involved): Yes    Discharge Planner Nurse   Safe discharge environment identified: Yes  Barriers to discharge: Yes, covid precautions       Entered by: Trang Mills RN 09/20/2022 7:14 AM     Please review provider order for any additional goals.   Nurse to notify provider when observation goals have been met and patient is ready for discharge.    Pt slept well second half of shift. Up to bathroom once. Cooperative.

## 2022-09-20 NOTE — PROGRESS NOTES
[facility]    Hospitalist Progress Note    Assessment & Plan   82 year old male who was admitted on 9/13/2022 with weakness and found to be Covid 19 positive:    Impression:   Principal Problem:    Covid 19 Infection, Positive 9/13/22   -- getting PT and OT   -- no hypoxia or SOB, not on antiviral therapy      Mild Dementia with behaviors   -- slight agitation 9/19/2022, started on PRN seroquel and Trazodone qhs      Hypomagnesemia -- replaced      Chronic systolic CHF -- Echo 6/11/21 LVEF 24%. S/P BiV Pacer      Anemia of chronic disease      Thrombocytopenia -- suspect 2nd to viral illness, resolved.       Chronic Atrial Fib    -- will stop Warfarin given dementia, risk of falling, and switch to aspirin 325 mg daily      Weakness -- multifactorial (Covid, dehydration, diarrhea)   -- seems improved but still needs assistance when up      Benign Essential Hypertension   -- will decrease Coreg to 6.25 mg bid, and Losartan to 50 mg daily   (may not have been taking his meds at all at home before admitted)      ANA (acute kidney injury)     CRF (chronic renal failure), stage 3      Diarrhea     Ulcerative Colitis    -- Lomotil bid prn       Anorexia    Dehydration   -- improved      Benign Essential Hypertension    CRF (chronic renal failure), stage 3      Chronic systolic CHF -- Echo 6/11/21 LVEF 24%. S/P BiV Pacer    Anemia of chronic disease        Anorexia    Dehydration      Mild Dementia without behaviors     Plan:  TCU when Covid recovered (9/23) ... unless he gets dramatically better.     DVT Prophylaxis: Pneumatic Compression Devices  Code Status: Full Code    Disposition: Expected discharge to TCU on 9/23/22, or possible home with Home Care PT if strength improves (lives alone, no one to help)     Baltazar Chan MD  Pager [phone]  Cell Phone [phone]  Text Page (7am to 6pm)    Interval History   No Agitation this AM, slept OK.     Physical Exam   Temp: 97.6  F (36.4  " C) Temp src: Axillary BP: 128/59 Pulse: 70   Resp: 15 SpO2: 99 % O2 Device: None (Room air)    Vitals:    09/16/22 1714 09/17/22 1957 09/18/22 2209   Weight: 61.8 kg (136 lb 3.2 oz) 62.1 kg (136 lb 14.4 oz) 59.2 kg (130 lb 9.6 oz)     Vital Signs with Ranges  Temp:  [97.2  F (36.2  C)-98.1  F (36.7  C)] 97.6  F (36.4  C)  Pulse:  [70-84] 70  Resp:  [15-20] 15  BP: ()/(53-76) 128/59  SpO2:  [94 %-99 %] 99 %  I/O last 3 completed shifts:  In: 220 [P.O.:220]  Out: 300 [Urine:300]    # Pain Assessment:  Current Pain Score 9/20/2022   Patient currently in pain? denies   Pain score (0-10) -   Tanmay bell pain level was assessed and he currently denies pain.        Constitutional: Awake, alert, cooperative, no apparent distress  Respiratory: Slight expiratory wheeze bilaterally.   Cardiovascular: Regular rate and rhythm, normal S1 and S2, and no murmur noted  GI: Normal bowel sounds, soft, non-distended, non-tender  Extrem: No calf tenderness, no ankle edema  Neuro: Ox2 (date \"Sept 27th, 2022\" place \"[facility] President \"I don't know\"), no focal motor or sensory deficits but generalized weakness.     Medications     - MEDICATION INSTRUCTIONS -         [START ON 9/21/2022] aspirin  325 mg Oral Daily     carbidopa-levodopa  1 tablet Oral BID w/meals     carvedilol  6.25 mg Oral BID w/meals     gabapentin  100 mg Oral BID     losartan  50 mg Oral Daily     pantoprazole  40 mg Oral QAM AC     rosuvastatin  20 mg Oral QPM     sodium chloride (PF)  3 mL Intracatheter Q8H     traZODone  50 mg Oral At Bedtime       Data   Recent Labs   Lab 09/20/22  0706 09/19/22  0715 09/18/22  0934 09/17/22  0645 09/15/22  0756 09/14/22  1000 09/13/22  1950 09/13/22  1950 09/13/22  1831   WBC  --   --  7.0 5.8  --  7.9  --   --  13.1*   HGB  --   --  9.7* 8.1*  --  9.4*  --   --  10.3*   MCV  --   --  96 97  --  97  --   --  98   PLT  --   --  159 131*  --  170  --   --  221   INR 2.08* 2.11* 1.98* 1.95*   < >  --   --   --  1.27*   NA  --  " 135*  --  134*  --  139   < > 136  --    POTASSIUM  --  3.9  --  3.9  --  4.1   < > 3.9  --    CHLORIDE  --  103  --  104  --  105   < > 101  --    CO2  --  27  --  24  --  25   < > 24  --    BUN  --  19  --  18  --  21   < > 27  --    CR  --  1.03  --  1.18  --  1.36*   < > 1.58*  --    ANIONGAP  --  5  --  6  --  9   < > 11  --    RUBÉN  --  8.8  --  8.4*  --  9.1   < > 9.4  --    GLC  --  75  --  77  --  83   < > 88  --    ALBUMIN  --   --   --   --   --  2.8*  --  2.8*  --    PROTTOTAL  --   --   --   --   --  5.8*  --  6.1  --    BILITOTAL  --   --   --   --   --  0.5  --  0.5  --    ALKPHOS  --   --   --   --   --  157*  --  166*  --    ALT  --   --   --   --   --  9  --  9  --    AST  --   --   --   --   --  13  --  14  --    LIPASE  --   --   --   --   --   --   --   --  17    < > = values in this interval not displayed.       Imaging:   No results found for this or any previous visit (from the past 24 hour(s)).

## 2022-09-20 NOTE — PROGRESS NOTES
Care Management Follow Up    Length of Stay (days): 1    Expected Discharge Date: 09/22/2022     Concerns to be Addressed: discharge planning     Patient plan of care discussed at interdisciplinary rounds: Yes    Anticipated Discharge Disposition: TCU     Anticipated Discharge Services: County Worker  Anticipated Discharge DME:      Patient/family educated on Medicare website which has current facility and service quality ratings:    Education Provided on the Discharge Plan:    Patient/Family in Agreement with the Plan: yes    Referrals Placed by CM/SW: TCU  Private pay costs discussed: Not applicable    Additional Information:  CM spoke to stanton who is the pt [facility] and she was concerned that pt was not going to TCU at discharge she wanted to make sure the plan is still TCU.  I reassured her that it is what we are currently working on, and then family is looking into AL for after TCU. CM called Covid TCUs and [facility] to call back with bed availability. Pt will be covid recovered on 9/23 and more options for TCUs at this time but for now will await to hear back from the covid approved facilities. Stanton would like to be notified as the plan at discharge. RNCM to follow for medical progression, recommendations, and final discharge plan.    St[facility] [place] [phone]        Emelina Hammonds RN

## 2022-09-20 NOTE — PLAN OF CARE
"PRIMARY DIAGNOSIS: \"GENERIC\" NURSING  OUTPATIENT/OBSERVATION GOALS TO BE MET BEFORE DISCHARGE:  ADLs back to baseline: No    Activity and level of assistance: Up with standby assistance.    Pain status: Pain free.    Return to near baseline physical activity: Yes     Discharge Planner Nurse   Safe discharge environment identified: No  Barriers to discharge: Yes       Entered by: Cori Cesar RN 09/20/2022 11:04 AM     Please review provider order for any additional goals.   Nurse to notify provider when observation goals have been met and patient is ready for discharge.Goal Outcome Evaluation:    Alert and oriented to self.  Cooperative. Pleasant. Assist of one to the restroom. Declined the chair. Ate well with tray set up.  No complaints of pain. Bed alarm for safety. Plan is TCU when COVID recovered. 9/23                    "

## 2022-09-20 NOTE — PLAN OF CARE
"Goal Outcome Evaluation:PRIMARY DIAGNOSIS: \"GENERIC\" NURSING  OUTPATIENT/OBSERVATION GOALS TO BE MET BEFORE DISCHARGE:  ADLs back to baseline: No    Activity and level of assistance: Up with standby assistance.    Pain status: Pain free.    Return to near baseline physical activity: Yes     Discharge Planner Nurse   Safe discharge environment identified: No  Barriers to discharge: Yes       Entered by: Cori Cesar RN 09/20/2022 2:05 PM     Please review provider order for any additional goals.   Nurse to notify provider when observation goals have been met and patient is ready for discharge.                      "

## 2022-09-20 NOTE — PLAN OF CARE
"  Problem: Plan of Care - These are the overarching goals to be used throughout the patient stay.    Goal: Optimal Comfort and Wellbeing  Intervention: Monitor Pain and Promote Comfort  Recent Flowsheet Documentation  Taken 9/19/2022 1636 by Liza Block RN  Pain Management Interventions: medication (see MAR)   Goal Outcome Evaluation:    Set off bed alarm early evening, had legs off bed, assisted up out of bed with walker.  Insisted on leaving room.  Says \"I'm going home.\" Several staff needed to convince pt to stay in room.  Upset, says he doesn't feel right.  MD notified, PRN zyprexia ordered.      Phoned nephew to update on confusion.  spoke with pt and convinced pt to take medication.      Medicated with Zyprexia one time with good results.  Slept after that.     Ate 40% of dinner plus two cookies and a banana later.  Used urinal once and one time incontinent of urine.                "

## 2022-09-20 NOTE — PLAN OF CARE
"PRIMARY DIAGNOSIS: \"GENERIC\" NURSING  OUTPATIENT/OBSERVATION GOALS TO BE MET BEFORE DISCHARGE:  ADLs back to baseline: No    Activity and level of assistance: Up with standby assistance.    Pain status: Improved-controlled with oral pain medications.    Return to near baseline physical activity: No     Discharge Planner Nurse   Safe discharge environment identified: Yes  Barriers to discharge:  Covid recovery       Entered by: Jeovanny Carrillo RN 09/20/2022 5:42 PM     Please review provider order for any additional goals.   Nurse to notify provider when observation goals have been met and patient is ready for discharge.              "

## 2022-09-21 NOTE — PLAN OF CARE
PRIMARY DIAGNOSIS: COVID-19 INFECTION  OUTPATIENT/OBSERVATION GOALS TO BE MET BEFORE DISCHARGE:  Dyspnea improved and O2 sats >88% on RA or back to baseline O2 levels: Yes   SpO2: 97 %, O2 Device: None (Room air)    Tolerating oral abx or appropriate plans made outpatient infusion:  Not on abxs    Vitals signs normal or return to baseline: Yes    Short term supplemental O2 needed with activity at home: No    Tolerate oral intake to maintain hydration: Yes    Return to near baseline physical activity: Yes    Discharge Planner Nurse   Safe discharge environment identified: Yes  Barriers to discharge: Yes       Entered by: Shabbir Bailon RN 09/21/2022 5:30 AM     Please review provider order for any additional goals.   Nurse to notify provider when observation goals have been met and patient is ready for discharge.Goal Outcome Evaluation:  Pt is cooperative with cares, SpO2 97% room air. No shortness of breath or dyspnea.

## 2022-09-21 NOTE — PLAN OF CARE
PRIMARY DIAGNOSIS: COVID-19 INFECTION  OUTPATIENT/OBSERVATION GOALS TO BE MET BEFORE DISCHARGE:  Dyspnea improved and O2 sats >88% on RA or back to baseline O2 levels: Yes   SpO2: 97 %, O2 Device: None (Room air)    Tolerating oral abx or appropriate plans made outpatient infusion: not taking abxs.    Vitals signs normal or return to baseline: Yes    Short term supplemental O2 needed with activity at home: No    Tolerate oral intake to maintain hydration: Yes    Return to near baseline physical activity: Yes    Discharge Planner Nurse   Safe discharge environment identified: Yes  Barriers to discharge: Yes       Entered by: Shabbir Bailon RN 09/21/2022 1:22 AM   Patient's O2 sats 97% room air. Incontinent of bladder, bia-cares done. No behavioral disturbances noted.   Please review provider order for any additional goals.   Nurse to notify provider when observation goals have been met and patient is ready for discharge.Goal Outcome Evaluation:

## 2022-09-21 NOTE — PLAN OF CARE
"PRIMARY DIAGNOSIS: \"GENERIC\" NURSING  OUTPATIENT/OBSERVATION GOALS TO BE MET BEFORE DISCHARGE:  ADLs back to baseline: No    Activity and level of assistance: Up with standby assistance.    Pain status: Pain free.    Return to near baseline physical activity: Yes     Discharge Planner Nurse   Safe discharge environment identified: Yes  Barriers to discharge:  Covid recovery       Entered by: Jeovanny Carrillo RN 09/20/2022 10:10 PM    Patient is alert and oriented to self.  Pt has some confusion.  Pt is pleasant, calm, controlled and cooperative with cares. Denied pain, SOB.  No cough noted.  Cooperative with medications.    /61 (BP Location: Right arm, Patient Position: Semi-Sánchez's)   Pulse 77   Temp 98.1  F (36.7  C) (Oral)   Resp 16   Ht 1.753 m (5' 9\")   Wt 59.2 kg (130 lb 9.6 oz)   SpO2 97%   BMI 19.29 kg/m                    "

## 2022-09-21 NOTE — PLAN OF CARE
PRIMARY DIAGNOSIS: GENERALIZED WEAKNESS    OUTPATIENT/OBSERVATION GOALS TO BE MET BEFORE DISCHARGE  1. Orthostatic performed: N/A    2. Tolerating PO medications: Yes    3. Return to near baseline physical activity: No    4. Cleared for discharge by consultants (if involved): N/A    Discharge Planner Nurse   Safe discharge environment identified: No  Barriers to discharge: Yes       Entered by: Giovana Garber RN 09/21/2022 4:09 PM     Please review provider order for any additional goals.   Nurse to notify provider when observation goals have been met and patient is ready for discharge.Goal Outcome Evaluation:      Pt needs placement, covid isolation.  Pt lived at home alone, now close to being a total care pt.

## 2022-09-21 NOTE — PROGRESS NOTES
Sent referral to [facility] TCu as they have an outbreak of Covid pts and are willing to take other Covid patients in the hospital.  Spoke to admissions and they will review the referral and get back to me after.

## 2022-09-21 NOTE — PROGRESS NOTES
"Daily Progress Note        CODE STATUS:  Full Code    09/21/22  Assessment/Plan:  82 year old male who was admitted on 9/13/2022 with weakness and found to be Covid 19 positive:     Covid 19 Infection, Positive 9/13/22  - No hypoxia or SOB, not on antiviral therapy  - PT, OT eval     Mild Dementia with behaviors  - Slight agitation 9/19/2022, started on PRN seroquel and Trazodone qhs     Hypomagnesemia  - Replaced     Chronic systolic CHF   - Echo 6/11/21 LVEF 24%. S/P BiV Pacer  - Home bumex on hold since admission     Anemia of chronic disease   - Stable    Thrombocytopenia  - Suspect 2nd to viral illness, resolved.      Chronic Atrial Fib   - Stopped Warfarin given dementia, risk of falling, and switched to aspirin 325 mg daily     Generalized Weakness   - Multifactorial (Covid, dehydration, diarrhea)  - Seems improved but still needs assistance when up     Benign Essential Hypertension  - Decreased Coreg to 6.25mg (from 37.25mg) bid, and Losartan to 50 mg (rom 100mg) daily. May not have been taking his meds at all at home before admitted per report.     ANA (acute kidney injury)   CRF (chronic renal failure), stage 3  - ANA resolved.     Diarrhea   Ulcerative Colitis   - Lomotil bid prn      Anorexia  Dehydration  - Improved     Benign Essential Hypertension  - Home meds adjusted as above        Plan:  TCU when Covid recovered (9/23). Then to [facility] per family.      DVT Prophylaxis: Pneumatic Compression Devices  Code Status: Full Code     Disposition: Expected discharge to TCU on 9/23/22, then to [facility]. Patient lives alone, no one to help, and has been considered a vulnerable adult    Subjective:  Interval History: Patient seen and examined. Notes, labs, imaging reports personally reviewed. Patient is new to me for today. Patient was being fed by the nursing aide this morning. Patient stated \"this week has been upside down for him.\" Didn't offer any specific complains.     Review of Systems:   As mentioned in " subjective.    Patient Active Problem List   Diagnosis     Osteoarthritis Of The Knee     Esophageal reflux     Obstructive sleep apnea     Ptosis Of Eyelid     Hyperlipidemia     Crohn's (Granulomatous) Colitis     Benign Essential Hypertension     Coronary atherosclerosis of native coronary artery     Ischemic cardiomyopathy     Paroxysmal ventricular tachycardia (H)     Dry Nonexudative Macular Degeneration     Serum Enzyme Levels - Alkaline Phosphatase Elevated     Dysphagia     ICD (implantable cardioverter-defibrillator), single, in situ     Lumbar stenosis with neurogenic claudication     Sacroiliac joint dysfunction of right side     CRF (chronic renal failure), stage 3     Cerebral aneurysm     Osteoporosis     Ileitis     Heart failure with reduced ejection fraction (H)     Diaphragmatic hernia     Pharyngoesophageal dysphagia     History of Crohn's disease     Iron deficiency anemia     Polyp of stomach and duodenum     Reflux esophagitis     Rotator cuff tear arthropathy of right shoulder     Schatzki's ring     Diverticulosis of colon     Flatulence, eructation and gas pain     S/P cervical spinal fusion     Hypomagnesemia     ANA (acute kidney injury) (H)     Dizziness     Polyp of colon     ICD (implantable cardioverter-defibrillator) in place     Esophageal obstruction due to food impaction     ACP (advance care planning)     Chronic systolic CHF -- Echo 6/11/21 LVEF 24%. S/P BiV Pacer     Cardiac pacemaker in situ     Pneumonia due to infectious organism, unspecified laterality, unspecified part of lung     Anemia of chronic disease     CAD (coronary artery disease), native coronary artery     Chronic Atrial Fib -- Warfarin stopped 2nd to fall risk     Pulmonary embolism with infarction (H)     LBBB (left bundle branch block)     Sciatica     Presbyesophagus     Closed fracture of proximal end of left humerus with routine healing     Loose stools     Weakness     Polyp of duodenum     Diarrhea      ICD (implantable cardioverter-defibrillator) battery depletion     Anorexia     Dehydration     Covid 19 Infection, Positive 9/13/22     Mild Dementia without behaviors       Scheduled Meds:    aspirin  325 mg Oral Daily     carbidopa-levodopa  1 tablet Oral BID w/meals     carvedilol  6.25 mg Oral BID w/meals     gabapentin  100 mg Oral BID     losartan  50 mg Oral Daily     pantoprazole  40 mg Oral QAM AC     rosuvastatin  20 mg Oral QPM     sodium chloride (PF)  3 mL Intracatheter Q8H     traZODone  50 mg Oral At Bedtime     Continuous Infusions:    - MEDICATION INSTRUCTIONS -       PRN Meds:.acetaminophen **OR** acetaminophen, albuterol, albuterol, carboxymethylcellulose PF, diphenoxylate-atropine, lidocaine 4%, lidocaine (buffered or not buffered), OLANZapine, ondansetron **OR** [DISCONTINUED] ondansetron, - MEDICATION INSTRUCTIONS -, prochlorperazine **OR** prochlorperazine **OR** prochlorperazine, QUEtiapine, sodium chloride (PF)    Objective:  Vital signs in last 24 hours:  Temp:  [97.6  F (36.4  C)-98.1  F (36.7  C)] 97.8  F (36.6  C)  Pulse:  [70-80] 74  Resp:  [15-18] 18  BP: (107-137)/(55-65) 120/57  SpO2:  [95 %-99 %] 97 %        Intake/Output Summary (Last 24 hours) at 9/21/2022 0736  Last data filed at 9/20/2022 1934  Gross per 24 hour   Intake 230 ml   Output --   Net 230 ml       Physical Exam:    General: Not in obvious distress.  HEENT: NC, AT. Edentulous   Chest: Clear to auscultation bilaterally  Heart: S1S2 normal, regular. No M/R/G  Abdomen: Soft. NT, ND. Bowel sounds- active.  Extremities: No leg swelling  Neuro: Awake, grossly non-focal      Lab Results:(I have personally reviewed the results)    No results found for this or any previous visit (from the past 24 hour(s)).    All laboratory data reviewed  Serum Glucose range:   Recent Labs   Lab 09/19/22  0715 09/17/22  0645 09/14/22  1000   GLC 75 77 83     ABG: No lab results found in last 7 days.  CBC:   Recent Labs   Lab 09/18/22  0928  09/17/22  0645 09/14/22  1000   WBC 7.0 5.8 7.9   HGB 9.7* 8.1* 9.4*   HCT 29.9* 25.2* 29.4*   MCV 96 97 97    131* 170   NEUTROPHIL  --   --  77   LYMPH  --   --  10   MONOCYTE  --   --  10   EOSINOPHIL  --   --  2     Chemistry:   Recent Labs   Lab 09/19/22  0715 09/17/22  0645 09/15/22  0756 09/14/22  1000   * 134*  --  139   POTASSIUM 3.9 3.9  --  4.1   CHLORIDE 103 104  --  105   CO2 27 24  --  25   BUN 19 18  --  21   CR 1.03 1.18  --  1.36*   GFRESTIMATED 72 61  --  52*   RUBÉN 8.8 8.4*  --  9.1   MAG  --   --  2.0 2.3   PROTTOTAL  --   --   --  5.8*   ALBUMIN  --   --   --  2.8*   AST  --   --   --  13   ALT  --   --   --  9   ALKPHOS  --   --   --  157*   BILITOTAL  --   --   --  0.5     Coags:  Recent Labs   Lab 09/20/22  0706 09/19/22  0715 09/18/22  0934   INR 2.08* 2.11* 1.98*     Cardiac Markers:  No results for input(s): CKTOTAL, TROPONINI in the last 168 hours.       CT Abdomen Pelvis w/o Contrast    Result Date: 9/13/2022  EXAM: CT ABDOMEN PELVIS W/O CONTRAST LOCATION: [facility] DATE/TIME: 9/13/2022 11:39 PM INDICATION: Anorexia and diarrhea. COMPARISON: CTA chest 06/11/2021 TECHNIQUE: CT scan of the abdomen and pelvis was performed without IV contrast. Multiplanar reformats were obtained. Dose reduction techniques were used. CONTRAST: None. FINDINGS: LOWER CHEST: Mild dependent atelectasis. No focal airspace disease. HEPATOBILIARY: Liver appears normal. Status post cholecystectomy. PANCREAS: Normal. SPLEEN: Normal. ADRENAL GLANDS: Normal. KIDNEYS/BLADDER: 1.2 cm cyst in the right renal lower pole; no follow-up indicated. Left kidney appears normal. No hydronephrosis. 0.5 cm nonobstructing calculus in the urinary bladder. Urinary bladder otherwise appears normal. BOWEL: Mild colonic diverticulosis. No obstruction or inflammatory change. Appendix not visualized, although no secondary signs of acute appendicitis. LYMPH NODES: No lymphadenopathy. VASCULATURE:  Extensive atherosclerotic calcifications. PELVIC ORGANS: Enlarged prostate. MUSCULOSKELETAL: Bilateral fat-containing inguinal hernias. Multilevel degenerative changes of the spine. Anterolisthesis of L4 on L5.     IMPRESSION: 1.  No acute findings or inflammatory changes in the abdomen or pelvis.       Latest radiology report personally reviewed.    Note created using dragon voice recognition software so sounds alike errors may have escaped editing.      09/21/2022   Chele Zepeda MD  Hospitalist, [facility]  Pager: [phone]

## 2022-09-21 NOTE — PLAN OF CARE
Problem: Plan of Care - These are the overarching goals to be used throughout the patient stay.    Goal: Plan of Care Review/Shift Note  Description: The Plan of Care Review/Shift note should be completed every shift.  The Outcome Evaluation is a brief statement about your assessment that the patient is improving, declining, or no change.  This information will be displayed automatically on your shift note.  Outcome: Ongoing, Progressing     Problem: Plan of Care - These are the overarching goals to be used throughout the patient stay.    Goal: Absence of Hospital-Acquired Illness or Injury  Intervention: Identify and Manage Fall Risk  Recent Flowsheet Documentation  Taken 9/21/2022 1201 by Giovana Garber RN  Safety Promotion/Fall Prevention:   assistive device/personal items within reach   bed alarm on  Taken 9/21/2022 0800 by Giovana Garber RN  Safety Promotion/Fall Prevention:   assistive device/personal items within reach   bed alarm on     Problem: Plan of Care - These are the overarching goals to be used throughout the patient stay.    Goal: Absence of Hospital-Acquired Illness or Injury  Intervention: Prevent Skin Injury  Recent Flowsheet Documentation  Taken 9/21/2022 1400 by Giovana Garber RN  Body Position:   turned   left  Taken 9/21/2022 1201 by Giovana Garber RN  Body Position:   supine   turned  Taken 9/21/2022 0945 by Giovana Garber RN  Body Position:   turned   right  Taken 9/21/2022 0800 by Giovana Garber RN  Body Position:   supine   turned     Goal Outcome Evaluation:    Pt has been sleeping a lot, alert to self only, cooperative with cares.  Turned and brief checked every 2 hours.  Saline locked.  BP stable.  Only ate 25 % of breakfast, feeder.  Pt too weak to help with turning.  Incontinent of urine.  Bed alarm on.

## 2022-09-22 NOTE — PLAN OF CARE
Problem: Plan of Care - These are the overarching goals to be used throughout the patient stay.    Goal: Optimal Comfort and Wellbeing  Outcome: Ongoing, Progressing     Pt comfortable overnight. No pain reported. Covid positive, asymptomatic at this time.   Somewhat confused and agitated, did not know what time it was but wanted to leave the hospital. Redirectable.

## 2022-09-22 NOTE — PROGRESS NOTES
Care Management Follow Up    Length of Stay (days): 1    Expected Discharge Date: 09/23/2022     Concerns to be Addressed: discharge planning     Patient plan of care discussed at interdisciplinary rounds: Yes    Anticipated Discharge Disposition: Home, Home Care     Anticipated Discharge Services: County Worker  Anticipated Discharge DME:      Patient/family educated on Medicare website which has current facility and service quality ratings:    Education Provided on the Discharge Plan:    Patient/Family in Agreement with the Plan: yes    Referrals Placed by CM/SW: Homecare  Private pay costs discussed: transportation costs    Additional Information:  CM spoke to [facility] and they have clinically accepted pt they are just waiting for Insurance auth. CM will call tomorrow to see if they have auth.     3:06 PM  Pt has been accepted to [facility] for 9/23 a ride has been set up with [employer] for noon.       Emelina Hammonds RN

## 2022-09-22 NOTE — PLAN OF CARE
"PRIMARY DIAGNOSIS: \"GENERIC\" NURSING  OUTPATIENT/OBSERVATION GOALS TO BE MET BEFORE DISCHARGE:  ADLs back to baseline: No    Activity and level of assistance: Up with standby assistance.    Pain status: Pain free.    Return to near baseline physical activity: No     Discharge Planner Nurse   Safe discharge environment identified: Yes  Barriers to discharge: Yes, waiting for placement, covid positive.        Entered by: Christa Gomez RN 09/21/2022 10:01 PM             "

## 2022-09-22 NOTE — SIGNIFICANT EVENT
"Significant Event Note    Time of event: 12:38 PM September 22, 2022    S: called to assess a fall.   Sounds as if patient is chronically generally weak   He was transferring from bed to chair and just lost balance   RN / assistant were not able to catch him   No LOC. No chest pain or SOB nor other new sx before or after fall.   Hasn't been eating much     Denies hitting his head   Is on daily  mg, no other clear thinners on list right now     B: 81 yo M admitted for COVID.     /53 (BP Location: Left leg, Patient Position: Right side, Cuff Size: Adult Small)   Pulse 77   Temp 98.2  F (36.8  C) (Oral)   Resp 18   Ht 1.753 m (5' 9\")   Wt 57.2 kg (126 lb 1.6 oz)   SpO2 95%   BMI 18.62 kg/m    Ex:   Gen: elderly and frail appearing   CV: RRR. No murmur.   Resp: breathing comfortably on room air. No overt crackles or wheeze.   Neuro:     A: Fall, mechanical   In setting of weakness related to COVID / likely malnutrition     R:  - Monitor, consider 1:1 vs ipad   - Low concern head bleed, hold on CT head for now   - Continue with PT/OT as able   - Other cares as previously outlined by other providers   - Defer to the hospitalist ultimately     Discussed with: bedside nurse, charge     Santy Marie,   Requested RN discuss with daytime hospitalist, appreciate -- notify me if wanting anything different     "

## 2022-09-22 NOTE — PLAN OF CARE
up with walker, 1 assist and transfer belt walked in room and was in the recliner chair by 8am.  Tires easily.  nauseated after morning medications.  Needs to be fed meals. Very poor intake.  Falls prevention plan in place. Allyson Andrews RN    Problem: Plan of Care - These are the overarching goals to be used throughout the patient stay.    Goal: Readiness for Transition of Care  Outcome: Ongoing, Progressing           Problem: Plan of Care - These are the overarching goals to be used throughout the patient stay.    Goal: Absence of Hospital-Acquired Illness or Injury  Outcome: Ongoing, Progressing  Intervention: Identify and Manage Fall Risk  Recent Flowsheet Documentation  Taken 9/22/2022 0800 by Allyson Andrews RN  Safety Promotion/Fall Prevention:   bed alarm on   chair alarm on  Intervention: Prevent and Manage VTE (Venous Thromboembolism) Risk  Recent Flowsheet Documentation  Taken 9/22/2022 0800 by Allyson Andrews RN  Activity Management: activity encouraged

## 2022-09-22 NOTE — PROGRESS NOTES
Daily Progress Note        CODE STATUS:  Full Code    09/21/22  Assessment/Plan:  82 year old male who was admitted on 9/13/2022 with weakness and found to be Covid 19 positive:     Covid 19 Infection, Positive 9/13/22  - No hypoxia or SOB, not on antiviral therapy  - PT, OT eval     Mild Dementia with behaviors  - Slight agitation 9/19/2022, started on PRN seroquel and Trazodone qhs     Hypomagnesemia  - Replaced     Chronic systolic CHF   - Echo 6/11/21 LVEF 24%. S/P BiV Pacer  - Home bumex on hold since admission     Anemia of chronic disease   - Stable    Thrombocytopenia  - Suspect 2nd to viral illness, resolved.      Chronic Atrial Fib   - Stopped Warfarin given dementia, risk of falling, and switched to aspirin 325 mg daily     Generalized Weakness   - Multifactorial (Covid, dehydration, diarrhea)  - Seems improved but still needs assistance when up     Benign Essential Hypertension  - Decreased Coreg to 6.25mg (from 37.25mg) bid, and Losartan to 50 mg (rom 100mg) daily. May not have been taking his meds at all at home before admitted per report.     ANA (acute kidney injury)   CRF (chronic renal failure), stage 3  - ANA resolved.     Diarrhea   Ulcerative Colitis   - Lomotil bid prn      Anorexia  Dehydration  - Improved     Benign Essential Hypertension  - Home meds adjusted as above     Witnessed fall:  - Patient apparently had a assisted fall today while being transferred from. Nursing aide was in the room. No LOC. Didn't hit his head. No external injuries sustained. No need for imaging studies.  - Monitor     Plan:  TCU when Covid recovered (9/23). Then to [facility] per family.      DVT Prophylaxis: Pneumatic Compression Devices  Code Status: Full Code     Disposition: Expected discharge to TCU on 9/23/22, then to [facility]. Patient lives alone, no one to help, and has been considered a vulnerable adult    Subjective:  Interval History: Patient confused but redirectable overnight per nursing staff. Had an assisted  fall in the room this morning. Denies any pain new pain symptoms.    Review of Systems:   As mentioned in subjective.    Patient Active Problem List   Diagnosis     Osteoarthritis Of The Knee     Esophageal reflux     Obstructive sleep apnea     Ptosis Of Eyelid     Hyperlipidemia     Crohn's (Granulomatous) Colitis     Benign Essential Hypertension     Coronary atherosclerosis of native coronary artery     Ischemic cardiomyopathy     Paroxysmal ventricular tachycardia (H)     Dry Nonexudative Macular Degeneration     Serum Enzyme Levels - Alkaline Phosphatase Elevated     Dysphagia     ICD (implantable cardioverter-defibrillator), single, in situ     Lumbar stenosis with neurogenic claudication     Sacroiliac joint dysfunction of right side     CRF (chronic renal failure), stage 3     Cerebral aneurysm     Osteoporosis     Ileitis     Heart failure with reduced ejection fraction (H)     Diaphragmatic hernia     Pharyngoesophageal dysphagia     History of Crohn's disease     Iron deficiency anemia     Polyp of stomach and duodenum     Reflux esophagitis     Rotator cuff tear arthropathy of right shoulder     Schatzki's ring     Diverticulosis of colon     Flatulence, eructation and gas pain     S/P cervical spinal fusion     Hypomagnesemia     ANA (acute kidney injury) (H)     Dizziness     Polyp of colon     ICD (implantable cardioverter-defibrillator) in place     Esophageal obstruction due to food impaction     ACP (advance care planning)     Chronic systolic CHF -- Echo 6/11/21 LVEF 24%. S/P BiV Pacer     Cardiac pacemaker in situ     Pneumonia due to infectious organism, unspecified laterality, unspecified part of lung     Anemia of chronic disease     CAD (coronary artery disease), native coronary artery     Chronic Atrial Fib -- Warfarin stopped 2nd to fall risk     Pulmonary embolism with infarction (H)     LBBB (left bundle branch block)     Sciatica     Presbyesophagus     Closed fracture of proximal end  of left humerus with routine healing     Loose stools     Weakness     Polyp of duodenum     Diarrhea     ICD (implantable cardioverter-defibrillator) battery depletion     Anorexia     Dehydration     Covid 19 Infection, Positive 9/13/22     Mild Dementia without behaviors       Scheduled Meds:    aspirin  325 mg Oral Daily     carbidopa-levodopa  1 tablet Oral BID w/meals     carvedilol  6.25 mg Oral BID w/meals     gabapentin  100 mg Oral BID     losartan  50 mg Oral Daily     pantoprazole  40 mg Oral QAM AC     rosuvastatin  20 mg Oral QPM     sodium chloride (PF)  3 mL Intracatheter Q8H     traZODone  50 mg Oral At Bedtime     Continuous Infusions:    - MEDICATION INSTRUCTIONS -       PRN Meds:.acetaminophen **OR** acetaminophen, albuterol, albuterol, carboxymethylcellulose PF, diphenoxylate-atropine, lidocaine 4%, lidocaine (buffered or not buffered), OLANZapine, ondansetron **OR** [DISCONTINUED] ondansetron, - MEDICATION INSTRUCTIONS -, prochlorperazine **OR** prochlorperazine **OR** prochlorperazine, QUEtiapine, sodium chloride (PF)    Objective:  Vital signs in last 24 hours:  Temp:  [97.5  F (36.4  C)-98.1  F (36.7  C)] 98  F (36.7  C)  Pulse:  [72-85] 72  Resp:  [16-17] 16  BP: (102-122)/(55-61) 110/61  SpO2:  [92 %-98 %] 93 %        Intake/Output Summary (Last 24 hours) at 9/21/2022 0736  Last data filed at 9/20/2022 1934  Gross per 24 hour   Intake 230 ml   Output --   Net 230 ml       Physical Exam:    General: Not in obvious distress.  HEENT: NC, AT. Edentulous   Chest: Clear to auscultation bilaterally  Heart: S1S2 normal, regular. No M/R/G  Abdomen: Soft. NT, ND. Bowel sounds- active.  Extremities: No leg swelling  Neuro: Awake, grossly non-focal      Lab Results:(I have personally reviewed the results)    No results found for this or any previous visit (from the past 24 hour(s)).    All laboratory data reviewed  Serum Glucose range:   Recent Labs   Lab 09/19/22  0715 09/17/22  0645   GLC 75 77      ABG: No lab results found in last 7 days.  CBC:   Recent Labs   Lab 09/18/22  0934 09/17/22  0645   WBC 7.0 5.8   HGB 9.7* 8.1*   HCT 29.9* 25.2*   MCV 96 97    131*     Chemistry:   Recent Labs   Lab 09/19/22  0715 09/17/22  0645 09/15/22  0756   * 134*  --    POTASSIUM 3.9 3.9  --    CHLORIDE 103 104  --    CO2 27 24  --    BUN 19 18  --    CR 1.03 1.18  --    GFRESTIMATED 72 61  --    RUBÉN 8.8 8.4*  --    MAG  --   --  2.0     Coags:  Recent Labs   Lab 09/20/22  0706 09/19/22  0715 09/18/22  0934   INR 2.08* 2.11* 1.98*     Cardiac Markers:  No results for input(s): CKTOTAL, TROPONINI in the last 168 hours.       CT Abdomen Pelvis w/o Contrast    Result Date: 9/13/2022  EXAM: CT ABDOMEN PELVIS W/O CONTRAST LOCATION: [facility] DATE/TIME: 9/13/2022 11:39 PM INDICATION: Anorexia and diarrhea. COMPARISON: CTA chest 06/11/2021 TECHNIQUE: CT scan of the abdomen and pelvis was performed without IV contrast. Multiplanar reformats were obtained. Dose reduction techniques were used. CONTRAST: None. FINDINGS: LOWER CHEST: Mild dependent atelectasis. No focal airspace disease. HEPATOBILIARY: Liver appears normal. Status post cholecystectomy. PANCREAS: Normal. SPLEEN: Normal. ADRENAL GLANDS: Normal. KIDNEYS/BLADDER: 1.2 cm cyst in the right renal lower pole; no follow-up indicated. Left kidney appears normal. No hydronephrosis. 0.5 cm nonobstructing calculus in the urinary bladder. Urinary bladder otherwise appears normal. BOWEL: Mild colonic diverticulosis. No obstruction or inflammatory change. Appendix not visualized, although no secondary signs of acute appendicitis. LYMPH NODES: No lymphadenopathy. VASCULATURE: Extensive atherosclerotic calcifications. PELVIC ORGANS: Enlarged prostate. MUSCULOSKELETAL: Bilateral fat-containing inguinal hernias. Multilevel degenerative changes of the spine. Anterolisthesis of L4 on L5.     IMPRESSION: 1.  No acute findings or inflammatory changes  in the abdomen or pelvis.       Latest radiology report personally reviewed.    Note created using dragon voice recognition software so sounds alike errors may have escaped editing.      09/22/2022   Chele Zepeda MD  Hospitalist, [facility]  Pager: [phone]

## 2022-09-22 NOTE — PLAN OF CARE
"PRIMARY DIAGNOSIS: \"GENERIC\" NURSING  OUTPATIENT/OBSERVATION GOALS TO BE MET BEFORE DISCHARGE:  1. ADLs back to baseline: No    2. Activity and level of assistance: Up with standby assistance.    3. Pain status: Pain free.    4. Return to near baseline physical activity: No     Discharge Planner Nurse   Safe discharge environment identified: Yes  Barriers to discharge: Yes, pending placement.       Entered by: Christa Gomez RN 09/21/2022 10:02 PM                 "

## 2022-09-23 NOTE — PLAN OF CARE
Goal Outcome Evaluation:    Pt is alert and cooperative. Disoriented to place time and situation. Bed alarm on for safety.  Woc rn came to assess his coccyx pressure ulcer that is new.  Mepilex in place. Bm x2 incontinent. Tolerating diet with tray set up.

## 2022-09-23 NOTE — PROGRESS NOTES
ANTICOAGULATION  MANAGEMENT: Discharge Review    Tanmay Israel chart reviewed for anticoagulation continuity of care    Hospital Admission on 9/13/22 for Dehydration, Anorexia, CHF, COVID +.    Discharge disposition: TCU    Results:    Recent labs: (last 7 days)     09/17/22  0645 09/18/22  0934 09/19/22  0715 09/20/22  0706   INR 1.95* 1.98* 2.11* 2.08*     Anticoagulation inpatient management:     warfarin discontinued, switched to  mg daily    Anticoagulation discharge instructions:     Warfarin dosing: warfarin discontinued   Bridging: No   INR goal change: No      Medication changes affecting anticoagulation: No    Additional factors affecting anticoagulation: No     PLAN     No adjustment to anticoagulation plan needed    Patient not contacted    Anticoagulation Calendar updated, [facility] referral and INR order discontinued.    Patient discharged from [facility].    Romelia Kelly RN

## 2022-09-23 NOTE — PROGRESS NOTES
Care Management Discharge Note    Discharge Date: 09/23/2022       Discharge Disposition: Transitional Care [facility]    Discharge Services: TCU    Discharge DME: none     Discharge Transportation:  [facility] stretcher (due to Covid status) at 1230. PCS complete and faxed.    Private pay costs discussed: transportation costs    PAS Confirmation Code:  ([id number]  Patient/family educated on Medicare website which has current facility and service quality ratings:  yes    Education Provided on the Discharge Plan:  yes  Persons Notified of Discharge Plans: yes  Patient/Family in Agreement with the Plan: yes    Handoff Referral Completed: Yes    Additional Information:  Pt adequate for discharge to TCU. Pt is still considered Covid positive until tomorrow and facility is aware of this. CM updated niece and pt and both are accepting of this plan. CM also tried to reach out to Latasha from [facility] as she wanted to be informed when pt discharged but there was no way to leave a voice message. No further CM needs at this time.    Nurse to Nurse report: [phone]        Emelina Hammonds RN

## 2022-09-23 NOTE — PLAN OF CARE
Physical Therapy Discharge Summary    Reason for therapy discharge:    Discharged to TCU.    Progress towards therapy goal(s). See goals on Care Plan in [facility] electronic health record for goal details.  Goals partially met.  Barriers to achieving goals:   discharge from facility.    Therapy recommendation(s):    Further recommended therapy is related to documented deficits, and is necessary to maximize functional independence in order for patient to return to prior level of function.      Kiley Tellez, ALFREDITO 9/23/2022

## 2022-09-23 NOTE — CONSULTS
[facility] Nurse Inpatient Assessment     Consulted for:  Sacral wounds    Patient History (according to provider note(s):      82 year old with ischemic cardiomyopathy, permanent atrial fibrillation, chronic kidney disease, anemia, diarrhea with history of colitis, significant low back pain resulting in disability, resting tremor and increasing difficulty with ambulation and has been evaluated by neurology for possible parkinsonism.  Currently resides at home in an apartment.  He does not have any additional services provided in his living situation.  His niece Mary Kay and her siblings are his primary caregivers.  He does require more assistance with activities of daily living.  He is dependent on others for transportation at this point in time.  Transportation to and from appointments is difficult even with assistance.  He has had a likely some cognitive decline with time although this is not fully characterized.  He also has complex medical problems which she needs help in managing.  We discussed arranging for home care.  He was to start physical therapy but we will try to arrange for home care physical therapy for leg weakness. He thinks he has lost 10 lbs in the last 1-2 weeks.        Areas Assessed:      Areas visualized during today's visit: Focused:  Sacral / coccyx area    Pressure Injury Location:  SACRUM AREA        Last photo: today 9/23/22 at initial assessment  Wound type: Friction, Shear and Moisture Associated Skin Damage (MASD), pressure could not be completely r/out  Wound base: pink not granular with minimal yellow      Palpation of the wound bed: normal      Drainage: scant     Description of drainage: serous     Measurements (length x width x depth, in cm) 3 cm  x 1.5 cm superficial partial thickness wound     Tunneling N/A     Undermining N/A  Periwound skin: Intact      Color: pink blanchable      Temperature: normal   Odor: none  Pain: tender with  manipulations  Pain intervention prior to dressing change: patient tolerated well and no significant pain present   Treatment goal: Heal , Maintain (prevention of deterioration) and Protection  STATUS: initial assessment  Supplies ordered: gathered and at bedside      Treatment Plan:     Sacrum wound(s): Every other day    - Cleanse wound with wound cleanser or normal saline, gently dry;  - apply 4x4 Mepilex dressing;   Change every other day or sooner PRN for moisture/soilage    Orders: Written    RECOMMEND PRIMARY TEAM ORDER: None, at this time  Education provided: not to use diapers / depends   Discussed plan of care with: Patient and unit assistant   WOC nurse follow-up plan: twice weekly  Notify WOC if wound(s) deteriorate.  Nursing to notify the Provider(s) and re-consult the WOC Nurse if new skin concern.    DATA:     Current support surface: Standard  Foam mattress  Containment of urine/stool: external urinary catheter  BMI: Body mass index is 18.62 kg/m .   Active diet order: Orders Placed This Encounter      Diet     Output: I/O last 3 completed shifts:  In: 560 [P.O.:560]  Out: -      Labs: Recent Labs   Lab 09/20/22  0706 09/19/22  0715 09/18/22  0934   HGB  --   --  9.7*   INR 2.08* 2.11* 1.98*   WBC  --   --  7.0   CRP  --  12.5*  --      Pressure injury risk assessment:   Sensory Perception: 3-->slightly limited  Moisture: 3-->occasionally moist  Activity: 3-->walks occasionally  Mobility: 3-->slightly limited  Nutrition: 2-->probably inadequate  Friction and Shear: 2-->potential problem  Ron Score: 16    Nirali Good RN, CWON

## 2022-09-23 NOTE — PLAN OF CARE
Occupational Therapy Discharge Summary    Reason for therapy discharge:    Discharged to transitional care facility.    Progress towards therapy goal(s). See goals on Care Plan in [facility] electronic health record for goal details.  Goals partially met.  Barriers to achieving goals:   discharge from facility.    Therapy recommendation(s):    Continued therapy is recommended.  Rationale/Recommendations:  continue OT services at TCU to promote ability to perform ADLs and functional mobility.

## 2022-09-23 NOTE — DISCHARGE INSTRUCTIONS
WOUND CARE  Sacrum wound    - Cleanse wound with wound cleanser or normal saline, gently dry  - apply 4x4 Mepilex dressing     Change every other day or sooner PRN for moisture/soilage in the area

## 2022-09-23 NOTE — PLAN OF CARE
"PRIMARY DIAGNOSIS: \"GENERIC\" NURSING  OUTPATIENT/OBSERVATION GOALS TO BE MET BEFORE DISCHARGE:  1. ADLs back to baseline: No    2. Activity and level of assistance: 1-2 assist to bathroom.    3. Pain status: Pain free.    4. Return to near baseline physical activity: No     Discharge Planner Nurse   Safe discharge environment identified: Yes  Barriers to discharge: No, [place] [facility] [place] 12[place] with [facility]       Entered by: Christa Gomez RN 09/22/2022 10:34 PM    "

## 2022-09-23 NOTE — DISCHARGE SUMMARY
[facility] MEDICINE  DISCHARGE SUMMARY     Primary Care Physician: Go Ramírez  Admission Date: 9/13/2022   Discharge Provider: NTAASHA Ortega Discharge Date: 9/23/2022   Diet: as below   Code Status: Full Code   Activity: DCACTIVITY: Activity as tolerated        Condition at Discharge: Stable     REASON FOR PRESENTATION(See Admission Note for Details)   Weakness and decreased appetite    PRINCIPAL & ACTIVE DISCHARGE DIAGNOSES     Principal Problem:    Covid 19 Infection, Positive 9/13/22  Active Problems:    Benign Essential Hypertension    CRF (chronic renal failure), stage 3    History of Crohn's disease    Hypomagnesemia    ANA (acute kidney injury) (H)    Chronic systolic CHF -- Echo 6/11/21 LVEF 24%. 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            PROCEDURES ( this hospitalization only)          RECOMMENDATIONS TO OUTPATIENT PROVIDER FOR F/U VISIT     Follow-up Appointments     Follow Up and recommended labs and tests      Follow up with Nursing home physician.                 DISPOSITION     Skilled Nursing Facility    SUMMARY OF HOSPITAL COURSE:      82 year old male who was admitted on 9/13/2022 with weakness and found to be Covid 19 positive:     Covid 19 Infection, Positive 9/13/22  - No hypoxia or SOB, not on antiviral therapy  - PT, OT eval     Mild Dementia with behaviors  - Mild intermittent agitation noted during the hospital stay. Prn seroquel was helpful.   - Patient needs assistance with feeding.     Hypomagnesemia  - Replaced     Chronic systolic CHF   - Echo 6/11/21 LVEF 24%. S/P BiV Pacer  - Bumex discontinued since admission. Consider re-starting it if an e/o volume  overload is noted.      Anemia of chronic disease   - Stable    Thrombocytopenia  - Suspect 2nd to viral illness, resolved.      Chronic Atrial Fib   - Stopped Warfarin given dementia, risk of falling, and switched to aspirin 325 mg daily     Generalized Weakness   - Multifactorial (Covid, dehydration, diarrhea)  - Seems improved but still needs assistance when up     Benign Essential Hypertension  - Decreased Coreg to 6.25mg (from 37.25mg) bid, and Losartan to 50 mg (rom 100mg) daily. May not have been taking his meds at all at home before admitted per report.     ANA (acute kidney injury)   CRF (chronic renal failure), stage 3  - ANA resolved.     Diarrhea   Ulcerative Colitis   - Lomotil bid prn      Anorexia  Dehydration  - Improved     Benign Essential Hypertension  - Home meds adjusted as above     Witnessed fall:  - Patient apparently had a assisted fall here on 9/22 while being transferred. Nursing aide was in the room. No LOC. Didn't hit his head. No external injuries sustained. No need for imaging studies.        Discharge Medications with Med changes:     Discharge Medication List as of 9/23/2022 12:20 PM      START taking these medications    Details   aspirin (ASA) 325 MG EC tablet Take 1 tablet (325 mg) by mouth daily, No Print Out      traZODone (DESYREL) 50 MG tablet Take 1 tablet (50 mg) by mouth At Bedtime, No Print Out         CONTINUE these medications which have CHANGED    Details   carvedilol (COREG) 6.25 MG tablet Take 1 tablet (6.25 mg) by mouth 2 times daily (with meals), No Print Out      gabapentin (NEURONTIN) 100 MG capsule Take 1 capsule (100 mg) by mouth 2 times daily, Disp-90 capsule, R-4, No Print Out      losartan (COZAAR) 50 MG tablet Take 1 tablet (50 mg) by mouth daily, No Print Out         CONTINUE these medications which have NOT CHANGED    Details   acetaminophen (TYLENOL) 500 MG tablet Take 1,000 mg by mouth every 8 hours as needed , Historical      albuterol (PROAIR  HFA/PROVENTIL HFA/VENTOLIN HFA) 108 (90 Base) MCG/ACT inhaler Inhale 1-2 puffs into the lungs every 4 hours as needed , Historical      calcium carbonate-vitamin D 600-200 MG-UNIT TABS Take 2 tablets by mouth daily , Historical      carbidopa-levodopa (SINEMET)  MG tablet Take 1 tablet by mouth 3 times daily Take at least 30 min before meals or 2 hours after meals. Do not mix with food., Disp-90 tablet, R-1, E-Prescribe      carboxymethylcellulose PF (REFRESH PLUS) 0.5 % ophthalmic solution 1 drop 4 times daily as needed for dry eyes, Historical      cycloSPORINE (RESTASIS) 0.05 % ophthalmic emulsion 1 drop 2 times daily as needed for dry eyes, Historical      diclofenac (VOLTAREN) 1 % topical gel Apply 2 g topically 2 times daily , Historical      erythromycin (ROMYCIN) 5 MG/GM ophthalmic ointment Place 0.5 inches into both eyes daily Historical      ferrous sulfate (FEROSUL) 325 (65 Fe) MG tablet Take 1 tablet by mouth daily, Historical      multivitamin w/minerals (THERA-VIT-M) tablet Take 1 tablet by mouth daily, Historical      omeprazole (PRILOSEC) 20 MG DR capsule TAKE ONE CAPSULE BY MOUTH ONCE DAILY BEFORE BREAKFAST, Disp-90 capsule, R-1, E-Prescribe      rosuvastatin (CRESTOR) 20 MG tablet TAKE 1 TABLET BY MOUTH DAILY AT BEDTIME, Disp-90 tablet, R-3, E-Prescribe      vitamin B-12 (CYANOCOBALAMIN) 2000 MCG tablet Take 2,000 mcg by mouth daily, Historical      cyclobenzaprine (FLEXERIL) 5 MG tablet Take 1 tablet (5 mg) by mouth 2 times daily as needed for muscle spasms, Disp-30 tablet, R-3, E-Prescribe      Lidocaine (LIDOCARE) 4 % Patch Place 1 patch onto the skin daily as needed for moderate pain To prevent lidocaine toxicity, patient should be patch free for 12 hrs daily.Disp-10 patch, [id number]         STOP taking these medications       benztropine (COGENTIN) 1 MG tablet Comments:   Reason for Stopping:         bumetanide (BUMEX) 1 MG tablet Comments:   Reason for Stopping:         potassium  chloride ER (KLOR-CON M) 20 MEQ CR tablet Comments:   Reason for Stopping:         traMADol (ULTRAM) 50 MG tablet Comments:   Reason for Stopping:         warfarin ANTICOAGULANT (COUMADIN) 2.5 MG tablet Comments:   Reason for Stopping:                     Rationale for medication changes:      Please see the summary above        Consults   None      Immunizations given this encounter     Most Recent Immunizations   Administered Date(s) Administered     COVID-19,PF,Pfizer (12+ Yrs) 11/22/2021     COVID-19,PF,Pfizer 12+ Yrs (2022 and After) 04/25/2022     DT (PEDS <7y) 03/01/2001     FLU 6-35 months 09/09/2010     Flu, Unspecified 09/09/2010     HepB-Adult 10/05/2018     Influenza (H1N1) 01/27/2010     Influenza (High Dose) 3 valent vaccine 09/08/2020     Influenza (IIV3) PF 10/01/2014     Influenza Vaccine, 6+MO IM (QUADRIVALENT W/PRESERVATIVES) 10/01/2014     Influenza, Quad, High Dose, Pf, 65yr+ (Fluzone HD) 10/01/2021     Influenza,INJ,MDCK,PF,Quad >6mo(Flucelvax-RMG) 09/30/2018     Pneumo Conj 13-V (2010&after) 10/01/2014     Pneumococcal 23 valent 09/08/2005     TD (ADULT, 7+) 08/14/2021     TDAP Vaccine (Boostrix) 11/09/2015     Td (Adult), Adsorbed 08/14/2021     Td, Absorbed, Pf, Adult, Lf Unspecified 08/14/2021     Tdap (Adacel,Boostrix) 11/09/2015     Tdap (Adult) Unspecified Formulation 03/01/2001     Zoster vaccine recombinant adjuvanted (SHINGRIX) 12/08/2020     Zoster vaccine, live 12/16/2010           Anticoagulation Information      Recent INR results:   Recent Labs   Lab 09/20/22  0706 09/19/22  0715 09/18/22  0934 09/17/22  0645   INR 2.08* 2.11* 1.98* 1.95*     Warfarin doses (if applicable) or name of other anticoagulant: NA      SIGNIFICANT IMAGING FINDINGS     Results for orders placed or performed during the hospital encounter of 09/13/22   CT Abdomen Pelvis w/o Contrast    Impression    IMPRESSION:   1.  No acute findings or inflammatory changes in the abdomen or pelvis.         SIGNIFICANT  "LABORATORY FINDINGS     Most Recent 3 CBC's:Recent Labs   Lab Test 09/18/22  0934 09/17/22  0645 09/14/22  1000   WBC 7.0 5.8 7.9   HGB 9.7* 8.1* 9.4*   MCV 96 97 97    131* 170     Most Recent 3 BMP's:Recent Labs   Lab Test 09/19/22  0715 09/17/22  0645 09/14/22  1000   * 134* 139   POTASSIUM 3.9 3.9 4.1   CHLORIDE 103 104 105   CO2 27 24 25   BUN 19 18 21   CR 1.03 1.18 1.36*   ANIONGAP 5 6 9   RUBÉN 8.8 8.4* 9.1   GLC 75 77 83     Most Recent 3 INR's:Recent Labs   Lab Test 09/20/22  0706 09/19/22  0715 09/18/22  0934   INR 2.08* 2.11* 1.98*         Discharge Orders        Primary Care - Care Coordination Referral      General info for SNF    Length of Stay Estimate: Short Term Care: Estimated # of Days <30  Condition at Discharge: Improving  Level of care:skilled   Rehabilitation Potential: Good  Admission H&P remains valid and up-to-date: Yes  Recent Chemotherapy: N/A  Use Nursing Home Standing Orders: Yes     Mantoux instructions    Give two-step Mantoux (PPD) Per Facility Policy Yes     Follow Up and recommended labs and tests    Follow up with Nursing home physician.     Reason for your hospital stay    Weakness, failure to thrive, COVID-19 infection     Wound care    Site:   Coccyx  Instructions:  Per WO's instructions     Activity - Up with assistive device     Physical Therapy Adult Consult    Evaluate and treat as clinically indicated.    Reason:  Weakness     Occupational Therapy Adult Consult    Evaluate and treat as clinically indicated.    Reason:  Weakness     Fall precautions     Diet    Follow this diet upon discharge: Orders Placed This Encounter      Room Service      Snacks/Supplements Adult: Magic Cup; With Meals      Snacks/Supplements Adult: Ensure Enlive; With Meals      Regular Diet Adult       Examination   /61 (BP Location: Left arm)   Pulse 73   Temp 97.8  F (36.6  C) (Oral)   Resp 16   Ht 1.753 m (5' 9\")   Wt 57.2 kg (126 lb 1.6 oz)   SpO2 98%   BMI 18.62 kg/m  "     General: Not in obvious distress.  HEENT: NC, AT. Edentulous   Chest: Clear to auscultation bilaterally  Heart: S1S2 normal, regular. No M/R/G  Abdomen: Soft. NT, ND. Bowel sounds- active.  Extremities: No leg swelling  Neuro: Awake, grossly non-focal  Please see EMR for more detailed significant labs, imaging, consultant notes etc.    I, NATASHA Ortega, personally saw the patient today and spent greater than 30 minutes discharging this patient.    NATASHA Ortega  [facility]    CC:Go Ramírez

## 2022-09-23 NOTE — PLAN OF CARE
"PRIMARY DIAGNOSIS: \"GENERIC\" NURSING  OUTPATIENT/OBSERVATION GOALS TO BE MET BEFORE DISCHARGE:  ADLs back to baseline: No    Activity and level of assistance: 1-2 assist to bathroom    Pain status: Pain free.    Return to near baseline physical activity: No     Discharge Planner Nurse   Safe discharge environment identified: Yes  Barriers to discharge: Yes       Entered by: Christa Gomez RN 09/22/2022 9:23 PM    Pt alert and oriented to self, place, and situation. Orientation fluctuates, pleasant. Has been cooperative and directable. Denies pain. Ate 25% of intake. Encourage fluid intake.        "

## 2022-09-23 NOTE — PLAN OF CARE
PRIMARY DIAGNOSIS: GENERALIZED WEAKNESS    OUTPATIENT/OBSERVATION GOALS TO BE MET BEFORE DISCHARGE  1. Orthostatic performed: N/A    2. Tolerating PO medications: Yes    3. Return to near baseline physical activity: Yes    4. Cleared for discharge by consultants (if involved): Yes    Discharge Planner Nurse   Safe discharge environment identified: Yes  Barriers to discharge: No       Entered by: Jyoti Branch RN 09/23/2022 6:44 AM     Please review provider order for any additional goals.   Nurse to notify provider when observation goals have been met and patient is ready for discharge.Goal Outcome Evaluation:

## 2022-09-27 NOTE — PROGRESS NOTES
Clinic Care Coordination Contact    Community Health Worker Follow Up    Care Gaps:     Health Maintenance Due   Topic Date Due     HF ACTION PLAN  Never done     MICROALBUMIN  05/22/2020     COVID-19 Vaccine (5 - Booster for Pfizer series) 06/20/2022     INFLUENZA VACCINE (1) 09/01/2022       Care Plan: not addressed this encounter.    Intervention and Education during outreach: CHW gave patients krystin Muñiz contact information and encouraged her to reach out with any questions or concerns.    CHW Note:    CHW received VM from patients krystin Muñiz requesting return call. CHW called patients krystin Muñiz back as requested.    Mary Kay shared that the patient was moved to [facility] in [place] on 9/23/22. Mary Kay states she has not heard anything from the TCU since the patient moved. Mary Kay wants to ensure that when the patient is ready to come home they have resources in place if needed.    CHW will forward chart to lead CCC JUANCARLOS King to review patients recent changes.    CHW encouraged Mary Kay to reach out if she has any questions or concerns.     CHW Plan: CHW will continue to support patient with goals through routine scheduled outreach. CHW will outreach in one month on 10/27/22.      Lawanda Hsu  Community Health Worker   [facility]  Clinic Care Coordination  [facility]   [email or web address]  Office: [phone]

## 2022-09-27 NOTE — PROGRESS NOTES
Clinic Care Coordination Contact  Care Coordination Transition Communication         Clinical Data: Patient was hospitalized at [facility] from 9/13/22 to 9/23/22 with diagnosis of COVID 19 infection.     Transition to Facility:              [facility]              Contact name and phone number/fax: [phone]    Plan: RN/SW Care Coordinator will await notification from facility staff informing RN/SW Care Coordinator of patient's discharge plans/needs. RN/SW Care Coordinator will review chart and outreach to facility staff every 4 weeks and as needed.     David Myhre, RN  CCC RN

## 2022-10-07 NOTE — TELEPHONE ENCOUNTER
10/7/22 Called patient to ask if any symptoms with fast HR on 9/6/22. No answer, unable to leave message.  Patient was not in the hospital at that time, was admitted on 9/13 with low Magnesium of 1.1.  Patient needs [place] reprogramming, will schedule at same time as appt with Dr Fleming on 11/17/2022. Aimee Galicia Device RN          Type: remote CRT-D transmission, missed September clinic visit due to hospitalization.  Device: Medtronic Cobalt BiV ICD  Pacing%/Programmed: biVP 89.2% in VVIR  ppm mode.  Lead(s): stable RV and LV  Battery longevity: estimated 6 yrs 4 mos  Presenting: biV pacing and sensing, rate 81 bpm.  Atrial arrhythmias: n/a  Anticoagulant: warfarin  Ventricular arrhythmias: since 8/8/22, 28 nonsustained in VT zone, EGMs suggest RVR vs VT. Overall V-rates >/=120 bpm 5%.   Therapies/settings: slow  bpm, fast  bpm,  bpm.  Device/Lead alerts: device on advisory, software patch needed.  Comments: normal ICD function. Routed to device RN. Optivol elevating just slightly above normal.  Plan: due for device clinic visit to upload software fix. Letter sent asking patient to schedule.  KERI Roberts, Device Specialist  Add: reviewed transmission. Will call patient, see Epic note.  Aimee Galicia Device RN

## 2022-10-14 NOTE — TELEPHONE ENCOUNTER
Patient's niece Mary Kay called with questions about turning off ICD therapies. Per Mary Kay, patient has recently been enrolled in hospice and the the doctor recommended turning off therapies. Mary Kay states that she and her siblings are still deciding what to do. She furthermore reports that patient's resuscitation status is now DNR. Mary Kay was informed that it is typical and normal to turn off therapies when patients enroll in hospice. Mary Kay was also informed that an order from the hospice MD would need to be faxed to the device clinic. Mary Kay verbalized understanding and states that once they decide, they will be in touch. She denies further questions or concerns at this time.     Osiris Kaur, JUANCARLOS   Device Clinic

## 2022-10-18 NOTE — PROGRESS NOTES
Clinic Care Coordination Contact  [facility] RN spoke with patient's niece Mary Kay this afternoon. Mary Kay said she wanted patient's care team to know he passed away this morning under hospice care.

## 2022-10-20 LAB
MDC_IDC_EPISODE_DTM: NORMAL
MDC_IDC_EPISODE_DURATION: 1 S
MDC_IDC_EPISODE_DURATION: 11 S
MDC_IDC_EPISODE_DURATION: 14 S
MDC_IDC_EPISODE_DURATION: 15 S
MDC_IDC_EPISODE_DURATION: 16 S
MDC_IDC_EPISODE_DURATION: 16 S
MDC_IDC_EPISODE_DURATION: 17 S
MDC_IDC_EPISODE_DURATION: 19 S
MDC_IDC_EPISODE_DURATION: 21 S
MDC_IDC_EPISODE_DURATION: 23 S
MDC_IDC_EPISODE_DURATION: 31 S
MDC_IDC_EPISODE_DURATION: 5 S
MDC_IDC_EPISODE_DURATION: 72 S
MDC_IDC_EPISODE_DURATION: 8 S
MDC_IDC_EPISODE_DURATION: 94 S
MDC_IDC_EPISODE_ID: 53
MDC_IDC_EPISODE_ID: 54
MDC_IDC_EPISODE_ID: 55
MDC_IDC_EPISODE_ID: 57
MDC_IDC_EPISODE_ID: 58
MDC_IDC_EPISODE_ID: 59
MDC_IDC_EPISODE_ID: 62
MDC_IDC_EPISODE_ID: 65
MDC_IDC_EPISODE_ID: 67
MDC_IDC_EPISODE_ID: 69
MDC_IDC_EPISODE_ID: 72
MDC_IDC_EPISODE_ID: 74
MDC_IDC_EPISODE_ID: 76
MDC_IDC_EPISODE_ID: 79
MDC_IDC_EPISODE_ID: 82
MDC_IDC_EPISODE_ID: 85
MDC_IDC_EPISODE_ID: 88
MDC_IDC_EPISODE_ID: 89
MDC_IDC_EPISODE_ID: 90
MDC_IDC_EPISODE_ID: 91
MDC_IDC_EPISODE_ID: 92
MDC_IDC_EPISODE_ID: 93
MDC_IDC_EPISODE_ID: 94
MDC_IDC_EPISODE_THERAPY_RESULT: NORMAL
MDC_IDC_EPISODE_TYPE: NORMAL
MDC_IDC_LEAD_IMPLANT_DT: NORMAL
MDC_IDC_LEAD_IMPLANT_DT: NORMAL
MDC_IDC_LEAD_LOCATION: NORMAL
MDC_IDC_LEAD_LOCATION: NORMAL
MDC_IDC_LEAD_LOCATION_DETAIL_1: NORMAL
MDC_IDC_LEAD_LOCATION_DETAIL_1: NORMAL
MDC_IDC_LEAD_MFG: NORMAL
MDC_IDC_LEAD_MFG: NORMAL
MDC_IDC_LEAD_MODEL: NORMAL
MDC_IDC_LEAD_MODEL: NORMAL
MDC_IDC_LEAD_POLARITY_TYPE: NORMAL
MDC_IDC_LEAD_POLARITY_TYPE: NORMAL
MDC_IDC_LEAD_SERIAL: NORMAL
MDC_IDC_LEAD_SERIAL: NORMAL
MDC_IDC_LEAD_SPECIAL_FUNCTION: NORMAL
MDC_IDC_MSMT_BATTERY_DTM: NORMAL
MDC_IDC_MSMT_BATTERY_REMAINING_LONGEVITY: 51 MO
MDC_IDC_MSMT_BATTERY_RRT_TRIGGER: NORMAL
MDC_IDC_MSMT_BATTERY_VOLTAGE: 2.89 V
MDC_IDC_MSMT_CAP_CHARGE_DTM: NORMAL
MDC_IDC_MSMT_CAP_CHARGE_ENERGY: 40 J
MDC_IDC_MSMT_CAP_CHARGE_TIME: 7.8 S
MDC_IDC_MSMT_CAP_CHARGE_TYPE: NORMAL
MDC_IDC_MSMT_LEADCHNL_LV_IMPEDANCE_VALUE: 209 OHM
MDC_IDC_MSMT_LEADCHNL_LV_IMPEDANCE_VALUE: 266 OHM
MDC_IDC_MSMT_LEADCHNL_LV_IMPEDANCE_VALUE: 285 OHM
MDC_IDC_MSMT_LEADCHNL_LV_IMPEDANCE_VALUE: 304 OHM
MDC_IDC_MSMT_LEADCHNL_LV_IMPEDANCE_VALUE: 342 OHM
MDC_IDC_MSMT_LEADCHNL_LV_IMPEDANCE_VALUE: 418 OHM
MDC_IDC_MSMT_LEADCHNL_LV_IMPEDANCE_VALUE: 418 OHM
MDC_IDC_MSMT_LEADCHNL_LV_IMPEDANCE_VALUE: 456 OHM
MDC_IDC_MSMT_LEADCHNL_LV_IMPEDANCE_VALUE: 513 OHM
MDC_IDC_MSMT_LEADCHNL_LV_IMPEDANCE_VALUE: 513 OHM
MDC_IDC_MSMT_LEADCHNL_LV_PACING_THRESHOLD_AMPLITUDE: 2 V
MDC_IDC_MSMT_LEADCHNL_LV_PACING_THRESHOLD_PULSEWIDTH: 0.4 MS
MDC_IDC_MSMT_LEADCHNL_RA_IMPEDANCE_VALUE: 3000 OHM
MDC_IDC_MSMT_LEADCHNL_RV_IMPEDANCE_VALUE: 266 OHM
MDC_IDC_MSMT_LEADCHNL_RV_IMPEDANCE_VALUE: 304 OHM
MDC_IDC_MSMT_LEADCHNL_RV_PACING_THRESHOLD_AMPLITUDE: 0.75 V
MDC_IDC_MSMT_LEADCHNL_RV_PACING_THRESHOLD_PULSEWIDTH: 0.4 MS
MDC_IDC_MSMT_LEADCHNL_RV_SENSING_INTR_AMPL: 1.8 MV
MDC_IDC_PG_IMPLANT_DTM: NORMAL
MDC_IDC_PG_MFG: NORMAL
MDC_IDC_PG_MODEL: NORMAL
MDC_IDC_PG_SERIAL: NORMAL
MDC_IDC_PG_TYPE: NORMAL
MDC_IDC_SESS_CLINIC_NAME: NORMAL
MDC_IDC_SESS_DTM: NORMAL
MDC_IDC_SESS_TYPE: NORMAL
MDC_IDC_SET_BRADY_LOWRATE: 70 {BEATS}/MIN
MDC_IDC_SET_BRADY_MAX_SENSOR_RATE: 130 {BEATS}/MIN
MDC_IDC_SET_BRADY_MODE: NORMAL
MDC_IDC_SET_CRT_LVRV_DELAY: 0 MS
MDC_IDC_SET_CRT_PACED_CHAMBERS: NORMAL
MDC_IDC_SET_LEADCHNL_LV_PACING_AMPLITUDE: 3 V
MDC_IDC_SET_LEADCHNL_LV_PACING_ANODE_ELECTRODE_1: NORMAL
MDC_IDC_SET_LEADCHNL_LV_PACING_ANODE_LOCATION_1: NORMAL
MDC_IDC_SET_LEADCHNL_LV_PACING_CAPTURE_MODE: NORMAL
MDC_IDC_SET_LEADCHNL_LV_PACING_CATHODE_ELECTRODE_1: NORMAL
MDC_IDC_SET_LEADCHNL_LV_PACING_CATHODE_LOCATION_1: NORMAL
MDC_IDC_SET_LEADCHNL_LV_PACING_POLARITY: NORMAL
MDC_IDC_SET_LEADCHNL_LV_PACING_PULSEWIDTH: 0.4 MS
MDC_IDC_SET_LEADCHNL_RA_SENSING_ANODE_ELECTRODE_1: NORMAL
MDC_IDC_SET_LEADCHNL_RA_SENSING_ANODE_LOCATION_1: NORMAL
MDC_IDC_SET_LEADCHNL_RA_SENSING_CATHODE_ELECTRODE_1: NORMAL
MDC_IDC_SET_LEADCHNL_RA_SENSING_CATHODE_LOCATION_1: NORMAL
MDC_IDC_SET_LEADCHNL_RA_SENSING_POLARITY: NORMAL
MDC_IDC_SET_LEADCHNL_RA_SENSING_SENSITIVITY: NORMAL
MDC_IDC_SET_LEADCHNL_RV_PACING_AMPLITUDE: 1.5 V
MDC_IDC_SET_LEADCHNL_RV_PACING_ANODE_ELECTRODE_1: NORMAL
MDC_IDC_SET_LEADCHNL_RV_PACING_ANODE_LOCATION_1: NORMAL
MDC_IDC_SET_LEADCHNL_RV_PACING_CAPTURE_MODE: NORMAL
MDC_IDC_SET_LEADCHNL_RV_PACING_CATHODE_ELECTRODE_1: NORMAL
MDC_IDC_SET_LEADCHNL_RV_PACING_CATHODE_LOCATION_1: NORMAL
MDC_IDC_SET_LEADCHNL_RV_PACING_POLARITY: NORMAL
MDC_IDC_SET_LEADCHNL_RV_PACING_PULSEWIDTH: 0.4 MS
MDC_IDC_SET_LEADCHNL_RV_SENSING_ANODE_ELECTRODE_1: NORMAL
MDC_IDC_SET_LEADCHNL_RV_SENSING_ANODE_LOCATION_1: NORMAL
MDC_IDC_SET_LEADCHNL_RV_SENSING_CATHODE_ELECTRODE_1: NORMAL
MDC_IDC_SET_LEADCHNL_RV_SENSING_CATHODE_LOCATION_1: NORMAL
MDC_IDC_SET_LEADCHNL_RV_SENSING_POLARITY: NORMAL
MDC_IDC_SET_LEADCHNL_RV_SENSING_SENSITIVITY: 0.45 MV
MDC_IDC_SET_ZONE_DETECTION_BEATS_DENOMINATOR: 40 {BEATS}
MDC_IDC_SET_ZONE_DETECTION_BEATS_NUMERATOR: 30 {BEATS}
MDC_IDC_SET_ZONE_DETECTION_INTERVAL: 260 MS
MDC_IDC_SET_ZONE_DETECTION_INTERVAL: 310 MS
MDC_IDC_SET_ZONE_DETECTION_INTERVAL: 330 MS
MDC_IDC_SET_ZONE_DETECTION_INTERVAL: 400 MS
MDC_IDC_SET_ZONE_TYPE: NORMAL
MDC_IDC_STAT_AT_BURDEN_PERCENT: 0 %
MDC_IDC_STAT_AT_DTM_END: NORMAL
MDC_IDC_STAT_AT_DTM_START: NORMAL
MDC_IDC_STAT_BRADY_DTM_END: NORMAL
MDC_IDC_STAT_BRADY_DTM_START: NORMAL
MDC_IDC_STAT_BRADY_RV_PERCENT_PACED: 85.67 %
MDC_IDC_STAT_CRT_DTM_END: NORMAL
MDC_IDC_STAT_CRT_DTM_START: NORMAL
MDC_IDC_STAT_CRT_LV_PERCENT_PACED: 85.67 %
MDC_IDC_STAT_CRT_PERCENT_PACED: 85.67 %
MDC_IDC_STAT_EPISODE_RECENT_COUNT: 0
MDC_IDC_STAT_EPISODE_RECENT_COUNT: 1
MDC_IDC_STAT_EPISODE_RECENT_COUNT: 14
MDC_IDC_STAT_EPISODE_RECENT_COUNT: 4
MDC_IDC_STAT_EPISODE_RECENT_COUNT_DTM_END: NORMAL
MDC_IDC_STAT_EPISODE_RECENT_COUNT_DTM_START: NORMAL
MDC_IDC_STAT_EPISODE_TOTAL_COUNT: 0
MDC_IDC_STAT_EPISODE_TOTAL_COUNT: 1
MDC_IDC_STAT_EPISODE_TOTAL_COUNT: 18
MDC_IDC_STAT_EPISODE_TOTAL_COUNT: 45
MDC_IDC_STAT_EPISODE_TOTAL_COUNT_DTM_END: NORMAL
MDC_IDC_STAT_EPISODE_TOTAL_COUNT_DTM_START: NORMAL
MDC_IDC_STAT_EPISODE_TYPE: NORMAL
MDC_IDC_STAT_TACHYTHERAPY_ATP_DELIVERED_RECENT: 3
MDC_IDC_STAT_TACHYTHERAPY_ATP_DELIVERED_TOTAL: 4
MDC_IDC_STAT_TACHYTHERAPY_RECENT_DTM_END: NORMAL
MDC_IDC_STAT_TACHYTHERAPY_RECENT_DTM_START: NORMAL
MDC_IDC_STAT_TACHYTHERAPY_SHOCKS_ABORTED_RECENT: 5
MDC_IDC_STAT_TACHYTHERAPY_SHOCKS_ABORTED_TOTAL: 5
MDC_IDC_STAT_TACHYTHERAPY_TOTAL_DTM_END: NORMAL
MDC_IDC_STAT_TACHYTHERAPY_TOTAL_DTM_START: NORMAL

## 2023-04-03 NOTE — TELEPHONE ENCOUNTER
Anticoagulation Annual Referral Renewal Review    Tanmay Israel's chart reviewed for annual renewal of referral to anticoagulation monitoring.        Criteria for anticoagulation nurse and/or pharmacist renewal met   Warfarin indication: Atrial Fibrillation Yes, per indication   Current with INR monitoring/compliant Yes Yes   Date of last office visit 9/24/19 Yes, had office visit within last year   Time in Therapeutic Range (TTR) n/a n/a       Tanmay Israel met all criteria for anticoagulation management program initiated renewal.  New INR standing orders and anticoagulation referral renewal placed.      Edgar Ortez RN  9:08 AM       [Negative] : Heme/Lymph

## 2023-05-11 NOTE — PROGRESS NOTES
-cont Cardizem CD (dose increased prior to transfer by cardiology) and Toprol-XL  -was transiently placed on an Amiodarone infusion in the [address] [place] [address] ED on arrival 5/8/23  -cont Xarelto  -Recent cardioversion last month and history of ablation in June 2022  -Transferred to the [address] [place] [address] for electrophysiology evaluation/intervention    -s/p AV Drew Junctional ablation on 5/10/23  -tele ANTICOAGULATION MANAGEMENT     Tanmay Israel 81 year old male is on warfarin with therapeutic INR result. (Goal INR 2.0-3.0)    Recent labs: (last 7 days)     03/07/22  0949   INR 2.8*       ASSESSMENT       Source(s): Chart Review, Patient/Caregiver Call and Template       Warfarin doses taken: Less warfarin taken than planned which may be affecting INR-- confirmed tablet strength with patient and dosing.    Diet: No new diet changes identified    New illness, injury, or hospitalization: No    Medication/supplement changes: None noted    Signs or symptoms of bleeding or clotting: No    Previous INR: Subtherapeutic    Additional findings: None       PLAN     Recommended plan for no diet, medication or health factor changes affecting INR     Dosing Instructions: Continue your current warfarin dose as you have been taking it with next INR in 3 weeks       Summary  As of 3/7/2022    Full warfarin instructions:  1.25 mg every Fri; 2.5 mg all other days   Next INR check:  3/28/2022             Telephone call with Tanmay who verbalizes understanding and agrees to plan    Patient offered & declined to schedule next visit    Education provided: Goal range and significance of current result and Contact [phone] with any changes, questions or concerns.     Plan made per [facility] anticoagulation protocol    Yaneth Wells RN  Anticoagulation Clinic  3/7/2022    _______________________________________________________________________     Anticoagulation Episode Summary     Current INR goal:  2.0-3.0   TTR:  46.9 % (1 y)   Target end date:  Indefinite   Send INR reminders to:  CHANI RIOS    Indications    Atrial fibrillation (H) [I48.91]  Pulmonary embolism with infarction (H) [I26.99]  Atrial fibrillation  unspecified type (H) [I48.91]           Comments:           Anticoagulation Care Providers     Provider Role Specialty Phone number    Go Ramírez MD Referring Family Medicine [phone]

## 2023-06-06 NOTE — ADDENDUM NOTE
Encounter addended by: Sharon Arora, PT on: 6/6/2023 3:47 PM   Actions taken: Episode resolved, Clinical Note Signed, Flowsheet accepted

## 2023-06-06 NOTE — PROGRESS NOTES
07/21/22 1300   Appointment Info   Signing clinician's name / credentials Sharon Arora, PT, DPT, CLT-NOE   Visits Used 1/12   Medical Diagnosis Bilateral leg weakness   PT Tx Diagnosis impaired gait, balance, mobility and endurance, LE weakness, unsteadiness on feet   Progress Note/Certification   Start of Care Date 07/21/22   Predicted Duration 12   Certification date from 07/21/22   Certification date to 10/19/22   Progress Note Due Date   (10th session)   GOALS   PT Goals 1;3;2   PT Goal 1   Goal Identifier HEP   Goal Description Pt will be independent in HEP to address impairments and improve function and decrease falls risk   Target Date 10/19/22   PT Goal 2   Goal Identifier TUG   Goal Description Pt will demonstrate a decreased falls risk by decreasing their TUG score by >10 seconds   Target Date 10/19/22   PT Goal 3   Goal Identifier APTA STS   Goal Description Pt will be able to complete >5 reps of STS with use of UEs   Target Date 10/19/22   Vitals Signs   Heart Rate 88  (at rest)   SpO2 96  (at rest)   Blood Pressure 95/65  (not symptomatic and encouraged pt to drink water)   Treatment Interventions (PT)   Interventions Neuromuscular Re-education   Neuromuscular Re-education   Neuromuscular re-ed of mvmt, balance, coord, kinesthetic sense, posture, proprioception minutes (95976) 10   Skilled Intervention Initiation of HEP to reduce falls risk and education   Patient Response/Progress verbalized and demonstrated understanding   Treatment Detail Do all exercises next to counter: feet together x 30 sec hold, standing heel raises 10x juanis UE support, standing hip abd 10x B, education on falls risk and to use SEC at all times, pt would benefit from use of walker - provider will reach out to PCP for order   Eval/Assessments   PT Eval, Low Complexity Minutes (97185) 35  (pt arrived 11 min late as he went to the wrong location first)   Education   Learner/Method Patient   Plan   Home program PTRx   Plan  for next session 2 min walk test, Kaye, review HEP and progress as able, walker update   Medicare Claim Information   Medical Diagnosis Bilateral leg weakness       DISCHARGE  Reason for Discharge: Pt cancelled remaining PT appointments.     Equipment Issued: none    Referring Provider:  Go Arora, PT, DPT, CLT-NOE

## 2023-08-24 NOTE — PROGRESS NOTES
[facility] Daily Progress     Patient Name: Tanmay Israel  Date: 11/1/2017  Visit #: 2  PTA visit #:    Date of evaluation: 10/25/2017  Referral Diagnosis: Osteoarthrosis involving lower leg  Referring provider: Go Ramírez MD  Visit Diagnosis:     ICD-10-CM    1. Chronic pain of both knees M25.561     M25.562     G89.29    2. Muscle weakness (generalized) M62.81      Initial Assessment  Tanmay Israel is a 77 y.o. male who presents to therapy today with chief complaints of leg weakness and pain from back and knees that has been chronic in nature and progressively getting worse. He is most concerned about the weakness as he is limited in getting out of a chair without use of UEs and getting down to line up a put and back up off the ground. Patient tested 6 reps in 30 seconds on sit to stand test but was able to do it without the use of his arms, this is below the age gender norm of 14 reps and puts him at an increase risk for falls. Patient has mild limitations in knee extension but WNL knee flexion. Patient is unable to SL stance on the right side but able to do 3-5 seconds on the left side. Patient is able to heel and toe walk without increase in pain. Patient will benefit from skilled PT intervention to increase in strength and overall functional mobility    Assessment:     HEP/POC compliance is  good .  Patient demonstrates understanding/independence with home program.  Patient is benefitting from skilled physical therapy and is making steady progress toward functional goals.  Patient is appropriate to continue with skilled physical therapy intervention, as indicated by initial plan of care.    Goal Status:  Pt. will be independent with home exercise program in : 6 weeks  Pt. will be able to walk : 30 minutes;on uneven/inclined surfaces;on even surfaces;with less difficulty;for community mobility;for exercise/recreation;in 6 weeks  Patient will transfer: sit/stand;for car;for in/out of chair;with  less difficulty;in 6 weeks  Pt will: complete floor to stand transfer wtih modified indpendence in 6 weeks    Plan / Patient Education:     Continue with initial plan of care.  Progress with home program as tolerated.   Plan for next visit:  review HEP, progress exercises toward functional limitations      Subjective:     Pain Ratin  Patient reports the knees are sore still but feels that the Lam chi is better and he is able to get out of the chair with increase ease. Has been working on the sit to stands and has been trying to go up the steps and its going ok.       Objective:     HEP  Sit to stands  SLR  Squats     Treatment Today   2017  TREATMENT MINUTES COMMENTS   Evaluation     Self-care/ Home management     Manual therapy     Neuromuscular Re-education     Therapeutic Activity     Therapeutic Exercises 25  Floor transfer x 2   Wall squats x 5 reps was painful so changed to door squats holding on to door handle for cues into posterior weight shift x 10 reps  SLR x 10 reps B  Reviewed sit to stands x 10 reps from standard chair    Gait training     Modality__________________                Total 25     Blank areas are intentional and mean the treatment did not include these items.     Mary Rosas, PT, DPT   2017     no...

## 2023-11-01 NOTE — PROGRESS NOTES
Nutrition Note    Received nutrition consult.  RD cannot consult on observation patients or outpatients in beds d/t licensure issues and CMS conditions of participation.  RD will monitor for any status changes and will complete consult if patient changes to inpatient status.    Enbrel Counseling:  I discussed with the patient the risks of etanercept including but not limited to myelosuppression, immunosuppression, autoimmune hepatitis, demyelinating diseases, lymphoma, and infections.  The patient understands that monitoring is required including a PPD at baseline and must alert us or the primary physician if symptoms of infection or other concerning signs are noted.

## 2023-11-24 NOTE — PROGRESS NOTES
[facility] Daily Progress/Discharge Summary    Patient Name: Tanmay Israel  Date: 11/29/2017  Visit #: 4  PTA visit #:    Date of evaluation: 10/25/2017  Referral Diagnosis: Osteoarthrosis involving lower leg  Referring provider: Go Ramírez MD  Visit Diagnosis:     ICD-10-CM    1. Chronic pain of both knees M25.561     M25.562     G89.29    2. Muscle weakness (generalized) M62.81    3. Chronic right sacroiliac joint pain M53.3     G89.29      Initial Assessment  Tanmay Israel is a 77 y.o. male who presents to therapy today with chief complaints of leg weakness and pain from back and knees that has been chronic in nature and progressively getting worse. He is most concerned about the weakness as he is limited in getting out of a chair without use of UEs and getting down to line up a put and back up off the ground. Patient tested 6 reps in 30 seconds on sit to stand test but was able to do it without the use of his arms, this is below the age gender norm of 14 reps and puts him at an increase risk for falls. Patient has mild limitations in knee extension but WNL knee flexion. Patient is unable to SL stance on the right side but able to do 3-5 seconds on the left side. Patient is able to heel and toe walk without increase in pain. Patient will benefit from skilled PT intervention to increase in strength and overall functional mobility    Assessment:     HEP/POC compliance is  good .  Patient demonstrates understanding/independence with home program.  Patient is benefitting from skilled physical therapy and is making steady progress toward functional goals.  Patient will be getting and injection today and then leaving for [place] in 2 weeks, chart will be held until Dec 1th, then consider the chart discharged if the patient does not return to the clinic.     Goal Status:  Pt. will be independent with home exercise program in : 6 weeks  Pt. will be able to walk : 30 minutes;on uneven/inclined  surfaces;on even surfaces;with less difficulty;for community mobility;for exercise/recreation;in 6 weeks  Patient will transfer: sit/stand;for car;for in/out of chair;with less difficulty;in 6 weeks  Pt will: complete floor to stand transfer wtih modified indpendence in 6 weeks    Plan / Patient Education:     Hold chart x 2 weeks, Discharge after that time   Plan for next visit:  Follow up in 2 weeks,  review HEP, progress exercises toward functional limitations      Subjective:     Pain Ratin   Patient reports he has been more and more painful and he will be getting an injection in his low back today over at the spine [facility] at 2pm. He will be leaving for [place] in 2 weeks. Exercises are being done every day and he feels they are going ok.      Objective:       Treatment Today   2017  TREATMENT MINUTES COMMENTS   Evaluation     Self-care/ Home management     Manual therapy     Neuromuscular Re-education     Therapeutic Activity     Therapeutic Exercises 28 NuStep WL 4 x 6 min  Squats x 15 reps  Added standing hip abduction 3 x 5 reps B  Added standing hip extension 3 x 5 reps B  - standing B shoulder extension with L3 band and TA set  - TA set with band pull L3 band x 15 reps    Gait training     Modality__________________                Total 28    Blank areas are intentional and mean the treatment did not include these items.     Mary Rosas, PT, DPT   2017   within normal limits

## 2024-07-31 NOTE — TELEPHONE ENCOUNTER
ANTICOAGULATION  MANAGEMENT: Discharge Continuity of Care Review    Hospital admission on  2/22-2/25 for cervical laminoplasty    Discharge disposition: Home    INR Results:       Recent labs: (last 7 days)     02/22/19  1035   INR 1.38*       Warfarin inpatient management: Warfarin held    Warfarin discharge instructions: warfarin held until follow up visit on 3/6     Medication Changes Affecting Anticoagulation: Yes: Lovenox q24h.     Additional Factors Affecting Anticoagulation: No    Plan         Spoke with both patient and [facility] nurse Sanaz. Pt will follow up with Neurology 3/6 about when to restart warfarin.     Anticoagulation calendar updated    Edgar Ortez RN                 pain on right side  XR CERVICAL SPINE (4-5 VIEWS)      2. Bilateral arm numbness and tingling while sleeping  XR CERVICAL SPINE (4-5 VIEWS)      3. Bilateral carpal tunnel syndrome        4. History of bariatric surgery                Plan:      Orders Placed This Encounter   Procedures    XR CERVICAL SPINE (4-5 VIEWS)     Standing Status:   Future     Number of Occurrences:   1     Standing Expiration Date:   7/31/2025    [facility]     Referral Priority:   Routine     Referral Type:   Eval and Treat     Referral Reason:   Specialty Services Required     Requested Specialty:   Physical Therapist     Number of Visits Requested:   1    Donnie Meadows MD, Pain Management, [place]     Referral Priority:   Routine     Referral Type:   Eval and Treat     Referral Reason:   Specialty Services Required     Referred to Provider:   Donnie Tanner MD     Requested Specialty:   Physical Medicine and Rehab     Number of Visits Requested:   1    EMG     If abnormalities are found in the ordered extremity(ies) that indicate a broader electro-diagnostic problem than initially suspected, the electro-diagnostician may complete additional testing of extremities not previously ordered to allow a definitive conclusion to the EMG study     Standing Status:   Future     Standing Expiration Date:   1/31/2025     Order Specific Question:   Which body part?     Answer:   Right Upper Extremity     Order Specific Question:   Which body part?     Answer:   Left Upper Extremity    [employer] Wrist Brace, Left; Wrist Brace, Right     Order Specific Question:   Equipment:     Answer:   Wrist Brace, Left     Order Specific Question:   Additional Equipment (if needed):     Answer:   Wrist Brace, Right    F/u after emg

## 2024-11-15 NOTE — PATIENT INSTRUCTIONS - HE
It appears that the diarrhea has improved.    Plan is to continue the Dificid (Fidaxomycin) until about 6/17/19.    Watch for recurrences.    Return to clinic as needed.  
done

## 2025-03-01 NOTE — TELEPHONE ENCOUNTER
----- Message from Bettie Acosta sent at 1/8/2019  8:05 AM CST -----  Contact: Patient  Caller: Tanmay    Primary cardiologist: Dr. Baker    Detailed reason for call: Tanmay states he is short of breath and asks for a return call.     New or active symptoms? Yes    Best phone number: [phone]  Best time to contact: today  Ok to leave a detailed message? Yes    Device? Yes   Post-Op Assessment Note    CV Status:  Stable  Pain Score: 0    Pain management: adequate     Mental Status:  Arousable and sleepy   Hydration Status:  Euvolemic   PONV Controlled:  Controlled   Airway Patency:  Patent      Post Op Vitals Reviewed: Yes      Staff: CRNA         No notable events documented.     BP   101/50   Temp      Pulse 90   Resp 18   SpO2 98% RA None

## (undated) DEVICE — ESU BLADE PEAK PLASMA 3.0S PS210-030S

## (undated) DEVICE — GOWN IMPERVIOUS BREATHABLE SMART LG 89015

## (undated) DEVICE — GLOVE GAMMEX NEOPRENE ULTRA SZ 7 LF 8514

## (undated) DEVICE — SOL WATER IRRIG 1000ML BOTTLE 2F7114

## (undated) DEVICE — TRAY PREP DRY SKIN SCRUB 067

## (undated) DEVICE — INTRO MICRO MINI STICK 5FR STIFF NITINOL

## (undated) DEVICE — CATH BALLOON WEDGE 6FR 60CM AI-07125

## (undated) DEVICE — WIRE WHISPER EDS CS-J 4648

## (undated) DEVICE — SHEATH WORLEY STD BRAIDED 40CM

## (undated) DEVICE — PREP POVIDONE IODINE SOLUTION 10% 4OZ BOTTLE 29906-004

## (undated) DEVICE — CUSTOM PACK GEN MAJOR SBA5BGMHEA

## (undated) DEVICE — CLIP HORIZON MULTI MED BLUE 002204

## (undated) DEVICE — DRAPE IOBAN INCISE 23X17" 6650EZ

## (undated) DEVICE — ELECTRODE ADULT PACING MULTI P-211-M1

## (undated) DEVICE — CUSTOM PACK PACER ICD SAN5BPCHEA

## (undated) DEVICE — DRSG GAUZE 4X4" 3033

## (undated) DEVICE — GUIDEWIRE VASCULAR 0.035IN DIA 145CML 15CML/6CML STAINLESS

## (undated) DEVICE — CATH ANGIO ANG GLIDE 5FRX65CM CG507

## (undated) DEVICE — PLATE GROUNDING ADULT W/CORD 9165L

## (undated) DEVICE — DRESSING PRIMAPORE 4 X 3-1/8 66000317

## (undated) DEVICE — DRAPE OR LAPAROTOMY KC 89228*

## (undated) DEVICE — PREP CHLORAPREP 26ML TINTED HI-LITE ORANGE 930815

## (undated) DEVICE — PREP POVIDONE-IODINE 7.5% SCRUB 4OZ BOTTLE MDS093945

## (undated) DEVICE — VALVE ROTATING HEMOSTATIC ACS 23242

## (undated) DEVICE — SU MONOCRYL+ 4-0 18IN PS2 UND MCP496G

## (undated) RX ORDER — TRIAMCINOLONE ACETONIDE 40 MG/ML
INJECTION, SUSPENSION INTRA-ARTICULAR; INTRAMUSCULAR
Status: DISPENSED
Start: 2021-05-26

## (undated) RX ORDER — LIDOCAINE HYDROCHLORIDE 10 MG/ML
INJECTION, SOLUTION EPIDURAL; INFILTRATION; INTRACAUDAL; PERINEURAL
Status: DISPENSED
Start: 2022-01-01

## (undated) RX ORDER — FENTANYL CITRATE 50 UG/ML
INJECTION, SOLUTION INTRAMUSCULAR; INTRAVENOUS
Status: DISPENSED
Start: 2022-01-01

## (undated) RX ORDER — TRIAMCINOLONE ACETONIDE 40 MG/ML
INJECTION, SUSPENSION INTRA-ARTICULAR; INTRAMUSCULAR
Status: DISPENSED
Start: 2022-01-01

## (undated) RX ORDER — ACETAMINOPHEN 325 MG/1
TABLET ORAL
Status: DISPENSED
Start: 2021-01-01

## (undated) RX ORDER — VANCOMYCIN HYDROCHLORIDE 1 G/20ML
INJECTION, POWDER, LYOPHILIZED, FOR SOLUTION INTRAVENOUS
Status: DISPENSED
Start: 2022-01-01

## (undated) RX ORDER — LIDOCAINE HYDROCHLORIDE 10 MG/ML
INJECTION, SOLUTION EPIDURAL; INFILTRATION; INTRACAUDAL; PERINEURAL
Status: DISPENSED
Start: 2021-05-26